# Patient Record
Sex: MALE | Race: WHITE | NOT HISPANIC OR LATINO | Employment: FULL TIME | URBAN - METROPOLITAN AREA
[De-identification: names, ages, dates, MRNs, and addresses within clinical notes are randomized per-mention and may not be internally consistent; named-entity substitution may affect disease eponyms.]

---

## 2019-08-09 ENCOUNTER — OFFICE VISIT (OUTPATIENT)
Dept: FAMILY MEDICINE | Facility: CLINIC | Age: 49
End: 2019-08-09
Payer: COMMERCIAL

## 2019-08-09 VITALS
TEMPERATURE: 98 F | SYSTOLIC BLOOD PRESSURE: 120 MMHG | BODY MASS INDEX: 25.62 KG/M2 | WEIGHT: 183 LBS | HEIGHT: 71 IN | DIASTOLIC BLOOD PRESSURE: 60 MMHG | OXYGEN SATURATION: 96 % | HEART RATE: 84 BPM

## 2019-08-09 DIAGNOSIS — Z72.0 TOBACCO ABUSE: ICD-10-CM

## 2019-08-09 DIAGNOSIS — Z00.00 ROUTINE PHYSICAL EXAMINATION: Primary | ICD-10-CM

## 2019-08-09 DIAGNOSIS — Z23 PNEUMOCOCCAL VACCINATION ADMINISTERED AT CURRENT VISIT: ICD-10-CM

## 2019-08-09 DIAGNOSIS — E78.5 HYPERLIPIDEMIA, UNSPECIFIED HYPERLIPIDEMIA TYPE: ICD-10-CM

## 2019-08-09 PROCEDURE — 99386 PREV VISIT NEW AGE 40-64: CPT | Mod: 25,S$GLB,, | Performed by: FAMILY MEDICINE

## 2019-08-09 PROCEDURE — 99999 PR PBB SHADOW E&M-NEW PATIENT-LVL III: CPT | Mod: PBBFAC,,, | Performed by: FAMILY MEDICINE

## 2019-08-09 PROCEDURE — 90471 IMMUNIZATION ADMIN: CPT | Mod: S$GLB,,, | Performed by: FAMILY MEDICINE

## 2019-08-09 PROCEDURE — 90471 PNEUMOCOCCAL POLYSACCHARIDE VACCINE 23-VALENT =>2YO SQ IM: ICD-10-PCS | Mod: S$GLB,,, | Performed by: FAMILY MEDICINE

## 2019-08-09 PROCEDURE — 90732 PPSV23 VACC 2 YRS+ SUBQ/IM: CPT | Mod: S$GLB,,, | Performed by: FAMILY MEDICINE

## 2019-08-09 PROCEDURE — 99999 PR PBB SHADOW E&M-NEW PATIENT-LVL III: ICD-10-PCS | Mod: PBBFAC,,, | Performed by: FAMILY MEDICINE

## 2019-08-09 PROCEDURE — 99386 PR PREVENTIVE VISIT,NEW,40-64: ICD-10-PCS | Mod: 25,S$GLB,, | Performed by: FAMILY MEDICINE

## 2019-08-09 PROCEDURE — 90732 PNEUMOCOCCAL POLYSACCHARIDE VACCINE 23-VALENT =>2YO SQ IM: ICD-10-PCS | Mod: S$GLB,,, | Performed by: FAMILY MEDICINE

## 2019-08-09 RX ORDER — ATORVASTATIN CALCIUM 10 MG/1
10 TABLET, FILM COATED ORAL DAILY
COMMUNITY
End: 2019-08-09 | Stop reason: SDUPTHER

## 2019-08-09 RX ORDER — CHOLECALCIFEROL (VITAMIN D3) 25 MCG
1000 TABLET ORAL DAILY
COMMUNITY
End: 2019-08-09

## 2019-08-09 RX ORDER — ATORVASTATIN CALCIUM 10 MG/1
10 TABLET, FILM COATED ORAL DAILY
Qty: 90 TABLET | Refills: 3 | Status: SHIPPED | OUTPATIENT
Start: 2019-08-09 | End: 2020-08-24

## 2019-08-09 NOTE — PROGRESS NOTES
[facility]  Progress Note    SUBJECTIVE:     Chief Complaint   Patient presents with    [facility]       HPI   Rubén Hu  is a 48 y.o. male here for routine physical exam. Patient has no other new complaints/problems at this time.      Review of patient's allergies indicates:   Allergen Reactions    Bactrim [sulfamethoxazole-trimethoprim] Hives       Past Medical History:   Diagnosis Date    Hyperlipidemia      Past Surgical History:   Procedure Laterality Date    EYE SURGERY       Family History   Problem Relation Age of Onset    Arthritis Mother     COPD Father      Social History     Tobacco Use    Smoking status: Current Some Day Smoker     Types: Cigars    Smokeless tobacco: Never Used   Substance Use Topics    Alcohol use: Yes    Drug use: Never        Review of Systems   Constitutional: Negative for chills, diaphoresis and fever.   HENT: Negative for congestion, ear pain and sore throat.    Eyes: Negative for photophobia and discharge.   Respiratory: Negative for cough, shortness of breath and wheezing.    Cardiovascular: Negative for chest pain and palpitations.   Gastrointestinal: Negative for abdominal pain, constipation, diarrhea, nausea and vomiting.   Genitourinary: Negative for dysuria and hematuria.   Musculoskeletal: Negative for back pain and myalgias.   Skin: Negative for itching and rash.   Neurological: Negative for dizziness, sensory change, focal weakness, weakness and headaches.   All other systems reviewed and are negative.    OBJECTIVE:     Vitals:    08/09/19 1403   BP: 120/60   Pulse: 84   Temp: 98 °F (36.7 °C)     Body mass index is 25.52 kg/m².    Physical Exam   Constitutional: He is oriented to person, place, and time and well-developed, well-nourished, and in no distress. No distress.   HENT:   Head: Normocephalic and atraumatic.   Right Ear: Tympanic membrane is not perforated, not erythematous and not bulging. No hemotympanum.   Left Ear: Tympanic  membrane is not perforated, not erythematous and not bulging. No hemotympanum.   Mouth/Throat: Oropharynx is clear and moist. No oropharyngeal exudate.   Eyes: Pupils are equal, round, and reactive to light. Conjunctivae and EOM are normal.   Neck: No thyromegaly present.   Cardiovascular: Normal rate, regular rhythm and normal heart sounds. Exam reveals no gallop and no friction rub.   No murmur heard.  Pulmonary/Chest: Effort normal and breath sounds normal. No respiratory distress. He has no wheezes. He has no rales.   Abdominal: Soft. Bowel sounds are normal. He exhibits no distension. There is no tenderness. There is no rebound and no guarding.   Musculoskeletal: Normal range of motion. He exhibits no edema or tenderness.   Lymphadenopathy:     He has no cervical adenopathy.   Neurological: He is alert and oriented to person, place, and time.   Skin: Skin is warm. No rash noted. He is not diaphoretic. No erythema.       Old records were reviewed. Labs and/or images were independently reviewed.    ASSESSMENT     1. Routine physical examination    2. Hyperlipidemia, unspecified hyperlipidemia type    3. Tobacco abuse    4. Pneumococcal vaccination administered at current visit        PLAN:     Routine physical examination  -     CBC auto differential; Future  -     Comprehensive metabolic panel; Future  -     Hemoglobin A1c; Future  -     Lipid panel; Future  -     TSH; Future  -     T4, free; Future  -     PSA, Screening; Future; Expected date: 08/09/2019  -     Vitamin D; Future; Expected date: 08/09/2019  -     We briefly discussed diet, exercise, and routine preventive exams. All questions and comments addressed.    Hyperlipidemia, unspecified hyperlipidemia type  -     atorvastatin (LIPITOR) 10 MG tablet; Take 1 tablet (10 mg total) by mouth once daily.  Dispense: 90 tablet; Refill: 3  -     Counseled patient about healthy diet, exercise habits, and to increase physical activity.    Tobacco abuse   -      Will quit on his own.    Pneumococcal vaccination administered at current visit  -     Pneumococcal Polysaccharide Vaccine (23 Valent) (SQ/IM)      RTC PRN    Bebeto Arora MD  08/09/2019 2:30 PM

## 2019-08-12 ENCOUNTER — LAB VISIT (OUTPATIENT)
Dept: LAB | Facility: HOSPITAL | Age: 49
End: 2019-08-12
Attending: FAMILY MEDICINE
Payer: COMMERCIAL

## 2019-08-12 ENCOUNTER — TELEPHONE (OUTPATIENT)
Dept: FAMILY MEDICINE | Facility: CLINIC | Age: 49
End: 2019-08-12

## 2019-08-12 DIAGNOSIS — E55.9 VITAMIN D DEFICIENCY: Primary | ICD-10-CM

## 2019-08-12 DIAGNOSIS — Z00.00 ROUTINE PHYSICAL EXAMINATION: ICD-10-CM

## 2019-08-12 LAB
25(OH)D3+25(OH)D2 SERPL-MCNC: 16 NG/ML (ref 30–96)
ALBUMIN SERPL BCP-MCNC: 4.1 G/DL (ref 3.5–5.2)
ALP SERPL-CCNC: 53 U/L (ref 55–135)
ALT SERPL W/O P-5'-P-CCNC: 29 U/L (ref 10–44)
ANION GAP SERPL CALC-SCNC: 6 MMOL/L (ref 8–16)
AST SERPL-CCNC: 22 U/L (ref 10–40)
BASOPHILS # BLD AUTO: 0.08 K/UL (ref 0–0.2)
BASOPHILS NFR BLD: 0.7 % (ref 0–1.9)
BILIRUB SERPL-MCNC: 0.3 MG/DL (ref 0.1–1)
BUN SERPL-MCNC: 13 MG/DL (ref 6–20)
CALCIUM SERPL-MCNC: 9.4 MG/DL (ref 8.7–10.5)
CHLORIDE SERPL-SCNC: 105 MMOL/L (ref 95–110)
CHOLEST SERPL-MCNC: 139 MG/DL (ref 120–199)
CHOLEST/HDLC SERPL: 3 {RATIO} (ref 2–5)
CO2 SERPL-SCNC: 28 MMOL/L (ref 23–29)
COMPLEXED PSA SERPL-MCNC: 1.7 NG/ML (ref 0–4)
CREAT SERPL-MCNC: 0.8 MG/DL (ref 0.5–1.4)
DIFFERENTIAL METHOD: ABNORMAL
EOSINOPHIL # BLD AUTO: 0.3 K/UL (ref 0–0.5)
EOSINOPHIL NFR BLD: 2.3 % (ref 0–8)
ERYTHROCYTE [DISTWIDTH] IN BLOOD BY AUTOMATED COUNT: 14.3 % (ref 11.5–14.5)
EST. GFR  (AFRICAN AMERICAN): >60 ML/MIN/1.73 M^2
EST. GFR  (NON AFRICAN AMERICAN): >60 ML/MIN/1.73 M^2
ESTIMATED AVG GLUCOSE: 105 MG/DL (ref 68–131)
GLUCOSE SERPL-MCNC: 89 MG/DL (ref 70–110)
HBA1C MFR BLD HPLC: 5.3 % (ref 4–5.6)
HCT VFR BLD AUTO: 44.3 % (ref 40–54)
HDLC SERPL-MCNC: 47 MG/DL (ref 40–75)
HDLC SERPL: 33.8 % (ref 20–50)
HGB BLD-MCNC: 14.1 G/DL (ref 14–18)
IMM GRANULOCYTES # BLD AUTO: 0.07 K/UL (ref 0–0.04)
IMM GRANULOCYTES NFR BLD AUTO: 0.6 % (ref 0–0.5)
LDLC SERPL CALC-MCNC: 66.8 MG/DL (ref 63–159)
LYMPHOCYTES # BLD AUTO: 2.6 K/UL (ref 1–4.8)
LYMPHOCYTES NFR BLD: 23.3 % (ref 18–48)
MCH RBC QN AUTO: 31.4 PG (ref 27–31)
MCHC RBC AUTO-ENTMCNC: 31.8 G/DL (ref 32–36)
MCV RBC AUTO: 99 FL (ref 82–98)
MONOCYTES # BLD AUTO: 1.1 K/UL (ref 0.3–1)
MONOCYTES NFR BLD: 9.4 % (ref 4–15)
NEUTROPHILS # BLD AUTO: 7.2 K/UL (ref 1.8–7.7)
NEUTROPHILS NFR BLD: 63.7 % (ref 38–73)
NONHDLC SERPL-MCNC: 92 MG/DL
NRBC BLD-RTO: 0 /100 WBC
PLATELET # BLD AUTO: 548 K/UL (ref 150–350)
PMV BLD AUTO: 11.3 FL (ref 9.2–12.9)
POTASSIUM SERPL-SCNC: 3.9 MMOL/L (ref 3.5–5.1)
PROT SERPL-MCNC: 7.1 G/DL (ref 6–8.4)
RBC # BLD AUTO: 4.49 M/UL (ref 4.6–6.2)
SODIUM SERPL-SCNC: 139 MMOL/L (ref 136–145)
T4 FREE SERPL-MCNC: 0.71 NG/DL (ref 0.71–1.51)
TRIGL SERPL-MCNC: 126 MG/DL (ref 30–150)
TSH SERPL DL<=0.005 MIU/L-ACNC: 0.96 UIU/ML (ref 0.4–4)
WBC # BLD AUTO: 11.27 K/UL (ref 3.9–12.7)

## 2019-08-12 PROCEDURE — 82306 VITAMIN D 25 HYDROXY: CPT

## 2019-08-12 PROCEDURE — 85025 COMPLETE CBC W/AUTO DIFF WBC: CPT

## 2019-08-12 PROCEDURE — 83036 HEMOGLOBIN GLYCOSYLATED A1C: CPT

## 2019-08-12 PROCEDURE — 84153 ASSAY OF PSA TOTAL: CPT

## 2019-08-12 PROCEDURE — 36415 COLL VENOUS BLD VENIPUNCTURE: CPT | Mod: PO

## 2019-08-12 PROCEDURE — 84443 ASSAY THYROID STIM HORMONE: CPT

## 2019-08-12 PROCEDURE — 84439 ASSAY OF FREE THYROXINE: CPT

## 2019-08-12 PROCEDURE — 80053 COMPREHEN METABOLIC PANEL: CPT

## 2019-08-12 PROCEDURE — 80061 LIPID PANEL: CPT

## 2019-08-12 RX ORDER — ERGOCALCIFEROL 1.25 MG/1
50000 CAPSULE ORAL
Qty: 12 CAPSULE | Refills: 3 | Status: SHIPPED | OUTPATIENT
Start: 2019-08-12 | End: 2020-07-13 | Stop reason: SDUPTHER

## 2019-08-14 ENCOUNTER — PATIENT OUTREACH (OUTPATIENT)
Dept: ADMINISTRATIVE | Facility: HOSPITAL | Age: 49
End: 2019-08-14

## 2020-07-11 DIAGNOSIS — E55.9 VITAMIN D DEFICIENCY: ICD-10-CM

## 2020-07-11 DIAGNOSIS — E78.5 HYPERLIPIDEMIA, UNSPECIFIED HYPERLIPIDEMIA TYPE: Primary | ICD-10-CM

## 2020-07-12 RX ORDER — ERGOCALCIFEROL 1.25 MG/1
CAPSULE ORAL
Qty: 12 CAPSULE | Refills: 3 | OUTPATIENT
Start: 2020-07-12

## 2020-07-12 NOTE — PROGRESS NOTES
Refill Routing Note     Medication(s) are not appropriate for processing by [facility]:    Medication Outside of Protocol    Appointments  past 12m or future 3m with PCP    Date Provider   Last Visit   8/9/2019 Bebeto Arora MD   Next Visit   Visit date not found Bebeto Arora MD           Automatic Epic Protocol Generated Data:    Requested Prescriptions   Pending Prescriptions Disp Refills    ergocalciferol (ERGOCALCIFEROL) 50,000 unit Cap [Pharmacy Med Name: VITAMIN D2 50,000IU (ERGO) CAP RX] 12 capsule 3     Sig: TAKE 1 CAPSULE BY MOUTH ONCE WEEKLY       Endocrinology:  Vitamins - Vitamin D Supplementation Failed - 7/11/2020  5:12 PM        Failed - Vitamin D is 20 or above and within 360 days     Vit D, 25-Hydroxy   Date Value Ref Range Status   08/12/2019 16 (L) 30 - 96 ng/mL Final     Comment:     Vitamin D deficiency.........<10 ng/mL                              Vitamin D insufficiency......10-29 ng/mL       Vitamin D sufficiency........> or equal to 30 ng/mL  Vitamin D toxicity............>100 ng/mL                Passed - Patient is at least 18 years old        Passed - Hypercalcemia is not present on problem list        Passed - Office visit in past 12 months or future 90 days.     Recent Outpatient Visits            11 months ago Routine physical examination    [facility] Bebeto Arora MD                    Passed - Ca in normal range and within 360 days     Calcium   Date Value Ref Range Status   08/12/2019 9.4 8.7 - 10.5 mg/dL Final                    Note composed:10:20 PM 07/11/2020

## 2020-07-13 RX ORDER — ERGOCALCIFEROL 1.25 MG/1
50000 CAPSULE ORAL
Qty: 12 CAPSULE | Refills: 0 | Status: SHIPPED | OUTPATIENT
Start: 2020-07-13 | End: 2020-10-02

## 2020-07-13 NOTE — TELEPHONE ENCOUNTER
Please note denial of medication.    Requested Prescriptions     Refused Prescriptions Disp Refills    ergocalciferol (ERGOCALCIFEROL) 50,000 unit Cap [Pharmacy Med Name: VITAMIN D2 50,000IU (ERGO) CAP RX] 12 capsule 3     Sig: TAKE 1 CAPSULE BY MOUTH ONCE WEEKLY     Refused By: ELOISA RAYMOND     Reason for Refusal: Patient needs an appointment         Thanks

## 2020-07-13 NOTE — TELEPHONE ENCOUNTER
Provider Staff:     Please schedule patient for Annual and Labs (CMP, Lipid)    Please also check with your provider if any further labs need to be added and scheduled together.    Thanks!  [facility]     Appointments  past 12m or future 3m with PCP    Date Provider   Last Visit   8/9/2019 Bebeto Arora MD   Next Visit   Visit date not found Bebeto Arora MD     Note composed:8:34 AM 07/13/2020

## 2020-07-13 NOTE — TELEPHONE ENCOUNTER
Spoke with the patient and scheduled his physical and fasting labs.  Patient is asking can a one month refill be sent to the pharmacy he is completely out.  Please advise      Will this patient need any other labs, other then the 2 I scheduled?

## 2020-07-13 NOTE — TELEPHONE ENCOUNTER
Spoke with the patient and inform him of the medication approval.  Patient verbalized understandings.

## 2020-07-30 ENCOUNTER — LAB VISIT (OUTPATIENT)
Dept: LAB | Facility: HOSPITAL | Age: 50
End: 2020-07-30
Attending: FAMILY MEDICINE
Payer: COMMERCIAL

## 2020-07-30 DIAGNOSIS — E78.5 HYPERLIPIDEMIA, UNSPECIFIED HYPERLIPIDEMIA TYPE: ICD-10-CM

## 2020-07-30 LAB
ALBUMIN SERPL BCP-MCNC: 4.4 G/DL (ref 3.5–5.2)
ALP SERPL-CCNC: 62 U/L (ref 55–135)
ALT SERPL W/O P-5'-P-CCNC: 43 U/L (ref 10–44)
ANION GAP SERPL CALC-SCNC: 8 MMOL/L (ref 8–16)
AST SERPL-CCNC: 27 U/L (ref 10–40)
BILIRUB SERPL-MCNC: 0.4 MG/DL (ref 0.1–1)
BUN SERPL-MCNC: 18 MG/DL (ref 6–20)
CALCIUM SERPL-MCNC: 9.7 MG/DL (ref 8.7–10.5)
CHLORIDE SERPL-SCNC: 105 MMOL/L (ref 95–110)
CHOLEST SERPL-MCNC: 169 MG/DL (ref 120–199)
CHOLEST/HDLC SERPL: 3.3 {RATIO} (ref 2–5)
CO2 SERPL-SCNC: 25 MMOL/L (ref 23–29)
CREAT SERPL-MCNC: 0.8 MG/DL (ref 0.5–1.4)
EST. GFR  (AFRICAN AMERICAN): >60 ML/MIN/1.73 M^2
EST. GFR  (NON AFRICAN AMERICAN): >60 ML/MIN/1.73 M^2
GLUCOSE SERPL-MCNC: 87 MG/DL (ref 70–110)
HDLC SERPL-MCNC: 51 MG/DL (ref 40–75)
HDLC SERPL: 30.2 % (ref 20–50)
LDLC SERPL CALC-MCNC: 88.2 MG/DL (ref 63–159)
NONHDLC SERPL-MCNC: 118 MG/DL
POTASSIUM SERPL-SCNC: 4.1 MMOL/L (ref 3.5–5.1)
PROT SERPL-MCNC: 7.8 G/DL (ref 6–8.4)
SODIUM SERPL-SCNC: 138 MMOL/L (ref 136–145)
TRIGL SERPL-MCNC: 149 MG/DL (ref 30–150)

## 2020-07-30 PROCEDURE — 80061 LIPID PANEL: CPT

## 2020-07-30 PROCEDURE — 36415 COLL VENOUS BLD VENIPUNCTURE: CPT | Mod: PO

## 2020-07-30 PROCEDURE — 80053 COMPREHEN METABOLIC PANEL: CPT

## 2020-08-11 ENCOUNTER — OFFICE VISIT (OUTPATIENT)
Dept: FAMILY MEDICINE | Facility: CLINIC | Age: 50
End: 2020-08-11
Payer: COMMERCIAL

## 2020-08-11 VITALS
SYSTOLIC BLOOD PRESSURE: 104 MMHG | OXYGEN SATURATION: 97 % | WEIGHT: 189.69 LBS | BODY MASS INDEX: 26.56 KG/M2 | DIASTOLIC BLOOD PRESSURE: 80 MMHG | HEART RATE: 82 BPM | HEIGHT: 71 IN | TEMPERATURE: 98 F

## 2020-08-11 DIAGNOSIS — Z00.00 ROUTINE PHYSICAL EXAMINATION: Primary | ICD-10-CM

## 2020-08-11 DIAGNOSIS — Z23 NEED FOR SHINGLES VACCINE: ICD-10-CM

## 2020-08-11 PROBLEM — Z72.0 TOBACCO ABUSE: Chronic | Status: ACTIVE | Noted: 2019-08-09

## 2020-08-11 PROBLEM — E78.5 HYPERLIPIDEMIA: Chronic | Status: ACTIVE | Noted: 2019-08-09

## 2020-08-11 PROCEDURE — 90750 ZOSTER RECOMBINANT VACCINE: ICD-10-PCS | Mod: S$GLB,,, | Performed by: FAMILY MEDICINE

## 2020-08-11 PROCEDURE — 99999 PR PBB SHADOW E&M-EST. PATIENT-LVL III: ICD-10-PCS | Mod: PBBFAC,,, | Performed by: FAMILY MEDICINE

## 2020-08-11 PROCEDURE — 90471 ZOSTER RECOMBINANT VACCINE: ICD-10-PCS | Mod: S$GLB,,, | Performed by: FAMILY MEDICINE

## 2020-08-11 PROCEDURE — 90471 IMMUNIZATION ADMIN: CPT | Mod: S$GLB,,, | Performed by: FAMILY MEDICINE

## 2020-08-11 PROCEDURE — 3008F PR BODY MASS INDEX (BMI) DOCUMENTED: ICD-10-PCS | Mod: CPTII,S$GLB,, | Performed by: FAMILY MEDICINE

## 2020-08-11 PROCEDURE — 99999 PR PBB SHADOW E&M-EST. PATIENT-LVL III: CPT | Mod: PBBFAC,,, | Performed by: FAMILY MEDICINE

## 2020-08-11 PROCEDURE — 99396 PREV VISIT EST AGE 40-64: CPT | Mod: 25,S$GLB,, | Performed by: FAMILY MEDICINE

## 2020-08-11 PROCEDURE — 99396 PR PREVENTIVE VISIT,EST,40-64: ICD-10-PCS | Mod: 25,S$GLB,, | Performed by: FAMILY MEDICINE

## 2020-08-11 PROCEDURE — 3008F BODY MASS INDEX DOCD: CPT | Mod: CPTII,S$GLB,, | Performed by: FAMILY MEDICINE

## 2020-08-11 PROCEDURE — 90750 HZV VACC RECOMBINANT IM: CPT | Mod: S$GLB,,, | Performed by: FAMILY MEDICINE

## 2020-08-11 NOTE — PROGRESS NOTES
[facility]  Progress Note    SUBJECTIVE:     Chief Complaint   Patient presents with    Annual Exam       HPI   Rubén Hu  is a 49 y.o. male here for routine physical exam. Patient has no other new complaints/problems at this time.      Review of patient's allergies indicates:   Allergen Reactions    Bactrim [sulfamethoxazole-trimethoprim] Hives       Past Medical History:   Diagnosis Date    Hyperlipidemia      Past Surgical History:   Procedure Laterality Date    EYE SURGERY       Family History   Problem Relation Age of Onset    Arthritis Mother     COPD Father      Social History     Tobacco Use    Smoking status: Current Some Day Smoker     Types: Cigars    Smokeless tobacco: Never Used   Substance Use Topics    Alcohol use: Yes    Drug use: Never        Review of Systems   Constitutional: Negative for chills, diaphoresis and fever.   HENT: Negative for congestion, ear pain and sore throat.    Eyes: Negative for photophobia and discharge.   Respiratory: Negative for cough, shortness of breath and wheezing.    Cardiovascular: Negative for chest pain and palpitations.   Gastrointestinal: Negative for abdominal pain, constipation, diarrhea, nausea and vomiting.   Genitourinary: Negative for dysuria and hematuria.   Musculoskeletal: Negative for back pain and myalgias.   Skin: Negative for itching and rash.   Neurological: Negative for dizziness, sensory change, focal weakness, weakness and headaches.   All other systems reviewed and are negative.    OBJECTIVE:     Vitals:    08/11/20 1528   BP: 104/80   Pulse: 82   Temp: 97.7 °F (36.5 °C)     Body mass index is 26.46 kg/m².    Physical Exam   Constitutional: He is oriented to person, place, and time and well-developed, well-nourished, and in no distress. No distress.   HENT:   Head: Normocephalic and atraumatic.   Right Ear: Tympanic membrane is not perforated, not erythematous and not bulging. No hemotympanum.   Left Ear: Tympanic  membrane is not perforated, not erythematous and not bulging. No hemotympanum.   Mouth/Throat: Oropharynx is clear and moist. No oropharyngeal exudate.   Eyes: Pupils are equal, round, and reactive to light. Conjunctivae and EOM are normal.   Neck: No thyromegaly present.   Cardiovascular: Normal rate, regular rhythm and normal heart sounds. Exam reveals no gallop and no friction rub.   No murmur heard.  Pulmonary/Chest: Effort normal and breath sounds normal. No respiratory distress. He has no wheezes. He has no rales.   Abdominal: Soft. Bowel sounds are normal. He exhibits no distension. There is no abdominal tenderness. There is no rebound and no guarding.   Musculoskeletal: Normal range of motion.         General: No tenderness or edema.   Lymphadenopathy:     He has no cervical adenopathy.   Neurological: He is alert and oriented to person, place, and time.   Skin: Skin is warm. No rash noted. He is not diaphoretic. No erythema.       Old records were reviewed. Labs and/or images were independently reviewed.    ASSESSMENT     1. Routine physical examination    2. Need for shingles vaccine        PLAN:     Routine physical examination  -     CBC auto differential; Future  -     Hemoglobin A1C; Future  -     Comprehensive metabolic panel; Future  -     Lipid Panel; Future  -     TSH; Future  -     T4, free; Future  -     PSA, Screening; Future; Expected date: 08/11/2020  -     Vitamin D; Future; Expected date: 08/11/2020  -     We briefly discussed diet, exercise, and routine preventive exams. All questions and comments addressed.    Need for shingles vaccine  -     Zoster Recombinant Vaccine      RTC PRN    Bebeto Arora MD  08/11/2020 3:42 PM

## 2020-08-14 DIAGNOSIS — Z11.59 NEED FOR HEPATITIS C SCREENING TEST: ICD-10-CM

## 2020-08-23 DIAGNOSIS — E78.5 HYPERLIPIDEMIA, UNSPECIFIED HYPERLIPIDEMIA TYPE: ICD-10-CM

## 2020-08-24 RX ORDER — ATORVASTATIN CALCIUM 10 MG/1
TABLET, FILM COATED ORAL
Qty: 90 TABLET | Refills: 3 | Status: SHIPPED | OUTPATIENT
Start: 2020-08-24 | End: 2021-03-10 | Stop reason: SDUPTHER

## 2020-08-25 NOTE — PROGRESS NOTES
Refill Authorization Note     is requesting a refill authorization.    Brief assessment and rationale for refill: APPROVE: prr               Medication reconciliation completed: No                         Comments:   Automatic Epic Protocol Generated Data:    Requested Prescriptions   Signed Prescriptions Disp Refills    atorvastatin (LIPITOR) 10 MG tablet 90 tablet 3     Sig: TAKE 1 TABLET BY MOUTH EVERY NIGHT AT BEDTIME       Cardiovascular:  Antilipid - Statins Passed - 8/23/2020  5:35 PM        Passed - Patient is at least 18 years old        Passed - Office visit in past 12 months or future 90 days.     Recent Outpatient Visits            1 week ago Routine physical examination    [facility] Bebeto Arora MD    1 year ago Routine physical examination    [facility] Bebeto Arora MD          Future Appointments              In 2 months NURSE, [facility]                Passed - Lipid Panel completed in last 360 days     Lab Results   Component Value Date    CHOL 169 07/30/2020    HDL 51 07/30/2020    LDLCALC 88.2 07/30/2020    TRIG 149 07/30/2020             Passed - ALT is 94 or below and within 360 days     ALT   Date Value Ref Range Status   07/30/2020 43 10 - 44 U/L Final   08/12/2019 29 10 - 44 U/L Final              Passed - AST is 54 or below and within 360 days     AST   Date Value Ref Range Status   07/30/2020 27 10 - 40 U/L Final   08/12/2019 22 10 - 40 U/L Final                    Appointments  past 12m or future 3m with PCP    Date Provider   Last Visit   8/11/2020 Bebeto Arora MD   Next Visit   Visit date not found Bebeto Arora MD   ED visits in past 90 days: 0     Note composed:10:23 PM 08/24/2020

## 2020-10-02 DIAGNOSIS — E55.9 VITAMIN D DEFICIENCY: ICD-10-CM

## 2020-10-02 RX ORDER — ERGOCALCIFEROL 1.25 MG/1
CAPSULE ORAL
Qty: 12 CAPSULE | Refills: 3 | Status: SHIPPED | OUTPATIENT
Start: 2020-10-02 | End: 2021-03-10 | Stop reason: SDUPTHER

## 2020-10-02 NOTE — PROGRESS NOTES
Refill Routing Note   Medication(s) are not appropriate for processing by [facility] for the following reason(s)   - Outside of Protocol    Medication reconciliation completed: No   Automatic Epic Protocol Generated Data:    Requested Prescriptions   Pending Prescriptions Disp Refills    ergocalciferol (ERGOCALCIFEROL) 50,000 unit Cap [Pharmacy Med Name: VITAMIN D2 50,000IU (ERGO) CAP RX] 12 capsule 0     Sig: TAKE 1 CAPSULE BY MOUTH EVERY 7 DAYS       There is no refill protocol information for this order           Appointments     Date Provider   Last Visit   8/11/2020 Bebeto Arora MD   Next Visit   Visit date not found Bebeto Arora MD   ED visits in past 90 days: 0    Note composed:8:54 AM 10/02/2020

## 2020-12-03 ENCOUNTER — CLINICAL SUPPORT (OUTPATIENT)
Dept: FAMILY MEDICINE | Facility: CLINIC | Age: 50
End: 2020-12-03
Payer: COMMERCIAL

## 2020-12-03 DIAGNOSIS — Z23 NEED FOR SHINGLES VACCINE: Primary | ICD-10-CM

## 2020-12-03 PROCEDURE — 99499 UNLISTED E&M SERVICE: CPT | Mod: S$GLB,,, | Performed by: FAMILY MEDICINE

## 2020-12-03 PROCEDURE — 99499 NO LOS: ICD-10-PCS | Mod: S$GLB,,, | Performed by: FAMILY MEDICINE

## 2020-12-03 PROCEDURE — 90471 IMMUNIZATION ADMIN: CPT | Mod: S$GLB,,, | Performed by: FAMILY MEDICINE

## 2020-12-03 PROCEDURE — 90750 HZV VACC RECOMBINANT IM: CPT | Mod: S$GLB,,, | Performed by: FAMILY MEDICINE

## 2020-12-03 PROCEDURE — 90471 ZOSTER RECOMBINANT VACCINE: ICD-10-PCS | Mod: S$GLB,,, | Performed by: FAMILY MEDICINE

## 2020-12-03 PROCEDURE — 90750 ZOSTER RECOMBINANT VACCINE: ICD-10-PCS | Mod: S$GLB,,, | Performed by: FAMILY MEDICINE

## 2020-12-03 NOTE — PROGRESS NOTES
SHINGRIX dose#2,Lot# [id number],  Given IM in Left Deltoid, tolerated well. Patient instructed to waited 15 minutes ,VIS form given

## 2020-12-18 ENCOUNTER — TELEPHONE (OUTPATIENT)
Dept: FAMILY MEDICINE | Facility: CLINIC | Age: 50
End: 2020-12-18

## 2020-12-18 NOTE — TELEPHONE ENCOUNTER
----- Message from Tessie Cleary sent at 12/18/2020 12:34 PM CST -----  Regarding: Pharmacy Change  Contact: Rubén  Type: Patient Call Back    Who called:Rubén    What is the request in detail:[facility] - [place], [place] - [address] [phone] (Phone)  [phone] (Fax)    Can the clinic reply by [facility]?    Would the patient rather a call back or a response via [facility]? Call    Best call back number:[phone]    Additional Information:Going forward, please send all medications and refills to pharmacy listed above

## 2020-12-19 ENCOUNTER — TELEPHONE (OUTPATIENT)
Dept: FAMILY MEDICINE | Facility: CLINIC | Age: 50
End: 2020-12-19

## 2020-12-19 NOTE — TELEPHONE ENCOUNTER
----- Message from Tessie Huff sent at 12/18/2020 12:58 PM CST -----  Regarding: self [phone]  .Type:  Patient Returning Call    Who Called: self     Who Left Message for Patient: Eleni Jorge    Does the patient know what this is regarding?: A call back     Would the patient rather a call back or a response via [facility]? Call back     Best Call Back Number:[phone]

## 2020-12-19 NOTE — TELEPHONE ENCOUNTER
Spoke with patient and he informed me that due to his new insurance; all medications [facility]. This was update to reflex patient's request.

## 2021-03-10 DIAGNOSIS — E78.5 HYPERLIPIDEMIA, UNSPECIFIED HYPERLIPIDEMIA TYPE: ICD-10-CM

## 2021-03-10 DIAGNOSIS — E55.9 VITAMIN D DEFICIENCY: ICD-10-CM

## 2021-03-10 RX ORDER — ERGOCALCIFEROL 1.25 MG/1
50000 CAPSULE ORAL
Qty: 12 CAPSULE | Refills: 1 | Status: SHIPPED | OUTPATIENT
Start: 2021-03-10 | End: 2021-09-19

## 2021-03-10 RX ORDER — ATORVASTATIN CALCIUM 10 MG/1
10 TABLET, FILM COATED ORAL NIGHTLY
Qty: 90 TABLET | Refills: 1 | Status: SHIPPED | OUTPATIENT
Start: 2021-03-10 | End: 2021-09-17

## 2021-08-09 ENCOUNTER — TELEPHONE (OUTPATIENT)
Dept: FAMILY MEDICINE | Facility: CLINIC | Age: 51
End: 2021-08-09

## 2021-09-16 DIAGNOSIS — E78.5 HYPERLIPIDEMIA, UNSPECIFIED HYPERLIPIDEMIA TYPE: ICD-10-CM

## 2021-09-16 DIAGNOSIS — E55.9 VITAMIN D DEFICIENCY: ICD-10-CM

## 2021-09-17 RX ORDER — ATORVASTATIN CALCIUM 10 MG/1
TABLET, FILM COATED ORAL
Qty: 90 TABLET | Refills: 0 | Status: SHIPPED | OUTPATIENT
Start: 2021-09-17 | End: 2021-12-21

## 2021-09-19 RX ORDER — ERGOCALCIFEROL 1.25 MG/1
CAPSULE ORAL
Qty: 12 CAPSULE | Refills: 0 | Status: SHIPPED | OUTPATIENT
Start: 2021-09-19 | End: 2021-12-21 | Stop reason: SDUPTHER

## 2021-12-20 DIAGNOSIS — E55.9 VITAMIN D DEFICIENCY: ICD-10-CM

## 2021-12-20 DIAGNOSIS — E78.5 HYPERLIPIDEMIA, UNSPECIFIED HYPERLIPIDEMIA TYPE: ICD-10-CM

## 2021-12-21 RX ORDER — ATORVASTATIN CALCIUM 10 MG/1
TABLET, FILM COATED ORAL
Qty: 90 TABLET | Refills: 0 | Status: SHIPPED | OUTPATIENT
Start: 2021-12-21 | End: 2022-04-21 | Stop reason: SDUPTHER

## 2021-12-21 RX ORDER — ERGOCALCIFEROL 1.25 MG/1
50000 CAPSULE ORAL
Qty: 12 CAPSULE | Refills: 0 | Status: SHIPPED | OUTPATIENT
Start: 2021-12-21 | End: 2022-03-07

## 2022-01-19 ENCOUNTER — OFFICE VISIT (OUTPATIENT)
Dept: FAMILY MEDICINE | Facility: CLINIC | Age: 52
End: 2022-01-19
Payer: COMMERCIAL

## 2022-01-19 VITALS
HEIGHT: 71 IN | DIASTOLIC BLOOD PRESSURE: 80 MMHG | OXYGEN SATURATION: 98 % | WEIGHT: 193 LBS | HEART RATE: 79 BPM | SYSTOLIC BLOOD PRESSURE: 138 MMHG | TEMPERATURE: 98 F | BODY MASS INDEX: 27.02 KG/M2

## 2022-01-19 DIAGNOSIS — K64.9 HEMORRHOIDS, UNSPECIFIED HEMORRHOID TYPE: ICD-10-CM

## 2022-01-19 DIAGNOSIS — Z23 NEED FOR TDAP VACCINATION: ICD-10-CM

## 2022-01-19 DIAGNOSIS — Z00.00 ROUTINE PHYSICAL EXAMINATION: Primary | ICD-10-CM

## 2022-01-19 DIAGNOSIS — E78.5 HYPERLIPIDEMIA, UNSPECIFIED HYPERLIPIDEMIA TYPE: Chronic | ICD-10-CM

## 2022-01-19 PROCEDURE — 3008F BODY MASS INDEX DOCD: CPT | Mod: CPTII,S$GLB,, | Performed by: FAMILY MEDICINE

## 2022-01-19 PROCEDURE — 1159F PR MEDICATION LIST DOCUMENTED IN MEDICAL RECORD: ICD-10-PCS | Mod: CPTII,S$GLB,, | Performed by: FAMILY MEDICINE

## 2022-01-19 PROCEDURE — 3075F PR MOST RECENT SYSTOLIC BLOOD PRESS GE 130-139MM HG: ICD-10-PCS | Mod: CPTII,S$GLB,, | Performed by: FAMILY MEDICINE

## 2022-01-19 PROCEDURE — 90715 TDAP VACCINE GREATER THAN OR EQUAL TO 7YO IM: ICD-10-PCS | Mod: S$GLB,,, | Performed by: FAMILY MEDICINE

## 2022-01-19 PROCEDURE — 90471 IMMUNIZATION ADMIN: CPT | Mod: S$GLB,,, | Performed by: FAMILY MEDICINE

## 2022-01-19 PROCEDURE — 90471 TDAP VACCINE GREATER THAN OR EQUAL TO 7YO IM: ICD-10-PCS | Mod: S$GLB,,, | Performed by: FAMILY MEDICINE

## 2022-01-19 PROCEDURE — 90715 TDAP VACCINE 7 YRS/> IM: CPT | Mod: S$GLB,,, | Performed by: FAMILY MEDICINE

## 2022-01-19 PROCEDURE — 3079F DIAST BP 80-89 MM HG: CPT | Mod: CPTII,S$GLB,, | Performed by: FAMILY MEDICINE

## 2022-01-19 PROCEDURE — 99396 PREV VISIT EST AGE 40-64: CPT | Mod: S$GLB,,, | Performed by: FAMILY MEDICINE

## 2022-01-19 PROCEDURE — 99999 PR PBB SHADOW E&M-EST. PATIENT-LVL III: CPT | Mod: PBBFAC,,, | Performed by: FAMILY MEDICINE

## 2022-01-19 PROCEDURE — 3075F SYST BP GE 130 - 139MM HG: CPT | Mod: CPTII,S$GLB,, | Performed by: FAMILY MEDICINE

## 2022-01-19 PROCEDURE — 1159F MED LIST DOCD IN RCRD: CPT | Mod: CPTII,S$GLB,, | Performed by: FAMILY MEDICINE

## 2022-01-19 PROCEDURE — 99999 PR PBB SHADOW E&M-EST. PATIENT-LVL III: ICD-10-PCS | Mod: PBBFAC,,, | Performed by: FAMILY MEDICINE

## 2022-01-19 PROCEDURE — 99396 PR PREVENTIVE VISIT,EST,40-64: ICD-10-PCS | Mod: S$GLB,,, | Performed by: FAMILY MEDICINE

## 2022-01-19 PROCEDURE — 3008F PR BODY MASS INDEX (BMI) DOCUMENTED: ICD-10-PCS | Mod: CPTII,S$GLB,, | Performed by: FAMILY MEDICINE

## 2022-01-19 PROCEDURE — 3079F PR MOST RECENT DIASTOLIC BLOOD PRESSURE 80-89 MM HG: ICD-10-PCS | Mod: CPTII,S$GLB,, | Performed by: FAMILY MEDICINE

## 2022-01-19 NOTE — PROGRESS NOTES
[facility]  Progress Note    SUBJECTIVE:     Chief Complaint   Patient presents with    Annual Exam       HPI   Rubén Hu  is a 51 y.o. male here for routine physical exam. Patient has no other new complaints/problems at this time.      Review of patient's allergies indicates:   Allergen Reactions    Bactrim [sulfamethoxazole-trimethoprim] Hives       Past Medical History:   Diagnosis Date    Hyperlipidemia      Past Surgical History:   Procedure Laterality Date    EYE SURGERY       Family History   Problem Relation Age of Onset    Arthritis Mother     COPD Father      Social History     Tobacco Use    Smoking status: Former Smoker     Types: Cigars    Smokeless tobacco: Never Used   Substance Use Topics    Alcohol use: Yes    Drug use: Never        Review of Systems   Constitutional: Negative for chills, diaphoresis and fever.   HENT: Negative for congestion, ear pain and sore throat.    Eyes: Negative for photophobia and discharge.   Respiratory: Negative for cough, shortness of breath and wheezing.    Cardiovascular: Negative for chest pain and palpitations.   Gastrointestinal: Negative for abdominal pain, constipation, diarrhea, nausea and vomiting.   Genitourinary: Negative for dysuria and hematuria.   Musculoskeletal: Negative for back pain and myalgias.   Skin: Negative for itching and rash.   Neurological: Negative for dizziness, sensory change, focal weakness, weakness and headaches.   All other systems reviewed and are negative.    OBJECTIVE:     Vitals:    01/19/22 1601   BP: 138/80   Pulse: 79   Temp: 98.1 °F (36.7 °C)     Body mass index is 26.92 kg/m².    Physical Exam  Constitutional:       General: He is not in acute distress.     Appearance: He is not diaphoretic.   HENT:      Head: Normocephalic and atraumatic.      Right Ear: Tympanic membrane and ear canal normal. No hemotympanum. Tympanic membrane is not perforated, erythematous or bulging.      Left Ear: Tympanic  membrane and ear canal normal. No hemotympanum. Tympanic membrane is not perforated, erythematous or bulging.      Mouth/Throat:      Pharynx: No oropharyngeal exudate.   Eyes:      Conjunctiva/sclera: Conjunctivae normal.      Pupils: Pupils are equal, round, and reactive to light.   Neck:      Thyroid: No thyromegaly.   Cardiovascular:      Rate and Rhythm: Normal rate and regular rhythm.      Heart sounds: Normal heart sounds. No murmur heard.  No friction rub. No gallop.    Pulmonary:      Effort: Pulmonary effort is normal. No respiratory distress.      Breath sounds: Normal breath sounds. No wheezing or rales.   Abdominal:      General: Bowel sounds are normal. There is no distension.      Palpations: Abdomen is soft.      Tenderness: There is no abdominal tenderness. There is no guarding or rebound.   Musculoskeletal:         General: No tenderness. Normal range of motion.   Lymphadenopathy:      Cervical: No cervical adenopathy.   Skin:     General: Skin is warm.      Findings: No erythema or rash.   Neurological:      Mental Status: He is alert and oriented to person, place, and time.         Old records were reviewed. Labs and/or images were independently reviewed.    ASSESSMENT     1. Routine physical examination    2. Hyperlipidemia, unspecified hyperlipidemia type    3. Hemorrhoids, unspecified hemorrhoid type    4. Need for Tdap vaccination        PLAN:     Routine physical examination  -     CBC Auto Differential; Future  -     Comprehensive Metabolic Panel; Future  -     Hemoglobin A1C; Future  -     Lipid Panel; Future  -     TSH; Future  -     T4, Free; Future  -     We briefly discussed diet, exercise, and routine preventive exams. All questions and comments addressed.    Hyperlipidemia, unspecified hyperlipidemia type   -     Counseled patient about healthy diet, exercise habits, and to increase physical activity.    Hemorrhoids, unspecified hemorrhoid type   -      Increase fiber intake.  Continue with current regimen. F/u with colorectal as scheduled.    Need for Tdap vaccination  -     Tdap Vaccine      RTC PRN    Bebeto Arora MD  01/19/2022 4:23 PM

## 2022-01-22 ENCOUNTER — LAB VISIT (OUTPATIENT)
Dept: LAB | Facility: HOSPITAL | Age: 52
End: 2022-01-22
Attending: FAMILY MEDICINE
Payer: COMMERCIAL

## 2022-01-22 DIAGNOSIS — Z00.00 ROUTINE PHYSICAL EXAMINATION: ICD-10-CM

## 2022-01-22 LAB
ALBUMIN SERPL BCP-MCNC: 3.9 G/DL (ref 3.5–5.2)
ALP SERPL-CCNC: 59 U/L (ref 55–135)
ALT SERPL W/O P-5'-P-CCNC: 39 U/L (ref 10–44)
ANION GAP SERPL CALC-SCNC: 10 MMOL/L (ref 8–16)
AST SERPL-CCNC: 27 U/L (ref 10–40)
BASOPHILS # BLD AUTO: 0.09 K/UL (ref 0–0.2)
BASOPHILS NFR BLD: 0.8 % (ref 0–1.9)
BILIRUB SERPL-MCNC: 0.4 MG/DL (ref 0.1–1)
BUN SERPL-MCNC: 10 MG/DL (ref 6–20)
CALCIUM SERPL-MCNC: 9.5 MG/DL (ref 8.7–10.5)
CHLORIDE SERPL-SCNC: 107 MMOL/L (ref 95–110)
CHOLEST SERPL-MCNC: 137 MG/DL (ref 120–199)
CHOLEST/HDLC SERPL: 2.9 {RATIO} (ref 2–5)
CO2 SERPL-SCNC: 25 MMOL/L (ref 23–29)
CREAT SERPL-MCNC: 0.7 MG/DL (ref 0.5–1.4)
DIFFERENTIAL METHOD: ABNORMAL
EOSINOPHIL # BLD AUTO: 0.3 K/UL (ref 0–0.5)
EOSINOPHIL NFR BLD: 2.8 % (ref 0–8)
ERYTHROCYTE [DISTWIDTH] IN BLOOD BY AUTOMATED COUNT: 14.2 % (ref 11.5–14.5)
EST. GFR  (AFRICAN AMERICAN): >60 ML/MIN/1.73 M^2
EST. GFR  (NON AFRICAN AMERICAN): >60 ML/MIN/1.73 M^2
ESTIMATED AVG GLUCOSE: 108 MG/DL (ref 68–131)
GLUCOSE SERPL-MCNC: 88 MG/DL (ref 70–110)
HBA1C MFR BLD: 5.4 % (ref 4–5.6)
HCT VFR BLD AUTO: 44.9 % (ref 40–54)
HDLC SERPL-MCNC: 48 MG/DL (ref 40–75)
HDLC SERPL: 35 % (ref 20–50)
HGB BLD-MCNC: 14.6 G/DL (ref 14–18)
IMM GRANULOCYTES # BLD AUTO: 0.1 K/UL (ref 0–0.04)
IMM GRANULOCYTES NFR BLD AUTO: 0.8 % (ref 0–0.5)
LDLC SERPL CALC-MCNC: 63.6 MG/DL (ref 63–159)
LYMPHOCYTES # BLD AUTO: 2.3 K/UL (ref 1–4.8)
LYMPHOCYTES NFR BLD: 19 % (ref 18–48)
MCH RBC QN AUTO: 31 PG (ref 27–31)
MCHC RBC AUTO-ENTMCNC: 32.5 G/DL (ref 32–36)
MCV RBC AUTO: 95 FL (ref 82–98)
MONOCYTES # BLD AUTO: 1.6 K/UL (ref 0.3–1)
MONOCYTES NFR BLD: 13.7 % (ref 4–15)
NEUTROPHILS # BLD AUTO: 7.5 K/UL (ref 1.8–7.7)
NEUTROPHILS NFR BLD: 62.9 % (ref 38–73)
NONHDLC SERPL-MCNC: 89 MG/DL
NRBC BLD-RTO: 0 /100 WBC
PLATELET # BLD AUTO: 662 K/UL (ref 150–450)
PMV BLD AUTO: 10.8 FL (ref 9.2–12.9)
POTASSIUM SERPL-SCNC: 4.6 MMOL/L (ref 3.5–5.1)
PROT SERPL-MCNC: 7 G/DL (ref 6–8.4)
RBC # BLD AUTO: 4.71 M/UL (ref 4.6–6.2)
SODIUM SERPL-SCNC: 142 MMOL/L (ref 136–145)
T4 FREE SERPL-MCNC: 0.8 NG/DL (ref 0.71–1.51)
TRIGL SERPL-MCNC: 127 MG/DL (ref 30–150)
TSH SERPL DL<=0.005 MIU/L-ACNC: 1.04 UIU/ML (ref 0.4–4)
WBC # BLD AUTO: 11.87 K/UL (ref 3.9–12.7)

## 2022-01-22 PROCEDURE — 80053 COMPREHEN METABOLIC PANEL: CPT | Performed by: FAMILY MEDICINE

## 2022-01-22 PROCEDURE — 84443 ASSAY THYROID STIM HORMONE: CPT | Performed by: FAMILY MEDICINE

## 2022-01-22 PROCEDURE — 83036 HEMOGLOBIN GLYCOSYLATED A1C: CPT | Performed by: FAMILY MEDICINE

## 2022-01-22 PROCEDURE — 36415 COLL VENOUS BLD VENIPUNCTURE: CPT | Mod: PO | Performed by: FAMILY MEDICINE

## 2022-01-22 PROCEDURE — 84439 ASSAY OF FREE THYROXINE: CPT | Performed by: FAMILY MEDICINE

## 2022-01-22 PROCEDURE — 85025 COMPLETE CBC W/AUTO DIFF WBC: CPT | Performed by: FAMILY MEDICINE

## 2022-01-22 PROCEDURE — 80061 LIPID PANEL: CPT | Performed by: FAMILY MEDICINE

## 2022-03-07 DIAGNOSIS — E55.9 VITAMIN D DEFICIENCY: ICD-10-CM

## 2022-03-07 RX ORDER — ERGOCALCIFEROL 1.25 MG/1
CAPSULE ORAL
Qty: 12 CAPSULE | Refills: 3 | Status: SHIPPED | OUTPATIENT
Start: 2022-03-07 | End: 2022-04-21 | Stop reason: SDUPTHER

## 2022-03-16 DIAGNOSIS — Z11.59 NEED FOR HEPATITIS C SCREENING TEST: ICD-10-CM

## 2022-04-21 DIAGNOSIS — E55.9 VITAMIN D DEFICIENCY: ICD-10-CM

## 2022-04-21 DIAGNOSIS — E78.5 HYPERLIPIDEMIA, UNSPECIFIED HYPERLIPIDEMIA TYPE: ICD-10-CM

## 2022-04-21 RX ORDER — ERGOCALCIFEROL 1.25 MG/1
CAPSULE ORAL
Qty: 12 CAPSULE | Refills: 2 | Status: SHIPPED | OUTPATIENT
Start: 2022-04-21 | End: 2023-03-02 | Stop reason: SDUPTHER

## 2022-04-21 RX ORDER — ATORVASTATIN CALCIUM 10 MG/1
TABLET, FILM COATED ORAL
Qty: 90 TABLET | Refills: 2 | Status: SHIPPED | OUTPATIENT
Start: 2022-04-21 | End: 2023-01-30

## 2022-04-21 NOTE — TELEPHONE ENCOUNTER
----- Message from Jessika Leon sent at 4/21/2022  4:31 PM CDT -----  Type: RX Refill Request    Who Called: self     Have you contacted your pharmacy: yes     Refill or New Rx: refill     RX Name and Strength: atorvastatin (LIPITOR) 10 MG tablet, ergocalciferol (ERGOCALCIFEROL) 50,000 unit Cap    Preferred Pharmacy with phone number:   [facility] - [place], [place] - [address]  [address]  [facility] [address]  Phone: [phone] Fax: [phone]    Local or Mail Order: mail     Would the patient rather a call back or a response via [facility]? Call back     Best Call Back Number: [phone]

## 2022-04-21 NOTE — TELEPHONE ENCOUNTER
No new care gaps identified.  Powered by [employer]. Reference number: [id number].   4/21/2022 4:38:43 PM CDT

## 2022-11-17 ENCOUNTER — OFFICE VISIT (OUTPATIENT)
Dept: FAMILY MEDICINE | Facility: CLINIC | Age: 52
End: 2022-11-17
Payer: COMMERCIAL

## 2022-11-17 VITALS
HEART RATE: 80 BPM | SYSTOLIC BLOOD PRESSURE: 118 MMHG | DIASTOLIC BLOOD PRESSURE: 62 MMHG | TEMPERATURE: 98 F | HEIGHT: 71 IN | WEIGHT: 196.88 LBS | BODY MASS INDEX: 27.56 KG/M2 | OXYGEN SATURATION: 98 %

## 2022-11-17 DIAGNOSIS — L03.213 PERIORBITAL CELLULITIS OF RIGHT EYE: Primary | ICD-10-CM

## 2022-11-17 DIAGNOSIS — H00.012 HORDEOLUM EXTERNUM OF RIGHT LOWER EYELID: ICD-10-CM

## 2022-11-17 PROCEDURE — 3074F SYST BP LT 130 MM HG: CPT | Mod: CPTII,S$GLB,, | Performed by: INTERNAL MEDICINE

## 2022-11-17 PROCEDURE — 3078F PR MOST RECENT DIASTOLIC BLOOD PRESSURE < 80 MM HG: ICD-10-PCS | Mod: CPTII,S$GLB,, | Performed by: INTERNAL MEDICINE

## 2022-11-17 PROCEDURE — 1159F PR MEDICATION LIST DOCUMENTED IN MEDICAL RECORD: ICD-10-PCS | Mod: CPTII,S$GLB,, | Performed by: INTERNAL MEDICINE

## 2022-11-17 PROCEDURE — 99999 PR PBB SHADOW E&M-EST. PATIENT-LVL III: CPT | Mod: PBBFAC,,, | Performed by: INTERNAL MEDICINE

## 2022-11-17 PROCEDURE — 3044F HG A1C LEVEL LT 7.0%: CPT | Mod: CPTII,S$GLB,, | Performed by: INTERNAL MEDICINE

## 2022-11-17 PROCEDURE — 3008F BODY MASS INDEX DOCD: CPT | Mod: CPTII,S$GLB,, | Performed by: INTERNAL MEDICINE

## 2022-11-17 PROCEDURE — 3078F DIAST BP <80 MM HG: CPT | Mod: CPTII,S$GLB,, | Performed by: INTERNAL MEDICINE

## 2022-11-17 PROCEDURE — 3008F PR BODY MASS INDEX (BMI) DOCUMENTED: ICD-10-PCS | Mod: CPTII,S$GLB,, | Performed by: INTERNAL MEDICINE

## 2022-11-17 PROCEDURE — 99214 OFFICE O/P EST MOD 30 MIN: CPT | Mod: S$GLB,,, | Performed by: INTERNAL MEDICINE

## 2022-11-17 PROCEDURE — 3044F PR MOST RECENT HEMOGLOBIN A1C LEVEL <7.0%: ICD-10-PCS | Mod: CPTII,S$GLB,, | Performed by: INTERNAL MEDICINE

## 2022-11-17 PROCEDURE — 3074F PR MOST RECENT SYSTOLIC BLOOD PRESSURE < 130 MM HG: ICD-10-PCS | Mod: CPTII,S$GLB,, | Performed by: INTERNAL MEDICINE

## 2022-11-17 PROCEDURE — 1160F PR REVIEW ALL MEDS BY PRESCRIBER/CLIN PHARMACIST DOCUMENTED: ICD-10-PCS | Mod: CPTII,S$GLB,, | Performed by: INTERNAL MEDICINE

## 2022-11-17 PROCEDURE — 99214 PR OFFICE/OUTPT VISIT, EST, LEVL IV, 30-39 MIN: ICD-10-PCS | Mod: S$GLB,,, | Performed by: INTERNAL MEDICINE

## 2022-11-17 PROCEDURE — 99999 PR PBB SHADOW E&M-EST. PATIENT-LVL III: ICD-10-PCS | Mod: PBBFAC,,, | Performed by: INTERNAL MEDICINE

## 2022-11-17 PROCEDURE — 1159F MED LIST DOCD IN RCRD: CPT | Mod: CPTII,S$GLB,, | Performed by: INTERNAL MEDICINE

## 2022-11-17 PROCEDURE — 1160F RVW MEDS BY RX/DR IN RCRD: CPT | Mod: CPTII,S$GLB,, | Performed by: INTERNAL MEDICINE

## 2022-11-17 RX ORDER — ERYTHROMYCIN 5 MG/G
OINTMENT OPHTHALMIC 3 TIMES DAILY
Qty: 3.5 G | Refills: 0 | Status: SHIPPED | OUTPATIENT
Start: 2022-11-17

## 2022-11-17 RX ORDER — CEPHALEXIN 500 MG/1
1000 TABLET ORAL 2 TIMES DAILY
Qty: 40 TABLET | Refills: 0 | Status: SHIPPED | OUTPATIENT
Start: 2022-11-17 | End: 2022-11-27

## 2022-11-17 NOTE — PROGRESS NOTES
Assessment & Plan (all problems are new to me)  Problem List Items Addressed This Visit    None  Visit Diagnoses       Periorbital cellulitis of right eye    -  Primary  -    Start PO antibiotics given rapidly worsening course over last 3 days. I have discussed the common side effects of this(these) medication(s) and black box warnings (if applicable) with the patient and answered all of the questions they had at the time of this visit regarding this(these) medication(s).     Relevant Medications    cephalexin (KEFTAB) 500 mg tablet    Hordeolum externum of right lower eyelid    -  Start erythromycin ophthalmic    Relevant Medications    erythromycin (ROMYCIN) ophthalmic ointment              Health Maintenance reviewed, deferred to physical in 2 months.    Follow-up: Follow up if symptoms worsen or fail to improve.  ______________________________________________________________________    Chief Complaint  Chief Complaint   Patient presents with    Stye     Started Monday, pt states he has put hot wash cloths on it at night and that provides temporary relief, but the next day it gets worse.       HPI  Rubén Hu is a 52 y.o. male with multiple medical diagnoses as listed in the medical history and problem list that presents for stye on right lower lid since Monday.  Pt is new to me but is known to this clinic/department with their last appointment being Visit date not found.      It is getting rapidly worse.  More red and swollen.  He is trying hot compress and some old cream he had from a previous stye (12 years ago).  No orbital pain.  No discharge.  No pain with EOMI.  No fevers.       PAST MEDICAL HISTORY:  Past Medical History:   Diagnosis Date    Hyperlipidemia        PAST SURGICAL HISTORY:  Past Surgical History:   Procedure Laterality Date    EYE SURGERY         SOCIAL HISTORY:  Social History     Socioeconomic History    Marital status:    Tobacco Use    Smoking status: Former     Types: Cigars  "   Smokeless tobacco: Never   Substance and Sexual Activity    Alcohol use: Yes    Drug use: Never    Sexual activity: Yes     Partners: Female       FAMILY HISTORY:  Family History   Problem Relation Age of Onset    Arthritis Mother     COPD Father        ALLERGIES AND MEDICATIONS: updated and reviewed.  Review of patient's allergies indicates:   Allergen Reactions    Bactrim [sulfamethoxazole-trimethoprim] Hives     Current Outpatient Medications   Medication Sig Dispense Refill    atorvastatin (LIPITOR) 10 MG tablet TAKE ONE TABLET BY MOUTH ONCE NIGHTLY 90 tablet 2    ergocalciferol (ERGOCALCIFEROL) 50,000 unit Cap TAKE ONE CAPSULE (50,000 UNITS TOTAL) BY MOUTH ONCE WEEKLY 12 capsule 2    cephalexin (KEFTAB) 500 mg tablet Take 2 tablets (1,000 mg total) by mouth 2 (two) times daily. for 10 days 40 tablet 0    erythromycin (ROMYCIN) ophthalmic ointment Place into the right eye 3 (three) times daily. 3.5 g 0    NIFEDIPINE, BULK, MISC 0.3 % by Apply Externally route 3 (three) times daily.       No current facility-administered medications for this visit.         ROS  Review of Systems   Constitutional:  Negative for chills, diaphoresis and fever.   Eyes:  Negative for photophobia, pain, discharge, redness, itching and visual disturbance.       Physical Exam  Vitals:    11/17/22 1428   BP: 118/62   Pulse: 80   Temp: 97.9 °F (36.6 °C)   TempSrc: Oral   SpO2: 98%   Weight: 89.3 kg (196 lb 13.9 oz)   Height: 5' 11" (1.803 m)    Body mass index is 27.46 kg/m².  Weight: 89.3 kg (196 lb 13.9 oz)   Height: 5' 11" (180.3 cm)   Physical Exam  Constitutional:       General: He is not in acute distress.     Appearance: Normal appearance. He is well-developed.   HENT:      Head: Normocephalic and atraumatic.   Eyes:      General:         Right eye: Hordeolum (lower lid) present.      Extraocular Movements: Extraocular movements intact.      Conjunctiva/sclera: Conjunctivae normal.     Pulmonary:      Effort: Pulmonary effort is " normal. No respiratory distress.   Musculoskeletal:      Cervical back: Normal range of motion.   Neurological:      General: No focal deficit present.      Mental Status: He is alert and oriented to person, place, and time.   Psychiatric:         Mood and Affect: Mood and affect normal.         Behavior: Behavior normal.         Thought Content: Thought content normal.         Judgment: Judgment normal.           Health Maintenance         Date Due Completion Date    Hepatitis C Screening Never done ---    HIV Screening Never done ---    COVID-19 Vaccine (2 - Booster for Isaac series) 05/11/2021 3/16/2021    Colorectal Cancer Screening 06/14/2021 6/14/2018    Lipid Panel 01/22/2027 1/22/2022    TETANUS VACCINE 01/21/2032 1/21/2022

## 2023-03-02 ENCOUNTER — OFFICE VISIT (OUTPATIENT)
Dept: FAMILY MEDICINE | Facility: CLINIC | Age: 53
End: 2023-03-02
Payer: COMMERCIAL

## 2023-03-02 VITALS
SYSTOLIC BLOOD PRESSURE: 126 MMHG | DIASTOLIC BLOOD PRESSURE: 78 MMHG | OXYGEN SATURATION: 98 % | HEIGHT: 71 IN | WEIGHT: 194.75 LBS | BODY MASS INDEX: 27.27 KG/M2 | HEART RATE: 88 BPM | TEMPERATURE: 98 F

## 2023-03-02 DIAGNOSIS — E78.5 HYPERLIPIDEMIA, UNSPECIFIED HYPERLIPIDEMIA TYPE: Chronic | ICD-10-CM

## 2023-03-02 DIAGNOSIS — Z12.11 ENCOUNTER FOR SCREENING FOR MALIGNANT NEOPLASM OF COLON: ICD-10-CM

## 2023-03-02 DIAGNOSIS — Z00.00 ROUTINE PHYSICAL EXAMINATION: Primary | ICD-10-CM

## 2023-03-02 DIAGNOSIS — E55.9 VITAMIN D DEFICIENCY: ICD-10-CM

## 2023-03-02 PROCEDURE — 3078F PR MOST RECENT DIASTOLIC BLOOD PRESSURE < 80 MM HG: ICD-10-PCS | Mod: CPTII,S$GLB,, | Performed by: FAMILY MEDICINE

## 2023-03-02 PROCEDURE — 99396 PR PREVENTIVE VISIT,EST,40-64: ICD-10-PCS | Mod: S$GLB,,, | Performed by: FAMILY MEDICINE

## 2023-03-02 PROCEDURE — 3008F BODY MASS INDEX DOCD: CPT | Mod: CPTII,S$GLB,, | Performed by: FAMILY MEDICINE

## 2023-03-02 PROCEDURE — 3074F SYST BP LT 130 MM HG: CPT | Mod: CPTII,S$GLB,, | Performed by: FAMILY MEDICINE

## 2023-03-02 PROCEDURE — 99999 PR PBB SHADOW E&M-EST. PATIENT-LVL III: CPT | Mod: PBBFAC,,, | Performed by: FAMILY MEDICINE

## 2023-03-02 PROCEDURE — 99396 PREV VISIT EST AGE 40-64: CPT | Mod: S$GLB,,, | Performed by: FAMILY MEDICINE

## 2023-03-02 PROCEDURE — 3074F PR MOST RECENT SYSTOLIC BLOOD PRESSURE < 130 MM HG: ICD-10-PCS | Mod: CPTII,S$GLB,, | Performed by: FAMILY MEDICINE

## 2023-03-02 PROCEDURE — 3008F PR BODY MASS INDEX (BMI) DOCUMENTED: ICD-10-PCS | Mod: CPTII,S$GLB,, | Performed by: FAMILY MEDICINE

## 2023-03-02 PROCEDURE — 1159F PR MEDICATION LIST DOCUMENTED IN MEDICAL RECORD: ICD-10-PCS | Mod: CPTII,S$GLB,, | Performed by: FAMILY MEDICINE

## 2023-03-02 PROCEDURE — 99999 PR PBB SHADOW E&M-EST. PATIENT-LVL III: ICD-10-PCS | Mod: PBBFAC,,, | Performed by: FAMILY MEDICINE

## 2023-03-02 PROCEDURE — 1159F MED LIST DOCD IN RCRD: CPT | Mod: CPTII,S$GLB,, | Performed by: FAMILY MEDICINE

## 2023-03-02 PROCEDURE — 3078F DIAST BP <80 MM HG: CPT | Mod: CPTII,S$GLB,, | Performed by: FAMILY MEDICINE

## 2023-03-02 RX ORDER — ATORVASTATIN CALCIUM 10 MG/1
10 TABLET, FILM COATED ORAL NIGHTLY
Qty: 90 TABLET | Refills: 3 | Status: SHIPPED | OUTPATIENT
Start: 2023-03-02

## 2023-03-02 RX ORDER — ERGOCALCIFEROL 1.25 MG/1
CAPSULE ORAL
Qty: 12 CAPSULE | Refills: 2 | Status: SHIPPED | OUTPATIENT
Start: 2023-03-02 | End: 2024-01-31

## 2023-03-02 NOTE — PROGRESS NOTES
[facility]  Progress Note    SUBJECTIVE:     Chief Complaint   Patient presents with    Annual Exam       HPI   Rubén Hu  is a 52 y.o. male here for routine physical exam. Patient has no other new complaints/problems at this time.      Review of patient's allergies indicates:   Allergen Reactions    Bactrim [sulfamethoxazole-trimethoprim] Hives       Past Medical History:   Diagnosis Date    Hyperlipidemia      Past Surgical History:   Procedure Laterality Date    EYE SURGERY       Family History   Problem Relation Age of Onset    Arthritis Mother     COPD Father      Social History     Tobacco Use    Smoking status: Former     Types: Cigars    Smokeless tobacco: Never   Substance Use Topics    Alcohol use: Yes    Drug use: Never        Review of Systems   Constitutional:  Negative for chills, diaphoresis and fever.   HENT:  Negative for congestion, ear pain and sore throat.    Eyes:  Negative for photophobia and discharge.   Respiratory:  Negative for cough, shortness of breath and wheezing.    Cardiovascular:  Negative for chest pain and palpitations.   Gastrointestinal:  Negative for abdominal pain, constipation, diarrhea, nausea and vomiting.   Genitourinary:  Negative for dysuria and hematuria.   Musculoskeletal:  Negative for back pain and myalgias.   Skin:  Negative for itching and rash.   Neurological:  Negative for dizziness, sensory change, focal weakness, weakness and headaches.   All other systems reviewed and are negative.  OBJECTIVE:     Vitals:    03/02/23 1305   BP: 126/78   Pulse: 88   Temp: 98.4 °F (36.9 °C)     Body mass index is 27.17 kg/m².    Physical Exam  Constitutional:       General: He is not in acute distress.     Appearance: He is not diaphoretic.   HENT:      Head: Normocephalic and atraumatic.      Right Ear: Tympanic membrane and ear canal normal. No hemotympanum. Tympanic membrane is not perforated, erythematous or bulging.      Left Ear: Tympanic membrane and ear  canal normal. No hemotympanum. Tympanic membrane is not perforated, erythematous or bulging.      Mouth/Throat:      Pharynx: No oropharyngeal exudate.   Eyes:      Conjunctiva/sclera: Conjunctivae normal.      Pupils: Pupils are equal, round, and reactive to light.   Neck:      Thyroid: No thyromegaly.   Cardiovascular:      Rate and Rhythm: Normal rate and regular rhythm.      Heart sounds: Normal heart sounds. No murmur heard.    No friction rub. No gallop.   Pulmonary:      Effort: Pulmonary effort is normal. No respiratory distress.      Breath sounds: Normal breath sounds. No wheezing or rales.   Abdominal:      General: Bowel sounds are normal. There is no distension.      Palpations: Abdomen is soft.      Tenderness: There is no abdominal tenderness. There is no guarding or rebound.   Musculoskeletal:         General: No tenderness. Normal range of motion.   Lymphadenopathy:      Cervical: No cervical adenopathy.   Skin:     General: Skin is warm.      Findings: No erythema or rash.   Neurological:      Mental Status: He is alert and oriented to person, place, and time.       Old records were reviewed. Labs and/or images were independently reviewed.    ASSESSMENT     1. Routine physical examination    2. Hyperlipidemia, unspecified hyperlipidemia type    3. Vitamin D deficiency    4. Encounter for screening for malignant neoplasm of colon        PLAN:     Routine physical examination  -     Comprehensive Metabolic Panel; Future  -     CBC Auto Differential; Future  -     Hemoglobin A1C; Future  -     Lipid Panel; Future  -     TSH; Future  -     T4, Free; Future  -     PSA, Screening; Future; Expected date: 03/02/2023  -     Vitamin D; Future; Expected date: 03/02/2023  -     We briefly discussed diet, exercise, and routine preventive exams. All questions and comments addressed.    Hyperlipidemia, unspecified hyperlipidemia type  -     atorvastatin (LIPITOR) 10 MG tablet; Take 1 tablet (10 mg total) by mouth  every evening.  Dispense: 90 tablet; Refill: 3  -     Stable. Continue current regimen.    Vitamin D deficiency  -     ergocalciferol (ERGOCALCIFEROL) 50,000 unit Cap; TAKE ONE CAPSULE (50,000 UNITS TOTAL) BY MOUTH ONCE WEEKLY  Dispense: 12 capsule; Refill: 2  -     Stable. Continue current regimen.    Encounter for screening for malignant neoplasm of colon  -     Ambulatory referral/consult to Endo Procedure ; Future; Expected date: 03/03/2023      RTC PRJOSE Arora MD  03/02/2023 1:16 PM

## 2023-03-03 ENCOUNTER — LAB VISIT (OUTPATIENT)
Dept: LAB | Facility: HOSPITAL | Age: 53
End: 2023-03-03
Attending: FAMILY MEDICINE
Payer: COMMERCIAL

## 2023-03-03 DIAGNOSIS — Z11.59 NEED FOR HEPATITIS C SCREENING TEST: ICD-10-CM

## 2023-03-03 DIAGNOSIS — Z00.00 ROUTINE PHYSICAL EXAMINATION: ICD-10-CM

## 2023-03-03 LAB
25(OH)D3+25(OH)D2 SERPL-MCNC: 26 NG/ML (ref 30–96)
ALBUMIN SERPL BCP-MCNC: 4.4 G/DL (ref 3.5–5.2)
ALP SERPL-CCNC: 61 U/L (ref 55–135)
ALT SERPL W/O P-5'-P-CCNC: 56 U/L (ref 10–44)
ANION GAP SERPL CALC-SCNC: 7 MMOL/L (ref 8–16)
AST SERPL-CCNC: 35 U/L (ref 10–40)
BASOPHILS # BLD AUTO: 0.11 K/UL (ref 0–0.2)
BASOPHILS NFR BLD: 1 % (ref 0–1.9)
BILIRUB SERPL-MCNC: 0.5 MG/DL (ref 0.1–1)
BUN SERPL-MCNC: 16 MG/DL (ref 6–20)
CALCIUM SERPL-MCNC: 10 MG/DL (ref 8.7–10.5)
CHLORIDE SERPL-SCNC: 105 MMOL/L (ref 95–110)
CHOLEST SERPL-MCNC: 179 MG/DL (ref 120–199)
CHOLEST/HDLC SERPL: 4.1 {RATIO} (ref 2–5)
CO2 SERPL-SCNC: 27 MMOL/L (ref 23–29)
COMPLEXED PSA SERPL-MCNC: 2 NG/ML (ref 0–4)
CREAT SERPL-MCNC: 0.9 MG/DL (ref 0.5–1.4)
DIFFERENTIAL METHOD: ABNORMAL
EOSINOPHIL # BLD AUTO: 0.2 K/UL (ref 0–0.5)
EOSINOPHIL NFR BLD: 1.5 % (ref 0–8)
ERYTHROCYTE [DISTWIDTH] IN BLOOD BY AUTOMATED COUNT: 14.5 % (ref 11.5–14.5)
EST. GFR  (NO RACE VARIABLE): >60 ML/MIN/1.73 M^2
ESTIMATED AVG GLUCOSE: 108 MG/DL (ref 68–131)
GLUCOSE SERPL-MCNC: 79 MG/DL (ref 70–110)
HBA1C MFR BLD: 5.4 % (ref 4–5.6)
HCT VFR BLD AUTO: 49.4 % (ref 40–54)
HCV AB SERPL QL IA: NORMAL
HDLC SERPL-MCNC: 44 MG/DL (ref 40–75)
HDLC SERPL: 24.6 % (ref 20–50)
HGB BLD-MCNC: 15.9 G/DL (ref 14–18)
IMM GRANULOCYTES # BLD AUTO: 0.06 K/UL (ref 0–0.04)
IMM GRANULOCYTES NFR BLD AUTO: 0.5 % (ref 0–0.5)
LDLC SERPL CALC-MCNC: 111.6 MG/DL (ref 63–159)
LYMPHOCYTES # BLD AUTO: 2.4 K/UL (ref 1–4.8)
LYMPHOCYTES NFR BLD: 20.4 % (ref 18–48)
MCH RBC QN AUTO: 31.3 PG (ref 27–31)
MCHC RBC AUTO-ENTMCNC: 32.2 G/DL (ref 32–36)
MCV RBC AUTO: 97 FL (ref 82–98)
MONOCYTES # BLD AUTO: 1.1 K/UL (ref 0.3–1)
MONOCYTES NFR BLD: 9.5 % (ref 4–15)
NEUTROPHILS # BLD AUTO: 7.8 K/UL (ref 1.8–7.7)
NEUTROPHILS NFR BLD: 67.1 % (ref 38–73)
NONHDLC SERPL-MCNC: 135 MG/DL
NRBC BLD-RTO: 0 /100 WBC
PLATELET # BLD AUTO: 786 K/UL (ref 150–450)
PMV BLD AUTO: 10.6 FL (ref 9.2–12.9)
POTASSIUM SERPL-SCNC: 4.1 MMOL/L (ref 3.5–5.1)
PROT SERPL-MCNC: 7.7 G/DL (ref 6–8.4)
RBC # BLD AUTO: 5.08 M/UL (ref 4.6–6.2)
SODIUM SERPL-SCNC: 139 MMOL/L (ref 136–145)
T4 FREE SERPL-MCNC: 0.89 NG/DL (ref 0.71–1.51)
TRIGL SERPL-MCNC: 117 MG/DL (ref 30–150)
TSH SERPL DL<=0.005 MIU/L-ACNC: 1.49 UIU/ML (ref 0.4–4)
WBC # BLD AUTO: 11.57 K/UL (ref 3.9–12.7)

## 2023-03-03 PROCEDURE — 84153 ASSAY OF PSA TOTAL: CPT | Performed by: FAMILY MEDICINE

## 2023-03-03 PROCEDURE — 80053 COMPREHEN METABOLIC PANEL: CPT | Performed by: FAMILY MEDICINE

## 2023-03-03 PROCEDURE — 83036 HEMOGLOBIN GLYCOSYLATED A1C: CPT | Performed by: FAMILY MEDICINE

## 2023-03-03 PROCEDURE — 85025 COMPLETE CBC W/AUTO DIFF WBC: CPT | Performed by: FAMILY MEDICINE

## 2023-03-03 PROCEDURE — 80061 LIPID PANEL: CPT | Performed by: FAMILY MEDICINE

## 2023-03-03 PROCEDURE — 84443 ASSAY THYROID STIM HORMONE: CPT | Performed by: FAMILY MEDICINE

## 2023-03-03 PROCEDURE — 36415 COLL VENOUS BLD VENIPUNCTURE: CPT | Mod: PO | Performed by: FAMILY MEDICINE

## 2023-03-03 PROCEDURE — 84439 ASSAY OF FREE THYROXINE: CPT | Performed by: FAMILY MEDICINE

## 2023-03-03 PROCEDURE — 82306 VITAMIN D 25 HYDROXY: CPT | Performed by: FAMILY MEDICINE

## 2023-03-03 PROCEDURE — 86803 HEPATITIS C AB TEST: CPT | Performed by: FAMILY MEDICINE

## 2023-05-15 ENCOUNTER — OFFICE VISIT (OUTPATIENT)
Dept: FAMILY MEDICINE | Facility: CLINIC | Age: 53
End: 2023-05-15
Payer: COMMERCIAL

## 2023-05-15 VITALS
HEART RATE: 88 BPM | BODY MASS INDEX: 26.64 KG/M2 | TEMPERATURE: 98 F | SYSTOLIC BLOOD PRESSURE: 130 MMHG | OXYGEN SATURATION: 99 % | WEIGHT: 190.25 LBS | DIASTOLIC BLOOD PRESSURE: 70 MMHG | HEIGHT: 71 IN

## 2023-05-15 DIAGNOSIS — M54.50 ACUTE BILATERAL LOW BACK PAIN WITHOUT SCIATICA: Primary | ICD-10-CM

## 2023-05-15 PROCEDURE — 3078F DIAST BP <80 MM HG: CPT | Mod: CPTII,S$GLB,, | Performed by: INTERNAL MEDICINE

## 2023-05-15 PROCEDURE — 3044F HG A1C LEVEL LT 7.0%: CPT | Mod: CPTII,S$GLB,, | Performed by: INTERNAL MEDICINE

## 2023-05-15 PROCEDURE — 1160F PR REVIEW ALL MEDS BY PRESCRIBER/CLIN PHARMACIST DOCUMENTED: ICD-10-PCS | Mod: CPTII,S$GLB,, | Performed by: INTERNAL MEDICINE

## 2023-05-15 PROCEDURE — 3078F PR MOST RECENT DIASTOLIC BLOOD PRESSURE < 80 MM HG: ICD-10-PCS | Mod: CPTII,S$GLB,, | Performed by: INTERNAL MEDICINE

## 2023-05-15 PROCEDURE — 1159F PR MEDICATION LIST DOCUMENTED IN MEDICAL RECORD: ICD-10-PCS | Mod: CPTII,S$GLB,, | Performed by: INTERNAL MEDICINE

## 2023-05-15 PROCEDURE — 1159F MED LIST DOCD IN RCRD: CPT | Mod: CPTII,S$GLB,, | Performed by: INTERNAL MEDICINE

## 2023-05-15 PROCEDURE — 1160F RVW MEDS BY RX/DR IN RCRD: CPT | Mod: CPTII,S$GLB,, | Performed by: INTERNAL MEDICINE

## 2023-05-15 PROCEDURE — 3008F PR BODY MASS INDEX (BMI) DOCUMENTED: ICD-10-PCS | Mod: CPTII,S$GLB,, | Performed by: INTERNAL MEDICINE

## 2023-05-15 PROCEDURE — 99999 PR PBB SHADOW E&M-EST. PATIENT-LVL III: ICD-10-PCS | Mod: PBBFAC,,, | Performed by: INTERNAL MEDICINE

## 2023-05-15 PROCEDURE — 99214 OFFICE O/P EST MOD 30 MIN: CPT | Mod: S$GLB,,, | Performed by: INTERNAL MEDICINE

## 2023-05-15 PROCEDURE — 3008F BODY MASS INDEX DOCD: CPT | Mod: CPTII,S$GLB,, | Performed by: INTERNAL MEDICINE

## 2023-05-15 PROCEDURE — 3044F PR MOST RECENT HEMOGLOBIN A1C LEVEL <7.0%: ICD-10-PCS | Mod: CPTII,S$GLB,, | Performed by: INTERNAL MEDICINE

## 2023-05-15 PROCEDURE — 99214 PR OFFICE/OUTPT VISIT, EST, LEVL IV, 30-39 MIN: ICD-10-PCS | Mod: S$GLB,,, | Performed by: INTERNAL MEDICINE

## 2023-05-15 PROCEDURE — 3075F PR MOST RECENT SYSTOLIC BLOOD PRESS GE 130-139MM HG: ICD-10-PCS | Mod: CPTII,S$GLB,, | Performed by: INTERNAL MEDICINE

## 2023-05-15 PROCEDURE — 3075F SYST BP GE 130 - 139MM HG: CPT | Mod: CPTII,S$GLB,, | Performed by: INTERNAL MEDICINE

## 2023-05-15 PROCEDURE — 99999 PR PBB SHADOW E&M-EST. PATIENT-LVL III: CPT | Mod: PBBFAC,,, | Performed by: INTERNAL MEDICINE

## 2023-05-15 RX ORDER — TIZANIDINE 2 MG/1
TABLET ORAL
Qty: 60 TABLET | Refills: 0 | Status: SHIPPED | OUTPATIENT
Start: 2023-05-15 | End: 2023-06-15

## 2023-05-15 RX ORDER — NAPROXEN 500 MG/1
500 TABLET ORAL 2 TIMES DAILY PRN
Qty: 60 TABLET | Refills: 0 | Status: SHIPPED | OUTPATIENT
Start: 2023-05-15

## 2023-05-15 NOTE — PROGRESS NOTES
__________________________________________________________________________________________________________________________________________________  I attest that I have reviewed the student note and that the components of the history of present illness, the physical exam, and the assessment and plan documented were performed by me or were performed in my presence by the student. I have verified (and addended if appropriate) the documentation and performed (or re-performed) the exam and medical decision making.     Problem List Items Addressed This Visit    None  Visit Diagnoses       Acute bilateral low back pain without sciatica    -  Primary  -  Patient with no red flags for low back pain on history.  His physical exam is consistent with a musculoskeletal pain.  He has a negative straight leg raise.  I reassured him that imaging at this time does not seem to be indicated.  Okay to continue Tylenol.  Stop ibuprofen and change of oxygen.  Add tizanidine at night. I have discussed the common side effects of this(these) medication(s) and black box warnings (if applicable) with the patient and answered all of the questions they had at the time of this visit regarding this(these) medication(s).  Low back handout provided for home physical therapy    Relevant Medications    tiZANidine (ZANAFLEX) 2 MG tablet    naproxen (EC NAPROSYN) 500 MG EC tablet            No follow-ups on file.    Jesús Gaston    __________________________________________________________________________________________________________________________________________________  Chief Complaint  Chief Complaint   Patient presents with    Back Pain       HPI  Rubén Hu is a 52 y.o. male with multiple medical diagnoses as listed in the medical history and problem list that presents for back pain.  Their last appointment with primary care was 3/2/2023.      Mr. Hu was fishing on Saturday morning and in the evening started experiencing sharp  "lower back pain. Patient doesn't recall hearing a "pop" or "snap" or any particular movement during the morning that started the pain. Describes it as sharp pangs that often come on with rotational movement of his torso or with straightening his back. He points to the L5/S1 region centrally as the focal point of pain, and states it further radiates bilaterally to both hip regions. Pain does not travel down his legs. Denies urinary or fecal incontinence, numbness/tingling in legs, or any gait difficulties.      PAST MEDICAL HISTORY:  Past Medical History:   Diagnosis Date    Hyperlipidemia        PAST SURGICAL HISTORY:  Past Surgical History:   Procedure Laterality Date    EYE SURGERY         SOCIAL HISTORY:  Social History     Socioeconomic History    Marital status:    Tobacco Use    Smoking status: Former     Types: Cigars    Smokeless tobacco: Never   Substance and Sexual Activity    Alcohol use: Yes    Drug use: Never    Sexual activity: Yes     Partners: Female       FAMILY HISTORY:  Family History   Problem Relation Age of Onset    Arthritis Mother     COPD Father        ALLERGIES AND MEDICATIONS: updated and reviewed.  Review of patient's allergies indicates:   Allergen Reactions    Bactrim [sulfamethoxazole-trimethoprim] Hives     Current Outpatient Medications   Medication Sig Dispense Refill    atorvastatin (LIPITOR) 10 MG tablet Take 1 tablet (10 mg total) by mouth every evening. 90 tablet 3    ergocalciferol (ERGOCALCIFEROL) 50,000 unit Cap TAKE ONE CAPSULE (50,000 UNITS TOTAL) BY MOUTH ONCE WEEKLY 12 capsule 2    erythromycin (ROMYCIN) ophthalmic ointment Place into the right eye 3 (three) times daily. (Patient not taking: Reported on 5/15/2023) 3.5 g 0    naproxen (EC NAPROSYN) 500 MG EC tablet Take 1 tablet (500 mg total) by mouth 2 (two) times daily as needed (pain). 60 tablet 0    NIFEDIPINE, BULK, MISC 0.3 % by Apply Externally route 3 (three) times daily.      tiZANidine (ZANAFLEX) 2 MG " "tablet Take 1-2 tabs PO QHS PRN muscle pain/spasms 60 tablet 0     No current facility-administered medications for this visit.         ROS  Review of Systems   Constitutional:  Positive for activity change. Negative for diaphoresis and fever.        Decreased movement since start of back pain   Gastrointestinal:  Negative for abdominal pain, constipation, diarrhea, nausea and vomiting.   Genitourinary:  Negative for difficulty urinating, frequency, hematuria and urgency.   Musculoskeletal:  Positive for back pain. Negative for gait problem and joint swelling.        Pain in lower back, L5/S1 region centrally, radiating bilaterally to posterior hip regions   Skin:  Negative for color change.         Physical Exam  Vitals:    05/15/23 1020   BP: 130/70   Pulse: 88   Temp: 98.3 °F (36.8 °C)   SpO2: 99%   Weight: 86.3 kg (190 lb 4.1 oz)   Height: 5' 11" (1.803 m)    Body mass index is 26.54 kg/m².  Weight: 86.3 kg (190 lb 4.1 oz)   Height: 5' 11" (180.3 cm)   Physical Exam  HENT:      Head: Normocephalic and atraumatic.   Cardiovascular:      Rate and Rhythm: Normal rate and regular rhythm.      Pulses: Normal pulses.      Heart sounds: Normal heart sounds.   Pulmonary:      Effort: Pulmonary effort is normal.      Breath sounds: Normal breath sounds.   Abdominal:      General: Abdomen is flat.      Palpations: Abdomen is soft.   Musculoskeletal:         General: No swelling, tenderness, deformity or signs of injury. Normal range of motion.      Cervical back: Normal range of motion.      Comments: Negative straight leg raise   Skin:     General: Skin is warm and dry.   Neurological:      General: No focal deficit present.      Mental Status: He is alert and oriented to person, place, and time.      Sensory: Sensation is intact.      Deep Tendon Reflexes:      Reflex Scores:       Patellar reflexes are 2+ on the right side and 2+ on the left side.        Health Maintenance         Date Due Completion Date    HIV " Screening Never done ---    COVID-19 Vaccine (2 - Booster for Isaac series) 05/11/2021 3/16/2021    Colorectal Cancer Screening 06/14/2021 6/14/2018    Hemoglobin A1c (Diabetic Prevention Screening) 03/03/2026 3/3/2023    Lipid Panel 03/03/2028 3/3/2023    TETANUS VACCINE 01/21/2032 1/21/2022              Assessment and Plan:  Acute bilateral low back pain without sciatica  3-day history of lower back pain (L5/S1 region) following fishing trip. Most likely musculoskeletal origin vs neuropathic. Sharp pangs, reproduced on rotational movement of torso. Has tried Ibuprofen 600mg twice daily, with minimal relief.  -     tiZANidine (ZANAFLEX) 2 MG tablet; Take 1-2 tabs PO QHS PRN muscle pain/spasms  Dispense: 60 tablet; Refill: 0  -     naproxen (EC NAPROSYN) 500 MG EC tablet; Take 1 tablet (500 mg total) by mouth 2 (two) times daily as needed (pain).  Dispense: 60 tablet; Refill: 0      KAREN Malik  MS4, -[employer]

## 2023-06-11 DIAGNOSIS — M54.50 ACUTE BILATERAL LOW BACK PAIN WITHOUT SCIATICA: ICD-10-CM

## 2023-06-11 NOTE — TELEPHONE ENCOUNTER
No care due was identified.  [facility] Embedded Care Due Messages. Reference number: [id number].   6/11/2023 6:45:37 AM CDT

## 2023-06-15 RX ORDER — TIZANIDINE 2 MG/1
TABLET ORAL
Qty: 60 TABLET | Refills: 0 | Status: SHIPPED | OUTPATIENT
Start: 2023-06-15

## 2023-07-31 ENCOUNTER — TELEPHONE (OUTPATIENT)
Dept: ENDOSCOPY | Facility: HOSPITAL | Age: 53
End: 2023-07-31
Payer: COMMERCIAL

## 2023-07-31 ENCOUNTER — PATIENT MESSAGE (OUTPATIENT)
Dept: ENDOSCOPY | Facility: HOSPITAL | Age: 53
End: 2023-07-31
Payer: COMMERCIAL

## 2023-07-31 DIAGNOSIS — Z12.11 SCREENING FOR COLON CANCER: Primary | ICD-10-CM

## 2023-07-31 RX ORDER — SODIUM, POTASSIUM,MAG SULFATES 17.5-3.13G
1 SOLUTION, RECONSTITUTED, ORAL ORAL DAILY
Qty: 1 KIT | Refills: 0 | Status: SHIPPED | OUTPATIENT
Start: 2023-07-31 | End: 2023-08-02

## 2023-07-31 NOTE — TELEPHONE ENCOUNTER
Spoke to pt to schedule procedure(s) Colonoscopy       Physician to perform procedure(s) Dr. ESTHELA Ardon  Date of Procedure (s) 08/09/2023  Arrival Time 2:30 PM  Time of Procedure(s) 3:30 PM   Location of Procedure(s) [place] [address]th Floor  Type of Rx Prep sent to patient: Suprep  Instructions provided to patient via [facility]    Patient was informed on the following information and verbalized understanding. Screening questionnaire reviewed with patient and complete. If procedure requires anesthesia, a responsible adult needs to be present to accompany the patient home, patient cannot drive after receiving anesthesia. Appointment details are tentative, especially check-in time. Patient will receive a prep-op call 4 days prior to confirm check-in time for procedure. If applicable the patient should contact their pharmacy to verify Rx for procedure prep is ready for pick-up. Patient was advised to call the scheduling department at [phone] if pharmacy states no Rx is available. Patient was advised to call the endoscopy scheduling department if any questions or concerns arise.      [facility] Endoscopy Scheduling Department         Procedure:      Ambulatory referral/consult to Endo Procedure     Status: Needs Scheduling (Sent to Patient)      Requested appt date: 3/3/2023          Authorizing:     Bebeto Arora MD in [facility] FAMILY MED/ INTERNAL MED/ PEDS      Referral:          [id number] (Authorized)                              Expires:           9/2/2023          Priority:            Routine      Assign to:        JAQUELINE BROWN                            Diagnosis:       Encounter for screening for malignant neoplasm of colon [Z12.11]       Order Specific Questions      What procedure is to be performed?      Screening Colonoscopy                                        CPT Code:      COLON CA SCRN NOT HI RSK IND []

## 2023-07-31 NOTE — TELEPHONE ENCOUNTER
Called pt and spoke to him about the colonoscopy. He has an appointment next week at [facility] for a consultation. He said that he was having some issues so I offered him a 1115 next Monday at [place] and he declined. He said that he would think about it and give us a call. Pend x 1 week      Procedure: Ambulatory referral/consult to Endo Procedure  Status: Needs Scheduling (Sent to Patient)     Requested appt date: 3/3/2023 Authorizing: Bebeto Aorra MD in [facility] FAMILY MED/ INTERNAL MED/ PEDS     Referral: [id number] (Authorized)         Expires: 9/2/2023 Priority: Routine     Assign to: JAQUELINE BROWN       Diagnosis: Encounter for screening for malignant neoplasm of colon [Z12.11]      Order Specific Questions     What procedure is to be performed?     Screening Colonoscopy        CPT Code:     COLON CA SCRN NOT HI RSK IND []

## 2023-08-07 ENCOUNTER — TELEPHONE (OUTPATIENT)
Dept: ENDOSCOPY | Facility: HOSPITAL | Age: 53
End: 2023-08-07
Payer: COMMERCIAL

## 2023-08-09 ENCOUNTER — HOSPITAL ENCOUNTER (OUTPATIENT)
Facility: HOSPITAL | Age: 53
Discharge: HOME | End: 2023-08-09
Attending: INTERNAL MEDICINE | Admitting: INTERNAL MEDICINE
Payer: COMMERCIAL

## 2023-08-09 ENCOUNTER — ANESTHESIA EVENT (OUTPATIENT)
Dept: ENDOSCOPY | Facility: HOSPITAL | Age: 53
End: 2023-08-09
Payer: COMMERCIAL

## 2023-08-09 ENCOUNTER — ANESTHESIA (OUTPATIENT)
Dept: ENDOSCOPY | Facility: HOSPITAL | Age: 53
End: 2023-08-09
Payer: COMMERCIAL

## 2023-08-09 VITALS
HEIGHT: 71 IN | OXYGEN SATURATION: 99 % | SYSTOLIC BLOOD PRESSURE: 118 MMHG | DIASTOLIC BLOOD PRESSURE: 77 MMHG | RESPIRATION RATE: 18 BRPM | HEART RATE: 58 BPM | BODY MASS INDEX: 25.62 KG/M2 | TEMPERATURE: 98 F | WEIGHT: 183 LBS

## 2023-08-09 DIAGNOSIS — Z86.010 HISTORY OF COLONIC POLYPS: Primary | ICD-10-CM

## 2023-08-09 DIAGNOSIS — K63.5 COLON POLYPS: ICD-10-CM

## 2023-08-09 PROCEDURE — 37000009 HC ANESTHESIA EA ADD 15 MINS: Performed by: INTERNAL MEDICINE

## 2023-08-09 PROCEDURE — E9220 PRA ENDO ANESTHESIA: ICD-10-PCS | Mod: ,,, | Performed by: NURSE ANESTHETIST, CERTIFIED REGISTERED

## 2023-08-09 PROCEDURE — G0105 COLORECTAL SCRN; HI RISK IND: HCPCS | Performed by: INTERNAL MEDICINE

## 2023-08-09 PROCEDURE — 63600175 PHARM REV CODE 636 W HCPCS: Performed by: NURSE ANESTHETIST, CERTIFIED REGISTERED

## 2023-08-09 PROCEDURE — E9220 PRA ENDO ANESTHESIA: HCPCS | Mod: ,,, | Performed by: NURSE ANESTHETIST, CERTIFIED REGISTERED

## 2023-08-09 PROCEDURE — 25000003 PHARM REV CODE 250: Performed by: NURSE ANESTHETIST, CERTIFIED REGISTERED

## 2023-08-09 PROCEDURE — G0105 COLORECTAL SCRN; HI RISK IND: HCPCS | Mod: ,,, | Performed by: INTERNAL MEDICINE

## 2023-08-09 PROCEDURE — G0105 COLORECTAL SCRN; HI RISK IND: ICD-10-PCS | Mod: ,,, | Performed by: INTERNAL MEDICINE

## 2023-08-09 PROCEDURE — 37000008 HC ANESTHESIA 1ST 15 MINUTES: Performed by: INTERNAL MEDICINE

## 2023-08-09 RX ORDER — PROPOFOL 10 MG/ML
VIAL (ML) INTRAVENOUS
Status: DISCONTINUED | OUTPATIENT
Start: 2023-08-09 | End: 2023-08-09

## 2023-08-09 RX ORDER — LIDOCAINE HYDROCHLORIDE 20 MG/ML
INJECTION INTRAVENOUS
Status: DISCONTINUED | OUTPATIENT
Start: 2023-08-09 | End: 2023-08-09

## 2023-08-09 RX ORDER — SODIUM CHLORIDE 9 MG/ML
INJECTION, SOLUTION INTRAVENOUS CONTINUOUS
Status: DISCONTINUED | OUTPATIENT
Start: 2023-08-09 | End: 2023-08-09 | Stop reason: HOSPADM

## 2023-08-09 RX ADMIN — PROPOFOL 175 MCG/KG/MIN: 10 INJECTION, EMULSION INTRAVENOUS at 12:08

## 2023-08-09 RX ADMIN — LIDOCAINE HYDROCHLORIDE 50 MG: 20 INJECTION INTRAVENOUS at 12:08

## 2023-08-09 RX ADMIN — SODIUM CHLORIDE: 0.9 INJECTION, SOLUTION INTRAVENOUS at 12:08

## 2023-08-09 NOTE — H&P
Short Stay Endoscopy History and Physical    PCP - Bebeto Arora MD    Procedure - Colonoscopy  ASA - per anesthesia  Mallampati - per anesthesia  History of Anesthesia problems - no  Family history Anesthesia problems - no   Plan of anesthesia - General    HPI:  This is a 52 y.o. male here for evaluation of : personal history of colon polyps      ROS:  Constitutional: No fevers, chills, No weight loss  CV: No chest pain  Pulm: No cough, No shortness of breath  GI: see HPI  Derm: No rash    Medical History:  has a past medical history of Hyperlipidemia.    Surgical History:  has a past surgical history that includes Eye surgery.    Family History: family history includes Arthritis in his mother; COPD in his father.. Otherwise no colon cancer, inflammatory bowel disease, or GI malignancies.    Social History:  reports that he has been smoking cigars. He has never used smokeless tobacco. He reports current alcohol use. He reports that he does not use drugs.    Review of patient's allergies indicates:   Allergen Reactions    Bactrim [sulfamethoxazole-trimethoprim] Hives       Medications:   Medications Prior to Admission   Medication Sig Dispense Refill Last Dose    atorvastatin (LIPITOR) 10 MG tablet Take 1 tablet (10 mg total) by mouth every evening. 90 tablet 3 8/8/2023    ergocalciferol (ERGOCALCIFEROL) 50,000 unit Cap TAKE ONE CAPSULE (50,000 UNITS TOTAL) BY MOUTH ONCE WEEKLY 12 capsule 2 Past Week    erythromycin (ROMYCIN) ophthalmic ointment Place into the right eye 3 (three) times daily. (Patient not taking: Reported on 5/15/2023) 3.5 g 0 Unknown    naproxen (EC NAPROSYN) 500 MG EC tablet Take 1 tablet (500 mg total) by mouth 2 (two) times daily as needed (pain). 60 tablet 0 More than a month    NIFEDIPINE, BULK, MISC 0.3 % by Apply Externally route 3 (three) times daily.   Unknown    tiZANidine (ZANAFLEX) 2 MG tablet TAKE 1 TO 2 TABLETS BY MOUTH EVERY NIGHT AT BEDTIME AS NEEDED FOR MUSCLE PAIN OR SPASMS  60 tablet 0 Unknown         Physical Exam:    Vital Signs:   Vitals:    08/09/23 1234   BP: 125/76   Pulse: 72   Resp: 18   Temp: 98.4 °F (36.9 °C)       General Appearance: Well appearing in no acute distress  Eyes:    No scleral icterus  ENT: Neck supple, Lips, mucosa, and tongue normal; teeth and gums normal  Abdomen: Soft, non tender, non distended with positive bowel sounds. No hepatosplenomegaly, ascites, or mass.  Extremities: 2+ pulses, no clubbing, cyanosis or edema  Skin: No rash      Labs:  Lab Results   Component Value Date    WBC 11.57 03/03/2023    HGB 15.9 03/03/2023    HCT 49.4 03/03/2023     (H) 03/03/2023    CHOL 179 03/03/2023    TRIG 117 03/03/2023    HDL 44 03/03/2023    ALT 56 (H) 03/03/2023    AST 35 03/03/2023     03/03/2023    K 4.1 03/03/2023     03/03/2023    CREATININE 0.9 03/03/2023    BUN 16 03/03/2023    CO2 27 03/03/2023    TSH 1.494 03/03/2023    PSA 2.0 03/03/2023    HGBA1C 5.4 03/03/2023       I have explained the risks and benefits of endoscopy procedures to the patient including but not limited to bleeding, perforation, infection, and death.  The patient was asked if they understand and allowed to ask any further questions to their satisfaction.    Will Ardon MD

## 2023-08-09 NOTE — TRANSFER OF CARE
"Anesthesia Transfer of Care Note    Patient: Rubén Hu    Procedure(s) Performed: Procedure(s) (LRB):  COLONOSCOPY (N/A)    Patient location: GI    Anesthesia Type: general    Transport from OR: Transported from OR on room air with adequate spontaneous ventilation    Post pain: adequate analgesia    Post assessment: no apparent anesthetic complications    Post vital signs: stable    Level of consciousness: awake and alert    Nausea/Vomiting: no nausea/vomiting    Complications: none    Transfer of care protocol was followed      Last vitals:   Visit Vitals  /76 (BP Location: Left arm, Patient Position: Lying)   Pulse 72   Temp 36.9 °C (98.4 °F) (Temporal)   Resp 18   Ht 5' 11" (1.803 m)   Wt 83 kg (183 lb)   SpO2 98%   BMI 25.52 kg/m²     "

## 2023-08-09 NOTE — PLAN OF CARE
To procedure room 11 via stretcher. All vital signs, medications, IV fluids and sedation per CRNA. See anesthesia notes. Pt's belongings under stretcher

## 2023-08-09 NOTE — ANESTHESIA PREPROCEDURE EVALUATION
08/09/2023  Rubén Hu is a 52 y.o., male.    Patient Active Problem List   Diagnosis    Hyperlipidemia    Tobacco abuse (quit cigarettes, on cigars)    Hemorrhoids     Pre-op Assessment    I have reviewed the Patient Summary Reports.     I have reviewed the Nursing Notes. I have reviewed the NPO Status.   I have reviewed the Medications.     Review of Systems  Anesthesia Hx:  No problems with previous Anesthesia  Denies Family Hx of Anesthesia complications.   Denies Personal Hx of Anesthesia complications.       Physical Exam  General: Alert and Oriented    Airway:  Mallampati: II   Mouth Opening: Normal  TM Distance: Normal  Tongue: Normal  Neck ROM: Normal ROM    Dental:  Intact        Anesthesia Plan  Type of Anesthesia, risks & benefits discussed:    Anesthesia Type: Gen Natural Airway  Intra-op Monitoring Plan: Standard ASA Monitors  Post Op Pain Control Plan: IV/PO Opioids PRN  Induction:  IV  Informed Consent: Informed consent signed with the Patient and all parties understand the risks and agree with anesthesia plan.  All questions answered.   ASA Score: 2  Day of Surgery Review of History & Physical: H&P Update referred to the surgeon/provider.    Ready For Surgery From Anesthesia Perspective.     .

## 2023-08-09 NOTE — ANESTHESIA POSTPROCEDURE EVALUATION
Anesthesia Post Evaluation    Patient: Rubén Hu    Procedure(s) Performed: Procedure(s) (LRB):  COLONOSCOPY (N/A)    Final Anesthesia Type: general      Patient location during evaluation: PACU  Patient participation: Yes- Able to Participate  Level of consciousness: awake and alert and oriented  Post-procedure vital signs: reviewed and stable  Pain management: adequate  Airway patency: patent    PONV status at discharge: No PONV  Anesthetic complications: no      Cardiovascular status: hemodynamically stable  Respiratory status: unassisted  Hydration status: euvolemic  Follow-up not needed.          Vitals Value Taken Time   /77 08/09/23 1348   Pulse 58 08/09/23 1348   Resp 18 08/09/23 1348   SpO2 99 % 08/09/23 1348         Event Time   Out of Recovery 13:52:45         Pain/Mohsen Score: Mohsen Score: 10 (8/9/2023  1:50 PM)

## 2023-08-09 NOTE — PROVATION PATIENT INSTRUCTIONS
Discharge Summary/Instructions after an Endoscopic Procedure  Patient Name: Rubén Hu  Patient MRN: [id number]  Patient YOB: 1970 Wednesday, August 9, 2023  Will Ardon MD  Dear patient,  As a result of recent federal legislation (The Federal Cures Act), you may   receive lab or pathology results from your procedure in your [facility]   account before your physician is able to contact you. Your physician or   their representative will relay the results to you with their   recommendations at their soonest availability.  Thank you,  RESTRICTIONS:  During your procedure today, you received medications for sedation.  These   medications may affect your judgment, balance and coordination.  Therefore,   for 24 hours, you have the following restrictions:   - DO NOT drive a car, operate machinery, make legal/financial decisions,   sign important papers or drink alcohol.    ACTIVITY:  Today: no heavy lifting, straining or running due to procedural   sedation/anesthesia.  The following day: return to full activity including work.  DIET:  Eat and drink normally unless instructed otherwise.     TREATMENT FOR COMMON SIDE EFFECTS:  - Mild abdominal pain, nausea, belching, bloating or excessive gas:  rest,   eat lightly and use a heating pad.  - Sore Throat: treat with throat lozenges and/or gargle with warm salt   water.  - Because air was used during the procedure, expelling large amounts of air   from your rectum or belching is normal.  - If a bowel prep was taken, you may not have a bowel movement for 1-3 days.    This is normal.  SYMPTOMS TO WATCH FOR AND REPORT TO YOUR PHYSICIAN:  1. Abdominal pain or bloating, other than gas cramps.  2. Chest pain.  3. Back pain.  4. Signs of infection such as: chills or fever occurring within 24 hours   after the procedure.  5. Rectal bleeding, which would show as bright red, maroon, or black stools.   (A tablespoon of blood from the rectum is not serious, especially if    hemorrhoids are present.)  6. Vomiting.  7. Weakness or dizziness.  GO DIRECTLY TO THE NEAREST EMERGENCY ROOM IF YOU HAVE ANY OF THE FOLLOWING:      Difficulty breathing              Chills and/or fever over 101 F   Persistent vomiting and/or vomiting blood   Severe abdominal pain   Severe chest pain   Black, tarry stools   Bleeding- more than one tablespoon   Any other symptom or condition that you feel may need urgent attention  Your doctor recommends these additional instructions:  If any biopsies were taken, your doctors clinic will contact you in 1 to 2   weeks with any results.  - Discharge patient to home (ambulatory).   - Patient has a contact number available for emergencies.  The signs and   symptoms of potential delayed complications were discussed with the   patient.  Return to normal activities tomorrow.  Written discharge   instructions were provided to the patient.   - Resume previous diet.   - Continue present medications.   - Return to primary care physician as previously scheduled.   - Repeat colonoscopy in 5 years for surveillance.  For questions, problems or results please call your physician - Will Ardon MD at Work:  [phone].  [facility], EMERGENCY ROOM PHONE NUMBER: [phone]  IF A COMPLICATION OR EMERGENCY SITUATION ARISES AND YOU ARE UNABLE TO REACH   YOUR PHYSICIAN - GO DIRECTLY TO THE EMERGENCY ROOM.  Will Ardon MD  8/9/2023 1:13:25 PM  This report has been verified and signed electronically.  Dear patient,  As a result of recent federal legislation (The Federal Cures Act), you may   receive lab or pathology results from your procedure in your [facility]   account before your physician is able to contact you. Your physician or   their representative will relay the results to you with their   recommendations at their soonest availability.  Thank you,  PROVATION

## 2023-11-22 ENCOUNTER — PATIENT OUTREACH (OUTPATIENT)
Dept: ADMINISTRATIVE | Facility: HOSPITAL | Age: 53
End: 2023-11-22
Payer: COMMERCIAL

## 2024-01-25 DIAGNOSIS — E55.9 VITAMIN D DEFICIENCY: ICD-10-CM

## 2024-01-31 RX ORDER — ERGOCALCIFEROL 1.25 MG/1
CAPSULE ORAL
Qty: 12 CAPSULE | Refills: 0 | Status: SHIPPED | OUTPATIENT
Start: 2024-01-31 | End: 2024-04-15

## 2024-02-07 ENCOUNTER — TELEPHONE (OUTPATIENT)
Dept: FAMILY MEDICINE | Facility: CLINIC | Age: 54
End: 2024-02-07

## 2024-02-20 ENCOUNTER — OFFICE VISIT (OUTPATIENT)
Dept: FAMILY MEDICINE | Facility: CLINIC | Age: 54
End: 2024-02-20
Payer: COMMERCIAL

## 2024-02-20 VITALS
HEIGHT: 71 IN | DIASTOLIC BLOOD PRESSURE: 80 MMHG | TEMPERATURE: 99 F | HEART RATE: 69 BPM | BODY MASS INDEX: 26.23 KG/M2 | OXYGEN SATURATION: 98 % | SYSTOLIC BLOOD PRESSURE: 124 MMHG | WEIGHT: 187.38 LBS

## 2024-02-20 DIAGNOSIS — Z23 PNEUMOCOCCAL VACCINATION ADMINISTERED AT CURRENT VISIT: ICD-10-CM

## 2024-02-20 DIAGNOSIS — Z00.00 ROUTINE PHYSICAL EXAMINATION: Primary | ICD-10-CM

## 2024-02-20 DIAGNOSIS — E27.8 ADRENAL NODULE: ICD-10-CM

## 2024-02-20 PROBLEM — E27.9 ADRENAL NODULE: Status: ACTIVE | Noted: 2024-02-20

## 2024-02-20 PROBLEM — E27.9 ADRENAL NODULE: Chronic | Status: ACTIVE | Noted: 2024-02-20

## 2024-02-20 PROCEDURE — 3074F SYST BP LT 130 MM HG: CPT | Mod: CPTII,S$GLB,, | Performed by: FAMILY MEDICINE

## 2024-02-20 PROCEDURE — 99999 PR PBB SHADOW E&M-EST. PATIENT-LVL III: CPT | Mod: PBBFAC,,, | Performed by: FAMILY MEDICINE

## 2024-02-20 PROCEDURE — G0009 ADMIN PNEUMOCOCCAL VACCINE: HCPCS | Mod: S$GLB,,, | Performed by: FAMILY MEDICINE

## 2024-02-20 PROCEDURE — 3079F DIAST BP 80-89 MM HG: CPT | Mod: CPTII,S$GLB,, | Performed by: FAMILY MEDICINE

## 2024-02-20 PROCEDURE — 90677 PCV20 VACCINE IM: CPT | Mod: S$GLB,,, | Performed by: FAMILY MEDICINE

## 2024-02-20 PROCEDURE — 99396 PREV VISIT EST AGE 40-64: CPT | Mod: S$GLB,,, | Performed by: FAMILY MEDICINE

## 2024-02-20 PROCEDURE — 1159F MED LIST DOCD IN RCRD: CPT | Mod: CPTII,S$GLB,, | Performed by: FAMILY MEDICINE

## 2024-02-20 PROCEDURE — 3008F BODY MASS INDEX DOCD: CPT | Mod: CPTII,S$GLB,, | Performed by: FAMILY MEDICINE

## 2024-02-20 NOTE — PROGRESS NOTES
[facility]  Progress Note    SUBJECTIVE:     Chief Complaint   Patient presents with    Annual Exam       HPI   Rubén Hu  is a 53 y.o. male here for physical exam. Patient has no other new complaints/problems at this time.      Review of patient's allergies indicates:   Allergen Reactions    Bactrim [sulfamethoxazole-trimethoprim] Hives       Past Medical History:   Diagnosis Date    Hyperlipidemia      Past Surgical History:   Procedure Laterality Date    COLONOSCOPY N/A 8/9/2023    Procedure: COLONOSCOPY;  Surgeon: Will Ardon MD;  Location: [address]);  Service: Endoscopy;  Laterality: N/A;  Suprep Instructions sent via portal / Authorizing:     Bebeto Arora MD in [facility] FAMILY MED/ INTERNAL MED/ PEDS  Referral:          [id number]    EYE SURGERY       Family History   Problem Relation Age of Onset    Arthritis Mother     COPD Father      Social History     Tobacco Use    Smoking status: Former     Types: Cigars    Smokeless tobacco: Never   Substance Use Topics    Alcohol use: Yes     Comment: socially    Drug use: Never        Review of Systems   Constitutional:  Negative for chills, diaphoresis and fever.   HENT:  Negative for congestion, ear pain and sore throat.    Eyes:  Negative for photophobia and discharge.   Respiratory:  Negative for cough, shortness of breath and wheezing.    Cardiovascular:  Negative for chest pain and palpitations.   Gastrointestinal:  Negative for abdominal pain, constipation, diarrhea, nausea and vomiting.   Genitourinary:  Negative for dysuria and hematuria.   Musculoskeletal:  Negative for back pain and myalgias.   Skin:  Negative for itching and rash.   Neurological:  Negative for dizziness, sensory change, focal weakness, weakness and headaches.   All other systems reviewed and are negative.    OBJECTIVE:     Vitals:    02/20/24 0841   BP: 124/80   Pulse: 69   Temp: 98.7 °F (37.1 °C)     Body mass index is 26.14 kg/m².    Physical Exam  Constitutional:        General: He is not in acute distress.     Appearance: He is not diaphoretic.   HENT:      Head: Normocephalic and atraumatic.      Right Ear: Tympanic membrane and ear canal normal. No hemotympanum. Tympanic membrane is not perforated, erythematous or bulging.      Left Ear: Tympanic membrane and ear canal normal. No hemotympanum. Tympanic membrane is not perforated, erythematous or bulging.   Eyes:      Conjunctiva/sclera: Conjunctivae normal.      Pupils: Pupils are equal, round, and reactive to light.   Neck:      Thyroid: No thyromegaly.   Cardiovascular:      Rate and Rhythm: Normal rate and regular rhythm.      Heart sounds: Normal heart sounds. No murmur heard.     No friction rub. No gallop.   Pulmonary:      Effort: Pulmonary effort is normal. No respiratory distress.      Breath sounds: Normal breath sounds. No wheezing or rales.   Abdominal:      General: Bowel sounds are normal. There is no distension.      Palpations: Abdomen is soft.      Tenderness: There is no abdominal tenderness. There is no guarding or rebound.   Musculoskeletal:         General: No tenderness. Normal range of motion.   Skin:     General: Skin is warm.      Findings: No erythema or rash.   Neurological:      Mental Status: He is alert and oriented to person, place, and time.         Old records were reviewed. Labs and/or images were independently reviewed.    ASSESSMENT     1. Routine physical examination    2. Adrenal nodule        PLAN:     Routine physical examination  -     CBC Auto Differential; Future; Expected date: 02/20/2024  -     Comprehensive Metabolic Panel; Future; Expected date: 02/20/2024  -     Hemoglobin A1C; Future; Expected date: 02/20/2024  -     TSH; Future; Expected date: 02/20/2024  -     Lipid Panel; Future; Expected date: 02/20/2024  -     PSA, Screening; Future; Expected date: 02/20/2024  -     T4, Free; Future  -     We briefly discussed diet, exercise, and routine preventive exams. All questions  and comments addressed.    Adrenal nodule   -      repeat CT after 6/2024.    RTC PRN    Bebeto Arora MD  02/20/2024 8:52 AM

## 2024-02-22 ENCOUNTER — LAB VISIT (OUTPATIENT)
Dept: LAB | Facility: HOSPITAL | Age: 54
End: 2024-02-22
Payer: COMMERCIAL

## 2024-02-22 DIAGNOSIS — Z00.00 ROUTINE PHYSICAL EXAMINATION: ICD-10-CM

## 2024-02-22 LAB
ALBUMIN SERPL BCP-MCNC: 4 G/DL (ref 3.5–5.2)
ALP SERPL-CCNC: 71 U/L (ref 55–135)
ALT SERPL W/O P-5'-P-CCNC: 79 U/L (ref 10–44)
ANION GAP SERPL CALC-SCNC: 11 MMOL/L (ref 8–16)
AST SERPL-CCNC: 57 U/L (ref 10–40)
BASOPHILS # BLD AUTO: 0.13 K/UL (ref 0–0.2)
BASOPHILS NFR BLD: 0.9 % (ref 0–1.9)
BILIRUB SERPL-MCNC: 0.3 MG/DL (ref 0.1–1)
BUN SERPL-MCNC: 15 MG/DL (ref 6–20)
CALCIUM SERPL-MCNC: 9.5 MG/DL (ref 8.7–10.5)
CHLORIDE SERPL-SCNC: 104 MMOL/L (ref 95–110)
CHOLEST SERPL-MCNC: 138 MG/DL (ref 120–199)
CHOLEST/HDLC SERPL: 2.7 {RATIO} (ref 2–5)
CO2 SERPL-SCNC: 25 MMOL/L (ref 23–29)
COMPLEXED PSA SERPL-MCNC: 2.2 NG/ML (ref 0–4)
CREAT SERPL-MCNC: 0.8 MG/DL (ref 0.5–1.4)
DIFFERENTIAL METHOD BLD: ABNORMAL
EOSINOPHIL # BLD AUTO: 0.2 K/UL (ref 0–0.5)
EOSINOPHIL NFR BLD: 1.4 % (ref 0–8)
ERYTHROCYTE [DISTWIDTH] IN BLOOD BY AUTOMATED COUNT: 14.6 % (ref 11.5–14.5)
EST. GFR  (NO RACE VARIABLE): >60 ML/MIN/1.73 M^2
ESTIMATED AVG GLUCOSE: 108 MG/DL (ref 68–131)
GLUCOSE SERPL-MCNC: 94 MG/DL (ref 70–110)
HBA1C MFR BLD: 5.4 % (ref 4–5.6)
HCT VFR BLD AUTO: 49.2 % (ref 40–54)
HDLC SERPL-MCNC: 52 MG/DL (ref 40–75)
HDLC SERPL: 37.7 % (ref 20–50)
HGB BLD-MCNC: 15.7 G/DL (ref 14–18)
IMM GRANULOCYTES # BLD AUTO: 0.09 K/UL (ref 0–0.04)
IMM GRANULOCYTES NFR BLD AUTO: 0.7 % (ref 0–0.5)
LDLC SERPL CALC-MCNC: 73.8 MG/DL (ref 63–159)
LYMPHOCYTES # BLD AUTO: 1.8 K/UL (ref 1–4.8)
LYMPHOCYTES NFR BLD: 12.9 % (ref 18–48)
MCH RBC QN AUTO: 30.7 PG (ref 27–31)
MCHC RBC AUTO-ENTMCNC: 31.9 G/DL (ref 32–36)
MCV RBC AUTO: 96 FL (ref 82–98)
MONOCYTES # BLD AUTO: 1.8 K/UL (ref 0.3–1)
MONOCYTES NFR BLD: 12.9 % (ref 4–15)
NEUTROPHILS # BLD AUTO: 9.9 K/UL (ref 1.8–7.7)
NEUTROPHILS NFR BLD: 71.2 % (ref 38–73)
NONHDLC SERPL-MCNC: 86 MG/DL
NRBC BLD-RTO: 0 /100 WBC
PLATELET # BLD AUTO: 780 K/UL (ref 150–450)
PMV BLD AUTO: 10.7 FL (ref 9.2–12.9)
POTASSIUM SERPL-SCNC: 4.2 MMOL/L (ref 3.5–5.1)
PROT SERPL-MCNC: 7.3 G/DL (ref 6–8.4)
RBC # BLD AUTO: 5.11 M/UL (ref 4.6–6.2)
SODIUM SERPL-SCNC: 140 MMOL/L (ref 136–145)
T4 FREE SERPL-MCNC: 0.8 NG/DL (ref 0.71–1.51)
TRIGL SERPL-MCNC: 61 MG/DL (ref 30–150)
TSH SERPL DL<=0.005 MIU/L-ACNC: 0.91 UIU/ML (ref 0.4–4)
WBC # BLD AUTO: 13.83 K/UL (ref 3.9–12.7)

## 2024-02-22 PROCEDURE — 80061 LIPID PANEL: CPT | Performed by: FAMILY MEDICINE

## 2024-02-22 PROCEDURE — 84153 ASSAY OF PSA TOTAL: CPT | Performed by: FAMILY MEDICINE

## 2024-02-22 PROCEDURE — 85025 COMPLETE CBC W/AUTO DIFF WBC: CPT | Performed by: FAMILY MEDICINE

## 2024-02-22 PROCEDURE — 84443 ASSAY THYROID STIM HORMONE: CPT | Performed by: FAMILY MEDICINE

## 2024-02-22 PROCEDURE — 36415 COLL VENOUS BLD VENIPUNCTURE: CPT | Mod: PO | Performed by: FAMILY MEDICINE

## 2024-02-22 PROCEDURE — 80053 COMPREHEN METABOLIC PANEL: CPT | Performed by: FAMILY MEDICINE

## 2024-02-22 PROCEDURE — 84439 ASSAY OF FREE THYROXINE: CPT | Performed by: FAMILY MEDICINE

## 2024-02-22 PROCEDURE — 83036 HEMOGLOBIN GLYCOSYLATED A1C: CPT | Performed by: FAMILY MEDICINE

## 2024-02-23 ENCOUNTER — PATIENT MESSAGE (OUTPATIENT)
Dept: FAMILY MEDICINE | Facility: CLINIC | Age: 54
End: 2024-02-23
Payer: COMMERCIAL

## 2024-02-23 DIAGNOSIS — D72.829 LEUKOCYTOSIS, UNSPECIFIED TYPE: Primary | ICD-10-CM

## 2024-02-23 DIAGNOSIS — R74.8 ELEVATED LIVER ENZYMES: Primary | ICD-10-CM

## 2024-03-07 ENCOUNTER — TELEPHONE (OUTPATIENT)
Dept: FAMILY MEDICINE | Facility: CLINIC | Age: 54
End: 2024-03-07
Payer: COMMERCIAL

## 2024-03-07 NOTE — TELEPHONE ENCOUNTER
----- Message from Nina Hanna MA sent at 3/7/2024 11:00 AM CST -----  Type: Patient Call Back    Who called:What is the request in detail: pt. Is asking if the orders in the system is all he needs to have done .. He was told to repeat labs ..     Can the clinic reply by [facility]?No    Would the patient rather a call back or a response via [facility]? Yes, call     Best call back number: [phone]

## 2024-03-18 ENCOUNTER — PATIENT OUTREACH (OUTPATIENT)
Dept: ADMINISTRATIVE | Facility: HOSPITAL | Age: 54
End: 2024-03-18
Payer: COMMERCIAL

## 2024-03-18 ENCOUNTER — PATIENT MESSAGE (OUTPATIENT)
Dept: ADMINISTRATIVE | Facility: HOSPITAL | Age: 54
End: 2024-03-18
Payer: COMMERCIAL

## 2024-03-19 ENCOUNTER — LAB VISIT (OUTPATIENT)
Dept: LAB | Facility: HOSPITAL | Age: 54
End: 2024-03-19
Attending: FAMILY MEDICINE
Payer: COMMERCIAL

## 2024-03-19 DIAGNOSIS — D72.829 LEUKOCYTOSIS, UNSPECIFIED TYPE: ICD-10-CM

## 2024-03-19 DIAGNOSIS — R74.8 ELEVATED LIVER ENZYMES: ICD-10-CM

## 2024-03-19 LAB
ALBUMIN SERPL BCP-MCNC: 4.4 G/DL (ref 3.5–5.2)
ALP SERPL-CCNC: 59 U/L (ref 55–135)
ALT SERPL W/O P-5'-P-CCNC: 39 U/L (ref 10–44)
ANION GAP SERPL CALC-SCNC: 6 MMOL/L (ref 8–16)
AST SERPL-CCNC: 28 U/L (ref 10–40)
BASOPHILS # BLD AUTO: 0.14 K/UL (ref 0–0.2)
BASOPHILS NFR BLD: 1.1 % (ref 0–1.9)
BILIRUB SERPL-MCNC: 0.4 MG/DL (ref 0.1–1)
BUN SERPL-MCNC: 15 MG/DL (ref 6–20)
CALCIUM SERPL-MCNC: 10.1 MG/DL (ref 8.7–10.5)
CHLORIDE SERPL-SCNC: 106 MMOL/L (ref 95–110)
CO2 SERPL-SCNC: 28 MMOL/L (ref 23–29)
CREAT SERPL-MCNC: 0.8 MG/DL (ref 0.5–1.4)
DIFFERENTIAL METHOD BLD: ABNORMAL
EOSINOPHIL # BLD AUTO: 0.3 K/UL (ref 0–0.5)
EOSINOPHIL NFR BLD: 2.1 % (ref 0–8)
ERYTHROCYTE [DISTWIDTH] IN BLOOD BY AUTOMATED COUNT: 14.6 % (ref 11.5–14.5)
EST. GFR  (NO RACE VARIABLE): >60 ML/MIN/1.73 M^2
GLUCOSE SERPL-MCNC: 88 MG/DL (ref 70–110)
HCT VFR BLD AUTO: 51.4 % (ref 40–54)
HGB BLD-MCNC: 16 G/DL (ref 14–18)
IMM GRANULOCYTES # BLD AUTO: 0.1 K/UL (ref 0–0.04)
IMM GRANULOCYTES NFR BLD AUTO: 0.8 % (ref 0–0.5)
LYMPHOCYTES # BLD AUTO: 2.7 K/UL (ref 1–4.8)
LYMPHOCYTES NFR BLD: 21.1 % (ref 18–48)
MCH RBC QN AUTO: 30.7 PG (ref 27–31)
MCHC RBC AUTO-ENTMCNC: 31.1 G/DL (ref 32–36)
MCV RBC AUTO: 99 FL (ref 82–98)
MONOCYTES # BLD AUTO: 1 K/UL (ref 0.3–1)
MONOCYTES NFR BLD: 7.9 % (ref 4–15)
NEUTROPHILS # BLD AUTO: 8.5 K/UL (ref 1.8–7.7)
NEUTROPHILS NFR BLD: 67 % (ref 38–73)
NRBC BLD-RTO: 0 /100 WBC
PATH REV BLD -IMP: NORMAL
PLATELET # BLD AUTO: 903 K/UL (ref 150–450)
PMV BLD AUTO: 10.7 FL (ref 9.2–12.9)
POTASSIUM SERPL-SCNC: 4.7 MMOL/L (ref 3.5–5.1)
PROT SERPL-MCNC: 7.3 G/DL (ref 6–8.4)
RBC # BLD AUTO: 5.22 M/UL (ref 4.6–6.2)
SODIUM SERPL-SCNC: 140 MMOL/L (ref 136–145)
WBC # BLD AUTO: 12.73 K/UL (ref 3.9–12.7)

## 2024-03-19 PROCEDURE — 80053 COMPREHEN METABOLIC PANEL: CPT | Performed by: FAMILY MEDICINE

## 2024-03-19 PROCEDURE — 85025 COMPLETE CBC W/AUTO DIFF WBC: CPT | Performed by: FAMILY MEDICINE

## 2024-03-19 PROCEDURE — 85060 BLOOD SMEAR INTERPRETATION: CPT | Mod: ,,, | Performed by: PATHOLOGY

## 2024-03-19 PROCEDURE — 36415 COLL VENOUS BLD VENIPUNCTURE: CPT | Mod: PO | Performed by: FAMILY MEDICINE

## 2024-03-20 ENCOUNTER — TELEPHONE (OUTPATIENT)
Dept: FAMILY MEDICINE | Facility: CLINIC | Age: 54
End: 2024-03-20
Payer: COMMERCIAL

## 2024-03-20 DIAGNOSIS — D75.839 THROMBOCYTOSIS: Primary | ICD-10-CM

## 2024-03-20 DIAGNOSIS — D72.829 LEUKOCYTOSIS, UNSPECIFIED TYPE: ICD-10-CM

## 2024-03-20 LAB — PATH REV BLD -IMP: NORMAL

## 2024-03-22 ENCOUNTER — LAB VISIT (OUTPATIENT)
Dept: LAB | Facility: HOSPITAL | Age: 54
End: 2024-03-22
Attending: FAMILY MEDICINE
Payer: COMMERCIAL

## 2024-03-22 DIAGNOSIS — D75.839 THROMBOCYTOSIS: ICD-10-CM

## 2024-03-22 DIAGNOSIS — D72.829 LEUKOCYTOSIS, UNSPECIFIED TYPE: ICD-10-CM

## 2024-03-22 LAB — FERRITIN SERPL-MCNC: 354 NG/ML (ref 20–300)

## 2024-03-22 PROCEDURE — 36415 COLL VENOUS BLD VENIPUNCTURE: CPT | Mod: PO | Performed by: FAMILY MEDICINE

## 2024-03-22 PROCEDURE — 81270 JAK2 GENE: CPT | Performed by: FAMILY MEDICINE

## 2024-03-22 PROCEDURE — 82728 ASSAY OF FERRITIN: CPT | Performed by: FAMILY MEDICINE

## 2024-03-25 LAB — CRC RECOMMENDATION EXT: NORMAL

## 2024-03-26 ENCOUNTER — PATIENT MESSAGE (OUTPATIENT)
Dept: FAMILY MEDICINE | Facility: CLINIC | Age: 54
End: 2024-03-26
Payer: COMMERCIAL

## 2024-03-26 DIAGNOSIS — D75.839 THROMBOCYTOSIS: ICD-10-CM

## 2024-03-26 DIAGNOSIS — Z15.89 JAK2 GENE MUTATION: Primary | ICD-10-CM

## 2024-03-26 LAB
MPNR  FINAL DIAGNOSIS: NORMAL
MPNR  SPECIMEN TYPE: NORMAL
MPNR RESULT: NORMAL

## 2024-03-27 ENCOUNTER — PATIENT OUTREACH (OUTPATIENT)
Dept: ADMINISTRATIVE | Facility: HOSPITAL | Age: 54
End: 2024-03-27
Payer: COMMERCIAL

## 2024-04-08 ENCOUNTER — OFFICE VISIT (OUTPATIENT)
Dept: HEMATOLOGY/ONCOLOGY | Facility: CLINIC | Age: 54
End: 2024-04-08
Payer: COMMERCIAL

## 2024-04-08 ENCOUNTER — LAB VISIT (OUTPATIENT)
Dept: LAB | Facility: HOSPITAL | Age: 54
End: 2024-04-08
Attending: INTERNAL MEDICINE
Payer: COMMERCIAL

## 2024-04-08 VITALS
DIASTOLIC BLOOD PRESSURE: 79 MMHG | HEART RATE: 69 BPM | HEIGHT: 71 IN | SYSTOLIC BLOOD PRESSURE: 128 MMHG | WEIGHT: 189.13 LBS | BODY MASS INDEX: 26.48 KG/M2 | OXYGEN SATURATION: 100 %

## 2024-04-08 DIAGNOSIS — D75.839 THROMBOCYTOSIS: Primary | ICD-10-CM

## 2024-04-08 DIAGNOSIS — D75.839 THROMBOCYTOSIS: ICD-10-CM

## 2024-04-08 LAB
ALBUMIN SERPL BCP-MCNC: 4.1 G/DL (ref 3.5–5.2)
ALP SERPL-CCNC: 60 U/L (ref 55–135)
ALT SERPL W/O P-5'-P-CCNC: 41 U/L (ref 10–44)
ANION GAP SERPL CALC-SCNC: 7 MMOL/L (ref 8–16)
AST SERPL-CCNC: 24 U/L (ref 10–40)
BASOPHILS # BLD AUTO: 0.11 K/UL (ref 0–0.2)
BASOPHILS NFR BLD: 0.9 % (ref 0–1.9)
BILIRUB SERPL-MCNC: 0.3 MG/DL (ref 0.1–1)
BUN SERPL-MCNC: 15 MG/DL (ref 6–20)
CALCIUM SERPL-MCNC: 9.8 MG/DL (ref 8.7–10.5)
CHLORIDE SERPL-SCNC: 106 MMOL/L (ref 95–110)
CO2 SERPL-SCNC: 28 MMOL/L (ref 23–29)
CREAT SERPL-MCNC: 0.9 MG/DL (ref 0.5–1.4)
DIFFERENTIAL METHOD BLD: ABNORMAL
EOSINOPHIL # BLD AUTO: 0.2 K/UL (ref 0–0.5)
EOSINOPHIL NFR BLD: 1.8 % (ref 0–8)
ERYTHROCYTE [DISTWIDTH] IN BLOOD BY AUTOMATED COUNT: 14.6 % (ref 11.5–14.5)
ERYTHROCYTE [SEDIMENTATION RATE] IN BLOOD BY WESTERGREN METHOD: 1 MM/HR (ref 0–10)
EST. GFR  (NO RACE VARIABLE): >60 ML/MIN/1.73 M^2
GLUCOSE SERPL-MCNC: 90 MG/DL (ref 70–110)
HAV IGM SERPL QL IA: NORMAL
HBV CORE AB SERPL QL IA: NORMAL
HBV CORE IGM SERPL QL IA: NORMAL
HBV SURFACE AG SERPL QL IA: NORMAL
HCT VFR BLD AUTO: 47.5 % (ref 40–54)
HCV AB SERPL QL IA: NORMAL
HGB BLD-MCNC: 15.1 G/DL (ref 14–18)
IMM GRANULOCYTES # BLD AUTO: 0.11 K/UL (ref 0–0.04)
IMM GRANULOCYTES NFR BLD AUTO: 0.9 % (ref 0–0.5)
IRON SERPL-MCNC: 87 UG/DL (ref 45–160)
LYMPHOCYTES # BLD AUTO: 2.3 K/UL (ref 1–4.8)
LYMPHOCYTES NFR BLD: 19.3 % (ref 18–48)
MCH RBC QN AUTO: 30.1 PG (ref 27–31)
MCHC RBC AUTO-ENTMCNC: 31.8 G/DL (ref 32–36)
MCV RBC AUTO: 95 FL (ref 82–98)
MONOCYTES # BLD AUTO: 1 K/UL (ref 0.3–1)
MONOCYTES NFR BLD: 8.3 % (ref 4–15)
NEUTROPHILS # BLD AUTO: 8.2 K/UL (ref 1.8–7.7)
NEUTROPHILS NFR BLD: 68.8 % (ref 38–73)
NRBC BLD-RTO: 0 /100 WBC
PLATELET # BLD AUTO: 749 K/UL (ref 150–450)
PMV BLD AUTO: 9.9 FL (ref 9.2–12.9)
POTASSIUM SERPL-SCNC: 6 MMOL/L (ref 3.5–5.1)
PROT SERPL-MCNC: 7 G/DL (ref 6–8.4)
RBC # BLD AUTO: 5.02 M/UL (ref 4.6–6.2)
SATURATED IRON: 23 % (ref 20–50)
SODIUM SERPL-SCNC: 141 MMOL/L (ref 136–145)
TOTAL IRON BINDING CAPACITY: 373 UG/DL (ref 250–450)
TRANSFERRIN SERPL-MCNC: 252 MG/DL (ref 200–375)
URATE SERPL-MCNC: 6.5 MG/DL (ref 3.4–7)
WBC # BLD AUTO: 11.9 K/UL (ref 3.9–12.7)

## 2024-04-08 PROCEDURE — 3074F SYST BP LT 130 MM HG: CPT | Mod: CPTII,S$GLB,, | Performed by: INTERNAL MEDICINE

## 2024-04-08 PROCEDURE — 80053 COMPREHEN METABOLIC PANEL: CPT | Performed by: INTERNAL MEDICINE

## 2024-04-08 PROCEDURE — 80074 ACUTE HEPATITIS PANEL: CPT | Performed by: INTERNAL MEDICINE

## 2024-04-08 PROCEDURE — 84550 ASSAY OF BLOOD/URIC ACID: CPT | Performed by: INTERNAL MEDICINE

## 2024-04-08 PROCEDURE — 85240 CLOT FACTOR VIII AHG 1 STAGE: CPT | Performed by: INTERNAL MEDICINE

## 2024-04-08 PROCEDURE — 83540 ASSAY OF IRON: CPT | Performed by: INTERNAL MEDICINE

## 2024-04-08 PROCEDURE — 3008F BODY MASS INDEX DOCD: CPT | Mod: CPTII,S$GLB,, | Performed by: INTERNAL MEDICINE

## 2024-04-08 PROCEDURE — 81270 JAK2 GENE: CPT | Performed by: INTERNAL MEDICINE

## 2024-04-08 PROCEDURE — 85246 CLOT FACTOR VIII VW ANTIGEN: CPT | Performed by: INTERNAL MEDICINE

## 2024-04-08 PROCEDURE — 85652 RBC SED RATE AUTOMATED: CPT | Performed by: INTERNAL MEDICINE

## 2024-04-08 PROCEDURE — 86704 HEP B CORE ANTIBODY TOTAL: CPT | Performed by: INTERNAL MEDICINE

## 2024-04-08 PROCEDURE — 3044F HG A1C LEVEL LT 7.0%: CPT | Mod: CPTII,S$GLB,, | Performed by: INTERNAL MEDICINE

## 2024-04-08 PROCEDURE — 3078F DIAST BP <80 MM HG: CPT | Mod: CPTII,S$GLB,, | Performed by: INTERNAL MEDICINE

## 2024-04-08 PROCEDURE — 99999 PR PBB SHADOW E&M-EST. PATIENT-LVL III: CPT | Mod: PBBFAC,,, | Performed by: INTERNAL MEDICINE

## 2024-04-08 PROCEDURE — 85025 COMPLETE CBC W/AUTO DIFF WBC: CPT | Performed by: INTERNAL MEDICINE

## 2024-04-08 PROCEDURE — 99204 OFFICE O/P NEW MOD 45 MIN: CPT | Mod: S$GLB,,, | Performed by: INTERNAL MEDICINE

## 2024-04-08 PROCEDURE — 1159F MED LIST DOCD IN RCRD: CPT | Mod: CPTII,S$GLB,, | Performed by: INTERNAL MEDICINE

## 2024-04-08 NOTE — PROGRESS NOTES
Subjective     Patient ID: Rubén Hu is a 53 y.o. male.    Chief Complaint: No chief complaint on file.  Reason For Consultation: Thrombocytosis  HPI Patient is a 54 y/o male with history of HLD seen today in consultation for thrombocytosis. No recent infections or surgeries. No history of splenectomy. He denies known  history of abnormal blood counts. He reports a couple of episodes of acute abd pain assoc with n/v and abd cramping past few months. He was followed at [facility] and workup included CT imaging a/p 1/31/24  Very subtle soft tissue nodularity and enhancement measuring approximately 1 cm along the right margin of the images. This is nonspecific and could represent a hemorrhoid. Visualized bowel may be helpful. Bilateral adrenal nodularity with 2 1.5 cm right adrenal nodules, most likely representing adenomas but incompletely characterized. He also  underwent EGD-Colonoscopy March 2024 which was unremarkable. No new Rx meds. He takes Vit D weekly .No HA/CP. Appetite and weight stable. No fatigue. .Family history unremarkable for cancer. He reports possible family history of hemoglobinopathy . He smokes cigars occasionally . He denies excessive snoring , fatigue or daytime sleepiness. Labs from 3/19/24 shows wbc 12.7 Hb 16 Hct 51.4 % plt ct 903. He is here for further evaluation.     Past Medical History:   Diagnosis Date    Hyperlipidemia        Past Surgical History:   Procedure Laterality Date    COLONOSCOPY N/A 8/9/2023    Procedure: COLONOSCOPY;  Surgeon: Will Ardon MD;  Location: [address];  Service: Endoscopy;  Laterality: N/A;  Suprep Instructions sent via portal / Authorizing:     Bebeto Arora MD in [facility] FAMILY MED/ INTERNAL MED/ PEDS  Referral:          [id number]    EYE SURGERY          Social History     Tobacco Use    Smoking status: Former     Types: Cigars    Smokeless tobacco: Never   Substance Use Topics    Alcohol use: Yes     Comment: socially    Drug use: Never     "    Review of Systems   Constitutional:  Negative for appetite change, fatigue, fever and unexpected weight change.   HENT:  Negative for mouth sores.    Eyes:  Negative for visual disturbance.   Respiratory:  Negative for cough and shortness of breath.    Cardiovascular:  Negative for chest pain.   Gastrointestinal:  Positive for abdominal pain. Negative for diarrhea.   Genitourinary:  Negative for frequency.   Musculoskeletal:  Negative for back pain.   Integumentary:  Negative for rash.   Neurological:  Negative for headaches.   Hematological:  Negative for adenopathy.   Psychiatric/Behavioral:  The patient is nervous/anxious.           Objective     Vitals:    04/08/24 0848   BP: 128/79   Pulse: 69   SpO2: 100%   Weight: 85.8 kg (189 lb 2.5 oz)   Height: 5' 11" (1.803 m)       Physical Exam    Physical Exam   Constitutional: She is oriented to person, place, and time. She appears well-developed and well-nourished.   HENT:   Head: Normocephalic.   Mouth/Throat: Oropharynx is clear and moist. No oropharyngeal exudate.   Eyes: Conjunctivae and lids are normal.  No scleral icterus.   Neck: Normal range of motion. Neck supple. No thyromegaly present.   Cardiovascular: Normal rate, regular rhythm and normal heart sounds.    No murmur heard.  Pulmonary/Chest: Breath sounds normal. She has no wheezes. She has no rales.   Abdominal: Soft. Bowel sounds are normal. There is no hepatosplenomegaly. There is no tenderness. There is no rebound and no guarding.   Musculoskeletal: Normal range of motion. She exhibits no edema and no tenderness.   Lymphadenopathy:     She has no cervical adenopathy.     She has no axillary adenopathy.        Right: No supraclavicular adenopathy present.        Left: No supraclavicular adenopathy present.   Neurological: She is alert and oriented to person, place, and time. No cranial nerve deficit. Coordination normal.   Skin: Skin is warm and dry. No ecchymosis, no petechiae and no rash noted. " No erythema.   Psychiatric: She has a normal mood and affect.           [facility] CT ABDOMEN PELVIS WITH CONTRAST on 1/31/2024 8:30 CST    Clinical history: Anorectal hemorrhage.    Technique: Oral contrast was administered prior to the study. During IV administration of contrast, spiral acquisition with multiplanar reconstructed images of the abdomen and pelvis were obtained. CT was performed utilizing one or more of the following dose lowering techniques: automated exposure control, iterative reconstruction technique, and/or adjustment of the mA and kV according to patient size.    CONTRAST: BARIUM SULFATE 2 % (W/V) ORAL SUSPENSION:450mL; IOHEXOL 350 MG IODINE/ML INTRAVENOUS SOLUTION:100mL    Findings: There is very subtle soft tissue nodularity and enhancement measuring approximately 1 cm along the right margin of the anus. There are no pathologically enlarged lymph nodes within the gluteal soft tissues, pudendal areas, pelvic sidewalls, retroperitoneum, or the mesentery.    The visualized lung bases, lower heart, and pericardium are unremarkable.    No suspicious lesions are seen within the liver, the spleen, or the pancreas. There is mild nodularity of both adrenal glands. There are 2 hypoenhancing foci within the right lobe each measuring approximately 1.5 cm in greatest length. No discrete nodularity is seen on the left.    There are tiny bilateral renal cysts. The kidneys are otherwise unremarkable without hydronephrosis down to the bladder. There are no focal bladder lesions. The seminal vesicles and prostate are unremarkable.    Stool and gas are scattered throughout the colon. The appendix is not inflamed. There is no small bowel dilatation. The stomach and lower esophagus are unremarkable.    No suspicious osseous lesions are seen. Surrounding structures are otherwise intact.    IMPRESSION:  1. Very subtle soft tissue nodularity and enhancement measuring approximately 1 cm along the right margin of the  images. This is nonspecific and could represent a hemorrhoid. Visualized bowel may be helpful.  2. Bilateral adrenal nodularity with 2 1.5 cm right adrenal nodules, most likely representing adenomas but incompletely characterized. Consider repeat adrenal protocol CT or long-term follow-up monitoring.       Pathologist interpretation of peripheral blood smear:   Mild leukocytosis shows no diagnostic morphologic abnormalities on   scanning.   Normochromic normocytic red cells show mild anisocytosis.    Platelets are morphologically unremarkable but increased in number.    - - Given the persistent and increasing peripheral thrombocytosis,   ruling out a myeloproliferative neoplasm is recommended.     Assessment and Plan     1. Thrombocytosis  -     CBC Auto Differential; Future; Expected date: 04/08/2024  -     Comprehensive Metabolic Panel; Future; Expected date: 04/08/2024  -     Iron and TIBC; Future; Expected date: 04/08/2024  -     Uric Acid; Future; Expected date: 04/08/2024  -     Sedimentation rate; Future; Expected date: 04/08/2024  -     MPN (JAK2 V617F)WITH REFLEX TO CALR,MPL; Future; Expected date: 04/08/2024  -     BCR/ABL GENE REARRANGEMENT (PCR); Future; Expected date: 04/08/2024  -     von Willebrand Profile; Future; Expected date: 04/08/2024  -     Hepatitis B Core Antibody, Total; Future; Expected date: 04/08/2024  -     HEPATITIS PANEL, ACUTE; Future; Expected date: 04/08/2024    Review of laboratory data reveals  elevated plt ct trending since 2019. Review of laboratory data concerning for myeloproliferative neoplasm. Plan workup including  appropriate lab testing and bone marrow biopsy. Bone marrow biopsy not always indicated for diagnosis   However;bone marrow may provide confirmatory data and prognostic information, particularly  Degree of reticulin fibrosis which may provide a useful baseline study  Pt elects  to proceed with bmbx at this point  Informed consent obtained.     All questions posed  answered to patient's satisfaction     ALL LABS TODAY  BONE MARROW BX- ZYRA  SCAN CONSENT  FOLLOW UP 2 WEEKS AFTER BMBX

## 2024-04-08 NOTE — PROGRESS NOTES
PROCEDURE NOTE:  Bone Marrow Biopsy  Date: 4/16/24  Indication: Thrombocytosis   Consent: Informed consent was obtained from patient.  Timeout: Done and documented.  Site: Left posterior illiac crest.  Prep: Betadine.  Needle used: 11 gauge Jamshidi needle.  Anesthetic: 2% lidocaine 10 cc.  Biopsy: The biopsy needle was introduced into the marrow cavity and an aspirate was obtained without complications. Core biopsy obtained and sent for routine histologic examination and cytogenetics. DNA/RNA hold and FISH Hold sent.   Complications: None.  Disposition: The patient was discharged home after applying pressure to site for 15 minutes and being check by an RN.  Minimal blood loss    Return to clinic in 2 weeks with MD appointment and labs.     Patient is in agreement with the proposed treatment plan. All questions were answered to the patient's satisfaction. Patient knows to call clinic for any new or worsening symptoms and if anything is needed before the next clinic visit.          Vero Hughes, FNP-C  Hematology & Medical Oncology   [facility] [place] [address]  ph. [phone]  Fax. [phone]    Collaborating physician, Dr. Garay.

## 2024-04-10 LAB
MPNR  FINAL DIAGNOSIS: NORMAL
MPNR  SPECIMEN TYPE: 1
MPNR RESULT: NORMAL

## 2024-04-14 DIAGNOSIS — E55.9 VITAMIN D DEFICIENCY: ICD-10-CM

## 2024-04-14 NOTE — TELEPHONE ENCOUNTER
Care Due:                  Date            Visit Type   Department     Provider  --------------------------------------------------------------------------------                                EP [facility] FAMILY                              PRIMARY      MED/ INTERNAL  Last Visit: 02-      CARE ([facility])   MED/ PEDS      ELOISA RAYMOND  Next Visit: None Scheduled  None         None Found                                                            Last  Test          Frequency    Reason                     Performed    Due Date  --------------------------------------------------------------------------------    Vitamin D...  12 months..  ergocalciferol...........  03- 02-    [facility] Embedded Care Due Messages. Reference number: [id number].   4/14/2024 1:59:37 PM CDT

## 2024-04-15 LAB
FACT VIII ACT/NOR PPP: NORMAL %
VON WILLEBRAND EVAL PPP-IMP: NORMAL
VWF AG ACT/NOR PPP IA: NORMAL %
VWF:AC ACT/NOR PPP IA: NORMAL %

## 2024-04-15 RX ORDER — ERGOCALCIFEROL 1.25 MG/1
CAPSULE ORAL
Qty: 12 CAPSULE | Refills: 2 | Status: SHIPPED | OUTPATIENT
Start: 2024-04-15

## 2024-04-15 NOTE — TELEPHONE ENCOUNTER
Refill Routing Note   Medication(s) are not appropriate for processing by [facility] for the following reason(s):        Outside of protocol    [facility] action(s):  Route     Requires labs : Yes             Appointments  past 12m or future 3m with PCP    Date Provider   Last Visit   2/20/2024 Bebeto Arora MD   Next Visit   Visit date not found Bebeto Arora MD   ED visits in past 90 days: 0        Note composed:8:14 AM 04/15/2024

## 2024-04-16 ENCOUNTER — OFFICE VISIT (OUTPATIENT)
Dept: HEMATOLOGY/ONCOLOGY | Facility: CLINIC | Age: 54
End: 2024-04-16
Payer: COMMERCIAL

## 2024-04-16 VITALS — HEART RATE: 68 BPM | SYSTOLIC BLOOD PRESSURE: 144 MMHG | DIASTOLIC BLOOD PRESSURE: 94 MMHG

## 2024-04-16 DIAGNOSIS — D75.839 THROMBOCYTOSIS: Primary | ICD-10-CM

## 2024-04-16 PROCEDURE — 88305 TISSUE EXAM BY PATHOLOGIST: CPT | Mod: 26,,, | Performed by: PATHOLOGY

## 2024-04-16 PROCEDURE — 88185 FLOWCYTOMETRY/TC ADD-ON: CPT | Performed by: PATHOLOGY

## 2024-04-16 PROCEDURE — 88342 IMHCHEM/IMCYTCHM 1ST ANTB: CPT | Mod: 26,,, | Performed by: PATHOLOGY

## 2024-04-16 PROCEDURE — 85097 BONE MARROW INTERPRETATION: CPT | Mod: ,,, | Performed by: PATHOLOGY

## 2024-04-16 PROCEDURE — 88184 FLOWCYTOMETRY/ TC 1 MARKER: CPT | Performed by: PATHOLOGY

## 2024-04-16 PROCEDURE — 88341 IMHCHEM/IMCYTCHM EA ADD ANTB: CPT | Performed by: PATHOLOGY

## 2024-04-16 PROCEDURE — 88275 CYTOGENETICS 100-300: CPT | Mod: 59

## 2024-04-16 PROCEDURE — 88189 FLOWCYTOMETRY/READ 16 & >: CPT | Mod: ,,, | Performed by: PATHOLOGY

## 2024-04-16 PROCEDURE — 88264 CHROMOSOME ANALYSIS 20-25: CPT | Performed by: NURSE PRACTITIONER

## 2024-04-16 PROCEDURE — 38222 DX BONE MARROW BX & ASPIR: CPT | Mod: LT,S$GLB,, | Performed by: NURSE PRACTITIONER

## 2024-04-16 PROCEDURE — 88299 UNLISTED CYTOGENETIC STUDY: CPT | Performed by: NURSE PRACTITIONER

## 2024-04-16 PROCEDURE — 81207 BCR/ABL1 GENE MINOR BP: CPT | Performed by: NURSE PRACTITIONER

## 2024-04-16 PROCEDURE — 88311 DECALCIFY TISSUE: CPT | Mod: 26,,, | Performed by: PATHOLOGY

## 2024-04-16 PROCEDURE — 88342 IMHCHEM/IMCYTCHM 1ST ANTB: CPT | Mod: 59 | Performed by: PATHOLOGY

## 2024-04-16 PROCEDURE — 88311 DECALCIFY TISSUE: CPT | Performed by: PATHOLOGY

## 2024-04-16 PROCEDURE — 88341 IMHCHEM/IMCYTCHM EA ADD ANTB: CPT | Mod: 26,,, | Performed by: PATHOLOGY

## 2024-04-16 PROCEDURE — 99999 PR PBB SHADOW E&M-EST. PATIENT-LVL III: CPT | Mod: PBBFAC,,, | Performed by: NURSE PRACTITIONER

## 2024-04-16 PROCEDURE — 88313 SPECIAL STAINS GROUP 2: CPT | Mod: 26,,, | Performed by: PATHOLOGY

## 2024-04-16 PROCEDURE — 88271 CYTOGENETICS DNA PROBE: CPT | Mod: 59 | Performed by: NURSE PRACTITIONER

## 2024-04-16 PROCEDURE — 88313 SPECIAL STAINS GROUP 2: CPT | Mod: 59 | Performed by: PATHOLOGY

## 2024-04-16 PROCEDURE — 88237 TISSUE CULTURE BONE MARROW: CPT | Performed by: NURSE PRACTITIONER

## 2024-04-16 PROCEDURE — 88305 TISSUE EXAM BY PATHOLOGIST: CPT | Performed by: PATHOLOGY

## 2024-04-17 DIAGNOSIS — D75.839 THROMBOCYTOSIS: Primary | ICD-10-CM

## 2024-04-19 LAB
DNA/RNA EXTRACT AND HOLD RESULT: NORMAL
DNA/RNA EXTRACTION: NORMAL
EXHR SPECIMEN TYPE: NORMAL

## 2024-04-22 LAB
ANNOTATION COMMENT IMP: NORMAL
HOLDF INTERPRETATION: NORMAL
HOLDF REASON FOR REFERRAL: NORMAL
HOLDF RELEASED BY: NORMAL
HOLDF REQUESTED FISH TEST: NORMAL
HOLDF SOURCE: NORMAL
MOL DX INTERP BLD/T QL: NORMAL
REF LAB TEST METHOD: NORMAL
SPECIMEN TYPE: NORMAL

## 2024-04-24 ENCOUNTER — PATIENT MESSAGE (OUTPATIENT)
Dept: HEMATOLOGY/ONCOLOGY | Facility: CLINIC | Age: 54
End: 2024-04-24
Payer: COMMERCIAL

## 2024-04-24 LAB
BODY SITE - BONE MARROW: NORMAL
CLINICAL DIAGNOSIS - BONE MARROW: NORMAL
DIAGNOSTIC BCR/ABL 1 RESULT: NORMAL
FLOW CYTOMETRY ANTIBODIES ANALYZED - BONE MARROW: NORMAL
FLOW CYTOMETRY COMMENT - BONE MARROW: NORMAL
FLOW CYTOMETRY INTERPRETATION - BONE MARROW: NORMAL
NARRATIVE DIAGNOSTIC REPORT-IMP: NORMAL
SPECIMEN SOURCE: NORMAL

## 2024-04-25 DIAGNOSIS — D75.839 THROMBOCYTOSIS: Primary | ICD-10-CM

## 2024-05-02 ENCOUNTER — LAB VISIT (OUTPATIENT)
Dept: LAB | Facility: HOSPITAL | Age: 54
End: 2024-05-02
Payer: COMMERCIAL

## 2024-05-02 DIAGNOSIS — D75.839 THROMBOCYTOSIS: ICD-10-CM

## 2024-05-02 LAB
ALBUMIN SERPL BCP-MCNC: 4.1 G/DL (ref 3.5–5.2)
ALP SERPL-CCNC: 65 U/L (ref 55–135)
ALT SERPL W/O P-5'-P-CCNC: 43 U/L (ref 10–44)
ANION GAP SERPL CALC-SCNC: 12 MMOL/L (ref 8–16)
AST SERPL-CCNC: 32 U/L (ref 10–40)
BASOPHILS # BLD AUTO: 0.12 K/UL (ref 0–0.2)
BASOPHILS NFR BLD: 1 % (ref 0–1.9)
BILIRUB SERPL-MCNC: 0.2 MG/DL (ref 0.1–1)
BUN SERPL-MCNC: 18 MG/DL (ref 6–20)
CALCIUM SERPL-MCNC: 9.6 MG/DL (ref 8.7–10.5)
CHLORIDE SERPL-SCNC: 107 MMOL/L (ref 95–110)
CO2 SERPL-SCNC: 22 MMOL/L (ref 23–29)
CREAT SERPL-MCNC: 0.8 MG/DL (ref 0.5–1.4)
DIFFERENTIAL METHOD BLD: ABNORMAL
EOSINOPHIL # BLD AUTO: 0.3 K/UL (ref 0–0.5)
EOSINOPHIL NFR BLD: 2.2 % (ref 0–8)
ERYTHROCYTE [DISTWIDTH] IN BLOOD BY AUTOMATED COUNT: 14.6 % (ref 11.5–14.5)
EST. GFR  (NO RACE VARIABLE): >60 ML/MIN/1.73 M^2
GLUCOSE SERPL-MCNC: 88 MG/DL (ref 70–110)
HCT VFR BLD AUTO: 46.7 % (ref 40–54)
HGB BLD-MCNC: 15.1 G/DL (ref 14–18)
IMM GRANULOCYTES # BLD AUTO: 0.08 K/UL (ref 0–0.04)
IMM GRANULOCYTES NFR BLD AUTO: 0.7 % (ref 0–0.5)
LYMPHOCYTES # BLD AUTO: 2.8 K/UL (ref 1–4.8)
LYMPHOCYTES NFR BLD: 23.7 % (ref 18–48)
MCH RBC QN AUTO: 30.3 PG (ref 27–31)
MCHC RBC AUTO-ENTMCNC: 32.3 G/DL (ref 32–36)
MCV RBC AUTO: 94 FL (ref 82–98)
MONOCYTES # BLD AUTO: 1 K/UL (ref 0.3–1)
MONOCYTES NFR BLD: 8.6 % (ref 4–15)
NEUTROPHILS # BLD AUTO: 7.5 K/UL (ref 1.8–7.7)
NEUTROPHILS NFR BLD: 63.8 % (ref 38–73)
NRBC BLD-RTO: 0 /100 WBC
PLATELET # BLD AUTO: 781 K/UL (ref 150–450)
PMV BLD AUTO: 10.4 FL (ref 9.2–12.9)
POTASSIUM SERPL-SCNC: 4.2 MMOL/L (ref 3.5–5.1)
PROT SERPL-MCNC: 7.3 G/DL (ref 6–8.4)
RBC # BLD AUTO: 4.98 M/UL (ref 4.6–6.2)
SODIUM SERPL-SCNC: 141 MMOL/L (ref 136–145)
WBC # BLD AUTO: 11.81 K/UL (ref 3.9–12.7)

## 2024-05-02 PROCEDURE — 80053 COMPREHEN METABOLIC PANEL: CPT | Performed by: INTERNAL MEDICINE

## 2024-05-02 PROCEDURE — 36415 COLL VENOUS BLD VENIPUNCTURE: CPT | Mod: PO | Performed by: INTERNAL MEDICINE

## 2024-05-02 PROCEDURE — 85025 COMPLETE CBC W/AUTO DIFF WBC: CPT | Performed by: INTERNAL MEDICINE

## 2024-05-06 ENCOUNTER — OFFICE VISIT (OUTPATIENT)
Dept: HEMATOLOGY/ONCOLOGY | Facility: CLINIC | Age: 54
End: 2024-05-06
Payer: COMMERCIAL

## 2024-05-06 VITALS
DIASTOLIC BLOOD PRESSURE: 79 MMHG | HEART RATE: 79 BPM | HEIGHT: 71 IN | OXYGEN SATURATION: 100 % | SYSTOLIC BLOOD PRESSURE: 150 MMHG | BODY MASS INDEX: 26.76 KG/M2 | WEIGHT: 191.13 LBS

## 2024-05-06 DIAGNOSIS — E27.8 ADRENAL NODULE: ICD-10-CM

## 2024-05-06 DIAGNOSIS — D47.1 MYELOPROLIFERATIVE NEOPLASM: Primary | ICD-10-CM

## 2024-05-06 LAB
CHROM BANDING METHOD: NORMAL
CHROMOSOME ANALYSIS BM ADDITIONAL INFORMATION: NORMAL
CHROMOSOME ANALYSIS BM RELEASED BY: NORMAL
CHROMOSOME ANALYSIS BM RESULT SUMMARY: NORMAL
CLINICAL CYTOGENETICIST REVIEW: NORMAL
KARYOTYP MAR: NORMAL
MAYO MISCELLANEOUS RESULT (REF): NORMAL
REASON FOR REFERRAL (NARRATIVE): NORMAL
REF LAB TEST METHOD: NORMAL
SPECIMEN SOURCE: NORMAL
SPECIMEN: NORMAL

## 2024-05-06 PROCEDURE — 3044F HG A1C LEVEL LT 7.0%: CPT | Mod: CPTII,S$GLB,, | Performed by: INTERNAL MEDICINE

## 2024-05-06 PROCEDURE — 3008F BODY MASS INDEX DOCD: CPT | Mod: CPTII,S$GLB,, | Performed by: INTERNAL MEDICINE

## 2024-05-06 PROCEDURE — 99999 PR PBB SHADOW E&M-EST. PATIENT-LVL III: CPT | Mod: PBBFAC,,, | Performed by: INTERNAL MEDICINE

## 2024-05-06 PROCEDURE — 1159F MED LIST DOCD IN RCRD: CPT | Mod: CPTII,S$GLB,, | Performed by: INTERNAL MEDICINE

## 2024-05-06 PROCEDURE — 3078F DIAST BP <80 MM HG: CPT | Mod: CPTII,S$GLB,, | Performed by: INTERNAL MEDICINE

## 2024-05-06 PROCEDURE — 3077F SYST BP >= 140 MM HG: CPT | Mod: CPTII,S$GLB,, | Performed by: INTERNAL MEDICINE

## 2024-05-06 PROCEDURE — 99215 OFFICE O/P EST HI 40 MIN: CPT | Mod: S$GLB,,, | Performed by: INTERNAL MEDICINE

## 2024-05-06 NOTE — PROGRESS NOTES
Subjective     Patient ID: Rubén Hu is a 53 y.o. male.    Chief Complaint: No chief complaint on file.  Diagnosis : MPN   HPI Patient is a 52 y/o male with history of HLD seen today in consultation for thrombocytosis. No recent infections or surgeries. No history of splenectomy. He denies known  history of abnormal blood counts. He reports a couple of episodes of acute abd pain assoc with n/v and abd cramping past few months. He was followed at [facility] [place] and workup included CT imaging a/p 1/31/24  Very subtle soft tissue nodularity and enhancement measuring approximately 1 cm along the right margin of the images. This is nonspecific and could represent a hemorrhoid. Visualized bowel may be helpful. Bilateral adrenal nodularity with 2 1.5 cm right adrenal nodules, most likely representing adenomas but incompletely characterized. He also  underwent EGD-Colonoscopy March 2024 which was unremarkable. No new Rx meds. He takes Vit D weekly .No HA/CP. Appetite and weight stable. No fatigue. .Family history unremarkable for cancer. He reports possible family history of hemoglobinopathy . He smokes cigars occasionally . He denies excessive snoring , fatigue or daytime sleepiness. Labs from 3/19/24 shows wbc 12.7 Hb 16 Hct 51.4 % plt ct 903.     Quit smoking 10 yrs ago. 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  -- ADEQUATE STORAGE IRON.  -- SEE  COMMENT. 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 FISH studies (reported separately) indicate ABL1 (at 9q34) is not rearranged. This specific 9;19 translocation is not a known, recurrent abnormality associated  with any hematologic malignancy. Since this translocation could be constitutional or acquired, a chromosome study of T-lymphocytes from blood, test CHRCB (Chromosomes, Congenital, Blood), is recommended to establish this patient's constitutional  karyotype. If this abnormality is acquired, it could be associated with a hematologic malignancy. If it is constitutional, a genetic consultation may be of benefit. Clinical and pathologic correlation is recommended.           Past Medical History:   Diagnosis Date    Hyperlipidemia        Past Surgical History:   Procedure Laterality Date    COLONOSCOPY N/A 8/9/2023    Procedure: COLONOSCOPY;  Surgeon: Will Ardon MD;  Location: [address]);  Service: Endoscopy;  Laterality: N/A;  Suprep Instructions sent via portal / Authorizing:     Bebeto Arora MD in [facility] FAMILY MED/ INTERNAL MED/ PEDS  Referral:          [id number]    EYE SURGERY          Social History     Tobacco Use    Smoking status: Former     Types: Cigars    Smokeless tobacco: Never   Substance Use Topics    Alcohol use: Yes     Comment: socially    Drug use: Never        Review of Systems   Constitutional:  Negative for appetite change, fatigue, fever and unexpected weight change.   HENT:  Negative for mouth sores.    Eyes:  Negative for visual disturbance.   Respiratory:  Negative for cough and shortness of breath.    Cardiovascular:  Negative for chest  "pain.   Gastrointestinal:  Positive for abdominal pain. Negative for diarrhea.   Genitourinary:  Negative for frequency.   Musculoskeletal:  Negative for back pain.   Integumentary:  Negative for rash.   Neurological:  Negative for headaches.   Hematological:  Negative for adenopathy.   Psychiatric/Behavioral:  The patient is nervous/anxious.           Objective     Vitals:    05/06/24 1432   BP: (!) 150/79   Pulse: 79   SpO2: 100%   Weight: 86.7 kg (191 lb 2.2 oz)   Height: 5' 11" (1.803 m)           Physical Exam    Physical Exam   Constitutional: She is oriented to person, place, and time. She appears well-developed and well-nourished.   HENT:   Head: Normocephalic.   Mouth/Throat: Oropharynx is clear and moist. No oropharyngeal exudate.   Eyes: Conjunctivae and lids are normal.  No scleral icterus.   Neck: Normal range of motion. Neck supple. No thyromegaly present.   Cardiovascular: Normal rate, regular rhythm and normal heart sounds.    No murmur heard.  Pulmonary/Chest: Breath sounds normal. She has no wheezes. She has no rales.   Abdominal: Soft. Bowel sounds are normal. There is no hepatosplenomegaly. There is no tenderness. There is no rebound and no guarding.   Musculoskeletal: Normal range of motion. She exhibits no edema and no tenderness.   Lymphadenopathy:     She has no cervical adenopathy.     She has no axillary adenopathy.        Right: No supraclavicular adenopathy present.        Left: No supraclavicular adenopathy present.   Neurological: She is alert and oriented to person, place, and time. No cranial nerve deficit. Coordination normal.   Skin: Skin is warm and dry. No ecchymosis, no petechiae and no rash noted. No erythema.   Psychiatric: She has a normal mood and affect.           [facility] CT ABDOMEN PELVIS WITH CONTRAST on 1/31/2024 8:30 CST    Clinical history: Anorectal hemorrhage.    Technique: Oral contrast was administered prior to the study. During IV administration of contrast, spiral " acquisition with multiplanar reconstructed images of the abdomen and pelvis were obtained. CT was performed utilizing one or more of the following dose lowering techniques: automated exposure control, iterative reconstruction technique, and/or adjustment of the mA and kV according to patient size.    CONTRAST: BARIUM SULFATE 2 % (W/V) ORAL SUSPENSION:450mL; IOHEXOL 350 MG IODINE/ML INTRAVENOUS SOLUTION:100mL    Findings: There is very subtle soft tissue nodularity and enhancement measuring approximately 1 cm along the right margin of the anus. There are no pathologically enlarged lymph nodes within the gluteal soft tissues, pudendal areas, pelvic sidewalls, retroperitoneum, or the mesentery.    The visualized lung bases, lower heart, and pericardium are unremarkable.    No suspicious lesions are seen within the liver, the spleen, or the pancreas. There is mild nodularity of both adrenal glands. There are 2 hypoenhancing foci within the right lobe each measuring approximately 1.5 cm in greatest length. No discrete nodularity is seen on the left.    There are tiny bilateral renal cysts. The kidneys are otherwise unremarkable without hydronephrosis down to the bladder. There are no focal bladder lesions. The seminal vesicles and prostate are unremarkable.    Stool and gas are scattered throughout the colon. The appendix is not inflamed. There is no small bowel dilatation. The stomach and lower esophagus are unremarkable.    No suspicious osseous lesions are seen. Surrounding structures are otherwise intact.    IMPRESSION:  1. Very subtle soft tissue nodularity and enhancement measuring approximately 1 cm along the right margin of the images. This is nonspecific and could represent a hemorrhoid. Visualized bowel may be helpful.  2. Bilateral adrenal nodularity with 2 1.5 cm right adrenal nodules, most likely representing adenomas but incompletely characterized. Consider repeat adrenal protocol CT or long-term follow-up  monitoring.       Pathologist interpretation of peripheral blood smear:   Mild leukocytosis shows no diagnostic morphologic abnormalities on   scanning.   Normochromic normocytic red cells show mild anisocytosis.    Platelets are morphologically unremarkable but increased in number.    - - Given the persistent and increasing peripheral thrombocytosis,   ruling out a myeloproliferative neoplasm is recommended.     Assessment and Plan     1. 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  -- ADEQUATE STORAGE IRON.  -- SEE COMMENT.  1. Peripheral blood JAK2 mutation analysis (04/08/2024): JAK2 V617F mutated DNA was detected and measured at 15% of total JAK2 DNA. 2. Bone marrow BCR/ABL1 mRNA analysis: No BCR/ABL1 mRNA transcripts were detected  The overall findings are consistent with a myeloproliferative neoplasm. Differential considerations include primary myelofibrosis and essential thrombocythemia. 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 Correlation with other lab results is recommended    Discussed bmbx findings in detail with patient  Discussed bmbx findings in detail with pathologist   Clinically , findings c/w ET   Pt considered low risk stratification   Plan [facility] send out testing , a chromosome study of Tlymphocytes from blood, test CHRCB (Chromosomes, Congenital, Blood), is recommended to establish this patient's constitutional karyotype. If this abnormality is acquired, it could be associated with a hematologic malignancy. If it is constitutional, a genetic consultation may be of benefit    Assessment of thrombotic risk, control of other risk factors for thrombosis (eg, smoking, elevated cholesterol), and risk-stratified treatment are key aspects of management for individuals with ET.     - Lactate Dehydrogenase; Future  - US  Abdomen Complete; Future    2. Adrenal nodule  Plan CT imaging   Review of laboratory data reveals  elevated plt ct trending since 2019. Review of laboratory data concerning for myeloproliferative neoplasm. Plan workup including  appropriate lab testing and bone marrow biopsy. Bone marrow biopsy not always indicated for diagnosis   However;bone marrow may provide confirmatory data and prognostic information, particularly  Degree of reticulin fibrosis which may provide a useful baseline study  Pt elects  to proceed with bmbx at this point  Informed consent obtained.     All questions posed answered to patient's satisfaction         SCHEDULE CT   SCHEDULE LABS  LDH AND MISCELLANOUS SEND OUT TEST     FOLLOW UP IN 1MONTH       I spent a total of  50  minutes on the day of the visit.This includes face to face time and non-face to face time preparing to see the patient (eg, review of tests), obtaining and/or reviewing separately obtained history, documenting clinical information in the electronic or other health record, independently interpreting results and communicating results to the patient/family/caregiver, and coordinating care.

## 2024-05-16 ENCOUNTER — HOSPITAL ENCOUNTER (OUTPATIENT)
Dept: RADIOLOGY | Facility: HOSPITAL | Age: 54
Discharge: HOME | End: 2024-05-16
Attending: INTERNAL MEDICINE
Payer: COMMERCIAL

## 2024-05-16 DIAGNOSIS — D47.1 MYELOPROLIFERATIVE NEOPLASM: ICD-10-CM

## 2024-05-16 PROCEDURE — 76700 US EXAM ABDOM COMPLETE: CPT | Mod: TC

## 2024-05-16 PROCEDURE — 76700 US EXAM ABDOM COMPLETE: CPT | Mod: 26,,, | Performed by: INTERNAL MEDICINE

## 2024-05-21 ENCOUNTER — PATIENT MESSAGE (OUTPATIENT)
Dept: HEMATOLOGY/ONCOLOGY | Facility: CLINIC | Age: 54
End: 2024-05-21
Payer: COMMERCIAL

## 2024-05-22 ENCOUNTER — LAB VISIT (OUTPATIENT)
Dept: LAB | Facility: HOSPITAL | Age: 54
End: 2024-05-22
Payer: COMMERCIAL

## 2024-05-22 DIAGNOSIS — D47.1 MYELOPROLIFERATIVE NEOPLASM: ICD-10-CM

## 2024-05-22 LAB — LDH SERPL L TO P-CCNC: 173 U/L (ref 110–260)

## 2024-05-22 PROCEDURE — 88275 CYTOGENETICS 100-300: CPT

## 2024-05-22 PROCEDURE — 88271 CYTOGENETICS DNA PROBE: CPT | Mod: 59 | Performed by: INTERNAL MEDICINE

## 2024-05-22 PROCEDURE — 36415 COLL VENOUS BLD VENIPUNCTURE: CPT | Mod: PO | Performed by: INTERNAL MEDICINE

## 2024-05-22 PROCEDURE — 30000890 MAYO MISCELLANEOUS TEST (REFLEX): Mod: 59 | Performed by: INTERNAL MEDICINE

## 2024-05-22 PROCEDURE — 83615 LACTATE (LD) (LDH) ENZYME: CPT | Performed by: INTERNAL MEDICINE

## 2024-05-22 PROCEDURE — 30000890 HC MISC. SEND OUT TEST

## 2024-05-23 ENCOUNTER — HOSPITAL ENCOUNTER (OUTPATIENT)
Dept: RADIOLOGY | Facility: HOSPITAL | Age: 54
Discharge: HOME | End: 2024-05-23
Attending: INTERNAL MEDICINE
Payer: COMMERCIAL

## 2024-05-23 DIAGNOSIS — E27.8 ADRENAL NODULE: ICD-10-CM

## 2024-05-23 PROCEDURE — 74178 CT ABD&PLV WO CNTR FLWD CNTR: CPT | Mod: TC

## 2024-05-23 PROCEDURE — 25500020 PHARM REV CODE 255: Performed by: INTERNAL MEDICINE

## 2024-05-23 PROCEDURE — 74178 CT ABD&PLV WO CNTR FLWD CNTR: CPT | Mod: 26,,, | Performed by: STUDENT IN AN ORGANIZED HEALTH CARE EDUCATION/TRAINING PROGRAM

## 2024-05-23 RX ADMIN — IOHEXOL 75 ML: 350 INJECTION, SOLUTION INTRAVENOUS at 03:05

## 2024-05-28 ENCOUNTER — LAB VISIT (OUTPATIENT)
Dept: LAB | Facility: HOSPITAL | Age: 54
End: 2024-05-28
Attending: INTERNAL MEDICINE
Payer: COMMERCIAL

## 2024-05-28 ENCOUNTER — OFFICE VISIT (OUTPATIENT)
Dept: HEMATOLOGY/ONCOLOGY | Facility: CLINIC | Age: 54
End: 2024-05-28
Payer: COMMERCIAL

## 2024-05-28 VITALS
SYSTOLIC BLOOD PRESSURE: 131 MMHG | BODY MASS INDEX: 26.55 KG/M2 | DIASTOLIC BLOOD PRESSURE: 86 MMHG | OXYGEN SATURATION: 98 % | HEIGHT: 71 IN | WEIGHT: 189.63 LBS | HEART RATE: 67 BPM

## 2024-05-28 DIAGNOSIS — E27.8 ADRENAL NODULE: ICD-10-CM

## 2024-05-28 DIAGNOSIS — D47.1 MYELOPROLIFERATIVE NEOPLASM: ICD-10-CM

## 2024-05-28 DIAGNOSIS — D75.81 MF (MYELOFIBROSIS): ICD-10-CM

## 2024-05-28 PROCEDURE — 99215 OFFICE O/P EST HI 40 MIN: CPT | Mod: S$GLB,,, | Performed by: INTERNAL MEDICINE

## 2024-05-28 PROCEDURE — 3044F HG A1C LEVEL LT 7.0%: CPT | Mod: CPTII,S$GLB,, | Performed by: INTERNAL MEDICINE

## 2024-05-28 PROCEDURE — 85397 CLOTTING FUNCT ACTIVITY: CPT | Performed by: INTERNAL MEDICINE

## 2024-05-28 PROCEDURE — 85245 CLOT FACTOR VIII VW RISTOCTN: CPT | Performed by: INTERNAL MEDICINE

## 2024-05-28 PROCEDURE — 88262 CHROMOSOME ANALYSIS 15-20: CPT | Performed by: INTERNAL MEDICINE

## 2024-05-28 PROCEDURE — 88230 TISSUE CULTURE LYMPHOCYTE: CPT | Performed by: INTERNAL MEDICINE

## 2024-05-28 PROCEDURE — 3075F SYST BP GE 130 - 139MM HG: CPT | Mod: CPTII,S$GLB,, | Performed by: INTERNAL MEDICINE

## 2024-05-28 PROCEDURE — 3079F DIAST BP 80-89 MM HG: CPT | Mod: CPTII,S$GLB,, | Performed by: INTERNAL MEDICINE

## 2024-05-28 PROCEDURE — 85390 FIBRINOLYSINS SCREEN I&R: CPT | Mod: 91 | Performed by: INTERNAL MEDICINE

## 2024-05-28 PROCEDURE — 85240 CLOT FACTOR VIII AHG 1 STAGE: CPT | Performed by: INTERNAL MEDICINE

## 2024-05-28 PROCEDURE — 3008F BODY MASS INDEX DOCD: CPT | Mod: CPTII,S$GLB,, | Performed by: INTERNAL MEDICINE

## 2024-05-28 PROCEDURE — 99999 PR PBB SHADOW E&M-EST. PATIENT-LVL III: CPT | Mod: PBBFAC,,, | Performed by: INTERNAL MEDICINE

## 2024-05-28 PROCEDURE — 36415 COLL VENOUS BLD VENIPUNCTURE: CPT | Performed by: INTERNAL MEDICINE

## 2024-05-28 PROCEDURE — 1159F MED LIST DOCD IN RCRD: CPT | Mod: CPTII,S$GLB,, | Performed by: INTERNAL MEDICINE

## 2024-05-28 PROCEDURE — 85247 CLOT FACTOR VIII MULTIMETRIC: CPT | Performed by: INTERNAL MEDICINE

## 2024-05-28 RX ORDER — HYDROXYUREA 500 MG/1
500 CAPSULE ORAL DAILY
Qty: 30 CAPSULE | Refills: 3 | Status: SHIPPED | OUTPATIENT
Start: 2024-05-28 | End: 2024-09-25

## 2024-05-28 NOTE — PROGRESS NOTES
Subjective     Patient ID: Rubén Hu is a 53 y.o. male.    Chief Complaint: No chief complaint on file.  Diagnosis : MPN   HPI Patient is a 54 y/o male with history of HLD seen today  for follow up for MPN, myeloproliferative neoplasia. He was seen initially in consultation for  thrombocytosis. No recent infections or surgeries. No history of splenectomy. He denies known  history of abnormal blood counts. He reports a couple of episodes of acute abd pain assoc with n/v and abd cramping past few months. He was followed at [facility] [place] and workup included CT imaging a/p 1/31/24  Very subtle soft tissue nodularity and enhancement measuring approximately 1 cm along the right margin of the images. This is nonspecific and could represent a hemorrhoid. Visualized bowel may be helpful. Bilateral adrenal nodularity with 2 1.5 cm right adrenal nodules, most likely representing adenomas but incompletely characterized. He also  underwent EGD-Colonoscopy March 2024 which was unremarkable. No new Rx meds. He takes Vit D weekly .No HA/CP. Appetite and weight stable. No fatigue. .Family history unremarkable for cancer. He reports possible family history of hemoglobinopathy . He smokes cigars occasionally . He denies excessive snoring , fatigue or daytime sleepiness. Labs from 3/19/24 shows wbc 12.7 Hb 16 Hct 51.4 % plt ct 903. Quit smoking 10 yrs ago. Smoked 28 yrs  No CAD. On cholesterol meds since age 39 Dad is 89 and has bleeding ulcers had a mild MI in his 80's He continues with intermittent rectal bleeding on toilet paper He was diagnosed with Anal fissure 2 1/2 years ago per CRS    Interval Hx: accompanied by wife   He reports occasional Headaches  HA have improved with start of baby ASA last visit  He reports pruritus when he showers  Also has improved with baby ASA   He reports some life related stressors  He continues to work full time and remains active  Appetite and weight stable           He  underwent bmbx  4/16/24    BONE MARROW, LEFT ILIAC CREST (ASPIRATE SMEAR, TOUCH IMPRINT, CLOT SECTION, AND CORE BIOPSY):  -- HYPERCELLULAR MARROW (60-70%) 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  -- ADEQUATE STORAGE IRON.  -- SEE COMMENT. 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 FISH studies (reported separately) indicate ABL1 (at 9q34) is not rearranged. This specific 9;19 translocation is not a known, recurrent abnormality associated  with any hematologic malignancy. Since this translocation could be constitutional or acquired, a chromosome study of T-lymphocytes from blood, test CHRCB (Chromosomes, Congenital, Blood), is recommended to establish this patient's constitutional  karyotype. If this abnormality is acquired, it could be associated with a hematologic malignancy. If it is constitutional, a genetic consultation may be of benefit. Clinical and pathologic correlation is recommended.           Past Medical History:   Diagnosis Date    Hyperlipidemia        Past Surgical History:   Procedure Laterality Date    COLONOSCOPY N/A 8/9/2023    Procedure: COLONOSCOPY;  Surgeon: Will Ardon MD;  Location: [address]);  Service: Endoscopy;  Laterality: N/A;  Suprep Instructions sent via portal / Authorizing:     Bebeto Arora MD in [facility] FAMILY MED/ INTERNAL MED/ PEDS  Referral:          [id number]    EYE SURGERY          Social History     Tobacco Use    Smoking status: Former     Types: Cigars    Smokeless tobacco: Never   Substance Use Topics    Alcohol use: Yes     Comment: socially    Drug use: Never        Review of Systems   Constitutional:   "Negative for appetite change, fatigue, fever and unexpected weight change.   HENT:  Negative for mouth sores.    Eyes:  Negative for visual disturbance.   Respiratory:  Negative for cough and shortness of breath.    Cardiovascular:  Negative for chest pain.   Gastrointestinal:  Positive for abdominal pain. Negative for diarrhea.   Genitourinary:  Negative for frequency.   Musculoskeletal:  Negative for back pain.   Integumentary:  Negative for rash.   Neurological:  Positive for headaches (improved).   Hematological:  Negative for adenopathy.   Psychiatric/Behavioral:  The patient is nervous/anxious.           Objective       Vitals:    05/28/24 0817   BP: 131/86   Pulse: 67   SpO2: 98%   Weight: 86 kg (189 lb 9.5 oz)   Height: 5' 11" (1.803 m)       Physical Exam    Physical Exam   Constitutional: She is oriented to person, place, and time. She appears well-developed and well-nourished.   HENT:   Head: Normocephalic.   Mouth/Throat: Oropharynx is clear and moist. No oropharyngeal exudate.   Eyes: Conjunctivae and lids are normal.  No scleral icterus.   Neck: Normal range of motion. Neck supple. No thyromegaly present.   Cardiovascular: Normal rate, regular rhythm and normal heart sounds.    No murmur heard.  Pulmonary/Chest: Breath sounds normal. She has no wheezes. She has no rales.   Abdominal: Soft. Bowel sounds are normal. There is no tenderness. There is no rebound and no guarding.   Musculoskeletal: Normal range of motion. She exhibits no edema and no tenderness.   Lymphadenopathy:     She has no cervical adenopathy.     She has no axillary adenopathy.        Right: No supraclavicular adenopathy present.        Left: No supraclavicular adenopathy present.   Neurological: She is alert and oriented to person, place, and time. No cranial nerve deficit. Coordination normal.   Skin: Skin is warm and dry. No ecchymosis, no petechiae and no rash noted. No erythema.   Psychiatric: She has a normal mood and affect.  "          [facility] [place] ABDOMEN PELVIS WITH CONTRAST on 1/31/2024 8:30 CST    Clinical history: Anorectal hemorrhage.    Technique: Oral contrast was administered prior to the study. During IV administration of contrast, spiral acquisition with multiplanar reconstructed images of the abdomen and pelvis were obtained. CT was performed utilizing one or more of the following dose lowering techniques: automated exposure control, iterative reconstruction technique, and/or adjustment of the mA and kV according to patient size.    CONTRAST: BARIUM SULFATE 2 % (W/V) ORAL SUSPENSION:450mL; IOHEXOL 350 MG IODINE/ML INTRAVENOUS SOLUTION:100mL    Findings: There is very subtle soft tissue nodularity and enhancement measuring approximately 1 cm along the right margin of the anus. There are no pathologically enlarged lymph nodes within the gluteal soft tissues, pudendal areas, pelvic sidewalls, retroperitoneum, or the mesentery.    The visualized lung bases, lower heart, and pericardium are unremarkable.    No suspicious lesions are seen within the liver, the spleen, or the pancreas. There is mild nodularity of both adrenal glands. There are 2 hypoenhancing foci within the right lobe each measuring approximately 1.5 cm in greatest length. No discrete nodularity is seen on the left.    There are tiny bilateral renal cysts. The kidneys are otherwise unremarkable without hydronephrosis down to the bladder. There are no focal bladder lesions. The seminal vesicles and prostate are unremarkable.    Stool and gas are scattered throughout the colon. The appendix is not inflamed. There is no small bowel dilatation. The stomach and lower esophagus are unremarkable.    No suspicious osseous lesions are seen. Surrounding structures are otherwise intact.    IMPRESSION:  1. Very subtle soft tissue nodularity and enhancement measuring approximately 1 cm along the right margin of the images. This is nonspecific and could represent a hemorrhoid.  Visualized bowel may be helpful.  2. Bilateral adrenal nodularity with 2 1.5 cm right adrenal nodules, most likely representing adenomas but incompletely characterized. Consider repeat adrenal protocol CT or long-term follow-up monitoring.       Pathologist interpretation of peripheral blood smear:   Mild leukocytosis shows no diagnostic morphologic abnormalities on   scanning.   Normochromic normocytic red cells show mild anisocytosis.    Platelets are morphologically unremarkable but increased in number.    - - Given the persistent and increasing peripheral thrombocytosis,   ruling out a myeloproliferative neoplasm is recommended.                 Assessment and Plan     1. 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  -- ADEQUATE STORAGE IRON.  -- SEE COMMENT.  1. Peripheral blood JAK2 mutation analysis (04/08/2024): JAK2 V617F mutated DNA was detected and measured at 15% of total JAK2 DNA. 2. Bone marrow BCR/ABL1 mRNA analysis: No BCR/ABL1 mRNA transcripts were detected  The overall findings are consistent with a myeloproliferative neoplasm. Differential considerations include primary myelofibrosis and essential thrombocythemia. 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 Correlation with other lab results is recommended    Discussed bmbx findings in detail with patient  Discussed bmbx findings in detail with pathologist      Plan [facility] send out testing , a chromosome study of Tlymphocytes from blood, test CHRCB (Chromosomes, Congenital, Blood), is recommended to establish this patient's constitutional karyotype. If this abnormality is acquired, it could be associated with a hematologic malignancy. If it is constitutional, a genetic consultation may be of benefit    Also detailed d/w Dr. Bailey regarding patient. 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 He has a MPN/MF type clinical picture. Based on age , bone marrow findings and counts plan to initiate cytoreductive therapy with Hydrea 500mg po daily with close monitoring of counts.   Plan to continue to monitor for any signs of disease progression and if any evidence of disease progression, .plan referral to [facility] transplant team.       Plan NGS blood testing  Plan vWillebrand panel testing   Plan CHRCB testing     Rx provided for Hydrea 500mg po qd  CBC in 2 weeks    Myelofibrosis  See above    3. Adrenal nodule   CT imaging shows 2 right adrenal nodules measuring approximately 1.6 and 1.7 cm, respectively, with imaging characteristics in keeping with adrenal adenomas.        Plan d/w Dr. Bailey   Plan d/w Dr. Spencer GRANADOS spent a total of  65  minutes on the day of the visit.This includes face to face time and non-face to face time preparing to see the patient (eg, review of tests), obtaining and/or reviewing separately obtained history, documenting clinical information in the electronic or other health record, independently interpreting results and communicating results to the patient/family/caregiver, and coordinating care.

## 2024-05-29 ENCOUNTER — LAB VISIT (OUTPATIENT)
Dept: LAB | Facility: HOSPITAL | Age: 54
End: 2024-05-29
Attending: INTERNAL MEDICINE
Payer: COMMERCIAL

## 2024-05-29 DIAGNOSIS — D47.1 MYELOPROLIFERATIVE NEOPLASM: ICD-10-CM

## 2024-05-29 DIAGNOSIS — E78.5 HYPERLIPIDEMIA, UNSPECIFIED HYPERLIPIDEMIA TYPE: Chronic | ICD-10-CM

## 2024-05-29 LAB
FACT VIII ACT/NOR PPP: 65 % (ref 55–200)
MAYO MISCELLANEOUS RESULT (REF): NORMAL
VON WILLEBRAND EVAL PPP-IMP: ABNORMAL
VWF AG ACT/NOR PPP IA: 54 % (ref 55–200)
VWF:AC ACT/NOR PPP IA: 42 % (ref 55–200)

## 2024-05-29 PROCEDURE — 36415 COLL VENOUS BLD VENIPUNCTURE: CPT | Performed by: INTERNAL MEDICINE

## 2024-05-29 PROCEDURE — 81450 HL NEO GSAP 5-50DNA/DNA&RNA: CPT | Performed by: INTERNAL MEDICINE

## 2024-05-29 RX ORDER — ATORVASTATIN CALCIUM 10 MG/1
10 TABLET, FILM COATED ORAL NIGHTLY
Qty: 90 TABLET | Refills: 2 | Status: SHIPPED | OUTPATIENT
Start: 2024-05-29

## 2024-05-30 NOTE — TELEPHONE ENCOUNTER
Refill Decision Note   Rubén Hu  is requesting a refill authorization.  Brief Assessment and Rationale for Refill:  Approve     Medication Therapy Plan:         Comments:     Note composed:11:16 PM 05/29/2024

## 2024-05-30 NOTE — TELEPHONE ENCOUNTER
No care due was identified.  [facility] Embedded Care Due Messages. Reference number: [id number].   5/29/2024 7:18:05 PM CDT

## 2024-06-03 LAB — VWF:RCO ACT/NOR PPP PL AGG: 48 % (ref 55–200)

## 2024-06-06 LAB
CHROM BANDING METHOD: NORMAL
CHROMOSOME ANALYSIS CONGENITAL ADDITIONAL INFORMATION: NORMAL
CHROMOSOME ANALYSIS CONGENITAL RELEASED BY: NORMAL
CHROMOSOME ANALYSIS CONGENITAL RESULT SUMMARY: NORMAL
CLINICAL CYTOGENETICIST REVIEW: NORMAL
KARYOTYP SPEC: NORMAL
PROVIDER SIGNING NAME: NORMAL
REASON FOR REFERRAL, CHROMOSOME ANALYSIS CONGENITAL: NORMAL
REF LAB TEST METHOD: NORMAL
SPECIMEN SOURCE: NORMAL
SPECIMEN, CHROMOSOME ANALYSIS CONGENITAL: NORMAL
VON WILLEBRAND EVAL PPP-IMP: NORMAL
VON WILLEBRAND EVAL PPP-IMP: NORMAL
VWF MULTIMERS PPP QL: NORMAL

## 2024-06-10 ENCOUNTER — TELEPHONE (OUTPATIENT)
Dept: HEMATOLOGY/ONCOLOGY | Facility: CLINIC | Age: 54
End: 2024-06-10
Payer: COMMERCIAL

## 2024-06-10 ENCOUNTER — LAB VISIT (OUTPATIENT)
Dept: LAB | Facility: HOSPITAL | Age: 54
End: 2024-06-10
Attending: INTERNAL MEDICINE
Payer: COMMERCIAL

## 2024-06-10 ENCOUNTER — TELEPHONE (OUTPATIENT)
Dept: PSYCHIATRY | Facility: CLINIC | Age: 54
End: 2024-06-10
Payer: COMMERCIAL

## 2024-06-10 DIAGNOSIS — D47.1 MYELOPROLIFERATIVE NEOPLASM: ICD-10-CM

## 2024-06-10 DIAGNOSIS — R45.89 ANXIETY ABOUT HEALTH: Primary | ICD-10-CM

## 2024-06-10 LAB
BASOPHILS # BLD AUTO: 0.09 K/UL (ref 0–0.2)
BASOPHILS NFR BLD: 0.7 % (ref 0–1.9)
DIFFERENTIAL METHOD BLD: ABNORMAL
EOSINOPHIL # BLD AUTO: 0.3 K/UL (ref 0–0.5)
EOSINOPHIL NFR BLD: 2.2 % (ref 0–8)
ERYTHROCYTE [DISTWIDTH] IN BLOOD BY AUTOMATED COUNT: 14.6 % (ref 11.5–14.5)
HCT VFR BLD AUTO: 45.7 % (ref 40–54)
HGB BLD-MCNC: 14.6 G/DL (ref 14–18)
IMM GRANULOCYTES # BLD AUTO: 0.07 K/UL (ref 0–0.04)
IMM GRANULOCYTES NFR BLD AUTO: 0.5 % (ref 0–0.5)
LYMPHOCYTES # BLD AUTO: 2.5 K/UL (ref 1–4.8)
LYMPHOCYTES NFR BLD: 18.9 % (ref 18–48)
MCH RBC QN AUTO: 31.1 PG (ref 27–31)
MCHC RBC AUTO-ENTMCNC: 31.9 G/DL (ref 32–36)
MCV RBC AUTO: 97 FL (ref 82–98)
MONOCYTES # BLD AUTO: 1.1 K/UL (ref 0.3–1)
MONOCYTES NFR BLD: 8.6 % (ref 4–15)
NEUTROPHILS # BLD AUTO: 9.2 K/UL (ref 1.8–7.7)
NEUTROPHILS NFR BLD: 69.1 % (ref 38–73)
NRBC BLD-RTO: 0 /100 WBC
PLATELET # BLD AUTO: 665 K/UL (ref 150–450)
PMV BLD AUTO: 9.8 FL (ref 9.2–12.9)
RBC # BLD AUTO: 4.7 M/UL (ref 4.6–6.2)
WBC # BLD AUTO: 13.25 K/UL (ref 3.9–12.7)

## 2024-06-10 PROCEDURE — 36415 COLL VENOUS BLD VENIPUNCTURE: CPT | Performed by: INTERNAL MEDICINE

## 2024-06-10 PROCEDURE — 85025 COMPLETE CBC W/AUTO DIFF WBC: CPT | Performed by: INTERNAL MEDICINE

## 2024-06-10 NOTE — TELEPHONE ENCOUNTER
Good afternoon This is a patient of Dr Garay that was just diagnosed with  a  Myeloproliferative neoplasm. If you could schedule him to be seen by your group we would greatly appreciate it. .His   anxiety is  so extreme he calls  almost daily with this great fear of dying  I can appreciate his concern,although we have not told him he was dying, but  Dr Garay and  I have been unsuccessful in speaking with him. I am really concerned for him at this time as I do not like seeing this fear consuming his thoughts to the extreme that they are doing . If you could assist with getting him scheduled asap we would greatly appreciate your assistance. Thank you for all you do for our patients  Theo sesay RN [facility] [place] hem/onc

## 2024-06-10 NOTE — TELEPHONE ENCOUNTER
Patient called is having anxiety and not doing okay after diagnosis of 2 possible cancers. He stated he is coming in on 07/17, but cannot wait until then to be seen. Pt would like to be seen sooner around 06/17, 18, 19 if possible. You are booked, how would you like to proceed?

## 2024-06-11 ENCOUNTER — TELEPHONE (OUTPATIENT)
Dept: HEMATOLOGY/ONCOLOGY | Facility: CLINIC | Age: 54
End: 2024-06-11
Payer: COMMERCIAL

## 2024-06-11 ENCOUNTER — PATIENT MESSAGE (OUTPATIENT)
Dept: HEMATOLOGY/ONCOLOGY | Facility: CLINIC | Age: 54
End: 2024-06-11
Payer: COMMERCIAL

## 2024-06-11 NOTE — NURSING
Oncology Navigation   Intake  Cancer Type: Leukemia  Type of Referral: Internal  Date of Referral: 06/11/24  Initial Nurse Navigator Contact: 06/11/24  Referral to Initial Contact Timeline (days): 0     Treatment                              Acuity      Follow Up  No follow-ups on file.

## 2024-06-11 NOTE — TELEPHONE ENCOUNTER
Called and spoke to  Patient       Appointment scheduled on 7/1 with Dr James.  All questions and concerns addressed. Provided my name and direct number and instructed Patient  to call with any questions and/or concerns.     Thanks,  SONYA SilvaN, RN-BC  BMT Nurse Navigator  [facility] [address], [place], [place] [address]  _________________________  o [phone]

## 2024-06-12 LAB
ANNOTATION COMMENT IMP: NORMAL
DX: NORMAL
NGS CLINCIAL TRIALS: NORMAL
NGS INTERPRETATION: NORMAL
NGS ONCOHEME PANEL GENE LIST: NORMAL
NGS PATHOGENIC MUTATIONS DETECTED: NORMAL
NGS REVIEWED BY:: NORMAL
NGS VARIANTS OF UNKNOWN SIGNIFICANCE: NORMAL
NGSHM RESULT, BLOOD: NORMAL
REF LAB TEST METHOD: NORMAL
SPECIMEN SOURCE: NORMAL
TEST PERFORMANCE INFO SPEC: NORMAL

## 2024-06-13 LAB
COMMENT: NORMAL
FINAL PATHOLOGIC DIAGNOSIS: NORMAL
GROSS: NORMAL
Lab: NORMAL
MICROSCOPIC EXAM: NORMAL
SUPPLEMENTAL DIAGNOSIS: NORMAL

## 2024-07-01 ENCOUNTER — OFFICE VISIT (OUTPATIENT)
Dept: HEMATOLOGY/ONCOLOGY | Facility: CLINIC | Age: 54
End: 2024-07-01
Payer: COMMERCIAL

## 2024-07-01 ENCOUNTER — HOSPITAL ENCOUNTER (OUTPATIENT)
Dept: RADIOLOGY | Facility: HOSPITAL | Age: 54
Discharge: HOME | End: 2024-07-01
Attending: INTERNAL MEDICINE
Payer: COMMERCIAL

## 2024-07-01 VITALS
SYSTOLIC BLOOD PRESSURE: 138 MMHG | HEART RATE: 80 BPM | WEIGHT: 187.63 LBS | TEMPERATURE: 98 F | OXYGEN SATURATION: 97 % | DIASTOLIC BLOOD PRESSURE: 84 MMHG | RESPIRATION RATE: 18 BRPM | HEIGHT: 71 IN | BODY MASS INDEX: 26.27 KG/M2

## 2024-07-01 DIAGNOSIS — K62.5 RECTAL BLEED: ICD-10-CM

## 2024-07-01 DIAGNOSIS — M25.561 CHRONIC PAIN OF RIGHT KNEE: ICD-10-CM

## 2024-07-01 DIAGNOSIS — G89.29 CHRONIC PAIN OF RIGHT KNEE: ICD-10-CM

## 2024-07-01 DIAGNOSIS — D47.1 PRIMARY MYELOFIBROSIS: Primary | ICD-10-CM

## 2024-07-01 PROCEDURE — 73560 X-RAY EXAM OF KNEE 1 OR 2: CPT | Mod: 26,RT,, | Performed by: RADIOLOGY

## 2024-07-01 PROCEDURE — 73560 X-RAY EXAM OF KNEE 1 OR 2: CPT | Mod: TC,RT

## 2024-07-01 PROCEDURE — 99999 PR PBB SHADOW E&M-EST. PATIENT-LVL IV: CPT | Mod: PBBFAC,,, | Performed by: INTERNAL MEDICINE

## 2024-07-01 RX ORDER — NAPROXEN SODIUM 220 MG/1
81 TABLET, FILM COATED ORAL DAILY
COMMUNITY

## 2024-07-01 NOTE — PROGRESS NOTES
HEMATOLOGIC MALIGNANCIES CONSULT NOTE    IDENTIFYING STATEMENT   Rubén Peters) is a 53 y.o. male with a  of 1970 from [place], [place], referred by Dr. 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     HISTORY OF PRESENT ILLNESS:      Mr. 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     He first established care with Dr. Richa Garay on 2024 for evaluation of thrombocytosis. Platelet counts were recorded at 903 on 3/19/2024. He had testing and was found to be positive for JAK2 V617F mutation.     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    Bone marrow biopsy on 2024 was consistent with a myeloproliferative neoplasm with grade 1-2 reticulin fibrosis. Pathology report gave consideration to both essential thrombocytosis and primary myelofibrosis, but PMF was favored.     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     He presents for another opinion on the management of PMF. He has read about this and is worried about his prognosis. He reports having been healthy otherwise. He presents with his wife and his brother.     He is having drenching night sweats about once per week.    Past Medical History:   Diagnosis Date    Hyperlipidemia        Family History   Problem Relation Name Age of Onset    Arthritis Mother      COPD Father         Social History     Socioeconomic History    Marital status:    Tobacco Use    Smoking status: Former     Types: Cigars    Smokeless tobacco: Never   Substance and Sexual Activity    Alcohol use: Yes     Comment: socially    Drug use: Never    Sexual activity: Yes     Partners: Female     Social Determinants of Health     Financial Resource Strain: Low Risk  (2024)    Overall Financial Resource Strain ([facility])      Difficulty of Paying Living Expenses: Not hard at all   Food Insecurity: No Food Insecurity (6/30/2024)    Hunger Vital Sign     Worried About Running Out of Food in the Last Year: Never true     Ran Out of Food in the Last Year: Never true   Physical Activity: Insufficiently Active (6/30/2024)    Exercise Vital Sign     Days of Exercise per Week: 5 days     Minutes of Exercise per Session: 20 min   Stress: No Stress Concern Present (6/30/2024)    [employer] - Occupational Stress Questionnaire     Feeling of Stress : Only a little   Housing Stability: Unknown (6/30/2024)    Housing Stability Vital Sign     Unable to Pay for Housing in the Last Year: No         MEDICATIONS:     Current Outpatient Medications on File Prior to Visit   Medication Sig Dispense Refill    aspirin 81 MG Chew Take 81 mg by mouth once daily. Pt takes over the counter      atorvastatin (LIPITOR) 10 MG tablet TAKE ONE TABLET BY MOUTH EVERY EVENING 90 tablet 2    ergocalciferol (ERGOCALCIFEROL) 50,000 unit Cap TAKE 1 CAPSULE BY MOUTH ONCE WEEKLY 12 capsule 2     No current facility-administered medications on file prior to visit.       ALLERGIES:   Review of patient's allergies indicates:   Allergen Reactions    Bactrim [sulfamethoxazole-trimethoprim] Hives        ROS:       Review of Systems   Constitutional:  Positive for diaphoresis. Negative for fatigue, fever and unexpected weight change.   HENT:   Negative for lump/mass and sore throat.    Eyes:  Negative for icterus.   Respiratory:  Negative for cough and shortness of breath.    Cardiovascular:  Negative for chest pain and palpitations.   Gastrointestinal:  Positive for blood in stool. Negative for abdominal distention, constipation, diarrhea, nausea and vomiting.   Genitourinary:  Negative for dysuria and frequency.    Musculoskeletal:  Negative for arthralgias, gait problem and myalgias.   Skin:  Negative for rash.   Neurological:  Negative for dizziness,  "gait problem and headaches.   Hematological:  Negative for adenopathy. Does not bruise/bleed easily.   Psychiatric/Behavioral:  The patient is not nervous/anxious.        PHYSICAL EXAM:  Vitals:    07/01/24 1400   BP: 138/84   Pulse: 80   Resp: 18   Temp: 98.4 °F (36.9 °C)   TempSrc: Oral   SpO2: 97%   Weight: 85.1 kg (187 lb 9.8 oz)   Height: 5' 11" (1.803 m)   PainSc: 0-No pain       Physical Exam  Constitutional:       General: He is not in acute distress.     Appearance: He is well-developed.   HENT:      Head: Normocephalic and atraumatic.      Mouth/Throat:      Mouth: No oral lesions.   Eyes:      Conjunctiva/sclera: Conjunctivae normal.   Neck:      Thyroid: No thyromegaly.   Cardiovascular:      Rate and Rhythm: Normal rate and regular rhythm.      Heart sounds: Normal heart sounds. No murmur heard.  Pulmonary:      Breath sounds: Normal breath sounds. No wheezing or rales.   Abdominal:      General: There is no distension.      Palpations: Abdomen is soft. There is no hepatomegaly, splenomegaly or mass.      Tenderness: There is no abdominal tenderness.   Lymphadenopathy:      Cervical: No cervical adenopathy.      Right cervical: No deep cervical adenopathy.     Left cervical: No deep cervical adenopathy.   Skin:     Findings: No rash.   Neurological:      Mental Status: He is alert and oriented to person, place, and time.      Cranial Nerves: No cranial nerve deficit.      Coordination: Coordination normal.      Deep Tendon Reflexes: Reflexes are normal and symmetric.         LAB:   Results for orders placed or performed in visit on 06/10/24   CBC Auto Differential   Result Value Ref Range    WBC 13.25 (H) 3.90 - 12.70 K/uL    RBC 4.70 4.60 - 6.20 M/uL    Hemoglobin 14.6 14.0 - 18.0 g/dL    Hematocrit 45.7 40.0 - 54.0 %    MCV 97 82 - 98 fL    MCH 31.1 (H) 27.0 - 31.0 pg    MCHC 31.9 (L) 32.0 - 36.0 g/dL    RDW 14.6 (H) 11.5 - 14.5 %    Platelets 665 (H) 150 - 450 K/uL    MPV 9.8 9.2 - 12.9 fL    " Immature Granulocytes 0.5 0.0 - 0.5 %    Gran # (ANC) 9.2 (H) 1.8 - 7.7 K/uL    Immature Grans (Abs) 0.07 (H) 0.00 - 0.04 K/uL    Lymph # 2.5 1.0 - 4.8 K/uL    Mono # 1.1 (H) 0.3 - 1.0 K/uL    Eos # 0.3 0.0 - 0.5 K/uL    Baso # 0.09 0.00 - 0.20 K/uL    nRBC 0 0 /100 WBC    Gran % 69.1 38.0 - 73.0 %    Lymph % 18.9 18.0 - 48.0 %    Mono % 8.6 4.0 - 15.0 %    Eosinophil % 2.2 0.0 - 8.0 %    Basophil % 0.7 0.0 - 1.9 %    Differential Method Automated        PROBLEMS ASSESSED THIS VISIT:    1. Primary myelofibrosis    2. Chronic pain of right knee    3. Rectal bleed        IMPRESSION:    1. Primary myelofibrosis   A. 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 Adrenal nodules - need evaluation    3. Hyperlipidemia  4. History of tobacco use    PLAN:       Primary myelofibrosis  Mr. Hu has primary myelofibrosis. Using the MIPSS-70 version 2.0 risk assessment tool, he is considered intermediate risk, with 10-year overall survival of 37%.     We reviewed that PMF is a hematologic malignancy, and that there are few, if any, medical therapies that alter the disease biology in a mechanism that is expected to alter the clinical course. Therapeutics are predominantly aimed at mitigating symptoms of disease, especially splenomegaly.    We also discussed that allogeneic stem cell transplant may offer long-term disease-free survival and possible cure, though this is not guaranteed. AlloSCT has high intrinsic risk, with 20% mortality within the first year; thus, timing of transplant can be complicated to avoid exposing patients to excess risk too early in their disease course.    Mr. Hu is healthy and desires a long-term survival. For symptom mitigation, I have recommended he stop hydroxyurea now, and we will plan  ruxolitinib therapy.    We obtained HLA typing today, and we will plan evaluation for alloSCT given intermediate risk disease.     Constitutional chromsomal abnormality  Refer to medical genetics.     Rectal bleeding  He has known diverticulosis from prior colonoscopy with [facility], but he has regular episodes of rectal bleeding. Refer to gastroenterology here as he does not have a gastroenterologist. Will need further evaluation before alloSCT.     Follow-up    Route Chart for Scheduling    BMT Chart Routing      Follow up with physician 1 month.   Follow up with REBEKAH    Provider visit type Malignant hem   Infusion scheduling note    Injection scheduling note    Labs CBC, CMP, magnesium, phosphorus, LDH and uric acid   Scheduling:  Preferred lab:  Lab interval:  labs at time of return visit   Imaging    Pharmacy appointment    Other referrals                   Clyde James MD  Hematology and Stem Cell Transplant    Visit today included increased complexity associated with the care of the episodic problem primary myelofibrosis addressed and managing the longitudinal care of the patient due to the serious and/or complex managed problem(s) primary myelofibrosis.

## 2024-07-03 ENCOUNTER — TELEPHONE (OUTPATIENT)
Dept: HEMATOLOGY/ONCOLOGY | Facility: CLINIC | Age: 54
End: 2024-07-03
Payer: COMMERCIAL

## 2024-07-03 NOTE — TELEPHONE ENCOUNTER
Pt called to ask questions about [employer] paperwork.  Insurance denied new med and med will be filled at [facility].  Should start med on Monday.    Thanks,  SONYA SilvaN, RN-BC  BMT Nurse Navigator  [facility] [address], [place], [place] [address]  _________________________  o [phone]

## 2024-07-16 DIAGNOSIS — D47.1 PRIMARY MYELOFIBROSIS: ICD-10-CM

## 2024-07-22 ENCOUNTER — TELEPHONE (OUTPATIENT)
Dept: HEMATOLOGY/ONCOLOGY | Facility: CLINIC | Age: 54
End: 2024-07-22
Payer: COMMERCIAL

## 2024-07-22 ENCOUNTER — HOSPITAL ENCOUNTER (INPATIENT)
Facility: HOSPITAL | Age: 54
LOS: 1 days | Discharge: HOME | DRG: 393 | End: 2024-07-23
Attending: EMERGENCY MEDICINE | Admitting: HOSPITALIST
Payer: COMMERCIAL

## 2024-07-22 DIAGNOSIS — K62.5 BRBPR (BRIGHT RED BLOOD PER RECTUM): Primary | ICD-10-CM

## 2024-07-22 DIAGNOSIS — D47.1 MYELOPROLIFERATIVE DISEASE: ICD-10-CM

## 2024-07-22 DIAGNOSIS — D72.829 LEUKOCYTOSIS, UNSPECIFIED TYPE: ICD-10-CM

## 2024-07-22 DIAGNOSIS — R07.9 CHEST PAIN: ICD-10-CM

## 2024-07-22 DIAGNOSIS — K52.9 COLITIS: ICD-10-CM

## 2024-07-22 LAB
ABO + RH BLD: NORMAL
ALBUMIN SERPL BCP-MCNC: 4 G/DL (ref 3.5–5.2)
ALBUMIN SERPL-MCNC: 3.6 G/DL (ref 3.3–5.5)
ALBUMIN SERPL-MCNC: 3.9 G/DL (ref 3.3–5.5)
ALP SERPL-CCNC: 41 U/L (ref 42–141)
ALP SERPL-CCNC: 45 U/L (ref 42–141)
ALP SERPL-CCNC: 56 U/L (ref 55–135)
ALT SERPL W/O P-5'-P-CCNC: 50 U/L (ref 10–44)
ANION GAP SERPL CALC-SCNC: 8 MMOL/L (ref 8–16)
AST SERPL-CCNC: 29 U/L (ref 10–40)
BASOPHILS # BLD AUTO: 0.11 K/UL (ref 0–0.2)
BASOPHILS NFR BLD: 0.7 % (ref 0–1.9)
BILIRUB SERPL-MCNC: 0.4 MG/DL (ref 0.1–1)
BILIRUB SERPL-MCNC: 0.5 MG/DL (ref 0.2–1.6)
BILIRUB SERPL-MCNC: 0.6 MG/DL (ref 0.2–1.6)
BLD GP AB SCN CELLS X3 SERPL QL: NORMAL
BUN SERPL-MCNC: 11 MG/DL (ref 7–22)
BUN SERPL-MCNC: 12 MG/DL (ref 6–20)
CALCIUM SERPL-MCNC: 9 MG/DL (ref 8–10.3)
CALCIUM SERPL-MCNC: 9.5 MG/DL (ref 8.7–10.5)
CHLORIDE SERPL-SCNC: 104 MMOL/L (ref 95–110)
CHLORIDE SERPL-SCNC: 106 MMOL/L (ref 98–108)
CO2 SERPL-SCNC: 29 MMOL/L (ref 23–29)
CREAT SERPL-MCNC: 0.8 MG/DL (ref 0.6–1.2)
CREAT SERPL-MCNC: 1 MG/DL (ref 0.5–1.4)
CRP SERPL-MCNC: 5.1 MG/L (ref 0–8.2)
DIFFERENTIAL METHOD BLD: ABNORMAL
EOSINOPHIL # BLD AUTO: 0.2 K/UL (ref 0–0.5)
EOSINOPHIL NFR BLD: 1.2 % (ref 0–8)
ERYTHROCYTE [DISTWIDTH] IN BLOOD BY AUTOMATED COUNT: 15.4 % (ref 11.5–14.5)
ERYTHROCYTE [SEDIMENTATION RATE] IN BLOOD BY PHOTOMETRIC METHOD: <2 MM/HR (ref 0–23)
EST. GFR  (NO RACE VARIABLE): >60 ML/MIN/1.73 M^2
GLUCOSE SERPL-MCNC: 114 MG/DL (ref 73–118)
GLUCOSE SERPL-MCNC: 130 MG/DL (ref 70–110)
HCT VFR BLD AUTO: 45.1 % (ref 40–54)
HCT, POC: NORMAL
HGB BLD-MCNC: 14.9 G/DL (ref 14–18)
HGB, POC: NORMAL (ref 14–18)
IMM GRANULOCYTES # BLD AUTO: 0.07 K/UL (ref 0–0.04)
IMM GRANULOCYTES NFR BLD AUTO: 0.4 % (ref 0–0.5)
INR PPP: 1 (ref 0.8–1.2)
LYMPHOCYTES # BLD AUTO: 3.3 K/UL (ref 1–4.8)
LYMPHOCYTES NFR BLD: 20.1 % (ref 18–48)
MAGNESIUM SERPL-MCNC: 2.3 MG/DL (ref 1.6–2.6)
MCH RBC QN AUTO: 31.7 PG (ref 27–31)
MCH, POC: NORMAL
MCHC RBC AUTO-ENTMCNC: 33 G/DL (ref 32–36)
MCHC, POC: NORMAL
MCV RBC AUTO: 96 FL (ref 82–98)
MCV, POC: NORMAL
MONOCYTES # BLD AUTO: 1.2 K/UL (ref 0.3–1)
MONOCYTES NFR BLD: 7.4 % (ref 4–15)
MPV, POC: NORMAL
NEUTROPHILS # BLD AUTO: 11.5 K/UL (ref 1.8–7.7)
NEUTROPHILS NFR BLD: 70.2 % (ref 38–73)
NRBC BLD-RTO: 0 /100 WBC
PHOSPHATE SERPL-MCNC: 2.4 MG/DL (ref 2.7–4.5)
PLATELET # BLD AUTO: 520 K/UL (ref 150–450)
PMV BLD AUTO: 10.1 FL (ref 9.2–12.9)
POC ALT (SGPT): 44 U/L (ref 10–47)
POC ALT (SGPT): 44 U/L (ref 10–47)
POC AMYLASE: 33 U/L (ref 14–97)
POC AST (SGOT): 33 U/L (ref 11–38)
POC AST (SGOT): 41 U/L (ref 11–38)
POC GGT: 26 U/L (ref 5–65)
POC PLATELET COUNT: NORMAL
POC PTINR: 1.1 (ref 0.9–1.2)
POC PTWBT: 13.1 SEC (ref 9.7–14.3)
POC TCO2: 28 MMOL/L (ref 18–33)
POTASSIUM BLD-SCNC: 4.6 MMOL/L (ref 3.6–5.1)
POTASSIUM SERPL-SCNC: 4.1 MMOL/L (ref 3.5–5.1)
PROT SERPL-MCNC: 6.9 G/DL (ref 6–8.4)
PROTEIN, POC: 6.8 G/DL (ref 6.4–8.1)
PROTEIN, POC: 6.9 G/DL (ref 6.4–8.1)
PROTHROMBIN TIME: 11.1 SEC (ref 9–12.5)
RBC # BLD AUTO: 4.7 M/UL (ref 4.6–6.2)
RBC, POC: NORMAL
RDW, POC: NORMAL
SAMPLE: NORMAL
SODIUM BLD-SCNC: 144 MMOL/L (ref 128–145)
SODIUM SERPL-SCNC: 141 MMOL/L (ref 136–145)
SPECIMEN OUTDATE: NORMAL
WBC # BLD AUTO: 16.39 K/UL (ref 3.9–12.7)
WBC, POC: NORMAL

## 2024-07-22 PROCEDURE — 63600175 PHARM REV CODE 636 W HCPCS: Performed by: STUDENT IN AN ORGANIZED HEALTH CARE EDUCATION/TRAINING PROGRAM

## 2024-07-22 PROCEDURE — 27000207 HC ISOLATION

## 2024-07-22 PROCEDURE — 86850 RBC ANTIBODY SCREEN: CPT | Performed by: STUDENT IN AN ORGANIZED HEALTH CARE EDUCATION/TRAINING PROGRAM

## 2024-07-22 PROCEDURE — 25000003 PHARM REV CODE 250: Performed by: STUDENT IN AN ORGANIZED HEALTH CARE EDUCATION/TRAINING PROGRAM

## 2024-07-22 PROCEDURE — 85610 PROTHROMBIN TIME: CPT | Mod: ER

## 2024-07-22 PROCEDURE — 86901 BLOOD TYPING SEROLOGIC RH(D): CPT | Performed by: STUDENT IN AN ORGANIZED HEALTH CARE EDUCATION/TRAINING PROGRAM

## 2024-07-22 PROCEDURE — 80053 COMPREHEN METABOLIC PANEL: CPT | Performed by: STUDENT IN AN ORGANIZED HEALTH CARE EDUCATION/TRAINING PROGRAM

## 2024-07-22 PROCEDURE — 25500020 PHARM REV CODE 255: Mod: ER | Performed by: EMERGENCY MEDICINE

## 2024-07-22 PROCEDURE — 86140 C-REACTIVE PROTEIN: CPT | Performed by: STUDENT IN AN ORGANIZED HEALTH CARE EDUCATION/TRAINING PROGRAM

## 2024-07-22 PROCEDURE — 85025 COMPLETE CBC W/AUTO DIFF WBC: CPT | Performed by: STUDENT IN AN ORGANIZED HEALTH CARE EDUCATION/TRAINING PROGRAM

## 2024-07-22 PROCEDURE — 36415 COLL VENOUS BLD VENIPUNCTURE: CPT | Performed by: EMERGENCY MEDICINE

## 2024-07-22 PROCEDURE — 25000003 PHARM REV CODE 250: Mod: ER | Performed by: EMERGENCY MEDICINE

## 2024-07-22 PROCEDURE — 85652 RBC SED RATE AUTOMATED: CPT | Performed by: STUDENT IN AN ORGANIZED HEALTH CARE EDUCATION/TRAINING PROGRAM

## 2024-07-22 PROCEDURE — 84100 ASSAY OF PHOSPHORUS: CPT | Performed by: STUDENT IN AN ORGANIZED HEALTH CARE EDUCATION/TRAINING PROGRAM

## 2024-07-22 PROCEDURE — 63600175 PHARM REV CODE 636 W HCPCS: Mod: ER | Performed by: EMERGENCY MEDICINE

## 2024-07-22 PROCEDURE — 99285 EMERGENCY DEPT VISIT HI MDM: CPT | Mod: 25,ER

## 2024-07-22 PROCEDURE — 82040 ASSAY OF SERUM ALBUMIN: CPT | Mod: ER

## 2024-07-22 PROCEDURE — 85025 COMPLETE CBC W/AUTO DIFF WBC: CPT | Mod: ER

## 2024-07-22 PROCEDURE — 80053 COMPREHEN METABOLIC PANEL: CPT | Mod: ER

## 2024-07-22 PROCEDURE — 83735 ASSAY OF MAGNESIUM: CPT | Performed by: STUDENT IN AN ORGANIZED HEALTH CARE EDUCATION/TRAINING PROGRAM

## 2024-07-22 PROCEDURE — 85610 PROTHROMBIN TIME: CPT | Performed by: STUDENT IN AN ORGANIZED HEALTH CARE EDUCATION/TRAINING PROGRAM

## 2024-07-22 PROCEDURE — 86900 BLOOD TYPING SEROLOGIC ABO: CPT | Performed by: STUDENT IN AN ORGANIZED HEALTH CARE EDUCATION/TRAINING PROGRAM

## 2024-07-22 PROCEDURE — 21400001 HC TELEMETRY ROOM

## 2024-07-22 PROCEDURE — 36415 COLL VENOUS BLD VENIPUNCTURE: CPT | Performed by: HOSPITALIST

## 2024-07-22 RX ORDER — SODIUM CHLORIDE 9 MG/ML
INJECTION, SOLUTION INTRAVENOUS CONTINUOUS
Status: DISCONTINUED | OUTPATIENT
Start: 2024-07-22 | End: 2024-07-23 | Stop reason: HOSPADM

## 2024-07-22 RX ORDER — LANOLIN ALCOHOL/MO/W.PET/CERES
800 CREAM (GRAM) TOPICAL
Status: DISCONTINUED | OUTPATIENT
Start: 2024-07-22 | End: 2024-07-23 | Stop reason: HOSPADM

## 2024-07-22 RX ORDER — ALUMINUM HYDROXIDE, MAGNESIUM HYDROXIDE, AND SIMETHICONE 1200; 120; 1200 MG/30ML; MG/30ML; MG/30ML
30 SUSPENSION ORAL 4 TIMES DAILY PRN
Status: DISCONTINUED | OUTPATIENT
Start: 2024-07-22 | End: 2024-07-23 | Stop reason: HOSPADM

## 2024-07-22 RX ORDER — ACETAMINOPHEN 325 MG/1
650 TABLET ORAL EVERY 4 HOURS PRN
Status: DISCONTINUED | OUTPATIENT
Start: 2024-07-22 | End: 2024-07-23 | Stop reason: HOSPADM

## 2024-07-22 RX ORDER — ONDANSETRON HYDROCHLORIDE 2 MG/ML
4 INJECTION, SOLUTION INTRAVENOUS EVERY 8 HOURS PRN
Status: DISCONTINUED | OUTPATIENT
Start: 2024-07-22 | End: 2024-07-23 | Stop reason: HOSPADM

## 2024-07-22 RX ORDER — SODIUM CHLORIDE 9 MG/ML
1000 INJECTION, SOLUTION INTRAVENOUS
Status: DISCONTINUED | OUTPATIENT
Start: 2024-07-22 | End: 2024-07-22

## 2024-07-22 RX ORDER — ATORVASTATIN CALCIUM 10 MG/1
10 TABLET, FILM COATED ORAL NIGHTLY
Status: DISCONTINUED | OUTPATIENT
Start: 2024-07-22 | End: 2024-07-23 | Stop reason: HOSPADM

## 2024-07-22 RX ORDER — SODIUM,POTASSIUM PHOSPHATES 280-250MG
2 POWDER IN PACKET (EA) ORAL
Status: DISCONTINUED | OUTPATIENT
Start: 2024-07-22 | End: 2024-07-23 | Stop reason: HOSPADM

## 2024-07-22 RX ORDER — BISACODYL 10 MG/1
10 SUPPOSITORY RECTAL DAILY PRN
Status: DISCONTINUED | OUTPATIENT
Start: 2024-07-22 | End: 2024-07-23 | Stop reason: HOSPADM

## 2024-07-22 RX ORDER — IPRATROPIUM BROMIDE AND ALBUTEROL SULFATE 2.5; .5 MG/3ML; MG/3ML
3 SOLUTION RESPIRATORY (INHALATION) EVERY 4 HOURS PRN
Status: DISCONTINUED | OUTPATIENT
Start: 2024-07-22 | End: 2024-07-23 | Stop reason: HOSPADM

## 2024-07-22 RX ORDER — NALOXONE HCL 0.4 MG/ML
0.02 VIAL (ML) INJECTION
Status: DISCONTINUED | OUTPATIENT
Start: 2024-07-22 | End: 2024-07-23 | Stop reason: HOSPADM

## 2024-07-22 RX ORDER — TALC
6 POWDER (GRAM) TOPICAL NIGHTLY PRN
Status: DISCONTINUED | OUTPATIENT
Start: 2024-07-22 | End: 2024-07-23 | Stop reason: HOSPADM

## 2024-07-22 RX ORDER — SIMETHICONE 80 MG
1 TABLET,CHEWABLE ORAL 4 TIMES DAILY PRN
Status: DISCONTINUED | OUTPATIENT
Start: 2024-07-22 | End: 2024-07-23 | Stop reason: HOSPADM

## 2024-07-22 RX ORDER — GLUCAGON 1 MG
1 KIT INJECTION
Status: DISCONTINUED | OUTPATIENT
Start: 2024-07-22 | End: 2024-07-23 | Stop reason: HOSPADM

## 2024-07-22 RX ORDER — IBUPROFEN 200 MG
24 TABLET ORAL
Status: DISCONTINUED | OUTPATIENT
Start: 2024-07-22 | End: 2024-07-23 | Stop reason: HOSPADM

## 2024-07-22 RX ORDER — POLYETHYLENE GLYCOL 3350 17 G/17G
17 POWDER, FOR SOLUTION ORAL DAILY
Status: DISCONTINUED | OUTPATIENT
Start: 2024-07-23 | End: 2024-07-22

## 2024-07-22 RX ORDER — IBUPROFEN 200 MG
16 TABLET ORAL
Status: DISCONTINUED | OUTPATIENT
Start: 2024-07-22 | End: 2024-07-23 | Stop reason: HOSPADM

## 2024-07-22 RX ORDER — ACETAMINOPHEN 325 MG/1
650 TABLET ORAL EVERY 8 HOURS PRN
Status: DISCONTINUED | OUTPATIENT
Start: 2024-07-22 | End: 2024-07-23 | Stop reason: HOSPADM

## 2024-07-22 RX ORDER — SODIUM CHLORIDE 0.9 % (FLUSH) 0.9 %
10 SYRINGE (ML) INJECTION EVERY 12 HOURS PRN
Status: DISCONTINUED | OUTPATIENT
Start: 2024-07-22 | End: 2024-07-23 | Stop reason: HOSPADM

## 2024-07-22 RX ORDER — PROCHLORPERAZINE EDISYLATE 5 MG/ML
5 INJECTION INTRAMUSCULAR; INTRAVENOUS EVERY 6 HOURS PRN
Status: DISCONTINUED | OUTPATIENT
Start: 2024-07-22 | End: 2024-07-23 | Stop reason: HOSPADM

## 2024-07-22 RX ADMIN — IOHEXOL 85 ML: 350 INJECTION, SOLUTION INTRAVENOUS at 12:07

## 2024-07-22 RX ADMIN — PIPERACILLIN AND TAZOBACTAM 4.5 G: 4; .5 INJECTION, POWDER, LYOPHILIZED, FOR SOLUTION INTRAVENOUS; PARENTERAL at 09:07

## 2024-07-22 RX ADMIN — SODIUM CHLORIDE: 9 INJECTION, SOLUTION INTRAVENOUS at 08:07

## 2024-07-22 RX ADMIN — PIPERACILLIN AND TAZOBACTAM 4.5 G: 4; .5 INJECTION, POWDER, LYOPHILIZED, FOR SOLUTION INTRAVENOUS; PARENTERAL at 02:07

## 2024-07-22 NOTE — ASSESSMENT & PLAN NOTE
CT abdomen and pelvis showed findings suggestive of infectious/inflammatory colitis involving the mid to distal transverse colon and the entirety of the descending colon.  Continue Zosyn   Stool studies   IVF

## 2024-07-22 NOTE — HPI
This is a 53-year-old male with a past medical history of primary myelofibrosis (on ruxolitinib), hyperlipidemia, tobacco use who presents with rectal bleeding.    Patient of rectal bleeding and abdominal pain that started 2 days prior to presentation.  He endorsed severe abdominal cramping, associated with loose bloody stools, and several episodes of nonbloody emesis.  His symptoms progressively worsened, however associated with decreased p.o. intake.  Patient reports 2 prior similar episodes in the past and intermittent chronic bleeding noted after wiping since 2019.    In the ED, the patient was hemodynamically stable.  Labs were remarkable for leukocytosis (16.3), stable hemoglobin (14.7).  CT abdomen and pelvis showed findings suggestive of infectious/inflammatory colitis involving the mid to distal transverse colon and the entirety of the descending colon.  Patient was given 1 L of NS, Zosyn.  He was admitted for further management.

## 2024-07-22 NOTE — ED PROVIDER NOTES
"  EM PHYSICIAN NOTE       This patient presents with a complaint of   Chief Complaint   Patient presents with    Rectal Bleeding     Patient arrived to ED with complaint of abdominal pain, diarrhea, and rectal bleeding onset Friday.  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        Source of HPI & ROS: patient    HPI:  This is a 53-year-old gentleman with a history of a myeloproliferative disorder for which he takes ruxolitinib and hydroxyurea, presents to the emergency department with a complaint of rectal bleeding.  Patient reports that proximally 3 days ago he developed abdominal pain diarrhea and bleeding.  He reports that bleeding has been daily the abdominal pain has improved.  The patient reports this is about the 3rd episode, and he has undergone a fairly thorough workup including upper and lower scope and CT scan in the past with no diagnosis.      Review of patient's allergies indicates:   Allergen Reactions    Bactrim [sulfamethoxazole-trimethoprim] Hives          Pertinent REVIEW of SYSTEMS  GENERAL/CONSTITUTIONAL: There is not a report of fever   CARDIOVASCULAR: There is not a report of chest pain   RESPIRATORY: There is not a report of cough or SOB  GASTROINTESTINAL: There is a report of  vomiting, diarrhea  HEMATOLOGIC/LYMPHATIC: There is not a report of anticoagulant but the patient does take a baby aspirin a day    The nurse's notes and triage vital signs were reviewed.    PHYSICAL EXAMINATION    ED Triage Vitals [07/22/24 1025]   Enc Vitals Group      /87      Pulse 87      Resp 18      Temp 97.8 °F (36.6 °C)      Temp Source Oral      SpO2 98 %      Weight 185 lb      Height 5' 11"      Head Circumference       Peak Flow       Pain Score       Pain Loc       Pain Education       Exclude from Growth Chart      Vital signs and Pulse Ox reviewed in clinical context. Abnormalities noted: None  Body mass index is 25.8 kg/m².  Pt's level of consciousness is Awake and Alert, and the " patient is in no distress.  Skin: warm, pink and dry.  Capillary refill is less than 2 seconds.  Mucosa: normal  Head and Neck: no JVD, neck supple  Cardiac exam: RRR I did not appreciate a murmur.  Pulmonary exam: unlabored and clear  Abd Exam: soft nontender   Rectal exam:  No blood in the vault.  There no obvious hemorrhoids or mass.  Musculoskeletal: no joint tenderness, deformity or swelling   Neurologic: GCS 15; moving all extremities equally, no facial droop       Medical decision making:   Nurses notes and Vital Signs reviewed.     Problems: Today's visit reveals GI bleed/bright red blood per rectum which is a/an  Acute on Chronic problem with a differential diagnosis which includes anemia, diverticular bleed, inflammatory bowel disease.    Other problems today include leukocytosis     MDM Components integrated into this visit: Social determinants of health impacting care today: Access to PCP impaired because patient was unable to obtain appointment with PCP in a timely manner    Data: see ER course below for lab test ordered, results reviewed, independent interpretation of images or EKG, discussion with consultants, data obtained from sources other than patient:  ED Course as of 07/22/24 1412   Mon Jul 22, 2024   1137 INR is normal [MH]   1137 CMP is normal [MH]   1137 [place] count is 05759.  H&H is normal.  Platelets are 563 [MH]   1216 Chart review reveals the patient's colonoscopy in March had revealed mild diverticular disease [MH]   1230 My wet read of the CT of the abdomen: I do not see obstruction but the descending colon appears to be inflamed [MH]   1249 Final read of the CT: . Long segment wall thickening involving the mid to distal transverse colon and the entirety of the descending colon, suggesting infectious or inflammatory colitis.  [MH]   1346 I consulted both GI and hematology:  GI requested that we transfer to [facility] for admission: c diff, culture, GI pathogen panel, crypto,  giardia;  cmv DNA pcr blood.  Patient's hematologist reports that the colitis is not an expected part of his myeloproliferative disorder []   1347 I have consulted hospital medicine for admit at [facility]. []   1351 Discussed with the patient the recommendations of my consultants and he is in agreement with transfer []      ED Course User Index  [] Asya Murillo MD           Orders Placed This Encounter   Procedures    Clostridium difficile EIA    Stool culture **CANNOT BE ORDERED STAT**    X-ray chest AP portable    CT Abdomen Pelvis With IV Contrast NO Oral Contrast    CMV DNA, quantitative, PCR    Stool Exam-Ova,Cysts,Parasites    Giardia / Cryptosporidum, EIA    Gastrointestinal Pathogens Panel, PCR    Diet NPO    Vital signs    Cardiac Monitoring - Adult    Inpatient consult to Gastroenterology    Special contact c diff isolation status    POCT CBC    Insert peripheral IV    Admit to Inpatient     Medications   piperacillin-tazobactam (ZOSYN) 4.5 g in 0.9% NaCl 100 mL IVPB (MB+) (4.5 g Intravenous New Bag 7/22/24 1409)   iohexoL (OMNIPAQUE 350) injection 100 mL (85 mLs Intravenous Given 7/22/24 1209)           Diagnoses that have been ruled out:   None   Diagnoses that are still under consideration:   None   Final diagnoses:   BRBPR (bright red blood per rectum)   Leukocytosis, unspecified type   Colitis     Future Appointments   Date Time Provider Department Center   7/24/2024  8:30 AM Ewa Donovan NP [facility] COLON Daniel [facility]   8/8/2024  2:20 PM [facility] LAB BMT [facility] LABBMT Sergey Goodman   8/8/2024  3:20 PM Clyde James MD [facility] HC BMT Sergey Canshahida   9/26/2024 12:30 PM Chanelle Ibarra [facility] HERARSENIO Goodman       CRITICAL CARE TIME:   Critical care services included the following: chart data review, reviewing nursing notes and researching old charts from internal and external sources, documentation time, consultant collaboration regarding findings and treatment options, medication orders  and management, direct patient care, vital sign assessments, physical exam reassessments, and ordering, interpreting and reviewing diagnostic studies/lab tests.    Aggregate critical care time was approximately 35 minutes, which includes only time during which I was engaged in work directly related to the patient's care, as described above, whether at the bedside or elsewhere in the Emergency Department.  It did not include time spent performing other reported procedures or the services of residents, students, nurses or physician assistants.      Disposition:  Admit to [facility]    Asya Murillo      This note was created using dictation software.  This program may occasionally mistype words and phrases.             Asya Murillo MD  07/22/24 1058       Asya Murillo MD  07/22/24 1218       Asya Murillo MD  07/22/24 1353       Asya Murillo MD  07/22/24 1412

## 2024-07-22 NOTE — SUBJECTIVE & OBJECTIVE
Past Medical History:   Diagnosis Date    Hyperlipidemia        Past Surgical History:   Procedure Laterality Date    COLONOSCOPY N/A 8/9/2023    Procedure: COLONOSCOPY;  Surgeon: Will Ardon MD;  Location: [address]);  Service: Endoscopy;  Laterality: N/A;  Suprep Instructions sent via portal / Authorizing:     Bebeto Arora MD in [facility] FAMILY MED/ INTERNAL MED/ PEDS  Referral:          [id number]    EYE SURGERY         Review of patient's allergies indicates:   Allergen Reactions    Bactrim [sulfamethoxazole-trimethoprim] Hives       No current facility-administered medications on file prior to encounter.     Current Outpatient Medications on File Prior to Encounter   Medication Sig    aspirin 81 MG Chew Take 81 mg by mouth once daily. Pt takes over the counter    atorvastatin (LIPITOR) 10 MG tablet TAKE ONE TABLET BY MOUTH EVERY EVENING    ergocalciferol (ERGOCALCIFEROL) 50,000 unit Cap TAKE 1 CAPSULE BY MOUTH ONCE WEEKLY    ruxolitinib (JAKAFI) 20 mg Tab TAKE 1 TABLET (20 MG) TWICE DAILY     Family History       Problem Relation (Age of Onset)    Arthritis Mother    COPD Father          Tobacco Use    Smoking status: Former     Types: Cigars    Smokeless tobacco: Never   Substance and Sexual Activity    Alcohol use: Yes     Comment: socially    Drug use: Never    Sexual activity: Yes     Partners: Female     Review of Systems   Constitutional: Negative.    HENT: Negative.     Eyes: Negative.    Respiratory: Negative.     Cardiovascular: Negative.    Gastrointestinal:  Positive for abdominal pain, blood in stool, diarrhea, nausea and vomiting.   Endocrine: Negative.    Genitourinary: Negative.    Musculoskeletal: Negative.    Skin: Negative.    Allergic/Immunologic: Negative.    Neurological: Negative.    Psychiatric/Behavioral: Negative.       Objective:     Vital Signs (Most Recent):  Temp: 97.8 °F (36.6 °C) (07/22/24 1704)  Pulse: 77 (07/22/24 1704)  Resp: 16 (07/22/24 1704)  BP: (!) 150/82 (07/22/24  1704)  SpO2: 99 % (07/22/24 1704) Vital Signs (24h Range):  Temp:  [97.7 °F (36.5 °C)-97.8 °F (36.6 °C)] 97.8 °F (36.6 °C)  Pulse:  [62-87] 77  Resp:  [16-18] 16  SpO2:  [96 %-100 %] 99 %  BP: (114-157)/(73-87) 150/82     Weight: 83.9 kg (185 lb)  Body mass index is 25.8 kg/m².     Physical Exam  Vitals and nursing note reviewed.   Constitutional:       General: He is not in acute distress.     Appearance: Normal appearance. He is not ill-appearing.   HENT:      Head: Normocephalic and atraumatic.      Nose: Nose normal.      Mouth/Throat:      Mouth: Mucous membranes are moist.   Eyes:      Extraocular Movements: Extraocular movements intact.   Cardiovascular:      Rate and Rhythm: Normal rate.      Pulses: Normal pulses.      Heart sounds: No murmur heard.  Pulmonary:      Effort: Pulmonary effort is normal. No respiratory distress.   Abdominal:      General: Abdomen is flat.      Palpations: Abdomen is soft.      Tenderness: There is no abdominal tenderness.   Musculoskeletal:      Right lower leg: No edema.      Left lower leg: No edema.   Skin:     General: Skin is warm.      Capillary Refill: Capillary refill takes less than 2 seconds.   Neurological:      General: No focal deficit present.      Mental Status: He is alert.   Psychiatric:         Mood and Affect: Mood normal.                Significant Labs: All pertinent labs within the past 24 hours have been reviewed.    Significant Imaging: I have reviewed all pertinent imaging results/findings within the past 24 hours.

## 2024-07-22 NOTE — H&P
[facility] Medicine  History & Physical    Patient Name: Rubén Hu  MRN: [id number]  Patient Class: IP- Inpatient  Admission Date: 7/22/2024  Attending Physician: Cindy Calles, *   Primary Care Provider: Bebeto Arora MD         Patient information was obtained from patient, spouse/SO, and ER records.     Subjective:     Principal Problem:Colitis    Chief Complaint:   Chief Complaint   Patient presents with    Rectal Bleeding     Patient arrived to ED with complaint of abdominal pain, diarrhea, and rectal bleeding onset Friday.  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         HPI: This is a 53-year-old male with a past medical history of primary myelofibrosis (on ruxolitinib), hyperlipidemia, tobacco use who presents with rectal bleeding.    Patient of rectal bleeding and abdominal pain that started 2 days prior to presentation.  He endorsed severe abdominal cramping, associated with loose bloody stools, and several episodes of nonbloody emesis.  His symptoms progressively worsened, however associated with decreased p.o. intake.  Patient reports 2 prior similar episodes in the past and intermittent chronic bleeding noted after wiping since 2019.    In the ED, the patient was hemodynamically stable.  Labs were remarkable for leukocytosis (16.3), stable hemoglobin (14.7).  CT abdomen and pelvis showed findings suggestive of infectious/inflammatory colitis involving the mid to distal transverse colon and the entirety of the descending colon.  Patient was given 1 L of NS, Zosyn.  He was admitted for further management.    Past Medical History:   Diagnosis Date    Hyperlipidemia        Past Surgical History:   Procedure Laterality Date    COLONOSCOPY N/A 8/9/2023    Procedure: COLONOSCOPY;  Surgeon: Will Ardon MD;  Location: [address];  Service: Endoscopy;  Laterality: N/A;  Suprep Instructions sent via portal / Authorizing:     Bebeto Arora MD in [facility]  FAMILY MED/ INTERNAL MED/ PEDS  Referral:          [id number]    EYE SURGERY         Review of patient's allergies indicates:   Allergen Reactions    Bactrim [sulfamethoxazole-trimethoprim] Hives       No current facility-administered medications on file prior to encounter.     Current Outpatient Medications on File Prior to Encounter   Medication Sig    aspirin 81 MG Chew Take 81 mg by mouth once daily. Pt takes over the counter    atorvastatin (LIPITOR) 10 MG tablet TAKE ONE TABLET BY MOUTH EVERY EVENING    ergocalciferol (ERGOCALCIFEROL) 50,000 unit Cap TAKE 1 CAPSULE BY MOUTH ONCE WEEKLY    ruxolitinib (JAKAFI) 20 mg Tab TAKE 1 TABLET (20 MG) TWICE DAILY     Family History       Problem Relation (Age of Onset)    Arthritis Mother    COPD Father          Tobacco Use    Smoking status: Former     Types: Cigars    Smokeless tobacco: Never   Substance and Sexual Activity    Alcohol use: Yes     Comment: socially    Drug use: Never    Sexual activity: Yes     Partners: Female     Review of Systems   Constitutional: Negative.    HENT: Negative.     Eyes: Negative.    Respiratory: Negative.     Cardiovascular: Negative.    Gastrointestinal:  Positive for abdominal pain, blood in stool, diarrhea, nausea and vomiting.   Endocrine: Negative.    Genitourinary: Negative.    Musculoskeletal: Negative.    Skin: Negative.    Allergic/Immunologic: Negative.    Neurological: Negative.    Psychiatric/Behavioral: Negative.       Objective:     Vital Signs (Most Recent):  Temp: 97.8 °F (36.6 °C) (07/22/24 1704)  Pulse: 77 (07/22/24 1704)  Resp: 16 (07/22/24 1704)  BP: (!) 150/82 (07/22/24 1704)  SpO2: 99 % (07/22/24 1704) Vital Signs (24h Range):  Temp:  [97.7 °F (36.5 °C)-97.8 °F (36.6 °C)] 97.8 °F (36.6 °C)  Pulse:  [62-87] 77  Resp:  [16-18] 16  SpO2:  [96 %-100 %] 99 %  BP: (114-157)/(73-87) 150/82     Weight: 83.9 kg (185 lb)  Body mass index is 25.8 kg/m².     Physical Exam  Vitals and nursing note reviewed.   Constitutional:        General: He is not in acute distress.     Appearance: Normal appearance. He is not ill-appearing.   HENT:      Head: Normocephalic and atraumatic.      Nose: Nose normal.      Mouth/Throat:      Mouth: Mucous membranes are moist.   Eyes:      Extraocular Movements: Extraocular movements intact.   Cardiovascular:      Rate and Rhythm: Normal rate.      Pulses: Normal pulses.      Heart sounds: No murmur heard.  Pulmonary:      Effort: Pulmonary effort is normal. No respiratory distress.   Abdominal:      General: Abdomen is flat.      Palpations: Abdomen is soft.      Tenderness: There is no abdominal tenderness.   Musculoskeletal:      Right lower leg: No edema.      Left lower leg: No edema.   Skin:     General: Skin is warm.      Capillary Refill: Capillary refill takes less than 2 seconds.   Neurological:      General: No focal deficit present.      Mental Status: He is alert.   Psychiatric:         Mood and Affect: Mood normal.                Significant Labs: All pertinent labs within the past 24 hours have been reviewed.    Significant Imaging: I have reviewed all pertinent imaging results/findings within the past 24 hours.  Assessment/Plan:     * Colitis  CT abdomen and pelvis showed findings suggestive of infectious/inflammatory colitis involving the mid to distal transverse colon and the entirety of the descending colon.  Continue Zosyn   Stool studies   IVF    BRBPR (bright red blood per rectum)  - Colonoscopy (6/14/2018): External hemorrhoids. Internal hemorrhoids.  Polyps (4mm to 5mm) in the descending colon.  Polyp (12mm) in the descending colon.  Polyp ( 6 mm ) in the transverse colon.    - Colonoscopy (8/9/2023): Diverticulosis in the entire examined colon.  Internal hemorrhoids.  - Colonoscopy/EGD (3/25/2024) normal mucosa in the stomach, examined duodenum.  Lipoma in the ileocecal valve, mild diverticulosis of the ascending colon, descending colon and sigmoid colon.  Polyps (5 mm) in the  transverse colon s/p polypectomy.  Polyp (6 mm) in the sigmoid colon s/p polypectomy    Patient has acute blood loss due to hemorrhage, the hemorrhage is due to gastrointestinal bleed, patient does have a propensity for bleeding due to prior GI bleeds/diverticulosis... Will trend hemoglobin/hematocrit Daily, as well as monitor and correct for any coagulation defects. CBC and vital signs have been reviewed and last CBC was noted-   Lab Results   Component Value Date    WBC 12.34 07/01/2024    HGB 15.0 07/01/2024    HCT 44.6 07/01/2024    MCV 95 07/01/2024     (H) 07/01/2024         Will order a type and screen and consent patient for blood transfusion. Will transfuse if Hgb is <7g/dl (<8g/dl in cases of active ACS) or if patient has rapid bleeding leading to hemodynamic instability.  Consult GI     Myeloproliferative disease  Continue home Ruxolitinib     Hyperlipidemia  Continue statin         VTE Risk Mitigation (From admission, onward)           Ordered     IP VTE LOW RISK PATIENT  Once         07/22/24 1810     Place sequential compression device  Until discontinued         07/22/24 1810                           Je Castaneda MD  Department of Hospital Medicine  [facility]

## 2024-07-22 NOTE — ASSESSMENT & PLAN NOTE
- Colonoscopy (6/14/2018): External hemorrhoids. Internal hemorrhoids.  Polyps (4mm to 5mm) in the descending colon.  Polyp (12mm) in the descending colon.  Polyp ( 6 mm ) in the transverse colon.    - Colonoscopy (8/9/2023): Diverticulosis in the entire examined colon.  Internal hemorrhoids.  - Colonoscopy/EGD (3/25/2024) normal mucosa in the stomach, examined duodenum.  Lipoma in the ileocecal valve, mild diverticulosis of the ascending colon, descending colon and sigmoid colon.  Polyps (5 mm) in the transverse colon s/p polypectomy.  Polyp (6 mm) in the sigmoid colon s/p polypectomy    Patient has acute blood loss due to hemorrhage, the hemorrhage is due to gastrointestinal bleed, patient does have a propensity for bleeding due to prior GI bleeds/diverticulosis... Will trend hemoglobin/hematocrit Daily, as well as monitor and correct for any coagulation defects. CBC and vital signs have been reviewed and last CBC was noted-   Lab Results   Component Value Date    WBC 12.34 07/01/2024    HGB 15.0 07/01/2024    HCT 44.6 07/01/2024    MCV 95 07/01/2024     (H) 07/01/2024         Will order a type and screen and consent patient for blood transfusion. Will transfuse if Hgb is <7g/dl (<8g/dl in cases of active ACS) or if patient has rapid bleeding leading to hemodynamic instability.  Consult GI

## 2024-07-22 NOTE — TELEPHONE ENCOUNTER
Pt called to state he had severe abd pain on Sat night, all night long. He vomited 3 times and had 8 bloody bowel movements Sat night. No nausea or vomiting yesterday but he had 3-4 bloody bowel movements. He states he feels good this morning.   Per Dr James:  He should present to urgent care for assessment. If he has another bloody bowel movement, he needs to go straight to the emergency department.  I called pt and he is on the way to the [facility] urgent care near his home.  I also explained that if he has severe abd pain with bloody bowel movements he will need to go to the emergency room as soon as possible.  Pt stated an understanding. Reminded pt that it is important for him to attend his CRS appt on 7/24.

## 2024-07-23 ENCOUNTER — TELEPHONE (OUTPATIENT)
Dept: GASTROENTEROLOGY | Facility: CLINIC | Age: 54
End: 2024-07-23

## 2024-07-23 ENCOUNTER — ANESTHESIA (OUTPATIENT)
Dept: ENDOSCOPY | Facility: HOSPITAL | Age: 54
DRG: 393 | End: 2024-07-23
Payer: COMMERCIAL

## 2024-07-23 ENCOUNTER — TELEPHONE (OUTPATIENT)
Dept: SURGERY | Facility: CLINIC | Age: 54
End: 2024-07-23
Payer: COMMERCIAL

## 2024-07-23 ENCOUNTER — ANESTHESIA EVENT (OUTPATIENT)
Dept: ENDOSCOPY | Facility: HOSPITAL | Age: 54
DRG: 393 | End: 2024-07-23
Payer: COMMERCIAL

## 2024-07-23 VITALS
HEIGHT: 71 IN | DIASTOLIC BLOOD PRESSURE: 73 MMHG | SYSTOLIC BLOOD PRESSURE: 122 MMHG | TEMPERATURE: 98 F | HEART RATE: 71 BPM | RESPIRATION RATE: 17 BRPM | WEIGHT: 185 LBS | OXYGEN SATURATION: 100 % | BODY MASS INDEX: 25.9 KG/M2

## 2024-07-23 LAB
ALBUMIN SERPL BCP-MCNC: 3.5 G/DL (ref 3.5–5.2)
ALP SERPL-CCNC: 47 U/L (ref 55–135)
ALT SERPL W/O P-5'-P-CCNC: 39 U/L (ref 10–44)
ANION GAP SERPL CALC-SCNC: 7 MMOL/L (ref 8–16)
AST SERPL-CCNC: 24 U/L (ref 10–40)
BASOPHILS # BLD AUTO: 0.09 K/UL (ref 0–0.2)
BASOPHILS NFR BLD: 0.9 % (ref 0–1.9)
BILIRUB SERPL-MCNC: 0.5 MG/DL (ref 0.1–1)
BUN SERPL-MCNC: 13 MG/DL (ref 6–20)
CALCIUM SERPL-MCNC: 8.8 MG/DL (ref 8.7–10.5)
CHLORIDE SERPL-SCNC: 107 MMOL/L (ref 95–110)
CO2 SERPL-SCNC: 26 MMOL/L (ref 23–29)
CREAT SERPL-MCNC: 0.9 MG/DL (ref 0.5–1.4)
DIFFERENTIAL METHOD BLD: ABNORMAL
EOSINOPHIL # BLD AUTO: 0.2 K/UL (ref 0–0.5)
EOSINOPHIL NFR BLD: 1.9 % (ref 0–8)
ERYTHROCYTE [DISTWIDTH] IN BLOOD BY AUTOMATED COUNT: 15.1 % (ref 11.5–14.5)
EST. GFR  (NO RACE VARIABLE): >60 ML/MIN/1.73 M^2
GLUCOSE SERPL-MCNC: 93 MG/DL (ref 70–110)
HCT VFR BLD AUTO: 41.3 % (ref 40–54)
HGB BLD-MCNC: 13.4 G/DL (ref 14–18)
IMM GRANULOCYTES # BLD AUTO: 0.05 K/UL (ref 0–0.04)
IMM GRANULOCYTES NFR BLD AUTO: 0.5 % (ref 0–0.5)
LYMPHOCYTES # BLD AUTO: 3.2 K/UL (ref 1–4.8)
LYMPHOCYTES NFR BLD: 30.8 % (ref 18–48)
MAGNESIUM SERPL-MCNC: 2.1 MG/DL (ref 1.6–2.6)
MCH RBC QN AUTO: 30.9 PG (ref 27–31)
MCHC RBC AUTO-ENTMCNC: 32.4 G/DL (ref 32–36)
MCV RBC AUTO: 95 FL (ref 82–98)
MONOCYTES # BLD AUTO: 0.8 K/UL (ref 0.3–1)
MONOCYTES NFR BLD: 7.8 % (ref 4–15)
NEUTROPHILS # BLD AUTO: 6.1 K/UL (ref 1.8–7.7)
NEUTROPHILS NFR BLD: 58.1 % (ref 38–73)
NRBC BLD-RTO: 0 /100 WBC
PHOSPHATE SERPL-MCNC: 2.9 MG/DL (ref 2.7–4.5)
PLATELET # BLD AUTO: 479 K/UL (ref 150–450)
PMV BLD AUTO: 10.4 FL (ref 9.2–12.9)
POTASSIUM SERPL-SCNC: 4.1 MMOL/L (ref 3.5–5.1)
PROT SERPL-MCNC: 6.1 G/DL (ref 6–8.4)
RBC # BLD AUTO: 4.33 M/UL (ref 4.6–6.2)
SODIUM SERPL-SCNC: 140 MMOL/L (ref 136–145)
WBC # BLD AUTO: 10.43 K/UL (ref 3.9–12.7)

## 2024-07-23 PROCEDURE — 25000003 PHARM REV CODE 250: Performed by: STUDENT IN AN ORGANIZED HEALTH CARE EDUCATION/TRAINING PROGRAM

## 2024-07-23 PROCEDURE — 63600175 PHARM REV CODE 636 W HCPCS: Performed by: STUDENT IN AN ORGANIZED HEALTH CARE EDUCATION/TRAINING PROGRAM

## 2024-07-23 PROCEDURE — 88305 TISSUE EXAM BY PATHOLOGIST: CPT | Performed by: PATHOLOGY

## 2024-07-23 PROCEDURE — 99223 1ST HOSP IP/OBS HIGH 75: CPT | Mod: 25,,, | Performed by: NURSE PRACTITIONER

## 2024-07-23 PROCEDURE — 83735 ASSAY OF MAGNESIUM: CPT | Performed by: STUDENT IN AN ORGANIZED HEALTH CARE EDUCATION/TRAINING PROGRAM

## 2024-07-23 PROCEDURE — 85025 COMPLETE CBC W/AUTO DIFF WBC: CPT | Performed by: STUDENT IN AN ORGANIZED HEALTH CARE EDUCATION/TRAINING PROGRAM

## 2024-07-23 PROCEDURE — 84100 ASSAY OF PHOSPHORUS: CPT | Performed by: STUDENT IN AN ORGANIZED HEALTH CARE EDUCATION/TRAINING PROGRAM

## 2024-07-23 PROCEDURE — 25000003 PHARM REV CODE 250: Performed by: NURSE ANESTHETIST, CERTIFIED REGISTERED

## 2024-07-23 PROCEDURE — 37000008 HC ANESTHESIA 1ST 15 MINUTES: Performed by: INTERNAL MEDICINE

## 2024-07-23 PROCEDURE — 27201012 HC FORCEPS, HOT/COLD, DISP: Performed by: INTERNAL MEDICINE

## 2024-07-23 PROCEDURE — 0DBM8ZX EXCISION OF DESCENDING COLON, VIA NATURAL OR ARTIFICIAL OPENING ENDOSCOPIC, DIAGNOSTIC: ICD-10-PCS | Performed by: INTERNAL MEDICINE

## 2024-07-23 PROCEDURE — 0DBP8ZX EXCISION OF RECTUM, VIA NATURAL OR ARTIFICIAL OPENING ENDOSCOPIC, DIAGNOSTIC: ICD-10-PCS | Performed by: INTERNAL MEDICINE

## 2024-07-23 PROCEDURE — 37000009 HC ANESTHESIA EA ADD 15 MINS: Performed by: INTERNAL MEDICINE

## 2024-07-23 PROCEDURE — 0DBN8ZX EXCISION OF SIGMOID COLON, VIA NATURAL OR ARTIFICIAL OPENING ENDOSCOPIC, DIAGNOSTIC: ICD-10-PCS | Performed by: INTERNAL MEDICINE

## 2024-07-23 PROCEDURE — 80053 COMPREHEN METABOLIC PANEL: CPT | Performed by: STUDENT IN AN ORGANIZED HEALTH CARE EDUCATION/TRAINING PROGRAM

## 2024-07-23 PROCEDURE — 36415 COLL VENOUS BLD VENIPUNCTURE: CPT | Performed by: STUDENT IN AN ORGANIZED HEALTH CARE EDUCATION/TRAINING PROGRAM

## 2024-07-23 RX ORDER — LIDOCAINE HYDROCHLORIDE 20 MG/ML
INJECTION, SOLUTION EPIDURAL; INFILTRATION; INTRACAUDAL; PERINEURAL
Status: DISCONTINUED
Start: 2024-07-23 | End: 2024-07-23 | Stop reason: HOSPADM

## 2024-07-23 RX ORDER — LIDOCAINE HYDROCHLORIDE 20 MG/ML
INJECTION INTRAVENOUS
Status: DISCONTINUED | OUTPATIENT
Start: 2024-07-23 | End: 2024-07-23

## 2024-07-23 RX ORDER — PROPOFOL 10 MG/ML
VIAL (ML) INTRAVENOUS
Status: DISCONTINUED
Start: 2024-07-23 | End: 2024-07-23 | Stop reason: WASHOUT

## 2024-07-23 RX ORDER — PROPOFOL 10 MG/ML
VIAL (ML) INTRAVENOUS
Status: DISCONTINUED | OUTPATIENT
Start: 2024-07-23 | End: 2024-07-23

## 2024-07-23 RX ORDER — PROPOFOL 10 MG/ML
VIAL (ML) INTRAVENOUS
Status: DISCONTINUED
Start: 2024-07-23 | End: 2024-07-23 | Stop reason: HOSPADM

## 2024-07-23 RX ADMIN — PROPOFOL 50 MG: 10 INJECTION, EMULSION INTRAVENOUS at 11:07

## 2024-07-23 RX ADMIN — PROPOFOL 20 MG: 10 INJECTION, EMULSION INTRAVENOUS at 11:07

## 2024-07-23 RX ADMIN — SODIUM CHLORIDE: 9 INJECTION, SOLUTION INTRAVENOUS at 04:07

## 2024-07-23 RX ADMIN — LIDOCAINE HYDROCHLORIDE 100 MG: 20 INJECTION, SOLUTION INTRAVENOUS at 11:07

## 2024-07-23 RX ADMIN — PROPOFOL 30 MG: 10 INJECTION, EMULSION INTRAVENOUS at 11:07

## 2024-07-23 RX ADMIN — PROPOFOL 80 MG: 10 INJECTION, EMULSION INTRAVENOUS at 11:07

## 2024-07-23 RX ADMIN — SODIUM CHLORIDE: 0.9 INJECTION, SOLUTION INTRAVENOUS at 11:07

## 2024-07-23 RX ADMIN — PIPERACILLIN AND TAZOBACTAM 4.5 G: 4; .5 INJECTION, POWDER, LYOPHILIZED, FOR SOLUTION INTRAVENOUS; PARENTERAL at 05:07

## 2024-07-23 NOTE — CONSULTS
[facility] Consultation Note    Patient Complaint: rectal bleeding, abdominal pain    PCP:   Bebeto Arora       LOS: 1        Initial History of Present Illness (HPI):  This is a 53 y.o. male consulted to GI service for colitis. PMH primary myelofibrosis (on ruxolitinib), hyperlipidemia, tobacco use. Patient complaint of acute onset of rectal bleeding with associated symptoms of  n/v, abdominal pain and diarrhea that began Saturday night. Denies hematemesis, dark stools and constipation. He reports chronic rectal bleeding for years but usually scant spotting. He reports having a colonoscopy at [facility]  with mild diverticular disease, I do not have a copy of the report. Denies oac.  Has pictures on phone of BRB stools with clots.  Reports he has a appt with heme[facility]c [facility] tomorrow.    Hgb 14.9 to 13.4, wbc 16.3      Medical History:  has a past medical history of Hyperlipidemia.    Surgical History:  has a past surgical history that includes Eye surgery and Colonoscopy (N/A, 8/9/2023).      Objective Findings:    Vital Signs:  Temp:  [97.6 °F (36.4 °C)-98.2 °F (36.8 °C)]   Pulse:  [54-87]   Resp:  [16-18]   BP: (114-157)/(70-87)   SpO2:  [96 %-100 %]   Body mass index is 25.8 kg/m².      Physical Exam  Vitals and nursing note reviewed.   Constitutional:       Appearance: Normal appearance.   HENT:      Head: Normocephalic.   Pulmonary:      Effort: Pulmonary effort is normal.   Abdominal:      General: Bowel sounds are normal.      Palpations: Abdomen is soft.   Skin:     General: Skin is warm and dry.   Neurological:      Mental Status: He is alert and oriented to person, place, and time.   Psychiatric:         Mood and Affect: Mood normal.         Behavior: Behavior normal.         Thought Content: Thought content normal.         Judgment: Judgment normal.               Labs:  Lab Results   Component Value Date    WBC 10.43 07/23/2024    HGB 13.4 (L) 07/23/2024    HCT 41.3 07/23/2024    PLT  479 (H) 2024    CHOL 138 2024    TRIG 61 2024    HDL 52 2024    ALT 39 2024    AST 24 2024     2024    K 4.1 2024     2024    CREATININE 0.9 2024    BUN 13 2024    CO2 26 2024    TSH 0.912 2024    PSA 2.2 2024    INR 1.0 2024    HGBA1C 5.4 2024             Imagin/22 CT Abdomen pelvis-  Long segment wall thickening involving the mid to distal transverse colon and the entirety of the descending colon, suggesting infectious or inflammatory colitis.   Findings suggest hepatic steatosis.    Endoscopy: 3/27/24 EGD- normal  3/27/24 Colonoscopy- lipoma, diverticulosis, otherwise normal colonoscopy.    I have independently reviewed and interpreted the imaging above           Rectal bleeding. Abdominal pain. Abnormal CT abdomen. Leukocytosis.   Plan/ Recommendations:  1.  Denies any episodes of rectal bleeding this am. Continue to monitor counts. WBC normalized with the start of zosyn. Stool studies ordered, unable to produce a sample at this time. Plan for flex sig today.      Thank you so much for allowing us to participate in the care of Rubén Hu . Please contact us if you have any additional questions.    Venessa Shaw NP  Gastroenterology  [facility] - Med Surg

## 2024-07-23 NOTE — HOSPITAL COURSE
Mr. Hu is a 54 yo M who was admitted with hematochezia and abdominal pain. CT abdomen/pelvis with long segment wall thickening involving mid/distal transverse colon and entire descending colon. He was started on IV antibiotics and GI consulted. He underwent flexible sigmoidoscopy and found to have a single rectal ulcer and reports no evidence of inflammation in the sigmoid or descending colon. Biopsies were taken.  He was cleared for discharge from GI standpoint. Discussed with GI, they are recommending against continuing antibiotics at discharge. Patient tolerating diet post sigmoidoscopy. He is stable for discharge home and follow up with GI as outpatient regarding biopsy results.

## 2024-07-23 NOTE — H&P
Short Stay Endoscopy History and Physical    PCP - Bebeto Arora MD    Procedure - Flexible Sigmoidoscopy  ASA - per anesthesia  Mallampati - per anesthesia  Plan of anesthesia - MAC    HPI:  This is a 53 y.o. male here for evaluation of : rectal bleeding    ROS:  Constitutional: No fevers, chills  CV: No chest pain  Pulm: No cough  Ophtho: No vision changes  GI: see HPI  Derm: No rash    Medical History:  has a past medical history of Hyperlipidemia.    Surgical History:  has a past surgical history that includes Eye surgery and Colonoscopy (N/A, 8/9/2023).    Family History: family history includes Arthritis in his mother; COPD in his father.. Otherwise no colon cancer, inflammatory bowel disease, or GI malignancies.    Social History:  reports that he has quit smoking. His smoking use included cigars. He has never used smokeless tobacco. He reports current alcohol use. He reports that he does not use drugs.    Review of patient's allergies indicates:   Allergen Reactions    Bactrim [sulfamethoxazole-trimethoprim] Hives       Medications:   Medications Prior to Admission   Medication Sig Dispense Refill Last Dose    aspirin 81 MG Chew Take 81 mg by mouth once daily. Pt takes over the counter       atorvastatin (LIPITOR) 10 MG tablet TAKE ONE TABLET BY MOUTH EVERY EVENING 90 tablet 2     ergocalciferol (ERGOCALCIFEROL) 50,000 unit Cap TAKE 1 CAPSULE BY MOUTH ONCE WEEKLY 12 capsule 2     ruxolitinib (JAKAFI) 20 mg Tab TAKE 1 TABLET (20 MG) TWICE DAILY 60 tablet 0          Vital Signs:   Vitals:    07/23/24 0753   BP: (!) 143/80   Pulse: 69   Resp: 18   Temp: 98 °F (36.7 °C)       General Appearance: Well appearing in no acute distress  Eyes:    No scleral icterus  ENT: atraumatic  Abdomen: Soft, nondistended  Extremities: no tenderness  Skin: normal color    Labs:  Lab Results   Component Value Date    WBC 10.43 07/23/2024    HGB 13.4 (L) 07/23/2024    HCT 41.3 07/23/2024     (H) 07/23/2024    CHOL 138  02/22/2024    TRIG 61 02/22/2024    HDL 52 02/22/2024    ALT 39 07/23/2024    AST 24 07/23/2024     07/23/2024    K 4.1 07/23/2024     07/23/2024    CREATININE 0.9 07/23/2024    BUN 13 07/23/2024    CO2 26 07/23/2024    TSH 0.912 02/22/2024    PSA 2.2 02/22/2024    INR 1.0 07/22/2024    HGBA1C 5.4 02/22/2024       I have explained the risks and benefits of endoscopy procedures to the patient/their POA including but not limited to bleeding, perforation, infection, and death.  The patient/their POA was asked if they understand and allowed to ask any further questions to their satisfaction.    Proceed with flexible sigmoidoscopy    Edilberto Waldrop MD

## 2024-07-23 NOTE — TRANSFER OF CARE
"Anesthesia Transfer of Care Note    Patient: Rubén Hu    Procedure(s) Performed: Procedure(s) (LRB):  SIGMOIDOSCOPY, FLEXIBLE (N/A)    Patient location: GI    Anesthesia Type: general    Post pain: adequate analgesia    Post assessment: no apparent anesthetic complications and tolerated procedure well    Post vital signs: stable    Level of consciousness: sedated and responds to stimulation    Nausea/Vomiting: no nausea/vomiting    Complications: none    Transfer of care protocol was followed      Last vitals: Visit Vitals  BP (!) 97/59   Pulse 86   Temp 36.5 °C (97.7 °F) (Skin)   Resp 18   Ht 5' 11" (1.803 m)   Wt 83.9 kg (185 lb)   SpO2 96%   BMI 25.80 kg/m²     "

## 2024-07-23 NOTE — ANESTHESIA PREPROCEDURE EVALUATION
07/23/2024    Pre-operative evaluation for Procedure(s) (LRB):  SIGMOIDOSCOPY, FLEXIBLE (N/A)    Rubén Hu is a 53 y.o. male     Patient Active Problem List   Diagnosis    Hyperlipidemia    Tobacco abuse (quit cigarettes, on cigars)    Hemorrhoids    Adrenal nodule (repeat CT around 6/2024)    Myeloproliferative disease    Colitis    BRBPR (bright red blood per rectum)       Review of patient's allergies indicates:   Allergen Reactions    Bactrim [sulfamethoxazole-trimethoprim] Hives       No current facility-administered medications on file prior to encounter.     Current Outpatient Medications on File Prior to Encounter   Medication Sig Dispense Refill    aspirin 81 MG Chew Take 81 mg by mouth once daily. Pt takes over the counter      atorvastatin (LIPITOR) 10 MG tablet TAKE ONE TABLET BY MOUTH EVERY EVENING 90 tablet 2    ergocalciferol (ERGOCALCIFEROL) 50,000 unit Cap TAKE 1 CAPSULE BY MOUTH ONCE WEEKLY 12 capsule 2    ruxolitinib (JAKAFI) 20 mg Tab TAKE 1 TABLET (20 MG) TWICE DAILY 60 tablet 0             VITALS    Wt Readings from Last 3 Encounters:   07/22/24 83.9 kg (185 lb)   07/01/24 85.1 kg (187 lb 9.8 oz)   05/28/24 86 kg (189 lb 9.5 oz)     Temp Readings from Last 3 Encounters:   07/23/24 36.7 °C (98 °F) (Oral)   07/01/24 36.9 °C (98.4 °F) (Oral)   02/20/24 37.1 °C (98.7 °F)     BP Readings from Last 3 Encounters:   07/23/24 (!) 143/80   07/01/24 138/84   05/28/24 131/86     Pulse Readings from Last 3 Encounters:   07/23/24 69   07/01/24 80   05/28/24 67       CBC:   Recent Labs     07/22/24 1915 07/23/24  0451   WBC 16.39* 10.43   RBC 4.70 4.33*   HGB 14.9 13.4*   HCT 45.1 41.3   * 479*   MCV 96 95   MCH 31.7* 30.9   MCHC 33.0 32.4       CMP:   Recent Labs     07/22/24 1915 07/23/24  0451    140   K 4.1 4.1    107   CO2 29 26   BUN 12 13   CREATININE 1.0 0.9   GLU  130* 93   MG 2.3 2.1   PHOS 2.4* 2.9   CALCIUM 9.5 8.8   ALBUMIN 4.0 3.5   PROT 6.9 6.1   ALKPHOS 56 47*   ALT 50* 39   AST 29 24   BILITOT 0.4 0.5       INR  Recent Labs     24  1101 24  1915   INR 1.1 1.0           Diagnostic Studies:      EKD Echo:  No results found for this or any previous visit.      Pre-op Assessment    I have reviewed the Patient Summary Reports.     I have reviewed the Nursing Notes. I have reviewed the NPO Status.      Review of Systems  Anesthesia Hx:  No problems with previous Anesthesia             Denies Family Hx of Anesthesia complications.    Denies Personal Hx of Anesthesia complications.                    Social:  Smoker       Hematology/Oncology:  Hematology Normal                                 Oncology Comments: Primary myelofibrosis     EENT/Dental:  EENT/Dental Normal           Cardiovascular:                hyperlipidemia                             Pulmonary:  Pulmonary Normal                       Hepatic/GI:        Rectal bleeding          Musculoskeletal:  Musculoskeletal Normal                Neurological:  Neurology Normal                                      Endocrine:  Endocrine Normal            Dermatological:  Skin Normal    Psych:  Psychiatric Normal                    Physical Exam  General: Alert, Oriented, Well nourished and Cooperative    Airway:  Mallampati: II   Mouth Opening: Normal  TM Distance: Normal  Tongue: Normal  Neck ROM: Normal ROM    Dental:  Intact    Chest/Lungs:  Normal Respiratory Rate    Heart:  Rate: Normal  Rhythm: Regular Rhythm        Anesthesia Plan  Type of Anesthesia, risks & benefits discussed:    Anesthesia Type: Gen Natural Airway  Intra-op Monitoring Plan: Standard ASA Monitors  Induction:  IV  Informed Consent: Informed consent signed with the Patient and all parties understand the risks and agree with anesthesia plan.  All questions answered.   ASA Score: 2    Ready For Surgery From Anesthesia Perspective.      .

## 2024-07-23 NOTE — NURSING
[facility]  Nurses Note -- 4 Eyes      7/22/2024       Skin assessed on: Q Shift      [x] No Pressure Injuries Present    [x]Prevention Measures Documented    [] Yes LDA  for Pressure Injury Previously documented     [] Yes New Pressure Injury Discovered   [] LDA for New Pressure Injury Added      Attending RN:  Barbara Mazariegos, RN     Second RN:  Malina MCCLURE RN

## 2024-07-23 NOTE — PLAN OF CARE
Procedure and recovery complete. Awake and alert. No c/o pain or discomfort. Resp. Even and unlabored. Report given to primary nurse. No acute distress noted.

## 2024-07-23 NOTE — DISCHARGE SUMMARY
[facility]  Discharge Summary      Patient Name: Rubén Hu  MRN: [id number]  [facility]: [id number]  Patient Class: IP- Inpatient  Admission Date: 7/22/2024  Hospital Length of Stay: 1 days  Discharge Date and Time: 7/23/2024  4:28 PM  Attending Physician: No att. providers found   Discharging Provider: Juan Jose Middleton MD  Primary Care Provider: Bebeto Arora MD    Primary Care Team: Networked reference to record PCT     HPI:   This is a 53-year-old male with a past medical history of primary myelofibrosis (on ruxolitinib), hyperlipidemia, tobacco use who presents with rectal bleeding.    Patient of rectal bleeding and abdominal pain that started 2 days prior to presentation.  He endorsed severe abdominal cramping, associated with loose bloody stools, and several episodes of nonbloody emesis.  His symptoms progressively worsened, however associated with decreased p.o. intake.  Patient reports 2 prior similar episodes in the past and intermittent chronic bleeding noted after wiping since 2019.    In the ED, the patient was hemodynamically stable.  Labs were remarkable for leukocytosis (16.3), stable hemoglobin (14.7).  CT abdomen and pelvis showed findings suggestive of infectious/inflammatory colitis involving the mid to distal transverse colon and the entirety of the descending colon.  Patient was given 1 L of NS, Zosyn.  He was admitted for further management.    Procedure(s) (LRB):  SIGMOIDOSCOPY, FLEXIBLE (N/A)      Hospital Course:   Mr. Hu is a 52 yo M who was admitted with hematochezia and abdominal pain. CT abdomen/pelvis with long segment wall thickening involving mid/distal transverse colon and entire descending colon. He was started on IV antibiotics and GI consulted. He underwent flexible sigmoidoscopy and found to have a single rectal ulcer and reports no evidence of inflammation in the sigmoid or descending colon. Biopsies were taken.  He was cleared for discharge from  GI standpoint. Discussed with GI, they are recommending against continuing antibiotics at discharge. Patient tolerating diet post sigmoidoscopy. He is stable for discharge home and follow up with GI as outpatient regarding biopsy results.      Goals of Care Treatment Preferences:  Code Status: Full Code      Consults:   Consults (From admission, onward)          Status Ordering Provider     Inpatient consult to Gastroenterology  Once        Provider:  Nirali Vicente MD    Completed ROBBI LORENZO            No new Assessment & Plan notes have been filed under this hospital service since the last note was generated.  Service: Hospital Medicine    Final Active Diagnoses:    Diagnosis Date Noted POA    PRINCIPAL PROBLEM:  Colitis [K52.9] 07/22/2024 Yes    BRBPR (bright red blood per rectum) [K62.5] 07/22/2024 Yes    Myeloproliferative disease [D47.1] 07/01/2024 Yes    Hyperlipidemia [E78.5] 08/09/2019 Yes     Chronic      Problems Resolved During this Admission:       Discharged Condition: stable    Disposition: Home or Self Care    Follow Up:    Patient Instructions:      Diet Adult Regular     Notify your health care provider if you experience any of the following:  temperature >100.4     Notify your health care provider if you experience any of the following:  persistent nausea and vomiting or diarrhea     Notify your health care provider if you experience any of the following:  severe uncontrolled pain     Notify your health care provider if you experience any of the following:  difficulty breathing or increased cough     Notify your health care provider if you experience any of the following:  severe persistent headache     Notify your health care provider if you experience any of the following:  worsening rash     Notify your health care provider if you experience any of the following:  persistent dizziness, light-headedness, or visual disturbances     Notify your health care provider if you experience any of  the following:  increased confusion or weakness     Activity as tolerated       Significant Diagnostic Studies: Labs: All labs within the past 24 hours have been reviewed    Pending Diagnostic Studies:       Procedure Component Value Units Date/Time    CMV DNA, quantitative, PCR [[id number]] Collected: 07/22/24 1915    Order Status: Sent Lab Status: In process Updated: 07/23/24 1242    Specimen: Blood     Specimen to Pathology, Surgery Gastrointestinal tract [[id number]] Collected: 07/23/24 1144    Order Status: Sent Lab Status: In process Updated: 07/23/24 1144    Specimen: Tissue            Medications:  Reconciled Home Medications:      Medication List        CONTINUE taking these medications      aspirin 81 MG Chew  Take 81 mg by mouth once daily. Pt takes over the counter     atorvastatin 10 MG tablet  Commonly known as: LIPITOR  TAKE ONE TABLET BY MOUTH EVERY EVENING     ergocalciferol 50,000 unit Cap  Commonly known as: ERGOCALCIFEROL  TAKE 1 CAPSULE BY MOUTH ONCE WEEKLY     ruxolitinib 20 mg Tab  Commonly known as: JAKAFI  TAKE 1 TABLET (20 MG) TWICE DAILY              Indwelling Lines/Drains at time of discharge:   Lines/Drains/Airways       None                   Time spent on the discharge of patient: >35 minutes         Juan Jose Middleton MD  Department of Hospital Medicine  [facility]

## 2024-07-23 NOTE — PLAN OF CARE
Case Management Assessment     PCP: Bebeto Arora MD    Pharmacy:   [id number] - [place] [address] [facility] [address]  [address] [place] [address]  Phone: [phone] Fax: [phone]    [facility] - [place], [place] - [address]  [address]  [place] [address]  Phone: [phone] Fax: [phone]      Patient Arrived From: Home  Existing Help at Home: Carmelita Hu (Spouse) [phone]    Barriers to Discharge: None    Discharge Plan:    A. Home   B. Home with family  Spoke with patient's spouse via telephone.  She stated patient is independent but she is available at home to provide assistance as needed; spouse to assist patient home at discharge. CM to continue to assess for and until discharge.   07/23/24 1122   Discharge Assessment   Assessment Type Discharge Planning Assessment   Confirmed/corrected address, phone number and insurance Yes   Confirmed Demographics Correct on Facesheet   Source of Information family   Does patient/caregiver understand observation status Yes   Communicated ALCON with patient/caregiver Date not available/Unable to determine   Reason For Admission Colitis   People in Home spouse   Facility Arrived From: House   Do you expect to return to your current living situation? Yes   Do you have help at home or someone to help you manage your care at home? Yes   Who are your caregiver(s) and their phone number(s)? Carmelita Hu (Spouse)  [phone]   Prior to hospitilization cognitive status: Unable to Assess   Current cognitive status: Unable to Assess   Walking or Climbing Stairs Difficulty no   Dressing/Bathing Difficulty no   Equipment Currently Used at Home none   Readmission within 30 days? No   Patient currently being followed by outpatient case management? No   Do you currently have service(s) that help you manage your care at home? No   Do you take prescription medications? Yes    Do you have prescription coverage? Yes   Coverage [facility] CHOICE PLUS   Do you have any problems affording any of your prescribed medications? No   Is the patient taking medications as prescribed? yes   Who is going to help you get home at discharge? Carmelita Hu (Spouse)  [phone]   How do you get to doctors appointments? car, drives self   Are you on dialysis? No   Do you take coumadin? No   Discharge Plan A Home   Discharge Plan B Home with family   DME Needed Upon Discharge  other (see comments)  (TBD)   Discharge Plan discussed with: Spouse/sig other   Name(s) and Number(s) Carmelita Hu (Spouse) [phone]   Transition of Care Barriers None

## 2024-07-23 NOTE — NURSING
AVS virtually reviewed with patient in its entirety with emphasis on diet, medications, follow-up appointments and reasons to return to the ED or contact the [facility] On [facility] Line. Patient also encouraged to utilize their patient portal. Ease and convenience of use reiterated. Education complete and patient voiced understanding. All questions answered. Discharge teaching complete.

## 2024-07-23 NOTE — PLAN OF CARE
Case Management Final Discharge Note      Discharge Disposition: Home    New DME ordered / company name: None    Relevant SDOH / Transition of Care Barriers:  None    Primary Caretaker and contact information: Patient is independent; Carmelita Hu (Spouse) [phone]    Scheduled followup appointment: Follow up appointment with PCP scheduled; patient also has upcoming appointments for Colon and Rectal Surgery, Lab Bone and Marrow Transplant, and Hematology.  All appointments added to patient AVS     Referrals placed: None    Transportation: Private vehicle/family taking patient home        Patient and family educated on discharge services and updated on DC plan. Patient to be discharged home; patient is independent but lives with her wife who is able to provide assistance at home as needed.  Bedside RN notified, patient clear to discharge from Case Management Perspective.    07/23/24 1400   Final Note   Assessment Type Final Discharge Note   Anticipated Discharge Disposition Home   Hospital Resources/Appts/Education Provided Provided patient/caregiver with written discharge plan information;Post-Acute resouces added to AVS   Post-Acute Status   Coverage [facility] CHOICE PLUS   Discharge Delays None known at this time

## 2024-07-23 NOTE — TELEPHONE ENCOUNTER
----- Message from Luiz Humphries sent at 7/23/2024 10:44 AM CDT -----  Regarding: Consult  Contact: Patsy/[facility] [phone]  Patsy calling from [facility] requesting a callback from nurse or provider in regards to a consult for patient. Please call back as soon as possible.

## 2024-07-23 NOTE — ANESTHESIA POSTPROCEDURE EVALUATION
Anesthesia Post Evaluation    Patient: Rubén Hu    Procedure(s) Performed: Procedure(s) (LRB):  SIGMOIDOSCOPY, FLEXIBLE (N/A)    Final Anesthesia Type: general      Patient location during evaluation: GI PACU  Patient participation: Yes- Able to Participate  Level of consciousness: awake and alert and oriented  Post-procedure vital signs: reviewed and stable  Pain management: adequate  Airway patency: patent    PONV status at discharge: No PONV  Anesthetic complications: no      Cardiovascular status: blood pressure returned to baseline and hemodynamically stable  Respiratory status: unassisted, spontaneous ventilation and room air  Hydration status: euvolemic  Follow-up not needed.              Vitals Value Taken Time   /73 07/23/24 1215   Temp 97.7 F 07/23/24 1145   Pulse 69 07/23/24 1215   Resp 17 07/23/24 1215   SpO2 100 % 07/23/24 1215         Event Time   Out of Recovery 07/23/2024 12:21:42         Pain/Mohsen Score: No data recorded

## 2024-07-23 NOTE — PROVATION PATIENT INSTRUCTIONS
Discharge Summary/Instructions after an Endoscopic Procedure  Patient Name: Rubén Hu  Patient MRN: [id number]  Patient YOB: 1970 Tuesday, July 23, 2024  Nirali Vicente MD  Dear patient,  As a result of recent federal legislation (The Federal Cures Act), you may   receive lab or pathology results from your procedure in your [facility]   account before your physician is able to contact you. Your physician or   their representative will relay the results to you with their   recommendations at their soonest availability.  Thank you,  RESTRICTIONS:  During your procedure today, you received medications for sedation.  These   medications may affect your judgment, balance and coordination.  Therefore,   for 24 hours, you have the following restrictions:   - DO NOT drive a car, operate machinery, make legal/financial decisions,   sign important papers or drink alcohol.    ACTIVITY:  Today: no heavy lifting, straining or running due to procedural   sedation/anesthesia.  The following day: return to full activity including work.  DIET:  Eat and drink normally unless instructed otherwise.     TREATMENT FOR COMMON SIDE EFFECTS:  - Mild abdominal pain, nausea, belching, bloating or excessive gas:  rest,   eat lightly and use a heating pad.  - Sore Throat: treat with throat lozenges and/or gargle with warm salt   water.  - Because air was used during the procedure, expelling large amounts of air   from your rectum or belching is normal.  - If a bowel prep was taken, you may not have a bowel movement for 1-3 days.    This is normal.  SYMPTOMS TO WATCH FOR AND REPORT TO YOUR PHYSICIAN:  1. Abdominal pain or bloating, other than gas cramps.  2. Chest pain.  3. Back pain.  4. Signs of infection such as: chills or fever occurring within 24 hours   after the procedure.  5. Rectal bleeding, which would show as bright red, maroon, or black stools.   (A tablespoon of blood from the rectum is not serious, especially  if   hemorrhoids are present.)  6. Vomiting.  7. Weakness or dizziness.  GO DIRECTLY TO THE NEAREST EMERGENCY ROOM IF YOU HAVE ANY OF THE FOLLOWING:      Difficulty breathing              Chills and/or fever over 101 F   Persistent vomiting and/or vomiting blood   Severe abdominal pain   Severe chest pain   Black, tarry stools   Bleeding- more than one tablespoon   Any other symptom or condition that you feel may need urgent attention  Your doctor recommends these additional instructions:  If any biopsies were taken, your doctors clinic will contact you in 1 to 2   weeks with any results.  - Return patient to hospital glass for ongoing care.   - Resume previous diet.   - Continue present medications.   - Await pathology results.  For questions, problems or results please call your physician - Nirali Vicente MD at Work:  [phone].  [facility] can be reached at [phone]     IF A COMPLICATION OR EMERGENCY SITUATION ARISES AND YOU ARE UNABLE TO REACH   YOUR PHYSICIAN - GO DIRECTLY TO THE EMERGENCY ROOM.  Nirali Vicente MD  7/23/2024 11:50:40 AM  This report has been verified and signed electronically.  Dear patient,  As a result of recent federal legislation (The Federal Cures Act), you may   receive lab or pathology results from your procedure in your [facility]   account before your physician is able to contact you. Your physician or   their representative will relay the results to you with their   recommendations at their soonest availability.  Thank you,  PROVATION

## 2024-07-24 ENCOUNTER — OFFICE VISIT (OUTPATIENT)
Dept: SURGERY | Facility: CLINIC | Age: 54
End: 2024-07-24
Payer: COMMERCIAL

## 2024-07-24 ENCOUNTER — TELEPHONE (OUTPATIENT)
Dept: SURGERY | Facility: CLINIC | Age: 54
End: 2024-07-24

## 2024-07-24 VITALS
DIASTOLIC BLOOD PRESSURE: 79 MMHG | SYSTOLIC BLOOD PRESSURE: 120 MMHG | WEIGHT: 185.19 LBS | HEIGHT: 71 IN | HEART RATE: 75 BPM | BODY MASS INDEX: 25.93 KG/M2

## 2024-07-24 DIAGNOSIS — R10.84 GENERALIZED ABDOMINAL PAIN: ICD-10-CM

## 2024-07-24 DIAGNOSIS — K62.5 RECTAL BLEED: Primary | ICD-10-CM

## 2024-07-24 LAB
CMV DNA SPEC QL NAA+PROBE: NORMAL
CYTOMEGALOVIRUS PCR, QUANT: NOT DETECTED IU/ML

## 2024-07-24 PROCEDURE — 3074F SYST BP LT 130 MM HG: CPT | Mod: CPTII,S$GLB,, | Performed by: NURSE PRACTITIONER

## 2024-07-24 PROCEDURE — 99204 OFFICE O/P NEW MOD 45 MIN: CPT | Mod: S$GLB,,, | Performed by: NURSE PRACTITIONER

## 2024-07-24 PROCEDURE — 3008F BODY MASS INDEX DOCD: CPT | Mod: CPTII,S$GLB,, | Performed by: NURSE PRACTITIONER

## 2024-07-24 PROCEDURE — 1160F RVW MEDS BY RX/DR IN RCRD: CPT | Mod: CPTII,S$GLB,, | Performed by: NURSE PRACTITIONER

## 2024-07-24 PROCEDURE — 99999 PR PBB SHADOW E&M-EST. PATIENT-LVL V: CPT | Mod: PBBFAC,,, | Performed by: NURSE PRACTITIONER

## 2024-07-24 PROCEDURE — 3078F DIAST BP <80 MM HG: CPT | Mod: CPTII,S$GLB,, | Performed by: NURSE PRACTITIONER

## 2024-07-24 PROCEDURE — 1159F MED LIST DOCD IN RCRD: CPT | Mod: CPTII,S$GLB,, | Performed by: NURSE PRACTITIONER

## 2024-07-24 PROCEDURE — 1111F DSCHRG MED/CURRENT MED MERGE: CPT | Mod: CPTII,S$GLB,, | Performed by: NURSE PRACTITIONER

## 2024-07-24 PROCEDURE — 3044F HG A1C LEVEL LT 7.0%: CPT | Mod: CPTII,S$GLB,, | Performed by: NURSE PRACTITIONER

## 2024-07-24 NOTE — PROGRESS NOTES
CRS Office Visit History and Physical    Referring Md:   Clyde James Md  [address] [place] [address]    SUBJECTIVE:     Chief Complaint: rectal bleeding    History of Present Illness:  The patient is a new patient to this practice.   Course is as follows:  Patient is a 53 y.o. male presents with rectal bleeding. This appt was made on 7/1. On 7/22/2024 pt went to hospital for abd cramping and bloody stools. CT scan showed: 1. Long segment wall thickening involving the mid to distal transverse colon and the entirety of the descending colon, suggesting infectious or inflammatory colitis. Correlation is advised. Follow-up colonoscopy as warranted.   Flex sig 7/23 showed rectal ulcer, biopsies were taken from rectum, sigmoid colon and descending. Since procedure yesterday, path still pending.   Pt reports the bleeding as 3 episodes of stomach pains that are really bad, he has stomach pains with throwing up. This all started October 2023. Once Oct 2023, Jan 2024, then a few days ago.   Has a BM Daily, soft, easy to pass.   He has a normal EGD 2024  Currently no rectal pain    He reports a few years ago he had LIS for fissure and since then no burning or pain at anus    Last Colonoscopy: 3/2024 , two polyps removed  Family history of colorectal cancer or IBD: no.    Review of patient's allergies indicates:   Allergen Reactions    Bactrim [sulfamethoxazole-trimethoprim] Hives       Past Medical History:   Diagnosis Date    Hyperlipidemia      Past Surgical History:   Procedure Laterality Date    COLONOSCOPY N/A 8/9/2023    Procedure: COLONOSCOPY;  Surgeon: Will Ardon MD;  Location: [address];  Service: Endoscopy;  Laterality: N/A;  Suprep Instructions sent via portal / Authorizing:     Bebeto Arora MD in [facility] FAMILY MED/ INTERNAL MED/ PEDS  Referral:          [id number]    EYE SURGERY       Family History   Problem Relation Name Age of Onset    Arthritis Mother      COPD Father    "    Social History     Tobacco Use    Smoking status: Former     Types: Cigars    Smokeless tobacco: Never   Substance Use Topics    Alcohol use: Yes     Comment: socially    Drug use: Never        Review of Systems:  ROS    OBJECTIVE:     Vital Signs (Most Recent)  /79 (BP Location: Left arm, Patient Position: Sitting, BP Method: Medium (Automatic))   Pulse 75   Ht 5' 11" (1.803 m)   Wt 84 kg (185 lb 3 oz)   BMI 25.83 kg/m²     Physical Exam:  General: White male in no distress   Neuro: Alert and oriented to person, place, and time.  Moves all extremities.     HEENT: No icterus.  Trachea midline  Respiratory: Respirations are even and unlabored, no cough or audible wheezing  Skin: Warm dry and intact, No visible rashes, no jaundice    Labs reviewed today:  Lab Results   Component Value Date    WBC 10.43 07/23/2024    HGB 13.4 (L) 07/23/2024    HCT 41.3 07/23/2024     (H) 07/23/2024    CHOL 138 02/22/2024    TRIG 61 02/22/2024    HDL 52 02/22/2024    ALT 39 07/23/2024    AST 24 07/23/2024     07/23/2024    K 4.1 07/23/2024     07/23/2024    CREATININE 0.9 07/23/2024    BUN 13 07/23/2024    CO2 26 07/23/2024    TSH 0.912 02/22/2024    PSA 2.2 02/22/2024    INR 1.0 07/22/2024    HGBA1C 5.4 02/22/2024       Imaging reviewed today:  See HPI    Endoscopy reviewed today:  See HPI    Anorectal Exam:    Anal Skin:  MLP previous LIAS scar    Digital Rectal Exam:  Deferred due to recent scope      ASSESSMENT/PLAN:     Diagnoses and all orders for this visit:    Rectal bleed  -     Ambulatory referral/consult to Gastroenterology    Generalized abdominal pain      54 y/o m here with abd pain and rectal bleeding. These come as an episode of intense abd pain, n/v then rectal bleeding. Recent flex sig with rectal ulcer yesterday w path pending. CT scan a few days ago with colon inflammation. For now he needs to follow up with GI regarding these abd pain episodes. He has questions regarding the painful " episodes and would like counseling regarding this and has not had clinic visit w GI, so a message was sent to that team today.    F/u PRn    LEON RodriguezP-CHANG  Colon and Rectal Surgery

## 2024-07-25 ENCOUNTER — TELEPHONE (OUTPATIENT)
Dept: GASTROENTEROLOGY | Facility: CLINIC | Age: 54
End: 2024-07-25
Payer: COMMERCIAL

## 2024-07-25 DIAGNOSIS — Z76.82 STEM CELL TRANSPLANT CANDIDATE: Primary | ICD-10-CM

## 2024-07-25 DIAGNOSIS — D47.1 PRIMARY MYELOFIBROSIS: ICD-10-CM

## 2024-07-25 LAB
FINAL PATHOLOGIC DIAGNOSIS: NORMAL
GROSS: NORMAL
Lab: NORMAL

## 2024-07-25 NOTE — TELEPHONE ENCOUNTER
MA spoke with patient in regards to scheduling a hospital follow for his chronic abdominal pain. Patient declined appointment, he states that he has already seen GI at the main campus and is scheduled to see his PCP next week. Patient states that his abdominal pain has improved and that he will call for an appointment if anything changes.

## 2024-07-25 NOTE — TELEPHONE ENCOUNTER
----- Message from Jane Bobby MA sent at 7/24/2024  5:02 PM CDT -----    ----- Message -----  From: Ewa Donovan NP  Sent: 7/24/2024   8:16 AM CDT  To: [facility] Gastro Clinical Staff    Please contact pt for hospital f/u. He has chronic abd pain that he needs addressed. Lives on [address]

## 2024-07-26 ENCOUNTER — PATIENT OUTREACH (OUTPATIENT)
Dept: ADMINISTRATIVE | Facility: CLINIC | Age: 54
End: 2024-07-26
Payer: COMMERCIAL

## 2024-07-26 NOTE — PROGRESS NOTES
C3 nurse spoke with Rubén Hu  for a TCC post hospital discharge follow up call. The patient has a scheduled [facility] appointments with Ewa Donovan NP (Colon & Rectal Surgery) Wednesday Jul 24, 2024 8:30 AM and with Bebeto Arora MD (Family Medicine) Tuesday Jul 30, 2024 10:20 AM

## 2024-07-30 ENCOUNTER — LAB VISIT (OUTPATIENT)
Dept: LAB | Facility: HOSPITAL | Age: 54
End: 2024-07-30
Attending: FAMILY MEDICINE
Payer: COMMERCIAL

## 2024-07-30 ENCOUNTER — OFFICE VISIT (OUTPATIENT)
Dept: FAMILY MEDICINE | Facility: CLINIC | Age: 54
End: 2024-07-30
Payer: COMMERCIAL

## 2024-07-30 ENCOUNTER — EDUCATION (OUTPATIENT)
Dept: HEMATOLOGY/ONCOLOGY | Facility: CLINIC | Age: 54
End: 2024-07-30
Payer: COMMERCIAL

## 2024-07-30 VITALS
SYSTOLIC BLOOD PRESSURE: 124 MMHG | HEART RATE: 77 BPM | WEIGHT: 189.69 LBS | DIASTOLIC BLOOD PRESSURE: 78 MMHG | BODY MASS INDEX: 26.56 KG/M2 | OXYGEN SATURATION: 100 % | HEIGHT: 71 IN | TEMPERATURE: 98 F

## 2024-07-30 DIAGNOSIS — Z76.82 STEM CELL TRANSPLANT CANDIDATE: Primary | ICD-10-CM

## 2024-07-30 DIAGNOSIS — D47.1 MYELOPROLIFERATIVE DISEASE: Chronic | ICD-10-CM

## 2024-07-30 DIAGNOSIS — K62.5 BRBPR (BRIGHT RED BLOOD PER RECTUM): Primary | ICD-10-CM

## 2024-07-30 DIAGNOSIS — K52.9 COLITIS: ICD-10-CM

## 2024-07-30 DIAGNOSIS — D47.1 PRIMARY MYELOFIBROSIS: ICD-10-CM

## 2024-07-30 LAB
ALBUMIN SERPL BCP-MCNC: 4 G/DL (ref 3.5–5.2)
ALP SERPL-CCNC: 53 U/L (ref 55–135)
ALT SERPL W/O P-5'-P-CCNC: 47 U/L (ref 10–44)
ANION GAP SERPL CALC-SCNC: 8 MMOL/L (ref 8–16)
AST SERPL-CCNC: 37 U/L (ref 10–40)
BASOPHILS # BLD AUTO: 0.12 K/UL (ref 0–0.2)
BASOPHILS NFR BLD: 1.1 % (ref 0–1.9)
BILIRUB SERPL-MCNC: 0.2 MG/DL (ref 0.1–1)
BUN SERPL-MCNC: 15 MG/DL (ref 6–20)
CALCIUM SERPL-MCNC: 9.4 MG/DL (ref 8.7–10.5)
CHLORIDE SERPL-SCNC: 106 MMOL/L (ref 95–110)
CO2 SERPL-SCNC: 26 MMOL/L (ref 23–29)
CREAT SERPL-MCNC: 0.8 MG/DL (ref 0.5–1.4)
DIFFERENTIAL METHOD BLD: ABNORMAL
EOSINOPHIL # BLD AUTO: 0.2 K/UL (ref 0–0.5)
EOSINOPHIL NFR BLD: 1.5 % (ref 0–8)
ERYTHROCYTE [DISTWIDTH] IN BLOOD BY AUTOMATED COUNT: 15.3 % (ref 11.5–14.5)
EST. GFR  (NO RACE VARIABLE): >60 ML/MIN/1.73 M^2
GLUCOSE SERPL-MCNC: 80 MG/DL (ref 70–110)
HCT VFR BLD AUTO: 43 % (ref 40–54)
HGB BLD-MCNC: 14.1 G/DL (ref 14–18)
IMM GRANULOCYTES # BLD AUTO: 0.12 K/UL (ref 0–0.04)
IMM GRANULOCYTES NFR BLD AUTO: 1.1 % (ref 0–0.5)
LYMPHOCYTES # BLD AUTO: 3.1 K/UL (ref 1–4.8)
LYMPHOCYTES NFR BLD: 27.2 % (ref 18–48)
MCH RBC QN AUTO: 32.1 PG (ref 27–31)
MCHC RBC AUTO-ENTMCNC: 32.8 G/DL (ref 32–36)
MCV RBC AUTO: 98 FL (ref 82–98)
MONOCYTES # BLD AUTO: 0.8 K/UL (ref 0.3–1)
MONOCYTES NFR BLD: 7 % (ref 4–15)
NEUTROPHILS # BLD AUTO: 7.1 K/UL (ref 1.8–7.7)
NEUTROPHILS NFR BLD: 62.1 % (ref 38–73)
NRBC BLD-RTO: 0 /100 WBC
PLATELET # BLD AUTO: 572 K/UL (ref 150–450)
PMV BLD AUTO: 11.1 FL (ref 9.2–12.9)
POTASSIUM SERPL-SCNC: 4.2 MMOL/L (ref 3.5–5.1)
PROT SERPL-MCNC: 6.9 G/DL (ref 6–8.4)
RBC # BLD AUTO: 4.39 M/UL (ref 4.6–6.2)
SODIUM SERPL-SCNC: 140 MMOL/L (ref 136–145)
WBC # BLD AUTO: 11.39 K/UL (ref 3.9–12.7)

## 2024-07-30 PROCEDURE — 1111F DSCHRG MED/CURRENT MED MERGE: CPT | Mod: CPTII,S$GLB,, | Performed by: FAMILY MEDICINE

## 2024-07-30 PROCEDURE — 3008F BODY MASS INDEX DOCD: CPT | Mod: CPTII,S$GLB,, | Performed by: FAMILY MEDICINE

## 2024-07-30 PROCEDURE — 99999 PR PBB SHADOW E&M-EST. PATIENT-LVL III: CPT | Mod: PBBFAC,,, | Performed by: FAMILY MEDICINE

## 2024-07-30 PROCEDURE — 85025 COMPLETE CBC W/AUTO DIFF WBC: CPT | Mod: NBTX | Performed by: FAMILY MEDICINE

## 2024-07-30 PROCEDURE — 3044F HG A1C LEVEL LT 7.0%: CPT | Mod: CPTII,S$GLB,, | Performed by: FAMILY MEDICINE

## 2024-07-30 PROCEDURE — 36415 COLL VENOUS BLD VENIPUNCTURE: CPT | Mod: PO | Performed by: FAMILY MEDICINE

## 2024-07-30 PROCEDURE — 99214 OFFICE O/P EST MOD 30 MIN: CPT | Mod: S$GLB,,, | Performed by: FAMILY MEDICINE

## 2024-07-30 PROCEDURE — 1159F MED LIST DOCD IN RCRD: CPT | Mod: CPTII,S$GLB,, | Performed by: FAMILY MEDICINE

## 2024-07-30 PROCEDURE — 3074F SYST BP LT 130 MM HG: CPT | Mod: CPTII,S$GLB,, | Performed by: FAMILY MEDICINE

## 2024-07-30 PROCEDURE — 80053 COMPREHEN METABOLIC PANEL: CPT | Performed by: FAMILY MEDICINE

## 2024-07-30 PROCEDURE — 3078F DIAST BP <80 MM HG: CPT | Mod: CPTII,S$GLB,, | Performed by: FAMILY MEDICINE

## 2024-07-30 NOTE — PROGRESS NOTES
Spoke with patient to discuss allogeneic stem cell transplant. Discussed with patient pre-screening requirements. Educated patient on the evaluation process, line placement, stem cell collection, and the hospitalization including current visitor's policy. Reviewed with patient lodging and caregiver requirements following transplant as well as the need for follow-up and immunizations. Donor names and numbers obtained from patient; patient instructed to reach out with further questions.

## 2024-07-30 NOTE — PROGRESS NOTES
[facility]  Progress Note    SUBJECTIVE:     Chief Complaint   Patient presents with    Hospital Follow Up       HPI   Rubén Hu  is a 53 y.o. male here for hospital follow-up. Was having some bright red blood per rectum, which he was dx with colitis. Finished course of abx. No current bleeding. Has f/u with GI.    Review of patient's allergies indicates:   Allergen Reactions    Bactrim [sulfamethoxazole-trimethoprim] Hives       Past Medical History:   Diagnosis Date    Hyperlipidemia      Past Surgical History:   Procedure Laterality Date    COLONOSCOPY N/A 8/9/2023    Procedure: COLONOSCOPY;  Surgeon: Will Ardon MD;  [facility]  Service: Endoscopy;  Laterality: N/A;  Suprep Instructions sent via portal / Authorizing:     Bebeto Arora MD in [facility] FAMILY MED/ INTERNAL MED/ PEDS  Referral:          [id number]    EYE SURGERY      FLEXIBLE SIGMOIDOSCOPY N/A 7/23/2024    Procedure: SIGMOIDOSCOPY, FLEXIBLE;  Surgeon: Nirali Vicente MD;  Location: [facility];  Service: Endoscopy;  Laterality: N/A;     Family History   Problem Relation Name Age of Onset    Arthritis Mother      COPD Father       Social History     Tobacco Use    Smoking status: Former     Types: Cigars    Smokeless tobacco: Never   Substance Use Topics    Alcohol use: Yes     Comment: socially    Drug use: Never        Review of Systems   Constitutional:  Negative for chills and fever.   HENT: Negative.     Respiratory: Negative.  Negative for shortness of breath.    Cardiovascular: Negative.  Negative for chest pain.   Gastrointestinal:  Positive for blood in stool. Negative for abdominal pain, constipation, diarrhea, melena, nausea and vomiting.   Genitourinary: Negative.    Neurological:  Negative for headaches.   All other systems reviewed and are negative.    OBJECTIVE:     Vitals:    07/30/24 1042   BP: 124/78   Pulse: 77   Temp: 98.1 °F (36.7 °C)     Body mass index is 26.46 kg/m².    Physical  Exam  Constitutional:       General: He is not in acute distress.     Appearance: He is not diaphoretic.   HENT:      Head: Normocephalic and atraumatic.   Eyes:      Conjunctiva/sclera: Conjunctivae normal.   Pulmonary:      Effort: Pulmonary effort is normal. No respiratory distress.   Skin:     General: Skin is warm.   Neurological:      Mental Status: He is alert and oriented to person, place, and time.         Old records were reviewed. Labs and/or images were independently reviewed.    ASSESSMENT     1. BRBPR (bright red blood per rectum)    2. Colitis    3. Myeloproliferative disease (followed by hematology)        PLAN:     BRBPR (bright red blood per rectum)/Colitis  -     CBC Auto Differential; Future  -     Comprehensive Metabolic Panel; Future  -      finish abx. Back at baseline.   -      follow-up with GI. Check CBC to ensure WBC stays in normal range.    Myeloproliferative disease (followed by hematology)  -     CBC Auto Differential; Future  -     Comprehensive Metabolic Panel; Future  -     follow-up with hematology. No current side effects with jakafi.      RTC PRN  30 minutes of total time spent on the encounter, which includes face to face time and non-face to face time preparing to see the patient (eg, review of tests), Obtaining and/or reviewing separately obtained history, Documenting clinical information in the electronic or other health record, Independently interpreting results (not separately reported), communicating results to the patient/family/caregiver, and/or Care coordination (not separately reported).     Bebeto Arora MD  07/30/2024 1:06 PM

## 2024-07-30 NOTE — PROGRESS NOTES
"    [facility] Consultation Note    Reason for Consult:  The primary encounter diagnosis was Hematochezia. A diagnosis of Constipation, unspecified constipation type was also pertinent to this visit.    PCP:   Bebeto Arora   [address] / [place] [address]    Referring MD:  Ewa Donovan, Np  [address] [place] [id number]    HPI:  This is a 53 y.o. male here for hospital follow up for hematochezia, diarrhea, and abdominal pain. Pt was admitted on 7/22. CT with long segment wall thickening involving mid/distal transverse colon and entire descending colon. He underwent flex sigmoidoscopy and was found to have single rectal ulcer with no evidence of inflammation in sigmoid or descending colon. Colonic mucosa biopsies with focal surface erosion, negative for active inflammation, granulomas, dysplasia, or malignancies.      Today, patient reports he is feeling better. CBC yesterday with improved counts. Denies any additional episodes of bloody stool, abdominal pain, fever, nausea, vomiting, and diarrhea. States this was the third episode of hematochezia following diarrhea. States he ate something different prior to each episode. He has had 2 colonoscopies in the past year, remarkable for a couple polyps and diverticulosis. Pt endorses daily, small BM that he describes pellet stool with straining, occasionally notices rectal bleeding with wiping. States he drinks about 5-6 cups of water daily. Reports healthy diet. Pt to have stem cell transplant in a few months.       Objective Findings:    Vital Signs:  /87   Pulse 66   Ht 5' 11" (1.803 m)   Wt 84.9 kg (187 lb 2.7 oz)   BMI 26.11 kg/m²   Body mass index is 26.11 kg/m².    Physical Exam:  General Appearance: Well appearing in no acute distress  Abdomen: Soft, non tender, non distended with positive bowel sounds in all four quadrants. No hepatosplenomegaly, ascites, or mass    I have personally reviewed labs " and imaging results.     CT Abdomen Pelvis (07/22/2024)  Impression:   1. Long segment wall thickening involving the mid to distal transverse colon and the entirety of the descending colon, suggesting infectious or inflammatory colitis.  Correlation is advised.  Follow-up colonoscopy as warranted.  2. Findings suggest hepatic steatosis, correlation with LFTs recommended.  3. Please see above for several additional findings.    Assessment:  1. Hematochezia    2. Constipation, unspecified constipation type      This is a 53 y.o male with myelofibrosis (on ruxolitinib) and hyperlipidemia who presents for hospital f/u for episode of diarrhea with hematochezia. He underwent flex sigmoidoscopy and was found to have single rectal ulcer with no evidence of inflammation in sigmoid or descending colon. Colonic mucosa biopsies with focal surface erosion. Today, pt reports feeling better. CBC yesterday with improved counts. Endorses daily, small pellet stool with straining, occasionally notices rectal bleeding with wiping.     - Recommended patient start daily fiber supplement and Miralax  - Maintain adequate hydration  - Repeat colonoscopy due in 2028 or sooner if indicated.     RTC as needed.     Thank you so much for allowing me to participate in the care of Rubén Scaccia Sarah Abukhader, PA-C [facility]

## 2024-07-31 ENCOUNTER — OFFICE VISIT (OUTPATIENT)
Dept: GASTROENTEROLOGY | Facility: CLINIC | Age: 54
End: 2024-07-31
Payer: COMMERCIAL

## 2024-07-31 VITALS
BODY MASS INDEX: 26.21 KG/M2 | HEIGHT: 71 IN | HEART RATE: 66 BPM | SYSTOLIC BLOOD PRESSURE: 129 MMHG | DIASTOLIC BLOOD PRESSURE: 87 MMHG | WEIGHT: 187.19 LBS

## 2024-07-31 DIAGNOSIS — K92.1 HEMATOCHEZIA: Primary | ICD-10-CM

## 2024-07-31 DIAGNOSIS — K59.00 CONSTIPATION, UNSPECIFIED CONSTIPATION TYPE: ICD-10-CM

## 2024-07-31 PROCEDURE — 1111F DSCHRG MED/CURRENT MED MERGE: CPT | Mod: CPTII,S$GLB,,

## 2024-07-31 PROCEDURE — 3044F HG A1C LEVEL LT 7.0%: CPT | Mod: CPTII,S$GLB,,

## 2024-07-31 PROCEDURE — 99214 OFFICE O/P EST MOD 30 MIN: CPT | Mod: S$GLB,,,

## 2024-07-31 PROCEDURE — 1160F RVW MEDS BY RX/DR IN RCRD: CPT | Mod: CPTII,S$GLB,,

## 2024-07-31 PROCEDURE — 99999 PR PBB SHADOW E&M-EST. PATIENT-LVL III: CPT | Mod: PBBFAC,,,

## 2024-07-31 PROCEDURE — 3074F SYST BP LT 130 MM HG: CPT | Mod: CPTII,S$GLB,,

## 2024-07-31 PROCEDURE — 3008F BODY MASS INDEX DOCD: CPT | Mod: CPTII,S$GLB,,

## 2024-07-31 PROCEDURE — 1159F MED LIST DOCD IN RCRD: CPT | Mod: CPTII,S$GLB,,

## 2024-07-31 PROCEDURE — 3079F DIAST BP 80-89 MM HG: CPT | Mod: CPTII,S$GLB,,

## 2024-07-31 NOTE — PATIENT INSTRUCTIONS
Constipation Tips  - Eat more high fiber foods   - Take a Fiber supplement (Metamucil, Benefiber, Citrucell, etc)  - Take Miralax (once, twice, or three times a day)  - Drink at least 8 cups of water a day  - Get regular physical activity  - Use a stool or Squatty Potty  - Avoid sitting on the toilet >5 minutes to prevent hemorrhoids

## 2024-08-08 ENCOUNTER — OFFICE VISIT (OUTPATIENT)
Dept: HEMATOLOGY/ONCOLOGY | Facility: CLINIC | Age: 54
End: 2024-08-08
Payer: COMMERCIAL

## 2024-08-08 ENCOUNTER — LAB VISIT (OUTPATIENT)
Dept: LAB | Facility: HOSPITAL | Age: 54
End: 2024-08-08
Attending: INTERNAL MEDICINE
Payer: COMMERCIAL

## 2024-08-08 VITALS
HEIGHT: 71 IN | HEART RATE: 66 BPM | DIASTOLIC BLOOD PRESSURE: 74 MMHG | OXYGEN SATURATION: 99 % | WEIGHT: 187.5 LBS | SYSTOLIC BLOOD PRESSURE: 131 MMHG | TEMPERATURE: 98 F | RESPIRATION RATE: 18 BRPM | BODY MASS INDEX: 26.25 KG/M2

## 2024-08-08 DIAGNOSIS — K75.81 METABOLIC DYSFUNCTION-ASSOCIATED STEATOHEPATITIS (MASH): ICD-10-CM

## 2024-08-08 DIAGNOSIS — D47.1 PRIMARY MYELOFIBROSIS: Primary | ICD-10-CM

## 2024-08-08 DIAGNOSIS — D47.1 PRIMARY MYELOFIBROSIS: ICD-10-CM

## 2024-08-08 DIAGNOSIS — Z76.82 STEM CELL TRANSPLANT CANDIDATE: ICD-10-CM

## 2024-08-08 PROBLEM — K62.5 BRBPR (BRIGHT RED BLOOD PER RECTUM): Status: RESOLVED | Noted: 2024-07-22 | Resolved: 2024-08-08

## 2024-08-08 LAB
ALBUMIN SERPL BCP-MCNC: 4.3 G/DL (ref 3.5–5.2)
ALP SERPL-CCNC: 55 U/L (ref 55–135)
ALT SERPL W/O P-5'-P-CCNC: 59 U/L (ref 10–44)
ANION GAP SERPL CALC-SCNC: 7 MMOL/L (ref 8–16)
AST SERPL-CCNC: 32 U/L (ref 10–40)
BASOPHILS # BLD AUTO: 0.09 K/UL (ref 0–0.2)
BASOPHILS NFR BLD: 0.7 % (ref 0–1.9)
BILIRUB SERPL-MCNC: 0.4 MG/DL (ref 0.1–1)
BUN SERPL-MCNC: 16 MG/DL (ref 6–20)
CALCIUM SERPL-MCNC: 9.7 MG/DL (ref 8.7–10.5)
CHLORIDE SERPL-SCNC: 105 MMOL/L (ref 95–110)
CO2 SERPL-SCNC: 27 MMOL/L (ref 23–29)
COMPLEXED PSA SERPL-MCNC: 2.8 NG/ML (ref 0–4)
CREAT SERPL-MCNC: 0.9 MG/DL (ref 0.5–1.4)
DIFFERENTIAL METHOD BLD: ABNORMAL
EOSINOPHIL # BLD AUTO: 0.2 K/UL (ref 0–0.5)
EOSINOPHIL NFR BLD: 1.8 % (ref 0–8)
ERYTHROCYTE [DISTWIDTH] IN BLOOD BY AUTOMATED COUNT: 14.8 % (ref 11.5–14.5)
EST. GFR  (NO RACE VARIABLE): >60 ML/MIN/1.73 M^2
GLUCOSE SERPL-MCNC: 88 MG/DL (ref 70–110)
HCT VFR BLD AUTO: 41 % (ref 40–54)
HGB BLD-MCNC: 14 G/DL (ref 14–18)
IMM GRANULOCYTES # BLD AUTO: 0.07 K/UL (ref 0–0.04)
IMM GRANULOCYTES NFR BLD AUTO: 0.6 % (ref 0–0.5)
LDH SERPL L TO P-CCNC: 174 U/L (ref 110–260)
LYMPHOCYTES # BLD AUTO: 2.9 K/UL (ref 1–4.8)
LYMPHOCYTES NFR BLD: 23.4 % (ref 18–48)
MAGNESIUM SERPL-MCNC: 2.1 MG/DL (ref 1.6–2.6)
MCH RBC QN AUTO: 32.3 PG (ref 27–31)
MCHC RBC AUTO-ENTMCNC: 34.1 G/DL (ref 32–36)
MCV RBC AUTO: 95 FL (ref 82–98)
MONOCYTES # BLD AUTO: 0.9 K/UL (ref 0.3–1)
MONOCYTES NFR BLD: 7.1 % (ref 4–15)
NEUTROPHILS # BLD AUTO: 8.3 K/UL (ref 1.8–7.7)
NEUTROPHILS NFR BLD: 66.4 % (ref 38–73)
NRBC BLD-RTO: 0 /100 WBC
PHOSPHATE SERPL-MCNC: 4.3 MG/DL (ref 2.7–4.5)
PLATELET # BLD AUTO: 524 K/UL (ref 150–450)
PMV BLD AUTO: 10.4 FL (ref 9.2–12.9)
POTASSIUM SERPL-SCNC: 3.7 MMOL/L (ref 3.5–5.1)
PROT SERPL-MCNC: 7.1 G/DL (ref 6–8.4)
RBC # BLD AUTO: 4.33 M/UL (ref 4.6–6.2)
SODIUM SERPL-SCNC: 139 MMOL/L (ref 136–145)
URATE SERPL-MCNC: 5.8 MG/DL (ref 3.4–7)
WBC # BLD AUTO: 12.46 K/UL (ref 3.9–12.7)

## 2024-08-08 PROCEDURE — 85025 COMPLETE CBC W/AUTO DIFF WBC: CPT | Performed by: INTERNAL MEDICINE

## 2024-08-08 PROCEDURE — 80053 COMPREHEN METABOLIC PANEL: CPT | Performed by: INTERNAL MEDICINE

## 2024-08-08 PROCEDURE — 99999 PR PBB SHADOW E&M-EST. PATIENT-LVL IV: CPT | Mod: PBBFAC,,, | Performed by: INTERNAL MEDICINE

## 2024-08-08 PROCEDURE — 86977 RBC SERUM PRETX INCUBJ/INHIB: CPT | Mod: 59 | Performed by: INTERNAL MEDICINE

## 2024-08-08 PROCEDURE — 86832 HLA CLASS I HIGH DEFIN QUAL: CPT | Performed by: INTERNAL MEDICINE

## 2024-08-08 PROCEDURE — 3044F HG A1C LEVEL LT 7.0%: CPT | Mod: CPTII,S$GLB,, | Performed by: INTERNAL MEDICINE

## 2024-08-08 PROCEDURE — 84153 ASSAY OF PSA TOTAL: CPT | Performed by: INTERNAL MEDICINE

## 2024-08-08 PROCEDURE — 99215 OFFICE O/P EST HI 40 MIN: CPT | Mod: S$GLB,,, | Performed by: INTERNAL MEDICINE

## 2024-08-08 PROCEDURE — 3078F DIAST BP <80 MM HG: CPT | Mod: CPTII,S$GLB,, | Performed by: INTERNAL MEDICINE

## 2024-08-08 PROCEDURE — 83735 ASSAY OF MAGNESIUM: CPT | Performed by: INTERNAL MEDICINE

## 2024-08-08 PROCEDURE — 3008F BODY MASS INDEX DOCD: CPT | Mod: CPTII,S$GLB,, | Performed by: INTERNAL MEDICINE

## 2024-08-08 PROCEDURE — 84100 ASSAY OF PHOSPHORUS: CPT | Performed by: INTERNAL MEDICINE

## 2024-08-08 PROCEDURE — 1159F MED LIST DOCD IN RCRD: CPT | Mod: CPTII,S$GLB,, | Performed by: INTERNAL MEDICINE

## 2024-08-08 PROCEDURE — 84550 ASSAY OF BLOOD/URIC ACID: CPT | Performed by: INTERNAL MEDICINE

## 2024-08-08 PROCEDURE — 1160F RVW MEDS BY RX/DR IN RCRD: CPT | Mod: CPTII,S$GLB,, | Performed by: INTERNAL MEDICINE

## 2024-08-08 PROCEDURE — 86833 HLA CLASS II HIGH DEFIN QUAL: CPT | Performed by: INTERNAL MEDICINE

## 2024-08-08 PROCEDURE — 83615 LACTATE (LD) (LDH) ENZYME: CPT | Performed by: INTERNAL MEDICINE

## 2024-08-08 PROCEDURE — 1111F DSCHRG MED/CURRENT MED MERGE: CPT | Mod: CPTII,S$GLB,, | Performed by: INTERNAL MEDICINE

## 2024-08-08 PROCEDURE — 3075F SYST BP GE 130 - 139MM HG: CPT | Mod: CPTII,S$GLB,, | Performed by: INTERNAL MEDICINE

## 2024-08-08 RX ORDER — DOXYCYCLINE 100 MG/1
100 CAPSULE ORAL 2 TIMES DAILY
COMMUNITY
Start: 2024-07-10

## 2024-08-09 ENCOUNTER — TELEPHONE (OUTPATIENT)
Dept: HEPATOLOGY | Facility: CLINIC | Age: 54
End: 2024-08-09
Payer: COMMERCIAL

## 2024-08-12 NOTE — PROGRESS NOTES
HEMATOLOGIC MALIGNANCIES PROGRESS NOTE    IDENTIFYING STATEMENT   Rubén Peters) is a 53 y.o. male with a  of 1970 from [place], [place] with the diagnosis of primary myelofibrosis.      ONCOLOGY HISTORY:    1. Primary myelofibrosis              A. 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 Adrenal nodules - need evaluation     3. Hyperlipidemia  4. History of tobacco use    INTERVAL HISTORY:      Mr. Hu returns to clinic in follow-up of primary myelofibrosis. He is on ruxolitinib and is tolerating this well. His diaphoresis is improved.    He also had flex sig after initial visit with identification of a rectal ulcer in the descending colon. Biopsy showed focal surface erosion only without any dysplasia, granuloma, or malignancy. His bleeding has improved.     Past Medical History, Past Social History and Past Family History have been reviewed and are unchanged except as noted in the interval history.    MEDICATIONS:     Current Outpatient Medications on File Prior to Visit   Medication Sig Dispense Refill    aspirin 81 MG Chew Take 81 mg by mouth once daily. Pt takes over the counter      atorvastatin (LIPITOR) 10 MG tablet TAKE ONE TABLET BY MOUTH EVERY EVENING 90 tablet 2    doxycycline (VIBRAMYCIN) 100 MG Cap Take 100 mg by mouth 2 (two) times daily.      ergocalciferol (ERGOCALCIFEROL) 50,000 unit Cap TAKE 1 CAPSULE BY MOUTH ONCE WEEKLY 12 capsule 2    ruxolitinib (JAKAFI) 20 mg Tab TAKE 1 TABLET (20 MG) TWICE DAILY 60 tablet 0     No current facility-administered medications on file prior to visit.       ALLERGIES:   Review of patient's allergies indicates:   Allergen Reactions    Bactrim [sulfamethoxazole-trimethoprim] Hives        ROS:       Review of Systems   Constitutional:   "Negative for diaphoresis, fatigue, fever and unexpected weight change.   HENT:   Negative for lump/mass and sore throat.    Eyes:  Negative for icterus.   Respiratory:  Negative for cough and shortness of breath.    Cardiovascular:  Negative for chest pain and palpitations.   Gastrointestinal:  Negative for abdominal distention, blood in stool, constipation, diarrhea, nausea and vomiting.   Genitourinary:  Negative for dysuria and frequency.    Musculoskeletal:  Negative for arthralgias, gait problem and myalgias.   Skin:  Negative for rash.   Neurological:  Negative for dizziness, gait problem and headaches.   Hematological:  Negative for adenopathy. Does not bruise/bleed easily.   Psychiatric/Behavioral:  The patient is not nervous/anxious.        PHYSICAL EXAM:  Vitals:    08/08/24 1516   BP: 131/74   Pulse: 66   Resp: 18   Temp: 97.9 °F (36.6 °C)   TempSrc: Oral   SpO2: 99%   Weight: 85.1 kg (187 lb 8 oz)   Height: 5' 11" (1.803 m)   PainSc: 0-No pain       KARNOFSKY PERFORMANCE STATUS 90%  ECOG 0    Physical Exam  Constitutional:       General: He is not in acute distress.     Appearance: He is well-developed.   HENT:      Head: Normocephalic and atraumatic.      Mouth/Throat:      Mouth: No oral lesions.   Eyes:      Conjunctiva/sclera: Conjunctivae normal.   Neck:      Thyroid: No thyromegaly.   Cardiovascular:      Rate and Rhythm: Normal rate and regular rhythm.      Heart sounds: Normal heart sounds. No murmur heard.  Pulmonary:      Breath sounds: Normal breath sounds. No wheezing or rales.   Abdominal:      General: There is no distension.      Palpations: Abdomen is soft. There is no hepatomegaly, splenomegaly or mass.      Tenderness: There is no abdominal tenderness.   Lymphadenopathy:      Cervical: No cervical adenopathy.      Right cervical: No deep cervical adenopathy.     Left cervical: No deep cervical adenopathy.   Skin:     Findings: No rash.   Neurological:      Mental Status: He is alert and " oriented to person, place, and time.      Cranial Nerves: No cranial nerve deficit.      Coordination: Coordination normal.      Deep Tendon Reflexes: Reflexes are normal and symmetric.         LAB:   Results for orders placed or performed in visit on 08/08/24   CBC Auto Differential   Result Value Ref Range    WBC 12.46 3.90 - 12.70 K/uL    RBC 4.33 (L) 4.60 - 6.20 M/uL    Hemoglobin 14.0 14.0 - 18.0 g/dL    Hematocrit 41.0 40.0 - 54.0 %    MCV 95 82 - 98 fL    MCH 32.3 (H) 27.0 - 31.0 pg    MCHC 34.1 32.0 - 36.0 g/dL    RDW 14.8 (H) 11.5 - 14.5 %    Platelets 524 (H) 150 - 450 K/uL    MPV 10.4 9.2 - 12.9 fL    Immature Granulocytes 0.6 (H) 0.0 - 0.5 %    Gran # (ANC) 8.3 (H) 1.8 - 7.7 K/uL    Immature Grans (Abs) 0.07 (H) 0.00 - 0.04 K/uL    Lymph # 2.9 1.0 - 4.8 K/uL    Mono # 0.9 0.3 - 1.0 K/uL    Eos # 0.2 0.0 - 0.5 K/uL    Baso # 0.09 0.00 - 0.20 K/uL    nRBC 0 0 /100 WBC    Gran % 66.4 38.0 - 73.0 %    Lymph % 23.4 18.0 - 48.0 %    Mono % 7.1 4.0 - 15.0 %    Eosinophil % 1.8 0.0 - 8.0 %    Basophil % 0.7 0.0 - 1.9 %    Differential Method Automated    Comprehensive Metabolic Panel   Result Value Ref Range    Sodium 139 136 - 145 mmol/L    Potassium 3.7 3.5 - 5.1 mmol/L    Chloride 105 95 - 110 mmol/L    CO2 27 23 - 29 mmol/L    Glucose 88 70 - 110 mg/dL    BUN 16 6 - 20 mg/dL    Creatinine 0.9 0.5 - 1.4 mg/dL    Calcium 9.7 8.7 - 10.5 mg/dL    Total Protein 7.1 6.0 - 8.4 g/dL    Albumin 4.3 3.5 - 5.2 g/dL    Total Bilirubin 0.4 0.1 - 1.0 mg/dL    Alkaline Phosphatase 55 55 - 135 U/L    AST 32 10 - 40 U/L    ALT 59 (H) 10 - 44 U/L    eGFR >60.0 >60 mL/min/1.73 m^2    Anion Gap 7 (L) 8 - 16 mmol/L   Magnesium   Result Value Ref Range    Magnesium 2.1 1.6 - 2.6 mg/dL   PHOSPHORUS   Result Value Ref Range    Phosphorus 4.3 2.7 - 4.5 mg/dL   Lactate Dehydrogenase   Result Value Ref Range     110 - 260 U/L   Uric Acid   Result Value Ref Range    Uric Acid 5.8 3.4 - 7.0 mg/dL   PSA, Screening   Result Value  Ref Range    PSA, Screen 2.8 0.00 - 4.00 ng/mL       PROBLEMS ASSESSED THIS VISIT:    1. Primary myelofibrosis    2. Stem cell transplant candidate    3. Metabolic dysfunction-associated steatohepatitis ([facility])        PLAN:       Primary myelofibrosis  Mr. Hu has primary myelofibrosis. Using the MIPSS-70 version 2.0 risk assessment tool, he is considered intermediate risk, with 10-year overall survival of 37%.      He is on ruxolitinib therapy and tolerating this well. This has brought some symptom mitigation with respect to diaphoresis.     He is interested in allogeneic stem cell transplant. He has potential MUDs in the [facility] registry. His siblings have submitted typing. Evaluation is pending donor selection.     [facility]  Refer to hepatology for evaluation as he is an allogeneic stem cell transplant candidate.     Follow-up  1 month     Clyde James MD  Hematology and Stem Cell Transplant

## 2024-08-13 DIAGNOSIS — D47.1 PRIMARY MYELOFIBROSIS: ICD-10-CM

## 2024-08-13 LAB — HPRA INTERPRETATION: NORMAL

## 2024-08-14 LAB
CLASS I ANTIBODIES - LUMINEX: NORMAL
CLASS II ANTIBODIES - LUMINEX: NEGATIVE
CPRA %: 0
SERUM COLLECTION DT - LUMINEX CLASS I: NORMAL
SERUM COLLECTION DT - LUMINEX CLASS II: NORMAL
SPCL1 TESTING DATE: NORMAL
SPCL2 TESTING DATE: NORMAL
SPLUA TESTING DATE: NORMAL

## 2024-09-03 ENCOUNTER — OFFICE VISIT (OUTPATIENT)
Dept: HEPATOLOGY | Facility: CLINIC | Age: 54
End: 2024-09-03
Payer: COMMERCIAL

## 2024-09-03 DIAGNOSIS — Z76.82 STEM CELL TRANSPLANT CANDIDATE: ICD-10-CM

## 2024-09-03 DIAGNOSIS — E66.3 OVERWEIGHT (BMI 25.0-29.9): ICD-10-CM

## 2024-09-03 DIAGNOSIS — D47.1 MYELOPROLIFERATIVE DISEASE: Chronic | ICD-10-CM

## 2024-09-03 DIAGNOSIS — K75.81 METABOLIC DYSFUNCTION-ASSOCIATED STEATOHEPATITIS (MASH): ICD-10-CM

## 2024-09-03 NOTE — Clinical Note
Please help the patient schedule labs and fibroscan at his earliest convenience. RTC in 3 months. Thank you.

## 2024-09-03 NOTE — PROGRESS NOTES
Hepatology Consult Note    Referring provider: Aaareferral Self  PCP: Bebeto Arora MD    The patient location is: [place]  The chief complaint leading to consultation is: MASH, stem cell transplant candidate     Visit type: audiovisual    Face to Face time with patient: 22 minutes.   30 minutes of total time spent on the encounter, which includes face to face time and non-face to face time preparing to see the patient (eg, review of tests), Obtaining and/or reviewing separately obtained history, Documenting clinical information in the electronic or other health record, Independently interpreting results (not separately reported) and communicating results to the patient/family/caregiver, or Care coordination (not separately reported).     Each patient to whom he or she provides medical services by telemedicine is:  (1) informed of the relationship between the physician and patient and the respective role of any other health care provider with respect to management of the patient; and (2) notified that he or she may decline to receive medical services by telemedicine and may withdraw from such care at any time.    HPI:  Rubén Hu is a 54 y.o. male with HLD, primary myelofibrosis being evaluated for stem cell transplant, who was referred to Hepatology Clinic for evaluation of elevated liver enzymes, hepatic steatosis. Denies prior diagnosis of fatty liver, cirrhosis.    Regarding risk factors for liver disease, the patient denies prior history of EtOH abuse, illicit drug use, blood transfusions, prior tattoos. MASLD risk factors: overweight (current weight ranges 180lb - 190lb, heaviest weight ~208lb in early 20s), HLD. Denies history of DM, HTN, REJI. Denies family history of liver disease or liver cancer.     He has been on Jakafi for the last 2 1/2 months. Only OTC is baby aspirin. Denies herbal supplements, vitamins.    The patient denies prior episodes of jaundice. The patient denies prior evaluation by  hepatologist, liver biopsy.    Denies prior abdominal surgeries.    Past Medical History:   Diagnosis Date    Hyperlipidemia        Past Surgical History:   Procedure Laterality Date    COLONOSCOPY N/A 8/9/2023    Procedure: COLONOSCOPY;  Surgeon: Will Ardon MD;  Location: [facility] ([address] [place] [address]);  Service: Endoscopy;  Laterality: N/A;  Suprep Instructions sent via portal / Authorizing:     Bebeto Arora MD in [facility] FAMILY MED/ INTERNAL MED/ PEDS  Referral:          [id number]    EYE SURGERY      FLEXIBLE SIGMOIDOSCOPY N/A 7/23/2024    Procedure: SIGMOIDOSCOPY, FLEXIBLE;  Surgeon: Nirali Vicente MD;  Location: [facility];  Service: Endoscopy;  Laterality: N/A;       Family History   Problem Relation Name Age of Onset    Arthritis Mother      COPD Father         Social History     Tobacco Use    Smoking status: Former     Types: Cigars    Smokeless tobacco: Never   Substance Use Topics    Alcohol use: Yes     Comment: socially    Drug use: Never       Current Outpatient Medications   Medication Sig Dispense Refill    aspirin 81 MG Chew Take 81 mg by mouth once daily. Pt takes over the counter      atorvastatin (LIPITOR) 10 MG tablet TAKE ONE TABLET BY MOUTH EVERY EVENING 90 tablet 2    doxycycline (VIBRAMYCIN) 100 MG Cap Take 100 mg by mouth 2 (two) times daily.      ergocalciferol (ERGOCALCIFEROL) 50,000 unit Cap TAKE 1 CAPSULE BY MOUTH ONCE WEEKLY 12 capsule 2    ruxolitinib (JAKAFI) 20 mg Tab TAKE 1 TABLET (20 MG) TWICE DAILY 60 tablet 0     No current facility-administered medications for this visit.       Review of patient's allergies indicates:   Allergen Reactions    Bactrim [sulfamethoxazole-trimethoprim] Hives            There were no vitals filed for this visit.  There is no height or weight on file to calculate BMI.    Physical Exam  Constitutional:       General: He is not in acute distress.  Eyes:      General: No scleral icterus.  Skin:     Coloration: Skin is not jaundiced.   Neurological:      General:  "No focal deficit present.      Mental Status: He is alert.   Psychiatric:         Thought Content: Thought content normal.         LABS: I personally reviewed pertinent laboratory findings.    Lab Results   Component Value Date    WBC 12.46 08/08/2024    HGB 14.0 08/08/2024    HCT 41.0 08/08/2024    MCV 95 08/08/2024     (H) 08/08/2024       Lab Results   Component Value Date     08/08/2024    K 3.7 08/08/2024     08/08/2024    CO2 27 08/08/2024    BUN 16 08/08/2024    CREATININE 0.9 08/08/2024    CALCIUM 9.7 08/08/2024    ANIONGAP 7 (L) 08/08/2024    ESTGFRAFRICA >60.0 01/22/2022    EGFRNONAA >60.0 01/22/2022       Lab Results   Component Value Date    ALT 59 (H) 08/08/2024    AST 32 08/08/2024    ALKPHOS 55 08/08/2024    BILITOT 0.4 08/08/2024       Lab Results   Component Value Date    HEPAIGM Non-reactive 04/08/2024    HEPBIGM Non-reactive 04/08/2024    HEPBCAB Non-reactive 04/08/2024    HEPCAB Non-reactive 04/08/2024       No results found for: "MARIO ALBERTO", "MITOAB", "SMOOTHMUSCAB", "IGG", "CERULOPLSM"     MELD 3.0: 6 at 7/23/2024  4:51 AM  MELD-Na: 6 at 7/23/2024  4:51 AM  Calculated from:  Serum Creatinine: 0.9 mg/dL (Using min of 1 mg/dL) at 7/23/2024  4:51 AM  Serum Sodium: 140 mmol/L (Using max of 137 mmol/L) at 7/23/2024  4:51 AM  Total Bilirubin: 0.5 mg/dL (Using min of 1 mg/dL) at 7/23/2024  4:51 AM  Serum Albumin: 3.5 g/dL at 7/23/2024  4:51 AM  INR(ratio): 1.0 at 7/22/2024  7:15 PM  Age at listing (hypothetical): 53 years  Sex: Male at 7/23/2024  4:51 AM     FIB-4 Calculation: 0 at 9/3/2024  7:39 AM     FIB-4 below 1.30 is considered as low-risk for advanced fibrosis  FIB-4 over 2.67 is considered as high-risk for advanced fibrosis  FIB-4 values between 1.30 and 2.67 are considered as intermediate-risk of advanced fibrosis for ages 36-64.     For ages > 64 the cut-off for low-risk goes to < 2.  This is a screening tool and clinical judgement should be used in the interpretation of these " results.    IMAGING: I personally reviewed imaging studies.      Assessment:  Rubén Hu is a 54 y.o. male with HLD, primary myelofibrosis being evaluated for stem cell transplant, who was referred to Hepatology Clinic for evaluation of MASH. Denies prior diagnosis of fatty liver, cirrhosis. Per chart review, transaminases have been intermittently elevated (ALT>AST, ALT <1.5x ULN) dating back to 2023 in our system. Bilirubin and INR are normal. Platelet count is not decreased. There is no evidence of iron overload. Contrasted CT abd (7/22/24) demonstrated findings suggestive of hepatic steatosis without mention of splenomegaly, or concerns portal hypertension.     Presentation seems most consistent with MASLD. Plan to complete chronic liver disease workup, and non-invasive measures of fibrosis.    Counseled on importance of adhering to a healthy diet and maintaining an ideal weight to prevent progression of liver disease.    1. Metabolic dysfunction-associated steatohepatitis (MASH)    2. Stem cell transplant candidate    3. Myeloproliferative disease (followed by hematology)    4. Overweight (BMI 25.0-29.9)      Recommendations:  - LFTs, INR  - HAV IgG, HBsAb, HIV  - MARIO ALBERTO, A1AT phenotype   - PETH   - FibroScan   - Mediterranean diet   - Maintain an ideal weight     Return to clinic in 3 months.    I have sent communication to the referring physician and/or primary care provider.    Reyna Montesinos MD  Staff Physician  Hepatology and Liver Transplant  [facility]  [facility] [place]

## 2024-09-04 ENCOUNTER — TELEPHONE (OUTPATIENT)
Facility: CLINIC | Age: 54
End: 2024-09-04
Payer: COMMERCIAL

## 2024-09-04 NOTE — TELEPHONE ENCOUNTER
Pt was called and fibroscan, labs, and 3 month follow up were schld with him.    Barbara    ----- Message from Reyna Montesinos MD sent at 9/3/2024  9:32 AM CDT -----  Please help the patient schedule labs and fibroscan at his earliest convenience. RTC in 3 months. Thank you.

## 2024-09-06 ENCOUNTER — PROCEDURE VISIT (OUTPATIENT)
Dept: HEPATOLOGY | Facility: CLINIC | Age: 54
End: 2024-09-06
Payer: COMMERCIAL

## 2024-09-06 ENCOUNTER — LAB VISIT (OUTPATIENT)
Dept: LAB | Facility: HOSPITAL | Age: 54
End: 2024-09-06
Attending: INTERNAL MEDICINE
Payer: COMMERCIAL

## 2024-09-06 DIAGNOSIS — K75.81 METABOLIC DYSFUNCTION-ASSOCIATED STEATOHEPATITIS (MASH): ICD-10-CM

## 2024-09-06 LAB
ALBUMIN SERPL BCP-MCNC: 4.3 G/DL (ref 3.5–5.2)
ALP SERPL-CCNC: 69 U/L (ref 55–135)
ALT SERPL W/O P-5'-P-CCNC: 97 U/L (ref 10–44)
AST SERPL-CCNC: 52 U/L (ref 10–40)
BILIRUB DIRECT SERPL-MCNC: 0.1 MG/DL (ref 0.1–0.3)
BILIRUB SERPL-MCNC: 0.4 MG/DL (ref 0.1–1)
HAV IGG SER QL IA: NORMAL
HBV SURFACE AB SER-ACNC: <3 MIU/ML
HBV SURFACE AB SER-ACNC: NORMAL M[IU]/ML
INR PPP: 1 (ref 0.8–1.2)
PROT SERPL-MCNC: 7.5 G/DL (ref 6–8.4)
PROTHROMBIN TIME: 10.5 SEC (ref 9–12.5)

## 2024-09-06 PROCEDURE — 86038 ANTINUCLEAR ANTIBODIES: CPT | Performed by: INTERNAL MEDICINE

## 2024-09-06 PROCEDURE — 82103 ALPHA-1-ANTITRYPSIN TOTAL: CPT | Performed by: INTERNAL MEDICINE

## 2024-09-06 PROCEDURE — 80321 ALCOHOLS BIOMARKERS 1OR 2: CPT | Performed by: INTERNAL MEDICINE

## 2024-09-06 PROCEDURE — 85610 PROTHROMBIN TIME: CPT | Performed by: INTERNAL MEDICINE

## 2024-09-06 PROCEDURE — 82104 ALPHA-1-ANTITRYPSIN PHENO: CPT | Performed by: INTERNAL MEDICINE

## 2024-09-06 PROCEDURE — 86706 HEP B SURFACE ANTIBODY: CPT | Performed by: INTERNAL MEDICINE

## 2024-09-06 PROCEDURE — 80076 HEPATIC FUNCTION PANEL: CPT | Performed by: INTERNAL MEDICINE

## 2024-09-06 PROCEDURE — 36415 COLL VENOUS BLD VENIPUNCTURE: CPT | Performed by: INTERNAL MEDICINE

## 2024-09-06 PROCEDURE — 86790 VIRUS ANTIBODY NOS: CPT | Performed by: INTERNAL MEDICINE

## 2024-09-06 NOTE — PROCEDURES
FibroScan Transplant Hepatology    Date/Time: 9/6/2024 8:30 AM    Performed by: Fabian Arias MD  Authorized by: Reyna Motnesinos MD    Diagnosis:  NAFLD    Probe:  XL    Universal Protocol: Patient's identity, procedure and site were verified, confirmatory pause was performed.  Discussed procedure including risks and potential complications.  Questions answered.  Patient verbalizes understanding and wishes to proceed with VCTE.     Procedure: After providing explanations of the procedure, patient was placed in the supine position with right arm in maximum abduction to allow optimal exposure of right lateral abdomen.  Patient was briefly assessed, Testing was performed in the mid-axillary location, 50Hz Shear Wave pulses were applied and the resulting Shear Wave and Propagation Speed detected with a 3.5 MHz ultrasonic signal, using the FibroScan probe, Skin to liver capsule distance and liver parenchyma were accessed during the entire examination with the FibroScan probe, Patient was instructed to breathe normally and to abstain from sudden movements during the procedure, allowing for random measurements of liver stiffness. At least 10 Shear Waves were produced, Individual measurements of each Shear Wave were calculated.  Patient tolerated the procedure well with no complications.  Meets discharge criteria as was dismissed.  Rates pain 0 out of 10.  Patient will follow up with ordering provider to review results.    Findings  Median liver stiffness score:  13.5  CAP Reading: dB/m:  246    IQR/med %:  9  Interpretation  Fibrosis interpretation is based on medial liver stiffness - Kilopascal (kPa).    Fibrosis Stage:  F4  Steatosis interpretation is based on controlled attenuation parameter - (dB/m).    Steatosis Grade:  <S1

## 2024-09-09 LAB — ANA SER QL IF: NORMAL

## 2024-09-10 LAB
A1AT PHENOTYP SERPL-IMP: NORMAL BANDS
A1AT SERPL NEPH-MCNC: 116 MG/DL (ref 100–190)
CLINICAL BIOCHEMIST REVIEW: NORMAL
PLPETH BLD-MCNC: <10 NG/ML
POPETH BLD-MCNC: <10 NG/ML

## 2024-09-17 ENCOUNTER — TELEPHONE (OUTPATIENT)
Dept: HEPATOLOGY | Facility: CLINIC | Age: 54
End: 2024-09-17
Payer: COMMERCIAL

## 2024-09-17 ENCOUNTER — OFFICE VISIT (OUTPATIENT)
Dept: HEMATOLOGY/ONCOLOGY | Facility: CLINIC | Age: 54
End: 2024-09-17
Payer: COMMERCIAL

## 2024-09-17 VITALS
HEART RATE: 86 BPM | RESPIRATION RATE: 20 BRPM | SYSTOLIC BLOOD PRESSURE: 122 MMHG | BODY MASS INDEX: 26.91 KG/M2 | DIASTOLIC BLOOD PRESSURE: 73 MMHG | WEIGHT: 192.25 LBS | TEMPERATURE: 98 F | OXYGEN SATURATION: 98 % | HEIGHT: 71 IN

## 2024-09-17 DIAGNOSIS — D47.1 MYELOPROLIFERATIVE DISEASE: ICD-10-CM

## 2024-09-17 DIAGNOSIS — D47.1 PRIMARY MYELOFIBROSIS: Primary | ICD-10-CM

## 2024-09-17 DIAGNOSIS — K75.81 METABOLIC DYSFUNCTION-ASSOCIATED STEATOHEPATITIS (MASH): Primary | ICD-10-CM

## 2024-09-17 DIAGNOSIS — Z76.82 STEM CELL TRANSPLANT CANDIDATE: ICD-10-CM

## 2024-09-17 DIAGNOSIS — K75.81 METABOLIC DYSFUNCTION-ASSOCIATED STEATOHEPATITIS (MASH): ICD-10-CM

## 2024-09-17 DIAGNOSIS — D47.1 PRIMARY MYELOFIBROSIS: ICD-10-CM

## 2024-09-17 PROCEDURE — 3074F SYST BP LT 130 MM HG: CPT | Mod: CPTII,S$GLB,, | Performed by: INTERNAL MEDICINE

## 2024-09-17 PROCEDURE — 99999 PR PBB SHADOW E&M-EST. PATIENT-LVL III: CPT | Mod: PBBFAC,,, | Performed by: INTERNAL MEDICINE

## 2024-09-17 PROCEDURE — 1159F MED LIST DOCD IN RCRD: CPT | Mod: CPTII,S$GLB,, | Performed by: INTERNAL MEDICINE

## 2024-09-17 PROCEDURE — 99215 OFFICE O/P EST HI 40 MIN: CPT | Mod: S$GLB,,, | Performed by: INTERNAL MEDICINE

## 2024-09-17 PROCEDURE — 3008F BODY MASS INDEX DOCD: CPT | Mod: CPTII,S$GLB,, | Performed by: INTERNAL MEDICINE

## 2024-09-17 PROCEDURE — G2211 COMPLEX E/M VISIT ADD ON: HCPCS | Mod: S$GLB,,, | Performed by: INTERNAL MEDICINE

## 2024-09-17 PROCEDURE — 3044F HG A1C LEVEL LT 7.0%: CPT | Mod: CPTII,S$GLB,, | Performed by: INTERNAL MEDICINE

## 2024-09-17 PROCEDURE — 1160F RVW MEDS BY RX/DR IN RCRD: CPT | Mod: CPTII,S$GLB,, | Performed by: INTERNAL MEDICINE

## 2024-09-17 PROCEDURE — 3078F DIAST BP <80 MM HG: CPT | Mod: CPTII,S$GLB,, | Performed by: INTERNAL MEDICINE

## 2024-09-17 NOTE — TELEPHONE ENCOUNTER
Called Mr. Hu to review results from labs, FibroScan and discuss next steps. Serological workup has been unrevealing, cross sectional imaging suggest presence of hepatic steatosis, and FibroScan suggests cirrhosis without fatty infiltration. Etiology of liver disease is unclear at this time. Ddx includes MASH cirrhosis vs extramedullary hematopoiesis related to myeloid neoplasm. After discussing case with Onc team, recommend proceeding with liver biopsy for further evaluation of elevated liver enzymes and confirm presence of cirrhosis.     The patient agrees with the plan. All questions were answered.    Reyna Montesinos MD  Staff Physician  Hepatology and Liver Transplant  [facility]  [facility] [place]

## 2024-09-18 DIAGNOSIS — K75.81 METABOLIC DYSFUNCTION-ASSOCIATED STEATOHEPATITIS (MASH): Primary | ICD-10-CM

## 2024-09-18 DIAGNOSIS — D47.1 MYELOPROLIFERATIVE DISEASE: Chronic | ICD-10-CM

## 2024-09-18 DIAGNOSIS — Z76.82 STEM CELL TRANSPLANT CANDIDATE: ICD-10-CM

## 2024-09-20 ENCOUNTER — PATIENT MESSAGE (OUTPATIENT)
Dept: HEMATOLOGY/ONCOLOGY | Facility: CLINIC | Age: 54
End: 2024-09-20
Payer: COMMERCIAL

## 2024-09-20 ENCOUNTER — TELEPHONE (OUTPATIENT)
Dept: HEMATOLOGY/ONCOLOGY | Facility: CLINIC | Age: 54
End: 2024-09-20
Payer: COMMERCIAL

## 2024-09-20 NOTE — TELEPHONE ENCOUNTER
Patient called in to rescheduled genetic appointment as he has to have a liver biopsy the same day.

## 2024-09-23 ENCOUNTER — PATIENT MESSAGE (OUTPATIENT)
Dept: INTERVENTIONAL RADIOLOGY/VASCULAR | Facility: HOSPITAL | Age: 54
End: 2024-09-23
Payer: COMMERCIAL

## 2024-09-23 NOTE — NURSING
Pre-procedure call complete.  2 patient identifier used (name and ).   Arrival time 7am.  Patient instructed not to eat or drink anything after midnight the night before procedure.  Patient aware will need someone to provide transport home and monitor pt 8 hours post procedure.  No driving for at least 24 hours after procedure.   Patient reports he does not take blood pressure, heart medications, seizure and anti-rejection medications.   Do not take sleep medication (including OTC) the night before procedure.  Arrival time and location given.  Expected length of stay reviewed.  Covid screening completed.  Patient verbalized understanding of all pre-procedure instructions.  Written instructions and directions sent to patient in [employer]/portal.

## 2024-09-23 NOTE — PROGRESS NOTES
HEMATOLOGIC MALIGNANCIES PROGRESS NOTE    IDENTIFYING STATEMENT   Rubén Peters) is a 54 y.o. male with a  of 1970 from [place], [place] with the diagnosis of primary myelofibrosis.      ONCOLOGY HISTORY:    1. Primary myelofibrosis              A. 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 Adrenal nodules - need evaluation     3. Hyperlipidemia  4. History of tobacco use    INTERVAL HISTORY:      Mr. Hu returns to clinic in follow-up of primary myelofibrosis. He is on ruxolitinib and is tolerating this well. His diaphoresis is improved.    He is distressed because the fibroscan of his liver was suggestive of cirrhosis, and he is recommended for a liver biopsy.     Past Medical History, Past Social History and Past Family History have been reviewed and are unchanged except as noted in the interval history.    MEDICATIONS:     Current Outpatient Medications on File Prior to Visit   Medication Sig Dispense Refill    aspirin 81 MG Chew Take 81 mg by mouth once daily. Pt takes over the counter      atorvastatin (LIPITOR) 10 MG tablet TAKE ONE TABLET BY MOUTH EVERY EVENING 90 tablet 2    ergocalciferol (ERGOCALCIFEROL) 50,000 unit Cap TAKE 1 CAPSULE BY MOUTH ONCE WEEKLY 12 capsule 2     No current facility-administered medications on file prior to visit.       ALLERGIES:   Review of patient's allergies indicates:   Allergen Reactions    Bactrim [sulfamethoxazole-trimethoprim] Hives        ROS:       Review of Systems   Constitutional:  Negative for diaphoresis, fatigue, fever and unexpected weight change.   HENT:   Negative for lump/mass and sore throat.    Eyes:  Negative for icterus.   Respiratory:  Negative for cough and shortness of breath.    Cardiovascular:  Negative for chest pain and  "palpitations.   Gastrointestinal:  Negative for abdominal distention, blood in stool, constipation, diarrhea, nausea and vomiting.   Genitourinary:  Negative for dysuria and frequency.    Musculoskeletal:  Negative for arthralgias, gait problem and myalgias.   Skin:  Negative for rash.   Neurological:  Negative for dizziness, gait problem and headaches.   Hematological:  Negative for adenopathy. Does not bruise/bleed easily.   Psychiatric/Behavioral:  The patient is not nervous/anxious.        PHYSICAL EXAM:  Vitals:    09/17/24 1609   BP: 122/73   Pulse: 86   Resp: 20   Temp: 98 °F (36.7 °C)   TempSrc: Oral   SpO2: 98%   Weight: 87.2 kg (192 lb 3.9 oz)   Height: 5' 11" (1.803 m)   PainSc: 0-No pain       KARNOFSKY PERFORMANCE STATUS 90%  ECOG 0    Physical Exam  Constitutional:       General: He is not in acute distress.     Appearance: He is well-developed.   HENT:      Head: Normocephalic and atraumatic.      Mouth/Throat:      Mouth: No oral lesions.   Eyes:      Conjunctiva/sclera: Conjunctivae normal.   Neck:      Thyroid: No thyromegaly.   Cardiovascular:      Rate and Rhythm: Normal rate and regular rhythm.      Heart sounds: Normal heart sounds. No murmur heard.  Pulmonary:      Breath sounds: Normal breath sounds. No wheezing or rales.   Abdominal:      General: There is no distension.      Palpations: Abdomen is soft. There is no hepatomegaly, splenomegaly or mass.      Tenderness: There is no abdominal tenderness.   Lymphadenopathy:      Cervical: No cervical adenopathy.      Right cervical: No deep cervical adenopathy.     Left cervical: No deep cervical adenopathy.   Skin:     Findings: No rash.   Neurological:      Mental Status: He is alert and oriented to person, place, and time.      Cranial Nerves: No cranial nerve deficit.      Coordination: Coordination normal.      Deep Tendon Reflexes: Reflexes are normal and symmetric.         LAB:   Results for orders placed or performed in visit on 09/06/24 "   Alpha 1 Antitrypsin Phenotype   Result Value Ref Range    Alpha 1 Antitrypsin Phenotype MM bands    Alpha-1 Anti-Trypsin 116 100 - 190 mg/dL   MARIO ALBERTO Screen w/Reflex   Result Value Ref Range    MARIO ALBERTO Screen Negative <1:80 Negative <1:80   Hepatitis A antibody, IgG   Result Value Ref Range    Hepatitis A Antibody IgG Non-reactive    Hepatitis B Surface Ab, Qualitative   Result Value Ref Range    Hep B S Ab <3.00 mIU/mL    Hep B S Ab Non-reactive    Hepatic Function Panel   Result Value Ref Range    Total Protein 7.5 6.0 - 8.4 g/dL    Albumin 4.3 3.5 - 5.2 g/dL    Total Bilirubin 0.4 0.1 - 1.0 mg/dL    Bilirubin, Direct 0.1 0.1 - 0.3 mg/dL    AST 52 (H) 10 - 40 U/L    ALT 97 (H) 10 - 44 U/L    Alkaline Phosphatase 69 55 - 135 U/L   Protime-INR   Result Value Ref Range    Prothrombin Time 10.5 9.0 - 12.5 sec    INR 1.0 0.8 - 1.2   Phosphatidylethanol (PETH)   Result Value Ref Range    PEth 16:0/18.1 (POPEth) <10 Cutoff: 10 ng/mL    PEth 16:0/18.2 (PLPEth) <10 Cutoff: 10 ng/mL    PETH INTERPRETATION Negative.        PROBLEMS ASSESSED THIS VISIT:    1. Primary myelofibrosis    2. Metabolic dysfunction-associated steatohepatitis (MASH)          PLAN:       Primary myelofibrosis  Mr. Hu has primary myelofibrosis. Using the MIPSS-70 version 2.0 risk assessment tool, he is considered intermediate risk, with 10-year overall survival of 37%.      He is on ruxolitinib therapy and tolerating this well. This has brought some symptom mitigation with respect to diaphoresis.     He is interested in allogeneic stem cell transplant. We are identifying an appropriate donor.    MASH vs. Cirrhosis?   Discussed need for liver biopsy to help determine if he may be a transplant candidate. He is understanding and agreeable to biopsy.     Follow-up  1 month     Route Chart for Scheduling    BMT Chart Routing      Follow up with physician 1 month.   Follow up with REBEKAH    Provider visit type Malignant hem   Infusion scheduling note     Injection scheduling note    Labs CBC, CMP, magnesium, phosphorus, LDH and uric acid   Scheduling:  Preferred lab:  Lab interval:  labs 1-2 days before return visit please   Imaging    Pharmacy appointment    Other referrals                       Clyde James MD  Hematology and Stem Cell Transplant    Visit today included increased complexity associated with the care of the episodic problem primary myelofibrosis addressed and managing the longitudinal care of the patient due to the serious and/or complex managed problem(s) primary myelofibrosis.

## 2024-09-25 ENCOUNTER — LAB VISIT (OUTPATIENT)
Dept: LAB | Facility: HOSPITAL | Age: 54
End: 2024-09-25
Attending: FAMILY MEDICINE
Payer: COMMERCIAL

## 2024-09-25 DIAGNOSIS — K75.81 METABOLIC DYSFUNCTION-ASSOCIATED STEATOHEPATITIS (MASH): ICD-10-CM

## 2024-09-25 LAB
BASOPHILS # BLD AUTO: 0.06 K/UL (ref 0–0.2)
BASOPHILS NFR BLD: 0.5 % (ref 0–1.9)
DIFFERENTIAL METHOD BLD: ABNORMAL
EOSINOPHIL # BLD AUTO: 0.2 K/UL (ref 0–0.5)
EOSINOPHIL NFR BLD: 1.2 % (ref 0–8)
ERYTHROCYTE [DISTWIDTH] IN BLOOD BY AUTOMATED COUNT: 14.2 % (ref 11.5–14.5)
HCT VFR BLD AUTO: 38.9 % (ref 40–54)
HGB BLD-MCNC: 12.5 G/DL (ref 14–18)
IMM GRANULOCYTES # BLD AUTO: 0.16 K/UL (ref 0–0.04)
IMM GRANULOCYTES NFR BLD AUTO: 1.2 % (ref 0–0.5)
LYMPHOCYTES # BLD AUTO: 3.3 K/UL (ref 1–4.8)
LYMPHOCYTES NFR BLD: 25.5 % (ref 18–48)
MCH RBC QN AUTO: 31.3 PG (ref 27–31)
MCHC RBC AUTO-ENTMCNC: 32.1 G/DL (ref 32–36)
MCV RBC AUTO: 98 FL (ref 82–98)
MONOCYTES # BLD AUTO: 1.1 K/UL (ref 0.3–1)
MONOCYTES NFR BLD: 8.1 % (ref 4–15)
NEUTROPHILS # BLD AUTO: 8.3 K/UL (ref 1.8–7.7)
NEUTROPHILS NFR BLD: 63.5 % (ref 38–73)
NRBC BLD-RTO: 0 /100 WBC
PLATELET # BLD AUTO: 558 K/UL (ref 150–450)
PMV BLD AUTO: 10.9 FL (ref 9.2–12.9)
RBC # BLD AUTO: 3.99 M/UL (ref 4.6–6.2)
WBC # BLD AUTO: 13.04 K/UL (ref 3.9–12.7)

## 2024-09-25 PROCEDURE — 85025 COMPLETE CBC W/AUTO DIFF WBC: CPT | Performed by: FAMILY MEDICINE

## 2024-09-25 PROCEDURE — 36415 COLL VENOUS BLD VENIPUNCTURE: CPT | Mod: PO | Performed by: FAMILY MEDICINE

## 2024-09-26 ENCOUNTER — HOSPITAL ENCOUNTER (OUTPATIENT)
Dept: INTERVENTIONAL RADIOLOGY/VASCULAR | Facility: HOSPITAL | Age: 54
Discharge: HOME | End: 2024-09-26
Attending: FAMILY MEDICINE | Admitting: RADIOLOGY
Payer: COMMERCIAL

## 2024-09-26 VITALS
HEART RATE: 66 BPM | TEMPERATURE: 98 F | WEIGHT: 187 LBS | RESPIRATION RATE: 17 BRPM | BODY MASS INDEX: 26.18 KG/M2 | SYSTOLIC BLOOD PRESSURE: 117 MMHG | DIASTOLIC BLOOD PRESSURE: 70 MMHG | OXYGEN SATURATION: 98 % | HEIGHT: 71 IN

## 2024-09-26 DIAGNOSIS — Z76.82 STEM CELL TRANSPLANT CANDIDATE: ICD-10-CM

## 2024-09-26 DIAGNOSIS — K75.81 METABOLIC DYSFUNCTION-ASSOCIATED STEATOHEPATITIS (MASH): ICD-10-CM

## 2024-09-26 DIAGNOSIS — D47.1 MYELOPROLIFERATIVE DISEASE: Chronic | ICD-10-CM

## 2024-09-26 PROCEDURE — 94761 N-INVAS EAR/PLS OXIMETRY MLT: CPT

## 2024-09-26 PROCEDURE — 63600175 PHARM REV CODE 636 W HCPCS: Performed by: RADIOLOGY

## 2024-09-26 PROCEDURE — 99900035 HC TECH TIME PER 15 MIN (STAT)

## 2024-09-26 PROCEDURE — 25000003 PHARM REV CODE 250: Performed by: RADIOLOGY

## 2024-09-26 RX ORDER — MIDAZOLAM HYDROCHLORIDE 1 MG/ML
INJECTION, SOLUTION INTRAMUSCULAR; INTRAVENOUS
Status: COMPLETED | OUTPATIENT
Start: 2024-09-26 | End: 2024-09-26

## 2024-09-26 RX ORDER — SODIUM CHLORIDE 9 MG/ML
INJECTION, SOLUTION INTRAVENOUS CONTINUOUS
Status: DISCONTINUED | OUTPATIENT
Start: 2024-09-26 | End: 2024-09-27 | Stop reason: HOSPADM

## 2024-09-26 RX ORDER — FENTANYL CITRATE 50 UG/ML
INJECTION, SOLUTION INTRAMUSCULAR; INTRAVENOUS
Status: COMPLETED | OUTPATIENT
Start: 2024-09-26 | End: 2024-09-26

## 2024-09-26 RX ORDER — LIDOCAINE HYDROCHLORIDE 10 MG/ML
1 INJECTION, SOLUTION EPIDURAL; INFILTRATION; INTRACAUDAL; PERINEURAL ONCE
Status: DISCONTINUED | OUTPATIENT
Start: 2024-09-26 | End: 2024-09-27 | Stop reason: HOSPADM

## 2024-09-26 RX ORDER — ONDANSETRON HYDROCHLORIDE 2 MG/ML
4 INJECTION, SOLUTION INTRAVENOUS EVERY 6 HOURS PRN
Status: DISCONTINUED | OUTPATIENT
Start: 2024-09-26 | End: 2024-09-27 | Stop reason: HOSPADM

## 2024-09-26 RX ORDER — LIDOCAINE HYDROCHLORIDE 20 MG/ML
INJECTION, SOLUTION INFILTRATION; PERINEURAL
Status: COMPLETED | OUTPATIENT
Start: 2024-09-26 | End: 2024-09-26

## 2024-09-26 RX ADMIN — MIDAZOLAM HYDROCHLORIDE 1 MG: 1 INJECTION, SOLUTION INTRAMUSCULAR; INTRAVENOUS at 09:09

## 2024-09-26 RX ADMIN — FENTANYL CITRATE 50 MCG: 50 INJECTION, SOLUTION INTRAMUSCULAR; INTRAVENOUS at 09:09

## 2024-09-26 RX ADMIN — LIDOCAINE HYDROCHLORIDE 10 ML: 20 INJECTION, SOLUTION INFILTRATION; PERINEURAL at 09:09

## 2024-09-26 NOTE — H&P
Radiology History & Physical      SUBJECTIVE:     Chief Complaint: Elevated LFTs    History of Present Illness:  Rubén Hu is a 54 y.o. male who presents for random liver biopsy.  Past Medical History:   Diagnosis Date    Hyperlipidemia      Past Surgical History:   Procedure Laterality Date    COLONOSCOPY N/A 8/9/2023    Procedure: COLONOSCOPY;  Surgeon: Will Ardon MD;  Location: [facility] ([address] [place] [address]);  Service: Endoscopy;  Laterality: N/A;  Suprep Instructions sent via portal / Authorizing:     Bebeto Arora MD in [facility] FAMILY MED/ INTERNAL MED/ PEDS  Referral:          [id number]    EYE SURGERY      FLEXIBLE SIGMOIDOSCOPY N/A 7/23/2024    Procedure: SIGMOIDOSCOPY, FLEXIBLE;  Surgeon: Nirali Vicente MD;  Location: [facility];  Service: Endoscopy;  Laterality: N/A;       Home Meds:   Prior to Admission medications    Medication Sig Start Date End Date Taking? Authorizing Provider   atorvastatin (LIPITOR) 10 MG tablet TAKE ONE TABLET BY MOUTH EVERY EVENING 5/29/24  Yes Bebeto Arora MD   ergocalciferol (ERGOCALCIFEROL) 50,000 unit Cap TAKE 1 CAPSULE BY MOUTH ONCE WEEKLY 4/15/24  Yes Bebeto Arora MD   ruxolitinib (JAKAFI) 20 mg Tab TAKE 1 TABLET (20 MG) TWICE DAILY 9/18/24  Yes Clyde James MD   aspirin 81 MG Chew Take 81 mg by mouth once daily. Pt takes over the counter    Provider, Historical     Anticoagulants/Antiplatelets: aspirin - Held    Allergies:   Review of patient's allergies indicates:   Allergen Reactions    Bactrim [sulfamethoxazole-trimethoprim] Hives     Sedation History:  no adverse reactions    Review of Systems:   Hematological: no known coagulopathies  Respiratory: no shortness of breath  Cardiovascular: no chest pain  Gastrointestinal: no abdominal pain  Genito-Urinary: no dysuria  Musculoskeletal: negative  Neurological: no TIA or stroke symptoms         OBJECTIVE:     Vital Signs (Most Recent)  Temp: 97.5 °F (36.4 °C) (09/26/24 0712)  Pulse: 73 (09/26/24 0712)  Resp: 19  (09/26/24 0712)  BP: (!) 153/86 (09/26/24 0712)  SpO2: 98 % (09/26/24 0712)    Physical Exam:  ASA: 3  Mallampati: 2    General: no acute distress  Mental Status: alert and oriented to person, place and time  HEENT: normocephalic, atraumatic  Chest: unlabored breathing  Heart: regular heart rate  Abdomen: nondistended  Extremity: moves all extremities    Laboratory  Lab Results   Component Value Date    INR 1.0 09/06/2024       Lab Results   Component Value Date    WBC 13.04 (H) 09/25/2024    HGB 12.5 (L) 09/25/2024    HCT 38.9 (L) 09/25/2024    MCV 98 09/25/2024     (H) 09/25/2024      Lab Results   Component Value Date    GLU 88 08/08/2024     08/08/2024    K 3.7 08/08/2024     08/08/2024    CO2 27 08/08/2024    BUN 16 08/08/2024    CREATININE 0.9 08/08/2024    CALCIUM 9.7 08/08/2024    MG 2.1 08/08/2024    ALT 97 (H) 09/06/2024    AST 52 (H) 09/06/2024    ALBUMIN 4.3 09/06/2024    BILITOT 0.4 09/06/2024    BILIDIR 0.1 09/06/2024       ASSESSMENT/PLAN:     Sedation Plan: Moderate  Patient will undergo random liver biopsy.    Jean-Paul Medina M.D.  Interventional Radiology  Department of Radiology

## 2024-09-26 NOTE — PLAN OF CARE
Chart reviewed. Preop nursing care completed per orders. Safe surgery checklist complete. Pt denies any open wounds cuts or sores. Pt denies any metal in body or use of weight loss injections. Pt AAOX3, VSS on room air. Pt toileted, Bed locked in lowest position, Call light within reach. Pt denies any needs at this time. Belongings placed in locker #11.

## 2024-09-26 NOTE — PROCEDURES
Radiology Post-Procedure Note    Pre Op Diagnosis: Elevated LFTs    Post Op Diagnosis: Same    Procedure: Random liver biopsy    Procedure performed by: Jean-Paul Medina MD    Written Informed Consent Obtained: Yes  Specimen Removed: YES 2 x 18 gauge cores  Estimated Blood Loss: Minimal    Findings:   Under US guidance, 17/18 gauge biopsy system was used to sample right liver lobe. Tract embolized with Gelfoam slurry. No complications. See dictation.    Patient tolerated procedure well.    Jean-Paul Medina M.D.  Interventional Radiology  Department of Radiology

## 2024-09-26 NOTE — DISCHARGE SUMMARY
Radiology Discharge Summary      Hospital Course: No complications    Admit Date: 9/26/2024  Discharge Date: 09/26/2024     Instructions Given to Patient: Yes  Diet: Resume prior diet  Activity: activity as tolerated and no driving for today    Description of Condition on Discharge: Stable  Vital Signs (Most Recent): Temp: 97.5 °F (36.4 °C) (09/26/24 0712)  Pulse: 70 (09/26/24 0911)  Resp: 16 (09/26/24 0911)  BP: 122/88 (09/26/24 0911)  SpO2: (!) 94 % (09/26/24 0911)    Discharge Disposition: Home    Discharge Diagnosis: Elevated LFTs s/p random liver biopsy    Follow up: As scheduled    Jean-Paul Medina M.D.  Interventional Radiology  Department of Radiology

## 2024-09-30 NOTE — PROGRESS NOTES
Cancer Genetics  Hereditary and High-Risk Clinic  Department of Hematology and Oncology  [facility]    Date of Service:  10/1/24  Visit Provider:  Ronnie Magallanes, [facility], [facility] [place]  Collaborating Physician:  Cipriano Mcnair MD    Patient ID  Name: Rubén Hu    : 1970    MRN: [id number]      Referring Provider:   Clyde James MD  [facility] [place] [address]    Televisit Information  The patient location is:  [place].    The chief complaint leading to consultation is:  As below.    Visit type: audiovisual.    Face-to-face time with patient:  Approximately 60 minutes.    Approximately 140 minutes in total were spent on this encounter, which includes face-to-face time and non-face-to-face time preparing to see the patient (e.g., review of tests), obtaining and/or reviewing separately obtained history, documenting clinical information in the electronic or other health record, independently interpreting results (not separately reported) and communicating results to the patient/family/caregiver, or coordinating care (not separately reported).    Each patient provided medical services by telemedicine is:  (1) informed of the relationship between the physician and patient and the respective role of any other health care provider with respect to management of the patient; and (2) notified that he or she may decline to receive medical services by telemedicine and may withdraw from such care at any time.    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 Today, we discussed the impact of the balanced translocation for himself and his family members. Chromosome testing was recommend for first-degree relatives and  "will be requested if one of these relatives acts as a stem cell donor. We also discussed coordination of a skin-punch biopsy for germline analysis of the JAK2 mutation. A portal message will be sent to Rubén with information about the balanced translocation that he can share with his family members. Follow-up will be needed to confirm and coordinate germline genetic testing and skin-punch biopsy.    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  He was referred by Dr. 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    Age:  54 y.o.   Race and ethnicity:  White, Not  or /a  Previous germline cancer genetic testing:  No    Cancer History  Primary myelofibrosis diagnosed at 54yo  No personal history Masses/tumors/lesions    Previous Somatic Testing Results  Chromosome Analysis for Congenital Disorders, Blood  Result: abnormal  46,XY,t(9;19)(q34.3;q13.1)   Result indicates that the t(9;19) observed in the 04/16/2024 bone marrow specimen was likely constitutional   This balanced translocation is not currently known to be associated with hematological malignancy  OncoHeme (NGS) Hematologic Neoplasms, Blood  Result: pathogenic mutation detected  JAK2: Chr9(GRCh37):g.[id number]>T; NM_004972.3(JAK2):c.1849G>T; p.[id number] (14% VAF)     GI History  Upper GI screening: N/A  Most recent colonoscopy: 3/2024  Colonoscoyp: 8/9/23 - 1 polyp  Sigmoidoscopy: 7/23/24 - rectal ulcer  Colon polyp:  Yes - <10 polyps  Pancreatitis:  No    Prostate History  PSA: 8/2/24 - 2.8    Dermatologic History  Abnormal moles/lesions: cyst removed from " right side of jaw  Skin cancer screening: does annual screening, no concerns     Focused Social History  Alcohol use: social drinking, quit after cancer diagnosis  Tobacco use: 28 years of smoking, quit smoking 11 years ago    ONCOLOGY PEDIGREE  Ancestry: paternal - Australian/Sicilian, maternal - Australian/Sicilian  Ashkenazi Episcopal:  No  Consanguinity:  No  Hereditary cancer genetic testing in blood relatives:  No    Family Cancer Pedigree:  Pedigree Image     Rubén reported his  family history of cancer as follows:   Maternal first cousin (54yo) diagnosed with breast cancer at 50yo  Maternal first cousin (46yo) diagnosed with testicular cancer at 18yo     COUNSELING      Pre-test cancer genetic counseling was conducted.        Causes of Cancer:  Cancer occurs when cells grow out of control. Some genes help protect against cancer by controlling the growth of cells. However, mutations (problems) in these genes can prevent them from working properly, increasing the risk of developing cancer.  Sporadic Cancer: Most people who get cancer have sporadic cancer. Sporadic cancer is caused by mutations that occur during the lifetime. These mutations may be caused by aging, environmental exposures (ex. UV radiation, carcinogens), lifestyle (ex. smoking, drinking alcohol, sunbathing, poor diet), other medical conditions (ex. hepatitis, HPV, ulcerative colitis), or other factors.   Hereditary Cancer: A small percentage (5-10%) of people who develop cancer were born with a mutation already in one of the cancer protection genes.  Familial Cancer: Cancer can also cluster in families that do not have an identifiable mutation. This may be due to shared environmental or lifestyle factors or genetic risk factors that have not been identified or cannot be detected using current technology or panels.     The likelihood of having a hereditary cancer risk depends on many factors including who in the family had cancer, what type of cancer  they had, their age at diagnosis, cancer specifics (such as MMR status of an endometrial or colon cancer or type of breast cancer), and previous genetic testing in the family. Typically, the chance is higher for families that have multiple people with the same or related cancers, an individual with multiple cancers, younger ages of cancer, and certain types of cancer (such as pancreatic or triple negative breast cancer).     Genetic Testing Options:   Various genetic testing options were discussed along with associated benefits, limitations, and risks.   There are several issues to consider regarding multi-gene testing:  Insurance coverage can vary depending on the genetic test panel(s) ordered.  There are limited data and a lack of clear guidelines regarding degree of cancer risk associated with some of the genes assessed and how to communicate and manage risk for carriers of these genes; this issue is compounded by the low incidence rates of hereditary disease; multi-gene tests include moderate-penetrance genes, and they often also include low-penetrance genes for which there are little available data regarding degree of cancer risk and guidelines for risk management.  Increased likelihood of detecting a genetic variant of unknown significance (VUS).  Increased chance of finding genotypically distinct cell lines (i.e., genetic mosaicism) with next-generation sequencing; clones of non-cancerous cell containing certain genetic mutations have been found in healthy adults undergoing multi-gene testing; this phenomenon can often be attributed to clonal hematopoiesis, a condition in which a hematopoietic stem cell begins making blood cells with the same acquired mutation.    Rubén expressed interest in pursuing site-specific JAK2 sequencing over panel testing, as the JAK2 mutation was the pathogenic mutation detected on somatic testing. We discussed how a skin-punch biopsy would be required for testing due to his  current hematological malignancy.     We also discussed how chromosome testing would be recommended for first degree relatives. This would allow for identification of which side of the family the balanced translocation was inherited from, or if the translocation was de wade. Some carriers of balanced translocations have an increased risk of miscarriage or pregnancies with abnormal clinical outcomes, including intellectual disability, due to unbalanced versions of the rearrangement.     Possible Results:    Positive (pathogenic or likely pathogenic variant): A genetic variant was found that is suspected or known to impact the function of the gene. The impact of a positive result on an individual's risk of cancer varies based on the gene, specific variant, individual's sex assigned at birth, personal cancer history, other health history (such as surgical history), and family history. A positive result can impact screening and risk management recommendations. However, there are not always available guidelines for management based on a specific gene variant. Family history and personal risk factors should always be considered. Sometimes, a positive result can also have treatment or reproductive implications.   Negative: No clinically significant variants were reported in the tested genes. A negative result does not indicate that an individual cannot develop cancer or even that the individual is at average risk. An individual may still be at an increased risk for cancer based on personal risk factors or family history. Additionally, there could be a hereditary cancer predisposition that was not included in a chosen panel or is not detected with current technology.   Variant of Uncertain Significance (VUS): A variant was found. However, the lab does not have enough information to determine if the variant is benign (harmless) or pathogenic (impacts the function of the gene). The laboratory may update (reclassify) the  variant over time as more information becomes available. When reclassified, most variants of uncertain significance are reclassified to benign/likely benign. Typically, it is not recommended to  based on the presence of a VUS. The chance of finding a VUS varies based on the test performed. Generally, the chance of finding a VUS increases with the number of genes tested and decreases with the amount of testing of that gene (genes that are tested more frequently or for a longer period of time have a lower VUS rate).     Genetic Mutation Inheritance:  When an individual has a germline gene mutation, their first-degree relatives (parents, children, and siblings) each have a 50% chance of carrying the same mutation. Other, more distant blood relatives can also be at risk of carrying the same mutation. At-risk relatives of an individual with a mutation should consider genetic testing to help determine their risk for cancer.     When an individual carries a balanced translocation, there is an increased risk of miscarriage or pregnancies with abnormal clinical outcomes, including intellectual disability, due to unbalanced versions of the rearrangement. There are four possible outcomes for a pregnancy when one parent carries a balanced translocation:  Inheritance of both ordinary chromosomes (without translocation)  Inheritance of the same balanced chromosome arrangement as the carrier parent  Inheritance of an unbalanced chromosome arrangement (in this case, extra material of chromosome 19 and missing material of chromosome 9)  Inheritance of a different unbalanced chromosome arrangement (in this case, extra material of chromosome 9 and missing material of chromosome 19)    Rubén's family history is not suggestive of any family members with an unbalanced chromosome rearrangement. However, other family members could also be carriers of the unbalanced rearrangement. Chromosomal testing of first-degree  relatives could be informative for determining the origin of the balanced translocation. It would also be informative for determining if other family members are at risk for miscarriage or pregnancies with abnormal clinical outcomes, including intellectual disability and multiple congenital anomalies, due to an unbalanced chromosome rearrangement.    Genetic Discrimination: The Genetic Information Nondiscrimination Act of 2008 (LEXI) is a [place] federal law that provides some protections against the use of an individual's genetic information by their health insurer and by their employer. Title I of LEXI prohibits most health insurers (except for insurance obtained through a job with the  or the Federal Employees Health Benefits Plan) from utilizing an individual's genetic information to make decisions regarding insurance eligibility or premium charges. Title II of LEXI prohibits covered entities from requesting or requiring the genetic information of employees and applicants and from using said information to make employment decisions. This does not apply to employers with fewer than 15 employees or to the .  LEXI also does not protect individuals from genetic discrimination by any other type of policy or entity, including but not limited to life insurance, disability insurance, long-term care insurance,  benefits, and [employer] [facility] benefits.    Genetic Testing Guidelines: Rubén's reported does not meet meets the genetic testing criteria established by the National Comprehensive Cancer Network Genetic/Familial High-Risk Assessment: Breast, Ovarian, and Pancreatic version 1.2025 or Genetic/Familial High-Risk Assessment: Colorectal version 1.2024.  However, Rubén decided he may proceed with testing anyway after a discussion regarding various genetic testing panels that could be performed as well as associated risks. The patient understands that the chance of finding a clinically  significant variant is low and that insurance is unlikely to cover genetic testing.     ASSESSMENT / PLAN      Genetic Testing Logistics:  Germline genetic testing of JAK2 is pending. An outside laboratory performs this genetic testing. Genetic testing typically takes 2-3 weeks to after the sample has been received.  There is a potential for the patient to have out-of-pocket costs related to genetic testing.  Post-test genetic counseling can be conducted once the genetic testing results are available.    Genetic Test Information:  Testing lab: TBD   Test panel: TBD   ICD-10 code(s): Q95.0, D75.81   Verbal informed consent: Obtained   Specimen type: Fibroblasts obtained from skin  (Patient denies blood disorders that would necessitate a skin fibroblast specimen)   Specimen collection by: TBD   Specimen collection date: To be scheduled   Results expected by: Approximately 2-3 weeks after the genetic testing lab receives the specimen   Results disclosure plan: Post-test visit if positive or complex result; otherwise, results will be communicated by phone call     Follow-up: Post-test genetic counseling will be conducted once the genetic testing results are available. Rubén will be provided information about the balanced 9;19 translocation via the portal so family members can consider undergoing chromosome analysis. For family members who are local, testing can be coordinated through [place]. Any family member who plans to act as a stem cell donor will be required to undergo chromosomal analysis.    Rubén appeared to have a good understanding of the information. Questions were encouraged and answered to the patient's satisfaction, and he verbalized understanding of the information and agreement with the plan.   Rubén is welcome to contact me if he has any questions, concerns, or updates to his family history.     This assessment is based on the history and reports provided, as well as the current scientific knowledge  regarding cancer genetics.     Ronnie Magallanes, MGC, [facility]  Certified Genetic Counselor  Department of Hematology and Oncology  [facility]    CC:  Dr. Clyde James

## 2024-10-01 ENCOUNTER — OFFICE VISIT (OUTPATIENT)
Dept: HEMATOLOGY/ONCOLOGY | Facility: CLINIC | Age: 54
End: 2024-10-01
Payer: COMMERCIAL

## 2024-10-01 DIAGNOSIS — D47.1 PRIMARY MYELOFIBROSIS: ICD-10-CM

## 2024-10-02 ENCOUNTER — TELEPHONE (OUTPATIENT)
Dept: HEPATOLOGY | Facility: CLINIC | Age: 54
End: 2024-10-02
Payer: COMMERCIAL

## 2024-10-02 NOTE — TELEPHONE ENCOUNTER
Contacted the patient over the phone to inform about the results from liver biopsy performed on 9/26/24.     Liver, random (needle biopsy):   Mild steatosis, 5-10%   Minimal portal based lymphocytes, nonspecific.   No significant interface activity.  No lobular activity.   No extramedullary hematopoiesis identified.   Portal expansion (stage 0/1 of 4)   Mild hepatocyte iron.   Iron and trichrome stains with appropriate controls.     I discussed with the patient that although there is some excess fat in there liver, there is no inflammation associated with the fat, and there is no evidence of advanced scarring in the liver. I informed him that from a liver's perspective, there is no contraindication to stem cell transplant.    Will arrange for 6-month follow in Hepatology Clinic.     The patient understood the recs / plan. All questions were answered.    Reyna Montesinos MD  Staff Physician  Hepatology and Liver Transplant  [facility]  [facility]

## 2024-10-03 ENCOUNTER — TELEPHONE (OUTPATIENT)
Facility: CLINIC | Age: 54
End: 2024-10-03
Payer: COMMERCIAL

## 2024-10-03 NOTE — TELEPHONE ENCOUNTER
Patient Dec 2024 appt was Cx as requested by provider. Recall placed for March 2025 appt as [facility] is not open/available.     Barbara

## 2024-10-07 ENCOUNTER — PATIENT MESSAGE (OUTPATIENT)
Dept: HEMATOLOGY/ONCOLOGY | Facility: CLINIC | Age: 54
End: 2024-10-07
Payer: COMMERCIAL

## 2024-10-18 DIAGNOSIS — D47.1 PRIMARY MYELOFIBROSIS: ICD-10-CM

## 2024-10-23 ENCOUNTER — PATIENT MESSAGE (OUTPATIENT)
Dept: RESEARCH | Facility: HOSPITAL | Age: 54
End: 2024-10-23
Payer: COMMERCIAL

## 2024-10-24 ENCOUNTER — PATIENT MESSAGE (OUTPATIENT)
Dept: RESEARCH | Facility: HOSPITAL | Age: 54
End: 2024-10-24
Payer: COMMERCIAL

## 2024-10-31 ENCOUNTER — LAB VISIT (OUTPATIENT)
Dept: LAB | Facility: HOSPITAL | Age: 54
End: 2024-10-31
Attending: INTERNAL MEDICINE
Payer: COMMERCIAL

## 2024-10-31 DIAGNOSIS — D47.1 PRIMARY MYELOFIBROSIS: ICD-10-CM

## 2024-10-31 LAB
ALBUMIN SERPL BCP-MCNC: 4.2 G/DL (ref 3.5–5.2)
ALP SERPL-CCNC: 50 U/L (ref 40–150)
ALT SERPL W/O P-5'-P-CCNC: 60 U/L (ref 10–44)
ANION GAP SERPL CALC-SCNC: 9 MMOL/L (ref 8–16)
AST SERPL-CCNC: 33 U/L (ref 10–40)
BASOPHILS # BLD AUTO: 0.07 K/UL (ref 0–0.2)
BASOPHILS NFR BLD: 0.7 % (ref 0–1.9)
BILIRUB SERPL-MCNC: 0.5 MG/DL (ref 0.1–1)
BUN SERPL-MCNC: 16 MG/DL (ref 6–20)
CALCIUM SERPL-MCNC: 9.7 MG/DL (ref 8.7–10.5)
CHLORIDE SERPL-SCNC: 106 MMOL/L (ref 95–110)
CO2 SERPL-SCNC: 25 MMOL/L (ref 23–29)
CREAT SERPL-MCNC: 0.9 MG/DL (ref 0.5–1.4)
DIFFERENTIAL METHOD BLD: ABNORMAL
EOSINOPHIL # BLD AUTO: 0.1 K/UL (ref 0–0.5)
EOSINOPHIL NFR BLD: 1.3 % (ref 0–8)
ERYTHROCYTE [DISTWIDTH] IN BLOOD BY AUTOMATED COUNT: 13.8 % (ref 11.5–14.5)
EST. GFR  (NO RACE VARIABLE): >60 ML/MIN/1.73 M^2
GLUCOSE SERPL-MCNC: 89 MG/DL (ref 70–110)
HCT VFR BLD AUTO: 38.2 % (ref 40–54)
HGB BLD-MCNC: 12.2 G/DL (ref 14–18)
IMM GRANULOCYTES # BLD AUTO: 0.11 K/UL (ref 0–0.04)
IMM GRANULOCYTES NFR BLD AUTO: 1.1 % (ref 0–0.5)
LDH SERPL L TO P-CCNC: 181 U/L (ref 110–260)
LYMPHOCYTES # BLD AUTO: 3.3 K/UL (ref 1–4.8)
LYMPHOCYTES NFR BLD: 33.1 % (ref 18–48)
MAGNESIUM SERPL-MCNC: 2.1 MG/DL (ref 1.6–2.6)
MCH RBC QN AUTO: 31.5 PG (ref 27–31)
MCHC RBC AUTO-ENTMCNC: 31.9 G/DL (ref 32–36)
MCV RBC AUTO: 99 FL (ref 82–98)
MONOCYTES # BLD AUTO: 0.7 K/UL (ref 0.3–1)
MONOCYTES NFR BLD: 7.4 % (ref 4–15)
NEUTROPHILS # BLD AUTO: 5.7 K/UL (ref 1.8–7.7)
NEUTROPHILS NFR BLD: 56.4 % (ref 38–73)
NRBC BLD-RTO: 0 /100 WBC
PHOSPHATE SERPL-MCNC: 3.2 MG/DL (ref 2.7–4.5)
PLATELET # BLD AUTO: 515 K/UL (ref 150–450)
PMV BLD AUTO: 10.8 FL (ref 9.2–12.9)
POTASSIUM SERPL-SCNC: 4 MMOL/L (ref 3.5–5.1)
PROT SERPL-MCNC: 7.1 G/DL (ref 6–8.4)
RBC # BLD AUTO: 3.87 M/UL (ref 4.6–6.2)
SODIUM SERPL-SCNC: 140 MMOL/L (ref 136–145)
URATE SERPL-MCNC: 6 MG/DL (ref 3.4–7)
WBC # BLD AUTO: 10.03 K/UL (ref 3.9–12.7)

## 2024-10-31 PROCEDURE — 84100 ASSAY OF PHOSPHORUS: CPT | Performed by: INTERNAL MEDICINE

## 2024-10-31 PROCEDURE — 84550 ASSAY OF BLOOD/URIC ACID: CPT | Performed by: INTERNAL MEDICINE

## 2024-10-31 PROCEDURE — 80053 COMPREHEN METABOLIC PANEL: CPT | Performed by: INTERNAL MEDICINE

## 2024-10-31 PROCEDURE — 83615 LACTATE (LD) (LDH) ENZYME: CPT | Performed by: INTERNAL MEDICINE

## 2024-10-31 PROCEDURE — 83735 ASSAY OF MAGNESIUM: CPT | Performed by: INTERNAL MEDICINE

## 2024-10-31 PROCEDURE — 85025 COMPLETE CBC W/AUTO DIFF WBC: CPT | Performed by: INTERNAL MEDICINE

## 2024-10-31 PROCEDURE — 36415 COLL VENOUS BLD VENIPUNCTURE: CPT | Mod: PO | Performed by: INTERNAL MEDICINE

## 2024-11-05 ENCOUNTER — OFFICE VISIT (OUTPATIENT)
Dept: HEMATOLOGY/ONCOLOGY | Facility: CLINIC | Age: 54
End: 2024-11-05
Payer: COMMERCIAL

## 2024-11-05 VITALS
BODY MASS INDEX: 27.6 KG/M2 | RESPIRATION RATE: 18 BRPM | OXYGEN SATURATION: 99 % | WEIGHT: 197.19 LBS | DIASTOLIC BLOOD PRESSURE: 84 MMHG | HEIGHT: 71 IN | SYSTOLIC BLOOD PRESSURE: 142 MMHG | HEART RATE: 63 BPM

## 2024-11-05 DIAGNOSIS — D47.1 PRIMARY MYELOFIBROSIS: ICD-10-CM

## 2024-11-05 PROCEDURE — 1159F MED LIST DOCD IN RCRD: CPT | Mod: CPTII,S$GLB,, | Performed by: INTERNAL MEDICINE

## 2024-11-05 PROCEDURE — 3008F BODY MASS INDEX DOCD: CPT | Mod: CPTII,S$GLB,, | Performed by: INTERNAL MEDICINE

## 2024-11-05 PROCEDURE — 3079F DIAST BP 80-89 MM HG: CPT | Mod: CPTII,S$GLB,, | Performed by: INTERNAL MEDICINE

## 2024-11-05 PROCEDURE — 1160F RVW MEDS BY RX/DR IN RCRD: CPT | Mod: CPTII,S$GLB,, | Performed by: INTERNAL MEDICINE

## 2024-11-05 PROCEDURE — 3044F HG A1C LEVEL LT 7.0%: CPT | Mod: CPTII,S$GLB,, | Performed by: INTERNAL MEDICINE

## 2024-11-05 PROCEDURE — 99999 PR PBB SHADOW E&M-EST. PATIENT-LVL III: CPT | Mod: PBBFAC,,, | Performed by: INTERNAL MEDICINE

## 2024-11-05 PROCEDURE — G2211 COMPLEX E/M VISIT ADD ON: HCPCS | Mod: S$GLB,,, | Performed by: INTERNAL MEDICINE

## 2024-11-05 PROCEDURE — 3077F SYST BP >= 140 MM HG: CPT | Mod: CPTII,S$GLB,, | Performed by: INTERNAL MEDICINE

## 2024-11-05 PROCEDURE — 99215 OFFICE O/P EST HI 40 MIN: CPT | Mod: S$GLB,,, | Performed by: INTERNAL MEDICINE

## 2024-11-08 ENCOUNTER — TELEPHONE (OUTPATIENT)
Dept: HEMATOLOGY/ONCOLOGY | Facility: CLINIC | Age: 54
End: 2024-11-08
Payer: COMMERCIAL

## 2024-11-08 NOTE — TELEPHONE ENCOUNTER
Pt called.  Verified that insurance has been updated.  Request sent to clinic nurse for medication PA.

## 2024-11-12 DIAGNOSIS — Z76.82 STEM CELL TRANSPLANT CANDIDATE: Primary | ICD-10-CM

## 2024-11-12 DIAGNOSIS — D47.1 PRIMARY MYELOFIBROSIS: ICD-10-CM

## 2024-11-13 NOTE — PROGRESS NOTES
HEMATOLOGIC MALIGNANCIES PROGRESS NOTE    IDENTIFYING STATEMENT   Rubén Peters) is a 54 y.o. male with a  of 1970 from [place], [place] with the diagnosis of primary myelofibrosis.      ONCOLOGY HISTORY:    1. Primary myelofibrosis              A. 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 Adrenal nodules - need evaluation     3. Hyperlipidemia  4. History of tobacco use    INTERVAL HISTORY:      Mr. Hu returns to clinic in follow-up of primary myelofibrosis. He is on ruxolitinib and is tolerating this well. His diaphoresis is improved.    Since his last visit, he had liver biopsy showing only mild steatosis but no cirrhosis. No extramedullary hematopoiesis.     Past Medical History, Past Social History and Past Family History have been reviewed and are unchanged except as noted in the interval history.    MEDICATIONS:     Current Outpatient Medications on File Prior to Visit   Medication Sig Dispense Refill    aspirin 81 MG Chew Take 81 mg by mouth once daily. Pt takes over the counter      atorvastatin (LIPITOR) 10 MG tablet TAKE ONE TABLET BY MOUTH EVERY EVENING 90 tablet 2    ergocalciferol (ERGOCALCIFEROL) 50,000 unit Cap TAKE 1 CAPSULE BY MOUTH ONCE WEEKLY 12 capsule 2     No current facility-administered medications on file prior to visit.       ALLERGIES:   Review of patient's allergies indicates:   Allergen Reactions    Bactrim [sulfamethoxazole-trimethoprim] Hives        ROS:       Review of Systems   Constitutional:  Negative for diaphoresis, fatigue, fever and unexpected weight change.   HENT:   Negative for lump/mass and sore throat.    Eyes:  Negative for icterus.   Respiratory:  Negative for cough and shortness of breath.    Cardiovascular:  Negative for chest pain and  "palpitations.   Gastrointestinal:  Negative for abdominal distention, blood in stool, constipation, diarrhea, nausea and vomiting.   Genitourinary:  Negative for dysuria and frequency.    Musculoskeletal:  Negative for arthralgias, gait problem and myalgias.   Skin:  Negative for rash.   Neurological:  Negative for dizziness, gait problem and headaches.   Hematological:  Negative for adenopathy. Does not bruise/bleed easily.   Psychiatric/Behavioral:  The patient is not nervous/anxious.        PHYSICAL EXAM:  Vitals:    11/05/24 1626   BP: (!) 142/84   Pulse: 63   Resp: 18   SpO2: 99%   Weight: 89.4 kg (197 lb 3.2 oz)   Height: 5' 11" (1.803 m)   PainSc: 0-No pain       KARNOFSKY PERFORMANCE STATUS 90%  ECOG 0    Physical Exam  Constitutional:       General: He is not in acute distress.     Appearance: He is well-developed.   HENT:      Head: Normocephalic and atraumatic.      Mouth/Throat:      Mouth: No oral lesions.   Eyes:      Conjunctiva/sclera: Conjunctivae normal.   Neck:      Thyroid: No thyromegaly.   Cardiovascular:      Rate and Rhythm: Normal rate and regular rhythm.      Heart sounds: Normal heart sounds. No murmur heard.  Pulmonary:      Breath sounds: Normal breath sounds. No wheezing or rales.   Abdominal:      General: There is no distension.      Palpations: Abdomen is soft. There is no hepatomegaly, splenomegaly or mass.      Tenderness: There is no abdominal tenderness.   Lymphadenopathy:      Cervical: No cervical adenopathy.      Right cervical: No deep cervical adenopathy.     Left cervical: No deep cervical adenopathy.   Skin:     Findings: No rash.   Neurological:      Mental Status: He is alert and oriented to person, place, and time.      Cranial Nerves: No cranial nerve deficit.      Coordination: Coordination normal.      Deep Tendon Reflexes: Reflexes are normal and symmetric.         LAB:   Results for orders placed or performed in visit on 10/31/24   CBC Auto Differential    " Collection Time: 10/31/24 10:15 AM   Result Value Ref Range    WBC 10.03 3.90 - 12.70 K/uL    RBC 3.87 (L) 4.60 - 6.20 M/uL    Hemoglobin 12.2 (L) 14.0 - 18.0 g/dL    Hematocrit 38.2 (L) 40.0 - 54.0 %    MCV 99 (H) 82 - 98 fL    MCH 31.5 (H) 27.0 - 31.0 pg    MCHC 31.9 (L) 32.0 - 36.0 g/dL    RDW 13.8 11.5 - 14.5 %    Platelets 515 (H) 150 - 450 K/uL    MPV 10.8 9.2 - 12.9 fL    Immature Granulocytes 1.1 (H) 0.0 - 0.5 %    Gran # (ANC) 5.7 1.8 - 7.7 K/uL    Immature Grans (Abs) 0.11 (H) 0.00 - 0.04 K/uL    Lymph # 3.3 1.0 - 4.8 K/uL    Mono # 0.7 0.3 - 1.0 K/uL    Eos # 0.1 0.0 - 0.5 K/uL    Baso # 0.07 0.00 - 0.20 K/uL    nRBC 0 0 /100 WBC    Gran % 56.4 38.0 - 73.0 %    Lymph % 33.1 18.0 - 48.0 %    Mono % 7.4 4.0 - 15.0 %    Eosinophil % 1.3 0.0 - 8.0 %    Basophil % 0.7 0.0 - 1.9 %    Differential Method Automated    Comprehensive Metabolic Panel    Collection Time: 10/31/24 10:15 AM   Result Value Ref Range    Sodium 140 136 - 145 mmol/L    Potassium 4.0 3.5 - 5.1 mmol/L    Chloride 106 95 - 110 mmol/L    CO2 25 23 - 29 mmol/L    Glucose 89 70 - 110 mg/dL    BUN 16 6 - 20 mg/dL    Creatinine 0.9 0.5 - 1.4 mg/dL    Calcium 9.7 8.7 - 10.5 mg/dL    Total Protein 7.1 6.0 - 8.4 g/dL    Albumin 4.2 3.5 - 5.2 g/dL    Total Bilirubin 0.5 0.1 - 1.0 mg/dL    Alkaline Phosphatase 50 40 - 150 U/L    AST 33 10 - 40 U/L    ALT 60 (H) 10 - 44 U/L    eGFR >60 >60 mL/min/1.73 m^2    Anion Gap 9 8 - 16 mmol/L   Magnesium    Collection Time: 10/31/24 10:15 AM   Result Value Ref Range    Magnesium 2.1 1.6 - 2.6 mg/dL   PHOSPHORUS    Collection Time: 10/31/24 10:15 AM   Result Value Ref Range    Phosphorus 3.2 2.7 - 4.5 mg/dL   Lactate Dehydrogenase    Collection Time: 10/31/24 10:15 AM   Result Value Ref Range     110 - 260 U/L   Uric Acid    Collection Time: 10/31/24 10:15 AM   Result Value Ref Range    Uric Acid 6.0 3.4 - 7.0 mg/dL       PROBLEMS ASSESSED THIS VISIT:    1. Primary myelofibrosis      PLAN:       Primary  myelofibrosis  Mr. Hu has primary myelofibrosis. Using the MIPSS-70 version 2.0 risk assessment tool, he is considered intermediate risk, with 10-year overall survival of 37%.      He is on ruxolitinib therapy and tolerating this well. This has brought some symptom mitigation with respect to diaphoresis.     He is interested in allogeneic stem cell transplant. His brother is a haploidentical donor, and we are planning evaluation and transplant dates at this time.     Anemia  Likely due to ruxolitinib therapy. Mild. Monitor closely throughout therapy.     MASLD  Biopsy proven to be steatotic change. Most compatible with MASLD. Follow-up with hepatology. Encourage lifestyle modification. No significant fibrosis.     Follow-up  For transplant evaluation    Clyde James MD  Hematology and Stem Cell Transplant    Visit today included increased complexity associated with the care of the episodic problem primary myelofibrosis addressed and managing the longitudinal care of the patient due to the serious and/or complex managed problem(s) primary myelofibrosis.

## 2024-11-13 NOTE — H&P (VIEW-ONLY)
HEMATOLOGIC MALIGNANCIES PROGRESS NOTE    IDENTIFYING STATEMENT   Rubén Peters) is a 54 y.o. male with a  of 1970 from [place], [place] with the diagnosis of primary myelofibrosis.      ONCOLOGY HISTORY:    1. Primary myelofibrosis              A. 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 Adrenal nodules - need evaluation     3. Hyperlipidemia  4. History of tobacco use    INTERVAL HISTORY:      Mr. Hu returns to clinic in follow-up of primary myelofibrosis. He is on ruxolitinib and is tolerating this well. His diaphoresis is improved.    Since his last visit, he had liver biopsy showing only mild steatosis but no cirrhosis. No extramedullary hematopoiesis.     Past Medical History, Past Social History and Past Family History have been reviewed and are unchanged except as noted in the interval history.    MEDICATIONS:     Current Outpatient Medications on File Prior to Visit   Medication Sig Dispense Refill    aspirin 81 MG Chew Take 81 mg by mouth once daily. Pt takes over the counter      atorvastatin (LIPITOR) 10 MG tablet TAKE ONE TABLET BY MOUTH EVERY EVENING 90 tablet 2    ergocalciferol (ERGOCALCIFEROL) 50,000 unit Cap TAKE 1 CAPSULE BY MOUTH ONCE WEEKLY 12 capsule 2     No current facility-administered medications on file prior to visit.       ALLERGIES:   Review of patient's allergies indicates:   Allergen Reactions    Bactrim [sulfamethoxazole-trimethoprim] Hives        ROS:       Review of Systems   Constitutional:  Negative for diaphoresis, fatigue, fever and unexpected weight change.   HENT:   Negative for lump/mass and sore throat.    Eyes:  Negative for icterus.   Respiratory:  Negative for cough and shortness of breath.    Cardiovascular:  Negative for chest pain and  "palpitations.   Gastrointestinal:  Negative for abdominal distention, blood in stool, constipation, diarrhea, nausea and vomiting.   Genitourinary:  Negative for dysuria and frequency.    Musculoskeletal:  Negative for arthralgias, gait problem and myalgias.   Skin:  Negative for rash.   Neurological:  Negative for dizziness, gait problem and headaches.   Hematological:  Negative for adenopathy. Does not bruise/bleed easily.   Psychiatric/Behavioral:  The patient is not nervous/anxious.        PHYSICAL EXAM:  Vitals:    11/05/24 1626   BP: (!) 142/84   Pulse: 63   Resp: 18   SpO2: 99%   Weight: 89.4 kg (197 lb 3.2 oz)   Height: 5' 11" (1.803 m)   PainSc: 0-No pain       KARNOFSKY PERFORMANCE STATUS 90%  ECOG 0    Physical Exam  Constitutional:       General: He is not in acute distress.     Appearance: He is well-developed.   HENT:      Head: Normocephalic and atraumatic.      Mouth/Throat:      Mouth: No oral lesions.   Eyes:      Conjunctiva/sclera: Conjunctivae normal.   Neck:      Thyroid: No thyromegaly.   Cardiovascular:      Rate and Rhythm: Normal rate and regular rhythm.      Heart sounds: Normal heart sounds. No murmur heard.  Pulmonary:      Breath sounds: Normal breath sounds. No wheezing or rales.   Abdominal:      General: There is no distension.      Palpations: Abdomen is soft. There is no hepatomegaly, splenomegaly or mass.      Tenderness: There is no abdominal tenderness.   Lymphadenopathy:      Cervical: No cervical adenopathy.      Right cervical: No deep cervical adenopathy.     Left cervical: No deep cervical adenopathy.   Skin:     Findings: No rash.   Neurological:      Mental Status: He is alert and oriented to person, place, and time.      Cranial Nerves: No cranial nerve deficit.      Coordination: Coordination normal.      Deep Tendon Reflexes: Reflexes are normal and symmetric.         LAB:   Results for orders placed or performed in visit on 10/31/24   CBC Auto Differential    " Collection Time: 10/31/24 10:15 AM   Result Value Ref Range    WBC 10.03 3.90 - 12.70 K/uL    RBC 3.87 (L) 4.60 - 6.20 M/uL    Hemoglobin 12.2 (L) 14.0 - 18.0 g/dL    Hematocrit 38.2 (L) 40.0 - 54.0 %    MCV 99 (H) 82 - 98 fL    MCH 31.5 (H) 27.0 - 31.0 pg    MCHC 31.9 (L) 32.0 - 36.0 g/dL    RDW 13.8 11.5 - 14.5 %    Platelets 515 (H) 150 - 450 K/uL    MPV 10.8 9.2 - 12.9 fL    Immature Granulocytes 1.1 (H) 0.0 - 0.5 %    Gran # (ANC) 5.7 1.8 - 7.7 K/uL    Immature Grans (Abs) 0.11 (H) 0.00 - 0.04 K/uL    Lymph # 3.3 1.0 - 4.8 K/uL    Mono # 0.7 0.3 - 1.0 K/uL    Eos # 0.1 0.0 - 0.5 K/uL    Baso # 0.07 0.00 - 0.20 K/uL    nRBC 0 0 /100 WBC    Gran % 56.4 38.0 - 73.0 %    Lymph % 33.1 18.0 - 48.0 %    Mono % 7.4 4.0 - 15.0 %    Eosinophil % 1.3 0.0 - 8.0 %    Basophil % 0.7 0.0 - 1.9 %    Differential Method Automated    Comprehensive Metabolic Panel    Collection Time: 10/31/24 10:15 AM   Result Value Ref Range    Sodium 140 136 - 145 mmol/L    Potassium 4.0 3.5 - 5.1 mmol/L    Chloride 106 95 - 110 mmol/L    CO2 25 23 - 29 mmol/L    Glucose 89 70 - 110 mg/dL    BUN 16 6 - 20 mg/dL    Creatinine 0.9 0.5 - 1.4 mg/dL    Calcium 9.7 8.7 - 10.5 mg/dL    Total Protein 7.1 6.0 - 8.4 g/dL    Albumin 4.2 3.5 - 5.2 g/dL    Total Bilirubin 0.5 0.1 - 1.0 mg/dL    Alkaline Phosphatase 50 40 - 150 U/L    AST 33 10 - 40 U/L    ALT 60 (H) 10 - 44 U/L    eGFR >60 >60 mL/min/1.73 m^2    Anion Gap 9 8 - 16 mmol/L   Magnesium    Collection Time: 10/31/24 10:15 AM   Result Value Ref Range    Magnesium 2.1 1.6 - 2.6 mg/dL   PHOSPHORUS    Collection Time: 10/31/24 10:15 AM   Result Value Ref Range    Phosphorus 3.2 2.7 - 4.5 mg/dL   Lactate Dehydrogenase    Collection Time: 10/31/24 10:15 AM   Result Value Ref Range     110 - 260 U/L   Uric Acid    Collection Time: 10/31/24 10:15 AM   Result Value Ref Range    Uric Acid 6.0 3.4 - 7.0 mg/dL       PROBLEMS ASSESSED THIS VISIT:    1. Primary myelofibrosis      PLAN:       Primary  myelofibrosis  Mr. Hu has primary myelofibrosis. Using the MIPSS-70 version 2.0 risk assessment tool, he is considered intermediate risk, with 10-year overall survival of 37%.      He is on ruxolitinib therapy and tolerating this well. This has brought some symptom mitigation with respect to diaphoresis.     He is interested in allogeneic stem cell transplant. His brother is a haploidentical donor, and we are planning evaluation and transplant dates at this time.     Anemia  Likely due to ruxolitinib therapy. Mild. Monitor closely throughout therapy.     MASLD  Biopsy proven to be steatotic change. Most compatible with MASLD. Follow-up with hepatology. Encourage lifestyle modification. No significant fibrosis.     Follow-up  For transplant evaluation    Clyde James MD  Hematology and Stem Cell Transplant    Visit today included increased complexity associated with the care of the episodic problem primary myelofibrosis addressed and managing the longitudinal care of the patient due to the serious and/or complex managed problem(s) primary myelofibrosis.

## 2024-11-14 DIAGNOSIS — D47.1 PRIMARY MYELOFIBROSIS: Primary | ICD-10-CM

## 2024-11-20 ENCOUNTER — HOSPITAL ENCOUNTER (OUTPATIENT)
Dept: RADIOLOGY | Facility: HOSPITAL | Age: 54
Discharge: HOME | End: 2024-11-20
Attending: INTERNAL MEDICINE
Payer: COMMERCIAL

## 2024-11-20 ENCOUNTER — HOSPITAL ENCOUNTER (OUTPATIENT)
Dept: PULMONOLOGY | Facility: CLINIC | Age: 54
Discharge: HOME | End: 2024-11-20
Payer: COMMERCIAL

## 2024-11-20 ENCOUNTER — PATIENT MESSAGE (OUTPATIENT)
Dept: HEMATOLOGY/ONCOLOGY | Facility: CLINIC | Age: 54
End: 2024-11-20

## 2024-11-20 ENCOUNTER — HOSPITAL ENCOUNTER (OUTPATIENT)
Dept: CARDIOLOGY | Facility: CLINIC | Age: 54
Discharge: HOME | End: 2024-11-20
Payer: COMMERCIAL

## 2024-11-20 ENCOUNTER — CLINICAL SUPPORT (OUTPATIENT)
Dept: HEMATOLOGY/ONCOLOGY | Facility: CLINIC | Age: 54
End: 2024-11-20
Payer: COMMERCIAL

## 2024-11-20 ENCOUNTER — CLINICAL SUPPORT (OUTPATIENT)
Dept: NUTRITION | Facility: CLINIC | Age: 54
End: 2024-11-20
Payer: COMMERCIAL

## 2024-11-20 ENCOUNTER — HOSPITAL ENCOUNTER (OUTPATIENT)
Dept: CARDIOLOGY | Facility: HOSPITAL | Age: 54
Discharge: HOME | End: 2024-11-20
Attending: INTERNAL MEDICINE
Payer: COMMERCIAL

## 2024-11-20 VITALS
DIASTOLIC BLOOD PRESSURE: 83 MMHG | WEIGHT: 197.06 LBS | HEIGHT: 71 IN | HEART RATE: 93 BPM | SYSTOLIC BLOOD PRESSURE: 136 MMHG | BODY MASS INDEX: 27.59 KG/M2

## 2024-11-20 DIAGNOSIS — D47.1 PRIMARY MYELOFIBROSIS: ICD-10-CM

## 2024-11-20 DIAGNOSIS — Z76.82 STEM CELL TRANSPLANT CANDIDATE: ICD-10-CM

## 2024-11-20 DIAGNOSIS — Z76.82 STEM CELL TRANSPLANT CANDIDATE: Primary | ICD-10-CM

## 2024-11-20 DIAGNOSIS — D47.1 PRIMARY MYELOFIBROSIS: Primary | ICD-10-CM

## 2024-11-20 LAB
ASCENDING AORTA: 3.4 CM
AV AREA BY CONTINUOUS VTI: 3.8 CM2
AV INDEX (PROSTH): 0.91
AV LVOT MEAN GRADIENT: 2 MMHG
AV LVOT PEAK GRADIENT: 3 MMHG
AV MEAN GRADIENT: 2.3 MMHG
AV PEAK GRADIENT: 3.2 MMHG
AV VALVE AREA BY VELOCITY RATIO: 4.2 CM²
AV VALVE AREA: 3.8 CM2
AV VELOCITY RATIO: 1
BSA FOR ECHO PROCEDURE: 2.12 M2
CV ECHO LV RWT: 0.2 CM
DOP CALC AO PEAK VEL: 0.9 M/S
DOP CALC AO VTI: 19.7 CM
DOP CALC LVOT AREA: 4.2 CM2
DOP CALC LVOT DIAMETER: 2.3 CM
DOP CALC LVOT PEAK VEL: 0.9 M/S
DOP CALC LVOT STROKE VOLUME: 74.3 CM3
DOP CALCLVOT PEAK VEL VTI: 17.9 CM
E WAVE DECELERATION TIME: 201 MS
E/A RATIO: 0.91
E/E' RATIO: 6.53 M/S
ECHO EF ESTIMATED: 59 %
ECHO LV POSTERIOR WALL: 0.5 CM (ref 0.6–1.1)
FRACTIONAL SHORTENING: 31.4 % (ref 28–44)
GLOBAL LONGITUIDAL STRAIN: 18 %
INTERVENTRICULAR SEPTUM: 0.6 CM (ref 0.6–1.1)
IVC DIAMETER: 0.97 CM
LA MAJOR: 5.11 CM
LA MINOR: 5.24 CM
LA WIDTH: 3.92 CM
LEFT ATRIUM SIZE: 4.44 CM
LEFT ATRIUM VOLUME INDEX MOD: 26.7 ML/M2
LEFT ATRIUM VOLUME INDEX: 36.5 ML/M2
LEFT ATRIUM VOLUME MOD: 56.14 ML
LEFT ATRIUM VOLUME: 76.55 CM3
LEFT INTERNAL DIMENSION IN SYSTOLE: 3.5 CM (ref 2.1–4)
LEFT VENTRICLE DIASTOLIC VOLUME INDEX: 59.37 ML/M2
LEFT VENTRICLE DIASTOLIC VOLUME: 124.68 ML
LEFT VENTRICLE MASS INDEX: 42.2 G/M2
LEFT VENTRICLE SYSTOLIC VOLUME INDEX: 24.3 ML/M2
LEFT VENTRICLE SYSTOLIC VOLUME: 51.09 ML
LEFT VENTRICULAR INTERNAL DIMENSION IN DIASTOLE: 5.1 CM (ref 3.5–6)
LEFT VENTRICULAR MASS: 88.5 G
LV LATERAL E/E' RATIO: 5.64
LV SEPTAL E/E' RATIO: 7.75
MV PEAK A VEL: 0.68 M/S
MV PEAK E VEL: 0.62 M/S
OHS CV RV/LV RATIO: 0.82 CM
OHS QRS DURATION: 74 MS
OHS QTC CALCULATION: 433 MS
RA MAJOR: 4.16 CM
RA PRESSURE ESTIMATED: 3 MMHG
RA WIDTH: 4.03 CM
RIGHT ATRIAL AREA: 13.2 CM2
RIGHT VENTRICLE DIASTOLIC BASEL DIMENSION: 4.2 CM
RV TISSUE DOPPLER FREE WALL SYSTOLIC VELOCITY 1 (APICAL 4 CHAMBER VIEW): 15.42 CM/S
SINUS: 3.27 CM
STJ: 3.06 CM
TDI LATERAL: 0.11 M/S
TDI SEPTAL: 0.08 M/S
TDI: 0.1 M/S
TRICUSPID ANNULAR PLANE SYSTOLIC EXCURSION: 2.6 CM
TV PEAK GRADIENT: 4 MMHG
Z-SCORE OF LEFT VENTRICULAR DIMENSION IN END DIASTOLE: -2.47
Z-SCORE OF LEFT VENTRICULAR DIMENSION IN END SYSTOLE: -1.03

## 2024-11-20 PROCEDURE — 93010 ELECTROCARDIOGRAM REPORT: CPT | Mod: S$GLB,,, | Performed by: INTERNAL MEDICINE

## 2024-11-20 PROCEDURE — 99999 PR PBB SHADOW E&M-EST. PATIENT-LVL II: CPT | Mod: PBBFAC,,,

## 2024-11-20 PROCEDURE — 93356 MYOCRD STRAIN IMG SPCKL TRCK: CPT

## 2024-11-20 PROCEDURE — 93356 MYOCRD STRAIN IMG SPCKL TRCK: CPT | Mod: ,,, | Performed by: INTERNAL MEDICINE

## 2024-11-20 PROCEDURE — 71046 X-RAY EXAM CHEST 2 VIEWS: CPT | Mod: 26,,, | Performed by: RADIOLOGY

## 2024-11-20 PROCEDURE — 93005 ELECTROCARDIOGRAM TRACING: CPT | Mod: S$GLB,,, | Performed by: INTERNAL MEDICINE

## 2024-11-20 PROCEDURE — 93306 TTE W/DOPPLER COMPLETE: CPT | Mod: 26,,, | Performed by: INTERNAL MEDICINE

## 2024-11-20 PROCEDURE — 97802 MEDICAL NUTRITION INDIV IN: CPT | Mod: 95,,,

## 2024-11-20 PROCEDURE — 71046 X-RAY EXAM CHEST 2 VIEWS: CPT | Mod: TC,FY

## 2024-11-20 NOTE — PROGRESS NOTES
BMT Pharmacist Evaluation      Current Outpatient Medications:     aspirin 81 MG Chew, Take 81 mg by mouth once daily. Pt takes over the counter, Disp: , Rfl:     atorvastatin (LIPITOR) 10 MG tablet, TAKE ONE TABLET BY MOUTH EVERY EVENING, Disp: 90 tablet, Rfl: 2    ergocalciferol (ERGOCALCIFEROL) 50,000 unit Cap, TAKE 1 CAPSULE BY MOUTH ONCE WEEKLY (Patient taking differently: Take 50,000 Units by mouth every 7 days. TAKE 1 CAPSULE BY MOUTH ONCE WEEKLY (Every Thursday)), Disp: 12 capsule, Rfl: 2    ruxolitinib (JAKAFI) 20 mg Tab, Take 1 tablet (20 mg) by mouth 2 (two) times a day., Disp: 60 tablet, Rfl: 2  No current facility-administered medications for this visit.      Review of patient's allergies indicates:   Allergen Reactions    Bactrim [sulfamethoxazole-trimethoprim] Hives         CrCl cannot be calculated (Patient's most recent lab result is older than the maximum 7 days allowed.).       Medication adherence: Good; Was able to recall doses and when he started taking certain medications.   Medication-related problems: Patient reported developing hives over chest from Bactrim. No other allergies or medication related problems reported.      Planned conditioning regimen:  Thiotepa on Day -7  Busulfan on Day -6, -5, -4, and -3  Fludarabine on Day -6, -5, -4, and -3  REST DAYS on Day -2 and -1     Planned  GVHD Prophylaxis:  Cyclophosphamide on Day +3 and +4  Tacrolimus starting on Day +5  Mycophenolate starting on Day +5     Antimicrobial Prophylaxis:  Acyclovir starting on Day -7  Levofloxacin starting on Day -1  Micafungin on Day -1 through Day +4  Letermovir starting on Day +5 (if CMV PCR drawn on day 0 results negative)  Posaconazole starting on Day +5  Atovaquone starting on Day +30     Skin Care with Thiotepa: Day -7 through D-5    Seizure Prophylaxis:  Levetiracetam on Day -7 through Day -2     SOS/VOD Prophylaxis:  Ursodiol on Day -7 through Day +30     Growth Factor Support:  Neupogen starting on Day  +5       Caregiver: Carmelita (Spouse)  Post-transplant discharge plans: Home     Notes:  Reviewed and reconciled the medication list with the patient and his caregiver. Patient was able to confirm all medications he currently takes including schedule and indication. Patient demonstrates excellent medication adherence and understands the importance of this through the transplant process.     Reviewed the planned high-dose chemotherapy regimen, including schedule and possible side effects. Provided the patient with drug information handouts for chemotherapy and GVHD prophylaxis. Reviewed possible side effects of transplant including: neutropenia, thrombocytopenia, anemia, infection, infusion reactions, rash, nausea/vomiting, mucositis/mouth sores, loss of appetite, taste changes, diarrhea, skin color changes, neuropathy, muscle/joint pain, skin sensitivity to light, hair loss, liver and/or renal dysfunction, and rare side effects of seizures and sinusoidal obstruction syndrome (SOS/VOD). Discussed the specific bathing and skin care requirements during and for 24-48 hours after the administration of thiotepa. Reviewed prophylactic antimicrobials, as well as prophylactic and as needed antiemetics. Encouraged the patient to report all possible side effects/new symptoms and to ask for supportive care medications if needed. Patient verbalized understanding and all questions were answered.    Pharmacy Notes:   - Patient reports that his provider placed him on aspirin back in May after his initial diagnosis. No cardiac history reported. Recommend holding once his platelets fall below 50k, and stopping on discharge.    - Recommend holding his home Lipitor on admit. It can be safely restarted on discharge.   - Patient reports taking Tylenol as needed for headaches. Patient aware that Tylenol and NSAIDs are generally avoided during admission due to increased risk of fever masking.    - No reported history of shingles, invasive  fungal infections, or CDiff.    - Patient understands that he will need to take a higher dose of acyclovir for one year post-transplant and start taking Atovaquone at day +30 and continue until off immunosuppression.     Drug Interactions:   Busulfan and Tylenol: Patient is aware that he will need to avoid Tylenol products beginning 3 days before admission. Should he develop a headache, before admit, he can take a one time ibuprofen. He is aware that Tylenol will be temporarily placed on his chart as an allergy, and that he will receive a reminder call from a pharmacist before admission.        Proposed recommendations:  The patient demonstrates good medication adherence and understanding of the chemotherapy and transplant plan. BMT/Hematology Oncology PharmD will continue to follow the patient while admitted to the inpatient unit.       Annalee Mcdowell, PharmD  Clinical Pharmacist-BMT/Hematology  [facility]

## 2024-11-20 NOTE — PROGRESS NOTES
The patient location is: [place]  The chief complaint leading to consultation is: evaluation/education prior to SCT/CarT Therapy.    Visit type: audiovisual    Face to Face time with patient: 30  45 minutes of total time spent on the encounter, which includes face to face time and non-face to face time preparing to see the patient (eg, review of tests), Obtaining and/or reviewing separately obtained history, Documenting clinical information in the electronic or other health record, Independently interpreting results (not separately reported) and communicating results to the patient/family/caregiver, or Care coordination (not separately reported).     Each patient to whom he or she provides medical services by telemedicine is:  (1) informed of the relationship between the physician and patient and the respective role of any other health care provider with respect to management of the patient; and (2) notified that he or she may decline to receive medical services by telemedicine and may withdraw from such care at any time.    Notes:  Oncology Nutrition Assessment   Evaluation and Education Note    Rubén Beachburak  1970    Transplant History:   Primary oncologist: Dr. Clyde James  Primary oncologic diagnosis: primary myelofibrosis  Transplant type and date: Allogenic SCT on 11/27/24  Conditioning Regimen: n/a  Immediate post-transplant complications: n/a    Social History:   Caregiver: spouse, Carmelita -   Post-transplant discharge plans: home Pre-Transplant Nutrition History:   Appetite/Intake:  good  Cultural/Spiritual/Personal Preferences: No Preferences    Living Situation: Lives with spouse  Who: Shops for Groceries? Spouse  Who: Prepare meals? Patient and Spouse  Eating out: 0-1x/week.  Allergies: Bactrim [sulfamethoxazole-trimethoprim]     PMHx:   Past Medical History:   Diagnosis Date    Hyperlipidemia         Nutrition Assessment    Anthropometrics:   Weight:   Wt Readings  "from Last 10 Encounters:   11/05/24 89.4 kg (197 lb 3.2 oz)   09/26/24 84.8 kg (187 lb)   09/17/24 87.2 kg (192 lb 3.9 oz)   08/08/24 85.1 kg (187 lb 8 oz)   07/31/24 84.9 kg (187 lb 2.7 oz)   07/30/24 86 kg (189 lb 11.3 oz)   07/24/24 84 kg (185 lb 3 oz)   07/22/24 83.9 kg (185 lb)   07/01/24 85.1 kg (187 lb 9.8 oz)   05/28/24 86 kg (189 lb 9.5 oz)                          Usual BW: 195-200 lb. Timeframe: past year  Weight Change: none Ht Readings from Last 1 Encounters:   11/05/24 5' 11" (1.803 m)      BMI Readings from Last 1 Encounters:   11/05/24 27.50 kg/m²     Estimated body surface area is 2.12 meters squared as calculated from the following:    Height as of 11/5/24: 5' 11" (1.803 m).    Weight as of 11/5/24: 89.4 kg (197 lb 3.2 oz).         Post-Transplant Nutrition History:   Appetite: good  Intake: good  Doesn't typically eat breakfast. Lunch is tuna and egg salad sandwich with fruit and yogurt. Dinner is     Encounter Notes:  Rubén Hu is a 54 y.o. male being seen for nutrition evaluation and education prior to Allogenic SCT. Patient presents to clinic accompanied by spouse . Weight stable. Current nutrition impact symptoms listed below.   And Ms. Hu are     Nutrition Impact Symptoms    [x] No nutritional concerns at current  [] Poor Appetite   [] Weight loss   [] Nausea   [] Vomiting   [] Diarrhea   [] Constipation   [] Early Satiety [] Change in smell   [] Change in taste   [] Dry Mouth   [] Thick saliva   [] Dysphagia   [] Difficulty chewing  [] Mucositis  [] Indigestion  [] Gas/Bloating  [] Reflux      [] Fatigue     [] other, please specify      Current Medications:  Current Outpatient Medications   Medication Instructions    aspirin 81 mg, Daily    atorvastatin (LIPITOR) 10 mg, Oral, Nightly    ergocalciferol (ERGOCALCIFEROL) 50,000 unit Cap TAKE 1 CAPSULE BY MOUTH ONCE WEEKLY    ruxolitinib (JAKAFI) 20 mg Tab Take 1 tablet (20 mg) by mouth 2 (two) times a day.     Labs: Reviewed - " followed by MD mckinney     Nutrition Diagnosis    Nutrition Problem: Food and nutrition related knowledge deficit  Etiology (related to): cancer and associated treatment  Signs/Symptoms (as evidenced by): referral for SCT evaluation- allogeneic transplant soon     Nutrition Intervention    Nutrition Prescription  N/A due to pre-transplant education/evaluation encounter    7082-2529 kcals/day 20-25 kcal/kg    g protein/day 1-1.2 gm/kg  6274-4653 mL fluid/day 1 ml/kcal    Recommendations:   Review details of [facility] Nutrition Guide review during visit   Encouraged safe food practices    Materials Provided/Reviewed: [facility] Nutrition Guide, Cell Therapy Nutrition Guide booklet  Barriers to Learning: none identified  Patient and/or Family Verbalizes understanding: yes     Nutrition Monitoring and Evaluation    Monitor: energy intake, diet tolerance , weight, and symptom management     Follow up upon RTC post Allogenic SCT In 2 months    Communication to referring provider/care team: Note available in chart.     Consultation Time: 45 Minutes    Hanna Lamb, MPH, RD, LDN  [facility]  [phone]

## 2024-11-20 NOTE — PATIENT INSTRUCTIONS
Recommendations:   Review details of [facility] Nutrition Guide review during visit   Encouraged safe food practices

## 2024-11-21 ENCOUNTER — DOCUMENTATION ONLY (OUTPATIENT)
Dept: HEMATOLOGY/ONCOLOGY | Facility: CLINIC | Age: 54
End: 2024-11-21
Payer: COMMERCIAL

## 2024-11-21 NOTE — PROGRESS NOTES
[facility]   Bone Marrow Transplant Psychosocial Assessment   Date: 2024       Demographic Information     Name: Rubén Hu    : 1970    Age: 54 y.o.    Sex: male    Race: White    Marital Status:    SS #:     Phone Number(s): [phone] (home)     Home Address: [address] [place] [address]    Mailing Address: [address] [place] [address]    Are you a [place] Citizen? Yes   If no, please explain: NA       Contact Information     Next of Kin: Carmelita Hu   Relationship: Spouse   Phone Number(s): [phone]   Emergency Contact: Extended Emergency Contact Information  Primary Emergency Contact: BayleedawoodCarmelita forde  Mobile Phone: [phone]  Relation: Spouse  Preferred language: English   needed? No        Living Arrangements   Household Composition:  Patient currently resides with: Spouse   If patient resides with spouse, please explain the marital relationship: No concerns   Does the patient currently own his/her own home? Yes   Does the patient currently rent home/apartment: No   Are current living arrangements permanent? Yes   If no, please explain: NA       Children's Names     Name Sex Age   1. None                           Who will be the primary caregiver for the children when patient is admitted to the hospital? NA               Support System   Primary Caregiver:     Name: Carmelita   Relationship: Spouse   Cell #: [phone]   Address: [facility]   Home #: None   City: [place]   Street: [address] [place] [address]   ZIP: [id number]       Secondary Caregiver:     Name: James Hu   Relationship: Brother   Cell #: [phone]   Address: [address] [place] [address]   Home #: None   City: [place]       ZIP: [id number]     Will patient's caregiver be available full-time? Yes   What is the patient's Druze? Shinto   Is patient currently practicing or non-practicing? No      Does patient have any other sources for support? Yes      If yes, please explain:  Brother, Sister, Nephew, Friends, Co-workers (at pharmacy)       Post BMT Plans      Does patient have full understanding of recovery from BMT? Yes   Does patient understand risk associated with BMT? Yes   What are patient's housing plans post BMT? Own home   Does patient have a Living Will? No   Does patient have a Power of ?  No   If yes, please give name of POA:  Name: No    Phone #: NA       Employment Information     Is patient currently employed? Yes   Employer: Networked reference to record [employer]   Phone #: Data Unavailable [facility]   Position:    Full Time/Part Time? full time   YRS: 15       Secondary Employment Information     Employer: None                       Significant Other Employment Information     Employer: [employer]   Phone #: [phone]   Position: Special    Full Time/Part Time? full time   YRS: 24         Financial Information     Monthly Income: $155-160,000 combined   Yearly Income: $13,000   Source of Income:  salary   Do you have any financial concerns? Patient will receive 66% of salary but concerned about finances.   If yes, please explain:  No immediate concerns       Insurance Information      Do you have health insurance?  Yes   Insurance Carrier [employer] out of state   Policy #: [id number]    Group #:   [id number]      Policy Shay: Patient   Medicare: No   Medicare Part D: No   Medicaid: No   Do you have Disability Insurance? Yes, long and short term      Do you have a Cancer Policy? No   Do you have medication/prescription coverage? Yes   Are you a ? No       Medical Information     Diagnosis: No diagnosis found. primary myelofibrosis    Date of Diagnosis: 07/01/2024   Is this a new diagnosis? Yes         If no, please explain: NA   Past Medical History: Past Medical History:   Diagnosis Date    Hyperlipidemia       Infusion Services: None   Home Health: None   Durable Medical Equipment: None   Activities of Daily  Living: Independent   Patient's Family Cancer History: Cancer-related family history includes Breast cancer (age of onset: 51) in his maternal cousin; Testicular cancer (age of onset: 17) in his maternal cousin.       Cognitive Functioning     Cognitive State: alert and oriented   Does patient have any concerns that may affect medical follow up and full understanding of treatment? No   Does patient have any concerns that may impact medication compliance? No   Education Level: some college   Does the patient have any learning disabilities? No   If yes, please explain: NA   Can the patient read English? Yes   Can the patient write in English? Yes      Is the patient Literate? Yes   What is the patient's primary language? English   Does the patient need interpretation services? No        Psychosocial History     Does patient have any emotional issues? No   If, yes please explain:  NA   Does patient have a psychiatric history? No   If, yes please explain:  NA   Is patient currently taking any psychiatric medication? No   If yes, please list medications: NA   Is patient currently in therapy or attending support groups? No   Where is the patient currently in therapy? NA   Therapist/Counselor Name: KHANH   Therapist/Counselor Phone #: NA       Alcohol/Drug Use/Abuse History     Alcohol Use: None since diagnosis in May Social History     Substance and Sexual Activity   Alcohol Use Yes    Comment: socially      Tobacco Use: Quite 11 years ago Social History     Tobacco Use   Smoking Status Former    Types: Cigars   Smokeless Tobacco Never      Drug Use: None Social History     Substance and Sexual Activity   Drug Use Never          Coping Skills     How is the patient currently coping with their diagnosis? Staying positive, maintains a positive outlook   Is the patient open/receptive to psychosocial intervention? Yes, if recommended   Has the patient experienced any significant losses in his/her life? No, none recently      If  yes, please explain: NA   What are the patient's identified needs? Monitor finances   Goals: Patient feels he's still young, he wants to live life to the fullest, travel, work   Interview Behavior: Pleasant, AAOx4   Suitability for Transplant: Yes   Additional Comments: Patient has financial concerns from taking time off work but will receive 2/3 income, spouse will receive 1/2 income. Patient in good physical shape, was doing 100 pushups 5 days/week.    Patient has a dog, Jun, at home.     Patient's brother and sister live near patient, can provide assistance if needed.    Carmelita has jury duty scheduled for 1/27/24. NAT obtained signed MD letter requesting excusal, sent to Carmelita via email, [email or web address] and [email or web address].

## 2024-11-22 LAB
DLCO ADJ PRE: 22.74 ML/(MIN*MMHG) (ref 23.21–37.07)
DLCO SINGLE BREATH LLN: 23.21
DLCO SINGLE BREATH PRE REF: 72.3 %
DLCO SINGLE BREATH REF: 30.14
DLCOC SBVA LLN: 3.04
DLCOC SBVA PRE REF: 81.8 %
DLCOC SBVA REF: 4.23
DLCOC SINGLE BREATH LLN: 23.21
DLCOC SINGLE BREATH PRE REF: 75.5 %
DLCOC SINGLE BREATH REF: 30.14
DLCOCSBVAULN: 5.42
DLCOCSINGLEBREATHULN: 37.07
DLCOCSINGLEBREATHZSCORE: -1.76
DLCOSINGLEBREATHULN: 37.07
DLCOSINGLEBREATHZSCORE: -1.98
DLCOVA LLN: 3.04
DLCOVA PRE REF: 78.4 %
DLCOVA PRE: 3.31 ML/(MIN*MMHG*L) (ref 3.04–5.42)
DLCOVA REF: 4.23
DLCOVAULN: 5.42
DLVAADJ PRE: 3.46 ML/(MIN*MMHG*L) (ref 3.04–5.42)
FEF 25 75 LLN: 2.07
FEF 25 75 PRE REF: 76.8 %
FEF 25 75 REF: 3.78
FEV05 LLN: 1.76
FEV05 REF: 2.9
FEV1 FVC LLN: 67
FEV1 FVC PRE REF: 99.5 %
FEV1 FVC REF: 78
FEV1 LLN: 2.89
FEV1 PRE REF: 92.5 %
FEV1 REF: 3.76
FEV1FVCZSCORE: -0.06
FEV1ZSCORE: -0.54
FVC LLN: 3.72
FVC PRE REF: 92.7 %
FVC REF: 4.82
FVCZSCORE: -0.53
IVC PRE: 4.53 L (ref 3.72–5.93)
IVC SINGLE BREATH LLN: 3.72
IVC SINGLE BREATH PRE REF: 94 %
IVC SINGLE BREATH REF: 4.82
IVCSINGLEBREATHULN: 5.93
PEF LLN: 7.24
PEF PRE REF: 108.5 %
PEF REF: 9.56
PHYSICIAN COMMENT: ABNORMAL
PRE DLCO: 21.79 ML/(MIN*MMHG) (ref 23.21–37.07)
PRE FEF 25 75: 2.91 L/S (ref 2.07–5.5)
PRE FET 100: 6.42 SEC
PRE FEV05 REF: 93.7 %
PRE FEV1 FVC: 77.89 % (ref 66.82–88.12)
PRE FEV1: 3.48 L (ref 2.89–4.59)
PRE FEV5: 2.72 L (ref 1.76–4.03)
PRE FVC: 4.46 L (ref 3.72–5.93)
PRE PEF: 10.37 L/S (ref 7.24–11.87)
VA PRE: 6.57 L (ref 6.98–6.98)
VA SINGLE BREATH LLN: 6.98
VA SINGLE BREATH PRE REF: 94.2 %
VA SINGLE BREATH REF: 6.98
VASINGLEBREATHULN: 6.98

## 2024-11-27 ENCOUNTER — HOSPITAL ENCOUNTER (OUTPATIENT)
Facility: HOSPITAL | Age: 54
Discharge: HOME | End: 2024-11-27
Attending: INTERNAL MEDICINE | Admitting: INTERNAL MEDICINE
Payer: COMMERCIAL

## 2024-11-27 ENCOUNTER — OFFICE VISIT (OUTPATIENT)
Dept: PSYCHIATRY | Facility: CLINIC | Age: 54
End: 2024-11-27
Payer: COMMERCIAL

## 2024-11-27 ENCOUNTER — ANESTHESIA (OUTPATIENT)
Dept: ENDOSCOPY | Facility: HOSPITAL | Age: 54
End: 2024-11-27
Payer: COMMERCIAL

## 2024-11-27 ENCOUNTER — ANESTHESIA EVENT (OUTPATIENT)
Dept: ENDOSCOPY | Facility: HOSPITAL | Age: 54
End: 2024-11-27
Payer: COMMERCIAL

## 2024-11-27 VITALS
TEMPERATURE: 98 F | SYSTOLIC BLOOD PRESSURE: 125 MMHG | BODY MASS INDEX: 27.44 KG/M2 | WEIGHT: 196 LBS | HEIGHT: 71 IN | HEART RATE: 61 BPM | OXYGEN SATURATION: 100 % | RESPIRATION RATE: 16 BRPM | DIASTOLIC BLOOD PRESSURE: 82 MMHG

## 2024-11-27 DIAGNOSIS — Z76.82 STEM CELL TRANSPLANT CANDIDATE: ICD-10-CM

## 2024-11-27 DIAGNOSIS — Z01.818 PRE-TRANSPLANT EVALUATION FOR STEM CELL TRANSPLANT: Primary | ICD-10-CM

## 2024-11-27 DIAGNOSIS — D75.81 MYELOFIBROSIS: ICD-10-CM

## 2024-11-27 DIAGNOSIS — D47.1 PRIMARY MYELOFIBROSIS: ICD-10-CM

## 2024-11-27 DIAGNOSIS — D47.1 PRIMARY MYELOFIBROSIS: Primary | ICD-10-CM

## 2024-11-27 LAB
REASON FOR REFERRAL (NARRATIVE): NORMAL
SPECIMEN SOURCE: NORMAL

## 2024-11-27 PROCEDURE — 88237 TISSUE CULTURE BONE MARROW: CPT | Performed by: NURSE PRACTITIONER

## 2024-11-27 PROCEDURE — 88313 SPECIAL STAINS GROUP 2: CPT | Mod: 59 | Performed by: PATHOLOGY

## 2024-11-27 PROCEDURE — 88185 FLOWCYTOMETRY/TC ADD-ON: CPT | Mod: 59 | Performed by: PATHOLOGY

## 2024-11-27 PROCEDURE — 38222 DX BONE MARROW BX & ASPIR: CPT | Mod: LT,,, | Performed by: NURSE PRACTITIONER

## 2024-11-27 PROCEDURE — 37000008 HC ANESTHESIA 1ST 15 MINUTES: Performed by: INTERNAL MEDICINE

## 2024-11-27 PROCEDURE — 88311 DECALCIFY TISSUE: CPT | Performed by: PATHOLOGY

## 2024-11-27 PROCEDURE — 88299 UNLISTED CYTOGENETIC STUDY: CPT | Performed by: NURSE PRACTITIONER

## 2024-11-27 PROCEDURE — 37000009 HC ANESTHESIA EA ADD 15 MINS: Performed by: INTERNAL MEDICINE

## 2024-11-27 PROCEDURE — 25000003 PHARM REV CODE 250: Performed by: NURSE ANESTHETIST, CERTIFIED REGISTERED

## 2024-11-27 PROCEDURE — 38222 DX BONE MARROW BX & ASPIR: CPT | Performed by: INTERNAL MEDICINE

## 2024-11-27 PROCEDURE — 88184 FLOWCYTOMETRY/ TC 1 MARKER: CPT | Performed by: PATHOLOGY

## 2024-11-27 PROCEDURE — 88341 IMHCHEM/IMCYTCHM EA ADD ANTB: CPT | Performed by: PATHOLOGY

## 2024-11-27 PROCEDURE — 81450 HL NEO GSAP 5-50DNA/DNA&RNA: CPT | Performed by: NURSE PRACTITIONER

## 2024-11-27 PROCEDURE — 63600175 PHARM REV CODE 636 W HCPCS: Performed by: NURSE ANESTHETIST, CERTIFIED REGISTERED

## 2024-11-27 PROCEDURE — 99999 PR PBB SHADOW E&M-EST. PATIENT-LVL II: CPT | Mod: PBBFAC,,, | Performed by: PSYCHOLOGIST

## 2024-11-27 PROCEDURE — 88305 TISSUE EXAM BY PATHOLOGIST: CPT | Performed by: PATHOLOGY

## 2024-11-27 PROCEDURE — 88342 IMHCHEM/IMCYTCHM 1ST ANTB: CPT | Performed by: PATHOLOGY

## 2024-11-27 RX ORDER — PROPOFOL 10 MG/ML
VIAL (ML) INTRAVENOUS
Status: DISCONTINUED | OUTPATIENT
Start: 2024-11-27 | End: 2024-11-27

## 2024-11-27 RX ORDER — LIDOCAINE HYDROCHLORIDE 20 MG/ML
INJECTION INTRAVENOUS
Status: DISCONTINUED | OUTPATIENT
Start: 2024-11-27 | End: 2024-11-27

## 2024-11-27 RX ADMIN — PROPOFOL 140 MCG/KG/MIN: 10 INJECTION, EMULSION INTRAVENOUS at 01:11

## 2024-11-27 RX ADMIN — PROPOFOL 95 MG: 10 INJECTION, EMULSION INTRAVENOUS at 01:11

## 2024-11-27 RX ADMIN — SODIUM CHLORIDE: 9 INJECTION, SOLUTION INTRAVENOUS at 01:11

## 2024-11-27 RX ADMIN — LIDOCAINE HYDROCHLORIDE 75 MG: 20 INJECTION INTRAVENOUS at 01:11

## 2024-11-27 NOTE — DISCHARGE INSTRUCTIONS
Discharge instructions for having a Bone Marrow Aspiration / Biopsy    Keep Bandage in place for 24 hours.  - Do not shower or take a tube bath during this time. (you may sponge bathe).  - Call the nurse or physician for excessive bleeding or pain at biopsy site.  - You may take Tylenol as needed for pain.    You have received medication to sedate you.  - Do not drive a car or operate heavy machinery for the rest of the day.  - You may resume other activities as tolerated.    You can call [phone] for any problems during the hours of 8:00 AM-5:00PM.    For an emergency after 5:00 PM you can call [phone] and have the  page the Hematologist / Oncologist on call.

## 2024-11-27 NOTE — PROCEDURES
"Rubén Hu is a 54 y.o. male patient.    Temp: 97.7 °F (36.5 °C) (11/27/24 1155)  Pulse: 60 (11/27/24 1155)  Resp: 16 (11/27/24 1155)  BP: 139/78 (11/27/24 1155)  SpO2: 98 % (11/27/24 1155)  Weight: 88.9 kg (196 lb) (11/27/24 1155)  Height: 5' 11" (180.3 cm) (11/27/24 1155)       Bone marrow    Date/Time: 11/27/2024 1:30 PM    Performed by: Cassia Wu NP  Authorized by: Cassia Wu NP    Aspiration?: Yes   Biopsy?: Yes      PROCEDURE NOTE:  Bone Marrow aspiration and biopsy  Indication: pre allo transplant restaging myelofibrosis  Consent: Informed consent was obtained from patient.  Timeout: Done and documented.  Site: Left posterior illiac crest.  Prep: Betadine.  Needle used: 11 gauge Jamshidi needle.  Anesthetic: 2% lidocaine 5 cc.  Biopsy: The biopsy needle was introduced into the marrow cavity and 25cc's of aspirate was obtained without complications and sent for flow, cytogenetics, NGS and DNA hold. Core biopsy obtained on 2nd attempt and sent for routine histologic examination.  Complications: None.  EBL: minimal  Disposition: The patient was discharged per anesthesia protocol.    Cassia Wu NP  Hematology/BMT   11/27/2024    "

## 2024-11-27 NOTE — PROGRESS NOTES
INFORMED CONSENT/ LIMITS of CONFIDENTIALITY: Prior to beginning the interview, the patient's identification was confirmed using two identifiers. Rubén Hu  was informed of the possible risks and benefits of psychological interventions (e.g., counseling, psychotherapy, testing) and provided information regarding the handling of protected health records and   the limits of confidentiality, including the importance of reporting any suicidal or homicidal ideation to ensure safety of all parties. This provider explained the purpose of today's appointment and the patient was provided with time to ask questions regarding this information.  Acceptance and understanding of these conditions was expressed, and Rubén Hu freely consented to this evaluation.     Psycho-Oncology Pre-Transplant Evaluation  Psychiatry Initial Visit (PhD)  Psychological Intake and Assessment    Date:  11/27/2024     CPT Code: 38353 (1hr) , 34699 (1hr), 31462 (1hr) Evaluation Length (direct face-to-face time):  1 hour   Total Time including report writing, chart review, integration of data and feedback: 3 hours       Referred by:  BMT Team/ Oncologist: CIARRA James MD.     Chief complaint/reason for encounter:  Psychological Evaluation prior to stem cell transplantation    Clinical status of patient: Outpatient    Rubén Hu, a 54 y.o. male, was seen for initial evaluation visit.  Met with patient and spouse. His primary care physician is Bebeto Arora MD.       Psychological Intake  Medical/Surgical History:   Patient Active Problem List   Diagnosis    Hyperlipidemia    Tobacco abuse (quit cigarettes, on cigars)    Hemorrhoids    Adrenal nodule (repeat CT around 6/2024)    Primary myelofibrosis    Colitis    Metabolic dysfunction-associated steatohepatitis (MASH)    Stem cell transplant candidate    Overweight (BMI 25.0-29.9)        Health Behaviors:       ETOH Use: No (past social)      Tobacco Use: No Former Smoker   Illicit Drug  Use:  No     Prescription Misuse:No   Caffeine: minimal 1 cup per day   Exercise:The patient maintains a regular, healthy exercise program.   Firearms:  yes stored    Advanced directives:No       Past Psychiatric History:   Inpatient treatment: No     Outpatient treatment: No     Prior substance abuse treatment: No     Suicide Attempts: No      Psychotropic Medications:  Current: none       Past: none    Current medications as per below, allergies reviewed in chart.  Current Outpatient Medications   Medication    aspirin 81 MG Chew    atorvastatin (LIPITOR) 10 MG tablet    ergocalciferol (ERGOCALCIFEROL) 50,000 unit Cap    ruxolitinib (JAKAFI) 20 mg Tab     No current facility-administered medications for this visit.         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 Anxiety is much improved. Brother in haplo donor.    Rubén Hu has adjusted to illness fairly well primarily through active coping strategies. He has engaged in appropriate information gathering.  The patient has good family support.  His family is coping well with the  diagnosis/treatment/prognosis.    Rubén Hu reports using  fishing and time with family and friends  as his primary methods of coping with general stressors.  Illness-related psychosocial stressors include changes in ability to engage in leisure activities. These stressors will not prevent patient from adhering to post-transplant requirements.  The patient has an good partnership with his [facility] oncology treatment team. The patient reports the following barriers to cancer care:none.    Stem Cell Transplantation (SCT):  Rubén Hu possesses a good level of knowledge about SCT gleaned from materials provided by his clinicians and discussions with his clinical team .  Rubén Hu is knowledgeable about the possible costs, risks, and complications of the procedure and the behavioral changes which will be required of him.  He has anticipated his recovery needs and has planned adequate time away from work, assistance from family, and residence at home to facilitate healing. Rubén Hu is aware of the requirement that HSCT patients must stay within 1 hour of the hospital for their first 100 days post-transplant.  Rubén Hu knows he must commit to careful monitoring of symptoms, the possibility of a complex long-term multiple drug treatment regimen, and long term follow-up visits with his oncologist (as required) following the procedure.  He is aware of the following necessary behavioral changes:changes in food selection, preparation, and storage, increased vigilance with home cleanliness , careful personal and dental hygiene , changes to modes of pet care , and rapid return to physical activity. The patient reports good compliance with medical treatment in the past, which is supported by review of his medical chart.  Rubén Hu has realistic expectations of health and illness possibilities following SCT. He is aware of possible medical side effects including infection, organ toxicity/failure ,  hair loss, GI difficulties , loss of appetite, fatigue , neurocognitive changes, neuropathy, and mucositis  during/following treatment. He is aware of the risk of mortality. He also anticipates social strains including decreased ability to participate in some leisure and social activities for some time and the strains of care-giving demands on friends/family.      Collateral Information: No concerns    Current Symptoms:  Mood: denied no SI/HI;   Guillermina: Denies   Psychosis: Denies  Anxiety: Feeling nervous, anxious, or on edge; prior anxiety:situational, general worries ;   Generalized anxiety: Denies       Panic Disorder: Denies  Social/specific phobia: Denies   OCD: Denies  Substance abuse: denied  Is the patient willing to submit to a random drug screen?   Yes (Drugs of abuse screen NEG)  Cognitive functioning: denied  Health behaviors: noncontributory  Can the patient identify own medications and describe purpose and proper dosing? Yes  Does the patient appropriately manage chronic conditions which need close monitoring (such as diabetes)? Yes  Does the patient use complementary or alternative medications, remedies, procedures, or interventions? No   Sleep:   Stress creeping into dreams ,   Pain: Mr. Hu reports no pain.      Trauma: Denies  Sexual Dysfunction:  Denies   Head Injury History: Denies  Personality Functioning: The patient does not display any personality characteristics which would be an impediment to receiving BMT.    Patient Reported Cancer Treatment Symptoms:  no complaints    Psychological Assessment/Testing:     Distress thermometer:   Distress Score    Distress Score: 3        Practical Problems Physical Problems                                                   Family Problems                                         Emotional Problems                                                         Spiritual/Religions Concerns               Other Problems              PHQ ANSWERS    Q1. Little  interest or pleasure in doing things: (Patient-Rptd) (P) Not at all (11/20/24 2034)  Q2. Feeling down, depressed, or hopeless: (Patient-Rptd) (P) Not at all (11/20/24 2034)  Q3. Trouble falling or staying asleep, or sleeping too much: (Patient-Rptd) (P) Not at all (11/20/24 2034)  Q4. Feeling tired or having little energy: (Patient-Rptd) (P) Several days (11/20/24 2034)  Q5. Poor appetite or overeating: (Patient-Rptd) (P) Several days (11/20/24 2034)  Q6. Feeling bad about yourself - or that you are a failure or have let yourself or your family down: (Patient-Rptd) (P) Not at all (11/20/24 2034)  Q7. Trouble concentrating on things, such as reading the newspaper or watching television: (Patient-Rptd) (P) Not at all (11/20/24 2034)  Q8. Moving or speaking so slowly that other people could have noticed. Or the opposite - being so fidgety or restless that you have been moving around a lot more than usual: (Patient-Rptd) (P) Not at all (11/20/24 2034)  Q9.  0    PHQ8 Score : (Patient-Rptd) (P) 2 (11/20/24 2034)  PHQ-9 Total Score: (Patient-Rptd) (P) 2 (11/20/24 2034)         COURTNEY-7 Answers        11/20/2024     8:33 PM   GAD7   1. Feeling nervous, anxious, or on edge? 1    2. Not being able to stop or control worrying? 0    3. Worrying too much about different things? 0    4. Trouble relaxing? 0    5. Being so restless that it is hard to sit still? 0    6. Becoming easily annoyed or irritable? 0    7. Feeling afraid as if something awful might happen? 1    8. If you checked off any problems, how difficult have these problems made it for you to do your work, take care of things at home, or get along with other people? 0    COURTNEY-7 Score 2        Patient-reported     COURTNEY-7 Score: (Patient-Rptd) (P) 2  Interpretation: (Patient-Rptd) (P) Normal       AUDIT-C  The AUDIT-C is a 3-item alcohol screen that can help identify individuals who are hazardous drinkers or who have alcohol use disorders (including alcohol abuse or  dependence). Negative; Score:  0    PCS: Pain catastrophizing: 3 There were no elevations      MOCA  The Chris Cognitive Assessment (MOCA), a cognitive screener, was administered to assess the patient's current mental status and cognitive capacity. Patient obtained a score of 27/30. This score does fall within normal limits as compared to those within similar age and level of education, and suggests no impairments in neurocognitive functioning.  He is currently undergoing cancer treatment, which is associated with temporary decline in cognitive functioning.      REALM-R:   Deferred as patient did not have reading glasses.       [facility] BEHAVIORAL MEDICINE DIAGNOSTIC (MBMD)                                                               The MBMD provides an assessment of the potential role of psychiatric factors in a patient's disease and treatment. Rubén Hu produced a valid MBMD profile. Validity indices suggested some concerns in the area of Disclosure though not enough to invalidate results.The elevation of the disclosure scale suggests that Rubén Hu may be hesitant to share some information. All additional scales will be interpreted with this information in mind.    The patient's profile suggests the presence of  no  anxiety, no depression, no  cognitive issues, no difficulty with moodiness or mood swings, and no apprehension to being open with others about these issues. There are no indications he may struggle with overeating, overuse of EtOH, overuse of caffeine, use of substances, inactivity, or tobacco use when under stress.     In regard to COPING STYLES, this patient's responses showed no particular strong points or areas of concern. Several key scales show response patterns that would be expected to POSITIVELY influence mis-procedural course (Sociable, Confident, and Respectful). Rubén Hu's responses indicate that he is outgoing and cooperative following treatment, which suggests that  he will be compliant with recommendations by providers. Additionally, the profile suggests that the patient is self-assured but may need to be informed in more detailed of medical suggestions by providers. Scorers with similar profiles tend to be responsible and cooperative.     The STRESS MODERATORS scales assess factors which have the potential to influence patient responses to treatment. Rubén Hu obtained no elevations on the Stress Moderators scales of Illness Apprehension, Functional Deficits, Pain Sensitivity, Social Isolation, and Future Pessimism, and a mild elevation on the scale of Spiritual Absence. His score on the Spiritual Absence scale suggests low levels of spiritual coping mechanisms. However, he is positive about his future and feels able to cope with his current pain levels and physical abilities (based on his scores on the Functional Deficits, Pain Sensitivity, and Future Pessimism scales). Profiles similar to Rubén Hu indicate the patient may have high awareness of changes in his physical functioning, which may be beneficial to him with regard to being proactive in seeking medical treatment.    Scores on the TREATMENT PROGNOSTIC scales reveal that Rubén Hu is likely to be very stoic in the face of medical intervention, will likely be diligent in adhering to medical recommendations, and is receptive to information about his health and comfortable learning about details of his illness. The patient's responses indicate that he is likely to be an adequate partner with his medical team in working toward wellness. He is not likely to require excessive psychosocial support or assistance from his medical team during his mis-procedural course.    Scores on the MANAGEMENT GUIDES indices reveal that the patient's responses indicate that he is likely to feel confident in his ability to cope with activities, roles, and responsibilities of daily life and is adjusting adequately to his  illness and treatment. The patient's responses indicate that he is not likely to require assistance to overcome his psychosocial needs and additional psychotherapeutic support is not likely to be necessary.            Mental Status Exam:    General appearance:  appears stated age, neatly dressed, well groomed  Level of cooperation:  cooperative  Thought processes:  logical, goal-directed   Speech: normal in rate, volume, and tone    Mood: steady  Affect: mood congruent  Thought content:  no illusions, no visual hallucinations, no auditory hallucinations, no delusions, no active or passive homicidal thoughts, no active or passive suicidal ideation, no obsessions, no compulsions, no violence  Orientation:  oriented to person, place, and time  Memory:  Recent memory:  3 of 5 objects after brief delay.    Remote memory - intact  Attention span and concentration:  WNL  Abstract reasoning:    Similarities: abstract.    Proverbs: abstract.  Judgment and insight: good   Language:  Intact      SUMMARY:  Rubén Hu is a  54 y.o. male referred by Dr. CIARRA James MD. for psychological evaluation prior to stem cell transplantation.  The patient appears absent of disabling psychopathology or disabilities which would prevent understanding and compliance with medical treatment.  There is no evidence of suicidality.   The patient has good knowledge about HSCT, appropriate expectations for health and illness following transplantation, adequate  understanding of the possible risks and complications of this treatment option, and a high willingness to sustain effort for lifestyle changes and health adaptations which will be required of him. He is aware of the 100 day 1 hour residence requirement.  He reports adequate compliance with previous medical treatment.  Rubén Hu has good social support from spouse. Caregivers are engaged and aware of post-HSCT demands.  The patient exhibits a high degree of social stability.  The  patient acknowledges no stressors expected to limit his ability to cope with the demands of HSCT and recovery.  The patient reports limited, social alcohol use, no illicit drug use, and long-term discontinuation of tobacco use  He demonstrates adequate health literacy.          IMPRESSIONS AND RECOMMENDATIONS  Rubén Hu is an acceptable HSCT candidate from a psychological perspective, with minimal risk.   There are no overt psychological contraindications for proceeding with the procedure.He has no significant mental health history, and reports no current psychiatric problems or major adjustment issues.  The patient has reasonable expectations for the procedure, good social support, and has already begun making appropriate life plans in anticipation of the procedure. The patient has verbalized appropriate awareness and commitment to the necessary behavioral changes associated with HSCTand appears willing to adjust to long-term lifestyle challenges and medical follow-up. There are no recommendations for psychological treatment at this time. The patient and family are aware of resources available should their psychological needs change in the future.    ICD-10-CM ICD-9-CM   1. Pre-transplant evaluation for stem cell transplant  Z01.818 V72.83   2. Primary myelofibrosis  D47.1 238.76   3. Stem cell transplant candidate  Z76.82 V49.83           Livier Barroso, PhD  Clinical Psychologist  [place] License #[id number]  [place] License #[id number]

## 2024-11-27 NOTE — DISCHARGE SUMMARY
[facility]th [facility]  Hematology  Bone Marrow Transplant  Discharge Summary      Patient Name: Rubén Hu  MRN: [id number]  Admission Date: 11/27/2024  Hospital Length of Stay: 0 days  Discharge Date and Time: 11/27/2024  2:18 PM  Attending Physician: No att. providers found   Discharging Provider: Cassia Wu NP  Primary Care Provider: Bebeto Arora MD    HPI: 54 year old male with history of myelofibrosis present for pre allo transplant restaging bone marrow aspiration and biopsy with sedation.    Procedure(s) (LRB):  Biopsy-bone marrow (N/A)     Hospital Course:   Patient admitted to endoscopy today for a bone marrow aspiration and biopsy. Consent was obtained for a bone marrow biopsy. Patient was sedated per anesthesia and a bone marrow biopsy and aspiration was performed in endo suite 5. Patient was then transferred to post op and discharged home when appropriate per anesthesia.      Goals of Care Treatment Preferences:  Code Status: Full Code          Significant Diagnostic Studies: N/A    Pending Diagnostic Studies:       Procedure Component Value Units Date/Time    Chromosome Analysis, Bone Marrow Right Posterior Iliac Crest [[id number]] Collected: 11/27/24 1301    Order Status: Sent Lab Status: In process Updated: 11/27/24 1301    Specimen: Bone Marrow     Heme Disorders DNA/RNA Hold, Bone Marrow [[id number]] Collected: 11/27/24 1301    Order Status: Sent Lab Status: In process Updated: 11/27/24 1301    Specimen: Bone Marrow     OncoHeme (NGS) Hematologic Neoplasms, BM Diagnosis or Indication for test: restaging myelofibrosis [[id number]] Collected: 11/27/24 1301    Order Status: Sent Lab Status: In process Updated: 11/27/24 1301    Specimen: Bone Marrow     Specimen to Pathology, Bone Marrow Aspiration/Biopsy [[id number]] Collected: 11/27/24 1301    Order Status: Sent Lab Status: In process Updated: 11/27/24 1357    Specimen: Bone Marrow           There are no hospital problems to  display for this patient.     Discharged Condition: stable    Disposition: Home or Self Care    Follow Up:   Future Appointments   Date Time Provider Department Center   12/4/2024  1:40 PM Clyde James MD [facility] BMT [place]   12/18/2024 11:45 AM BMT, NURSE TAMIE [facility] BMT [place]   12/18/2024 12:45 PM Tatum Pruitt NP [facility] BMT [place]   1/17/2025  9:30 AM Hanna Lamb RD [facility]        Patient Instructions:      Notify your health care provider if you experience any of the following:  temperature >100.4     Notify your health care provider if you experience any of the following:  persistent nausea and vomiting or diarrhea     Notify your health care provider if you experience any of the following:  severe uncontrolled pain     Notify your health care provider if you experience any of the following:  redness, tenderness, or signs of infection (pain, swelling, redness, odor or green/yellow discharge around incision site)     Notify your health care provider if you experience any of the following:  difficulty breathing or increased cough     Notify your health care provider if you experience any of the following:  persistent dizziness, light-headedness, or visual disturbances     Remove dressing in 24 hours     Activity as tolerated     Shower on day dressing removed (No bath)     Medications:  Reconciled Home Medications:      Medication List        ASK your doctor about these medications      aspirin 81 MG Chew  Take 81 mg by mouth once daily. Pt takes over the counter     atorvastatin 10 MG tablet  Commonly known as: LIPITOR  TAKE ONE TABLET BY MOUTH EVERY EVENING     ergocalciferol 50,000 unit Cap  Commonly known as: ERGOCALCIFEROL  TAKE 1 CAPSULE BY MOUTH ONCE WEEKLY     JAKAFI 20 mg Tab  Generic drug: ruxolitinib  Take 1 tablet (20 mg) by mouth 2 (two) times a day.              Cassia Wu NP  Bone Marrow Transplant  [facility]

## 2024-11-27 NOTE — PLAN OF CARE
Discharge instructions reviewed with patient at bedside. Questions/concerns addressed. Verbalized understanding of instructions.

## 2024-11-27 NOTE — TRANSFER OF CARE
"Anesthesia Transfer of Care Note    Patient: Rubén Hu    Procedure(s) Performed: Procedure(s) (LRB):  Biopsy-bone marrow (N/A)    Patient location: GI    Anesthesia Type: general    Transport from OR: Transported from OR on room air with adequate spontaneous ventilation    Post pain: adequate analgesia    Post assessment: no apparent anesthetic complications    Post vital signs: stable    Level of consciousness: awake and lethargic    Nausea/Vomiting: no nausea/vomiting    Complications: none    Transfer of care protocol was followed    Last vitals: Visit Vitals  /68 (BP Location: Right arm, Patient Position: Lying)   Pulse 68   Temp 36.8 °C (98.2 °F) (Temporal)   Resp 16   Ht 5' 11" (1.803 m)   Wt 88.9 kg (196 lb)   SpO2 99%   BMI 27.34 kg/m²     "

## 2024-11-27 NOTE — ANESTHESIA POSTPROCEDURE EVALUATION
Anesthesia Post Evaluation    Patient: Rubén Hu    Procedure(s) Performed: Procedure(s) (LRB):  Biopsy-bone marrow (N/A)    Final Anesthesia Type: general      Patient location during evaluation: PACU  Patient participation: Yes- Able to Participate  Level of consciousness: awake and alert and oriented  Post-procedure vital signs: reviewed and stable  Pain management: adequate  Airway patency: patent    PONV status at discharge: No PONV  Anesthetic complications: no      Cardiovascular status: blood pressure returned to baseline and hemodynamically stable  Respiratory status: unassisted  Hydration status: euvolemic  Follow-up not needed.              Vitals Value Taken Time   /82 11/27/24 1402   Temp 36.8 °C (98.2 °F) 11/27/24 1337   Pulse 61 11/27/24 1402   Resp 16 11/27/24 1402   SpO2 100 % 11/27/24 1402         Event Time   Out of Recovery 14:12:24         Pain/Mohsen Score: Mohsen Score: 10 (11/27/2024  1:47 PM)

## 2024-11-27 NOTE — LETTER
December 6, 2024        Clyde James MD  [address] [facility] [address]             [facility] - Psychiatry  [address]  [place] [address]  Phone: [phone]  Fax: [phone]   Patient: Rubén Hu   MR Number: [id number]   YOB: 1970   Date of Visit: 11/27/2024       Dear Team    Thank you for referring Rubén Hu to me for evaluation. Below are the relevant portions of my assessment and plan of care.       IMPRESSIONS AND RECOMMENDATIONS  Rubén Hu is an acceptable HSCT candidate from a psychological perspective, with minimal risk.   There are no overt psychological contraindications for proceeding with the procedure.He has no significant mental health history, and reports no current psychiatric problems or major adjustment issues.  The patient has reasonable expectations for the procedure, good social support, and has already begun making appropriate life plans in anticipation of the procedure. The patient has verbalized appropriate awareness and commitment to the necessary behavioral changes associated with HSCTand appears willing to adjust to long-term lifestyle challenges and medical follow-up. There are no recommendations for psychological treatment at this time. The patient and family are aware of resources available should their psychological needs change in the future.    ICD-10-CM ICD-9-CM   1. Pre-transplant evaluation for stem cell transplant  Z01.818 V72.83   2. Primary myelofibrosis  D47.1 238.76   3. Stem cell transplant candidate  Z76.82 V49.83         If you have questions, please do not hesitate to call me. I look forward to following Rubén along with you.    Sincerely,      Livier Barroso, PhD           CC  No Recipients      Date/Time Last Known Well Time: 
 2200 10-24-19 Date/Time Discovery of Stroke Symptoms 2200 10-24-19 NIHSS upon arrival:  NIH 21 at 1:14 AM per Tele Neuro in ER at [facility] Time to IR Suite:[facility] arrival 2:52 am 
 
Time of Arterial Puncture: 3:06 am 
 
Start of IA Infusion of tPA or 1st pass of recanalization device in Target vessel: # 1 @ 3:26 am 
                        #2 @ 3:38 am 
                        #3 @ 3:46 am 
                        #4 @ 4:05 am 
                    
 
Time of Revascularization:  4:05 am 
 
Pretreatment TICI:  0 Post treatment TICI:  2B Procedure Stop Time(When sterile drape is off):  
 
Time of first set of VS, neuro cks, groin, and pulse checks:  415 am 
 
Time transfer to ICU:4:40 am 
 
Time of last VS, neuro, groin, and pulse checks documentation before ICU caring for pt:  4:30 am 
 
 
 
TRANSFER - OUT REPORT: 
 
Verbal report given to Connor Bhatt RN, ICU on   [facility]being transferred to ICU #2 for routine post - op (Post embolectomy) Report consisted of patients Situation, Background, Assessment and  
Recommendations(SBAR). Information from the following report(s) [facility] ER and Angio procedure was reviewed with the receiving nurse. Lines:   18 R arm 
              20 L arm Left radial A line Opportunity for questions and clarification was provided. Patient transported with: 
 Monitor O2 @ 2 liters Registered Nurse Tech  
 
 
Report to Connor Bhatt RN at bedside in angio suite prior to transfer. Patient to ICU bed from angio table.  No personal belongings with patient upon arrival.

## 2024-11-27 NOTE — ANESTHESIA PREPROCEDURE EVALUATION
"                                                                                                             11/27/2024    Pre-operative evaluation for Procedure(s) Bone Marrow Biopsy    Rubén Hu is a 54 y.o. male     Patient Active Problem List   Diagnosis    Hyperlipidemia    Tobacco abuse (quit cigarettes, on cigars)    Hemorrhoids    Adrenal nodule (repeat CT around 6/2024)    Primary myelofibrosis    Colitis    Metabolic dysfunction-associated steatohepatitis (MASH)    Stem cell transplant candidate    Overweight (BMI 25.0-29.9)       Review of patient's allergies indicates:   Allergen Reactions    Bactrim [sulfamethoxazole-trimethoprim] Hives       No current facility-administered medications on file prior to encounter.     Current Outpatient Medications on File Prior to Encounter   Medication Sig Dispense Refill    aspirin 81 MG Chew Take 81 mg by mouth once daily. Pt takes over the counter      atorvastatin (LIPITOR) 10 MG tablet TAKE ONE TABLET BY MOUTH EVERY EVENING 90 tablet 2    ruxolitinib (JAKAFI) 20 mg Tab Take 1 tablet (20 mg) by mouth 2 (two) times a day. 60 tablet 2    ergocalciferol (ERGOCALCIFEROL) 50,000 unit Cap TAKE 1 CAPSULE BY MOUTH ONCE WEEKLY (Patient taking differently: Take 50,000 Units by mouth every 7 days. TAKE 1 CAPSULE BY MOUTH ONCE WEEKLY (Every Thursday)) 12 capsule 2             VITALS    Wt Readings from Last 3 Encounters:   11/27/24 88.9 kg (196 lb)   11/20/24 89.4 kg (197 lb 1.5 oz)   11/05/24 89.4 kg (197 lb 3.2 oz)     Temp Readings from Last 3 Encounters:   11/27/24 36.5 °C (97.7 °F)   09/26/24 36.7 °C (98.1 °F) (Oral)   09/17/24 36.7 °C (98 °F) (Oral)     BP Readings from Last 3 Encounters:   11/27/24 139/78   11/20/24 136/83   11/05/24 (!) 142/84     Pulse Readings from Last 3 Encounters:   11/27/24 60   11/20/24 93   11/05/24 63       CBC:   No results for input(s): "WBC", "RBC", "HGB", "HCT", "PLT", "MCV", "MCH", "MCHC" in the last 72 " "hours.      CMP:   No results for input(s): "NA", "K", "CL", "CO2", "BUN", "CREATININE", "GLU", "MG", "PHOS", "CALCIUM", "ALBUMIN", "PROT", "ALKPHOS", "ALT", "AST", "BILITOT" in the last 72 hours.      INR  No results for input(s): "PT", "INR", "PROTIME", "APTT" in the last 72 hours.          Diagnostic Studies:      EKD Echo:  No results found for this or any previous visit.      Pre-op Assessment    I have reviewed the Patient Summary Reports.     I have reviewed the Nursing Notes. I have reviewed the NPO Status.      Review of Systems  Anesthesia Hx:  No problems with previous Anesthesia             Denies Family Hx of Anesthesia complications.    Denies Personal Hx of Anesthesia complications.                    Social:  Smoker       Hematology/Oncology:  Hematology Normal                                 Oncology Comments: Primary myelofibrosis     EENT/Dental:  EENT/Dental Normal           Cardiovascular:                hyperlipidemia                               Pulmonary:  Pulmonary Normal                       Hepatic/GI:      Liver Disease, Hepatitis Rectal bleeding          Liver Disease, Hepatitis        Musculoskeletal:  Musculoskeletal Normal                Neurological:  Neurology Normal                                      Endocrine:  Endocrine Normal            Dermatological:  Skin Normal    Psych:  Psychiatric Normal                  Physical Exam  General: Alert, Oriented, Well nourished and Cooperative    Airway:  Mallampati: II   Mouth Opening: Normal  TM Distance: Normal  Tongue: Normal  Neck ROM: Normal ROM    Dental:  Intact    Chest/Lungs:  Normal Respiratory Rate    Heart:  Rate: Normal  Rhythm: Regular Rhythm      Anesthesia Plan  Type of Anesthesia, risks & benefits discussed:    Anesthesia Type: Gen Natural Airway  Intra-op Monitoring Plan: Standard ASA Monitors  Induction:  IV  Informed Consent: Informed consent signed with the Patient and all parties understand the risks and " agree with anesthesia plan.  All questions answered. Patient consented to blood products? No  ASA Score: 2  Day of Surgery Review of History & Physical: H&P Update referred to the surgeon/provider.I have interviewed and examined the patient. I have reviewed the patient's H&P dated: There are no significant changes.     Ready For Surgery From Anesthesia Perspective.     .

## 2024-11-27 NOTE — INTERVAL H&P NOTE
The patient has been examined and the H&P has been reviewed:    I concur with the findings and no changes have occurred since H&P was written.    Procedure risks, benefits and alternative options discussed and understood by patient/family.          There are no hospital problems to display for this patient.     Detail Level: Zone Render Risk Assessment In Note?: yes Additional Notes: Pt seeing  endocrinologist due to 4 cm growth on adrenal gland and spots on liver after mri. Pt stated mass is stable is following up in 1year

## 2024-12-03 DIAGNOSIS — Z76.82 STEM CELL TRANSPLANT CANDIDATE: Primary | ICD-10-CM

## 2024-12-03 DIAGNOSIS — D47.1 PRIMARY MYELOFIBROSIS: ICD-10-CM

## 2024-12-03 LAB
DNA/RNA EXTRACT AND HOLD RESULT: NORMAL
DNA/RNA EXTRACTION: NORMAL
EXHR SPECIMEN TYPE: NORMAL

## 2024-12-04 ENCOUNTER — DOCUMENTATION ONLY (OUTPATIENT)
Dept: HEMATOLOGY/ONCOLOGY | Facility: CLINIC | Age: 54
End: 2024-12-04
Payer: COMMERCIAL

## 2024-12-04 ENCOUNTER — OFFICE VISIT (OUTPATIENT)
Dept: HEMATOLOGY/ONCOLOGY | Facility: CLINIC | Age: 54
End: 2024-12-04
Payer: COMMERCIAL

## 2024-12-04 VITALS
HEIGHT: 71 IN | WEIGHT: 198.63 LBS | SYSTOLIC BLOOD PRESSURE: 140 MMHG | HEART RATE: 75 BPM | BODY MASS INDEX: 27.81 KG/M2 | OXYGEN SATURATION: 100 % | RESPIRATION RATE: 18 BRPM | DIASTOLIC BLOOD PRESSURE: 79 MMHG

## 2024-12-04 DIAGNOSIS — Z76.82 STEM CELL TRANSPLANT CANDIDATE: ICD-10-CM

## 2024-12-04 DIAGNOSIS — D63.0 ANEMIA IN NEOPLASTIC DISEASE: ICD-10-CM

## 2024-12-04 DIAGNOSIS — D47.1 PRIMARY MYELOFIBROSIS: Primary | ICD-10-CM

## 2024-12-04 LAB
BODY SITE - BONE MARROW: NORMAL
CLINICAL DIAGNOSIS - BONE MARROW: NORMAL
COMMENT: NORMAL
FINAL PATHOLOGIC DIAGNOSIS: NORMAL
FLOW CYTOMETRY ANTIBODIES ANALYZED - BONE MARROW: NORMAL
FLOW CYTOMETRY COMMENT - BONE MARROW: NORMAL
FLOW CYTOMETRY INTERPRETATION - BONE MARROW: NORMAL
GROSS: NORMAL
Lab: NORMAL
MICROSCOPIC EXAM: NORMAL

## 2024-12-04 PROCEDURE — 99215 OFFICE O/P EST HI 40 MIN: CPT | Mod: S$GLB,,, | Performed by: INTERNAL MEDICINE

## 2024-12-04 PROCEDURE — 3077F SYST BP >= 140 MM HG: CPT | Mod: CPTII,S$GLB,, | Performed by: INTERNAL MEDICINE

## 2024-12-04 PROCEDURE — 3008F BODY MASS INDEX DOCD: CPT | Mod: CPTII,S$GLB,, | Performed by: INTERNAL MEDICINE

## 2024-12-04 PROCEDURE — 99999 PR PBB SHADOW E&M-EST. PATIENT-LVL III: CPT | Mod: PBBFAC,,, | Performed by: INTERNAL MEDICINE

## 2024-12-04 PROCEDURE — 1160F RVW MEDS BY RX/DR IN RCRD: CPT | Mod: CPTII,S$GLB,, | Performed by: INTERNAL MEDICINE

## 2024-12-04 PROCEDURE — 3044F HG A1C LEVEL LT 7.0%: CPT | Mod: CPTII,S$GLB,, | Performed by: INTERNAL MEDICINE

## 2024-12-04 PROCEDURE — G2211 COMPLEX E/M VISIT ADD ON: HCPCS | Mod: S$GLB,,, | Performed by: INTERNAL MEDICINE

## 2024-12-04 PROCEDURE — 1159F MED LIST DOCD IN RCRD: CPT | Mod: CPTII,S$GLB,, | Performed by: INTERNAL MEDICINE

## 2024-12-04 PROCEDURE — 3078F DIAST BP <80 MM HG: CPT | Mod: CPTII,S$GLB,, | Performed by: INTERNAL MEDICINE

## 2024-12-04 NOTE — PROGRESS NOTES
Consent status for submitting research data to [facility]:    Patient accepts [facility] study.

## 2024-12-05 ENCOUNTER — PATIENT MESSAGE (OUTPATIENT)
Dept: SURGERY | Facility: CLINIC | Age: 54
End: 2024-12-05
Payer: COMMERCIAL

## 2024-12-05 ENCOUNTER — PATIENT MESSAGE (OUTPATIENT)
Dept: ADMINISTRATIVE | Facility: OTHER | Age: 54
End: 2024-12-05
Payer: COMMERCIAL

## 2024-12-05 DIAGNOSIS — Z76.82 STEM CELL TRANSPLANT CANDIDATE: Primary | ICD-10-CM

## 2024-12-06 RX ORDER — POSACONAZOLE 100 MG/1
300 TABLET, DELAYED RELEASE ORAL DAILY
Qty: 90 TABLET | Refills: 11 | Status: SHIPPED | OUTPATIENT
Start: 2024-12-18

## 2024-12-09 ENCOUNTER — OFFICE VISIT (OUTPATIENT)
Dept: ENDOCRINOLOGY | Facility: CLINIC | Age: 54
End: 2024-12-09
Payer: COMMERCIAL

## 2024-12-09 VITALS
WEIGHT: 200.19 LBS | DIASTOLIC BLOOD PRESSURE: 80 MMHG | OXYGEN SATURATION: 98 % | BODY MASS INDEX: 27.92 KG/M2 | HEART RATE: 64 BPM | SYSTOLIC BLOOD PRESSURE: 140 MMHG

## 2024-12-09 DIAGNOSIS — Z76.82 STEM CELL TRANSPLANT CANDIDATE: ICD-10-CM

## 2024-12-09 DIAGNOSIS — E78.5 HYPERLIPIDEMIA, UNSPECIFIED HYPERLIPIDEMIA TYPE: Chronic | ICD-10-CM

## 2024-12-09 DIAGNOSIS — D47.1 PRIMARY MYELOFIBROSIS: ICD-10-CM

## 2024-12-09 DIAGNOSIS — D35.00 ADRENAL ADENOMA, UNSPECIFIED LATERALITY: Primary | ICD-10-CM

## 2024-12-09 DIAGNOSIS — E27.9 ADRENAL NODULE: ICD-10-CM

## 2024-12-09 PROCEDURE — 99999 PR PBB SHADOW E&M-EST. PATIENT-LVL III: CPT | Mod: PBBFAC,,,

## 2024-12-09 PROCEDURE — 3008F BODY MASS INDEX DOCD: CPT | Mod: CPTII,S$GLB,, | Performed by: INTERNAL MEDICINE

## 2024-12-09 PROCEDURE — 99204 OFFICE O/P NEW MOD 45 MIN: CPT | Mod: S$GLB,,, | Performed by: INTERNAL MEDICINE

## 2024-12-09 PROCEDURE — 3079F DIAST BP 80-89 MM HG: CPT | Mod: CPTII,S$GLB,, | Performed by: INTERNAL MEDICINE

## 2024-12-09 PROCEDURE — 3044F HG A1C LEVEL LT 7.0%: CPT | Mod: CPTII,S$GLB,, | Performed by: INTERNAL MEDICINE

## 2024-12-09 PROCEDURE — 3077F SYST BP >= 140 MM HG: CPT | Mod: CPTII,S$GLB,, | Performed by: INTERNAL MEDICINE

## 2024-12-09 RX ORDER — DEXAMETHASONE 1 MG/1
1 TABLET ORAL ONCE
Qty: 1 TABLET | Refills: 0 | Status: SHIPPED | OUTPATIENT
Start: 2024-12-09 | End: 2024-12-09

## 2024-12-09 NOTE — PATIENT INSTRUCTIONS
Here are the instructions for the dexamethasone suppression test.    Please take 1 mg dexamethasone between 11 PM-12 AM. Then have your blood drawn at 8-9 AM the next morning.    I have sent the prescription of dexamethasone to your pharmacy and entered the blood tests for you.

## 2024-12-09 NOTE — ASSESSMENT & PLAN NOTE
Two subcentimeter right adrenal incidentalomas, both less than 10 HU,  first noted on CAT scan in Jan 2024, no interval growth on repeat imagings.  Low suspicion for pheochromocytoma based on imaging characteristics.  Patient has no signs or symptoms suggestive of Cushing's syndrome.  Will proceed with 1 mg dexamethasone suppression test to evaluate for MACS. If DST is normal, no further workup is warranted.    From an adrenal perspective, patient is cleared to proceed with the transplant.

## 2024-12-09 NOTE — PROGRESS NOTES
Endocrinology New Patient Visit    Subjective:      Chief Complaint: No chief complaint on file.      HPI: Rubén Hu is a 54 y.o. male who is here for an initial evaluation for adrenal incidentaloma    PMHx: Primary myelofibrosis- scheduled for allogenic stem cell transplant, MASLD, HLD      HPI    Regarding Adrenal Incidentaloma:     - Found to have two right incidental adrenal nodules on CT scan in January, 2024  - Repeat imaging was done in May, 2024 and July, 2024.  - Adrenal lesion imaging characteristics: 1.6 x 1.4 cm, and 1.7 x 1.2 cm -5.8 HU and -8.3 HU respectively     - Functional evaluation not done    - Last BMD: not done    - Symptoms of excess cortisol or pheochromocytoma:  No   Yes  [x]    []  Abdominal discomfort  [x]    []  Hypertension- blood pressure elevated since this month, not on any meds  [x]    []  Paroxysmal symptoms (syncope, pallor, dizziness)  [x]    []  Palpitations  [x]    []  Diabetes or new hyperglycemia  [x]    []  Polyuria, polydipsia, nocturia, unexplained weight loss or blurred vision (does have polyuria)  [x]    []  Easy bruisability  [x]    []  Abnormal stretch marks  [x]    []  Muscle weakness   [x]    []  Fractures or osteoporosis  [x]    []  DVT    - Contributing factors:    No   Yes  []    [x]  Hyperlipidemia  []    [x]  Weight gain- 10 lbs few months  [x]    []  Atherosclerosis  [x]    []  Potassium abnormalities  [x]    []  Recent Steroids  [x]    []  Recent Clonidine  [x]    []  Recent Spironolactone  [x]    []  Family history of adrenocortical carcinoma       Reviewed past medical, family, social history and updated as appropriate.    Objective:     Vitals:    12/09/24 0952   BP: (!) 140/80   Pulse: 64           BP Readings from Last 5 Encounters:   12/04/24 (!) 140/79   11/27/24 125/82   11/20/24 136/83   11/05/24 (!) 142/84   09/26/24 117/70     Wt Readings from Last 5 Encounters:   12/09/24 90.8 kg (200 lb 2.8 oz)   12/04/24 90.1 kg (198 lb 10.2 oz)   11/27/24  88.9 kg (196 lb)   11/20/24 89.4 kg (197 lb 1.5 oz)   11/05/24 89.4 kg (197 lb 3.2 oz)           Physical Exam  Constitutional:       Appearance: Normal appearance.      Comments: BMI-28  No truncal obesity   Eyes:      Extraocular Movements: Extraocular movements intact.   Cardiovascular:      Rate and Rhythm: Normal rate.   Pulmonary:      Effort: No respiratory distress.   Abdominal:      Comments: No abdominal striae   Skin:     General: Skin is warm and dry.      Comments: No supraclavicular or dorsal fat pads   Neurological:      Mental Status: He is oriented to person, place, and time.   Psychiatric:         Behavior: Behavior normal.          Lab Results   Component Value Date    CHOL 138 02/22/2024    HDL 52 02/22/2024    LDLCALC 73.8 02/22/2024    TRIG 61 02/22/2024    CHOLHDL 37.7 02/22/2024     Lab Results   Component Value Date     11/20/2024    K 4.1 11/20/2024     11/20/2024    CO2 28 11/20/2024    GLU 90 11/20/2024    BUN 15 11/20/2024    CREATININE 0.9 11/20/2024    CALCIUM 10.2 11/20/2024    PROT 7.9 11/20/2024    ALBUMIN 4.6 11/20/2024    BILITOT 0.5 11/20/2024    ALKPHOS 63 11/20/2024    AST 37 11/20/2024    ALT 66 (H) 11/20/2024    ANIONGAP 6 (L) 11/20/2024    ESTGFRAFRICA >60.0 01/22/2022    EGFRNONAA >60.0 01/22/2022    TSH 0.912 02/22/2024        Assessment/Plan:       Adrenal nodule    Two subcentimeter right adrenal incidentalomas, both less than 10 HU,  first noted on CAT scan in Jan 2024, no interval growth on repeat imagings.  Low suspicion for pheochromocytoma based on imaging characteristics.  Patient has no signs or symptoms suggestive of Cushing's syndrome.  Will proceed with 1 mg dexamethasone suppression test to evaluate for MACS. If DST is normal, no further workup is warranted.    From an adrenal perspective, patient is cleared to proceed with the transplant.      Hyperlipidemia, unspecified hyperlipidemia type    On atorvastatin         Patient seen, examined and  discussed with Dr. Mejía.      Shalini De Leon MD  [facility]

## 2024-12-10 ENCOUNTER — LAB VISIT (OUTPATIENT)
Dept: LAB | Facility: HOSPITAL | Age: 54
End: 2024-12-10
Payer: COMMERCIAL

## 2024-12-10 ENCOUNTER — PATIENT OUTREACH (OUTPATIENT)
Dept: ADMINISTRATIVE | Facility: HOSPITAL | Age: 54
End: 2024-12-10
Payer: COMMERCIAL

## 2024-12-10 DIAGNOSIS — D35.00 ADRENAL ADENOMA, UNSPECIFIED LATERALITY: ICD-10-CM

## 2024-12-10 LAB — CORTIS SERPL-MCNC: <1 UG/DL (ref 4.3–22.4)

## 2024-12-10 PROCEDURE — 82533 TOTAL CORTISOL: CPT

## 2024-12-10 PROCEDURE — 82542 COL CHROMOTOGRAPHY QUAL/QUAN: CPT

## 2024-12-10 PROCEDURE — 36415 COLL VENOUS BLD VENIPUNCTURE: CPT | Mod: PO

## 2024-12-10 NOTE — PROGRESS NOTES
I have seen the patient, reviewed the fellow's history and physical, assessment, plan, and progress note. I have personally interviewed and examined the patient at bedside and agree with the findings.     Keyshawn Mejía MD  Endocrinology Staff

## 2024-12-11 RX ORDER — DEXAMETHASONE 1 MG/1
TABLET ORAL
COMMUNITY
Start: 2024-12-09

## 2024-12-11 NOTE — PROGRESS NOTES
HEMATOLOGIC MALIGNANCIES PROGRESS NOTE    IDENTIFYING STATEMENT   Rubén Peters) is a 54 y.o. male with a  of 1970 from [place], [place] with the diagnosis of primary myelofibrosis.      ONCOLOGY HISTORY:    1. Primary myelofibrosis              A. 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 Adrenal nodules - to be evaluated by endocrinology     3. Hyperlipidemia  4. History of tobacco use    INTERVAL HISTORY:      Mr. Hu returns to clinic in follow-up of primary myelofibrosis. He is on ruxolitinib and is tolerating this well. His diaphoresis is improved.    He presents for informed consent for allogeneic stem cell transplant.     Past Medical History, Past Social History and Past Family History have been reviewed and are unchanged except as noted in the interval history.    MEDICATIONS:     Current Outpatient Medications on File Prior to Visit   Medication Sig Dispense Refill    aspirin 81 MG Chew Take 81 mg by mouth once daily. Pt takes over the counter      atorvastatin (LIPITOR) 10 MG tablet TAKE ONE TABLET BY MOUTH EVERY EVENING 90 tablet 2    dexAMETHasone (DECADRON) 1 MG Tab TAKE ONE TABLET BY MOUTH ONCE ATR 11 PM      ergocalciferol (ERGOCALCIFEROL) 50,000 unit Cap TAKE 1 CAPSULE BY MOUTH ONCE WEEKLY (Patient taking differently: Take 50,000 Units by mouth every 7 days. TAKE 1 CAPSULE BY MOUTH ONCE WEEKLY (Every Thursday)) 12 capsule 2    ruxolitinib (JAKAFI) 20 mg Tab Take 1 tablet (20 mg) by mouth 2 (two) times a day. 60 tablet 2    [START ON 2024] letermovir (PREVYMIS) 480 mg Tab Take 1 tablet (480 mg total) by mouth once daily. 28 tablet 11    [START ON 2024] posaconazole (NOXAFIL) 100 mg TbEC tablet Take 3 tablets (300 mg total) by mouth once daily. 90 tablet 11     No  "current facility-administered medications on file prior to visit.       ALLERGIES:   Review of patient's allergies indicates:   Allergen Reactions    Bactrim [sulfamethoxazole-trimethoprim] Hives        ROS:       Review of Systems   Constitutional:  Negative for diaphoresis, fatigue, fever and unexpected weight change.   HENT:   Negative for lump/mass and sore throat.    Eyes:  Negative for icterus.   Respiratory:  Negative for cough and shortness of breath.    Cardiovascular:  Negative for chest pain and palpitations.   Gastrointestinal:  Negative for abdominal distention, blood in stool, constipation, diarrhea, nausea and vomiting.   Genitourinary:  Negative for dysuria and frequency.    Musculoskeletal:  Negative for arthralgias, gait problem and myalgias.   Skin:  Negative for rash.   Neurological:  Negative for dizziness, gait problem and headaches.   Hematological:  Negative for adenopathy. Does not bruise/bleed easily.   Psychiatric/Behavioral:  The patient is not nervous/anxious.        PHYSICAL EXAM:  Vitals:    12/04/24 1343   BP: (!) 140/79   Pulse: 75   Resp: 18   SpO2: 100%   Weight: 90.1 kg (198 lb 10.2 oz)   Height: 5' 11" (1.803 m)   PainSc: 0-No pain       KARNOFSKY PERFORMANCE STATUS 90%  ECOG 0    Physical Exam  Constitutional:       General: He is not in acute distress.     Appearance: He is well-developed.   HENT:      Head: Normocephalic and atraumatic.      Mouth/Throat:      Mouth: No oral lesions.   Eyes:      Conjunctiva/sclera: Conjunctivae normal.   Neck:      Thyroid: No thyromegaly.   Cardiovascular:      Rate and Rhythm: Normal rate and regular rhythm.      Heart sounds: Normal heart sounds. No murmur heard.  Pulmonary:      Breath sounds: Normal breath sounds. No wheezing or rales.   Abdominal:      General: There is no distension.      Palpations: Abdomen is soft. There is no hepatomegaly, splenomegaly or mass.      Tenderness: There is no abdominal tenderness.   Lymphadenopathy:    "   Cervical: No cervical adenopathy.      Right cervical: No deep cervical adenopathy.     Left cervical: No deep cervical adenopathy.   Skin:     Findings: No rash.   Neurological:      Mental Status: He is alert and oriented to person, place, and time.      Cranial Nerves: No cranial nerve deficit.      Coordination: Coordination normal.      Deep Tendon Reflexes: Reflexes are normal and symmetric.         LAB:   Results for orders placed or performed during the hospital encounter of 11/27/24   Heme Disorders DNA/RNA Hold, Bone Marrow    Collection Time: 11/27/24  1:01 PM   Result Value Ref Range    EXHR Specimen Type Bone Marrow     EXHR Extract and Hold Result see method     EXHR Extraction Performed    OncoHeme (NGS) Hematologic Neoplasms, BM Diagnosis or Indication for test: restaging myelofibrosis    Collection Time: 11/27/24  1:01 PM   Result Value Ref Range    NGS Specimen Type Bone Marrow     NGS Indication of Test restaging myelofibrosis     NGSHM Result, Bone Marrow See Interpretation     NGS Pathogenic Mutations Detected SEE BELOW     NGS Interpretation SEE BELOW     NGS Clinical Trials SEE BELOW     NGS Variants of Unknown Significance None     NGS Additional Notes None     NGS Method Summary SEE BELOW     NGS Disclaimer SEE BELOW     NGS OncoHeme Panel Gene list SEE BELOW     NGS Reviewed by: SEE BELOW    Leukemia/Lymphoma Screen - Bone Marrow Right Posterior Iliac Crest    Collection Time: 11/27/24  1:01 PM   Result Value Ref Range    Clinical Diagnosis - Bone Marrow MPD     Body Site - Bone Marrow RPI     Bone Marrow Interpretation       No diagnostic abnormal hematopoietic population is detected in this sample.    Bone Marrow Antibodies Analyzed       All analyzed: CD2, CD3, CD4, CD5, CD7, CD8, CD10, CD13, CD19, CD20, CD34, , KAPPA, LAMBDA,CD45 and 7AAD.    Bone Marrow Comment       Flow cytometric analysis of  bone marrow shows populations of polyclonal B lymphocytes and T lymphocytes that are  immunophenotypically unremarkable.  The blast gate is not increased.  Flow differential:  Lymphocytes 10.0%, Monocytes 4.4%, Granulocytes  80.1%, Blast  1.5%, Debris/nRBC 1.2%,  Viability 99.9%.     Chromosome Analysis, Bone Marrow Right Posterior Iliac Crest    Collection Time: 11/27/24  1:01 PM   Result Value Ref Range    Reason for Referral restaging myelofibrosis    Specimen to Pathology, Bone Marrow Aspiration/Biopsy    Collection Time: 11/27/24  1:01 PM   Result Value Ref Range    Final Pathologic Diagnosis       BONE MARROW, LEFT ILIAC CREST (ASPIRATE SMEAR, TOUCH IMPRINT, CLOT SECTION, AND CORE BIOPSY):  -- NORMOCELLULAR MARROW (30-50%) WITH TRILINEAGE HEMATOPOIESIS AND MEGAKARYOCYTIC HYPERPLASIA.  -- RETICULIN MYELOFIBROSIS (MF 1-2 OF 3).  -- INCREASED STORAGE IRON.  -- SEE COMMENT.      Gross       Two Part Case:    Part 1  Pathology MRN# [id number]  Hospital/Clinic label MRN# [id number]    Received in formalin labeled with the patient's name, MRN, and &quot;clot&quot; is a 2.4 x 1.3 x 0.5 cm aggregate of dark red clot.  The specimen is formalin filtered and entirely submitted in cassettes:  [id number]  [id number]  [id number]  [id number]    Part 2  Pathology MRN# [id number]  Hospital/Clinic label MRN# [id number]    Received in formalin labeled with the patient's name, MRN, and &quot;core&quot; is a 1.8 x 0.2 cm tan-brown bone core.  The specimen is placed in immunocal and submitted in toto in cassette [id number].    PIERCE Wright, PA ([facility])CM      Microscopic Exam       CBC (11/20/2024): WBC 11.31 K/uL (granulocyte 56.0 %, lymphocyte 33.4 %, monocyte 7.3 %, eosinophil 1.1 %, basophil 0.7 % immature granulocytes 1.5%), RBC 4.03 M/uL, HGB 13.2 g/dL, HCT 39.2 %, MCV 97 fL, MCH 32.8 pg, MCHC 33.7 g/dL, RDW 13.8 %, Platelet   539 K/uL, MPV 10.5 fL.    BONE MARROW ASPIRATE DIFFERENTIAL COUNT:  Blasts-----------------------------------------------1.3  %  Promyelocytes-----------------------------------1.3 %  Myelocytes----------------------------------------4.7 %  Metamyelocytes---------------------------------9.7 %  Bands-----------------------------------------------12.3 %  PMN Neutrophils--------------------------------22.7 %  Eosinophils---------------------------------------3.0 %  Basophils------------------------------------------0.0 %  Monocytes----------------------------------------6.0 %  Lymphocytes-------------------------------------13.7 %  Plasma cells--------------------------------------0.3 %  Erythroid precursors---------------------------25.0  %    BONE MARROW ASPIRATE SMEAR:  The smears contain cellular spicules. The myeloid to erythroid ratio is approximately 2.5:1.0. Both myeloid and erythroid precursors display orderly maturation.  Blasts are not increased.  Frequent megakaryocytes are seen.  Megakaryocytes display bulbous   nuclei.  Lymphocytes and plasma cells are not increased.      BONE MARROW TOUCH IMPRINT:  The touch imprint displays cellular composition similar to the aspirate smear.    BONE MARROW CLOT SECTION:  The clot sections contain spicules with cellular composition similar to the biopsy core.  Storage iron is increased.    BONE MARROW CORE BIOPSY:  Cortical and medullary bones are present. The cellularity is approximately 30-50%. The myeloid to erythroid ratio is unremarkable. Megakaryocytes are increased and display hyperchromatic nuclei.  Lymphoid aggregates or granulomas are not identified.    Storage iron is increased.  Reticulin stain shows myelofibrosis (MF 1-2 of 3).  Controls are adequate.      Comment       Flow cytometric analysis of  bone marrow shows populations of polyclonal B lymphocytes and T lymphocytes that are immunophenotypically unremarkable.  The blast gate is not increased. Flow differential:  Lymphocytes 10.0%, Monocytes 4.4%, Granulocytes    80.1%, Blast  1.5%, Debris/nRBC 1.2%,  Viability 99.9%.      Immunohistochemical stains were performed on the biopsy core for greater sensitivity and further architectural assessment with adequate controls.  CD61-positive megakaryocytes are increased and display both large and small forms.  CD34-positive blasts   are not increased..    Taken together, the overall findings are consistent with patient's history of primary myelofibrosis.      Disclaimer       Unless the case is a 'gross only' or additional testing only, the final diagnosis for each specimen is based on a microscopic examination of appropriate tissue sections.       PROBLEMS ASSESSED THIS VISIT:    1. Primary myelofibrosis    2. Stem cell transplant candidate    3. Anemia in neoplastic disease        PLAN:       Primary myelofibrosis  Mr. Hu has primary myelofibrosis. Using the MIPSS-70 version 2.0 risk assessment tool, he is considered intermediate risk, with 10-year overall survival of 37%.      He is on ruxolitinib therapy and tolerating this well. This has brought some symptom mitigation with respect to diaphoresis.     He is interested in allogeneic stem cell transplant. His brother is a haploidentical donor, and we obtained informed consent today.     Anemia  Likely due to ruxolitinib therapy. Mild. Monitor closely throughout therapy.     MASLD  Biopsy proven to be steatotic change. Most compatible with MASLD. Follow-up with hepatology. Encourage lifestyle modification. No significant fibrosis.     Adrenal nodule  Under evaluation by endocrinology.     Follow-up  For allogeneic stem cell transplant.    Clyde James MD  Hematology and Stem Cell Transplant    TRANSPLANT CONSENT    Today, we obtained informed consent for allogeneic stem cell transplant. We discussed that allogeneic stem cell transplant is a potentially curative procedure for primary myelofibrosis, though morbidity and mortality are high. We reviewed the pre-transplant evaluation and potential risks. HCT-CI score is included below  for discussion of mortality risk.     We reviewed the required procedures, including collection of donor product, central line placement, conditioning chemotherapy, and stem cell/bone marrow infusion. We reviewed that central line placement risks include bleeding, pain, infection, and small risk of pneumothorax. We reviewed risks of conditioning chemotherapy, including, but not limited to, organ damage, nausea/vomiting, diarrhea or constipation, mucositis, cytopenias requiring transfusion (including transfusion consents), opportunistic infection, and death. We reviewed procedures surrounding transplant infusion, including risk of acute reactions during infusion and long-term reactions. We also discussed specifically risks such as SOS/VOD.      We discussed frankly that relapse is possible after transplant and may cause eventual death. We also reviewed risks of graft versus host disease long-term after transplant. We reviewed need for close follow-up in the post-transplant period and requirement of residence within 1 hour of the transplant center for 100 days post-transplant.      The patient was given opportunities to ask questions, and all questions were answered to their satisfaction. The patient provided informed consent.     Diagnosis: primary myelofibrosis     CIBMTR Classification  Primary myelofibrosis     ASBMT Risk  other     HCT-CI score  2     21% NRM at 1 year     1-year OS: 62%  3-year NRM: 28%  3-year OS: 47%     Transplant-type  Haploidentical peripheral blood stem cell transplant from related donor    Blood group  CMV-reactive (donor) into CMV-negative (Recipient)    CMV status  Donor O-positive  Recipient O-positive     Conditioning     BuFluTT     Planned maintenance  none      Visit today included increased complexity associated with the care of the episodic problem primary myelofibrosis addressed and managing the longitudinal care of the patient due to the serious and/or complex managed  problem(s) primary myelofibrosis.

## 2024-12-12 DIAGNOSIS — E78.5 HYPERLIPIDEMIA, UNSPECIFIED HYPERLIPIDEMIA TYPE: Chronic | ICD-10-CM

## 2024-12-12 DIAGNOSIS — E55.9 VITAMIN D DEFICIENCY: ICD-10-CM

## 2024-12-12 RX ORDER — ATORVASTATIN CALCIUM 10 MG/1
10 TABLET, FILM COATED ORAL NIGHTLY
Qty: 90 TABLET | Refills: 2 | Status: SHIPPED | OUTPATIENT
Start: 2024-12-12

## 2024-12-12 RX ORDER — ERGOCALCIFEROL 1.25 MG/1
CAPSULE ORAL
Qty: 12 CAPSULE | Refills: 2 | Status: SHIPPED | OUTPATIENT
Start: 2024-12-12

## 2024-12-12 NOTE — TELEPHONE ENCOUNTER
Care Due:                  Date            Visit Type   Department     Provider  --------------------------------------------------------------------------------                                             [facility]     MED/ INTERNAL  Last Visit: 07-      FOLLOW UP    MED/ PEDS      ELOISA RAYMOND  Next Visit: None Scheduled  None         None Found                                                            Last  Test          Frequency    Reason                     Performed    Due Date  --------------------------------------------------------------------------------    Lipid Panel.  12 months..  atorvastatin.............  02- 02-    Vitamin D...  12 months..  ergocalciferol...........  03- 02-    [employer] Embedded Care Due Messages. Reference number: [id number].   12/12/2024 1:49:52 PM CST

## 2024-12-12 NOTE — TELEPHONE ENCOUNTER
----- Message from Mary sent at 12/12/2024 12:24 PM CST -----  .Type: RX Refill Request    Who Called: Self     Have you contacted your pharmacy: No     Refill or New Rx: Refill     RX Name and Strength:atorvastatin (LIPITOR) 10 MG tablet    vitamin D (VITAMIN D3) 1000 units Tab    Preferred Pharmacy with phone number:.    [facility], [facility] - [address] [address]  Phone: [phone] Fax: [phone]        Local or Mail Order: Mail Order     Ordering Provider: Bebeto Arora    Would the patient rather a call back or a response via [facility]? Call Back     Best Call Back Number: .[phone] (home)       Additional Information:

## 2024-12-16 NOTE — PROGRESS NOTES
BMT Pharmacist Reminder Phone Call    Spoke with patient on the phone to remind him to avoid all acetaminophen containing products starting today up until admission on 12/18/24.     Patient understands recommendations and that an allergy for acetaminophen will be placed in his chart for safety purposes. Allergy will be removed once acetaminophen is safe to take post-busulfan administration.       Annalee Mcdowell, PharmD  Clinical Pharmacist-BMT/Hematology  [facility]

## 2024-12-17 ENCOUNTER — TELEPHONE (OUTPATIENT)
Dept: SURGERY | Facility: CLINIC | Age: 54
End: 2024-12-17
Payer: COMMERCIAL

## 2024-12-17 ENCOUNTER — ANESTHESIA EVENT (OUTPATIENT)
Dept: SURGERY | Facility: HOSPITAL | Age: 54
End: 2024-12-17
Payer: COMMERCIAL

## 2024-12-17 NOTE — ANESTHESIA PREPROCEDURE EVALUATION
[facility]  Anesthesia Pre-Operative Evaluation   12/17/2024        Rubén Hu, 1970  [id number]  Procedure(s) (LRB):  INSERTION, CATHETER, CENTRAL VENOUS, NORMAN TRIPLE LUMEN, Bard 12.5 fr Norman Cath model 4221193 (Right)    Donna Hu is a 54 y.o. male w/ a significant PMHx of HLD, hemmorhoids, MASH, and primary myelofibrosis here as a stem cell transplant candidate for insertion of a central venous catheter.    Patient now presents for above procedure(s).     Prev Airway: None documented.    LDA: None documented.       Drips: None documented.      Patient Active Problem List   Diagnosis    Hyperlipidemia    Tobacco abuse (quit cigarettes, on cigars)    Hemorrhoids    Primary myelofibrosis    Colitis    Metabolic dysfunction-associated steatohepatitis (MASH)    Stem cell transplant candidate    Overweight (BMI 25.0-29.9)    Adrenal nodule       Review of patient's allergies indicates:   Allergen Reactions    Acetaminophen Other (See Comments)     No acetaminophen beginning 72 hours prior to Busulfan through at least 72 hours after the last Busulfan dose. BMT PharmD will remove allergy once safe to give.     Bactrim [sulfamethoxazole-trimethoprim] Hives       Current Inpatient Medications:       No current facility-administered medications on file prior to encounter.     Current Outpatient Medications on File Prior to Encounter   Medication Sig Dispense Refill    aspirin 81 MG Chew Take 81 mg by mouth once daily. Pt takes over the counter      [START ON 12/31/2024] letermovir (PREVYMIS) 480 mg Tab Take 1 tablet (480 mg total) by mouth once daily. 28 tablet 11    [START ON 12/18/2024] posaconazole (NOXAFIL) 100 mg TbEC tablet Take 3 tablets (300 mg total) by mouth once daily. 90 tablet 11    ruxolitinib (JAKAFI) 20 mg Tab Take 1 tablet (20 mg) by mouth 2 (two) times a day. 60 tablet 2       Past Surgical History:   Procedure Laterality Date    BONE MARROW BIOPSY N/A  11/27/2024    Procedure: Biopsy-bone marrow;  Surgeon: Clyde James MD;  Location: [facility] (4TH FLR);  Service: Oncology;  Laterality: N/A;  11/20-pre call complete-tb    COLONOSCOPY N/A 8/9/2023    Procedure: COLONOSCOPY;  Surgeon: Will Ardon MD;  Location: [facility] (4TH FLR);  Service: Endoscopy;  Laterality: N/A;  Suprep Instructions sent via portal / Authorizing:     Bebeto Arora MD in [facility] FAMILY MED/ INTERNAL MED/ PEDS  Referral:          [id number]    EYE SURGERY      FLEXIBLE SIGMOIDOSCOPY N/A 7/23/2024    Procedure: SIGMOIDOSCOPY, FLEXIBLE;  Surgeon: Nirali Vicente MD;  Location: [facility];  Service: Endoscopy;  Laterality: N/A;       Social History:  Tobacco Use: Medium Risk (12/11/2024)    Patient History     Smoking Tobacco Use: Former     Smokeless Tobacco Use: Never     Passive Exposure: Not on file       Alcohol Use: Not At Risk (6/30/2024)    AUDIT-C     Frequency of Alcohol Consumption: 2-4 times a month     Average Number of Drinks: 1 or 2     Frequency of Binge Drinking: Never       Objective    Vital Signs Range:  BMI Readings from Last 1 Encounters:   12/09/24 27.92 kg/m²               Significant Labs:        Component Value Date/Time    WBC 11.31 11/20/2024 0929    HGB 13.2 (L) 11/20/2024 0929    HCT 39.2 (L) 11/20/2024 0929     (H) 11/20/2024 0929     11/20/2024 0929    K 4.1 11/20/2024 0929     11/20/2024 0929    CO2 28 11/20/2024 0929    GLU 90 11/20/2024 0929    BUN 15 11/20/2024 0929    CREATININE 0.9 11/20/2024 0929    MG 2.3 11/20/2024 0929    PHOS 2.9 11/20/2024 0929    CALCIUM 10.2 11/20/2024 0929    ALBUMIN 4.6 11/20/2024 0929    PROT 7.9 11/20/2024 0929    ALKPHOS 63 11/20/2024 0929    BILITOT 0.5 11/20/2024 0929    AST 37 11/20/2024 0929    ALT 66 (H) 11/20/2024 0929    INR 1.0 11/20/2024 0929    INR 1.1 07/22/2024 1101    HGBA1C 5.4 02/22/2024 0820        Please see Results Review for additional labs.     Diagnostic Studies: All relevant studies,  reviewed.      EKG:   Results for orders placed or performed during the hospital encounter of 11/20/24   EKG 12-lead    Collection Time: 11/20/24  2:27 PM   Result Value Ref Range    QRS Duration 74 ms    [facility] QTC Calculation 433 ms    Narrative    Test Reason : D47.1,Z76.82,    Vent. Rate :  78 BPM     Atrial Rate :  78 BPM     P-R Int : 148 ms          QRS Dur :  74 ms      QT Int : 380 ms       P-R-T Axes :  65  49  42 degrees    QTcB Int : 433 ms    Normal sinus rhythm  Normal ECG  No previous ECGs available  Confirmed by Lukas Grove ([id number]) on 11/20/2024 4:02:35 PM    Referred By: PEDRO PABLO YEPEZ           Confirmed By: Lukas Grove       ECHO:  Results for orders placed during the hospital encounter of 11/20/24    Echo    Interpretation Summary    Left Ventricle: The left ventricle is normal in size. Ventricular mass is normal. Normal wall thickness. Normal wall motion. There is normal systolic function with a visually estimated ejection fraction of 60 - 65%. Global longitudinal strain is -18.0%. Global longitudinal strain is normal. There is normal diastolic function.    Right Ventricle: Normal right ventricular cavity size. Wall thickness is normal. Systolic function is normal.    IVC/SVC: Normal venous pressure at 3 mmHg.         Pre-op Assessment    I have reviewed the Patient Summary Reports.     I have reviewed the Nursing Notes.    I have reviewed the Medications.     Review of Systems  Anesthesia Hx:  No previous Anesthesia                Cardiovascular:                hyperlipidemia                               Hepatic/GI:      Liver Disease, Hepatitis           Liver Disease, Hepatitis            Physical Exam  General: Well nourished, Cooperative and Alert    Airway:  Mallampati: II   Mouth Opening: Normal  TM Distance: Normal  Tongue: Normal  Neck ROM: Normal ROM    Dental:  Intact        Anesthesia Plan  Type of Anesthesia, risks & benefits discussed:    Anesthesia Type: Gen Natural Airway, Gen  Supraglottic Airway, MAC  Intra-op Monitoring Plan: Standard ASA Monitors  Post Op Pain Control Plan: multimodal analgesia  Induction:  IV  Airway Plan: Video, Post-Induction  ASA Score: 3  Day of Surgery Review of History & Physical: H&P Update referred to the surgeon/provider.    Ready For Surgery From Anesthesia Perspective.     .

## 2024-12-17 NOTE — PROGRESS NOTES
HEMATOLOGIC MALIGNANCIES PROGRESS NOTE    IDENTIFYING STATEMENT   Rubén Peters) is a 54 y.o. male with a  of 1970 from [place], [place] with the diagnosis of primary myelofibrosis.      ONCOLOGY HISTORY:    1. Primary myelofibrosis              A. 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 Adrenal nodules - to be evaluated by endocrinology  3. Hyperlipidemia  4. History of tobacco use    INTERVAL HISTORY 2024:    Patient is here for surveillance visit prior to admission for HAPLO stem cell transplant from his brother. He had his central line placed this morning and did some experience some pain post-procedure. The site is still bothersome at the neck incision site. He reports he has been feeling well, walking, fishing, etc... and is otherwise without any infectious signs/symptoms. Denies fevers, chills, sore throat, cough, chest pain, shortness of breath, dysuria, N/V/D. He has not had aspirin in two weeks.    Past Medical History, Past Social History and Past Family History have been reviewed and are unchanged except as noted in the interval history.    MEDICATIONS:     Current Outpatient Medications on File Prior to Visit   Medication Sig Dispense Refill    aspirin 81 MG Chew Take 81 mg by mouth once daily. Pt takes over the counter (Patient not taking: Reported on 2024)      atorvastatin (LIPITOR) 10 MG tablet Take 1 tablet (10 mg total) by mouth every evening. (Patient not taking: Reported on 2024) 90 tablet 2    ergocalciferol (ERGOCALCIFEROL) 50,000 unit Cap TAKE 1 CAPSULE BY MOUTH ONCE WEEKLY (Patient not taking: Reported on 2024) 12 capsule 2    [START ON 2024] letermovir (PREVYMIS) 480 mg Tab Take 1 tablet (480 mg total) by mouth once daily. (Patient not  taking: Reported on 12/18/2024) 28 tablet 11    posaconazole (NOXAFIL) 100 mg TbEC tablet Take 3 tablets (300 mg total) by mouth once daily. (Patient not taking: Reported on 12/18/2024) 90 tablet 11    ruxolitinib (JAKAFI) 20 mg Tab Take 1 tablet (20 mg) by mouth 2 (two) times a day. (Patient not taking: Reported on 12/18/2024.) 60 tablet 2    [DISCONTINUED] dexAMETHasone (DECADRON) 1 MG Tab TAKE ONE TABLET BY MOUTH ONCE ATR 11 PM       Current Facility-Administered Medications on File Prior to Visit   Medication Dose Route Frequency Provider Last Rate Last Admin    [COMPLETED] ceFAZolin 2 g  2 g Intravenous On Call Procedure Winston Brunner PA-C   2 g at 12/18/24 0854    [COMPLETED] fentaNYL 50 mcg/mL injection 25 mcg  25 mcg Intravenous Q5 Min PRN Amadeo Corona MD   25 mcg at 12/18/24 1030    [DISCONTINUED] BUPivacaine (PF) 0.25% (2.5 mg/ml) injection    PRN Willie Hadley MD   6 mL at 12/18/24 0912    [DISCONTINUED] dextrose 50% injection 12.5 g  12.5 g Intravenous PRN Amadeo Corona MD        [DISCONTINUED] glucagon (human recombinant) injection 1 mg  1 mg Intramuscular PRN Amadeo Corona MD        [DISCONTINUED] haloperidol lactate injection 0.5 mg  0.5 mg Intravenous Q10 Min PRN Amadeo Corona MD        [DISCONTINUED] heparin, porcine (PF) 100 unit/mL injection flush    PRN Willie Hadley MD   5 mL at 12/18/24 0935    [DISCONTINUED] sodium chloride 0.9% flush 10 mL  10 mL Intravenous PRN Amadeo Corona MD           ALLERGIES:   Review of patient's allergies indicates:   Allergen Reactions    Acetaminophen Other (See Comments)     No acetaminophen beginning 72 hours prior to Busulfan through at least 72 hours after the last Busulfan dose. BMT PharmD will remove allergy once safe to give.     Bactrim [sulfamethoxazole-trimethoprim] Hives        ROS:       Review of Systems   Constitutional:  Positive for fatigue. Negative for appetite change, diaphoresis, fever and unexpected weight change.   HENT:    "Negative for lump/mass and sore throat.    Eyes:  Negative for icterus.   Respiratory:  Negative for cough and shortness of breath.    Cardiovascular:  Negative for chest pain and palpitations.   Gastrointestinal:  Negative for abdominal distention, blood in stool, constipation, diarrhea, nausea and vomiting.   Genitourinary:  Negative for dysuria and frequency.    Musculoskeletal:  Negative for arthralgias, gait problem and myalgias.   Skin:  Negative for rash.   Neurological:  Negative for dizziness, gait problem and headaches.   Hematological:  Negative for adenopathy. Does not bruise/bleed easily.   Psychiatric/Behavioral:  The patient is not nervous/anxious.        PHYSICAL EXAM:  Vitals:    12/18/24 1228   BP: 127/77   Pulse: 76   Resp: 16   Temp: 98.3 °F (36.8 °C)   TempSrc: Oral   SpO2: 98%   Weight: 90.8 kg (200 lb 2.8 oz)   Height: 5' 11" (1.803 m)   PainSc: 0-No pain       KARNOFSKY PERFORMANCE STATUS 90%  ECOG 0    Physical Exam  Constitutional:       General: He is not in acute distress.     Appearance: Normal appearance. He is well-developed and normal weight. He is not ill-appearing.   HENT:      Head: Normocephalic and atraumatic.      Nose: Nose normal. No congestion.      Mouth/Throat:      Mouth: Mucous membranes are moist. No oral lesions.      Pharynx: Oropharynx is clear. No oropharyngeal exudate.   Eyes:      Conjunctiva/sclera: Conjunctivae normal.   Neck:      Thyroid: No thyromegaly.   Cardiovascular:      Rate and Rhythm: Normal rate and regular rhythm.      Pulses: Normal pulses.      Heart sounds: Normal heart sounds. No murmur heard.  Pulmonary:      Breath sounds: Normal breath sounds. No wheezing or rales.   Abdominal:      General: There is no distension.      Palpations: Abdomen is soft. There is no hepatomegaly, splenomegaly or mass.      Tenderness: There is no abdominal tenderness.   Musculoskeletal:      Right lower leg: No edema.      Left lower leg: No edema. "   Lymphadenopathy:      Cervical: No cervical adenopathy.      Right cervical: No deep cervical adenopathy.     Left cervical: No deep cervical adenopathy.   Skin:     Findings: No rash.   Neurological:      Mental Status: He is alert and oriented to person, place, and time.      Cranial Nerves: No cranial nerve deficit.      Coordination: Coordination normal.      Deep Tendon Reflexes: Reflexes are normal and symmetric.       LAB:   Results for orders placed or performed during the hospital encounter of 12/18/24   CBC auto differential    Collection Time: 12/18/24  6:52 AM   Result Value Ref Range    WBC 8.80 3.90 - 12.70 K/uL    RBC 3.82 (L) 4.60 - 6.20 M/uL    Hemoglobin 12.9 (L) 14.0 - 18.0 g/dL    Hematocrit 37.3 (L) 40.0 - 54.0 %    MCV 98 82 - 98 fL    MCH 33.8 (H) 27.0 - 31.0 pg    MCHC 34.6 32.0 - 36.0 g/dL    RDW 13.7 11.5 - 14.5 %    Platelets 466 (H) 150 - 450 K/uL    MPV 11.1 9.2 - 12.9 fL    Immature Granulocytes 1.1 (H) 0.0 - 0.5 %    Gran # (ANC) 5.6 1.8 - 7.7 K/uL    Immature Grans (Abs) 0.10 (H) 0.00 - 0.04 K/uL    Lymph # 2.0 1.0 - 4.8 K/uL    Mono # 0.9 0.3 - 1.0 K/uL    Eos # 0.2 0.0 - 0.5 K/uL    Baso # 0.07 0.00 - 0.20 K/uL    nRBC 0 0 /100 WBC    Gran % 63.4 38.0 - 73.0 %    Lymph % 23.0 18.0 - 48.0 %    Mono % 10.0 4.0 - 15.0 %    Eosinophil % 1.7 0.0 - 8.0 %    Basophil % 0.8 0.0 - 1.9 %    Differential Method Automated    Comprehensive metabolic panel    Collection Time: 12/18/24  6:52 AM   Result Value Ref Range    Sodium 139 136 - 145 mmol/L    Potassium 3.9 3.5 - 5.1 mmol/L    Chloride 109 95 - 110 mmol/L    CO2 22 (L) 23 - 29 mmol/L    Glucose 95 70 - 110 mg/dL    BUN 15 6 - 20 mg/dL    Creatinine 0.8 0.5 - 1.4 mg/dL    Calcium 9.0 8.7 - 10.5 mg/dL    Total Protein 7.0 6.0 - 8.4 g/dL    Albumin 4.0 3.5 - 5.2 g/dL    Total Bilirubin 0.3 0.1 - 1.0 mg/dL    Alkaline Phosphatase 46 40 - 150 U/L    AST 43 (H) 10 - 40 U/L    ALT 63 (H) 10 - 44 U/L    eGFR >60.0 >60 mL/min/1.73 m^2    Anion  Gap 8 8 - 16 mmol/L   Type & Screen    Collection Time: 12/18/24  6:52 AM   Result Value Ref Range    Group & Rh O POS     Indirect Viola NEG     Specimen Outdate 12/21/2024 23:59        PROBLEMS ASSESSED THIS VISIT:    1. Primary myelofibrosis    2. Stem cell transplant candidate          PLAN:       Primary myelofibrosis/Stem Cell Transplant Candidate  Mr. Hu has primary myelofibrosis. Using the MIPSS-70 version 2.0 risk assessment tool, he is considered intermediate risk, with 10-year overall survival of 37%.      He was on ruxolitinib therapy and tolerating this well. This has brought some symptom mitigation with respect to diaphoresis.   Will receive allogeneic stem cell transplant. His brother is a haploidentical donor, and informed consent completed with Dr. James 12/4/24.  - Central line placed today, reports pain/stiffness to the site.  - Covid negative today  - Planned admission today for HAPLO SCT from peripheral blood donor.    Planned conditioning regimen:  Thiotepa on Day -7  Busulfan on Day -6, -5, -4, and -3  Fludarabine on Day -6, -5, -4, and -3  REST DAYS on Day -2 and -1     Planned  GVHD Prophylaxis:  Cyclophosphamide on Day +3 and +4  Tacrolimus starting on Day +5  Mycophenolate starting on Day +5     Antimicrobial Prophylaxis:  Acyclovir starting on Day -7  Levofloxacin starting on Day -1  Micafungin on Day -1 through Day +4  Letermovir starting on Day +5 (if CMV PCR drawn on day 0 results negative)  Posaconazole starting on Day +5  Atovaquone starting on Day +30     Skin Care with Thiotepa: Day -7 through D-5     Seizure Prophylaxis:  Levetiracetam on Day -7 through Day -2     SOS/VOD Prophylaxis:  Ursodiol on Day -7 through Day +30     Growth Factor Support:  Neupogen starting on Day +5        Caregiver: Carmelita (Spouse)  Post-transplant discharge plans: Home      Anemia  Likely due to ruxolitinib therapy. Mild.  CBC daily while inpatient; transfuse for hgb <7  g/dL    Thrombocytosis  Secondary to myelofibrosis  Stopped ASA two weeks ago  Plan for VTE prophylaxis until PLT count <50K    MASLD  Biopsy proven to be steatotic change. Most compatible with MASLD. Follow-up with hepatology. Encourage lifestyle modification. No significant fibrosis.     Adrenal nodule  Cleared by endocrinology from perspective.    Follow-up  Twice weekly labs and follow up post-transplant.      BMT Chart Routing      Follow up with physician    Follow up with REBEKAH Francois as scheduled   Provider visit type    Infusion scheduling note    Injection scheduling note    Labs CBC, CMP, CMV, magnesium, phosphorus, type and screen, tacrolimus level and EBV   Scheduling:  Preferred lab:  Lab interval:  Twice weekly, prior to appointments   Imaging    Pharmacy appointment    Other referrals               Total time of this visit was 40 minutes, including time spent face to face with patient and/or via video/audio, and also in preparing for today's visit for MDM and documentation. (Medical Decision Making, including consideration of possible diagnoses, management options, complex medical record review, review of diagnostic tests and information, consideration and discussion of significant complications based on comorbidities, and discussion with providers involved with the care of the patient). Greater than 50% was spent face to face with the patient counseling and coordinating care.       Tatum Pruitt NP  Hematology and Stem Cell Transplant

## 2024-12-17 NOTE — PRE-PROCEDURE INSTRUCTIONS
PreOp Instructions given:   - Verbal medication information (what to hold and what to take)   - NPO guidelines     Patients should stop full meals at midnight, but they can consume clear liquids up to 2 hours prior to scheduled Surgery time.  Clear liquids include Gatorade, water, Clear liquids do NOT include anything with pulp or food particles (such as chicken broth, ice cream, yogurt, Jello, etc.)   - Arrival place directions given; time to be given the day before procedure by the   Surgeon's Office 0600 DOSC  - Bathing with antibacterial soap   - Don't wear any jewelry or bring any valuables AM of surgery   - No makeup or moisturizer to face   - No perfume/cologne, powder, lotions or aftershave   Pt. verbalized understanding.   Pt denies any h/o Anesthesia/Sedation complications or side effects.

## 2024-12-18 ENCOUNTER — ANESTHESIA (OUTPATIENT)
Dept: SURGERY | Facility: HOSPITAL | Age: 54
End: 2024-12-18
Payer: COMMERCIAL

## 2024-12-18 ENCOUNTER — HOSPITAL ENCOUNTER (INPATIENT)
Facility: HOSPITAL | Age: 54
LOS: 38 days | Discharge: HOME | DRG: 014 | End: 2025-01-25
Attending: INTERNAL MEDICINE | Admitting: INTERNAL MEDICINE
Payer: COMMERCIAL

## 2024-12-18 ENCOUNTER — TELEPHONE (OUTPATIENT)
Dept: HEMATOLOGY/ONCOLOGY | Facility: CLINIC | Age: 54
End: 2024-12-18
Payer: COMMERCIAL

## 2024-12-18 ENCOUNTER — CLINICAL SUPPORT (OUTPATIENT)
Dept: HEMATOLOGY/ONCOLOGY | Facility: CLINIC | Age: 54
End: 2024-12-18
Payer: COMMERCIAL

## 2024-12-18 ENCOUNTER — OFFICE VISIT (OUTPATIENT)
Dept: HEMATOLOGY/ONCOLOGY | Facility: CLINIC | Age: 54
End: 2024-12-18
Payer: COMMERCIAL

## 2024-12-18 ENCOUNTER — HOSPITAL ENCOUNTER (OUTPATIENT)
Facility: HOSPITAL | Age: 54
Discharge: HOME | End: 2024-12-18
Attending: SURGERY | Admitting: SURGERY
Payer: COMMERCIAL

## 2024-12-18 VITALS
HEIGHT: 71 IN | HEART RATE: 71 BPM | SYSTOLIC BLOOD PRESSURE: 123 MMHG | DIASTOLIC BLOOD PRESSURE: 68 MMHG | WEIGHT: 200 LBS | TEMPERATURE: 98 F | RESPIRATION RATE: 13 BRPM | BODY MASS INDEX: 28 KG/M2 | OXYGEN SATURATION: 98 %

## 2024-12-18 VITALS
HEART RATE: 76 BPM | HEIGHT: 71 IN | BODY MASS INDEX: 28.02 KG/M2 | TEMPERATURE: 98 F | SYSTOLIC BLOOD PRESSURE: 127 MMHG | OXYGEN SATURATION: 98 % | RESPIRATION RATE: 16 BRPM | WEIGHT: 200.19 LBS | DIASTOLIC BLOOD PRESSURE: 77 MMHG

## 2024-12-18 DIAGNOSIS — Z94.84 IMMUNOCOMPROMISED STATE ASSOCIATED WITH STEM CELL TRANSPLANT: ICD-10-CM

## 2024-12-18 DIAGNOSIS — Z76.82 STEM CELL TRANSPLANT CANDIDATE: ICD-10-CM

## 2024-12-18 DIAGNOSIS — Z94.84 HISTORY OF ALLOGENEIC STEM CELL TRANSPLANT: ICD-10-CM

## 2024-12-18 DIAGNOSIS — Z75.1 ENCOUNTER FOR PERSON AWAITING ADMISSION TO ACUTE HOSPITAL: Primary | ICD-10-CM

## 2024-12-18 DIAGNOSIS — D61.810 PANCYTOPENIA DUE TO CHEMOTHERAPY: ICD-10-CM

## 2024-12-18 DIAGNOSIS — R10.9 ABDOMINAL PAIN, UNSPECIFIED ABDOMINAL LOCATION: ICD-10-CM

## 2024-12-18 DIAGNOSIS — D47.1 PRIMARY MYELOFIBROSIS: Primary | ICD-10-CM

## 2024-12-18 DIAGNOSIS — G47.00 INSOMNIA, UNSPECIFIED TYPE: ICD-10-CM

## 2024-12-18 DIAGNOSIS — R19.7 DIARRHEA, UNSPECIFIED TYPE: ICD-10-CM

## 2024-12-18 DIAGNOSIS — I10 HYPERTENSION, UNSPECIFIED TYPE: ICD-10-CM

## 2024-12-18 DIAGNOSIS — Z76.82 STEM CELL TRANSPLANT CANDIDATE: Primary | ICD-10-CM

## 2024-12-18 DIAGNOSIS — R94.31 QT PROLONGATION: ICD-10-CM

## 2024-12-18 DIAGNOSIS — D47.1 PRIMARY MYELOFIBROSIS: ICD-10-CM

## 2024-12-18 DIAGNOSIS — D75.81 MYELOFIBROSIS: ICD-10-CM

## 2024-12-18 DIAGNOSIS — D84.822 IMMUNOCOMPROMISED STATE ASSOCIATED WITH STEM CELL TRANSPLANT: ICD-10-CM

## 2024-12-18 PROBLEM — K76.0 METABOLIC DYSFUNCTION-ASSOCIATED STEATOTIC LIVER DISEASE (MASLD): Status: ACTIVE | Noted: 2024-09-03

## 2024-12-18 PROBLEM — D63.0 ANEMIA IN NEOPLASTIC DISEASE: Status: ACTIVE | Noted: 2024-12-18

## 2024-12-18 PROBLEM — D75.839 THROMBOCYTOSIS: Status: ACTIVE | Noted: 2024-12-18

## 2024-12-18 LAB
ABO + RH BLD: NORMAL
ABO + RH BLD: NORMAL
ALBUMIN SERPL BCP-MCNC: 4 G/DL (ref 3.5–5.2)
ALP SERPL-CCNC: 46 U/L (ref 40–150)
ALT SERPL W/O P-5'-P-CCNC: 63 U/L (ref 10–44)
ANION GAP SERPL CALC-SCNC: 8 MMOL/L (ref 8–16)
AST SERPL-CCNC: 43 U/L (ref 10–40)
BASOPHILS # BLD AUTO: 0.07 K/UL (ref 0–0.2)
BASOPHILS NFR BLD: 0.8 % (ref 0–1.9)
BILIRUB SERPL-MCNC: 0.3 MG/DL (ref 0.1–1)
BLD GP AB SCN CELLS X3 SERPL QL: NORMAL
BLD GP AB SCN CELLS X3 SERPL QL: NORMAL
BUN SERPL-MCNC: 15 MG/DL (ref 6–20)
CALCIUM SERPL-MCNC: 9 MG/DL (ref 8.7–10.5)
CHLORIDE SERPL-SCNC: 109 MMOL/L (ref 95–110)
CO2 SERPL-SCNC: 22 MMOL/L (ref 23–29)
CREAT SERPL-MCNC: 0.8 MG/DL (ref 0.5–1.4)
DIFFERENTIAL METHOD BLD: ABNORMAL
EOSINOPHIL # BLD AUTO: 0.2 K/UL (ref 0–0.5)
EOSINOPHIL NFR BLD: 1.7 % (ref 0–8)
ERYTHROCYTE [DISTWIDTH] IN BLOOD BY AUTOMATED COUNT: 13.7 % (ref 11.5–14.5)
EST. GFR  (NO RACE VARIABLE): >60 ML/MIN/1.73 M^2
GLUCOSE SERPL-MCNC: 95 MG/DL (ref 70–110)
HCT VFR BLD AUTO: 37.3 % (ref 40–54)
HGB BLD-MCNC: 12.9 G/DL (ref 14–18)
IMM GRANULOCYTES # BLD AUTO: 0.1 K/UL (ref 0–0.04)
IMM GRANULOCYTES NFR BLD AUTO: 1.1 % (ref 0–0.5)
LYMPHOCYTES # BLD AUTO: 2 K/UL (ref 1–4.8)
LYMPHOCYTES NFR BLD: 23 % (ref 18–48)
MCH RBC QN AUTO: 33.8 PG (ref 27–31)
MCHC RBC AUTO-ENTMCNC: 34.6 G/DL (ref 32–36)
MCV RBC AUTO: 98 FL (ref 82–98)
MONOCYTES # BLD AUTO: 0.9 K/UL (ref 0.3–1)
MONOCYTES NFR BLD: 10 % (ref 4–15)
NEUTROPHILS # BLD AUTO: 5.6 K/UL (ref 1.8–7.7)
NEUTROPHILS NFR BLD: 63.4 % (ref 38–73)
NRBC BLD-RTO: 0 /100 WBC
PLATELET # BLD AUTO: 466 K/UL (ref 150–450)
PMV BLD AUTO: 11.1 FL (ref 9.2–12.9)
POTASSIUM SERPL-SCNC: 3.9 MMOL/L (ref 3.5–5.1)
PROT SERPL-MCNC: 7 G/DL (ref 6–8.4)
RBC # BLD AUTO: 3.82 M/UL (ref 4.6–6.2)
SARS-COV-2 RDRP RESP QL NAA+PROBE: NEGATIVE
SODIUM SERPL-SCNC: 139 MMOL/L (ref 136–145)
SPECIMEN OUTDATE: NORMAL
SPECIMEN OUTDATE: NORMAL
WBC # BLD AUTO: 8.8 K/UL (ref 3.9–12.7)

## 2024-12-18 PROCEDURE — 37000008 HC ANESTHESIA 1ST 15 MINUTES: Performed by: SURGERY

## 2024-12-18 PROCEDURE — 63600175 PHARM REV CODE 636 W HCPCS

## 2024-12-18 PROCEDURE — 63600175 PHARM REV CODE 636 W HCPCS: Performed by: STUDENT IN AN ORGANIZED HEALTH CARE EDUCATION/TRAINING PROGRAM

## 2024-12-18 PROCEDURE — 87635 SARS-COV-2 COVID-19 AMP PRB: CPT | Performed by: INTERNAL MEDICINE

## 2024-12-18 PROCEDURE — 37000009 HC ANESTHESIA EA ADD 15 MINS: Performed by: SURGERY

## 2024-12-18 PROCEDURE — 80053 COMPREHEN METABOLIC PANEL: CPT | Performed by: SURGERY

## 2024-12-18 PROCEDURE — 25000003 PHARM REV CODE 250

## 2024-12-18 PROCEDURE — 86900 BLOOD TYPING SEROLOGIC ABO: CPT | Performed by: SURGERY

## 2024-12-18 PROCEDURE — 25000003 PHARM REV CODE 250: Performed by: INTERNAL MEDICINE

## 2024-12-18 PROCEDURE — 36415 COLL VENOUS BLD VENIPUNCTURE: CPT | Performed by: NURSE PRACTITIONER

## 2024-12-18 PROCEDURE — 86850 RBC ANTIBODY SCREEN: CPT | Mod: 91 | Performed by: NURSE PRACTITIONER

## 2024-12-18 PROCEDURE — 99223 1ST HOSP IP/OBS HIGH 75: CPT | Mod: ,,, | Performed by: INTERNAL MEDICINE

## 2024-12-18 PROCEDURE — 77001 FLUOROGUIDE FOR VEIN DEVICE: CPT | Mod: 26,,, | Performed by: SURGERY

## 2024-12-18 PROCEDURE — 36558 INSERT TUNNELED CV CATH: CPT | Mod: RT,,, | Performed by: SURGERY

## 2024-12-18 PROCEDURE — 36000707: Performed by: SURGERY

## 2024-12-18 PROCEDURE — 71000015 HC POSTOP RECOV 1ST HR: Performed by: SURGERY

## 2024-12-18 PROCEDURE — 99999 PR PBB SHADOW E&M-EST. PATIENT-LVL I: CPT | Mod: PBBFAC,,,

## 2024-12-18 PROCEDURE — 99999 PR PBB SHADOW E&M-EST. PATIENT-LVL V: CPT | Mod: PBBFAC,,,

## 2024-12-18 PROCEDURE — 63600175 PHARM REV CODE 636 W HCPCS: Mod: JZ,JG | Performed by: SURGERY

## 2024-12-18 PROCEDURE — 85025 COMPLETE CBC W/AUTO DIFF WBC: CPT | Performed by: SURGERY

## 2024-12-18 PROCEDURE — 63600175 PHARM REV CODE 636 W HCPCS: Performed by: INTERNAL MEDICINE

## 2024-12-18 PROCEDURE — 36000706: Performed by: SURGERY

## 2024-12-18 PROCEDURE — 71000044 HC DOSC ROUTINE RECOVERY FIRST HOUR: Performed by: SURGERY

## 2024-12-18 PROCEDURE — 20600001 HC STEP DOWN PRIVATE ROOM

## 2024-12-18 PROCEDURE — 71000016 HC POSTOP RECOV ADDL HR: Performed by: SURGERY

## 2024-12-18 RX ORDER — OLANZAPINE 5 MG/1
5 TABLET ORAL 2 TIMES DAILY
Status: CANCELLED | OUTPATIENT
Start: 2024-12-18

## 2024-12-18 RX ORDER — SODIUM CHLORIDE 0.9 % (FLUSH) 0.9 %
10 SYRINGE (ML) INJECTION
Status: CANCELLED | OUTPATIENT
Start: 2024-12-18

## 2024-12-18 RX ORDER — PROPOFOL 10 MG/ML
VIAL (ML) INTRAVENOUS
Status: DISCONTINUED | OUTPATIENT
Start: 2024-12-18 | End: 2024-12-18

## 2024-12-18 RX ORDER — LANOLIN ALCOHOL/MO/W.PET/CERES
800 CREAM (GRAM) TOPICAL EVERY 4 HOURS PRN
Status: DISCONTINUED | OUTPATIENT
Start: 2024-12-18 | End: 2025-01-02

## 2024-12-18 RX ORDER — ONDANSETRON HYDROCHLORIDE 2 MG/ML
INJECTION, SOLUTION INTRAVENOUS
Status: DISCONTINUED | OUTPATIENT
Start: 2024-12-18 | End: 2024-12-18

## 2024-12-18 RX ORDER — LANOLIN ALCOHOL/MO/W.PET/CERES
400 CREAM (GRAM) TOPICAL EVERY 4 HOURS PRN
Status: DISCONTINUED | OUTPATIENT
Start: 2024-12-18 | End: 2025-01-02

## 2024-12-18 RX ORDER — GLUCAGON 1 MG
1 KIT INJECTION
Status: DISCONTINUED | OUTPATIENT
Start: 2024-12-18 | End: 2024-12-18 | Stop reason: HOSPADM

## 2024-12-18 RX ORDER — POSACONAZOLE 100 MG/1
300 TABLET, DELAYED RELEASE ORAL DAILY
Status: CANCELLED | OUTPATIENT
Start: 2025-01-02

## 2024-12-18 RX ORDER — ATOVAQUONE 750 MG/5ML
1500 SUSPENSION ORAL DAILY
Status: CANCELLED | OUTPATIENT
Start: 2025-01-25

## 2024-12-18 RX ORDER — URSODIOL 300 MG/1
300 CAPSULE ORAL 2 TIMES DAILY
Status: DISCONTINUED | OUTPATIENT
Start: 2024-12-19 | End: 2025-01-25 | Stop reason: HOSPADM

## 2024-12-18 RX ORDER — NAPROXEN SODIUM 220 MG/1
81 TABLET, FILM COATED ORAL DAILY
Status: DISCONTINUED | OUTPATIENT
Start: 2024-12-19 | End: 2024-12-18

## 2024-12-18 RX ORDER — HEPARIN 100 UNIT/ML
SYRINGE INTRAVENOUS
Status: DISCONTINUED | OUTPATIENT
Start: 2024-12-18 | End: 2024-12-18 | Stop reason: HOSPADM

## 2024-12-18 RX ORDER — FENTANYL CITRATE 50 UG/ML
INJECTION, SOLUTION INTRAMUSCULAR; INTRAVENOUS
Status: DISCONTINUED | OUTPATIENT
Start: 2024-12-18 | End: 2024-12-18

## 2024-12-18 RX ORDER — OLANZAPINE 2.5 MG/1
5 TABLET ORAL 2 TIMES DAILY
Status: COMPLETED | OUTPATIENT
Start: 2024-12-18 | End: 2024-12-25

## 2024-12-18 RX ORDER — TRAMADOL HYDROCHLORIDE 50 MG/1
50 TABLET ORAL ONCE
Status: COMPLETED | OUTPATIENT
Start: 2024-12-18 | End: 2024-12-18

## 2024-12-18 RX ORDER — MYCOPHENOLATE MOFETIL 250 MG/1
1000 CAPSULE ORAL 3 TIMES DAILY
Status: DISCONTINUED | OUTPATIENT
Start: 2024-12-31 | End: 2025-01-05

## 2024-12-18 RX ORDER — ATOVAQUONE 750 MG/5ML
1500 SUSPENSION ORAL DAILY
Status: DISCONTINUED | OUTPATIENT
Start: 2025-01-25 | End: 2025-01-25 | Stop reason: HOSPADM

## 2024-12-18 RX ORDER — HALOPERIDOL 5 MG/ML
0.5 INJECTION INTRAMUSCULAR EVERY 10 MIN PRN
Status: DISCONTINUED | OUTPATIENT
Start: 2024-12-18 | End: 2024-12-18 | Stop reason: HOSPADM

## 2024-12-18 RX ORDER — POTASSIUM CHLORIDE 20 MEQ/1
20 TABLET, EXTENDED RELEASE ORAL
Status: DISCONTINUED | OUTPATIENT
Start: 2024-12-18 | End: 2025-01-02

## 2024-12-18 RX ORDER — ONDANSETRON HYDROCHLORIDE 2 MG/ML
8 INJECTION, SOLUTION INTRAVENOUS
Status: CANCELLED | OUTPATIENT
Start: 2024-12-29

## 2024-12-18 RX ORDER — ACYCLOVIR 200 MG/1
800 CAPSULE ORAL 2 TIMES DAILY
Status: CANCELLED | OUTPATIENT
Start: 2024-12-19

## 2024-12-18 RX ORDER — LEVOFLOXACIN 500 MG/1
500 TABLET, FILM COATED ORAL DAILY
Status: CANCELLED | OUTPATIENT
Start: 2024-12-25

## 2024-12-18 RX ORDER — TACROLIMUS 1 MG/1
1 CAPSULE ORAL EVERY EVENING
Status: CANCELLED | OUTPATIENT
Start: 2024-12-31

## 2024-12-18 RX ORDER — POSACONAZOLE 100 MG/1
300 TABLET, DELAYED RELEASE ORAL DAILY
Status: DISCONTINUED | OUTPATIENT
Start: 2025-01-01 | End: 2025-01-05

## 2024-12-18 RX ORDER — SODIUM CHLORIDE 0.9 % (FLUSH) 0.9 %
10 SYRINGE (ML) INJECTION
Status: DISCONTINUED | OUTPATIENT
Start: 2024-12-18 | End: 2024-12-18 | Stop reason: HOSPADM

## 2024-12-18 RX ORDER — LORAZEPAM 2 MG/ML
1 INJECTION INTRAMUSCULAR EVERY 6 HOURS PRN
Status: DISCONTINUED | OUTPATIENT
Start: 2024-12-18 | End: 2025-01-14

## 2024-12-18 RX ORDER — ONDANSETRON HYDROCHLORIDE 2 MG/ML
8 INJECTION, SOLUTION INTRAVENOUS
Status: COMPLETED | OUTPATIENT
Start: 2024-12-29 | End: 2025-01-01

## 2024-12-18 RX ORDER — BUPIVACAINE HYDROCHLORIDE 2.5 MG/ML
INJECTION, SOLUTION EPIDURAL; INFILTRATION; INTRACAUDAL
Status: DISCONTINUED | OUTPATIENT
Start: 2024-12-18 | End: 2024-12-18 | Stop reason: HOSPADM

## 2024-12-18 RX ORDER — DEXAMETHASONE 4 MG/1
12 TABLET ORAL
Status: CANCELLED | OUTPATIENT
Start: 2024-12-19

## 2024-12-18 RX ORDER — LEVOFLOXACIN 500 MG/1
500 TABLET, FILM COATED ORAL DAILY
Status: DISCONTINUED | OUTPATIENT
Start: 2024-12-25 | End: 2024-12-27

## 2024-12-18 RX ORDER — DEXAMETHASONE SODIUM PHOSPHATE 4 MG/ML
INJECTION, SOLUTION INTRA-ARTICULAR; INTRALESIONAL; INTRAMUSCULAR; INTRAVENOUS; SOFT TISSUE
Status: DISCONTINUED | OUTPATIENT
Start: 2024-12-18 | End: 2024-12-18

## 2024-12-18 RX ORDER — DEXAMETHASONE 4 MG/1
12 TABLET ORAL
Status: COMPLETED | OUTPATIENT
Start: 2024-12-19 | End: 2024-12-25

## 2024-12-18 RX ORDER — LORAZEPAM 1 MG/1
1 TABLET ORAL
Status: CANCELLED | OUTPATIENT
Start: 2024-12-29

## 2024-12-18 RX ORDER — LORAZEPAM 2 MG/ML
1 INJECTION INTRAMUSCULAR EVERY 6 HOURS PRN
Status: CANCELLED | OUTPATIENT
Start: 2024-12-18

## 2024-12-18 RX ORDER — TACROLIMUS 1 MG/1
1 CAPSULE ORAL EVERY EVENING
Status: DISCONTINUED | OUTPATIENT
Start: 2024-12-31 | End: 2025-01-06

## 2024-12-18 RX ORDER — TACROLIMUS 1 MG/1
1 CAPSULE ORAL EVERY MORNING
Status: CANCELLED | OUTPATIENT
Start: 2024-12-31

## 2024-12-18 RX ORDER — ONDANSETRON HYDROCHLORIDE 8 MG/1
8 TABLET, FILM COATED ORAL
Status: CANCELLED | OUTPATIENT
Start: 2024-12-19

## 2024-12-18 RX ORDER — SODIUM CHLORIDE 0.9 % (FLUSH) 0.9 %
10 SYRINGE (ML) INJECTION
Status: DISCONTINUED | OUTPATIENT
Start: 2024-12-18 | End: 2025-01-25 | Stop reason: HOSPADM

## 2024-12-18 RX ORDER — POSACONAZOLE 100 MG/1
300 TABLET, DELAYED RELEASE ORAL 2 TIMES DAILY
Status: CANCELLED | OUTPATIENT
Start: 2024-12-31

## 2024-12-18 RX ORDER — HEPARIN 100 UNIT/ML
300 SYRINGE INTRAVENOUS
Status: CANCELLED | OUTPATIENT
Start: 2024-12-18

## 2024-12-18 RX ORDER — ACYCLOVIR 200 MG/1
800 CAPSULE ORAL 2 TIMES DAILY
Status: DISCONTINUED | OUTPATIENT
Start: 2024-12-19 | End: 2025-01-02

## 2024-12-18 RX ORDER — PANTOPRAZOLE SODIUM 40 MG/1
40 TABLET, DELAYED RELEASE ORAL DAILY
Status: DISCONTINUED | OUTPATIENT
Start: 2024-12-19 | End: 2025-01-02

## 2024-12-18 RX ORDER — DIPHENHYDRAMINE HCL 25 MG
50 CAPSULE ORAL
Status: DISCONTINUED | OUTPATIENT
Start: 2024-12-18 | End: 2025-01-14

## 2024-12-18 RX ORDER — LEVETIRACETAM 500 MG/1
500 TABLET ORAL 2 TIMES DAILY
Status: COMPLETED | OUTPATIENT
Start: 2024-12-18 | End: 2024-12-24

## 2024-12-18 RX ORDER — POSACONAZOLE 100 MG/1
300 TABLET, DELAYED RELEASE ORAL 2 TIMES DAILY
Status: COMPLETED | OUTPATIENT
Start: 2024-12-31 | End: 2024-12-31

## 2024-12-18 RX ORDER — PROCHLORPERAZINE EDISYLATE 5 MG/ML
10 INJECTION INTRAMUSCULAR; INTRAVENOUS ONCE AS NEEDED
Status: CANCELLED | OUTPATIENT
Start: 2024-12-18

## 2024-12-18 RX ORDER — HEPARIN 100 UNIT/ML
300 SYRINGE INTRAVENOUS
Status: DISCONTINUED | OUTPATIENT
Start: 2024-12-18 | End: 2025-01-25 | Stop reason: HOSPADM

## 2024-12-18 RX ORDER — PROCHLORPERAZINE EDISYLATE 5 MG/ML
10 INJECTION INTRAMUSCULAR; INTRAVENOUS ONCE AS NEEDED
Status: COMPLETED | OUTPATIENT
Start: 2024-12-18 | End: 2024-12-23

## 2024-12-18 RX ORDER — LORAZEPAM 1 MG/1
1 TABLET ORAL
Status: COMPLETED | OUTPATIENT
Start: 2024-12-29 | End: 2024-12-30

## 2024-12-18 RX ORDER — SODIUM CHLORIDE AND POTASSIUM CHLORIDE 150; 900 MG/100ML; MG/100ML
75 INJECTION, SOLUTION INTRAVENOUS CONTINUOUS
Status: CANCELLED | OUTPATIENT
Start: 2024-12-18

## 2024-12-18 RX ORDER — URSODIOL 300 MG/1
300 CAPSULE ORAL 2 TIMES DAILY
Status: CANCELLED | OUTPATIENT
Start: 2024-12-19

## 2024-12-18 RX ORDER — FENTANYL CITRATE 50 UG/ML
25 INJECTION, SOLUTION INTRAMUSCULAR; INTRAVENOUS EVERY 5 MIN PRN
Status: COMPLETED | OUTPATIENT
Start: 2024-12-18 | End: 2024-12-18

## 2024-12-18 RX ORDER — LEVETIRACETAM 500 MG/1
500 TABLET ORAL 2 TIMES DAILY
Status: CANCELLED | OUTPATIENT
Start: 2024-12-19

## 2024-12-18 RX ORDER — DEXMEDETOMIDINE HYDROCHLORIDE 100 UG/ML
INJECTION, SOLUTION INTRAVENOUS
Status: DISCONTINUED | OUTPATIENT
Start: 2024-12-18 | End: 2024-12-18

## 2024-12-18 RX ORDER — ONDANSETRON 4 MG/1
8 TABLET, FILM COATED ORAL
Status: DISCONTINUED | OUTPATIENT
Start: 2024-12-19 | End: 2024-12-21

## 2024-12-18 RX ORDER — IBUPROFEN 400 MG/1
400 TABLET ORAL EVERY 6 HOURS PRN
Status: DISCONTINUED | OUTPATIENT
Start: 2024-12-18 | End: 2024-12-18

## 2024-12-18 RX ORDER — TACROLIMUS 1 MG/1
1 CAPSULE ORAL EVERY MORNING
Status: DISCONTINUED | OUTPATIENT
Start: 2024-12-31 | End: 2025-01-06

## 2024-12-18 RX ORDER — MIDAZOLAM HYDROCHLORIDE 1 MG/ML
INJECTION INTRAMUSCULAR; INTRAVENOUS
Status: DISCONTINUED | OUTPATIENT
Start: 2024-12-18 | End: 2024-12-18

## 2024-12-18 RX ORDER — CEFAZOLIN 2 G/1
2 INJECTION, POWDER, FOR SOLUTION INTRAMUSCULAR; INTRAVENOUS
Status: COMPLETED | OUTPATIENT
Start: 2024-12-18 | End: 2024-12-18

## 2024-12-18 RX ORDER — MYCOPHENOLATE MOFETIL 500 MG/1
1000 TABLET ORAL 3 TIMES DAILY
Status: CANCELLED | OUTPATIENT
Start: 2024-12-31

## 2024-12-18 RX ORDER — SODIUM CHLORIDE AND POTASSIUM CHLORIDE 150; 900 MG/100ML; MG/100ML
75 INJECTION, SOLUTION INTRAVENOUS CONTINUOUS
Status: DISCONTINUED | OUTPATIENT
Start: 2024-12-18 | End: 2024-12-27

## 2024-12-18 RX ADMIN — TRAMADOL HYDROCHLORIDE 50 MG: 50 TABLET, COATED ORAL at 06:12

## 2024-12-18 RX ADMIN — DEXMEDETOMIDINE 8 MCG: 200 INJECTION, SOLUTION INTRAVENOUS at 08:12

## 2024-12-18 RX ADMIN — FENTANYL CITRATE 25 MCG: 50 INJECTION INTRAMUSCULAR; INTRAVENOUS at 10:12

## 2024-12-18 RX ADMIN — PROPOFOL 125 MCG/KG/MIN: 10 INJECTION, EMULSION INTRAVENOUS at 08:12

## 2024-12-18 RX ADMIN — DEXAMETHASONE SODIUM PHOSPHATE 4 MG: 4 INJECTION, SOLUTION INTRAMUSCULAR; INTRAVENOUS at 08:12

## 2024-12-18 RX ADMIN — Medication 1 DOSE: at 09:12

## 2024-12-18 RX ADMIN — MIDAZOLAM HYDROCHLORIDE 2 MG: 2 INJECTION, SOLUTION INTRAMUSCULAR; INTRAVENOUS at 08:12

## 2024-12-18 RX ADMIN — PROPOFOL 150 MCG/KG/MIN: 10 INJECTION, EMULSION INTRAVENOUS at 08:12

## 2024-12-18 RX ADMIN — ONDANSETRON 4 MG: 2 INJECTION INTRAMUSCULAR; INTRAVENOUS at 09:12

## 2024-12-18 RX ADMIN — CEFAZOLIN 2 G: 2 INJECTION, POWDER, FOR SOLUTION INTRAMUSCULAR; INTRAVENOUS at 08:12

## 2024-12-18 RX ADMIN — OLANZAPINE 5 MG: 2.5 TABLET, FILM COATED ORAL at 09:12

## 2024-12-18 RX ADMIN — PROPOFOL 50 MG: 10 INJECTION, EMULSION INTRAVENOUS at 08:12

## 2024-12-18 RX ADMIN — LEVETIRACETAM 500 MG: 500 TABLET, FILM COATED ORAL at 09:12

## 2024-12-18 RX ADMIN — SODIUM CHLORIDE: 0.9 INJECTION, SOLUTION INTRAVENOUS at 09:12

## 2024-12-18 RX ADMIN — SODIUM CHLORIDE: 0.9 INJECTION, SOLUTION INTRAVENOUS at 08:12

## 2024-12-18 RX ADMIN — SODIUM CHLORIDE AND POTASSIUM CHLORIDE 75 ML/HR: .9; .15 SOLUTION INTRAVENOUS at 09:12

## 2024-12-18 RX ADMIN — FENTANYL CITRATE 50 MCG: 50 INJECTION, SOLUTION INTRAMUSCULAR; INTRAVENOUS at 09:12

## 2024-12-18 NOTE — ASSESSMENT & PLAN NOTE
- Subcentimeter nodular thickening of the adrenal glands first noted on imaging from 1/2024.  - Seen by endocrine prior to transplant admission and Dexamethasone suppression test performed. Patient responded appropriately. No further w/u needed.  - Likely adenomas.

## 2024-12-18 NOTE — PROGRESS NOTES
BMT Pharmacist Treatment Plan Note:     Patient admitting for stem cell transplant.      The following patient parameters from 12/18/24 have been used in the treatment plan (within 7 days from start of chemotherapy) to calculate doses per ASBMT recommendations:     Actual body weight is being used to calculate Fludarabine   - Height = 180.3 cm   - Weight = 90.7 kg   - BSA = 2.13 m2    Since patient's actual weight > 120% of ideal body weight, adjusted body weight with a 25% correction factor is being used to calculate Busulfan and Cyclophosphamide   - Height = 180.3 cm   - Weight = 90.7 kg   - Ideal body weight = 75.3 kg   - Adjusted (correction factor 25%) = 79.2 kg   - Adjusted BSA = 1.99 m2    Since patient's actual weight > 120% of ideal body weight, adjusted body weight with a 40% correction factor is being used to calculate Thiotepa   - Height = 180.3 cm   - Weight = 90.7 kg   - Ideal body weight = 75.3 kg   - Adjusted (correction factor 40%) = 81.5 kg   - Adjusted BSA = 2.02 m2            Please note that adjusted body weight in Epic side bar is based off a correction factor of 40%.      Calculations:     Ideal body weight   - Males: 50 kg + 2.3 kg for each inch over 5 feet   - Females: 45.5 kg + 2.3 kg for each inch over 5 feet     Adjusted body weight   - AjBW25 (correction factor 25%) = [(Actual body weight - Ideal body weight) x 0.25] + IBW   - AjBW40 (correction factor 40%) = [(Actual body weight - Ideal body weight) x 0.40] + IBW     BSA (Mosteller) = SQR RT ([Height(cm) x Weight(kg)] / 3600)        Carolina Carson, Pharm.D., BCOP  Clinical Pharmacy Specialist, Bone Marrow Transplant/Cellular Therapy  [facility]  SpectraLink: [phone]

## 2024-12-18 NOTE — ASSESSMENT & PLAN NOTE
- 2/2 myelofibrosis  - Was on daily ASA, but stopped taking ~ 2 weeks ago  - Anticipate thrombocytopenia following chemotherapy  - Daily CBC while IP

## 2024-12-18 NOTE — ASSESSMENT & PLAN NOTE
From Dr. James's most recent clinic note:  Primary myelofibrosis              A. 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

## 2024-12-18 NOTE — ASSESSMENT & PLAN NOTE
- Patient of Dr. 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 (Listed as allergy in Epic. Can be removed as allergy 72 hours following last dose of Busulfan).  - Patient is O+. Donor is O+.  - Patient is CMV non-reactive. Donor is CMV reactive. Given donor CMV status, patient will start (patient-supplied) letermovir on Day +13.  - See treatment plan below:  Planned conditioning regimen:  Thiotepa on Day -7  Busulfan on Day -6, -5, -4, and -3  Fludarabine on Day -6, -5, -4, and -3  REST DAYS on Day -2 and -1     Planned  GVHD Prophylaxis:  Cyclophosphamide on Day +3 and +4  Tacrolimus starting on Day +5  Mycophenolate starting on Day +5     Antimicrobial Prophylaxis:  Acyclovir starting on Day -7  Levofloxacin starting on Day -1  Micafungin on Day -1 through Day +4  Letermovir starting on Day +5 (if CMV PCR drawn on day 0 results negative)  Posaconazole starting on Day +5  Atovaquone starting on Day +30     Skin Care with Thiotepa: Day -7 through D-5     Seizure Prophylaxis:  Levetiracetam on Day -7 through Day -2     SOS/VOD Prophylaxis:  Ursodiol on Day -7 through Day +30     Growth Factor Support:  Neupogen starting on Day +5        Caregiver: Carmelita (Spouse)  Post-transplant discharge plans: Home

## 2024-12-18 NOTE — ASSESSMENT & PLAN NOTE
- Biopsy proven to be steatotic change. Most compatible with MASLD.   - Follow-up with hepatology OP.

## 2024-12-18 NOTE — ASSESSMENT & PLAN NOTE
- Holding home atorvastatin while IP to avoid interaction with other medications. Can resume at discharge if LFTs stable.

## 2024-12-18 NOTE — TRANSFER OF CARE
"Anesthesia Transfer of Care Note    Patient: Rubén Hu    Procedure(s) Performed: Procedure(s) (LRB):  INSERTION, CATHETER, CENTRAL VENOUS, NORMAN TRIPLE LUMEN, Bard 12.5 fr Norman Cath model [id number] (Right)    Patient location: [facility]    Anesthesia Type: general    Transport from OR: Transported from OR on 6-10 L/min O2 by face mask with adequate spontaneous ventilation    Post pain: adequate analgesia    Post assessment: no apparent anesthetic complications and tolerated procedure well    Post vital signs: stable    Level of consciousness: awake    Nausea/Vomiting: no nausea/vomiting    Complications: none    Transfer of care protocol was followed      Last vitals: Visit Vitals  /78   Pulse 71   Temp 36.9 °C (98.4 °F)   Resp 16   Ht 5' 11" (1.803 m)   Wt 90.7 kg (200 lb)   SpO2 99%   BMI 27.89 kg/m²     "

## 2024-12-18 NOTE — H&P
"Acute Care Surgery: History & Physical    Chief Complaint: primary myelofibrosis    Subjective:  Mr. Hu is a 54 y.o. male who presents for tunneled central venous access for  stem cell transplant. No history of prior tunneled line.    On examination, Mr. Hu is sitting comfortably in the room upon entering in no acute distress and in good spirits.     PMHx, SHx, FHx, SocHx, Allergies, Medications:  Mr. Hu  has a past medical history of Cancer and Hyperlipidemia.     He  has a past surgical history that includes Eye surgery; Colonoscopy (N/A, 8/9/2023); Flexible sigmoidoscopy (N/A, 7/23/2024); and Bone marrow biopsy (N/A, 11/27/2024).    He  reports that he has quit smoking. His smoking use included cigars and cigarettes. He has never used smokeless tobacco. He reports that he does not currently use alcohol. He reports that he does not use drugs.     Mr. Hu's family history includes Arthritis in his mother; Breast cancer (age of onset: 51) in his maternal cousin; COPD in his father; Heart disease in his maternal grandfather; Stroke in his maternal grandmother; Testicular cancer (age of onset: 17) in his maternal cousin.  He is allergic to acetaminophen and bactrim [sulfamethoxazole-trimethoprim].    Mr. Hu has a current medication list which includes the following prescription(s): aspirin, atorvastatin, ergocalciferol, ruxolitinib, dexamethasone, [START ON 12/31/2024] letermovir, and posaconazole, and the following Facility-Administered Medications: cefazolin.    ROS:  Pertinent items from 14 system review are noted in HPI, all others negative     Objective:  /78   Pulse 71   Temp 98.4 °F (36.9 °C)   Resp 16   Ht 5' 11" (1.803 m)   Wt 90.7 kg (200 lb)   SpO2 99%   BMI 27.89 kg/m²     Physical Exam:  Gen: no acute distress, alert and oriented  CV: regular rate   Pulm: no increased work of breathing, symmetrical chest rise  Psych: appropriate affect, normal speech    Medical " Decision Making:  Data reviewed during evaluation of Mr. Hu includes lab results, personal interpretation of imaging results, and other practitioner's notes and charts prior to this encounter.     Assessment:   Mr. Hu is a 54 y.o. male who primary myelofibrosis who needs tunneled central venous access for stem cell transplant.    After discussing the alternatives, benefits, and risks for the proposed operation, Mr. Hu wished to proceed. All of Mr. Hu questions concerning the operation were answered, he expressed full understanding of the procedure and the risks/benefits associated, and signed informed consent was obtained.    Plan:  -OR for tunneled central venous access  -informed consent obtained     Willie Hadley MD, FACUNDO, [facility]  Acute Care Surgery & Surgical Critical Care  [facility]

## 2024-12-18 NOTE — SUBJECTIVE & OBJECTIVE
Patient information was obtained from patient and past medical records.     Oncology History:   From Dr. James's most recent clinic note:  Primary myelofibrosis              A. 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       Acetaminophen and Bactrim [sulfamethoxazole-trimethoprim]     Past Medical History:   Diagnosis Date    Cancer     Hyperlipidemia      Past Surgical History:   Procedure Laterality Date    BONE MARROW BIOPSY N/A 11/27/2024    Procedure: Biopsy-bone marrow;  Surgeon: Clyde James MD;  Location: [facility] ([address] [place] [address]);  Service: Oncology;  Laterality: N/A;  11/20-pre call complete-tb    COLONOSCOPY N/A 8/9/2023    Procedure: COLONOSCOPY;  Surgeon: Will Ardon MD;  Location: [facility] ([address] [place] [address]);  Service: Endoscopy;  Laterality: N/A;  Suprep Instructions sent via portal / Authorizing:     Bebeto Arora MD in [facility] FAMILY MED/ INTERNAL MED/ PEDS  Referral:          [id number]    EYE SURGERY      FLEXIBLE SIGMOIDOSCOPY N/A 7/23/2024    Procedure: SIGMOIDOSCOPY, FLEXIBLE;  Surgeon: Nirali Vicente MD;  Location: [facility] ENDO;  Service: Endoscopy;  Laterality: N/A;     Family History       Problem Relation (Age of Onset)    Arthritis Mother    Breast cancer Maternal Cousin (51)    COPD Father    Heart disease Maternal Grandfather    Stroke Maternal Grandmother    Testicular cancer Maternal Cousin (17)          Tobacco Use    Smoking status: Former     Types: Cigars, Cigarettes    Smokeless tobacco: Never   Substance and Sexual Activity    Alcohol use: Not Currently     Comment: socially    Drug use: Never    Sexual activity: Yes     Partners: Female       Review of Systems   Constitutional:  Positive for fatigue. Negative for activity change, appetite change,  chills and fever.   HENT:  Negative for congestion, mouth sores, nosebleeds, rhinorrhea, sinus pressure, sinus pain, sneezing and sore throat.    Eyes:  Negative for photophobia, pain, discharge, redness, itching and visual disturbance.   Respiratory:  Negative for cough, chest tightness, shortness of breath and wheezing.    Cardiovascular:  Negative for chest pain, palpitations and leg swelling.   Gastrointestinal:  Negative for abdominal distention, abdominal pain, blood in stool, constipation, diarrhea, nausea and vomiting.   Endocrine: Negative for cold intolerance, heat intolerance, polydipsia, polyphagia and polyuria.   Genitourinary:  Negative for difficulty urinating, frequency, hematuria and urgency.   Musculoskeletal:  Negative for arthralgias, back pain, myalgias, neck pain and neck stiffness.   Skin:  Negative for pallor, rash and wound.   Allergic/Immunologic: Negative for environmental allergies, food allergies and immunocompromised state.   Neurological:  Negative for dizziness, tremors, seizures, syncope, speech difficulty, weakness, light-headedness, numbness and headaches.   Hematological:  Negative for adenopathy. Does not bruise/bleed easily.   Psychiatric/Behavioral:  Negative for agitation, confusion, hallucinations, sleep disturbance and suicidal ideas. The patient is not nervous/anxious.      Objective:     Vital Signs (Most Recent):    Vital Signs (24h Range):  Temp:  [98.1 °F (36.7 °C)-98.4 °F (36.9 °C)] 98.3 °F (36.8 °C)  Pulse:  [60-76] 76  Resp:  [13-20] 16  SpO2:  [95 %-99 %] 98 %  BP: (107-135)/(63-79) 127/77        There is no height or weight on file to calculate BMI.  There is no height or weight on file to calculate BSA.    ECOG SCORE           [unfilled]    Lines/Drains/Airways       Central Venous Catheter Line  Duration             Tunneled Central Line - Triple Lumen 12/18/24 Internal Jugular Right <1 day                     Physical Exam  Constitutional:       Appearance: He is  well-developed.   HENT:      Head: Normocephalic and atraumatic.      Mouth/Throat:      Pharynx: No oropharyngeal exudate.   Eyes:      General:         Right eye: No discharge.         Left eye: No discharge.      Conjunctiva/sclera: Conjunctivae normal.      Pupils: Pupils are equal, round, and reactive to light.   Cardiovascular:      Rate and Rhythm: Normal rate and regular rhythm.      Heart sounds: Normal heart sounds. No murmur heard.  Pulmonary:      Effort: Pulmonary effort is normal. No respiratory distress.      Breath sounds: Normal breath sounds. No wheezing or rales.   Abdominal:      General: Bowel sounds are normal. There is no distension.      Palpations: Abdomen is soft.      Tenderness: There is no abdominal tenderness.   Musculoskeletal:         General: No deformity. Normal range of motion.      Cervical back: Normal range of motion and neck supple.   Skin:     General: Skin is warm and dry.      Findings: No erythema or rash.      Comments: Right chest wall Longoria. Dressing c/d/i. No sign of infection to site.   Neurological:      Mental Status: He is alert and oriented to person, place, and time.   Psychiatric:         Behavior: Behavior normal.         Thought Content: Thought content normal.         Judgment: Judgment normal.            Significant Labs:   CBC:   Recent Labs   Lab 12/18/24  0652   WBC 8.80   HGB 12.9*   HCT 37.3*   *    and CMP:   Recent Labs   Lab 12/18/24  0652      K 3.9      CO2 22*   GLU 95   BUN 15   CREATININE 0.8   CALCIUM 9.0   PROT 7.0   ALBUMIN 4.0   BILITOT 0.3   ALKPHOS 46   AST 43*   ALT 63*   ANIONGAP 8       Diagnostic Results:  I have reviewed all pertinent imaging results/findings within the past 24 hours.

## 2024-12-18 NOTE — HPI
Mr. Hu is a 54-y-o patient of Dr. Clyde James with primary myelofibrosis diagnosed 4/2024. Other medical history includes HLD, previous tobacco abuse, and adrenal nodule noted on imaging from 7/2024. 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 His central line was place by gen surge earlier today. He c/o tenderness to site.  Does not appear infected. He denies recent fevers, chills, cough, SOB, chest pain, bleeding or bruising, and n/v/d/c. He is COVID neg.

## 2024-12-18 NOTE — TELEPHONE ENCOUNTER
"----- Message from Serge sent at 12/18/2024 11:42 AM CST -----  Consult/Advisory      Name Of Caller: Self    Contact Preference?: [phone]     Provider Name: Sonal    Does patient feel the need to be seen today? Yes    What is the nature of the call?: Calling to inform office that he's running about 15-20 late for today's 11:45 appt due to currently still having his port put in. Inquiring about still being seen today    Additional Notes:  "Thank you for all that you do for our patients"  "

## 2024-12-18 NOTE — BRIEF OP NOTE
[facility] - [facility] (2nd Fl)  Brief Operative Note    Surgery Date: 12/18/2024     Surgeons and Role:     * Willie Hadley MD - Primary     * Rian Oden MD - Resident - Assisting        Pre-op Diagnosis:  Stem cell transplant candidate [Z76.82]    Post-op Diagnosis:  Post-Op Diagnosis Codes:     * Stem cell transplant candidate [Z76.82]    Procedure(s) (LRB):  INSERTION, CATHETER, CENTRAL VENOUS, LONGORIA TRIPLE LUMEN, Bard 12.5 fr Longoria Cath model [id number] (Right)    Anesthesia: Local MAC    Operative Findings: Right IJ tunneled CVC placed under ultrasound and fluoroscopic guidance.    Chest xray pending    Estimated Blood Loss: Minimal         Specimens:   Specimen (24h ago, onward)      None              Discharge Note    OUTCOME: Patient tolerated treatment/procedure well without complication and is now ready for discharge.    DISPOSITION: Home or Self Care    FINAL DIAGNOSIS:  Same    FOLLOWUP: None    DISCHARGE INSTRUCTIONS:    Discharge Procedure Orders   Notify your health care provider if you experience any of the following:  temperature >100.4     Notify your health care provider if you experience any of the following:  persistent nausea and vomiting or diarrhea     Notify your health care provider if you experience any of the following:  severe uncontrolled pain     Notify your health care provider if you experience any of the following:  redness, tenderness, or signs of infection (pain, swelling, redness, odor or green/yellow discharge around incision site)     Notify your health care provider if you experience any of the following:  difficulty breathing or increased cough     Notify your health care provider if you experience any of the following:  severe persistent headache     Notify your health care provider if you experience any of the following:  worsening rash     Notify your health care provider if you experience any of the following:  persistent dizziness, light-headedness, or visual  disturbances     Notify your health care provider if you experience any of the following:  increased confusion or weakness     Leave dressing on - Keep it clean, dry, and intact until clinic visit     Activity as tolerated

## 2024-12-19 PROBLEM — K59.09 OTHER CONSTIPATION: Status: ACTIVE | Noted: 2024-12-19

## 2024-12-19 LAB
ALBUMIN SERPL BCP-MCNC: 3.5 G/DL (ref 3.5–5.2)
ALP SERPL-CCNC: 54 U/L (ref 40–150)
ALT SERPL W/O P-5'-P-CCNC: 61 U/L (ref 10–44)
ANION GAP SERPL CALC-SCNC: 7 MMOL/L (ref 8–16)
AST SERPL-CCNC: 35 U/L (ref 10–40)
BASOPHILS # BLD AUTO: 0.02 K/UL (ref 0–0.2)
BASOPHILS NFR BLD: 0.1 % (ref 0–1.9)
BILIRUB SERPL-MCNC: 0.3 MG/DL (ref 0.1–1)
BUN SERPL-MCNC: 13 MG/DL (ref 6–20)
CALCIUM SERPL-MCNC: 8.9 MG/DL (ref 8.7–10.5)
CHLORIDE SERPL-SCNC: 110 MMOL/L (ref 95–110)
CO2 SERPL-SCNC: 22 MMOL/L (ref 23–29)
CREAT SERPL-MCNC: 0.8 MG/DL (ref 0.5–1.4)
DIFFERENTIAL METHOD BLD: ABNORMAL
EOSINOPHIL # BLD AUTO: 0.1 K/UL (ref 0–0.5)
EOSINOPHIL NFR BLD: 0.4 % (ref 0–8)
ERYTHROCYTE [DISTWIDTH] IN BLOOD BY AUTOMATED COUNT: 13.8 % (ref 11.5–14.5)
EST. GFR  (NO RACE VARIABLE): >60 ML/MIN/1.73 M^2
GLUCOSE SERPL-MCNC: 105 MG/DL (ref 70–110)
HCT VFR BLD AUTO: 33.4 % (ref 40–54)
HGB BLD-MCNC: 11 G/DL (ref 14–18)
IMM GRANULOCYTES # BLD AUTO: 0.12 K/UL (ref 0–0.04)
IMM GRANULOCYTES NFR BLD AUTO: 0.7 % (ref 0–0.5)
LYMPHOCYTES # BLD AUTO: 1.7 K/UL (ref 1–4.8)
LYMPHOCYTES NFR BLD: 10.1 % (ref 18–48)
MAGNESIUM SERPL-MCNC: 2.2 MG/DL (ref 1.6–2.6)
MCH RBC QN AUTO: 32.4 PG (ref 27–31)
MCHC RBC AUTO-ENTMCNC: 32.9 G/DL (ref 32–36)
MCV RBC AUTO: 99 FL (ref 82–98)
MONOCYTES # BLD AUTO: 1.6 K/UL (ref 0.3–1)
MONOCYTES NFR BLD: 9.6 % (ref 4–15)
NEUTROPHILS # BLD AUTO: 13.2 K/UL (ref 1.8–7.7)
NEUTROPHILS NFR BLD: 79.1 % (ref 38–73)
NRBC BLD-RTO: 0 /100 WBC
PHOSPHATE SERPL-MCNC: 2.9 MG/DL (ref 2.7–4.5)
PLATELET # BLD AUTO: 399 K/UL (ref 150–450)
PMV BLD AUTO: 11.1 FL (ref 9.2–12.9)
POCT GLUCOSE: 156 MG/DL (ref 70–110)
POTASSIUM SERPL-SCNC: 3.9 MMOL/L (ref 3.5–5.1)
PROT SERPL-MCNC: 6.4 G/DL (ref 6–8.4)
RBC # BLD AUTO: 3.39 M/UL (ref 4.6–6.2)
SODIUM SERPL-SCNC: 139 MMOL/L (ref 136–145)
WBC # BLD AUTO: 16.7 K/UL (ref 3.9–12.7)

## 2024-12-19 PROCEDURE — 85025 COMPLETE CBC W/AUTO DIFF WBC: CPT | Performed by: NURSE PRACTITIONER

## 2024-12-19 PROCEDURE — 25000003 PHARM REV CODE 250: Performed by: NURSE PRACTITIONER

## 2024-12-19 PROCEDURE — 97165 OT EVAL LOW COMPLEX 30 MIN: CPT

## 2024-12-19 PROCEDURE — 84100 ASSAY OF PHOSPHORUS: CPT | Performed by: NURSE PRACTITIONER

## 2024-12-19 PROCEDURE — 99233 SBSQ HOSP IP/OBS HIGH 50: CPT | Mod: ,,, | Performed by: INTERNAL MEDICINE

## 2024-12-19 PROCEDURE — 20600001 HC STEP DOWN PRIVATE ROOM

## 2024-12-19 PROCEDURE — 25000003 PHARM REV CODE 250: Performed by: INTERNAL MEDICINE

## 2024-12-19 PROCEDURE — 97161 PT EVAL LOW COMPLEX 20 MIN: CPT

## 2024-12-19 PROCEDURE — 83735 ASSAY OF MAGNESIUM: CPT | Performed by: NURSE PRACTITIONER

## 2024-12-19 PROCEDURE — 63600175 PHARM REV CODE 636 W HCPCS: Performed by: INTERNAL MEDICINE

## 2024-12-19 PROCEDURE — 94761 N-INVAS EAR/PLS OXIMETRY MLT: CPT

## 2024-12-19 PROCEDURE — 97116 GAIT TRAINING THERAPY: CPT

## 2024-12-19 PROCEDURE — 80053 COMPREHEN METABOLIC PANEL: CPT | Performed by: NURSE PRACTITIONER

## 2024-12-19 RX ORDER — MUPIROCIN 20 MG/G
OINTMENT TOPICAL 2 TIMES DAILY
Status: COMPLETED | OUTPATIENT
Start: 2024-12-19 | End: 2024-12-23

## 2024-12-19 RX ADMIN — OLANZAPINE 5 MG: 2.5 TABLET, FILM COATED ORAL at 08:12

## 2024-12-19 RX ADMIN — MUPIROCIN: 20 OINTMENT TOPICAL at 12:12

## 2024-12-19 RX ADMIN — ONDANSETRON HYDROCHLORIDE 8 MG: 4 TABLET, FILM COATED ORAL at 04:12

## 2024-12-19 RX ADMIN — URSODIOL 300 MG: 300 CAPSULE ORAL at 08:12

## 2024-12-19 RX ADMIN — Medication 1 DOSE: at 06:12

## 2024-12-19 RX ADMIN — PANTOPRAZOLE SODIUM 40 MG: 40 TABLET, DELAYED RELEASE ORAL at 08:12

## 2024-12-19 RX ADMIN — ACYCLOVIR 800 MG: 200 CAPSULE ORAL at 08:12

## 2024-12-19 RX ADMIN — SODIUM CHLORIDE AND POTASSIUM CHLORIDE 75 ML/HR: .9; .15 SOLUTION INTRAVENOUS at 01:12

## 2024-12-19 RX ADMIN — DEXAMETHASONE 12 MG: 4 TABLET ORAL at 04:12

## 2024-12-19 RX ADMIN — ONDANSETRON HYDROCHLORIDE 8 MG: 4 TABLET, FILM COATED ORAL at 08:12

## 2024-12-19 RX ADMIN — ONDANSETRON HYDROCHLORIDE 8 MG: 4 TABLET, FILM COATED ORAL at 12:12

## 2024-12-19 RX ADMIN — Medication 1 DOSE: at 08:12

## 2024-12-19 RX ADMIN — LEVETIRACETAM 500 MG: 500 TABLET, FILM COATED ORAL at 08:12

## 2024-12-19 RX ADMIN — Medication 1 DOSE: at 12:12

## 2024-12-19 RX ADMIN — MUPIROCIN: 20 OINTMENT TOPICAL at 08:12

## 2024-12-19 RX ADMIN — THIOTEPA 405 MG: 15 INJECTION, POWDER, LYOPHILIZED, FOR SOLUTION INTRACAVITARY; INTRAVENOUS; INTRAVESICAL at 06:12

## 2024-12-19 NOTE — PLAN OF CARE
Problem: Occupational Therapy  Goal: Occupational Therapy Goal  Description: Goals to be met by: 01-02-24     Patient will increase functional independence with ADLs by performing:    Pt. With be independent with ADL tasks  Pt. Will be independent with HEP to maintain level of endurance    Outcome: Progressing

## 2024-12-19 NOTE — PROGRESS NOTES
NAT met with patient and spouse at bedside. Patient resting, woke briefly but quickly fell back asleep, no ss of pain or agitation.    Patient's spouse voiced some frustration with having to wait on a room for a planned admission. She's hoping to be moved into a larger room today.    Carmelita reports jury duty has been resolved, no other concerns at this time.

## 2024-12-19 NOTE — NURSING
Patient direct admit for stem cell transplant . Provider notified about patient admit . Patient came with right chest wall triple lumen cental line dressing C/D/I . A & O x 4 . Spo2 maintains in room air . Patient complained of pain tramadol given a sp-er order . .

## 2024-12-19 NOTE — PLAN OF CARE
Recommendations  --Continue Regular diet as tolerated and clinically indicated   --Encourage good intakes   --Nursing: please continue to document % meal eaten on flowsheet   --RD to monitor weight, PO intake     Goals: Meet % EEN/EPN  Nutrition Goal Status: new  Communication of RD Recs:  (POC)

## 2024-12-19 NOTE — SUBJECTIVE & OBJECTIVE
Subjective:     Interval History: Day -7 of Bu/Flu/Thiotepa + PtCy Haplo Brother SCT for myelofibrosis. Patient received thiotepa today with no incident. Continuing showers per protocol. Reports dry mouth and mild constipation. Still with soreness to muhammad insertion site, although improved since yesterday. Afebrile, VSS    Objective:     Vital Signs (Most Recent):  Temp: 97.3 °F (36.3 °C) (12/19/24 1103)  Pulse: 80 (12/19/24 1103)  Resp: 19 (12/19/24 1103)  BP: 113/73 (12/19/24 1103)  SpO2: 99 % (12/19/24 1103) Vital Signs (24h Range):  Temp:  [97.3 °F (36.3 °C)-98.5 °F (36.9 °C)] 97.3 °F (36.3 °C)  Pulse:  [] 80  Resp:  [13-19] 19  SpO2:  [94 %-99 %] 99 %  BP: (113-145)/(65-85) 113/73     Weight: 91.4 kg (201 lb 8 oz)  Body mass index is 28.1 kg/m².  Body surface area is 2.14 meters squared.    Intake/Output - Last 3 Shifts         12/17 0700  12/18 0659 12/18 0700  12/19 0659 12/19 0700  12/20 0659    P.O.  150     I.V. (mL/kg)  661.6 (7.2)     IV Piggyback  72.2     Total Intake(mL/kg)  883.8 (9.7)     Urine (mL/kg/hr)  1500 850 (2.2)    Total Output  1500 850    Net  -616.2 -850                    Physical Exam  Vitals reviewed.   Constitutional:       Appearance: Normal appearance. He is well-developed.   HENT:      Head: Normocephalic and atraumatic.      Right Ear: External ear normal.      Left Ear: External ear normal.      Mouth/Throat:      Mouth: Mucous membranes are dry.      Pharynx: No oropharyngeal exudate.   Eyes:      Extraocular Movements: Extraocular movements intact.      Conjunctiva/sclera: Conjunctivae normal.   Cardiovascular:      Rate and Rhythm: Normal rate and regular rhythm.      Pulses: Normal pulses.      Heart sounds: Normal heart sounds. No murmur heard.  Pulmonary:      Effort: Pulmonary effort is normal. No respiratory distress.      Breath sounds: Normal breath sounds. No stridor. No wheezing or rales.   Abdominal:      General: Bowel sounds are normal.      Palpations:  Abdomen is soft.      Tenderness: There is no abdominal tenderness.   Musculoskeletal:         General: Normal range of motion.      Cervical back: Normal range of motion and neck supple.      Right lower leg: No edema.      Left lower leg: No edema.   Skin:     General: Skin is warm and dry.      Findings: Bruising present. No erythema or rash.      Comments: Right triple lumen muhammad clean and dry with no signs of infection; small amount of bruising and swelling noted; line placed 12/18   Neurological:      General: No focal deficit present.      Mental Status: He is alert and oriented to person, place, and time.      Motor: No weakness.   Psychiatric:         Mood and Affect: Mood normal.         Behavior: Behavior normal.         Thought Content: Thought content normal.         Judgment: Judgment normal.            Significant Labs:   CBC:   Recent Labs   Lab 12/18/24  0652 12/19/24  0410   WBC 8.80 16.70*   HGB 12.9* 11.0*   HCT 37.3* 33.4*   * 399    and CMP:   Recent Labs   Lab 12/18/24  0652 12/19/24  0410    139   K 3.9 3.9    110   CO2 22* 22*   GLU 95 105   BUN 15 13   CREATININE 0.8 0.8   CALCIUM 9.0 8.9   PROT 7.0 6.4   ALBUMIN 4.0 3.5   BILITOT 0.3 0.3   ALKPHOS 46 54   AST 43* 35   ALT 63* 61*   ANIONGAP 8 7*       Diagnostic Results:  I have reviewed all pertinent imaging results/findings within the past 24 hours.

## 2024-12-19 NOTE — ASSESSMENT & PLAN NOTE
From Dr. James's most recent clinic note:  Primary myelofibrosis              A. 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

## 2024-12-19 NOTE — PLAN OF CARE
Problem: Physical Therapy  Goal: Physical Therapy Goal  Description: Goals to be met by: 25     Patient will increase functional independence with mobility by performin. Supine to sit with [place]  2. Sit to supine with [place]  3. Gait  x 750 feet with [place] using No Assistive Device.   4. Lower extremity exercise program x15 reps per handout, with assistance as needed    PT Evaluation completed 2024   PT goals and POC established.     Outcome: Progressing

## 2024-12-19 NOTE — PT/OT/SLP EVAL
Physical Therapy Evaluation    Patient Name:  Rubén Hu   MRN:  [id number]    Recommendations:     Discharge Recommendations: No Therapy Indicated   Discharge Equipment Recommendations: none   Barriers to discharge: None    Assessment:     Rubén Hu is a 54 y.o. male admitted with a medical diagnosis of Stem cell transplant candidate.  He presents with the following impairments/functional limitations:  (at risk of deconditioning) Patient pleasantly agreeable to therapy eval, tolerated the session  well. Will keep on PT caseload for 1x/week maintenance therapy, primarily to ensure he does not have any regression in his functional mobility during this admit.      Rehab Prognosis: Good; patient would benefit from acute skilled PT services to address these deficits and reach maximum level of function.    Recent Surgery: * No surgery found *      Plan:     During this hospitalization, patient to be seen 1 x/week to address the identified rehab impairments via gait training, therapeutic activities, therapeutic exercises, neuromuscular re-education and progress toward the following goals:    Plan of Care Expires:  01/18/25    Subjective     Chief Complaint: pain at port site with increased mobility of shoulder and laying supine  Patient/Family Comments/goals: maintain strength, successful SCT  Pain/Comfort:  Pain Rating 1: 5/10  Location 1:  (port site)  Pain Addressed 1: Pre-medicate for activity, Nurse notified  Pain Rating Post-Intervention 1: 5/10    Patients cultural, spiritual, Quaker conflicts given the current situation: no    Living Environment:  Patient lives with his wife and dog in a [facility] with 0 TALAT. Bathroom contains walk-in shower without grab bars or a seat inside.   Prior to admission, patients level of function was independence, actively working - however currently on leave.  Equipment used at home: none.  DME owned (not currently used): none.  Upon discharge, patient will have assistance from  wife.    Objective:     Communicated with RN prior to session.  Patient found up in chair with  (port)  upon PT entry to room.    General Precautions: Standard, fall  Orthopedic Precautions:N/A   Braces: N/A  Respiratory Status: Room air    Exams:  Cognitive Exam:  Patient is oriented to Person, Place, Time, and Situation  Gross Motor Coordination:  WFL  Postural Exam:  Patient presented with the following abnormalities:    -       Rounded shoulders  -       Forward head  RLE ROM: WFL  RLE Strength: WFL  LLE ROM: WFL  LLE Strength: WFL    Functional Mobility:  Bed Mobility:  not performed, as patient found/left in chair  Transfers:     Sit to Stand:  independence with no AD  Gait: ~320 ft with no AD and supervision, one episode of instability d/t footwear dysfunction but able to self correct with therapist managing IV pole for majority of ambulation trial  Balance:   Sitting: good  Standing: no LOB noted, no need for DME      AM-PAC 6 CLICK MOBILITY  Total Score:24       Treatment & Education:  Patient educated on calling for assistance for any needs to improve overall safety awareness.  All items placed within reach, and notified on call light usage for assistance with any needs for fall prevention.  Patient educated on importance of OOB activity to promote overall endurance - good tolerance noted  Patient educated on role of PT in acute care, PT POC, and PT goals.      Patient left up in chair with all lines intact, call button in reach, and team present.    GOALS:   Multidisciplinary Problems       Physical Therapy Goals          Problem: Physical Therapy    Goal Priority Disciplines Outcome Interventions   Physical Therapy Goal     PT, PT/OT Progressing    Description: Goals to be met by: 25     Patient will increase functional independence with mobility by performin. Supine to sit with [place]  2. Sit to supine with [place]  3. Gait  x 750 feet with [place] using No Assistive Device.    4. Lower extremity exercise program x15 reps per handout, with assistance as needed                         History:     Past Medical History:   Diagnosis Date    Cancer     Hyperlipidemia        Past Surgical History:   Procedure Laterality Date    BONE MARROW BIOPSY N/A 11/27/2024    Procedure: Biopsy-bone marrow;  Surgeon: Clyde James MD;  [facility]: [facility]);  Service: Oncology;  Laterality: N/A;  11/20-pre call complete-tb    COLONOSCOPY N/A 8/9/2023    Procedure: COLONOSCOPY;  Surgeon: Will Ardon MD;  Location: [facility] (4TH FLR);  Service: Endoscopy;  Laterality: N/A;  Suprep Instructions sent via portal / Authorizing:     Bebeto Arora MD in [facility] FAMILY MED/ INTERNAL MED/ PEDS  Referral:          [id number]    EYE SURGERY      FLEXIBLE SIGMOIDOSCOPY N/A 7/23/2024    Procedure: SIGMOIDOSCOPY, FLEXIBLE;  Surgeon: Nirali Vicente MD;  Location: [facility];  Service: Endoscopy;  Laterality: N/A;    INSERTION OF LONGORIA CATHETER Right 12/18/2024    Procedure: INSERTION, CATHETER, CENTRAL VENOUS, LONGORIA TRIPLE LUMEN, Bard 12.5 fr [place] Cath model [id number];  Surgeon: Willie Hadley MD;  Location: [facility] OR [address] [place] [address];  Service: General;  Laterality: Right;       Time Tracking:     PT Received On: 12/19/24  PT Start Time: 1017     PT Stop Time: 1038  PT Total Time (min): 21 min     Billable Minutes: Evaluation 11 and Gait Training 10      12/19/2024
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
## 2024-12-19 NOTE — CONSULTS
[facility] - Oncology ([facility])  Adult Nutrition  Consult Note    SUMMARY     Recommendations  --Continue Regular diet as tolerated and clinically indicated   --Encourage good intakes   --Nursing: please continue to document % meal eaten on flowsheet   --RD to monitor weight, PO intake     Goals: Meet % EEN/EPN  Nutrition Goal Status: new  Communication of RD Recs:  (POC)    Assessment and Plan    Nutrition Problem  Increased nutrient needs (calories, protein)     Related to (etiology):   Metabolic demand of disease and treatment    Signs and Symptoms (as evidenced by):   Myelofibrosis     Interventions/Recommendations (treatment strategy):  --Continue Regular diet as tolerated and clinically indicated   --Encourage good intakes   --Nursing: please continue to document % meal eaten on flowsheet   --RD to monitor weight, PO intake     Nutrition Diagnosis Status:   New      Reason for Assessment    Reason For Assessment: consult (Transplant patient)  Diagnosis: cancer diagnosis/related complications (Stem cell transplant candidate)  General Information Comments: 54-y-o patient of Dr. Clyde James with primary myelofibrosis diagnosed 4/2024. Other medical history includes HLD, previous tobacco abuse, and adrenal nodule noted on imaging from 7/2024. 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 RD consult for transplant patient. Pt asleep at time of visit - spoke with pt's wife. Wife reports good appetite and PO intake with 3 meals/day with 100% PO intake. Wife denies any recent weight loss and reports weight gain. Weight trending up - no concerns for malnutrition at this time. Educated wife on food safety and high calorie/high protein diet. Wife verbalized understanding. Left handout and RD contact info with wife.    Nutrition Discharge Planning: Provided pt with high calorie, high protein diet education and food safety education for bone marrow transplant. Appropriate  "education material with RD contact information provided. No other needs identified.    Nutrition Related Social Determinants of Health: SDOH: Adequate food in home environment    Nutrition Risk Screen    Nutrition Risk Screen: no indicators present    Nutrition/Diet History    Spiritual, Cultural Beliefs, Jainism Practices, Values that Affect Care: no  Food Allergies: NKFA    Anthropometrics    Temp: 97.4 °F (36.3 °C)  Height Method: Stated  Height: 5' 11" (180.3 cm)  Height (inches): 71 in  Weight Method: Standard Scale  Weight: 91.4 kg (201 lb 8 oz)  Weight (lb): 201.5 lb  Ideal Body Weight (IBW), Male: 172 lb  % Ideal Body Weight, Male (lb): 117.15 %  BMI (Calculated): 28.1  BMI Grade: 25 - 29.9 - overweight       Lab/Procedures/Meds    Pertinent Labs Reviewed: reviewed - hemoglobin 11, hematocrit 33.4, MCV 99, MCH 32.4, ALT 61    Pertinent Medications Reviewed: reviewed - ondansetron, pantoprazole, sodium bicarb-sodium chloride, tacrolimus    Estimated/Assessed Needs    Weight Used For Calorie Calculations: 91.4 kg (201 lb 8 oz)  Energy Calorie Requirements (kcal): 9056-0329 kcal/day (25-30 kcal/day)  Energy Need Method: Kcal/kg  Protein Requirements:  g/day (1.0-1.2 g/kg)  Weight Used For Protein Calculations: 91.4 kg (201 lb 8 oz)     Estimated Fluid Requirement Method: RDA Method  RDA Method (mL): 2285  CHO Requirement: 286-343 g/day      Nutrition Prescription Ordered    Current Diet Order: Regular    Evaluation of Received Nutrient/Fluid Intake       % Intake of Estimated Energy Needs: 75 - 100 %  % Meal Intake: 75 - 100 %    Nutrition Risk    Level of Risk/Frequency of Follow-up: high       Monitor and Evaluation    Food and Nutrient Intake: energy intake, food and beverage intake  Food and Nutrient Adminstration: diet order  Knowledge/Beliefs/Attitudes: food and nutrition knowledge/skill  Physical Activity and Function: nutrition-related ADLs and IADLs  Anthropometric Measurements: weight, weight " change, body mass index  Biochemical Data, Medical Tests and Procedures: electrolyte and renal panel, gastrointestinal profile, glucose/endocrine profile, inflammatory profile, lipid profile  Nutrition-Focused Physical Findings: overall appearance       Nutrition Follow-Up    RD Follow-up?: Yes    Tari Cote, Registration Eligible, Provisional LDN

## 2024-12-19 NOTE — ANESTHESIA POSTPROCEDURE EVALUATION
Anesthesia Post Evaluation    Patient: Rubén Hu    Procedure(s) Performed: Procedure(s) (LRB):  INSERTION, CATHETER, CENTRAL VENOUS, NORMAN TRIPLE LUMEN, Bard 12.5 fr Norman Cath model [id number] (Right)    Final Anesthesia Type: general      Patient location during evaluation: PACU  Patient participation: Yes- Able to Participate  Level of consciousness: awake and alert and oriented  Post-procedure vital signs: reviewed and stable  Pain management: adequate  Airway patency: patent    PONV status at discharge: No PONV  Anesthetic complications: no      Cardiovascular status: blood pressure returned to baseline and hemodynamically stable  Respiratory status: unassisted, spontaneous ventilation and room air  Hydration status: euvolemic  Follow-up not needed.              Vitals Value Taken Time   /68 12/18/24 1145   Temp 36.8 °C (98.3 °F) 12/18/24 1145   Pulse 71 12/18/24 1145   Resp 13 12/18/24 1145   SpO2 98 % 12/18/24 1145         No case tracking events are documented in the log.      Pain/Mohsen Score: Pain Rating Prior to Med Admin: 6 (12/18/2024  6:22 PM)  Pain Rating Post Med Admin: 0 (12/18/2024  7:22 PM)  Mohsen Score: 10 (12/18/2024 10:15 AM)

## 2024-12-19 NOTE — PT/OT/SLP EVAL
Occupational Therapy   Evaluation    Name: Rubén Hu  MRN: [id number]  Admitting Diagnosis: Stem cell transplant candidate  Recent Surgery: * No surgery found *      Recommendations:     Discharge Recommendations: No Therapy Indicated  Discharge Equipment Recommendations:  none  Barriers to discharge:  None    Assessment:     Rubén Hu is a 54 y.o. male with a medical diagnosis of Stem cell transplant candidate.  He presents with high leve of functioning and good endurance at this time. Performance deficits affecting function: other (comment) (potential to decline with treatments).      Rehab Prognosis: Good; patient would benefit from acute skilled OT services to address these deficits and reach maximum level of function.       Plan:     Patient to be seen 1 x/week to address the above listed problems via self-care/home management, therapeutic exercises  Plan of Care Expires: 01/02/25  Plan of Care Reviewed with: patient    Subjective     Chief Complaint: Pt. Reported he got in his room late and he thought the room was going to be larger and with stationary bike.  Patient/Family Comments/goals: To continue to stay strong and moving around    Occupational Profile:  Living Environment: pt. Resides with his spouse in ss house with no steps. Pt. Has a WIS with no seat.   Previous level of function: pt. Completely independent with ADL/IADL tasks and mobility; works full-time but on FMLA at this time.   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  Patient found supine with  (port) upon OT entry to room.    General Precautions: Standard, fall  Orthopedic Precautions: N/A  Braces: N/A  Respiratory Status:  Room air    Occupational Performance:    Bed Mobility:    Patient completed Supine to Sit with independence    Functional Mobility/Transfers:  Patient completed Sit <> Stand Transfer with independence  with  no assistive device   Functional Mobility: pt. Performed functional mobility in room to and from the bathroom pushing IV pole with mod I    Activities of Daily Living:  Grooming: independence in stand at sink to wash hands  Lower Body Dressing: independence to don slippers  Toileting: independence to void in stand  in bathroom toilet    Cognitive/Visual Perceptual:  Cognitive/Psychosocial Skills:     -       Oriented to: Person, Place, Time, and Situation   -       Follows Commands/attention:Follows multistep  commands  -       Communication: clear/fluent  -       Memory: No Deficits noted  -       Safety awareness/insight to disability: intact   -       Mood/Affect/Coping skills/emotional control: Appropriate to situation  Visual/Perceptual:      -Intact      Physical Exam:  Balance: -       good  Postural examination/scapula alignment:    -       No postural abnormalities identified  Skin integrity: blister in neck region (notified nurse)  Upper Extremity Range of Motion:     -       Right Upper Extremity: WFL  -       Left Upper Extremity: WFL   Strength:    -       Right Upper Extremity: WFL  -       Left Upper Extremity: WFL    AMPAC 6 Click ADL:  AMPAC Total Score: 23    Treatment & Education:  Pt. Educated on role of OT and pOC  Pt. Educated on safety with mobility, giving body time to adjust to positional changes, being aware if platelet or H & H is low and have assist for mobility, SBA for showers as well as nurse covering port site, as well as need to wear mask out of the room and wash hands when re-entering the room    Patient left supine with all lines intact and call button in reach    GOALS:   Multidisciplinary Problems       Occupational Therapy Goals          Problem: Occupational Therapy     Goal Priority Disciplines Outcome Interventions   Occupational Therapy Goal     OT, PT/OT Progressing    Description: Goals to be met by: 01-02-24     Patient will increase functional independence with ADLs by performing:    Pt. With be independent with ADL tasks  Pt. Will be independent with HEP to maintain level of endurance                         History:     Past Medical History:   Diagnosis Date    Cancer     Hyperlipidemia          Past Surgical History:   Procedure Laterality Date    BONE MARROW BIOPSY N/A 11/27/2024    Procedure: Biopsy-bone marrow;  Surgeon: Clyde James MD;  Location: [facility] ([address] [place] [address]);  Service: Oncology;  Laterality: N/A;  11/20-pre call complete-tb    COLONOSCOPY N/A 8/9/2023    Procedure: COLONOSCOPY;  Surgeon: Will Ardon MD;  Location: [facility] ([address] [place] [address]);  Service: Endoscopy;  Laterality: N/A;  Suprep Instructions sent via portal / Authorizing:     Bebeto Arora MD in [facility] FAMILY MED/ INTERNAL MED/ PEDS  Referral:          [id number]    EYE SURGERY      FLEXIBLE SIGMOIDOSCOPY N/A 7/23/2024    Procedure: SIGMOIDOSCOPY, FLEXIBLE;  Surgeon: Nirali Vicente MD;  Location: [facility];  Service: Endoscopy;  Laterality: N/A;    INSERTION OF LONGORIA CATHETER Right 12/18/2024    Procedure: INSERTION, CATHETER, CENTRAL VENOUS, LONGORIA TRIPLE LUMEN, Bard 12.5 fr Longoria Cath model [id number];  Surgeon: Willie Hadley MD;  Location: [address];  Service: General;  Laterality: Right;       Time Tracking:     OT Date of Treatment: 12/19/24  OT Start Time: 0840  OT Stop Time: 0853  OT Total Time (min): 13 min    Billable Minutes:Evaluation 13    12/19/2024

## 2024-12-19 NOTE — PLAN OF CARE
Patient AAOX4, VSS, afebrile, on room air. Patient had no concerns over night. Patient up to the bathroom independently, free from falls and injuries. I&O's monitored as ordered. Bed in lowest position, side rails up x2, non-skid socks on, call light in reach. Patient educated to use call light for any needs, patient verbalizes understanding. There are no concerns at this time. Plan of care on going.

## 2024-12-19 NOTE — NURSING
thiotepa 405 mg in 0.9% NaCl 605.5 mL chemo infusion  started through right chest muhammad (gauze dressing and paper tape applied), noted for having positive blood return to infuse over 3 hours. Dosage and BSA checked by two chemotherapy certified nurses (Zaida RN and BRANDIN Stewart). Premedications given prior to starting infusion. Consent and order in chart/EPIC. Chemotherapy education completed at this time. Chemotherapeutic precautions in place throughout therapy. Will continue to monitor.

## 2024-12-19 NOTE — H&P
[facility] - Oncology ([facility])  Hematology  Bone Marrow Transplant  H&P    Subjective:     Principal Problem: Stem cell transplant candidate    HPI: Mr. Hu is a 54-y-o patient of Dr. Clyde James with primary myelofibrosis diagnosed 4/2024. Other medical history includes HLD, previous tobacco abuse, and adrenal nodule noted on imaging from 7/2024. 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 His central line was place by gen surge earlier today. He c/o tenderness to site.  Does not appear infected. He denies recent fevers, chills, cough, SOB, chest pain, bleeding or bruising, and n/v/d/c. He is COVID neg.    Patient information was obtained from patient and past medical records.     Oncology History:   From Dr. James's most recent clinic note:  Primary myelofibrosis              A. 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       Acetaminophen and Bactrim [sulfamethoxazole-trimethoprim]     Past Medical History:   Diagnosis Date    Cancer     Hyperlipidemia      Past Surgical History:   Procedure Laterality Date    BONE MARROW BIOPSY N/A 11/27/2024    Procedure: Biopsy-bone marrow;  Surgeon: Clyde James MD;  Location: [facility] ([address] [place] [address]);  Service: Oncology;  Laterality: N/A;  11/20-pre call complete-tb    COLONOSCOPY N/A 8/9/2023    Procedure: COLONOSCOPY;  Surgeon: Will Ardon MD;  Location: [facility] ([address] [place] [address]);  Service: Endoscopy;  Laterality: N/A;  Suprep Instructions sent via portal / Authorizing:     Bebeto Arora MD in [facility] FAMILY MED/ INTERNAL MED/ PEDS  Referral:          [id number]    EYE SURGERY      FLEXIBLE SIGMOIDOSCOPY N/A 7/23/2024    Procedure: SIGMOIDOSCOPY, FLEXIBLE;  Surgeon: Nirali Vicente MD;  Location:  [facility] ENDO;  Service: Endoscopy;  Laterality: N/A;     Family History       Problem Relation (Age of Onset)    Arthritis Mother    Breast cancer Maternal Cousin (51)    COPD Father    Heart disease Maternal Grandfather    Stroke Maternal Grandmother    Testicular cancer Maternal Cousin (17)          Tobacco Use    Smoking status: Former     Types: Cigars, Cigarettes    Smokeless tobacco: Never   Substance and Sexual Activity    Alcohol use: Not Currently     Comment: socially    Drug use: Never    Sexual activity: Yes     Partners: Female       Review of Systems   Constitutional:  Positive for fatigue. Negative for activity change, appetite change, chills and fever.   HENT:  Negative for congestion, mouth sores, nosebleeds, rhinorrhea, sinus pressure, sinus pain, sneezing and sore throat.    Eyes:  Negative for photophobia, pain, discharge, redness, itching and visual disturbance.   Respiratory:  Negative for cough, chest tightness, shortness of breath and wheezing.    Cardiovascular:  Negative for chest pain, palpitations and leg swelling.   Gastrointestinal:  Negative for abdominal distention, abdominal pain, blood in stool, constipation, diarrhea, nausea and vomiting.   Endocrine: Negative for cold intolerance, heat intolerance, polydipsia, polyphagia and polyuria.   Genitourinary:  Negative for difficulty urinating, frequency, hematuria and urgency.   Musculoskeletal:  Negative for arthralgias, back pain, myalgias, neck pain and neck stiffness.   Skin:  Negative for pallor, rash and wound.   Allergic/Immunologic: Negative for environmental allergies, food allergies and immunocompromised state.   Neurological:  Negative for dizziness, tremors, seizures, syncope, speech difficulty, weakness, light-headedness, numbness and headaches.   Hematological:  Negative for adenopathy. Does not bruise/bleed easily.   Psychiatric/Behavioral:  Negative for agitation, confusion, hallucinations, sleep disturbance and suicidal  ideas. The patient is not nervous/anxious.      Objective:     Vital Signs (Most Recent):    Vital Signs (24h Range):  Temp:  [98.1 °F (36.7 °C)-98.4 °F (36.9 °C)] 98.3 °F (36.8 °C)  Pulse:  [60-76] 76  Resp:  [13-20] 16  SpO2:  [95 %-99 %] 98 %  BP: (107-135)/(63-79) 127/77        There is no height or weight on file to calculate BMI.  There is no height or weight on file to calculate BSA.    ECOG SCORE           [unfilled]    Lines/Drains/Airways       Central Venous Catheter Line  Duration             Tunneled Central Line - Triple Lumen 12/18/24 Internal Jugular Right <1 day                     Physical Exam  Constitutional:       Appearance: He is well-developed.   HENT:      Head: Normocephalic and atraumatic.      Mouth/Throat:      Pharynx: No oropharyngeal exudate.   Eyes:      General:         Right eye: No discharge.         Left eye: No discharge.      Conjunctiva/sclera: Conjunctivae normal.      Pupils: Pupils are equal, round, and reactive to light.   Cardiovascular:      Rate and Rhythm: Normal rate and regular rhythm.      Heart sounds: Normal heart sounds. No murmur heard.  Pulmonary:      Effort: Pulmonary effort is normal. No respiratory distress.      Breath sounds: Normal breath sounds. No wheezing or rales.   Abdominal:      General: Bowel sounds are normal. There is no distension.      Palpations: Abdomen is soft.      Tenderness: There is no abdominal tenderness.   Musculoskeletal:         General: No deformity. Normal range of motion.      Cervical back: Normal range of motion and neck supple.   Skin:     General: Skin is warm and dry.      Findings: No erythema or rash.      Comments: Right chest wall Longoria. Dressing c/d/i. No sign of infection to site.   Neurological:      Mental Status: He is alert and oriented to person, place, and time.   Psychiatric:         Behavior: Behavior normal.         Thought Content: Thought content normal.         Judgment: Judgment normal.             Significant Labs:   CBC:   Recent Labs   Lab 12/18/24  0652   WBC 8.80   HGB 12.9*   HCT 37.3*   *    and CMP:   Recent Labs   Lab 12/18/24  0652      K 3.9      CO2 22*   GLU 95   BUN 15   CREATININE 0.8   CALCIUM 9.0   PROT 7.0   ALBUMIN 4.0   BILITOT 0.3   ALKPHOS 46   AST 43*   ALT 63*   ANIONGAP 8       Diagnostic Results:  I have reviewed all pertinent imaging results/findings within the past 24 hours.  Assessment/Plan:     Cardiac/Vascular  Hyperlipidemia  - Holding home atorvastatin while IP to avoid interaction with other medications. Can resume at discharge if LFTs stable.      Oncology  * Stem cell transplant candidate  - Patient of Dr. 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 (Listed as allergy in Epic. Can be removed as allergy 72 hours following last dose of Busulfan).  - Patient is O+. Donor is O+.  - Patient is CMV non-reactive. Donor is CMV reactive. Given donor CMV status, patient will start (patient-supplied) letermovir on Day +13.  - See treatment plan below:  Planned conditioning regimen:  Thiotepa on Day -7  Busulfan on Day -6, -5, -4, and -3  Fludarabine on Day -6, -5, -4, and -3  REST DAYS on Day -2 and -1     Planned  GVHD Prophylaxis:  Cyclophosphamide on Day +3 and +4  Tacrolimus starting on Day +5  Mycophenolate starting on Day +5     Antimicrobial Prophylaxis:  Acyclovir starting on Day -7  Levofloxacin starting on Day -1  Micafungin on Day -1 through Day +4  Letermovir starting on Day +5 (if CMV PCR drawn on day 0 results negative)  Posaconazole starting on Day +5  Atovaquone starting on Day +30     Skin Care with Thiotepa: Day -7 through D-5     Seizure Prophylaxis:  Levetiracetam on Day -7 through Day -2     SOS/VOD Prophylaxis:  Ursodiol on Day -7 through Day +30     Growth Factor Support:  Neupogen  starting on Day +5        Caregiver: Carmelita (Spouse)  Post-transplant discharge plans: Home      Thrombocytosis  - 2/2 myelofibrosis  - Was on daily ASA, but stopped taking ~ 2 weeks ago  - Anticipate thrombocytopenia following chemotherapy  - Daily CBC while IP    Anemia in neoplastic disease  - Anticipate pancytopenia following chemotherapy.  - Transfuse for hgb < 7  - Daily CBC while IP    Primary myelofibrosis  From Dr. James's most recent clinic note:  Primary myelofibrosis              A. 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  - Seen by endocrine prior to transplant admission and Dexamethasone suppression test performed. Patient responded appropriately. No further w/u needed.  - Likely adenomas.    GI  Metabolic dysfunction-associated steatotic liver disease (MASLD)  - Biopsy proven to be steatotic change. Most compatible with MASLD.   - Follow-up with hepatology OP.        VTE Risk Mitigation (From admission, onward)      None              Abdullahi Marshall DO  Hematology/Oncology Fellow, PGY-IV  [facility]

## 2024-12-19 NOTE — PROGRESS NOTES
[facility]guez - Oncology ([facility])  Hematology  Bone Marrow Transplant  Progress Note    Patient Name: Rubén Hu  Admission Date: 12/18/2024  Hospital Length of Stay: 1 days  Code Status: Full Code    Subjective:     Interval History: Day -7 of Bu/Flu/Thiotepa + PtCy Haplo Brother SCT for myelofibrosis. Patient received thiotepa today with no incident. Continuing showers per protocol. Reports dry mouth and mild constipation. Still with soreness to muhammad insertion site, although improved since yesterday. Afebrile, VSS    Objective:     Vital Signs (Most Recent):  Temp: 97.3 °F (36.3 °C) (12/19/24 1103)  Pulse: 80 (12/19/24 1103)  Resp: 19 (12/19/24 1103)  BP: 113/73 (12/19/24 1103)  SpO2: 99 % (12/19/24 1103) Vital Signs (24h Range):  Temp:  [97.3 °F (36.3 °C)-98.5 °F (36.9 °C)] 97.3 °F (36.3 °C)  Pulse:  [] 80  Resp:  [13-19] 19  SpO2:  [94 %-99 %] 99 %  BP: (113-145)/(65-85) 113/73     Weight: 91.4 kg (201 lb 8 oz)  Body mass index is 28.1 kg/m².  Body surface area is 2.14 meters squared.    Intake/Output - Last 3 Shifts         12/17 0700  12/18 0659 12/18 0700  12/19 0659 12/19 0700  12/20 0659    P.O.  150     I.V. (mL/kg)  661.6 (7.2)     IV Piggyback  72.2     Total Intake(mL/kg)  883.8 (9.7)     Urine (mL/kg/hr)  1500 850 (2.2)    Total Output  1500 850    Net  -616.2 -850                    Physical Exam  Vitals reviewed.   Constitutional:       Appearance: Normal appearance. He is well-developed.   HENT:      Head: Normocephalic and atraumatic.      Right Ear: External ear normal.      Left Ear: External ear normal.      Mouth/Throat:      Mouth: Mucous membranes are dry.      Pharynx: No oropharyngeal exudate.   Eyes:      Extraocular Movements: Extraocular movements intact.      Conjunctiva/sclera: Conjunctivae normal.   Cardiovascular:      Rate and Rhythm: Normal rate and regular rhythm.      Pulses: Normal pulses.      Heart sounds: Normal heart sounds. No murmur heard.  Pulmonary:       Effort: Pulmonary effort is normal. No respiratory distress.      Breath sounds: Normal breath sounds. No stridor. No wheezing or rales.   Abdominal:      General: Bowel sounds are normal.      Palpations: Abdomen is soft.      Tenderness: There is no abdominal tenderness.   Musculoskeletal:         General: Normal range of motion.      Cervical back: Normal range of motion and neck supple.      Right lower leg: No edema.      Left lower leg: No edema.   Skin:     General: Skin is warm and dry.      Findings: Bruising present. No erythema or rash.      Comments: Right triple lumen muhammad clean and dry with no signs of infection; small amount of bruising and swelling noted; line placed 12/18   Neurological:      General: No focal deficit present.      Mental Status: He is alert and oriented to person, place, and time.      Motor: No weakness.   Psychiatric:         Mood and Affect: Mood normal.         Behavior: Behavior normal.         Thought Content: Thought content normal.         Judgment: Judgment normal.            Significant Labs:   CBC:   Recent Labs   Lab 12/18/24  0652 12/19/24  0410   WBC 8.80 16.70*   HGB 12.9* 11.0*   HCT 37.3* 33.4*   * 399    and CMP:   Recent Labs   Lab 12/18/24  0652 12/19/24  0410    139   K 3.9 3.9    110   CO2 22* 22*   GLU 95 105   BUN 15 13   CREATININE 0.8 0.8   CALCIUM 9.0 8.9   PROT 7.0 6.4   ALBUMIN 4.0 3.5   BILITOT 0.3 0.3   ALKPHOS 46 54   AST 43* 35   ALT 63* 61*   ANIONGAP 8 7*       Diagnostic Results:  I have reviewed all pertinent imaging results/findings within the past 24 hours.  Assessment/Plan:     * Stem cell transplant candidate  - Patient of Dr. 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 (Listed as allergy in Epic. Can be removed as allergy 72 hours following last dose of  Busulfan).  - Patient is O+. Donor is O+.  - Patient is CMV non-reactive. Donor is CMV reactive. Given donor CMV status, patient will start (patient-supplied) letermovir on Day +13.    - See treatment plan below:  Planned conditioning regimen:  Thiotepa on Day -7  Busulfan on Day -6, -5, -4, and -3  Fludarabine on Day -6, -5, -4, and -3  REST DAYS on Day -2 and -1     Planned  GVHD Prophylaxis:  Cyclophosphamide on Day +3 and +4  Tacrolimus starting on Day +5  Mycophenolate starting on Day +5     Antimicrobial Prophylaxis:  Acyclovir starting on Day -7  Levofloxacin starting on Day -1  Micafungin on Day -1 through Day +4  Letermovir starting on Day +5 (if CMV PCR drawn on day 0 results negative)  Posaconazole starting on Day +5  Atovaquone starting on Day +30     Skin Care with Thiotepa: Day -7 through D-5     Seizure Prophylaxis:  Levetiracetam on Day -7 through Day -2     SOS/VOD Prophylaxis:  Ursodiol on Day -7 through Day +30     Growth Factor Support:  Neupogen starting on Day +5        Caregiver: Carmelita (Spouse)  Post-transplant discharge plans: Home      Primary myelofibrosis  From Dr. James's most recent clinic note:  Primary myelofibrosis              A. 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  - Transfuse for hgb < 7  - Daily CBC while IP    Thrombocytosis  - 2/2 myelofibrosis  - Was on daily ASA, but stopped taking ~ 2 weeks ago  - Anticipate thrombocytopenia following chemotherapy  - Daily CBC while IP  - platelet count  normal today at 399K    Other constipation  - continue prn miralax    Adrenal nodule  - Subcentimeter nodular thickening of the adrenal glands first noted on imaging from 1/2024.  - Seen by endocrine prior to transplant admission and Dexamethasone suppression test performed. Patient responded appropriately. No further w/u needed.  - Likely adenomas.    Metabolic dysfunction-associated steatotic liver disease (MASLD)  - Biopsy proven to be steatotic change. Most compatible with MASLD.   - Follow-up with hepatology OP.    Hyperlipidemia  - Holding home atorvastatin while IP to avoid interaction with other medications. Can resume at discharge if LFTs stable.          VTE Risk Mitigation (From admission, onward)           Ordered     heparin, porcine (PF) 100 unit/mL injection flush 300 Units  As needed (PRN)         12/18/24 2025                    Disposition: Remains inpatient    Bridgette Castillo NP  Bone Marrow Transplant  [facility] - Oncology ([facility])

## 2024-12-19 NOTE — ASSESSMENT & PLAN NOTE
- 2/2 myelofibrosis  - Was on daily ASA, but stopped taking ~ 2 weeks ago  - Anticipate thrombocytopenia following chemotherapy  - Daily CBC while IP  - platelet count normal today at 399K

## 2024-12-19 NOTE — CARE UPDATE
Unit [facility] Care Support Interaction      I have reviewed the chart of Rubén Hu who is hospitalized for Stem cell transplant candidate. The patient is currently located in the following unit: ONC/BMT        I have assisted the primary physician in management of the following:      MRSA Decolonization - Mupirocin ordered and CHG ordered        MATHEW SUTHERLAND  Unit Based [facility]

## 2024-12-20 LAB
ALBUMIN SERPL BCP-MCNC: 3.7 G/DL (ref 3.5–5.2)
ALP SERPL-CCNC: 66 U/L (ref 40–150)
ALT SERPL W/O P-5'-P-CCNC: 55 U/L (ref 10–44)
ANION GAP SERPL CALC-SCNC: 7 MMOL/L (ref 8–16)
AST SERPL-CCNC: 28 U/L (ref 10–40)
BASOPHILS # BLD AUTO: 0.03 K/UL (ref 0–0.2)
BASOPHILS NFR BLD: 0.2 % (ref 0–1.9)
BILIRUB SERPL-MCNC: 0.2 MG/DL (ref 0.1–1)
BUN SERPL-MCNC: 14 MG/DL (ref 6–20)
CALCIUM SERPL-MCNC: 9.1 MG/DL (ref 8.7–10.5)
CHLORIDE SERPL-SCNC: 112 MMOL/L (ref 95–110)
CO2 SERPL-SCNC: 23 MMOL/L (ref 23–29)
CREAT SERPL-MCNC: 0.8 MG/DL (ref 0.5–1.4)
DIFFERENTIAL METHOD BLD: ABNORMAL
EOSINOPHIL # BLD AUTO: 0 K/UL (ref 0–0.5)
EOSINOPHIL NFR BLD: 0.2 % (ref 0–8)
ERYTHROCYTE [DISTWIDTH] IN BLOOD BY AUTOMATED COUNT: 14.2 % (ref 11.5–14.5)
EST. GFR  (NO RACE VARIABLE): >60 ML/MIN/1.73 M^2
GLUCOSE SERPL-MCNC: 105 MG/DL (ref 70–110)
HCT VFR BLD AUTO: 35.6 % (ref 40–54)
HGB BLD-MCNC: 12.1 G/DL (ref 14–18)
IMM GRANULOCYTES # BLD AUTO: 0.15 K/UL (ref 0–0.04)
IMM GRANULOCYTES NFR BLD AUTO: 0.9 % (ref 0–0.5)
LYMPHOCYTES # BLD AUTO: 1.9 K/UL (ref 1–4.8)
LYMPHOCYTES NFR BLD: 11.5 % (ref 18–48)
MAGNESIUM SERPL-MCNC: 2.1 MG/DL (ref 1.6–2.6)
MCH RBC QN AUTO: 33.9 PG (ref 27–31)
MCHC RBC AUTO-ENTMCNC: 34 G/DL (ref 32–36)
MCV RBC AUTO: 100 FL (ref 82–98)
MONOCYTES # BLD AUTO: 1.3 K/UL (ref 0.3–1)
MONOCYTES NFR BLD: 7.7 % (ref 4–15)
NEUTROPHILS # BLD AUTO: 13.3 K/UL (ref 1.8–7.7)
NEUTROPHILS NFR BLD: 79.5 % (ref 38–73)
NRBC BLD-RTO: 0 /100 WBC
PHOSPHATE SERPL-MCNC: 2.6 MG/DL (ref 2.7–4.5)
PLATELET # BLD AUTO: 475 K/UL (ref 150–450)
PMV BLD AUTO: 11.3 FL (ref 9.2–12.9)
POTASSIUM SERPL-SCNC: 3.6 MMOL/L (ref 3.5–5.1)
PROT SERPL-MCNC: 6.9 G/DL (ref 6–8.4)
RBC # BLD AUTO: 3.57 M/UL (ref 4.6–6.2)
SODIUM SERPL-SCNC: 142 MMOL/L (ref 136–145)
WBC # BLD AUTO: 16.69 K/UL (ref 3.9–12.7)

## 2024-12-20 PROCEDURE — 84100 ASSAY OF PHOSPHORUS: CPT | Performed by: NURSE PRACTITIONER

## 2024-12-20 PROCEDURE — 80053 COMPREHEN METABOLIC PANEL: CPT | Performed by: NURSE PRACTITIONER

## 2024-12-20 PROCEDURE — 25000003 PHARM REV CODE 250: Performed by: NURSE PRACTITIONER

## 2024-12-20 PROCEDURE — 83735 ASSAY OF MAGNESIUM: CPT | Performed by: NURSE PRACTITIONER

## 2024-12-20 PROCEDURE — 20600001 HC STEP DOWN PRIVATE ROOM

## 2024-12-20 PROCEDURE — 85025 COMPLETE CBC W/AUTO DIFF WBC: CPT | Performed by: NURSE PRACTITIONER

## 2024-12-20 PROCEDURE — 25000003 PHARM REV CODE 250: Performed by: INTERNAL MEDICINE

## 2024-12-20 PROCEDURE — 63600175 PHARM REV CODE 636 W HCPCS: Performed by: INTERNAL MEDICINE

## 2024-12-20 PROCEDURE — 99232 SBSQ HOSP IP/OBS MODERATE 35: CPT | Mod: FS,,, | Performed by: INTERNAL MEDICINE

## 2024-12-20 PROCEDURE — 80299 QUANTITATIVE ASSAY DRUG: CPT | Mod: 59 | Performed by: INTERNAL MEDICINE

## 2024-12-20 RX ORDER — POLYETHYLENE GLYCOL 3350 17 G/17G
17 POWDER, FOR SOLUTION ORAL DAILY
Status: DISCONTINUED | OUTPATIENT
Start: 2024-12-20 | End: 2024-12-24

## 2024-12-20 RX ORDER — AMOXICILLIN 250 MG
1 CAPSULE ORAL DAILY PRN
Status: DISCONTINUED | OUTPATIENT
Start: 2024-12-20 | End: 2024-12-24

## 2024-12-20 RX ADMIN — SODIUM CHLORIDE AND POTASSIUM CHLORIDE 75 ML/HR: .9; .15 SOLUTION INTRAVENOUS at 10:12

## 2024-12-20 RX ADMIN — ACYCLOVIR 800 MG: 200 CAPSULE ORAL at 08:12

## 2024-12-20 RX ADMIN — ONDANSETRON HYDROCHLORIDE 8 MG: 4 TABLET, FILM COATED ORAL at 08:12

## 2024-12-20 RX ADMIN — MUPIROCIN: 20 OINTMENT TOPICAL at 08:12

## 2024-12-20 RX ADMIN — LEVETIRACETAM 500 MG: 500 TABLET, FILM COATED ORAL at 10:12

## 2024-12-20 RX ADMIN — MUPIROCIN: 20 OINTMENT TOPICAL at 10:12

## 2024-12-20 RX ADMIN — Medication 1 DOSE: at 01:12

## 2024-12-20 RX ADMIN — POTASSIUM CHLORIDE 20 MEQ: 1500 TABLET, EXTENDED RELEASE ORAL at 06:12

## 2024-12-20 RX ADMIN — BUSULFAN 253.4 MG: 6 INJECTION, SOLUTION INTRAVENOUS at 05:12

## 2024-12-20 RX ADMIN — DEXAMETHASONE 12 MG: 4 TABLET ORAL at 04:12

## 2024-12-20 RX ADMIN — Medication 1 TABLET: at 06:12

## 2024-12-20 RX ADMIN — Medication 1 TABLET: at 01:12

## 2024-12-20 RX ADMIN — Medication 1 TABLET: at 10:12

## 2024-12-20 RX ADMIN — POLYETHYLENE GLYCOL 3350 17 G: 17 POWDER, FOR SOLUTION ORAL at 10:12

## 2024-12-20 RX ADMIN — Medication 1 DOSE: at 10:12

## 2024-12-20 RX ADMIN — PANTOPRAZOLE SODIUM 40 MG: 40 TABLET, DELAYED RELEASE ORAL at 10:12

## 2024-12-20 RX ADMIN — ONDANSETRON HYDROCHLORIDE 8 MG: 4 TABLET, FILM COATED ORAL at 01:12

## 2024-12-20 RX ADMIN — ACYCLOVIR 800 MG: 200 CAPSULE ORAL at 10:12

## 2024-12-20 RX ADMIN — FLUDARABINE PHOSPHATE 85 MG: 25 INJECTION, SOLUTION INTRAVENOUS at 04:12

## 2024-12-20 RX ADMIN — Medication 1 TABLET: at 05:12

## 2024-12-20 RX ADMIN — ONDANSETRON HYDROCHLORIDE 8 MG: 4 TABLET, FILM COATED ORAL at 04:12

## 2024-12-20 RX ADMIN — URSODIOL 300 MG: 300 CAPSULE ORAL at 10:12

## 2024-12-20 RX ADMIN — Medication 1 DOSE: at 05:12

## 2024-12-20 RX ADMIN — OLANZAPINE 5 MG: 2.5 TABLET, FILM COATED ORAL at 08:12

## 2024-12-20 RX ADMIN — OLANZAPINE 5 MG: 2.5 TABLET, FILM COATED ORAL at 10:12

## 2024-12-20 RX ADMIN — SODIUM CHLORIDE AND POTASSIUM CHLORIDE 20 ML/HR: .9; .15 SOLUTION INTRAVENOUS at 04:12

## 2024-12-20 RX ADMIN — Medication 1 DOSE: at 08:12

## 2024-12-20 RX ADMIN — URSODIOL 300 MG: 300 CAPSULE ORAL at 08:12

## 2024-12-20 RX ADMIN — LEVETIRACETAM 500 MG: 500 TABLET, FILM COATED ORAL at 08:12

## 2024-12-20 NOTE — NURSING
fludarabine (FLUDARA) 85 mg in 0.9% NaCl 118.4 mL chemo infusion to infuse through R Longoria. Positive for blood return to infuse over 1 hour. CAR and consent in chart. Dosage and BSA verified by 2 chemo certified nurses (Miriam GHOTRA, and BRANDIN Bartholomew). Pre-meds given prior to infusion. Chemo education performed and patient verbalizes understanding. Chemo precautions in place throughout infusion. Patient expresses no other needs at this time.

## 2024-12-20 NOTE — PLAN OF CARE
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 Busulfan chemo infusion completed and labs sent off per chemo regimen. Patient is AAOx4. No acute events this shift. Up, independent and family at bedside. Afebrile & VSS on room air. No PRNs needed. Electrolytes closely monitored and replaced per PRN scale. Ambulates independently to bathroom; educated on importance of I/O recording. CHG wipes provided and encouraged use. Tolerating diet with good intake and voiding without difficulty. Pt remaining free from falls or injury throughout shift; bed locked and in lowest position; call light within reach. POC reviewed with patient and family at bedside. Patient involved in care. All needs addressed. Frequent rounding performed. Patient is stable at this time.

## 2024-12-20 NOTE — ASSESSMENT & PLAN NOTE
- Patient of Dr. Clyde James  - Admitting today for a Bu/Flu/Thiotepa haploidentical (brother) allogeneic SCT  - Today is Day -6  - Cannot have Tylenol 72 hours prior to until 72 hours following Busulfan administration. (Listed as allergy in Epic. Can be removed as allergy 72 hours following last dose of Busulfan).  - Patient is O+. Donor is O+.  - Patient is CMV non-reactive. Donor is CMV reactive. Given donor CMV status, patient will start (patient-supplied) letermovir on Day +13.    - See treatment plan below:  Planned conditioning regimen:  Thiotepa on Day -7  Busulfan on Day -6, -5, -4, and -3  Fludarabine on Day -6, -5, -4, and -3  REST DAYS on Day -2 and -1     Planned  GVHD Prophylaxis:  Cyclophosphamide on Day +3 and +4  Tacrolimus starting on Day +5  Mycophenolate starting on Day +5     Antimicrobial Prophylaxis:  Acyclovir starting on Day -7  Levofloxacin starting on Day -1  Micafungin on Day -1 through Day +4  Letermovir starting on Day +5 (if CMV PCR drawn on day 0 results negative)  Posaconazole starting on Day +5  Atovaquone starting on Day +30     Skin Care with Thiotepa: Day -7 through D-5     Seizure Prophylaxis:  Levetiracetam on Day -7 through Day -2     SOS/VOD Prophylaxis:  Ursodiol on Day -7 through Day +30     Growth Factor Support:  Neupogen starting on Day +5        Caregiver: Carmelita (Spouse)  Post-transplant discharge plans: Home

## 2024-12-20 NOTE — PLAN OF CARE
Day -6 Bu/Flu/Thiotepa+PtCy Haplo SCT. Patient AAOx4. Afebrile and VSS on room air. NaCl w/Kt to infuse @ 75 ml/hr. Patient with no complaints overnight. Showers, linen, and dressing changes performed as ordered. Patient is up independently to the bathroom. Free from falls and injury. Monitoring I&Os, tolerating diet, and CHG wipes given for independent use. Patient received chemo this morning and is tolerating well. Bed is locked and in lowest position, non-skid socks on, and call light within reach. Patient educated on using the call light when needing assistance and verbalizes understanding. Plan of care reviewed and is ongoing. Patient expresses no other needs at this time.

## 2024-12-20 NOTE — PLAN OF CARE
Day -7 of Bu/Flu/Thiotepa+PtCy Haplo SCT. Pt involved in plan of care and communicating needs throughout shift. Up in room and to bathroom independently; voiding without difficulty. Tolerating diet. Reports tenderness at line insertion site. Thiotepa showers started; scheduled 9 and 3. Dressing changed; gauze with paper tape. NS w 20K+ infusing @ 75 cc/hr. All VSS. Pt remaining free from falls or injury throughout shift. Bed locked and in lowest position, personal belongings and call light within reach, non skid socks on when OOB. Pt instructed to call for assistance as needed. Wife at bedside. Hourly rounding done on pt.

## 2024-12-20 NOTE — NURSING
busulfan (BUSULFEX) 253.4 mg in 0.9% NaCl 469.3 mL chemo infusion to infuse through R Longoria. Positive for blood return to infuse over 3 hours. CAR and consent in chart. Dosage and BSA verified by 2 chemo certified nurses (Miriam GHOTRA, and BRANDIN Bartholomew). Pre-meds given prior to infusion. Chemo education performed and patient verbalizes understanding. Chemo precautions in place throughout infusion. Patient expresses no other needs at this time.

## 2024-12-20 NOTE — PROGRESS NOTES
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 Tolerating chemo well thus far. Performing Q6 hour fresh water showers following thiotepa administration. Remains afebrile VSS. Good oral intake. No BM since admission. Received Mirilax today and PRN Pericolace also made available. Was having discomfort with newly placed Longoria, which he states has improved today.    Objective:     Vital Signs (Most Recent):  Temp: 97.6 °F (36.4 °C) (12/20/24 1117)  Pulse: 78 (12/20/24 1117)  Resp: 19 (12/20/24 1117)  BP: 132/78 (12/20/24 1117)  SpO2: 97 % (12/20/24 1117) Vital Signs (24h Range):  Temp:  [97.3 °F (36.3 °C)-97.9 °F (36.6 °C)] 97.6 °F (36.4 °C)  Pulse:  [69-78] 78  Resp:  [18-20] 19  SpO2:  [94 %-98 %] 97 %  BP: (122-135)/(71-83) 132/78     Weight: 92.9 kg (204 lb 14.7 oz)  Body mass index is 28.58 kg/m².  Body surface area is 2.16 meters squared.    ECOG SCORE           [unfilled]    Intake/Output - Last 3 Shifts         12/18 0700  12/19 0659 12/19 0700  12/20 0659 12/20 0700  12/21 0659    P.O. 150 1880 300    I.V. (mL/kg) 661.6 (7.2) 958.6 (10.5)     IV Piggyback 72.2 512.5     Total Intake(mL/kg) 883.8 (9.7) 3351.1 (36.7) 300 (3.2)    Urine (mL/kg/hr) 1500 1350 (0.6) 2000 (3.6)    Emesis/NG output   0    Stool   0    Total Output 1500 1350 2000    Net -616.2 +2001.1 -1700           Urine Occurrence  1 x 1 x    Stool Occurrence   0 x    Emesis Occurrence   0 x             Physical Exam  Constitutional:       Appearance: He is well-developed.   HENT:      Head: Normocephalic and atraumatic.      Mouth/Throat:      Pharynx: No oropharyngeal exudate.   Eyes:      General:         Right eye: No discharge.         Left eye: No discharge.       Conjunctiva/sclera: Conjunctivae normal.      Pupils: Pupils are equal, round, and reactive to light.   Cardiovascular:      Rate and Rhythm: Normal rate and regular rhythm.      Heart sounds: Normal heart sounds. No murmur heard.  Pulmonary:      Effort: Pulmonary effort is normal. No respiratory distress.      Breath sounds: Normal breath sounds. No wheezing or rales.   Abdominal:      General: Bowel sounds are normal. There is no distension.      Palpations: Abdomen is soft.      Tenderness: There is no abdominal tenderness.   Musculoskeletal:         General: No deformity. Normal range of motion.      Cervical back: Normal range of motion and neck supple.   Skin:     General: Skin is warm and dry.      Findings: No erythema or rash.      Comments: Right chest wall Longoria. Dressing c/d/i. No sign of infection to site.   Neurological:      Mental Status: He is alert and oriented to person, place, and time.   Psychiatric:         Behavior: Behavior normal.         Thought Content: Thought content normal.         Judgment: Judgment normal.            Significant Labs:   CBC:   Recent Labs   Lab 12/19/24 0410 12/20/24  0438   WBC 16.70* 16.69*   HGB 11.0* 12.1*   HCT 33.4* 35.6*    475*    and CMP:   Recent Labs   Lab 12/19/24  0410 12/20/24  0438    142   K 3.9 3.6    112*   CO2 22* 23    105   BUN 13 14   CREATININE 0.8 0.8   CALCIUM 8.9 9.1   PROT 6.4 6.9   ALBUMIN 3.5 3.7   BILITOT 0.3 0.2   ALKPHOS 54 66   AST 35 28   ALT 61* 55*   ANIONGAP 7* 7*       Diagnostic Results:  I have reviewed all pertinent imaging results/findings within the past 24 hours.  Assessment/Plan:     * Stem cell transplant candidate  - Patient of Dr. Clyde James  - Admitting today for a Bu/Flu/Thiotepa haploidentical (brother) allogeneic SCT  - Today is Day -6  - Cannot have Tylenol 72 hours prior to until 72 hours following Busulfan administration. (Listed as allergy in Epic. Can be removed as allergy 72  hours following last dose of Busulfan).  - Patient is O+. Donor is O+.  - Patient is CMV non-reactive. Donor is CMV reactive. Given donor CMV status, patient will start (patient-supplied) letermovir on Day +13.    - See treatment plan below:  Planned conditioning regimen:  Thiotepa on Day -7  Busulfan on Day -6, -5, -4, and -3  Fludarabine on Day -6, -5, -4, and -3  REST DAYS on Day -2 and -1     Planned  GVHD Prophylaxis:  Cyclophosphamide on Day +3 and +4  Tacrolimus starting on Day +5  Mycophenolate starting on Day +5     Antimicrobial Prophylaxis:  Acyclovir starting on Day -7  Levofloxacin starting on Day -1  Micafungin on Day -1 through Day +4  Letermovir starting on Day +5 (if CMV PCR drawn on day 0 results negative)  Posaconazole starting on Day +5  Atovaquone starting on Day +30     Skin Care with Thiotepa: Day -7 through D-5     Seizure Prophylaxis:  Levetiracetam on Day -7 through Day -2     SOS/VOD Prophylaxis:  Ursodiol on Day -7 through Day +30     Growth Factor Support:  Neupogen starting on Day +5        Caregiver: Carmelita (Spouse)  Post-transplant discharge plans: Home      Primary myelofibrosis  From Dr. James's most recent clinic note:  Primary myelofibrosis              A. 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  - Transfuse for hgb < 7  - Daily CBC  while IP    Metabolic dysfunction-associated steatotic liver disease (MASLD)  - Biopsy proven to be steatotic change. Most compatible with MASLD.   - Follow-up with hepatology OP.    Other constipation  - Continue prn miralax and PRN Pericolace    Adrenal nodule  - Subcentimeter nodular thickening of the adrenal glands first noted on imaging from 1/2024.  - Seen by endocrine prior to transplant admission and Dexamethasone suppression test performed. Patient responded appropriately. No further w/u needed.  - Likely adenomas.    Hyperlipidemia  - Holding home atorvastatin while IP to avoid interaction with other medications. Can resume at discharge if LFTs stable.          VTE Risk Mitigation (From admission, onward)           Ordered     heparin, porcine (PF) 100 unit/mL injection flush 300 Units  As needed (PRN)         12/18/24 2025                    Disposition: Inpatient for allogeneic SCT.    Anna Vera, NP  Bone Marrow Transplant  [facility] On[facility] ([facility])

## 2024-12-20 NOTE — SUBJECTIVE & OBJECTIVE
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 Tolerating chemo well thus far. Performing Q6 hour fresh water showers following thiotepa administration. Remains afebrile VSS. Good oral intake. No BM since admission. Received Mirilax today and PRN Pericolace also made available. Was having discomfort with newly placed Longoria, which he states has improved today.    Objective:     Vital Signs (Most Recent):  Temp: 97.6 °F (36.4 °C) (12/20/24 1117)  Pulse: 78 (12/20/24 1117)  Resp: 19 (12/20/24 1117)  BP: 132/78 (12/20/24 1117)  SpO2: 97 % (12/20/24 1117) Vital Signs (24h Range):  Temp:  [97.3 °F (36.3 °C)-97.9 °F (36.6 °C)] 97.6 °F (36.4 °C)  Pulse:  [69-78] 78  Resp:  [18-20] 19  SpO2:  [94 %-98 %] 97 %  BP: (122-135)/(71-83) 132/78     Weight: 92.9 kg (204 lb 14.7 oz)  Body mass index is 28.58 kg/m².  Body surface area is 2.16 meters squared.    ECOG SCORE           [unfilled]    Intake/Output - Last 3 Shifts         12/18 0700  12/19 0659 12/19 0700  12/20 0659 12/20 0700  12/21 0659    P.O. 150 1880 300    I.V. (mL/kg) 661.6 (7.2) 958.6 (10.5)     IV Piggyback 72.2 512.5     Total Intake(mL/kg) 883.8 (9.7) 3351.1 (36.7) 300 (3.2)    Urine (mL/kg/hr) 1500 1350 (0.6) 2000 (3.6)    Emesis/NG output   0    Stool   0    Total Output 1500 1350 2000    Net -616.2 +2001.1 -1700           Urine Occurrence  1 x 1 x    Stool Occurrence   0 x    Emesis Occurrence   0 x             Physical Exam  Constitutional:       Appearance: He is well-developed.   HENT:      Head: Normocephalic and atraumatic.      Mouth/Throat:      Pharynx: No oropharyngeal exudate.   Eyes:      General:         Right eye: No discharge.         Left eye: No discharge.      Conjunctiva/sclera: Conjunctivae normal.      Pupils: Pupils are equal, round, and reactive to light.   Cardiovascular:      Rate and Rhythm: Normal rate and regular rhythm.      Heart sounds:  Normal heart sounds. No murmur heard.  Pulmonary:      Effort: Pulmonary effort is normal. No respiratory distress.      Breath sounds: Normal breath sounds. No wheezing or rales.   Abdominal:      General: Bowel sounds are normal. There is no distension.      Palpations: Abdomen is soft.      Tenderness: There is no abdominal tenderness.   Musculoskeletal:         General: No deformity. Normal range of motion.      Cervical back: Normal range of motion and neck supple.   Skin:     General: Skin is warm and dry.      Findings: No erythema or rash.      Comments: Right chest wall Longoria. Dressing c/d/i. No sign of infection to site.   Neurological:      Mental Status: He is alert and oriented to person, place, and time.   Psychiatric:         Behavior: Behavior normal.         Thought Content: Thought content normal.         Judgment: Judgment normal.            Significant Labs:   CBC:   Recent Labs   Lab 12/19/24 0410 12/20/24  0438   WBC 16.70* 16.69*   HGB 11.0* 12.1*   HCT 33.4* 35.6*    475*    and CMP:   Recent Labs   Lab 12/19/24 0410 12/20/24  0438    142   K 3.9 3.6    112*   CO2 22* 23    105   BUN 13 14   CREATININE 0.8 0.8   CALCIUM 8.9 9.1   PROT 6.4 6.9   ALBUMIN 3.5 3.7   BILITOT 0.3 0.2   ALKPHOS 54 66   AST 35 28   ALT 61* 55*   ANIONGAP 7* 7*       Diagnostic Results:  I have reviewed all pertinent imaging results/findings within the past 24 hours.

## 2024-12-21 LAB
ALBUMIN SERPL BCP-MCNC: 3.2 G/DL (ref 3.5–5.2)
ALP SERPL-CCNC: 48 U/L (ref 40–150)
ALT SERPL W/O P-5'-P-CCNC: 48 U/L (ref 10–44)
ANION GAP SERPL CALC-SCNC: 7 MMOL/L (ref 8–16)
AST SERPL-CCNC: 21 U/L (ref 10–40)
BASOPHILS # BLD AUTO: 0.02 K/UL (ref 0–0.2)
BASOPHILS NFR BLD: 0.2 % (ref 0–1.9)
BILIRUB SERPL-MCNC: 0.2 MG/DL (ref 0.1–1)
BUN SERPL-MCNC: 14 MG/DL (ref 6–20)
CALCIUM SERPL-MCNC: 8.6 MG/DL (ref 8.7–10.5)
CHLORIDE SERPL-SCNC: 110 MMOL/L (ref 95–110)
CO2 SERPL-SCNC: 25 MMOL/L (ref 23–29)
CREAT SERPL-MCNC: 0.7 MG/DL (ref 0.5–1.4)
DIFFERENTIAL METHOD BLD: ABNORMAL
EOSINOPHIL # BLD AUTO: 0 K/UL (ref 0–0.5)
EOSINOPHIL NFR BLD: 0.1 % (ref 0–8)
ERYTHROCYTE [DISTWIDTH] IN BLOOD BY AUTOMATED COUNT: 14.4 % (ref 11.5–14.5)
EST. GFR  (NO RACE VARIABLE): >60 ML/MIN/1.73 M^2
GLUCOSE SERPL-MCNC: 99 MG/DL (ref 70–110)
HCT VFR BLD AUTO: 30.2 % (ref 40–54)
HGB BLD-MCNC: 10.1 G/DL (ref 14–18)
IMM GRANULOCYTES # BLD AUTO: 0.06 K/UL (ref 0–0.04)
IMM GRANULOCYTES NFR BLD AUTO: 0.5 % (ref 0–0.5)
LYMPHOCYTES # BLD AUTO: 1 K/UL (ref 1–4.8)
LYMPHOCYTES NFR BLD: 8.6 % (ref 18–48)
MAGNESIUM SERPL-MCNC: 2.1 MG/DL (ref 1.6–2.6)
MCH RBC QN AUTO: 33.1 PG (ref 27–31)
MCHC RBC AUTO-ENTMCNC: 33.4 G/DL (ref 32–36)
MCV RBC AUTO: 99 FL (ref 82–98)
MONOCYTES # BLD AUTO: 0.5 K/UL (ref 0.3–1)
MONOCYTES NFR BLD: 4.4 % (ref 4–15)
NEUTROPHILS # BLD AUTO: 10.1 K/UL (ref 1.8–7.7)
NEUTROPHILS NFR BLD: 86.2 % (ref 38–73)
NRBC BLD-RTO: 0 /100 WBC
PHOSPHATE SERPL-MCNC: 3.1 MG/DL (ref 2.7–4.5)
PLATELET # BLD AUTO: 380 K/UL (ref 150–450)
PMV BLD AUTO: 11.2 FL (ref 9.2–12.9)
POTASSIUM SERPL-SCNC: 3.6 MMOL/L (ref 3.5–5.1)
PROT SERPL-MCNC: 5.9 G/DL (ref 6–8.4)
RBC # BLD AUTO: 3.05 M/UL (ref 4.6–6.2)
SODIUM SERPL-SCNC: 142 MMOL/L (ref 136–145)
WBC # BLD AUTO: 11.67 K/UL (ref 3.9–12.7)

## 2024-12-21 PROCEDURE — 99900035 HC TECH TIME PER 15 MIN (STAT)

## 2024-12-21 PROCEDURE — 84100 ASSAY OF PHOSPHORUS: CPT | Performed by: NURSE PRACTITIONER

## 2024-12-21 PROCEDURE — 83735 ASSAY OF MAGNESIUM: CPT | Performed by: NURSE PRACTITIONER

## 2024-12-21 PROCEDURE — 25000003 PHARM REV CODE 250: Performed by: INTERNAL MEDICINE

## 2024-12-21 PROCEDURE — 99232 SBSQ HOSP IP/OBS MODERATE 35: CPT | Mod: ,,, | Performed by: INTERNAL MEDICINE

## 2024-12-21 PROCEDURE — 63600175 PHARM REV CODE 636 W HCPCS: Mod: JZ,JG | Performed by: INTERNAL MEDICINE

## 2024-12-21 PROCEDURE — 25000003 PHARM REV CODE 250: Performed by: NURSE PRACTITIONER

## 2024-12-21 PROCEDURE — 85025 COMPLETE CBC W/AUTO DIFF WBC: CPT | Performed by: NURSE PRACTITIONER

## 2024-12-21 PROCEDURE — 20600001 HC STEP DOWN PRIVATE ROOM

## 2024-12-21 PROCEDURE — 80053 COMPREHEN METABOLIC PANEL: CPT | Performed by: NURSE PRACTITIONER

## 2024-12-21 PROCEDURE — 25000003 PHARM REV CODE 250: Performed by: STUDENT IN AN ORGANIZED HEALTH CARE EDUCATION/TRAINING PROGRAM

## 2024-12-21 RX ORDER — LACTULOSE 10 G/15ML
15 SOLUTION ORAL 2 TIMES DAILY
Status: COMPLETED | OUTPATIENT
Start: 2024-12-21 | End: 2024-12-21

## 2024-12-21 RX ORDER — ONDANSETRON 8 MG/1
8 TABLET, ORALLY DISINTEGRATING ORAL EVERY 8 HOURS
Status: COMPLETED | OUTPATIENT
Start: 2024-12-21 | End: 2024-12-25

## 2024-12-21 RX ADMIN — LACTULOSE 15 G: 20 SOLUTION ORAL at 01:12

## 2024-12-21 RX ADMIN — POTASSIUM CHLORIDE 20 MEQ: 1500 TABLET, EXTENDED RELEASE ORAL at 06:12

## 2024-12-21 RX ADMIN — Medication 1 DOSE: at 05:12

## 2024-12-21 RX ADMIN — LACTULOSE 15 G: 20 SOLUTION ORAL at 08:12

## 2024-12-21 RX ADMIN — ACYCLOVIR 800 MG: 200 CAPSULE ORAL at 08:12

## 2024-12-21 RX ADMIN — FLUDARABINE PHOSPHATE 85 MG: 25 INJECTION, SOLUTION INTRAVENOUS at 04:12

## 2024-12-21 RX ADMIN — LEVETIRACETAM 500 MG: 500 TABLET, FILM COATED ORAL at 08:12

## 2024-12-21 RX ADMIN — DIPHENHYDRAMINE HYDROCHLORIDE, ZINC ACETATE: 2; .1 CREAM TOPICAL at 01:12

## 2024-12-21 RX ADMIN — Medication 1 DOSE: at 08:12

## 2024-12-21 RX ADMIN — MUPIROCIN: 20 OINTMENT TOPICAL at 08:12

## 2024-12-21 RX ADMIN — BUSULFAN 253.4 MG: 6 INJECTION, SOLUTION INTRAVENOUS at 05:12

## 2024-12-21 RX ADMIN — DEXAMETHASONE 12 MG: 4 TABLET ORAL at 03:12

## 2024-12-21 RX ADMIN — ONDANSETRON 8 MG: 8 TABLET, ORALLY DISINTEGRATING ORAL at 02:12

## 2024-12-21 RX ADMIN — ONDANSETRON 8 MG: 8 TABLET, ORALLY DISINTEGRATING ORAL at 10:12

## 2024-12-21 RX ADMIN — OLANZAPINE 5 MG: 2.5 TABLET, FILM COATED ORAL at 08:12

## 2024-12-21 RX ADMIN — URSODIOL 300 MG: 300 CAPSULE ORAL at 08:12

## 2024-12-21 RX ADMIN — PANTOPRAZOLE SODIUM 40 MG: 40 TABLET, DELAYED RELEASE ORAL at 08:12

## 2024-12-21 RX ADMIN — POLYETHYLENE GLYCOL 3350 17 G: 17 POWDER, FOR SOLUTION ORAL at 08:12

## 2024-12-21 RX ADMIN — Medication 1 DOSE: at 01:12

## 2024-12-21 RX ADMIN — ONDANSETRON HYDROCHLORIDE 8 MG: 4 TABLET, FILM COATED ORAL at 03:12

## 2024-12-21 NOTE — NURSING
fludarabine (FLUDARA) 85 mg in 0.9% NaCl 118.4 mL chemo infusion to infuse through R Longoria. Positive for blood return to infuse over 1 hour. CAR and consent in chart. Dosage and BSA verified by 2 chemo certified nurses (Miriam GHOTRA, and BRANDIN Neely). Pre-meds given prior to infusion. Chemo education performed and patient verbalizes understanding. Chemo precautions in place throughout infusion. Patient expresses no other needs at this time.

## 2024-12-21 NOTE — OP NOTE
[facility]  Surgery Department  Operative Note    SUMMARY     Date of Procedure: 12/18/2024     Primary Surgeon: Surgeons and Role:     * Willie Hadley MD - Primary     * Rian Oden MD - Resident - Assisting       Pre-Operative Diagnosis: Stem cell transplant candidate [Z76.82]    Post-op Diagnosis: Same    Anesthesia: Local MAC     Procedure: Procedure(s) (LRB):  INSERTION, CATHETER, CENTRAL VENOUS, LONGORIA TRIPLE LUMEN, Bard 12.5 fr Longoria Cath model [id number] (Right)     Indications for the procedure: Rubén Hu is a 54 y.o. with primary myelofibrosis who is stem cell transplant candidate. We recommended they undergo a tunneled central venous catheter placement and the patient agreed to proceed. The patient signed informed consent and expressed understanding of the risks, benefits, and alternatives of surgery.     Operative Findings (including complications, if any):   1) Right internal jugular central venous catheter placement under ultrasound and fluoroscopic guidance.    Procedure in Detail: After the procedure was explained and all questions answered appropriately, consent was obtained. The patient was then brought back to the Operating Room, where he was placed in the supine position.  MAC was then induced.  Next, the patient was prepped and draped in the usual sterile fashion.  A timeout, including the patient's name, allergies, procedure, was then performed, to which all members of the operative team agreed. We also confirmed the administration of appropriate pre-operative antibiotics. The patient was prepped and draped in the usual sterile fashion inferiorly from just below the horizontal line between the nipples superiorly to the angle of the mandible and from axilla to axilla laterally so as to provide sterile access to both internal jugular areas. The 12.5 fr Longoria triple lumen catheter was selected for this patient.  Using ultrasound guidance to locate the right  internal jugular vein the introducer needle was inserted through the skin and advanced towards the right internal jugular vein.  Aspiration and advancement yielded dark red blood consistent with internal jugular cannulation.  The wire was inserted through the needle to appropriate depth and C-arm was brought in draped to confirm wire below the diaphragm, consistent with venous cannulation through the subclavian to the SVC, through the right atrium, to the IVC. Ectopy was also observed. Two finger breaths below the clavicle on the right chest, a 3 cm incision was made horizontally with the #15 blade and a small pocket was developed using blunt dissection and Bovie electrocautery. An #11 blade was used to make the skin nick at the insertion site of the wire. The catheter was tunneled through the subcutaneous tissue in the pocket that was created towards the insertion site of the wire. The catheter was then measured and cut to the appropriate length using the C-arm and superimposition of the catheter on the external chest with a single shot film showing the superimposed catheter tip projecting over the SVC. The introducer sheath was fully inserted under fluoroscopic guidance and the wire withdrawn. The catheter was passed down the break-away introducer sheath. Then good flow was confirmed by accessing each lumen with a syringe and aspirating to see blood return, then forcefully flushing sterile saline through the catheter with appropriately low resistance.  An appropriate volume of heparinized saline was then injected into the each lumen.  C arm was then brought back in to confirm the central line was in appropriate position with no kinks in the catheter.  Using 2-0 prolene a u-stitch was thrown to approximate the skin around the insertion site of the central venous catheter. A chlorohexidane impreganted sponge was placed around the catheter at it insertion at the skin. A central line dressing was then placed over the  catheter. A 4-0 monocryl was used to close the skin at the neck. All instrument and lap counts were correct at the end of the case. The patient emerged from anesthesia uneventfully and was taken to [facility] in stable condition.    The procedure was tolerated well and the patient was sent to recovery in good condition.  Post-central venous cannulation chest x-ray is pending completion at this time.     Dr. Hadley was present during this case.    Estimated Blood Loss (EBL): Minimal           Implants: 12.5 fr Triple Lumen Longoria Catheter             Condition: Good    Disposition: DOSC - hemodynamically stable.

## 2024-12-21 NOTE — NURSING
busulfan (BUSULFEX) 253.4 mg in 0.9% NaCl 469.3 mL chemo infusion to infuse through R Longorai. Positive for blood return to infuse over 3 hours. CAR and consent in chart. Dosage and BSA verified by 2 chemo certified nurses (Florinda PITTS and BRANDIN Carvalho). Pre-meds given prior to infusion. Chemo education performed and patient verbalizes understanding. Chemo precautions in place throughout infusion. Patient expresses no other needs at this time.

## 2024-12-21 NOTE — SUBJECTIVE & OBJECTIVE
Subjective:     Interval History: D-5 from a Bu/Flu/Thiotepa PTCy haploidentical (brother) AlloSCT for PMF. Doing well. Had BM this afternoon. Skin rash improving. Otherwise no complaints.     Objective:     Vital Signs (Most Recent):  Temp: 97.8 °F (36.6 °C) (12/21/24 1128)  Pulse: 75 (12/21/24 1128)  Resp: 20 (12/21/24 1128)  BP: 128/78 (12/21/24 1128)  SpO2: 95 % (12/21/24 1128) Vital Signs (24h Range):  Temp:  [97.4 °F (36.3 °C)-97.8 °F (36.6 °C)] 97.8 °F (36.6 °C)  Pulse:  [62-77] 75  Resp:  [16-20] 20  SpO2:  [95 %-99 %] 95 %  BP: (128-138)/(72-80) 128/78     Weight: 93.3 kg (205 lb 12.8 oz)  Body mass index is 28.7 kg/m².  Body surface area is 2.16 meters squared.    ECOG SCORE           [unfilled]    Intake/Output - Last 3 Shifts         12/19 0700  12/20 0659 12/20 0700  12/21 0659 12/21 0700  12/22 0659    P.O. 1880 1400 240    I.V. (mL/kg) 958.6 (10.5) 1630.8 (17.5) 144.9 (1.6)    IV Piggyback 512.5 802.4 369.8    Total Intake(mL/kg) 3351.1 (36.7) 3833.2 (41.1) 754.7 (8.1)    Urine (mL/kg/hr) 1350 (0.6) 3100 (1.4) 1600 (2.8)    Emesis/NG output  0     Stool  0     Total Output 1350 3100 1600    Net +2001.1 +733.2 -845.3           Urine Occurrence 1 x 4 x     Stool Occurrence  0 x     Emesis Occurrence  0 x              Physical Exam  Constitutional:       Appearance: He is well-developed.   HENT:      Head: Normocephalic and atraumatic.      Mouth/Throat:      Pharynx: No oropharyngeal exudate.   Eyes:      General:         Right eye: No discharge.         Left eye: No discharge.      Conjunctiva/sclera: Conjunctivae normal.      Pupils: Pupils are equal, round, and reactive to light.   Cardiovascular:      Rate and Rhythm: Normal rate and regular rhythm.      Heart sounds: Normal heart sounds. No murmur heard.  Pulmonary:      Effort: Pulmonary effort is normal. No respiratory distress.      Breath sounds: Normal breath sounds. No wheezing or rales.   Abdominal:      General: Bowel sounds are normal.  There is no distension.      Palpations: Abdomen is soft.      Tenderness: There is no abdominal tenderness.   Musculoskeletal:         General: No deformity. Normal range of motion.      Cervical back: Normal range of motion and neck supple.   Skin:     General: Skin is warm and dry.      Findings: Rash present. No erythema.      Comments: Right chest wall Longoria. Dressing c/d/i. No sign of infection to site.   Neurological:      Mental Status: He is alert and oriented to person, place, and time.   Psychiatric:         Behavior: Behavior normal.         Thought Content: Thought content normal.         Judgment: Judgment normal.            Significant Labs:   CBC:   Recent Labs   Lab 12/20/24 0438 12/21/24 0315   WBC 16.69* 11.67   HGB 12.1* 10.1*   HCT 35.6* 30.2*   * 380    and CMP:   Recent Labs   Lab 12/20/24 0438 12/21/24 0315    142   K 3.6 3.6   * 110   CO2 23 25    99   BUN 14 14   CREATININE 0.8 0.7   CALCIUM 9.1 8.6*   PROT 6.9 5.9*   ALBUMIN 3.7 3.2*   BILITOT 0.2 0.2   ALKPHOS 66 48   AST 28 21   ALT 55* 48*   ANIONGAP 7* 7*       Diagnostic Results:  I have reviewed all pertinent imaging results/findings within the past 24 hours.

## 2024-12-21 NOTE — PROGRESS NOTES
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 Doing well. Had BM this afternoon. Skin rash improving. Otherwise no complaints.     Objective:     Vital Signs (Most Recent):  Temp: 97.8 °F (36.6 °C) (12/21/24 1128)  Pulse: 75 (12/21/24 1128)  Resp: 20 (12/21/24 1128)  BP: 128/78 (12/21/24 1128)  SpO2: 95 % (12/21/24 1128) Vital Signs (24h Range):  Temp:  [97.4 °F (36.3 °C)-97.8 °F (36.6 °C)] 97.8 °F (36.6 °C)  Pulse:  [62-77] 75  Resp:  [16-20] 20  SpO2:  [95 %-99 %] 95 %  BP: (128-138)/(72-80) 128/78     Weight: 93.3 kg (205 lb 12.8 oz)  Body mass index is 28.7 kg/m².  Body surface area is 2.16 meters squared.    ECOG SCORE           [unfilled]    Intake/Output - Last 3 Shifts         12/19 0700  12/20 0659 12/20 0700  12/21 0659 12/21 0700  12/22 0659    P.O. 1880 1400 240    I.V. (mL/kg) 958.6 (10.5) 1630.8 (17.5) 144.9 (1.6)    IV Piggyback 512.5 802.4 369.8    Total Intake(mL/kg) 3351.1 (36.7) 3833.2 (41.1) 754.7 (8.1)    Urine (mL/kg/hr) 1350 (0.6) 3100 (1.4) 1600 (2.8)    Emesis/NG output  0     Stool  0     Total Output 1350 3100 1600    Net +2001.1 +733.2 -845.3           Urine Occurrence 1 x 4 x     Stool Occurrence  0 x     Emesis Occurrence  0 x              Physical Exam  Constitutional:       Appearance: He is well-developed.   HENT:      Head: Normocephalic and atraumatic.      Mouth/Throat:      Pharynx: No oropharyngeal exudate.   Eyes:      General:         Right eye: No discharge.         Left eye: No discharge.      Conjunctiva/sclera: Conjunctivae normal.      Pupils: Pupils are equal, round, and reactive to light.   Cardiovascular:      Rate and Rhythm: Normal rate and regular rhythm.      Heart sounds: Normal heart sounds. No murmur  heard.  Pulmonary:      Effort: Pulmonary effort is normal. No respiratory distress.      Breath sounds: Normal breath sounds. No wheezing or rales.   Abdominal:      General: Bowel sounds are normal. There is no distension.      Palpations: Abdomen is soft.      Tenderness: There is no abdominal tenderness.   Musculoskeletal:         General: No deformity. Normal range of motion.      Cervical back: Normal range of motion and neck supple.   Skin:     General: Skin is warm and dry.      Findings: Rash present. No erythema.      Comments: Right chest wall [place]. Dressing c/d/i. No sign of infection to site.   Neurological:      Mental Status: He is alert and oriented to person, place, and time.   Psychiatric:         Behavior: Behavior normal.         Thought Content: Thought content normal.         Judgment: Judgment normal.            Significant Labs:   CBC:   Recent Labs   Lab 12/20/24 0438 12/21/24 0315   WBC 16.69* 11.67   HGB 12.1* 10.1*   HCT 35.6* 30.2*   * 380    and CMP:   Recent Labs   Lab 12/20/24 0438 12/21/24 0315    142   K 3.6 3.6   * 110   CO2 23 25    99   BUN 14 14   CREATININE 0.8 0.7   CALCIUM 9.1 8.6*   PROT 6.9 5.9*   ALBUMIN 3.7 3.2*   BILITOT 0.2 0.2   ALKPHOS 66 48   AST 28 21   ALT 55* 48*   ANIONGAP 7* 7*       Diagnostic Results:  I have reviewed all pertinent imaging results/findings within the past 24 hours.  Assessment/Plan:     * Stem cell transplant candidate  - Patient of Dr. 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 (Listed as allergy in Epic. Can be removed as allergy 72 hours following last dose of Busulfan).  - Patient is O+. Donor is O+.  - Patient is CMV non-reactive. Donor is CMV reactive. Given donor CMV status, patient will start (patient-supplied) letermovir on Day +13.    - See  treatment plan below:  Planned conditioning regimen:  Thiotepa on Day -7  Busulfan on Day -6, -5, -4, and -3  Fludarabine on Day -6, -5, -4, and -3  REST DAYS on Day -2 and -1     Planned  GVHD Prophylaxis:  Cyclophosphamide on Day +3 and +4  Tacrolimus starting on Day +5  Mycophenolate starting on Day +5     Antimicrobial Prophylaxis:  Acyclovir starting on Day -7  Levofloxacin starting on Day -1  Micafungin on Day -1 through Day +4  Letermovir starting on Day +5 (if CMV PCR drawn on day 0 results negative)  Posaconazole starting on Day +5  Atovaquone starting on Day +30     Skin Care with Thiotepa: Day -7 through D-5     Seizure Prophylaxis:  Levetiracetam on Day -7 through Day -2     SOS/VOD Prophylaxis:  Ursodiol on Day -7 through Day +30     Growth Factor Support:  Neupogen starting on Day +5        Caregiver: Carmelita (Spouse)  Post-transplant discharge plans: Home      Other constipation  - Continue prn miralax and PRN Pericolace    Thrombocytosis  - 2/2 myelofibrosis  - Was on daily ASA, but stopped taking ~ 2 weeks ago  - Anticipate thrombocytopenia following chemotherapy  - Daily CBC while IP    Anemia in neoplastic disease  - Anticipate pancytopenia following chemotherapy.  - Transfuse for hgb < 7  - Daily CBC while IP    Adrenal nodule  - Subcentimeter nodular thickening of the adrenal glands first noted on imaging from 1/2024.  - Seen by endocrine prior to transplant admission and Dexamethasone suppression test performed. Patient responded appropriately. No further w/u needed.  - Likely adenomas.    Metabolic dysfunction-associated steatotic liver disease (MASLD)  - Biopsy proven to be steatotic change. Most compatible with MASLD.   - Follow-up with hepatology OP.    Primary myelofibrosis  From Dr. James's most recent clinic note:  Primary myelofibrosis              A. 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 Can resume at discharge if LFTs stable.          VTE Risk Mitigation (From admission, onward)           Ordered     heparin, porcine (PF) 100 unit/mL injection flush 300 Units  As needed (PRN)         12/18/24 2025                    Arthur Samano MD  Bone Marrow Transplant  [facility] - Oncology ([facility])

## 2024-12-21 NOTE — PLAN OF CARE
Day -5 Bu/Flu/Thiotepa+PtCy Haplo SCT. Patient AAOx4. Afebrile and VSS on room air. NaCl w/Kt to infuse @ 75 ml/hr. Patient with no complaints overnight. Showers, linen, and dressing changes performed as ordered. Patient is up independently to the bathroom. Free from falls and injury. Monitoring I&Os, tolerating diet, and CHG wipes given for independent use. No BM overnight. Patient received chemo this morning and is tolerating well. Bed is locked and in lowest position, non-skid socks on, and call light within reach. Patient educated on using the call light when needing assistance and verbalizes understanding. Plan of care reviewed and is ongoing. Patient expresses no other needs at this time

## 2024-12-21 NOTE — PLAN OF CARE
POC reviewed with patient; understanding verbalized. Day -5 Bu/Flu/Thiotepa haploidentical allogeneic SCT. Triple lumen central line infusing NS W/ KCI @ 75ml/hr. Pt. has nonskid footwear on, bed in lowest position and locked with bed rails up x2.  Pt. instructed to call prior to getting OOB.  Pt. has call light and personal items within reach. Wife at bedside. Patient ambulates in room independently. VSS and afebrile this shift. All questions and concerns addressed at this time.

## 2024-12-21 NOTE — ASSESSMENT & PLAN NOTE
- Patient of Dr. 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 (Listed as allergy in Epic. Can be removed as allergy 72 hours following last dose of Busulfan).  - Patient is O+. Donor is O+.  - Patient is CMV non-reactive. Donor is CMV reactive. Given donor CMV status, patient will start (patient-supplied) letermovir on Day +13.    - See treatment plan below:  Planned conditioning regimen:  Thiotepa on Day -7  Busulfan on Day -6, -5, -4, and -3  Fludarabine on Day -6, -5, -4, and -3  REST DAYS on Day -2 and -1     Planned  GVHD Prophylaxis:  Cyclophosphamide on Day +3 and +4  Tacrolimus starting on Day +5  Mycophenolate starting on Day +5     Antimicrobial Prophylaxis:  Acyclovir starting on Day -7  Levofloxacin starting on Day -1  Micafungin on Day -1 through Day +4  Letermovir starting on Day +5 (if CMV PCR drawn on day 0 results negative)  Posaconazole starting on Day +5  Atovaquone starting on Day +30     Skin Care with Thiotepa: Day -7 through D-5     Seizure Prophylaxis:  Levetiracetam on Day -7 through Day -2     SOS/VOD Prophylaxis:  Ursodiol on Day -7 through Day +30     Growth Factor Support:  Neupogen starting on Day +5        Caregiver: Carmelita (Spouse)  Post-transplant discharge plans: Home

## 2024-12-22 LAB
ABO + RH BLD: NORMAL
ALBUMIN SERPL BCP-MCNC: 3.3 G/DL (ref 3.5–5.2)
ALP SERPL-CCNC: 48 U/L (ref 40–150)
ALT SERPL W/O P-5'-P-CCNC: 52 U/L (ref 10–44)
ANION GAP SERPL CALC-SCNC: 8 MMOL/L (ref 8–16)
ANISOCYTOSIS BLD QL SMEAR: SLIGHT
AST SERPL-CCNC: 29 U/L (ref 10–40)
BASOPHILS # BLD AUTO: 0.02 K/UL (ref 0–0.2)
BASOPHILS NFR BLD: 0.2 % (ref 0–1.9)
BILIRUB SERPL-MCNC: 0.7 MG/DL (ref 0.1–1)
BLD GP AB SCN CELLS X3 SERPL QL: NORMAL
BUN SERPL-MCNC: 14 MG/DL (ref 6–20)
CALCIUM SERPL-MCNC: 8.9 MG/DL (ref 8.7–10.5)
CHLORIDE SERPL-SCNC: 108 MMOL/L (ref 95–110)
CO2 SERPL-SCNC: 24 MMOL/L (ref 23–29)
CREAT SERPL-MCNC: 0.7 MG/DL (ref 0.5–1.4)
DIFFERENTIAL METHOD BLD: ABNORMAL
EOSINOPHIL # BLD AUTO: 0 K/UL (ref 0–0.5)
EOSINOPHIL NFR BLD: 0.1 % (ref 0–8)
ERYTHROCYTE [DISTWIDTH] IN BLOOD BY AUTOMATED COUNT: 14.2 % (ref 11.5–14.5)
EST. GFR  (NO RACE VARIABLE): >60 ML/MIN/1.73 M^2
GLUCOSE SERPL-MCNC: 95 MG/DL (ref 70–110)
HCT VFR BLD AUTO: 30.5 % (ref 40–54)
HGB BLD-MCNC: 10.5 G/DL (ref 14–18)
IMM GRANULOCYTES # BLD AUTO: 0.06 K/UL (ref 0–0.04)
IMM GRANULOCYTES NFR BLD AUTO: 0.7 % (ref 0–0.5)
LYMPHOCYTES # BLD AUTO: 0.4 K/UL (ref 1–4.8)
LYMPHOCYTES NFR BLD: 4.9 % (ref 18–48)
MAGNESIUM SERPL-MCNC: 2.1 MG/DL (ref 1.6–2.6)
MCH RBC QN AUTO: 34.8 PG (ref 27–31)
MCHC RBC AUTO-ENTMCNC: 34.4 G/DL (ref 32–36)
MCV RBC AUTO: 101 FL (ref 82–98)
MONOCYTES # BLD AUTO: 0.1 K/UL (ref 0.3–1)
MONOCYTES NFR BLD: 1.1 % (ref 4–15)
NEUTROPHILS # BLD AUTO: 7.9 K/UL (ref 1.8–7.7)
NEUTROPHILS NFR BLD: 93 % (ref 38–73)
NRBC BLD-RTO: 0 /100 WBC
OVALOCYTES BLD QL SMEAR: ABNORMAL
PHOSPHATE SERPL-MCNC: 2.5 MG/DL (ref 2.7–4.5)
PLATELET # BLD AUTO: 349 K/UL (ref 150–450)
PLATELET BLD QL SMEAR: ABNORMAL
PMV BLD AUTO: 11.3 FL (ref 9.2–12.9)
POIKILOCYTOSIS BLD QL SMEAR: SLIGHT
POTASSIUM SERPL-SCNC: 3.7 MMOL/L (ref 3.5–5.1)
PROT SERPL-MCNC: 5.9 G/DL (ref 6–8.4)
RBC # BLD AUTO: 3.02 M/UL (ref 4.6–6.2)
SODIUM SERPL-SCNC: 140 MMOL/L (ref 136–145)
SPECIMEN OUTDATE: NORMAL
WBC # BLD AUTO: 8.54 K/UL (ref 3.9–12.7)

## 2024-12-22 PROCEDURE — 86850 RBC ANTIBODY SCREEN: CPT | Performed by: NURSE PRACTITIONER

## 2024-12-22 PROCEDURE — 83735 ASSAY OF MAGNESIUM: CPT | Performed by: NURSE PRACTITIONER

## 2024-12-22 PROCEDURE — 20600001 HC STEP DOWN PRIVATE ROOM

## 2024-12-22 PROCEDURE — 99232 SBSQ HOSP IP/OBS MODERATE 35: CPT | Mod: ,,, | Performed by: INTERNAL MEDICINE

## 2024-12-22 PROCEDURE — 25000003 PHARM REV CODE 250: Performed by: NURSE PRACTITIONER

## 2024-12-22 PROCEDURE — 25000003 PHARM REV CODE 250: Performed by: INTERNAL MEDICINE

## 2024-12-22 PROCEDURE — 63600175 PHARM REV CODE 636 W HCPCS: Mod: JZ,JG | Performed by: INTERNAL MEDICINE

## 2024-12-22 PROCEDURE — 25000003 PHARM REV CODE 250: Performed by: STUDENT IN AN ORGANIZED HEALTH CARE EDUCATION/TRAINING PROGRAM

## 2024-12-22 PROCEDURE — 80053 COMPREHEN METABOLIC PANEL: CPT | Performed by: NURSE PRACTITIONER

## 2024-12-22 PROCEDURE — 85025 COMPLETE CBC W/AUTO DIFF WBC: CPT | Performed by: NURSE PRACTITIONER

## 2024-12-22 PROCEDURE — 84100 ASSAY OF PHOSPHORUS: CPT | Performed by: NURSE PRACTITIONER

## 2024-12-22 PROCEDURE — 63600175 PHARM REV CODE 636 W HCPCS: Performed by: INTERNAL MEDICINE

## 2024-12-22 RX ORDER — LACTULOSE 10 G/15ML
20 SOLUTION ORAL ONCE
Status: DISCONTINUED | OUTPATIENT
Start: 2024-12-22 | End: 2024-12-24

## 2024-12-22 RX ADMIN — POTASSIUM CHLORIDE 20 MEQ: 1500 TABLET, EXTENDED RELEASE ORAL at 09:12

## 2024-12-22 RX ADMIN — SODIUM CHLORIDE AND POTASSIUM CHLORIDE 75 ML/HR: .9; .15 SOLUTION INTRAVENOUS at 05:12

## 2024-12-22 RX ADMIN — BUSULFAN 282 MG: 6 INJECTION, SOLUTION INTRAVENOUS at 05:12

## 2024-12-22 RX ADMIN — LEVETIRACETAM 500 MG: 500 TABLET, FILM COATED ORAL at 09:12

## 2024-12-22 RX ADMIN — MUPIROCIN: 20 OINTMENT TOPICAL at 09:12

## 2024-12-22 RX ADMIN — OLANZAPINE 5 MG: 2.5 TABLET, FILM COATED ORAL at 09:12

## 2024-12-22 RX ADMIN — FLUDARABINE PHOSPHATE 85 MG: 25 INJECTION, SOLUTION INTRAVENOUS at 04:12

## 2024-12-22 RX ADMIN — URSODIOL 300 MG: 300 CAPSULE ORAL at 09:12

## 2024-12-22 RX ADMIN — Medication 1 DOSE: at 05:12

## 2024-12-22 RX ADMIN — DEXAMETHASONE 12 MG: 4 TABLET ORAL at 03:12

## 2024-12-22 RX ADMIN — Medication 1 DOSE: at 01:12

## 2024-12-22 RX ADMIN — SODIUM CHLORIDE AND POTASSIUM CHLORIDE 75 ML/HR: .9; .15 SOLUTION INTRAVENOUS at 10:12

## 2024-12-22 RX ADMIN — LEVETIRACETAM 500 MG: 500 TABLET, FILM COATED ORAL at 08:12

## 2024-12-22 RX ADMIN — PANTOPRAZOLE SODIUM 40 MG: 40 TABLET, DELAYED RELEASE ORAL at 09:12

## 2024-12-22 RX ADMIN — URSODIOL 300 MG: 300 CAPSULE ORAL at 08:12

## 2024-12-22 RX ADMIN — OLANZAPINE 5 MG: 2.5 TABLET, FILM COATED ORAL at 08:12

## 2024-12-22 RX ADMIN — DIPHENHYDRAMINE HYDROCHLORIDE 15 ML: 25 SOLUTION ORAL at 11:12

## 2024-12-22 RX ADMIN — Medication 1 DOSE: at 09:12

## 2024-12-22 RX ADMIN — ONDANSETRON 8 MG: 8 TABLET, ORALLY DISINTEGRATING ORAL at 05:12

## 2024-12-22 RX ADMIN — ONDANSETRON 8 MG: 8 TABLET, ORALLY DISINTEGRATING ORAL at 01:12

## 2024-12-22 RX ADMIN — ACYCLOVIR 800 MG: 200 CAPSULE ORAL at 09:12

## 2024-12-22 RX ADMIN — ONDANSETRON 8 MG: 8 TABLET, ORALLY DISINTEGRATING ORAL at 09:12

## 2024-12-22 RX ADMIN — POLYETHYLENE GLYCOL 3350 17 G: 17 POWDER, FOR SOLUTION ORAL at 09:12

## 2024-12-22 RX ADMIN — ACYCLOVIR 800 MG: 200 CAPSULE ORAL at 08:12

## 2024-12-22 RX ADMIN — MUPIROCIN: 20 OINTMENT TOPICAL at 08:12

## 2024-12-22 NOTE — PLAN OF CARE
POC reviewed with patient; understanding verbalized. Day -4 Bu/Flu/Thiotepa haploidentical allogeneic SCT. Electrolytes replaced. NS w/ 20 KCI @ 75 ml/hr. Pt. with nonskid footwear on, bed in lowest position, and locked with bed rails up x2. Pt. has call light and personal items within reach. Wife at bedside. Patient ambulates in room independently. VSS and afebrile this shift. All questions and concerns addressed at this time.

## 2024-12-22 NOTE — NURSING
CHEMOTHERAPY NOTE:    busulfan (BUSULFEX) 253.4 mg in 0.9% NaCl 469.3 mL chemo infusion to infuse through R [place]. Positive for blood return to infuse over 3 hours. CAR and consent in chart. Dosage and BSA verified by 2 chemo certified nurses (Florinda PITTS, and [place] SHELDON RN). Pre-meds given prior to infusion. Chemo education performed and patient verbalizes understanding. Chemo precautions in place throughout infusion. Patient expresses no other needs at this time.

## 2024-12-22 NOTE — SUBJECTIVE & OBJECTIVE
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 Doing well. Feels constipated today, lactulose x1 today. Skin rash stable. Having dry mouth - added MMW.     Objective:     Vital Signs (Most Recent):  Temp: 97.9 °F (36.6 °C) (12/22/24 0718)  Pulse: 73 (12/22/24 0718)  Resp: 18 (12/22/24 0718)  BP: 134/78 (12/22/24 0718)  SpO2: 96 % (12/22/24 0718) Vital Signs (24h Range):  Temp:  [97.7 °F (36.5 °C)-98.6 °F (37 °C)] 97.9 °F (36.6 °C)  Pulse:  [61-75] 73  Resp:  [18-20] 18  SpO2:  [95 %-98 %] 96 %  BP: (125-138)/(75-82) 134/78     Weight: 93.1 kg (205 lb 4 oz)  Body mass index is 28.63 kg/m².  Body surface area is 2.16 meters squared.    ECOG SCORE           [unfilled]    Intake/Output - Last 3 Shifts         12/20 0700  12/21 0659 12/21 0700 12/22 0659 12/22 0700 12/23 0659    P.O. 1400 240 480    I.V. (mL/kg) 1630.8 (17.5) 588.8 (6.3) 891.7 (9.6)    IV Piggyback 802.4 369.8 637.9    Total Intake(mL/kg) 3833.2 (41.1) 1198.6 (12.9) 2009.6 (21.6)    Urine (mL/kg/hr) 3100 (1.4) 3200 (1.4) 1050 (4)    Emesis/NG output 0      Stool 0      Total Output 3100 3200 1050    Net +733.2 -2001.4 +959.6           Urine Occurrence 4 x      Stool Occurrence 0 x      Emesis Occurrence 0 x               Physical Exam  Constitutional:       Appearance: He is well-developed.   HENT:      Head: Normocephalic and atraumatic.      Mouth/Throat:      Pharynx: No oropharyngeal exudate.   Eyes:      General:         Right eye: No discharge.         Left eye: No discharge.      Conjunctiva/sclera: Conjunctivae normal.      Pupils: Pupils are equal, round, and reactive to light.   Cardiovascular:      Rate and Rhythm: Normal rate and regular rhythm.      Heart sounds: Normal heart sounds. No murmur heard.  Pulmonary:      Effort: Pulmonary effort is normal. No respiratory distress.      Breath sounds: Normal breath sounds. No wheezing or rales.   Abdominal:      General: Bowel sounds  are normal. There is no distension.      Palpations: Abdomen is soft.      Tenderness: There is no abdominal tenderness.   Musculoskeletal:         General: No deformity. Normal range of motion.      Cervical back: Normal range of motion and neck supple.   Skin:     General: Skin is warm and dry.      Findings: Rash present. No erythema.      Comments: Right chest wall [place]. Dressing c/d/i. No sign of infection to site.   Neurological:      Mental Status: He is alert and oriented to person, place, and time.   Psychiatric:         Behavior: Behavior normal.         Thought Content: Thought content normal.         Judgment: Judgment normal.            Significant Labs:   CBC:   Recent Labs   Lab 12/21/24 0315 12/22/24 0359   WBC 11.67 8.54   HGB 10.1* 10.5*   HCT 30.2* 30.5*    349    and CMP:   Recent Labs   Lab 12/21/24 0315 12/22/24 0359    140   K 3.6 3.7    108   CO2 25 24   GLU 99 95   BUN 14 14   CREATININE 0.7 0.7   CALCIUM 8.6* 8.9   PROT 5.9* 5.9*   ALBUMIN 3.2* 3.3*   BILITOT 0.2 0.7   ALKPHOS 48 48   AST 21 29   ALT 48* 52*   ANIONGAP 7* 8       Diagnostic Results:  I have reviewed all pertinent imaging results/findings within the past 24 hours.

## 2024-12-22 NOTE — NURSING
CHEMOTHERAPY NOTE:    fludarabine (FLUDARA) 85 mg in 0.9% NaCl 118.4 mL chemo infusion to infuse through R [place]. Positive for blood return to infuse over 1 hour. CAR and consent in chart. Dosage and BSA verified by 2 chemo certified nurses (BRANDIN Neely and Zaida SALINAS RN). Pre-meds given prior to infusion. Chemo education performed and patient verbalizes understanding. Chemo precautions in place throughout infusion. Patient expresses no other needs at this time.

## 2024-12-22 NOTE — PROGRESS NOTES
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 Doing well. Feels constipated today, lactulose x1 today. Skin rash stable. Having dry mouth - added MMW.     Objective:     Vital Signs (Most Recent):  Temp: 97.9 °F (36.6 °C) (12/22/24 0718)  Pulse: 73 (12/22/24 0718)  Resp: 18 (12/22/24 0718)  BP: 134/78 (12/22/24 0718)  SpO2: 96 % (12/22/24 0718) Vital Signs (24h Range):  Temp:  [97.7 °F (36.5 °C)-98.6 °F (37 °C)] 97.9 °F (36.6 °C)  Pulse:  [61-75] 73  Resp:  [18-20] 18  SpO2:  [95 %-98 %] 96 %  BP: (125-138)/(75-82) 134/78     Weight: 93.1 kg (205 lb 4 oz)  Body mass index is 28.63 kg/m².  Body surface area is 2.16 meters squared.    ECOG SCORE           [unfilled]    Intake/Output - Last 3 Shifts         12/20 0700 12/21 0659 12/21 0700 12/22 0659 12/22 0700 12/23 0659    P.O. 1400 240 480    I.V. (mL/kg) 1630.8 (17.5) 588.8 (6.3) 891.7 (9.6)    IV Piggyback 802.4 369.8 637.9    Total Intake(mL/kg) 3833.2 (41.1) 1198.6 (12.9) 2009.6 (21.6)    Urine (mL/kg/hr) 3100 (1.4) 3200 (1.4) 1050 (4)    Emesis/NG output 0      Stool 0      Total Output 3100 3200 1050    Net +733.2 -2001.4 +959.6           Urine Occurrence 4 x      Stool Occurrence 0 x      Emesis Occurrence 0 x               Physical Exam  Constitutional:       Appearance: He is well-developed.   HENT:      Head: Normocephalic and atraumatic.      Mouth/Throat:      Pharynx: No oropharyngeal exudate.   Eyes:      General:         Right eye: No discharge.         Left eye: No discharge.      Conjunctiva/sclera: Conjunctivae normal.      Pupils: Pupils are equal, round, and reactive to light.   Cardiovascular:      Rate and Rhythm: Normal rate and regular rhythm.      Heart sounds: Normal heart sounds.  No murmur heard.  Pulmonary:      Effort: Pulmonary effort is normal. No respiratory distress.      Breath sounds: Normal breath sounds. No wheezing or rales.   Abdominal:      General: Bowel sounds are normal. There is no distension.      Palpations: Abdomen is soft.      Tenderness: There is no abdominal tenderness.   Musculoskeletal:         General: No deformity. Normal range of motion.      Cervical back: Normal range of motion and neck supple.   Skin:     General: Skin is warm and dry.      Findings: Rash present. No erythema.      Comments: Right chest wall [place]. Dressing c/d/i. No sign of infection to site.   Neurological:      Mental Status: He is alert and oriented to person, place, and time.   Psychiatric:         Behavior: Behavior normal.         Thought Content: Thought content normal.         Judgment: Judgment normal.            Significant Labs:   CBC:   Recent Labs   Lab 12/21/24 0315 12/22/24 0359   WBC 11.67 8.54   HGB 10.1* 10.5*   HCT 30.2* 30.5*    349    and CMP:   Recent Labs   Lab 12/21/24 0315 12/22/24  0359    140   K 3.6 3.7    108   CO2 25 24   GLU 99 95   BUN 14 14   CREATININE 0.7 0.7   CALCIUM 8.6* 8.9   PROT 5.9* 5.9*   ALBUMIN 3.2* 3.3*   BILITOT 0.2 0.7   ALKPHOS 48 48   AST 21 29   ALT 48* 52*   ANIONGAP 7* 8       Diagnostic Results:  I have reviewed all pertinent imaging results/findings within the past 24 hours.  Assessment/Plan:     * Stem cell transplant candidate  - Patient of Dr. 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 (Listed as allergy in Epic. Can be removed as allergy 72 hours following last dose of Busulfan).  - Patient is O+. Donor is O+.  - Patient is CMV non-reactive. Donor is CMV reactive. Given donor CMV status, patient will start (patient-supplied) letermovir on Day +13.    -  See treatment plan below:  Planned conditioning regimen:  Thiotepa on Day -7  Busulfan on Day -6, -5, -4, and -3  Fludarabine on Day -6, -5, -4, and -3  REST DAYS on Day -2 and -1     Planned  GVHD Prophylaxis:  Cyclophosphamide on Day +3 and +4  Tacrolimus starting on Day +5  Mycophenolate starting on Day +5     Antimicrobial Prophylaxis:  Acyclovir starting on Day -7  Levofloxacin starting on Day -1  Micafungin on Day -1 through Day +4  Letermovir starting on Day +5 (if CMV PCR drawn on day 0 results negative)  Posaconazole starting on Day +5  Atovaquone starting on Day +30     Skin Care with Thiotepa: Day -7 through D-5     Seizure Prophylaxis:  Levetiracetam on Day -7 through Day -2     SOS/VOD Prophylaxis:  Ursodiol on Day -7 through Day +30     Growth Factor Support:  Neupogen starting on Day +5        Caregiver: Carmelita (Spouse)  Post-transplant discharge plans: Home      Other constipation  - Continue prn miralax and PRN Pericolace    Thrombocytosis  - 2/2 myelofibrosis  - Was on daily ASA, but stopped taking ~ 2 weeks ago  - Anticipate thrombocytopenia following chemotherapy  - Daily CBC while IP    Anemia in neoplastic disease  - Anticipate pancytopenia following chemotherapy.  - Transfuse for hgb < 7  - Daily CBC while IP    Adrenal nodule  - Subcentimeter nodular thickening of the adrenal glands first noted on imaging from 1/2024.  - Seen by endocrine prior to transplant admission and Dexamethasone suppression test performed. Patient responded appropriately. No further w/u needed.  - Likely adenomas.    Metabolic dysfunction-associated steatotic liver disease (MASLD)  - Biopsy proven to be steatotic change. Most compatible with MASLD.   - Follow-up with hepatology OP.    Primary myelofibrosis  From Dr. James's most recent clinic note:  Primary myelofibrosis              A. 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 Can resume at discharge if LFTs stable.          VTE Risk Mitigation (From admission, onward)           Ordered     heparin, porcine (PF) 100 unit/mL injection flush 300 Units  As needed (PRN)         12/18/24 2025                    Arthur Samano MD  Bone Marrow Transplant  [facility] - Oncology ([facility])

## 2024-12-22 NOTE — ASSESSMENT & PLAN NOTE
- Patient of Dr. 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 (Listed as allergy in Epic. Can be removed as allergy 72 hours following last dose of Busulfan).  - Patient is O+. Donor is O+.  - Patient is CMV non-reactive. Donor is CMV reactive. Given donor CMV status, patient will start (patient-supplied) letermovir on Day +13.    - See treatment plan below:  Planned conditioning regimen:  Thiotepa on Day -7  Busulfan on Day -6, -5, -4, and -3  Fludarabine on Day -6, -5, -4, and -3  REST DAYS on Day -2 and -1     Planned  GVHD Prophylaxis:  Cyclophosphamide on Day +3 and +4  Tacrolimus starting on Day +5  Mycophenolate starting on Day +5     Antimicrobial Prophylaxis:  Acyclovir starting on Day -7  Levofloxacin starting on Day -1  Micafungin on Day -1 through Day +4  Letermovir starting on Day +5 (if CMV PCR drawn on day 0 results negative)  Posaconazole starting on Day +5  Atovaquone starting on Day +30     Skin Care with Thiotepa: Day -7 through D-5     Seizure Prophylaxis:  Levetiracetam on Day -7 through Day -2     SOS/VOD Prophylaxis:  Ursodiol on Day -7 through Day +30     Growth Factor Support:  Neupogen starting on Day +5        Caregiver: Carmelita (Spouse)  Post-transplant discharge plans: Home

## 2024-12-23 LAB
ALBUMIN SERPL BCP-MCNC: 3.5 G/DL (ref 3.5–5.2)
ALP SERPL-CCNC: 49 U/L (ref 40–150)
ALT SERPL W/O P-5'-P-CCNC: 57 U/L (ref 10–44)
ANION GAP SERPL CALC-SCNC: 6 MMOL/L (ref 8–16)
ANISOCYTOSIS BLD QL SMEAR: SLIGHT
AST SERPL-CCNC: 27 U/L (ref 10–40)
BASOPHILS # BLD AUTO: 0.01 K/UL (ref 0–0.2)
BASOPHILS NFR BLD: 0.1 % (ref 0–1.9)
BILIRUB SERPL-MCNC: 0.5 MG/DL (ref 0.1–1)
BUN SERPL-MCNC: 17 MG/DL (ref 6–20)
BUSULFAN SERPL-SCNC: NORMAL UMOL/L
CALCIUM SERPL-MCNC: 8.7 MG/DL (ref 8.7–10.5)
CHLORIDE SERPL-SCNC: 105 MMOL/L (ref 95–110)
CO2 SERPL-SCNC: 25 MMOL/L (ref 23–29)
CREAT SERPL-MCNC: 0.8 MG/DL (ref 0.5–1.4)
DIFFERENTIAL METHOD BLD: ABNORMAL
EOSINOPHIL # BLD AUTO: 0 K/UL (ref 0–0.5)
EOSINOPHIL NFR BLD: 0.2 % (ref 0–8)
ERYTHROCYTE [DISTWIDTH] IN BLOOD BY AUTOMATED COUNT: 13.7 % (ref 11.5–14.5)
EST. GFR  (NO RACE VARIABLE): >60 ML/MIN/1.73 M^2
GLUCOSE SERPL-MCNC: 96 MG/DL (ref 70–110)
HCT VFR BLD AUTO: 33.6 % (ref 40–54)
HGB BLD-MCNC: 11.3 G/DL (ref 14–18)
IMM GRANULOCYTES # BLD AUTO: 0.04 K/UL (ref 0–0.04)
IMM GRANULOCYTES NFR BLD AUTO: 0.5 % (ref 0–0.5)
LYMPHOCYTES # BLD AUTO: 0.2 K/UL (ref 1–4.8)
LYMPHOCYTES NFR BLD: 2.4 % (ref 18–48)
MAGNESIUM SERPL-MCNC: 2.2 MG/DL (ref 1.6–2.6)
MCH RBC QN AUTO: 33.3 PG (ref 27–31)
MCHC RBC AUTO-ENTMCNC: 33.6 G/DL (ref 32–36)
MCV RBC AUTO: 99 FL (ref 82–98)
MONOCYTES # BLD AUTO: 0 K/UL (ref 0.3–1)
MONOCYTES NFR BLD: 0.2 % (ref 4–15)
NEUTROPHILS # BLD AUTO: 8.4 K/UL (ref 1.8–7.7)
NEUTROPHILS NFR BLD: 96.6 % (ref 38–73)
NRBC BLD-RTO: 0 /100 WBC
OVALOCYTES BLD QL SMEAR: ABNORMAL
PHOSPHATE SERPL-MCNC: 2.6 MG/DL (ref 2.7–4.5)
PLATELET # BLD AUTO: 380 K/UL (ref 150–450)
PLATELET BLD QL SMEAR: ABNORMAL
PMV BLD AUTO: 11.1 FL (ref 9.2–12.9)
POIKILOCYTOSIS BLD QL SMEAR: SLIGHT
POTASSIUM SERPL-SCNC: 4 MMOL/L (ref 3.5–5.1)
PROT SERPL-MCNC: 6.6 G/DL (ref 6–8.4)
RBC # BLD AUTO: 3.39 M/UL (ref 4.6–6.2)
SODIUM SERPL-SCNC: 136 MMOL/L (ref 136–145)
WBC # BLD AUTO: 8.7 K/UL (ref 3.9–12.7)

## 2024-12-23 PROCEDURE — 25000003 PHARM REV CODE 250: Performed by: INTERNAL MEDICINE

## 2024-12-23 PROCEDURE — 83735 ASSAY OF MAGNESIUM: CPT | Performed by: NURSE PRACTITIONER

## 2024-12-23 PROCEDURE — 80053 COMPREHEN METABOLIC PANEL: CPT | Performed by: NURSE PRACTITIONER

## 2024-12-23 PROCEDURE — 84100 ASSAY OF PHOSPHORUS: CPT | Performed by: NURSE PRACTITIONER

## 2024-12-23 PROCEDURE — 20600001 HC STEP DOWN PRIVATE ROOM

## 2024-12-23 PROCEDURE — 25000003 PHARM REV CODE 250: Performed by: STUDENT IN AN ORGANIZED HEALTH CARE EDUCATION/TRAINING PROGRAM

## 2024-12-23 PROCEDURE — 63600175 PHARM REV CODE 636 W HCPCS: Performed by: NURSE PRACTITIONER

## 2024-12-23 PROCEDURE — 99232 SBSQ HOSP IP/OBS MODERATE 35: CPT | Mod: FS,,, | Performed by: INTERNAL MEDICINE

## 2024-12-23 PROCEDURE — 25000003 PHARM REV CODE 250: Performed by: NURSE PRACTITIONER

## 2024-12-23 PROCEDURE — 63600175 PHARM REV CODE 636 W HCPCS: Performed by: INTERNAL MEDICINE

## 2024-12-23 PROCEDURE — 85025 COMPLETE CBC W/AUTO DIFF WBC: CPT | Performed by: NURSE PRACTITIONER

## 2024-12-23 PROCEDURE — 63600175 PHARM REV CODE 636 W HCPCS: Mod: JZ,JG | Performed by: INTERNAL MEDICINE

## 2024-12-23 RX ORDER — CLONIDINE HYDROCHLORIDE 0.1 MG/1
0.1 TABLET ORAL ONCE AS NEEDED
Status: COMPLETED | OUTPATIENT
Start: 2024-12-26 | End: 2024-12-26

## 2024-12-23 RX ORDER — PROCHLORPERAZINE EDISYLATE 5 MG/ML
5 INJECTION INTRAMUSCULAR; INTRAVENOUS
Status: DISCONTINUED | OUTPATIENT
Start: 2024-12-23 | End: 2025-01-14

## 2024-12-23 RX ORDER — DIPHENHYDRAMINE HYDROCHLORIDE 50 MG/ML
50 INJECTION INTRAMUSCULAR; INTRAVENOUS ONCE AS NEEDED
Status: COMPLETED | OUTPATIENT
Start: 2024-12-26 | End: 2024-12-26

## 2024-12-23 RX ORDER — NITROGLYCERIN 0.4 MG/1
0.4 TABLET SUBLINGUAL ONCE AS NEEDED
Status: COMPLETED | OUTPATIENT
Start: 2024-12-26 | End: 2024-12-26

## 2024-12-23 RX ORDER — EPINEPHRINE 1 MG/ML
0.5 INJECTION, SOLUTION, CONCENTRATE INTRAVENOUS ONCE AS NEEDED
Status: DISCONTINUED | OUTPATIENT
Start: 2024-12-26 | End: 2025-01-02

## 2024-12-23 RX ORDER — FUROSEMIDE 10 MG/ML
100 INJECTION INTRAMUSCULAR; INTRAVENOUS ONCE AS NEEDED
Status: COMPLETED | OUTPATIENT
Start: 2024-12-26 | End: 2024-12-26

## 2024-12-23 RX ORDER — DIPHENHYDRAMINE HYDROCHLORIDE 50 MG/ML
50 INJECTION INTRAMUSCULAR; INTRAVENOUS ONCE
Status: COMPLETED | OUTPATIENT
Start: 2024-12-26 | End: 2024-12-26

## 2024-12-23 RX ORDER — LORAZEPAM 2 MG/ML
1 INJECTION INTRAMUSCULAR ONCE
Status: COMPLETED | OUTPATIENT
Start: 2024-12-26 | End: 2024-12-26

## 2024-12-23 RX ORDER — SODIUM CHLORIDE AND POTASSIUM CHLORIDE 150; 900 MG/100ML; MG/100ML
INJECTION, SOLUTION INTRAVENOUS CONTINUOUS
Status: ACTIVE | OUTPATIENT
Start: 2024-12-26 | End: 2024-12-26

## 2024-12-23 RX ORDER — MEPERIDINE HYDROCHLORIDE 50 MG/ML
50 INJECTION INTRAMUSCULAR; INTRAVENOUS; SUBCUTANEOUS ONCE AS NEEDED
Status: COMPLETED | OUTPATIENT
Start: 2024-12-26 | End: 2024-12-26

## 2024-12-23 RX ADMIN — OLANZAPINE 5 MG: 2.5 TABLET, FILM COATED ORAL at 08:12

## 2024-12-23 RX ADMIN — ACYCLOVIR 800 MG: 200 CAPSULE ORAL at 08:12

## 2024-12-23 RX ADMIN — Medication 1 TABLET: at 01:12

## 2024-12-23 RX ADMIN — PROCHLORPERAZINE EDISYLATE 5 MG: 5 INJECTION INTRAMUSCULAR; INTRAVENOUS at 09:12

## 2024-12-23 RX ADMIN — DEXAMETHASONE 12 MG: 4 TABLET ORAL at 04:12

## 2024-12-23 RX ADMIN — SODIUM CHLORIDE AND POTASSIUM CHLORIDE 75 ML/HR: .9; .15 SOLUTION INTRAVENOUS at 06:12

## 2024-12-23 RX ADMIN — Medication 1 DOSE: at 01:12

## 2024-12-23 RX ADMIN — PROCHLORPERAZINE EDISYLATE 10 MG: 5 INJECTION INTRAMUSCULAR; INTRAVENOUS at 07:12

## 2024-12-23 RX ADMIN — BUSULFAN 282 MG: 6 INJECTION, SOLUTION INTRAVENOUS at 05:12

## 2024-12-23 RX ADMIN — FLUDARABINE PHOSPHATE 85 MG: 25 INJECTION, SOLUTION INTRAVENOUS at 04:12

## 2024-12-23 RX ADMIN — URSODIOL 300 MG: 300 CAPSULE ORAL at 09:12

## 2024-12-23 RX ADMIN — ACYCLOVIR 800 MG: 200 CAPSULE ORAL at 09:12

## 2024-12-23 RX ADMIN — ONDANSETRON 8 MG: 8 TABLET, ORALLY DISINTEGRATING ORAL at 09:12

## 2024-12-23 RX ADMIN — PANTOPRAZOLE SODIUM 40 MG: 40 TABLET, DELAYED RELEASE ORAL at 08:12

## 2024-12-23 RX ADMIN — PROCHLORPERAZINE EDISYLATE 5 MG: 5 INJECTION INTRAMUSCULAR; INTRAVENOUS at 05:12

## 2024-12-23 RX ADMIN — ONDANSETRON 8 MG: 8 TABLET, ORALLY DISINTEGRATING ORAL at 05:12

## 2024-12-23 RX ADMIN — Medication 1 TABLET: at 06:12

## 2024-12-23 RX ADMIN — POLYETHYLENE GLYCOL 3350 17 G: 17 POWDER, FOR SOLUTION ORAL at 08:12

## 2024-12-23 RX ADMIN — LORAZEPAM 1 MG: 2 INJECTION INTRAMUSCULAR; INTRAVENOUS at 10:12

## 2024-12-23 RX ADMIN — LEVETIRACETAM 500 MG: 500 TABLET, FILM COATED ORAL at 09:12

## 2024-12-23 RX ADMIN — URSODIOL 300 MG: 300 CAPSULE ORAL at 08:12

## 2024-12-23 RX ADMIN — Medication 1 TABLET: at 05:12

## 2024-12-23 RX ADMIN — LEVETIRACETAM 500 MG: 500 TABLET, FILM COATED ORAL at 08:12

## 2024-12-23 RX ADMIN — Medication 1 DOSE: at 08:12

## 2024-12-23 RX ADMIN — TRAZODONE HYDROCHLORIDE 25 MG: 50 TABLET ORAL at 09:12

## 2024-12-23 RX ADMIN — Medication 1 DOSE: at 06:12

## 2024-12-23 RX ADMIN — ONDANSETRON 8 MG: 8 TABLET, ORALLY DISINTEGRATING ORAL at 01:12

## 2024-12-23 RX ADMIN — Medication 1 DOSE: at 09:12

## 2024-12-23 RX ADMIN — OLANZAPINE 5 MG: 2.5 TABLET, FILM COATED ORAL at 09:12

## 2024-12-23 RX ADMIN — MUPIROCIN: 20 OINTMENT TOPICAL at 09:12

## 2024-12-23 RX ADMIN — Medication 1 TABLET: at 09:12

## 2024-12-23 NOTE — PLAN OF CARE
Day -3 Bu/Flu/Thiotepa+PtCy Haplo SCT. Patient AAOx4. Afebrile and VSS on room air. NaCl w/Kt to infuse @ 75 ml/hr. Complained of nausea overnight that was moderately relieved with scheduled Zofran. Patient received chemo overnight and tolerated well. Patient is up independently to the bathroom. Free from falls and injury. Monitoring I&Os, tolerating diet, and CHG wipes given for independent use. No BM overnight. Bed is locked and in lowest position, non-skid socks on, and call light within reach. Patient educated on using the call light when needing assistance and verbalizes understanding. Plan of care reviewed and is ongoing. Patient expresses no other needs at this time.

## 2024-12-23 NOTE — SUBJECTIVE & OBJECTIVE
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 Remains afebrile. VSS. Will receive Busulfan and Fludarabine today. Remains afebrile. VSS. Complains of insomnia and nausea. PRN trazodone ordered for insomnia. Will schedule compazine and alternate with scheduled Zofran.    Objective:     Vital Signs (Most Recent):  Temp: 97.8 °F (36.6 °C) (12/23/24 0716)  Pulse: 77 (12/23/24 0903)  Resp: 18 (12/23/24 0716)  BP: (!) 145/77 (12/23/24 0903)  SpO2: 95 % (12/23/24 0716) Vital Signs (24h Range):  Temp:  [97.5 °F (36.4 °C)-98.1 °F (36.7 °C)] 97.8 °F (36.6 °C)  Pulse:  [70-88] 77  Resp:  [18] 18  SpO2:  [95 %-99 %] 95 %  BP: (103-145)/(65-85) 145/77     Weight: 93.1 kg (205 lb 4 oz)  Body mass index is 28.63 kg/m².  Body surface area is 2.16 meters squared.    ECOG SCORE           [unfilled]    Intake/Output - Last 3 Shifts         12/21 0700  12/22 0659 12/22 0700 12/23 0659 12/23 0700 12/24 0659    P.O. 240 2370     I.V. (mL/kg) 588.8 (6.3) 2309.7 (24.8) 43.6 (0.5)    IV Piggyback 369.8 918.3 356.7    Total Intake(mL/kg) 1198.6 (12.9) 5598 (60.1) 400.3 (4.3)    Urine (mL/kg/hr) 3200 (1.4) 2650 (1.2) 800 (3.4)    Emesis/NG output       Stool       Total Output 3200 2650 800    Net -2001.4 +2948 -399.7           Urine Occurrence  1 x              Physical Exam  Constitutional:       Appearance: He is well-developed.   HENT:      Head: Normocephalic and atraumatic.      Mouth/Throat:      Pharynx: No oropharyngeal exudate.   Eyes:      General:         Right eye: No discharge.         Left eye: No discharge.      Conjunctiva/sclera: Conjunctivae normal.      Pupils: Pupils are equal, round, and reactive to light.   Cardiovascular:      Rate and Rhythm: Normal rate and regular rhythm.      Heart sounds: Normal heart sounds. No murmur heard.  Pulmonary:      Effort: Pulmonary effort is normal. No respiratory distress.      Breath sounds: Normal breath sounds. No  wheezing or rales.   Abdominal:      General: Bowel sounds are normal. There is no distension.      Palpations: Abdomen is soft.      Tenderness: There is no abdominal tenderness.   Musculoskeletal:         General: No deformity. Normal range of motion.      Cervical back: Normal range of motion and neck supple.   Skin:     General: Skin is warm and dry.      Findings: No erythema or rash.      Comments: Right chest wall [place]. Dressing c/d/i. No sign of infection to site.   Neurological:      Mental Status: He is alert and oriented to person, place, and time.   Psychiatric:         Behavior: Behavior normal.         Thought Content: Thought content normal.         Judgment: Judgment normal.            Significant Labs:   CBC:   Recent Labs   Lab 12/22/24  0359 12/23/24  0416   WBC 8.54 8.70   HGB 10.5* 11.3*   HCT 30.5* 33.6*    380    and CMP:   Recent Labs   Lab 12/22/24  0359 12/23/24 0416    136   K 3.7 4.0    105   CO2 24 25   GLU 95 96   BUN 14 17   CREATININE 0.7 0.8   CALCIUM 8.9 8.7   PROT 5.9* 6.6   ALBUMIN 3.3* 3.5   BILITOT 0.7 0.5   ALKPHOS 48 49   AST 29 27   ALT 52* 57*   ANIONGAP 8 6*       Diagnostic Results:  I have reviewed all pertinent imaging results/findings within the past 24 hours.

## 2024-12-23 NOTE — ASSESSMENT & PLAN NOTE
- Patient of Dr. 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 (Listed as allergy in Epic. Can be removed as allergy 72 hours following last dose of Busulfan).  - Patient is O+. Donor is O+.  - Patient is CMV non-reactive. Donor is CMV reactive. Given donor CMV status, patient will start (patient-supplied) letermovir on Day +13.  - Day -4 and Day -3 Busulfan dose-adjusted to 282 mg  - See treatment plan below:    Planned conditioning regimen:  Thiotepa on Day -7  Busulfan on Day -6, -5, -4, and -3  Fludarabine on Day -6, -5, -4, and -3  REST DAYS on Day -2 and -1     Planned  GVHD Prophylaxis:  Cyclophosphamide on Day +3 and +4  Tacrolimus starting on Day +5  Mycophenolate starting on Day +5     Antimicrobial Prophylaxis:  Acyclovir starting on Day -7  Levofloxacin starting on Day -1  Micafungin on Day -1 through Day +4  Letermovir starting on Day +5 (if CMV PCR drawn on day 0 results negative)  Posaconazole starting on Day +5  Atovaquone starting on Day +30     Skin Care with Thiotepa: Day -7 through D-5     Seizure Prophylaxis:  Levetiracetam on Day -7 through Day -2     SOS/VOD Prophylaxis:  Ursodiol on Day -7 through Day +30     Growth Factor Support:  Neupogen starting on Day +5        Caregiver: Carmelita (Spouse)  Post-transplant discharge plans: Home

## 2024-12-23 NOTE — PROGRESS NOTES
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 Remains afebrile. VSS. Will receive Busulfan and Fludarabine today. Remains afebrile. VSS. Complains of insomnia and nausea. PRN trazodone ordered for insomnia. Will schedule compazine and alternate with scheduled Zofran.    Objective:     Vital Signs (Most Recent):  Temp: 97.8 °F (36.6 °C) (12/23/24 0716)  Pulse: 77 (12/23/24 0903)  Resp: 18 (12/23/24 0716)  BP: (!) 145/77 (12/23/24 0903)  SpO2: 95 % (12/23/24 0716) Vital Signs (24h Range):  Temp:  [97.5 °F (36.4 °C)-98.1 °F (36.7 °C)] 97.8 °F (36.6 °C)  Pulse:  [70-88] 77  Resp:  [18] 18  SpO2:  [95 %-99 %] 95 %  BP: (103-145)/(65-85) 145/77     Weight: 93.1 kg (205 lb 4 oz)  Body mass index is 28.63 kg/m².  Body surface area is 2.16 meters squared.    ECOG SCORE           [unfilled]    Intake/Output - Last 3 Shifts         12/21 0700 12/22 0659 12/22 0700 12/23 0659 12/23 0700 12/24 0659    P.O. 240 2370     I.V. (mL/kg) 588.8 (6.3) 2309.7 (24.8) 43.6 (0.5)    IV Piggyback 369.8 918.3 356.7    Total Intake(mL/kg) 1198.6 (12.9) 5598 (60.1) 400.3 (4.3)    Urine (mL/kg/hr) 3200 (1.4) 2650 (1.2) 800 (3.4)    Emesis/NG output       Stool       Total Output 3200 2650 800    Net -2001.4 +2948 -399.7           Urine Occurrence  1 x              Physical Exam  Constitutional:       Appearance: He is well-developed.   HENT:      Head: Normocephalic and atraumatic.      Mouth/Throat:      Pharynx: No oropharyngeal exudate.   Eyes:      General:         Right eye: No discharge.         Left eye: No discharge.      Conjunctiva/sclera: Conjunctivae normal.      Pupils: Pupils are equal, round, and reactive to light.   Cardiovascular:      Rate and Rhythm: Normal rate  and regular rhythm.      Heart sounds: Normal heart sounds. No murmur heard.  Pulmonary:      Effort: Pulmonary effort is normal. No respiratory distress.      Breath sounds: Normal breath sounds. No wheezing or rales.   Abdominal:      General: Bowel sounds are normal. There is no distension.      Palpations: Abdomen is soft.      Tenderness: There is no abdominal tenderness.   Musculoskeletal:         General: No deformity. Normal range of motion.      Cervical back: Normal range of motion and neck supple.   Skin:     General: Skin is warm and dry.      Findings: No erythema or rash.      Comments: Right chest wall [place]. Dressing c/d/i. No sign of infection to site.   Neurological:      Mental Status: He is alert and oriented to person, place, and time.   Psychiatric:         Behavior: Behavior normal.         Thought Content: Thought content normal.         Judgment: Judgment normal.            Significant Labs:   CBC:   Recent Labs   Lab 12/22/24 0359 12/23/24 0416   WBC 8.54 8.70   HGB 10.5* 11.3*   HCT 30.5* 33.6*    380    and CMP:   Recent Labs   Lab 12/22/24 0359 12/23/24 0416    136   K 3.7 4.0    105   CO2 24 25   GLU 95 96   BUN 14 17   CREATININE 0.7 0.8   CALCIUM 8.9 8.7   PROT 5.9* 6.6   ALBUMIN 3.3* 3.5   BILITOT 0.7 0.5   ALKPHOS 48 49   AST 29 27   ALT 52* 57*   ANIONGAP 8 6*       Diagnostic Results:  I have reviewed all pertinent imaging results/findings within the past 24 hours.  Assessment/Plan:     * Stem cell transplant candidate  - Patient of Dr. 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 (Listed as allergy in Epic. Can be removed as allergy 72 hours following last dose of Busulfan).  - Patient is O+. Donor is O+.  - Patient is CMV non-reactive. Donor is CMV reactive. Given donor CMV status, patient will  start (patient-supplied) letermovir on Day +13.  - Day -4 and Day -3 Busulfan dose-adjusted to 282 mg  - See treatment plan below:    Planned conditioning regimen:  Thiotepa on Day -7  Busulfan on Day -6, -5, -4, and -3  Fludarabine on Day -6, -5, -4, and -3  REST DAYS on Day -2 and -1     Planned  GVHD Prophylaxis:  Cyclophosphamide on Day +3 and +4  Tacrolimus starting on Day +5  Mycophenolate starting on Day +5     Antimicrobial Prophylaxis:  Acyclovir starting on Day -7  Levofloxacin starting on Day -1  Micafungin on Day -1 through Day +4  Letermovir starting on Day +5 (if CMV PCR drawn on day 0 results negative)  Posaconazole starting on Day +5  Atovaquone starting on Day +30     Skin Care with Thiotepa: Day -7 through D-5     Seizure Prophylaxis:  Levetiracetam on Day -7 through Day -2     SOS/VOD Prophylaxis:  Ursodiol on Day -7 through Day +30     Growth Factor Support:  Neupogen starting on Day +5        Caregiver: Carmelita (Spouse)  Post-transplant discharge plans: Home      Primary myelofibrosis  From Dr. James's most recent clinic note:  Primary myelofibrosis              A. 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  - Transfuse for hgb < 7  - Daily CBC while IP    Metabolic dysfunction-associated steatotic liver disease (MASLD)  - Biopsy proven to be steatotic  change. Most compatible with MASLD.   - Follow-up with hepatology OP.    Other constipation  - Continue PRN miralax and PRN Pericolace    Adrenal nodule  - Subcentimeter nodular thickening of the adrenal glands first noted on imaging from 1/2024.  - Seen by endocrine prior to transplant admission and Dexamethasone suppression test performed. Patient responded appropriately. No further w/u needed.  - Likely adenomas.    Hyperlipidemia  - Holding home atorvastatin while IP to avoid interaction with other medications. Can resume at discharge if LFTs stable.          VTE Risk Mitigation (From admission, onward)           Ordered     heparin, porcine (PF) 100 unit/mL injection flush 300 Units  As needed (PRN)         12/18/24 2025                    Disposition: Inpatient for allogeneic SCT.    Anna Vera, NP  Bone Marrow Transplant  [facility] - Oncology ([facility])

## 2024-12-23 NOTE — NURSING
busulfan (BUSULFEX) 253.4 mg in 0.9% NaCl 469.3 mL chemo infusion to infuse through R [place]. Positive for blood return to infuse over 3 hours. CAR and consent in chart. Dosage and BSA verified by 2 chemo certified nurses (Miriam RN, and Florinda RN). Pre-meds given prior to infusion. Chemo education performed and patient verbalizes understanding. Chemo precautions in place throughout infusion. Patient expresses no other needs at this time.

## 2024-12-23 NOTE — NURSING
fludarabine (FLUDARA) 85 mg in 0.9% NaCl 118.4 mL chemo infusion to infuse through R [place]. Positive for blood return to infuse over 1 hour. CAR and consent in chart. Dosage and BSA verified by 2 chemo certified nurses (Miriam GHOTRA, and [place] HEAD RN). Pre-meds given prior to infusion. Chemo education performed and patient verbalizes understanding. Chemo precautions in place throughout infusion. Patient expresses no other needs at this time

## 2024-12-24 PROBLEM — G47.00 INSOMNIA: Status: ACTIVE | Noted: 2024-12-24

## 2024-12-24 LAB
ALBUMIN SERPL BCP-MCNC: 3.3 G/DL (ref 3.5–5.2)
ALP SERPL-CCNC: 38 U/L (ref 40–150)
ALT SERPL W/O P-5'-P-CCNC: 42 U/L (ref 10–44)
ANION GAP SERPL CALC-SCNC: 5 MMOL/L (ref 8–16)
AST SERPL-CCNC: 19 U/L (ref 10–40)
BASOPHILS # BLD AUTO: 0.01 K/UL (ref 0–0.2)
BASOPHILS NFR BLD: 0.2 % (ref 0–1.9)
BILIRUB SERPL-MCNC: 0.6 MG/DL (ref 0.1–1)
BUN SERPL-MCNC: 15 MG/DL (ref 6–20)
CALCIUM SERPL-MCNC: 8.5 MG/DL (ref 8.7–10.5)
CHLORIDE SERPL-SCNC: 105 MMOL/L (ref 95–110)
CO2 SERPL-SCNC: 24 MMOL/L (ref 23–29)
CREAT SERPL-MCNC: 0.7 MG/DL (ref 0.5–1.4)
DIFFERENTIAL METHOD BLD: ABNORMAL
EOSINOPHIL # BLD AUTO: 0 K/UL (ref 0–0.5)
EOSINOPHIL NFR BLD: 0.6 % (ref 0–8)
ERYTHROCYTE [DISTWIDTH] IN BLOOD BY AUTOMATED COUNT: 13.5 % (ref 11.5–14.5)
EST. GFR  (NO RACE VARIABLE): >60 ML/MIN/1.73 M^2
GLUCOSE SERPL-MCNC: 101 MG/DL (ref 70–110)
HCT VFR BLD AUTO: 31 % (ref 40–54)
HGB BLD-MCNC: 10.1 G/DL (ref 14–18)
IMM GRANULOCYTES # BLD AUTO: 0.03 K/UL (ref 0–0.04)
IMM GRANULOCYTES NFR BLD AUTO: 0.5 % (ref 0–0.5)
LYMPHOCYTES # BLD AUTO: 0.1 K/UL (ref 1–4.8)
LYMPHOCYTES NFR BLD: 1.1 % (ref 18–48)
MAGNESIUM SERPL-MCNC: 2.1 MG/DL (ref 1.6–2.6)
MCH RBC QN AUTO: 32.3 PG (ref 27–31)
MCHC RBC AUTO-ENTMCNC: 32.6 G/DL (ref 32–36)
MCV RBC AUTO: 99 FL (ref 82–98)
MONOCYTES # BLD AUTO: 0 K/UL (ref 0.3–1)
MONOCYTES NFR BLD: 0.2 % (ref 4–15)
NEUTROPHILS # BLD AUTO: 6.5 K/UL (ref 1.8–7.7)
NEUTROPHILS NFR BLD: 97.4 % (ref 38–73)
NRBC BLD-RTO: 0 /100 WBC
PHOSPHATE SERPL-MCNC: 2.6 MG/DL (ref 2.7–4.5)
PLATELET # BLD AUTO: 314 K/UL (ref 150–450)
PMV BLD AUTO: 10.9 FL (ref 9.2–12.9)
POTASSIUM SERPL-SCNC: 3.8 MMOL/L (ref 3.5–5.1)
PROT SERPL-MCNC: 6 G/DL (ref 6–8.4)
RBC # BLD AUTO: 3.13 M/UL (ref 4.6–6.2)
SODIUM SERPL-SCNC: 134 MMOL/L (ref 136–145)
WBC # BLD AUTO: 6.63 K/UL (ref 3.9–12.7)

## 2024-12-24 PROCEDURE — 99232 SBSQ HOSP IP/OBS MODERATE 35: CPT | Mod: FS,,, | Performed by: INTERNAL MEDICINE

## 2024-12-24 PROCEDURE — 25000003 PHARM REV CODE 250: Performed by: INTERNAL MEDICINE

## 2024-12-24 PROCEDURE — 84100 ASSAY OF PHOSPHORUS: CPT | Performed by: NURSE PRACTITIONER

## 2024-12-24 PROCEDURE — 20600001 HC STEP DOWN PRIVATE ROOM

## 2024-12-24 PROCEDURE — 83735 ASSAY OF MAGNESIUM: CPT | Performed by: NURSE PRACTITIONER

## 2024-12-24 PROCEDURE — 25000003 PHARM REV CODE 250: Performed by: STUDENT IN AN ORGANIZED HEALTH CARE EDUCATION/TRAINING PROGRAM

## 2024-12-24 PROCEDURE — 63600175 PHARM REV CODE 636 W HCPCS: Performed by: NURSE PRACTITIONER

## 2024-12-24 PROCEDURE — 25000003 PHARM REV CODE 250: Performed by: NURSE PRACTITIONER

## 2024-12-24 PROCEDURE — 63600175 PHARM REV CODE 636 W HCPCS: Performed by: INTERNAL MEDICINE

## 2024-12-24 PROCEDURE — 80053 COMPREHEN METABOLIC PANEL: CPT | Performed by: NURSE PRACTITIONER

## 2024-12-24 PROCEDURE — 85025 COMPLETE CBC W/AUTO DIFF WBC: CPT | Performed by: NURSE PRACTITIONER

## 2024-12-24 RX ADMIN — ONDANSETRON 8 MG: 8 TABLET, ORALLY DISINTEGRATING ORAL at 05:12

## 2024-12-24 RX ADMIN — PROCHLORPERAZINE EDISYLATE 5 MG: 5 INJECTION INTRAMUSCULAR; INTRAVENOUS at 05:12

## 2024-12-24 RX ADMIN — PROCHLORPERAZINE EDISYLATE 5 MG: 5 INJECTION INTRAMUSCULAR; INTRAVENOUS at 04:12

## 2024-12-24 RX ADMIN — DEXAMETHASONE 12 MG: 4 TABLET ORAL at 04:12

## 2024-12-24 RX ADMIN — Medication 1 DOSE: at 01:12

## 2024-12-24 RX ADMIN — LEVETIRACETAM 500 MG: 500 TABLET, FILM COATED ORAL at 08:12

## 2024-12-24 RX ADMIN — Medication 1 DOSE: at 05:12

## 2024-12-24 RX ADMIN — PROCHLORPERAZINE EDISYLATE 5 MG: 5 INJECTION INTRAMUSCULAR; INTRAVENOUS at 10:12

## 2024-12-24 RX ADMIN — ONDANSETRON 8 MG: 8 TABLET, ORALLY DISINTEGRATING ORAL at 01:12

## 2024-12-24 RX ADMIN — OLANZAPINE 5 MG: 2.5 TABLET, FILM COATED ORAL at 08:12

## 2024-12-24 RX ADMIN — ACYCLOVIR 800 MG: 200 CAPSULE ORAL at 08:12

## 2024-12-24 RX ADMIN — ONDANSETRON 8 MG: 8 TABLET, ORALLY DISINTEGRATING ORAL at 10:12

## 2024-12-24 RX ADMIN — Medication 1 DOSE: at 08:12

## 2024-12-24 RX ADMIN — Medication 1 TABLET: at 01:12

## 2024-12-24 RX ADMIN — PROCHLORPERAZINE EDISYLATE 5 MG: 5 INJECTION INTRAMUSCULAR; INTRAVENOUS at 09:12

## 2024-12-24 RX ADMIN — Medication 1 TABLET: at 06:12

## 2024-12-24 RX ADMIN — URSODIOL 300 MG: 300 CAPSULE ORAL at 08:12

## 2024-12-24 RX ADMIN — PANTOPRAZOLE SODIUM 40 MG: 40 TABLET, DELAYED RELEASE ORAL at 08:12

## 2024-12-24 RX ADMIN — Medication 1 TABLET: at 09:12

## 2024-12-24 RX ADMIN — POTASSIUM CHLORIDE 20 MEQ: 1500 TABLET, EXTENDED RELEASE ORAL at 06:12

## 2024-12-24 RX ADMIN — Medication 1 TABLET: at 05:12

## 2024-12-24 RX ADMIN — SODIUM CHLORIDE AND POTASSIUM CHLORIDE 75 ML/HR: .9; .15 SOLUTION INTRAVENOUS at 06:12

## 2024-12-24 RX ADMIN — TRAZODONE HYDROCHLORIDE 25 MG: 50 TABLET ORAL at 08:12

## 2024-12-24 RX ADMIN — LORAZEPAM 1 MG: 2 INJECTION INTRAMUSCULAR; INTRAVENOUS at 10:12

## 2024-12-24 RX ADMIN — POLYETHYLENE GLYCOL 3350 17 G: 17 POWDER, FOR SOLUTION ORAL at 08:12

## 2024-12-24 NOTE — PLAN OF CARE
POC reviewed with patient; understanding verbalized. Day -3 Bu/Flu/Thiotepa+PtCy Haplo SCT. Electrolytes replaced. NS w/ KCI @ 75 ml/hr. Pt. with nonskid footwear on, bed in lowest position, and locked with bed rails up x2. Pt. has call light and personal items within reach. Patient ambulates in room independently. Wife at bedside. VSS and afebrile this shift. All questions and concerns addressed at this time.

## 2024-12-24 NOTE — PROGRESS NOTES
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 Mildly hypertensive, remains afebrile. Difficulty sleeping overnight. Today is a rest day. Transplant scheduled for 12/26 @1330. He will receive 3 bags with CD34 dose 6.04 x 10^6/kg.     Objective:     Vital Signs (Most Recent):  Temp: 98 °F (36.7 °C) (12/24/24 1109)  Pulse: 97 (12/24/24 1109)  Resp: 18 (12/24/24 1109)  BP: 134/74 (12/24/24 1109)  SpO2: 95 % (12/24/24 1109) Vital Signs (24h Range):  Temp:  [97.8 °F (36.6 °C)-98.4 °F (36.9 °C)] 98 °F (36.7 °C)  Pulse:  [76-97] 97  Resp:  [17-18] 18  SpO2:  [95 %-99 %] 95 %  BP: (134-151)/(74-86) 134/74     Weight: 91.4 kg (201 lb 9.8 oz)  Body mass index is 28.12 kg/m².  Body surface area is 2.14 meters squared.    ECOG SCORE           [unfilled]    Intake/Output - Last 3 Shifts         12/22 0700  12/23 0659 12/23 0700  12/24 0659 12/24 0700  12/25 0659    P.O. 2370 1450 450    I.V. (mL/kg) 2309.7 (24.8) 537.8 (5.9) 352.6 (3.9)    IV Piggyback 918.3 356.7     Total Intake(mL/kg) 5598 (60.1) 2344.5 (25.7) 802.6 (8.8)    Urine (mL/kg/hr) 2650 (1.2) 1800 (0.8) 350 (0.6)    Stool  0     Total Output 2650 1800 350    Net +2948 +544.5 +452.6           Urine Occurrence 1 x 6 x     Stool Occurrence  2 x              Physical Exam  Vitals reviewed.   Constitutional:       Appearance: Normal appearance.   HENT:      Head: Normocephalic and atraumatic.      Nose: Nose normal.      Mouth/Throat:      Mouth: Mucous membranes are moist.      Pharynx: Oropharynx is clear.   Eyes:      Extraocular Movements: Extraocular movements intact.      Conjunctiva/sclera: Conjunctivae normal.      Pupils: Pupils are equal, round, and reactive to light.   Cardiovascular:      Rate and  Rhythm: Normal rate and regular rhythm.      Pulses: Normal pulses.      Heart sounds: Normal heart sounds.   Pulmonary:      Effort: Pulmonary effort is normal.      Breath sounds: Normal breath sounds.   Abdominal:      General: Abdomen is flat. Bowel sounds are normal.      Palpations: Abdomen is soft.   Musculoskeletal:         General: Normal range of motion.      Cervical back: Normal range of motion.   Skin:     General: Skin is warm and dry.      Capillary Refill: Capillary refill takes less than 2 seconds.      Comments: R muhammad in place, c/d/I. No signs of infection   Neurological:      General: No focal deficit present.      Mental Status: He is alert and oriented to person, place, and time.   Psychiatric:         Mood and Affect: Mood normal.         Behavior: Behavior normal.         Thought Content: Thought content normal.         Judgment: Judgment normal.            Significant Labs:   CBC:   Recent Labs   Lab 12/23/24 0416 12/24/24  0432   WBC 8.70 6.63   HGB 11.3* 10.1*   HCT 33.6* 31.0*    314    and CMP:   Recent Labs   Lab 12/23/24 0416 12/24/24  0432    134*   K 4.0 3.8    105   CO2 25 24   GLU 96 101   BUN 17 15   CREATININE 0.8 0.7   CALCIUM 8.7 8.5*   PROT 6.6 6.0   ALBUMIN 3.5 3.3*   BILITOT 0.5 0.6   ALKPHOS 49 38*   AST 27 19   ALT 57* 42   ANIONGAP 6* 5*       Diagnostic Results:  None  Assessment/Plan:     * Stem cell transplant candidate  - Patient of Dr. 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 (Listed as allergy in Epic. Can be removed as allergy 72 hours following last dose of Busulfan).  - Patient is O+. Donor is O+.  - Patient is CMV non-reactive. Donor is CMV reactive. Given donor CMV status, patient will start (patient-supplied) letermovir on Day +13.  - Day -4 and Day -3 Busulfan dose-adjusted  to 282 mg  - See treatment plan below:    Planned conditioning regimen:  Thiotepa on Day -7  Busulfan on Day -6, -5, -4, and -3  Fludarabine on Day -6, -5, -4, and -3  REST DAYS on Day -2 and -1     Planned  GVHD Prophylaxis:  Cyclophosphamide on Day +3 and +4  Tacrolimus starting on Day +5  Mycophenolate starting on Day +5     Antimicrobial Prophylaxis:  Acyclovir starting on Day -7  Levofloxacin starting on Day -1  Micafungin on Day -1 through Day +4  Letermovir starting on Day +5 (if CMV PCR drawn on day 0 results negative)  Posaconazole starting on Day +5  Atovaquone starting on Day +30     Skin Care with Thiotepa: Day -7 through D-5     Seizure Prophylaxis:  Levetiracetam on Day -7 through Day -2     SOS/VOD Prophylaxis:  Ursodiol on Day -7 through Day +30     Growth Factor Support:  Neupogen starting on Day +5        Caregiver: Carmelita (Spouse)  Post-transplant discharge plans: Home      Primary myelofibrosis  From Dr. James's most recent clinic note:  Primary myelofibrosis              A. 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 Transplant on 12/26/24 at 1330. Receiving 3 bags with CD34 dose of 6.04 x 10^6/kg.     Insomnia  - Difficulty sleeping at night while IP.  - Has PRN ativan and trazodone  - melatonin not effective     Other constipation  - Constipated upon admit and was previously taking miralax and PRN Pericolace. Now with soft bowel movements and miralax and pericolace discontinued 12/24/24.     Thrombocytosis  - 2/2 myelofibrosis  - Was on daily ASA, but stopped taking ~ 2 weeks ago  - Anticipate  thrombocytopenia following chemotherapy  - Daily CBC while IP    Anemia in neoplastic disease  - Anticipate pancytopenia following chemotherapy.  - Transfuse for hgb < 7  - Daily CBC while IP    Adrenal nodule  - Subcentimeter nodular thickening of the adrenal glands first noted on imaging from 1/2024.  - Seen by endocrine prior to transplant admission and Dexamethasone suppression test performed. Patient responded appropriately. No further w/u needed.  - Likely adenomas.    Metabolic dysfunction-associated steatotic liver disease (MASLD)  - Biopsy proven to be steatotic change. Most compatible with MASLD.   - Follow-up with hepatology OP.    Hyperlipidemia  - Holding home atorvastatin while IP to avoid interaction with other medications. Can resume at discharge if LFTs stable.          VTE Risk Mitigation (From admission, onward)           Ordered     heparin, porcine (PF) 100 unit/mL injection flush 300 Units  As needed (PRN)         12/18/24 2025                    Disposition: Remains inpatient    Chu Wolf PA-C  Bone Marrow Transplant  [facility] - Oncology ([facility])

## 2024-12-24 NOTE — ASSESSMENT & PLAN NOTE
- Holding home atorvastatin while IP to avoid interaction with other medications. Can resume at discharge if LFTs stable.     no

## 2024-12-24 NOTE — SUBJECTIVE & OBJECTIVE
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 Mildly hypertensive, remains afebrile. Difficulty sleeping overnight. Today is a rest day. Transplant scheduled for 12/26 @1330. He will receive 3 bags with CD34 dose 6.04 x 10^6/kg.     Objective:     Vital Signs (Most Recent):  Temp: 98 °F (36.7 °C) (12/24/24 1109)  Pulse: 97 (12/24/24 1109)  Resp: 18 (12/24/24 1109)  BP: 134/74 (12/24/24 1109)  SpO2: 95 % (12/24/24 1109) Vital Signs (24h Range):  Temp:  [97.8 °F (36.6 °C)-98.4 °F (36.9 °C)] 98 °F (36.7 °C)  Pulse:  [76-97] 97  Resp:  [17-18] 18  SpO2:  [95 %-99 %] 95 %  BP: (134-151)/(74-86) 134/74     Weight: 91.4 kg (201 lb 9.8 oz)  Body mass index is 28.12 kg/m².  Body surface area is 2.14 meters squared.    ECOG SCORE           [unfilled]    Intake/Output - Last 3 Shifts         12/22 0700  12/23 0659 12/23 0700  12/24 0659 12/24 0700  12/25 0659    P.O. 2370 1450 450    I.V. (mL/kg) 2309.7 (24.8) 537.8 (5.9) 352.6 (3.9)    IV Piggyback 918.3 356.7     Total Intake(mL/kg) 5598 (60.1) 2344.5 (25.7) 802.6 (8.8)    Urine (mL/kg/hr) 2650 (1.2) 1800 (0.8) 350 (0.6)    Stool  0     Total Output 2650 1800 350    Net +2948 +544.5 +452.6           Urine Occurrence 1 x 6 x     Stool Occurrence  2 x              Physical Exam  Vitals reviewed.   Constitutional:       Appearance: Normal appearance.   HENT:      Head: Normocephalic and atraumatic.      Nose: Nose normal.      Mouth/Throat:      Mouth: Mucous membranes are moist.      Pharynx: Oropharynx is clear.   Eyes:      Extraocular Movements: Extraocular movements intact.      Conjunctiva/sclera: Conjunctivae normal.      Pupils: Pupils are equal, round, and reactive to light.   Cardiovascular:      Rate and Rhythm: Normal rate and regular rhythm.      Pulses: Normal pulses.      Heart sounds: Normal heart sounds.   Pulmonary:      Effort: Pulmonary effort is normal.      Breath sounds: Normal breath  sounds.   Abdominal:      General: Abdomen is flat. Bowel sounds are normal.      Palpations: Abdomen is soft.   Musculoskeletal:         General: Normal range of motion.      Cervical back: Normal range of motion.   Skin:     General: Skin is warm and dry.      Capillary Refill: Capillary refill takes less than 2 seconds.      Comments: R muhammad in place, c/d/I. No signs of infection   Neurological:      General: No focal deficit present.      Mental Status: He is alert and oriented to person, place, and time.   Psychiatric:         Mood and Affect: Mood normal.         Behavior: Behavior normal.         Thought Content: Thought content normal.         Judgment: Judgment normal.            Significant Labs:   CBC:   Recent Labs   Lab 12/23/24  0416 12/24/24  0432   WBC 8.70 6.63   HGB 11.3* 10.1*   HCT 33.6* 31.0*    314    and CMP:   Recent Labs   Lab 12/23/24  0416 12/24/24  0432    134*   K 4.0 3.8    105   CO2 25 24   GLU 96 101   BUN 17 15   CREATININE 0.8 0.7   CALCIUM 8.7 8.5*   PROT 6.6 6.0   ALBUMIN 3.5 3.3*   BILITOT 0.5 0.6   ALKPHOS 49 38*   AST 27 19   ALT 57* 42   ANIONGAP 6* 5*       Diagnostic Results:  None

## 2024-12-24 NOTE — ASSESSMENT & PLAN NOTE
- Patient of Dr. 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 (Listed as allergy in Epic. Can be removed as allergy 72 hours following last dose of Busulfan).  - Patient is O+. Donor is O+.  - Patient is CMV non-reactive. Donor is CMV reactive. Given donor CMV status, patient will start (patient-supplied) letermovir on Day +13.  - Day -4 and Day -3 Busulfan dose-adjusted to 282 mg  - See treatment plan below:    Planned conditioning regimen:  Thiotepa on Day -7  Busulfan on Day -6, -5, -4, and -3  Fludarabine on Day -6, -5, -4, and -3  REST DAYS on Day -2 and -1     Planned  GVHD Prophylaxis:  Cyclophosphamide on Day +3 and +4  Tacrolimus starting on Day +5  Mycophenolate starting on Day +5     Antimicrobial Prophylaxis:  Acyclovir starting on Day -7  Levofloxacin starting on Day -1  Micafungin on Day -1 through Day +4  Letermovir starting on Day +5 (if CMV PCR drawn on day 0 results negative)  Posaconazole starting on Day +5  Atovaquone starting on Day +30     Skin Care with Thiotepa: Day -7 through D-5     Seizure Prophylaxis:  Levetiracetam on Day -7 through Day -2     SOS/VOD Prophylaxis:  Ursodiol on Day -7 through Day +30     Growth Factor Support:  Neupogen starting on Day +5        Caregiver: Carmelita (Spouse)  Post-transplant discharge plans: Home

## 2024-12-24 NOTE — ASSESSMENT & PLAN NOTE
- Difficulty sleeping at night while IP.  - Has PRN ativan and trazodone  - melatonin not effective

## 2024-12-24 NOTE — ASSESSMENT & PLAN NOTE
From Dr. James's most recent clinic note:  Primary myelofibrosis              A. 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 Transplant on 12/26/24 at 1330. Receiving 3 bags with CD34 dose of 6.04 x 10^6/kg.

## 2024-12-24 NOTE — ASSESSMENT & PLAN NOTE
- Constipated upon admit and was previously taking miralax and PRN Pericolace. Now with soft bowel movements and miralax and pericolace discontinued 12/24/24.

## 2024-12-24 NOTE — PLAN OF CARE
POC reviewed with patient; understanding verbalized. Haplo SCT Day -2. No c/o of nausea this shift. 1x BM this shift. NS with 20K @ 75 cc/hr. Pt. with nonskid footwear on, bed in lowest position, and locked with bed rails up x2.  Pt. instructed to call prior to getting OOB.  Pt. has call light and personal items within reach. Patient ambulates in room independently. VSS and afebrile this shift. All questions and concerns addressed at this time.

## 2024-12-24 NOTE — PLAN OF CARE
Day -2 Bu/Flu/Thiotepa+PtCy Haplo SCT. Patient AAOx4. Afebrile and VSS on room air. NaCl w/Kt to infuse @ 75 ml/hr. Patient complaining of not being able to sleep overnight. PRN trazodone and ativan was given with minimal relief. Receiving scheduled Zofran and Compazine for nausea. Patient is up independently to the bathroom. Free from falls and injury. Monitoring I&Os, tolerating diet, and CHG wipes given for independent use. Bed is locked and in lowest position, non-skid socks on, and call light within reach. Patient educated on using the call light when needing assistance and verbalizes understanding. Plan of care reviewed and is ongoing. Patient expresses no other needs at this time.

## 2024-12-25 LAB
ABO + RH BLD: NORMAL
ALBUMIN SERPL BCP-MCNC: 3.4 G/DL (ref 3.5–5.2)
ALP SERPL-CCNC: 36 U/L (ref 40–150)
ALT SERPL W/O P-5'-P-CCNC: 38 U/L (ref 10–44)
ANION GAP SERPL CALC-SCNC: 7 MMOL/L (ref 8–16)
AST SERPL-CCNC: 16 U/L (ref 10–40)
BASOPHILS # BLD AUTO: 0.01 K/UL (ref 0–0.2)
BASOPHILS NFR BLD: 0.2 % (ref 0–1.9)
BILIRUB SERPL-MCNC: 0.7 MG/DL (ref 0.1–1)
BLD GP AB SCN CELLS X3 SERPL QL: NORMAL
BUN SERPL-MCNC: 17 MG/DL (ref 6–20)
CALCIUM SERPL-MCNC: 8.9 MG/DL (ref 8.7–10.5)
CHLORIDE SERPL-SCNC: 107 MMOL/L (ref 95–110)
CO2 SERPL-SCNC: 25 MMOL/L (ref 23–29)
CREAT SERPL-MCNC: 0.8 MG/DL (ref 0.5–1.4)
DIFFERENTIAL METHOD BLD: ABNORMAL
EOSINOPHIL # BLD AUTO: 0 K/UL (ref 0–0.5)
EOSINOPHIL NFR BLD: 0.8 % (ref 0–8)
ERYTHROCYTE [DISTWIDTH] IN BLOOD BY AUTOMATED COUNT: 13.2 % (ref 11.5–14.5)
EST. GFR  (NO RACE VARIABLE): >60 ML/MIN/1.73 M^2
GLUCOSE SERPL-MCNC: 91 MG/DL (ref 70–110)
HCT VFR BLD AUTO: 32.1 % (ref 40–54)
HGB BLD-MCNC: 10.6 G/DL (ref 14–18)
IMM GRANULOCYTES # BLD AUTO: 0.08 K/UL (ref 0–0.04)
IMM GRANULOCYTES NFR BLD AUTO: 1.7 % (ref 0–0.5)
LYMPHOCYTES # BLD AUTO: 0.1 K/UL (ref 1–4.8)
LYMPHOCYTES NFR BLD: 1.9 % (ref 18–48)
MAGNESIUM SERPL-MCNC: 2.1 MG/DL (ref 1.6–2.6)
MCH RBC QN AUTO: 32.3 PG (ref 27–31)
MCHC RBC AUTO-ENTMCNC: 33 G/DL (ref 32–36)
MCV RBC AUTO: 98 FL (ref 82–98)
MONOCYTES # BLD AUTO: 0 K/UL (ref 0.3–1)
MONOCYTES NFR BLD: 0.2 % (ref 4–15)
NEUTROPHILS # BLD AUTO: 4.5 K/UL (ref 1.8–7.7)
NEUTROPHILS NFR BLD: 95.2 % (ref 38–73)
NRBC BLD-RTO: 0 /100 WBC
PHOSPHATE SERPL-MCNC: 2.9 MG/DL (ref 2.7–4.5)
PLATELET # BLD AUTO: 294 K/UL (ref 150–450)
PMV BLD AUTO: 10.8 FL (ref 9.2–12.9)
POTASSIUM SERPL-SCNC: 4 MMOL/L (ref 3.5–5.1)
PROT SERPL-MCNC: 6.3 G/DL (ref 6–8.4)
RBC # BLD AUTO: 3.28 M/UL (ref 4.6–6.2)
SODIUM SERPL-SCNC: 139 MMOL/L (ref 136–145)
SPECIMEN OUTDATE: NORMAL
WBC # BLD AUTO: 4.77 K/UL (ref 3.9–12.7)

## 2024-12-25 PROCEDURE — 84100 ASSAY OF PHOSPHORUS: CPT | Performed by: NURSE PRACTITIONER

## 2024-12-25 PROCEDURE — 63600175 PHARM REV CODE 636 W HCPCS: Performed by: INTERNAL MEDICINE

## 2024-12-25 PROCEDURE — 99232 SBSQ HOSP IP/OBS MODERATE 35: CPT | Mod: ,,, | Performed by: INTERNAL MEDICINE

## 2024-12-25 PROCEDURE — 25000003 PHARM REV CODE 250: Performed by: NURSE PRACTITIONER

## 2024-12-25 PROCEDURE — 85025 COMPLETE CBC W/AUTO DIFF WBC: CPT | Performed by: NURSE PRACTITIONER

## 2024-12-25 PROCEDURE — 25000003 PHARM REV CODE 250: Performed by: INTERNAL MEDICINE

## 2024-12-25 PROCEDURE — 83735 ASSAY OF MAGNESIUM: CPT | Performed by: NURSE PRACTITIONER

## 2024-12-25 PROCEDURE — 86901 BLOOD TYPING SEROLOGIC RH(D): CPT | Performed by: INTERNAL MEDICINE

## 2024-12-25 PROCEDURE — 20600001 HC STEP DOWN PRIVATE ROOM

## 2024-12-25 PROCEDURE — 63600175 PHARM REV CODE 636 W HCPCS: Performed by: NURSE PRACTITIONER

## 2024-12-25 PROCEDURE — 80053 COMPREHEN METABOLIC PANEL: CPT | Performed by: NURSE PRACTITIONER

## 2024-12-25 PROCEDURE — 25000003 PHARM REV CODE 250: Performed by: STUDENT IN AN ORGANIZED HEALTH CARE EDUCATION/TRAINING PROGRAM

## 2024-12-25 RX ADMIN — URSODIOL 300 MG: 300 CAPSULE ORAL at 08:12

## 2024-12-25 RX ADMIN — ONDANSETRON 8 MG: 8 TABLET, ORALLY DISINTEGRATING ORAL at 05:12

## 2024-12-25 RX ADMIN — Medication 1 DOSE: at 04:12

## 2024-12-25 RX ADMIN — OLANZAPINE 5 MG: 2.5 TABLET, FILM COATED ORAL at 09:12

## 2024-12-25 RX ADMIN — ACYCLOVIR 800 MG: 200 CAPSULE ORAL at 08:12

## 2024-12-25 RX ADMIN — SODIUM CHLORIDE AND POTASSIUM CHLORIDE 75 ML/HR: .9; .15 SOLUTION INTRAVENOUS at 09:12

## 2024-12-25 RX ADMIN — ONDANSETRON 8 MG: 8 TABLET, ORALLY DISINTEGRATING ORAL at 01:12

## 2024-12-25 RX ADMIN — URSODIOL 300 MG: 300 CAPSULE ORAL at 09:12

## 2024-12-25 RX ADMIN — Medication 1 DOSE: at 08:12

## 2024-12-25 RX ADMIN — PROCHLORPERAZINE EDISYLATE 5 MG: 5 INJECTION INTRAMUSCULAR; INTRAVENOUS at 04:12

## 2024-12-25 RX ADMIN — LEVOFLOXACIN 500 MG: 500 TABLET, FILM COATED ORAL at 08:12

## 2024-12-25 RX ADMIN — LORAZEPAM 1 MG: 2 INJECTION INTRAMUSCULAR; INTRAVENOUS at 10:12

## 2024-12-25 RX ADMIN — Medication 1 DOSE: at 09:12

## 2024-12-25 RX ADMIN — MICAFUNGIN SODIUM 100 MG: 100 INJECTION, POWDER, LYOPHILIZED, FOR SOLUTION INTRAVENOUS at 09:12

## 2024-12-25 RX ADMIN — TRAZODONE HYDROCHLORIDE 25 MG: 50 TABLET ORAL at 09:12

## 2024-12-25 RX ADMIN — PROCHLORPERAZINE EDISYLATE 5 MG: 5 INJECTION INTRAMUSCULAR; INTRAVENOUS at 10:12

## 2024-12-25 RX ADMIN — OLANZAPINE 5 MG: 2.5 TABLET, FILM COATED ORAL at 08:12

## 2024-12-25 RX ADMIN — PANTOPRAZOLE SODIUM 40 MG: 40 TABLET, DELAYED RELEASE ORAL at 08:12

## 2024-12-25 RX ADMIN — Medication 1 DOSE: at 01:12

## 2024-12-25 RX ADMIN — ACYCLOVIR 800 MG: 200 CAPSULE ORAL at 09:12

## 2024-12-25 RX ADMIN — DEXAMETHASONE 12 MG: 4 TABLET ORAL at 04:12

## 2024-12-25 RX ADMIN — SODIUM CHLORIDE AND POTASSIUM CHLORIDE 75 ML/HR: .9; .15 SOLUTION INTRAVENOUS at 07:12

## 2024-12-25 NOTE — ASSESSMENT & PLAN NOTE
- Patient of Dr. 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 (Listed as allergy in Epic. Can be removed as allergy 72 hours following last dose of Busulfan).  - Patient is O+. Donor is O+.  - Patient is CMV non-reactive. Donor is CMV reactive. Given donor CMV status, patient will start (patient-supplied) letermovir on Day +13.  - Day -4 and Day -3 Busulfan dose-adjusted to 282 mg  - See treatment plan below:    Planned conditioning regimen:  Thiotepa on Day -7  Busulfan on Day -6, -5, -4, and -3  Fludarabine on Day -6, -5, -4, and -3  REST DAYS on Day -2 and -1     Planned  GVHD Prophylaxis:  Cyclophosphamide on Day +3 and +4  Tacrolimus starting on Day +5  Mycophenolate starting on Day +5     Antimicrobial Prophylaxis:  Acyclovir starting on Day -7  Levofloxacin starting on Day -1  Micafungin on Day -1 through Day +4  Letermovir starting on Day +5 (if CMV PCR drawn on day 0 results negative)  Posaconazole starting on Day +5  Atovaquone starting on Day +30     Skin Care with Thiotepa: Day -7 through D-5     Seizure Prophylaxis:  Levetiracetam on Day -7 through Day -2     SOS/VOD Prophylaxis:  Ursodiol on Day -7 through Day +30     Growth Factor Support:  Neupogen starting on Day +5        Caregiver: Carmelita (Spouse)  Post-transplant discharge plans: Home

## 2024-12-25 NOTE — PLAN OF CARE
Day -1 Bu/Flu/Thiotepa+PtCy Haplo SCT. Patient AAOx4. Afebrile and VSS on room air. NaCl w/Kt to infuse @ 75 ml/hr. PRN trazodone and ativan was given with minimal relief. Receiving scheduled Zofran and Compazine for nausea. No bowel movements overnight. Patient is up independently to the bathroom. Free from falls and injury. Monitoring I&Os, tolerating diet, and CHG wipes given for independent use. Bed is locked and in lowest position, non-skid socks on, and call light within reach. Patient educated on using the call light when needing assistance and verbalizes understanding. Plan of care reviewed and is ongoing. Patient expresses no other needs at this time.

## 2024-12-25 NOTE — PLAN OF CARE
Day -1 Bu/Flu/Thiotepa Haplo SCT. Pt involved in plan of care and communicating needs throughout shift. PRN antiemetics given for nausea with moderate relief. Up in room and to bathroom independently; no c/o pain or SOB. Tolerating diet, voiding without difficulty. BM this shift. AAOX4. All VSS; no acute events so far this shift. NaCl w/Kt to infuse @ 75 ml/hr. Pt remaining free from falls or injury throughout shift; bed locked and in lowest position; call light within reach. Pt instructed to call for assistance as needed. Q1H rounding done on pt.

## 2024-12-25 NOTE — PROGRESS NOTES
[facility] - Oncology ([facility])  Hematology  Bone Marrow Transplant  Progress Note    Patient Name: Rubén Hu  Admission Date: 12/18/2024  Hospital Length of Stay: 7 days  Code Status: Full Code    Subjective:     Interval History: D-1 Bu/Flu/Thiotepa PTCy haploidentical (brother) AlloSCT for PMF. No symptoms. Doing well. Rest day today.      Objective:     Vital Signs (Most Recent):  Temp: 97.8 °F (36.6 °C) (12/25/24 0742)  Pulse: 76 (12/25/24 0742)  Resp: 15 (12/25/24 0742)  BP: 122/77 (12/25/24 0742)  SpO2: 95 % (12/25/24 0742) Vital Signs (24h Range):  Temp:  [97.3 °F (36.3 °C)-98.1 °F (36.7 °C)] 97.8 °F (36.6 °C)  Pulse:  [76-97] 76  Resp:  [15-18] 15  SpO2:  [95 %-97 %] 95 %  BP: (117-148)/(74-79) 122/77     Weight: 90.5 kg (199 lb 6.5 oz)  Body mass index is 27.81 kg/m².  Body surface area is 2.13 meters squared.    ECOG SCORE           [unfilled]    Intake/Output - Last 3 Shifts         12/23 0700  12/24 0659 12/24 0700 12/25 0659 12/25 0700  12/26 0659    P.O. 1450 1200     I.V. (mL/kg) 537.8 (5.9) 783.8 (8.7)     IV Piggyback 356.7      Total Intake(mL/kg) 2344.5 (25.7) 1983.8 (21.9)     Urine (mL/kg/hr) 1800 (0.8) 1300 (0.6)     Stool 0 0     Total Output 1800 1300     Net +544.5 +683.8            Urine Occurrence 6 x 2 x     Stool Occurrence 2 x 1 x              Physical Exam  Vitals reviewed.   Constitutional:       Appearance: Normal appearance.   HENT:      Head: Normocephalic and atraumatic.      Nose: Nose normal.      Mouth/Throat:      Mouth: Mucous membranes are moist.      Pharynx: Oropharynx is clear.   Eyes:      Extraocular Movements: Extraocular movements intact.      Conjunctiva/sclera: Conjunctivae normal.      Pupils: Pupils are equal, round, and reactive to light.   Cardiovascular:      Rate and Rhythm: Normal rate and regular rhythm.      Pulses: Normal pulses.      Heart sounds: Normal heart sounds.   Pulmonary:      Effort: Pulmonary effort is normal.      Breath sounds: Normal  breath sounds.   Abdominal:      General: Abdomen is flat. Bowel sounds are normal.      Palpations: Abdomen is soft.   Musculoskeletal:         General: Normal range of motion.      Cervical back: Normal range of motion.   Skin:     General: Skin is warm and dry.      Capillary Refill: Capillary refill takes less than 2 seconds.      Comments: R muhammad in place, c/d/I. No signs of infection   Neurological:      General: No focal deficit present.      Mental Status: He is alert and oriented to person, place, and time.   Psychiatric:         Mood and Affect: Mood normal.         Behavior: Behavior normal.         Thought Content: Thought content normal.         Judgment: Judgment normal.            Significant Labs:   CBC:   Recent Labs   Lab 12/24/24  0432 12/25/24  0536   WBC 6.63 4.77   HGB 10.1* 10.6*   HCT 31.0* 32.1*    294    and CMP:   Recent Labs   Lab 12/24/24  0432 12/25/24  0536   * 139   K 3.8 4.0    107   CO2 24 25    91   BUN 15 17   CREATININE 0.7 0.8   CALCIUM 8.5* 8.9   PROT 6.0 6.3   ALBUMIN 3.3* 3.4*   BILITOT 0.6 0.7   ALKPHOS 38* 36*   AST 19 16   ALT 42 38   ANIONGAP 5* 7*       Diagnostic Results:  I have reviewed all pertinent imaging results/findings within the past 24 hours.  Assessment/Plan:     * Stem cell transplant candidate  - Patient of Dr. 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 (Listed as allergy in Epic. Can be removed as allergy 72 hours following last dose of Busulfan).  - Patient is O+. Donor is O+.  - Patient is CMV non-reactive. Donor is CMV reactive. Given donor CMV status, patient will start (patient-supplied) letermovir on Day +13.  - Day -4 and Day -3 Busulfan dose-adjusted to 282 mg  - See treatment plan below:    Planned conditioning regimen:  Thiotepa on Day -7  Busulfan on Day -6,  -5, -4, and -3  Fludarabine on Day -6, -5, -4, and -3  REST DAYS on Day -2 and -1     Planned  GVHD Prophylaxis:  Cyclophosphamide on Day +3 and +4  Tacrolimus starting on Day +5  Mycophenolate starting on Day +5     Antimicrobial Prophylaxis:  Acyclovir starting on Day -7  Levofloxacin starting on Day -1  Micafungin on Day -1 through Day +4  Letermovir starting on Day +5 (if CMV PCR drawn on day 0 results negative)  Posaconazole starting on Day +5  Atovaquone starting on Day +30     Skin Care with Thiotepa: Day -7 through D-5     Seizure Prophylaxis:  Levetiracetam on Day -7 through Day -2     SOS/VOD Prophylaxis:  Ursodiol on Day -7 through Day +30     Growth Factor Support:  Neupogen starting on Day +5        Caregiver: Carmelita (Spouse)  Post-transplant discharge plans: Home      Insomnia  - Difficulty sleeping at night while IP.  - Has PRN ativan and trazodone  - melatonin not effective     Other constipation  - Constipated upon admit and was previously taking miralax and PRN Pericolace. Now with soft bowel movements and miralax and pericolace discontinued 12/24/24.     Thrombocytosis  - 2/2 myelofibrosis  - Was on daily ASA, but stopped taking ~ 2 weeks ago  - Anticipate thrombocytopenia following chemotherapy  - Daily CBC while IP    Anemia in neoplastic disease  - Anticipate pancytopenia following chemotherapy.  - Transfuse for hgb < 7  - Daily CBC while IP    Adrenal nodule  - Subcentimeter nodular thickening of the adrenal glands first noted on imaging from 1/2024.  - Seen by endocrine prior to transplant admission and Dexamethasone suppression test performed. Patient responded appropriately. No further w/u needed.  - Likely adenomas.    Metabolic dysfunction-associated steatotic liver disease (MASLD)  - Biopsy proven to be steatotic change. Most compatible with MASLD.   - Follow-up with hepatology OP.    Primary myelofibrosis  From Dr. James's most recent clinic note:  Primary myelofibrosis               A. 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 Transplant on 12/26/24 at 1330. Receiving 3 bags with CD34 dose of 6.04 x 10^6/kg.     Hyperlipidemia  - Holding home atorvastatin while IP to avoid interaction with other medications. Can resume at discharge if LFTs stable.          VTE Risk Mitigation (From admission, onward)           Ordered     heparin, porcine (PF) 100 unit/mL injection flush 300 Units  As needed (PRN)         12/18/24 2025                        Arthur Samano MD  Bone Marrow Transplant  [facility] - Oncology ([facility])

## 2024-12-25 NOTE — SUBJECTIVE & OBJECTIVE
Subjective:     Interval History: D-1 Bu/Flu/Thiotepa PTCy haploidentical (brother) AlloSCT for PMF. No symptoms. Doing well. Rest day today.      Objective:     Vital Signs (Most Recent):  Temp: 97.8 °F (36.6 °C) (12/25/24 0742)  Pulse: 76 (12/25/24 0742)  Resp: 15 (12/25/24 0742)  BP: 122/77 (12/25/24 0742)  SpO2: 95 % (12/25/24 0742) Vital Signs (24h Range):  Temp:  [97.3 °F (36.3 °C)-98.1 °F (36.7 °C)] 97.8 °F (36.6 °C)  Pulse:  [76-97] 76  Resp:  [15-18] 15  SpO2:  [95 %-97 %] 95 %  BP: (117-148)/(74-79) 122/77     Weight: 90.5 kg (199 lb 6.5 oz)  Body mass index is 27.81 kg/m².  Body surface area is 2.13 meters squared.    ECOG SCORE           [unfilled]    Intake/Output - Last 3 Shifts         12/23 0700  12/24 0659 12/24 0700 12/25 0659 12/25 0700 12/26 0659    P.O. 1450 1200     I.V. (mL/kg) 537.8 (5.9) 783.8 (8.7)     IV Piggyback 356.7      Total Intake(mL/kg) 2344.5 (25.7) 1983.8 (21.9)     Urine (mL/kg/hr) 1800 (0.8) 1300 (0.6)     Stool 0 0     Total Output 1800 1300     Net +544.5 +683.8            Urine Occurrence 6 x 2 x     Stool Occurrence 2 x 1 x              Physical Exam  Vitals reviewed.   Constitutional:       Appearance: Normal appearance.   HENT:      Head: Normocephalic and atraumatic.      Nose: Nose normal.      Mouth/Throat:      Mouth: Mucous membranes are moist.      Pharynx: Oropharynx is clear.   Eyes:      Extraocular Movements: Extraocular movements intact.      Conjunctiva/sclera: Conjunctivae normal.      Pupils: Pupils are equal, round, and reactive to light.   Cardiovascular:      Rate and Rhythm: Normal rate and regular rhythm.      Pulses: Normal pulses.      Heart sounds: Normal heart sounds.   Pulmonary:      Effort: Pulmonary effort is normal.      Breath sounds: Normal breath sounds.   Abdominal:      General: Abdomen is flat. Bowel sounds are normal.      Palpations: Abdomen is soft.   Musculoskeletal:         General: Normal range of motion.      Cervical back:  Normal range of motion.   Skin:     General: Skin is warm and dry.      Capillary Refill: Capillary refill takes less than 2 seconds.      Comments: R muhammad in place, c/d/I. No signs of infection   Neurological:      General: No focal deficit present.      Mental Status: He is alert and oriented to person, place, and time.   Psychiatric:         Mood and Affect: Mood normal.         Behavior: Behavior normal.         Thought Content: Thought content normal.         Judgment: Judgment normal.            Significant Labs:   CBC:   Recent Labs   Lab 12/24/24  0432 12/25/24  0536   WBC 6.63 4.77   HGB 10.1* 10.6*   HCT 31.0* 32.1*    294    and CMP:   Recent Labs   Lab 12/24/24  0432 12/25/24  0536   * 139   K 3.8 4.0    107   CO2 24 25    91   BUN 15 17   CREATININE 0.7 0.8   CALCIUM 8.5* 8.9   PROT 6.0 6.3   ALBUMIN 3.3* 3.4*   BILITOT 0.6 0.7   ALKPHOS 38* 36*   AST 19 16   ALT 42 38   ANIONGAP 5* 7*       Diagnostic Results:  I have reviewed all pertinent imaging results/findings within the past 24 hours.

## 2024-12-26 PROBLEM — Z94.84 HISTORY OF ALLOGENEIC STEM CELL TRANSPLANT: Status: ACTIVE | Noted: 2024-09-03

## 2024-12-26 LAB
ALBUMIN SERPL BCP-MCNC: 3.2 G/DL (ref 3.5–5.2)
ALP SERPL-CCNC: 34 U/L (ref 40–150)
ALT SERPL W/O P-5'-P-CCNC: 34 U/L (ref 10–44)
ANION GAP SERPL CALC-SCNC: 7 MMOL/L (ref 8–16)
ANISOCYTOSIS BLD QL SMEAR: SLIGHT
AST SERPL-CCNC: 15 U/L (ref 10–40)
BASOPHILS # BLD AUTO: 0.02 K/UL (ref 0–0.2)
BASOPHILS NFR BLD: 0.7 % (ref 0–1.9)
BILIRUB SERPL-MCNC: 0.5 MG/DL (ref 0.1–1)
BUN SERPL-MCNC: 16 MG/DL (ref 6–20)
CALCIUM SERPL-MCNC: 8.7 MG/DL (ref 8.7–10.5)
CHLORIDE SERPL-SCNC: 107 MMOL/L (ref 95–110)
CMV DNA SPEC QL NAA+PROBE: NORMAL
CO2 SERPL-SCNC: 24 MMOL/L (ref 23–29)
CREAT SERPL-MCNC: 0.7 MG/DL (ref 0.5–1.4)
CYTOMEGALOVIRUS PCR, QUANT: NOT DETECTED IU/ML
DIFFERENTIAL METHOD BLD: ABNORMAL
EOSINOPHIL # BLD AUTO: 0 K/UL (ref 0–0.5)
EOSINOPHIL NFR BLD: 0.3 % (ref 0–8)
ERYTHROCYTE [DISTWIDTH] IN BLOOD BY AUTOMATED COUNT: 13.3 % (ref 11.5–14.5)
EST. GFR  (NO RACE VARIABLE): >60 ML/MIN/1.73 M^2
GLUCOSE SERPL-MCNC: 90 MG/DL (ref 70–110)
HCT VFR BLD AUTO: 30.2 % (ref 40–54)
HGB BLD-MCNC: 9.9 G/DL (ref 14–18)
IMM GRANULOCYTES # BLD AUTO: 0.07 K/UL (ref 0–0.04)
IMM GRANULOCYTES NFR BLD AUTO: 2.4 % (ref 0–0.5)
LYMPHOCYTES # BLD AUTO: 0.1 K/UL (ref 1–4.8)
LYMPHOCYTES NFR BLD: 4.1 % (ref 18–48)
MAGNESIUM SERPL-MCNC: 2.1 MG/DL (ref 1.6–2.6)
MCH RBC QN AUTO: 32.9 PG (ref 27–31)
MCHC RBC AUTO-ENTMCNC: 32.8 G/DL (ref 32–36)
MCV RBC AUTO: 100 FL (ref 82–98)
MONOCYTES # BLD AUTO: 0 K/UL (ref 0.3–1)
MONOCYTES NFR BLD: 0.3 % (ref 4–15)
NEUTROPHILS # BLD AUTO: 2.7 K/UL (ref 1.8–7.7)
NEUTROPHILS NFR BLD: 92.2 % (ref 38–73)
NRBC BLD-RTO: 0 /100 WBC
PHOSPHATE SERPL-MCNC: 2.8 MG/DL (ref 2.7–4.5)
PLATELET # BLD AUTO: 239 K/UL (ref 150–450)
PLATELET BLD QL SMEAR: ABNORMAL
PMV BLD AUTO: 10.7 FL (ref 9.2–12.9)
POTASSIUM SERPL-SCNC: 4.1 MMOL/L (ref 3.5–5.1)
PROT SERPL-MCNC: 6.1 G/DL (ref 6–8.4)
RBC # BLD AUTO: 3.01 M/UL (ref 4.6–6.2)
SODIUM SERPL-SCNC: 138 MMOL/L (ref 136–145)
TOXIC GRANULES BLD QL SMEAR: PRESENT
WBC # BLD AUTO: 2.93 K/UL (ref 3.9–12.7)

## 2024-12-26 PROCEDURE — 85025 COMPLETE CBC W/AUTO DIFF WBC: CPT | Performed by: NURSE PRACTITIONER

## 2024-12-26 PROCEDURE — 84100 ASSAY OF PHOSPHORUS: CPT | Performed by: NURSE PRACTITIONER

## 2024-12-26 PROCEDURE — 25000003 PHARM REV CODE 250: Performed by: INTERNAL MEDICINE

## 2024-12-26 PROCEDURE — 63600175 PHARM REV CODE 636 W HCPCS: Performed by: NURSE PRACTITIONER

## 2024-12-26 PROCEDURE — 20600001 HC STEP DOWN PRIVATE ROOM

## 2024-12-26 PROCEDURE — 38208 THAW PRESERVED STEM CELLS: CPT

## 2024-12-26 PROCEDURE — 80053 COMPREHEN METABOLIC PANEL: CPT | Performed by: NURSE PRACTITIONER

## 2024-12-26 PROCEDURE — 38241 TRANSPLT AUTOL HCT/DONOR: CPT

## 2024-12-26 PROCEDURE — 30243Y2 TRANSFUSION OF ALLOGENEIC RELATED HEMATOPOIETIC STEM CELLS INTO CENTRAL VEIN, PERCUTANEOUS APPROACH: ICD-10-PCS | Performed by: INTERNAL MEDICINE

## 2024-12-26 PROCEDURE — 83735 ASSAY OF MAGNESIUM: CPT | Performed by: NURSE PRACTITIONER

## 2024-12-26 PROCEDURE — 63600175 PHARM REV CODE 636 W HCPCS: Performed by: INTERNAL MEDICINE

## 2024-12-26 PROCEDURE — 25000003 PHARM REV CODE 250: Performed by: NURSE PRACTITIONER

## 2024-12-26 PROCEDURE — 99232 SBSQ HOSP IP/OBS MODERATE 35: CPT | Mod: FS,,, | Performed by: INTERNAL MEDICINE

## 2024-12-26 RX ADMIN — URSODIOL 300 MG: 300 CAPSULE ORAL at 08:12

## 2024-12-26 RX ADMIN — LEVOFLOXACIN 500 MG: 500 TABLET, FILM COATED ORAL at 09:12

## 2024-12-26 RX ADMIN — Medication 1 DOSE: at 05:12

## 2024-12-26 RX ADMIN — PANTOPRAZOLE SODIUM 40 MG: 40 TABLET, DELAYED RELEASE ORAL at 09:12

## 2024-12-26 RX ADMIN — ACYCLOVIR 800 MG: 200 CAPSULE ORAL at 08:12

## 2024-12-26 RX ADMIN — PROCHLORPERAZINE EDISYLATE 5 MG: 5 INJECTION INTRAMUSCULAR; INTRAVENOUS at 03:12

## 2024-12-26 RX ADMIN — ACYCLOVIR 800 MG: 200 CAPSULE ORAL at 09:12

## 2024-12-26 RX ADMIN — URSODIOL 300 MG: 300 CAPSULE ORAL at 09:12

## 2024-12-26 RX ADMIN — Medication 1 DOSE: at 12:12

## 2024-12-26 RX ADMIN — Medication 1 DOSE: at 09:12

## 2024-12-26 RX ADMIN — SODIUM CHLORIDE AND POTASSIUM CHLORIDE: .9; .15 SOLUTION INTRAVENOUS at 11:12

## 2024-12-26 RX ADMIN — DIPHENHYDRAMINE HYDROCHLORIDE 50 MG: 50 INJECTION, SOLUTION INTRAMUSCULAR; INTRAVENOUS at 12:12

## 2024-12-26 RX ADMIN — HYDROCORTISONE SODIUM SUCCINATE 250 MG: 250 INJECTION, POWDER, FOR SOLUTION INTRAMUSCULAR; INTRAVENOUS at 12:12

## 2024-12-26 RX ADMIN — PROCHLORPERAZINE EDISYLATE 5 MG: 5 INJECTION INTRAMUSCULAR; INTRAVENOUS at 08:12

## 2024-12-26 RX ADMIN — LORAZEPAM 1 MG: 2 INJECTION INTRAMUSCULAR; INTRAVENOUS at 09:12

## 2024-12-26 RX ADMIN — PROCHLORPERAZINE EDISYLATE 5 MG: 5 INJECTION INTRAMUSCULAR; INTRAVENOUS at 04:12

## 2024-12-26 RX ADMIN — LORAZEPAM 1 MG: 2 INJECTION INTRAMUSCULAR; INTRAVENOUS at 12:12

## 2024-12-26 RX ADMIN — MICAFUNGIN SODIUM 100 MG: 100 INJECTION, POWDER, LYOPHILIZED, FOR SOLUTION INTRAVENOUS at 09:12

## 2024-12-26 RX ADMIN — PROCHLORPERAZINE EDISYLATE 5 MG: 5 INJECTION INTRAMUSCULAR; INTRAVENOUS at 10:12

## 2024-12-26 RX ADMIN — Medication 1 DOSE: at 08:12

## 2024-12-26 NOTE — PROGRESS NOTES
[facility] - Oncology ([facility])  Hematology  Bone Marrow Transplant  Progress Note    Patient Name: Rubén Hu  Admission Date: 12/18/2024  Hospital Length of Stay: 8 days  Code Status: Full Code    Subjective:     Interval History: Day 0 Bu/Flu/Thiotepa PTCy haploidentical (brother) SCT for PMF. VSS, remains afebrile. No active complaints today, doing well. Transplant planned for 1330 and pt will receive 3 bags with CD34 dose of 6.04x10^6/kg.     Objective:     Vital Signs (Most Recent):  Temp: 97.4 °F (36.3 °C) (12/26/24 0727)  Pulse: 76 (12/26/24 0727)  Resp: 16 (12/26/24 0727)  BP: 135/83 (12/26/24 0727)  SpO2: 97 % (12/26/24 0727) Vital Signs (24h Range):  Temp:  [97.4 °F (36.3 °C)-98.2 °F (36.8 °C)] 97.4 °F (36.3 °C)  Pulse:  [74-99] 76  Resp:  [16-20] 16  SpO2:  [94 %-97 %] 97 %  BP: (117-138)/(72-88) 135/83     Weight: 90.4 kg (199 lb 4.7 oz)  Body mass index is 27.8 kg/m².  Body surface area is 2.13 meters squared.    ECOG SCORE           [unfilled]    Intake/Output - Last 3 Shifts         12/24 0700 12/25 0659 12/25 0700 12/26 0659 12/26 0700 12/27 0659    P.O. 1200 1030     I.V. (mL/kg) 783.8 (8.7) 1772.4 (19.6)     IV Piggyback  99.3     Total Intake(mL/kg) 1983.8 (21.9) 2901.8 (32.1)     Urine (mL/kg/hr) 1300 (0.6) 2050 (0.9) 350 (2)    Stool 0 0     Total Output 1300 2050 350    Net +683.8 +851.8 -350           Urine Occurrence 2 x 2 x     Stool Occurrence 1 x 1 x              Physical Exam  Vitals reviewed.   Constitutional:       Appearance: Normal appearance.   HENT:      Head: Normocephalic and atraumatic.      Nose: Nose normal.      Mouth/Throat:      Mouth: Mucous membranes are moist.      Pharynx: Oropharynx is clear.   Eyes:      Extraocular Movements: Extraocular movements intact.      Conjunctiva/sclera: Conjunctivae normal.      Pupils: Pupils are equal, round, and reactive to light.   Cardiovascular:      Rate and Rhythm: Normal rate and regular rhythm.      Pulses: Normal pulses.       Heart sounds: Normal heart sounds.   Pulmonary:      Effort: Pulmonary effort is normal.      Breath sounds: Normal breath sounds.   Abdominal:      General: Abdomen is flat. Bowel sounds are normal.      Palpations: Abdomen is soft.   Musculoskeletal:         General: Normal range of motion.      Cervical back: Normal range of motion.   Skin:     General: Skin is warm and dry.      Capillary Refill: Capillary refill takes less than 2 seconds.      Comments: R muhammad in place, c/d/I. No signs of infection    Neurological:      General: No focal deficit present.      Mental Status: He is alert and oriented to person, place, and time.   Psychiatric:         Mood and Affect: Mood normal.         Behavior: Behavior normal.         Thought Content: Thought content normal.         Judgment: Judgment normal.            Significant Labs:   CBC:   Recent Labs   Lab 12/25/24  0536 12/26/24  0512   WBC 4.77 2.93*   HGB 10.6* 9.9*   HCT 32.1* 30.2*    239    and CMP:   Recent Labs   Lab 12/25/24  0536 12/26/24  0512    138   K 4.0 4.1    107   CO2 25 24   GLU 91 90   BUN 17 16   CREATININE 0.8 0.7   CALCIUM 8.9 8.7   PROT 6.3 6.1   ALBUMIN 3.4* 3.2*   BILITOT 0.7 0.5   ALKPHOS 36* 34*   AST 16 15   ALT 38 34   ANIONGAP 7* 7*       Diagnostic Results:  None  Assessment/Plan:     * History of allogeneic stem cell transplant  - Patient of Dr. 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 Received 3 bags with CD34 dose of 6.04x10^6/kg.   - Today is Day 0  - Cannot have Tylenol 72 hours prior to until 72 hours following Busulfan administration. (Listed as allergy in Epic. Can be removed as allergy 72 hours following last dose of Busulfan).  - Patient is O+. Donor is O+.  - Patient is CMV non-reactive. Donor is CMV reactive. Given donor CMV status, patient will start (patient-supplied) letermovir on Day +13.  - Day -4 and Day -3  Busulfan dose-adjusted to 282 mg  - See treatment plan below:    Planned conditioning regimen:  Thiotepa on Day -7  Busulfan on Day -6, -5, -4, and -3  Fludarabine on Day -6, -5, -4, and -3  REST DAYS on Day -2 and -1     Planned  GVHD Prophylaxis:  Cyclophosphamide on Day +3 and +4  Tacrolimus starting on Day +5  Mycophenolate starting on Day +5     Antimicrobial Prophylaxis:  Acyclovir starting on Day -7  Levofloxacin starting on Day -1  Micafungin on Day -1 through Day +4  Letermovir starting on Day +5 (if CMV PCR drawn on day 0 results negative)  Posaconazole starting on Day +5  Atovaquone starting on Day +30     Skin Care with Thiotepa: Day -7 through D-5     Seizure Prophylaxis:  Levetiracetam on Day -7 through Day -2     SOS/VOD Prophylaxis:  Ursodiol on Day -7 through Day +30     Growth Factor Support:  Neupogen starting on Day +5        Caregiver: Carmelita (Spouse)  Post-transplant discharge plans: Home      Primary myelofibrosis  From Dr. James's most recent clinic note:  Primary myelofibrosis              A. 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 Transplant on 12/26/24 at 1330. Received 3 bags with CD34 dose of 6.04 x 10^6/kg.     Insomnia  - Difficulty sleeping at night while IP.  - Has PRN ativan and trazodone  - melatonin not effective     Other constipation  - Constipated upon admit and was previously taking miralax and PRN Pericolace. Now with soft bowel movements and miralax and pericolace discontinued 12/24/24.     Thrombocytosis  - 2/2 myelofibrosis  - Was on daily ASA, but stopped taking ~ 3 weeks  ago  - Anticipate thrombocytopenia following chemotherapy  - Daily CBC while IP    Anemia in neoplastic disease  - Anticipate pancytopenia following chemotherapy.  - Transfuse for hgb < 7  - Daily CBC while IP    Adrenal nodule  - Subcentimeter nodular thickening of the adrenal glands first noted on imaging from 1/2024.  - Seen by endocrine prior to transplant admission and Dexamethasone suppression test performed. Patient responded appropriately. No further w/u needed.  - Likely adenomas.    Metabolic dysfunction-associated steatotic liver disease (MASLD)  - Biopsy proven to be steatotic change. Most compatible with MASLD.   - Follow-up with hepatology OP.    Hyperlipidemia  - Holding home atorvastatin while IP to avoid interaction with other medications. Can resume at discharge if LFTs stable.          VTE Risk Mitigation (From admission, onward)           Ordered     heparin, porcine (PF) 100 unit/mL injection flush 300 Units  As needed (PRN)         12/18/24 2025                    Disposition: Remains inpatient     Chu Wolf PA-C  Bone Marrow Transplant  [facility] - Oncology ([facility])

## 2024-12-26 NOTE — PLAN OF CARE
Day 0 of Bu/Flu/Thiotepa PTCy haploidentical SCT. Pt involved in plan of care and communicating needs throughout shift. Up in room and to bathroom independently; voiding without difficulty. Tolerating diet. No c/o pain. Pt received 3 bags of stem cells today; no complications noted during transfusion. Pre and post hydration infused as ordered. IVF currently @ 75 cc/hr. One soft BM today. All VSS. Pt remaining free from falls or injury throughout shift. Bed locked and in lowest position, personal belongings and call light within reach, non skid socks on when OOB. Pt instructed to call for assistance as needed. Hourly rounding done on pt.

## 2024-12-26 NOTE — SUBJECTIVE & OBJECTIVE
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 VSS, remains afebrile. No active complaints today, doing well. Transplant planned for 1330 and pt will receive 3 bags with CD34 dose of 6.04x10^6/kg.     Objective:     Vital Signs (Most Recent):  Temp: 97.4 °F (36.3 °C) (12/26/24 0727)  Pulse: 76 (12/26/24 0727)  Resp: 16 (12/26/24 0727)  BP: 135/83 (12/26/24 0727)  SpO2: 97 % (12/26/24 0727) Vital Signs (24h Range):  Temp:  [97.4 °F (36.3 °C)-98.2 °F (36.8 °C)] 97.4 °F (36.3 °C)  Pulse:  [74-99] 76  Resp:  [16-20] 16  SpO2:  [94 %-97 %] 97 %  BP: (117-138)/(72-88) 135/83     Weight: 90.4 kg (199 lb 4.7 oz)  Body mass index is 27.8 kg/m².  Body surface area is 2.13 meters squared.    ECOG SCORE           [unfilled]    Intake/Output - Last 3 Shifts         12/24 0700 12/25 0659 12/25 0700 12/26 0659 12/26 0700 12/27 0659    P.O. 1200 1030     I.V. (mL/kg) 783.8 (8.7) 1772.4 (19.6)     IV Piggyback  99.3     Total Intake(mL/kg) 1983.8 (21.9) 2901.8 (32.1)     Urine (mL/kg/hr) 1300 (0.6) 2050 (0.9) 350 (2)    Stool 0 0     Total Output 1300 2050 350    Net +683.8 +851.8 -350           Urine Occurrence 2 x 2 x     Stool Occurrence 1 x 1 x              Physical Exam  Vitals reviewed.   Constitutional:       Appearance: Normal appearance.   HENT:      Head: Normocephalic and atraumatic.      Nose: Nose normal.      Mouth/Throat:      Mouth: Mucous membranes are moist.      Pharynx: Oropharynx is clear.   Eyes:      Extraocular Movements: Extraocular movements intact.      Conjunctiva/sclera: Conjunctivae normal.      Pupils: Pupils are equal, round, and reactive to light.   Cardiovascular:      Rate and Rhythm: Normal rate and regular rhythm.      Pulses: Normal pulses.      Heart sounds: Normal heart sounds.   Pulmonary:      Effort: Pulmonary effort is normal.      Breath sounds: Normal breath sounds.   Abdominal:      General: Abdomen is flat. Bowel sounds are normal.       Palpations: Abdomen is soft.   Musculoskeletal:         General: Normal range of motion.      Cervical back: Normal range of motion.   Skin:     General: Skin is warm and dry.      Capillary Refill: Capillary refill takes less than 2 seconds.      Comments: R muhammad in place, c/d/I. No signs of infection    Neurological:      General: No focal deficit present.      Mental Status: He is alert and oriented to person, place, and time.   Psychiatric:         Mood and Affect: Mood normal.         Behavior: Behavior normal.         Thought Content: Thought content normal.         Judgment: Judgment normal.            Significant Labs:   CBC:   Recent Labs   Lab 12/25/24  0536 12/26/24  0512   WBC 4.77 2.93*   HGB 10.6* 9.9*   HCT 32.1* 30.2*    239    and CMP:   Recent Labs   Lab 12/25/24  0536 12/26/24  0512    138   K 4.0 4.1    107   CO2 25 24   GLU 91 90   BUN 17 16   CREATININE 0.8 0.7   CALCIUM 8.9 8.7   PROT 6.3 6.1   ALBUMIN 3.4* 3.2*   BILITOT 0.7 0.5   ALKPHOS 36* 34*   AST 16 15   ALT 38 34   ANIONGAP 7* 7*       Diagnostic Results:  None

## 2024-12-26 NOTE — PLAN OF CARE
Day 0 Bu/Flu/Thiotepa+PtCy Haplo SCT. Patient AAOx4. Afebrile and VSS on room air. NaCl w/Kt to infuse @ 75 ml/hr. PRN trazodone and ativan was given with moderate relief. Receiving scheduled Zofran and Compazine for nausea. No bowel movements overnight. Patient is up independently to the bathroom. Free from falls and injury. Monitoring I&Os, tolerating diet, and CHG wipes given for independent use. Bed is locked and in lowest position, non-skid socks on, and call light within reach. Patient educated on using the call light when needing assistance and verbalizes understanding. Plan of care reviewed and is ongoing. Patient expresses no other needs at this time.

## 2024-12-26 NOTE — PROGRESS NOTES
[facility] - Oncology ([facility])  Adult Nutrition  Progress Note    SUMMARY       Recommendations  --Continue Regular diet as tolerated and clinically indicated   --Encourage good intakes   --Nursing: please continue to document % meal eaten on flowsheet   --RD to monitor weight, PO intake     Goals: Meet % EEN/EPN  Nutrition Goal Status: goal met  Communication of RD Recs:  (POC)    Assessment and Plan    Nutrition Problem  Increased nutrient needs (calories, protein)      Related to (etiology):   Metabolic demand of disease and treatment     Signs and Symptoms (as evidenced by):   Myelofibrosis      Interventions/Recommendations (treatment strategy):  --Continue Regular diet as tolerated and clinically indicated   --Encourage good intakes   --Nursing: please continue to document % meal eaten on flowsheet   --RD to monitor weight, PO intake      Nutrition Diagnosis Status:   Continues     Reason for Assessment    Reason For Assessment: RD follow-up   Diagnosis: cancer diagnosis/related complications (Stem cell transplant candidate)  General Information Comments:   General Information Comments: 54-y-o patient of Dr. Clyde James with primary myelofibrosis diagnosed 4/2024. Other medical history includes HLD, previous tobacco abuse, and adrenal nodule noted on imaging from 7/2024. 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 RD follow-up. Attempted to speak to pt - providers in room at time of visit. Noted 75% PO intake per chart. Weight trending up - no concerns for malnutrition at this time.     Nutrition Discharge Planning: Provided pt with high calorie, high protein diet education and food safety education for bone marrow transplant. Appropriate education material with RD contact information provided. No other needs identified.    Nutrition Risk Screen    Nutrition Risk Screen: no indicators present    Nutrition/Diet History    Spiritual, Cultural Beliefs, Jehovah's witness  "Practices, Values that Affect Care: no  Food Allergies: NKFA    Anthropometrics    Temp: 97.4 °F (36.3 °C)  Height Method: Stated  Height: 5' 11" (180.3 cm)  Height (inches): 71 in  Weight Method: Standard Scale  Weight: 90.4 kg (199 lb 4.7 oz)  Weight (lb): 199.3 lb  Ideal Body Weight (IBW), Male: 172 lb  % Ideal Body Weight, Male (lb): 117.15 %  BMI (Calculated): 27.8  BMI Grade: 25 - 29.9 - overweight       Lab/Procedures/Meds    Pertinent Labs Reviewed: reviewed - hemoglobin 9.9, hematocrit 30.2, , MCH 32.9    Pertinent Medications Reviewed: reviewed - ondansetron, pantoprazole, sodium bicarb-sodium chloride, tacrolimus     Estimated/Assessed Needs    Weight Used For Calorie Calculations: 91.4 kg (201 lb 8 oz)  Energy Calorie Requirements (kcal): 4710-5956 kcal/day (25-30 kcal/day)  Energy Need Method: Kcal/kg  Protein Requirements:  g/day (1.0-1.2 g/kg)  Weight Used For Protein Calculations: 91.4 kg (201 lb 8 oz)     Estimated Fluid Requirement Method: RDA Method  RDA Method (mL): 2285  CHO Requirement: 286-343 g/day      Nutrition Prescription Ordered    Current Diet Order: Regular    Evaluation of Received Nutrient/Fluid Intake    Comments: LBM 12/25   % Intake of Estimated Energy Needs: 75 - 100 %  % Meal Intake: 75 - 100 %    Nutrition Risk    Level of Risk/Frequency of Follow-up: high     Monitor and Evaluation    Food and Nutrient Intake: energy intake, food and beverage intake  Food and Nutrient Adminstration: diet order  Knowledge/Beliefs/Attitudes: food and nutrition knowledge/skill  Physical Activity and Function: nutrition-related ADLs and IADLs  Anthropometric Measurements: weight, weight change, body mass index  Biochemical Data, Medical Tests and Procedures: electrolyte and renal panel, gastrointestinal profile, glucose/endocrine profile, inflammatory profile, lipid profile  Nutrition-Focused Physical Findings: overall appearance     Nutrition Follow-Up    RD Follow-up?: Yes    Tari " Rocael, Registration Eligible, Provisional LDN

## 2024-12-26 NOTE — PLAN OF CARE
Recommendations  --Continue Regular diet as tolerated and clinically indicated   --Encourage good intakes   --Nursing: please continue to document % meal eaten on flowsheet   --RD to monitor weight, PO intake     Goals: Meet % EEN/EPN  Nutrition Goal Status: goal met  Communication of RD Recs:  (POC)

## 2024-12-26 NOTE — PROGRESS NOTES
SW met with patient and spouse at bedside. Patient feeling well, he is ready to receive SCT.  Patient spouse did not have any concerns at this time either.     Patient reports missing work and missing his dog. SW encouraged patient in that today is a big step in returning home.    No other needs or concerns at this time. SW remains available.

## 2024-12-26 NOTE — ASSESSMENT & PLAN NOTE
From Dr. James's most recent clinic note:  Primary myelofibrosis              A. 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 Transplant on 12/26/24 at 1330. Received 3 bags with CD34 dose of 6.04 x 10^6/kg.

## 2024-12-26 NOTE — ASSESSMENT & PLAN NOTE
- Patient of Dr. 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 Received 3 bags with CD34 dose of 6.04x10^6/kg.   - Today is Day 0  - Cannot have Tylenol 72 hours prior to until 72 hours following Busulfan administration. (Listed as allergy in Epic. Can be removed as allergy 72 hours following last dose of Busulfan).  - Patient is O+. Donor is O+.  - Patient is CMV non-reactive. Donor is CMV reactive. Given donor CMV status, patient will start (patient-supplied) letermovir on Day +13.  - Day -4 and Day -3 Busulfan dose-adjusted to 282 mg  - See treatment plan below:    Planned conditioning regimen:  Thiotepa on Day -7  Busulfan on Day -6, -5, -4, and -3  Fludarabine on Day -6, -5, -4, and -3  REST DAYS on Day -2 and -1     Planned  GVHD Prophylaxis:  Cyclophosphamide on Day +3 and +4  Tacrolimus starting on Day +5  Mycophenolate starting on Day +5     Antimicrobial Prophylaxis:  Acyclovir starting on Day -7  Levofloxacin starting on Day -1  Micafungin on Day -1 through Day +4  Letermovir starting on Day +5 (if CMV PCR drawn on day 0 results negative)  Posaconazole starting on Day +5  Atovaquone starting on Day +30     Skin Care with Thiotepa: Day -7 through D-5     Seizure Prophylaxis:  Levetiracetam on Day -7 through Day -2     SOS/VOD Prophylaxis:  Ursodiol on Day -7 through Day +30     Growth Factor Support:  Neupogen starting on Day +5        Caregiver: Carmelita (Spouse)  Post-transplant discharge plans: Home

## 2024-12-26 NOTE — ASSESSMENT & PLAN NOTE
- 2/2 myelofibrosis  - Was on daily ASA, but stopped taking ~ 3 weeks ago  - Anticipate thrombocytopenia following chemotherapy  - Daily CBC while IP

## 2024-12-27 PROBLEM — T45.1X5A CHEMOTHERAPY-INDUCED NAUSEA: Status: ACTIVE | Noted: 2024-12-27

## 2024-12-27 PROBLEM — R11.0 CHEMOTHERAPY-INDUCED NAUSEA: Status: ACTIVE | Noted: 2024-12-27

## 2024-12-27 PROBLEM — R51.9 HEADACHE: Status: ACTIVE | Noted: 2024-12-27

## 2024-12-27 PROBLEM — R74.01 TRANSAMINITIS: Status: ACTIVE | Noted: 2024-12-27

## 2024-12-27 PROBLEM — I10 HYPERTENSION: Status: ACTIVE | Noted: 2024-12-27

## 2024-12-27 LAB
ALBUMIN SERPL BCP-MCNC: 3.2 G/DL (ref 3.5–5.2)
ALP SERPL-CCNC: 38 U/L (ref 40–150)
ALT SERPL W/O P-5'-P-CCNC: 74 U/L (ref 10–44)
ANION GAP SERPL CALC-SCNC: 8 MMOL/L (ref 8–16)
ANISOCYTOSIS BLD QL SMEAR: SLIGHT
AST SERPL-CCNC: 51 U/L (ref 10–40)
BASOPHILS # BLD AUTO: ABNORMAL K/UL (ref 0–0.2)
BASOPHILS NFR BLD: 0 % (ref 0–1.9)
BILIRUB SERPL-MCNC: 0.4 MG/DL (ref 0.1–1)
BILIRUB UR QL STRIP: NEGATIVE
BUN SERPL-MCNC: 14 MG/DL (ref 6–20)
CALCIUM SERPL-MCNC: 8.5 MG/DL (ref 8.7–10.5)
CHLORIDE SERPL-SCNC: 108 MMOL/L (ref 95–110)
CLARITY UR REFRACT.AUTO: CLEAR
CO2 SERPL-SCNC: 23 MMOL/L (ref 23–29)
COLOR UR AUTO: COLORLESS
CREAT SERPL-MCNC: 0.7 MG/DL (ref 0.5–1.4)
DIFFERENTIAL METHOD BLD: ABNORMAL
EOSINOPHIL # BLD AUTO: ABNORMAL K/UL (ref 0–0.5)
EOSINOPHIL NFR BLD: 0 % (ref 0–8)
ERYTHROCYTE [DISTWIDTH] IN BLOOD BY AUTOMATED COUNT: 13.3 % (ref 11.5–14.5)
EST. GFR  (NO RACE VARIABLE): >60 ML/MIN/1.73 M^2
GLUCOSE SERPL-MCNC: 83 MG/DL (ref 70–110)
GLUCOSE UR QL STRIP: NEGATIVE
HCT VFR BLD AUTO: 29.4 % (ref 40–54)
HGB BLD-MCNC: 9.8 G/DL (ref 14–18)
HGB UR QL STRIP: NEGATIVE
IMM GRANULOCYTES # BLD AUTO: ABNORMAL K/UL (ref 0–0.04)
IMM GRANULOCYTES NFR BLD AUTO: ABNORMAL % (ref 0–0.5)
KETONES UR QL STRIP: NEGATIVE
LEUKOCYTE ESTERASE UR QL STRIP: NEGATIVE
LYMPHOCYTES # BLD AUTO: ABNORMAL K/UL (ref 1–4.8)
LYMPHOCYTES NFR BLD: 1 % (ref 18–48)
MAGNESIUM SERPL-MCNC: 1.9 MG/DL (ref 1.6–2.6)
MCH RBC QN AUTO: 31.7 PG (ref 27–31)
MCHC RBC AUTO-ENTMCNC: 33.3 G/DL (ref 32–36)
MCV RBC AUTO: 95 FL (ref 82–98)
MONOCYTES # BLD AUTO: ABNORMAL K/UL (ref 0.3–1)
MONOCYTES NFR BLD: 0 % (ref 4–15)
NEUTROPHILS NFR BLD: 99 % (ref 38–73)
NITRITE UR QL STRIP: NEGATIVE
NRBC BLD-RTO: 0 /100 WBC
OHS QRS DURATION: 74 MS
OHS QTC CALCULATION: 434 MS
PH UR STRIP: 7 [PH] (ref 5–8)
PHOSPHATE SERPL-MCNC: 2.8 MG/DL (ref 2.7–4.5)
PLATELET # BLD AUTO: 166 K/UL (ref 150–450)
PMV BLD AUTO: 10.1 FL (ref 9.2–12.9)
POTASSIUM SERPL-SCNC: 3.7 MMOL/L (ref 3.5–5.1)
PROT SERPL-MCNC: 6 G/DL (ref 6–8.4)
PROT UR QL STRIP: NEGATIVE
RBC # BLD AUTO: 3.09 M/UL (ref 4.6–6.2)
SODIUM SERPL-SCNC: 139 MMOL/L (ref 136–145)
SP GR UR STRIP: 1 (ref 1–1.03)
URN SPEC COLLECT METH UR: ABNORMAL
WBC # BLD AUTO: 1.34 K/UL (ref 3.9–12.7)

## 2024-12-27 PROCEDURE — 80053 COMPREHEN METABOLIC PANEL: CPT | Performed by: NURSE PRACTITIONER

## 2024-12-27 PROCEDURE — 63600175 PHARM REV CODE 636 W HCPCS: Performed by: INTERNAL MEDICINE

## 2024-12-27 PROCEDURE — 25000003 PHARM REV CODE 250: Performed by: INTERNAL MEDICINE

## 2024-12-27 PROCEDURE — 36415 COLL VENOUS BLD VENIPUNCTURE: CPT

## 2024-12-27 PROCEDURE — 63600175 PHARM REV CODE 636 W HCPCS

## 2024-12-27 PROCEDURE — 85007 BL SMEAR W/DIFF WBC COUNT: CPT | Performed by: NURSE PRACTITIONER

## 2024-12-27 PROCEDURE — 99233 SBSQ HOSP IP/OBS HIGH 50: CPT | Mod: FS,,, | Performed by: INTERNAL MEDICINE

## 2024-12-27 PROCEDURE — 87086 URINE CULTURE/COLONY COUNT: CPT

## 2024-12-27 PROCEDURE — 85027 COMPLETE CBC AUTOMATED: CPT | Performed by: NURSE PRACTITIONER

## 2024-12-27 PROCEDURE — 81003 URINALYSIS AUTO W/O SCOPE: CPT

## 2024-12-27 PROCEDURE — 84100 ASSAY OF PHOSPHORUS: CPT | Performed by: NURSE PRACTITIONER

## 2024-12-27 PROCEDURE — 25000003 PHARM REV CODE 250: Performed by: NURSE PRACTITIONER

## 2024-12-27 PROCEDURE — 63600175 PHARM REV CODE 636 W HCPCS: Performed by: NURSE PRACTITIONER

## 2024-12-27 PROCEDURE — 93010 ELECTROCARDIOGRAM REPORT: CPT | Mod: ,,, | Performed by: INTERNAL MEDICINE

## 2024-12-27 PROCEDURE — 20600001 HC STEP DOWN PRIVATE ROOM

## 2024-12-27 PROCEDURE — 25000003 PHARM REV CODE 250

## 2024-12-27 PROCEDURE — 83735 ASSAY OF MAGNESIUM: CPT | Performed by: NURSE PRACTITIONER

## 2024-12-27 PROCEDURE — 93005 ELECTROCARDIOGRAM TRACING: CPT

## 2024-12-27 PROCEDURE — 87040 BLOOD CULTURE FOR BACTERIA: CPT

## 2024-12-27 RX ORDER — AMLODIPINE BESYLATE 5 MG/1
5 TABLET ORAL DAILY
Status: DISCONTINUED | OUTPATIENT
Start: 2024-12-27 | End: 2025-01-05

## 2024-12-27 RX ORDER — CEFEPIME HYDROCHLORIDE 2 G/1
2 INJECTION, POWDER, FOR SOLUTION INTRAVENOUS
Status: DISCONTINUED | OUTPATIENT
Start: 2024-12-27 | End: 2024-12-27

## 2024-12-27 RX ORDER — CEFEPIME HYDROCHLORIDE 2 G/1
2 INJECTION, POWDER, FOR SOLUTION INTRAVENOUS
Status: DISCONTINUED | OUTPATIENT
Start: 2024-12-27 | End: 2024-12-31

## 2024-12-27 RX ORDER — MORPHINE SULFATE 2 MG/ML
1 INJECTION, SOLUTION INTRAMUSCULAR; INTRAVENOUS EVERY 4 HOURS PRN
Status: DISCONTINUED | OUTPATIENT
Start: 2024-12-27 | End: 2025-01-04

## 2024-12-27 RX ORDER — ACETAMINOPHEN 325 MG/1
650 TABLET ORAL ONCE
Status: COMPLETED | OUTPATIENT
Start: 2024-12-27 | End: 2024-12-27

## 2024-12-27 RX ORDER — OXYCODONE HYDROCHLORIDE 10 MG/1
10 TABLET ORAL EVERY 6 HOURS PRN
Status: DISCONTINUED | OUTPATIENT
Start: 2024-12-27 | End: 2024-12-27

## 2024-12-27 RX ORDER — OLANZAPINE 2.5 MG/1
10 TABLET ORAL NIGHTLY
Status: DISCONTINUED | OUTPATIENT
Start: 2024-12-27 | End: 2025-01-13

## 2024-12-27 RX ORDER — MORPHINE SULFATE 2 MG/ML
2 INJECTION, SOLUTION INTRAMUSCULAR; INTRAVENOUS EVERY 4 HOURS PRN
Status: DISCONTINUED | OUTPATIENT
Start: 2024-12-27 | End: 2024-12-27

## 2024-12-27 RX ORDER — OXYCODONE HYDROCHLORIDE 5 MG/1
5 TABLET ORAL EVERY 6 HOURS PRN
Status: DISCONTINUED | OUTPATIENT
Start: 2024-12-27 | End: 2024-12-27

## 2024-12-27 RX ORDER — SUMATRIPTAN SUCCINATE 50 MG/1
50 TABLET ORAL
Status: DISCONTINUED | OUTPATIENT
Start: 2024-12-27 | End: 2025-01-25 | Stop reason: HOSPADM

## 2024-12-27 RX ADMIN — Medication 1 DOSE: at 12:12

## 2024-12-27 RX ADMIN — MORPHINE SULFATE 2 MG: 2 INJECTION, SOLUTION INTRAMUSCULAR; INTRAVENOUS at 12:12

## 2024-12-27 RX ADMIN — OLANZAPINE 10 MG: 2.5 TABLET, FILM COATED ORAL at 09:12

## 2024-12-27 RX ADMIN — POTASSIUM CHLORIDE 20 MEQ: 1500 TABLET, EXTENDED RELEASE ORAL at 08:12

## 2024-12-27 RX ADMIN — CEFEPIME 2 G: 2 INJECTION, POWDER, FOR SOLUTION INTRAVENOUS at 05:12

## 2024-12-27 RX ADMIN — Medication 400 MG: at 12:12

## 2024-12-27 RX ADMIN — PROCHLORPERAZINE EDISYLATE 5 MG: 5 INJECTION INTRAMUSCULAR; INTRAVENOUS at 09:12

## 2024-12-27 RX ADMIN — AMLODIPINE BESYLATE 5 MG: 5 TABLET ORAL at 08:12

## 2024-12-27 RX ADMIN — PROCHLORPERAZINE EDISYLATE 5 MG: 5 INJECTION INTRAMUSCULAR; INTRAVENOUS at 04:12

## 2024-12-27 RX ADMIN — MORPHINE SULFATE 1 MG: 2 INJECTION, SOLUTION INTRAMUSCULAR; INTRAVENOUS at 04:12

## 2024-12-27 RX ADMIN — PROCHLORPERAZINE EDISYLATE 5 MG: 5 INJECTION INTRAMUSCULAR; INTRAVENOUS at 10:12

## 2024-12-27 RX ADMIN — LORAZEPAM 1 MG: 2 INJECTION INTRAMUSCULAR; INTRAVENOUS at 09:12

## 2024-12-27 RX ADMIN — URSODIOL 300 MG: 300 CAPSULE ORAL at 08:12

## 2024-12-27 RX ADMIN — Medication 1 DOSE: at 09:12

## 2024-12-27 RX ADMIN — ACYCLOVIR 800 MG: 200 CAPSULE ORAL at 08:12

## 2024-12-27 RX ADMIN — URSODIOL 300 MG: 300 CAPSULE ORAL at 09:12

## 2024-12-27 RX ADMIN — Medication 400 MG: at 08:12

## 2024-12-27 RX ADMIN — ACETAMINOPHEN 650 MG: 325 TABLET ORAL at 04:12

## 2024-12-27 RX ADMIN — Medication 1 DOSE: at 04:12

## 2024-12-27 RX ADMIN — SODIUM CHLORIDE AND POTASSIUM CHLORIDE 75 ML/HR: .9; .15 SOLUTION INTRAVENOUS at 12:12

## 2024-12-27 RX ADMIN — Medication 1 DOSE: at 08:12

## 2024-12-27 RX ADMIN — ACYCLOVIR 800 MG: 200 CAPSULE ORAL at 09:12

## 2024-12-27 RX ADMIN — LEVOFLOXACIN 500 MG: 500 TABLET, FILM COATED ORAL at 08:12

## 2024-12-27 RX ADMIN — PANTOPRAZOLE SODIUM 40 MG: 40 TABLET, DELAYED RELEASE ORAL at 08:12

## 2024-12-27 RX ADMIN — MICAFUNGIN SODIUM 100 MG: 100 INJECTION, POWDER, LYOPHILIZED, FOR SOLUTION INTRAVENOUS at 09:12

## 2024-12-27 RX ADMIN — SUMATRIPTAN SUCCINATE 50 MG: 50 TABLET ORAL at 05:12

## 2024-12-27 RX ADMIN — OXYCODONE 5 MG: 5 TABLET ORAL at 08:12

## 2024-12-27 NOTE — ASSESSMENT & PLAN NOTE
- Patient of Dr. 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 Received 3 bags with CD34 dose of 6.04x10^6/kg.   - Today is Day +1  - Cannot have Tylenol 72 hours prior to until 72 hours following Busulfan administration. (Listed as allergy in Epic. Can be removed as allergy 72 hours following last dose of Busulfan).  - Patient is O+. Donor is O+.  - Patient is CMV non-reactive. Donor is CMV reactive. Given donor CMV status, patient will start (patient-supplied) letermovir on Day +13.  - Day -4 and Day -3 Busulfan dose-adjusted to 282 mg  - See treatment plan below:    Planned conditioning regimen:  Thiotepa on Day -7  Busulfan on Day -6, -5, -4, and -3  Fludarabine on Day -6, -5, -4, and -3  REST DAYS on Day -2 and -1     Planned  GVHD Prophylaxis:  Cyclophosphamide on Day +3 and +4  Tacrolimus starting on Day +5  Mycophenolate starting on Day +5     Antimicrobial Prophylaxis:  Acyclovir starting on Day -7  Levofloxacin starting on Day -1  Micafungin on Day -1 through Day +4  Letermovir starting on Day +5 (if CMV PCR drawn on day 0 results negative)  Posaconazole starting on Day +5  Atovaquone starting on Day +30     Skin Care with Thiotepa: Day -7 through D-5     Seizure Prophylaxis:  Levetiracetam on Day -7 through Day -2     SOS/VOD Prophylaxis:  Ursodiol on Day -7 through Day +30     Growth Factor Support:  Neupogen starting on Day +5        Caregiver: Carmelita (Spouse)  Post-transplant discharge plans: Home

## 2024-12-27 NOTE — ASSESSMENT & PLAN NOTE
- Transaminases first elevated 12/27. AST 51 and ALT 74. Suspect 2/2 chemotherapy prior to transplant.   - Will continue to monitor with daily CMP

## 2024-12-27 NOTE — PT/OT/SLP PROGRESS
Occupational Therapy      Patient Name:  Rubén Hu   MRN:  [id number]    Patient not seen today secondary to pt. Reporting feeling really bad on this date and HR noted to be elevated.     12/27/2024

## 2024-12-27 NOTE — PT/OT/SLP PROGRESS
"Physical Therapy      Patient Name:  Rubén Hu   MRN:  [id number]    10:50 am  Patient not seen today secondary to Other (Comment) ("Pt's nurse reports "He is trying to sleep.  He's drained from yesterday's transplant"). Will follow-up on next scheduled visit.    "

## 2024-12-27 NOTE — PROGRESS NOTES
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 Hypertensive, now on amlodipine. Other VSS, remains afebrile. Complaints of a headache this morning. Given oxycodone for pain relief. Additionally notes continued fatigue since chemotherapy. Tolerated SCT well yesterday. Mild rise in transaminases and will continue to monitor. Fluids discontinued today after transplant.     Objective:     Vital Signs (Most Recent):  Temp: 100.1 °F (37.8 °C) (12/27/24 0741)  Pulse: (!) 115 (12/27/24 0741)  Resp: 18 (12/27/24 0845)  BP: (!) 164/80 (12/27/24 0741)  SpO2: 97 % (12/27/24 0741) Vital Signs (24h Range):  Temp:  [97.5 °F (36.4 °C)-100.1 °F (37.8 °C)] 100.1 °F (37.8 °C)  Pulse:  [] 115  Resp:  [15-22] 18  SpO2:  [94 %-99 %] 97 %  BP: (125-167)/(72-89) 164/80     Weight: 91.7 kg (202 lb 0.8 oz)  Body mass index is 28.18 kg/m².  Body surface area is 2.14 meters squared.    ECOG SCORE           [unfilled]    Intake/Output - Last 3 Shifts         12/25 0700 12/26 0659 12/26 0700 12/27 0659 12/27 0700 12/28 0659    P.O. 1030 1340 200    I.V. (mL/kg) 1772.4 (19.6) 2807.8 (30.7)     Blood  180     IV Piggyback 99.3 98.6     Total Intake(mL/kg) 2901.8 (32.1) 4426.4 (48.3) 200 (2.2)    Urine (mL/kg/hr) 2050 (0.9) 2375 (1.1) 725 (2.9)    Stool 0 0     Total Output 2050 2375 725    Net +851.8 +2051.4 -525           Urine Occurrence 2 x 2 x     Stool Occurrence 1 x 1 x              Physical Exam  Vitals reviewed.   Constitutional:       Appearance: Normal appearance.   HENT:      Head: Normocephalic and atraumatic.      Nose: Nose normal.      Mouth/Throat:      Mouth: Mucous membranes are moist.      Pharynx: Oropharynx is clear.   Eyes:      Extraocular Movements: Extraocular  movements intact.      Conjunctiva/sclera: Conjunctivae normal.      Pupils: Pupils are equal, round, and reactive to light.   Cardiovascular:      Rate and Rhythm: Normal rate and regular rhythm.      Pulses: Normal pulses.      Heart sounds: Normal heart sounds.   Pulmonary:      Effort: Pulmonary effort is normal.      Breath sounds: Normal breath sounds.   Abdominal:      General: Abdomen is flat. Bowel sounds are normal.      Palpations: Abdomen is soft.   Musculoskeletal:         General: Normal range of motion.      Cervical back: Normal range of motion.      Right lower leg: No edema.      Left lower leg: No edema.   Skin:     General: Skin is warm and dry.      Capillary Refill: Capillary refill takes less than 2 seconds.      Comments: R chest wall muhammad in place, c/d/I. No signs of infection    Neurological:      General: No focal deficit present.      Mental Status: He is alert and oriented to person, place, and time.   Psychiatric:         Mood and Affect: Mood normal.         Behavior: Behavior normal.         Thought Content: Thought content normal.         Judgment: Judgment normal.            Significant Labs:   CBC:   Recent Labs   Lab 12/26/24  0512 12/27/24  0435   WBC 2.93* 1.34*   HGB 9.9* 9.8*   HCT 30.2* 29.4*    166    and CMP:   Recent Labs   Lab 12/26/24  0512 12/27/24  0435    139   K 4.1 3.7    108   CO2 24 23   GLU 90 83   BUN 16 14   CREATININE 0.7 0.7   CALCIUM 8.7 8.5*   PROT 6.1 6.0   ALBUMIN 3.2* 3.2*   BILITOT 0.5 0.4   ALKPHOS 34* 38*   AST 15 51*   ALT 34 74*   ANIONGAP 7* 8       Diagnostic Results:  None  Assessment/Plan:     * History of allogeneic stem cell transplant  - Patient of Dr. 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 Received 3 bags with CD34 dose of 6.04x10^6/kg.   - Today is Day +1  - Cannot have Tylenol 72 hours prior to until 72 hours following Busulfan  administration. (Listed as allergy in Epic. Can be removed as allergy 72 hours following last dose of Busulfan).  - Patient is O+. Donor is O+.  - Patient is CMV non-reactive. Donor is CMV reactive. Given donor CMV status, patient will start (patient-supplied) letermovir on Day +13.  - Day -4 and Day -3 Busulfan dose-adjusted to 282 mg  - See treatment plan below:    Planned conditioning regimen:  Thiotepa on Day -7  Busulfan on Day -6, -5, -4, and -3  Fludarabine on Day -6, -5, -4, and -3  REST DAYS on Day -2 and -1     Planned  GVHD Prophylaxis:  Cyclophosphamide on Day +3 and +4  Tacrolimus starting on Day +5  Mycophenolate starting on Day +5     Antimicrobial Prophylaxis:  Acyclovir starting on Day -7  Levofloxacin starting on Day -1  Micafungin on Day -1 through Day +4  Letermovir starting on Day +5 (if CMV PCR drawn on day 0 results negative)  Posaconazole starting on Day +5  Atovaquone starting on Day +30     Skin Care with Thiotepa: Day -7 through D-5     Seizure Prophylaxis:  Levetiracetam on Day -7 through Day -2     SOS/VOD Prophylaxis:  Ursodiol on Day -7 through Day +30     Growth Factor Support:  Neupogen starting on Day +5        Caregiver: Carmelita (Spouse)  Post-transplant discharge plans: Home      Primary myelofibrosis  From Dr. James's most recent clinic note:  Primary myelofibrosis              A. 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 Transplant on 12/26/24 at 1330. Received 3 bags with CD34 dose of 6.04 x 10^6/kg.     Hypertension  - Became hypertensive following SCT on 12/26. No signs of volume  overload contributing to HTN  - 12/27 started amlodipine     Headache  - 12/27 diffuse headache began.   - Oxy 5 mg as needed for HA  - Avoiding tylenol with recent allo SCT/neutropenia and do not want to mask a fever.     Transaminitis  - Transaminases first elevated 12/27. AST 51 and ALT 74. Suspect 2/2 chemotherapy prior to transplant.   - Will continue to monitor with daily CMP    Insomnia  - Difficulty sleeping at night while IP.  - Has PRN ativan and trazodone  - melatonin not effective     Other constipation  - Constipated upon admit and was previously taking miralax and PRN Pericolace. Now with soft bowel movements and miralax and pericolace discontinued 12/24/24.     Thrombocytosis  - 2/2 myelofibrosis  - Was on daily ASA, but stopped taking ~ 3 weeks ago  - Anticipate thrombocytopenia following chemotherapy  - Daily CBC while IP    Anemia in neoplastic disease  - Anticipate pancytopenia following chemotherapy.  - Transfuse for hgb < 7  - Daily CBC while IP    Adrenal nodule  - Subcentimeter nodular thickening of the adrenal glands first noted on imaging from 1/2024.  - Seen by endocrine prior to transplant admission and Dexamethasone suppression test performed. Patient responded appropriately. No further w/u needed.  - Likely adenomas.    Metabolic dysfunction-associated steatotic liver disease (MASLD)  - Biopsy proven to be steatotic change. Most compatible with MASLD.   - Follow-up with hepatology OP.    Hyperlipidemia  - Holding home atorvastatin while IP to avoid interaction with other medications. Can resume at discharge if LFTs stable.          VTE Risk Mitigation (From admission, onward)           Ordered     heparin, porcine (PF) 100 unit/mL injection flush 300 Units  As needed (PRN)         12/18/24 2025                    Disposition: Remains inpatient while awaiting engraftment     Chu Wolf PA-C  Bone Marrow Transplant  [facility] - Oncology ([facility])

## 2024-12-27 NOTE — PLAN OF CARE
Day +1 of Bu/Flu/Thiotepa PTCy haploidentical Allo SCT. No acute events this shift. Patient AAOx4. T-max of 99.9. Temp reassessed at 99.8. All other VSS. Patient complaint of anxiety. PRN Ativan administered x1. Moderate relief obtained. IV fluids continued as ordered. Bed in lowest position and locked. Side rails up x2. All possessions and call light within reach. Non-skid socks worn. Instructed to call for assistance and voiced understanding. All needs of patient currently met. Will continue to monitor with frequent rounding.

## 2024-12-27 NOTE — ASSESSMENT & PLAN NOTE
- Biopsy proven to be steatotic change. Most compatible with MASLD.   - Follow-up with hepatology OP.   I will SWITCH the dose or number of times a day I take the medications listed below when I get home from the hospital:  None

## 2024-12-27 NOTE — SUBJECTIVE & OBJECTIVE
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 Hypertensive, now on amlodipine. Other VSS, remains afebrile. Complaints of a headache this morning. Given oxycodone for pain relief. Additional notes continued fatigue since chemotherapy. Tolerated SCT well yesterday. Mild rise in transaminases and will continue to monitor. Fluids discontinued today after transplant.     Objective:     Vital Signs (Most Recent):  Temp: 100.1 °F (37.8 °C) (12/27/24 0741)  Pulse: (!) 115 (12/27/24 0741)  Resp: 18 (12/27/24 0845)  BP: (!) 164/80 (12/27/24 0741)  SpO2: 97 % (12/27/24 0741) Vital Signs (24h Range):  Temp:  [97.5 °F (36.4 °C)-100.1 °F (37.8 °C)] 100.1 °F (37.8 °C)  Pulse:  [] 115  Resp:  [15-22] 18  SpO2:  [94 %-99 %] 97 %  BP: (125-167)/(72-89) 164/80     Weight: 91.7 kg (202 lb 0.8 oz)  Body mass index is 28.18 kg/m².  Body surface area is 2.14 meters squared.    ECOG SCORE           [unfilled]    Intake/Output - Last 3 Shifts         12/25 0700 12/26 0659 12/26 0700 12/27 0659 12/27 0700 12/28 0659    P.O. 1030 1340 200    I.V. (mL/kg) 1772.4 (19.6) 2807.8 (30.7)     Blood  180     IV Piggyback 99.3 98.6     Total Intake(mL/kg) 2901.8 (32.1) 4426.4 (48.3) 200 (2.2)    Urine (mL/kg/hr) 2050 (0.9) 2375 (1.1) 725 (2.9)    Stool 0 0     Total Output 2050 2375 725    Net +851.8 +2051.4 -525           Urine Occurrence 2 x 2 x     Stool Occurrence 1 x 1 x              Physical Exam  Vitals reviewed.   Constitutional:       Appearance: Normal appearance.   HENT:      Head: Normocephalic and atraumatic.      Nose: Nose normal.      Mouth/Throat:      Mouth: Mucous membranes are moist.      Pharynx: Oropharynx is clear.   Eyes:      Extraocular Movements: Extraocular movements intact.      Conjunctiva/sclera: Conjunctivae normal.      Pupils: Pupils are equal, round, and reactive to light.   Cardiovascular:      Rate and Rhythm: Normal rate and regular rhythm.       Pulses: Normal pulses.      Heart sounds: Normal heart sounds.   Pulmonary:      Effort: Pulmonary effort is normal.      Breath sounds: Normal breath sounds.   Abdominal:      General: Abdomen is flat. Bowel sounds are normal.      Palpations: Abdomen is soft.   Musculoskeletal:         General: Normal range of motion.      Cervical back: Normal range of motion.      Right lower leg: No edema.      Left lower leg: No edema.   Skin:     General: Skin is warm and dry.      Capillary Refill: Capillary refill takes less than 2 seconds.      Comments: R chest wall muhammad in place, c/d/I. No signs of infection    Neurological:      General: No focal deficit present.      Mental Status: He is alert and oriented to person, place, and time.   Psychiatric:         Mood and Affect: Mood normal.         Behavior: Behavior normal.         Thought Content: Thought content normal.         Judgment: Judgment normal.            Significant Labs:   CBC:   Recent Labs   Lab 12/26/24  0512 12/27/24  0435   WBC 2.93* 1.34*   HGB 9.9* 9.8*   HCT 30.2* 29.4*    166    and CMP:   Recent Labs   Lab 12/26/24  0512 12/27/24  0435    139   K 4.1 3.7    108   CO2 24 23   GLU 90 83   BUN 16 14   CREATININE 0.7 0.7   CALCIUM 8.7 8.5*   PROT 6.1 6.0   ALBUMIN 3.2* 3.2*   BILITOT 0.5 0.4   ALKPHOS 34* 38*   AST 15 51*   ALT 34 74*   ANIONGAP 7* 8       Diagnostic Results:  None

## 2024-12-27 NOTE — ASSESSMENT & PLAN NOTE
- Became hypertensive following SCT on 12/26. No signs of volume overload contributing to HTN  - 12/27 started amlodipine

## 2024-12-27 NOTE — PLAN OF CARE
Day +1 of Bu/Flu/Thiotepa PTCy haploidentical Allo SCT. Pt sleeping in bed upon arrival to room. Right IJ flushed without complications and normal saline locked, dressing CDI. Q1h patient rounds. Pt is independent. No difficulty voiding. On tele due to running tachy. Gave PRN oxy 5mg, Morphine 2mg, ad Sumatriptan 50mg for migraine. Electrolytes replaced per PRN orders. Complaints of on and off chills, t-max 100.8. Pt AAOx4. Received chest x-ray, blood cultures, and urine cultures - awaiting results. Non skid socks on; Pt. Instructed to call if any assistance is needed. Pt remaining free from falls or injury; Bed locked in lowest position w/side rails up x2 & all belongings including call light within reach; Plan of care ongoing; All questions and concerns addressed at this time.

## 2024-12-27 NOTE — ASSESSMENT & PLAN NOTE
- 12/27 diffuse headache began.   - Oxy 5 mg as needed for HA  - Avoiding tylenol with recent allo SCT/neutropenia and do not want to mask a fever.

## 2024-12-28 LAB
ABO + RH BLD: NORMAL
ALBUMIN SERPL BCP-MCNC: 3.1 G/DL (ref 3.5–5.2)
ALP SERPL-CCNC: 40 U/L (ref 40–150)
ALT SERPL W/O P-5'-P-CCNC: 108 U/L (ref 10–44)
ANION GAP SERPL CALC-SCNC: 9 MMOL/L (ref 8–16)
AST SERPL-CCNC: 50 U/L (ref 10–40)
BACTERIA UR CULT: NO GROWTH
BASOPHILS # BLD AUTO: ABNORMAL K/UL (ref 0–0.2)
BASOPHILS NFR BLD: 0 % (ref 0–1.9)
BILIRUB SERPL-MCNC: 0.8 MG/DL (ref 0.1–1)
BLD GP AB SCN CELLS X3 SERPL QL: NORMAL
BUN SERPL-MCNC: 12 MG/DL (ref 6–20)
CALCIUM SERPL-MCNC: 8.7 MG/DL (ref 8.7–10.5)
CHLORIDE SERPL-SCNC: 102 MMOL/L (ref 95–110)
CO2 SERPL-SCNC: 22 MMOL/L (ref 23–29)
CREAT SERPL-MCNC: 0.8 MG/DL (ref 0.5–1.4)
DIFFERENTIAL METHOD BLD: ABNORMAL
DOHLE BOD BLD QL SMEAR: PRESENT
EOSINOPHIL # BLD AUTO: ABNORMAL K/UL (ref 0–0.5)
EOSINOPHIL NFR BLD: 0 % (ref 0–8)
ERYTHROCYTE [DISTWIDTH] IN BLOOD BY AUTOMATED COUNT: 13.1 % (ref 11.5–14.5)
EST. GFR  (NO RACE VARIABLE): >60 ML/MIN/1.73 M^2
GLUCOSE SERPL-MCNC: 105 MG/DL (ref 70–110)
HCT VFR BLD AUTO: 28.9 % (ref 40–54)
HGB BLD-MCNC: 10.1 G/DL (ref 14–18)
IMM GRANULOCYTES # BLD AUTO: ABNORMAL K/UL (ref 0–0.04)
IMM GRANULOCYTES NFR BLD AUTO: ABNORMAL % (ref 0–0.5)
LYMPHOCYTES # BLD AUTO: ABNORMAL K/UL (ref 1–4.8)
LYMPHOCYTES NFR BLD: 2 % (ref 18–48)
MAGNESIUM SERPL-MCNC: 2 MG/DL (ref 1.6–2.6)
MCH RBC QN AUTO: 33.2 PG (ref 27–31)
MCHC RBC AUTO-ENTMCNC: 34.9 G/DL (ref 32–36)
MCV RBC AUTO: 95 FL (ref 82–98)
MONOCYTES # BLD AUTO: ABNORMAL K/UL (ref 0.3–1)
MONOCYTES NFR BLD: 1 % (ref 4–15)
NEUTROPHILS NFR BLD: 97 % (ref 38–73)
NRBC BLD-RTO: 0 /100 WBC
PHOSPHATE SERPL-MCNC: 2.2 MG/DL (ref 2.7–4.5)
PLATELET # BLD AUTO: 72 K/UL (ref 150–450)
PMV BLD AUTO: 10.4 FL (ref 9.2–12.9)
POTASSIUM SERPL-SCNC: 3.9 MMOL/L (ref 3.5–5.1)
PROT SERPL-MCNC: 6.4 G/DL (ref 6–8.4)
RBC # BLD AUTO: 3.04 M/UL (ref 4.6–6.2)
SODIUM SERPL-SCNC: 133 MMOL/L (ref 136–145)
SPECIMEN OUTDATE: NORMAL
WBC # BLD AUTO: 0.7 K/UL (ref 3.9–12.7)

## 2024-12-28 PROCEDURE — 85007 BL SMEAR W/DIFF WBC COUNT: CPT | Performed by: NURSE PRACTITIONER

## 2024-12-28 PROCEDURE — 99233 SBSQ HOSP IP/OBS HIGH 50: CPT | Mod: ,,, | Performed by: INTERNAL MEDICINE

## 2024-12-28 PROCEDURE — 63600175 PHARM REV CODE 636 W HCPCS

## 2024-12-28 PROCEDURE — 25000003 PHARM REV CODE 250: Performed by: NURSE PRACTITIONER

## 2024-12-28 PROCEDURE — 25000003 PHARM REV CODE 250: Performed by: INTERNAL MEDICINE

## 2024-12-28 PROCEDURE — 84100 ASSAY OF PHOSPHORUS: CPT | Performed by: NURSE PRACTITIONER

## 2024-12-28 PROCEDURE — 83735 ASSAY OF MAGNESIUM: CPT | Performed by: NURSE PRACTITIONER

## 2024-12-28 PROCEDURE — 80053 COMPREHEN METABOLIC PANEL: CPT | Performed by: NURSE PRACTITIONER

## 2024-12-28 PROCEDURE — 63600175 PHARM REV CODE 636 W HCPCS: Performed by: NURSE PRACTITIONER

## 2024-12-28 PROCEDURE — 87324 CLOSTRIDIUM AG IA: CPT | Performed by: STUDENT IN AN ORGANIZED HEALTH CARE EDUCATION/TRAINING PROGRAM

## 2024-12-28 PROCEDURE — 25000003 PHARM REV CODE 250

## 2024-12-28 PROCEDURE — 20600001 HC STEP DOWN PRIVATE ROOM

## 2024-12-28 PROCEDURE — 86850 RBC ANTIBODY SCREEN: CPT | Performed by: INTERNAL MEDICINE

## 2024-12-28 PROCEDURE — 25000003 PHARM REV CODE 250: Performed by: STUDENT IN AN ORGANIZED HEALTH CARE EDUCATION/TRAINING PROGRAM

## 2024-12-28 PROCEDURE — 85027 COMPLETE CBC AUTOMATED: CPT | Performed by: NURSE PRACTITIONER

## 2024-12-28 PROCEDURE — 63600175 PHARM REV CODE 636 W HCPCS: Performed by: INTERNAL MEDICINE

## 2024-12-28 RX ORDER — ACETAMINOPHEN 325 MG/1
650 TABLET ORAL EVERY 6 HOURS PRN
Status: DISCONTINUED | OUTPATIENT
Start: 2024-12-28 | End: 2024-12-31

## 2024-12-28 RX ADMIN — Medication 1 TABLET: at 09:12

## 2024-12-28 RX ADMIN — URSODIOL 300 MG: 300 CAPSULE ORAL at 08:12

## 2024-12-28 RX ADMIN — PROCHLORPERAZINE EDISYLATE 5 MG: 5 INJECTION INTRAMUSCULAR; INTRAVENOUS at 04:12

## 2024-12-28 RX ADMIN — MICAFUNGIN SODIUM 100 MG: 100 INJECTION, POWDER, LYOPHILIZED, FOR SOLUTION INTRAVENOUS at 08:12

## 2024-12-28 RX ADMIN — OLANZAPINE 10 MG: 2.5 TABLET, FILM COATED ORAL at 09:12

## 2024-12-28 RX ADMIN — PANTOPRAZOLE SODIUM 40 MG: 40 TABLET, DELAYED RELEASE ORAL at 08:12

## 2024-12-28 RX ADMIN — TRAZODONE HYDROCHLORIDE 25 MG: 50 TABLET ORAL at 10:12

## 2024-12-28 RX ADMIN — ACYCLOVIR 800 MG: 200 CAPSULE ORAL at 08:12

## 2024-12-28 RX ADMIN — AMLODIPINE BESYLATE 5 MG: 5 TABLET ORAL at 08:12

## 2024-12-28 RX ADMIN — Medication 1 DOSE: at 08:12

## 2024-12-28 RX ADMIN — CEFEPIME 2 G: 2 INJECTION, POWDER, FOR SOLUTION INTRAVENOUS at 08:12

## 2024-12-28 RX ADMIN — ACETAMINOPHEN 650 MG: 325 TABLET ORAL at 03:12

## 2024-12-28 RX ADMIN — SUMATRIPTAN SUCCINATE 50 MG: 50 TABLET ORAL at 03:12

## 2024-12-28 RX ADMIN — Medication 1 TABLET: at 05:12

## 2024-12-28 RX ADMIN — PROCHLORPERAZINE EDISYLATE 5 MG: 5 INJECTION INTRAMUSCULAR; INTRAVENOUS at 09:12

## 2024-12-28 RX ADMIN — Medication 1 DOSE: at 01:12

## 2024-12-28 RX ADMIN — CEFEPIME 2 G: 2 INJECTION, POWDER, FOR SOLUTION INTRAVENOUS at 12:12

## 2024-12-28 RX ADMIN — ACYCLOVIR 800 MG: 200 CAPSULE ORAL at 09:12

## 2024-12-28 RX ADMIN — URSODIOL 300 MG: 300 CAPSULE ORAL at 09:12

## 2024-12-28 RX ADMIN — Medication 1 DOSE: at 05:12

## 2024-12-28 RX ADMIN — Medication 1 TABLET: at 08:12

## 2024-12-28 RX ADMIN — PROCHLORPERAZINE EDISYLATE 5 MG: 5 INJECTION INTRAMUSCULAR; INTRAVENOUS at 05:12

## 2024-12-28 RX ADMIN — Medication 1 TABLET: at 01:12

## 2024-12-28 RX ADMIN — CEFEPIME 2 G: 2 INJECTION, POWDER, FOR SOLUTION INTRAVENOUS at 05:12

## 2024-12-28 RX ADMIN — Medication 1 DOSE: at 09:12

## 2024-12-28 NOTE — SUBJECTIVE & OBJECTIVE
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 Afebrile this morning. VSS. Complains of fatigue and nausea. Denies any headache. LFTs trending up. Infectious work up so far negative.  Continue cefepime for now  Objective:     Vital Signs (Most Recent):  Temp: 99 °F (37.2 °C) (12/28/24 0742)  Pulse: 109 (12/28/24 0742)  Resp: 16 (12/28/24 0742)  BP: 137/84 (12/28/24 0742)  SpO2: 95 % (12/28/24 0742) Vital Signs (24h Range):  Temp:  [98.2 °F (36.8 °C)-100.8 °F (38.2 °C)] 99 °F (37.2 °C)  Pulse:  [] 109  Resp:  [16-20] 16  SpO2:  [94 %-97 %] 95 %  BP: (125-148)/(70-84) 137/84     Weight: 89.9 kg (198 lb 4.9 oz)  Body mass index is 27.66 kg/m².  Body surface area is 2.12 meters squared.    ECOG SCORE           [unfilled]    Intake/Output - Last 3 Shifts         12/26 0700 12/27 0659 12/27 0700 12/28 0659 12/28 0700 12/29 0659    P.O. 1340 1100     I.V. (mL/kg) 2807.8 (30.7) 140 (1.6)     Blood 180      IV Piggyback 98.6 98.7     Total Intake(mL/kg) 4426.4 (48.3) 1338.7 (14.9)     Urine (mL/kg/hr) 2375 (1.1) 2825 (1.3)     Stool 0      Total Output 2375 2825     Net +2051.4 -1486.3            Urine Occurrence 2 x 4 x     Stool Occurrence 1 x               Physical Exam  Vitals reviewed.   Constitutional:       Appearance: Normal appearance.   HENT:      Head: Normocephalic and atraumatic.      Nose: Nose normal.      Mouth/Throat:      Mouth: Mucous membranes are moist.      Pharynx: Oropharynx is clear.   Eyes:      Extraocular Movements: Extraocular movements intact.      Conjunctiva/sclera: Conjunctivae normal.      Pupils: Pupils are equal, round, and reactive to light.   Cardiovascular:      Rate and Rhythm: Normal rate and regular rhythm.      Pulses: Normal pulses.      Heart sounds: Normal heart sounds.   Pulmonary:      Effort: Pulmonary effort is normal.      Breath sounds: Normal breath sounds.   Abdominal:      General: Abdomen is flat. Bowel  sounds are normal.      Palpations: Abdomen is soft.   Musculoskeletal:         General: Normal range of motion.      Cervical back: Normal range of motion.      Right lower leg: No edema.      Left lower leg: No edema.   Skin:     General: Skin is warm and dry.      Capillary Refill: Capillary refill takes less than 2 seconds.      Comments: R chest wall muhammad in place, c/d/I. No signs of infection    Neurological:      General: No focal deficit present.      Mental Status: He is alert and oriented to person, place, and time.   Psychiatric:         Mood and Affect: Mood normal.         Behavior: Behavior normal.         Thought Content: Thought content normal.         Judgment: Judgment normal.            Significant Labs:   All pertinent labs from the last 24 hours have been reviewed.    Diagnostic Results:  I have reviewed and interpreted all pertinent imaging results/findings within the past 24 hours.

## 2024-12-28 NOTE — PROGRESS NOTES
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 Afebrile this morning. VSS. Complains of fatigue and nausea. Denies any headache. LFTs trending up. Infectious work up so far negative.  Continue cefepime for now  Objective:     Vital Signs (Most Recent):  Temp: 99 °F (37.2 °C) (12/28/24 0742)  Pulse: 109 (12/28/24 0742)  Resp: 16 (12/28/24 0742)  BP: 137/84 (12/28/24 0742)  SpO2: 95 % (12/28/24 0742) Vital Signs (24h Range):  Temp:  [98.2 °F (36.8 °C)-100.8 °F (38.2 °C)] 99 °F (37.2 °C)  Pulse:  [] 109  Resp:  [16-20] 16  SpO2:  [94 %-97 %] 95 %  BP: (125-148)/(70-84) 137/84     Weight: 89.9 kg (198 lb 4.9 oz)  Body mass index is 27.66 kg/m².  Body surface area is 2.12 meters squared.    ECOG SCORE           [unfilled]    Intake/Output - Last 3 Shifts         12/26 0700 12/27 0659 12/27 0700 12/28 0659 12/28 0700 12/29 0659    P.O. 1340 1100     I.V. (mL/kg) 2807.8 (30.7) 140 (1.6)     Blood 180      IV Piggyback 98.6 98.7     Total Intake(mL/kg) 4426.4 (48.3) 1338.7 (14.9)     Urine (mL/kg/hr) 2375 (1.1) 2825 (1.3)     Stool 0      Total Output 2375 2825     Net +2051.4 -1486.3            Urine Occurrence 2 x 4 x     Stool Occurrence 1 x               Physical Exam  Vitals reviewed.   Constitutional:       Appearance: Normal appearance.   HENT:      Head: Normocephalic and atraumatic.      Nose: Nose normal.      Mouth/Throat:      Mouth: Mucous membranes are moist.      Pharynx: Oropharynx is clear.   Eyes:      Extraocular Movements: Extraocular movements intact.      Conjunctiva/sclera: Conjunctivae normal.      Pupils: Pupils are equal, round, and reactive to light.   Cardiovascular:      Rate and Rhythm: Normal rate and regular rhythm.      Pulses:  Normal pulses.      Heart sounds: Normal heart sounds.   Pulmonary:      Effort: Pulmonary effort is normal.      Breath sounds: Normal breath sounds.   Abdominal:      General: Abdomen is flat. Bowel sounds are normal.      Palpations: Abdomen is soft.   Musculoskeletal:         General: Normal range of motion.      Cervical back: Normal range of motion.      Right lower leg: No edema.      Left lower leg: No edema.   Skin:     General: Skin is warm and dry.      Capillary Refill: Capillary refill takes less than 2 seconds.      Comments: R chest wall muhammad in place, c/d/I. No signs of infection    Neurological:      General: No focal deficit present.      Mental Status: He is alert and oriented to person, place, and time.   Psychiatric:         Mood and Affect: Mood normal.         Behavior: Behavior normal.         Thought Content: Thought content normal.         Judgment: Judgment normal.            Significant Labs:   All pertinent labs from the last 24 hours have been reviewed.    Diagnostic Results:  I have reviewed and interpreted all pertinent imaging results/findings within the past 24 hours.  Assessment/Plan:     * History of allogeneic stem cell transplant  - Patient of Dr. 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 Received 3 bags with CD34 dose of 6.04x10^6/kg.   - Today is Day +2  - Cannot have Tylenol 72 hours prior to until 72 hours following Busulfan administration. (Listed as allergy in Epic. Can be removed as allergy 72 hours following last dose of Busulfan).  - Patient is O+. Donor is O+.  - Patient is CMV non-reactive. Donor is CMV reactive. Given donor CMV status, patient will start (patient-supplied) letermovir on Day +13.  - Day -4 and Day -3 Busulfan dose-adjusted to 282 mg  - See treatment plan below:    Planned conditioning regimen:  Thiotepa on Day -7  Busulfan on Day -6, -5, -4, and -3  Fludarabine on  Day -6, -5, -4, and -3  REST DAYS on Day -2 and -1     Planned  GVHD Prophylaxis:  Cyclophosphamide on Day +3 and +4  Tacrolimus starting on Day +5  Mycophenolate starting on Day +5     Antimicrobial Prophylaxis:  Acyclovir starting on Day -7  Levofloxacin starting on Day -1  Micafungin on Day -1 through Day +4  Letermovir starting on Day +5 (if CMV PCR drawn on day 0 results negative)  Posaconazole starting on Day +5  Atovaquone starting on Day +30     Skin Care with Thiotepa: Day -7 through D-5     Seizure Prophylaxis:  Levetiracetam on Day -7 through Day -2     SOS/VOD Prophylaxis:  Ursodiol on Day -7 through Day +30     Growth Factor Support:  Neupogen starting on Day +5        Caregiver: Carmelita (Spouse)  Post-transplant discharge plans: Home      Chemotherapy-induced nausea  - Anticipate 2/2 chemotherapy   - Zofran q8h  - Compazine q6h  - Zyprexa QHS  - Ativan PRN    Transaminitis  - Transaminases first elevated 12/27. AST 51 and ALT 74. Suspect 2/2 chemotherapy prior to transplant.   - Will continue to monitor with daily CMP    Hypertension  - Became hypertensive following SCT on 12/26. No signs of volume overload contributing to HTN  - 12/27 started amlodipine     Headache  - 12/27 diffuse headache began.   - Oxy 5 mg as needed for HA  - Avoiding tylenol with recent allo SCT/neutropenia and do not want to mask a fever.     Insomnia  - Difficulty sleeping at night while IP.  - Has PRN ativan and trazodone  - melatonin not effective     Other constipation  - Constipated upon admit and was previously taking miralax and PRN Pericolace. Now with soft bowel movements and miralax and pericolace discontinued 12/24/24.     Thrombocytosis  - 2/2 myelofibrosis  - Was on daily ASA, but stopped taking ~ 3 weeks ago  - Anticipate thrombocytopenia following chemotherapy  - Daily CBC while IP    Anemia in neoplastic disease  - Anticipate pancytopenia following chemotherapy.  - Transfuse for hgb < 7  - Daily CBC while  IP    Adrenal nodule  - Subcentimeter nodular thickening of the adrenal glands first noted on imaging from 1/2024.  - Seen by endocrine prior to transplant admission and Dexamethasone suppression test performed. Patient responded appropriately. No further w/u needed.  - Likely adenomas.    Metabolic dysfunction-associated steatotic liver disease (MASLD)  - Biopsy proven to be steatotic change. Most compatible with MASLD.   - Follow-up with hepatology OP.    Primary myelofibrosis  From Dr. James's most recent clinic note:  Primary myelofibrosis              A. 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 Transplant on 12/26/24 at 1330. Received 3 bags with CD34 dose of 6.04 x 10^6/kg.     Hyperlipidemia  - Holding home atorvastatin while IP to avoid interaction with other medications. Can resume at discharge if LFTs stable.          VTE Risk Mitigation (From admission, onward)           Ordered     heparin, porcine (PF) 100 unit/mL injection flush 300 Units  As needed (PRN)         12/18/24 2025                    Disposition: nahid Bruno MD  Bone Marrow Transplant  Daniel caryl - Oncology ([facility])

## 2024-12-28 NOTE — ASSESSMENT & PLAN NOTE
- Patient of Dr. 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 Received 3 bags with CD34 dose of 6.04x10^6/kg.   - Today is Day +2  - Cannot have Tylenol 72 hours prior to until 72 hours following Busulfan administration. (Listed as allergy in Epic. Can be removed as allergy 72 hours following last dose of Busulfan).  - Patient is O+. Donor is O+.  - Patient is CMV non-reactive. Donor is CMV reactive. Given donor CMV status, patient will start (patient-supplied) letermovir on Day +13.  - Day -4 and Day -3 Busulfan dose-adjusted to 282 mg  - See treatment plan below:    Planned conditioning regimen:  Thiotepa on Day -7  Busulfan on Day -6, -5, -4, and -3  Fludarabine on Day -6, -5, -4, and -3  REST DAYS on Day -2 and -1     Planned  GVHD Prophylaxis:  Cyclophosphamide on Day +3 and +4  Tacrolimus starting on Day +5  Mycophenolate starting on Day +5     Antimicrobial Prophylaxis:  Acyclovir starting on Day -7  Levofloxacin starting on Day -1  Micafungin on Day -1 through Day +4  Letermovir starting on Day +5 (if CMV PCR drawn on day 0 results negative)  Posaconazole starting on Day +5  Atovaquone starting on Day +30     Skin Care with Thiotepa: Day -7 through D-5     Seizure Prophylaxis:  Levetiracetam on Day -7 through Day -2     SOS/VOD Prophylaxis:  Ursodiol on Day -7 through Day +30     Growth Factor Support:  Neupogen starting on Day +5        Caregiver: Carmelita (Spouse)  Post-transplant discharge plans: Home

## 2024-12-28 NOTE — PLAN OF CARE
Day +2 of Bu/Flu/Thiotepa PTCy haploidentical Allo SCT. No acute events this shift. Patient AAOx4.  T-max of 99.9. Temp not sustained. All other VSS. Ambulates independently and voids without difficulty. No complaints of pain or nausea. Patient complaint of anxiety. PRN IV Ativan administered x1. Moderate relief obtained. Bed in lowest position and locked. Side rails up x2. All possessions and call light within reach. Non-skid socks worn. Instructed to call for assistance and voiced understanding. All needs of patient currently met. Will continue to monitor with frequent rounding.

## 2024-12-28 NOTE — PLAN OF CARE
Problem: Adult Inpatient Plan of Care  Goal: Plan of Care Review  Outcome: Progressing  Goal: Patient-Specific Goal (Individualized)  Outcome: Progressing  Goal: Absence of Hospital-Acquired Illness or Injury  Outcome: Progressing  Goal: Optimal Comfort and Wellbeing  Outcome: Progressing  Goal: Readiness for Transition of Care  Outcome: Progressing     Problem: Infection  Goal: Absence of Infection Signs and Symptoms  Outcome: Progressing     Problem: Wound  Goal: Optimal Coping  Outcome: Progressing  Goal: Optimal Functional Ability  Outcome: Progressing  Goal: Absence of Infection Signs and Symptoms  Outcome: Progressing  Goal: Improved Oral Intake  Outcome: Progressing  Goal: Optimal Pain Control and Function  Outcome: Progressing  Goal: Skin Health and Integrity  Outcome: Progressing  Goal: Optimal Wound Healing  Outcome: Progressing     Problem: Fatigue  Goal: Improved Activity Tolerance  Outcome: Progressing

## 2024-12-29 LAB
ALBUMIN SERPL BCP-MCNC: 2.9 G/DL (ref 3.5–5.2)
ALP SERPL-CCNC: 40 U/L (ref 40–150)
ALT SERPL W/O P-5'-P-CCNC: 80 U/L (ref 10–44)
ANION GAP SERPL CALC-SCNC: 7 MMOL/L (ref 8–16)
ANISOCYTOSIS BLD QL SMEAR: SLIGHT
AST SERPL-CCNC: 26 U/L (ref 10–40)
BASOPHILS # BLD AUTO: 0 K/UL (ref 0–0.2)
BASOPHILS NFR BLD: 0 % (ref 0–1.9)
BILIRUB SERPL-MCNC: 0.5 MG/DL (ref 0.1–1)
BUN SERPL-MCNC: 15 MG/DL (ref 6–20)
CALCIUM SERPL-MCNC: 8.7 MG/DL (ref 8.7–10.5)
CHLORIDE SERPL-SCNC: 105 MMOL/L (ref 95–110)
CO2 SERPL-SCNC: 23 MMOL/L (ref 23–29)
CREAT SERPL-MCNC: 0.8 MG/DL (ref 0.5–1.4)
DIFFERENTIAL METHOD BLD: ABNORMAL
EOSINOPHIL # BLD AUTO: 0 K/UL (ref 0–0.5)
EOSINOPHIL NFR BLD: 0 % (ref 0–8)
ERYTHROCYTE [DISTWIDTH] IN BLOOD BY AUTOMATED COUNT: 13.2 % (ref 11.5–14.5)
EST. GFR  (NO RACE VARIABLE): >60 ML/MIN/1.73 M^2
GLUCOSE SERPL-MCNC: 113 MG/DL (ref 70–110)
HCT VFR BLD AUTO: 28.7 % (ref 40–54)
HGB BLD-MCNC: 9.8 G/DL (ref 14–18)
HYPOCHROMIA BLD QL SMEAR: ABNORMAL
IMM GRANULOCYTES # BLD AUTO: 0 K/UL (ref 0–0.04)
IMM GRANULOCYTES NFR BLD AUTO: 0 % (ref 0–0.5)
LYMPHOCYTES # BLD AUTO: 0 K/UL (ref 1–4.8)
LYMPHOCYTES NFR BLD: 5.6 % (ref 18–48)
MAGNESIUM SERPL-MCNC: 2.2 MG/DL (ref 1.6–2.6)
MCH RBC QN AUTO: 32.9 PG (ref 27–31)
MCHC RBC AUTO-ENTMCNC: 34.1 G/DL (ref 32–36)
MCV RBC AUTO: 96 FL (ref 82–98)
MONOCYTES # BLD AUTO: 0 K/UL (ref 0.3–1)
MONOCYTES NFR BLD: 0 % (ref 4–15)
NEUTROPHILS # BLD AUTO: 0.3 K/UL (ref 1.8–7.7)
NEUTROPHILS NFR BLD: 94.4 % (ref 38–73)
NRBC BLD-RTO: 0 /100 WBC
OVALOCYTES BLD QL SMEAR: ABNORMAL
PHOSPHATE SERPL-MCNC: 2.3 MG/DL (ref 2.7–4.5)
PLATELET # BLD AUTO: 35 K/UL (ref 150–450)
PLATELET BLD QL SMEAR: ABNORMAL
PMV BLD AUTO: 10.2 FL (ref 9.2–12.9)
POIKILOCYTOSIS BLD QL SMEAR: SLIGHT
POLYCHROMASIA BLD QL SMEAR: ABNORMAL
POTASSIUM SERPL-SCNC: 3.7 MMOL/L (ref 3.5–5.1)
PROT SERPL-MCNC: 6.6 G/DL (ref 6–8.4)
RBC # BLD AUTO: 2.98 M/UL (ref 4.6–6.2)
SODIUM SERPL-SCNC: 135 MMOL/L (ref 136–145)
TARGETS BLD QL SMEAR: ABNORMAL
WBC # BLD AUTO: 0.36 K/UL (ref 3.9–12.7)

## 2024-12-29 PROCEDURE — 25000003 PHARM REV CODE 250: Performed by: INTERNAL MEDICINE

## 2024-12-29 PROCEDURE — 99233 SBSQ HOSP IP/OBS HIGH 50: CPT | Mod: ,,, | Performed by: INTERNAL MEDICINE

## 2024-12-29 PROCEDURE — 85025 COMPLETE CBC W/AUTO DIFF WBC: CPT | Performed by: NURSE PRACTITIONER

## 2024-12-29 PROCEDURE — 84100 ASSAY OF PHOSPHORUS: CPT | Performed by: NURSE PRACTITIONER

## 2024-12-29 PROCEDURE — 25000003 PHARM REV CODE 250: Performed by: NURSE PRACTITIONER

## 2024-12-29 PROCEDURE — 83735 ASSAY OF MAGNESIUM: CPT | Performed by: NURSE PRACTITIONER

## 2024-12-29 PROCEDURE — 63600175 PHARM REV CODE 636 W HCPCS: Mod: JG | Performed by: INTERNAL MEDICINE

## 2024-12-29 PROCEDURE — 63600175 PHARM REV CODE 636 W HCPCS: Performed by: INTERNAL MEDICINE

## 2024-12-29 PROCEDURE — 80053 COMPREHEN METABOLIC PANEL: CPT | Performed by: NURSE PRACTITIONER

## 2024-12-29 PROCEDURE — 63600175 PHARM REV CODE 636 W HCPCS: Performed by: NURSE PRACTITIONER

## 2024-12-29 PROCEDURE — 20600001 HC STEP DOWN PRIVATE ROOM

## 2024-12-29 PROCEDURE — 25000003 PHARM REV CODE 250

## 2024-12-29 PROCEDURE — 63600175 PHARM REV CODE 636 W HCPCS

## 2024-12-29 RX ADMIN — Medication 1 DOSE: at 05:12

## 2024-12-29 RX ADMIN — SODIUM CHLORIDE 500 ML: 9 INJECTION, SOLUTION INTRAVENOUS at 01:12

## 2024-12-29 RX ADMIN — Medication 400 MG: at 09:12

## 2024-12-29 RX ADMIN — ONDANSETRON 8 MG: 2 INJECTION INTRAMUSCULAR; INTRAVENOUS at 01:12

## 2024-12-29 RX ADMIN — PROCHLORPERAZINE EDISYLATE 5 MG: 5 INJECTION INTRAMUSCULAR; INTRAVENOUS at 04:12

## 2024-12-29 RX ADMIN — Medication 1 DOSE: at 09:12

## 2024-12-29 RX ADMIN — ONDANSETRON 8 MG: 2 INJECTION INTRAMUSCULAR; INTRAVENOUS at 05:12

## 2024-12-29 RX ADMIN — AMLODIPINE BESYLATE 5 MG: 5 TABLET ORAL at 09:12

## 2024-12-29 RX ADMIN — MICAFUNGIN SODIUM 100 MG: 100 INJECTION, POWDER, LYOPHILIZED, FOR SOLUTION INTRAVENOUS at 10:12

## 2024-12-29 RX ADMIN — CYCLOPHOSPHAMIDE 3960 MG: 200 INJECTION, SOLUTION INTRAVENOUS at 11:12

## 2024-12-29 RX ADMIN — URSODIOL 300 MG: 300 CAPSULE ORAL at 09:12

## 2024-12-29 RX ADMIN — PROCHLORPERAZINE EDISYLATE 5 MG: 5 INJECTION INTRAMUSCULAR; INTRAVENOUS at 10:12

## 2024-12-29 RX ADMIN — Medication 1 TABLET: at 01:12

## 2024-12-29 RX ADMIN — ONDANSETRON 8 MG: 2 INJECTION INTRAMUSCULAR; INTRAVENOUS at 09:12

## 2024-12-29 RX ADMIN — OLANZAPINE 10 MG: 2.5 TABLET, FILM COATED ORAL at 09:12

## 2024-12-29 RX ADMIN — MESNA 792 MG: 100 INJECTION, SOLUTION INTRAVENOUS at 01:12

## 2024-12-29 RX ADMIN — CEFEPIME 2 G: 2 INJECTION, POWDER, FOR SOLUTION INTRAVENOUS at 12:12

## 2024-12-29 RX ADMIN — MESNA 792 MG: 100 INJECTION, SOLUTION INTRAVENOUS at 05:12

## 2024-12-29 RX ADMIN — LORAZEPAM 1 MG: 1 TABLET ORAL at 09:12

## 2024-12-29 RX ADMIN — APREPITANT 130 MG: 130 INJECTION, EMULSION INTRAVENOUS at 09:12

## 2024-12-29 RX ADMIN — CEFEPIME 2 G: 2 INJECTION, POWDER, FOR SOLUTION INTRAVENOUS at 05:12

## 2024-12-29 RX ADMIN — CEFEPIME 2 G: 2 INJECTION, POWDER, FOR SOLUTION INTRAVENOUS at 08:12

## 2024-12-29 RX ADMIN — Medication 1 TABLET: at 09:12

## 2024-12-29 RX ADMIN — MESNA 792 MG: 100 INJECTION, SOLUTION INTRAVENOUS at 10:12

## 2024-12-29 RX ADMIN — MESNA 792 MG: 100 INJECTION, SOLUTION INTRAVENOUS at 07:12

## 2024-12-29 RX ADMIN — PROCHLORPERAZINE EDISYLATE 5 MG: 5 INJECTION INTRAMUSCULAR; INTRAVENOUS at 09:12

## 2024-12-29 RX ADMIN — ACYCLOVIR 800 MG: 200 CAPSULE ORAL at 09:12

## 2024-12-29 RX ADMIN — Medication 1 DOSE: at 01:12

## 2024-12-29 RX ADMIN — POTASSIUM CHLORIDE 20 MEQ: 1500 TABLET, EXTENDED RELEASE ORAL at 09:12

## 2024-12-29 RX ADMIN — PANTOPRAZOLE SODIUM 40 MG: 40 TABLET, DELAYED RELEASE ORAL at 09:12

## 2024-12-29 RX ADMIN — SODIUM CHLORIDE 500 ML: 9 INJECTION, SOLUTION INTRAVENOUS at 10:12

## 2024-12-29 RX ADMIN — Medication 1 TABLET: at 05:12

## 2024-12-29 NOTE — NURSING
cycloPHOSphamide 50 mg/kg = 3,960 mg in 0.9% NaCl 584.8 mL chemo infusion started through right muhammad noted for having positive blood return over 90 mins. Consent & CAR in chart. CAR & BSA verified by two chemo certified RNs; 2 RN verification per protocol. Education given on chemotherapy prior to administration w/additional education provided as necessary; pt. verbalized understanding with all questions answered; Atineoplastic precautions in place throughout infusion; Pt. Tolerated well w/NADN.

## 2024-12-29 NOTE — PLAN OF CARE
Assumed care of pt. 06:45-19:15  Pt involved in plan of care and communicating needs throughout shift.      - D +3 haplo Allo SCT for PMF  - AAOx4;   - No complaints of pain throughout shift  - Ambulates independently;   - Reg. diet  - T-Max 100.3; Ordered to CTM  - Continent of bowel and bladder   - Voids via RR; No BM this shift  - RA; NSR-low ST (100's) on telemonitor  - Skin intact   - Chemotherapy administered as charted; Pt. Tolerated well w/NADN        - q1h patient rounds. All VSS; no acute events so far this shift. Non skid socks on; Pt. Instructed to call if any assistance is needed. Pt remaining free from falls or injury; Bed locked in lowest position w/side rails up x2 & all belongings including call light within reach; Plan of care ongoing; All needs met at this time.

## 2024-12-29 NOTE — PT/OT/SLP PROGRESS
Physical Therapy      Patient Name:  Rubén Hu   MRN:  [id number]    Patient not seen today secondary to RN & PT assessment given patient confusion & possible s/s of delirium. Will follow-up at next appropriate date.

## 2024-12-29 NOTE — PROGRESS NOTES
Daniel Rodríguez - Oncology ([facility])  Hematology  Bone Marrow Transplant  Progress Note    Patient Name: Rubén Hu  Admission Date: 12/18/2024  Hospital Length of Stay: 11 days  Code Status: Full Code    Subjective:     Interval History: Day +3 s/p Bu/Flu/Thiotepa PTCy haploidentical (brother) SCT for PMF. Febrile yesterday with Tmax 101.9. Having loose watery stools. No BM overnight. C diff pending. BC NGTD. LFTs trending down. Continue antibiotics.    Objective:     Vital Signs (Most Recent):  Temp: 98.9 °F (37.2 °C) (12/29/24 0732)  Pulse: 94 (12/29/24 0732)  Resp: 16 (12/29/24 0732)  BP: (!) 144/79 (12/29/24 0732)  SpO2: 98 % (12/29/24 0732) Vital Signs (24h Range):  Temp:  [98.2 °F (36.8 °C)-101.9 °F (38.8 °C)] 98.9 °F (37.2 °C)  Pulse:  [] 94  Resp:  [16-18] 16  SpO2:  [95 %-98 %] 98 %  BP: (115-144)/(72-84) 144/79     Weight: 88.6 kg (195 lb 7 oz)  Body mass index is 27.26 kg/m².  Body surface area is 2.11 meters squared.    ECOG SCORE           [unfilled]    Intake/Output - Last 3 Shifts         12/27 0700 12/28 0659 12/28 0700 12/29 0659 12/29 0700 12/30 0659    P.O. 1100 150     I.V. (mL/kg) 140 (1.6)      Blood       IV Piggyback 98.7 98.6     Total Intake(mL/kg) 1338.7 (14.9) 248.6 (2.8)     Urine (mL/kg/hr) 2825 (1.3)      Stool       Total Output 2825      Net -1486.3 +248.6            Urine Occurrence 4 x      Stool Occurrence  5 x              Physical Exam  Vitals reviewed.   Constitutional:       Appearance: Normal appearance.   HENT:      Head: Normocephalic and atraumatic.      Nose: Nose normal.      Mouth/Throat:      Mouth: Mucous membranes are moist.      Pharynx: Oropharynx is clear.   Eyes:      Extraocular Movements: Extraocular movements intact.      Conjunctiva/sclera: Conjunctivae normal.      Pupils: Pupils are equal, round, and reactive to light.   Cardiovascular:      Rate and Rhythm: Normal rate and regular rhythm.      Pulses: Normal pulses.      Heart sounds: Normal heart  sounds.   Pulmonary:      Effort: Pulmonary effort is normal.      Breath sounds: Normal breath sounds.   Abdominal:      General: Abdomen is flat. Bowel sounds are normal.      Palpations: Abdomen is soft.   Musculoskeletal:         General: Normal range of motion.      Cervical back: Normal range of motion.      Right lower leg: No edema.      Left lower leg: No edema.   Skin:     General: Skin is warm and dry.      Capillary Refill: Capillary refill takes less than 2 seconds.      Comments: R chest wall muhammad in place, c/d/I. No signs of infection    Neurological:      General: No focal deficit present.      Mental Status: He is alert and oriented to person, place, and time.   Psychiatric:         Mood and Affect: Mood normal.         Behavior: Behavior normal.         Thought Content: Thought content normal.         Judgment: Judgment normal.            Significant Labs:   All pertinent labs from the last 24 hours have been reviewed.    Diagnostic Results:  I have reviewed and interpreted all pertinent imaging results/findings within the past 24 hours.  Assessment/Plan:     * History of allogeneic stem cell transplant  - Patient of Dr. 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 Received 3 bags with CD34 dose of 6.04x10^6/kg.   - Today is Day +3  - Cannot have Tylenol 72 hours prior to until 72 hours following Busulfan administration. (Listed as allergy in Epic. Can be removed as allergy 72 hours following last dose of Busulfan).  - Patient is O+. Donor is O+.  - Patient is CMV non-reactive. Donor is CMV reactive. Given donor CMV status, patient will start (patient-supplied) letermovir on Day +13.  - Day -4 and Day -3 Busulfan dose-adjusted to 282 mg  - See treatment plan below:    Planned conditioning regimen:  Thiotepa on Day -7  Busulfan on Day -6, -5, -4, and -3  Fludarabine on Day -6, -5, -4, and -3  REST DAYS on Day -2 and  -1     Planned  GVHD Prophylaxis:  Cyclophosphamide on Day +3 and +4  Tacrolimus starting on Day +5  Mycophenolate starting on Day +5     Antimicrobial Prophylaxis:  Acyclovir starting on Day -7  Levofloxacin starting on Day -1  Micafungin on Day -1 through Day +4  Letermovir starting on Day +5 (if CMV PCR drawn on day 0 results negative)  Posaconazole starting on Day +5  Atovaquone starting on Day +30     Skin Care with Thiotepa: Day -7 through D-5     Seizure Prophylaxis:  Levetiracetam on Day -7 through Day -2     SOS/VOD Prophylaxis:  Ursodiol on Day -7 through Day +30     Growth Factor Support:  Neupogen starting on Day +5        Caregiver: Carmelita (Spouse)  Post-transplant discharge plans: Home      Chemotherapy-induced nausea  - Anticipate 2/2 chemotherapy   - Zofran q8h  - Compazine q6h  - Zyprexa QHS  - Ativan PRN    Transaminitis  - Transaminases first elevated 12/27. AST 51 and ALT 74. Suspect 2/2 chemotherapy prior to transplant.   - Will continue to monitor with daily CMP    Hypertension  - Became hypertensive following SCT on 12/26. No signs of volume overload contributing to HTN  - 12/27 started amlodipine     Headache  - 12/27 diffuse headache began.   - Oxy 5 mg as needed for HA  - Avoiding tylenol with recent allo SCT/neutropenia and do not want to mask a fever.     Insomnia  - Difficulty sleeping at night while IP.  - Has PRN ativan and trazodone  - melatonin not effective     Other constipation  - Constipated upon admit and was previously taking miralax and PRN Pericolace. Now with soft bowel movements and miralax and pericolace discontinued 12/24/24.     Thrombocytosis  - 2/2 myelofibrosis  - Was on daily ASA, but stopped taking ~ 3 weeks ago  - Anticipate thrombocytopenia following chemotherapy  - Daily CBC while IP    Anemia in neoplastic disease  - Anticipate pancytopenia following chemotherapy.  - Transfuse for hgb < 7  - Daily CBC while IP    Adrenal nodule  - Subcentimeter nodular  thickening of the adrenal glands first noted on imaging from 1/2024.  - Seen by endocrine prior to transplant admission and Dexamethasone suppression test performed. Patient responded appropriately. No further w/u needed.  - Likely adenomas.    Metabolic dysfunction-associated steatotic liver disease (MASLD)  - Biopsy proven to be steatotic change. Most compatible with MASLD.   - Follow-up with hepatology OP.    Primary myelofibrosis  From Dr. James's most recent clinic note:  Primary myelofibrosis              A. 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 Transplant on 12/26/24 at 1330. Received 3 bags with CD34 dose of 6.04 x 10^6/kg.     Hyperlipidemia  - Holding home atorvastatin while IP to avoid interaction with other medications. Can resume at discharge if LFTs stable.          VTE Risk Mitigation (From admission, onward)           Ordered     heparin, porcine (PF) 100 unit/mL injection flush 300 Units  As needed (PRN)         12/18/24 2025                    Disposition: YANNICK Bruno MD  Bone Marrow Transplant  [facility] ([facility])

## 2024-12-29 NOTE — NURSING
"Called to bedside by pts. Spouse who voiced concern that pt was "having a conversation with someone that is not there" & endorsing feeling hot/cold. Pt. AAOx4 to all orientation questions. Temp. 99.8; MD Bruno made aware; Will recheck temp. In 1 hour @ 1700 as ordered;   "

## 2024-12-29 NOTE — PLAN OF CARE
Pt day +3 allo SCT. No acute events occurred during the shift. No complains of pain or nausea. Pt ambulates in room and to bathroom independently. No difficulty voiding. VS have been stable throughout shift and pt is afebrile. Pt AOx4. POC reviewed with patient and he verbalized an understanding. Questions encouraged and answered. Bed is in the lowest position and locked. Non skid socks worn. Call light within reach. Pt encouraged to call for assistance when needed.

## 2024-12-29 NOTE — SUBJECTIVE & OBJECTIVE
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 Febrile yesterday with Tmax 101.9. Having loose watery stools. No BM overnight. C diff pending. BC NGTD. LFTs trending down. Continue antibiotics.    Objective:     Vital Signs (Most Recent):  Temp: 98.9 °F (37.2 °C) (12/29/24 0732)  Pulse: 94 (12/29/24 0732)  Resp: 16 (12/29/24 0732)  BP: (!) 144/79 (12/29/24 0732)  SpO2: 98 % (12/29/24 0732) Vital Signs (24h Range):  Temp:  [98.2 °F (36.8 °C)-101.9 °F (38.8 °C)] 98.9 °F (37.2 °C)  Pulse:  [] 94  Resp:  [16-18] 16  SpO2:  [95 %-98 %] 98 %  BP: (115-144)/(72-84) 144/79     Weight: 88.6 kg (195 lb 7 oz)  Body mass index is 27.26 kg/m².  Body surface area is 2.11 meters squared.    ECOG SCORE           [unfilled]    Intake/Output - Last 3 Shifts         12/27 0700  12/28 0659 12/28 0700 12/29 0659 12/29 0700 12/30 0659    P.O. 1100 150     I.V. (mL/kg) 140 (1.6)      Blood       IV Piggyback 98.7 98.6     Total Intake(mL/kg) 1338.7 (14.9) 248.6 (2.8)     Urine (mL/kg/hr) 2825 (1.3)      Stool       Total Output 2825      Net -1486.3 +248.6            Urine Occurrence 4 x      Stool Occurrence  5 x              Physical Exam  Vitals reviewed.   Constitutional:       Appearance: Normal appearance.   HENT:      Head: Normocephalic and atraumatic.      Nose: Nose normal.      Mouth/Throat:      Mouth: Mucous membranes are moist.      Pharynx: Oropharynx is clear.   Eyes:      Extraocular Movements: Extraocular movements intact.      Conjunctiva/sclera: Conjunctivae normal.      Pupils: Pupils are equal, round, and reactive to light.   Cardiovascular:      Rate and Rhythm: Normal rate and regular rhythm.      Pulses: Normal pulses.      Heart sounds: Normal heart sounds.   Pulmonary:      Effort: Pulmonary effort is normal.      Breath sounds: Normal breath sounds.   Abdominal:      General: Abdomen is flat. Bowel sounds are normal.      Palpations: Abdomen is  soft.   Musculoskeletal:         General: Normal range of motion.      Cervical back: Normal range of motion.      Right lower leg: No edema.      Left lower leg: No edema.   Skin:     General: Skin is warm and dry.      Capillary Refill: Capillary refill takes less than 2 seconds.      Comments: R chest wall muhammad in place, c/d/I. No signs of infection    Neurological:      General: No focal deficit present.      Mental Status: He is alert and oriented to person, place, and time.   Psychiatric:         Mood and Affect: Mood normal.         Behavior: Behavior normal.         Thought Content: Thought content normal.         Judgment: Judgment normal.            Significant Labs:   All pertinent labs from the last 24 hours have been reviewed.    Diagnostic Results:  I have reviewed and interpreted all pertinent imaging results/findings within the past 24 hours.

## 2024-12-30 PROBLEM — R50.81 NEUTROPENIC FEVER: Status: ACTIVE | Noted: 2024-12-30

## 2024-12-30 PROBLEM — D61.810 PANCYTOPENIA DUE TO CHEMOTHERAPY: Status: ACTIVE | Noted: 2024-12-18

## 2024-12-30 PROBLEM — D70.9 NEUTROPENIC FEVER: Status: ACTIVE | Noted: 2024-12-30

## 2024-12-30 PROBLEM — E87.6 HYPOKALEMIA: Status: ACTIVE | Noted: 2024-12-30

## 2024-12-30 PROBLEM — E87.8 ELECTROLYTE DISORDER: Status: ACTIVE | Noted: 2024-12-30

## 2024-12-30 LAB
ALBUMIN SERPL BCP-MCNC: 2.5 G/DL (ref 3.5–5.2)
ALP SERPL-CCNC: 42 U/L (ref 40–150)
ALT SERPL W/O P-5'-P-CCNC: 69 U/L (ref 10–44)
ANION GAP SERPL CALC-SCNC: 8 MMOL/L (ref 8–16)
AST SERPL-CCNC: 31 U/L (ref 10–40)
BASOPHILS # BLD AUTO: 0 K/UL (ref 0–0.2)
BASOPHILS NFR BLD: 0 % (ref 0–1.9)
BILIRUB SERPL-MCNC: 0.6 MG/DL (ref 0.1–1)
BUN SERPL-MCNC: 13 MG/DL (ref 6–20)
C DIFF GDH STL QL: NEGATIVE
C DIFF TOX A+B STL QL IA: NEGATIVE
CALCIUM SERPL-MCNC: 8.2 MG/DL (ref 8.7–10.5)
CHLORIDE SERPL-SCNC: 107 MMOL/L (ref 95–110)
CO2 SERPL-SCNC: 21 MMOL/L (ref 23–29)
CREAT SERPL-MCNC: 0.8 MG/DL (ref 0.5–1.4)
DIFFERENTIAL METHOD BLD: ABNORMAL
EOSINOPHIL # BLD AUTO: 0 K/UL (ref 0–0.5)
EOSINOPHIL NFR BLD: 0 % (ref 0–8)
ERYTHROCYTE [DISTWIDTH] IN BLOOD BY AUTOMATED COUNT: 12.7 % (ref 11.5–14.5)
EST. GFR  (NO RACE VARIABLE): >60 ML/MIN/1.73 M^2
GLUCOSE SERPL-MCNC: 95 MG/DL (ref 70–110)
HCT VFR BLD AUTO: 25.1 % (ref 40–54)
HGB BLD-MCNC: 8.3 G/DL (ref 14–18)
IMM GRANULOCYTES # BLD AUTO: 0 K/UL (ref 0–0.04)
IMM GRANULOCYTES NFR BLD AUTO: 0 % (ref 0–0.5)
LYMPHOCYTES # BLD AUTO: 0 K/UL (ref 1–4.8)
LYMPHOCYTES NFR BLD: 33.3 % (ref 18–48)
MAGNESIUM SERPL-MCNC: 2.2 MG/DL (ref 1.6–2.6)
MCH RBC QN AUTO: 32.2 PG (ref 27–31)
MCHC RBC AUTO-ENTMCNC: 33.1 G/DL (ref 32–36)
MCV RBC AUTO: 97 FL (ref 82–98)
MONOCYTES # BLD AUTO: 0 K/UL (ref 0.3–1)
MONOCYTES NFR BLD: 0 % (ref 4–15)
NEUTROPHILS # BLD AUTO: 0 K/UL (ref 1.8–7.7)
NEUTROPHILS NFR BLD: 66.7 % (ref 38–73)
NRBC BLD-RTO: 0 /100 WBC
PHOSPHATE SERPL-MCNC: 2.4 MG/DL (ref 2.7–4.5)
PLATELET # BLD AUTO: 10 K/UL (ref 150–450)
PMV BLD AUTO: 11.9 FL (ref 9.2–12.9)
POTASSIUM SERPL-SCNC: 3.4 MMOL/L (ref 3.5–5.1)
PROT SERPL-MCNC: 6.1 G/DL (ref 6–8.4)
RBC # BLD AUTO: 2.58 M/UL (ref 4.6–6.2)
SODIUM SERPL-SCNC: 136 MMOL/L (ref 136–145)
WBC # BLD AUTO: 0.06 K/UL (ref 3.9–12.7)

## 2024-12-30 PROCEDURE — 63600175 PHARM REV CODE 636 W HCPCS: Performed by: INTERNAL MEDICINE

## 2024-12-30 PROCEDURE — 99233 SBSQ HOSP IP/OBS HIGH 50: CPT | Mod: FS,,, | Performed by: INTERNAL MEDICINE

## 2024-12-30 PROCEDURE — 63600175 PHARM REV CODE 636 W HCPCS

## 2024-12-30 PROCEDURE — 25000003 PHARM REV CODE 250: Performed by: INTERNAL MEDICINE

## 2024-12-30 PROCEDURE — 63600175 PHARM REV CODE 636 W HCPCS: Performed by: NURSE PRACTITIONER

## 2024-12-30 PROCEDURE — 25000003 PHARM REV CODE 250: Performed by: NURSE PRACTITIONER

## 2024-12-30 PROCEDURE — 25000003 PHARM REV CODE 250

## 2024-12-30 PROCEDURE — 85025 COMPLETE CBC W/AUTO DIFF WBC: CPT | Performed by: NURSE PRACTITIONER

## 2024-12-30 PROCEDURE — 20600001 HC STEP DOWN PRIVATE ROOM

## 2024-12-30 PROCEDURE — 94761 N-INVAS EAR/PLS OXIMETRY MLT: CPT

## 2024-12-30 PROCEDURE — 83735 ASSAY OF MAGNESIUM: CPT | Performed by: NURSE PRACTITIONER

## 2024-12-30 PROCEDURE — 80053 COMPREHEN METABOLIC PANEL: CPT | Performed by: NURSE PRACTITIONER

## 2024-12-30 PROCEDURE — 63600175 PHARM REV CODE 636 W HCPCS: Mod: JG | Performed by: INTERNAL MEDICINE

## 2024-12-30 PROCEDURE — 84100 ASSAY OF PHOSPHORUS: CPT | Performed by: NURSE PRACTITIONER

## 2024-12-30 PROCEDURE — 27000207 HC ISOLATION

## 2024-12-30 RX ORDER — DICYCLOMINE HYDROCHLORIDE 10 MG/1
10 CAPSULE ORAL 4 TIMES DAILY
Status: DISCONTINUED | OUTPATIENT
Start: 2024-12-30 | End: 2025-01-02

## 2024-12-30 RX ORDER — OXYCODONE HYDROCHLORIDE 5 MG/1
5 TABLET ORAL EVERY 6 HOURS PRN
Status: DISCONTINUED | OUTPATIENT
Start: 2024-12-30 | End: 2025-01-02

## 2024-12-30 RX ORDER — HYDROMORPHONE HYDROCHLORIDE 1 MG/ML
1 INJECTION, SOLUTION INTRAMUSCULAR; INTRAVENOUS; SUBCUTANEOUS ONCE
Status: COMPLETED | OUTPATIENT
Start: 2024-12-30 | End: 2024-12-30

## 2024-12-30 RX ADMIN — MICAFUNGIN SODIUM 100 MG: 100 INJECTION, POWDER, LYOPHILIZED, FOR SOLUTION INTRAVENOUS at 09:12

## 2024-12-30 RX ADMIN — AMLODIPINE BESYLATE 5 MG: 5 TABLET ORAL at 09:12

## 2024-12-30 RX ADMIN — CEFEPIME 2 G: 2 INJECTION, POWDER, FOR SOLUTION INTRAVENOUS at 09:12

## 2024-12-30 RX ADMIN — MORPHINE SULFATE 1 MG: 2 INJECTION, SOLUTION INTRAMUSCULAR; INTRAVENOUS at 06:12

## 2024-12-30 RX ADMIN — ONDANSETRON 8 MG: 2 INJECTION INTRAMUSCULAR; INTRAVENOUS at 01:12

## 2024-12-30 RX ADMIN — CYCLOPHOSPHAMIDE 3960 MG: 200 INJECTION, SOLUTION INTRAVENOUS at 11:12

## 2024-12-30 RX ADMIN — Medication 1 TABLET: at 04:12

## 2024-12-30 RX ADMIN — Medication 1 DOSE: at 09:12

## 2024-12-30 RX ADMIN — CEFEPIME 2 G: 2 INJECTION, POWDER, FOR SOLUTION INTRAVENOUS at 12:12

## 2024-12-30 RX ADMIN — HYDROMORPHONE HYDROCHLORIDE 1 MG: 1 INJECTION, SOLUTION INTRAMUSCULAR; INTRAVENOUS; SUBCUTANEOUS at 09:12

## 2024-12-30 RX ADMIN — MESNA 792 MG: 100 INJECTION, SOLUTION INTRAVENOUS at 07:12

## 2024-12-30 RX ADMIN — POTASSIUM CHLORIDE 20 MEQ: 1500 TABLET, EXTENDED RELEASE ORAL at 11:12

## 2024-12-30 RX ADMIN — MESNA 792 MG: 100 INJECTION, SOLUTION INTRAVENOUS at 04:12

## 2024-12-30 RX ADMIN — MESNA 792 MG: 100 INJECTION, SOLUTION INTRAVENOUS at 11:12

## 2024-12-30 RX ADMIN — CEFEPIME 2 G: 2 INJECTION, POWDER, FOR SOLUTION INTRAVENOUS at 04:12

## 2024-12-30 RX ADMIN — ONDANSETRON 8 MG: 2 INJECTION INTRAMUSCULAR; INTRAVENOUS at 06:12

## 2024-12-30 RX ADMIN — OLANZAPINE 10 MG: 2.5 TABLET, FILM COATED ORAL at 09:12

## 2024-12-30 RX ADMIN — DICYCLOMINE HYDROCHLORIDE 10 MG: 10 CAPSULE ORAL at 04:12

## 2024-12-30 RX ADMIN — Medication 1 DOSE: at 04:12

## 2024-12-30 RX ADMIN — URSODIOL 300 MG: 300 CAPSULE ORAL at 09:12

## 2024-12-30 RX ADMIN — LORAZEPAM 1 MG: 1 TABLET ORAL at 09:12

## 2024-12-30 RX ADMIN — ACYCLOVIR 800 MG: 200 CAPSULE ORAL at 09:12

## 2024-12-30 RX ADMIN — PROCHLORPERAZINE EDISYLATE 5 MG: 5 INJECTION INTRAMUSCULAR; INTRAVENOUS at 10:12

## 2024-12-30 RX ADMIN — PROCHLORPERAZINE EDISYLATE 5 MG: 5 INJECTION INTRAMUSCULAR; INTRAVENOUS at 04:12

## 2024-12-30 RX ADMIN — DICYCLOMINE HYDROCHLORIDE 10 MG: 10 CAPSULE ORAL at 09:12

## 2024-12-30 RX ADMIN — POTASSIUM CHLORIDE 20 MEQ: 1500 TABLET, EXTENDED RELEASE ORAL at 09:12

## 2024-12-30 RX ADMIN — MESNA 792 MG: 100 INJECTION, SOLUTION INTRAVENOUS at 02:12

## 2024-12-30 RX ADMIN — ONDANSETRON 8 MG: 2 INJECTION INTRAMUSCULAR; INTRAVENOUS at 09:12

## 2024-12-30 RX ADMIN — Medication 1 TABLET: at 09:12

## 2024-12-30 RX ADMIN — SODIUM CHLORIDE 500 ML: 9 INJECTION, SOLUTION INTRAVENOUS at 01:12

## 2024-12-30 RX ADMIN — PANTOPRAZOLE SODIUM 40 MG: 40 TABLET, DELAYED RELEASE ORAL at 09:12

## 2024-12-30 RX ADMIN — SODIUM CHLORIDE 500 ML: 9 INJECTION, SOLUTION INTRAVENOUS at 08:12

## 2024-12-30 RX ADMIN — Medication 1 TABLET: at 01:12

## 2024-12-30 RX ADMIN — OXYCODONE 5 MG: 5 TABLET ORAL at 04:12

## 2024-12-30 RX ADMIN — Medication 1 DOSE: at 01:12

## 2024-12-30 NOTE — ASSESSMENT & PLAN NOTE
- Transaminases first elevated 12/27. AST 51 and ALT 74. Suspect 2/2 chemotherapy prior to transplant.   - Will continue to monitor with daily CMP.  - Down-trending.

## 2024-12-30 NOTE — ASSESSMENT & PLAN NOTE
- 2/2 myelofibrosis  - Was on daily ASA, but stopped taking ~ 3 weeks ago  - Now with anticipated thrombocytopenia following chemotherapy  - Daily CBC while IP  RESOLVED

## 2024-12-30 NOTE — SUBJECTIVE & OBJECTIVE
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 Will receive PT Cy today. T-max of 100.3 over night. VSS. Infection w/u remains neg. Continuing Cefepime for neutropenic fevers. Repleting K+. No aGVHD on exam. C-diff sample collected but apparently not appropriate for processing. Reports having liquid stool this morning. Will reorder c-diff with next liquid stool.    Objective:     Vital Signs (Most Recent):  Temp: 98.3 °F (36.8 °C) (12/30/24 0759)  Pulse: 94 (12/30/24 0759)  Resp: 15 (12/30/24 0759)  BP: 112/70 (12/30/24 0759)  SpO2: 95 % (12/30/24 0759) Vital Signs (24h Range):  Temp:  [98 °F (36.7 °C)-100.3 °F (37.9 °C)] 98.3 °F (36.8 °C)  Pulse:  [] 94  Resp:  [15-18] 15  SpO2:  [95 %-98 %] 95 %  BP: (112-144)/(68-76) 112/70     Weight: 89.8 kg (197 lb 15.6 oz)  Body mass index is 27.61 kg/m².  Body surface area is 2.12 meters squared.    ECOG SCORE           [unfilled]    Intake/Output - Last 3 Shifts         12/28 0700  12/29 0659 12/29 0700  12/30 0659 12/30 0700 12/31 0659    P.O. 150 240     I.V. (mL/kg)       IV Piggyback 98.6 1114.6     Total Intake(mL/kg) 248.6 (2.8) 1354.6 (15.1)     Urine (mL/kg/hr) 1200 (0.6) 1720 (0.8)     Stool 0 0     Total Output 1200 1720     Net -951.4 -365.4            Urine Occurrence  1 x     Stool Occurrence 5 x 0 x              Physical Exam  Constitutional:       Appearance: He is well-developed.   HENT:      Head: Normocephalic and atraumatic.      Mouth/Throat:      Pharynx: No oropharyngeal exudate.   Eyes:      General:         Right eye: No discharge.         Left eye: No discharge.      Conjunctiva/sclera: Conjunctivae normal.      Pupils: Pupils are equal, round, and reactive to light.   Cardiovascular:      Rate and Rhythm: Normal rate and regular rhythm.      Heart sounds: Normal heart sounds. No murmur heard.  Pulmonary:      Effort: Pulmonary effort is normal. No respiratory distress.      Breath  sounds: Normal breath sounds. No wheezing or rales.   Abdominal:      General: Bowel sounds are normal. There is no distension.      Palpations: Abdomen is soft.      Tenderness: There is no abdominal tenderness.   Musculoskeletal:         General: No deformity. Normal range of motion.      Cervical back: Normal range of motion and neck supple.   Skin:     General: Skin is warm and dry.      Findings: No erythema or rash.      Comments: Right chest wall [place]. Dressing c/d/i. No sign of infection to site.   Neurological:      Mental Status: He is alert and oriented to person, place, and time.   Psychiatric:         Behavior: Behavior normal.         Thought Content: Thought content normal.         Judgment: Judgment normal.            Significant Labs:   CBC:   Recent Labs   Lab 12/29/24  0453 12/30/24  0420   WBC 0.36* 0.06*   HGB 9.8* 8.3*   HCT 28.7* 25.1*   PLT 35* 10*    and CMP:   Recent Labs   Lab 12/29/24  0453 12/30/24  0420   * 136   K 3.7 3.4*    107   CO2 23 21*   * 95   BUN 15 13   CREATININE 0.8 0.8   CALCIUM 8.7 8.2*   PROT 6.6 6.1   ALBUMIN 2.9* 2.5*   BILITOT 0.5 0.6   ALKPHOS 40 42   AST 26 31   ALT 80* 69*   ANIONGAP 7* 8       Diagnostic Results:  I have reviewed all pertinent imaging results/findings within the past 24 hours.

## 2024-12-30 NOTE — ASSESSMENT & PLAN NOTE
- Became hypertensive following SCT on 12/26. No signs of volume overload contributing to HTN.  - 12/27 started amlodipine.

## 2024-12-30 NOTE — PLAN OF CARE
Pt involved in plan of care and communicating needs throughout shift. Up in room and to bathroom independently; no SOB. Pt reported having liquid stools x3 and abdominal pain - C-diff sample sent to lab. Electrolytes replaced PRN as per protocol. Pt. Verbalized not having much of an appetite. Voiding without difficulty.  AAOX4. All VSS; no acute events so far this shift. Pt remaining free from falls or injury throughout shift; bed locked and in lowest position; call light within reach. Pt instructed to call for assistance as needed. Q1H rounding done on pt.    Called to pt room pt endorsing sharp abdominal pains with cramping NP Carmelita notified. Bentyl & Oxy given as ordered with no relief. Pt diaphoretic in fetal position with HR sustaining 130's. On call physician Rolando notified - CT of chest, abd and pelvis with contrast ordered. IV morphine given as ordered with moderate relief. Pending hospital transport for CT.

## 2024-12-30 NOTE — ASSESSMENT & PLAN NOTE
- Constipated upon admit and was previously taking miralax and PRN Pericolace. Now with soft bowel movements. Miralax and pericolace discontinued 12/24/24.

## 2024-12-30 NOTE — ASSESSMENT & PLAN NOTE
- 12/27 diffuse headache began.   - Oxy 5 mg as needed for HA  - Avoiding tylenol with recent allo SCT/neutropenia and do not want to mask a fever.   - No c/o HA today.

## 2024-12-30 NOTE — PLAN OF CARE
Pt day +4 allo SCT. No acute events occurred during the shift. No complains of pain or nausea. Pt ambulates in room and to bathroom independently. No difficulty voiding. VS have been stable throughout shift and pt is afebrile. Pt AOx4. POC reviewed with patient and he verbalized an understanding. Questions encouraged and answered. Bed is in the lowest position and locked. Non skid socks worn. Call light within reach. Pt encouraged to call for assistance when needed.

## 2024-12-30 NOTE — ASSESSMENT & PLAN NOTE
- Anticipated following chemotherapy.  - Transfuse for hgb < 7 or plts < 10K  - Continue antimicrobial ppx  - Daily CBC while IP

## 2024-12-30 NOTE — PROGRESS NOTES
Daniel Rodríguez - Oncology ([facility])  Hematology  Bone Marrow Transplant  Progress Note    Patient Name: Rubén Hu  Admission Date: 12/18/2024  Hospital Length of Stay: 12 days  Code Status: Full Code    Subjective:     Interval History: Day +4 from a Bu/Flu/Thiotepa PTCy haploidentical (brother) SCT for PMF. Will receive PT Cy today. T-max of 100.3 over night. VSS. Infection w/u remains neg. Continuing Cefepime for neutropenic fevers. Repleting K+. No aGVHD on exam. C-diff sample collected but apparently not appropriate for processing. Reports having liquid stool this morning. Will reorder c-diff with next liquid stool.    Objective:     Vital Signs (Most Recent):  Temp: 98.3 °F (36.8 °C) (12/30/24 0759)  Pulse: 94 (12/30/24 0759)  Resp: 15 (12/30/24 0759)  BP: 112/70 (12/30/24 0759)  SpO2: 95 % (12/30/24 0759) Vital Signs (24h Range):  Temp:  [98 °F (36.7 °C)-100.3 °F (37.9 °C)] 98.3 °F (36.8 °C)  Pulse:  [] 94  Resp:  [15-18] 15  SpO2:  [95 %-98 %] 95 %  BP: (112-144)/(68-76) 112/70     Weight: 89.8 kg (197 lb 15.6 oz)  Body mass index is 27.61 kg/m².  Body surface area is 2.12 meters squared.    ECOG SCORE           [unfilled]    Intake/Output - Last 3 Shifts         12/28 0700  12/29 0659 12/29 0700  12/30 0659 12/30 0700  12/31 0659    P.O. 150 240     I.V. (mL/kg)       IV Piggyback 98.6 1114.6     Total Intake(mL/kg) 248.6 (2.8) 1354.6 (15.1)     Urine (mL/kg/hr) 1200 (0.6) 1720 (0.8)     Stool 0 0     Total Output 1200 1720     Net -951.4 -365.4            Urine Occurrence  1 x     Stool Occurrence 5 x 0 x              Physical Exam  Constitutional:       Appearance: He is well-developed.   HENT:      Head: Normocephalic and atraumatic.      Mouth/Throat:      Pharynx: No oropharyngeal exudate.   Eyes:      General:         Right eye: No discharge.         Left eye: No discharge.      Conjunctiva/sclera: Conjunctivae normal.      Pupils: Pupils are equal, round, and reactive to light.   Cardiovascular:       Rate and Rhythm: Normal rate and regular rhythm.      Heart sounds: Normal heart sounds. No murmur heard.  Pulmonary:      Effort: Pulmonary effort is normal. No respiratory distress.      Breath sounds: Normal breath sounds. No wheezing or rales.   Abdominal:      General: Bowel sounds are normal. There is no distension.      Palpations: Abdomen is soft.      Tenderness: There is no abdominal tenderness.   Musculoskeletal:         General: No deformity. Normal range of motion.      Cervical back: Normal range of motion and neck supple.   Skin:     General: Skin is warm and dry.      Findings: No erythema or rash.      Comments: Right chest wall [place]. Dressing c/d/i. No sign of infection to site.   Neurological:      Mental Status: He is alert and oriented to person, place, and time.   Psychiatric:         Behavior: Behavior normal.         Thought Content: Thought content normal.         Judgment: Judgment normal.            Significant Labs:   CBC:   Recent Labs   Lab 12/29/24  0453 12/30/24  0420   WBC 0.36* 0.06*   HGB 9.8* 8.3*   HCT 28.7* 25.1*   PLT 35* 10*    and CMP:   Recent Labs   Lab 12/29/24  0453 12/30/24  0420   * 136   K 3.7 3.4*    107   CO2 23 21*   * 95   BUN 15 13   CREATININE 0.8 0.8   CALCIUM 8.7 8.2*   PROT 6.6 6.1   ALBUMIN 2.9* 2.5*   BILITOT 0.5 0.6   ALKPHOS 40 42   AST 26 31   ALT 80* 69*   ANIONGAP 7* 8       Diagnostic Results:  I have reviewed all pertinent imaging results/findings within the past 24 hours.  Assessment/Plan:     * History of allogeneic stem cell transplant  - Patient of Dr. 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 Received 3 bags with CD34 dose of 6.04x10^6/kg.   - Today is Day +4  - He will receive PT Cy today.  - Patient is O+. Donor is O+.  - Patient is CMV non-reactive. Donor is CMV reactive. Given donor CMV status, patient will start (patient-supplied)  letermovir on Day +13.  - Day -4 and Day -3 Busulfan dose-adjusted to 282 mg  - See treatment plan below:    Planned conditioning regimen:  Thiotepa on Day -7  Busulfan on Day -6, -5, -4, and -3  Fludarabine on Day -6, -5, -4, and -3  REST DAYS on Day -2 and -1     Planned  GVHD Prophylaxis:  Cyclophosphamide on Day +3 and +4  Tacrolimus starting on Day +5  Mycophenolate starting on Day +5     Antimicrobial Prophylaxis:  Acyclovir starting on Day -7  Levofloxacin starting on Day -1  Micafungin on Day -1 through Day +4  Letermovir starting on Day +5 (if CMV PCR drawn on day 0 results negative)  Posaconazole starting on Day +5  Atovaquone starting on Day +30     Skin Care with Thiotepa: Day -7 through D-5     Seizure Prophylaxis:  Levetiracetam on Day -7 through Day -2     SOS/VOD Prophylaxis:  Ursodiol on Day -7 through Day +30     Growth Factor Support:  Neupogen starting on Day +5        Caregiver: Carmelita (Spouse)  Post-transplant discharge plans: Home      Primary myelofibrosis  From Dr. James's most recent clinic note:  Primary myelofibrosis              A. 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 Transplant on 12/26/24 at 1330. Received 3 bags with CD34 dose of 6.04 x 10^6/kg.     Pancytopenia due to chemotherapy  - Anticipated following chemotherapy.  - Transfuse for hgb < 7 or plts < 10K  - Continue antimicrobial ppx  - Daily CBC while IP    Neutropenic fever  - Infection w/u neg thus far  - Continue Cefepime    Chemotherapy-induced nausea  - Anticipate 2/2 chemotherapy   - Zofran q8h  - Compazine q6h  - Zyprexa  QHS  - Ativan PRN    Hypokalemia  - See electrolyte disorder    Electrolyte disorder  - Replete per PRN electrolyte order set  - Daily CMP, mag, and phos while inpatient    Transaminitis  - Transaminases first elevated 12/27. AST 51 and ALT 74. Suspect 2/2 chemotherapy prior to transplant.   - Will continue to monitor with daily CMP.  - Down-trending.    Hypertension  - Became hypertensive following SCT on 12/26. No signs of volume overload contributing to HTN.  - 12/27 started amlodipine.     Headache  - 12/27 diffuse headache began.   - Oxy 5 mg as needed for HA  - Avoiding tylenol with recent allo SCT/neutropenia and do not want to mask a fever.   - No c/o HA today.    Insomnia  - Difficulty sleeping at night while IP.  - Has PRN ativan and trazodone  - Melatonin not effective     Other constipation  - Constipated upon admit and was previously taking miralax and PRN Pericolace. Now with soft bowel movements. Miralax and pericolace discontinued 12/24/24.     Thrombocytosis  - 2/2 myelofibrosis  - Was on daily ASA, but stopped taking ~ 3 weeks ago  - Now with anticipated thrombocytopenia following chemotherapy  - Daily CBC while IP  RESOLVED    Adrenal nodule  - Subcentimeter nodular thickening of the adrenal glands first noted on imaging from 1/2024.  - Seen by endocrine prior to transplant admission and Dexamethasone suppression test performed. Patient responded appropriately. No further w/u needed.  - Likely adenomas.    Metabolic dysfunction-associated steatotic liver disease (MASLD)  - Biopsy proven to be steatotic change. Most compatible with MASLD.   - Follow-up with hepatology OP.    Hyperlipidemia  - Holding home atorvastatin while IP to avoid interaction with other medications. Can resume at discharge if LFTs stable.          VTE Risk Mitigation (From admission, onward)           Ordered     heparin, porcine (PF) 100 unit/mL injection flush 300 Units  As needed (PRN)         12/18/24 2025                     Disposition: Inpatient for allogeneic SCT. Awaiting neutrophil engraftment.    Anna Vera, NP  Bone Marrow Transplant  [facility] - Oncology ([facility])

## 2024-12-30 NOTE — ASSESSMENT & PLAN NOTE
- Patient of Dr. 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 Received 3 bags with CD34 dose of 6.04x10^6/kg.   - Today is Day +4  - He will receive PT Cy today.  - Patient is O+. Donor is O+.  - Patient is CMV non-reactive. Donor is CMV reactive. Given donor CMV status, patient will start (patient-supplied) letermovir on Day +13.  - Day -4 and Day -3 Busulfan dose-adjusted to 282 mg  - See treatment plan below:    Planned conditioning regimen:  Thiotepa on Day -7  Busulfan on Day -6, -5, -4, and -3  Fludarabine on Day -6, -5, -4, and -3  REST DAYS on Day -2 and -1     Planned  GVHD Prophylaxis:  Cyclophosphamide on Day +3 and +4  Tacrolimus starting on Day +5  Mycophenolate starting on Day +5     Antimicrobial Prophylaxis:  Acyclovir starting on Day -7  Levofloxacin starting on Day -1  Micafungin on Day -1 through Day +4  Letermovir starting on Day +5 (if CMV PCR drawn on day 0 results negative)  Posaconazole starting on Day +5  Atovaquone starting on Day +30     Skin Care with Thiotepa: Day -7 through D-5     Seizure Prophylaxis:  Levetiracetam on Day -7 through Day -2     SOS/VOD Prophylaxis:  Ursodiol on Day -7 through Day +30     Growth Factor Support:  Neupogen starting on Day +5        Caregiver: Carmelita (Spouse)  Post-transplant discharge plans: Home

## 2024-12-31 PROBLEM — L23.1 ALLERGIC CONTACT DERMATITIS DUE TO ADHESIVES: Status: ACTIVE | Noted: 2024-12-31

## 2024-12-31 LAB
ABO + RH BLD: NORMAL
ALBUMIN SERPL BCP-MCNC: 2.4 G/DL (ref 3.5–5.2)
ALP SERPL-CCNC: 43 U/L (ref 40–150)
ALT SERPL W/O P-5'-P-CCNC: 48 U/L (ref 10–44)
ANION GAP SERPL CALC-SCNC: 7 MMOL/L (ref 8–16)
ANISOCYTOSIS BLD QL SMEAR: SLIGHT
AST SERPL-CCNC: 17 U/L (ref 10–40)
BASOPHILS # BLD AUTO: 0 K/UL (ref 0–0.2)
BASOPHILS NFR BLD: 0 % (ref 0–1.9)
BILIRUB SERPL-MCNC: 0.4 MG/DL (ref 0.1–1)
BLD GP AB SCN CELLS X3 SERPL QL: NORMAL
BLD PROD TYP BPU: NORMAL
BLOOD UNIT EXPIRATION DATE: NORMAL
BLOOD UNIT TYPE CODE: 7300
BLOOD UNIT TYPE: NORMAL
BUN SERPL-MCNC: 10 MG/DL (ref 6–20)
CALCIUM SERPL-MCNC: 8 MG/DL (ref 8.7–10.5)
CHLORIDE SERPL-SCNC: 110 MMOL/L (ref 95–110)
CO2 SERPL-SCNC: 20 MMOL/L (ref 23–29)
CODING SYSTEM: NORMAL
CREAT SERPL-MCNC: 0.6 MG/DL (ref 0.5–1.4)
CROSSMATCH INTERPRETATION: NORMAL
DIFFERENTIAL METHOD BLD: ABNORMAL
DISPENSE STATUS: NORMAL
EOSINOPHIL # BLD AUTO: 0 K/UL (ref 0–0.5)
EOSINOPHIL NFR BLD: 0 % (ref 0–8)
ERYTHROCYTE [DISTWIDTH] IN BLOOD BY AUTOMATED COUNT: 12.7 % (ref 11.5–14.5)
EST. GFR  (NO RACE VARIABLE): >60 ML/MIN/1.73 M^2
GLUCOSE SERPL-MCNC: 101 MG/DL (ref 70–110)
HCT VFR BLD AUTO: 22.6 % (ref 40–54)
HGB BLD-MCNC: 7.7 G/DL (ref 14–18)
IMM GRANULOCYTES # BLD AUTO: 0 K/UL (ref 0–0.04)
IMM GRANULOCYTES NFR BLD AUTO: 0 % (ref 0–0.5)
LYMPHOCYTES # BLD AUTO: 0 K/UL (ref 1–4.8)
LYMPHOCYTES NFR BLD: 100 % (ref 18–48)
MAGNESIUM SERPL-MCNC: 2.1 MG/DL (ref 1.6–2.6)
MCH RBC QN AUTO: 32.5 PG (ref 27–31)
MCHC RBC AUTO-ENTMCNC: 34.1 G/DL (ref 32–36)
MCV RBC AUTO: 95 FL (ref 82–98)
MONOCYTES # BLD AUTO: 0 K/UL (ref 0.3–1)
MONOCYTES NFR BLD: 0 % (ref 4–15)
NEUTROPHILS # BLD AUTO: 0 K/UL (ref 1.8–7.7)
NEUTROPHILS NFR BLD: 0 % (ref 38–73)
NRBC BLD-RTO: 0 /100 WBC
OVALOCYTES BLD QL SMEAR: ABNORMAL
PHOSPHATE SERPL-MCNC: 1.6 MG/DL (ref 2.7–4.5)
PLATELET # BLD AUTO: 4 K/UL (ref 150–450)
PLATELET BLD QL SMEAR: ABNORMAL
PMV BLD AUTO: 12.8 FL (ref 9.2–12.9)
POIKILOCYTOSIS BLD QL SMEAR: SLIGHT
POTASSIUM SERPL-SCNC: 3.2 MMOL/L (ref 3.5–5.1)
PROT SERPL-MCNC: 5.7 G/DL (ref 6–8.4)
RBC # BLD AUTO: 2.37 M/UL (ref 4.6–6.2)
SODIUM SERPL-SCNC: 137 MMOL/L (ref 136–145)
SPECIMEN OUTDATE: NORMAL
UNIT NUMBER: NORMAL
WBC # BLD AUTO: 0.01 K/UL (ref 3.9–12.7)

## 2024-12-31 PROCEDURE — 63600175 PHARM REV CODE 636 W HCPCS: Performed by: INTERNAL MEDICINE

## 2024-12-31 PROCEDURE — 86901 BLOOD TYPING SEROLOGIC RH(D): CPT | Performed by: INTERNAL MEDICINE

## 2024-12-31 PROCEDURE — 25000003 PHARM REV CODE 250: Performed by: NURSE PRACTITIONER

## 2024-12-31 PROCEDURE — 63600175 PHARM REV CODE 636 W HCPCS

## 2024-12-31 PROCEDURE — 63600175 PHARM REV CODE 636 W HCPCS: Performed by: NURSE PRACTITIONER

## 2024-12-31 PROCEDURE — 99233 SBSQ HOSP IP/OBS HIGH 50: CPT | Mod: FS,,, | Performed by: INTERNAL MEDICINE

## 2024-12-31 PROCEDURE — 80053 COMPREHEN METABOLIC PANEL: CPT | Performed by: NURSE PRACTITIONER

## 2024-12-31 PROCEDURE — 83735 ASSAY OF MAGNESIUM: CPT | Performed by: NURSE PRACTITIONER

## 2024-12-31 PROCEDURE — 85025 COMPLETE CBC W/AUTO DIFF WBC: CPT | Performed by: NURSE PRACTITIONER

## 2024-12-31 PROCEDURE — 20600001 HC STEP DOWN PRIVATE ROOM

## 2024-12-31 PROCEDURE — 25500020 PHARM REV CODE 255

## 2024-12-31 PROCEDURE — 25000003 PHARM REV CODE 250: Performed by: INTERNAL MEDICINE

## 2024-12-31 PROCEDURE — P9037 PLATE PHERES LEUKOREDU IRRAD: HCPCS

## 2024-12-31 PROCEDURE — 25500020 PHARM REV CODE 255: Performed by: INTERNAL MEDICINE

## 2024-12-31 PROCEDURE — 25000003 PHARM REV CODE 250

## 2024-12-31 PROCEDURE — 30233R1 TRANSFUSION OF NONAUTOLOGOUS PLATELETS INTO PERIPHERAL VEIN, PERCUTANEOUS APPROACH: ICD-10-PCS | Performed by: INTERNAL MEDICINE

## 2024-12-31 PROCEDURE — 84100 ASSAY OF PHOSPHORUS: CPT | Performed by: NURSE PRACTITIONER

## 2024-12-31 PROCEDURE — 36430 TRANSFUSION BLD/BLD COMPNT: CPT

## 2024-12-31 RX ORDER — ACETAMINOPHEN 325 MG/1
650 TABLET ORAL EVERY 6 HOURS PRN
Status: DISCONTINUED | OUTPATIENT
Start: 2024-12-31 | End: 2025-01-25 | Stop reason: HOSPADM

## 2024-12-31 RX ORDER — HYDROCODONE BITARTRATE AND ACETAMINOPHEN 500; 5 MG/1; MG/1
TABLET ORAL
Status: DISCONTINUED | OUTPATIENT
Start: 2024-12-31 | End: 2025-01-06

## 2024-12-31 RX ORDER — LEVOFLOXACIN 500 MG/1
500 TABLET, FILM COATED ORAL DAILY
Status: DISCONTINUED | OUTPATIENT
Start: 2024-12-31 | End: 2025-01-02

## 2024-12-31 RX ADMIN — Medication 1 DOSE: at 01:12

## 2024-12-31 RX ADMIN — ONDANSETRON 8 MG: 2 INJECTION INTRAMUSCULAR; INTRAVENOUS at 08:12

## 2024-12-31 RX ADMIN — LETERMOVIR 480 MG: 480 TABLET, FILM COATED ORAL at 09:12

## 2024-12-31 RX ADMIN — IOHEXOL 100 ML: 350 INJECTION, SOLUTION INTRAVENOUS at 09:12

## 2024-12-31 RX ADMIN — OXYCODONE 5 MG: 5 TABLET ORAL at 02:12

## 2024-12-31 RX ADMIN — Medication 2 TABLET: at 01:12

## 2024-12-31 RX ADMIN — POSACONAZOLE 300 MG: 100 TABLET, DELAYED RELEASE ORAL at 09:12

## 2024-12-31 RX ADMIN — URSODIOL 300 MG: 300 CAPSULE ORAL at 08:12

## 2024-12-31 RX ADMIN — DICYCLOMINE HYDROCHLORIDE 10 MG: 10 CAPSULE ORAL at 09:12

## 2024-12-31 RX ADMIN — Medication 2 TABLET: at 09:12

## 2024-12-31 RX ADMIN — POSACONAZOLE 300 MG: 100 TABLET, DELAYED RELEASE ORAL at 08:12

## 2024-12-31 RX ADMIN — ONDANSETRON 8 MG: 2 INJECTION INTRAMUSCULAR; INTRAVENOUS at 04:12

## 2024-12-31 RX ADMIN — MYCOPHENOLATE MOFETIL 1000 MG: 250 CAPSULE ORAL at 09:12

## 2024-12-31 RX ADMIN — PROCHLORPERAZINE EDISYLATE 5 MG: 5 INJECTION INTRAMUSCULAR; INTRAVENOUS at 05:12

## 2024-12-31 RX ADMIN — DICYCLOMINE HYDROCHLORIDE 10 MG: 10 CAPSULE ORAL at 01:12

## 2024-12-31 RX ADMIN — FILGRASTIM 480 MCG: 480 INJECTION, SOLUTION INTRAVENOUS; SUBCUTANEOUS at 09:12

## 2024-12-31 RX ADMIN — DIPHENHYDRAMINE HYDROCHLORIDE 50 MG: 25 CAPSULE ORAL at 11:12

## 2024-12-31 RX ADMIN — ACETAMINOPHEN 650 MG: 325 TABLET ORAL at 12:12

## 2024-12-31 RX ADMIN — MYCOPHENOLATE MOFETIL 1000 MG: 250 CAPSULE ORAL at 08:12

## 2024-12-31 RX ADMIN — CEFEPIME 2 G: 2 INJECTION, POWDER, FOR SOLUTION INTRAVENOUS at 01:12

## 2024-12-31 RX ADMIN — POTASSIUM CHLORIDE 20 MEQ: 1500 TABLET, EXTENDED RELEASE ORAL at 11:12

## 2024-12-31 RX ADMIN — ACYCLOVIR 800 MG: 200 CAPSULE ORAL at 09:12

## 2024-12-31 RX ADMIN — OLANZAPINE 10 MG: 2.5 TABLET, FILM COATED ORAL at 08:12

## 2024-12-31 RX ADMIN — MYCOPHENOLATE MOFETIL 1000 MG: 250 CAPSULE ORAL at 03:12

## 2024-12-31 RX ADMIN — DICYCLOMINE HYDROCHLORIDE 10 MG: 10 CAPSULE ORAL at 08:12

## 2024-12-31 RX ADMIN — TACROLIMUS 1 MG: 1 CAPSULE ORAL at 06:12

## 2024-12-31 RX ADMIN — ACYCLOVIR 800 MG: 200 CAPSULE ORAL at 08:12

## 2024-12-31 RX ADMIN — DICYCLOMINE HYDROCHLORIDE 10 MG: 10 CAPSULE ORAL at 05:12

## 2024-12-31 RX ADMIN — SUMATRIPTAN SUCCINATE 50 MG: 50 TABLET ORAL at 04:12

## 2024-12-31 RX ADMIN — POTASSIUM CHLORIDE 20 MEQ: 1500 TABLET, EXTENDED RELEASE ORAL at 01:12

## 2024-12-31 RX ADMIN — Medication 1 DOSE: at 09:12

## 2024-12-31 RX ADMIN — TACROLIMUS 1 MG: 1 CAPSULE ORAL at 08:12

## 2024-12-31 RX ADMIN — Medication 1 DOSE: at 08:12

## 2024-12-31 RX ADMIN — PROCHLORPERAZINE EDISYLATE 5 MG: 5 INJECTION INTRAMUSCULAR; INTRAVENOUS at 10:12

## 2024-12-31 RX ADMIN — ONDANSETRON 8 MG: 2 INJECTION INTRAMUSCULAR; INTRAVENOUS at 01:12

## 2024-12-31 RX ADMIN — PANTOPRAZOLE SODIUM 40 MG: 40 TABLET, DELAYED RELEASE ORAL at 09:12

## 2024-12-31 RX ADMIN — CEFEPIME 2 G: 2 INJECTION, POWDER, FOR SOLUTION INTRAVENOUS at 08:12

## 2024-12-31 RX ADMIN — URSODIOL 300 MG: 300 CAPSULE ORAL at 09:12

## 2024-12-31 RX ADMIN — PROCHLORPERAZINE EDISYLATE 5 MG: 5 INJECTION INTRAMUSCULAR; INTRAVENOUS at 04:12

## 2024-12-31 RX ADMIN — TRAZODONE HYDROCHLORIDE 25 MG: 50 TABLET ORAL at 08:12

## 2024-12-31 RX ADMIN — Medication 1 DOSE: at 05:12

## 2024-12-31 RX ADMIN — IOHEXOL 15 ML: 350 INJECTION, SOLUTION INTRAVENOUS at 05:12

## 2024-12-31 RX ADMIN — AMLODIPINE BESYLATE 5 MG: 5 TABLET ORAL at 09:12

## 2024-12-31 RX ADMIN — Medication 2 TABLET: at 05:12

## 2024-12-31 RX ADMIN — PROCHLORPERAZINE EDISYLATE 5 MG: 5 INJECTION INTRAMUSCULAR; INTRAVENOUS at 09:12

## 2024-12-31 RX ADMIN — POTASSIUM CHLORIDE 20 MEQ: 1500 TABLET, EXTENDED RELEASE ORAL at 09:12

## 2024-12-31 RX ADMIN — LEVOFLOXACIN 500 MG: 500 TABLET, FILM COATED ORAL at 10:12

## 2024-12-31 NOTE — ASSESSMENT & PLAN NOTE
- Patient of Dr. 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 Received 3 bags with CD34 dose of 6.04x10^6/kg.   - Today is Day +5  - Patient is O+. Donor is O+.  - Patient is CMV non-reactive. Donor is CMV reactive. Given donor CMV status, patient will start (patient-supplied) letermovir on Day +5.  - Day -4 and Day -3 Busulfan dose-adjusted to 282 mg  - See treatment plan below:    Planned conditioning regimen:  Thiotepa on Day -7  Busulfan on Day -6, -5, -4, and -3  Fludarabine on Day -6, -5, -4, and -3  REST DAYS on Day -2 and -1     Planned  GVHD Prophylaxis:  Cyclophosphamide on Day +3 and +4  Tacrolimus starting on Day +5  Mycophenolate starting on Day +5     Antimicrobial Prophylaxis:  Acyclovir starting on Day -7  Levofloxacin starting on Day -1  Micafungin on Day -1 through Day +4  Letermovir starting on Day +5 (if CMV PCR drawn on day 0 results negative)  Posaconazole starting on Day +5  Atovaquone starting on Day +30     Skin Care with Thiotepa: Day -7 through D-5     Seizure Prophylaxis:  Levetiracetam on Day -7 through Day -2     SOS/VOD Prophylaxis:  Ursodiol on Day -7 through Day +30     Growth Factor Support:  Neupogen starting on Day +5        Caregiver: Carmelita (Spouse)  Post-transplant discharge plans: Home

## 2024-12-31 NOTE — PROGRESS NOTES
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 VSS, Afebrile. Stopping cefepime and re-starting Levaquin today as pt has been afebrile >48hrs and infectious workup was negative. Abdominal pain resolved  with ATC Bentyl. Reports normal stools since yesterday evening. C-diff negative. CT chest/abd/pelvis with no acute abdominopelvic abnormalities. Platelets 4, will receive 1 unit in addition to K and phos replacement. ANC 0. Reports redness/itching to chest where tele stickers were previously placed. Tele monitor off. Suspect allergy to adhesive tele leads. Itching improved with topical benadryl. No signs of acute GVHD. 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 Also beginning letermovir, posaconazole and neupogen today.     lObjective:     Vital Signs (Most Recent):  Temp: 97.4 °F (36.3 °C) (12/31/24 1200)  Pulse: 87 (12/31/24 1200)  Resp: 18 (12/31/24 1200)  BP: (!) 140/72 (12/31/24 1200)  SpO2: 99 % (12/31/24 1200) Vital Signs (24h Range):  Temp:  [97.4 °F (36.3 °C)-100.1 °F (37.8 °C)] 97.4 °F (36.3 °C)  Pulse:  [81-99] 87  Resp:  [18-20] 18  SpO2:  [95 %-99 %] 99 %  BP: (125-140)/(72-79) 140/72     Weight: 90.2 kg (198 lb 13.7 oz)  Body mass index is 27.73 kg/m².  Body surface area is 2.13 meters squared.    ECOG SCORE           [unfilled]    Intake/Output - Last 3 Shifts         12/29 0700  12/30 0659 12/30 0700 12/31 0659 12/31 0700 01/01 0659    P.O. 240 1140 480    IV Piggyback 1114.6      Total Intake(mL/kg) 1354.6 (15.1) 1140 (12.6) 480 (5.3)    Urine (mL/kg/hr) 1720 (0.8) 950 (0.4)     Stool 0 0     Total Output 1720 950     Net -365.4 +190 +480           Urine Occurrence 1 x 1 x 3 x    Stool Occurrence 0 x 3 x              Physical  Exam  Constitutional:       General: He is awake.      Appearance: Normal appearance.   HENT:      Head: Normocephalic and atraumatic.      Mouth/Throat:      Pharynx: No oropharyngeal exudate.   Eyes:      General:         Right eye: No discharge.         Left eye: No discharge.      Conjunctiva/sclera: Conjunctivae normal.      Pupils: Pupils are equal, round, and reactive to light.   Cardiovascular:      Rate and Rhythm: Normal rate and regular rhythm.      Pulses: Normal pulses.      Heart sounds: Normal heart sounds. No murmur heard.  Pulmonary:      Effort: Pulmonary effort is normal. No respiratory distress.      Breath sounds: Normal breath sounds. No wheezing or rales.   Abdominal:      General: Bowel sounds are normal. There is no distension.      Palpations: Abdomen is soft.      Tenderness: There is no abdominal tenderness.   Musculoskeletal:         General: No deformity. Normal range of motion.      Cervical back: Normal range of motion and neck supple.   Skin:     General: Skin is warm and dry.      Findings: Erythema and rash present. Rash is urticarial.      Comments: Right chest wall [place]. CDI. No sign of infection to site. Erythematous regions where previous tele leads were on abdomen consistent with contact dermatitis.    Neurological:      Mental Status: He is alert and oriented to person, place, and time.   Psychiatric:         Behavior: Behavior normal. Behavior is cooperative.         Thought Content: Thought content normal.         Cognition and Memory: Cognition normal.         Judgment: Judgment normal.            Significant Labs:   CBC:   Recent Labs   Lab 12/30/24  0420 12/31/24  0446   WBC 0.06* 0.01*   HGB 8.3* 7.7*   HCT 25.1* 22.6*   PLT 10* 4*    and CMP:   Recent Labs   Lab 12/30/24  0420 12/31/24  0446    137   K 3.4* 3.2*    110   CO2 21* 20*   GLU 95 101   BUN 13 10   CREATININE 0.8 0.6   CALCIUM 8.2* 8.0*   PROT 6.1 5.7*   ALBUMIN 2.5* 2.4*   BILITOT 0.6 0.4    ALKPHOS 42 43   AST 31 17   ALT 69* 48*   ANIONGAP 8 7*       Diagnostic Results:      EXAMINATION:  CT ABDOMEN PELVIS WITH IV CONTRAST     CLINICAL HISTORY:  Abdominal pain, acute, nonlocalized;     TECHNIQUE:  Axial images of the abdomen and pelvis were acquired after the use of 100 cc Vbar014 IV contrast. No oral contrast was administered.  Coronal and sagittal reconstructions were also obtained     COMPARISON:  CT abdomen pelvis 07/22/2024, 05/23/2024     FINDINGS:  Heart: Normal in size. No pericardial effusion. No significant calcific coronary atherosclerosis.     Lung Bases: Bibasilar linear opacifications, likely subsegmental atelectasis versus scarring.  No pneumothorax, or pleural fluid.     Liver: Enlarged measuring 20 cm in craniocaudal dimension.  Normal contour.  No focal hepatic lesion.     Gallbladder: No calcified gallstones. No pericholecystic fluid or gallbladder wall thickening.     Bile Ducts: No intrahepatic or extrahepatic biliary ductal dilatation.     Pancreas: No mass, ductal dilation, or peripancreatic fat stranding.     Spleen: Subcentimeter hypoattenuating focus in the central spleen, similar to prior, too small to characterize.     Adrenals: Stable right adrenal nodules measuring 1.7 cm and 1.6 cm.  Similar subcentimeter nodularity of the left adrenal gland     Kidneys/Ureters: Normal in size and location. Symmetric bilateral perinephric stranding, nonspecific.  No hydronephrosis or nephrolithiasis. No ureteral dilatation.     Bladder: No evidence of wall thickening.     Reproductive organs: Unremarkable.     Peritoneum: Trace free fluid.  No free intraperitoneal air.     Lymph nodes: Subcentimeter periaortic and pericaval lymph nodes noted.  No lymph nodes in the abdomen or pelvis meet size criteria for pathologic enlargement.     GI tract/Mesentery: No abnormality of the visualized esophagus.  Stomach is unremarkable. Small bowel is normal in caliber with no evidence of obstruction  or inflammation. Colon demonstrates no focal wall thickening or obstruction.  Several colonic diverticuli without acute diverticulitis.  Appendix is visualized and unremarkable.     Abdominal wall/Soft Tissues: Unremarkable.     Vasculature: No aneurysm. No significant calcific atherosclerosis.     Bones: No acute fracture. No suspicious osseous lesions.     Impression:     No acute abdominopelvic abnormality.     Symmetric bilateral perinephric stranding and trace free intraperitoneal fluid, nonspecific.  No hydronephrosis.     Stable adrenal nodules, previously characterized as adenomas.     Hepatomegaly.     Additional findings as above.        Assessment/Plan:     * History of allogeneic stem cell transplant  - Patient of Dr. 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 Received 3 bags with CD34 dose of 6.04x10^6/kg.   - Today is Day +5  - Patient is O+. Donor is O+.  - Patient is CMV non-reactive. Donor is CMV reactive. Given donor CMV status, patient will start (patient-supplied) letermovir on Day +5.  - Day -4 and Day -3 Busulfan dose-adjusted to 282 mg  - See treatment plan below:    Planned conditioning regimen:  Thiotepa on Day -7  Busulfan on Day -6, -5, -4, and -3  Fludarabine on Day -6, -5, -4, and -3  REST DAYS on Day -2 and -1     Planned  GVHD Prophylaxis:  Cyclophosphamide on Day +3 and +4  Tacrolimus starting on Day +5  Mycophenolate starting on Day +5     Antimicrobial Prophylaxis:  Acyclovir starting on Day -7  Levofloxacin starting on Day -1  Micafungin on Day -1 through Day +4  Letermovir starting on Day +5 (if CMV PCR drawn on day 0 results negative)  Posaconazole starting on Day +5  Atovaquone starting on Day +30     Skin Care with Thiotepa: Day -7 through D-5     Seizure Prophylaxis:  Levetiracetam on Day -7 through Day -2     SOS/VOD Prophylaxis:  Ursodiol on Day -7 through Day +30     Growth Factor  Support:  Neupogen starting on Day +5        Caregiver: Carmelita (Spouse)  Post-transplant discharge plans: Home      Primary myelofibrosis  From Dr. 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 Transplant on 12/26/24 at 1330. Received 3 bags with CD34 dose of 6.04 x 10^6/kg.     Hypertension  - Became hypertensive following SCT on 12/26. No signs of volume overload contributing to HTN.  - 12/27 started amlodipine.     Headache  - 12/27 diffuse headache began.   - Oxy 5mg and sumatriptan as needed for HA  - Avoiding tylenol with recent allo SCT/neutropenia and do not want to mask a fever.   RESOLVED    Transaminitis  - Transaminases first elevated 12/27. AST 51 and ALT 74. Suspect 2/2 chemotherapy prior to transplant.   - Will continue to monitor with daily CMP.  - Down-trending.    Allergic contact dermatitis due to adhesives  - Itching and redness to LLQ/RLQ where tele leads were placed.   - Topical benadryl prn for itching.  - Tele removed     Hypokalemia  - See electrolyte disorder    Electrolyte disorder  - Replete per PRN electrolyte order set  - Daily CMP, mag, and phos while inpatient    Neutropenic fever  - Infection w/u neg thus far  - Afebrile, stopping cefepime and starting  levaquin today, 12/31/24    Chemotherapy-induced nausea  - Anticipate 2/2 chemotherapy   - Zofran q8h  - Compazine q6h  - Zyprexa QHS  - Ativan PRN    Insomnia  - Difficulty sleeping at night while IP.  - Has PRN ativan and trazodone  - Melatonin not effective     Other  constipation  - Constipated upon admit and was previously taking miralax and PRN Pericolace. Now with soft bowel movements. Miralax and pericolace discontinued 12/24/24.     Thrombocytosis  - 2/2 myelofibrosis  - Was on daily ASA, but stopped taking ~ 3 weeks ago  - Now with anticipated thrombocytopenia following chemotherapy  - Daily CBC while IP  RESOLVED    Pancytopenia due to chemotherapy  - Anticipated following chemotherapy.  - Transfuse for hgb < 7 or plts < 10K  - Continue antimicrobial ppx   - Daily CBC while IP      Adrenal nodule  - Subcentimeter nodular thickening of the adrenal glands first noted on imaging from 1/2024.  - Seen by endocrine prior to transplant admission and Dexamethasone suppression test performed. Patient responded appropriately. No further w/u needed.  - Likely adenomas.    Metabolic dysfunction-associated steatotic liver disease (MASLD)  - Biopsy proven to be steatotic change. Most compatible with MASLD.   - Follow-up with hepatology OP.    Hyperlipidemia  - Holding home atorvastatin while IP to avoid interaction with other medications. Can resume at discharge if LFTs stable.          VTE Risk Mitigation (From admission, onward)           Ordered     heparin, porcine (PF) 100 unit/mL injection flush 300 Units  As needed (PRN)         12/18/24 2025                    Disposition: Remains inpatient while awaiting engraftment    Chu Wolf PA-C  Bone Marrow Transplant  [facility] - [facility] ([facility])

## 2024-12-31 NOTE — SUBJECTIVE & OBJECTIVE
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 VSS, Afebrile. Stopping cefepime and re-starting Levaquin today as pt has been afebrile >48hrs and infectious workup was negative. Abdominal pain resolved  with ATC Bentyl. Reports normal stools since yesterday evening. C-diff negative. CT chest/abd/pelvis with no acute abdominopelvic abnormalities. Platelets 4, will receive 1 unit in addition to K and phos replacement. ANC 0. Reports redness/itching to chest where tele stickers were previously placed. Tele monitor off. Suspect allergy to adhesive tele leads. Itching improved with topical benadryl. No signs of acute GVHD. 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 Also beginning letermovir, posaconazole and neupogen today.     lObjective:     Vital Signs (Most Recent):  Temp: 97.4 °F (36.3 °C) (12/31/24 1200)  Pulse: 87 (12/31/24 1200)  Resp: 18 (12/31/24 1200)  BP: (!) 140/72 (12/31/24 1200)  SpO2: 99 % (12/31/24 1200) Vital Signs (24h Range):  Temp:  [97.4 °F (36.3 °C)-100.1 °F (37.8 °C)] 97.4 °F (36.3 °C)  Pulse:  [81-99] 87  Resp:  [18-20] 18  SpO2:  [95 %-99 %] 99 %  BP: (125-140)/(72-79) 140/72     Weight: 90.2 kg (198 lb 13.7 oz)  Body mass index is 27.73 kg/m².  Body surface area is 2.13 meters squared.    ECOG SCORE           [unfilled]    Intake/Output - Last 3 Shifts         12/29 0700 12/30 0659 12/30 0700 12/31 0659 12/31 0700 01/01 0659    P.O. 240 1140 480    IV Piggyback 1114.6      Total Intake(mL/kg) 1354.6 (15.1) 1140 (12.6) 480 (5.3)    Urine (mL/kg/hr) 1720 (0.8) 950 (0.4)     Stool 0 0     Total Output 1720 950     Net -365.4 +190 +480           Urine Occurrence 1 x 1 x 3 x    Stool Occurrence 0 x 3 x              Physical Exam  Constitutional:       General: He is awake.      Appearance: Normal appearance.   HENT:      Head: Normocephalic and atraumatic.      Mouth/Throat:      Pharynx: No oropharyngeal exudate.   Eyes:       General:         Right eye: No discharge.         Left eye: No discharge.      Conjunctiva/sclera: Conjunctivae normal.      Pupils: Pupils are equal, round, and reactive to light.   Cardiovascular:      Rate and Rhythm: Normal rate and regular rhythm.      Pulses: Normal pulses.      Heart sounds: Normal heart sounds. No murmur heard.  Pulmonary:      Effort: Pulmonary effort is normal. No respiratory distress.      Breath sounds: Normal breath sounds. No wheezing or rales.   Abdominal:      General: Bowel sounds are normal. There is no distension.      Palpations: Abdomen is soft.      Tenderness: There is no abdominal tenderness.   Musculoskeletal:         General: No deformity. Normal range of motion.      Cervical back: Normal range of motion and neck supple.   Skin:     General: Skin is warm and dry.      Findings: Erythema and rash present. Rash is urticarial.      Comments: Right chest wall [place]. CDI. No sign of infection to site. Erythematous regions where previous tele leads were on abdomen consistent with contact dermatitis.    Neurological:      Mental Status: He is alert and oriented to person, place, and time.   Psychiatric:         Behavior: Behavior normal. Behavior is cooperative.         Thought Content: Thought content normal.         Cognition and Memory: Cognition normal.         Judgment: Judgment normal.            Significant Labs:   CBC:   Recent Labs   Lab 12/30/24  0420 12/31/24  0446   WBC 0.06* 0.01*   HGB 8.3* 7.7*   HCT 25.1* 22.6*   PLT 10* 4*    and CMP:   Recent Labs   Lab 12/30/24  0420 12/31/24  0446    137   K 3.4* 3.2*    110   CO2 21* 20*   GLU 95 101   BUN 13 10   CREATININE 0.8 0.6   CALCIUM 8.2* 8.0*   PROT 6.1 5.7*   ALBUMIN 2.5* 2.4*   BILITOT 0.6 0.4   ALKPHOS 42 43   AST 31 17   ALT 69* 48*   ANIONGAP 8 7*       Diagnostic Results:      EXAMINATION:  CT ABDOMEN PELVIS WITH IV CONTRAST     CLINICAL HISTORY:  Abdominal pain, acute, nonlocalized;      TECHNIQUE:  Axial images of the abdomen and pelvis were acquired after the use of 100 cc Luck929 IV contrast. No oral contrast was administered.  Coronal and sagittal reconstructions were also obtained     COMPARISON:  CT abdomen pelvis 07/22/2024, 05/23/2024     FINDINGS:  Heart: Normal in size. No pericardial effusion. No significant calcific coronary atherosclerosis.     Lung Bases: Bibasilar linear opacifications, likely subsegmental atelectasis versus scarring.  No pneumothorax, or pleural fluid.     Liver: Enlarged measuring 20 cm in craniocaudal dimension.  Normal contour.  No focal hepatic lesion.     Gallbladder: No calcified gallstones. No pericholecystic fluid or gallbladder wall thickening.     Bile Ducts: No intrahepatic or extrahepatic biliary ductal dilatation.     Pancreas: No mass, ductal dilation, or peripancreatic fat stranding.     Spleen: Subcentimeter hypoattenuating focus in the central spleen, similar to prior, too small to characterize.     Adrenals: Stable right adrenal nodules measuring 1.7 cm and 1.6 cm.  Similar subcentimeter nodularity of the left adrenal gland     Kidneys/Ureters: Normal in size and location. Symmetric bilateral perinephric stranding, nonspecific.  No hydronephrosis or nephrolithiasis. No ureteral dilatation.     Bladder: No evidence of wall thickening.     Reproductive organs: Unremarkable.     Peritoneum: Trace free fluid.  No free intraperitoneal air.     Lymph nodes: Subcentimeter periaortic and pericaval lymph nodes noted.  No lymph nodes in the abdomen or pelvis meet size criteria for pathologic enlargement.     GI tract/Mesentery: No abnormality of the visualized esophagus.  Stomach is unremarkable. Small bowel is normal in caliber with no evidence of obstruction or inflammation. Colon demonstrates no focal wall thickening or obstruction.  Several colonic diverticuli without acute diverticulitis.  Appendix is visualized and unremarkable.     Abdominal  wall/Soft Tissues: Unremarkable.     Vasculature: No aneurysm. No significant calcific atherosclerosis.     Bones: No acute fracture. No suspicious osseous lesions.     Impression:     No acute abdominopelvic abnormality.     Symmetric bilateral perinephric stranding and trace free intraperitoneal fluid, nonspecific.  No hydronephrosis.     Stable adrenal nodules, previously characterized as adenomas.     Hepatomegaly.     Additional findings as above.

## 2024-12-31 NOTE — ASSESSMENT & PLAN NOTE
From Dr. 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 Transplant on 12/26/24 at 1330. Received 3 bags with CD34 dose of 6.04 x 10^6/kg.

## 2024-12-31 NOTE — NURSING
Patient received 1 unit of platelets at this time , premeds of tylenol 650mg po and benadryl 50mg po prior to platelets .  No signs and symptoms of reaction noted.

## 2024-12-31 NOTE — PT/OT/SLP PROGRESS
Occupational Therapy      Patient Name:  Rubén Hu   MRN:  [id number]    Patient not seen today secondary to away at CT scan  12/31/2024

## 2024-12-31 NOTE — ASSESSMENT & PLAN NOTE
- 12/27 diffuse headache began.   - Oxy 5mg and sumatriptan as needed for HA  - Avoiding tylenol with recent allo SCT/neutropenia and do not want to mask a fever.   RESOLVED

## 2024-12-31 NOTE — PLAN OF CARE
Day +5 of Allo SCT. Plan of care reviewed. Patient is oriented X4. Ambulates without difficulty. Right muhammad flushed and saline locked. C/O H/A; Oxycodone administered once. Sumatriptan administered once. NPO besides sips of water with medication. Omnipaque given for CT; Will finish drinking at 0715. C-diff negative. Remained afebrile. All questions and concerns addressed. All safety precautions in place. Remains free of injury. All needs met at this time.

## 2024-12-31 NOTE — ASSESSMENT & PLAN NOTE
- Itching and redness to LLQ/RLQ where tele leads were placed.   - Topical benadryl prn for itching.  - Tele removed

## 2024-12-31 NOTE — ASSESSMENT & PLAN NOTE
- Infection w/u neg thus far  - Afebrile, stopping cefepime and starting  levaquin today, 12/31/24

## 2025-01-01 LAB
ALBUMIN SERPL BCP-MCNC: 2.5 G/DL (ref 3.5–5.2)
ALP SERPL-CCNC: 42 U/L (ref 40–150)
ALT SERPL W/O P-5'-P-CCNC: 39 U/L (ref 10–44)
ANION GAP SERPL CALC-SCNC: 9 MMOL/L (ref 8–16)
ANISOCYTOSIS BLD QL SMEAR: SLIGHT
AST SERPL-CCNC: 13 U/L (ref 10–40)
BACTERIA BLD CULT: NORMAL
BACTERIA BLD CULT: NORMAL
BASOPHILS # BLD AUTO: 0 K/UL (ref 0–0.2)
BASOPHILS NFR BLD: 0 % (ref 0–1.9)
BILIRUB SERPL-MCNC: 0.4 MG/DL (ref 0.1–1)
BUN SERPL-MCNC: 9 MG/DL (ref 6–20)
CALCIUM SERPL-MCNC: 8.4 MG/DL (ref 8.7–10.5)
CHLORIDE SERPL-SCNC: 106 MMOL/L (ref 95–110)
CO2 SERPL-SCNC: 23 MMOL/L (ref 23–29)
CREAT SERPL-MCNC: 0.6 MG/DL (ref 0.5–1.4)
DIFFERENTIAL METHOD BLD: ABNORMAL
EOSINOPHIL # BLD AUTO: 0 K/UL (ref 0–0.5)
EOSINOPHIL NFR BLD: 0 % (ref 0–8)
ERYTHROCYTE [DISTWIDTH] IN BLOOD BY AUTOMATED COUNT: 12.3 % (ref 11.5–14.5)
EST. GFR  (NO RACE VARIABLE): >60 ML/MIN/1.73 M^2
GLUCOSE SERPL-MCNC: 98 MG/DL (ref 70–110)
HCT VFR BLD AUTO: 23.4 % (ref 40–54)
HGB BLD-MCNC: 7.9 G/DL (ref 14–18)
IMM GRANULOCYTES # BLD AUTO: 0 K/UL (ref 0–0.04)
IMM GRANULOCYTES NFR BLD AUTO: 0 % (ref 0–0.5)
LYMPHOCYTES # BLD AUTO: 0 K/UL (ref 1–4.8)
LYMPHOCYTES NFR BLD: 100 % (ref 18–48)
MAGNESIUM SERPL-MCNC: 2 MG/DL (ref 1.6–2.6)
MCH RBC QN AUTO: 31.9 PG (ref 27–31)
MCHC RBC AUTO-ENTMCNC: 33.8 G/DL (ref 32–36)
MCV RBC AUTO: 94 FL (ref 82–98)
MONOCYTES # BLD AUTO: 0 K/UL (ref 0.3–1)
MONOCYTES NFR BLD: 0 % (ref 4–15)
NEUTROPHILS # BLD AUTO: 0 K/UL (ref 1.8–7.7)
NEUTROPHILS NFR BLD: 0 % (ref 38–73)
NRBC BLD-RTO: 0 /100 WBC
PHOSPHATE SERPL-MCNC: 2 MG/DL (ref 2.7–4.5)
PLATELET # BLD AUTO: 16 K/UL (ref 150–450)
PLATELET BLD QL SMEAR: ABNORMAL
PMV BLD AUTO: 10.2 FL (ref 9.2–12.9)
POTASSIUM SERPL-SCNC: 3.4 MMOL/L (ref 3.5–5.1)
PROT SERPL-MCNC: 6 G/DL (ref 6–8.4)
RBC # BLD AUTO: 2.48 M/UL (ref 4.6–6.2)
SODIUM SERPL-SCNC: 138 MMOL/L (ref 136–145)
WBC # BLD AUTO: 0.01 K/UL (ref 3.9–12.7)

## 2025-01-01 PROCEDURE — 63600175 PHARM REV CODE 636 W HCPCS: Performed by: INTERNAL MEDICINE

## 2025-01-01 PROCEDURE — 63600175 PHARM REV CODE 636 W HCPCS: Performed by: NURSE PRACTITIONER

## 2025-01-01 PROCEDURE — 83735 ASSAY OF MAGNESIUM: CPT | Performed by: NURSE PRACTITIONER

## 2025-01-01 PROCEDURE — 25000003 PHARM REV CODE 250: Performed by: INTERNAL MEDICINE

## 2025-01-01 PROCEDURE — 85025 COMPLETE CBC W/AUTO DIFF WBC: CPT | Performed by: NURSE PRACTITIONER

## 2025-01-01 PROCEDURE — 20600001 HC STEP DOWN PRIVATE ROOM

## 2025-01-01 PROCEDURE — 25000003 PHARM REV CODE 250: Performed by: NURSE PRACTITIONER

## 2025-01-01 PROCEDURE — 84100 ASSAY OF PHOSPHORUS: CPT | Performed by: NURSE PRACTITIONER

## 2025-01-01 PROCEDURE — 25000003 PHARM REV CODE 250

## 2025-01-01 PROCEDURE — 99233 SBSQ HOSP IP/OBS HIGH 50: CPT | Mod: ,,, | Performed by: INTERNAL MEDICINE

## 2025-01-01 PROCEDURE — 80053 COMPREHEN METABOLIC PANEL: CPT | Performed by: NURSE PRACTITIONER

## 2025-01-01 PROCEDURE — 94761 N-INVAS EAR/PLS OXIMETRY MLT: CPT

## 2025-01-01 RX ORDER — LOPERAMIDE HYDROCHLORIDE 2 MG/1
2 CAPSULE ORAL 4 TIMES DAILY PRN
Status: DISCONTINUED | OUTPATIENT
Start: 2025-01-01 | End: 2025-01-10

## 2025-01-01 RX ORDER — TALC
9 POWDER (GRAM) TOPICAL NIGHTLY
Status: DISCONTINUED | OUTPATIENT
Start: 2025-01-01 | End: 2025-01-05

## 2025-01-01 RX ORDER — ZOLPIDEM TARTRATE 5 MG/1
5 TABLET ORAL NIGHTLY PRN
Status: DISCONTINUED | OUTPATIENT
Start: 2025-01-01 | End: 2025-01-14

## 2025-01-01 RX ADMIN — Medication 1 DOSE: at 09:01

## 2025-01-01 RX ADMIN — PANTOPRAZOLE SODIUM 40 MG: 40 TABLET, DELAYED RELEASE ORAL at 09:01

## 2025-01-01 RX ADMIN — ZOLPIDEM TARTRATE 5 MG: 5 TABLET, FILM COATED ORAL at 08:01

## 2025-01-01 RX ADMIN — DICYCLOMINE HYDROCHLORIDE 10 MG: 10 CAPSULE ORAL at 12:01

## 2025-01-01 RX ADMIN — URSODIOL 300 MG: 300 CAPSULE ORAL at 09:01

## 2025-01-01 RX ADMIN — LOPERAMIDE HYDROCHLORIDE 2 MG: 2 CAPSULE ORAL at 12:01

## 2025-01-01 RX ADMIN — POTASSIUM CHLORIDE 20 MEQ: 1500 TABLET, EXTENDED RELEASE ORAL at 06:01

## 2025-01-01 RX ADMIN — Medication 1 DOSE: at 08:01

## 2025-01-01 RX ADMIN — POTASSIUM CHLORIDE 20 MEQ: 1500 TABLET, EXTENDED RELEASE ORAL at 09:01

## 2025-01-01 RX ADMIN — DICYCLOMINE HYDROCHLORIDE 10 MG: 10 CAPSULE ORAL at 04:01

## 2025-01-01 RX ADMIN — MYCOPHENOLATE MOFETIL 1000 MG: 250 CAPSULE ORAL at 04:01

## 2025-01-01 RX ADMIN — MYCOPHENOLATE MOFETIL 1000 MG: 250 CAPSULE ORAL at 09:01

## 2025-01-01 RX ADMIN — LEVOFLOXACIN 500 MG: 500 TABLET, FILM COATED ORAL at 09:01

## 2025-01-01 RX ADMIN — PROCHLORPERAZINE EDISYLATE 5 MG: 5 INJECTION INTRAMUSCULAR; INTRAVENOUS at 04:01

## 2025-01-01 RX ADMIN — DIPHENHYDRAMINE HYDROCHLORIDE, ZINC ACETATE: 2; .1 CREAM TOPICAL at 06:01

## 2025-01-01 RX ADMIN — AMLODIPINE BESYLATE 5 MG: 5 TABLET ORAL at 09:01

## 2025-01-01 RX ADMIN — PROCHLORPERAZINE EDISYLATE 5 MG: 5 INJECTION INTRAMUSCULAR; INTRAVENOUS at 09:01

## 2025-01-01 RX ADMIN — DICYCLOMINE HYDROCHLORIDE 10 MG: 10 CAPSULE ORAL at 09:01

## 2025-01-01 RX ADMIN — ONDANSETRON 8 MG: 2 INJECTION INTRAMUSCULAR; INTRAVENOUS at 12:01

## 2025-01-01 RX ADMIN — URSODIOL 300 MG: 300 CAPSULE ORAL at 08:01

## 2025-01-01 RX ADMIN — OLANZAPINE 10 MG: 2.5 TABLET, FILM COATED ORAL at 08:01

## 2025-01-01 RX ADMIN — Medication 2 TABLET: at 04:01

## 2025-01-01 RX ADMIN — TACROLIMUS 1 MG: 1 CAPSULE ORAL at 09:01

## 2025-01-01 RX ADMIN — ACYCLOVIR 800 MG: 200 CAPSULE ORAL at 09:01

## 2025-01-01 RX ADMIN — ACYCLOVIR 800 MG: 200 CAPSULE ORAL at 08:01

## 2025-01-01 RX ADMIN — ONDANSETRON 8 MG: 2 INJECTION INTRAMUSCULAR; INTRAVENOUS at 08:01

## 2025-01-01 RX ADMIN — TACROLIMUS 1 MG: 1 CAPSULE ORAL at 06:01

## 2025-01-01 RX ADMIN — FILGRASTIM 480 MCG: 480 INJECTION, SOLUTION INTRAVENOUS; SUBCUTANEOUS at 09:01

## 2025-01-01 RX ADMIN — LETERMOVIR 480 MG: 480 TABLET, FILM COATED ORAL at 09:01

## 2025-01-01 RX ADMIN — Medication 2 TABLET: at 06:01

## 2025-01-01 RX ADMIN — POSACONAZOLE 300 MG: 100 TABLET, DELAYED RELEASE ORAL at 09:01

## 2025-01-01 RX ADMIN — ONDANSETRON 8 MG: 2 INJECTION INTRAMUSCULAR; INTRAVENOUS at 04:01

## 2025-01-01 RX ADMIN — MYCOPHENOLATE MOFETIL 1000 MG: 250 CAPSULE ORAL at 08:01

## 2025-01-01 RX ADMIN — Medication 1 DOSE: at 12:01

## 2025-01-01 RX ADMIN — Medication 2 TABLET: at 08:01

## 2025-01-01 NOTE — SUBJECTIVE & OBJECTIVE
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 VSS, Afebrile. VSS. Complains of stomach cramps and he states he is not sleeping well. Ambien ordered. Had 2 BM overnight. Continue supportive therapy.    Objective:     Vital Signs (Most Recent):  Temp: 97.4 °F (36.3 °C) (01/01/25 0400)  Pulse: 91 (01/01/25 0400)  Resp: 18 (01/01/25 0400)  BP: 121/69 (01/01/25 0400)  SpO2: 96 % (01/01/25 0400) Vital Signs (24h Range):  Temp:  [96.3 °F (35.7 °C)-98.5 °F (36.9 °C)] 97.4 °F (36.3 °C)  Pulse:  [81-99] 91  Resp:  [18-20] 18  SpO2:  [96 %-99 %] 96 %  BP: (113-140)/(69-79) 121/69     Weight: 89.9 kg (198 lb 4.9 oz)  Body mass index is 27.66 kg/m².  Body surface area is 2.12 meters squared.    ECOG SCORE           [unfilled]    Intake/Output - Last 3 Shifts         12/30 0700  12/31 0659 12/31 0700  01/01 0659 01/01 0700  01/02 0659    P.O. 1140 1680     Blood  227     IV Piggyback       Total Intake(mL/kg) 1140 (12.6) 1907 (21.2)     Urine (mL/kg/hr) 950 (0.4)      Stool 0      Total Output 950      Net +190 +1907            Urine Occurrence 1 x 10 x     Stool Occurrence 3 x 2 x              Physical Exam  Constitutional:       General: He is awake.      Appearance: Normal appearance.   HENT:      Head: Normocephalic and atraumatic.      Mouth/Throat:      Pharynx: No oropharyngeal exudate.   Eyes:      General:         Right eye: No discharge.         Left eye: No discharge.      Conjunctiva/sclera: Conjunctivae normal.      Pupils: Pupils are equal, round, and reactive to light.   Cardiovascular:      Rate and Rhythm: Normal rate and regular rhythm.      Pulses: Normal pulses.      Heart sounds: Normal heart sounds. No murmur heard.  Pulmonary:      Effort: Pulmonary effort is normal. No respiratory distress.      Breath sounds: Normal breath sounds. No wheezing or rales.   Abdominal:      General: Bowel sounds are normal. There is no distension.      Palpations: Abdomen  is soft.      Tenderness: There is no abdominal tenderness.   Musculoskeletal:         General: No deformity. Normal range of motion.      Cervical back: Normal range of motion and neck supple.   Skin:     General: Skin is warm and dry.      Findings: Erythema and rash present. Rash is urticarial.      Comments: Right chest wall [place]. CDI. No sign of infection to site. Erythematous regions where previous tele leads were on abdomen consistent with contact dermatitis.    Neurological:      Mental Status: He is alert and oriented to person, place, and time.   Psychiatric:         Behavior: Behavior normal. Behavior is cooperative.         Thought Content: Thought content normal.         Cognition and Memory: Cognition normal.         Judgment: Judgment normal.            Significant Labs:   All pertinent labs from the last 24 hours have been reviewed.    Diagnostic Results:  I have reviewed and interpreted all pertinent imaging results/findings within the past 24 hours.

## 2025-01-01 NOTE — PLAN OF CARE
Day + 6 haplo BMT. Plan of care discussed with patient at start of shift. Free from falls and injuries. Resting quietly with eyes closed. Respirations even, unlabored. Skin warm and dry. Denies pain. Nausea managed with scheduled antiemetics. Afebrile. Frequent checks for pain and safety maintained. Bed in lowest position, wheels locked, side rails up x's 2, call light in reach. Instructed to call for assistance as needed, verbalizes understanding.

## 2025-01-01 NOTE — PROGRESS NOTES
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 VSS, Afebrile. VSS. Complains of stomach cramps and he states he is not sleeping well. Ambien ordered. Had 2 BM overnight. Continue supportive therapy.    Objective:     Vital Signs (Most Recent):  Temp: 97.4 °F (36.3 °C) (01/01/25 0400)  Pulse: 91 (01/01/25 0400)  Resp: 18 (01/01/25 0400)  BP: 121/69 (01/01/25 0400)  SpO2: 96 % (01/01/25 0400) Vital Signs (24h Range):  Temp:  [96.3 °F (35.7 °C)-98.5 °F (36.9 °C)] 97.4 °F (36.3 °C)  Pulse:  [81-99] 91  Resp:  [18-20] 18  SpO2:  [96 %-99 %] 96 %  BP: (113-140)/(69-79) 121/69     Weight: 89.9 kg (198 lb 4.9 oz)  Body mass index is 27.66 kg/m².  Body surface area is 2.12 meters squared.    ECOG SCORE           [unfilled]    Intake/Output - Last 3 Shifts         12/30 0700  12/31 0659 12/31 0700 01/01 0659 01/01 0700 01/02 0659    P.O. 1140 1680     Blood  227     IV Piggyback       Total Intake(mL/kg) 1140 (12.6) 1907 (21.2)     Urine (mL/kg/hr) 950 (0.4)      Stool 0      Total Output 950      Net +190 +1907            Urine Occurrence 1 x 10 x     Stool Occurrence 3 x 2 x              Physical Exam  Constitutional:       General: He is awake.      Appearance: Normal appearance.   HENT:      Head: Normocephalic and atraumatic.      Mouth/Throat:      Pharynx: No oropharyngeal exudate.   Eyes:      General:         Right eye: No discharge.         Left eye: No discharge.      Conjunctiva/sclera: Conjunctivae normal.      Pupils: Pupils are equal, round, and reactive to light.   Cardiovascular:      Rate and Rhythm: Normal rate and regular rhythm.      Pulses: Normal pulses.      Heart sounds: Normal heart sounds. No murmur heard.  Pulmonary:      Effort: Pulmonary effort  is normal. No respiratory distress.      Breath sounds: Normal breath sounds. No wheezing or rales.   Abdominal:      General: Bowel sounds are normal. There is no distension.      Palpations: Abdomen is soft.      Tenderness: There is no abdominal tenderness.   Musculoskeletal:         General: No deformity. Normal range of motion.      Cervical back: Normal range of motion and neck supple.   Skin:     General: Skin is warm and dry.      Findings: Erythema and rash present. Rash is urticarial.      Comments: Right chest wall [place]. CDI. No sign of infection to site. Erythematous regions where previous tele leads were on abdomen consistent with contact dermatitis.    Neurological:      Mental Status: He is alert and oriented to person, place, and time.   Psychiatric:         Behavior: Behavior normal. Behavior is cooperative.         Thought Content: Thought content normal.         Cognition and Memory: Cognition normal.         Judgment: Judgment normal.            Significant Labs:   All pertinent labs from the last 24 hours have been reviewed.    Diagnostic Results:  I have reviewed and interpreted all pertinent imaging results/findings within the past 24 hours.  Assessment/Plan:     * History of allogeneic stem cell transplant  - Patient of Dr. 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 Received 3 bags with CD34 dose of 6.04x10^6/kg.   - Today is Day +6  - Patient is O+. Donor is O+.  - Patient is CMV non-reactive. Donor is CMV reactive. Given donor CMV status, patient will start (patient-supplied) letermovir on Day +5.  - Day -4 and Day -3 Busulfan dose-adjusted to 282 mg  - See treatment plan below:    Planned conditioning regimen:  Thiotepa on Day -7  Busulfan on Day -6, -5, -4, and -3  Fludarabine on Day -6, -5, -4, and -3  REST DAYS on Day -2 and -1     Planned  GVHD Prophylaxis:  Cyclophosphamide on Day +3 and  +4  Tacrolimus starting on Day +5  Mycophenolate starting on Day +5     Antimicrobial Prophylaxis:  Acyclovir starting on Day -7  Levofloxacin starting on Day -1  Micafungin on Day -1 through Day +4  Letermovir starting on Day +5 (if CMV PCR drawn on day 0 results negative)  Posaconazole starting on Day +5  Atovaquone starting on Day +30     Skin Care with Thiotepa: Day -7 through D-5     Seizure Prophylaxis:  Levetiracetam on Day -7 through Day -2     SOS/VOD Prophylaxis:  Ursodiol on Day -7 through Day +30     Growth Factor Support:  Neupogen starting on Day +5        Caregiver: Carmelita (Spouse)  Post-transplant discharge plans: Home      Allergic contact dermatitis due to adhesives  - Itching and redness to LLQ/RLQ where tele leads were placed.   - Topical benadryl prn for itching.  - Tele removed     Hypokalemia  - See electrolyte disorder    Electrolyte disorder  - Replete per PRN electrolyte order set  - Daily CMP, mag, and phos while inpatient    Neutropenic fever  - Infection w/u neg thus far  - Afebrile, stopping cefepime and starting  levaquin today, 12/31/24    Chemotherapy-induced nausea  - Anticipate 2/2 chemotherapy   - Zofran q8h  - Compazine q6h  - Zyprexa QHS  - Ativan PRN    Transaminitis  - Transaminases first elevated 12/27. AST 51 and ALT 74. Suspect 2/2 chemotherapy prior to transplant.   - Will continue to monitor with daily CMP.  - Down-trending.    Hypertension  - Became hypertensive following SCT on 12/26. No signs of volume overload contributing to HTN.  - 12/27 started amlodipine.     Headache  - 12/27 diffuse headache began.   - Oxy 5mg and sumatriptan as needed for HA  - Avoiding tylenol with recent allo SCT/neutropenia and do not want to mask a fever.   RESOLVED    Insomnia  - Difficulty sleeping at night while IP.  - Has PRN ativan and trazodone  - Melatonin not effective     Other constipation  - Constipated upon admit and was previously taking miralax and PRN Pericolace. Now with  soft bowel movements. Miralax and pericolace discontinued 12/24/24.     Thrombocytosis  - 2/2 myelofibrosis  - Was on daily ASA, but stopped taking ~ 3 weeks ago  - Now with anticipated thrombocytopenia following chemotherapy  - Daily CBC while IP  RESOLVED    Pancytopenia due to chemotherapy  - Anticipated following chemotherapy.  - Transfuse for hgb < 7 or plts < 10K  - Continue antimicrobial ppx   - Daily CBC while IP      Adrenal nodule  - Subcentimeter nodular thickening of the adrenal glands first noted on imaging from 1/2024.  - Seen by endocrine prior to transplant admission and Dexamethasone suppression test performed. Patient responded appropriately. No further w/u needed.  - Likely adenomas.    Metabolic dysfunction-associated steatotic liver disease (MASLD)  - Biopsy proven to be steatotic change. Most compatible with MASLD.   - Follow-up with hepatology OP.    Primary myelofibrosis  From Dr. 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 Transplant on 12/26/24 at 1330. Received 3 bags with CD34 dose of 6.04 x 10^6/kg.     Hyperlipidemia  - Holding home atorvastatin while IP to avoid interaction with other medications. Can resume at discharge if LFTs stable.          VTE Risk Mitigation (From admission, onward)           Ordered     heparin, porcine (PF) 100 unit/mL injection flush 300 Units  As needed (PRN)         12/18/24 2025                    Disposition: [id number] Bruno MD  Bone Marrow Transplant  [facility] - Oncology ([facility])

## 2025-01-01 NOTE — ASSESSMENT & PLAN NOTE
- Patient of Dr. 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 Received 3 bags with CD34 dose of 6.04x10^6/kg.   - Today is Day +6  - Patient is O+. Donor is O+.  - Patient is CMV non-reactive. Donor is CMV reactive. Given donor CMV status, patient will start (patient-supplied) letermovir on Day +5.  - Day -4 and Day -3 Busulfan dose-adjusted to 282 mg  - See treatment plan below:    Planned conditioning regimen:  Thiotepa on Day -7  Busulfan on Day -6, -5, -4, and -3  Fludarabine on Day -6, -5, -4, and -3  REST DAYS on Day -2 and -1     Planned  GVHD Prophylaxis:  Cyclophosphamide on Day +3 and +4  Tacrolimus starting on Day +5  Mycophenolate starting on Day +5     Antimicrobial Prophylaxis:  Acyclovir starting on Day -7  Levofloxacin starting on Day -1  Micafungin on Day -1 through Day +4  Letermovir starting on Day +5 (if CMV PCR drawn on day 0 results negative)  Posaconazole starting on Day +5  Atovaquone starting on Day +30     Skin Care with Thiotepa: Day -7 through D-5     Seizure Prophylaxis:  Levetiracetam on Day -7 through Day -2     SOS/VOD Prophylaxis:  Ursodiol on Day -7 through Day +30     Growth Factor Support:  Neupogen starting on Day +5        Caregiver: Carmelita (Spouse)  Post-transplant discharge plans: Home

## 2025-01-02 PROBLEM — R10.13 EPIGASTRIC PAIN: Status: ACTIVE | Noted: 2025-01-02

## 2025-01-02 PROBLEM — Z94.84 IMMUNOCOMPROMISED STATE ASSOCIATED WITH STEM CELL TRANSPLANT: Status: ACTIVE | Noted: 2025-01-02

## 2025-01-02 PROBLEM — K59.09 OTHER CONSTIPATION: Status: RESOLVED | Noted: 2024-12-19 | Resolved: 2025-01-02

## 2025-01-02 PROBLEM — D84.822 IMMUNOCOMPROMISED STATE ASSOCIATED WITH STEM CELL TRANSPLANT: Status: ACTIVE | Noted: 2025-01-02

## 2025-01-02 PROBLEM — K12.30 MUCOSITIS: Status: ACTIVE | Noted: 2025-01-02

## 2025-01-02 LAB
ALBUMIN SERPL BCP-MCNC: 2.5 G/DL (ref 3.5–5.2)
ALP SERPL-CCNC: 44 U/L (ref 40–150)
ALT SERPL W/O P-5'-P-CCNC: 28 U/L (ref 10–44)
ANION GAP SERPL CALC-SCNC: 8 MMOL/L (ref 8–16)
ANISOCYTOSIS BLD QL SMEAR: SLIGHT
AST SERPL-CCNC: 11 U/L (ref 10–40)
BASOPHILS # BLD AUTO: 0 K/UL (ref 0–0.2)
BASOPHILS NFR BLD: 0 % (ref 0–1.9)
BILIRUB SERPL-MCNC: 0.3 MG/DL (ref 0.1–1)
BUN SERPL-MCNC: 11 MG/DL (ref 6–20)
CALCIUM SERPL-MCNC: 8.6 MG/DL (ref 8.7–10.5)
CHLORIDE SERPL-SCNC: 107 MMOL/L (ref 95–110)
CO2 SERPL-SCNC: 25 MMOL/L (ref 23–29)
CREAT SERPL-MCNC: 0.7 MG/DL (ref 0.5–1.4)
DIFFERENTIAL METHOD BLD: ABNORMAL
EOSINOPHIL # BLD AUTO: 0 K/UL (ref 0–0.5)
EOSINOPHIL NFR BLD: 0 % (ref 0–8)
ERYTHROCYTE [DISTWIDTH] IN BLOOD BY AUTOMATED COUNT: 12.4 % (ref 11.5–14.5)
EST. GFR  (NO RACE VARIABLE): >60 ML/MIN/1.73 M^2
GGT SERPL-CCNC: 101 U/L (ref 8–55)
GLUCOSE SERPL-MCNC: 108 MG/DL (ref 70–110)
HCT VFR BLD AUTO: 23.6 % (ref 40–54)
HGB BLD-MCNC: 8.3 G/DL (ref 14–18)
IMM GRANULOCYTES # BLD AUTO: 0 K/UL (ref 0–0.04)
IMM GRANULOCYTES NFR BLD AUTO: 0 % (ref 0–0.5)
LIPASE SERPL-CCNC: 47 U/L (ref 4–60)
LYMPHOCYTES # BLD AUTO: 0 K/UL (ref 1–4.8)
LYMPHOCYTES NFR BLD: 50 % (ref 18–48)
MAGNESIUM SERPL-MCNC: 1.9 MG/DL (ref 1.6–2.6)
MCH RBC QN AUTO: 33.2 PG (ref 27–31)
MCHC RBC AUTO-ENTMCNC: 35.2 G/DL (ref 32–36)
MCV RBC AUTO: 94 FL (ref 82–98)
MONOCYTES # BLD AUTO: 0 K/UL (ref 0.3–1)
MONOCYTES NFR BLD: 0 % (ref 4–15)
NEUTROPHILS # BLD AUTO: 0 K/UL (ref 1.8–7.7)
NEUTROPHILS NFR BLD: 50 % (ref 38–73)
NRBC BLD-RTO: 0 /100 WBC
OVALOCYTES BLD QL SMEAR: ABNORMAL
PHOSPHATE SERPL-MCNC: 2.8 MG/DL (ref 2.7–4.5)
PLATELET # BLD AUTO: 10 K/UL (ref 150–450)
PLATELET BLD QL SMEAR: ABNORMAL
PMV BLD AUTO: 10.5 FL (ref 9.2–12.9)
POIKILOCYTOSIS BLD QL SMEAR: SLIGHT
POTASSIUM SERPL-SCNC: 3.6 MMOL/L (ref 3.5–5.1)
PROT SERPL-MCNC: 6.3 G/DL (ref 6–8.4)
RBC # BLD AUTO: 2.5 M/UL (ref 4.6–6.2)
SODIUM SERPL-SCNC: 140 MMOL/L (ref 136–145)
SPHEROCYTES BLD QL SMEAR: ABNORMAL
WBC # BLD AUTO: 0.02 K/UL (ref 3.9–12.7)

## 2025-01-02 PROCEDURE — 25000003 PHARM REV CODE 250: Performed by: INTERNAL MEDICINE

## 2025-01-02 PROCEDURE — 85025 COMPLETE CBC W/AUTO DIFF WBC: CPT | Performed by: NURSE PRACTITIONER

## 2025-01-02 PROCEDURE — 82977 ASSAY OF GGT: CPT

## 2025-01-02 PROCEDURE — 99233 SBSQ HOSP IP/OBS HIGH 50: CPT | Mod: FS,,, | Performed by: INTERNAL MEDICINE

## 2025-01-02 PROCEDURE — 83690 ASSAY OF LIPASE: CPT

## 2025-01-02 PROCEDURE — 83735 ASSAY OF MAGNESIUM: CPT | Performed by: NURSE PRACTITIONER

## 2025-01-02 PROCEDURE — 63600175 PHARM REV CODE 636 W HCPCS: Performed by: INTERNAL MEDICINE

## 2025-01-02 PROCEDURE — 84100 ASSAY OF PHOSPHORUS: CPT | Performed by: NURSE PRACTITIONER

## 2025-01-02 PROCEDURE — 20600001 HC STEP DOWN PRIVATE ROOM

## 2025-01-02 PROCEDURE — 25000003 PHARM REV CODE 250: Performed by: NURSE PRACTITIONER

## 2025-01-02 PROCEDURE — 63600175 PHARM REV CODE 636 W HCPCS

## 2025-01-02 PROCEDURE — 80053 COMPREHEN METABOLIC PANEL: CPT | Performed by: NURSE PRACTITIONER

## 2025-01-02 PROCEDURE — 25000003 PHARM REV CODE 250

## 2025-01-02 PROCEDURE — 63600175 PHARM REV CODE 636 W HCPCS: Performed by: NURSE PRACTITIONER

## 2025-01-02 PROCEDURE — 97110 THERAPEUTIC EXERCISES: CPT

## 2025-01-02 RX ORDER — MAGNESIUM SULFATE HEPTAHYDRATE 40 MG/ML
2 INJECTION, SOLUTION INTRAVENOUS
Status: DISCONTINUED | OUTPATIENT
Start: 2025-01-02 | End: 2025-01-22

## 2025-01-02 RX ORDER — POTASSIUM CHLORIDE 7.45 MG/ML
40 INJECTION INTRAVENOUS
Status: DISCONTINUED | OUTPATIENT
Start: 2025-01-02 | End: 2025-01-12

## 2025-01-02 RX ORDER — POTASSIUM CHLORIDE 7.45 MG/ML
80 INJECTION INTRAVENOUS
Status: DISCONTINUED | OUTPATIENT
Start: 2025-01-02 | End: 2025-01-12

## 2025-01-02 RX ORDER — FAMOTIDINE 10 MG/ML
20 INJECTION INTRAVENOUS DAILY
Status: DISCONTINUED | OUTPATIENT
Start: 2025-01-02 | End: 2025-01-16

## 2025-01-02 RX ORDER — POTASSIUM CHLORIDE 7.45 MG/ML
60 INJECTION INTRAVENOUS
Status: DISCONTINUED | OUTPATIENT
Start: 2025-01-02 | End: 2025-01-12

## 2025-01-02 RX ORDER — ONDANSETRON HYDROCHLORIDE 2 MG/ML
8 INJECTION, SOLUTION INTRAVENOUS EVERY 8 HOURS
Status: DISCONTINUED | OUTPATIENT
Start: 2025-01-02 | End: 2025-01-23

## 2025-01-02 RX ORDER — DICYCLOMINE HYDROCHLORIDE 10 MG/ML
20 INJECTION INTRAMUSCULAR 4 TIMES DAILY
Status: DISCONTINUED | OUTPATIENT
Start: 2025-01-02 | End: 2025-01-02

## 2025-01-02 RX ORDER — DICYCLOMINE HYDROCHLORIDE 10 MG/1
10 CAPSULE ORAL 4 TIMES DAILY
Status: DISCONTINUED | OUTPATIENT
Start: 2025-01-02 | End: 2025-01-05

## 2025-01-02 RX ORDER — LEVOFLOXACIN 5 MG/ML
500 INJECTION, SOLUTION INTRAVENOUS
Status: DISCONTINUED | OUTPATIENT
Start: 2025-01-02 | End: 2025-01-14

## 2025-01-02 RX ORDER — PANTOPRAZOLE SODIUM 40 MG/10ML
40 INJECTION, POWDER, LYOPHILIZED, FOR SOLUTION INTRAVENOUS DAILY
Status: DISCONTINUED | OUTPATIENT
Start: 2025-01-02 | End: 2025-01-16

## 2025-01-02 RX ADMIN — Medication 400 MG: at 05:01

## 2025-01-02 RX ADMIN — Medication 1 DOSE: at 08:01

## 2025-01-02 RX ADMIN — LOPERAMIDE HYDROCHLORIDE 2 MG: 2 CAPSULE ORAL at 01:01

## 2025-01-02 RX ADMIN — PANTOPRAZOLE SODIUM 40 MG: 40 INJECTION, POWDER, FOR SOLUTION INTRAVENOUS at 09:01

## 2025-01-02 RX ADMIN — Medication 1 DOSE: at 01:01

## 2025-01-02 RX ADMIN — PROCHLORPERAZINE EDISYLATE 5 MG: 5 INJECTION INTRAMUSCULAR; INTRAVENOUS at 10:01

## 2025-01-02 RX ADMIN — ONDANSETRON 8 MG: 2 INJECTION INTRAMUSCULAR; INTRAVENOUS at 01:01

## 2025-01-02 RX ADMIN — POSACONAZOLE 300 MG: 100 TABLET, DELAYED RELEASE ORAL at 10:01

## 2025-01-02 RX ADMIN — DICYCLOMINE HYDROCHLORIDE 10 MG: 10 CAPSULE ORAL at 01:01

## 2025-01-02 RX ADMIN — DICYCLOMINE HYDROCHLORIDE 10 MG: 10 CAPSULE ORAL at 05:01

## 2025-01-02 RX ADMIN — MAGNESIUM SULFATE HEPTAHYDRATE 2 G: 40 INJECTION, SOLUTION INTRAVENOUS at 10:01

## 2025-01-02 RX ADMIN — FILGRASTIM 480 MCG: 480 INJECTION, SOLUTION INTRAVENOUS; SUBCUTANEOUS at 10:01

## 2025-01-02 RX ADMIN — Medication 1 DOSE: at 05:01

## 2025-01-02 RX ADMIN — LETERMOVIR 480 MG: 480 TABLET, FILM COATED ORAL at 08:01

## 2025-01-02 RX ADMIN — URSODIOL 300 MG: 300 CAPSULE ORAL at 10:01

## 2025-01-02 RX ADMIN — DICYCLOMINE HYDROCHLORIDE 10 MG: 10 CAPSULE ORAL at 08:01

## 2025-01-02 RX ADMIN — AMLODIPINE BESYLATE 5 MG: 5 TABLET ORAL at 10:01

## 2025-01-02 RX ADMIN — URSODIOL 300 MG: 300 CAPSULE ORAL at 08:01

## 2025-01-02 RX ADMIN — TACROLIMUS 1 MG: 1 CAPSULE ORAL at 05:01

## 2025-01-02 RX ADMIN — POTASSIUM CHLORIDE 20 MEQ: 1500 TABLET, EXTENDED RELEASE ORAL at 05:01

## 2025-01-02 RX ADMIN — ONDANSETRON 8 MG: 2 INJECTION INTRAMUSCULAR; INTRAVENOUS at 09:01

## 2025-01-02 RX ADMIN — TACROLIMUS 1 MG: 1 CAPSULE ORAL at 08:01

## 2025-01-02 RX ADMIN — MYCOPHENOLATE MOFETIL 1000 MG: 250 CAPSULE ORAL at 08:01

## 2025-01-02 RX ADMIN — OLANZAPINE 10 MG: 2.5 TABLET, FILM COATED ORAL at 08:01

## 2025-01-02 RX ADMIN — ACYCLOVIR SODIUM 380 MG: 50 INJECTION, SOLUTION INTRAVENOUS at 10:01

## 2025-01-02 RX ADMIN — MYCOPHENOLATE MOFETIL 1000 MG: 250 CAPSULE ORAL at 03:01

## 2025-01-02 RX ADMIN — DIPHENHYDRAMINE HYDROCHLORIDE 15 ML: 25 SOLUTION ORAL at 01:01

## 2025-01-02 RX ADMIN — PROCHLORPERAZINE EDISYLATE 5 MG: 5 INJECTION INTRAMUSCULAR; INTRAVENOUS at 04:01

## 2025-01-02 RX ADMIN — LOPERAMIDE HYDROCHLORIDE 2 MG: 2 CAPSULE ORAL at 10:01

## 2025-01-02 RX ADMIN — LEVOFLOXACIN 500 MG: 5 INJECTION, SOLUTION INTRAVENOUS at 10:01

## 2025-01-02 RX ADMIN — FAMOTIDINE 20 MG: 10 INJECTION, SOLUTION INTRAVENOUS at 10:01

## 2025-01-02 RX ADMIN — ZOLPIDEM TARTRATE 5 MG: 5 TABLET, FILM COATED ORAL at 08:01

## 2025-01-02 RX ADMIN — DIPHENHYDRAMINE HYDROCHLORIDE 15 ML: 25 SOLUTION ORAL at 05:01

## 2025-01-02 RX ADMIN — ACYCLOVIR SODIUM 400 MG: 50 INJECTION, SOLUTION INTRAVENOUS at 08:01

## 2025-01-02 NOTE — ASSESSMENT & PLAN NOTE
- Suspect 2/2 chemotherapy conditioning regimen prior to allogeneic SCT. Abdomen notably distended and with TTP in epigastric region.   - Protonix and famotidine daily  - GGT and lipase pending  - morphine 1 mg PRN  - CT A/P 12/31: no acute pathology

## 2025-01-02 NOTE — PLAN OF CARE
Goals to be met by: 01-23-24     Patient will increase functional independence with ADLs by performing:    Pt. With be independent with ADL tasks - Mod I for bathing, plan continued 1/2  Pt. Will be independent with HEP to maintain level of endurance - Progressing, visual and verbal cues for UE HEP 1/2

## 2025-01-02 NOTE — PROGRESS NOTES
Daniel Rodríguez - Oncology ([facility])  Adult Nutrition  Progress Note    SUMMARY       Recommendations  --Continue Regular diet as tolerated and clinically indicated   --Discontinue Boost Plus TID, Order Boost Breeze Orange TID   --Encourage good intakes   --Nursing: please continue to document % meal eaten on flowsheet   --RD to monitor weight, PO intake     Goals: Meet % EEN/EPN  Nutrition Goal Status: progressing to   Communication of RD Recs:  (POC)    Assessment and Plan    Nutrition Problem  Increased nutrient needs (calories, protein)      Related to (etiology):   Metabolic demand of disease and treatment     Signs and Symptoms (as evidenced by):   Myelofibrosis      Interventions/Recommendations (treatment strategy):  --Continue Regular diet as tolerated and clinically indicated   --Discontinue Boost Plus TID, Order Boost Breeze Orange TID   --Encourage good intakes   --Nursing: please continue to document % meal eaten on flowsheet   --RD to monitor weight, PO intake       Nutrition Diagnosis Status:   Continues       Reason for Assessment    Reason For Assessment: RD follow-up  Diagnosis: cancer diagnosis/related complications (Stem cell transplant candidate)  General Information Comments: 54-y-o patient of Dr. Clyde James with primary myelofibrosis diagnosed 4/2024. Other medical history includes HLD, previous tobacco abuse, and adrenal nodule noted on imaging from 7/2024. 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 RD follow-up. Spoke with pt at bedside. Pt reports poor appetite d/t abdominal pain the last 4-5 days. Pt reports almost throwing up yesterday and today. Pt states he did not like Boost Plus, willing to try Boost Breeze.     Nutrition Discharge Planning: Provided pt with high calorie, high protein diet education and food safety education for bone marrow transplant. Appropriate education material with RD contact information provided. No other needs  "identified.    Nutrition Related Social Determinants of Health: SDOH: Adequate food in home environment      Nutrition Risk Screen    Nutrition Risk Screen: no indicators present    Nutrition/Diet History    Spiritual, Cultural Beliefs, Scientology Practices, Values that Affect Care: no  Food Allergies: NKFA    Anthropometrics    Temp: 98.1 °F (36.7 °C)  Height Method: Stated  Height: 5' 11" (180.3 cm)  Height (inches): 71 in  Weight Method: Standard Scale  Weight: 89.5 kg (197 lb 6.8 oz)  Weight (lb): 197.42 lb  Ideal Body Weight (IBW), Male: 172 lb  % Ideal Body Weight, Male (lb): 117.15 %  BMI (Calculated): 27.5  BMI Grade: 25 - 29.9 - overweight       Lab/Procedures/Meds    Pertinent Labs Reviewed: reviewed - hemoglobin 8.3, hematocrit 23.6, MCH 33.2, Ca 8.6    Pertinent Medications Reviewed: reviewed - ondansetron, pantoprazole, sodium bicarb-sodium chloride, tacrolimus    Estimated/Assessed Needs    Weight Used For Calorie Calculations: 91.4 kg (201 lb 8 oz)  Energy Calorie Requirements (kcal): 5247-1023 kcal/day (25-30 kcal/day)  Energy Need Method: Kcal/kg  Protein Requirements:  g/day (1.0-1.2 g/kg)  Weight Used For Protein Calculations: 91.4 kg (201 lb 8 oz)     Estimated Fluid Requirement Method: RDA Method  RDA Method (mL): 2285  CHO Requirement: 286-343 g/day      Nutrition Prescription Ordered    Current Diet Order: Regular    Evaluation of Received Nutrient/Fluid Intake    Comments: LBM 1/1   % Intake of Estimated Energy Needs: 0 - 25 %  % Meal Intake: 0 - 25 %    Nutrition Risk    Level of Risk/Frequency of Follow-up: high     Monitor and Evaluation    Food and Nutrient Intake: energy intake, food and beverage intake  Food and Nutrient Adminstration: diet order  Knowledge/Beliefs/Attitudes: food and nutrition knowledge/skill  Physical Activity and Function: nutrition-related ADLs and IADLs  Anthropometric Measurements: weight, weight change, body mass index  Biochemical Data, Medical Tests and " Procedures: electrolyte and renal panel, gastrointestinal profile, glucose/endocrine profile, inflammatory profile, lipid profile  Nutrition-Focused Physical Findings: overall appearance     Nutrition Follow-Up    RD Follow-up?: Yes    Tari Cote, Registration Eligible, Provisional LDN

## 2025-01-02 NOTE — ASSESSMENT & PLAN NOTE
- From Dr. 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 Transplant on 12/26/24 at 1330. Received 3 bags with CD34 dose of 6.04 x 10^6/kg.

## 2025-01-02 NOTE — PROGRESS NOTES
SW met with patient and spouse at bedside. Patient reports nausea, lack of appetite etc. Patient remains in good spirits.     Patient has no new concerns, he is looking forward to getting out of the hospital.    SW will continue to be available for any questions or concerns.

## 2025-01-02 NOTE — PLAN OF CARE
Recommendations  --Continue Regular diet as tolerated and clinically indicated   --Discontinue Boost Plus TID, Order Boost Breeze Orange TID   --Encourage good intakes   --Nursing: please continue to document % meal eaten on flowsheet   --RD to monitor weight, PO intake     Goals: Meet % EEN/EPN  Nutrition Goal Status: progressing to   Communication of RD Recs:  (POC)

## 2025-01-02 NOTE — CARE UPDATE
I have reviewed the chart of Rubén Hu who is hospitalized for the following:    Active Hospital Problems    Diagnosis    *History of allogeneic stem cell transplant    Mucositis    Epigastric pain    Immunocompromised state associated with stem cell transplant     Day +7 s/p Bu/Flu/Thiotepa PTCy haploidentical (brother) SCT for PMF       Allergic contact dermatitis due to adhesives    Neutropenic fever    Electrolyte disorder    Hypokalemia    Headache    Hypertension    Transaminitis    Chemotherapy-induced nausea    Insomnia    Pancytopenia due to chemotherapy    Thrombocytosis    Adrenal nodule    Metabolic dysfunction-associated steatotic liver disease (MASLD)    Primary myelofibrosis    Hyperlipidemia        ILENE BERRIOS, MATHEW  [facility]

## 2025-01-02 NOTE — ASSESSMENT & PLAN NOTE
- Patient of Dr. 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 Received 3 bags with CD34 dose of 6.04x10^6/kg.   - Today is Day +7  - Patient is O+. Donor is O+.  - Patient is CMV non-reactive. Donor is CMV reactive. Given donor CMV status, patient will start (patient-supplied) letermovir on Day +5.  - Day -4 and Day -3 Busulfan dose-adjusted to 282 mg  - See treatment plan below:    Planned conditioning regimen:  Thiotepa on Day -7  Busulfan on Day -6, -5, -4, and -3  Fludarabine on Day -6, -5, -4, and -3  REST DAYS on Day -2 and -1     Planned  GVHD Prophylaxis:  Cyclophosphamide on Day +3 and +4  Tacrolimus starting on Day +5  Mycophenolate starting on Day +5     Antimicrobial Prophylaxis:  Acyclovir starting on Day -7  Levofloxacin starting on Day -1  Micafungin on Day -1 through Day +4  Letermovir starting on Day +5 (if CMV PCR drawn on day 0 results negative)  Posaconazole starting on Day +5  Atovaquone starting on Day +30     Skin Care with Thiotepa: Day -7 through D-5     Seizure Prophylaxis:  Levetiracetam on Day -7 through Day -2     SOS/VOD Prophylaxis:  Ursodiol on Day -7 through Day +30     Growth Factor Support:  Neupogen starting on Day +5        Caregiver: Carmelita (Spouse)  Post-transplant discharge plans: Home

## 2025-01-02 NOTE — PLAN OF CARE
Plan of care reviewed with pt. Day +6. Prn electrolytes replaced. Pt ambulates in room and to restroom independently. All VSS, afebrile, free from falls or injuries; no acute events so far this shift. Pt in bed with non-skid socks on, bed locked and in lowest position, side rails up x2, call light and personal items within reach. Pt instructed to call for assistance as needed.

## 2025-01-02 NOTE — SUBJECTIVE & OBJECTIVE
Subjective:     Interval History: Day +7 s/p Bu/Flu/Thiotepa PTCy haploidentical (brother) SCT for PMF. VSS, afebrile. Continued complaints of stomach cramping and sharp epigastric pain. Abdomen notably distended. Diarrhea has now improved, but pt with worsening nausea and poor PO intake. Medications switched from PO to IV as able. Now with famotidine in addition to protonix. Has Duke's solution. Lipase and GGT ordered to rule out acute pancreatitis/cholecystitis. Of note, recent CT A/P from 12/31 without acute pathology. K repletion today. First tacro level will be Friday.     Objective:     Vital Signs (Most Recent):  Temp: 97.5 °F (36.4 °C) (01/02/25 0825)  Pulse: 102 (01/02/25 0825)  Resp: 18 (01/02/25 0825)  BP: 105/71 (01/02/25 0825)  SpO2: 98 % (01/02/25 0825) Vital Signs (24h Range):  Temp:  [97.5 °F (36.4 °C)-98.3 °F (36.8 °C)] 97.5 °F (36.4 °C)  Pulse:  [] 102  Resp:  [17-20] 18  SpO2:  [96 %-99 %] 98 %  BP: (105-119)/(66-75) 105/71     Weight: 89.5 kg (197 lb 6.8 oz)  Body mass index is 27.53 kg/m².  Body surface area is 2.12 meters squared.    ECOG SCORE           [unfilled]    Intake/Output - Last 3 Shifts         12/31 0700  01/01 0659 01/01 0700  01/02 0659 01/02 0700  01/03 0659    P.O. 1680 960     Blood 227      Total Intake(mL/kg) 1907 (21.2) 960 (10.7)     Urine (mL/kg/hr)       Stool       Total Output       Net +1907 +960            Urine Occurrence 10 x 8 x     Stool Occurrence 2 x 2 x              Physical Exam  Constitutional:       General: He is awake.      Appearance: Normal appearance.   HENT:      Head: Normocephalic and atraumatic.      Mouth/Throat:      Pharynx: No oropharyngeal exudate.   Eyes:      General:         Right eye: No discharge.         Left eye: No discharge.      Conjunctiva/sclera: Conjunctivae normal.      Pupils: Pupils are equal, round, and reactive to light.   Cardiovascular:      Rate and Rhythm: Normal rate and regular rhythm.      Pulses: Normal pulses.       Heart sounds: Normal heart sounds. No murmur heard.  Pulmonary:      Effort: Pulmonary effort is normal. No respiratory distress.      Breath sounds: Normal breath sounds. No wheezing or rales.   Abdominal:      General: Bowel sounds are normal. There is distension.      Palpations: Abdomen is soft.      Tenderness: There is no abdominal tenderness (epigastric). There is no guarding or rebound.   Musculoskeletal:         General: No deformity. Normal range of motion.      Cervical back: Normal range of motion and neck supple.   Skin:     General: Skin is warm and dry.      Findings: Erythema and rash present. Rash is urticarial.      Comments: Right chest wall [place]. CDI. No sign of infection to site. Erythematous regions where previous tele leads were on abdomen consistent with contact dermatitis.    Neurological:      Mental Status: He is alert and oriented to person, place, and time.   Psychiatric:         Behavior: Behavior normal. Behavior is cooperative.         Thought Content: Thought content normal.         Cognition and Memory: Cognition normal.         Judgment: Judgment normal.            Significant Labs:   CBC:   Recent Labs   Lab 01/01/25  0424 01/02/25  0425   WBC 0.01* 0.02*   HGB 7.9* 8.3*   HCT 23.4* 23.6*   PLT 16* 10*    and CMP:   Recent Labs   Lab 01/01/25  0424 01/02/25  0425    140   K 3.4* 3.6    107   CO2 23 25   GLU 98 108   BUN 9 11   CREATININE 0.6 0.7   CALCIUM 8.4* 8.6*   PROT 6.0 6.3   ALBUMIN 2.5* 2.5*   BILITOT 0.4 0.3   ALKPHOS 42 44   AST 13 11   ALT 39 28   ANIONGAP 9 8       Diagnostic Results:  None

## 2025-01-02 NOTE — ASSESSMENT & PLAN NOTE
- Anticipate 2/2 chemotherapy   - Meds switched from PO to IV as able 1/2.   - Zofran q8h  - Compazine q6h  - Zyprexa QHS  - Ativan PRN

## 2025-01-02 NOTE — PROGRESS NOTES
Daniel Rodríguez - Oncology ([facility])  Hematology  Bone Marrow Transplant  Progress Note    Patient Name: Rubén Hu  Admission Date: 12/18/2024  Hospital Length of Stay: 15 days  Code Status: Full Code    Subjective:     Interval History: Day +7 s/p Bu/Flu/Thiotepa PTCy haploidentical (brother) SCT for PMF. VSS, afebrile. Continued complaints of stomach cramping and sharp epigastric pain. Abdomen notably distended. Diarrhea has now improved, but pt with worsening nausea and poor PO intake. Medications switched from PO to IV as able. Now with famotidine in addition to protonix. Has Duke's solution. Lipase and GGT ordered to rule out acute pancreatitis/cholecystitis. Of note, recent CT A/P from 12/31 without acute pathology. K repletion today. First tacro level will be Friday.     Objective:     Vital Signs (Most Recent):  Temp: 97.5 °F (36.4 °C) (01/02/25 0825)  Pulse: 102 (01/02/25 0825)  Resp: 18 (01/02/25 0825)  BP: 105/71 (01/02/25 0825)  SpO2: 98 % (01/02/25 0825) Vital Signs (24h Range):  Temp:  [97.5 °F (36.4 °C)-98.3 °F (36.8 °C)] 97.5 °F (36.4 °C)  Pulse:  [] 102  Resp:  [17-20] 18  SpO2:  [96 %-99 %] 98 %  BP: (105-119)/(66-75) 105/71     Weight: 89.5 kg (197 lb 6.8 oz)  Body mass index is 27.53 kg/m².  Body surface area is 2.12 meters squared.    ECOG SCORE           [unfilled]    Intake/Output - Last 3 Shifts         12/31 0700  01/01 0659 01/01 0700  01/02 0659 01/02 0700  01/03 0659    P.O. 1680 960     Blood 227      Total Intake(mL/kg) 1907 (21.2) 960 (10.7)     Urine (mL/kg/hr)       Stool       Total Output       Net +1907 +960            Urine Occurrence 10 x 8 x     Stool Occurrence 2 x 2 x              Physical Exam  Constitutional:       General: He is awake.      Appearance: Normal appearance.   HENT:      Head: Normocephalic and atraumatic.      Mouth/Throat:      Pharynx: No oropharyngeal exudate.   Eyes:      General:         Right eye: No discharge.         Left eye: No discharge.       Conjunctiva/sclera: Conjunctivae normal.      Pupils: Pupils are equal, round, and reactive to light.   Cardiovascular:      Rate and Rhythm: Normal rate and regular rhythm.      Pulses: Normal pulses.      Heart sounds: Normal heart sounds. No murmur heard.  Pulmonary:      Effort: Pulmonary effort is normal. No respiratory distress.      Breath sounds: Normal breath sounds. No wheezing or rales.   Abdominal:      General: Bowel sounds are normal. There is distension.      Palpations: Abdomen is soft.      Tenderness: There is no abdominal tenderness (epigastric). There is no guarding or rebound.   Musculoskeletal:         General: No deformity. Normal range of motion.      Cervical back: Normal range of motion and neck supple.   Skin:     General: Skin is warm and dry.      Findings: Erythema and rash present. Rash is urticarial.      Comments: Right chest wall [place]. CDI. No sign of infection to site. Erythematous regions where previous tele leads were on abdomen consistent with contact dermatitis.    Neurological:      Mental Status: He is alert and oriented to person, place, and time.   Psychiatric:         Behavior: Behavior normal. Behavior is cooperative.         Thought Content: Thought content normal.         Cognition and Memory: Cognition normal.         Judgment: Judgment normal.            Significant Labs:   CBC:   Recent Labs   Lab 01/01/25  0424 01/02/25  0425   WBC 0.01* 0.02*   HGB 7.9* 8.3*   HCT 23.4* 23.6*   PLT 16* 10*    and CMP:   Recent Labs   Lab 01/01/25  0424 01/02/25  0425    140   K 3.4* 3.6    107   CO2 23 25   GLU 98 108   BUN 9 11   CREATININE 0.6 0.7   CALCIUM 8.4* 8.6*   PROT 6.0 6.3   ALBUMIN 2.5* 2.5*   BILITOT 0.4 0.3   ALKPHOS 42 44   AST 13 11   ALT 39 28   ANIONGAP 9 8       Diagnostic Results:  None  Assessment/Plan:     * History of allogeneic stem cell transplant  - Patient of Dr. Clyde James  - Admitting today for a Bu/Flu/Thiotepa haploidentical (brother)  allogeneic SCT  - Transplant day 0 on 12/26/24. Received 3 bags with CD34 dose of 6.04x10^6/kg.   - Today is Day +7  - Patient is O+. Donor is O+.  - Patient is CMV non-reactive. Donor is CMV reactive. Given donor CMV status, patient will start (patient-supplied) letermovir on Day +5.  - Day -4 and Day -3 Busulfan dose-adjusted to 282 mg  - See treatment plan below:    Planned conditioning regimen:  Thiotepa on Day -7  Busulfan on Day -6, -5, -4, and -3  Fludarabine on Day -6, -5, -4, and -3  REST DAYS on Day -2 and -1     Planned  GVHD Prophylaxis:  Cyclophosphamide on Day +3 and +4  Tacrolimus starting on Day +5  Mycophenolate starting on Day +5     Antimicrobial Prophylaxis:  Acyclovir starting on Day -7  Levofloxacin starting on Day -1  Micafungin on Day -1 through Day +4  Letermovir starting on Day +5 (if CMV PCR drawn on day 0 results negative)  Posaconazole starting on Day +5  Atovaquone starting on Day +30     Skin Care with Thiotepa: Day -7 through D-5     Seizure Prophylaxis:  Levetiracetam on Day -7 through Day -2     SOS/VOD Prophylaxis:  Ursodiol on Day -7 through Day +30     Growth Factor Support:  Neupogen starting on Day +5        Caregiver: Carmelita (Spouse)  Post-transplant discharge plans: Home      Primary myelofibrosis  - From Dr. 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 Transplant on 12/26/24 at 1330. Received 3 bags with CD34 dose of 6.04 x 10^6/kg.     Epigastric pain  - Suspect 2/2 chemotherapy conditioning regimen prior to allogeneic SCT. Abdomen notably  distended and with TTP in epigastric region.   - Protonix and famotidine daily  - GGT and lipase pending  - morphine 1 mg PRN  - CT A/P 12/31: no acute pathology     Mucositis  - Having poor PO intake/throat pain 2/2 chemotherapy   - Duke's solution QID  - Morphine 1 mg PRN      Transaminitis  - Transaminases first elevated 12/27. AST 51 and ALT 74. Suspect 2/2 chemotherapy prior to transplant.   - Will continue to monitor with daily CMP.  - Down-trending.    Hypertension  - Became hypertensive following SCT on 12/26. No signs of volume overload contributing to HTN.  - 12/27 started amlodipine.     Headache  - 12/27 diffuse headache began.   - Oxy 5mg and sumatriptan as needed for HA  - Avoiding tylenol with recent allo SCT/neutropenia and do not want to mask a fever.   RESOLVED    Allergic contact dermatitis due to adhesives  - Itching and redness to LLQ/RLQ where tele leads were placed.   - Topical benadryl prn for itching.  - Tele removed     Hypokalemia  - See electrolyte disorder    Electrolyte disorder  - Replete per PRN electrolyte order set  - Daily CMP, mag, and phos while inpatient    Neutropenic fever  - Infection w/u neg thus far  - Afebrile, stopping cefepime and started levaquin 12/31/24      Chemotherapy-induced nausea  - Anticipate 2/2 chemotherapy   - Meds switched from PO to IV as able 1/2.   - Zofran q8h  - Compazine q6h  - Zyprexa QHS  - Ativan PRN    Insomnia  - Difficulty sleeping at night while IP.  - Has PRN ativan and trazodone  - Ambien PRN  - Melatonin not effective     Thrombocytosis  - 2/2 myelofibrosis  - Was on daily ASA, but stopped taking ~ 3 weeks ago  - Now with anticipated thrombocytopenia following chemotherapy  - Daily CBC while IP  RESOLVED    Pancytopenia due to chemotherapy  - Anticipated following chemotherapy.  - Transfuse for hgb < 7 or plts < 10K  - Continue antimicrobial ppx   - Daily CBC while IP      Adrenal nodule  - Subcentimeter nodular thickening of the adrenal  glands first noted on imaging from 1/2024.  - Seen by endocrine prior to transplant admission and Dexamethasone suppression test performed. Patient responded appropriately. No further w/u needed.  - Likely adenomas.    Metabolic dysfunction-associated steatotic liver disease (MASLD)  - Biopsy proven to be steatotic change. Most compatible with MASLD.   - Follow-up with hepatology OP.    Hyperlipidemia  - Holding home atorvastatin while IP to avoid interaction with other medications. Can resume at discharge if LFTs stable.          VTE Risk Mitigation (From admission, onward)           Ordered     heparin, porcine (PF) 100 unit/mL injection flush 300 Units  As needed (PRN)         12/18/24 2025                    Disposition: Remains inpatient     Chu Wolf PA-C  Bone Marrow Transplant  Daniel caryl - Oncology ([facility])

## 2025-01-02 NOTE — PLAN OF CARE
Day + 7 haplo BMT. Plan of care discussed with patient at start of shift. Free from falls and injuries. Resting quietly with eyes closed. Respirations even, unlabored. Skin warm and dry. Denies pain. Nausea managed with scheduled antiemetics. Frequent checks for pain and safety maintained. Bed in lowest position, wheels locked, side rails up x's 2, call light in reach. Instructed to call for assistance as needed, verbalizes understanding.

## 2025-01-02 NOTE — ASSESSMENT & PLAN NOTE
- Having poor PO intake/throat pain 2/2 chemotherapy   - Duke's solution QID  - Morphine 1 mg PRN

## 2025-01-02 NOTE — PT/OT/SLP PROGRESS
"Occupational Therapy   Treatment    Name: Rubén Hu  MRN: [id number]  Admitting Diagnosis:  History of allogeneic stem cell transplant       Recommendations:     Discharge Recommendations: No Therapy Indicated  Discharge Equipment Recommendations:  none  Barriers to discharge:  None    Assessment:     Rubén Hu is a 54 y.o. male with a medical diagnosis of History of allogeneic stem cell transplant.  He presents with performance deficits affecting function are impaired endurance, pain. Presents in bed, agreeable to tx. C/o some ongoing abdominal pain d/t edema, improves with certain positions. Having GI pain and diarrhea prior date and reports impact on endurance. He sits EOB for UE theraband HEP this date with good performance given brief rest breaks.    Rehab Prognosis:  Good; patient would benefit from acute skilled OT services to address these deficits and reach maximum level of function.       Plan:     Patient to be seen 1 x/week to address the above listed problems via self-care/home management, therapeutic activities, therapeutic exercises  Plan of Care Expires: 01/23/25  Plan of Care Reviewed with: patient    Subjective     Chief Complaint: GI issues/diarrhea prior date "wiped" him out  Patient/Family Comments/goals: To go home  Pain/Comfort:  Pain Rating 1: other (see comments) ("a little")  Location - Orientation 1: generalized  Location 1: abdomen  Pain Addressed 1: Nurse notified, Reposition  Pain Rating Post-Intervention 1: 0/10    Objective:     Communicated with: Nsg prior to session.  Patient found HOB elevated with Other (comments) (port) upon OT entry to room.    General Precautions: Standard, fall    Orthopedic Precautions:N/A  Braces: N/A  Respiratory Status: Room air     Occupational Performance:     Bed Mobility:    Patient completed Rolling/Turning to Right with independence  Patient completed Scooting/Bridging with independence  Patient completed Supine to Sit with independence and " with side rail  Patient completed Sit to Supine with independence and with side rail     Functional Mobility/Transfers:  Deferred OOB, on and off toilet all yesterday and on writer's first attempt this date. Requires no A for STS and ambulation in room.     Activities of Daily Living:  Deferred needs.     [facility] 6 Click ADL: 23    Treatment & Education:  Pt provided Red theraband, educated on purpose and proper use.   Provided handout and completes 1 x10 each with band:  External rotation, shoulder flexion, shoulder abduction, tricep ext, bicep curl, lat pull out/shoulder adduction from overhead     Educated on use daily to reduce risk of decline, encouraged to carryover upon d/t as possible as well.     Patient left HOB elevated with all lines intact, call button in reach, and nsg present    GOALS:   Multidisciplinary Problems       Occupational Therapy Goals          Problem: Occupational Therapy    Goal Priority Disciplines Outcome Interventions   Occupational Therapy Goal     OT, PT/OT Progressing    Description: Goals to be met by: 01-23-24     Patient will increase functional independence with ADLs by performing:    Pt. With be independent with ADL tasks - Mod I for bathing, plan continued 1/2  Pt. Will be independent with HEP to maintain level of endurance - Progressing, visual and verbal cues for UE HEP 1/2                         Time Tracking:     OT Date of Treatment: 01/02/25  OT Start Time: 1012  OT Stop Time: 1025  OT Total Time (min): 13 min    Billable Minutes:Therapeutic Exercise 13    OT/PANFILO: OT          1/2/2025

## 2025-01-02 NOTE — ASSESSMENT & PLAN NOTE
- Transaminases first elevated 12/27. Suspect 2/2 chemotherapy prior to transplant.   - Will continue to monitor with daily CMP.  - Down-trending.

## 2025-01-02 NOTE — PLAN OF CARE
Day +7 of Bu/Flu/Thiotepa Haplo SCT. Pt involved in plan of care and communicating needs throughout shift. Up in room and to bathroom independently; voiding without difficulty. Poor appetite. Pt endorses abdominal pain and cramping especially when ambulating. Lipase/GGT lab ordered; US of abdomen done. Three loose bowel movements today; imodium given x2. Electrolytes replaced per protocol. All VSS. Pt remaining free from falls or injury throughout shift. Bed locked and in lowest position, personal belongings and call light within reach, non skid socks on when OOB. Pt instructed to call for assistance as needed. Hourly rounding done on pt.

## 2025-01-02 NOTE — ASSESSMENT & PLAN NOTE
- Difficulty sleeping at night while IP.  - Has PRN ativan and trazodone  - Ambien PRN  - Melatonin not effective

## 2025-01-03 PROBLEM — R14.3 FLATULENCE: Status: ACTIVE | Noted: 2025-01-03

## 2025-01-03 LAB
ABO + RH BLD: NORMAL
ALBUMIN SERPL BCP-MCNC: 2.4 G/DL (ref 3.5–5.2)
ALP SERPL-CCNC: 57 U/L (ref 40–150)
ALT SERPL W/O P-5'-P-CCNC: 33 U/L (ref 10–44)
ANION GAP SERPL CALC-SCNC: 6 MMOL/L (ref 8–16)
AST SERPL-CCNC: 19 U/L (ref 10–40)
BASOPHILS # BLD AUTO: 0 K/UL (ref 0–0.2)
BASOPHILS NFR BLD: 0 % (ref 0–1.9)
BILIRUB SERPL-MCNC: 0.4 MG/DL (ref 0.1–1)
BLD GP AB SCN CELLS X3 SERPL QL: NORMAL
BLD PROD TYP BPU: NORMAL
BLOOD UNIT EXPIRATION DATE: NORMAL
BLOOD UNIT TYPE CODE: 9500
BLOOD UNIT TYPE: NORMAL
BUN SERPL-MCNC: 11 MG/DL (ref 6–20)
CALCIUM SERPL-MCNC: 8.3 MG/DL (ref 8.7–10.5)
CHLORIDE SERPL-SCNC: 106 MMOL/L (ref 95–110)
CO2 SERPL-SCNC: 25 MMOL/L (ref 23–29)
CODING SYSTEM: NORMAL
CREAT SERPL-MCNC: 0.6 MG/DL (ref 0.5–1.4)
CROSSMATCH INTERPRETATION: NORMAL
DIFFERENTIAL METHOD BLD: ABNORMAL
DISPENSE STATUS: NORMAL
EOSINOPHIL # BLD AUTO: 0 K/UL (ref 0–0.5)
EOSINOPHIL NFR BLD: 0 % (ref 0–8)
ERYTHROCYTE [DISTWIDTH] IN BLOOD BY AUTOMATED COUNT: 12.5 % (ref 11.5–14.5)
EST. GFR  (NO RACE VARIABLE): >60 ML/MIN/1.73 M^2
GLUCOSE SERPL-MCNC: 108 MG/DL (ref 70–110)
HCT VFR BLD AUTO: 23.9 % (ref 40–54)
HGB BLD-MCNC: 7.9 G/DL (ref 14–18)
IMM GRANULOCYTES # BLD AUTO: 0 K/UL (ref 0–0.04)
IMM GRANULOCYTES NFR BLD AUTO: 0 % (ref 0–0.5)
LYMPHOCYTES # BLD AUTO: 0 K/UL (ref 1–4.8)
LYMPHOCYTES NFR BLD: 100 % (ref 18–48)
MAGNESIUM SERPL-MCNC: 2.1 MG/DL (ref 1.6–2.6)
MCH RBC QN AUTO: 32 PG (ref 27–31)
MCHC RBC AUTO-ENTMCNC: 33.1 G/DL (ref 32–36)
MCV RBC AUTO: 97 FL (ref 82–98)
MONOCYTES # BLD AUTO: 0 K/UL (ref 0.3–1)
MONOCYTES NFR BLD: 0 % (ref 4–15)
NEUTROPHILS # BLD AUTO: 0 K/UL (ref 1.8–7.7)
NEUTROPHILS NFR BLD: 0 % (ref 38–73)
NRBC BLD-RTO: 0 /100 WBC
PHOSPHATE SERPL-MCNC: 2.6 MG/DL (ref 2.7–4.5)
PLATELET # BLD AUTO: 6 K/UL (ref 150–450)
PLATELET BLD QL SMEAR: ABNORMAL
PMV BLD AUTO: 10.8 FL (ref 9.2–12.9)
POTASSIUM SERPL-SCNC: 3.7 MMOL/L (ref 3.5–5.1)
PROT SERPL-MCNC: 6 G/DL (ref 6–8.4)
RBC # BLD AUTO: 2.47 M/UL (ref 4.6–6.2)
SODIUM SERPL-SCNC: 137 MMOL/L (ref 136–145)
SPECIMEN OUTDATE: NORMAL
TACROLIMUS BLD-MCNC: 4.6 NG/ML (ref 5–15)
UNIT NUMBER: NORMAL
WBC # BLD AUTO: 0.01 K/UL (ref 3.9–12.7)

## 2025-01-03 PROCEDURE — P9037 PLATE PHERES LEUKOREDU IRRAD: HCPCS | Performed by: STUDENT IN AN ORGANIZED HEALTH CARE EDUCATION/TRAINING PROGRAM

## 2025-01-03 PROCEDURE — 85025 COMPLETE CBC W/AUTO DIFF WBC: CPT | Performed by: NURSE PRACTITIONER

## 2025-01-03 PROCEDURE — 63600175 PHARM REV CODE 636 W HCPCS: Performed by: NURSE PRACTITIONER

## 2025-01-03 PROCEDURE — 25000003 PHARM REV CODE 250: Performed by: INTERNAL MEDICINE

## 2025-01-03 PROCEDURE — 25000003 PHARM REV CODE 250: Performed by: NURSE PRACTITIONER

## 2025-01-03 PROCEDURE — 25000003 PHARM REV CODE 250: Performed by: STUDENT IN AN ORGANIZED HEALTH CARE EDUCATION/TRAINING PROGRAM

## 2025-01-03 PROCEDURE — 83735 ASSAY OF MAGNESIUM: CPT | Performed by: NURSE PRACTITIONER

## 2025-01-03 PROCEDURE — 99232 SBSQ HOSP IP/OBS MODERATE 35: CPT | Mod: ,,, | Performed by: INTERNAL MEDICINE

## 2025-01-03 PROCEDURE — 80197 ASSAY OF TACROLIMUS: CPT | Performed by: INTERNAL MEDICINE

## 2025-01-03 PROCEDURE — 84100 ASSAY OF PHOSPHORUS: CPT | Performed by: NURSE PRACTITIONER

## 2025-01-03 PROCEDURE — 97116 GAIT TRAINING THERAPY: CPT

## 2025-01-03 PROCEDURE — 25000003 PHARM REV CODE 250

## 2025-01-03 PROCEDURE — 80053 COMPREHEN METABOLIC PANEL: CPT | Performed by: NURSE PRACTITIONER

## 2025-01-03 PROCEDURE — 63600175 PHARM REV CODE 636 W HCPCS

## 2025-01-03 PROCEDURE — 63600175 PHARM REV CODE 636 W HCPCS: Mod: JZ,TB | Performed by: INTERNAL MEDICINE

## 2025-01-03 PROCEDURE — 20600001 HC STEP DOWN PRIVATE ROOM

## 2025-01-03 PROCEDURE — 63600175 PHARM REV CODE 636 W HCPCS: Performed by: STUDENT IN AN ORGANIZED HEALTH CARE EDUCATION/TRAINING PROGRAM

## 2025-01-03 PROCEDURE — 86900 BLOOD TYPING SEROLOGIC ABO: CPT | Performed by: INTERNAL MEDICINE

## 2025-01-03 RX ORDER — TACROLIMUS 1 MG/1
CAPSULE ORAL
Qty: 60 CAPSULE | Refills: 11 | Status: SHIPPED | OUTPATIENT
Start: 2025-01-03 | End: 2025-01-17

## 2025-01-03 RX ORDER — SIMETHICONE 80 MG
1 TABLET,CHEWABLE ORAL 3 TIMES DAILY
Status: DISCONTINUED | OUTPATIENT
Start: 2025-01-03 | End: 2025-01-25 | Stop reason: HOSPADM

## 2025-01-03 RX ORDER — ATOVAQUONE 750 MG/5ML
1500 SUSPENSION ORAL DAILY
Qty: 300 ML | Refills: 7 | Status: SHIPPED | OUTPATIENT
Start: 2025-01-25 | End: 2025-01-24 | Stop reason: HOSPADM

## 2025-01-03 RX ORDER — URSODIOL 300 MG/1
300 CAPSULE ORAL 2 TIMES DAILY
Qty: 30 CAPSULE | Refills: 0 | Status: SHIPPED | OUTPATIENT
Start: 2025-01-10 | End: 2025-01-17

## 2025-01-03 RX ORDER — DIPHENHYDRAMINE HYDROCHLORIDE 50 MG/ML
50 INJECTION INTRAMUSCULAR; INTRAVENOUS EVERY 6 HOURS PRN
Status: DISCONTINUED | OUTPATIENT
Start: 2025-01-03 | End: 2025-01-14

## 2025-01-03 RX ORDER — HYDROCODONE BITARTRATE AND ACETAMINOPHEN 500; 5 MG/1; MG/1
TABLET ORAL
Status: DISCONTINUED | OUTPATIENT
Start: 2025-01-03 | End: 2025-01-06

## 2025-01-03 RX ORDER — MYCOPHENOLATE MOFETIL 250 MG/1
1000 CAPSULE ORAL 3 TIMES DAILY
Qty: 240 CAPSULE | Refills: 0 | Status: SHIPPED | OUTPATIENT
Start: 2025-01-10 | End: 2025-01-17

## 2025-01-03 RX ADMIN — DICYCLOMINE HYDROCHLORIDE 10 MG: 10 CAPSULE ORAL at 05:01

## 2025-01-03 RX ADMIN — SODIUM PHOSPHATE, MONOBASIC, MONOHYDRATE AND SODIUM PHOSPHATE, DIBASIC, ANHYDROUS 15 MMOL: 142; 276 INJECTION, SOLUTION INTRAVENOUS at 08:01

## 2025-01-03 RX ADMIN — MYCOPHENOLATE MOFETIL 1000 MG: 250 CAPSULE ORAL at 08:01

## 2025-01-03 RX ADMIN — DICYCLOMINE HYDROCHLORIDE 10 MG: 10 CAPSULE ORAL at 12:01

## 2025-01-03 RX ADMIN — DIPHENHYDRAMINE HYDROCHLORIDE 15 ML: 25 SOLUTION ORAL at 12:01

## 2025-01-03 RX ADMIN — PROCHLORPERAZINE EDISYLATE 5 MG: 5 INJECTION INTRAMUSCULAR; INTRAVENOUS at 10:01

## 2025-01-03 RX ADMIN — LETERMOVIR 480 MG: 480 TABLET, FILM COATED ORAL at 08:01

## 2025-01-03 RX ADMIN — ONDANSETRON 8 MG: 2 INJECTION INTRAMUSCULAR; INTRAVENOUS at 10:01

## 2025-01-03 RX ADMIN — PROCHLORPERAZINE EDISYLATE 5 MG: 5 INJECTION INTRAMUSCULAR; INTRAVENOUS at 09:01

## 2025-01-03 RX ADMIN — TACROLIMUS 1 MG: 1 CAPSULE ORAL at 08:01

## 2025-01-03 RX ADMIN — DIPHENHYDRAMINE HYDROCHLORIDE 15 ML: 25 SOLUTION ORAL at 11:01

## 2025-01-03 RX ADMIN — MYCOPHENOLATE MOFETIL 1000 MG: 250 CAPSULE ORAL at 02:01

## 2025-01-03 RX ADMIN — DICYCLOMINE HYDROCHLORIDE 10 MG: 10 CAPSULE ORAL at 08:01

## 2025-01-03 RX ADMIN — SIMETHICONE 80 MG: 80 TABLET, CHEWABLE ORAL at 03:01

## 2025-01-03 RX ADMIN — Medication 1 DOSE: at 05:01

## 2025-01-03 RX ADMIN — URSODIOL 300 MG: 300 CAPSULE ORAL at 08:01

## 2025-01-03 RX ADMIN — Medication 1 DOSE: at 08:01

## 2025-01-03 RX ADMIN — DIPHENHYDRAMINE HYDROCHLORIDE 50 MG: 50 INJECTION, SOLUTION INTRAMUSCULAR; INTRAVENOUS at 08:01

## 2025-01-03 RX ADMIN — Medication 1 DOSE: at 12:01

## 2025-01-03 RX ADMIN — AMLODIPINE BESYLATE 5 MG: 5 TABLET ORAL at 08:01

## 2025-01-03 RX ADMIN — ONDANSETRON 8 MG: 2 INJECTION INTRAMUSCULAR; INTRAVENOUS at 02:01

## 2025-01-03 RX ADMIN — DIPHENHYDRAMINE HYDROCHLORIDE 15 ML: 25 SOLUTION ORAL at 05:01

## 2025-01-03 RX ADMIN — ONDANSETRON 8 MG: 2 INJECTION INTRAMUSCULAR; INTRAVENOUS at 05:01

## 2025-01-03 RX ADMIN — FAMOTIDINE 20 MG: 10 INJECTION, SOLUTION INTRAVENOUS at 08:01

## 2025-01-03 RX ADMIN — ZOLPIDEM TARTRATE 5 MG: 5 TABLET, FILM COATED ORAL at 08:01

## 2025-01-03 RX ADMIN — ACYCLOVIR SODIUM 400 MG: 50 INJECTION, SOLUTION INTRAVENOUS at 09:01

## 2025-01-03 RX ADMIN — PROCHLORPERAZINE EDISYLATE 5 MG: 5 INJECTION INTRAMUSCULAR; INTRAVENOUS at 05:01

## 2025-01-03 RX ADMIN — FILGRASTIM 480 MCG: 480 INJECTION, SOLUTION INTRAVENOUS; SUBCUTANEOUS at 08:01

## 2025-01-03 RX ADMIN — POTASSIUM CHLORIDE 40 MEQ: 7.46 INJECTION, SOLUTION INTRAVENOUS at 08:01

## 2025-01-03 RX ADMIN — OLANZAPINE 10 MG: 2.5 TABLET, FILM COATED ORAL at 08:01

## 2025-01-03 RX ADMIN — DIPHENHYDRAMINE HYDROCHLORIDE 50 MG: 25 CAPSULE ORAL at 03:01

## 2025-01-03 RX ADMIN — DIPHENHYDRAMINE HYDROCHLORIDE, ZINC ACETATE: 2; .1 CREAM TOPICAL at 07:01

## 2025-01-03 RX ADMIN — ALUMINUM HYDROXIDE, MAGNESIUM HYDROXIDE, AND DIMETHICONE 10 ML: 400; 400; 40 SUSPENSION ORAL at 08:01

## 2025-01-03 RX ADMIN — TACROLIMUS 1 MG: 1 CAPSULE ORAL at 05:01

## 2025-01-03 RX ADMIN — ACYCLOVIR SODIUM 400 MG: 50 INJECTION, SOLUTION INTRAVENOUS at 08:01

## 2025-01-03 RX ADMIN — PANTOPRAZOLE SODIUM 40 MG: 40 INJECTION, POWDER, FOR SOLUTION INTRAVENOUS at 08:01

## 2025-01-03 RX ADMIN — ACETAMINOPHEN 650 MG: 325 TABLET ORAL at 02:01

## 2025-01-03 RX ADMIN — MORPHINE SULFATE 1 MG: 2 INJECTION, SOLUTION INTRAMUSCULAR; INTRAVENOUS at 10:01

## 2025-01-03 RX ADMIN — SIMETHICONE 80 MG: 80 TABLET, CHEWABLE ORAL at 08:01

## 2025-01-03 RX ADMIN — LEVOFLOXACIN 500 MG: 5 INJECTION, SOLUTION INTRAVENOUS at 08:01

## 2025-01-03 RX ADMIN — POSACONAZOLE 300 MG: 100 TABLET, DELAYED RELEASE ORAL at 08:01

## 2025-01-03 NOTE — PT/OT/SLP PROGRESS
"Physical Therapy Treatment    Patient Name:  Rubén Hu   MRN:  [id number]    Recommendations:     Discharge Recommendations: No Therapy Indicated  Discharge Equipment Recommendations: none  Barriers to discharge: None    Assessment:     Rubén Hu is a 54 y.o. male admitted with a medical diagnosis of History of allogeneic stem cell transplant.  He presents with the following impairments/functional limitations: impaired endurance, pain, increased risk for deconditioning. Patient with good therapeutic participation this date, reports of some weakness from deconditioning over the last couple of days. Demonstrates ability for short ambulation trial in hallway without incident this date with RN approval. Will keep on PT caseload for 1x/week maintenance therapy, primarily to ensure he does not have any regression in his functional mobility during this admit.      Rehab Prognosis: Good; patient would benefit from acute skilled PT services to address these deficits and reach maximum level of function.    Recent Surgery: * No surgery found *      Plan:     During this hospitalization, patient to be seen 1 x/week to address the identified rehab impairments via gait training, therapeutic activities, therapeutic exercises, neuromuscular re-education and progress toward the following goals:    Plan of Care Expires:  01/18/25    Subjective     Chief Complaint: "I'm feeling better, but not MUCH better"  Patient/Family Comments/goals: improve strength  Pain/Comfort:  Pain Rating 1:  (no pain rating stated)  Location - Orientation 1: generalized  Location 1: abdomen  Pain Addressed 1: Nurse notified  Pain Rating Post-Intervention 1:  (no pain rating stated, "a little bit")      Objective:     Communicated with RN prior to session.  Patient found HOB elevated with  (no active lines) upon PT entry to room.     General Precautions: Standard, fall  Orthopedic Precautions: N/A  Braces: N/A  Respiratory Status: Room air   "   Functional Mobility:  Bed Mobility:     Supine to Sit: independence  Sit to Supine: independence  Transfers:     Sit to Stand:  independence with no AD  Gait: 22 ft, 80 ft with no AD and supervision, no LOB, mildly decreased woody but good reciprocal gait pattern and arm swing noted  Balance: sitting: good, standing: good      AM-PAC 6 CLICK MOBILITY  Turning over in bed (including adjusting bedclothes, sheets and blankets)?: 4  Sitting down on and standing up from a chair with arms (e.g., wheelchair, bedside commode, etc.): 4  Moving from lying on back to sitting on the side of the bed?: 4  Moving to and from a bed to a chair (including a wheelchair)?: 4  Need to walk in hospital room?: 4  Climbing 3-5 steps with a railing?: 4  Basic Mobility Total Score: 24       Treatment & Education:  Patient educated on calling for assistance for any needs to improve overall safety awareness.  Patient educated on importance of OOB activity to promote overall endurance.  Patient educated on current level of function and progression towards therapeutic goals.  All items placed within reach, and notified on call light usage for assistance with any needs for fall prevention.  Educated on appropriate recumbent bike parameters    Patient left up in chair with all lines intact, call button in reach, and RN + spouse present..    GOALS:   Multidisciplinary Problems       Physical Therapy Goals          Problem: Physical Therapy    Goal Priority Disciplines Outcome Interventions   Physical Therapy Goal     PT, PT/OT Progressing    Description: Goals to be met by: 25   Extended to be met by 25    Patient will increase functional independence with mobility by performin. Supine to sit with [place] - met 1/3/2025  2. Sit to supine with [place] - met 1/3/2025   3. Gait  x 750 feet with [place] using No Assistive Device.   4. Lower extremity exercise program x15 reps per handout, with assistance as needed                          Time Tracking:     PT Received On: 01/03/25  PT Start Time: 1510     PT Stop Time: 1519  PT Total Time (min): 9 min     Billable Minutes: Gait Training 9    Treatment Type: Treatment  PT/PTA: PT     Number of PTA visits since last PT visit: 0     01/03/2025

## 2025-01-03 NOTE — ASSESSMENT & PLAN NOTE
- Patient of Dr. 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 Received 3 bags with CD34 dose of 6.04x10^6/kg.   - Today is Day +8  - Patient is O+. Donor is O+.  - Patient is CMV non-reactive. Donor is CMV reactive. Given donor CMV status, patient will start (patient-supplied) letermovir on Day +5.  - Day -4 and Day -3 Busulfan dose-adjusted to 282 mg  - See treatment plan below:    Planned conditioning regimen:  Thiotepa on Day -7  Busulfan on Day -6, -5, -4, and -3  Fludarabine on Day -6, -5, -4, and -3  REST DAYS on Day -2 and -1     Planned  GVHD Prophylaxis:  Cyclophosphamide on Day +3 and +4  Tacrolimus starting on Day +5  Mycophenolate starting on Day +5     Antimicrobial Prophylaxis:  Acyclovir starting on Day -7  Levofloxacin starting on Day -1  Micafungin on Day -1 through Day +4  Letermovir starting on Day +5 (if CMV PCR drawn on day 0 results negative)  Posaconazole starting on Day +5  Atovaquone starting on Day +30     Skin Care with Thiotepa: Day -7 through D-5     Seizure Prophylaxis:  Levetiracetam on Day -7 through Day -2     SOS/VOD Prophylaxis:  Ursodiol on Day -7 through Day +30     Growth Factor Support:  Neupogen starting on Day +5        Caregiver: Carmelita (Spouse)  Post-transplant discharge plans: Home

## 2025-01-03 NOTE — SUBJECTIVE & OBJECTIVE
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 VSS, afebrile. Underwent RUQ limited USG 01/02 for sharp non radiating epigastric pains which was unremarkable/not concerning for VOD, continue ursodiol prophylaxis until D30. Lipase normal, GGT elevated to 101. Abdominal pain is slightly better. Continue famotidine in addition to protonix. Diarrhea significantly improved. Will obtain tacro level today.      Objective:     Vital Signs (Most Recent):  Temp: 97.8 °F (36.6 °C) (01/03/25 1140)  Pulse: 85 (01/03/25 1140)  Resp: 18 (01/03/25 1140)  BP: 125/80 (01/03/25 1140)  SpO2: 97 % (01/03/25 1140) Vital Signs (24h Range):  Temp:  [97.8 °F (36.6 °C)-98.5 °F (36.9 °C)] 97.8 °F (36.6 °C)  Pulse:  [85-95] 85  Resp:  [16-18] 18  SpO2:  [94 %-97 %] 97 %  BP: (117-126)/(73-80) 125/80     Weight: 88.9 kg (195 lb 15.8 oz)  Body mass index is 27.33 kg/m².  Body surface area is 2.11 meters squared.    ECOG SCORE           Intake/Output - Last 3 Shifts         01/01 0700  01/02 0659 01/02 0700  01/03 0659 01/03 0700  01/04 0659    P.O. 960 1300     I.V. (mL/kg)  43.6 (0.5)     Blood       IV Piggyback  160.8     Total Intake(mL/kg) 960 (10.7) 1504.4 (16.9)     Urine (mL/kg/hr)   200 (0.3)    Total Output   200    Net +960 +1504.4 -200           Urine Occurrence 8 x 5 x     Stool Occurrence 2 x 2 x              Physical Exam  Vitals and nursing note reviewed.   Constitutional:       General: He is not in acute distress.     Appearance: Normal appearance. He is not diaphoretic.   HENT:      Head: Normocephalic and atraumatic.      Nose: Nose normal.      Mouth/Throat:      Mouth: Mucous membranes are moist.      Pharynx: Oropharynx is clear.   Eyes:      General: No scleral icterus.     Extraocular Movements: Extraocular movements intact.      Pupils: Pupils are equal, round, and reactive to light.   Cardiovascular:      Rate and Rhythm: Normal rate and regular rhythm.       Pulses: Normal pulses.      Heart sounds: Normal heart sounds. No murmur heard.  Pulmonary:      Effort: Pulmonary effort is normal. No respiratory distress.      Breath sounds: Normal breath sounds. No stridor. No wheezing, rhonchi or rales.   Abdominal:      General: Abdomen is flat. Bowel sounds are normal. There is distension.      Palpations: Abdomen is soft. There is no mass.      Tenderness: There is abdominal tenderness (epigastric).   Musculoskeletal:         General: No swelling or tenderness. Normal range of motion.      Cervical back: Normal range of motion and neck supple. No rigidity.      Right lower leg: No edema.      Left lower leg: No edema.      Comments: Tunneled Central Line - Triple Lumen 12/18/24 Internal Jugular Right   Skin:     General: Skin is warm and dry.      Capillary Refill: Capillary refill takes less than 2 seconds.      Coloration: Skin is not jaundiced.      Findings: No lesion or rash.   Neurological:      General: No focal deficit present.      Mental Status: He is alert and oriented to person, place, and time.      Motor: No weakness.      Coordination: Coordination normal.   Psychiatric:         Mood and Affect: Mood normal.         Behavior: Behavior normal.            Significant Labs:   All pertinent labs from the last 24 hours have been reviewed.    Diagnostic Results:  I have reviewed and interpreted all pertinent imaging results/findings within the past 24 hours.

## 2025-01-03 NOTE — CARE UPDATE
Unit [facility] Care Support Interaction      I have reviewed the chart of Rubén Hu who is hospitalized for History of allogeneic stem cell transplant. The patient is currently located in the following unit: ONC/BMT        I have assisted the primary physician in management of the following:      Central Line - clean, dry, intact. Dressing is due to be changed today it is dated 12/27/24.    Educated pt on proper daily use of CHG wipes. Wipes left at bedside.     I have seen and examined the patient and provided the following support:     Patient experience rounds - positive experience reported  Proactive rounds - patient is stable       MATHEW SUTHERLAND  Unit Based [facility]

## 2025-01-03 NOTE — PLAN OF CARE
Problem: Physical Therapy  Goal: Physical Therapy Goal  Description: Goals to be met by: 25   Extended to be met by 25    Patient will increase functional independence with mobility by performin. Supine to sit with [place] - met 1/3/2025  2. Sit to supine with [place] - met 1/3/2025   3. Gait  x 750 feet with [place] using No Assistive Device.   4. Lower extremity exercise program x15 reps per handout, with assistance as needed    Outcome: Progressing

## 2025-01-03 NOTE — PLAN OF CARE
Plan of care reviewed with patient. Pt is day +8 Haplo Allo SCT. Pt remains afebrile. Free from falls or injury. No complaints of pain. Magic mouthwash  given for mucositits. Zofran and Compazine given for nausea. Pt up independently to bathroom. Bed locked in lowest position, non skid socks on, call light within reach. Pt instructed to call if any assistance is needed. Vitals stable. Will cont to emily pt.

## 2025-01-03 NOTE — ASSESSMENT & PLAN NOTE
- Suspect 2/2 chemotherapy conditioning regimen prior to allogeneic SCT. Abdomen notably distended and with TTP in epigastric region.   - Continue protonix and famotidine daily, simethicone 80 TID ordered for flatulence  - , lipase normal  - morphine 1 mg PRN  - CT A/P 12/31: no acute pathology   - USG abdomen (RUQ) 01/02 unremarkable, not concerning for VOD

## 2025-01-03 NOTE — PROGRESS NOTES
Daniel Rodríguez - Oncology ([facility])  Hematology  Bone Marrow Transplant  Progress Note    Patient Name: Rubén Hu  Admission Date: 12/18/2024  Hospital Length of Stay: 16 days  Code Status: Full Code    Subjective:     Interval History:   Day +8 s/p Bu/Flu/Thiotepa PTCy haploidentical (brother) SCT for PMF. VSS, afebrile. Underwent RUQ limited USG 01/02 for sharp non radiating epigastric pains which was unremarkable/not concerning for VOD, continue ursodiol prophylaxis until D30. Lipase normal, GGT elevated to 101. Abdominal pain is slightly better. Continue famotidine in addition to protonix. Diarrhea significantly improved. Will obtain tacro level today.      Objective:     Vital Signs (Most Recent):  Temp: 97.8 °F (36.6 °C) (01/03/25 1140)  Pulse: 85 (01/03/25 1140)  Resp: 18 (01/03/25 1140)  BP: 125/80 (01/03/25 1140)  SpO2: 97 % (01/03/25 1140) Vital Signs (24h Range):  Temp:  [97.8 °F (36.6 °C)-98.5 °F (36.9 °C)] 97.8 °F (36.6 °C)  Pulse:  [85-95] 85  Resp:  [16-18] 18  SpO2:  [94 %-97 %] 97 %  BP: (117-126)/(73-80) 125/80     Weight: 88.9 kg (195 lb 15.8 oz)  Body mass index is 27.33 kg/m².  Body surface area is 2.11 meters squared.    ECOG SCORE           Intake/Output - Last 3 Shifts         01/01 0700  01/02 0659 01/02 0700  01/03 0659 01/03 0700  01/04 0659    P.O. 960 1300     I.V. (mL/kg)  43.6 (0.5)     Blood       IV Piggyback  160.8     Total Intake(mL/kg) 960 (10.7) 1504.4 (16.9)     Urine (mL/kg/hr)   200 (0.3)    Total Output   200    Net +960 +1504.4 -200           Urine Occurrence 8 x 5 x     Stool Occurrence 2 x 2 x              Physical Exam  Vitals and nursing note reviewed.   Constitutional:       General: He is not in acute distress.     Appearance: Normal appearance. He is not diaphoretic.   HENT:      Head: Normocephalic and atraumatic.      Nose: Nose normal.      Mouth/Throat:      Mouth: Mucous membranes are moist.      Pharynx: Oropharynx is clear.   Eyes:      General: No scleral  icterus.     Extraocular Movements: Extraocular movements intact.      Pupils: Pupils are equal, round, and reactive to light.   Cardiovascular:      Rate and Rhythm: Normal rate and regular rhythm.      Pulses: Normal pulses.      Heart sounds: Normal heart sounds. No murmur heard.  Pulmonary:      Effort: Pulmonary effort is normal. No respiratory distress.      Breath sounds: Normal breath sounds. No stridor. No wheezing, rhonchi or rales.   Abdominal:      General: Abdomen is flat. Bowel sounds are normal. There is distension.      Palpations: Abdomen is soft. There is no mass.      Tenderness: There is abdominal tenderness (epigastric).   Musculoskeletal:         General: No swelling or tenderness. Normal range of motion.      Cervical back: Normal range of motion and neck supple. No rigidity.      Right lower leg: No edema.      Left lower leg: No edema.      Comments: Tunneled Central Line - Triple Lumen 12/18/24 Internal Jugular Right   Skin:     General: Skin is warm and dry.      Capillary Refill: Capillary refill takes less than 2 seconds.      Coloration: Skin is not jaundiced.      Findings: No lesion or rash.   Neurological:      General: No focal deficit present.      Mental Status: He is alert and oriented to person, place, and time.      Motor: No weakness.      Coordination: Coordination normal.   Psychiatric:         Mood and Affect: Mood normal.         Behavior: Behavior normal.            Significant Labs:   All pertinent labs from the last 24 hours have been reviewed.    Diagnostic Results:  I have reviewed and interpreted all pertinent imaging results/findings within the past 24 hours.  Assessment/Plan:     * History of allogeneic stem cell transplant  - Patient of Dr. 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 Received 3 bags with CD34 dose of 6.04x10^6/kg.   - Today is Day +8  - Patient is O+. Donor is  O+.  - Patient is CMV non-reactive. Donor is CMV reactive. Given donor CMV status, patient will start (patient-supplied) letermovir on Day +5.  - Day -4 and Day -3 Busulfan dose-adjusted to 282 mg  - See treatment plan below:    Planned conditioning regimen:  Thiotepa on Day -7  Busulfan on Day -6, -5, -4, and -3  Fludarabine on Day -6, -5, -4, and -3  REST DAYS on Day -2 and -1     Planned  GVHD Prophylaxis:  Cyclophosphamide on Day +3 and +4  Tacrolimus starting on Day +5  Mycophenolate starting on Day +5     Antimicrobial Prophylaxis:  Acyclovir starting on Day -7  Levofloxacin starting on Day -1  Micafungin on Day -1 through Day +4  Letermovir starting on Day +5 (if CMV PCR drawn on day 0 results negative)  Posaconazole starting on Day +5  Atovaquone starting on Day +30     Skin Care with Thiotepa: Day -7 through D-5     Seizure Prophylaxis:  Levetiracetam on Day -7 through Day -2     SOS/VOD Prophylaxis:  Ursodiol on Day -7 through Day +30     Growth Factor Support:  Neupogen starting on Day +5        Caregiver: Carmelita (Spouse)  Post-transplant discharge plans: Home      Flatulence  - See plan under epigastric pain. Simethicone 80 TID ordered 01/03    Epigastric pain  - Suspect 2/2 chemotherapy conditioning regimen prior to allogeneic SCT. Abdomen notably distended and with TTP in epigastric region.   - Continue protonix and famotidine daily, simethicone 80 TID ordered for flatulence  - , lipase normal  - morphine 1 mg PRN  - CT A/P 12/31: no acute pathology   - USG abdomen (RUQ) 01/02 unremarkable, not concerning for VOD    Mucositis  - Having poor PO intake/throat pain 2/2 chemotherapy   - Duke's solution QID  - Morphine 1 mg PRN    Allergic contact dermatitis due to adhesives  - Itching and redness to LLQ/RLQ where tele leads were placed.   - Topical benadryl prn for itching.  - Tele removed     Hypokalemia  - See electrolyte disorder    Electrolyte disorder  - Replete per PRN electrolyte order  set  - Daily CMP, mag, and phos while inpatient    Neutropenic fever  - Infection w/u neg thus far  - Afebrile, stopping cefepime and started levaquin 12/31/24      Chemotherapy-induced nausea  - Anticipate 2/2 chemotherapy   - Meds switched from PO to IV as able 1/2.   - Zofran q8h  - Compazine q6h  - Zyprexa QHS  - Ativan PRN    Transaminitis  - Transaminases first elevated 12/27. Suspect 2/2 chemotherapy prior to transplant.   - Will continue to monitor with daily CMP.  - Down-trending.    Hypertension  - Became hypertensive following SCT on 12/26. No signs of volume overload contributing to HTN.  - 12/27 started amlodipine.     Headache  - 12/27 diffuse headache began.   - Oxy 5mg and sumatriptan as needed for HA  - Avoiding tylenol with recent allo SCT/neutropenia and do not want to mask a fever.   RESOLVED    Insomnia  - Difficulty sleeping at night while IP.  - Has PRN ativan and trazodone  - Ambien PRN  - Melatonin not effective     Thrombocytosis  - 2/2 myelofibrosis  - Was on daily ASA, but stopped taking ~ 3 weeks ago  - Now with anticipated thrombocytopenia following chemotherapy  - Daily CBC while IP  RESOLVED    Pancytopenia due to chemotherapy  - Anticipated following chemotherapy.  - Transfuse for hgb < 7 or plts < 10K  - Continue antimicrobial ppx   - Daily CBC while IP    Adrenal nodule  - Subcentimeter nodular thickening of the adrenal glands first noted on imaging from 1/2024.  - Seen by endocrine prior to transplant admission and Dexamethasone suppression test performed. Patient responded appropriately. No further w/u needed.  - Likely adenomas.    Metabolic dysfunction-associated steatotic liver disease (MASLD)  - Biopsy proven to be steatotic change. Most compatible with MASLD.   - Follow-up with hepatology OP.    Primary myelofibrosis  - From Dr. 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 Transplant on 12/26/24 at 1330. Received 3 bags with CD34 dose of 6.04 x 10^6/kg.     Hyperlipidemia  - Holding home atorvastatin while IP to avoid interaction with other medications. Can resume at discharge if LFTs stable.      VTE Risk Mitigation (From admission, onward)           Ordered     heparin, porcine (PF) 100 unit/mL injection flush 300 Units  As needed (PRN)         12/18/24 2025                      Michael Saavedra MD  Bone Marrow Transplant  [facility] - Oncology ([facility])

## 2025-01-04 LAB
ALBUMIN SERPL BCP-MCNC: 2.4 G/DL (ref 3.5–5.2)
ALP SERPL-CCNC: 53 U/L (ref 40–150)
ALT SERPL W/O P-5'-P-CCNC: 25 U/L (ref 10–44)
ANION GAP SERPL CALC-SCNC: 5 MMOL/L (ref 8–16)
ANISOCYTOSIS BLD QL SMEAR: SLIGHT
AST SERPL-CCNC: 13 U/L (ref 10–40)
BASOPHILS # BLD AUTO: 0 K/UL (ref 0–0.2)
BASOPHILS NFR BLD: 0 % (ref 0–1.9)
BILIRUB SERPL-MCNC: 0.2 MG/DL (ref 0.1–1)
BUN SERPL-MCNC: 8 MG/DL (ref 6–20)
CALCIUM SERPL-MCNC: 8.1 MG/DL (ref 8.7–10.5)
CHLORIDE SERPL-SCNC: 108 MMOL/L (ref 95–110)
CO2 SERPL-SCNC: 24 MMOL/L (ref 23–29)
CREAT SERPL-MCNC: 0.6 MG/DL (ref 0.5–1.4)
DIFFERENTIAL METHOD BLD: ABNORMAL
EOSINOPHIL # BLD AUTO: 0 K/UL (ref 0–0.5)
EOSINOPHIL NFR BLD: 0 % (ref 0–8)
ERYTHROCYTE [DISTWIDTH] IN BLOOD BY AUTOMATED COUNT: 12.3 % (ref 11.5–14.5)
EST. GFR  (NO RACE VARIABLE): >60 ML/MIN/1.73 M^2
GLUCOSE SERPL-MCNC: 101 MG/DL (ref 70–110)
HCT VFR BLD AUTO: 21.1 % (ref 40–54)
HGB BLD-MCNC: 7.3 G/DL (ref 14–18)
HYPOCHROMIA BLD QL SMEAR: ABNORMAL
IMM GRANULOCYTES # BLD AUTO: 0 K/UL (ref 0–0.04)
IMM GRANULOCYTES NFR BLD AUTO: 0 % (ref 0–0.5)
LYMPHOCYTES # BLD AUTO: 0 K/UL (ref 1–4.8)
LYMPHOCYTES NFR BLD: 0 % (ref 18–48)
MAGNESIUM SERPL-MCNC: 2 MG/DL (ref 1.6–2.6)
MCH RBC QN AUTO: 33.2 PG (ref 27–31)
MCHC RBC AUTO-ENTMCNC: 34.6 G/DL (ref 32–36)
MCV RBC AUTO: 96 FL (ref 82–98)
MONOCYTES # BLD AUTO: 0 K/UL (ref 0.3–1)
MONOCYTES NFR BLD: 0 % (ref 4–15)
NEUTROPHILS # BLD AUTO: 0 K/UL (ref 1.8–7.7)
NEUTROPHILS NFR BLD: 100 % (ref 38–73)
NRBC BLD-RTO: 0 /100 WBC
OVALOCYTES BLD QL SMEAR: ABNORMAL
PHOSPHATE SERPL-MCNC: 2.3 MG/DL (ref 2.7–4.5)
PLATELET # BLD AUTO: 21 K/UL (ref 150–450)
PMV BLD AUTO: 12 FL (ref 9.2–12.9)
POIKILOCYTOSIS BLD QL SMEAR: SLIGHT
POLYCHROMASIA BLD QL SMEAR: ABNORMAL
POTASSIUM SERPL-SCNC: 4 MMOL/L (ref 3.5–5.1)
PROT SERPL-MCNC: 5.9 G/DL (ref 6–8.4)
RBC # BLD AUTO: 2.2 M/UL (ref 4.6–6.2)
SODIUM SERPL-SCNC: 137 MMOL/L (ref 136–145)
SPHEROCYTES BLD QL SMEAR: ABNORMAL
WBC # BLD AUTO: 0.01 K/UL (ref 3.9–12.7)

## 2025-01-04 PROCEDURE — 25000003 PHARM REV CODE 250

## 2025-01-04 PROCEDURE — 80053 COMPREHEN METABOLIC PANEL: CPT | Performed by: NURSE PRACTITIONER

## 2025-01-04 PROCEDURE — 25000003 PHARM REV CODE 250: Performed by: INTERNAL MEDICINE

## 2025-01-04 PROCEDURE — 84100 ASSAY OF PHOSPHORUS: CPT | Performed by: NURSE PRACTITIONER

## 2025-01-04 PROCEDURE — 83735 ASSAY OF MAGNESIUM: CPT | Performed by: NURSE PRACTITIONER

## 2025-01-04 PROCEDURE — 20600001 HC STEP DOWN PRIVATE ROOM

## 2025-01-04 PROCEDURE — 85025 COMPLETE CBC W/AUTO DIFF WBC: CPT | Performed by: NURSE PRACTITIONER

## 2025-01-04 PROCEDURE — 25000003 PHARM REV CODE 250: Performed by: STUDENT IN AN ORGANIZED HEALTH CARE EDUCATION/TRAINING PROGRAM

## 2025-01-04 PROCEDURE — 63600175 PHARM REV CODE 636 W HCPCS: Mod: JZ,TB | Performed by: INTERNAL MEDICINE

## 2025-01-04 PROCEDURE — 63600175 PHARM REV CODE 636 W HCPCS: Performed by: NURSE PRACTITIONER

## 2025-01-04 PROCEDURE — 63600175 PHARM REV CODE 636 W HCPCS: Performed by: INTERNAL MEDICINE

## 2025-01-04 PROCEDURE — 63600175 PHARM REV CODE 636 W HCPCS

## 2025-01-04 PROCEDURE — 99232 SBSQ HOSP IP/OBS MODERATE 35: CPT | Mod: ,,, | Performed by: INTERNAL MEDICINE

## 2025-01-04 RX ORDER — MORPHINE SULFATE 2 MG/ML
2 INJECTION, SOLUTION INTRAMUSCULAR; INTRAVENOUS
Status: DISCONTINUED | OUTPATIENT
Start: 2025-01-04 | End: 2025-01-04

## 2025-01-04 RX ORDER — MORPHINE SULFATE 2 MG/ML
2 INJECTION, SOLUTION INTRAMUSCULAR; INTRAVENOUS
Status: DISCONTINUED | OUTPATIENT
Start: 2025-01-04 | End: 2025-01-25 | Stop reason: HOSPADM

## 2025-01-04 RX ORDER — SODIUM CHLORIDE 9 MG/ML
INJECTION, SOLUTION INTRAVENOUS CONTINUOUS
Status: DISCONTINUED | OUTPATIENT
Start: 2025-01-04 | End: 2025-01-09

## 2025-01-04 RX ADMIN — Medication 1 DOSE: at 06:01

## 2025-01-04 RX ADMIN — ACYCLOVIR SODIUM 400 MG: 50 INJECTION, SOLUTION INTRAVENOUS at 09:01

## 2025-01-04 RX ADMIN — DIPHENHYDRAMINE HYDROCHLORIDE 15 ML: 25 SOLUTION ORAL at 06:01

## 2025-01-04 RX ADMIN — SODIUM PHOSPHATE, MONOBASIC, MONOHYDRATE AND SODIUM PHOSPHATE, DIBASIC, ANHYDROUS 15 MMOL: 142; 276 INJECTION, SOLUTION INTRAVENOUS at 09:01

## 2025-01-04 RX ADMIN — PROCHLORPERAZINE EDISYLATE 5 MG: 5 INJECTION INTRAMUSCULAR; INTRAVENOUS at 05:01

## 2025-01-04 RX ADMIN — MORPHINE SULFATE 2 MG: 2 INJECTION, SOLUTION INTRAMUSCULAR; INTRAVENOUS at 09:01

## 2025-01-04 RX ADMIN — SODIUM CHLORIDE: 9 INJECTION, SOLUTION INTRAVENOUS at 07:01

## 2025-01-04 RX ADMIN — AMLODIPINE BESYLATE 5 MG: 5 TABLET ORAL at 09:01

## 2025-01-04 RX ADMIN — OLANZAPINE 10 MG: 2.5 TABLET, FILM COATED ORAL at 09:01

## 2025-01-04 RX ADMIN — MYCOPHENOLATE MOFETIL 1000 MG: 250 CAPSULE ORAL at 02:01

## 2025-01-04 RX ADMIN — TACROLIMUS 1 MG: 1 CAPSULE ORAL at 08:01

## 2025-01-04 RX ADMIN — Medication 1 DOSE: at 09:01

## 2025-01-04 RX ADMIN — FAMOTIDINE 20 MG: 10 INJECTION, SOLUTION INTRAVENOUS at 09:01

## 2025-01-04 RX ADMIN — SIMETHICONE 80 MG: 80 TABLET, CHEWABLE ORAL at 09:01

## 2025-01-04 RX ADMIN — MYCOPHENOLATE MOFETIL 1000 MG: 250 CAPSULE ORAL at 09:01

## 2025-01-04 RX ADMIN — PROCHLORPERAZINE EDISYLATE 5 MG: 5 INJECTION INTRAMUSCULAR; INTRAVENOUS at 09:01

## 2025-01-04 RX ADMIN — POSACONAZOLE 300 MG: 100 TABLET, DELAYED RELEASE ORAL at 09:01

## 2025-01-04 RX ADMIN — ONDANSETRON 8 MG: 2 INJECTION INTRAMUSCULAR; INTRAVENOUS at 05:01

## 2025-01-04 RX ADMIN — FILGRASTIM 480 MCG: 480 INJECTION, SOLUTION INTRAVENOUS; SUBCUTANEOUS at 09:01

## 2025-01-04 RX ADMIN — URSODIOL 300 MG: 300 CAPSULE ORAL at 09:01

## 2025-01-04 RX ADMIN — DIPHENHYDRAMINE HYDROCHLORIDE 15 ML: 25 SOLUTION ORAL at 11:01

## 2025-01-04 RX ADMIN — MORPHINE SULFATE 1 MG: 2 INJECTION, SOLUTION INTRAMUSCULAR; INTRAVENOUS at 03:01

## 2025-01-04 RX ADMIN — TACROLIMUS 1 MG: 1 CAPSULE ORAL at 06:01

## 2025-01-04 RX ADMIN — MORPHINE SULFATE 1 MG: 2 INJECTION, SOLUTION INTRAMUSCULAR; INTRAVENOUS at 09:01

## 2025-01-04 RX ADMIN — LEVOFLOXACIN 500 MG: 5 INJECTION, SOLUTION INTRAVENOUS at 09:01

## 2025-01-04 RX ADMIN — DICYCLOMINE HYDROCHLORIDE 10 MG: 10 CAPSULE ORAL at 02:01

## 2025-01-04 RX ADMIN — LETERMOVIR 480 MG: 480 TABLET, FILM COATED ORAL at 09:01

## 2025-01-04 RX ADMIN — DIPHENHYDRAMINE HYDROCHLORIDE 15 ML: 25 SOLUTION ORAL at 05:01

## 2025-01-04 RX ADMIN — Medication 1 DOSE: at 02:01

## 2025-01-04 RX ADMIN — MYCOPHENOLATE MOFETIL 1000 MG: 250 CAPSULE ORAL at 08:01

## 2025-01-04 RX ADMIN — PROCHLORPERAZINE EDISYLATE 5 MG: 5 INJECTION INTRAMUSCULAR; INTRAVENOUS at 03:01

## 2025-01-04 RX ADMIN — PANTOPRAZOLE SODIUM 40 MG: 40 INJECTION, POWDER, FOR SOLUTION INTRAVENOUS at 09:01

## 2025-01-04 RX ADMIN — DICYCLOMINE HYDROCHLORIDE 10 MG: 10 CAPSULE ORAL at 09:01

## 2025-01-04 RX ADMIN — DICYCLOMINE HYDROCHLORIDE 10 MG: 10 CAPSULE ORAL at 06:01

## 2025-01-04 RX ADMIN — ONDANSETRON 8 MG: 2 INJECTION INTRAMUSCULAR; INTRAVENOUS at 02:01

## 2025-01-04 RX ADMIN — SIMETHICONE 80 MG: 80 TABLET, CHEWABLE ORAL at 02:01

## 2025-01-04 RX ADMIN — MORPHINE SULFATE 1 MG: 2 INJECTION, SOLUTION INTRAMUSCULAR; INTRAVENOUS at 05:01

## 2025-01-04 NOTE — SUBJECTIVE & OBJECTIVE
Subjective:     Interval History: Day +9 Following haplo SCT conditioned with Bu4Flu + Thiotepa plus PTCy, Tac, MMF. He reports continued mucositis/esophagitis pain.     Objective:     Vital Signs (Most Recent):  Temp: 98.2 °F (36.8 °C) (01/04/25 1603)  Pulse: 104 (01/04/25 1603)  Resp: 18 (01/04/25 1603)  BP: 117/73 (01/04/25 1603)  SpO2: 95 % (01/04/25 1603) Vital Signs (24h Range):  Temp:  [97.7 °F (36.5 °C)-98.8 °F (37.1 °C)] 98.2 °F (36.8 °C)  Pulse:  [] 104  Resp:  [16-18] 18  SpO2:  [94 %-97 %] 95 %  BP: (117-130)/(72-81) 117/73     Weight: 89.3 kg (196 lb 13.9 oz)  Body mass index is 27.46 kg/m².  Body surface area is 2.12 meters squared.    ECOG SCORE           Intake/Output - Last 3 Shifts         01/02 0700  01/03 0659 01/03 0700  01/04 0659 01/04 0700  01/05 0659    P.O. 1300 270 600    I.V. (mL/kg) 43.6 (0.5)      Blood  170     IV Piggyback 160.8 984.4     Total Intake(mL/kg) 1504.4 (16.9) 1424.4 (16) 600 (6.7)    Urine (mL/kg/hr)  625 (0.3) 1150 (1.4)    Total Output  625 1150    Net +1504.4 +799.4 -550           Urine Occurrence 5 x 1 x     Stool Occurrence 2 x               Physical Exam  Vitals and nursing note reviewed.   Constitutional:       General: He is not in acute distress.     Appearance: Normal appearance. He is not diaphoretic.   HENT:      Head: Normocephalic and atraumatic.      Nose: Nose normal.      Mouth/Throat:      Mouth: Mucous membranes are moist.      Pharynx: Oropharynx is clear.   Eyes:      General: No scleral icterus.     Extraocular Movements: Extraocular movements intact.      Pupils: Pupils are equal, round, and reactive to light.   Cardiovascular:      Rate and Rhythm: Normal rate and regular rhythm.      Pulses: Normal pulses.      Heart sounds: Normal heart sounds. No murmur heard.  Pulmonary:      Effort: Pulmonary effort is normal. No respiratory distress.      Breath sounds: Normal breath sounds. No stridor. No wheezing, rhonchi or rales.   Abdominal:       General: Abdomen is flat. Bowel sounds are normal. There is no distension.      Palpations: Abdomen is soft. There is no mass.      Tenderness: There is abdominal tenderness (epigastric).   Musculoskeletal:         General: No swelling or tenderness. Normal range of motion.      Cervical back: Normal range of motion and neck supple. No rigidity.      Right lower leg: No edema.      Left lower leg: No edema.      Comments: Tunneled Central Line - Triple Lumen 12/18/24 Internal Jugular Right   Skin:     General: Skin is warm and dry.      Capillary Refill: Capillary refill takes less than 2 seconds.      Coloration: Skin is not jaundiced.      Findings: No lesion or rash.   Neurological:      General: No focal deficit present.      Mental Status: He is alert and oriented to person, place, and time.      Motor: No weakness.      Coordination: Coordination normal.   Psychiatric:         Mood and Affect: Mood normal.         Behavior: Behavior normal.            Significant Labs:   All pertinent labs from the last 24 hours have been reviewed.    Diagnostic Results:  I have reviewed and interpreted all pertinent imaging results/findings within the past 24 hours.

## 2025-01-04 NOTE — ASSESSMENT & PLAN NOTE
- Patient of Dr. 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 Received 3 bags with CD34 dose of 6.04x10^6/kg.   - Today is Day +9  - Patient is O+. Donor is O+.  - Patient is CMV non-reactive. Donor is CMV reactive. Given donor CMV status, patient will start (patient-supplied) letermovir on Day +5.  - Day -4 and Day -3 Busulfan dose-adjusted to 282 mg  - See treatment plan below:    Planned conditioning regimen:  Thiotepa on Day -7  Busulfan on Day -6, -5, -4, and -3  Fludarabine on Day -6, -5, -4, and -3  REST DAYS on Day -2 and -1     Planned  GVHD Prophylaxis:  Cyclophosphamide on Day +3 and +4  Tacrolimus starting on Day +5  Mycophenolate starting on Day +5     Antimicrobial Prophylaxis:  Acyclovir starting on Day -7  Levofloxacin starting on Day -1  Micafungin on Day -1 through Day +4  Letermovir starting on Day +5 (if CMV PCR drawn on day 0 results negative)  Posaconazole starting on Day +5  Atovaquone starting on Day +30     Skin Care with Thiotepa: Day -7 through D-5     Seizure Prophylaxis:  Levetiracetam on Day -7 through Day -2     SOS/VOD Prophylaxis:  Ursodiol on Day -7 through Day +30     Growth Factor Support:  Neupogen starting on Day +5        Caregiver: Carmelita (Spouse)  Post-transplant discharge plans: Home

## 2025-01-04 NOTE — PROGRESS NOTES
Called into room. Pt endorses itching at back and chest. Hives noted. Banophen cream applied, charge called to bedside. Night nurse also at bedside. VSS. No dizziness or SOB.    01/03/25 1903   Vital Signs   Temp 98.1 °F (36.7 °C)   Temp Source Oral   Pulse 105   Resp 18   SpO2 97 %   /75   MAP (mmHg) 89   Assessments (Pre/Post)   Level of Consciousness (AVPU) alert

## 2025-01-04 NOTE — PROGRESS NOTES
Daniel Rodríguez - Oncology ([facility])  Hematology  Bone Marrow Transplant  Progress Note    Patient Name: Rubén Hu  Admission Date: 12/18/2024  Hospital Length of Stay: 17 days  Code Status: Full Code    Subjective:     Interval History: Day +9 Following haplo SCT conditioned with Bu4Flu + Thiotepa plus PTCy, Tac, MMF. He reports continued mucositis/esophagitis pain.     Objective:     Vital Signs (Most Recent):  Temp: 98.2 °F (36.8 °C) (01/04/25 1603)  Pulse: 104 (01/04/25 1603)  Resp: 18 (01/04/25 1603)  BP: 117/73 (01/04/25 1603)  SpO2: 95 % (01/04/25 1603) Vital Signs (24h Range):  Temp:  [97.7 °F (36.5 °C)-98.8 °F (37.1 °C)] 98.2 °F (36.8 °C)  Pulse:  [] 104  Resp:  [16-18] 18  SpO2:  [94 %-97 %] 95 %  BP: (117-130)/(72-81) 117/73     Weight: 89.3 kg (196 lb 13.9 oz)  Body mass index is 27.46 kg/m².  Body surface area is 2.12 meters squared.    ECOG SCORE           Intake/Output - Last 3 Shifts         01/02 0700  01/03 0659 01/03 0700  01/04 0659 01/04 0700  01/05 0659    P.O. 1300 270 600    I.V. (mL/kg) 43.6 (0.5)      Blood  170     IV Piggyback 160.8 984.4     Total Intake(mL/kg) 1504.4 (16.9) 1424.4 (16) 600 (6.7)    Urine (mL/kg/hr)  625 (0.3) 1150 (1.4)    Total Output  625 1150    Net +1504.4 +799.4 -550           Urine Occurrence 5 x 1 x     Stool Occurrence 2 x               Physical Exam  Vitals and nursing note reviewed.   Constitutional:       General: He is not in acute distress.     Appearance: Normal appearance. He is not diaphoretic.   HENT:      Head: Normocephalic and atraumatic.      Nose: Nose normal.      Mouth/Throat:      Mouth: Mucous membranes are moist.      Pharynx: Oropharynx is clear.   Eyes:      General: No scleral icterus.     Extraocular Movements: Extraocular movements intact.      Pupils: Pupils are equal, round, and reactive to light.   Cardiovascular:      Rate and Rhythm: Normal rate and regular rhythm.      Pulses: Normal pulses.      Heart sounds: Normal heart  sounds. No murmur heard.  Pulmonary:      Effort: Pulmonary effort is normal. No respiratory distress.      Breath sounds: Normal breath sounds. No stridor. No wheezing, rhonchi or rales.   Abdominal:      General: Abdomen is flat. Bowel sounds are normal. There is no distension.      Palpations: Abdomen is soft. There is no mass.      Tenderness: There is abdominal tenderness (epigastric).   Musculoskeletal:         General: No swelling or tenderness. Normal range of motion.      Cervical back: Normal range of motion and neck supple. No rigidity.      Right lower leg: No edema.      Left lower leg: No edema.      Comments: Tunneled Central Line - Triple Lumen 12/18/24 Internal Jugular Right   Skin:     General: Skin is warm and dry.      Capillary Refill: Capillary refill takes less than 2 seconds.      Coloration: Skin is not jaundiced.      Findings: No lesion or rash.   Neurological:      General: No focal deficit present.      Mental Status: He is alert and oriented to person, place, and time.      Motor: No weakness.      Coordination: Coordination normal.   Psychiatric:         Mood and Affect: Mood normal.         Behavior: Behavior normal.            Significant Labs:   All pertinent labs from the last 24 hours have been reviewed.    Diagnostic Results:  I have reviewed and interpreted all pertinent imaging results/findings within the past 24 hours.  Assessment/Plan:     * History of allogeneic stem cell transplant  - Patient of Dr. 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 Received 3 bags with CD34 dose of 6.04x10^6/kg.   - Today is Day +9  - Patient is O+. Donor is O+.  - Patient is CMV non-reactive. Donor is CMV reactive. Given donor CMV status, patient will start (patient-supplied) letermovir on Day +5.  - Day -4 and Day -3 Busulfan dose-adjusted to 282 mg  - See treatment plan below:    Planned conditioning  regimen:  Thiotepa on Day -7  Busulfan on Day -6, -5, -4, and -3  Fludarabine on Day -6, -5, -4, and -3  REST DAYS on Day -2 and -1     Planned  GVHD Prophylaxis:  Cyclophosphamide on Day +3 and +4  Tacrolimus starting on Day +5  Mycophenolate starting on Day +5     Antimicrobial Prophylaxis:  Acyclovir starting on Day -7  Levofloxacin starting on Day -1  Micafungin on Day -1 through Day +4  Letermovir starting on Day +5 (if CMV PCR drawn on day 0 results negative)  Posaconazole starting on Day +5  Atovaquone starting on Day +30     Skin Care with Thiotepa: Day -7 through D-5     Seizure Prophylaxis:  Levetiracetam on Day -7 through Day -2     SOS/VOD Prophylaxis:  Ursodiol on Day -7 through Day +30     Growth Factor Support:  Neupogen starting on Day +5        Caregiver: Carmelita (Spouse)  Post-transplant discharge plans: Home      Flatulence  - See plan under epigastric pain. Simethicone 80 TID ordered 01/03    Epigastric pain  - Suspect 2/2 chemotherapy conditioning regimen prior to allogeneic SCT. Abdomen notably distended and with TTP in epigastric region.   - Continue protonix and famotidine daily, simethicone 80 TID ordered for flatulence  - , lipase normal  - morphine 1 mg PRN  - CT A/P 12/31: no acute pathology   - USG abdomen (RUQ) 01/02 unremarkable, not concerning for VOD    Mucositis  - Having poor PO intake/throat pain 2/2 chemotherapy   - Duke's solution QID  - Morphine 1 mg PRN. Can increase dose or frequency if need be.    Allergic contact dermatitis due to adhesives  - Itching and redness to LLQ/RLQ where tele leads were placed.   - Topical benadryl prn for itching.  - Tele removed     Hypokalemia  - See electrolyte disorder    Electrolyte disorder  - Replete per PRN electrolyte order set  - Daily CMP, mag, and phos while inpatient    Neutropenic fever  - Infection w/u neg thus far  - Afebrile, stopping cefepime and started levaquin 12/31/24      Chemotherapy-induced nausea  - Anticipate 2/2  chemotherapy   - Meds switched from PO to IV as able 1/2.   - Zofran q8h  - Compazine q6h  - Zyprexa QHS  - Ativan PRN    Transaminitis  - Transaminases first elevated 12/27. Suspect 2/2 chemotherapy prior to transplant.   - Will continue to monitor with daily CMP.  - Down-trending.    Hypertension  - Became hypertensive following SCT on 12/26. No signs of volume overload contributing to HTN.  - 12/27 started amlodipine.     Headache  - 12/27 diffuse headache began.   - Oxy 5mg and sumatriptan as needed for HA  - Avoiding tylenol with recent allo SCT/neutropenia and do not want to mask a fever.   RESOLVED    Insomnia  - Difficulty sleeping at night while IP.  - Has PRN ativan and trazodone  - Ambien PRN  - Melatonin not effective     Thrombocytosis  - 2/2 myelofibrosis  - Was on daily ASA, but stopped taking ~ 3 weeks ago  - Now with anticipated thrombocytopenia following chemotherapy  - Daily CBC while IP  RESOLVED    Pancytopenia due to chemotherapy  - Anticipated following chemotherapy.  - Transfuse for hgb < 7 or plts < 10K  - Continue antimicrobial ppx   - Daily CBC while IP    Adrenal nodule  - Subcentimeter nodular thickening of the adrenal glands first noted on imaging from 1/2024.  - Seen by endocrine prior to transplant admission and Dexamethasone suppression test performed. Patient responded appropriately. No further w/u needed.  - Likely adenomas.    Metabolic dysfunction-associated steatotic liver disease (MASLD)  - Biopsy proven to be steatotic change. Most compatible with MASLD.   - Follow-up with hepatology OP.    Primary myelofibrosis  - From Dr. 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 Transplant on 12/26/24 at 1330. Received 3 bags with CD34 dose of 6.04 x 10^6/kg.     Hyperlipidemia  - Holding home atorvastatin while IP to avoid interaction with other medications. Can resume at discharge if LFTs stable.        VTE Risk Mitigation (From admission, onward)           Ordered     heparin, porcine (PF) 100 unit/mL injection flush 300 Units  As needed (PRN)         12/18/24 2025                    Disposition: inpatient     Clarence Grover MD  Bone Marrow Transplant  [facility] - Oncology ([facility])

## 2025-01-04 NOTE — PLAN OF CARE
Side rails up x2; call bell in place; bed in lowest, locked position; skid proof socks on; no evidence of skin breakdown; care plan explained to patient; pt remains free of injury. The pt is day +9 of an allo SCT. Pt with decreased appetite due to sore throat and pain when swallowing. Voids, ambulates to BR. Na phos IVPB infused, levofloxacin and acyclovir given as ordered. Pt with c/o pain morphine 1 mg iv given x 2. Pt stated he had relief. Compazine and zofran given as ordered, n/v controlled. Frequent rounding in progress pt encouraged to call as needed, VSS and afebrile.

## 2025-01-04 NOTE — PROGRESS NOTES
Called to room by RN. Pt with itching and Hives after receiving plts. Contacted Dr. Fuller. Received order to give additional IV benadryl. Pt vitals stable and in no acute distress. No other orders given. Did not want to treat as a plt reaction.

## 2025-01-04 NOTE — PLAN OF CARE
Plan of care reviewed with patient. Pt is day +9 Haplo Allo SCT. Pt remains afebrile. Free from falls or injury. Morphine given twice for mucositis pain. Magic mouthwash and Duke's given for mucositits. Zofran and Compazine given for nausea. Benadryl given once for hives/itching. Pt up independently to bathroom. Bed locked in lowest position, non skid socks on, call light within reach. Pt instructed to call if any assistance is needed. Vitals stable. Will cont to emily pt.

## 2025-01-04 NOTE — PLAN OF CARE
Day +8 of Bu/Flu/Thiotepa Haplo SCT. Pt involved in plan of care and communicating needs throughout shift. Up in room and to bathroom independently; voiding without difficulty. Poor appetite. Pt still endorses abdominal pain and cramping; simethicone added. No diarrhea today. Pt reports painful swallowing; duke's solution added on. IV Electrolytes replaced per protocol. Pt's plt=6k; still waiting for plts from blood bank at this time. Pt premedicated with benadryl and tylenol. Showered, linens changed. Sterile dressing change done at [facility]; redness noted at site. All VSS. Pt remaining free from falls or injury throughout shift. Bed locked and in lowest position, personal belongings and call light within reach, non skid socks on when OOB. Pt instructed to call for assistance as needed. Hourly rounding done on pt.

## 2025-01-04 NOTE — ASSESSMENT & PLAN NOTE
- Having poor PO intake/throat pain 2/2 chemotherapy   - Duke's solution QID  - Morphine 1 mg PRN. Can increase dose or frequency if need be.

## 2025-01-05 LAB
ALBUMIN SERPL BCP-MCNC: 2.5 G/DL (ref 3.5–5.2)
ALP SERPL-CCNC: 54 U/L (ref 40–150)
ALT SERPL W/O P-5'-P-CCNC: 20 U/L (ref 10–44)
ANION GAP SERPL CALC-SCNC: 6 MMOL/L (ref 8–16)
ANISOCYTOSIS BLD QL SMEAR: SLIGHT
AST SERPL-CCNC: 10 U/L (ref 10–40)
BASOPHILS # BLD AUTO: 0 K/UL (ref 0–0.2)
BASOPHILS NFR BLD: 0 % (ref 0–1.9)
BILIRUB SERPL-MCNC: 0.3 MG/DL (ref 0.1–1)
BUN SERPL-MCNC: 8 MG/DL (ref 6–20)
CALCIUM SERPL-MCNC: 8.5 MG/DL (ref 8.7–10.5)
CHLORIDE SERPL-SCNC: 107 MMOL/L (ref 95–110)
CO2 SERPL-SCNC: 23 MMOL/L (ref 23–29)
CREAT SERPL-MCNC: 0.6 MG/DL (ref 0.5–1.4)
DIFFERENTIAL METHOD BLD: ABNORMAL
EOSINOPHIL # BLD AUTO: 0 K/UL (ref 0–0.5)
EOSINOPHIL NFR BLD: 0 % (ref 0–8)
ERYTHROCYTE [DISTWIDTH] IN BLOOD BY AUTOMATED COUNT: 12.2 % (ref 11.5–14.5)
EST. GFR  (NO RACE VARIABLE): >60 ML/MIN/1.73 M^2
GLUCOSE SERPL-MCNC: 102 MG/DL (ref 70–110)
HCT VFR BLD AUTO: 21.3 % (ref 40–54)
HGB BLD-MCNC: 7.1 G/DL (ref 14–18)
HYPOCHROMIA BLD QL SMEAR: ABNORMAL
IMM GRANULOCYTES # BLD AUTO: 0 K/UL (ref 0–0.04)
IMM GRANULOCYTES NFR BLD AUTO: 0 % (ref 0–0.5)
LYMPHOCYTES # BLD AUTO: 0 K/UL (ref 1–4.8)
LYMPHOCYTES NFR BLD: 0 % (ref 18–48)
MAGNESIUM SERPL-MCNC: 1.6 MG/DL (ref 1.6–2.6)
MCH RBC QN AUTO: 32 PG (ref 27–31)
MCHC RBC AUTO-ENTMCNC: 33.3 G/DL (ref 32–36)
MCV RBC AUTO: 96 FL (ref 82–98)
MONOCYTES # BLD AUTO: 0 K/UL (ref 0.3–1)
MONOCYTES NFR BLD: 0 % (ref 4–15)
NEUTROPHILS # BLD AUTO: 0 K/UL (ref 1.8–7.7)
NEUTROPHILS NFR BLD: 100 % (ref 38–73)
NRBC BLD-RTO: 0 /100 WBC
OVALOCYTES BLD QL SMEAR: ABNORMAL
PHOSPHATE SERPL-MCNC: 2.9 MG/DL (ref 2.7–4.5)
PLATELET # BLD AUTO: 14 K/UL (ref 150–450)
PLATELET BLD QL SMEAR: ABNORMAL
PMV BLD AUTO: 11.5 FL (ref 9.2–12.9)
POIKILOCYTOSIS BLD QL SMEAR: SLIGHT
POLYCHROMASIA BLD QL SMEAR: ABNORMAL
POTASSIUM SERPL-SCNC: 4 MMOL/L (ref 3.5–5.1)
PROT SERPL-MCNC: 6 G/DL (ref 6–8.4)
RBC # BLD AUTO: 2.22 M/UL (ref 4.6–6.2)
SODIUM SERPL-SCNC: 136 MMOL/L (ref 136–145)
WBC # BLD AUTO: 0.01 K/UL (ref 3.9–12.7)

## 2025-01-05 PROCEDURE — 63600175 PHARM REV CODE 636 W HCPCS

## 2025-01-05 PROCEDURE — 25000003 PHARM REV CODE 250: Performed by: STUDENT IN AN ORGANIZED HEALTH CARE EDUCATION/TRAINING PROGRAM

## 2025-01-05 PROCEDURE — 63600175 PHARM REV CODE 636 W HCPCS: Performed by: INTERNAL MEDICINE

## 2025-01-05 PROCEDURE — 25000003 PHARM REV CODE 250

## 2025-01-05 PROCEDURE — 63600175 PHARM REV CODE 636 W HCPCS: Performed by: NURSE PRACTITIONER

## 2025-01-05 PROCEDURE — 85025 COMPLETE CBC W/AUTO DIFF WBC: CPT | Performed by: NURSE PRACTITIONER

## 2025-01-05 PROCEDURE — 99232 SBSQ HOSP IP/OBS MODERATE 35: CPT | Mod: ,,, | Performed by: INTERNAL MEDICINE

## 2025-01-05 PROCEDURE — 25000003 PHARM REV CODE 250: Performed by: INTERNAL MEDICINE

## 2025-01-05 PROCEDURE — 20600001 HC STEP DOWN PRIVATE ROOM

## 2025-01-05 PROCEDURE — 84100 ASSAY OF PHOSPHORUS: CPT | Performed by: NURSE PRACTITIONER

## 2025-01-05 PROCEDURE — 80053 COMPREHEN METABOLIC PANEL: CPT | Performed by: NURSE PRACTITIONER

## 2025-01-05 PROCEDURE — 63600175 PHARM REV CODE 636 W HCPCS: Performed by: STUDENT IN AN ORGANIZED HEALTH CARE EDUCATION/TRAINING PROGRAM

## 2025-01-05 PROCEDURE — 83735 ASSAY OF MAGNESIUM: CPT | Performed by: NURSE PRACTITIONER

## 2025-01-05 RX ORDER — SCOLOPAMINE TRANSDERMAL SYSTEM 1 MG/1
1 PATCH, EXTENDED RELEASE TRANSDERMAL
Status: DISCONTINUED | OUTPATIENT
Start: 2025-01-05 | End: 2025-01-14

## 2025-01-05 RX ORDER — HYDROMORPHONE HYDROCHLORIDE 1 MG/ML
0.2 INJECTION, SOLUTION INTRAMUSCULAR; INTRAVENOUS; SUBCUTANEOUS ONCE
Status: COMPLETED | OUTPATIENT
Start: 2025-01-05 | End: 2025-01-05

## 2025-01-05 RX ORDER — DICYCLOMINE HYDROCHLORIDE 10 MG/ML
10 INJECTION INTRAMUSCULAR 4 TIMES DAILY
Status: DISCONTINUED | OUTPATIENT
Start: 2025-01-05 | End: 2025-01-06

## 2025-01-05 RX ORDER — HYDROMORPHONE HYDROCHLORIDE 1 MG/ML
0.5 INJECTION, SOLUTION INTRAMUSCULAR; INTRAVENOUS; SUBCUTANEOUS EVERY 6 HOURS PRN
Status: DISCONTINUED | OUTPATIENT
Start: 2025-01-05 | End: 2025-01-13

## 2025-01-05 RX ADMIN — DICYCLOMINE HYDROCHLORIDE 10 MG: 10 INJECTION INTRAMUSCULAR at 09:01

## 2025-01-05 RX ADMIN — PROCHLORPERAZINE EDISYLATE 5 MG: 5 INJECTION INTRAMUSCULAR; INTRAVENOUS at 04:01

## 2025-01-05 RX ADMIN — MAGNESIUM SULFATE HEPTAHYDRATE 2 G: 40 INJECTION, SOLUTION INTRAVENOUS at 06:01

## 2025-01-05 RX ADMIN — MYCOPHENOLATE MOFETIL 1000 MG: 250 CAPSULE ORAL at 08:01

## 2025-01-05 RX ADMIN — TACROLIMUS 1 MG: 1 CAPSULE ORAL at 08:01

## 2025-01-05 RX ADMIN — ONDANSETRON 8 MG: 2 INJECTION INTRAMUSCULAR; INTRAVENOUS at 09:01

## 2025-01-05 RX ADMIN — FAMOTIDINE 20 MG: 10 INJECTION, SOLUTION INTRAVENOUS at 08:01

## 2025-01-05 RX ADMIN — DIPHENHYDRAMINE HYDROCHLORIDE 15 ML: 25 SOLUTION ORAL at 06:01

## 2025-01-05 RX ADMIN — PROCHLORPERAZINE EDISYLATE 5 MG: 5 INJECTION INTRAMUSCULAR; INTRAVENOUS at 03:01

## 2025-01-05 RX ADMIN — Medication 1 DOSE: at 08:01

## 2025-01-05 RX ADMIN — MORPHINE SULFATE 2 MG: 2 INJECTION, SOLUTION INTRAMUSCULAR; INTRAVENOUS at 04:01

## 2025-01-05 RX ADMIN — FILGRASTIM 480 MCG: 480 INJECTION, SOLUTION INTRAVENOUS; SUBCUTANEOUS at 10:01

## 2025-01-05 RX ADMIN — SCOPOLAMINE 1 PATCH: 1.5 PATCH, EXTENDED RELEASE TRANSDERMAL at 07:01

## 2025-01-05 RX ADMIN — URSODIOL 300 MG: 300 CAPSULE ORAL at 09:01

## 2025-01-05 RX ADMIN — ACYCLOVIR SODIUM 400 MG: 50 INJECTION, SOLUTION INTRAVENOUS at 09:01

## 2025-01-05 RX ADMIN — MORPHINE SULFATE 2 MG: 2 INJECTION, SOLUTION INTRAMUSCULAR; INTRAVENOUS at 12:01

## 2025-01-05 RX ADMIN — MORPHINE SULFATE 2 MG: 2 INJECTION, SOLUTION INTRAMUSCULAR; INTRAVENOUS at 08:01

## 2025-01-05 RX ADMIN — LEVOFLOXACIN 500 MG: 5 INJECTION, SOLUTION INTRAVENOUS at 10:01

## 2025-01-05 RX ADMIN — URSODIOL 300 MG: 300 CAPSULE ORAL at 08:01

## 2025-01-05 RX ADMIN — Medication 1 DOSE: at 09:01

## 2025-01-05 RX ADMIN — ACYCLOVIR SODIUM 400 MG: 50 INJECTION, SOLUTION INTRAVENOUS at 10:01

## 2025-01-05 RX ADMIN — Medication 1 DOSE: at 04:01

## 2025-01-05 RX ADMIN — DICYCLOMINE HYDROCHLORIDE 10 MG: 10 CAPSULE ORAL at 08:01

## 2025-01-05 RX ADMIN — DIPHENHYDRAMINE HYDROCHLORIDE 15 ML: 25 SOLUTION ORAL at 12:01

## 2025-01-05 RX ADMIN — AMLODIPINE BESYLATE 5 MG: 5 TABLET ORAL at 08:01

## 2025-01-05 RX ADMIN — DICYCLOMINE HYDROCHLORIDE 10 MG: 10 CAPSULE ORAL at 12:01

## 2025-01-05 RX ADMIN — SIMETHICONE 80 MG: 80 TABLET, CHEWABLE ORAL at 09:01

## 2025-01-05 RX ADMIN — PROCHLORPERAZINE EDISYLATE 5 MG: 5 INJECTION INTRAMUSCULAR; INTRAVENOUS at 10:01

## 2025-01-05 RX ADMIN — MYCOPHENOLATE MOFETIL 1000 MG: 500 INJECTION, POWDER, LYOPHILIZED, FOR SOLUTION INTRAVENOUS at 10:01

## 2025-01-05 RX ADMIN — HYDROMORPHONE HYDROCHLORIDE 0.2 MG: 1 INJECTION, SOLUTION INTRAMUSCULAR; INTRAVENOUS; SUBCUTANEOUS at 07:01

## 2025-01-05 RX ADMIN — SODIUM CHLORIDE: 9 INJECTION, SOLUTION INTRAVENOUS at 01:01

## 2025-01-05 RX ADMIN — Medication 1 DOSE: at 12:01

## 2025-01-05 RX ADMIN — MYCOPHENOLATE MOFETIL 1000 MG: 250 CAPSULE ORAL at 04:01

## 2025-01-05 RX ADMIN — MORPHINE SULFATE 2 MG: 2 INJECTION, SOLUTION INTRAMUSCULAR; INTRAVENOUS at 10:01

## 2025-01-05 RX ADMIN — ONDANSETRON 8 MG: 2 INJECTION INTRAMUSCULAR; INTRAVENOUS at 02:01

## 2025-01-05 RX ADMIN — SIMETHICONE 80 MG: 80 TABLET, CHEWABLE ORAL at 04:01

## 2025-01-05 RX ADMIN — LETERMOVIR 480 MG: 480 TABLET, FILM COATED ORAL at 08:01

## 2025-01-05 RX ADMIN — ONDANSETRON 8 MG: 2 INJECTION INTRAMUSCULAR; INTRAVENOUS at 06:01

## 2025-01-05 RX ADMIN — POSACONAZOLE 300 MG: 100 TABLET, DELAYED RELEASE ORAL at 08:01

## 2025-01-05 RX ADMIN — PANTOPRAZOLE SODIUM 40 MG: 40 INJECTION, POWDER, FOR SOLUTION INTRAVENOUS at 08:01

## 2025-01-05 RX ADMIN — GUAIFENESIN AND DEXTROMETHORPHAN HYDROBROMIDE 1 TABLET: 600; 30 TABLET, EXTENDED RELEASE ORAL at 04:01

## 2025-01-05 RX ADMIN — TACROLIMUS 1 MG: 1 CAPSULE ORAL at 06:01

## 2025-01-05 RX ADMIN — PROCHLORPERAZINE EDISYLATE 5 MG: 5 INJECTION INTRAMUSCULAR; INTRAVENOUS at 09:01

## 2025-01-05 RX ADMIN — SIMETHICONE 80 MG: 80 TABLET, CHEWABLE ORAL at 08:01

## 2025-01-05 NOTE — ASSESSMENT & PLAN NOTE
- Patient of Dr. 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 Received 3 bags with CD34 dose of 6.04x10^6/kg.   - Today is Day +10  - Patient is O+. Donor is O+.  - Patient is CMV non-reactive. Donor is CMV reactive. Given donor CMV status, patient will start (patient-supplied) letermovir on Day +5.  - Day -4 and Day -3 Busulfan dose-adjusted to 282 mg  - See treatment plan below:    Planned conditioning regimen:  Thiotepa on Day -7  Busulfan on Day -6, -5, -4, and -3  Fludarabine on Day -6, -5, -4, and -3  REST DAYS on Day -2 and -1     Planned  GVHD Prophylaxis:  Cyclophosphamide on Day +3 and +4  Tacrolimus starting on Day +5  Mycophenolate starting on Day +5     Antimicrobial Prophylaxis:  Acyclovir starting on Day -7  Levofloxacin starting on Day -1  Micafungin on Day -1 through Day +4  Letermovir starting on Day +5 (if CMV PCR drawn on day 0 results negative)  Posaconazole starting on Day +5  Atovaquone starting on Day +30     Skin Care with Thiotepa: Day -7 through D-5     Seizure Prophylaxis:  Levetiracetam on Day -7 through Day -2     SOS/VOD Prophylaxis:  Ursodiol on Day -7 through Day +30     Growth Factor Support:  Neupogen starting on Day +5        Caregiver: Carmelita (Spouse)  Post-transplant discharge plans: Home

## 2025-01-05 NOTE — PROGRESS NOTES
[facility] - Oncology ([facility])  Hematology  Bone Marrow Transplant  Progress Note    Patient Name: Rubén Hu  Admission Date: 12/18/2024  Hospital Length of Stay: 18 days  Code Status: Full Code    Subjective:     Interval History: Day +10 following haploSCT conditioned with BuFluThiotepa plus PTCy, Tac, MMF. Mucositis/esophagitis worsening. Transitioning meds to IV. Continue morphine PRN.     Objective:     Vital Signs (Most Recent):  Temp: 99.1 °F (37.3 °C) (01/05/25 1144)  Pulse: 102 (01/05/25 1144)  Resp: 16 (01/05/25 1259)  BP: (!) 146/83 (01/05/25 1144)  SpO2: 97 % (01/05/25 1144) Vital Signs (24h Range):  Temp:  [98 °F (36.7 °C)-99.2 °F (37.3 °C)] 99.1 °F (37.3 °C)  Pulse:  [] 102  Resp:  [16-20] 16  SpO2:  [95 %-97 %] 97 %  BP: (117-146)/(73-83) 146/83     Weight: 88.1 kg (194 lb 3.6 oz)  Body mass index is 27.09 kg/m².  Body surface area is 2.1 meters squared.    ECOG SCORE           Intake/Output - Last 3 Shifts         01/03 0700  01/04 0659 01/04 0700  01/05 0659 01/05 0700  01/06 0659    P.O. 270 700 240    I.V. (mL/kg)       Blood 170      IV Piggyback 984.4 549.2     Total Intake(mL/kg) 1424.4 (16) 1249.2 (14.2) 240 (2.7)    Urine (mL/kg/hr) 625 (0.3) 1550 (0.7) 1000 (1.4)    Emesis/NG output  0     Stool  0     Total Output 625 1550 1000    Net +799.4 -300.8 -760           Urine Occurrence 1 x 3 x     Stool Occurrence  0 x     Emesis Occurrence  0 x              Physical Exam  Vitals and nursing note reviewed.   Constitutional:       General: He is not in acute distress.     Appearance: Normal appearance. He is not diaphoretic.   HENT:      Head: Normocephalic and atraumatic.      Nose: Nose normal.      Mouth/Throat:      Mouth: Mucous membranes are moist.      Pharynx: Oropharynx is clear.   Eyes:      General: No scleral icterus.     Extraocular Movements: Extraocular movements intact.      Pupils: Pupils are equal, round, and reactive to light.   Cardiovascular:      Rate and Rhythm:  Normal rate and regular rhythm.      Pulses: Normal pulses.      Heart sounds: Normal heart sounds. No murmur heard.  Pulmonary:      Effort: Pulmonary effort is normal. No respiratory distress.      Breath sounds: Normal breath sounds. No stridor. No wheezing, rhonchi or rales.   Abdominal:      General: Abdomen is flat. Bowel sounds are normal. There is no distension.      Palpations: Abdomen is soft. There is no mass.      Tenderness: There is abdominal tenderness (epigastric).   Musculoskeletal:         General: No swelling or tenderness. Normal range of motion.      Cervical back: Normal range of motion and neck supple. No rigidity.      Right lower leg: No edema.      Left lower leg: No edema.      Comments: Tunneled Central Line - Triple Lumen 12/18/24 Internal Jugular Right   Skin:     General: Skin is warm and dry.      Capillary Refill: Capillary refill takes less than 2 seconds.      Coloration: Skin is not jaundiced.      Findings: No lesion or rash.   Neurological:      General: No focal deficit present.      Mental Status: He is alert and oriented to person, place, and time.      Motor: No weakness.      Coordination: Coordination normal.   Psychiatric:         Mood and Affect: Mood normal.         Behavior: Behavior normal.            Significant Labs:   All pertinent labs from the last 24 hours have been reviewed.    Diagnostic Results:  I have reviewed and interpreted all pertinent imaging results/findings within the past 24 hours.  Assessment/Plan:     * History of allogeneic stem cell transplant  - Patient of Dr. 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 Received 3 bags with CD34 dose of 6.04x10^6/kg.   - Today is Day +10  - Patient is O+. Donor is O+.  - Patient is CMV non-reactive. Donor is CMV reactive. Given donor CMV status, patient will start (patient-supplied) letermovir on Day +5.  - Day -4 and Day -3  Busulfan dose-adjusted to 282 mg  - See treatment plan below:    Planned conditioning regimen:  Thiotepa on Day -7  Busulfan on Day -6, -5, -4, and -3  Fludarabine on Day -6, -5, -4, and -3  REST DAYS on Day -2 and -1     Planned  GVHD Prophylaxis:  Cyclophosphamide on Day +3 and +4  Tacrolimus starting on Day +5  Mycophenolate starting on Day +5     Antimicrobial Prophylaxis:  Acyclovir starting on Day -7  Levofloxacin starting on Day -1  Micafungin on Day -1 through Day +4  Letermovir starting on Day +5 (if CMV PCR drawn on day 0 results negative)  Posaconazole starting on Day +5  Atovaquone starting on Day +30     Skin Care with Thiotepa: Day -7 through D-5     Seizure Prophylaxis:  Levetiracetam on Day -7 through Day -2     SOS/VOD Prophylaxis:  Ursodiol on Day -7 through Day +30     Growth Factor Support:  Neupogen starting on Day +5        Caregiver: Carmelita (Spouse)  Post-transplant discharge plans: Home      Flatulence  - See plan under epigastric pain. Simethicone 80 TID ordered 01/03    Epigastric pain  - Suspect 2/2 chemotherapy conditioning regimen prior to allogeneic SCT. Abdomen notably distended and with TTP in epigastric region.   - Continue protonix and famotidine daily, simethicone 80 TID ordered for flatulence  - , lipase normal  - morphine 1 mg PRN  - CT A/P 12/31: no acute pathology   - USG abdomen (RUQ) 01/02 unremarkable, not concerning for VOD    Mucositis  - Having poor PO intake/throat pain 2/2 chemotherapy   - Duke's solution QID  - Morphine 2 mg q3h PRN. Can increase dose or frequency if need be.  - Transitioned PO meds to IV.    Allergic contact dermatitis due to adhesives  - Itching and redness to LLQ/RLQ where tele leads were placed.   - Topical benadryl prn for itching.  - Tele removed     Hypokalemia  - See electrolyte disorder    Electrolyte disorder  - Replete per PRN electrolyte order set  - Daily CMP, mag, and phos while inpatient    Neutropenic fever  - Infection w/u neg  thus far  - Afebrile, stopping cefepime and started levaquin 12/31/24      Chemotherapy-induced nausea  - Anticipate 2/2 chemotherapy   - Meds switched from PO to IV as able 1/2.   - Zofran q8h  - Compazine q6h  - Zyprexa QHS  - Ativan PRN    Transaminitis  - Transaminases first elevated 12/27. Suspect 2/2 chemotherapy prior to transplant.   - Will continue to monitor with daily CMP.  - Down-trending.    Hypertension  - Became hypertensive following SCT on 12/26. No signs of volume overload contributing to HTN.  - 12/27 started amlodipine.     Headache  - 12/27 diffuse headache began.   - Oxy 5mg and sumatriptan as needed for HA  - Avoiding tylenol with recent allo SCT/neutropenia and do not want to mask a fever.   RESOLVED    Insomnia  - Difficulty sleeping at night while IP.  - Has PRN ativan and trazodone  - Ambien PRN  - Melatonin not effective     Thrombocytosis  - 2/2 myelofibrosis  - Was on daily ASA, but stopped taking ~ 3 weeks ago  - Now with anticipated thrombocytopenia following chemotherapy  - Daily CBC while IP  RESOLVED    Pancytopenia due to chemotherapy  - Anticipated following chemotherapy.  - Transfuse for hgb < 7 or plts < 10K  - Continue antimicrobial ppx   - Daily CBC while IP    Adrenal nodule  - Subcentimeter nodular thickening of the adrenal glands first noted on imaging from 1/2024.  - Seen by endocrine prior to transplant admission and Dexamethasone suppression test performed. Patient responded appropriately. No further w/u needed.  - Likely adenomas.    Metabolic dysfunction-associated steatotic liver disease (MASLD)  - Biopsy proven to be steatotic change. Most compatible with MASLD.   - Follow-up with hepatology OP.    Primary myelofibrosis  - From Dr. 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 Transplant on 12/26/24 at 1330. Received 3 bags with CD34 dose of 6.04 x 10^6/kg.     Hyperlipidemia  - Holding home atorvastatin while IP to avoid interaction with other medications. Can resume at discharge if LFTs stable.        VTE Risk Mitigation (From admission, onward)           Ordered     heparin, porcine (PF) 100 unit/mL injection flush 300 Units  As needed (PRN)         12/18/24 2025                    Disposition: inpatient    Clarence Grover MD  Bone Marrow Transplant  [facility] - Oncology ([facility])

## 2025-01-05 NOTE — PLAN OF CARE
Patient AAOX4, VSS, afebrile, on room air. Patient complained of pain and received morphine prn. Patient up to the bathroom independently, free from falls and injuries. I&O's monitored as ordered. Bed in lowest position, side rails up x2, non-skid socks on, call light in reach. Patient educated to use call light for any needs, patient verbalizes understanding. There are no concerns at this time. Plan of care on going.

## 2025-01-05 NOTE — ASSESSMENT & PLAN NOTE
- Having poor PO intake/throat pain 2/2 chemotherapy   - Duke's solution QID  - Morphine 2 mg q3h PRN. Can increase dose or frequency if need be.  - Transitioned PO meds to IV.

## 2025-01-05 NOTE — NURSING
Patient having difficulty swallowing due to his mucositis. He agreed to take his most important PO meds and refused the rest. Pt would like to see if any if the meds can be given IV.

## 2025-01-05 NOTE — SUBJECTIVE & OBJECTIVE
Subjective:     Interval History: Day +10 following haploSCT conditioned with BuFluThiotepa plus PTCy, Tac, MMF. Mucositis/esophagitis worsening. Transitioning meds to IV. Continue morphine PRN.     Objective:     Vital Signs (Most Recent):  Temp: 99.1 °F (37.3 °C) (01/05/25 1144)  Pulse: 102 (01/05/25 1144)  Resp: 16 (01/05/25 1259)  BP: (!) 146/83 (01/05/25 1144)  SpO2: 97 % (01/05/25 1144) Vital Signs (24h Range):  Temp:  [98 °F (36.7 °C)-99.2 °F (37.3 °C)] 99.1 °F (37.3 °C)  Pulse:  [] 102  Resp:  [16-20] 16  SpO2:  [95 %-97 %] 97 %  BP: (117-146)/(73-83) 146/83     Weight: 88.1 kg (194 lb 3.6 oz)  Body mass index is 27.09 kg/m².  Body surface area is 2.1 meters squared.    ECOG SCORE           Intake/Output - Last 3 Shifts         01/03 0700  01/04 0659 01/04 0700  01/05 0659 01/05 0700  01/06 0659    P.O. 270 700 240    I.V. (mL/kg)       Blood 170      IV Piggyback 984.4 549.2     Total Intake(mL/kg) 1424.4 (16) 1249.2 (14.2) 240 (2.7)    Urine (mL/kg/hr) 625 (0.3) 1550 (0.7) 1000 (1.4)    Emesis/NG output  0     Stool  0     Total Output 625 1550 1000    Net +799.4 -300.8 -760           Urine Occurrence 1 x 3 x     Stool Occurrence  0 x     Emesis Occurrence  0 x              Physical Exam  Vitals and nursing note reviewed.   Constitutional:       General: He is not in acute distress.     Appearance: Normal appearance. He is not diaphoretic.   HENT:      Head: Normocephalic and atraumatic.      Nose: Nose normal.      Mouth/Throat:      Mouth: Mucous membranes are moist.      Pharynx: Oropharynx is clear.   Eyes:      General: No scleral icterus.     Extraocular Movements: Extraocular movements intact.      Pupils: Pupils are equal, round, and reactive to light.   Cardiovascular:      Rate and Rhythm: Normal rate and regular rhythm.      Pulses: Normal pulses.      Heart sounds: Normal heart sounds. No murmur heard.  Pulmonary:      Effort: Pulmonary effort is normal. No respiratory distress.       Breath sounds: Normal breath sounds. No stridor. No wheezing, rhonchi or rales.   Abdominal:      General: Abdomen is flat. Bowel sounds are normal. There is no distension.      Palpations: Abdomen is soft. There is no mass.      Tenderness: There is abdominal tenderness (epigastric).   Musculoskeletal:         General: No swelling or tenderness. Normal range of motion.      Cervical back: Normal range of motion and neck supple. No rigidity.      Right lower leg: No edema.      Left lower leg: No edema.      Comments: Tunneled Central Line - Triple Lumen 12/18/24 Internal Jugular Right   Skin:     General: Skin is warm and dry.      Capillary Refill: Capillary refill takes less than 2 seconds.      Coloration: Skin is not jaundiced.      Findings: No lesion or rash.   Neurological:      General: No focal deficit present.      Mental Status: He is alert and oriented to person, place, and time.      Motor: No weakness.      Coordination: Coordination normal.   Psychiatric:         Mood and Affect: Mood normal.         Behavior: Behavior normal.            Significant Labs:   All pertinent labs from the last 24 hours have been reviewed.    Diagnostic Results:  I have reviewed and interpreted all pertinent imaging results/findings within the past 24 hours.

## 2025-01-06 DIAGNOSIS — D47.1 PRIMARY MYELOFIBROSIS: Primary | ICD-10-CM

## 2025-01-06 DIAGNOSIS — Z94.84 HISTORY OF ALLOGENEIC STEM CELL TRANSPLANT: Primary | ICD-10-CM

## 2025-01-06 LAB
ABO + RH BLD: NORMAL
ALBUMIN SERPL BCP-MCNC: 2.4 G/DL (ref 3.5–5.2)
ALP SERPL-CCNC: 60 U/L (ref 40–150)
ALT SERPL W/O P-5'-P-CCNC: 22 U/L (ref 10–44)
ANION GAP SERPL CALC-SCNC: 7 MMOL/L (ref 8–16)
ANISOCYTOSIS BLD QL SMEAR: SLIGHT
AST SERPL-CCNC: 15 U/L (ref 10–40)
BASOPHILS # BLD AUTO: 0 K/UL (ref 0–0.2)
BASOPHILS NFR BLD: 0 % (ref 0–1.9)
BILIRUB SERPL-MCNC: 0.4 MG/DL (ref 0.1–1)
BLD GP AB SCN CELLS X3 SERPL QL: NORMAL
BLD PROD TYP BPU: NORMAL
BLOOD UNIT EXPIRATION DATE: NORMAL
BLOOD UNIT TYPE CODE: 7300
BLOOD UNIT TYPE: NORMAL
BUN SERPL-MCNC: 8 MG/DL (ref 6–20)
CALCIUM SERPL-MCNC: 8.4 MG/DL (ref 8.7–10.5)
CHLORIDE SERPL-SCNC: 106 MMOL/L (ref 95–110)
CO2 SERPL-SCNC: 21 MMOL/L (ref 23–29)
CODING SYSTEM: NORMAL
CREAT SERPL-MCNC: 0.6 MG/DL (ref 0.5–1.4)
CROSSMATCH INTERPRETATION: NORMAL
DIFFERENTIAL METHOD BLD: ABNORMAL
DISPENSE STATUS: NORMAL
EOSINOPHIL # BLD AUTO: 0 K/UL (ref 0–0.5)
EOSINOPHIL NFR BLD: 0 % (ref 0–8)
ERYTHROCYTE [DISTWIDTH] IN BLOOD BY AUTOMATED COUNT: 12.1 % (ref 11.5–14.5)
EST. GFR  (NO RACE VARIABLE): >60 ML/MIN/1.73 M^2
GLUCOSE SERPL-MCNC: 104 MG/DL (ref 70–110)
HCT VFR BLD AUTO: 21.1 % (ref 40–54)
HGB BLD-MCNC: 7.2 G/DL (ref 14–18)
HYPOCHROMIA BLD QL SMEAR: ABNORMAL
IMM GRANULOCYTES # BLD AUTO: 0 K/UL (ref 0–0.04)
IMM GRANULOCYTES NFR BLD AUTO: 0 % (ref 0–0.5)
LYMPHOCYTES # BLD AUTO: 0 K/UL (ref 1–4.8)
LYMPHOCYTES NFR BLD: 0 % (ref 18–48)
MAGNESIUM SERPL-MCNC: 1.7 MG/DL (ref 1.6–2.6)
MCH RBC QN AUTO: 32.3 PG (ref 27–31)
MCHC RBC AUTO-ENTMCNC: 34.1 G/DL (ref 32–36)
MCV RBC AUTO: 95 FL (ref 82–98)
MONOCYTES # BLD AUTO: 0 K/UL (ref 0.3–1)
MONOCYTES NFR BLD: 0 % (ref 4–15)
NEUTROPHILS # BLD AUTO: 0 K/UL (ref 1.8–7.7)
NEUTROPHILS NFR BLD: 100 % (ref 38–73)
NRBC BLD-RTO: 0 /100 WBC
OVALOCYTES BLD QL SMEAR: ABNORMAL
PHOSPHATE SERPL-MCNC: 2.6 MG/DL (ref 2.7–4.5)
PLATELET # BLD AUTO: 10 K/UL (ref 150–450)
PLATELET BLD QL SMEAR: ABNORMAL
PMV BLD AUTO: 12.4 FL (ref 9.2–12.9)
POIKILOCYTOSIS BLD QL SMEAR: SLIGHT
POLYCHROMASIA BLD QL SMEAR: ABNORMAL
POTASSIUM SERPL-SCNC: 4.1 MMOL/L (ref 3.5–5.1)
PROT SERPL-MCNC: 6.2 G/DL (ref 6–8.4)
RBC # BLD AUTO: 2.23 M/UL (ref 4.6–6.2)
SODIUM SERPL-SCNC: 134 MMOL/L (ref 136–145)
SPECIMEN OUTDATE: NORMAL
TACROLIMUS BLD-MCNC: 4.7 NG/ML (ref 5–15)
UNIT NUMBER: NORMAL
WBC # BLD AUTO: 0.01 K/UL (ref 3.9–12.7)

## 2025-01-06 PROCEDURE — 63600175 PHARM REV CODE 636 W HCPCS

## 2025-01-06 PROCEDURE — 80197 ASSAY OF TACROLIMUS: CPT | Performed by: INTERNAL MEDICINE

## 2025-01-06 PROCEDURE — 20600001 HC STEP DOWN PRIVATE ROOM

## 2025-01-06 PROCEDURE — 25000003 PHARM REV CODE 250: Performed by: INTERNAL MEDICINE

## 2025-01-06 PROCEDURE — 36430 TRANSFUSION BLD/BLD COMPNT: CPT

## 2025-01-06 PROCEDURE — 63600175 PHARM REV CODE 636 W HCPCS: Performed by: NURSE PRACTITIONER

## 2025-01-06 PROCEDURE — 25000003 PHARM REV CODE 250

## 2025-01-06 PROCEDURE — 99233 SBSQ HOSP IP/OBS HIGH 50: CPT | Mod: ,,, | Performed by: INTERNAL MEDICINE

## 2025-01-06 PROCEDURE — 86901 BLOOD TYPING SEROLOGIC RH(D): CPT | Performed by: INTERNAL MEDICINE

## 2025-01-06 PROCEDURE — 80053 COMPREHEN METABOLIC PANEL: CPT | Performed by: NURSE PRACTITIONER

## 2025-01-06 PROCEDURE — 25000003 PHARM REV CODE 250: Performed by: STUDENT IN AN ORGANIZED HEALTH CARE EDUCATION/TRAINING PROGRAM

## 2025-01-06 PROCEDURE — P9037 PLATE PHERES LEUKOREDU IRRAD: HCPCS | Performed by: STUDENT IN AN ORGANIZED HEALTH CARE EDUCATION/TRAINING PROGRAM

## 2025-01-06 PROCEDURE — 83735 ASSAY OF MAGNESIUM: CPT | Performed by: NURSE PRACTITIONER

## 2025-01-06 PROCEDURE — 85025 COMPLETE CBC W/AUTO DIFF WBC: CPT | Performed by: NURSE PRACTITIONER

## 2025-01-06 PROCEDURE — 63600175 PHARM REV CODE 636 W HCPCS: Performed by: INTERNAL MEDICINE

## 2025-01-06 PROCEDURE — 63600175 PHARM REV CODE 636 W HCPCS: Mod: JZ,TB | Performed by: INTERNAL MEDICINE

## 2025-01-06 PROCEDURE — 84100 ASSAY OF PHOSPHORUS: CPT | Performed by: NURSE PRACTITIONER

## 2025-01-06 PROCEDURE — 63600175 PHARM REV CODE 636 W HCPCS: Performed by: STUDENT IN AN ORGANIZED HEALTH CARE EDUCATION/TRAINING PROGRAM

## 2025-01-06 RX ORDER — LIDOCAINE HYDROCHLORIDE 20 MG/ML
15 SOLUTION OROPHARYNGEAL EVERY 6 HOURS
Status: DISCONTINUED | OUTPATIENT
Start: 2025-01-06 | End: 2025-01-16

## 2025-01-06 RX ORDER — HYDROCODONE BITARTRATE AND ACETAMINOPHEN 500; 5 MG/1; MG/1
TABLET ORAL
Status: DISCONTINUED | OUTPATIENT
Start: 2025-01-06 | End: 2025-01-07

## 2025-01-06 RX ADMIN — GUAIFENESIN AND DEXTROMETHORPHAN HYDROBROMIDE 1 TABLET: 600; 30 TABLET, EXTENDED RELEASE ORAL at 08:01

## 2025-01-06 RX ADMIN — POSACONAZOLE 300 MG: 18 INJECTION, SOLUTION INTRAVENOUS at 08:01

## 2025-01-06 RX ADMIN — HYDROMORPHONE HYDROCHLORIDE 0.5 MG: 1 INJECTION, SOLUTION INTRAMUSCULAR; INTRAVENOUS; SUBCUTANEOUS at 08:01

## 2025-01-06 RX ADMIN — SODIUM PHOSPHATE, MONOBASIC, MONOHYDRATE AND SODIUM PHOSPHATE, DIBASIC, ANHYDROUS 15 MMOL: 142; 276 INJECTION, SOLUTION INTRAVENOUS at 08:01

## 2025-01-06 RX ADMIN — MORPHINE SULFATE 2 MG: 2 INJECTION, SOLUTION INTRAMUSCULAR; INTRAVENOUS at 03:01

## 2025-01-06 RX ADMIN — MORPHINE SULFATE 2 MG: 2 INJECTION, SOLUTION INTRAMUSCULAR; INTRAVENOUS at 12:01

## 2025-01-06 RX ADMIN — MAGNESIUM SULFATE HEPTAHYDRATE 2 G: 40 INJECTION, SOLUTION INTRAVENOUS at 06:01

## 2025-01-06 RX ADMIN — ALUMINUM HYDROXIDE, MAGNESIUM HYDROXIDE, AND DIMETHICONE 10 ML: 400; 400; 40 SUSPENSION ORAL at 12:01

## 2025-01-06 RX ADMIN — PROCHLORPERAZINE EDISYLATE 5 MG: 5 INJECTION INTRAMUSCULAR; INTRAVENOUS at 03:01

## 2025-01-06 RX ADMIN — OLANZAPINE 10 MG: 2.5 TABLET, FILM COATED ORAL at 08:01

## 2025-01-06 RX ADMIN — Medication 1 DOSE: at 08:01

## 2025-01-06 RX ADMIN — DIPHENHYDRAMINE HYDROCHLORIDE 15 ML: 25 SOLUTION ORAL at 12:01

## 2025-01-06 RX ADMIN — TACROLIMUS 1.5 MG: 1 CAPSULE ORAL at 05:01

## 2025-01-06 RX ADMIN — DIPHENHYDRAMINE HYDROCHLORIDE 15 ML: 25 SOLUTION ORAL at 05:01

## 2025-01-06 RX ADMIN — Medication 1 DOSE: at 05:01

## 2025-01-06 RX ADMIN — LEVOFLOXACIN 500 MG: 5 INJECTION, SOLUTION INTRAVENOUS at 08:01

## 2025-01-06 RX ADMIN — LIDOCAINE HYDROCHLORIDE 15 ML: 20 SOLUTION OROPHARYNGEAL at 11:01

## 2025-01-06 RX ADMIN — MYCOPHENOLATE MOFETIL 1000 MG: 500 INJECTION, POWDER, LYOPHILIZED, FOR SOLUTION INTRAVENOUS at 02:01

## 2025-01-06 RX ADMIN — ONDANSETRON 8 MG: 2 INJECTION INTRAMUSCULAR; INTRAVENOUS at 09:01

## 2025-01-06 RX ADMIN — PANTOPRAZOLE SODIUM 40 MG: 40 INJECTION, POWDER, FOR SOLUTION INTRAVENOUS at 08:01

## 2025-01-06 RX ADMIN — ACYCLOVIR SODIUM 400 MG: 50 INJECTION, SOLUTION INTRAVENOUS at 09:01

## 2025-01-06 RX ADMIN — LETERMOVIR 480 MG: 480 TABLET, FILM COATED ORAL at 08:01

## 2025-01-06 RX ADMIN — DIPHENHYDRAMINE HYDROCHLORIDE 15 ML: 25 SOLUTION ORAL at 11:01

## 2025-01-06 RX ADMIN — URSODIOL 300 MG: 300 CAPSULE ORAL at 08:01

## 2025-01-06 RX ADMIN — DIPHENHYDRAMINE HYDROCHLORIDE 15 ML: 25 SOLUTION ORAL at 06:01

## 2025-01-06 RX ADMIN — SIMETHICONE 80 MG: 80 TABLET, CHEWABLE ORAL at 08:01

## 2025-01-06 RX ADMIN — LIDOCAINE HYDROCHLORIDE 15 ML: 20 SOLUTION OROPHARYNGEAL at 06:01

## 2025-01-06 RX ADMIN — FAMOTIDINE 20 MG: 10 INJECTION, SOLUTION INTRAVENOUS at 08:01

## 2025-01-06 RX ADMIN — ACYCLOVIR SODIUM 400 MG: 50 INJECTION, SOLUTION INTRAVENOUS at 08:01

## 2025-01-06 RX ADMIN — ONDANSETRON 8 MG: 2 INJECTION INTRAMUSCULAR; INTRAVENOUS at 02:01

## 2025-01-06 RX ADMIN — Medication 1 DOSE: at 12:01

## 2025-01-06 RX ADMIN — DIPHENHYDRAMINE HYDROCHLORIDE 15 ML: 25 SOLUTION ORAL at 02:01

## 2025-01-06 RX ADMIN — MORPHINE SULFATE 2 MG: 2 INJECTION, SOLUTION INTRAMUSCULAR; INTRAVENOUS at 05:01

## 2025-01-06 RX ADMIN — MYCOPHENOLATE MOFETIL 1000 MG: 500 INJECTION, POWDER, LYOPHILIZED, FOR SOLUTION INTRAVENOUS at 08:01

## 2025-01-06 RX ADMIN — PROCHLORPERAZINE EDISYLATE 5 MG: 5 INJECTION INTRAMUSCULAR; INTRAVENOUS at 09:01

## 2025-01-06 RX ADMIN — TACROLIMUS 1 MG: 1 CAPSULE ORAL at 08:01

## 2025-01-06 RX ADMIN — ALUMINUM HYDROXIDE, MAGNESIUM HYDROXIDE, AND DIMETHICONE 10 ML: 400; 400; 40 SUSPENSION ORAL at 08:01

## 2025-01-06 RX ADMIN — MYCOPHENOLATE MOFETIL 1000 MG: 500 INJECTION, POWDER, LYOPHILIZED, FOR SOLUTION INTRAVENOUS at 09:01

## 2025-01-06 RX ADMIN — HYDROMORPHONE HYDROCHLORIDE 0.5 MG: 1 INJECTION, SOLUTION INTRAMUSCULAR; INTRAVENOUS; SUBCUTANEOUS at 12:01

## 2025-01-06 RX ADMIN — SODIUM CHLORIDE: 9 INJECTION, SOLUTION INTRAVENOUS at 09:01

## 2025-01-06 RX ADMIN — PROCHLORPERAZINE EDISYLATE 5 MG: 5 INJECTION INTRAMUSCULAR; INTRAVENOUS at 04:01

## 2025-01-06 RX ADMIN — PROCHLORPERAZINE EDISYLATE 5 MG: 5 INJECTION INTRAMUSCULAR; INTRAVENOUS at 10:01

## 2025-01-06 RX ADMIN — ONDANSETRON 8 MG: 2 INJECTION INTRAMUSCULAR; INTRAVENOUS at 06:01

## 2025-01-06 RX ADMIN — FILGRASTIM 480 MCG: 480 INJECTION, SOLUTION INTRAVENOUS; SUBCUTANEOUS at 08:01

## 2025-01-06 NOTE — SUBJECTIVE & OBJECTIVE
Subjective:     Interval History:   Day +11 following haploSCT conditioned with BuFluThiotepa plus PTCy, Tac, MMF. Mucositis/esophagitis worsening. Transitioned meds to IV. Continue morphine PRN. Given poor nutritional intake from pain 2/2 mucositis, transitioned diet to soft/bite sized. Phos of 4.6, replacing per PRN orders. PLT of 10K - ordered 1 unit of platelets. Tac level 4.7 today - increased tc dose to 1.5 BID. Epigastric pain and abdominal distension significantly improved.     Objective:     Vital Signs (Most Recent):  Temp: (!) 18 °F (-7.8 °C) (01/06/25 1201)  Pulse: 98 (01/06/25 1201)  Resp: 18 (01/06/25 1201)  BP: 120/72 (01/06/25 1201)  SpO2: 95 % (01/06/25 1201) Vital Signs (24h Range):  Temp:  [18 °F (-7.8 °C)-99.6 °F (37.6 °C)] 18 °F (-7.8 °C)  Pulse:  [] 98  Resp:  [16-20] 18  SpO2:  [93 %-96 %] 95 %  BP: (114-136)/(69-82) 120/72     Weight: 87.5 kg (192 lb 14.4 oz)  Body mass index is 26.9 kg/m².  Body surface area is 2.09 meters squared.    ECOG SCORE             Intake/Output - Last 3 Shifts         01/04 0700  01/05 0659 01/05 0700 01/06 0659 01/06 0700  01/07 0659    P.O. 700 720     I.V. (mL/kg)  1559.3 (17.8)     Blood       IV Piggyback 549.2 298.4     Total Intake(mL/kg) 1249.2 (14.2) 2577.7 (29.5)     Urine (mL/kg/hr) 1550 (0.7) 2225 (1.1)     Emesis/NG output 0      Stool 0      Total Output 1550 2225     Net -300.8 +352.7            Urine Occurrence 3 x      Stool Occurrence 0 x      Emesis Occurrence 0 x               Physical Exam  Vitals and nursing note reviewed.   Constitutional:       General: He is not in acute distress.     Appearance: Normal appearance. He is not diaphoretic.   HENT:      Head: Normocephalic and atraumatic.      Nose: Nose normal.      Mouth/Throat:      Mouth: Mucous membranes are moist.      Pharynx: Oropharynx is clear.   Eyes:      General: No scleral icterus.     Extraocular Movements: Extraocular movements intact.      Pupils: Pupils are equal,  round, and reactive to light.   Cardiovascular:      Rate and Rhythm: Normal rate and regular rhythm.      Pulses: Normal pulses.      Heart sounds: Normal heart sounds. No murmur heard.  Pulmonary:      Effort: Pulmonary effort is normal. No respiratory distress.      Breath sounds: Normal breath sounds. No stridor. No wheezing, rhonchi or rales.   Abdominal:      General: Abdomen is flat. Bowel sounds are normal. There is no distension.      Palpations: Abdomen is soft. There is no mass.      Tenderness: There is no abdominal tenderness.   Musculoskeletal:         General: No swelling or tenderness. Normal range of motion.      Cervical back: Normal range of motion and neck supple. No rigidity.      Right lower leg: No edema.      Left lower leg: No edema.      Comments: Tunneled Central Line - Triple Lumen 12/18/24 Internal Jugular Right   Skin:     General: Skin is warm and dry.      Capillary Refill: Capillary refill takes less than 2 seconds.      Coloration: Skin is not jaundiced.      Findings: No lesion or rash.   Neurological:      General: No focal deficit present.      Mental Status: He is alert and oriented to person, place, and time.      Motor: No weakness.      Coordination: Coordination normal.   Psychiatric:         Mood and Affect: Mood normal.         Behavior: Behavior normal.            Significant Labs:   All pertinent labs from the last 24 hours have been reviewed.    Diagnostic Results:  I have reviewed and interpreted all pertinent imaging results/findings within the past 24 hours.

## 2025-01-06 NOTE — PLAN OF CARE
Side rails up x2; call bell in place; bed in lowest, locked position; skid proof socks on; no evidence of skin breakdown; care plan explained to patient; pt remains free of injury. Pt is day +10 of an allo SCT. Pt with poor appetite and difficulty swallowing. Voids per urinal, ambulates in room. Pt with excess oral secretions, scopolamine patched ordered and placed behind right ear, suction with Yankauer available for control of secretion. Pt with frequent cough quaifeuesin and dextromethorphan scheduled. Pt with c/o pain with swallowing morphine given x 3 and dilaudid iv x 1. BMT aware new orders to have po changed to IVPB. Frequent rounding In progress pt encouraged to call as needed, VSS and afebrile.

## 2025-01-06 NOTE — ASSESSMENT & PLAN NOTE
- Having poor PO intake/throat pain 2/2 chemotherapy   - Duke's solution QID  - Morphine 2 mg q3h PRN. Can increase dose or frequency if need be.  - Transitioned PO meds to IV.  - Diet changed from regular to soft/bite sized

## 2025-01-06 NOTE — CLINICAL REVIEW
IP Sepsis Screen (most recent)       Sepsis Screen (IP) - 01/06/25 1221       Is the patient's history or complaint suggestive of a possible infection? Yes  -WL    Are there at least two of the following signs and symptoms present? Yes  -WL    Sepsis signs/symptoms - Hyper or Hypothermia Hyperthermia >100.4 or Hypothermia < 96.8  -WL    Sepsis signs/symptoms - Tachycardia Tachycardia     >90  -WL    Sepsis signs/symptoms - WBC WBC < 4,000 or WBC > 12,000  -WL    Are any of the following organ dysfunction criteria present and not considered to be due to a chronic condition? Yes  -WL    Organ Dysfunction Criteria Total Bilirubin > 2.0 Platelet count < 100,000  -WL    Initiate Sepsis Protocol No  -WL    Reason sepsis not considered Pt. receiving appropriate management   onc pt receiving chemo -WL              User Key  (r) = Recorded By, (t) = Taken By, (c) = Cosigned By      Initials Name    Vannesa Quispe RN

## 2025-01-06 NOTE — PROGRESS NOTES
Daniel Rodríguez - Oncology ([facility])  Hematology  Bone Marrow Transplant  Progress Note    Patient Name: Rubén Hu  Admission Date: 12/18/2024  Hospital Length of Stay: 19 days  Code Status: Full Code    Subjective:     Interval History:   Day +11 following haploSCT conditioned with BuFluThiotepa plus PTCy, Tac, MMF. Mucositis/esophagitis worsening. Transitioned meds to IV. Continue morphine PRN. Given poor nutritional intake from pain 2/2 mucositis, transitioned diet to soft/bite sized. Phos of 4.6, replacing per PRN orders. PLT of 10K - ordered 1 unit of platelets. Tac level 4.7 today - increased tc dose to 1.5 BID. Epigastric pain and abdominal distension significantly improved.     Objective:     Vital Signs (Most Recent):  Temp: (!) 18 °F (-7.8 °C) (01/06/25 1201)  Pulse: 98 (01/06/25 1201)  Resp: 18 (01/06/25 1201)  BP: 120/72 (01/06/25 1201)  SpO2: 95 % (01/06/25 1201) Vital Signs (24h Range):  Temp:  [18 °F (-7.8 °C)-99.6 °F (37.6 °C)] 18 °F (-7.8 °C)  Pulse:  [] 98  Resp:  [16-20] 18  SpO2:  [93 %-96 %] 95 %  BP: (114-136)/(69-82) 120/72     Weight: 87.5 kg (192 lb 14.4 oz)  Body mass index is 26.9 kg/m².  Body surface area is 2.09 meters squared.    ECOG SCORE             Intake/Output - Last 3 Shifts         01/04 0700  01/05 0659 01/05 0700 01/06 0659 01/06 0700 01/07 0659    P.O. 700 720     I.V. (mL/kg)  1559.3 (17.8)     Blood       IV Piggyback 549.2 298.4     Total Intake(mL/kg) 1249.2 (14.2) 2577.7 (29.5)     Urine (mL/kg/hr) 1550 (0.7) 2225 (1.1)     Emesis/NG output 0      Stool 0      Total Output 1550 2225     Net -300.8 +352.7            Urine Occurrence 3 x      Stool Occurrence 0 x      Emesis Occurrence 0 x               Physical Exam  Vitals and nursing note reviewed.   Constitutional:       General: He is not in acute distress.     Appearance: Normal appearance. He is not diaphoretic.   HENT:      Head: Normocephalic and atraumatic.      Nose: Nose normal.      Mouth/Throat:       Mouth: Mucous membranes are moist.      Pharynx: Oropharynx is clear.   Eyes:      General: No scleral icterus.     Extraocular Movements: Extraocular movements intact.      Pupils: Pupils are equal, round, and reactive to light.   Cardiovascular:      Rate and Rhythm: Normal rate and regular rhythm.      Pulses: Normal pulses.      Heart sounds: Normal heart sounds. No murmur heard.  Pulmonary:      Effort: Pulmonary effort is normal. No respiratory distress.      Breath sounds: Normal breath sounds. No stridor. No wheezing, rhonchi or rales.   Abdominal:      General: Abdomen is flat. Bowel sounds are normal. There is no distension.      Palpations: Abdomen is soft. There is no mass.      Tenderness: There is no abdominal tenderness.   Musculoskeletal:         General: No swelling or tenderness. Normal range of motion.      Cervical back: Normal range of motion and neck supple. No rigidity.      Right lower leg: No edema.      Left lower leg: No edema.      Comments: Tunneled Central Line - Triple Lumen 12/18/24 Internal Jugular Right   Skin:     General: Skin is warm and dry.      Capillary Refill: Capillary refill takes less than 2 seconds.      Coloration: Skin is not jaundiced.      Findings: No lesion or rash.   Neurological:      General: No focal deficit present.      Mental Status: He is alert and oriented to person, place, and time.      Motor: No weakness.      Coordination: Coordination normal.   Psychiatric:         Mood and Affect: Mood normal.         Behavior: Behavior normal.            Significant Labs:   All pertinent labs from the last 24 hours have been reviewed.    Diagnostic Results:  I have reviewed and interpreted all pertinent imaging results/findings within the past 24 hours.  Assessment/Plan:     * History of allogeneic stem cell transplant  - Patient of Dr. 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  Received 3 bags with CD34 dose of 6.04x10^6/kg.   - Today is Day +10  - Patient is O+. Donor is O+.  - Patient is CMV non-reactive. Donor is CMV reactive. Given donor CMV status, patient will start (patient-supplied) letermovir on Day +5.  - Day -4 and Day -3 Busulfan dose-adjusted to 282 mg  - See treatment plan below:    Planned conditioning regimen:  Thiotepa on Day -7  Busulfan on Day -6, -5, -4, and -3  Fludarabine on Day -6, -5, -4, and -3  REST DAYS on Day -2 and -1     Planned  GVHD Prophylaxis:  Cyclophosphamide on Day +3 and +4  Tacrolimus starting on Day +5  Mycophenolate starting on Day +5     Antimicrobial Prophylaxis:  Acyclovir starting on Day -7  Levofloxacin starting on Day -1  Micafungin on Day -1 through Day +4  Letermovir starting on Day +5 (if CMV PCR drawn on day 0 results negative)  Posaconazole starting on Day +5  Atovaquone starting on Day +30     Skin Care with Thiotepa: Day -7 through D-5     Seizure Prophylaxis:  Levetiracetam on Day -7 through Day -2     SOS/VOD Prophylaxis:  Ursodiol on Day -7 through Day +30     Growth Factor Support:  Neupogen starting on Day +5        Caregiver: Carmelita (Spouse)  Post-transplant discharge plans: Home      Flatulence  - See plan under epigastric pain. Simethicone 80 TID ordered 01/03    Epigastric pain  - Suspect 2/2 chemotherapy conditioning regimen prior to allogeneic SCT. Abdomen notably distended and with TTP in epigastric region.   - Continue protonix and famotidine daily, simethicone 80 TID ordered for flatulence  - , lipase normal  - morphine 1 mg PRN  - CT A/P 12/31: no acute pathology   - USG abdomen (RUQ) 01/02 unremarkable, not concerning for VOD    Mucositis  - Having poor PO intake/throat pain 2/2 chemotherapy   - Duke's solution QID  - Morphine 2 mg q3h PRN. Can increase dose or frequency if need be.  - Transitioned PO meds to IV.  - Diet changed from regular to soft/bite sized    Allergic contact dermatitis due to adhesives  -  Itching and redness to LLQ/RLQ where tele leads were placed.   - Topical benadryl prn for itching.  - Tele removed     Hypokalemia  - See electrolyte disorder    Electrolyte disorder  - Replete per PRN electrolyte order set  - Daily CMP, mag, and phos while inpatient    Neutropenic fever  - Infection w/u neg thus far  - Afebrile, stopping cefepime and started levaquin 12/31/24      Chemotherapy-induced nausea  - Anticipate 2/2 chemotherapy   - Meds switched from PO to IV as able 1/2.   - Zofran q8h  - Compazine q6h  - Zyprexa QHS  - Ativan PRN    Transaminitis  - Transaminases first elevated 12/27. Suspect 2/2 chemotherapy prior to transplant.   - Will continue to monitor with daily CMP.  - Down-trending.    Hypertension  - Became hypertensive following SCT on 12/26. No signs of volume overload contributing to HTN.  - 12/27 started amlodipine.     Headache  - 12/27 diffuse headache began.   - Oxy 5mg and sumatriptan as needed for HA  - Avoiding tylenol with recent allo SCT/neutropenia and do not want to mask a fever.   RESOLVED    Insomnia  - Difficulty sleeping at night while IP.  - Has PRN ativan and trazodone  - Ambien PRN  - Melatonin not effective     Thrombocytosis  - 2/2 myelofibrosis  - Was on daily ASA, but stopped taking ~ 3 weeks ago  - Now with anticipated thrombocytopenia following chemotherapy  - Daily CBC while IP  RESOLVED    Pancytopenia due to chemotherapy  - Anticipated following chemotherapy.  - Transfuse for hgb < 7 or plts < 10K  - Continue antimicrobial ppx   - Daily CBC while IP    Adrenal nodule  - Subcentimeter nodular thickening of the adrenal glands first noted on imaging from 1/2024.  - Seen by endocrine prior to transplant admission and Dexamethasone suppression test performed. Patient responded appropriately. No further w/u needed.  - Likely adenomas.    Metabolic dysfunction-associated steatotic liver disease (MASLD)  - Biopsy proven to be steatotic change. Most compatible with MASLD.    - Follow-up with hepatology OP.    Primary myelofibrosis  - From Dr. 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 Transplant on 12/26/24 at 1330. Received 3 bags with CD34 dose of 6.04 x 10^6/kg.     Hyperlipidemia  - Holding home atorvastatin while IP to avoid interaction with other medications. Can resume at discharge if LFTs stable.        VTE Risk Mitigation (From admission, onward)           Ordered     heparin, porcine (PF) 100 unit/mL injection flush 300 Units  As needed (PRN)         12/18/24 2025                      Michael Saavedra MD  Bone Marrow Transplant  [facility] - Oncology ([facility])

## 2025-01-06 NOTE — PLAN OF CARE
Patient AAOX4, VSS, afebrile, on room air. Patient complained of pain and received morphine and dilaudid prn. Patients medications being transferred ti IV/IM due to mucositis. Patient up to the bathroom independently, free from falls and injuries. I&O's monitored as ordered. Bed in lowest position, side rails up x2, non-skid socks on, call light in reach. Patient educated to use call light for any needs, patient verbalizes understanding. There are no concerns at this time. Plan of care on going.

## 2025-01-07 PROBLEM — R19.7 DIARRHEA: Status: ACTIVE | Noted: 2025-01-07

## 2025-01-07 LAB
ALBUMIN SERPL BCP-MCNC: 2.5 G/DL (ref 3.5–5.2)
ALP SERPL-CCNC: 62 U/L (ref 40–150)
ALT SERPL W/O P-5'-P-CCNC: 22 U/L (ref 10–44)
ANION GAP SERPL CALC-SCNC: 7 MMOL/L (ref 8–16)
ANISOCYTOSIS BLD QL SMEAR: SLIGHT
AST SERPL-CCNC: 14 U/L (ref 10–40)
BASOPHILS # BLD AUTO: 0 K/UL (ref 0–0.2)
BASOPHILS NFR BLD: 0 % (ref 0–1.9)
BILIRUB SERPL-MCNC: 0.4 MG/DL (ref 0.1–1)
BLD PROD TYP BPU: NORMAL
BLOOD UNIT EXPIRATION DATE: NORMAL
BLOOD UNIT TYPE CODE: 5100
BLOOD UNIT TYPE CODE: 6200
BLOOD UNIT TYPE CODE: 6200
BLOOD UNIT TYPE: NORMAL
BUN SERPL-MCNC: 8 MG/DL (ref 6–20)
CALCIUM SERPL-MCNC: 8.5 MG/DL (ref 8.7–10.5)
CHLORIDE SERPL-SCNC: 105 MMOL/L (ref 95–110)
CO2 SERPL-SCNC: 22 MMOL/L (ref 23–29)
CODING SYSTEM: NORMAL
CREAT SERPL-MCNC: 0.6 MG/DL (ref 0.5–1.4)
CROSSMATCH INTERPRETATION: NORMAL
DIFFERENTIAL METHOD BLD: ABNORMAL
DISPENSE STATUS: NORMAL
EOSINOPHIL # BLD AUTO: 0 K/UL (ref 0–0.5)
EOSINOPHIL NFR BLD: 0 % (ref 0–8)
ERYTHROCYTE [DISTWIDTH] IN BLOOD BY AUTOMATED COUNT: 12 % (ref 11.5–14.5)
EST. GFR  (NO RACE VARIABLE): >60 ML/MIN/1.73 M^2
GLUCOSE SERPL-MCNC: 113 MG/DL (ref 70–110)
HCT VFR BLD AUTO: 20.1 % (ref 40–54)
HGB BLD-MCNC: 6.9 G/DL (ref 14–18)
IMM GRANULOCYTES # BLD AUTO: 0 K/UL (ref 0–0.04)
IMM GRANULOCYTES NFR BLD AUTO: 0 % (ref 0–0.5)
LYMPHOCYTES # BLD AUTO: 0 K/UL (ref 1–4.8)
LYMPHOCYTES NFR BLD: 0 % (ref 18–48)
MAGNESIUM SERPL-MCNC: 1.7 MG/DL (ref 1.6–2.6)
MCH RBC QN AUTO: 32.2 PG (ref 27–31)
MCHC RBC AUTO-ENTMCNC: 34.3 G/DL (ref 32–36)
MCV RBC AUTO: 94 FL (ref 82–98)
MONOCYTES # BLD AUTO: 0 K/UL (ref 0.3–1)
MONOCYTES NFR BLD: 0 % (ref 4–15)
NEUTROPHILS # BLD AUTO: 0 K/UL (ref 1.8–7.7)
NEUTROPHILS NFR BLD: 100 % (ref 38–73)
NRBC BLD-RTO: 0 /100 WBC
NUM UNITS TRANS PACKED RBC: NORMAL
PHOSPHATE SERPL-MCNC: 2.7 MG/DL (ref 2.7–4.5)
PLATELET # BLD AUTO: 29 K/UL (ref 150–450)
PLATELET BLD QL SMEAR: ABNORMAL
PMV BLD AUTO: 11.5 FL (ref 9.2–12.9)
POIKILOCYTOSIS BLD QL SMEAR: SLIGHT
POLYCHROMASIA BLD QL SMEAR: ABNORMAL
POTASSIUM SERPL-SCNC: 4.1 MMOL/L (ref 3.5–5.1)
PROT SERPL-MCNC: 6.4 G/DL (ref 6–8.4)
RBC # BLD AUTO: 2.14 M/UL (ref 4.6–6.2)
SODIUM SERPL-SCNC: 134 MMOL/L (ref 136–145)
SPHEROCYTES BLD QL SMEAR: ABNORMAL
UNIT NUMBER: NORMAL
UNIT NUMBER: NORMAL
WBC # BLD AUTO: 0.01 K/UL (ref 3.9–12.7)

## 2025-01-07 PROCEDURE — 80053 COMPREHEN METABOLIC PANEL: CPT | Performed by: NURSE PRACTITIONER

## 2025-01-07 PROCEDURE — 63600175 PHARM REV CODE 636 W HCPCS: Performed by: INTERNAL MEDICINE

## 2025-01-07 PROCEDURE — 86920 COMPATIBILITY TEST SPIN: CPT | Performed by: STUDENT IN AN ORGANIZED HEALTH CARE EDUCATION/TRAINING PROGRAM

## 2025-01-07 PROCEDURE — 63600175 PHARM REV CODE 636 W HCPCS

## 2025-01-07 PROCEDURE — 63600175 PHARM REV CODE 636 W HCPCS: Performed by: NURSE PRACTITIONER

## 2025-01-07 PROCEDURE — 83735 ASSAY OF MAGNESIUM: CPT | Performed by: NURSE PRACTITIONER

## 2025-01-07 PROCEDURE — 99232 SBSQ HOSP IP/OBS MODERATE 35: CPT | Mod: ,,, | Performed by: INTERNAL MEDICINE

## 2025-01-07 PROCEDURE — P9037 PLATE PHERES LEUKOREDU IRRAD: HCPCS | Performed by: STUDENT IN AN ORGANIZED HEALTH CARE EDUCATION/TRAINING PROGRAM

## 2025-01-07 PROCEDURE — 25000003 PHARM REV CODE 250: Performed by: STUDENT IN AN ORGANIZED HEALTH CARE EDUCATION/TRAINING PROGRAM

## 2025-01-07 PROCEDURE — P9040 RBC LEUKOREDUCED IRRADIATED: HCPCS | Performed by: STUDENT IN AN ORGANIZED HEALTH CARE EDUCATION/TRAINING PROGRAM

## 2025-01-07 PROCEDURE — 25000003 PHARM REV CODE 250: Performed by: INTERNAL MEDICINE

## 2025-01-07 PROCEDURE — 20600001 HC STEP DOWN PRIVATE ROOM

## 2025-01-07 PROCEDURE — 84100 ASSAY OF PHOSPHORUS: CPT | Performed by: NURSE PRACTITIONER

## 2025-01-07 PROCEDURE — 30233N1 TRANSFUSION OF NONAUTOLOGOUS RED BLOOD CELLS INTO PERIPHERAL VEIN, PERCUTANEOUS APPROACH: ICD-10-PCS | Performed by: INTERNAL MEDICINE

## 2025-01-07 PROCEDURE — 25000003 PHARM REV CODE 250

## 2025-01-07 PROCEDURE — 63600175 PHARM REV CODE 636 W HCPCS: Mod: JZ,TB | Performed by: INTERNAL MEDICINE

## 2025-01-07 PROCEDURE — 63600175 PHARM REV CODE 636 W HCPCS: Performed by: STUDENT IN AN ORGANIZED HEALTH CARE EDUCATION/TRAINING PROGRAM

## 2025-01-07 PROCEDURE — 85025 COMPLETE CBC W/AUTO DIFF WBC: CPT | Performed by: NURSE PRACTITIONER

## 2025-01-07 RX ORDER — HYDROCODONE BITARTRATE AND ACETAMINOPHEN 500; 5 MG/1; MG/1
TABLET ORAL
Status: DISCONTINUED | OUTPATIENT
Start: 2025-01-07 | End: 2025-01-09

## 2025-01-07 RX ADMIN — ACYCLOVIR SODIUM 400 MG: 50 INJECTION, SOLUTION INTRAVENOUS at 08:01

## 2025-01-07 RX ADMIN — LEVOFLOXACIN 500 MG: 5 INJECTION, SOLUTION INTRAVENOUS at 08:01

## 2025-01-07 RX ADMIN — PROCHLORPERAZINE EDISYLATE 5 MG: 5 INJECTION INTRAMUSCULAR; INTRAVENOUS at 10:01

## 2025-01-07 RX ADMIN — GUAIFENESIN AND DEXTROMETHORPHAN HYDROBROMIDE 1 TABLET: 600; 30 TABLET, EXTENDED RELEASE ORAL at 08:01

## 2025-01-07 RX ADMIN — PROCHLORPERAZINE EDISYLATE 5 MG: 5 INJECTION INTRAMUSCULAR; INTRAVENOUS at 04:01

## 2025-01-07 RX ADMIN — Medication 1 DOSE: at 08:01

## 2025-01-07 RX ADMIN — LOPERAMIDE HYDROCHLORIDE 2 MG: 2 CAPSULE ORAL at 05:01

## 2025-01-07 RX ADMIN — URSODIOL 300 MG: 300 CAPSULE ORAL at 08:01

## 2025-01-07 RX ADMIN — HYDROMORPHONE HYDROCHLORIDE 0.5 MG: 1 INJECTION, SOLUTION INTRAMUSCULAR; INTRAVENOUS; SUBCUTANEOUS at 02:01

## 2025-01-07 RX ADMIN — HYDROMORPHONE HYDROCHLORIDE 0.5 MG: 1 INJECTION, SOLUTION INTRAMUSCULAR; INTRAVENOUS; SUBCUTANEOUS at 08:01

## 2025-01-07 RX ADMIN — DIPHENHYDRAMINE HYDROCHLORIDE 15 ML: 25 SOLUTION ORAL at 12:01

## 2025-01-07 RX ADMIN — MYCOPHENOLATE MOFETIL 1000 MG: 500 INJECTION, POWDER, LYOPHILIZED, FOR SOLUTION INTRAVENOUS at 10:01

## 2025-01-07 RX ADMIN — POSACONAZOLE 300 MG: 18 INJECTION, SOLUTION INTRAVENOUS at 08:01

## 2025-01-07 RX ADMIN — PANTOPRAZOLE SODIUM 40 MG: 40 INJECTION, POWDER, FOR SOLUTION INTRAVENOUS at 08:01

## 2025-01-07 RX ADMIN — MYCOPHENOLATE MOFETIL 1000 MG: 500 INJECTION, POWDER, LYOPHILIZED, FOR SOLUTION INTRAVENOUS at 04:01

## 2025-01-07 RX ADMIN — SODIUM CHLORIDE: 9 INJECTION, SOLUTION INTRAVENOUS at 03:01

## 2025-01-07 RX ADMIN — LIDOCAINE HYDROCHLORIDE 15 ML: 20 SOLUTION OROPHARYNGEAL at 11:01

## 2025-01-07 RX ADMIN — Medication 1 DOSE: at 04:01

## 2025-01-07 RX ADMIN — MYCOPHENOLATE MOFETIL 1000 MG: 500 INJECTION, POWDER, LYOPHILIZED, FOR SOLUTION INTRAVENOUS at 09:01

## 2025-01-07 RX ADMIN — DIPHENHYDRAMINE HYDROCHLORIDE 15 ML: 25 SOLUTION ORAL at 05:01

## 2025-01-07 RX ADMIN — ONDANSETRON 8 MG: 2 INJECTION INTRAMUSCULAR; INTRAVENOUS at 01:01

## 2025-01-07 RX ADMIN — Medication 1 DOSE: at 09:01

## 2025-01-07 RX ADMIN — FILGRASTIM 480 MCG: 480 INJECTION, SOLUTION INTRAVENOUS; SUBCUTANEOUS at 08:01

## 2025-01-07 RX ADMIN — MAGNESIUM SULFATE HEPTAHYDRATE 2 G: 40 INJECTION, SOLUTION INTRAVENOUS at 05:01

## 2025-01-07 RX ADMIN — ONDANSETRON 8 MG: 2 INJECTION INTRAMUSCULAR; INTRAVENOUS at 09:01

## 2025-01-07 RX ADMIN — PROCHLORPERAZINE EDISYLATE 5 MG: 5 INJECTION INTRAMUSCULAR; INTRAVENOUS at 09:01

## 2025-01-07 RX ADMIN — Medication 1 DOSE: at 12:01

## 2025-01-07 RX ADMIN — TACROLIMUS 1.5 MG: 1 CAPSULE ORAL at 05:01

## 2025-01-07 RX ADMIN — DIPHENHYDRAMINE HYDROCHLORIDE 15 ML: 25 SOLUTION ORAL at 11:01

## 2025-01-07 RX ADMIN — TACROLIMUS 1.5 MG: 1 CAPSULE ORAL at 08:01

## 2025-01-07 RX ADMIN — ONDANSETRON 8 MG: 2 INJECTION INTRAMUSCULAR; INTRAVENOUS at 05:01

## 2025-01-07 RX ADMIN — MORPHINE SULFATE 2 MG: 2 INJECTION, SOLUTION INTRAMUSCULAR; INTRAVENOUS at 10:01

## 2025-01-07 RX ADMIN — LETERMOVIR 480 MG: 480 TABLET, FILM COATED ORAL at 08:01

## 2025-01-07 RX ADMIN — LIDOCAINE HYDROCHLORIDE 15 ML: 20 SOLUTION OROPHARYNGEAL at 05:01

## 2025-01-07 RX ADMIN — OLANZAPINE 10 MG: 2.5 TABLET, FILM COATED ORAL at 08:01

## 2025-01-07 RX ADMIN — FAMOTIDINE 20 MG: 10 INJECTION, SOLUTION INTRAVENOUS at 08:01

## 2025-01-07 RX ADMIN — ACYCLOVIR SODIUM 400 MG: 50 INJECTION, SOLUTION INTRAVENOUS at 10:01

## 2025-01-07 NOTE — PROGRESS NOTES
Daniel Rodríguez - Oncology ([facility])  Hematology  Bone Marrow Transplant  Progress Note    Patient Name: Rubén Hu  Admission Date: 12/18/2024  Hospital Length of Stay: 20 days  Code Status: Full Code    Subjective:     Interval History:   Day +12 following haploSCT conditioned with BuFluThiotepa plus PTCy, Tac, MMF. Mucositis/esophagitis with no improvement noted. Continue morphine PRN. Ordered 1U pRBC and 1U platelets today. Continue tac 1.5 BID.  Continue soft diet. 1 episode of watery diarrhea overnight.     Objective:     Vital Signs (Most Recent):  Temp: 98.3 °F (36.8 °C) (01/07/25 1049)  Pulse: 88 (01/07/25 1120)  Resp: 20 (01/07/25 1012)  BP: 118/73 (01/07/25 1120)  SpO2: 96 % (01/07/25 1120) Vital Signs (24h Range):  Temp:  [18 °F (-7.8 °C)-99 °F (37.2 °C)] 98.3 °F (36.8 °C)  Pulse:  [] 88  Resp:  [18-20] 20  SpO2:  [93 %-97 %] 96 %  BP: (118-142)/(69-78) 118/73     Weight: 87.5 kg (192 lb 12.7 oz)  Body mass index is 26.89 kg/m².  Body surface area is 2.09 meters squared.    ECOG SCORE             Intake/Output - Last 3 Shifts         01/05 0700  01/06 0659 01/06 0700 01/07 0659 01/07 0700 01/08 0659    P.O. 720 360     I.V. (mL/kg) 1559.3 (17.8) 1971.9 (22.6)     IV Piggyback 298.4 1237.3     Total Intake(mL/kg) 2577.7 (29.5) 3569.2 (40.8)     Urine (mL/kg/hr) 2225 (1.1) 875 (0.4)     Emesis/NG output       Stool  0     Total Output 2225 875     Net +352.7 +2694.2            Urine Occurrence  4 x     Stool Occurrence  1 x              Physical Exam  Vitals and nursing note reviewed.   Constitutional:       General: He is not in acute distress.     Appearance: Normal appearance. He is not diaphoretic.   HENT:      Head: Normocephalic and atraumatic.      Nose: Nose normal.      Mouth/Throat:      Mouth: Mucous membranes are moist.      Pharynx: Oropharynx is clear.   Eyes:      General: No scleral icterus.     Extraocular Movements: Extraocular movements intact.      Pupils: Pupils are equal,  round, and reactive to light.   Cardiovascular:      Rate and Rhythm: Normal rate and regular rhythm.      Pulses: Normal pulses.      Heart sounds: Normal heart sounds. No murmur heard.  Pulmonary:      Effort: Pulmonary effort is normal. No respiratory distress.      Breath sounds: Normal breath sounds. No stridor. No wheezing, rhonchi or rales.   Abdominal:      General: Abdomen is flat. Bowel sounds are normal. There is no distension.      Palpations: Abdomen is soft. There is no mass.      Tenderness: There is no abdominal tenderness.   Musculoskeletal:         General: No swelling or tenderness. Normal range of motion.      Cervical back: Normal range of motion and neck supple. No rigidity.      Right lower leg: No edema.      Left lower leg: No edema.      Comments: Tunneled Central Line - Triple Lumen 12/18/24 Internal Jugular Right   Skin:     General: Skin is warm and dry.      Capillary Refill: Capillary refill takes less than 2 seconds.      Coloration: Skin is not jaundiced.      Findings: No lesion or rash.   Neurological:      General: No focal deficit present.      Mental Status: He is alert and oriented to person, place, and time.      Motor: No weakness.      Coordination: Coordination normal.   Psychiatric:         Mood and Affect: Mood normal.         Behavior: Behavior normal.            Significant Labs:   All pertinent labs from the last 24 hours have been reviewed.    Diagnostic Results:  I have reviewed and interpreted all pertinent imaging results/findings within the past 24 hours.  Assessment/Plan:     * History of allogeneic stem cell transplant  - Patient of Dr. 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 Received 3 bags with CD34 dose of 6.04x10^6/kg.   - Today is Day +12  - Patient is O+. Donor is O+.  - Patient is CMV non-reactive. Donor is CMV reactive. Given donor CMV status, patient will start  (patient-supplied) letermovir on Day +5.  - Day -4 and Day -3 Busulfan dose-adjusted to 282 mg  - See treatment plan below:    Planned conditioning regimen:  Thiotepa on Day -7  Busulfan on Day -6, -5, -4, and -3  Fludarabine on Day -6, -5, -4, and -3  REST DAYS on Day -2 and -1     Planned  GVHD Prophylaxis:  Cyclophosphamide on Day +3 and +4  Tacrolimus starting on Day +5  Mycophenolate starting on Day +5     Antimicrobial Prophylaxis:  Acyclovir starting on Day -7  Levofloxacin starting on Day -1  Micafungin on Day -1 through Day +4  Letermovir starting on Day +5 (if CMV PCR drawn on day 0 results negative)  Posaconazole starting on Day +5  Atovaquone starting on Day +30     Skin Care with Thiotepa: Day -7 through D-5     Seizure Prophylaxis:  Levetiracetam on Day -7 through Day -2     SOS/VOD Prophylaxis:  Ursodiol on Day -7 through Day +30     Growth Factor Support:  Neupogen starting on Day +5        Caregiver: Carmelita (Spouse)  Post-transplant discharge plans: Home      Diarrhea  - 1 episode reported 01/07. If worsening and meets criteria, will order Cdif testing    Flatulence  - See plan under epigastric pain. Simethicone 80 TID ordered 01/03. Resolved    Epigastric pain  - Suspect 2/2 chemotherapy conditioning regimen prior to allogeneic SCT. Abdomen notably distended and with TTP in epigastric region.   - Continue protonix and famotidine daily, simethicone 80 TID ordered for flatulence  - , lipase normal  - morphine 1 mg PRN  - CT A/P 12/31: no acute pathology   - USG abdomen (RUQ) 01/02 unremarkable, not concerning for VOD    Mucositis  - Having poor PO intake/throat pain 2/2 chemotherapy   - Duke's solution QID  - Morphine 2 mg q3h PRN. Can increase dose or frequency if need be.  - Transitioned PO meds to IV.  - Diet changed from regular to soft/bite sized    Allergic contact dermatitis due to adhesives  - Itching and redness to LLQ/RLQ where tele leads were placed.   - Topical benadryl prn for  itching.  - Tele removed     Hypokalemia  - See electrolyte disorder    Electrolyte disorder  - Replete per PRN electrolyte order set  - Daily CMP, mag, and phos while inpatient    Neutropenic fever  - Infection w/u neg thus far  - Afebrile, stopping cefepime and started levaquin 12/31/24    Chemotherapy-induced nausea  - Anticipate 2/2 chemotherapy   - Meds switched from PO to IV as able 1/2.   - Zofran q8h  - Compazine q6h  - Zyprexa QHS  - Ativan PRN    Transaminitis  - Transaminases first elevated 12/27. Suspect 2/2 chemotherapy prior to transplant.   - Will continue to monitor with daily CMP.  - RESOLVED    Hypertension  - Became hypertensive following SCT on 12/26. No signs of volume overload contributing to HTN.  - 12/27 started amlodipine.     Headache  - 12/27 diffuse headache began.   - Oxy 5mg and sumatriptan as needed for HA  - Avoiding tylenol with recent allo SCT/neutropenia and do not want to mask a fever.   RESOLVED    Insomnia  - Difficulty sleeping at night while IP.  - Has PRN ativan and trazodone  - Ambien PRN  - Melatonin not effective     Thrombocytosis  - 2/2 myelofibrosis  - Was on daily ASA, but stopped taking ~ 3 weeks ago  - Now with anticipated thrombocytopenia following chemotherapy  - Daily CBC while IP  RESOLVED    Pancytopenia due to chemotherapy  - Anticipated following chemotherapy.  - Transfuse for hgb < 7 or plts < 10K  - Continue antimicrobial ppx   - Daily CBC while IP    Adrenal nodule  - Subcentimeter nodular thickening of the adrenal glands first noted on imaging from 1/2024.  - Seen by endocrine prior to transplant admission and Dexamethasone suppression test performed. Patient responded appropriately. No further w/u needed.  - Likely adenomas.    Metabolic dysfunction-associated steatotic liver disease (MASLD)  - Biopsy proven to be steatotic change. Most compatible with MASLD.   - Follow-up with hepatology OP.    Primary myelofibrosis  - From Dr. 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 Transplant on 12/26/24 at 1330. Received 3 bags with CD34 dose of 6.04 x 10^6/kg.     Hyperlipidemia  - Holding home atorvastatin while IP to avoid interaction with other medications. Can resume at discharge if LFTs stable.      VTE Risk Mitigation (From admission, onward)           Ordered     heparin, porcine (PF) 100 unit/mL injection flush 300 Units  As needed (PRN)         12/18/24 2025                      Michael Saavedra MD  Bone Marrow Transplant  [facility] - Oncology ([facility])

## 2025-01-07 NOTE — SUBJECTIVE & OBJECTIVE
Subjective:     Interval History:   Day +12 following haploSCT conditioned with BuFluThiotepa plus PTCy, Tac, MMF. Mucositis/esophagitis with no improvement noted. Continue morphine PRN. Ordered 1U pRBC and 1U platelets today. Continue tac 1.5 BID.  Continue soft diet. 1 episode of watery diarrhea overnight.     Objective:     Vital Signs (Most Recent):  Temp: 98.3 °F (36.8 °C) (01/07/25 1049)  Pulse: 88 (01/07/25 1120)  Resp: 20 (01/07/25 1012)  BP: 118/73 (01/07/25 1120)  SpO2: 96 % (01/07/25 1120) Vital Signs (24h Range):  Temp:  [18 °F (-7.8 °C)-99 °F (37.2 °C)] 98.3 °F (36.8 °C)  Pulse:  [] 88  Resp:  [18-20] 20  SpO2:  [93 %-97 %] 96 %  BP: (118-142)/(69-78) 118/73     Weight: 87.5 kg (192 lb 12.7 oz)  Body mass index is 26.89 kg/m².  Body surface area is 2.09 meters squared.    ECOG SCORE             Intake/Output - Last 3 Shifts         01/05 0700  01/06 0659 01/06 0700 01/07 0659 01/07 0700  01/08 0659    P.O. 720 360     I.V. (mL/kg) 1559.3 (17.8) 1971.9 (22.6)     IV Piggyback 298.4 1237.3     Total Intake(mL/kg) 2577.7 (29.5) 3569.2 (40.8)     Urine (mL/kg/hr) 2225 (1.1) 875 (0.4)     Emesis/NG output       Stool  0     Total Output 2225 875     Net +352.7 +2694.2            Urine Occurrence  4 x     Stool Occurrence  1 x              Physical Exam  Vitals and nursing note reviewed.   Constitutional:       General: He is not in acute distress.     Appearance: Normal appearance. He is not diaphoretic.   HENT:      Head: Normocephalic and atraumatic.      Nose: Nose normal.      Mouth/Throat:      Mouth: Mucous membranes are moist.      Pharynx: Oropharynx is clear.   Eyes:      General: No scleral icterus.     Extraocular Movements: Extraocular movements intact.      Pupils: Pupils are equal, round, and reactive to light.   Cardiovascular:      Rate and Rhythm: Normal rate and regular rhythm.      Pulses: Normal pulses.      Heart sounds: Normal heart sounds. No murmur heard.  Pulmonary:       Effort: Pulmonary effort is normal. No respiratory distress.      Breath sounds: Normal breath sounds. No stridor. No wheezing, rhonchi or rales.   Abdominal:      General: Abdomen is flat. Bowel sounds are normal. There is no distension.      Palpations: Abdomen is soft. There is no mass.      Tenderness: There is no abdominal tenderness.   Musculoskeletal:         General: No swelling or tenderness. Normal range of motion.      Cervical back: Normal range of motion and neck supple. No rigidity.      Right lower leg: No edema.      Left lower leg: No edema.      Comments: Tunneled Central Line - Triple Lumen 12/18/24 Internal Jugular Right   Skin:     General: Skin is warm and dry.      Capillary Refill: Capillary refill takes less than 2 seconds.      Coloration: Skin is not jaundiced.      Findings: No lesion or rash.   Neurological:      General: No focal deficit present.      Mental Status: He is alert and oriented to person, place, and time.      Motor: No weakness.      Coordination: Coordination normal.   Psychiatric:         Mood and Affect: Mood normal.         Behavior: Behavior normal.            Significant Labs:   All pertinent labs from the last 24 hours have been reviewed.    Diagnostic Results:  I have reviewed and interpreted all pertinent imaging results/findings within the past 24 hours.

## 2025-01-07 NOTE — PLAN OF CARE
Day + 12 haplo BMT. No falls or injuries this shift. Bed alarm refused. Resting quietly with eyes closed. Respirations even, unlabored. Skin warm and dry. Pain managed with Dilaudid 0.5 mg IV x's 2. Nausea managed with scheduled antiemetics. Frequent checks for pain and safety maintained. Bed in lowest position, wheels locked, side rails up x's 2, call light in reach. Instructed to call for assistance as needed, verbalizes understanding.

## 2025-01-07 NOTE — ASSESSMENT & PLAN NOTE
- Patient of Dr. 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 Received 3 bags with CD34 dose of 6.04x10^6/kg.   - Today is Day +12  - Patient is O+. Donor is O+.  - Patient is CMV non-reactive. Donor is CMV reactive. Given donor CMV status, patient will start (patient-supplied) letermovir on Day +5.  - Day -4 and Day -3 Busulfan dose-adjusted to 282 mg  - See treatment plan below:    Planned conditioning regimen:  Thiotepa on Day -7  Busulfan on Day -6, -5, -4, and -3  Fludarabine on Day -6, -5, -4, and -3  REST DAYS on Day -2 and -1     Planned  GVHD Prophylaxis:  Cyclophosphamide on Day +3 and +4  Tacrolimus starting on Day +5  Mycophenolate starting on Day +5     Antimicrobial Prophylaxis:  Acyclovir starting on Day -7  Levofloxacin starting on Day -1  Micafungin on Day -1 through Day +4  Letermovir starting on Day +5 (if CMV PCR drawn on day 0 results negative)  Posaconazole starting on Day +5  Atovaquone starting on Day +30     Skin Care with Thiotepa: Day -7 through D-5     Seizure Prophylaxis:  Levetiracetam on Day -7 through Day -2     SOS/VOD Prophylaxis:  Ursodiol on Day -7 through Day +30     Growth Factor Support:  Neupogen starting on Day +5        Caregiver: Carmelita (Spouse)  Post-transplant discharge plans: Home

## 2025-01-07 NOTE — ASSESSMENT & PLAN NOTE
- Transaminases first elevated 12/27. Suspect 2/2 chemotherapy prior to transplant.   - Will continue to monitor with daily CMP.  - RESOLVED

## 2025-01-08 LAB
ALBUMIN SERPL BCP-MCNC: 2.2 G/DL (ref 3.5–5.2)
ALP SERPL-CCNC: 62 U/L (ref 40–150)
ALT SERPL W/O P-5'-P-CCNC: 21 U/L (ref 10–44)
ANION GAP SERPL CALC-SCNC: 7 MMOL/L (ref 8–16)
ANISOCYTOSIS BLD QL SMEAR: SLIGHT
AST SERPL-CCNC: 17 U/L (ref 10–40)
BASOPHILS # BLD AUTO: 0 K/UL (ref 0–0.2)
BASOPHILS NFR BLD: 0 % (ref 0–1.9)
BILIRUB SERPL-MCNC: 0.4 MG/DL (ref 0.1–1)
BLD PROD TYP BPU: NORMAL
BLOOD UNIT EXPIRATION DATE: NORMAL
BLOOD UNIT TYPE CODE: 5100
BLOOD UNIT TYPE: NORMAL
BUN SERPL-MCNC: 7 MG/DL (ref 6–20)
CALCIUM SERPL-MCNC: 7.9 MG/DL (ref 8.7–10.5)
CHLORIDE SERPL-SCNC: 109 MMOL/L (ref 95–110)
CO2 SERPL-SCNC: 22 MMOL/L (ref 23–29)
CODING SYSTEM: NORMAL
CREAT SERPL-MCNC: 0.6 MG/DL (ref 0.5–1.4)
CROSSMATCH INTERPRETATION: NORMAL
DACRYOCYTES BLD QL SMEAR: ABNORMAL
DIFFERENTIAL METHOD BLD: ABNORMAL
DISPENSE STATUS: NORMAL
EOSINOPHIL # BLD AUTO: 0 K/UL (ref 0–0.5)
EOSINOPHIL NFR BLD: 0 % (ref 0–8)
ERYTHROCYTE [DISTWIDTH] IN BLOOD BY AUTOMATED COUNT: 13.1 % (ref 11.5–14.5)
EST. GFR  (NO RACE VARIABLE): >60 ML/MIN/1.73 M^2
GLUCOSE SERPL-MCNC: 103 MG/DL (ref 70–110)
HCT VFR BLD AUTO: 20 % (ref 40–54)
HGB BLD-MCNC: 7 G/DL (ref 14–18)
IMM GRANULOCYTES # BLD AUTO: 0 K/UL (ref 0–0.04)
IMM GRANULOCYTES NFR BLD AUTO: 0 % (ref 0–0.5)
LYMPHOCYTES # BLD AUTO: 0 K/UL (ref 1–4.8)
LYMPHOCYTES NFR BLD: 0 % (ref 18–48)
MAGNESIUM SERPL-MCNC: 1.6 MG/DL (ref 1.6–2.6)
MCH RBC QN AUTO: 32.4 PG (ref 27–31)
MCHC RBC AUTO-ENTMCNC: 35 G/DL (ref 32–36)
MCV RBC AUTO: 93 FL (ref 82–98)
MONOCYTES # BLD AUTO: 0 K/UL (ref 0.3–1)
MONOCYTES NFR BLD: 0 % (ref 4–15)
NEUTROPHILS # BLD AUTO: 0 K/UL (ref 1.8–7.7)
NEUTROPHILS NFR BLD: 100 % (ref 38–73)
NRBC BLD-RTO: 0 /100 WBC
NUM UNITS TRANS PACKED RBC: NORMAL
OVALOCYTES BLD QL SMEAR: ABNORMAL
PHOSPHATE SERPL-MCNC: 2.1 MG/DL (ref 2.7–4.5)
PLATELET # BLD AUTO: 16 K/UL (ref 150–450)
PLATELET BLD QL SMEAR: ABNORMAL
PMV BLD AUTO: 10 FL (ref 9.2–12.9)
POIKILOCYTOSIS BLD QL SMEAR: SLIGHT
POTASSIUM SERPL-SCNC: 3.6 MMOL/L (ref 3.5–5.1)
PROT SERPL-MCNC: 5.8 G/DL (ref 6–8.4)
RBC # BLD AUTO: 2.16 M/UL (ref 4.6–6.2)
SODIUM SERPL-SCNC: 138 MMOL/L (ref 136–145)
TACROLIMUS BLD-MCNC: 12.5 NG/ML (ref 5–15)
WBC # BLD AUTO: 0.01 K/UL (ref 3.9–12.7)

## 2025-01-08 PROCEDURE — 85025 COMPLETE CBC W/AUTO DIFF WBC: CPT | Performed by: NURSE PRACTITIONER

## 2025-01-08 PROCEDURE — 99232 SBSQ HOSP IP/OBS MODERATE 35: CPT | Mod: ,,, | Performed by: INTERNAL MEDICINE

## 2025-01-08 PROCEDURE — 86920 COMPATIBILITY TEST SPIN: CPT | Performed by: STUDENT IN AN ORGANIZED HEALTH CARE EDUCATION/TRAINING PROGRAM

## 2025-01-08 PROCEDURE — 25000003 PHARM REV CODE 250: Performed by: INTERNAL MEDICINE

## 2025-01-08 PROCEDURE — 80053 COMPREHEN METABOLIC PANEL: CPT | Performed by: NURSE PRACTITIONER

## 2025-01-08 PROCEDURE — 25000003 PHARM REV CODE 250

## 2025-01-08 PROCEDURE — P9040 RBC LEUKOREDUCED IRRADIATED: HCPCS | Performed by: STUDENT IN AN ORGANIZED HEALTH CARE EDUCATION/TRAINING PROGRAM

## 2025-01-08 PROCEDURE — 63600175 PHARM REV CODE 636 W HCPCS: Performed by: INTERNAL MEDICINE

## 2025-01-08 PROCEDURE — 84100 ASSAY OF PHOSPHORUS: CPT | Performed by: NURSE PRACTITIONER

## 2025-01-08 PROCEDURE — 36430 TRANSFUSION BLD/BLD COMPNT: CPT

## 2025-01-08 PROCEDURE — 63600175 PHARM REV CODE 636 W HCPCS

## 2025-01-08 PROCEDURE — 25000003 PHARM REV CODE 250: Performed by: STUDENT IN AN ORGANIZED HEALTH CARE EDUCATION/TRAINING PROGRAM

## 2025-01-08 PROCEDURE — 63600175 PHARM REV CODE 636 W HCPCS: Performed by: NURSE PRACTITIONER

## 2025-01-08 PROCEDURE — 20600001 HC STEP DOWN PRIVATE ROOM

## 2025-01-08 PROCEDURE — 25000003 PHARM REV CODE 250: Performed by: NURSE PRACTITIONER

## 2025-01-08 PROCEDURE — 63600175 PHARM REV CODE 636 W HCPCS: Mod: JZ,TB | Performed by: INTERNAL MEDICINE

## 2025-01-08 PROCEDURE — 83735 ASSAY OF MAGNESIUM: CPT | Performed by: NURSE PRACTITIONER

## 2025-01-08 PROCEDURE — 80197 ASSAY OF TACROLIMUS: CPT | Performed by: INTERNAL MEDICINE

## 2025-01-08 RX ORDER — HYDROCODONE BITARTRATE AND ACETAMINOPHEN 500; 5 MG/1; MG/1
TABLET ORAL
Status: DISCONTINUED | OUTPATIENT
Start: 2025-01-08 | End: 2025-01-09

## 2025-01-08 RX ORDER — TACROLIMUS 1 MG/1
1 CAPSULE ORAL EVERY EVENING
Status: DISCONTINUED | OUTPATIENT
Start: 2025-01-08 | End: 2025-01-15

## 2025-01-08 RX ORDER — SODIUM,POTASSIUM PHOSPHATES 280-250MG
1 POWDER IN PACKET (EA) ORAL ONCE
Status: COMPLETED | OUTPATIENT
Start: 2025-01-08 | End: 2025-01-08

## 2025-01-08 RX ADMIN — ALUMINUM HYDROXIDE, MAGNESIUM HYDROXIDE, AND DIMETHICONE 10 ML: 400; 400; 40 SUSPENSION ORAL at 09:01

## 2025-01-08 RX ADMIN — POTASSIUM & SODIUM PHOSPHATES POWDER PACK 280-160-250 MG 1 PACKET: 280-160-250 PACK at 08:01

## 2025-01-08 RX ADMIN — MYCOPHENOLATE MOFETIL 1000 MG: 500 INJECTION, POWDER, LYOPHILIZED, FOR SOLUTION INTRAVENOUS at 09:01

## 2025-01-08 RX ADMIN — Medication 1 DOSE: at 12:01

## 2025-01-08 RX ADMIN — PANTOPRAZOLE SODIUM 40 MG: 40 INJECTION, POWDER, FOR SOLUTION INTRAVENOUS at 08:01

## 2025-01-08 RX ADMIN — SIMETHICONE 80 MG: 80 TABLET, CHEWABLE ORAL at 08:01

## 2025-01-08 RX ADMIN — GUAIFENESIN AND DEXTROMETHORPHAN HYDROBROMIDE 1 TABLET: 600; 30 TABLET, EXTENDED RELEASE ORAL at 08:01

## 2025-01-08 RX ADMIN — LEVOFLOXACIN 500 MG: 5 INJECTION, SOLUTION INTRAVENOUS at 08:01

## 2025-01-08 RX ADMIN — LETERMOVIR 480 MG: 480 TABLET, FILM COATED ORAL at 08:01

## 2025-01-08 RX ADMIN — ONDANSETRON 8 MG: 2 INJECTION INTRAMUSCULAR; INTRAVENOUS at 09:01

## 2025-01-08 RX ADMIN — SODIUM CHLORIDE: 9 INJECTION, SOLUTION INTRAVENOUS at 05:01

## 2025-01-08 RX ADMIN — GUAIFENESIN AND DEXTROMETHORPHAN HYDROBROMIDE 1 TABLET: 600; 30 TABLET, EXTENDED RELEASE ORAL at 09:01

## 2025-01-08 RX ADMIN — ACYCLOVIR SODIUM 400 MG: 50 INJECTION, SOLUTION INTRAVENOUS at 10:01

## 2025-01-08 RX ADMIN — FAMOTIDINE 20 MG: 10 INJECTION, SOLUTION INTRAVENOUS at 08:01

## 2025-01-08 RX ADMIN — SIMETHICONE 80 MG: 80 TABLET, CHEWABLE ORAL at 09:01

## 2025-01-08 RX ADMIN — URSODIOL 300 MG: 300 CAPSULE ORAL at 08:01

## 2025-01-08 RX ADMIN — TACROLIMUS 1.5 MG: 1 CAPSULE ORAL at 08:01

## 2025-01-08 RX ADMIN — DIPHENHYDRAMINE HYDROCHLORIDE 50 MG: 25 CAPSULE ORAL at 12:01

## 2025-01-08 RX ADMIN — FILGRASTIM 480 MCG: 480 INJECTION, SOLUTION INTRAVENOUS; SUBCUTANEOUS at 08:01

## 2025-01-08 RX ADMIN — LOPERAMIDE HYDROCHLORIDE 2 MG: 2 CAPSULE ORAL at 09:01

## 2025-01-08 RX ADMIN — Medication 1 DOSE: at 09:01

## 2025-01-08 RX ADMIN — DIPHENHYDRAMINE HYDROCHLORIDE 15 ML: 25 SOLUTION ORAL at 05:01

## 2025-01-08 RX ADMIN — PROCHLORPERAZINE EDISYLATE 5 MG: 5 INJECTION INTRAMUSCULAR; INTRAVENOUS at 09:01

## 2025-01-08 RX ADMIN — LOPERAMIDE HYDROCHLORIDE 2 MG: 2 CAPSULE ORAL at 04:01

## 2025-01-08 RX ADMIN — DIPHENHYDRAMINE HYDROCHLORIDE 15 ML: 25 SOLUTION ORAL at 11:01

## 2025-01-08 RX ADMIN — ACETAMINOPHEN 650 MG: 325 TABLET ORAL at 12:01

## 2025-01-08 RX ADMIN — MYCOPHENOLATE MOFETIL 1000 MG: 500 INJECTION, POWDER, LYOPHILIZED, FOR SOLUTION INTRAVENOUS at 02:01

## 2025-01-08 RX ADMIN — TACROLIMUS 1 MG: 1 CAPSULE ORAL at 05:01

## 2025-01-08 RX ADMIN — ACYCLOVIR SODIUM 400 MG: 50 INJECTION, SOLUTION INTRAVENOUS at 09:01

## 2025-01-08 RX ADMIN — URSODIOL 300 MG: 300 CAPSULE ORAL at 09:01

## 2025-01-08 RX ADMIN — MAGNESIUM SULFATE HEPTAHYDRATE 2 G: 40 INJECTION, SOLUTION INTRAVENOUS at 06:01

## 2025-01-08 RX ADMIN — ONDANSETRON 8 MG: 2 INJECTION INTRAMUSCULAR; INTRAVENOUS at 05:01

## 2025-01-08 RX ADMIN — SIMETHICONE 80 MG: 80 TABLET, CHEWABLE ORAL at 02:01

## 2025-01-08 RX ADMIN — PROCHLORPERAZINE EDISYLATE 5 MG: 5 INJECTION INTRAMUSCULAR; INTRAVENOUS at 10:01

## 2025-01-08 RX ADMIN — Medication 1 DOSE: at 04:01

## 2025-01-08 RX ADMIN — LIDOCAINE HYDROCHLORIDE 15 ML: 20 SOLUTION OROPHARYNGEAL at 05:01

## 2025-01-08 RX ADMIN — PROCHLORPERAZINE EDISYLATE 5 MG: 5 INJECTION INTRAMUSCULAR; INTRAVENOUS at 04:01

## 2025-01-08 RX ADMIN — MORPHINE SULFATE 2 MG: 2 INJECTION, SOLUTION INTRAMUSCULAR; INTRAVENOUS at 09:01

## 2025-01-08 RX ADMIN — MORPHINE SULFATE 2 MG: 2 INJECTION, SOLUTION INTRAMUSCULAR; INTRAVENOUS at 12:01

## 2025-01-08 RX ADMIN — OLANZAPINE 10 MG: 2.5 TABLET, FILM COATED ORAL at 09:01

## 2025-01-08 RX ADMIN — Medication 1 DOSE: at 08:01

## 2025-01-08 RX ADMIN — POSACONAZOLE 300 MG: 18 INJECTION, SOLUTION INTRAVENOUS at 08:01

## 2025-01-08 RX ADMIN — ONDANSETRON 8 MG: 2 INJECTION INTRAMUSCULAR; INTRAVENOUS at 02:01

## 2025-01-08 RX ADMIN — LIDOCAINE HYDROCHLORIDE 15 ML: 20 SOLUTION OROPHARYNGEAL at 11:01

## 2025-01-08 NOTE — SUBJECTIVE & OBJECTIVE
Subjective:     Interval History:   Day +13 following haploSCT conditioned with BuFluThiotepa plus PTCy, Tac, MMF. Mucositis/esophagitis improved. Continue soft diet and morphine PRN. Ordered 1U pRBC for Hb of 7. Tacrolimus doses being adjusted per pharmacy.    Objective:     Vital Signs (Most Recent):  Temp: 98.3 °F (36.8 °C) (01/08/25 0746)  Pulse: 106 (01/08/25 0746)  Resp: 17 (01/08/25 0746)  BP: 132/77 (01/08/25 0746)  SpO2: 97 % (01/08/25 0746) Vital Signs (24h Range):  Temp:  [97.9 °F (36.6 °C)-98.8 °F (37.1 °C)] 98.3 °F (36.8 °C)  Pulse:  [] 106  Resp:  [17-20] 17  SpO2:  [94 %-98 %] 97 %  BP: (118-142)/(72-78) 132/77     Weight: 87.9 kg (193 lb 12.6 oz)  Body mass index is 27.03 kg/m².  Body surface area is 2.1 meters squared.    ECOG SCORE             Intake/Output - Last 3 Shifts         01/06 0700  01/07 0659 01/07 0700 01/08 0659 01/08 0700 01/09 0659    P.O. 360 180     I.V. (mL/kg) 1971.9 (22.6) 1733.9 (19.7)     Blood 280 623     IV Piggyback 1237.3 1151.2     Total Intake(mL/kg) 3849.2 (44) 3688.1 (42)     Urine (mL/kg/hr) 875 (0.4) 1000 (0.5)     Stool 0 0     Total Output 875 1000     Net +2974.2 +2688.1            Urine Occurrence 4 x 1 x     Stool Occurrence 1 x 1 x              Physical Exam  Vitals and nursing note reviewed.   Constitutional:       General: He is not in acute distress.     Appearance: Normal appearance. He is not diaphoretic.   HENT:      Head: Normocephalic and atraumatic.      Nose: Nose normal.      Mouth/Throat:      Mouth: Mucous membranes are moist.      Pharynx: Oropharynx is clear.   Eyes:      General: No scleral icterus.     Extraocular Movements: Extraocular movements intact.      Pupils: Pupils are equal, round, and reactive to light.   Cardiovascular:      Rate and Rhythm: Normal rate and regular rhythm.      Pulses: Normal pulses.      Heart sounds: Normal heart sounds. No murmur heard.  Pulmonary:      Effort: Pulmonary effort is normal. No respiratory  distress.      Breath sounds: Normal breath sounds. No stridor. No wheezing, rhonchi or rales.   Abdominal:      General: Abdomen is flat. Bowel sounds are normal. There is no distension.      Palpations: Abdomen is soft. There is no mass.      Tenderness: There is no abdominal tenderness.   Musculoskeletal:         General: No swelling or tenderness. Normal range of motion.      Cervical back: Normal range of motion and neck supple. No rigidity.      Right lower leg: No edema.      Left lower leg: No edema.      Comments: Tunneled Central Line - Triple Lumen 12/18/24 Internal Jugular Right   Skin:     General: Skin is warm and dry.      Capillary Refill: Capillary refill takes less than 2 seconds.      Coloration: Skin is not jaundiced.      Findings: No lesion or rash.   Neurological:      General: No focal deficit present.      Mental Status: He is alert and oriented to person, place, and time.      Motor: No weakness.      Coordination: Coordination normal.   Psychiatric:         Mood and Affect: Mood normal.         Behavior: Behavior normal.            Significant Labs:   All pertinent labs from the last 24 hours have been reviewed.    Diagnostic Results:  I have reviewed and interpreted all pertinent imaging results/findings within the past 24 hours.

## 2025-01-08 NOTE — PROGRESS NOTES
[facility] - Oncology ([facility])  Hematology  Bone Marrow Transplant  Progress Note    Patient Name: Rubén Hu  Admission Date: 12/18/2024  Hospital Length of Stay: 21 days  Code Status: Full Code    Subjective:     Interval History:   Day +13 following haploSCT conditioned with BuFluThiotepa plus PTCy, Tac, MMF. Mucositis/esophagitis improved. Continue soft diet and morphine PRN. Ordered 1U pRBC for Hb of 7. Tacrolimus doses being adjusted per pharmacy. Another episode of diarrhea today, does not meet criteria for cdif testing - will monitor.     Objective:     Vital Signs (Most Recent):  Temp: 98.3 °F (36.8 °C) (01/08/25 0746)  Pulse: 106 (01/08/25 0746)  Resp: 17 (01/08/25 0746)  BP: 132/77 (01/08/25 0746)  SpO2: 97 % (01/08/25 0746) Vital Signs (24h Range):  Temp:  [97.9 °F (36.6 °C)-98.8 °F (37.1 °C)] 98.3 °F (36.8 °C)  Pulse:  [] 106  Resp:  [17-20] 17  SpO2:  [94 %-98 %] 97 %  BP: (118-142)/(72-78) 132/77     Weight: 87.9 kg (193 lb 12.6 oz)  Body mass index is 27.03 kg/m².  Body surface area is 2.1 meters squared.    ECOG SCORE             Intake/Output - Last 3 Shifts         01/06 0700 01/07 0659 01/07 0700 01/08 0659 01/08 0700 01/09 0659    P.O. 360 180     I.V. (mL/kg) 1971.9 (22.6) 1733.9 (19.7)     Blood 280 623     IV Piggyback 1237.3 1151.2     Total Intake(mL/kg) 3849.2 (44) 3688.1 (42)     Urine (mL/kg/hr) 875 (0.4) 1000 (0.5)     Stool 0 0     Total Output 875 1000     Net +2974.2 +2688.1            Urine Occurrence 4 x 1 x     Stool Occurrence 1 x 1 x              Physical Exam  Vitals and nursing note reviewed.   Constitutional:       General: He is not in acute distress.     Appearance: Normal appearance. He is not diaphoretic.   HENT:      Head: Normocephalic and atraumatic.      Nose: Nose normal.      Mouth/Throat:      Mouth: Mucous membranes are moist.      Pharynx: Oropharynx is clear.   Eyes:      General: No scleral icterus.     Extraocular Movements: Extraocular movements  intact.      Pupils: Pupils are equal, round, and reactive to light.   Cardiovascular:      Rate and Rhythm: Normal rate and regular rhythm.      Pulses: Normal pulses.      Heart sounds: Normal heart sounds. No murmur heard.  Pulmonary:      Effort: Pulmonary effort is normal. No respiratory distress.      Breath sounds: Normal breath sounds. No stridor. No wheezing, rhonchi or rales.   Abdominal:      General: Abdomen is flat. Bowel sounds are normal. There is no distension.      Palpations: Abdomen is soft. There is no mass.      Tenderness: There is no abdominal tenderness.   Musculoskeletal:         General: No swelling or tenderness. Normal range of motion.      Cervical back: Normal range of motion and neck supple. No rigidity.      Right lower leg: No edema.      Left lower leg: No edema.      Comments: Tunneled Central Line - Triple Lumen 12/18/24 Internal Jugular Right   Skin:     General: Skin is warm and dry.      Capillary Refill: Capillary refill takes less than 2 seconds.      Coloration: Skin is not jaundiced.      Findings: No lesion or rash.   Neurological:      General: No focal deficit present.      Mental Status: He is alert and oriented to person, place, and time.      Motor: No weakness.      Coordination: Coordination normal.   Psychiatric:         Mood and Affect: Mood normal.         Behavior: Behavior normal.            Significant Labs:   All pertinent labs from the last 24 hours have been reviewed.    Diagnostic Results:  I have reviewed and interpreted all pertinent imaging results/findings within the past 24 hours.  Assessment/Plan:     * History of allogeneic stem cell transplant  - Patient of Dr. 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 Received 3 bags with CD34 dose of 6.04x10^6/kg.   - Today is Day +13  - Patient is O+. Donor is O+.  - Patient is CMV non-reactive. Donor is CMV reactive. Given  donor CMV status, patient will start (patient-supplied) letermovir on Day +5.  - Day -4 and Day -3 Busulfan dose-adjusted to 282 mg  - See treatment plan below:    Planned conditioning regimen:  Thiotepa on Day -7  Busulfan on Day -6, -5, -4, and -3  Fludarabine on Day -6, -5, -4, and -3  REST DAYS on Day -2 and -1     Planned  GVHD Prophylaxis:  Cyclophosphamide on Day +3 and +4  Tacrolimus starting on Day +5  Mycophenolate starting on Day +5     Antimicrobial Prophylaxis:  Acyclovir starting on Day -7  Levofloxacin starting on Day -1  Micafungin on Day -1 through Day +4  Letermovir starting on Day +5 (if CMV PCR drawn on day 0 results negative)  Posaconazole starting on Day +5  Atovaquone starting on Day +30     Skin Care with Thiotepa: Day -7 through D-5     Seizure Prophylaxis:  Levetiracetam on Day -7 through Day -2     SOS/VOD Prophylaxis:  Ursodiol on Day -7 through Day +30     Growth Factor Support:  Neupogen starting on Day +5        Caregiver: Carmelita (Spouse)  Post-transplant discharge plans: Home      Diarrhea  - 1 episode reported 01/07 and 01/08. If worsening and meets criteria (>3 per day), will order Cdif testing    Flatulence  - See plan under epigastric pain. Simethicone 80 TID ordered 01/03. Resolved    Epigastric pain  - Suspect 2/2 chemotherapy conditioning regimen prior to allogeneic SCT. Abdomen notably distended and with TTP in epigastric region.   - Continue protonix and famotidine daily, simethicone 80 TID ordered for flatulence  - , lipase normal  - morphine 1 mg PRN  - CT A/P 12/31: no acute pathology   - USG abdomen (RUQ) 01/02 unremarkable, not concerning for VOD    Mucositis  - Having poor PO intake/throat pain 2/2 chemotherapy   - Duke's solution QID  - Morphine 2 mg q3h PRN. Can increase dose or frequency if need be.  - Transitioned PO meds to IV.  - Diet changed from regular to soft/bite sized  - IMPROVING    Allergic contact dermatitis due to adhesives  - Itching and  redness to LLQ/RLQ where tele leads were placed.   - Topical benadryl prn for itching.  - Tele removed     Hypokalemia  - See electrolyte disorder    Electrolyte disorder  - Replete per PRN electrolyte order set  - Daily CMP, mag, and phos while inpatient    Neutropenic fever  - Infection w/u neg thus far  - Afebrile, stopping cefepime and started levaquin 12/31/24    Chemotherapy-induced nausea  - Anticipate 2/2 chemotherapy   - Meds switched from PO to IV as able 1/2.   - Zofran q8h  - Compazine q6h  - Zyprexa QHS  - Ativan PRN    Transaminitis  - Transaminases first elevated 12/27. Suspect 2/2 chemotherapy prior to transplant.   - Will continue to monitor with daily CMP.  - RESOLVED    Hypertension  - Became hypertensive following SCT on 12/26. No signs of volume overload contributing to HTN.  - 12/27 started amlodipine.     Headache  - 12/27 diffuse headache began.   - Oxy 5mg and sumatriptan as needed for HA  - Avoiding tylenol with recent allo SCT/neutropenia and do not want to mask a fever.   RESOLVED    Insomnia  - Difficulty sleeping at night while IP.  - Has PRN ativan and trazodone  - Ambien PRN  - Melatonin not effective     Thrombocytosis  - 2/2 myelofibrosis  - Was on daily ASA, but stopped taking ~ 3 weeks ago  - Now with anticipated thrombocytopenia following chemotherapy  - Daily CBC while IP  RESOLVED    Pancytopenia due to chemotherapy  - Anticipated following chemotherapy.  - Transfuse for hgb < 7 or plts < 10K  - Continue antimicrobial ppx   - Daily CBC while IP    Adrenal nodule  - Subcentimeter nodular thickening of the adrenal glands first noted on imaging from 1/2024.  - Seen by endocrine prior to transplant admission and Dexamethasone suppression test performed. Patient responded appropriately. No further w/u needed.  - Likely adenomas.    Metabolic dysfunction-associated steatotic liver disease (MASLD)  - Biopsy proven to be steatotic change. Most compatible with MASLD.   - Follow-up with  hepatology OP.    Primary myelofibrosis  - From Dr. 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 Transplant on 12/26/24 at 1330. Received 3 bags with CD34 dose of 6.04 x 10^6/kg.     Hyperlipidemia  - Holding home atorvastatin while IP to avoid interaction with other medications. Can resume at discharge if LFTs stable.        VTE Risk Mitigation (From admission, onward)           Ordered     heparin, porcine (PF) 100 unit/mL injection flush 300 Units  As needed (PRN)         12/18/24 2025                      Michael Saavedra MD  Bone Marrow Transplant  [facility] - Oncology ([facility])

## 2025-01-08 NOTE — PLAN OF CARE
Day +13 haploSCT. Pt involved in plan of care and communicating needs throughout shift. Premedicated for blood transfusion. Scheduled antiemetics given for nausea with moderate relief. Up in room and to bathroom independently; voiding without difficulty. Scheduled imodium given as ordered; 1 loose BM this shift. No c/o SOB, N/V. Pt reports a decrease in oral intake due to mucositis/esophagitis. Electrolytes replaced PRN as per protocol. AAOX4. All VSS; no acute events so far this shift. Pt remaining free from falls or injury throughout shift; bed locked and in lowest position; call light within reach. Pt instructed to call for assistance as needed. Q1H rounding done on pt.

## 2025-01-08 NOTE — PROGRESS NOTES
SW met with patient who is up in recliner. Patient explained he recently had diarrhea.     Patient explained staff is responsive when he has a need and is comfortable asking for help.    No immediate needs at this time. SW will continue to remain available.

## 2025-01-08 NOTE — ASSESSMENT & PLAN NOTE
- Patient of Dr. 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 Received 3 bags with CD34 dose of 6.04x10^6/kg.   - Today is Day +13  - Patient is O+. Donor is O+.  - Patient is CMV non-reactive. Donor is CMV reactive. Given donor CMV status, patient will start (patient-supplied) letermovir on Day +5.  - Day -4 and Day -3 Busulfan dose-adjusted to 282 mg  - See treatment plan below:    Planned conditioning regimen:  Thiotepa on Day -7  Busulfan on Day -6, -5, -4, and -3  Fludarabine on Day -6, -5, -4, and -3  REST DAYS on Day -2 and -1     Planned  GVHD Prophylaxis:  Cyclophosphamide on Day +3 and +4  Tacrolimus starting on Day +5  Mycophenolate starting on Day +5     Antimicrobial Prophylaxis:  Acyclovir starting on Day -7  Levofloxacin starting on Day -1  Micafungin on Day -1 through Day +4  Letermovir starting on Day +5 (if CMV PCR drawn on day 0 results negative)  Posaconazole starting on Day +5  Atovaquone starting on Day +30     Skin Care with Thiotepa: Day -7 through D-5     Seizure Prophylaxis:  Levetiracetam on Day -7 through Day -2     SOS/VOD Prophylaxis:  Ursodiol on Day -7 through Day +30     Growth Factor Support:  Neupogen starting on Day +5        Caregiver: Carmelita (Spouse)  Post-transplant discharge plans: Home

## 2025-01-08 NOTE — PLAN OF CARE
Day + 13 haplo BMT. No falls or injuries this shift. Bed alarm refused. Resting quietly with eyes closed. Respirations even, unlabored. Skin warm and dry. Pain managed with Dilaudid 0.5 mg IV x's 1, and Morphine 2 mg IV x's 1. Nausea managed with scheduled antiemetics. Frequent checks for pain and safety maintained. Bed in lowest position, wheels locked, side rails up x's 2, call light in reach. Instructed to call for assistance as needed, verbalizes understanding.

## 2025-01-08 NOTE — CARE UPDATE
Unit [facility] Care Support Interaction      I have reviewed the chart of Rubén Hu who is hospitalized for History of allogeneic stem cell transplant. The patient is currently located in the following unit: ONC/BMT        I have assisted the primary physician in management of the following:      Central Line - clean, dry,intact. Dressing dated 1/3/25.     Educated pt on the proper daily use of CHG wipes. Pt states he has not been doing the CHG baths like he is supposed to. Encouraged pt to do daily CHG baths and educated on the importance of them. Pt verbalized understanding.  Provided 3 packets of wipes.    I have seen and examined the patient and provided the following support:     Patient experience rounds - positive experience reported  Proactive rounds - patient is stable       MATHEW SUTHERLAND  Unit Based RE[facility]

## 2025-01-08 NOTE — ASSESSMENT & PLAN NOTE
- Having poor PO intake/throat pain 2/2 chemotherapy   - Duke's solution QID  - Morphine 2 mg q3h PRN. Can increase dose or frequency if need be.  - Transitioned PO meds to IV.  - Diet changed from regular to soft/bite sized  - IMPROVING

## 2025-01-09 LAB
ABO + RH BLD: NORMAL
ALBUMIN SERPL BCP-MCNC: 2.2 G/DL (ref 3.5–5.2)
ALP SERPL-CCNC: 73 U/L (ref 40–150)
ALT SERPL W/O P-5'-P-CCNC: 30 U/L (ref 10–44)
ANION GAP SERPL CALC-SCNC: 8 MMOL/L (ref 8–16)
ANISOCYTOSIS BLD QL SMEAR: SLIGHT
AST SERPL-CCNC: 23 U/L (ref 10–40)
BASOPHILS # BLD AUTO: 0 K/UL (ref 0–0.2)
BASOPHILS NFR BLD: 0 % (ref 0–1.9)
BILIRUB SERPL-MCNC: 0.6 MG/DL (ref 0.1–1)
BLD GP AB SCN CELLS X3 SERPL QL: NORMAL
BLD PROD TYP BPU: NORMAL
BLOOD UNIT EXPIRATION DATE: NORMAL
BLOOD UNIT TYPE CODE: 6200
BLOOD UNIT TYPE: NORMAL
BUN SERPL-MCNC: 6 MG/DL (ref 6–20)
CALCIUM SERPL-MCNC: 8.1 MG/DL (ref 8.7–10.5)
CHLORIDE SERPL-SCNC: 108 MMOL/L (ref 95–110)
CO2 SERPL-SCNC: 21 MMOL/L (ref 23–29)
CODING SYSTEM: NORMAL
CREAT SERPL-MCNC: 0.6 MG/DL (ref 0.5–1.4)
CROSSMATCH INTERPRETATION: NORMAL
DACRYOCYTES BLD QL SMEAR: ABNORMAL
DIFFERENTIAL METHOD BLD: ABNORMAL
DISPENSE STATUS: NORMAL
EOSINOPHIL # BLD AUTO: 0 K/UL (ref 0–0.5)
EOSINOPHIL NFR BLD: 0 % (ref 0–8)
ERYTHROCYTE [DISTWIDTH] IN BLOOD BY AUTOMATED COUNT: 13.9 % (ref 11.5–14.5)
EST. GFR  (NO RACE VARIABLE): >60 ML/MIN/1.73 M^2
GLUCOSE SERPL-MCNC: 103 MG/DL (ref 70–110)
HCT VFR BLD AUTO: 23 % (ref 40–54)
HGB BLD-MCNC: 8.1 G/DL (ref 14–18)
IMM GRANULOCYTES # BLD AUTO: 0 K/UL (ref 0–0.04)
IMM GRANULOCYTES NFR BLD AUTO: 0 % (ref 0–0.5)
LYMPHOCYTES # BLD AUTO: 0 K/UL (ref 1–4.8)
LYMPHOCYTES NFR BLD: 0 % (ref 18–48)
MAGNESIUM SERPL-MCNC: 1.6 MG/DL (ref 1.6–2.6)
MCH RBC QN AUTO: 31.5 PG (ref 27–31)
MCHC RBC AUTO-ENTMCNC: 35.2 G/DL (ref 32–36)
MCV RBC AUTO: 90 FL (ref 82–98)
MONOCYTES # BLD AUTO: 0 K/UL (ref 0.3–1)
MONOCYTES NFR BLD: 0 % (ref 4–15)
NEUTROPHILS # BLD AUTO: 0 K/UL (ref 1.8–7.7)
NEUTROPHILS NFR BLD: 100 % (ref 38–73)
NRBC BLD-RTO: 0 /100 WBC
PHOSPHATE SERPL-MCNC: 2.4 MG/DL (ref 2.7–4.5)
PLATELET # BLD AUTO: 9 K/UL (ref 150–450)
PLATELET BLD QL SMEAR: ABNORMAL
PMV BLD AUTO: 10.1 FL (ref 9.2–12.9)
POIKILOCYTOSIS BLD QL SMEAR: SLIGHT
POLYCHROMASIA BLD QL SMEAR: ABNORMAL
POTASSIUM SERPL-SCNC: 3.4 MMOL/L (ref 3.5–5.1)
PROT SERPL-MCNC: 5.8 G/DL (ref 6–8.4)
RBC # BLD AUTO: 2.57 M/UL (ref 4.6–6.2)
SODIUM SERPL-SCNC: 137 MMOL/L (ref 136–145)
SPECIMEN OUTDATE: NORMAL
SPHEROCYTES BLD QL SMEAR: ABNORMAL
UNIT NUMBER: NORMAL
WBC # BLD AUTO: 0.01 K/UL (ref 3.9–12.7)

## 2025-01-09 PROCEDURE — 63600175 PHARM REV CODE 636 W HCPCS: Performed by: INTERNAL MEDICINE

## 2025-01-09 PROCEDURE — 25000003 PHARM REV CODE 250: Performed by: NURSE PRACTITIONER

## 2025-01-09 PROCEDURE — P9037 PLATE PHERES LEUKOREDU IRRAD: HCPCS | Performed by: STUDENT IN AN ORGANIZED HEALTH CARE EDUCATION/TRAINING PROGRAM

## 2025-01-09 PROCEDURE — 25000003 PHARM REV CODE 250: Performed by: STUDENT IN AN ORGANIZED HEALTH CARE EDUCATION/TRAINING PROGRAM

## 2025-01-09 PROCEDURE — 25000003 PHARM REV CODE 250: Performed by: INTERNAL MEDICINE

## 2025-01-09 PROCEDURE — 97116 GAIT TRAINING THERAPY: CPT | Mod: CQ

## 2025-01-09 PROCEDURE — 99232 SBSQ HOSP IP/OBS MODERATE 35: CPT | Mod: ,,, | Performed by: INTERNAL MEDICINE

## 2025-01-09 PROCEDURE — 63600175 PHARM REV CODE 636 W HCPCS

## 2025-01-09 PROCEDURE — 63600175 PHARM REV CODE 636 W HCPCS: Performed by: NURSE PRACTITIONER

## 2025-01-09 PROCEDURE — 63600175 PHARM REV CODE 636 W HCPCS: Mod: JZ,TB | Performed by: INTERNAL MEDICINE

## 2025-01-09 PROCEDURE — 86901 BLOOD TYPING SEROLOGIC RH(D): CPT | Performed by: INTERNAL MEDICINE

## 2025-01-09 PROCEDURE — 20600001 HC STEP DOWN PRIVATE ROOM

## 2025-01-09 PROCEDURE — 84100 ASSAY OF PHOSPHORUS: CPT | Performed by: NURSE PRACTITIONER

## 2025-01-09 PROCEDURE — 85025 COMPLETE CBC W/AUTO DIFF WBC: CPT | Performed by: NURSE PRACTITIONER

## 2025-01-09 PROCEDURE — 83735 ASSAY OF MAGNESIUM: CPT | Performed by: NURSE PRACTITIONER

## 2025-01-09 PROCEDURE — 80053 COMPREHEN METABOLIC PANEL: CPT | Performed by: NURSE PRACTITIONER

## 2025-01-09 PROCEDURE — 25000003 PHARM REV CODE 250

## 2025-01-09 RX ORDER — ZINC OXIDE 20 G/100G
OINTMENT TOPICAL
Status: DISCONTINUED | OUTPATIENT
Start: 2025-01-09 | End: 2025-01-25 | Stop reason: HOSPADM

## 2025-01-09 RX ORDER — HYDROCODONE BITARTRATE AND ACETAMINOPHEN 500; 5 MG/1; MG/1
TABLET ORAL
Status: DISCONTINUED | OUTPATIENT
Start: 2025-01-09 | End: 2025-01-15

## 2025-01-09 RX ORDER — DICYCLOMINE HYDROCHLORIDE 10 MG/1
10 CAPSULE ORAL 4 TIMES DAILY PRN
Status: DISCONTINUED | OUTPATIENT
Start: 2025-01-09 | End: 2025-01-25 | Stop reason: HOSPADM

## 2025-01-09 RX ADMIN — SIMETHICONE 80 MG: 80 TABLET, CHEWABLE ORAL at 09:01

## 2025-01-09 RX ADMIN — Medication 1 DOSE: at 09:01

## 2025-01-09 RX ADMIN — TACROLIMUS 1 MG: 1 CAPSULE ORAL at 05:01

## 2025-01-09 RX ADMIN — ONDANSETRON 8 MG: 2 INJECTION INTRAMUSCULAR; INTRAVENOUS at 01:01

## 2025-01-09 RX ADMIN — MYCOPHENOLATE MOFETIL 1000 MG: 500 INJECTION, POWDER, LYOPHILIZED, FOR SOLUTION INTRAVENOUS at 09:01

## 2025-01-09 RX ADMIN — ACYCLOVIR SODIUM 400 MG: 50 INJECTION, SOLUTION INTRAVENOUS at 09:01

## 2025-01-09 RX ADMIN — POTASSIUM CHLORIDE 10 MEQ: 7.46 INJECTION, SOLUTION INTRAVENOUS at 04:01

## 2025-01-09 RX ADMIN — URSODIOL 300 MG: 300 CAPSULE ORAL at 09:01

## 2025-01-09 RX ADMIN — OLANZAPINE 10 MG: 2.5 TABLET, FILM COATED ORAL at 09:01

## 2025-01-09 RX ADMIN — LEVOFLOXACIN 500 MG: 5 INJECTION, SOLUTION INTRAVENOUS at 09:01

## 2025-01-09 RX ADMIN — POTASSIUM CHLORIDE 10 MEQ: 7.46 INJECTION, SOLUTION INTRAVENOUS at 11:01

## 2025-01-09 RX ADMIN — GUAIFENESIN AND DEXTROMETHORPHAN HYDROBROMIDE 1 TABLET: 600; 30 TABLET, EXTENDED RELEASE ORAL at 09:01

## 2025-01-09 RX ADMIN — MAGNESIUM SULFATE HEPTAHYDRATE 2 G: 40 INJECTION, SOLUTION INTRAVENOUS at 11:01

## 2025-01-09 RX ADMIN — LETERMOVIR 480 MG: 480 TABLET, FILM COATED ORAL at 09:01

## 2025-01-09 RX ADMIN — SIMETHICONE 80 MG: 80 TABLET, CHEWABLE ORAL at 03:01

## 2025-01-09 RX ADMIN — POTASSIUM CHLORIDE 60 MEQ: 7.46 INJECTION, SOLUTION INTRAVENOUS at 02:01

## 2025-01-09 RX ADMIN — TACROLIMUS 1.5 MG: 1 CAPSULE ORAL at 09:01

## 2025-01-09 RX ADMIN — LOPERAMIDE HYDROCHLORIDE 2 MG: 2 CAPSULE ORAL at 01:01

## 2025-01-09 RX ADMIN — DIPHENHYDRAMINE HYDROCHLORIDE 15 ML: 25 SOLUTION ORAL at 05:01

## 2025-01-09 RX ADMIN — LOPERAMIDE HYDROCHLORIDE 2 MG: 2 CAPSULE ORAL at 09:01

## 2025-01-09 RX ADMIN — LIDOCAINE HYDROCHLORIDE 15 ML: 20 SOLUTION OROPHARYNGEAL at 11:01

## 2025-01-09 RX ADMIN — FILGRASTIM 480 MCG: 480 INJECTION, SOLUTION INTRAVENOUS; SUBCUTANEOUS at 09:01

## 2025-01-09 RX ADMIN — ALUMINUM HYDROXIDE, MAGNESIUM HYDROXIDE, AND DIMETHICONE 10 ML: 400; 400; 40 SUSPENSION ORAL at 05:01

## 2025-01-09 RX ADMIN — ONDANSETRON 8 MG: 2 INJECTION INTRAMUSCULAR; INTRAVENOUS at 09:01

## 2025-01-09 RX ADMIN — ONDANSETRON 8 MG: 2 INJECTION INTRAMUSCULAR; INTRAVENOUS at 06:01

## 2025-01-09 RX ADMIN — LIDOCAINE HYDROCHLORIDE 15 ML: 20 SOLUTION OROPHARYNGEAL at 05:01

## 2025-01-09 RX ADMIN — FAMOTIDINE 20 MG: 10 INJECTION, SOLUTION INTRAVENOUS at 09:01

## 2025-01-09 RX ADMIN — POTASSIUM CHLORIDE 10 MEQ: 7.46 INJECTION, SOLUTION INTRAVENOUS at 03:01

## 2025-01-09 RX ADMIN — PROCHLORPERAZINE EDISYLATE 5 MG: 5 INJECTION INTRAMUSCULAR; INTRAVENOUS at 09:01

## 2025-01-09 RX ADMIN — Medication 1 DOSE: at 04:01

## 2025-01-09 RX ADMIN — Medication 1 DOSE: at 01:01

## 2025-01-09 RX ADMIN — ACETAMINOPHEN 650 MG: 325 TABLET ORAL at 04:01

## 2025-01-09 RX ADMIN — MORPHINE SULFATE 2 MG: 2 INJECTION, SOLUTION INTRAMUSCULAR; INTRAVENOUS at 09:01

## 2025-01-09 RX ADMIN — POTASSIUM CHLORIDE 10 MEQ: 7.46 INJECTION, SOLUTION INTRAVENOUS at 05:01

## 2025-01-09 RX ADMIN — MYCOPHENOLATE MOFETIL 1000 MG: 500 INJECTION, POWDER, LYOPHILIZED, FOR SOLUTION INTRAVENOUS at 02:01

## 2025-01-09 RX ADMIN — POTASSIUM CHLORIDE 10 MEQ: 7.46 INJECTION, SOLUTION INTRAVENOUS at 01:01

## 2025-01-09 RX ADMIN — PANTOPRAZOLE SODIUM 40 MG: 40 INJECTION, POWDER, FOR SOLUTION INTRAVENOUS at 09:01

## 2025-01-09 RX ADMIN — PROCHLORPERAZINE EDISYLATE 5 MG: 5 INJECTION INTRAMUSCULAR; INTRAVENOUS at 05:01

## 2025-01-09 RX ADMIN — POSACONAZOLE 300 MG: 18 INJECTION, SOLUTION INTRAVENOUS at 11:01

## 2025-01-09 RX ADMIN — DIPHENHYDRAMINE HYDROCHLORIDE 50 MG: 25 CAPSULE ORAL at 04:01

## 2025-01-09 RX ADMIN — PROCHLORPERAZINE EDISYLATE 5 MG: 5 INJECTION INTRAMUSCULAR; INTRAVENOUS at 04:01

## 2025-01-09 RX ADMIN — DICYCLOMINE HYDROCHLORIDE 10 MG: 10 CAPSULE ORAL at 09:01

## 2025-01-09 RX ADMIN — LIDOCAINE HYDROCHLORIDE 15 ML: 20 SOLUTION OROPHARYNGEAL at 06:01

## 2025-01-09 RX ADMIN — SODIUM PHOSPHATE, MONOBASIC, MONOHYDRATE AND SODIUM PHOSPHATE, DIBASIC, ANHYDROUS 15 MMOL: 142; 276 INJECTION, SOLUTION INTRAVENOUS at 01:01

## 2025-01-09 NOTE — PROGRESS NOTES
[facility] - Oncology ([facility])  Adult Nutrition  Progress Note    SUMMARY       Recommendations  --Continue Regular diet as tolerated and clinically indicated   --Discontinue Boost Plus TID, Order Boost Breeze Orange TID  --Encourage good intakes   --Nursing: please continue to document % meal eaten on flowsheet   --RD to monitor weight, PO intake     Goals: Meet % EEN/EPN  Nutrition Goal Status: progressing to  Communication of RD Recs:  (POC)    Assessment and Plan    Nutrition Problem  Increased nutrient needs (calories, protein)      Related to (etiology):   Metabolic demand of disease and treatment     Signs and Symptoms (as evidenced by):   Myelofibrosis      Interventions/Recommendations (treatment strategy):  --Continue Regular diet as tolerated and clinically indicated   --Discontinue Boost Plus TID, Order Boost Breeze Orange TID   --Encourage good intakes   --Nursing: please continue to document % meal eaten on flowsheet   --RD to monitor weight, PO intake       Nutrition Diagnosis Status:   Continues    Reason for Assessment    Reason For Assessment: RD follow-up  Diagnosis: cancer diagnosis/related complications (History of allogeneic stem cell transplant)  General Information Comments: 54-y-o patient of Dr. Clyde James with primary myelofibrosis diagnosed 4/2024. Other medical history includes HLD, previous tobacco abuse, and adrenal nodule noted on imaging from 7/2024. 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 RD follow-up. Attemtped to speak to pt - pt asleep at time of visit. Noted 25% PO intake per chart. Weight trending up - no concerns for malnutrition at this time. Per last RD note, pt does not like Boost Plus but willing to try Boost Breeze.     Nutrition Discharge Planning: Adequate nutritional intake    Nutrition Related Social Determinants of Health: SDOH: Adequate food in home environment       Nutrition Risk Screen    Nutrition Risk Screen:  "no indicators present    Nutrition/Diet History    Spiritual, Cultural Beliefs, Sikhism Practices, Values that Affect Care: no  Food Allergies: NKFA    Anthropometrics    Temp: 97.9 °F (36.6 °C)  Height Method: Stated  Height: 5' 11" (180.3 cm)  Height (inches): 71 in  Weight Method: Standard Scale  Weight: 88.7 kg (195 lb 8.8 oz)  Weight (lb): 195.55 lb  Ideal Body Weight (IBW), Male: 172 lb  % Ideal Body Weight, Male (lb): 113.69 %  BMI (Calculated): 27.3  BMI Grade: 25 - 29.9 - overweight       Lab/Procedures/Meds    Pertinent Labs Reviewed: reviewed - hemoglobin 8.1, hematocrit 23, MCH 31.5, K 3.4, Ca 8.1, P 2.4    Pertinent Medications Reviewed: reviewed - ondansetron, pantoprazole, sodium bicarb-sodium chloride, tacrolimus    Estimated/Assessed Needs    Weight Used For Calorie Calculations: 88.7 kg (195 lb 8.8 oz)  Energy Calorie Requirements (kcal): 3846-6892 kcal/day (25-30 kcal/kg)  Energy Need Method: Kcal/kg  Protein Requirements:  g/day (1.0-1.2 g/kg)  Weight Used For Protein Calculations: 88.7 kg (195 lb 8.8 oz)     Estimated Fluid Requirement Method: RDA Method  RDA Method (mL): 2218  CHO Requirement: 286-343 g/day      Nutrition Prescription Ordered    Current Diet Order: Soft & Bite Sized (IDDSI Level 6)  Oral Nutrition Supplement: Boost Plus TID    Evaluation of Received Nutrient/Fluid Intake    Comments: LBM 1/8  % Intake of Estimated Energy Needs: 0 - 25 %  % Meal Intake: 0 - 25 %    Nutrition Risk    Level of Risk/Frequency of Follow-up: high     Monitor and Evaluation    Food and Nutrient Intake: food and beverage intake, energy intake  Food and Nutrient Adminstration: diet order  Knowledge/Beliefs/Attitudes: food and nutrition knowledge/skill  Physical Activity and Function: nutrition-related ADLs and IADLs  Anthropometric Measurements: weight, weight change, body mass index  Biochemical Data, Medical Tests and Procedures: electrolyte and renal panel, gastrointestinal profile, " glucose/endocrine profile, inflammatory profile, lipid profile  Nutrition-Focused Physical Findings: overall appearance     Nutrition Follow-Up    RD Follow-up?: Yes    Tari Cote, Registration Eligible, Provisional LDN

## 2025-01-09 NOTE — ASSESSMENT & PLAN NOTE
- 1 episode reported 01/07 and 01/08, has been receiving his PRN loperamide.   - If worsening and meets criteria, will order Cdif testing

## 2025-01-09 NOTE — PLAN OF CARE
Problem: Adult Inpatient Plan of Care  Goal: Plan of Care Review  Outcome: Progressing  Goal: Patient-Specific Goal (Individualized)  Outcome: Progressing  Goal: Absence of Hospital-Acquired Illness or Injury  Outcome: Progressing  Goal: Optimal Comfort and Wellbeing  Outcome: Progressing  Goal: Readiness for Transition of Care  Outcome: Progressing     Problem: Infection  Goal: Absence of Infection Signs and Symptoms  Outcome: Progressing     Problem: Wound  Goal: Optimal Coping  Outcome: Progressing  Goal: Optimal Functional Ability  Outcome: Progressing  Goal: Absence of Infection Signs and Symptoms  Outcome: Progressing  Goal: Improved Oral Intake  Outcome: Progressing  Goal: Optimal Pain Control and Function  Outcome: Progressing  Goal: Skin Health and Integrity  Outcome: Progressing  Goal: Optimal Wound Healing  Outcome: Progressing     Problem: Fatigue  Goal: Improved Activity Tolerance  Outcome: Progressing     Problem: Fall Injury Risk  Goal: Absence of Fall and Fall-Related Injury  Outcome: Progressing     Problem: Stem Cell/Bone Marrow Transplant  Goal: Optimal Coping with Transplant  Outcome: Progressing  Goal: Symptom-Free Urinary Elimination  Outcome: Progressing  Goal: Diarrhea Symptom Control  Outcome: Progressing  Goal: Improved Activity Tolerance  Outcome: Progressing  Goal: Blood Counts Within Acceptable Range  Outcome: Progressing  Goal: Absence of Hypersensitivity Reaction  Outcome: Progressing  Goal: Absence of Infection  Outcome: Progressing  Goal: Improved Oral Mucous Membrane Health and Integrity  Outcome: Progressing  Goal: Nausea and Vomiting Symptom Relief  Outcome: Progressing  Goal: Optimal Nutrition Intake  Outcome: Progressing

## 2025-01-09 NOTE — PLAN OF CARE
POC reviewed with patient; understanding verbalized. Pt is Day +14 Allo SCT. PRN Morphine given for pain. Continuous NS infusing @ 75mls. Pt. with nonskid footwear on, bed in lowest position, and locked with bed rails up x2.  Pt. instructed to call prior to getting OOB.  Pt. has call light and personal items within reach. Patient ambulates in room independently. VSS and afebrile this shift. All questions and concerns addressed at this time.

## 2025-01-09 NOTE — PROGRESS NOTES
Daniel Rodríguez - Oncology ([facility])  Hematology  Bone Marrow Transplant  Progress Note    Patient Name: Rubén Hu  Admission Date: 12/18/2024  Hospital Length of Stay: 22 days  Code Status: Full Code    Subjective:     Interval History:   Day +14 following haploSCT conditioned with BuFluThiotepa plus PTCy, Tac, MMF. Mucositis/esophagitis improved. Continue soft diet and morphine PRN.  Had 1 episode of diarrhea on 01/07 and 01/08 and 01/09 - continue PRN imodium. Tacrolimus doses being adjusted per pharmacy. 1 platelet unit ordered for platelet count of 9K. Has angular stomatitis and scrotal redness, concern for infection is low.     Objective:     Vital Signs (Most Recent):  Temp: 97.9 °F (36.6 °C) (01/09/25 0856)  Pulse: 81 (01/09/25 0856)  Resp: 20 (01/09/25 0856)  BP: (!) 144/84 (01/09/25 0856)  SpO2: 96 % (01/09/25 0856) Vital Signs (24h Range):  Temp:  [97.7 °F (36.5 °C)-98.6 °F (37 °C)] 97.9 °F (36.6 °C)  Pulse:  [] 81  Resp:  [16-20] 20  SpO2:  [95 %-98 %] 96 %  BP: (127-157)/(70-89) 144/84     Weight: 88.7 kg (195 lb 10.5 oz)  Body mass index is 27.29 kg/m².  Body surface area is 2.11 meters squared.    ECOG SCORE             Intake/Output - Last 3 Shifts         01/07 0700  01/08 0659 01/08 0700  01/09 0659 01/09 0700  01/10 0659    P.O. 180      I.V. (mL/kg) 1733.9 (19.7) 751 (8.5)     Blood 623      IV Piggyback 1151.2 800     Total Intake(mL/kg) 3688.1 (42) 1551 (17.5)     Urine (mL/kg/hr) 1000 (0.5) 2315 (1.1)     Stool 0 0     Total Output 1000 2315     Net +2688.1 -764            Urine Occurrence 1 x      Stool Occurrence 1 x 1 x              Physical Exam  Vitals and nursing note reviewed.   Constitutional:       General: He is not in acute distress.     Appearance: Normal appearance. He is not diaphoretic.   HENT:      Head: Normocephalic and atraumatic.      Nose: Nose normal.      Mouth/Throat:      Mouth: Mucous membranes are moist.      Pharynx: Oropharynx is clear.      Comments: Angular  stomatitis noted 01/09  Eyes:      General: No scleral icterus.     Extraocular Movements: Extraocular movements intact.      Pupils: Pupils are equal, round, and reactive to light.   Cardiovascular:      Rate and Rhythm: Normal rate and regular rhythm.      Pulses: Normal pulses.      Heart sounds: Normal heart sounds. No murmur heard.  Pulmonary:      Effort: Pulmonary effort is normal. No respiratory distress.      Breath sounds: Normal breath sounds. No stridor. No wheezing, rhonchi or rales.   Abdominal:      General: Abdomen is flat. Bowel sounds are normal. There is no distension.      Palpations: Abdomen is soft. There is no mass.      Tenderness: There is no abdominal tenderness.   Musculoskeletal:         General: No swelling or tenderness. Normal range of motion.      Cervical back: Normal range of motion and neck supple. No rigidity.      Right lower leg: No edema.      Left lower leg: No edema.      Comments: Tunneled Central Line - Triple Lumen 12/18/24 Internal Jugular Right   Skin:     General: Skin is warm and dry.      Capillary Refill: Capillary refill takes less than 2 seconds.      Coloration: Skin is not jaundiced.      Findings: No lesion or rash. Scrotal redness +  Neurological:      General: No focal deficit present.      Mental Status: He is alert and oriented to person, place, and time.      Motor: No weakness.      Coordination: Coordination normal.   Psychiatric:         Mood and Affect: Mood normal.         Behavior: Behavior normal.            Significant Labs:   All pertinent labs from the last 24 hours have been reviewed.    Diagnostic Results:  I have reviewed and interpreted all pertinent imaging results/findings within the past 24 hours.  Assessment/Plan:     * History of allogeneic stem cell transplant  - Patient of Dr. 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 Received 3 bags with CD34 dose of  6.04x10^6/kg.   - Today is Day +14  - Patient is O+. Donor is O+.  - Patient is CMV non-reactive. Donor is CMV reactive. Given donor CMV status, patient will start (patient-supplied) letermovir on Day +5.  - Day -4 and Day -3 Busulfan dose-adjusted to 282 mg  - See treatment plan below:    Planned conditioning regimen:  Thiotepa on Day -7  Busulfan on Day -6, -5, -4, and -3  Fludarabine on Day -6, -5, -4, and -3  REST DAYS on Day -2 and -1     Planned GVHD Prophylaxis:  Cyclophosphamide on Day +3 and +4  Tacrolimus starting on Day +5  Mycophenolate starting on Day +5     Antimicrobial Prophylaxis:  Acyclovir starting on Day -7  Levofloxacin starting on Day -1  Micafungin on Day -1 through Day +4  Letermovir starting on Day +5 (if CMV PCR drawn on day 0 results negative)  Posaconazole starting on Day +5  Atovaquone starting on Day +30     Skin Care with Thiotepa: Day -7 through D-5     Seizure Prophylaxis:  Levetiracetam on Day -7 through Day -2     SOS/VOD Prophylaxis:  Ursodiol on Day -7 through Day +30     Growth Factor Support:  Neupogen starting on Day +5        Caregiver: Carmelita (Spouse)  Post-transplant discharge plans: Home    Diarrhea  - 1 episode reported 01/07 and 01/08, has been receiving his PRN loperamide.   - If worsening and meets criteria, will order Cdif testing    Scrotal redness  - Noted 01/09 - not concerning for infectious process at this time. Possibly 2/2 thiotepa. Will try zinc oxide cream    Flatulence  - See plan under epigastric pain. Simethicone 80 TID ordered 01/03. Resolved    Epigastric pain  - Suspect 2/2 chemotherapy conditioning regimen prior to allogeneic SCT. Abdomen notably distended and with TTP in epigastric region.   - Continue protonix and famotidine daily, simethicone 80 TID ordered for flatulence  - , lipase normal  - morphine 1 mg PRN  - CT A/P 12/31: no acute pathology   - USG abdomen (RUQ) 01/02 unremarkable, not concerning for VOD    Mucositis  - Having poor PO  intake/throat pain 2/2 chemotherapy   - Duke's solution QID  - Morphine 2 mg q3h PRN. Can increase dose or frequency if need be.  - Transitioned PO meds to IV.  - Diet changed from regular to soft/bite sized  - IMPROVING    Allergic contact dermatitis due to adhesives  - Itching and redness to LLQ/RLQ where tele leads were placed.   - Topical benadryl prn for itching.  - Tele removed     Hypokalemia  - See electrolyte disorder    Electrolyte disorder  - Replete per PRN electrolyte order set  - Daily CMP, mag, and phos while inpatient    Neutropenic fever  - Infection w/u neg thus far  - Afebrile, stopping cefepime and started levaquin 12/31/24    Chemotherapy-induced nausea  - Anticipate 2/2 chemotherapy   - Meds switched from PO to IV as able 1/2.   - Zofran q8h  - Compazine q6h  - Zyprexa QHS  - Ativan PRN    Transaminitis  - Transaminases first elevated 12/27. Suspect 2/2 chemotherapy prior to transplant.   - Will continue to monitor with daily CMP.  - RESOLVED    Hypertension  - Became hypertensive following SCT on 12/26. No signs of volume overload contributing to HTN.  - 12/27 started amlodipine.     Headache  - 12/27 diffuse headache began.   - Oxy 5mg and sumatriptan as needed for HA  - Avoiding tylenol with recent allo SCT/neutropenia and do not want to mask a fever.   RESOLVED    Insomnia  - Difficulty sleeping at night while IP.  - Has PRN ativan and trazodone  - Ambien PRN  - Melatonin not effective     Thrombocytosis  - 2/2 myelofibrosis  - Was on daily ASA, but stopped taking ~ 3 weeks ago  - Now with anticipated thrombocytopenia following chemotherapy  - Daily CBC while IP  RESOLVED    Pancytopenia due to chemotherapy  - Anticipated following chemotherapy.  - Transfuse for hgb < 7 or plts < 10K  - Continue antimicrobial ppx   - Daily CBC while IP    Adrenal nodule  - Subcentimeter nodular thickening of the adrenal glands first noted on imaging from 1/2024.  - Seen by endocrine prior to transplant  admission and Dexamethasone suppression test performed. Patient responded appropriately. No further w/u needed.  - Likely adenomas.    Metabolic dysfunction-associated steatotic liver disease (MASLD)  - Biopsy proven to be steatotic change. Most compatible with MASLD.   - Follow-up with hepatology OP.    Primary myelofibrosis  - From Dr. 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 Transplant on 12/26/24 at 1330. Received 3 bags with CD34 dose of 6.04 x 10^6/kg.     Hyperlipidemia  - Holding home atorvastatin while IP to avoid interaction with other medications. Can resume at discharge if LFTs stable.        VTE Risk Mitigation (From admission, onward)           Ordered     heparin, porcine (PF) 100 unit/mL injection flush 300 Units  As needed (PRN)         12/18/24 2025                      Michael Saavedra MD  Bone Marrow Transplant  Daniel caryl - Oncology ([facility])

## 2025-01-09 NOTE — PROGRESS NOTES
Occupational Therapy         Rubén Hu   MRN: [id number]         Per pt he is able to perform ADL's and T/F's I at this time and states that he was able to perform dressing I today and that he has been walking to bathroom I.  Will continue to check on pt once a week for maintenance and instructed pt to notify MD and RN if he becomes weaker in coming days.  Pt verbalized understanding.    TAJ Brown  1/9/2025

## 2025-01-09 NOTE — CARE UPDATE
Evaluation of Early Detection of Sepsis Risk     Patient Name: Rubén Hu  MRN:  [id number]  Primary Care Team: [facility] HEMATOLOGY BMT  Date of Admission:  12/18/2024     Principal Problem:  History of allogeneic stem cell transplant   Date of Review: 01/09/2025    Patient reviewed for elevated sepsis risk score. Sepsis Screen Negative  Will continue to monitor for signs and symptoms of new or worsening infection and screen as needed. Please notify Rapid Response Team for any hypotension or decompensation.    Sepsis Screen Results     IP Sepsis Screen (most recent)       Sepsis Screen (IP) - 01/09/25 1506       Is the patient's history or complaint suggestive of a possible infection? No  -AP    Are there at least two of the following signs and symptoms present? Yes  -AP    Sepsis signs/symptoms - Tachycardia Tachycardia     >90  -AP    Sepsis signs/symptoms - WBC WBC < 4,000 or WBC > 12,000  -AP    Are any of the following organ dysfunction criteria present and not considered to be due to a chronic condition? Yes  -AP    Organ Dysfunction Criteria Total Bilirubin > 2.0 Platelet count < 100,000  -AP    Initiate Sepsis Protocol No  -AP              User Key  (r) = Recorded By, (t) = Taken By, (c) = Cosigned By      Initials Name    Viviane Ruano FNP AMY L PORCHE, FNP  Unit Based [id number]

## 2025-01-09 NOTE — PT/OT/SLP PROGRESS
Physical Therapy Treatment    Patient Name:  Rubén Hu   MRN:  [id number]    Recommendations:     Discharge Recommendations: No Therapy Indicated  Discharge Equipment Recommendations: none  Barriers to discharge: None    Assessment:     Rubén Hu is a 54 y.o. male admitted with a medical diagnosis of History of allogeneic stem cell transplant.  He presents with the following impairments/functional limitations: impaired endurance Pt tolerated treatment session well today. Patient remains appropriate for continued skilled services within the acute environment and goals remain appropriate.   .    Rehab Prognosis: Good; patient would benefit from acute skilled PT services to address these deficits and reach maximum level of function.    Recent Surgery: * No surgery found *      Plan:     During this hospitalization, patient to be seen 1 x/week to address the identified rehab impairments via gait training, therapeutic activities, therapeutic exercises, neuromuscular re-education and progress toward the following goals:    Plan of Care Expires:  01/18/25    Subjective     Chief Complaint: Abdominal discomfort   Patient/Family Comments/goals: Pt agreeable to PT   Pain/Comfort:  Pain Rating 1: 0/10      Objective:     Communicated with Rn prior to session.  Patient found supine with peripheral IV upon PT entry to room.     General Precautions: Standard, fall  Orthopedic Precautions: N/A  Braces: N/A  Respiratory Status: Room air     Functional Mobility:  Bed Mobility:     Supine to Sit: independence  Sit to Supine: independence    Transfers:     Sit to Stand:  independence with no AD  Gait: 300 ft supervision with No AD       AM-PAC 6 CLICK MOBILITY  Turning over in bed (including adjusting bedclothes, sheets and blankets)?: 4  Sitting down on and standing up from a chair with arms (e.g., wheelchair, bedside commode, etc.): 4  Moving from lying on back to sitting on the side of the bed?: 4  Moving to and from a bed  to a chair (including a wheelchair)?: 4  Need to walk in hospital room?: 4  Climbing 3-5 steps with a railing?: 4  Basic Mobility Total Score: 24       Treatment & Education:  Therapist provided instruction and educated for safety during transfers and gait training. As well as proper body mechanics, energy conservation, and fall prevention strategies during tasks listed above, and the effects of prolonged immobility and the importance of performing EOB/OOB activity and exercises to promote healing and reduce recovery time.       Patient left supine with all lines intact, call button in reach, Rn notified, and family present..    GOALS:   Multidisciplinary Problems       Physical Therapy Goals          Problem: Physical Therapy    Goal Priority Disciplines Outcome Interventions   Physical Therapy Goal     PT, PT/OT Progressing    Description: Goals to be met by: 25   Extended to be met by 25    Patient will increase functional independence with mobility by performin. Supine to sit with [place] - met 1/3/2025  2. Sit to supine with [place] - met 1/3/2025   3. Gait  x 750 feet with [place] using No Assistive Device.   4. Lower extremity exercise program x15 reps per handout, with assistance as needed                         Time Tracking:     PT Received On: 25  PT Start Time: 1336     PT Stop Time: 1344  PT Total Time (min): 8 min     Billable Minutes: Gait Training 8    Treatment Type: Treatment  PT/PTA: PTA     Number of PTA visits since last PT visit: 2025

## 2025-01-09 NOTE — SUBJECTIVE & OBJECTIVE
Subjective:     Interval History:   Day +14 following haploSCT conditioned with BuFluThiotepa plus PTCy, Tac, MMF. Mucositis/esophagitis improved. Continue soft diet and morphine PRN.  Had 1 episode of diarrhea on 01/07 and 01/08, none today as of AM - has PRN loperamide and did receive a dose this AM. Tacrolimus doses being adjusted per pharmacy. 1 platelet unit ordered for platelet count of 9K. Has angular stomatitis.    Objective:     Vital Signs (Most Recent):  Temp: 97.9 °F (36.6 °C) (01/09/25 0856)  Pulse: 81 (01/09/25 0856)  Resp: 20 (01/09/25 0856)  BP: (!) 144/84 (01/09/25 0856)  SpO2: 96 % (01/09/25 0856) Vital Signs (24h Range):  Temp:  [97.7 °F (36.5 °C)-98.6 °F (37 °C)] 97.9 °F (36.6 °C)  Pulse:  [] 81  Resp:  [16-20] 20  SpO2:  [95 %-98 %] 96 %  BP: (127-157)/(70-89) 144/84     Weight: 88.7 kg (195 lb 10.5 oz)  Body mass index is 27.29 kg/m².  Body surface area is 2.11 meters squared.    ECOG SCORE             Intake/Output - Last 3 Shifts         01/07 0700  01/08 0659 01/08 0700  01/09 0659 01/09 0700  01/10 0659    P.O. 180      I.V. (mL/kg) 1733.9 (19.7) 751 (8.5)     Blood 623      IV Piggyback 1151.2 800     Total Intake(mL/kg) 3688.1 (42) 1551 (17.5)     Urine (mL/kg/hr) 1000 (0.5) 2315 (1.1)     Stool 0 0     Total Output 1000 2315     Net +2688.1 -764            Urine Occurrence 1 x      Stool Occurrence 1 x 1 x              Physical Exam  Vitals and nursing note reviewed.   Constitutional:       General: He is not in acute distress.     Appearance: Normal appearance. He is not diaphoretic.   HENT:      Head: Normocephalic and atraumatic.      Nose: Nose normal.      Mouth/Throat:      Mouth: Mucous membranes are moist.      Pharynx: Oropharynx is clear.      Comments: Angular stomatitis noted 01/09  Eyes:      General: No scleral icterus.     Extraocular Movements: Extraocular movements intact.      Pupils: Pupils are equal, round, and reactive to light.   Cardiovascular:      Rate  and Rhythm: Normal rate and regular rhythm.      Pulses: Normal pulses.      Heart sounds: Normal heart sounds. No murmur heard.  Pulmonary:      Effort: Pulmonary effort is normal. No respiratory distress.      Breath sounds: Normal breath sounds. No stridor. No wheezing, rhonchi or rales.   Abdominal:      General: Abdomen is flat. Bowel sounds are normal. There is no distension.      Palpations: Abdomen is soft. There is no mass.      Tenderness: There is no abdominal tenderness.   Musculoskeletal:         General: No swelling or tenderness. Normal range of motion.      Cervical back: Normal range of motion and neck supple. No rigidity.      Right lower leg: No edema.      Left lower leg: No edema.      Comments: Tunneled Central Line - Triple Lumen 12/18/24 Internal Jugular Right   Skin:     General: Skin is warm and dry.      Capillary Refill: Capillary refill takes less than 2 seconds.      Coloration: Skin is not jaundiced.      Findings: No lesion or rash.   Neurological:      General: No focal deficit present.      Mental Status: He is alert and oriented to person, place, and time.      Motor: No weakness.      Coordination: Coordination normal.   Psychiatric:         Mood and Affect: Mood normal.         Behavior: Behavior normal.            Significant Labs:   All pertinent labs from the last 24 hours have been reviewed.    Diagnostic Results:  I have reviewed and interpreted all pertinent imaging results/findings within the past 24 hours.

## 2025-01-10 PROBLEM — R21 SCROTAL RASH: Status: ACTIVE | Noted: 2025-01-10

## 2025-01-10 LAB
ALBUMIN SERPL BCP-MCNC: 2.4 G/DL (ref 3.5–5.2)
ALP SERPL-CCNC: 78 U/L (ref 40–150)
ALT SERPL W/O P-5'-P-CCNC: 31 U/L (ref 10–44)
ANION GAP SERPL CALC-SCNC: 11 MMOL/L (ref 8–16)
ANISOCYTOSIS BLD QL SMEAR: SLIGHT
AST SERPL-CCNC: 23 U/L (ref 10–40)
BASOPHILS # BLD AUTO: 0 K/UL (ref 0–0.2)
BASOPHILS NFR BLD: 0 % (ref 0–1.9)
BILIRUB SERPL-MCNC: 0.6 MG/DL (ref 0.1–1)
BUN SERPL-MCNC: 6 MG/DL (ref 6–20)
CALCIUM SERPL-MCNC: 8.6 MG/DL (ref 8.7–10.5)
CHLORIDE SERPL-SCNC: 105 MMOL/L (ref 95–110)
CO2 SERPL-SCNC: 21 MMOL/L (ref 23–29)
CREAT SERPL-MCNC: 0.6 MG/DL (ref 0.5–1.4)
DACRYOCYTES BLD QL SMEAR: ABNORMAL
DIFFERENTIAL METHOD BLD: ABNORMAL
EOSINOPHIL # BLD AUTO: 0 K/UL (ref 0–0.5)
EOSINOPHIL NFR BLD: 0 % (ref 0–8)
ERYTHROCYTE [DISTWIDTH] IN BLOOD BY AUTOMATED COUNT: 13.2 % (ref 11.5–14.5)
EST. GFR  (NO RACE VARIABLE): >60 ML/MIN/1.73 M^2
GLUCOSE SERPL-MCNC: 100 MG/DL (ref 70–110)
HCT VFR BLD AUTO: 24.3 % (ref 40–54)
HGB BLD-MCNC: 8.4 G/DL (ref 14–18)
HYPOCHROMIA BLD QL SMEAR: ABNORMAL
IMM GRANULOCYTES # BLD AUTO: 0 K/UL (ref 0–0.04)
IMM GRANULOCYTES NFR BLD AUTO: 0 % (ref 0–0.5)
LYMPHOCYTES # BLD AUTO: 0 K/UL (ref 1–4.8)
LYMPHOCYTES NFR BLD: 0 % (ref 18–48)
MAGNESIUM SERPL-MCNC: 1.4 MG/DL (ref 1.6–2.6)
MCH RBC QN AUTO: 31 PG (ref 27–31)
MCHC RBC AUTO-ENTMCNC: 34.6 G/DL (ref 32–36)
MCV RBC AUTO: 90 FL (ref 82–98)
MONOCYTES # BLD AUTO: 0 K/UL (ref 0.3–1)
MONOCYTES NFR BLD: 0 % (ref 4–15)
NEUTROPHILS # BLD AUTO: 0 K/UL (ref 1.8–7.7)
NEUTROPHILS NFR BLD: 100 % (ref 38–73)
NRBC BLD-RTO: 0 /100 WBC
OVALOCYTES BLD QL SMEAR: ABNORMAL
PHOSPHATE SERPL-MCNC: 2.7 MG/DL (ref 2.7–4.5)
PLATELET # BLD AUTO: 11 K/UL (ref 150–450)
PLATELET BLD QL SMEAR: ABNORMAL
PMV BLD AUTO: 10.6 FL (ref 9.2–12.9)
POIKILOCYTOSIS BLD QL SMEAR: SLIGHT
POLYCHROMASIA BLD QL SMEAR: ABNORMAL
POTASSIUM SERPL-SCNC: 3.7 MMOL/L (ref 3.5–5.1)
PROT SERPL-MCNC: 6.2 G/DL (ref 6–8.4)
RBC # BLD AUTO: 2.71 M/UL (ref 4.6–6.2)
SODIUM SERPL-SCNC: 137 MMOL/L (ref 136–145)
SPHEROCYTES BLD QL SMEAR: ABNORMAL
TACROLIMUS BLD-MCNC: 10.7 NG/ML (ref 5–15)
WBC # BLD AUTO: 0.01 K/UL (ref 3.9–12.7)

## 2025-01-10 PROCEDURE — 63600175 PHARM REV CODE 636 W HCPCS

## 2025-01-10 PROCEDURE — 25000003 PHARM REV CODE 250: Performed by: STUDENT IN AN ORGANIZED HEALTH CARE EDUCATION/TRAINING PROGRAM

## 2025-01-10 PROCEDURE — 25000003 PHARM REV CODE 250

## 2025-01-10 PROCEDURE — 83735 ASSAY OF MAGNESIUM: CPT | Performed by: NURSE PRACTITIONER

## 2025-01-10 PROCEDURE — 25000003 PHARM REV CODE 250: Performed by: INTERNAL MEDICINE

## 2025-01-10 PROCEDURE — 80053 COMPREHEN METABOLIC PANEL: CPT | Performed by: NURSE PRACTITIONER

## 2025-01-10 PROCEDURE — 85025 COMPLETE CBC W/AUTO DIFF WBC: CPT | Performed by: NURSE PRACTITIONER

## 2025-01-10 PROCEDURE — 63600175 PHARM REV CODE 636 W HCPCS: Mod: JZ,TB | Performed by: INTERNAL MEDICINE

## 2025-01-10 PROCEDURE — 63600175 PHARM REV CODE 636 W HCPCS: Performed by: NURSE PRACTITIONER

## 2025-01-10 PROCEDURE — 84100 ASSAY OF PHOSPHORUS: CPT | Performed by: NURSE PRACTITIONER

## 2025-01-10 PROCEDURE — 99233 SBSQ HOSP IP/OBS HIGH 50: CPT | Mod: ,,, | Performed by: INTERNAL MEDICINE

## 2025-01-10 PROCEDURE — 97110 THERAPEUTIC EXERCISES: CPT

## 2025-01-10 PROCEDURE — 80197 ASSAY OF TACROLIMUS: CPT | Performed by: INTERNAL MEDICINE

## 2025-01-10 PROCEDURE — 63600175 PHARM REV CODE 636 W HCPCS: Performed by: INTERNAL MEDICINE

## 2025-01-10 PROCEDURE — 20600001 HC STEP DOWN PRIVATE ROOM

## 2025-01-10 RX ORDER — AMLODIPINE BESYLATE 5 MG/1
5 TABLET ORAL DAILY
Status: DISCONTINUED | OUTPATIENT
Start: 2025-01-11 | End: 2025-01-16

## 2025-01-10 RX ORDER — DIPHENOXYLATE HYDROCHLORIDE AND ATROPINE SULFATE 2.5; .025 MG/1; MG/1
1 TABLET ORAL 4 TIMES DAILY PRN
Status: DISCONTINUED | OUTPATIENT
Start: 2025-01-10 | End: 2025-01-25 | Stop reason: HOSPADM

## 2025-01-10 RX ORDER — LANOLIN ALCOHOL/MO/W.PET/CERES
400 CREAM (GRAM) TOPICAL ONCE
Status: DISCONTINUED | OUTPATIENT
Start: 2025-01-10 | End: 2025-01-10

## 2025-01-10 RX ORDER — LOPERAMIDE HYDROCHLORIDE 2 MG/1
2 CAPSULE ORAL 4 TIMES DAILY
Status: DISCONTINUED | OUTPATIENT
Start: 2025-01-10 | End: 2025-01-24

## 2025-01-10 RX ADMIN — PROCHLORPERAZINE EDISYLATE 5 MG: 5 INJECTION INTRAMUSCULAR; INTRAVENOUS at 05:01

## 2025-01-10 RX ADMIN — SODIUM PHOSPHATE, MONOBASIC, MONOHYDRATE AND SODIUM PHOSPHATE, DIBASIC, ANHYDROUS 15 MMOL: 142; 276 INJECTION, SOLUTION INTRAVENOUS at 11:01

## 2025-01-10 RX ADMIN — FILGRASTIM 480 MCG: 480 INJECTION, SOLUTION INTRAVENOUS; SUBCUTANEOUS at 08:01

## 2025-01-10 RX ADMIN — SIMETHICONE 80 MG: 80 TABLET, CHEWABLE ORAL at 08:01

## 2025-01-10 RX ADMIN — MORPHINE SULFATE 2 MG: 2 INJECTION, SOLUTION INTRAMUSCULAR; INTRAVENOUS at 02:01

## 2025-01-10 RX ADMIN — ONDANSETRON 8 MG: 2 INJECTION INTRAMUSCULAR; INTRAVENOUS at 06:01

## 2025-01-10 RX ADMIN — ACYCLOVIR SODIUM 400 MG: 50 INJECTION, SOLUTION INTRAVENOUS at 08:01

## 2025-01-10 RX ADMIN — Medication 1 DOSE: at 10:01

## 2025-01-10 RX ADMIN — ONDANSETRON 8 MG: 2 INJECTION INTRAMUSCULAR; INTRAVENOUS at 10:01

## 2025-01-10 RX ADMIN — FAMOTIDINE 20 MG: 10 INJECTION, SOLUTION INTRAVENOUS at 08:01

## 2025-01-10 RX ADMIN — POTASSIUM CHLORIDE 10 MEQ: 7.46 INJECTION, SOLUTION INTRAVENOUS at 11:01

## 2025-01-10 RX ADMIN — LOPERAMIDE HYDROCHLORIDE 2 MG: 2 CAPSULE ORAL at 10:01

## 2025-01-10 RX ADMIN — POTASSIUM CHLORIDE 40 MEQ: 7.46 INJECTION, SOLUTION INTRAVENOUS at 02:01

## 2025-01-10 RX ADMIN — Medication 1 DOSE: at 08:01

## 2025-01-10 RX ADMIN — GUAIFENESIN AND DEXTROMETHORPHAN HYDROBROMIDE 1 TABLET: 600; 30 TABLET, EXTENDED RELEASE ORAL at 08:01

## 2025-01-10 RX ADMIN — TACROLIMUS 1 MG: 1 CAPSULE ORAL at 05:01

## 2025-01-10 RX ADMIN — MYCOPHENOLATE MOFETIL 1000 MG: 500 INJECTION, POWDER, LYOPHILIZED, FOR SOLUTION INTRAVENOUS at 03:01

## 2025-01-10 RX ADMIN — POSACONAZOLE 300 MG: 18 INJECTION, SOLUTION INTRAVENOUS at 10:01

## 2025-01-10 RX ADMIN — LEVOFLOXACIN 500 MG: 5 INJECTION, SOLUTION INTRAVENOUS at 08:01

## 2025-01-10 RX ADMIN — DIPHENOXYLATE HYDROCHLORIDE AND ATROPINE SULFATE 1 TABLET: .025; 2.5 TABLET ORAL at 05:01

## 2025-01-10 RX ADMIN — MYCOPHENOLATE MOFETIL 1000 MG: 500 INJECTION, POWDER, LYOPHILIZED, FOR SOLUTION INTRAVENOUS at 08:01

## 2025-01-10 RX ADMIN — LOPERAMIDE HYDROCHLORIDE 2 MG: 2 CAPSULE ORAL at 12:01

## 2025-01-10 RX ADMIN — MAGNESIUM SULFATE HEPTAHYDRATE 2 G: 40 INJECTION, SOLUTION INTRAVENOUS at 10:01

## 2025-01-10 RX ADMIN — GUAIFENESIN AND DEXTROMETHORPHAN HYDROBROMIDE 1 TABLET: 600; 30 TABLET, EXTENDED RELEASE ORAL at 10:01

## 2025-01-10 RX ADMIN — TACROLIMUS 1.5 MG: 1 CAPSULE ORAL at 08:01

## 2025-01-10 RX ADMIN — PROCHLORPERAZINE EDISYLATE 5 MG: 5 INJECTION INTRAMUSCULAR; INTRAVENOUS at 04:01

## 2025-01-10 RX ADMIN — POTASSIUM CHLORIDE 40 MEQ: 7.46 INJECTION, SOLUTION INTRAVENOUS at 03:01

## 2025-01-10 RX ADMIN — SIMETHICONE 80 MG: 80 TABLET, CHEWABLE ORAL at 03:01

## 2025-01-10 RX ADMIN — MAGNESIUM SULFATE HEPTAHYDRATE 2 G: 40 INJECTION, SOLUTION INTRAVENOUS at 12:01

## 2025-01-10 RX ADMIN — PROCHLORPERAZINE EDISYLATE 5 MG: 5 INJECTION INTRAMUSCULAR; INTRAVENOUS at 10:01

## 2025-01-10 RX ADMIN — LOPERAMIDE HYDROCHLORIDE 2 MG: 2 CAPSULE ORAL at 05:01

## 2025-01-10 RX ADMIN — OLANZAPINE 10 MG: 2.5 TABLET, FILM COATED ORAL at 10:01

## 2025-01-10 RX ADMIN — DIPHENOXYLATE HYDROCHLORIDE AND ATROPINE SULFATE 1 TABLET: .025; 2.5 TABLET ORAL at 12:01

## 2025-01-10 RX ADMIN — SIMETHICONE 80 MG: 80 TABLET, CHEWABLE ORAL at 10:01

## 2025-01-10 RX ADMIN — URSODIOL 300 MG: 300 CAPSULE ORAL at 08:01

## 2025-01-10 RX ADMIN — MORPHINE SULFATE 2 MG: 2 INJECTION, SOLUTION INTRAMUSCULAR; INTRAVENOUS at 10:01

## 2025-01-10 RX ADMIN — PANTOPRAZOLE SODIUM 40 MG: 40 INJECTION, POWDER, FOR SOLUTION INTRAVENOUS at 08:01

## 2025-01-10 RX ADMIN — Medication 1 DOSE: at 12:01

## 2025-01-10 RX ADMIN — ONDANSETRON 8 MG: 2 INJECTION INTRAMUSCULAR; INTRAVENOUS at 02:01

## 2025-01-10 RX ADMIN — LETERMOVIR 480 MG: 480 TABLET, FILM COATED ORAL at 08:01

## 2025-01-10 RX ADMIN — URSODIOL 300 MG: 300 CAPSULE ORAL at 10:01

## 2025-01-10 RX ADMIN — MYCOPHENOLATE MOFETIL 1000 MG: 500 INJECTION, POWDER, LYOPHILIZED, FOR SOLUTION INTRAVENOUS at 10:01

## 2025-01-10 RX ADMIN — DICYCLOMINE HYDROCHLORIDE 10 MG: 10 CAPSULE ORAL at 10:01

## 2025-01-10 RX ADMIN — LOPERAMIDE HYDROCHLORIDE 2 MG: 2 CAPSULE ORAL at 08:01

## 2025-01-10 RX ADMIN — POTASSIUM CHLORIDE 10 MEQ: 7.46 INJECTION, SOLUTION INTRAVENOUS at 12:01

## 2025-01-10 RX ADMIN — ACYCLOVIR SODIUM 400 MG: 50 INJECTION, SOLUTION INTRAVENOUS at 10:01

## 2025-01-10 NOTE — SUBJECTIVE & OBJECTIVE
Subjective:     Interval History:   Day +15 following haploSCT conditioned with BuFluThiotepa plus PTCy, Tac, MMF. Mucositis/esophagitis improved. Continue soft diet and morphine PRN. Has been having diarrhea since 01/07 - current with gradually increasing imodium doses being administered. Reports having 1-2 episodes of diarrhea daily. Not meeting criteria for cdif testing. Tacrolimus doses being adjusted per pharmacy. Has angular stomatitis. Zinc oxide for scrotal rash (likely thiotepa related).    Objective:     Vital Signs (Most Recent):  Temp: 99.7 °F (37.6 °C) (01/10/25 0354)  Pulse: 91 (01/10/25 0808)  Resp: 16 (01/10/25 0808)  BP: (!) 142/83 (01/10/25 0808)  SpO2: 97 % (01/10/25 0808) Vital Signs (24h Range):  Temp:  [96.9 °F (36.1 °C)-99.7 °F (37.6 °C)] 99.7 °F (37.6 °C)  Pulse:  [] 91  Resp:  [16-18] 16  SpO2:  [92 %-98 %] 97 %  BP: (131-168)/(70-92) 142/83     Weight: 88.7 kg (195 lb 8.8 oz)  Body mass index is 27.27 kg/m².  Body surface area is 2.11 meters squared.    ECOG SCORE             Intake/Output - Last 3 Shifts         01/08 0700  01/09 0659 01/09 0700  01/10 0659 01/10 0700 01/11 0659    P.O.  260     I.V. (mL/kg) 751 (8.5) 1152.1 (13)     Blood  222     IV Piggyback 800 1988.2     Total Intake(mL/kg) 1551 (17.5) 3622.3 (40.8)     Urine (mL/kg/hr) 2315 (1.1) 1505 (0.7)     Stool 0 0     Total Output 2315 1505     Net -764 +2117.3            Urine Occurrence  1 x     Stool Occurrence 1 x 2 x              Physical Exam  Vitals and nursing note reviewed.   Constitutional:       General: He is not in acute distress.     Appearance: Normal appearance. He is not diaphoretic.   HENT:      Head: Normocephalic and atraumatic.      Nose: Nose normal.      Mouth/Throat:      Mouth: Mucous membranes are moist.      Pharynx: Oropharynx is clear.      Comments: Angular stomatitis noted 01/09  Eyes:      General: No scleral icterus.     Extraocular Movements: Extraocular movements intact.      Pupils:  Pupils are equal, round, and reactive to light.   Cardiovascular:      Rate and Rhythm: Normal rate and regular rhythm.      Pulses: Normal pulses.      Heart sounds: Normal heart sounds. No murmur heard.  Pulmonary:      Effort: Pulmonary effort is normal. No respiratory distress.      Breath sounds: Normal breath sounds. No stridor. No wheezing, rhonchi or rales.   Abdominal:      General: Abdomen is flat. Bowel sounds are normal. There is no distension.      Palpations: Abdomen is soft. There is no mass.      Tenderness: There is no abdominal tenderness.   Musculoskeletal:         General: No swelling or tenderness. Normal range of motion.      Cervical back: Normal range of motion and neck supple. No rigidity.      Right lower leg: No edema.      Left lower leg: No edema.      Comments: Tunneled Central Line - Triple Lumen 12/18/24 Internal Jugular Right   Skin:     General: Skin is warm and dry.      Capillary Refill: Capillary refill takes less than 2 seconds.      Coloration: Skin is not jaundiced.      Findings: Rash (Scrotal) present. No lesion.   Neurological:      General: No focal deficit present.      Mental Status: He is alert and oriented to person, place, and time.      Motor: No weakness.      Coordination: Coordination normal.   Psychiatric:         Mood and Affect: Mood normal.         Behavior: Behavior normal.            Significant Labs:   All pertinent labs from the last 24 hours have been reviewed.    Diagnostic Results:  I have reviewed and interpreted all pertinent imaging results/findings within the past 24 hours.

## 2025-01-10 NOTE — PLAN OF CARE
Assumed care of pt. 06:45-19:15  Pt involved in plan of care and communicating needs throughout shift.      - D +14 haplo Allo SCT for PMF  - AAOx4;   - c/o abdominal bloating  - Ambulates independently;   - Reg. Diet w/decreased PO intake d/t esophagitis  - Remains afebrile  - Continent of bowel and bladder   - Voids via RR; BM x2 this shift; Diarrhea imodium given x2  - RA;   - Skin intact w/gen. bruising  - 1 unit PLT administered; Type & Screen verified; Consents in chart; Premedicated as ordered; 2 RN verification per protocol; Pt. Tolerated well w/NADN;  - Electrolytes replaced as ordered;         - q1h patient rounds. All VSS; no acute events so far this shift. Non skid socks on; Pt. Instructed to call if any assistance is needed. Pt remaining free from falls or injury; Bed locked in lowest position w/side rails up x2 & all belongings including call light within reach; Plan of care ongoing; All needs met at this time.

## 2025-01-10 NOTE — PLAN OF CARE
POC reviewed with patient; understanding verbalized. Pt is Day +15 Allo SCT. PRN Morphine given for pain. Bentyl called in for abd cramping and Imodium given. Pt. with nonskid footwear on, bed in lowest position, and locked with bed rails up x2.  Pt. instructed to call prior to getting OOB.  Pt. has call light and personal items within reach. Patient ambulates in room independently. VSS and afebrile this shift. All questions and concerns addressed at this time.

## 2025-01-10 NOTE — ASSESSMENT & PLAN NOTE
- Patient of Dr. 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 Received 3 bags with CD34 dose of 6.04x10^6/kg.   - Today is Day +15  - Patient is O+. Donor is O+.  - Patient is CMV non-reactive. Donor is CMV reactive. Given donor CMV status, patient will start (patient-supplied) letermovir on Day +5.  - Day -4 and Day -3 Busulfan dose-adjusted to 282 mg  - See treatment plan below:    Planned conditioning regimen:  Thiotepa on Day -7  Busulfan on Day -6, -5, -4, and -3  Fludarabine on Day -6, -5, -4, and -3  REST DAYS on Day -2 and -1     Planned  GVHD Prophylaxis:  Cyclophosphamide on Day +3 and +4  Tacrolimus starting on Day +5  Mycophenolate starting on Day +5     Antimicrobial Prophylaxis:  Acyclovir starting on Day -7  Levofloxacin starting on Day -1  Micafungin on Day -1 through Day +4  Letermovir starting on Day +5 (if CMV PCR drawn on day 0 results negative)  Posaconazole starting on Day +5  Atovaquone starting on Day +30     Skin Care with Thiotepa: Day -7 through D-5     Seizure Prophylaxis:  Levetiracetam on Day -7 through Day -2     SOS/VOD Prophylaxis:  Ursodiol on Day -7 through Day +30     Growth Factor Support:  Neupogen starting on Day +5        Caregiver: Carmelita (Spouse)  Post-transplant discharge plans: Home

## 2025-01-10 NOTE — ASSESSMENT & PLAN NOTE
- C. Diff negative on 12/28/24  - 01/07 - current   - Has been receiving increasing doses of PRN loperamide. Scheduled imodium on 01/10 and added PRN lomotil  - Will order cdif testing if he has >3x diarrhea per day

## 2025-01-10 NOTE — PLAN OF CARE
Assumed care of pt. 06:45-19:15  Pt involved in plan of care and communicating needs throughout shift.      - D +15 haplo Allo SCT for PMF  - AAOx4;   - c/o abdominal bloating  - Ambulates independently;   - Reg. Diet w/decreased PO intake d/t esophagitis  - Remains afebrile  - Continent of bowel and bladder   - Voids via RR; BM x2 this shift; Diarrhea-imodium given x3; Lomotil given x2  - RA;   - Skin intact w/gen. Bruising  - Sterile dsg change completed  - Electrolytes replaced as ordered;         - q1h patient rounds. All VSS; no acute events so far this shift. Non skid socks on; Pt. Instructed to call if any assistance is needed. Pt remaining free from falls or injury; Bed locked in lowest position w/side rails up x2 & all belongings including call light within reach; Plan of care ongoing; All needs met at this time.

## 2025-01-10 NOTE — PT/OT/SLP PROGRESS
Occupational Therapy   Treatment    Name: Rubén Hu  MRN: [id number]  Admitting Diagnosis:  History of allogeneic stem cell transplant       Recommendations:     Discharge Recommendations: No Therapy Indicated  Discharge Equipment Recommendations:  none  Barriers to discharge:  None    Assessment:     Rubén Hu is a 54 y.o. male with a medical diagnosis of History of allogeneic stem cell transplant.  He presents with performance deficits affecting function are impaired endurance. Pt presents resting in bed, agreeable to tx. He tolerates session seated EOB focused on UE and core exercises to minimize risk of debility, decline in balance, and maximize activity tolerance.     Rehab Prognosis:  Good; patient would benefit from acute skilled OT services to address these deficits and reach maximum level of function.       Plan:     Patient to be seen 1 x/week to address the above listed problems via self-care/home management, therapeutic activities, therapeutic exercises  Plan of Care Expires: 01/23/25  Plan of Care Reviewed with: patient    Subjective     Chief Complaint: Still being hospitalized  Patient/Family Comments/goals: To go home  Pain/Comfort:  Pain Rating 1: 0/10  Pain Rating Post-Intervention 1: 0/10    Objective:     Communicated with: Nsg prior to session.  Patient found HOB elevated with peripheral IV upon OT entry to room.    General Precautions: Standard, fall    Orthopedic Precautions:N/A  Braces: N/A  Respiratory Status: Room air     Occupational Performance:     Bed Mobility:    Patient completed Scooting/Bridging with independence  Patient completed Supine to Sit with independence  Patient completed Sit to Supine with independence     Functional Mobility/Transfers:  Deferred need, ambulates room and halls IND with PT/Nsg.    Activities of Daily Living:  LBD IND seated EOB      AMPAC 6 Click ADL: 24    Treatment & Education:  Pt provided dumbbell UE exercise packet and completes 7/8 exercises  1x5 shoulders, 1x10 bicep, tricep, forearm to build strength and endurance to maintain IND.    Pt educated on purpose and performance of hollow body core: hold, flutter kicks, and scissor kicks, oblique twists, bicycle twists from seated edge of bed with trunk reclined to activate increased core musculature for improved bed mobility and functional balance.      Encouraged to engage in light exercise daily to tolerance.    -Education on energy conservation and task modification to maximize safety and (I) during ADLs and mobility    Pt had no further questions & when asked whether there were any concerns pt reported none.     Patient left HOB elevated with all lines intact, call button in reach, and nsg present    GOALS:   Multidisciplinary Problems       Occupational Therapy Goals          Problem: Occupational Therapy    Goal Priority Disciplines Outcome Interventions   Occupational Therapy Goal     OT, PT/OT Progressing    Description: Goals to be met by: 01-23-24     Patient will increase functional independence with ADLs by performing:    Pt. With be independent with ADL tasks - Mod I for bathing, plan continued 1/2  Pt. Will be independent with HEP to maintain level of endurance - Progressing, visual and verbal cues for UE HEP 1/2                         Time Tracking:     OT Date of Treatment: 01/10/25  OT Start Time: 0938  OT Stop Time: 0950  OT Total Time (min): 12 min    Billable Minutes:Therapeutic Exercise 12    OT/PANFILO: OT          1/10/2025

## 2025-01-10 NOTE — PROGRESS NOTES
[facility] - Oncology ([facility])  Hematology  Bone Marrow Transplant  Progress Note    Patient Name: Rubén Hu  Admission Date: 12/18/2024  Hospital Length of Stay: 23 days  Code Status: Full Code    Subjective:     Interval History:   Day +15 following haploSCT conditioned with BuFluThiotepa plus PTCy, Tac, MMF. Mucositis/esophagitis improved. Continue soft diet and morphine PRN. Has been having diarrhea since 01/07 - current with gradually increasing imodium doses being administered. Reports having 1-2 episodes of diarrhea daily. Not meeting criteria for cdif testing. Imodium scheduled and PRN lomotil added.     Tacrolimus doses being adjusted per pharmacy. Has angular stomatitis. Zinc oxide for scrotal rash (likely thiotepa related).    Objective:     Vital Signs (Most Recent):  Temp: 99.7 °F (37.6 °C) (01/10/25 0354)  Pulse: 91 (01/10/25 0808)  Resp: 16 (01/10/25 0808)  BP: (!) 142/83 (01/10/25 0808)  SpO2: 97 % (01/10/25 0808) Vital Signs (24h Range):  Temp:  [96.9 °F (36.1 °C)-99.7 °F (37.6 °C)] 99.7 °F (37.6 °C)  Pulse:  [] 91  Resp:  [16-18] 16  SpO2:  [92 %-98 %] 97 %  BP: (131-168)/(70-92) 142/83     Weight: 88.7 kg (195 lb 8.8 oz)  Body mass index is 27.27 kg/m².  Body surface area is 2.11 meters squared.    ECOG SCORE             Intake/Output - Last 3 Shifts         01/08 0700  01/09 0659 01/09 0700  01/10 0659 01/10 0700  01/11 0659    P.O.  260     I.V. (mL/kg) 751 (8.5) 1152.1 (13)     Blood  222     IV Piggyback 800 1988.2     Total Intake(mL/kg) 1551 (17.5) 3622.3 (40.8)     Urine (mL/kg/hr) 2315 (1.1) 1505 (0.7)     Stool 0 0     Total Output 2315 1505     Net -764 +2117.3            Urine Occurrence  1 x     Stool Occurrence 1 x 2 x              Physical Exam  Vitals and nursing note reviewed.   Constitutional:       General: He is not in acute distress.     Appearance: Normal appearance. He is not diaphoretic.   HENT:      Head: Normocephalic and atraumatic.      Nose: Nose normal.       Mouth/Throat:      Mouth: Mucous membranes are moist.      Pharynx: Oropharynx is clear.      Comments: Angular stomatitis noted 01/09  Eyes:      General: No scleral icterus.     Extraocular Movements: Extraocular movements intact.      Pupils: Pupils are equal, round, and reactive to light.   Cardiovascular:      Rate and Rhythm: Normal rate and regular rhythm.      Pulses: Normal pulses.      Heart sounds: Normal heart sounds. No murmur heard.  Pulmonary:      Effort: Pulmonary effort is normal. No respiratory distress.      Breath sounds: Normal breath sounds. No stridor. No wheezing, rhonchi or rales.   Abdominal:      General: Abdomen is flat. Bowel sounds are normal. There is no distension.      Palpations: Abdomen is soft. There is no mass.      Tenderness: There is no abdominal tenderness.   Musculoskeletal:         General: No swelling or tenderness. Normal range of motion.      Cervical back: Normal range of motion and neck supple. No rigidity.      Right lower leg: No edema.      Left lower leg: No edema.      Comments: Tunneled Central Line - Triple Lumen 12/18/24 Internal Jugular Right   Skin:     General: Skin is warm and dry.      Capillary Refill: Capillary refill takes less than 2 seconds.      Coloration: Skin is not jaundiced.      Findings: Rash (Scrotal) present. No lesion.   Neurological:      General: No focal deficit present.      Mental Status: He is alert and oriented to person, place, and time.      Motor: No weakness.      Coordination: Coordination normal.   Psychiatric:         Mood and Affect: Mood normal.         Behavior: Behavior normal.            Significant Labs:   All pertinent labs from the last 24 hours have been reviewed.    Diagnostic Results:  I have reviewed and interpreted all pertinent imaging results/findings within the past 24 hours.    Assessment/Plan:     * History of allogeneic stem cell transplant  - Patient of Dr. 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 Received 3 bags with CD34 dose of 6.04x10^6/kg.   - Today is Day +15  - Patient is O+. Donor is O+.  - Patient is CMV non-reactive. Donor is CMV reactive. Given donor CMV status, patient will start (patient-supplied) letermovir on Day +5.  - Day -4 and Day -3 Busulfan dose-adjusted to 282 mg  - See treatment plan below:    Planned conditioning regimen:  Thiotepa on Day -7  Busulfan on Day -6, -5, -4, and -3  Fludarabine on Day -6, -5, -4, and -3  REST DAYS on Day -2 and -1     Planned  GVHD Prophylaxis:  Cyclophosphamide on Day +3 and +4  Tacrolimus starting on Day +5  Mycophenolate starting on Day +5     Antimicrobial Prophylaxis:  Acyclovir starting on Day -7  Levofloxacin starting on Day -1  Micafungin on Day -1 through Day +4  Letermovir starting on Day +5 (if CMV PCR drawn on day 0 results negative)  Posaconazole starting on Day +5  Atovaquone starting on Day +30     Skin Care with Thiotepa: Day -7 through D-5     Seizure Prophylaxis:  Levetiracetam on Day -7 through Day -2     SOS/VOD Prophylaxis:  Ursodiol on Day -7 through Day +30     Growth Factor Support:  Neupogen starting on Day +5        Caregiver: Carmelita (Spouse)  Post-transplant discharge plans: Home      Scrotal rash  - Likely thiotepa related, concern for infection is low. Continue Zinc oxide     Diarrhea  - 01/07 - current   - Has been receiving increasing doses of PRN loperamide. Scheduled imodium on 01/10 and added PRN lomotil  - Will order cdif testing if he has >3x diarrhea per day    Flatulence  - See plan under epigastric pain. Simethicone 80 TID ordered 01/03. Resolved    Epigastric pain  - Suspect 2/2 chemotherapy conditioning regimen prior to allogeneic SCT. Abdomen notably distended and with TTP in epigastric region.   - Continue protonix and famotidine daily, simethicone 80 TID ordered for flatulence  - , lipase normal  - morphine 1 mg  PRN  - CT A/P 12/31: no acute pathology   - USG abdomen (RUQ) 01/02 unremarkable, not concerning for VOD    Mucositis  - Having poor PO intake/throat pain 2/2 chemotherapy   - Duke's solution QID  - Morphine 2 mg q3h PRN. Can increase dose or frequency if need be.  - Transitioned PO meds to IV.  - Diet changed from regular to soft/bite sized  - IMPROVING    Allergic contact dermatitis due to adhesives  - Itching and redness to LLQ/RLQ where tele leads were placed.   - Topical benadryl prn for itching.  - Tele removed     Hypokalemia  - See electrolyte disorder    Electrolyte disorder  - Replete per PRN electrolyte order set  - Daily CMP, mag, and phos while inpatient    Neutropenic fever  - Infection w/u neg thus far  - Afebrile, stopping cefepime and started levaquin 12/31/24    Chemotherapy-induced nausea  - Anticipate 2/2 chemotherapy   - Meds switched from PO to IV as able 1/2.   - Zofran q8h  - Compazine q6h  - Zyprexa QHS  - Ativan PRN    Transaminitis  - Transaminases first elevated 12/27. Suspect 2/2 chemotherapy prior to transplant.   - Will continue to monitor with daily CMP.  - RESOLVED    Hypertension  - Became hypertensive following SCT on 12/26. No signs of volume overload contributing to HTN.  - 12/27 started amlodipine.     Headache  - 12/27 diffuse headache began.   - Oxy 5mg and sumatriptan as needed for HA  - Avoiding tylenol with recent allo SCT/neutropenia and do not want to mask a fever.   RESOLVED    Insomnia  - Difficulty sleeping at night while IP.  - Has PRN ativan and trazodone  - Ambien PRN  - Melatonin not effective     Thrombocytosis  - 2/2 myelofibrosis  - Was on daily ASA, but stopped taking ~ 3 weeks ago  - Now with anticipated thrombocytopenia following chemotherapy  - Daily CBC while IP  RESOLVED    Pancytopenia due to chemotherapy  - Anticipated following chemotherapy.  - Transfuse for hgb < 7 or plts < 10K  - Continue antimicrobial ppx   - Daily CBC while IP    Adrenal nodule  -  Subcentimeter nodular thickening of the adrenal glands first noted on imaging from 1/2024.  - Seen by endocrine prior to transplant admission and Dexamethasone suppression test performed. Patient responded appropriately. No further w/u needed.  - Likely adenomas.    Metabolic dysfunction-associated steatotic liver disease (MASLD)  - Biopsy proven to be steatotic change. Most compatible with MASLD.   - Follow-up with hepatology OP.    Primary myelofibrosis  - From Dr. 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 Transplant on 12/26/24 at 1330. Received 3 bags with CD34 dose of 6.04 x 10^6/kg.     Hyperlipidemia  - Holding home atorvastatin while IP to avoid interaction with other medications. Can resume at discharge if LFTs stable.        VTE Risk Mitigation (From admission, onward)           Ordered     heparin, porcine (PF) 100 unit/mL injection flush 300 Units  As needed (PRN)         12/18/24 2025                      Michael Saavedra MD  Bone Marrow Transplant  [facility] - Oncology ([facility])

## 2025-01-11 LAB
ALBUMIN SERPL BCP-MCNC: 2.2 G/DL (ref 3.5–5.2)
ALP SERPL-CCNC: 74 U/L (ref 40–150)
ALT SERPL W/O P-5'-P-CCNC: 30 U/L (ref 10–44)
ANION GAP SERPL CALC-SCNC: 7 MMOL/L (ref 8–16)
ANISOCYTOSIS BLD QL SMEAR: SLIGHT
AST SERPL-CCNC: 20 U/L (ref 10–40)
BASOPHILS # BLD AUTO: 0 K/UL (ref 0–0.2)
BASOPHILS NFR BLD: 0 % (ref 0–1.9)
BILIRUB SERPL-MCNC: 0.6 MG/DL (ref 0.1–1)
BLD PROD TYP BPU: NORMAL
BLOOD UNIT EXPIRATION DATE: NORMAL
BLOOD UNIT TYPE CODE: 6200
BLOOD UNIT TYPE: NORMAL
BUN SERPL-MCNC: 7 MG/DL (ref 6–20)
CALCIUM SERPL-MCNC: 8.2 MG/DL (ref 8.7–10.5)
CHLORIDE SERPL-SCNC: 103 MMOL/L (ref 95–110)
CO2 SERPL-SCNC: 24 MMOL/L (ref 23–29)
CODING SYSTEM: NORMAL
CREAT SERPL-MCNC: 0.6 MG/DL (ref 0.5–1.4)
CROSSMATCH INTERPRETATION: NORMAL
DIFFERENTIAL METHOD BLD: ABNORMAL
DISPENSE STATUS: NORMAL
EOSINOPHIL # BLD AUTO: 0 K/UL (ref 0–0.5)
EOSINOPHIL NFR BLD: 0 % (ref 0–8)
ERYTHROCYTE [DISTWIDTH] IN BLOOD BY AUTOMATED COUNT: 12.9 % (ref 11.5–14.5)
EST. GFR  (NO RACE VARIABLE): >60 ML/MIN/1.73 M^2
GLUCOSE SERPL-MCNC: 102 MG/DL (ref 70–110)
HCT VFR BLD AUTO: 22.8 % (ref 40–54)
HGB BLD-MCNC: 7.9 G/DL (ref 14–18)
HYPOCHROMIA BLD QL SMEAR: ABNORMAL
IMM GRANULOCYTES # BLD AUTO: 0 K/UL (ref 0–0.04)
IMM GRANULOCYTES NFR BLD AUTO: 0 % (ref 0–0.5)
LYMPHOCYTES # BLD AUTO: 0 K/UL (ref 1–4.8)
LYMPHOCYTES NFR BLD: 0 % (ref 18–48)
MAGNESIUM SERPL-MCNC: 1.5 MG/DL (ref 1.6–2.6)
MCH RBC QN AUTO: 31 PG (ref 27–31)
MCHC RBC AUTO-ENTMCNC: 34.6 G/DL (ref 32–36)
MCV RBC AUTO: 89 FL (ref 82–98)
MONOCYTES # BLD AUTO: 0 K/UL (ref 0.3–1)
MONOCYTES NFR BLD: 0 % (ref 4–15)
NEUTROPHILS # BLD AUTO: 0 K/UL (ref 1.8–7.7)
NEUTROPHILS NFR BLD: 100 % (ref 38–73)
NRBC BLD-RTO: 0 /100 WBC
OVALOCYTES BLD QL SMEAR: ABNORMAL
PHOSPHATE SERPL-MCNC: 2.5 MG/DL (ref 2.7–4.5)
PLATELET # BLD AUTO: 4 K/UL (ref 150–450)
PLATELET BLD QL SMEAR: ABNORMAL
PMV BLD AUTO: 8.9 FL (ref 9.2–12.9)
POIKILOCYTOSIS BLD QL SMEAR: SLIGHT
POTASSIUM SERPL-SCNC: 3.5 MMOL/L (ref 3.5–5.1)
PROT SERPL-MCNC: 5.7 G/DL (ref 6–8.4)
RBC # BLD AUTO: 2.55 M/UL (ref 4.6–6.2)
SODIUM SERPL-SCNC: 134 MMOL/L (ref 136–145)
UNIT NUMBER: NORMAL
WBC # BLD AUTO: 0.01 K/UL (ref 3.9–12.7)

## 2025-01-11 PROCEDURE — 84100 ASSAY OF PHOSPHORUS: CPT | Performed by: NURSE PRACTITIONER

## 2025-01-11 PROCEDURE — 25000003 PHARM REV CODE 250

## 2025-01-11 PROCEDURE — 25000003 PHARM REV CODE 250: Performed by: NURSE PRACTITIONER

## 2025-01-11 PROCEDURE — 63600175 PHARM REV CODE 636 W HCPCS: Performed by: NURSE PRACTITIONER

## 2025-01-11 PROCEDURE — 25000003 PHARM REV CODE 250: Performed by: INTERNAL MEDICINE

## 2025-01-11 PROCEDURE — 85025 COMPLETE CBC W/AUTO DIFF WBC: CPT | Performed by: NURSE PRACTITIONER

## 2025-01-11 PROCEDURE — 25000003 PHARM REV CODE 250: Performed by: STUDENT IN AN ORGANIZED HEALTH CARE EDUCATION/TRAINING PROGRAM

## 2025-01-11 PROCEDURE — 20600001 HC STEP DOWN PRIVATE ROOM

## 2025-01-11 PROCEDURE — P9037 PLATE PHERES LEUKOREDU IRRAD: HCPCS | Performed by: STUDENT IN AN ORGANIZED HEALTH CARE EDUCATION/TRAINING PROGRAM

## 2025-01-11 PROCEDURE — 80053 COMPREHEN METABOLIC PANEL: CPT | Performed by: NURSE PRACTITIONER

## 2025-01-11 PROCEDURE — 63600175 PHARM REV CODE 636 W HCPCS

## 2025-01-11 PROCEDURE — 83735 ASSAY OF MAGNESIUM: CPT | Performed by: NURSE PRACTITIONER

## 2025-01-11 PROCEDURE — 63600175 PHARM REV CODE 636 W HCPCS: Performed by: INTERNAL MEDICINE

## 2025-01-11 PROCEDURE — 63600175 PHARM REV CODE 636 W HCPCS: Mod: JZ,TB | Performed by: INTERNAL MEDICINE

## 2025-01-11 PROCEDURE — 99233 SBSQ HOSP IP/OBS HIGH 50: CPT | Mod: ,,, | Performed by: INTERNAL MEDICINE

## 2025-01-11 RX ADMIN — ONDANSETRON 8 MG: 2 INJECTION INTRAMUSCULAR; INTRAVENOUS at 01:01

## 2025-01-11 RX ADMIN — ALUMINUM HYDROXIDE, MAGNESIUM HYDROXIDE, AND DIMETHICONE 10 ML: 400; 400; 40 SUSPENSION ORAL at 09:01

## 2025-01-11 RX ADMIN — ONDANSETRON 8 MG: 2 INJECTION INTRAMUSCULAR; INTRAVENOUS at 09:01

## 2025-01-11 RX ADMIN — ACETAMINOPHEN 650 MG: 325 TABLET ORAL at 11:01

## 2025-01-11 RX ADMIN — DIPHENOXYLATE HYDROCHLORIDE AND ATROPINE SULFATE 1 TABLET: .025; 2.5 TABLET ORAL at 05:01

## 2025-01-11 RX ADMIN — POSACONAZOLE 300 MG: 18 INJECTION, SOLUTION INTRAVENOUS at 11:01

## 2025-01-11 RX ADMIN — TACROLIMUS 1.5 MG: 1 CAPSULE ORAL at 09:01

## 2025-01-11 RX ADMIN — PROCHLORPERAZINE EDISYLATE 5 MG: 5 INJECTION INTRAMUSCULAR; INTRAVENOUS at 09:01

## 2025-01-11 RX ADMIN — LIDOCAINE HYDROCHLORIDE 15 ML: 20 SOLUTION OROPHARYNGEAL at 05:01

## 2025-01-11 RX ADMIN — LOPERAMIDE HYDROCHLORIDE 2 MG: 2 CAPSULE ORAL at 05:01

## 2025-01-11 RX ADMIN — LETERMOVIR 480 MG: 480 TABLET, FILM COATED ORAL at 09:01

## 2025-01-11 RX ADMIN — Medication 1 DOSE: at 09:01

## 2025-01-11 RX ADMIN — URSODIOL 300 MG: 300 CAPSULE ORAL at 09:01

## 2025-01-11 RX ADMIN — MYCOPHENOLATE MOFETIL 1000 MG: 500 INJECTION, POWDER, LYOPHILIZED, FOR SOLUTION INTRAVENOUS at 03:01

## 2025-01-11 RX ADMIN — Medication 1 DOSE: at 12:01

## 2025-01-11 RX ADMIN — DICYCLOMINE HYDROCHLORIDE 10 MG: 10 CAPSULE ORAL at 11:01

## 2025-01-11 RX ADMIN — LEVOFLOXACIN 500 MG: 5 INJECTION, SOLUTION INTRAVENOUS at 09:01

## 2025-01-11 RX ADMIN — DIPHENHYDRAMINE HYDROCHLORIDE 50 MG: 25 CAPSULE ORAL at 11:01

## 2025-01-11 RX ADMIN — LOPERAMIDE HYDROCHLORIDE 2 MG: 2 CAPSULE ORAL at 09:01

## 2025-01-11 RX ADMIN — OLANZAPINE 10 MG: 2.5 TABLET, FILM COATED ORAL at 09:01

## 2025-01-11 RX ADMIN — SIMETHICONE 80 MG: 80 TABLET, CHEWABLE ORAL at 09:01

## 2025-01-11 RX ADMIN — AMLODIPINE BESYLATE 5 MG: 5 TABLET ORAL at 09:01

## 2025-01-11 RX ADMIN — TACROLIMUS 1 MG: 1 CAPSULE ORAL at 05:01

## 2025-01-11 RX ADMIN — SCOPOLAMINE 1 PATCH: 1.5 PATCH, EXTENDED RELEASE TRANSDERMAL at 05:01

## 2025-01-11 RX ADMIN — DIPHENOXYLATE HYDROCHLORIDE AND ATROPINE SULFATE 1 TABLET: .025; 2.5 TABLET ORAL at 11:01

## 2025-01-11 RX ADMIN — GUAIFENESIN AND DEXTROMETHORPHAN HYDROBROMIDE 1 TABLET: 600; 30 TABLET, EXTENDED RELEASE ORAL at 09:01

## 2025-01-11 RX ADMIN — Medication 1 DOSE: at 05:01

## 2025-01-11 RX ADMIN — GUAIFENESIN AND DEXTROMETHORPHAN HYDROBROMIDE 1 TABLET: 600; 30 TABLET, EXTENDED RELEASE ORAL at 10:01

## 2025-01-11 RX ADMIN — LIDOCAINE HYDROCHLORIDE 15 ML: 20 SOLUTION OROPHARYNGEAL at 11:01

## 2025-01-11 RX ADMIN — MYCOPHENOLATE MOFETIL 1000 MG: 500 INJECTION, POWDER, LYOPHILIZED, FOR SOLUTION INTRAVENOUS at 10:01

## 2025-01-11 RX ADMIN — PANTOPRAZOLE SODIUM 40 MG: 40 INJECTION, POWDER, FOR SOLUTION INTRAVENOUS at 08:01

## 2025-01-11 RX ADMIN — DIPHENHYDRAMINE HYDROCHLORIDE 15 ML: 25 SOLUTION ORAL at 11:01

## 2025-01-11 RX ADMIN — LOPERAMIDE HYDROCHLORIDE 2 MG: 2 CAPSULE ORAL at 12:01

## 2025-01-11 RX ADMIN — ACYCLOVIR SODIUM 400 MG: 50 INJECTION, SOLUTION INTRAVENOUS at 09:01

## 2025-01-11 RX ADMIN — MYCOPHENOLATE MOFETIL 1000 MG: 500 INJECTION, POWDER, LYOPHILIZED, FOR SOLUTION INTRAVENOUS at 09:01

## 2025-01-11 RX ADMIN — ACYCLOVIR SODIUM 400 MG: 50 INJECTION, SOLUTION INTRAVENOUS at 10:01

## 2025-01-11 RX ADMIN — MORPHINE SULFATE 2 MG: 2 INJECTION, SOLUTION INTRAMUSCULAR; INTRAVENOUS at 05:01

## 2025-01-11 RX ADMIN — FAMOTIDINE 20 MG: 10 INJECTION, SOLUTION INTRAVENOUS at 08:01

## 2025-01-11 RX ADMIN — MORPHINE SULFATE 2 MG: 2 INJECTION, SOLUTION INTRAMUSCULAR; INTRAVENOUS at 09:01

## 2025-01-11 RX ADMIN — FILGRASTIM 480 MCG: 480 INJECTION, SOLUTION INTRAVENOUS; SUBCUTANEOUS at 09:01

## 2025-01-11 RX ADMIN — PROCHLORPERAZINE EDISYLATE 5 MG: 5 INJECTION INTRAMUSCULAR; INTRAVENOUS at 03:01

## 2025-01-11 RX ADMIN — SIMETHICONE 80 MG: 80 TABLET, CHEWABLE ORAL at 03:01

## 2025-01-11 NOTE — PLAN OF CARE
POC reviewed with patient; understanding verbalized. Pt is Day +16 Allo SCT. PRN Morphine given for pain. Bentyl and Imodium given. Pt. with nonskid footwear on, bed in lowest position, and locked with bed rails up x2.  Pt. instructed to call prior to getting OOB.  Pt. has call light and personal items within reach. Patient ambulates in room independently. VSS and afebrile this shift. All questions and concerns addressed at this time.

## 2025-01-11 NOTE — SUBJECTIVE & OBJECTIVE
Subjective:     Interval History: Day +16 following haploSCT conditioned with BuFluThiotepa plus PTCy, Tac, MMF. 1 episode of watery diarrhea this morning. Overall diarrhea improved with new regimen of scheduled imodium with PRN lamotil. Mucositis present but manageable with PRN's. Getting platelets today (4). ANC remains 0.     Objective:     Vital Signs (Most Recent):  Temp: 98.6 °F (37 °C) (01/11/25 1230)  Pulse: 98 (01/11/25 1230)  Resp: 18 (01/11/25 1230)  BP: (!) 146/80 (01/11/25 1230)  SpO2: (!) 94 % (01/11/25 1230) Vital Signs (24h Range):  Temp:  [97.9 °F (36.6 °C)-98.6 °F (37 °C)] 98.6 °F (37 °C)  Pulse:  [] 98  Resp:  [16-20] 18  SpO2:  [94 %-97 %] 94 %  BP: (127-155)/(76-91) 146/80     Weight: 88.7 kg (195 lb 8.8 oz)  Body mass index is 27.27 kg/m².  Body surface area is 2.11 meters squared.    ECOG SCORE           [unfilled]    Intake/Output - Last 3 Shifts         01/09 0700  01/10 0659 01/10 0700 01/11 0659 01/11 0700  01/12 0659    P.O. 260 280     I.V. (mL/kg) 1152.1 (13) 73.7 (0.8)     Blood 222      IV Piggyback 1988.2 3905.6     Total Intake(mL/kg) 3622.3 (40.8) 4259.3 (48)     Urine (mL/kg/hr) 1505 (0.7) 700 (0.3)     Stool 0 0     Total Output 1505 700     Net +2117.3 +3559.3            Urine Occurrence 1 x 1 x     Stool Occurrence 2 x 2 x              Physical Exam  Vitals and nursing note reviewed.   Constitutional:       General: He is not in acute distress.     Appearance: Normal appearance. He is not diaphoretic.   HENT:      Head: Normocephalic and atraumatic.      Nose: Nose normal.      Mouth/Throat:      Mouth: Mucous membranes are moist.      Pharynx: Oropharynx is clear.      Comments: Angular stomatitis noted 01/09  Eyes:      General: No scleral icterus.     Extraocular Movements: Extraocular movements intact.      Pupils: Pupils are equal, round, and reactive to light.   Cardiovascular:      Rate and Rhythm: Normal rate and regular rhythm.      Pulses: Normal pulses.       Heart sounds: Normal heart sounds. No murmur heard.  Pulmonary:      Effort: Pulmonary effort is normal. No respiratory distress.      Breath sounds: Normal breath sounds. No stridor. No wheezing, rhonchi or rales.   Abdominal:      General: Abdomen is flat. Bowel sounds are normal. There is no distension.      Palpations: Abdomen is soft. There is no mass.      Tenderness: There is no abdominal tenderness.   Musculoskeletal:         General: No swelling or tenderness. Normal range of motion.      Cervical back: Normal range of motion and neck supple. No rigidity.      Right lower leg: No edema.      Left lower leg: No edema.      Comments: Tunneled Central Line - Triple Lumen 12/18/24 Internal Jugular Right   Skin:     General: Skin is warm and dry.      Capillary Refill: Capillary refill takes less than 2 seconds.      Coloration: Skin is not jaundiced.      Findings: No lesion or rash.   Neurological:      General: No focal deficit present.      Mental Status: He is alert and oriented to person, place, and time.      Motor: No weakness.      Coordination: Coordination normal.   Psychiatric:         Mood and Affect: Mood normal.         Behavior: Behavior normal.            Significant Labs:   All pertinent labs from the last 24 hours have been reviewed.    Diagnostic Results:  I have reviewed all pertinent imaging results/findings within the past 24 hours.

## 2025-01-11 NOTE — PROGRESS NOTES
Daniel Rodríguez - Oncology ([facility])  Hematology  Bone Marrow Transplant  Progress Note    Patient Name: Rubén Hu  Admission Date: 12/18/2024  Hospital Length of Stay: 24 days  Code Status: Full Code    Subjective:     Interval History: Day +16 following haploSCT conditioned with BuFluThiotepa plus PTCy, Tac, MMF. 1 episode of watery diarrhea this morning. Overall diarrhea improved with new regimen of scheduled imodium with PRN lamotil. Mucositis present but manageable with PRN's. Getting platelets today (4). ANC remains 0.     Objective:     Vital Signs (Most Recent):  Temp: 98.6 °F (37 °C) (01/11/25 1230)  Pulse: 98 (01/11/25 1230)  Resp: 18 (01/11/25 1230)  BP: (!) 146/80 (01/11/25 1230)  SpO2: (!) 94 % (01/11/25 1230) Vital Signs (24h Range):  Temp:  [97.9 °F (36.6 °C)-98.6 °F (37 °C)] 98.6 °F (37 °C)  Pulse:  [] 98  Resp:  [16-20] 18  SpO2:  [94 %-97 %] 94 %  BP: (127-155)/(76-91) 146/80     Weight: 88.7 kg (195 lb 8.8 oz)  Body mass index is 27.27 kg/m².  Body surface area is 2.11 meters squared.    ECOG SCORE           [unfilled]    Intake/Output - Last 3 Shifts         01/09 0700  01/10 0659 01/10 0700 01/11 0659 01/11 0700 01/12 0659    P.O. 260 280     I.V. (mL/kg) 1152.1 (13) 73.7 (0.8)     Blood 222      IV Piggyback 1988.2 3905.6     Total Intake(mL/kg) 3622.3 (40.8) 4259.3 (48)     Urine (mL/kg/hr) 1505 (0.7) 700 (0.3)     Stool 0 0     Total Output 1505 700     Net +2117.3 +3559.3            Urine Occurrence 1 x 1 x     Stool Occurrence 2 x 2 x              Physical Exam  Vitals and nursing note reviewed.   Constitutional:       General: He is not in acute distress.     Appearance: Normal appearance. He is not diaphoretic.   HENT:      Head: Normocephalic and atraumatic.      Nose: Nose normal.      Mouth/Throat:      Mouth: Mucous membranes are moist.      Pharynx: Oropharynx is clear.      Comments: Angular stomatitis noted 01/09  Eyes:      General: No scleral icterus.     Extraocular  Movements: Extraocular movements intact.      Pupils: Pupils are equal, round, and reactive to light.   Cardiovascular:      Rate and Rhythm: Normal rate and regular rhythm.      Pulses: Normal pulses.      Heart sounds: Normal heart sounds. No murmur heard.  Pulmonary:      Effort: Pulmonary effort is normal. No respiratory distress.      Breath sounds: Normal breath sounds. No stridor. No wheezing, rhonchi or rales.   Abdominal:      General: Abdomen is flat. Bowel sounds are normal. There is no distension.      Palpations: Abdomen is soft. There is no mass.      Tenderness: There is no abdominal tenderness.   Musculoskeletal:         General: No swelling or tenderness. Normal range of motion.      Cervical back: Normal range of motion and neck supple. No rigidity.      Right lower leg: No edema.      Left lower leg: No edema.      Comments: Tunneled Central Line - Triple Lumen 12/18/24 Internal Jugular Right   Skin:     General: Skin is warm and dry.      Capillary Refill: Capillary refill takes less than 2 seconds.      Coloration: Skin is not jaundiced.      Findings: No lesion or rash.   Neurological:      General: No focal deficit present.      Mental Status: He is alert and oriented to person, place, and time.      Motor: No weakness.      Coordination: Coordination normal.   Psychiatric:         Mood and Affect: Mood normal.         Behavior: Behavior normal.            Significant Labs:   All pertinent labs from the last 24 hours have been reviewed.    Diagnostic Results:  I have reviewed all pertinent imaging results/findings within the past 24 hours.  Assessment/Plan:     * History of allogeneic stem cell transplant  - Patient of Dr. 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 Received 3 bags with CD34 dose of 6.04x10^6/kg.   - Today is Day +16  - Patient is O+. Donor is O+.  - Patient is CMV non-reactive. Donor is CMV  reactive. Given donor CMV status, patient will start (patient-supplied) letermovir on Day +5.  - Day -4 and Day -3 Busulfan dose-adjusted to 282 mg  - See treatment plan below:    Planned conditioning regimen:  Thiotepa on Day -7  Busulfan on Day -6, -5, -4, and -3  Fludarabine on Day -6, -5, -4, and -3  REST DAYS on Day -2 and -1     Planned  GVHD Prophylaxis:  Cyclophosphamide on Day +3 and +4  Tacrolimus starting on Day +5  Mycophenolate starting on Day +5     Antimicrobial Prophylaxis:  Acyclovir starting on Day -7  Levofloxacin starting on Day -1  Micafungin on Day -1 through Day +4  Letermovir starting on Day +5 (if CMV PCR drawn on day 0 results negative)  Posaconazole starting on Day +5  Atovaquone starting on Day +30     Skin Care with Thiotepa: Day -7 through D-5     Seizure Prophylaxis:  Levetiracetam on Day -7 through Day -2     SOS/VOD Prophylaxis:  Ursodiol on Day -7 through Day +30     Growth Factor Support:  Neupogen starting on Day +5        Caregiver: Carmelita (Spouse)  Post-transplant discharge plans: Home      Scrotal rash  - Likely thiotepa related, concern for infection is low. Continue Zinc oxide     Diarrhea  - C. Diff negative on 12/28/24  - 01/07 - current   - Has been receiving increasing doses of PRN loperamide. Scheduled imodium on 01/10 and added PRN lomotil  - Will order cdif testing if he has >3x diarrhea per day    Flatulence  - See plan under epigastric pain. Simethicone 80 TID ordered 01/03. Resolved    Epigastric pain  - Suspect 2/2 chemotherapy conditioning regimen prior to allogeneic SCT. Abdomen notably distended and with TTP in epigastric region.   - Continue protonix and famotidine daily, simethicone 80 TID ordered for flatulence  - , lipase normal  - morphine 1 mg PRN  - CT A/P 12/31: no acute pathology   - USG abdomen (RUQ) 01/02 unremarkable, not concerning for VOD    Mucositis  - Having poor PO intake/throat pain 2/2 chemotherapy   - Duke's solution QID  - Morphine  2 mg q3h PRN. Can increase dose or frequency if need be.  - Transitioned PO meds to IV.  - Diet changed from regular to soft/bite sized  - IMPROVING    Allergic contact dermatitis due to adhesives  - Itching and redness to LLQ/RLQ where tele leads were placed.   - Topical benadryl prn for itching.  - Tele removed     Hypokalemia  - See electrolyte disorder    Electrolyte disorder  - Replete per PRN electrolyte order set  - Daily CMP, mag, and phos while inpatient    Neutropenic fever  - Infection w/u neg thus far  - Afebrile, stopping cefepime and started levaquin 12/31/24    Chemotherapy-induced nausea  - Anticipate 2/2 chemotherapy   - Meds switched from PO to IV as able 1/2.   - Zofran q8h  - Compazine q6h  - Zyprexa QHS  - Ativan PRN    Transaminitis  - Transaminases first elevated 12/27. Suspect 2/2 chemotherapy prior to transplant.   - Will continue to monitor with daily CMP.  - RESOLVED    Hypertension  - Became hypertensive following SCT on 12/26. No signs of volume overload contributing to HTN.  - 12/27 started amlodipine.     Headache  - 12/27 diffuse headache began.   - Oxy 5mg and sumatriptan as needed for HA  - Avoiding tylenol with recent allo SCT/neutropenia and do not want to mask a fever.   RESOLVED    Insomnia  - Difficulty sleeping at night while IP.  - Has PRN ativan and trazodone  - Ambien PRN  - Melatonin not effective     Thrombocytosis  - 2/2 myelofibrosis  - Was on daily ASA, but stopped taking ~ 3 weeks ago  - Now with anticipated thrombocytopenia following chemotherapy  - Daily CBC while IP  RESOLVED    Pancytopenia due to chemotherapy  - Anticipated following chemotherapy.  - Transfuse for hgb < 7 or plts < 10K  - Continue antimicrobial ppx   - Daily CBC while IP    Adrenal nodule  - Subcentimeter nodular thickening of the adrenal glands first noted on imaging from 1/2024.  - Seen by endocrine prior to transplant admission and Dexamethasone suppression test performed. Patient responded  appropriately. No further w/u needed.  - Likely adenomas.    Metabolic dysfunction-associated steatotic liver disease (MASLD)  - Biopsy proven to be steatotic change. Most compatible with MASLD.   - Follow-up with hepatology OP.    Primary myelofibrosis  - From Dr. 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 Transplant on 12/26/24 at 1330. Received 3 bags with CD34 dose of 6.04 x 10^6/kg.     Hyperlipidemia  - Holding home atorvastatin while IP to avoid interaction with other medications. Can resume at discharge if LFTs stable.        VTE Risk Mitigation (From admission, onward)           Ordered     heparin, porcine (PF) 100 unit/mL injection flush 300 Units  As needed (PRN)         12/18/24 2025                    Tom Shankar MD  Bone Marrow Transplant  [facility] ([facility])

## 2025-01-11 NOTE — ASSESSMENT & PLAN NOTE
- Patient of Dr. 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 Received 3 bags with CD34 dose of 6.04x10^6/kg.   - Today is Day +16  - Patient is O+. Donor is O+.  - Patient is CMV non-reactive. Donor is CMV reactive. Given donor CMV status, patient will start (patient-supplied) letermovir on Day +5.  - Day -4 and Day -3 Busulfan dose-adjusted to 282 mg  - See treatment plan below:    Planned conditioning regimen:  Thiotepa on Day -7  Busulfan on Day -6, -5, -4, and -3  Fludarabine on Day -6, -5, -4, and -3  REST DAYS on Day -2 and -1     Planned  GVHD Prophylaxis:  Cyclophosphamide on Day +3 and +4  Tacrolimus starting on Day +5  Mycophenolate starting on Day +5     Antimicrobial Prophylaxis:  Acyclovir starting on Day -7  Levofloxacin starting on Day -1  Micafungin on Day -1 through Day +4  Letermovir starting on Day +5 (if CMV PCR drawn on day 0 results negative)  Posaconazole starting on Day +5  Atovaquone starting on Day +30     Skin Care with Thiotepa: Day -7 through D-5     Seizure Prophylaxis:  Levetiracetam on Day -7 through Day -2     SOS/VOD Prophylaxis:  Ursodiol on Day -7 through Day +30     Growth Factor Support:  Neupogen starting on Day +5        Caregiver: Carmelita (Spouse)  Post-transplant discharge plans: Home

## 2025-01-12 LAB
ABO + RH BLD: NORMAL
ALBUMIN SERPL BCP-MCNC: 2.2 G/DL (ref 3.5–5.2)
ALP SERPL-CCNC: 77 U/L (ref 40–150)
ALT SERPL W/O P-5'-P-CCNC: 29 U/L (ref 10–44)
ANION GAP SERPL CALC-SCNC: 10 MMOL/L (ref 8–16)
AST SERPL-CCNC: 21 U/L (ref 10–40)
BASOPHILS # BLD AUTO: 0 K/UL (ref 0–0.2)
BASOPHILS NFR BLD: 0 % (ref 0–1.9)
BILIRUB SERPL-MCNC: 0.7 MG/DL (ref 0.1–1)
BLD GP AB SCN CELLS X3 SERPL QL: NORMAL
BLD PROD TYP BPU: NORMAL
BLOOD UNIT EXPIRATION DATE: NORMAL
BLOOD UNIT TYPE CODE: 9500
BLOOD UNIT TYPE: NORMAL
BUN SERPL-MCNC: 8 MG/DL (ref 6–20)
CALCIUM SERPL-MCNC: 8.3 MG/DL (ref 8.7–10.5)
CHLORIDE SERPL-SCNC: 103 MMOL/L (ref 95–110)
CO2 SERPL-SCNC: 21 MMOL/L (ref 23–29)
CODING SYSTEM: NORMAL
CREAT SERPL-MCNC: 0.7 MG/DL (ref 0.5–1.4)
CROSSMATCH INTERPRETATION: NORMAL
DIFFERENTIAL METHOD BLD: ABNORMAL
DISPENSE STATUS: NORMAL
EOSINOPHIL # BLD AUTO: 0 K/UL (ref 0–0.5)
EOSINOPHIL NFR BLD: 0 % (ref 0–8)
ERYTHROCYTE [DISTWIDTH] IN BLOOD BY AUTOMATED COUNT: 12.7 % (ref 11.5–14.5)
EST. GFR  (NO RACE VARIABLE): >60 ML/MIN/1.73 M^2
GLUCOSE SERPL-MCNC: 107 MG/DL (ref 70–110)
HCT VFR BLD AUTO: 21.8 % (ref 40–54)
HGB BLD-MCNC: 7.5 G/DL (ref 14–18)
IMM GRANULOCYTES # BLD AUTO: 0 K/UL (ref 0–0.04)
IMM GRANULOCYTES NFR BLD AUTO: 0 % (ref 0–0.5)
LYMPHOCYTES # BLD AUTO: 0 K/UL (ref 1–4.8)
LYMPHOCYTES NFR BLD: 0 % (ref 18–48)
MAGNESIUM SERPL-MCNC: 1.2 MG/DL (ref 1.6–2.6)
MCH RBC QN AUTO: 31 PG (ref 27–31)
MCHC RBC AUTO-ENTMCNC: 34.4 G/DL (ref 32–36)
MCV RBC AUTO: 90 FL (ref 82–98)
MONOCYTES # BLD AUTO: 0 K/UL (ref 0.3–1)
MONOCYTES NFR BLD: 0 % (ref 4–15)
NEUTROPHILS # BLD AUTO: 0 K/UL (ref 1.8–7.7)
NEUTROPHILS NFR BLD: 100 % (ref 38–73)
NRBC BLD-RTO: 0 /100 WBC
PHOSPHATE SERPL-MCNC: 2.8 MG/DL (ref 2.7–4.5)
PLATELET # BLD AUTO: 6 K/UL (ref 150–450)
PLATELET BLD QL SMEAR: ABNORMAL
PMV BLD AUTO: 13.9 FL (ref 9.2–12.9)
POTASSIUM SERPL-SCNC: 3.6 MMOL/L (ref 3.5–5.1)
PROT SERPL-MCNC: 5.8 G/DL (ref 6–8.4)
RBC # BLD AUTO: 2.42 M/UL (ref 4.6–6.2)
SODIUM SERPL-SCNC: 134 MMOL/L (ref 136–145)
SPECIMEN OUTDATE: NORMAL
UNIT NUMBER: NORMAL
WBC # BLD AUTO: 0.02 K/UL (ref 3.9–12.7)

## 2025-01-12 PROCEDURE — P9073 PLATELETS PHERESIS PATH REDU: HCPCS | Performed by: STUDENT IN AN ORGANIZED HEALTH CARE EDUCATION/TRAINING PROGRAM

## 2025-01-12 PROCEDURE — 63600175 PHARM REV CODE 636 W HCPCS

## 2025-01-12 PROCEDURE — 63600175 PHARM REV CODE 636 W HCPCS: Performed by: STUDENT IN AN ORGANIZED HEALTH CARE EDUCATION/TRAINING PROGRAM

## 2025-01-12 PROCEDURE — 20600001 HC STEP DOWN PRIVATE ROOM

## 2025-01-12 PROCEDURE — P9073 PLATELETS PHERESIS PATH REDU: HCPCS | Mod: 91 | Performed by: STUDENT IN AN ORGANIZED HEALTH CARE EDUCATION/TRAINING PROGRAM

## 2025-01-12 PROCEDURE — 99233 SBSQ HOSP IP/OBS HIGH 50: CPT | Mod: ,,, | Performed by: INTERNAL MEDICINE

## 2025-01-12 PROCEDURE — 80053 COMPREHEN METABOLIC PANEL: CPT | Performed by: NURSE PRACTITIONER

## 2025-01-12 PROCEDURE — 85025 COMPLETE CBC W/AUTO DIFF WBC: CPT | Performed by: NURSE PRACTITIONER

## 2025-01-12 PROCEDURE — 83735 ASSAY OF MAGNESIUM: CPT | Performed by: NURSE PRACTITIONER

## 2025-01-12 PROCEDURE — 25000003 PHARM REV CODE 250: Performed by: STUDENT IN AN ORGANIZED HEALTH CARE EDUCATION/TRAINING PROGRAM

## 2025-01-12 PROCEDURE — 63600175 PHARM REV CODE 636 W HCPCS: Mod: JZ,TB | Performed by: INTERNAL MEDICINE

## 2025-01-12 PROCEDURE — 63600175 PHARM REV CODE 636 W HCPCS: Performed by: INTERNAL MEDICINE

## 2025-01-12 PROCEDURE — 86901 BLOOD TYPING SEROLOGIC RH(D): CPT | Performed by: INTERNAL MEDICINE

## 2025-01-12 PROCEDURE — 84100 ASSAY OF PHOSPHORUS: CPT | Performed by: NURSE PRACTITIONER

## 2025-01-12 PROCEDURE — 25000003 PHARM REV CODE 250: Performed by: INTERNAL MEDICINE

## 2025-01-12 PROCEDURE — 63600175 PHARM REV CODE 636 W HCPCS: Mod: JZ,TB | Performed by: STUDENT IN AN ORGANIZED HEALTH CARE EDUCATION/TRAINING PROGRAM

## 2025-01-12 PROCEDURE — 25000003 PHARM REV CODE 250: Performed by: NURSE PRACTITIONER

## 2025-01-12 PROCEDURE — 25000003 PHARM REV CODE 250

## 2025-01-12 PROCEDURE — 63600175 PHARM REV CODE 636 W HCPCS: Performed by: NURSE PRACTITIONER

## 2025-01-12 RX ORDER — MAGNESIUM SULFATE HEPTAHYDRATE 40 MG/ML
2 INJECTION, SOLUTION INTRAVENOUS ONCE
Status: COMPLETED | OUTPATIENT
Start: 2025-01-12 | End: 2025-01-12

## 2025-01-12 RX ADMIN — LETERMOVIR 480 MG: 480 TABLET, FILM COATED ORAL at 08:01

## 2025-01-12 RX ADMIN — MYCOPHENOLATE MOFETIL 1000 MG: 500 INJECTION, POWDER, LYOPHILIZED, FOR SOLUTION INTRAVENOUS at 10:01

## 2025-01-12 RX ADMIN — MYCOPHENOLATE MOFETIL 1000 MG: 500 INJECTION, POWDER, LYOPHILIZED, FOR SOLUTION INTRAVENOUS at 03:01

## 2025-01-12 RX ADMIN — SIMETHICONE 80 MG: 80 TABLET, CHEWABLE ORAL at 08:01

## 2025-01-12 RX ADMIN — DIPHENHYDRAMINE HYDROCHLORIDE 15 ML: 25 SOLUTION ORAL at 01:01

## 2025-01-12 RX ADMIN — ACETAMINOPHEN 650 MG: 325 TABLET ORAL at 10:01

## 2025-01-12 RX ADMIN — PROCHLORPERAZINE EDISYLATE 5 MG: 5 INJECTION INTRAMUSCULAR; INTRAVENOUS at 10:01

## 2025-01-12 RX ADMIN — URSODIOL 300 MG: 300 CAPSULE ORAL at 08:01

## 2025-01-12 RX ADMIN — TACROLIMUS 1 MG: 1 CAPSULE ORAL at 05:01

## 2025-01-12 RX ADMIN — MAGNESIUM SULFATE HEPTAHYDRATE 2 G: 40 INJECTION, SOLUTION INTRAVENOUS at 10:01

## 2025-01-12 RX ADMIN — POSACONAZOLE 300 MG: 18 INJECTION, SOLUTION INTRAVENOUS at 09:01

## 2025-01-12 RX ADMIN — PROCHLORPERAZINE EDISYLATE 5 MG: 5 INJECTION INTRAMUSCULAR; INTRAVENOUS at 04:01

## 2025-01-12 RX ADMIN — Medication 1 DOSE: at 08:01

## 2025-01-12 RX ADMIN — GUAIFENESIN AND DEXTROMETHORPHAN HYDROBROMIDE 1 TABLET: 600; 30 TABLET, EXTENDED RELEASE ORAL at 08:01

## 2025-01-12 RX ADMIN — ONDANSETRON 8 MG: 2 INJECTION INTRAMUSCULAR; INTRAVENOUS at 10:01

## 2025-01-12 RX ADMIN — FILGRASTIM 480 MCG: 480 INJECTION, SOLUTION INTRAVENOUS; SUBCUTANEOUS at 08:01

## 2025-01-12 RX ADMIN — LEVOFLOXACIN 500 MG: 5 INJECTION, SOLUTION INTRAVENOUS at 09:01

## 2025-01-12 RX ADMIN — PROCHLORPERAZINE EDISYLATE 5 MG: 5 INJECTION INTRAMUSCULAR; INTRAVENOUS at 03:01

## 2025-01-12 RX ADMIN — HYDROMORPHONE HYDROCHLORIDE 0.5 MG: 1 INJECTION, SOLUTION INTRAMUSCULAR; INTRAVENOUS; SUBCUTANEOUS at 10:01

## 2025-01-12 RX ADMIN — ACYCLOVIR SODIUM 400 MG: 50 INJECTION, SOLUTION INTRAVENOUS at 08:01

## 2025-01-12 RX ADMIN — SIMETHICONE 80 MG: 80 TABLET, CHEWABLE ORAL at 03:01

## 2025-01-12 RX ADMIN — DIPHENHYDRAMINE HYDROCHLORIDE 15 ML: 25 SOLUTION ORAL at 05:01

## 2025-01-12 RX ADMIN — POTASSIUM CHLORIDE 10 MEQ: 7.46 INJECTION, SOLUTION INTRAVENOUS at 07:01

## 2025-01-12 RX ADMIN — Medication 1 DOSE: at 05:01

## 2025-01-12 RX ADMIN — DIPHENHYDRAMINE HYDROCHLORIDE 15 ML: 25 SOLUTION ORAL at 12:01

## 2025-01-12 RX ADMIN — DIPHENOXYLATE HYDROCHLORIDE AND ATROPINE SULFATE 1 TABLET: .025; 2.5 TABLET ORAL at 10:01

## 2025-01-12 RX ADMIN — LOPERAMIDE HYDROCHLORIDE 2 MG: 2 CAPSULE ORAL at 05:01

## 2025-01-12 RX ADMIN — LOPERAMIDE HYDROCHLORIDE 2 MG: 2 CAPSULE ORAL at 08:01

## 2025-01-12 RX ADMIN — LIDOCAINE HYDROCHLORIDE 15 ML: 20 SOLUTION OROPHARYNGEAL at 05:01

## 2025-01-12 RX ADMIN — POTASSIUM CHLORIDE 40 MEQ: 7.46 INJECTION, SOLUTION INTRAVENOUS at 06:01

## 2025-01-12 RX ADMIN — TACROLIMUS 1.5 MG: 1 CAPSULE ORAL at 08:01

## 2025-01-12 RX ADMIN — ONDANSETRON 8 MG: 2 INJECTION INTRAMUSCULAR; INTRAVENOUS at 01:01

## 2025-01-12 RX ADMIN — OLANZAPINE 10 MG: 2.5 TABLET, FILM COATED ORAL at 08:01

## 2025-01-12 RX ADMIN — LOPERAMIDE HYDROCHLORIDE 2 MG: 2 CAPSULE ORAL at 12:01

## 2025-01-12 RX ADMIN — LIDOCAINE HYDROCHLORIDE 15 ML: 20 SOLUTION OROPHARYNGEAL at 12:01

## 2025-01-12 RX ADMIN — FAMOTIDINE 20 MG: 10 INJECTION, SOLUTION INTRAVENOUS at 08:01

## 2025-01-12 RX ADMIN — DIPHENHYDRAMINE HYDROCHLORIDE 50 MG: 25 CAPSULE ORAL at 10:01

## 2025-01-12 RX ADMIN — ACYCLOVIR SODIUM 400 MG: 50 INJECTION, SOLUTION INTRAVENOUS at 09:01

## 2025-01-12 RX ADMIN — Medication 1 DOSE: at 12:01

## 2025-01-12 RX ADMIN — PANTOPRAZOLE SODIUM 40 MG: 40 INJECTION, POWDER, FOR SOLUTION INTRAVENOUS at 08:01

## 2025-01-12 RX ADMIN — ALUMINUM HYDROXIDE, MAGNESIUM HYDROXIDE, AND DIMETHICONE 10 ML: 400; 400; 40 SUSPENSION ORAL at 05:01

## 2025-01-12 RX ADMIN — POTASSIUM CHLORIDE 10 MEQ: 7.46 INJECTION, SOLUTION INTRAVENOUS at 09:01

## 2025-01-12 RX ADMIN — AMLODIPINE BESYLATE 5 MG: 5 TABLET ORAL at 08:01

## 2025-01-12 RX ADMIN — ONDANSETRON 8 MG: 2 INJECTION INTRAMUSCULAR; INTRAVENOUS at 05:01

## 2025-01-12 RX ADMIN — MAGNESIUM SULFATE HEPTAHYDRATE 2 G: 40 INJECTION, SOLUTION INTRAVENOUS at 08:01

## 2025-01-12 NOTE — SUBJECTIVE & OBJECTIVE
Subjective:     Interval History: Day +17 following haploSCT conditioned with BuFluThiotepa plus PTCy, Tac, MMF. 1 episode of diarrhea since yesterday. Mucositis present but manageable with PRN's. Getting platelets today (6). ANC remains 0.     Objective:     Vital Signs (Most Recent):  Temp: 99.1 °F (37.3 °C) (01/12/25 1139)  Pulse: 105 (01/12/25 1139)  Resp: 20 (01/12/25 1139)  BP: 134/71 (01/12/25 1139)  SpO2: 95 % (01/12/25 1139) Vital Signs (24h Range):  Temp:  [98.2 °F (36.8 °C)-99.1 °F (37.3 °C)] 99.1 °F (37.3 °C)  Pulse:  [] 105  Resp:  [16-20] 20  SpO2:  [94 %-97 %] 95 %  BP: (134-156)/(71-83) 134/71     Weight: 79.1 kg (174 lb 6.1 oz)  Body mass index is 24.32 kg/m².  Body surface area is 1.99 meters squared.    ECOG SCORE           [unfilled]    Intake/Output - Last 3 Shifts         01/10 0700  01/11 0659 01/11 0700  01/12 0659 01/12 0700  01/13 0659    P.O. 280 100     I.V. (mL/kg) 73.7 (0.8)      Blood       IV Piggyback 3905.6      Total Intake(mL/kg) 4259.3 (48) 100 (1.3)     Urine (mL/kg/hr) 700 (0.3) 700 (0.4)     Stool 0 0     Total Output 700 700     Net +3559.3 -600            Urine Occurrence 1 x      Stool Occurrence 2 x 1 x              Physical Exam  Vitals and nursing note reviewed.   Constitutional:       General: He is not in acute distress.     Appearance: Normal appearance. He is not diaphoretic.   HENT:      Head: Normocephalic and atraumatic.      Nose: Nose normal.      Mouth/Throat:      Mouth: Mucous membranes are moist.      Pharynx: Oropharynx is clear.      Comments: Angular stomatitis noted 01/09  Eyes:      General: No scleral icterus.     Extraocular Movements: Extraocular movements intact.      Pupils: Pupils are equal, round, and reactive to light.   Cardiovascular:      Rate and Rhythm: Normal rate and regular rhythm.      Pulses: Normal pulses.      Heart sounds: Normal heart sounds. No murmur heard.  Pulmonary:      Effort: Pulmonary effort is normal. No respiratory  distress.      Breath sounds: Normal breath sounds. No stridor. No wheezing, rhonchi or rales.   Abdominal:      General: Abdomen is flat. Bowel sounds are normal. There is no distension.      Palpations: Abdomen is soft. There is no mass.      Tenderness: There is no abdominal tenderness.   Musculoskeletal:         General: No swelling or tenderness. Normal range of motion.      Cervical back: Normal range of motion and neck supple. No rigidity.      Right lower leg: No edema.      Left lower leg: No edema.      Comments: Tunneled Central Line - Triple Lumen 12/18/24 Internal Jugular Right   Skin:     General: Skin is warm and dry.      Capillary Refill: Capillary refill takes less than 2 seconds.      Coloration: Skin is not jaundiced.      Findings: No lesion or rash.   Neurological:      General: No focal deficit present.      Mental Status: He is alert and oriented to person, place, and time.      Motor: No weakness.      Coordination: Coordination normal.   Psychiatric:         Mood and Affect: Mood normal.         Behavior: Behavior normal.            Significant Labs:   All pertinent labs from the last 24 hours have been reviewed.    Diagnostic Results:  I have reviewed all pertinent imaging results/findings within the past 24 hours.

## 2025-01-12 NOTE — PLAN OF CARE
Assumed care of pt. [id number]  Pt involved in plan of care and communicating needs throughout shift.      - D +17 haplo Allo SCT for PMF  - AAOx4;   - c/o abdominal bloating / pain PRN pain meds denied   - Ambulates independently;   - Reg. Diet w/decreased PO intake d/t mucositis , esophagitis  - Remains afebrile  - Nausea under controlled with scheduled nausea meds .   - Continent of bowel and bladder   - Voids via RR; BM x 1 this shift; scheduled imodium given x 3; Lomotil given x 1   - RA;   - Skin intact w/gen. Bruising  - Right chest central line dressing C/D/I   - Electrolytes replaced as ordered;    - Platelet count 6  k , one unit of platelet transfused  tolerated well . Premeds administered .      - q1h patient rounds. All VSS; no acute events so far this shift. Non skid socks on; Pt. Instructed to call if any assistance is needed. Pt remaining free from falls or injury; Bed locked in lowest position w/side rails up x2 & all belongings including call light within reach; Plan of care ongoing; All needs met at this time.

## 2025-01-12 NOTE — ASSESSMENT & PLAN NOTE
- Patient of Dr. 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 Received 3 bags with CD34 dose of 6.04x10^6/kg.   - Today is Day +17  - Patient is O+. Donor is O+.  - Patient is CMV non-reactive. Donor is CMV reactive. Given donor CMV status, patient will start (patient-supplied) letermovir on Day +5.  - Day -4 and Day -3 Busulfan dose-adjusted to 282 mg  - See treatment plan below:    Planned conditioning regimen:  Thiotepa on Day -7  Busulfan on Day -6, -5, -4, and -3  Fludarabine on Day -6, -5, -4, and -3  REST DAYS on Day -2 and -1     Planned  GVHD Prophylaxis:  Cyclophosphamide on Day +3 and +4  Tacrolimus starting on Day +5  Mycophenolate starting on Day +5     Antimicrobial Prophylaxis:  Acyclovir starting on Day -7  Levofloxacin starting on Day -1  Micafungin on Day -1 through Day +4  Letermovir starting on Day +5 (if CMV PCR drawn on day 0 results negative)  Posaconazole starting on Day +5  Atovaquone starting on Day +30     Skin Care with Thiotepa: Day -7 through D-5     Seizure Prophylaxis:  Levetiracetam on Day -7 through Day -2     SOS/VOD Prophylaxis:  Ursodiol on Day -7 through Day +30     Growth Factor Support:  Neupogen starting on Day +5        Caregiver: Carmelita (Spouse)  Post-transplant discharge plans: Home

## 2025-01-12 NOTE — PROGRESS NOTES
Daniel Rodríguez - Oncology ([facility])  Hematology  Bone Marrow Transplant  Progress Note    Patient Name: Rubén Hu  Admission Date: 12/18/2024  Hospital Length of Stay: 25 days  Code Status: Full Code    Subjective:     Interval History: Day +17 following haploSCT conditioned with BuFluThiotepa plus PTCy, Tac, MMF. 1 episode of diarrhea since yesterday. Mucositis present but manageable with PRN's. Getting platelets today (6). ANC remains 0.     Objective:     Vital Signs (Most Recent):  Temp: 99.1 °F (37.3 °C) (01/12/25 1139)  Pulse: 105 (01/12/25 1139)  Resp: 20 (01/12/25 1139)  BP: 134/71 (01/12/25 1139)  SpO2: 95 % (01/12/25 1139) Vital Signs (24h Range):  Temp:  [98.2 °F (36.8 °C)-99.1 °F (37.3 °C)] 99.1 °F (37.3 °C)  Pulse:  [] 105  Resp:  [16-20] 20  SpO2:  [94 %-97 %] 95 %  BP: (134-156)/(71-83) 134/71     Weight: 79.1 kg (174 lb 6.1 oz)  Body mass index is 24.32 kg/m².  Body surface area is 1.99 meters squared.    ECOG SCORE           [unfilled]    Intake/Output - Last 3 Shifts         01/10 0700  01/11 0659 01/11 0700  01/12 0659 01/12 0700  01/13 0659    P.O. 280 100     I.V. (mL/kg) 73.7 (0.8)      Blood       IV Piggyback 3905.6      Total Intake(mL/kg) 4259.3 (48) 100 (1.3)     Urine (mL/kg/hr) 700 (0.3) 700 (0.4)     Stool 0 0     Total Output 700 700     Net +3559.3 -600            Urine Occurrence 1 x      Stool Occurrence 2 x 1 x              Physical Exam  Vitals and nursing note reviewed.   Constitutional:       General: He is not in acute distress.     Appearance: Normal appearance. He is not diaphoretic.   HENT:      Head: Normocephalic and atraumatic.      Nose: Nose normal.      Mouth/Throat:      Mouth: Mucous membranes are moist.      Pharynx: Oropharynx is clear.      Comments: Angular stomatitis noted 01/09  Eyes:      General: No scleral icterus.     Extraocular Movements: Extraocular movements intact.      Pupils: Pupils are equal, round, and reactive to light.   Cardiovascular:       Rate and Rhythm: Normal rate and regular rhythm.      Pulses: Normal pulses.      Heart sounds: Normal heart sounds. No murmur heard.  Pulmonary:      Effort: Pulmonary effort is normal. No respiratory distress.      Breath sounds: Normal breath sounds. No stridor. No wheezing, rhonchi or rales.   Abdominal:      General: Abdomen is flat. Bowel sounds are normal. There is no distension.      Palpations: Abdomen is soft. There is no mass.      Tenderness: There is no abdominal tenderness.   Musculoskeletal:         General: No swelling or tenderness. Normal range of motion.      Cervical back: Normal range of motion and neck supple. No rigidity.      Right lower leg: No edema.      Left lower leg: No edema.      Comments: Tunneled Central Line - Triple Lumen 12/18/24 Internal Jugular Right   Skin:     General: Skin is warm and dry.      Capillary Refill: Capillary refill takes less than 2 seconds.      Coloration: Skin is not jaundiced.      Findings: No lesion or rash.   Neurological:      General: No focal deficit present.      Mental Status: He is alert and oriented to person, place, and time.      Motor: No weakness.      Coordination: Coordination normal.   Psychiatric:         Mood and Affect: Mood normal.         Behavior: Behavior normal.            Significant Labs:   All pertinent labs from the last 24 hours have been reviewed.    Diagnostic Results:  I have reviewed all pertinent imaging results/findings within the past 24 hours.  Assessment/Plan:     * History of allogeneic stem cell transplant  - Patient of Dr. 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 Received 3 bags with CD34 dose of 6.04x10^6/kg.   - Today is Day +17  - Patient is O+. Donor is O+.  - Patient is CMV non-reactive. Donor is CMV reactive. Given donor CMV status, patient will start (patient-supplied) letermovir on Day +5.  - Day -4 and Day -3 Busulfan  dose-adjusted to 282 mg  - See treatment plan below:    Planned conditioning regimen:  Thiotepa on Day -7  Busulfan on Day -6, -5, -4, and -3  Fludarabine on Day -6, -5, -4, and -3  REST DAYS on Day -2 and -1     Planned  GVHD Prophylaxis:  Cyclophosphamide on Day +3 and +4  Tacrolimus starting on Day +5  Mycophenolate starting on Day +5     Antimicrobial Prophylaxis:  Acyclovir starting on Day -7  Levofloxacin starting on Day -1  Micafungin on Day -1 through Day +4  Letermovir starting on Day +5 (if CMV PCR drawn on day 0 results negative)  Posaconazole starting on Day +5  Atovaquone starting on Day +30     Skin Care with Thiotepa: Day -7 through D-5     Seizure Prophylaxis:  Levetiracetam on Day -7 through Day -2     SOS/VOD Prophylaxis:  Ursodiol on Day -7 through Day +30     Growth Factor Support:  Neupogen starting on Day +5        Caregiver: Carmelita (Spouse)  Post-transplant discharge plans: Home      Scrotal rash  - Likely thiotepa related, concern for infection is low. Continue Zinc oxide     Diarrhea  - C. Diff negative on 12/28/24  - 01/07 - current   - Has been receiving increasing doses of PRN loperamide. Scheduled imodium on 01/10 and added PRN lomotil  - Will order cdif testing if he has >3x diarrhea per day    Flatulence  - See plan under epigastric pain. Simethicone 80 TID ordered 01/03. Resolved    Epigastric pain  - Suspect 2/2 chemotherapy conditioning regimen prior to allogeneic SCT. Abdomen notably distended and with TTP in epigastric region.   - Continue protonix and famotidine daily, simethicone 80 TID ordered for flatulence  - , lipase normal  - morphine 1 mg PRN  - CT A/P 12/31: no acute pathology   - USG abdomen (RUQ) 01/02 unremarkable, not concerning for VOD    Mucositis  - Having poor PO intake/throat pain 2/2 chemotherapy   - Duke's solution QID  - Morphine 2 mg q3h PRN. Can increase dose or frequency if need be.  - Transitioned PO meds to IV.  - Diet changed from regular to  soft/bite sized  - IMPROVING    Allergic contact dermatitis due to adhesives  - Itching and redness to LLQ/RLQ where tele leads were placed.   - Topical benadryl prn for itching.  - Tele removed     Hypokalemia  - See electrolyte disorder    Electrolyte disorder  - Replete per PRN electrolyte order set  - Daily CMP, mag, and phos while inpatient    Neutropenic fever  - Infection w/u neg thus far  - Afebrile, stopping cefepime and started levaquin 12/31/24    Chemotherapy-induced nausea  - Anticipate 2/2 chemotherapy   - Meds switched from PO to IV as able 1/2.   - Zofran q8h  - Compazine q6h  - Zyprexa QHS  - Ativan PRN    Transaminitis  - Transaminases first elevated 12/27. Suspect 2/2 chemotherapy prior to transplant.   - Will continue to monitor with daily CMP.  - RESOLVED    Hypertension  - Became hypertensive following SCT on 12/26. No signs of volume overload contributing to HTN.  - 12/27 started amlodipine.     Headache  - 12/27 diffuse headache began.   - Oxy 5mg and sumatriptan as needed for HA  - Avoiding tylenol with recent allo SCT/neutropenia and do not want to mask a fever.   RESOLVED    Insomnia  - Difficulty sleeping at night while IP.  - Has PRN ativan and trazodone  - Ambien PRN  - Melatonin not effective     Thrombocytosis  - 2/2 myelofibrosis  - Was on daily ASA, but stopped taking ~ 3 weeks ago  - Now with anticipated thrombocytopenia following chemotherapy  - Daily CBC while IP  RESOLVED    Pancytopenia due to chemotherapy  - Anticipated following chemotherapy.  - Transfuse for hgb < 7 or plts < 10K  - Continue antimicrobial ppx   - Daily CBC while IP    Adrenal nodule  - Subcentimeter nodular thickening of the adrenal glands first noted on imaging from 1/2024.  - Seen by endocrine prior to transplant admission and Dexamethasone suppression test performed. Patient responded appropriately. No further w/u needed.  - Likely adenomas.    Metabolic dysfunction-associated steatotic liver disease  (MASLD)  - Biopsy proven to be steatotic change. Most compatible with MASLD.   - Follow-up with hepatology OP.    Primary myelofibrosis  - From Dr. 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 Transplant on 12/26/24 at 1330. Received 3 bags with CD34 dose of 6.04 x 10^6/kg.     Hyperlipidemia  - Holding home atorvastatin while IP to avoid interaction with other medications. Can resume at discharge if LFTs stable.        VTE Risk Mitigation (From admission, onward)           Ordered     heparin, porcine (PF) 100 unit/mL injection flush 300 Units  As needed (PRN)         12/18/24 2025                    Tom Shankar MD  Bone Marrow Transplant  Daniel caryl - Oncology ([facility])

## 2025-01-12 NOTE — PLAN OF CARE
Assumed care of pt. [id number]  Pt involved in plan of care and communicating needs throughout shift.      - D +16 haplo Allo SCT for PMF  - AAOx4;   - c/o abdominal bloating / pain PRN pain meds administered .   - Ambulates independently;   - Reg. Diet w/decreased PO intake d/t mucositis , esophagitis  - Remains afebrile  - Nausea under controlled with scheduled nausea meds .   - Continent of bowel and bladder   - Voids via RR; BM x 1 this shift; Diarrhea-imodium given x 3; Lomotil given x2  - RA;   - Skin intact w/gen. Bruising  - Right chest central line dressing C/D/I   - Electrolytes replaced as ordered;    - Platelet count 4 k , one unit of platelet transfused  tolerated well .      - q1h patient rounds. All VSS; no acute events so far this shift. Non skid socks on; Pt. Instructed to call if any assistance is needed. Pt remaining free from falls or injury; Bed locked in lowest position w/side rails up x2 & all belongings including call light within reach; Plan of care ongoing; All needs met at this time.

## 2025-01-12 NOTE — PLAN OF CARE
POC reviewed w pt and spouse at beginning of shift and PRN. Pt communicating needs and participating in POCpocD +18 haplo Allo SCT for PMF . AAOx4; reports abdominal bloating / pain PRN pain meds administered simethicone given as ordered. Ambulates independently; but bed alarm set due to low platelets up w assist. Mucositis  pain managed w scheduled meds. Nausea controlled with scheduled meds .     Voids via urinal; BM x 1 this shift; scheduled antidiarrhea given as ordered . In NAD on RA; Skin intact w/gen. Bruising  Right chest central line dressing C/D/I . VSS; no acute events so far this shift. Non skid socks on; Pt. Instructed to call if any assistance is needed. Pt remaining free from falls or injury; Bed locked in lowest position w/side rails up x2 & all belongings including call light within reach; Plan of care ongoing; Will CTM

## 2025-01-13 PROBLEM — R14.3 FLATULENCE: Status: RESOLVED | Noted: 2025-01-03 | Resolved: 2025-01-13

## 2025-01-13 PROBLEM — K62.89 PERIANAL PAIN: Status: ACTIVE | Noted: 2025-01-13

## 2025-01-13 LAB
ALBUMIN SERPL BCP-MCNC: 2.2 G/DL (ref 3.5–5.2)
ALP SERPL-CCNC: 84 U/L (ref 40–150)
ALT SERPL W/O P-5'-P-CCNC: 40 U/L (ref 10–44)
ANION GAP SERPL CALC-SCNC: 9 MMOL/L (ref 8–16)
ANISOCYTOSIS BLD QL SMEAR: SLIGHT
AST SERPL-CCNC: 42 U/L (ref 10–40)
BASOPHILS # BLD AUTO: ABNORMAL K/UL (ref 0–0.2)
BASOPHILS NFR BLD: 0 % (ref 0–1.9)
BILIRUB SERPL-MCNC: 1.2 MG/DL (ref 0.1–1)
BUN SERPL-MCNC: 7 MG/DL (ref 6–20)
CALCIUM SERPL-MCNC: 8.3 MG/DL (ref 8.7–10.5)
CHLORIDE SERPL-SCNC: 100 MMOL/L (ref 95–110)
CO2 SERPL-SCNC: 23 MMOL/L (ref 23–29)
CREAT SERPL-MCNC: 0.7 MG/DL (ref 0.5–1.4)
DIFFERENTIAL METHOD BLD: ABNORMAL
EOSINOPHIL # BLD AUTO: ABNORMAL K/UL (ref 0–0.5)
EOSINOPHIL NFR BLD: 0 % (ref 0–8)
ERYTHROCYTE [DISTWIDTH] IN BLOOD BY AUTOMATED COUNT: 12.8 % (ref 11.5–14.5)
EST. GFR  (NO RACE VARIABLE): >60 ML/MIN/1.73 M^2
GLUCOSE SERPL-MCNC: 113 MG/DL (ref 70–110)
HCT VFR BLD AUTO: 20.2 % (ref 40–54)
HGB BLD-MCNC: 7.1 G/DL (ref 14–18)
HYPOCHROMIA BLD QL SMEAR: ABNORMAL
IMM GRANULOCYTES # BLD AUTO: ABNORMAL K/UL (ref 0–0.04)
IMM GRANULOCYTES NFR BLD AUTO: ABNORMAL % (ref 0–0.5)
LYMPHOCYTES # BLD AUTO: ABNORMAL K/UL (ref 1–4.8)
LYMPHOCYTES NFR BLD: 0 % (ref 18–48)
MAGNESIUM SERPL-MCNC: 1.3 MG/DL (ref 1.6–2.6)
MCH RBC QN AUTO: 31.3 PG (ref 27–31)
MCHC RBC AUTO-ENTMCNC: 35.1 G/DL (ref 32–36)
MCV RBC AUTO: 89 FL (ref 82–98)
MONOCYTES # BLD AUTO: ABNORMAL K/UL (ref 0.3–1)
MONOCYTES NFR BLD: 0 % (ref 4–15)
NEUTROPHILS NFR BLD: 100 % (ref 38–73)
NRBC BLD-RTO: 0 /100 WBC
OVALOCYTES BLD QL SMEAR: ABNORMAL
PHOSPHATE SERPL-MCNC: 1.9 MG/DL (ref 2.7–4.5)
PLATELET # BLD AUTO: 11 K/UL (ref 150–450)
PMV BLD AUTO: 11.1 FL (ref 9.2–12.9)
POIKILOCYTOSIS BLD QL SMEAR: SLIGHT
POLYCHROMASIA BLD QL SMEAR: ABNORMAL
POTASSIUM SERPL-SCNC: 3.5 MMOL/L (ref 3.5–5.1)
PROT SERPL-MCNC: 5.9 G/DL (ref 6–8.4)
RBC # BLD AUTO: 2.27 M/UL (ref 4.6–6.2)
SODIUM SERPL-SCNC: 132 MMOL/L (ref 136–145)
SPHEROCYTES BLD QL SMEAR: ABNORMAL
TACROLIMUS BLD-MCNC: 14.9 NG/ML (ref 5–15)
WBC # BLD AUTO: 0.08 K/UL (ref 3.9–12.7)

## 2025-01-13 PROCEDURE — 83735 ASSAY OF MAGNESIUM: CPT | Performed by: NURSE PRACTITIONER

## 2025-01-13 PROCEDURE — 63600175 PHARM REV CODE 636 W HCPCS: Mod: JZ,TB | Performed by: INTERNAL MEDICINE

## 2025-01-13 PROCEDURE — 25000003 PHARM REV CODE 250

## 2025-01-13 PROCEDURE — 25000003 PHARM REV CODE 250: Performed by: STUDENT IN AN ORGANIZED HEALTH CARE EDUCATION/TRAINING PROGRAM

## 2025-01-13 PROCEDURE — 63600175 PHARM REV CODE 636 W HCPCS

## 2025-01-13 PROCEDURE — 85027 COMPLETE CBC AUTOMATED: CPT | Performed by: NURSE PRACTITIONER

## 2025-01-13 PROCEDURE — 63600175 PHARM REV CODE 636 W HCPCS: Mod: JZ,TB | Performed by: STUDENT IN AN ORGANIZED HEALTH CARE EDUCATION/TRAINING PROGRAM

## 2025-01-13 PROCEDURE — 85007 BL SMEAR W/DIFF WBC COUNT: CPT | Performed by: NURSE PRACTITIONER

## 2025-01-13 PROCEDURE — 84100 ASSAY OF PHOSPHORUS: CPT | Performed by: NURSE PRACTITIONER

## 2025-01-13 PROCEDURE — 99233 SBSQ HOSP IP/OBS HIGH 50: CPT | Mod: ,,, | Performed by: INTERNAL MEDICINE

## 2025-01-13 PROCEDURE — 25000003 PHARM REV CODE 250: Performed by: INTERNAL MEDICINE

## 2025-01-13 PROCEDURE — 25000003 PHARM REV CODE 250: Performed by: NURSE PRACTITIONER

## 2025-01-13 PROCEDURE — 80197 ASSAY OF TACROLIMUS: CPT | Performed by: INTERNAL MEDICINE

## 2025-01-13 PROCEDURE — 80053 COMPREHEN METABOLIC PANEL: CPT | Performed by: NURSE PRACTITIONER

## 2025-01-13 PROCEDURE — 20600001 HC STEP DOWN PRIVATE ROOM

## 2025-01-13 PROCEDURE — 63600175 PHARM REV CODE 636 W HCPCS: Performed by: NURSE PRACTITIONER

## 2025-01-13 PROCEDURE — 63600175 PHARM REV CODE 636 W HCPCS: Performed by: INTERNAL MEDICINE

## 2025-01-13 RX ORDER — POTASSIUM CHLORIDE 14.9 MG/ML
20 INJECTION INTRAVENOUS ONCE
Status: COMPLETED | OUTPATIENT
Start: 2025-01-13 | End: 2025-01-13

## 2025-01-13 RX ORDER — POTASSIUM CHLORIDE 7.45 MG/ML
80 INJECTION INTRAVENOUS
Status: DISCONTINUED | OUTPATIENT
Start: 2025-01-13 | End: 2025-01-22

## 2025-01-13 RX ORDER — POTASSIUM CHLORIDE 7.45 MG/ML
60 INJECTION INTRAVENOUS
Status: DISCONTINUED | OUTPATIENT
Start: 2025-01-13 | End: 2025-01-22

## 2025-01-13 RX ORDER — FUROSEMIDE 10 MG/ML
40 INJECTION INTRAMUSCULAR; INTRAVENOUS ONCE
Status: COMPLETED | OUTPATIENT
Start: 2025-01-13 | End: 2025-01-13

## 2025-01-13 RX ORDER — TACROLIMUS 1 MG/1
1 CAPSULE ORAL EVERY MORNING
Status: DISCONTINUED | OUTPATIENT
Start: 2025-01-14 | End: 2025-01-15

## 2025-01-13 RX ORDER — ZINC OXIDE 20 G/100G
OINTMENT TOPICAL EVERY 8 HOURS
Status: DISCONTINUED | OUTPATIENT
Start: 2025-01-13 | End: 2025-01-24

## 2025-01-13 RX ORDER — POTASSIUM CHLORIDE 7.45 MG/ML
40 INJECTION INTRAVENOUS
Status: DISCONTINUED | OUTPATIENT
Start: 2025-01-13 | End: 2025-01-22

## 2025-01-13 RX ADMIN — AMLODIPINE BESYLATE 5 MG: 5 TABLET ORAL at 09:01

## 2025-01-13 RX ADMIN — ZINC OXIDE: 200 OINTMENT TOPICAL at 10:01

## 2025-01-13 RX ADMIN — MAGNESIUM SULFATE HEPTAHYDRATE 2 G: 40 INJECTION, SOLUTION INTRAVENOUS at 05:01

## 2025-01-13 RX ADMIN — FUROSEMIDE 40 MG: 10 INJECTION, SOLUTION INTRAMUSCULAR; INTRAVENOUS at 10:01

## 2025-01-13 RX ADMIN — SODIUM PHOSPHATE, MONOBASIC, MONOHYDRATE AND SODIUM PHOSPHATE, DIBASIC, ANHYDROUS 20.01 MMOL: 142; 276 INJECTION, SOLUTION INTRAVENOUS at 01:01

## 2025-01-13 RX ADMIN — MYCOPHENOLATE MOFETIL 1000 MG: 500 INJECTION, POWDER, LYOPHILIZED, FOR SOLUTION INTRAVENOUS at 09:01

## 2025-01-13 RX ADMIN — Medication 1 DOSE: at 01:01

## 2025-01-13 RX ADMIN — ONDANSETRON 8 MG: 2 INJECTION INTRAMUSCULAR; INTRAVENOUS at 05:01

## 2025-01-13 RX ADMIN — MYCOPHENOLATE MOFETIL 1000 MG: 500 INJECTION, POWDER, LYOPHILIZED, FOR SOLUTION INTRAVENOUS at 04:01

## 2025-01-13 RX ADMIN — SUMATRIPTAN SUCCINATE 50 MG: 50 TABLET ORAL at 06:01

## 2025-01-13 RX ADMIN — LIDOCAINE HYDROCHLORIDE 15 ML: 20 SOLUTION OROPHARYNGEAL at 05:01

## 2025-01-13 RX ADMIN — DIPHENHYDRAMINE HYDROCHLORIDE 15 ML: 25 SOLUTION ORAL at 05:01

## 2025-01-13 RX ADMIN — Medication 1 DOSE: at 04:01

## 2025-01-13 RX ADMIN — PANTOPRAZOLE SODIUM 40 MG: 40 INJECTION, POWDER, FOR SOLUTION INTRAVENOUS at 10:01

## 2025-01-13 RX ADMIN — MAGNESIUM SULFATE HEPTAHYDRATE 2 G: 40 INJECTION, SOLUTION INTRAVENOUS at 09:01

## 2025-01-13 RX ADMIN — PROCHLORPERAZINE EDISYLATE 5 MG: 5 INJECTION INTRAMUSCULAR; INTRAVENOUS at 04:01

## 2025-01-13 RX ADMIN — LEVOFLOXACIN 500 MG: 5 INJECTION, SOLUTION INTRAVENOUS at 09:01

## 2025-01-13 RX ADMIN — PROCHLORPERAZINE EDISYLATE 5 MG: 5 INJECTION INTRAMUSCULAR; INTRAVENOUS at 10:01

## 2025-01-13 RX ADMIN — TACROLIMUS 1 MG: 1 CAPSULE ORAL at 05:01

## 2025-01-13 RX ADMIN — POTASSIUM CHLORIDE 20 MEQ: 14.9 INJECTION, SOLUTION INTRAVENOUS at 03:01

## 2025-01-13 RX ADMIN — ZINC OXIDE: 200 OINTMENT TOPICAL at 04:01

## 2025-01-13 RX ADMIN — FAMOTIDINE 20 MG: 10 INJECTION, SOLUTION INTRAVENOUS at 09:01

## 2025-01-13 RX ADMIN — LIDOCAINE HYDROCHLORIDE 15 ML: 20 SOLUTION OROPHARYNGEAL at 01:01

## 2025-01-13 RX ADMIN — ONDANSETRON 8 MG: 2 INJECTION INTRAMUSCULAR; INTRAVENOUS at 02:01

## 2025-01-13 RX ADMIN — PROCHLORPERAZINE EDISYLATE 5 MG: 5 INJECTION INTRAMUSCULAR; INTRAVENOUS at 03:01

## 2025-01-13 RX ADMIN — HYDROMORPHONE HYDROCHLORIDE 0.5 MG: 1 INJECTION, SOLUTION INTRAMUSCULAR; INTRAVENOUS; SUBCUTANEOUS at 03:01

## 2025-01-13 RX ADMIN — SIMETHICONE 80 MG: 80 TABLET, CHEWABLE ORAL at 10:01

## 2025-01-13 RX ADMIN — LOPERAMIDE HYDROCHLORIDE 2 MG: 2 CAPSULE ORAL at 01:01

## 2025-01-13 RX ADMIN — POTASSIUM CHLORIDE 20 MEQ: 14.9 INJECTION, SOLUTION INTRAVENOUS at 11:01

## 2025-01-13 RX ADMIN — Medication 1 DOSE: at 08:01

## 2025-01-13 RX ADMIN — LOPERAMIDE HYDROCHLORIDE 2 MG: 2 CAPSULE ORAL at 04:01

## 2025-01-13 RX ADMIN — SIMETHICONE 80 MG: 80 TABLET, CHEWABLE ORAL at 04:01

## 2025-01-13 RX ADMIN — DIPHENHYDRAMINE HYDROCHLORIDE 15 ML: 25 SOLUTION ORAL at 06:01

## 2025-01-13 RX ADMIN — URSODIOL 300 MG: 300 CAPSULE ORAL at 08:01

## 2025-01-13 RX ADMIN — MYCOPHENOLATE MOFETIL 1000 MG: 500 INJECTION, POWDER, LYOPHILIZED, FOR SOLUTION INTRAVENOUS at 10:01

## 2025-01-13 RX ADMIN — LETERMOVIR 480 MG: 480 TABLET, FILM COATED ORAL at 10:01

## 2025-01-13 RX ADMIN — DIPHENHYDRAMINE HYDROCHLORIDE 15 ML: 25 SOLUTION ORAL at 12:01

## 2025-01-13 RX ADMIN — FILGRASTIM 480 MCG: 480 INJECTION, SOLUTION INTRAVENOUS; SUBCUTANEOUS at 10:01

## 2025-01-13 RX ADMIN — LIDOCAINE HYDROCHLORIDE 15 ML: 20 SOLUTION OROPHARYNGEAL at 06:01

## 2025-01-13 RX ADMIN — PROCHLORPERAZINE EDISYLATE 5 MG: 5 INJECTION INTRAMUSCULAR; INTRAVENOUS at 08:01

## 2025-01-13 RX ADMIN — POSACONAZOLE 300 MG: 18 INJECTION, SOLUTION INTRAVENOUS at 09:01

## 2025-01-13 RX ADMIN — ACYCLOVIR SODIUM 400 MG: 50 INJECTION, SOLUTION INTRAVENOUS at 09:01

## 2025-01-13 RX ADMIN — ACYCLOVIR SODIUM 400 MG: 50 INJECTION, SOLUTION INTRAVENOUS at 08:01

## 2025-01-13 RX ADMIN — GUAIFENESIN AND DEXTROMETHORPHAN HYDROBROMIDE 1 TABLET: 600; 30 TABLET, EXTENDED RELEASE ORAL at 08:01

## 2025-01-13 RX ADMIN — LOPERAMIDE HYDROCHLORIDE 2 MG: 2 CAPSULE ORAL at 09:01

## 2025-01-13 RX ADMIN — URSODIOL 300 MG: 300 CAPSULE ORAL at 09:01

## 2025-01-13 RX ADMIN — Medication 1 DOSE: at 09:01

## 2025-01-13 RX ADMIN — ONDANSETRON 8 MG: 2 INJECTION INTRAMUSCULAR; INTRAVENOUS at 08:01

## 2025-01-13 RX ADMIN — LOPERAMIDE HYDROCHLORIDE 2 MG: 2 CAPSULE ORAL at 08:01

## 2025-01-13 RX ADMIN — LIDOCAINE HYDROCHLORIDE 15 ML: 20 SOLUTION OROPHARYNGEAL at 12:01

## 2025-01-13 RX ADMIN — GUAIFENESIN AND DEXTROMETHORPHAN HYDROBROMIDE 1 TABLET: 600; 30 TABLET, EXTENDED RELEASE ORAL at 09:01

## 2025-01-13 RX ADMIN — ALUMINUM HYDROXIDE, MAGNESIUM HYDROXIDE, AND DIMETHICONE 10 ML: 400; 400; 40 SUSPENSION ORAL at 05:01

## 2025-01-13 RX ADMIN — SIMETHICONE 80 MG: 80 TABLET, CHEWABLE ORAL at 08:01

## 2025-01-13 NOTE — ASSESSMENT & PLAN NOTE
- Likely thiotepa related, concern for infection is low. Continue Zinc oxide   - Wound care consulted

## 2025-01-13 NOTE — ASSESSMENT & PLAN NOTE
- Patient of Dr. 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 Received 3 bags with CD34 dose of 6.04x10^6/kg.   - Today is Day +18  - Patient is O+. Donor is O+.  - Patient is CMV non-reactive. Donor is CMV reactive. Given donor CMV status, patient will start (patient-supplied) letermovir on Day +5.  - Day -4 and Day -3 Busulfan dose-adjusted to 282 mg  - See treatment plan below:    Planned conditioning regimen:  Thiotepa on Day -7  Busulfan on Day -6, -5, -4, and -3  Fludarabine on Day -6, -5, -4, and -3  REST DAYS on Day -2 and -1     Planned  GVHD Prophylaxis:  Cyclophosphamide on Day +3 and +4  Tacrolimus starting on Day +5  Mycophenolate starting on Day +5     Antimicrobial Prophylaxis:  Acyclovir starting on Day -7  Levofloxacin starting on Day -1  Micafungin on Day -1 through Day +4  Letermovir starting on Day +5 (if CMV PCR drawn on day 0 results negative)  Posaconazole starting on Day +5  Atovaquone starting on Day +30     Skin Care with Thiotepa: Day -7 through D-5     Seizure Prophylaxis:  Levetiracetam on Day -7 through Day -2     SOS/VOD Prophylaxis:  Ursodiol on Day -7 through Day +30     Growth Factor Support:  Neupogen starting on Day +5        Caregiver: Carmelita (Spouse)  Post-transplant discharge plans: Home

## 2025-01-13 NOTE — ASSESSMENT & PLAN NOTE
- Pt with history of hematochezia that was worked up outpatient prior to transplant. 7/23/24 colonoscopy with several diverticula and a non-bleeding rectal abscess  - Denies hematochezia/melena while inpatient. Now with left sided perianal irritation and pain. Erythematous area that is tender to palpation, but without fluctuance. Reports pain became worse after irritation from diarrhea. Pt afebrile.   - Wound care consulted and will monitor pt closely for fevers/overt signs of infection  - Low threshold to consult CRS  - Continue zinc oxide

## 2025-01-13 NOTE — PROGRESS NOTES
SW notified that CM with [employer] [facility]    NAT left VM with Mitali Blount RN, at [phone] ex [phone], direct call back number provided.

## 2025-01-13 NOTE — PLAN OF CARE
POC reviewed w pt  at beginning of shift and PRN. Pt communicating needs and participating in POCpocD +18 haplo Allo SCT for PMF . AAOx4; reports HA pain; PRN pain meds administered simethicone given as ordered. Ambulates independently; but bed alarm set due to low platelets up w assist. Mucositis  pain managed w scheduled meds. Nausea controlled with scheduled meds .      Voids via urinal; cheduled antidiarrhea given as ordered . In NAD on RA; Skin intact w/gen. Bruising  Right chest central line dressing C/D/I . VSS; no acute events so far this shift. Non skid socks on; Pt. Instructed to call if any assistance is needed. Pt remaining free from falls or injury; Bed locked in lowest position w/side rails up x2 & all belongings including call light within reach; Plan of care ongoing; Will CTM

## 2025-01-13 NOTE — SUBJECTIVE & OBJECTIVE
Subjective:     Interval History: Day +18 following a haplo SCT conditioned with Bu/Flu/Thiotepa plus PTCy, [place], and MMF for primary myelofibrosis.Weight up today with use of IVF. Diuresing with lasix today. O2 saturations decreased, suspect volume overload contributing. Abdomen distended, but without tenderness. Monitoring closely for VOD/SOS with Tbili 1.2. Remains afebrile. Mucositis improving. Complaints of perianal pain and irritation. L sided perianal erythema with TTP. No signs fluctuance. Hematochezia worked up outpatient prior to admission and noted a non-bleeding rectal ulcer. Currently, denies blood in his stool. Wound care consulted and given barrier cream. Continuing electrolyte repletion today. ANC 80.     Objective:     Vital Signs (Most Recent):  Temp: 98.1 °F (36.7 °C) (01/13/25 0757)  Pulse: (!) 113 (01/13/25 0757)  Resp: 15 (01/13/25 0356)  BP: 139/73 (01/13/25 0755)  SpO2: (!) 93 % (01/13/25 0757) Vital Signs (24h Range):  Temp:  [98.1 °F (36.7 °C)-99.6 °F (37.6 °C)] 98.1 °F (36.7 °C)  Pulse:  [] 113  Resp:  [15-20] 15  SpO2:  [90 %-99 %] 93 %  BP: (131-164)/(70-79) 139/73     Weight: 88 kg (194 lb 1.8 oz)  Body mass index is 27.07 kg/m².  Body surface area is 2.1 meters squared.    ECOG SCORE           [unfilled]    Intake/Output - Last 3 Shifts         01/11 0700  01/12 0659 01/12 0700  01/13 0659 01/13 0700 01/14 0659    P.O. 100 600     I.V. (mL/kg)       Blood  249     IV Piggyback  350     Total Intake(mL/kg) 100 (1.3) 1199 (13.6)     Urine (mL/kg/hr) 700 (0.4) 730 (0.3)     Stool 0      Total Output 700 730     Net -600 +469            Stool Occurrence 1 x               Physical Exam  Vitals reviewed.   Constitutional:       Appearance: Normal appearance.   HENT:      Head: Normocephalic and atraumatic.      Nose: Nose normal.      Mouth/Throat:      Mouth: Mucous membranes are moist.      Pharynx: Oropharynx is clear.   Eyes:      Extraocular Movements: Extraocular movements  intact.      Conjunctiva/sclera: Conjunctivae normal.      Pupils: Pupils are equal, round, and reactive to light.   Cardiovascular:      Rate and Rhythm: Normal rate and regular rhythm.      Pulses: Normal pulses.      Heart sounds: Normal heart sounds.   Pulmonary:      Effort: Pulmonary effort is normal.      Breath sounds: Normal breath sounds.   Abdominal:      General: Abdomen is flat. Bowel sounds are normal.      Palpations: Abdomen is soft.   Genitourinary:     Comments: L perianal erythema with TTP. No fluctuance appreciated.   B/l scrotal erythema   Musculoskeletal:         General: Normal range of motion.      Cervical back: Normal range of motion.   Skin:     General: Skin is warm and dry.      Capillary Refill: Capillary refill takes less than 2 seconds.      Comments: R muhammad in place, c/d/I. No signs of infection    Neurological:      General: No focal deficit present.      Mental Status: He is alert and oriented to person, place, and time.   Psychiatric:         Mood and Affect: Mood normal.         Behavior: Behavior normal.         Thought Content: Thought content normal.         Judgment: Judgment normal.            Significant Labs:   CBC:   Recent Labs   Lab 01/12/25  0421 01/13/25  0351   WBC 0.02* 0.08*   HGB 7.5* 7.1*   HCT 21.8* 20.2*   PLT 6* 11*    and CMP:   Recent Labs   Lab 01/12/25  0421 01/13/25  0351   * 132*   K 3.6 3.5    100   CO2 21* 23    113*   BUN 8 7   CREATININE 0.7 0.7   CALCIUM 8.3* 8.3*   PROT 5.8* 5.9*   ALBUMIN 2.2* 2.2*   BILITOT 0.7 1.2*   ALKPHOS 77 84   AST 21 42*   ALT 29 40   ANIONGAP 10 9       Diagnostic Results:  None

## 2025-01-13 NOTE — PROGRESS NOTES
Daniel Rodríguez - Oncology ([facility])  Hematology  Bone Marrow Transplant  Progress Note    Patient Name: Rubén Hu  Admission Date: 12/18/2024  Hospital Length of Stay: 26 days  Code Status: Full Code    Subjective:     Interval History: Day +18 following a haplo SCT conditioned with Bu/Flu/Thiotepa plus PTCy, [place], and MMF for primary myelofibrosis.Weight up today with use of IVF. Diuresing with lasix today. O2 saturations decreased, suspect volume overload contributing. Abdomen distended, but without tenderness. Monitoring closely for VOD/SOS with Tbili 1.2. Remains afebrile. Mucositis improving. Complaints of perianal pain and irritation. L sided perianal erythema with TTP. No signs fluctuance. Hematochezia worked up outpatient prior to admission and noted a non-bleeding rectal ulcer. Currently, denies blood in his stool. Wound care consulted and given barrier cream. Continuing electrolyte repletion today. ANC 80.     Objective:     Vital Signs (Most Recent):  Temp: 98.1 °F (36.7 °C) (01/13/25 0757)  Pulse: (!) 113 (01/13/25 0757)  Resp: 15 (01/13/25 0356)  BP: 139/73 (01/13/25 0755)  SpO2: (!) 93 % (01/13/25 0757) Vital Signs (24h Range):  Temp:  [98.1 °F (36.7 °C)-99.6 °F (37.6 °C)] 98.1 °F (36.7 °C)  Pulse:  [] 113  Resp:  [15-20] 15  SpO2:  [90 %-99 %] 93 %  BP: (131-164)/(70-79) 139/73     Weight: 88 kg (194 lb 1.8 oz)  Body mass index is 27.07 kg/m².  Body surface area is 2.1 meters squared.    ECOG SCORE           [unfilled]    Intake/Output - Last 3 Shifts         01/11 0700 01/12 0659 01/12 0700 01/13 0659 01/13 0700 01/14 0659    P.O. 100 600     I.V. (mL/kg)       Blood  249     IV Piggyback  350     Total Intake(mL/kg) 100 (1.3) 1199 (13.6)     Urine (mL/kg/hr) 700 (0.4) 730 (0.3)     Stool 0      Total Output 700 730     Net -600 +469            Stool Occurrence 1 x               Physical Exam  Vitals reviewed.   Constitutional:       Appearance: Normal appearance.   HENT:      Head:  Normocephalic and atraumatic.      Nose: Nose normal.      Mouth/Throat:      Mouth: Mucous membranes are moist.      Pharynx: Oropharynx is clear.   Eyes:      Extraocular Movements: Extraocular movements intact.      Conjunctiva/sclera: Conjunctivae normal.      Pupils: Pupils are equal, round, and reactive to light.   Cardiovascular:      Rate and Rhythm: Normal rate and regular rhythm.      Pulses: Normal pulses.      Heart sounds: Normal heart sounds.   Pulmonary:      Effort: Pulmonary effort is normal.      Breath sounds: Normal breath sounds.   Abdominal:      General: Abdomen is flat. Bowel sounds are normal.      Palpations: Abdomen is soft.   Genitourinary:     Comments: L perianal erythema with TTP. No fluctuance appreciated.   B/l scrotal erythema   Musculoskeletal:         General: Normal range of motion.      Cervical back: Normal range of motion.   Skin:     General: Skin is warm and dry.      Capillary Refill: Capillary refill takes less than 2 seconds.      Comments: R muhammad in place, c/d/I. No signs of infection    Neurological:      General: No focal deficit present.      Mental Status: He is alert and oriented to person, place, and time.   Psychiatric:         Mood and Affect: Mood normal.         Behavior: Behavior normal.         Thought Content: Thought content normal.         Judgment: Judgment normal.            Significant Labs:   CBC:   Recent Labs   Lab 01/12/25  0421 01/13/25  0351   WBC 0.02* 0.08*   HGB 7.5* 7.1*   HCT 21.8* 20.2*   PLT 6* 11*    and CMP:   Recent Labs   Lab 01/12/25  0421 01/13/25  0351   * 132*   K 3.6 3.5    100   CO2 21* 23    113*   BUN 8 7   CREATININE 0.7 0.7   CALCIUM 8.3* 8.3*   PROT 5.8* 5.9*   ALBUMIN 2.2* 2.2*   BILITOT 0.7 1.2*   ALKPHOS 77 84   AST 21 42*   ALT 29 40   ANIONGAP 10 9       Diagnostic Results:  None  Assessment/Plan:     * History of allogeneic stem cell transplant  - Patient of Dr. Clyde James  - Admitting today for  a Bu/Flu/Thiotepa haploidentical (brother) allogeneic SCT  - Transplant day 0 on 12/26/24. Received 3 bags with CD34 dose of 6.04x10^6/kg.   - Today is Day +18  - Patient is O+. Donor is O+.  - Patient is CMV non-reactive. Donor is CMV reactive. Given donor CMV status, patient will start (patient-supplied) letermovir on Day +5.  - Day -4 and Day -3 Busulfan dose-adjusted to 282 mg  - See treatment plan below:    Planned conditioning regimen:  Thiotepa on Day -7  Busulfan on Day -6, -5, -4, and -3  Fludarabine on Day -6, -5, -4, and -3  REST DAYS on Day -2 and -1     Planned  GVHD Prophylaxis:  Cyclophosphamide on Day +3 and +4  Tacrolimus starting on Day +5  Mycophenolate starting on Day +5     Antimicrobial Prophylaxis:  Acyclovir starting on Day -7  Levofloxacin starting on Day -1  Micafungin on Day -1 through Day +4  Letermovir starting on Day +5 (if CMV PCR drawn on day 0 results negative)  Posaconazole starting on Day +5  Atovaquone starting on Day +30     Skin Care with Thiotepa: Day -7 through D-5     Seizure Prophylaxis:  Levetiracetam on Day -7 through Day -2     SOS/VOD Prophylaxis:  Ursodiol on Day -7 through Day +30     Growth Factor Support:  Neupogen starting on Day +5        Caregiver: Carmelita (Spouse)  Post-transplant discharge plans: Home      Primary myelofibrosis  - From Dr. 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 Transplant on 12/26/24 at 1330. Received 3 bags with CD34 dose of 6.04 x 10^6/kg.     Epigastric pain  - Suspect 2/2 chemotherapy conditioning regimen  prior to allogeneic SCT. Abdomen notably distended and with TTP in epigastric region.   - Continue protonix and famotidine daily, simethicone 80 TID ordered for flatulence  - , lipase normal  - morphine 1 mg PRN  - CT A/P 12/31: no acute pathology   - USG abdomen (RUQ) 01/02 unremarkable, not concerning for VOD    Perianal pain  - Pt with history of hematochezia that was worked up outpatient prior to transplant. 7/23/24 colonoscopy with several diverticula and a non-bleeding rectal abscess  - Denies hematochezia/melena while inpatient. Now with left sided perianal irritation and pain. Erythematous area that is tender to palpation, but without fluctuance. Reports pain became worse after irritation from diarrhea. Pt afebrile.   - Wound care consulted and will monitor pt closely for fevers/overt signs of infection  - Low threshold to consult CRS  - Continue zinc oxide    Mucositis  - Having poor PO intake/throat pain 2/2 chemotherapy   - Duke's solution QID  - Morphine 2 mg q3h PRN. Can increase dose or frequency if need be.  - Transitioned PO meds to IV.  - Diet changed from regular to soft/bite sized  - IMPROVING    Transaminitis  - Transaminases first elevated 12/27. Suspect 2/2 chemotherapy prior to transplant.   - Will continue to monitor with daily CMP.  - RESOLVED    Hypertension  - Became hypertensive following SCT on 12/26. No signs of volume overload contributing to HTN.  - 12/27 started amlodipine.     Headache  - 12/27 diffuse headache began.   - Oxy 5mg and sumatriptan as needed for HA  - Avoiding tylenol with recent allo SCT/neutropenia and do not want to mask a fever.   RESOLVED    Scrotal rash  - Likely thiotepa related, concern for infection is low. Continue Zinc oxide   - Wound care consulted    Diarrhea  - C. Diff negative on 12/28/24  - 01/07 - current   - Has been receiving increasing doses of PRN loperamide. Scheduled imodium on 01/10 and added PRN lomotil  - Will order cdif testing if he  has >3x diarrhea per day    Immunocompromised state associated with stem cell transplant  - See history of allogeneic stem cell transplant     Allergic contact dermatitis due to adhesives  - Itching and redness to LLQ/RLQ where tele leads were placed.   - Topical benadryl prn for itching.  - Tele removed       Hypokalemia  - See electrolyte disorder    Electrolyte disorder  - Replete per PRN electrolyte order set  - Daily CMP, mag, and phos while inpatient    Neutropenic fever  - Infection w/u neg thus far  - Afebrile, stopping cefepime and started levaquin 12/31/24    Chemotherapy-induced nausea  - Anticipate 2/2 chemotherapy   - Meds switched from PO to IV as able 1/2.   - Zofran q8h  - Compazine q6h  - Zyprexa QHS  - Ativan PRN    Insomnia  - Difficulty sleeping at night while IP.  - Has PRN ativan and trazodone  - Ambien PRN  - Melatonin not effective     Thrombocytosis  - 2/2 myelofibrosis  - Was on daily ASA, but stopped taking ~ 3 weeks ago  - Now with anticipated thrombocytopenia following chemotherapy  - Daily CBC while IP  RESOLVED    Pancytopenia due to chemotherapy  - Anticipated following chemotherapy.  - Transfuse for hgb < 7 or plts < 10K  - Continue antimicrobial ppx   - Daily CBC while IP    Adrenal nodule  - Subcentimeter nodular thickening of the adrenal glands first noted on imaging from 1/2024.  - Seen by endocrine prior to transplant admission and Dexamethasone suppression test performed. Patient responded appropriately. No further w/u needed.  - Likely adenomas.    Metabolic dysfunction-associated steatotic liver disease (MASLD)  - Biopsy proven to be steatotic change. Most compatible with MASLD.   - Follow-up with hepatology OP.    Hyperlipidemia  - Holding home atorvastatin while IP to avoid interaction with other medications. Can resume at discharge if LFTs stable.        VTE Risk Mitigation (From admission, onward)           Ordered     heparin, porcine (PF) 100 unit/mL injection flush 300  Units  As needed (PRN)         12/18/24 2025                    Disposition: Remains inpatient while awaiting engraftment     Chu Wolf PA-C  Bone Marrow Transplant  Daniel Rodríguez - Oncology ([facility])

## 2025-01-13 NOTE — PLAN OF CARE
Pt is Day +18 following a haplo SCT. No acute events during this shift, AAOX4, easily aroused, afebrile, with stable VS. Ambulatory to the bathroom with all safety precautions in place. No reports of headaches during this shift. Nausea relieved with scheduled nausea meds. Pt took all meds with no issue. Mucositis improving; suction at the bedside. Abdomen soft and round with some tenderness with general bruising. No BM on this shift. Scheduled imodium was given with afternoon meds.     Bed alarm is set.     Plan of care ongoing; all other needs met at this time.

## 2025-01-14 DIAGNOSIS — Z94.84 HISTORY OF ALLOGENEIC STEM CELL TRANSPLANT: Primary | ICD-10-CM

## 2025-01-14 PROBLEM — L98.9 SKIN LESIONS: Status: ACTIVE | Noted: 2025-01-10

## 2025-01-14 PROBLEM — E80.6 HYPERBILIRUBINEMIA: Status: ACTIVE | Noted: 2025-01-14

## 2025-01-14 PROBLEM — L03.90 CELLULITIS: Status: ACTIVE | Noted: 2025-01-14

## 2025-01-14 PROBLEM — R10.9 ABDOMINAL PAIN: Status: ACTIVE | Noted: 2025-01-02

## 2025-01-14 PROBLEM — D75.839 THROMBOCYTOSIS: Status: RESOLVED | Noted: 2024-12-18 | Resolved: 2025-01-14

## 2025-01-14 LAB
ALBUMIN SERPL BCP-MCNC: 2 G/DL (ref 3.5–5.2)
ALP SERPL-CCNC: 75 U/L (ref 40–150)
ALT SERPL W/O P-5'-P-CCNC: 35 U/L (ref 10–44)
ANION GAP SERPL CALC-SCNC: 10 MMOL/L (ref 8–16)
ANISOCYTOSIS BLD QL SMEAR: SLIGHT
AST SERPL-CCNC: 29 U/L (ref 10–40)
BASOPHILS # BLD AUTO: ABNORMAL K/UL (ref 0–0.2)
BASOPHILS NFR BLD: 0 % (ref 0–1.9)
BILIRUB SERPL-MCNC: 1.1 MG/DL (ref 0.1–1)
BLD PROD TYP BPU: NORMAL
BLD PROD TYP BPU: NORMAL
BLOOD UNIT EXPIRATION DATE: NORMAL
BLOOD UNIT EXPIRATION DATE: NORMAL
BLOOD UNIT TYPE CODE: 5100
BLOOD UNIT TYPE CODE: 5100
BLOOD UNIT TYPE: NORMAL
BLOOD UNIT TYPE: NORMAL
BUN SERPL-MCNC: 12 MG/DL (ref 6–20)
CALCIUM SERPL-MCNC: 8.1 MG/DL (ref 8.7–10.5)
CHLORIDE SERPL-SCNC: 98 MMOL/L (ref 95–110)
CO2 SERPL-SCNC: 23 MMOL/L (ref 23–29)
CODING SYSTEM: NORMAL
CODING SYSTEM: NORMAL
CREAT SERPL-MCNC: 0.8 MG/DL (ref 0.5–1.4)
CROSSMATCH INTERPRETATION: NORMAL
CROSSMATCH INTERPRETATION: NORMAL
DIFFERENTIAL METHOD BLD: ABNORMAL
DISPENSE STATUS: NORMAL
DISPENSE STATUS: NORMAL
EOSINOPHIL # BLD AUTO: ABNORMAL K/UL (ref 0–0.5)
EOSINOPHIL NFR BLD: 0 % (ref 0–8)
ERYTHROCYTE [DISTWIDTH] IN BLOOD BY AUTOMATED COUNT: 12.8 % (ref 11.5–14.5)
EST. GFR  (NO RACE VARIABLE): >60 ML/MIN/1.73 M^2
GLUCOSE SERPL-MCNC: 115 MG/DL (ref 70–110)
HCT VFR BLD AUTO: 18.8 % (ref 40–54)
HGB BLD-MCNC: 6.5 G/DL (ref 14–18)
HYPOCHROMIA BLD QL SMEAR: ABNORMAL
IMM GRANULOCYTES # BLD AUTO: ABNORMAL K/UL (ref 0–0.04)
IMM GRANULOCYTES NFR BLD AUTO: ABNORMAL % (ref 0–0.5)
LYMPHOCYTES # BLD AUTO: ABNORMAL K/UL (ref 1–4.8)
LYMPHOCYTES NFR BLD: 0 % (ref 18–48)
MAGNESIUM SERPL-MCNC: 1.7 MG/DL (ref 1.6–2.6)
MCH RBC QN AUTO: 30.8 PG (ref 27–31)
MCHC RBC AUTO-ENTMCNC: 34.6 G/DL (ref 32–36)
MCV RBC AUTO: 89 FL (ref 82–98)
MONOCYTES # BLD AUTO: ABNORMAL K/UL (ref 0.3–1)
MONOCYTES NFR BLD: 0 % (ref 4–15)
NEUTROPHILS NFR BLD: 92.9 % (ref 38–73)
NEUTS BAND NFR BLD MANUAL: 7.1 %
NRBC BLD-RTO: 0 /100 WBC
NUM UNITS TRANS PACKED RBC: NORMAL
OHS QRS DURATION: 80 MS
OHS QTC CALCULATION: 455 MS
OVALOCYTES BLD QL SMEAR: ABNORMAL
PHOSPHATE SERPL-MCNC: 2.6 MG/DL (ref 2.7–4.5)
PLATELET # BLD AUTO: 4 K/UL (ref 150–450)
PLATELET BLD QL SMEAR: ABNORMAL
PMV BLD AUTO: 11.6 FL (ref 9.2–12.9)
POIKILOCYTOSIS BLD QL SMEAR: SLIGHT
POLYCHROMASIA BLD QL SMEAR: ABNORMAL
POTASSIUM SERPL-SCNC: 3.5 MMOL/L (ref 3.5–5.1)
PROT SERPL-MCNC: 5.7 G/DL (ref 6–8.4)
RBC # BLD AUTO: 2.11 M/UL (ref 4.6–6.2)
SODIUM SERPL-SCNC: 131 MMOL/L (ref 136–145)
UNIT NUMBER: NORMAL
WBC # BLD AUTO: 0.21 K/UL (ref 3.9–12.7)

## 2025-01-14 PROCEDURE — 85027 COMPLETE CBC AUTOMATED: CPT | Performed by: NURSE PRACTITIONER

## 2025-01-14 PROCEDURE — 87102 FUNGUS ISOLATION CULTURE: CPT | Performed by: STUDENT IN AN ORGANIZED HEALTH CARE EDUCATION/TRAINING PROGRAM

## 2025-01-14 PROCEDURE — 87070 CULTURE OTHR SPECIMN AEROBIC: CPT | Performed by: STUDENT IN AN ORGANIZED HEALTH CARE EDUCATION/TRAINING PROGRAM

## 2025-01-14 PROCEDURE — 25500020 PHARM REV CODE 255: Performed by: INTERNAL MEDICINE

## 2025-01-14 PROCEDURE — 88305 TISSUE EXAM BY PATHOLOGIST: CPT | Performed by: PATHOLOGY

## 2025-01-14 PROCEDURE — P9040 RBC LEUKOREDUCED IRRADIATED: HCPCS

## 2025-01-14 PROCEDURE — 25000003 PHARM REV CODE 250

## 2025-01-14 PROCEDURE — 88312 SPECIAL STAINS GROUP 1: CPT | Mod: 59 | Performed by: PATHOLOGY

## 2025-01-14 PROCEDURE — 63600175 PHARM REV CODE 636 W HCPCS: Performed by: STUDENT IN AN ORGANIZED HEALTH CARE EDUCATION/TRAINING PROGRAM

## 2025-01-14 PROCEDURE — 84100 ASSAY OF PHOSPHORUS: CPT | Performed by: NURSE PRACTITIONER

## 2025-01-14 PROCEDURE — 93005 ELECTROCARDIOGRAM TRACING: CPT

## 2025-01-14 PROCEDURE — 25000003 PHARM REV CODE 250: Performed by: INTERNAL MEDICINE

## 2025-01-14 PROCEDURE — 99223 1ST HOSP IP/OBS HIGH 75: CPT | Mod: ,,, | Performed by: INTERNAL MEDICINE

## 2025-01-14 PROCEDURE — 80053 COMPREHEN METABOLIC PANEL: CPT | Performed by: NURSE PRACTITIONER

## 2025-01-14 PROCEDURE — 87075 CULTR BACTERIA EXCEPT BLOOD: CPT | Performed by: STUDENT IN AN ORGANIZED HEALTH CARE EDUCATION/TRAINING PROGRAM

## 2025-01-14 PROCEDURE — 63600175 PHARM REV CODE 636 W HCPCS: Performed by: NURSE PRACTITIONER

## 2025-01-14 PROCEDURE — 25000003 PHARM REV CODE 250: Performed by: STUDENT IN AN ORGANIZED HEALTH CARE EDUCATION/TRAINING PROGRAM

## 2025-01-14 PROCEDURE — 63600175 PHARM REV CODE 636 W HCPCS: Mod: JZ,TB | Performed by: INTERNAL MEDICINE

## 2025-01-14 PROCEDURE — 63600175 PHARM REV CODE 636 W HCPCS: Performed by: INTERNAL MEDICINE

## 2025-01-14 PROCEDURE — 86920 COMPATIBILITY TEST SPIN: CPT

## 2025-01-14 PROCEDURE — 87176 TISSUE HOMOGENIZATION CULTR: CPT | Performed by: STUDENT IN AN ORGANIZED HEALTH CARE EDUCATION/TRAINING PROGRAM

## 2025-01-14 PROCEDURE — 63600175 PHARM REV CODE 636 W HCPCS

## 2025-01-14 PROCEDURE — P9037 PLATE PHERES LEUKOREDU IRRAD: HCPCS

## 2025-01-14 PROCEDURE — 85007 BL SMEAR W/DIFF WBC COUNT: CPT | Performed by: NURSE PRACTITIONER

## 2025-01-14 PROCEDURE — 36430 TRANSFUSION BLD/BLD COMPNT: CPT

## 2025-01-14 PROCEDURE — 93010 ELECTROCARDIOGRAM REPORT: CPT | Mod: ,,, | Performed by: INTERNAL MEDICINE

## 2025-01-14 PROCEDURE — 20600001 HC STEP DOWN PRIVATE ROOM

## 2025-01-14 PROCEDURE — 83735 ASSAY OF MAGNESIUM: CPT | Performed by: NURSE PRACTITIONER

## 2025-01-14 PROCEDURE — 0HBBXZX EXCISION OF RIGHT UPPER ARM SKIN, EXTERNAL APPROACH, DIAGNOSTIC: ICD-10-PCS | Performed by: DERMATOLOGY

## 2025-01-14 PROCEDURE — 87206 SMEAR FLUORESCENT/ACID STAI: CPT | Performed by: STUDENT IN AN ORGANIZED HEALTH CARE EDUCATION/TRAINING PROGRAM

## 2025-01-14 PROCEDURE — 99233 SBSQ HOSP IP/OBS HIGH 50: CPT | Mod: ,,, | Performed by: INTERNAL MEDICINE

## 2025-01-14 PROCEDURE — 87186 SC STD MICRODIL/AGAR DIL: CPT | Performed by: STUDENT IN AN ORGANIZED HEALTH CARE EDUCATION/TRAINING PROGRAM

## 2025-01-14 PROCEDURE — 87077 CULTURE AEROBIC IDENTIFY: CPT | Performed by: STUDENT IN AN ORGANIZED HEALTH CARE EDUCATION/TRAINING PROGRAM

## 2025-01-14 PROCEDURE — 87116 MYCOBACTERIA CULTURE: CPT | Performed by: STUDENT IN AN ORGANIZED HEALTH CARE EDUCATION/TRAINING PROGRAM

## 2025-01-14 RX ORDER — CEFEPIME HYDROCHLORIDE 2 G/1
2 INJECTION, POWDER, FOR SOLUTION INTRAVENOUS
Status: DISCONTINUED | OUTPATIENT
Start: 2025-01-14 | End: 2025-01-25 | Stop reason: HOSPADM

## 2025-01-14 RX ORDER — METRONIDAZOLE 500 MG/1
500 TABLET ORAL EVERY 8 HOURS
Status: DISCONTINUED | OUTPATIENT
Start: 2025-01-15 | End: 2025-01-25 | Stop reason: HOSPADM

## 2025-01-14 RX ORDER — DIPHENHYDRAMINE HYDROCHLORIDE 50 MG/ML
25 INJECTION INTRAMUSCULAR; INTRAVENOUS EVERY 6 HOURS PRN
Status: DISCONTINUED | OUTPATIENT
Start: 2025-01-14 | End: 2025-01-14

## 2025-01-14 RX ORDER — PROCHLORPERAZINE EDISYLATE 5 MG/ML
5 INJECTION INTRAMUSCULAR; INTRAVENOUS EVERY 8 HOURS PRN
Status: DISCONTINUED | OUTPATIENT
Start: 2025-01-14 | End: 2025-01-24

## 2025-01-14 RX ORDER — HYDROCODONE BITARTRATE AND ACETAMINOPHEN 500; 5 MG/1; MG/1
TABLET ORAL
Status: DISCONTINUED | OUTPATIENT
Start: 2025-01-14 | End: 2025-01-15

## 2025-01-14 RX ORDER — METRONIDAZOLE 500 MG/1
500 TABLET ORAL EVERY 12 HOURS
Status: DISCONTINUED | OUTPATIENT
Start: 2025-01-14 | End: 2025-01-14

## 2025-01-14 RX ORDER — FUROSEMIDE 10 MG/ML
40 INJECTION INTRAMUSCULAR; INTRAVENOUS ONCE
Status: COMPLETED | OUTPATIENT
Start: 2025-01-14 | End: 2025-01-14

## 2025-01-14 RX ADMIN — MYCOPHENOLATE MOFETIL 1000 MG: 500 INJECTION, POWDER, LYOPHILIZED, FOR SOLUTION INTRAVENOUS at 09:01

## 2025-01-14 RX ADMIN — METRONIDAZOLE 500 MG: 500 TABLET ORAL at 09:01

## 2025-01-14 RX ADMIN — TACROLIMUS 1 MG: 1 CAPSULE ORAL at 07:01

## 2025-01-14 RX ADMIN — POTASSIUM CHLORIDE 40 MEQ: 7.46 INJECTION, SOLUTION INTRAVENOUS at 12:01

## 2025-01-14 RX ADMIN — CEFEPIME 2 G: 2 INJECTION, POWDER, FOR SOLUTION INTRAVENOUS at 02:01

## 2025-01-14 RX ADMIN — LOPERAMIDE HYDROCHLORIDE 2 MG: 2 CAPSULE ORAL at 09:01

## 2025-01-14 RX ADMIN — Medication 1 DOSE: at 09:01

## 2025-01-14 RX ADMIN — PROCHLORPERAZINE EDISYLATE 5 MG: 5 INJECTION INTRAMUSCULAR; INTRAVENOUS at 03:01

## 2025-01-14 RX ADMIN — URSODIOL 300 MG: 300 CAPSULE ORAL at 09:01

## 2025-01-14 RX ADMIN — SIMETHICONE 80 MG: 80 TABLET, CHEWABLE ORAL at 09:01

## 2025-01-14 RX ADMIN — ONDANSETRON 8 MG: 2 INJECTION INTRAMUSCULAR; INTRAVENOUS at 09:01

## 2025-01-14 RX ADMIN — ZINC OXIDE: 200 OINTMENT TOPICAL at 02:01

## 2025-01-14 RX ADMIN — POSACONAZOLE 300 MG: 18 INJECTION, SOLUTION INTRAVENOUS at 09:01

## 2025-01-14 RX ADMIN — IOHEXOL 75 ML: 350 INJECTION, SOLUTION INTRAVENOUS at 07:01

## 2025-01-14 RX ADMIN — AMLODIPINE BESYLATE 5 MG: 5 TABLET ORAL at 09:01

## 2025-01-14 RX ADMIN — GUAIFENESIN AND DEXTROMETHORPHAN HYDROBROMIDE 1 TABLET: 600; 30 TABLET, EXTENDED RELEASE ORAL at 09:01

## 2025-01-14 RX ADMIN — SIMETHICONE 80 MG: 80 TABLET, CHEWABLE ORAL at 03:01

## 2025-01-14 RX ADMIN — ACYCLOVIR SODIUM 400 MG: 50 INJECTION, SOLUTION INTRAVENOUS at 09:01

## 2025-01-14 RX ADMIN — ZINC OXIDE: 200 OINTMENT TOPICAL at 10:01

## 2025-01-14 RX ADMIN — ONDANSETRON 8 MG: 2 INJECTION INTRAMUSCULAR; INTRAVENOUS at 06:01

## 2025-01-14 RX ADMIN — VANCOMYCIN HYDROCHLORIDE 1500 MG: 1.5 INJECTION, POWDER, LYOPHILIZED, FOR SOLUTION INTRAVENOUS at 02:01

## 2025-01-14 RX ADMIN — LIDOCAINE HYDROCHLORIDE 15 ML: 20 SOLUTION OROPHARYNGEAL at 06:01

## 2025-01-14 RX ADMIN — FILGRASTIM 480 MCG: 480 INJECTION, SOLUTION INTRAVENOUS; SUBCUTANEOUS at 09:01

## 2025-01-14 RX ADMIN — FAMOTIDINE 20 MG: 10 INJECTION, SOLUTION INTRAVENOUS at 09:01

## 2025-01-14 RX ADMIN — LEVOFLOXACIN 500 MG: 5 INJECTION, SOLUTION INTRAVENOUS at 09:01

## 2025-01-14 RX ADMIN — IOHEXOL 60 ML: 350 INJECTION, SOLUTION INTRAVENOUS at 08:01

## 2025-01-14 RX ADMIN — MYCOPHENOLATE MOFETIL 1000 MG: 500 INJECTION, POWDER, LYOPHILIZED, FOR SOLUTION INTRAVENOUS at 11:01

## 2025-01-14 RX ADMIN — LOPERAMIDE HYDROCHLORIDE 2 MG: 2 CAPSULE ORAL at 05:01

## 2025-01-14 RX ADMIN — MYCOPHENOLATE MOFETIL 1000 MG: 500 INJECTION, POWDER, LYOPHILIZED, FOR SOLUTION INTRAVENOUS at 04:01

## 2025-01-14 RX ADMIN — LETERMOVIR 480 MG: 480 TABLET, FILM COATED ORAL at 09:01

## 2025-01-14 RX ADMIN — MAGNESIUM SULFATE HEPTAHYDRATE 2 G: 40 INJECTION, SOLUTION INTRAVENOUS at 12:01

## 2025-01-14 RX ADMIN — SODIUM CHLORIDE 1000 ML: 9 INJECTION, SOLUTION INTRAVENOUS at 07:01

## 2025-01-14 RX ADMIN — ZINC OXIDE: 200 OINTMENT TOPICAL at 06:01

## 2025-01-14 RX ADMIN — LOPERAMIDE HYDROCHLORIDE 2 MG: 2 CAPSULE ORAL at 02:01

## 2025-01-14 RX ADMIN — CEFEPIME 2 G: 2 INJECTION, POWDER, FOR SOLUTION INTRAVENOUS at 09:01

## 2025-01-14 RX ADMIN — TACROLIMUS 1 MG: 1 CAPSULE ORAL at 06:01

## 2025-01-14 RX ADMIN — ACETAMINOPHEN 650 MG: 325 TABLET ORAL at 07:01

## 2025-01-14 RX ADMIN — ONDANSETRON 8 MG: 2 INJECTION INTRAMUSCULAR; INTRAVENOUS at 02:01

## 2025-01-14 RX ADMIN — DIPHENHYDRAMINE HYDROCHLORIDE 15 ML: 25 SOLUTION ORAL at 06:01

## 2025-01-14 RX ADMIN — ALTEPLASE 2 MG: 2.2 INJECTION, POWDER, LYOPHILIZED, FOR SOLUTION INTRAVENOUS at 02:01

## 2025-01-14 RX ADMIN — VANCOMYCIN HYDROCHLORIDE 1500 MG: 1.5 INJECTION, POWDER, LYOPHILIZED, FOR SOLUTION INTRAVENOUS at 11:01

## 2025-01-14 RX ADMIN — PANTOPRAZOLE SODIUM 40 MG: 40 INJECTION, POWDER, FOR SOLUTION INTRAVENOUS at 09:01

## 2025-01-14 RX ADMIN — SODIUM PHOSPHATE, MONOBASIC, MONOHYDRATE AND SODIUM PHOSPHATE, DIBASIC, ANHYDROUS 15 MMOL: 142; 276 INJECTION, SOLUTION INTRAVENOUS at 04:01

## 2025-01-14 RX ADMIN — FUROSEMIDE 40 MG: 10 INJECTION, SOLUTION INTRAMUSCULAR; INTRAVENOUS at 09:01

## 2025-01-14 NOTE — SUBJECTIVE & OBJECTIVE
Past Medical History:   Diagnosis Date    Cancer     Flatulence 01/03/2025    Hyperlipidemia     Other constipation 12/19/2024    Thrombocytosis 12/18/2024       Past Surgical History:   Procedure Laterality Date    BONE MARROW BIOPSY N/A 11/27/2024    Procedure: Biopsy-bone marrow;  Surgeon: Clyde James MD;  Location: [facility] (4TH FLR);  Service: Oncology;  Laterality: N/A;  11/20-pre call complete-tb    COLONOSCOPY N/A 8/9/2023    Procedure: COLONOSCOPY;  Surgeon: Will Ardon MD;  Location: [facility] (4TH FLR);  Service: Endoscopy;  Laterality: N/A;  Suprep Instructions sent via portal / Authorizing:     Bebeto Arora MD in [facility] FAMILY MED/ INTERNAL MED/ PEDS  Referral:          [id number]    EYE SURGERY      FLEXIBLE SIGMOIDOSCOPY N/A 7/23/2024    Procedure: SIGMOIDOSCOPY, FLEXIBLE;  Surgeon: Nirali Vicente MD;  Location: [facility] ENDO;  Service: Endoscopy;  Laterality: N/A;    INSERTION OF LONGORIA CATHETER Right 12/18/2024    Procedure: INSERTION, CATHETER, CENTRAL VENOUS, LONGORIA TRIPLE LUMEN, Bard 12.5 fr Longoria Cath model [id number];  Surgeon: Willie Hadley MD;  Location: [facility] OR [address] [place] [address];  Service: General;  Laterality: Right;       Review of patient's allergies indicates:   Allergen Reactions    Bactrim [sulfamethoxazole-trimethoprim] Hives       Medications:  Medications Prior to Admission   Medication Sig    aspirin 81 MG Chew Take 81 mg by mouth once daily. Pt takes over the counter (Patient not taking: Reported on 12/18/2024)    ergocalciferol (ERGOCALCIFEROL) 50,000 unit Cap TAKE 1 CAPSULE BY MOUTH ONCE WEEKLY (Patient not taking: Reported on 12/18/2024)    letermovir (PREVYMIS) 480 mg Tab Take 1 tablet (480 mg total) by mouth once daily. (Patient not taking: Reported on 12/18/2024)    posaconazole (NOXAFIL) 100 mg TbEC tablet Take 3 tablets (300 mg total) by mouth once daily. (Patient not taking: Reported on 12/18/2024)    [DISCONTINUED] atorvastatin (LIPITOR) 10 MG tablet Take 1 tablet (10  "mg total) by mouth every evening. (Patient not taking: Reported on 12/18/2024)    [DISCONTINUED] ruxolitinib (JAKAFI) 20 mg Tab Take 1 tablet (20 mg) by mouth 2 (two) times a day. (Patient not taking: Reported on 12/18/2024.)     Antibiotics (From admission, onward)      Start     Stop Route Frequency Ordered    01/14/25 2100  metroNIDAZOLE tablet 500 mg         -- Oral Every 12 hours 01/14/25 1320    01/14/25 1430  ceFEPIme injection 2 g         -- IV Every 8 hours (non-standard times) 01/14/25 1320    01/14/25 1100  vancomycin 1,500 mg in 0.9% NaCl 250 mL IVPB (admixture device)         -- IV Every 12 hours (non-standard times) 01/14/25 0953    01/14/25 0947  vancomycin - pharmacy to dose  (vancomycin IVPB (PEDS and ADULTS))        Placed in "And" Linked Group    -- IV pharmacy to manage frequency 01/14/25 0847          Antifungals (From admission, onward)      Start     Stop Route Frequency Ordered    01/06/25 0900  posaconazole 300 mg in D5W 150 mL infusion         -- IV Every 24 hours (non-standard times) 01/05/25 0915    01/03/25 1838  duke's soln (benadryl 30 mL, mylanta 30 mL, LIDOcaine 30 mL, nystatin 30 mL) 120 mL         -- Oral 4 times daily PRN 01/03/25 1738          Antivirals (From admission, onward)          Stop Route Frequency     acyclovir (ZOVIRAX) injection         -- IV Every 12 hours (non-standard times)     letermovir         -- Oral Daily             Immunization History   Administered Date(s) Administered    COVID-19, vector-nr, rS-Ad26, PF (Isaac) 03/16/2021    Influenza (FLUBLOK) - Quadrivalent - Recombinant - PF *Preferred* (egg allergy) 10/20/2020, 10/25/2021, 10/14/2022, 09/29/2023    Influenza - Quadrivalent - MDCK - PF 10/08/2019    Pneumococcal Conjugate - 20 Valent 02/20/2024    Pneumococcal Polysaccharide - 23 Valent 08/09/2019    Tdap 01/21/2022    Zoster Recombinant 08/11/2020, 12/03/2020       Family History       Problem Relation (Age of Onset)    Arthritis Mother    Breast " cancer Maternal Cousin (51)    COPD Father    Heart disease Maternal Grandfather    Stroke Maternal Grandmother    Testicular cancer Maternal Cousin (17)          Social History     Socioeconomic History    Marital status:     Number of children: 0   Tobacco Use    Smoking status: Former     Types: Cigars, Cigarettes    Smokeless tobacco: Never   Substance and Sexual Activity    Alcohol use: Not Currently     Comment: socially    Drug use: Never    Sexual activity: Yes     Partners: Female     Social Drivers of Health     Financial Resource Strain: Low Risk  (12/18/2024)    Overall Financial Resource Strain (CARDIA)     Difficulty of Paying Living Expenses: Not very hard   Food Insecurity: No Food Insecurity (12/18/2024)    Hunger Vital Sign     Worried About Running Out of Food in the Last Year: Never true     Ran Out of Food in the Last Year: Never true   Transportation Needs: No Transportation Needs (12/18/2024)    TRANSPORTATION NEEDS     Transportation : No   Physical Activity: Insufficiently Active (6/30/2024)    Exercise Vital Sign     Days of Exercise per Week: 5 days     Minutes of Exercise per Session: 20 min   Stress: No Stress Concern Present (12/18/2024)    [employer] [place] [employer] - Occupational Stress Questionnaire     Feeling of Stress : Not at all   Housing Stability: Low Risk  (12/18/2024)    Housing Stability Vital Sign     Unable to Pay for Housing in the Last Year: No     Homeless in the Last Year: No     Review of Systems   Constitutional:  Negative for chills and fever.   Respiratory:  Negative for cough and shortness of breath.    Cardiovascular:  Negative for chest pain.   Gastrointestinal:  Positive for rectal pain (perianal). Negative for abdominal pain, constipation, diarrhea, nausea and vomiting.   Musculoskeletal:  Negative for arthralgias and myalgias.   Skin:  Positive for rash and wound.   Neurological:  Negative for weakness and headaches.     Objective:      Vital Signs (Most Recent):  Temp: 98.2 °F (36.8 °C) (01/14/25 1330)  Pulse: 107 (01/14/25 1330)  Resp: 18 (01/14/25 1330)  BP: 134/70 (01/14/25 1330)  SpO2: 97 % (01/14/25 1330) Vital Signs (24h Range):  Temp:  [98.1 °F (36.7 °C)-99 °F (37.2 °C)] 98.2 °F (36.8 °C)  Pulse:  [102-119] 107  Resp:  [18-20] 18  SpO2:  [92 %-97 %] 97 %  BP: (117-148)/(59-82) 134/70     Weight: 86.8 kg (191 lb 5.8 oz)  Body mass index is 26.69 kg/m².    Estimated Creatinine Clearance: 111.1 mL/min (based on SCr of 0.8 mg/dL).     Physical Exam  Vitals reviewed.   Constitutional:       General: He is not in acute distress.     Appearance: Normal appearance. He is not ill-appearing.   HENT:      Head: Normocephalic and atraumatic.   Eyes:      Extraocular Movements: Extraocular movements intact.      Conjunctiva/sclera: Conjunctivae normal.   Cardiovascular:      Rate and Rhythm: Normal rate and regular rhythm.      Heart sounds: No murmur heard.  Pulmonary:      Effort: Pulmonary effort is normal. No respiratory distress.      Breath sounds: Normal breath sounds.   Abdominal:      General: Abdomen is flat. Bowel sounds are normal.      Palpations: Abdomen is soft.      Tenderness: There is no abdominal tenderness.   Musculoskeletal:      Cervical back: Normal range of motion.   Skin:     General: Skin is warm and dry.      Comments: R underarm lesion and L calf rash  Erythematous induration of L perianal area   Neurological:      General: No focal deficit present.      Mental Status: He is alert and oriented to person, place, and time.   Psychiatric:         Mood and Affect: Mood normal.         Behavior: Behavior normal.         Thought Content: Thought content normal.          Significant Labs: All pertinent labs within the past 24 hours have been reviewed.  Recent Lab Results         01/14/25  0430        Albumin 2.0       ALP 75       ALT 35       Anion Gap 10       Aniso Slight       AST 29       Bands 7.1       Baso #  CANCELED  Comment: Result canceled by the ancillary.       Basophil % 0.0       BILIRUBIN TOTAL 1.1  Comment: For infants and newborns, interpretation of results should be based  on gestational age, weight and in agreement with clinical  observations.    Premature Infant recommended reference ranges:  Up to 24 hours.............<8.0 mg/dL  Up to 48 hours............<12.0 mg/dL  3-5 days..................<15.0 mg/dL  6-29 days.................<15.0 mg/dL         BUN 12       Calcium 8.1       Chloride 98       CO2 23       Creatinine 0.8       Differential Method Manual       eGFR >60.0       Eos # CANCELED  Comment: Result canceled by the ancillary.       Eos % 0.0       Glucose 115       Gran % 92.9       Hematocrit 18.8  Comment: PRELIM PLT/WBC/H&H critical result(s) called and verbal readback   obtained from TONY FRANCO RN AT 0556 ON 01/14/2025 BY MIKA  FINAL RESULTS VERIFIED BY REPEAT ANALYSIS by MIKA 01/14/2025 05:56         Hemoglobin 6.5       Hypo Occasional       Immature Grans (Abs) CANCELED  Comment: Mild elevation in immature granulocytes is non specific and   can be seen in a variety of conditions including stress response,   acute inflammation, trauma and pregnancy. Correlation with other   laboratory and clinical findings is essential.    Result canceled by the ancillary.         Immature Granulocytes CANCELED  Comment: Result canceled by the ancillary.       Lymph # CANCELED  Comment: Result canceled by the ancillary.       Lymph % 0.0       Magnesium  1.7       MCH 30.8       MCHC 34.6       MCV 89       Mono # CANCELED  Comment: Result canceled by the ancillary.       Mono % 0.0       MPV 11.6       nRBC 0       Ovalocytes Occasional       Phosphorus Level 2.6       Platelet Estimate Decreased       Platelet Count 4  Comment: PLT  critical result(s) called and verbal readback obtained from   Tony Franco RN by ANTOINE 01/14/2025 06:35         Poikilocytosis Slight       Poly Occasional        Potassium 3.5       PROTEIN TOTAL 5.7       RBC 2.11       RDW 12.8       Sodium 131       WBC 0.21  Comment: PRELIM PLT/WBC/H&H critical result(s) called and verbal readback   obtained from TONY FRANCO RN AT 0556 ON 01/14/2025 BY MIKA  FINAL RESULTS VERIFIED BY REPEAT ANALYSIS by MIKA 01/14/2025 05:56                 Significant Imaging: I have reviewed all pertinent imaging results/findings within the past 24 hours.

## 2025-01-14 NOTE — ASSESSMENT & PLAN NOTE
- Patient of Dr. 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 Received 3 bags with CD34 dose of 6.04x10^6/kg.   - Today is Day +19  - Patient is O+. Donor is O+.  - Patient is CMV non-reactive. Donor is CMV reactive. Given donor CMV status, patient will start (patient-supplied) letermovir on Day +5.  - Day -4 and Day -3 Busulfan dose-adjusted to 282 mg  - See treatment plan below:    Planned conditioning regimen:  Thiotepa on Day -7  Busulfan on Day -6, -5, -4, and -3  Fludarabine on Day -6, -5, -4, and -3  REST DAYS on Day -2 and -1     Planned  GVHD Prophylaxis:  Cyclophosphamide on Day +3 and +4  Tacrolimus starting on Day +5  Mycophenolate starting on Day +5     Antimicrobial Prophylaxis:  Acyclovir starting on Day -7  Levofloxacin starting on Day -1  Micafungin on Day -1 through Day +4  Letermovir starting on Day +5 (if CMV PCR drawn on day 0 results negative)  Posaconazole starting on Day +5  Atovaquone starting on Day +30     Skin Care with Thiotepa: Day -7 through D-5     Seizure Prophylaxis:  Levetiracetam on Day -7 through Day -2     SOS/VOD Prophylaxis:  Ursodiol on Day -7 through Day +30     Growth Factor Support:  Neupogen starting on Day +5        Caregiver: Carmelita (Spouse)  Post-transplant discharge plans: Home

## 2025-01-14 NOTE — ASSESSMENT & PLAN NOTE
- Suspect 2/2 chemotherapy conditioning regimen prior to allogeneic SCT. Abdomen notably distended and with TTP in epigastric region + RUQ.   - Continue protonix and famotidine daily, simethicone 80 TID ordered for flatulence  - , lipase normal  - morphine 1 mg PRN  - CT A/P 12/31: no acute pathology   - USG abdomen (RUQ) 01/02 unremarkable, not concerning for VOD  - Liver US w/ doppler 1/14: Hepatomegaly, mild intrahepatic biliary dilation. No signs of VOD/SOS  - Continuing on ursodiol for VOD/SOS ppx  - Trending Tbili. Elevated to 1.2 1/13. Today it is 1.1

## 2025-01-14 NOTE — ASSESSMENT & PLAN NOTE
-  1/14 pt first complained of perianal erythema and pain, R axillary rash/pain, and BLE petechial rash. Concern for cellulitis vs fungal infection. Low suspicion for acute GVHD as patient has yet to engraft. Pt remains afebrile, but with low grade temp of 100.1 yesterday evening  - CT A/P with colonic dilation, gaseous distension, and L sided perianal thickening/fat stranding concerning for cellulitis  - CT R axilla: no acute soft tissue infection  - CT BLE: mild edema; no signs of soft tissue infection  - S/p R axilla punch biopsy with dermatology. Pending pathology, AFB culture, Fungal culture, aerobic culture, and anaerobic cultures. Derm following  - ID following and recommending vancomycin, cefepime, and flagyl. Pending karius results  - Wound care consulted  - With zinc oxide barrier cream for scrotal and perianal irritation

## 2025-01-14 NOTE — SUBJECTIVE & OBJECTIVE
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  today. Weight stable. Tachycardic from 110s-120s. O2 saturations ranging from 92-94% on RA. Afebrile. Complaints of R underarm tenderness and erythema as well as worsening perianal irritation. On physical exam, R underarm has a palpable ulceration with surrounding warmth, tenderness, and erythema. Perianal irritation worsening with more diffuse redness, tenderness, and warmth. No fluctuance palpated. Bilateral lower extremities with petechiae and purpuric scabbing. Pt has not engrafted yet, so low suspicion for acute GVHD; however, will monitor for petechiae from low platelets vs infectious etiology. Vancomycin initiated with suspected cellulitis and ID and wound care consulted. Additionally, pt's abdomen is very distended with tenderness in the RUQ. Tbili 1.1 today and liver u/s w/ doppler ordered. Liver U/S resulted with hepatomegaly and minimal intrahepatic biliary ductal dilatation. No evidence of VOD/SOS or clots.     Objective:     Vital Signs (Most Recent):  Temp: 98.1 °F (36.7 °C) (01/14/25 1136)  Pulse: 108 (01/14/25 1136)  Resp: 18 (01/14/25 1136)  BP: 128/72 (01/14/25 1136)  SpO2: 97 % (01/14/25 1136) Vital Signs (24h Range):  Temp:  [98.1 °F (36.7 °C)-99 °F (37.2 °C)] 98.1 °F (36.7 °C)  Pulse:  [102-119] 108  Resp:  [18-20] 18  SpO2:  [92 %-97 %] 97 %  BP: (117-132)/(59-76) 128/72     Weight: 86.8 kg (191 lb 5.8 oz)  Body mass index is 26.69 kg/m².  Body surface area is 2.09 meters squared.    ECOG SCORE           [unfilled]    Intake/Output - Last 3 Shifts         01/12 0700 01/13 0659 01/13 0700 01/14 0659 01/14 0700  01/15 0659    P.O. 600 360     Blood 249      IV Piggyback 350 850     Total Intake(mL/kg) 1199 (13.6) 1210 (13.9)     Urine (mL/kg/hr) 730 (0.3) 1550 (0.7)     Emesis/NG output  0     Stool  0     Total Output 730 1550     Net +469  -340            Urine Occurrence  1 x     Stool Occurrence  0 x     Emesis Occurrence  0 x              Physical Exam  Vitals reviewed.   Constitutional:       Appearance: Normal appearance.   HENT:      Head: Normocephalic and atraumatic.      Nose: Nose normal.      Mouth/Throat:      Mouth: Mucous membranes are moist.      Pharynx: Oropharynx is clear.   Eyes:      Extraocular Movements: Extraocular movements intact.      Conjunctiva/sclera: Conjunctivae normal.      Pupils: Pupils are equal, round, and reactive to light.   Cardiovascular:      Rate and Rhythm: Normal rate and regular rhythm.      Pulses: Normal pulses.      Heart sounds: Normal heart sounds.   Pulmonary:      Effort: Pulmonary effort is normal.      Breath sounds: Normal breath sounds.   Abdominal:      General: Abdomen is flat. Bowel sounds are normal.      Palpations: Abdomen is soft.   Genitourinary:     Comments: L perianal erythema with TTP. No fluctuance appreciated.   B/l scrotal erythema   Musculoskeletal:         General: Normal range of motion.      Cervical back: Normal range of motion.   Skin:     General: Skin is warm and dry.      Capillary Refill: Capillary refill takes less than 2 seconds.      Findings: Bruising (abdominal), erythema (R underarm surrounding an ulceration), petechiae (B/l LE) and rash present. Rash is purpuric (on b/l LE).      Comments: R muhammad in place, c/d/I. No signs of infection    Neurological:      General: No focal deficit present.      Mental Status: He is alert and oriented to person, place, and time.   Psychiatric:         Mood and Affect: Mood normal.         Behavior: Behavior normal.         Thought Content: Thought content normal.         Judgment: Judgment normal.            Significant Labs:   CBC:   Recent Labs   Lab 01/13/25  0351 01/14/25  0430   WBC 0.08* 0.21*   HGB 7.1* 6.5*   HCT 20.2* 18.8*   PLT 11* 4*    and CMP:   Recent Labs   Lab 01/13/25  0351 01/14/25  0430   * 131*   K 3.5  3.5    98   CO2 23 23   * 115*   BUN 7 12   CREATININE 0.7 0.8   CALCIUM 8.3* 8.1*   PROT 5.9* 5.7*   ALBUMIN 2.2* 2.0*   BILITOT 1.2* 1.1*   ALKPHOS 84 75   AST 42* 29   ALT 40 35   ANIONGAP 9 10       Diagnostic Results:  U/S: Liver US w/doppler: Hepatomegaly. Mild intrahepatic biliary ductal dilation

## 2025-01-14 NOTE — HPI
"Mr. 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 Infectious disease consulted for "Concern for cellulitis in R underarm and perianal area. Warm to touch and tender to palpation. Remains afebrile. On anti-microbial ppx with levaquin, posaconazole, and acyclovir".     Photos of R underarm and L calf placed in media tab. Also has erythematous induration of L perianal area. Has been afebrile, on room air. Denies any significant exposures. [place] [facility] [place] They have 1 dog, no other animal exposures. Does go fishing, last trip was right before his previous hospitalization. Denies any cuts/scrapes/injuries while fishing. 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           "

## 2025-01-14 NOTE — ASSESSMENT & PLAN NOTE
- Pt with history of hematochezia that was worked up outpatient prior to transplant. 7/23/24 colonoscopy with several diverticula and a non-bleeding rectal abscess  - Denies hematochezia/melena while inpatient. Now with left sided perianal irritation and pain. Erythematous area that is tender to palpation, but without fluctuance. Reports pain became worse after irritation from diarrhea. Pt afebrile.   - 1/14 erythema worsening and rash now warm to touch. Concern for cellulitis vs abscess. Began vancomycin. ID consulted.  - Wound care consulted and will monitor pt closely for fevers/overt signs of infection  - Low threshold to consult CRS  - Continue zinc oxide

## 2025-01-14 NOTE — ASSESSMENT & PLAN NOTE
54M with PMH of myelofibrosis s/p haplo SCT 12/26/24, now with lesion of R underarm, L calf rash, and erythematous induration of L perianal area. No significant exposures.     Underarm lesion concerning for fungal infection, recommend dermatology for biopsy, and possibly of L calf as well since those lesions have different appearance. Will stop levofloxacin and start cefepime and flagyl (anaerobic coverage for possible abscess at L perianal area). Can also get CT of R underarm, L lower leg, and abdomen/pelvis. Also sent karius test.     Recommendations  Continue vancomycin  Stop levofloxacin  Start IV cefepime 2g q8h and PO flagyl 500mg BID  Dermatology consult for biopsy of R underarm lesion, possibly L leg rash as well  CT of R upper arm, L lower leg, and abdomen/pelvis  Follow up karius test

## 2025-01-14 NOTE — PROGRESS NOTES
Pharmacokinetic Initial Assessment: IV Vancomycin    Assessment/Plan:    Initiate intravenous vancomycin with loading dose of 1500 mg once followed by a maintenance dose of vancomycin 1500mg IV every 12 hours  Desired empiric serum trough concentration is 15 to 20 mcg/mL  Draw vancomycin trough level 60 min prior to fourth dose on 1/15 at approximately 2200  Pharmacy will continue to follow and monitor vancomycin.      Please contact pharmacy at extension [phone] with any questions regarding this assessment.     Thank you for the consult,   Yovany Morenotori       Patient brief summary:  Rubén Hu is a 54 y.o. male initiated on antimicrobial therapy with IV Vancomycin for treatment of suspected skin & soft tissue infection    Drug Allergies:   Review of patient's allergies indicates:   Allergen Reactions    Bactrim [sulfamethoxazole-trimethoprim] Hives       Actual Body Weight:   86.8kg    Renal Function:   Estimated Creatinine Clearance: 111.1 mL/min (based on SCr of 0.8 mg/dL).,     Dialysis Method (if applicable):  N/A    CBC (last 72 hours):  Recent Labs   Lab Result Units 01/12/25 0421 01/13/25 0351 01/14/25  0430   WBC K/uL 0.02* 0.08* 0.21*   Hemoglobin g/dL 7.5* 7.1* 6.5*   Hematocrit % 21.8* 20.2* 18.8*   Platelets K/uL 6* 11* 4*   Gran % % 100.0* 100.0* 92.9*   Lymph % % 0.0* 0.0* 0.0*   Mono % % 0.0* 0.0* 0.0*   Eosinophil % % 0.0 0.0 0.0   Basophil % % 0.0 0.0 0.0   Differential Method  Automated Manual Manual       Metabolic Panel (last 72 hours):  Recent Labs   Lab Result Units 01/12/25 0421 01/13/25 0351 01/14/25  0430   Sodium mmol/L 134* 132* 131*   Potassium mmol/L 3.6 3.5 3.5   Chloride mmol/L 103 100 98   CO2 mmol/L 21* 23 23   Glucose mg/dL 107 113* 115*   BUN mg/dL 8 7 12   Creatinine mg/dL 0.7 0.7 0.8   Albumin g/dL 2.2* 2.2* 2.0*   Total Bilirubin mg/dL 0.7 1.2* 1.1*   Alkaline Phosphatase U/L 77 84 75   AST U/L 21 42* 29   ALT U/L 29 40 35   Magnesium mg/dL 1.2* 1.3* 1.7   Phosphorus  "mg/dL 2.8 1.9* 2.6*       Drug levels (last 3 results):  No results for input(s): "VANCOMYCINRA", "VANCORANDOM", "VANCOMYCINPE", "VANCOPEAK", "VANCOMYCINTR", "VANCOTROUGH" in the last 72 hours.    Microbiologic Results:  Microbiology Results (last 7 days)       ** No results found for the last 168 hours. **            "

## 2025-01-14 NOTE — PROGRESS NOTES
Concerns for R axilla and BLE soft tissue infection including fungal infection. Concerns for perirectal soft tissue infection vs abscess. ID recommended switching prophylactic levaquin to cefepime + flagyl. Will continue vancomycin. CT R axilla, BLE, and CT AP ordered for further evaluation. Dermatology consulted and will perform a punch biopsy on the R axilla and BLE with cultures and fungal testing. Pt remains afebrile and updated on current plan of action.     Chu Wolf PA-C  Hematology/Oncology, BMT

## 2025-01-14 NOTE — ASSESSMENT & PLAN NOTE
- Tbili elevated to 1.2 on 1/13. Abdomen distended and tender to palpation in RUQ  - 12/31 CT AP complete for diffuse abdominal pain: No acute pathology. Stable adrenal nodule  - 1/2 abdominal US: No concerns for VOD/SOS  - 1/14 liver US w/ doppler: Hepatomegaly; mild intrahepatic biliary dilation. No evidence of VOD/SOS  - , lipase normal  - Continue ursodiol for VOD/SOS ppx  - Trending Tbili daily. 1.1 today

## 2025-01-14 NOTE — CONSULTS
"[facility])  Infectious Disease  Consult Note    Patient Name: Rubén Hu  MRN: [id number]  Admission Date: 12/18/2024  Hospital Length of Stay: 27 days  Attending Physician: Clyde James MD  Primary Care Provider: Bebeto Arora MD     Isolation Status: No active isolations    Patient information was obtained from patient, spouse/SO, and ER records.      Inpatient consult to Infectious Diseases  Consult performed by: Ghislaine Ashby DO  Consult ordered by: Chu Wolf PA-C        Assessment/Plan:     Derm  Skin lesions  54M with PMH of myelofibrosis s/p haplo SCT 12/26/24, now with lesion of R underarm, L calf rash, and erythematous induration of L perianal area. No significant exposures.     Underarm lesion concerning for fungal infection, recommend dermatology for biopsy, and possibly of L calf as well since those lesions have different appearance. Will stop levofloxacin and start cefepime and flagyl (anaerobic coverage for possible abscess at L perianal area). Can also get CT of R underarm, L lower leg, and abdomen/pelvis. Also sent karius test.     Recommendations  Continue vancomycin  Stop levofloxacin  Start IV cefepime 2g q8h and PO flagyl 500mg BID  Dermatology consult for biopsy of R underarm lesion, possibly L leg rash as well  CT of R upper arm, L lower leg, and abdomen/pelvis  Follow up karius test        Thank you for your consult. I will follow-up with patient. Please contact us if you have any additional questions.    Ghislaine Ashby DO  Infectious Disease  [facility])    Subjective:     Principal Problem: History of allogeneic stem cell transplant    HPI: Mr. 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 Infectious disease consulted for "Concern for cellulitis in R underarm and perianal area. Warm to touch and tender to palpation. Remains afebrile. On anti-microbial ppx with " "levaquin, posaconazole, and acyclovir".     Photos of R underarm and L calf placed in media tab. Also has erythematous induration of L perianal area. Has been afebrile, on room air. Denies any significant exposures. Lives in the [facility] [place] with his wife. They have 1 dog, no other animal exposures. Does go fishing, last trip was right before his previous hospitalization. Denies any cuts/scrapes/injuries while fishing. Works as the  for [employer].             Past Medical History:   Diagnosis Date    Cancer     Flatulence 01/03/2025    Hyperlipidemia     Other constipation 12/19/2024    Thrombocytosis 12/18/2024       Past Surgical History:   Procedure Laterality Date    BONE MARROW BIOPSY N/A 11/27/2024    Procedure: Biopsy-bone marrow;  Surgeon: Clyde James MD;  Location: [facility] ([address] [place] [address]);  Service: Oncology;  Laterality: N/A;  11/20-pre call complete-tb    COLONOSCOPY N/A 8/9/2023    Procedure: COLONOSCOPY;  Surgeon: Will Ardon MD;  [facility]  Service: Endoscopy;  Laterality: N/A;  Suprep Instructions sent via portal / Authorizing:     Bebeto Arora MD in [facility] FAMILY MED/ INTERNAL MED/ PEDS  Referral:          [id number]    EYE SURGERY      FLEXIBLE SIGMOIDOSCOPY N/A 7/23/2024    Procedure: SIGMOIDOSCOPY, FLEXIBLE;  Surgeon: Nirali Vicente MD;  Location: [facility];  Service: Endoscopy;  Laterality: N/A;    INSERTION OF LONGORIA CATHETER Right 12/18/2024    Procedure: INSERTION, CATHETER, CENTRAL VENOUS, LONGORIA TRIPLE LUMEN, Bard 12.5 fr Longoria Cath model [id number];  Surgeon: Willie Hadley MD;  Location: [address];  Service: General;  Laterality: Right;       Review of patient's allergies indicates:   Allergen Reactions    Bactrim [sulfamethoxazole-trimethoprim] Hives       Medications:  Medications Prior to Admission   Medication Sig    aspirin 81 MG Chew Take 81 mg by mouth once daily. Pt takes over the counter (Patient not taking: " "Reported on 12/18/2024)    ergocalciferol (ERGOCALCIFEROL) 50,000 unit Cap TAKE 1 CAPSULE BY MOUTH ONCE WEEKLY (Patient not taking: Reported on 12/18/2024)    letermovir (PREVYMIS) 480 mg Tab Take 1 tablet (480 mg total) by mouth once daily. (Patient not taking: Reported on 12/18/2024)    posaconazole (NOXAFIL) 100 mg TbEC tablet Take 3 tablets (300 mg total) by mouth once daily. (Patient not taking: Reported on 12/18/2024)    [DISCONTINUED] atorvastatin (LIPITOR) 10 MG tablet Take 1 tablet (10 mg total) by mouth every evening. (Patient not taking: Reported on 12/18/2024)    [DISCONTINUED] ruxolitinib (JAKAFI) 20 mg Tab Take 1 tablet (20 mg) by mouth 2 (two) times a day. (Patient not taking: Reported on 12/18/2024.)     Antibiotics (From admission, onward)      Start     Stop Route Frequency Ordered    01/14/25 2100  metroNIDAZOLE tablet 500 mg         -- Oral Every 12 hours 01/14/25 1320    01/14/25 1430  ceFEPIme injection 2 g         -- IV Every 8 hours (non-standard times) 01/14/25 1320    01/14/25 1100  vancomycin 1,500 mg in 0.9% NaCl 250 mL IVPB (admixture device)         -- IV Every 12 hours (non-standard times) 01/14/25 0953    01/14/25 0947  vancomycin - pharmacy to dose  (vancomycin IVPB (PEDS and ADULTS))        Placed in "And" Linked Group    -- IV pharmacy to manage frequency 01/14/25 0847          Antifungals (From admission, onward)      Start     Stop Route Frequency Ordered    01/06/25 0900  posaconazole 300 mg in D5W 150 mL infusion         -- IV Every 24 hours (non-standard times) 01/05/25 0915    01/03/25 1838  duke's soln (benadryl 30 mL, mylanta 30 mL, LIDOcaine 30 mL, nystatin 30 mL) 120 mL         -- Oral 4 times daily PRN 01/03/25 1738          Antivirals (From admission, onward)          Stop Route Frequency     acyclovir (ZOVIRAX) injection         -- IV Every 12 hours (non-standard times)     letermovir         -- Oral Daily             Immunization History   Administered Date(s) " Administered    COVID-19, vector-nr, rS-Ad26, PF (Isaac) 03/16/2021    Influenza (FLUBLOK) - Quadrivalent - Recombinant - PF *Preferred* (egg allergy) 10/20/2020, 10/25/2021, 10/14/2022, 09/29/2023    Influenza - Quadrivalent - MDCK - PF 10/08/2019    Pneumococcal Conjugate - 20 Valent 02/20/2024    Pneumococcal Polysaccharide - 23 Valent 08/09/2019    Tdap 01/21/2022    Zoster Recombinant 08/11/2020, 12/03/2020       Family History       Problem Relation (Age of Onset)    Arthritis Mother    Breast cancer Maternal Cousin (51)    COPD Father    Heart disease Maternal Grandfather    Stroke Maternal Grandmother    Testicular cancer Maternal Cousin (17)          Social History     Socioeconomic History    Marital status:     Number of children: 0   Tobacco Use    Smoking status: Former     Types: Cigars, Cigarettes    Smokeless tobacco: Never   Substance and Sexual Activity    Alcohol use: Not Currently     Comment: socially    Drug use: Never    Sexual activity: Yes     Partners: Female     Social Drivers of Health     Financial Resource Strain: Low Risk  (12/18/2024)    Overall Financial Resource Strain (CARDIA)     Difficulty of Paying Living Expenses: Not very hard   Food Insecurity: No Food Insecurity (12/18/2024)    Hunger Vital Sign     Worried About Running Out of Food in the Last Year: Never true     Ran Out of Food in the Last Year: Never true   Transportation Needs: No Transportation Needs (12/18/2024)    TRANSPORTATION NEEDS     Transportation : No   Physical Activity: Insufficiently Active (6/30/2024)    Exercise Vital Sign     Days of Exercise per Week: 5 days     Minutes of Exercise per Session: 20 min   Stress: No Stress Concern Present (12/18/2024)    [employer] - Occupational Stress Questionnaire     Feeling of Stress : Not at all   Housing Stability: Low Risk  (12/18/2024)    Housing Stability Vital Sign     Unable to Pay for Housing in the Last Year: No      Homeless in the Last Year: No     Review of Systems   Constitutional:  Negative for chills and fever.   Respiratory:  Negative for cough and shortness of breath.    Cardiovascular:  Negative for chest pain.   Gastrointestinal:  Positive for rectal pain (perianal). Negative for abdominal pain, constipation, diarrhea, nausea and vomiting.   Musculoskeletal:  Negative for arthralgias and myalgias.   Skin:  Positive for rash and wound.   Neurological:  Negative for weakness and headaches.     Objective:     Vital Signs (Most Recent):  Temp: 98.2 °F (36.8 °C) (01/14/25 1330)  Pulse: 107 (01/14/25 1330)  Resp: 18 (01/14/25 1330)  BP: 134/70 (01/14/25 1330)  SpO2: 97 % (01/14/25 1330) Vital Signs (24h Range):  Temp:  [98.1 °F (36.7 °C)-99 °F (37.2 °C)] 98.2 °F (36.8 °C)  Pulse:  [102-119] 107  Resp:  [18-20] 18  SpO2:  [92 %-97 %] 97 %  BP: (117-148)/(59-82) 134/70     Weight: 86.8 kg (191 lb 5.8 oz)  Body mass index is 26.69 kg/m².    Estimated Creatinine Clearance: 111.1 mL/min (based on SCr of 0.8 mg/dL).     Physical Exam  Vitals reviewed.   Constitutional:       General: He is not in acute distress.     Appearance: Normal appearance. He is not ill-appearing.   HENT:      Head: Normocephalic and atraumatic.   Eyes:      Extraocular Movements: Extraocular movements intact.      Conjunctiva/sclera: Conjunctivae normal.   Cardiovascular:      Rate and Rhythm: Normal rate and regular rhythm.      Heart sounds: No murmur heard.  Pulmonary:      Effort: Pulmonary effort is normal. No respiratory distress.      Breath sounds: Normal breath sounds.   Abdominal:      General: Abdomen is flat. Bowel sounds are normal.      Palpations: Abdomen is soft.      Tenderness: There is no abdominal tenderness.   Musculoskeletal:      Cervical back: Normal range of motion.   Skin:     General: Skin is warm and dry.      Comments: R underarm lesion and L calf rash  Erythematous induration of L perianal area   Neurological:      General: No  focal deficit present.      Mental Status: He is alert and oriented to person, place, and time.   Psychiatric:         Mood and Affect: Mood normal.         Behavior: Behavior normal.         Thought Content: Thought content normal.          Significant Labs: All pertinent labs within the past 24 hours have been reviewed.  Recent Lab Results         01/14/25  0430        Albumin 2.0       ALP 75       ALT 35       Anion Gap 10       Aniso Slight       AST 29       Bands 7.1       Baso # CANCELED  Comment: Result canceled by the ancillary.       Basophil % 0.0       BILIRUBIN TOTAL 1.1  Comment: For infants and newborns, interpretation of results should be based  on gestational age, weight and in agreement with clinical  observations.    Premature Infant recommended reference ranges:  Up to 24 hours.............<8.0 mg/dL  Up to 48 hours............<12.0 mg/dL  3-5 days..................<15.0 mg/dL  6-29 days.................<15.0 mg/dL         BUN 12       Calcium 8.1       Chloride 98       CO2 23       Creatinine 0.8       Differential Method Manual       eGFR >60.0       Eos # CANCELED  Comment: Result canceled by the ancillary.       Eos % 0.0       Glucose 115       Gran % 92.9       Hematocrit 18.8  Comment: PRELIM PLT/WBC/H&H critical result(s) called and verbal readback   obtained from TONY FRANCO RN AT 0556 ON 01/14/2025 BY MIKA  FINAL RESULTS VERIFIED BY REPEAT ANALYSIS by MIKA 01/14/2025 05:56         Hemoglobin 6.5       Hypo Occasional       Immature Grans (Abs) CANCELED  Comment: Mild elevation in immature granulocytes is non specific and   can be seen in a variety of conditions including stress response,   acute inflammation, trauma and pregnancy. Correlation with other   laboratory and clinical findings is essential.    Result canceled by the ancillary.         Immature Granulocytes CANCELED  Comment: Result canceled by the ancillary.       Lymph # CANCELED  Comment: Result canceled by the  ancillary.       Lymph % 0.0       Magnesium  1.7       MCH 30.8       MCHC 34.6       MCV 89       Mono # CANCELED  Comment: Result canceled by the ancillary.       Mono % 0.0       MPV 11.6       nRBC 0       Ovalocytes Occasional       Phosphorus Level 2.6       Platelet Estimate Decreased       Platelet Count 4  Comment: PLT  critical result(s) called and verbal readback obtained from   Octavia Franco RN by ANTOINE 01/14/2025 06:35         Poikilocytosis Slight       Poly Occasional       Potassium 3.5       PROTEIN TOTAL 5.7       RBC 2.11       RDW 12.8       Sodium 131       WBC 0.21  Comment: PRELIM PLT/WBC/H&H critical result(s) called and verbal readback   obtained from OCTAVIA FRANCO RN AT 0556 ON 01/14/2025 BY MIKA  FINAL RESULTS VERIFIED BY REPEAT ANALYSIS by MIKA 01/14/2025 05:56                 Significant Imaging: I have reviewed all pertinent imaging results/findings within the past 24 hours.

## 2025-01-14 NOTE — ASSESSMENT & PLAN NOTE
- Pt with R underarm, perianal and scrotal erythema and pain. Now with b/l LE purpuric rash.  - 1/14 perianal erythema and pain worsening with warmth to touch and tenderness to palpation. Concerns for cellulitis  - 1/14 R underarm with small ulceration and surrounding erythema, warms and tenderness. Concerns for cellulitis  - 1/14 bilateral lower extremities with petechiae and purpuric scabbing. Concern for petechiae + scabbing from thrombocytopenia and itching vs infectious etiology  - 1/14 Started vancomycin  - ID consulted  - Wound care consulted  - With zinc oxide barrier cream for scrotal and perianal irritation

## 2025-01-14 NOTE — PROGRESS NOTES
Daniel Rodríguez - Oncology ([facility])  Hematology  Bone Marrow Transplant  Progress Note    Patient Name: Rubén Hu  Admission Date: 12/18/2024  Hospital Length of Stay: 27 days  Code Status: Full Code    Subjective:     Interval History: Day +19 following a haplo SCT conditioned with Bu/Flu/Thiotepa + PTCy, [place], and MMF for primary myelofibrosis.  today. Weight stable. Tachycardic from 110s-120s. O2 saturations ranging from 92-94% on RA. Afebrile. Complaints of R underarm tenderness and erythema as well as worsening perianal irritation. On physical exam, R underarm has a palpable ulceration with surrounding warmth, tenderness, and erythema. Perianal irritation worsening with more diffuse redness, tenderness, and warmth. No fluctuance palpated. Bilateral lower extremities with petechiae and purpuric scabbing. Pt has not engrafted yet, so low suspicion for acute GVHD; however, will monitor for petechiae from low platelets vs infectious etiology. Vancomycin initiated with suspected cellulitis vs fungal infection and ID and wound care consulted. Additionally, pt's abdomen is very distended with tenderness in the RUQ. Tbili 1.1 today and liver u/s w/ doppler ordered. Liver U/S resulted with hepatomegaly and minimal intrahepatic biliary ductal dilatation. No evidence of VOD/SOS or clots.     Objective:     Vital Signs (Most Recent):  Temp: 98.1 °F (36.7 °C) (01/14/25 1136)  Pulse: 108 (01/14/25 1136)  Resp: 18 (01/14/25 1136)  BP: 128/72 (01/14/25 1136)  SpO2: 97 % (01/14/25 1136) Vital Signs (24h Range):  Temp:  [98.1 °F (36.7 °C)-99 °F (37.2 °C)] 98.1 °F (36.7 °C)  Pulse:  [102-119] 108  Resp:  [18-20] 18  SpO2:  [92 %-97 %] 97 %  BP: (117-132)/(59-76) 128/72     Weight: 86.8 kg (191 lb 5.8 oz)  Body mass index is 26.69 kg/m².  Body surface area is 2.09 meters squared.    ECOG SCORE           [unfilled]    Intake/Output - Last 3 Shifts         01/12 0700  01/13 0659 01/13 0700 01/14 0659 01/14 0700  01/15 0659     P.O. 600 360     Blood 249      IV Piggyback 350 850     Total Intake(mL/kg) 1199 (13.6) 1210 (13.9)     Urine (mL/kg/hr) 730 (0.3) 1550 (0.7)     Emesis/NG output  0     Stool  0     Total Output 730 1550     Net +469 -340            Urine Occurrence  1 x     Stool Occurrence  0 x     Emesis Occurrence  0 x              Physical Exam  Vitals reviewed.   Constitutional:       Appearance: Normal appearance.   HENT:      Head: Normocephalic and atraumatic.      Nose: Nose normal.      Mouth/Throat:      Mouth: Mucous membranes are moist.      Pharynx: Oropharynx is clear.   Eyes:      Extraocular Movements: Extraocular movements intact.      Conjunctiva/sclera: Conjunctivae normal.      Pupils: Pupils are equal, round, and reactive to light.   Cardiovascular:      Rate and Rhythm: Normal rate and regular rhythm.      Pulses: Normal pulses.      Heart sounds: Normal heart sounds.   Pulmonary:      Effort: Pulmonary effort is normal.      Breath sounds: Normal breath sounds.   Abdominal:      General: Abdomen is flat. Bowel sounds are normal.      Palpations: Abdomen is soft.   Genitourinary:     Comments: L perianal erythema with TTP. No fluctuance appreciated.   B/l scrotal erythema   Musculoskeletal:         General: Normal range of motion.      Cervical back: Normal range of motion.   Skin:     General: Skin is warm and dry.      Capillary Refill: Capillary refill takes less than 2 seconds.      Findings: Bruising (abdominal), erythema (R underarm surrounding an ulceration), petechiae (B/l LE) and rash present. Rash is purpuric (on b/l LE).      Comments: R muhammad in place, c/d/I. No signs of infection    Neurological:      General: No focal deficit present.      Mental Status: He is alert and oriented to person, place, and time.   Psychiatric:         Mood and Affect: Mood normal.         Behavior: Behavior normal.         Thought Content: Thought content normal.         Judgment: Judgment normal.             Significant Labs:   CBC:   Recent Labs   Lab 01/13/25  0351 01/14/25  0430   WBC 0.08* 0.21*   HGB 7.1* 6.5*   HCT 20.2* 18.8*   PLT 11* 4*    and CMP:   Recent Labs   Lab 01/13/25  0351 01/14/25  0430   * 131*   K 3.5 3.5    98   CO2 23 23   * 115*   BUN 7 12   CREATININE 0.7 0.8   CALCIUM 8.3* 8.1*   PROT 5.9* 5.7*   ALBUMIN 2.2* 2.0*   BILITOT 1.2* 1.1*   ALKPHOS 84 75   AST 42* 29   ALT 40 35   ANIONGAP 9 10       Diagnostic Results:  U/S: Liver US w/doppler: Hepatomegaly. Mild intrahepatic biliary ductal dilation   Assessment/Plan:     * History of allogeneic stem cell transplant  - Patient of Dr. 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 Received 3 bags with CD34 dose of 6.04x10^6/kg.   - Today is Day +19  - Patient is O+. Donor is O+.  - Patient is CMV non-reactive. Donor is CMV reactive. Given donor CMV status, patient will start (patient-supplied) letermovir on Day +5.  - Day -4 and Day -3 Busulfan dose-adjusted to 282 mg  - See treatment plan below:    Planned conditioning regimen:  Thiotepa on Day -7  Busulfan on Day -6, -5, -4, and -3  Fludarabine on Day -6, -5, -4, and -3  REST DAYS on Day -2 and -1     Planned  GVHD Prophylaxis:  Cyclophosphamide on Day +3 and +4  Tacrolimus starting on Day +5  Mycophenolate starting on Day +5     Antimicrobial Prophylaxis:  Acyclovir starting on Day -7  Levofloxacin starting on Day -1  Micafungin on Day -1 through Day +4  Letermovir starting on Day +5 (if CMV PCR drawn on day 0 results negative)  Posaconazole starting on Day +5  Atovaquone starting on Day +30     Skin Care with Thiotepa: Day -7 through D-5     Seizure Prophylaxis:  Levetiracetam on Day -7 through Day -2     SOS/VOD Prophylaxis:  Ursodiol on Day -7 through Day +30     Growth Factor Support:  Neupogen starting on Day +5        Caregiver: Carmelita (Spouse)  Post-transplant discharge plans:  Home      Primary myelofibrosis  - From Dr. 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 Transplant on 12/26/24 at 1330. Received 3 bags with CD34 dose of 6.04 x 10^6/kg.     Rash  - Pt with R underarm, perianal and scrotal erythema and pain. Now with b/l LE purpuric rash.  - 1/14 perianal erythema and pain worsening with warmth to touch and tenderness to palpation. Concerns for cellulitis vs abscess  - 1/14 R underarm with small ulceration and surrounding erythema, warms and tenderness. Concerns for cellulitis vs fungal infection   - 1/14 bilateral lower extremities with petechiae and purpuric scabbing. Concern for petechiae + scabbing from thrombocytopenia and itching vs infectious etiology  - 1/14 Started vancomycin  - ID consulted  - Wound care consulted  - With zinc oxide barrier cream for scrotal and perianal irritation    Perianal pain  - Pt with history of hematochezia that was worked up outpatient prior to transplant. 7/23/24 colonoscopy with several diverticula and a non-bleeding rectal abscess  - Denies hematochezia/melena while inpatient. Now with left sided perianal irritation and pain. Erythematous area that is tender to palpation, but without fluctuance. Reports pain became worse after irritation from diarrhea. Pt afebrile.   - 1/14 erythema worsening and rash now warm to touch. Concern for cellulitis vs abscess. Began vancomycin. ID consulted.  - Wound care consulted and will monitor pt closely for fevers/overt signs of infection  - Low threshold to consult CRS  - Continue  zinc oxide    Hyperbilirubinemia  - Tbili elevated to 1.2 on 1/13. Abdomen distended and tender to palpation in RUQ  - 12/31 CT AP complete for diffuse abdominal pain: No acute pathology. Stable adrenal nodule  - 1/2 abdominal US: No concerns for VOD/SOS  - 1/14 liver US w/ doppler: Hepatomegaly; mild intrahepatic biliary dilation. No evidence of VOD/SOS  - , lipase normal  - Continue ursodiol for VOD/SOS ppx  - Trending Tbili daily. 1.1 today    Abdominal pain  - Suspect 2/2 chemotherapy conditioning regimen prior to allogeneic SCT. Abdomen notably distended and with TTP in epigastric region + RUQ.   - Continue protonix and famotidine daily, simethicone 80 TID ordered for flatulence  - , lipase normal  - morphine 1 mg PRN  - CT A/P 12/31: no acute pathology   - USG abdomen (RUQ) 01/02 unremarkable, not concerning for VOD  - Liver US w/ doppler 1/14: Hepatomegaly, mild intrahepatic biliary dilation. No signs of VOD/SOS  - Continuing on ursodiol for VOD/SOS ppx  - Trending Tbili. Elevated to 1.2 1/13. Today it is 1.1    Transaminitis  - Transaminases first elevated 12/27. Suspect 2/2 chemotherapy prior to transplant.   - Will continue to monitor with daily CMP.  - RESOLVED    Mucositis  - Having poor PO intake/throat pain 2/2 chemotherapy   - Duke's solution QID  - Morphine 2 mg q3h PRN. Can increase dose or frequency if need be.  - Transitioned PO meds to IV.  - Diet changed from regular to soft/bite sized  - IMPROVING    Hypertension  - Became hypertensive following SCT on 12/26. No signs of volume overload contributing to HTN.  - 12/27 started amlodipine.     Headache  - 12/27 diffuse headache began.   - Oxy 5mg and sumatriptan as needed for HA  - Avoiding tylenol with recent allo SCT/neutropenia and do not want to mask a fever.   RESOLVED    Cellulitis  - See rash  - See perianal pain  - ID consulted  - Wound care consulted  - 1/14 began vancomycin    Diarrhea  - C. Diff negative on 12/28/24.  Diarrhea improving  - Imodium PRN      Immunocompromised state associated with stem cell transplant  - See history of allogeneic stem cell transplant     Allergic contact dermatitis due to adhesives  - Itching and redness to LLQ/RLQ where tele leads were placed.   - Topical benadryl prn for itching.  - Tele removed       Hypokalemia  - See electrolyte disorder    Electrolyte disorder  - Replete per PRN electrolyte order set  - Daily CMP, mag, and phos while inpatient    Neutropenic fever  - Infection w/u neg thus far  - Afebrile, stopping cefepime and started levaquin 12/31/24    Chemotherapy-induced nausea  - Anticipate 2/2 chemotherapy   - Meds switched from PO to IV as able 1/2.   - Zofran q8h  - Compazine q6h  - Zyprexa QHS  - Ativan PRN    Insomnia  - Difficulty sleeping at night while IP.  - Has PRN ativan and trazodone  - Ambien PRN  - Melatonin not effective     Pancytopenia due to chemotherapy  - Anticipated following chemotherapy.  - Transfuse for hgb < 7 or plts < 10K  - Continue antimicrobial ppx   - Daily CBC while IP    Adrenal nodule  - Subcentimeter nodular thickening of the adrenal glands first noted on imaging from 1/2024.  - Seen by endocrine prior to transplant admission and Dexamethasone suppression test performed. Patient responded appropriately. No further w/u needed.  - Likely adenomas.    Metabolic dysfunction-associated steatotic liver disease (MASLD)  - Biopsy proven to be steatotic change. Most compatible with MASLD.   - Follow-up with hepatology OP.    Hyperlipidemia  - Holding home atorvastatin while IP to avoid interaction with other medications. Can resume at discharge if LFTs stable.        VTE Risk Mitigation (From admission, onward)           Ordered     heparin, porcine (PF) 100 unit/mL injection flush 300 Units  As needed (PRN)         12/18/24 2025                    Disposition: Remains inpatient     Chu Wolf PA-C  Bone Marrow Transplant  Daniel [facility] - Oncology  ([facility])

## 2025-01-14 NOTE — CONSULTS
Daniel Rodríguez - Oncology ([facility])  Dermatology  Consult Note    Patient Name: Rubén Hu  MRN: [id number]  Admission Date: 12/18/2024  Hospital Length of Stay: 27 days  Attending Physician: Clyde James MD  Primary Care Provider: Bebeto Arora MD     Inpatient consult to Dermatology  Consult performed by: Imelda Camarillo MD  Consult ordered by: Chu Wolf, MARK        Subjective:     Principal Problem:History of allogeneic stem cell transplant    Reason for consult:  painful lesion armpit    HPI: 54yoM PMH of myelofibrosis s/p haplo SCT 12/26/24 on immunosuppression with tacrolimus and cellcept with very painful lesion in R armpit that appeared overnight. The patient denies similar lesions anywhere else.     He feels chills. Denies fevers, mouth ulcers, seizures. + petechial rash that has been new since hospitalization. Afebrile in past 24 hours.     Scheduled Meds:   (Magic mouthwash) 1:1:1 diphenhydrAMINE(Benadryl) 12.5mg/5ml liq, aluminum & magnesium hydroxide-simethicone (Maalox), LIDOcaine viscous 2%  15 mL Swish & Spit Q6H    acyclovir  400 mg Intravenous Q12H    amLODIPine  5 mg Oral Daily    [START ON 1/25/2025] atovaquone  1,500 mg Oral Daily    ceFEPime IV (PEDS and ADULTS)  2 g Intravenous Q8H    dextromethorphan-guaiFENesin  mg  1 tablet Oral BID    famotidine (PF)  20 mg Intravenous Daily    filgrastim  480 mcg Subcutaneous Daily    letermovir  480 mg Oral Daily    LIDOcaine viscous HCl 2%  15 mL Mucous Membrane Q6H    loperamide  2 mg Oral QID    metroNIDAZOLE  500 mg Oral Q12H    mycophenolate (CELLCEPT) 1,000 mg in D5W 250 mL IVPB  1,000 mg Intravenous TID    ondansetron  8 mg Intravenous Q8H    pantoprazole  40 mg Intravenous Daily    posaconazole 300 mg in D5W 150 mL infusion  300 mg Intravenous Q24H    simethicone  1 tablet Oral TID    sodium bicarb-sodium chloride  1 Dose Swish & Spit QID    tacrolimus  1 mg Oral Daily PM    tacrolimus  1 mg Oral Daily AM    ursodioL  300 mg Oral  BID    vancomycin (VANCOCIN) IV (PEDS and ADULTS)  1,500 mg Intravenous Q12H    zinc oxide   Topical (Top) Q8H     Continuous Infusions:  PRN Meds:.  Current Facility-Administered Medications:     0.9%  NaCl infusion (for blood administration), , Intravenous, Q24H PRN    0.9%  NaCl infusion (for blood administration), , Intravenous, Q24H PRN    0.9%  NaCl infusion (for blood administration), , Intravenous, Q24H PRN    acetaminophen, 650 mg, Oral, Q6H PRN    alteplase, 2 mg, Intra-Catheter, PRN    artificial tears, 2 drop, Both Eyes, PRN    dicyclomine, 10 mg, Oral, QID PRN    diphenhydrAMINE-zinc acetate 2-0.1%, , Topical (Top), TID PRN    diphenoxylate-atropine 2.5-0.025 mg, 1 tablet, Oral, QID PRN    duke's soln (benadryl 30 mL, mylanta 30 mL, LIDOcaine 30 mL, nystatin 30 mL) 120 mL, 10 mL, Oral, QID PRN    heparin, porcine (PF), 300 Units, Intravenous, PRN    iohexol, 15 mL, Oral, PRN    magnesium sulfate IVPB, 2 g, Intravenous, PRN    magnesium sulfate IVPB, 2 g, Intravenous, Q2H PRN    morphine, 2 mg, Intravenous, Q3H PRN    potassium chloride, 40 mEq, Intravenous, Q1H PRN **AND** potassium chloride, 60 mEq, Intravenous, Q1H PRN **AND** potassium chloride, 80 mEq, Intravenous, Q1H PRN    prochlorperazine, 5 mg, Intravenous, Q8H PRN    sodium chloride 0.9%, 10 mL, Intravenous, PRN    sodium phosphate 15 mmol in D5W 250 mL IVPB, 15 mmol, Intravenous, PRN    sodium phosphate 20.01 mmol in D5W 250 mL IVPB, 20.01 mmol, Intravenous, PRN    sodium phosphate 30 mmol in D5W 250 mL IVPB, 30 mmol, Intravenous, PRN    sumatriptan, 50 mg, Oral, Q2H PRN    Pharmacy to dose Vancomycin consult, , , Once **AND** vancomycin - pharmacy to dose, , Intravenous, pharmacy to manage frequency    zinc oxide, , Topical (Top), PRN    Review of patient's allergies indicates:   Allergen Reactions    Bactrim [sulfamethoxazole-trimethoprim] Hives       Past Medical History:   Diagnosis Date    Cancer     Flatulence 01/03/2025     Hyperlipidemia     Other constipation 12/19/2024    Thrombocytosis 12/18/2024       Past Surgical History:   Procedure Laterality Date    BONE MARROW BIOPSY N/A 11/27/2024    Procedure: Biopsy-bone marrow;  Surgeon: Clyde James MD;  Location: [facility] (4TH FLR);  Service: Oncology;  Laterality: N/A;  11/20-pre call complete-tb    COLONOSCOPY N/A 8/9/2023    Procedure: COLONOSCOPY;  Surgeon: Will Ardon MD;  Location: [facility] ENDO (4TH FLR);  Service: Endoscopy;  Laterality: N/A;  Suprep Instructions sent via portal / Authorizing:     Bebeto Arora MD in [facility] FAMILY MED/ INTERNAL MED/ PEDS  Referral:          [id number]    EYE SURGERY      FLEXIBLE SIGMOIDOSCOPY N/A 7/23/2024    Procedure: SIGMOIDOSCOPY, FLEXIBLE;  Surgeon: Nirali Vicente MD;  Location: [facility] ENDO;  Service: Endoscopy;  Laterality: N/A;    INSERTION OF LONGORIA CATHETER Right 12/18/2024    Procedure: INSERTION, CATHETER, CENTRAL VENOUS, LONGORIA TRIPLE LUMEN, Bard 12.5 fr Longoria Cath model [id number];  Surgeon: Willie Hadley MD;  [facility];  Service: General;  Laterality: Right;       Social History     Socioeconomic History    Marital status:     Number of children: 0   Tobacco Use    Smoking status: Former     Types: Cigars, Cigarettes    Smokeless tobacco: Never   Substance and Sexual Activity    Alcohol use: Not Currently     Comment: socially    Drug use: Never    Sexual activity: Yes     Partners: Female     Social Drivers of Health     Financial Resource Strain: Low Risk  (12/18/2024)    Overall Financial Resource Strain (CARDIA)     Difficulty of Paying Living Expenses: Not very hard   Food Insecurity: No Food Insecurity (12/18/2024)    Hunger Vital Sign     Worried About Running Out of Food in the Last Year: Never true     Ran Out of Food in the Last Year: Never true   Transportation Needs: No Transportation Needs (12/18/2024)    TRANSPORTATION NEEDS     Transportation : No   Physical Activity: Insufficiently  Active (6/30/2024)    Exercise Vital Sign     Days of Exercise per Week: 5 days     Minutes of Exercise per Session: 20 min   Stress: No Stress Concern Present (12/18/2024)    [place] [employer] - Occupational Stress Questionnaire     Feeling of Stress : Not at all   Housing Stability: Low Risk  (12/18/2024)    Housing Stability Vital Sign     Unable to Pay for Housing in the Last Year: No     Homeless in the Last Year: No       Family History   Problem Relation Name Age of Onset    Arthritis Mother      COPD Father      Testicular cancer Maternal Cousin Peter 17        Chemotherapy    Breast cancer Maternal Cousin  51        Chemotherapy    Heart disease Maternal Grandfather      Stroke Maternal Grandmother         Review of Systems: see in HPI    Physical Exam:  Vitals:    01/14/25 1330   BP: 134/70   Pulse: 107   Resp: 18   Temp: 98.2 °F (36.8 °C)     General: NAD   Skin:  - Face: clear  - Neck:clear  - Chest: clear  - Back:clear  - Abdomen:  2-3mm pinpoint erythematous macules   - Genitalia/buttocks: intergluteal cleft covered with zinc at the moment  - RUE: Right axilla with tender nodule with overlying erythema and central purpuric dusky center   - LUE: clear  - RLE: 2-3mm pinpoint erythematous macules   - LLE: 2-3mm pinpoint erythematous macules     Labs  1/14 WBC 0.21; Plt 4 Cr 0.8, ALP 75 , LFTs wnl     Assessment and Plan:  Dermatitis   54yoM myelofibrosis s/p haplo SCT 12/26/24 on immunosuppression with tacrolimus and cellcept with 1 new tender nodule with overlying erythema and central purpuric dusky center. DDX including deep fungal vs ecthyma vs GVHD vs vasculopathy  -Punch biopsy today to confirm diagnosis. Skin tissue cultures also obtained   -Further treatment recommendations once the biopsy has resulted.   -Please remove sutures around 1/28.     Punch biopsy procedure note:  Punch biopsy performed after verbal consent obtained. Area marked and prepped with alcohol.  Approximately 1.5cc of 1% lidocaine with epinephrine injected. 3 mm disposable punch used to remove lesion. Hemostasis obtained and biopsy site closed with 4-0 Prolene sutures. Vaseline and pressure bandage placed. Wound care instructions reviewed with patient and handout given. Patient tolerated procedure well without complication.     Case discussed with dermatology attending, Dr. Chan. Dermatology will continue to follow. Please contact us if you have any additional questions.     Imelda Camarillo MD   Dermatology

## 2025-01-15 PROBLEM — R51.9 HEADACHE: Status: RESOLVED | Noted: 2024-12-27 | Resolved: 2025-01-15

## 2025-01-15 LAB
ACID FAST MOD KINY STN SPEC: NORMAL
ALBUMIN SERPL BCP-MCNC: 2 G/DL (ref 3.5–5.2)
ALP SERPL-CCNC: 69 U/L (ref 40–150)
ALT SERPL W/O P-5'-P-CCNC: 33 U/L (ref 10–44)
ANION GAP SERPL CALC-SCNC: 11 MMOL/L (ref 8–16)
AST SERPL-CCNC: 30 U/L (ref 10–40)
BASOPHILS # BLD AUTO: 0 K/UL (ref 0–0.2)
BASOPHILS NFR BLD: 0 % (ref 0–1.9)
BILIRUB SERPL-MCNC: 1 MG/DL (ref 0.1–1)
BLD PROD TYP BPU: NORMAL
BLOOD UNIT EXPIRATION DATE: NORMAL
BLOOD UNIT TYPE CODE: 5100
BLOOD UNIT TYPE: NORMAL
BUN SERPL-MCNC: 10 MG/DL (ref 6–20)
CALCIUM SERPL-MCNC: 7.8 MG/DL (ref 8.7–10.5)
CHLORIDE SERPL-SCNC: 101 MMOL/L (ref 95–110)
CO2 SERPL-SCNC: 22 MMOL/L (ref 23–29)
CODING SYSTEM: NORMAL
CREAT SERPL-MCNC: 0.7 MG/DL (ref 0.5–1.4)
CROSSMATCH INTERPRETATION: NORMAL
DIFFERENTIAL METHOD BLD: ABNORMAL
DISPENSE STATUS: NORMAL
EOSINOPHIL # BLD AUTO: 0 K/UL (ref 0–0.5)
EOSINOPHIL NFR BLD: 0 % (ref 0–8)
ERYTHROCYTE [DISTWIDTH] IN BLOOD BY AUTOMATED COUNT: 13.6 % (ref 11.5–14.5)
EST. GFR  (NO RACE VARIABLE): >60 ML/MIN/1.73 M^2
GLUCOSE SERPL-MCNC: 98 MG/DL (ref 70–110)
HCT VFR BLD AUTO: 18.7 % (ref 40–54)
HGB BLD-MCNC: 6.6 G/DL (ref 14–18)
IMM GRANULOCYTES # BLD AUTO: 0 K/UL (ref 0–0.04)
IMM GRANULOCYTES NFR BLD AUTO: 0 % (ref 0–0.5)
LYMPHOCYTES # BLD AUTO: 0 K/UL (ref 1–4.8)
LYMPHOCYTES NFR BLD: 0 % (ref 18–48)
MAGNESIUM SERPL-MCNC: 1.5 MG/DL (ref 1.6–2.6)
MCH RBC QN AUTO: 30.8 PG (ref 27–31)
MCHC RBC AUTO-ENTMCNC: 35.3 G/DL (ref 32–36)
MCV RBC AUTO: 87 FL (ref 82–98)
MONOCYTES # BLD AUTO: 0 K/UL (ref 0.3–1)
MONOCYTES NFR BLD: 12.9 % (ref 4–15)
MYCOBACTERIUM SPEC QL CULT: NORMAL
NEUTROPHILS # BLD AUTO: 0.3 K/UL (ref 1.8–7.7)
NEUTROPHILS NFR BLD: 87.1 % (ref 38–73)
NRBC BLD-RTO: 0 /100 WBC
NUM UNITS TRANS PACKED RBC: NORMAL
PHOSPHATE SERPL-MCNC: 2.4 MG/DL (ref 2.7–4.5)
PLATELET # BLD AUTO: 18 K/UL (ref 150–450)
PLATELET BLD QL SMEAR: ABNORMAL
PMV BLD AUTO: 11.2 FL (ref 9.2–12.9)
POTASSIUM SERPL-SCNC: 3.1 MMOL/L (ref 3.5–5.1)
PROT SERPL-MCNC: 5.5 G/DL (ref 6–8.4)
RBC # BLD AUTO: 2.14 M/UL (ref 4.6–6.2)
SCHISTOCYTES BLD QL SMEAR: ABNORMAL
SODIUM SERPL-SCNC: 134 MMOL/L (ref 136–145)
TACROLIMUS BLD-MCNC: 15.1 NG/ML (ref 5–15)
VANCOMYCIN TROUGH SERPL-MCNC: 9.1 UG/ML (ref 10–22)
WBC # BLD AUTO: 0.31 K/UL (ref 3.9–12.7)

## 2025-01-15 PROCEDURE — 84100 ASSAY OF PHOSPHORUS: CPT | Performed by: NURSE PRACTITIONER

## 2025-01-15 PROCEDURE — 0152U NFCT DS DNA UNTRGT NGNRJ SEQ: CPT | Performed by: INTERNAL MEDICINE

## 2025-01-15 PROCEDURE — 20600001 HC STEP DOWN PRIVATE ROOM

## 2025-01-15 PROCEDURE — 86920 COMPATIBILITY TEST SPIN: CPT | Performed by: INTERNAL MEDICINE

## 2025-01-15 PROCEDURE — 25000003 PHARM REV CODE 250: Performed by: STUDENT IN AN ORGANIZED HEALTH CARE EDUCATION/TRAINING PROGRAM

## 2025-01-15 PROCEDURE — 86900 BLOOD TYPING SEROLOGIC ABO: CPT | Performed by: INTERNAL MEDICINE

## 2025-01-15 PROCEDURE — 63600175 PHARM REV CODE 636 W HCPCS: Performed by: INTERNAL MEDICINE

## 2025-01-15 PROCEDURE — P9040 RBC LEUKOREDUCED IRRADIATED: HCPCS | Performed by: INTERNAL MEDICINE

## 2025-01-15 PROCEDURE — 25000003 PHARM REV CODE 250: Performed by: INTERNAL MEDICINE

## 2025-01-15 PROCEDURE — 83735 ASSAY OF MAGNESIUM: CPT | Performed by: NURSE PRACTITIONER

## 2025-01-15 PROCEDURE — 80202 ASSAY OF VANCOMYCIN: CPT | Performed by: INTERNAL MEDICINE

## 2025-01-15 PROCEDURE — 25000003 PHARM REV CODE 250

## 2025-01-15 PROCEDURE — 80197 ASSAY OF TACROLIMUS: CPT | Performed by: INTERNAL MEDICINE

## 2025-01-15 PROCEDURE — 36430 TRANSFUSION BLD/BLD COMPNT: CPT

## 2025-01-15 PROCEDURE — 80053 COMPREHEN METABOLIC PANEL: CPT | Performed by: NURSE PRACTITIONER

## 2025-01-15 PROCEDURE — 85025 COMPLETE CBC W/AUTO DIFF WBC: CPT | Performed by: NURSE PRACTITIONER

## 2025-01-15 PROCEDURE — 99233 SBSQ HOSP IP/OBS HIGH 50: CPT | Mod: ,,, | Performed by: INTERNAL MEDICINE

## 2025-01-15 PROCEDURE — 63600175 PHARM REV CODE 636 W HCPCS

## 2025-01-15 PROCEDURE — 63600175 PHARM REV CODE 636 W HCPCS: Performed by: STUDENT IN AN ORGANIZED HEALTH CARE EDUCATION/TRAINING PROGRAM

## 2025-01-15 RX ORDER — MYCOPHENOLATE MOFETIL 250 MG/1
1000 CAPSULE ORAL 3 TIMES DAILY
Status: DISCONTINUED | OUTPATIENT
Start: 2025-01-15 | End: 2025-01-15

## 2025-01-15 RX ORDER — HYDROCODONE BITARTRATE AND ACETAMINOPHEN 500; 5 MG/1; MG/1
TABLET ORAL
Status: DISCONTINUED | OUTPATIENT
Start: 2025-01-15 | End: 2025-01-17

## 2025-01-15 RX ORDER — ACYCLOVIR 800 MG/1
800 TABLET ORAL 2 TIMES DAILY
Status: DISCONTINUED | OUTPATIENT
Start: 2025-01-15 | End: 2025-01-15

## 2025-01-15 RX ORDER — VANCOMYCIN 1.75 GRAM/500 ML IN 0.9 % SODIUM CHLORIDE INTRAVENOUS
1750
Status: DISCONTINUED | OUTPATIENT
Start: 2025-01-16 | End: 2025-01-17

## 2025-01-15 RX ORDER — DIPHENOXYLATE HYDROCHLORIDE AND ATROPINE SULFATE 2.5; .025 MG/1; MG/1
1 TABLET ORAL 4 TIMES DAILY
Status: DISCONTINUED | OUTPATIENT
Start: 2025-01-15 | End: 2025-01-24

## 2025-01-15 RX ORDER — POSACONAZOLE 100 MG/1
300 TABLET, DELAYED RELEASE ORAL DAILY
Status: DISCONTINUED | OUTPATIENT
Start: 2025-01-16 | End: 2025-01-25 | Stop reason: HOSPADM

## 2025-01-15 RX ORDER — MYCOPHENOLATE MOFETIL 250 MG/1
1000 CAPSULE ORAL 3 TIMES DAILY
Status: DISCONTINUED | OUTPATIENT
Start: 2025-01-15 | End: 2025-01-25 | Stop reason: HOSPADM

## 2025-01-15 RX ORDER — ACYCLOVIR 800 MG/1
800 TABLET ORAL 2 TIMES DAILY
Status: DISCONTINUED | OUTPATIENT
Start: 2025-01-15 | End: 2025-01-25 | Stop reason: HOSPADM

## 2025-01-15 RX ADMIN — LIDOCAINE HYDROCHLORIDE 15 ML: 20 SOLUTION OROPHARYNGEAL at 12:01

## 2025-01-15 RX ADMIN — Medication 1 DOSE: at 09:01

## 2025-01-15 RX ADMIN — METRONIDAZOLE 500 MG: 500 TABLET ORAL at 01:01

## 2025-01-15 RX ADMIN — ONDANSETRON 8 MG: 2 INJECTION INTRAMUSCULAR; INTRAVENOUS at 01:01

## 2025-01-15 RX ADMIN — FILGRASTIM 480 MCG: 480 INJECTION, SOLUTION INTRAVENOUS; SUBCUTANEOUS at 09:01

## 2025-01-15 RX ADMIN — LOPERAMIDE HYDROCHLORIDE 2 MG: 2 CAPSULE ORAL at 05:01

## 2025-01-15 RX ADMIN — MYCOPHENOLATE MOFETIL 1000 MG: 500 INJECTION, POWDER, LYOPHILIZED, FOR SOLUTION INTRAVENOUS at 03:01

## 2025-01-15 RX ADMIN — MORPHINE SULFATE 2 MG: 2 INJECTION, SOLUTION INTRAMUSCULAR; INTRAVENOUS at 09:01

## 2025-01-15 RX ADMIN — MAGNESIUM SULFATE HEPTAHYDRATE 2 G: 40 INJECTION, SOLUTION INTRAVENOUS at 05:01

## 2025-01-15 RX ADMIN — ZINC OXIDE: 200 OINTMENT TOPICAL at 01:01

## 2025-01-15 RX ADMIN — LOPERAMIDE HYDROCHLORIDE 2 MG: 2 CAPSULE ORAL at 09:01

## 2025-01-15 RX ADMIN — ACYCLOVIR 800 MG: 800 TABLET ORAL at 09:01

## 2025-01-15 RX ADMIN — Medication 1 DOSE: at 05:01

## 2025-01-15 RX ADMIN — POTASSIUM CHLORIDE 10 MEQ: 7.45 INJECTION INTRAVENOUS at 09:01

## 2025-01-15 RX ADMIN — ACYCLOVIR SODIUM 400 MG: 50 INJECTION, SOLUTION INTRAVENOUS at 09:01

## 2025-01-15 RX ADMIN — DIPHENHYDRAMINE HYDROCHLORIDE 15 ML: 25 SOLUTION ORAL at 12:01

## 2025-01-15 RX ADMIN — LETERMOVIR 480 MG: 480 TABLET, FILM COATED ORAL at 09:01

## 2025-01-15 RX ADMIN — POTASSIUM CHLORIDE 10 MEQ: 7.45 INJECTION INTRAVENOUS at 05:01

## 2025-01-15 RX ADMIN — ONDANSETRON 8 MG: 2 INJECTION INTRAMUSCULAR; INTRAVENOUS at 09:01

## 2025-01-15 RX ADMIN — GUAIFENESIN AND DEXTROMETHORPHAN HYDROBROMIDE 1 TABLET: 600; 30 TABLET, EXTENDED RELEASE ORAL at 09:01

## 2025-01-15 RX ADMIN — FAMOTIDINE 20 MG: 10 INJECTION, SOLUTION INTRAVENOUS at 09:01

## 2025-01-15 RX ADMIN — CEFEPIME 2 G: 2 INJECTION, POWDER, FOR SOLUTION INTRAVENOUS at 09:01

## 2025-01-15 RX ADMIN — SIMETHICONE 80 MG: 80 TABLET, CHEWABLE ORAL at 03:01

## 2025-01-15 RX ADMIN — ONDANSETRON 8 MG: 2 INJECTION INTRAMUSCULAR; INTRAVENOUS at 05:01

## 2025-01-15 RX ADMIN — POTASSIUM CHLORIDE 60 MEQ: 7.45 INJECTION INTRAVENOUS at 06:01

## 2025-01-15 RX ADMIN — DIPHENHYDRAMINE HYDROCHLORIDE 15 ML: 25 SOLUTION ORAL at 05:01

## 2025-01-15 RX ADMIN — METRONIDAZOLE 500 MG: 500 TABLET ORAL at 05:01

## 2025-01-15 RX ADMIN — POTASSIUM CHLORIDE 10 MEQ: 7.45 INJECTION INTRAVENOUS at 11:01

## 2025-01-15 RX ADMIN — DIPHENOXYLATE HYDROCHLORIDE AND ATROPINE SULFATE 1 TABLET: .025; 2.5 TABLET ORAL at 01:01

## 2025-01-15 RX ADMIN — Medication 1 DOSE: at 12:01

## 2025-01-15 RX ADMIN — PROCHLORPERAZINE EDISYLATE 5 MG: 5 INJECTION INTRAMUSCULAR; INTRAVENOUS at 03:01

## 2025-01-15 RX ADMIN — METRONIDAZOLE 500 MG: 500 TABLET ORAL at 09:01

## 2025-01-15 RX ADMIN — SODIUM PHOSPHATE, MONOBASIC, MONOHYDRATE AND SODIUM PHOSPHATE, DIBASIC, ANHYDROUS 15 MMOL: 142; 276 INJECTION, SOLUTION INTRAVENOUS at 06:01

## 2025-01-15 RX ADMIN — MORPHINE SULFATE 2 MG: 2 INJECTION, SOLUTION INTRAMUSCULAR; INTRAVENOUS at 08:01

## 2025-01-15 RX ADMIN — VANCOMYCIN HYDROCHLORIDE 1500 MG: 1.5 INJECTION, POWDER, LYOPHILIZED, FOR SOLUTION INTRAVENOUS at 12:01

## 2025-01-15 RX ADMIN — PANTOPRAZOLE SODIUM 40 MG: 40 INJECTION, POWDER, FOR SOLUTION INTRAVENOUS at 09:01

## 2025-01-15 RX ADMIN — SIMETHICONE 80 MG: 80 TABLET, CHEWABLE ORAL at 09:01

## 2025-01-15 RX ADMIN — URSODIOL 300 MG: 300 CAPSULE ORAL at 09:01

## 2025-01-15 RX ADMIN — ZINC OXIDE: 200 OINTMENT TOPICAL at 09:01

## 2025-01-15 RX ADMIN — DIPHENOXYLATE HYDROCHLORIDE AND ATROPINE SULFATE 1 TABLET: .025; 2.5 TABLET ORAL at 05:01

## 2025-01-15 RX ADMIN — POTASSIUM CHLORIDE 10 MEQ: 7.45 INJECTION INTRAVENOUS at 06:01

## 2025-01-15 RX ADMIN — ZINC OXIDE: 200 OINTMENT TOPICAL at 05:01

## 2025-01-15 RX ADMIN — MORPHINE SULFATE 2 MG: 2 INJECTION, SOLUTION INTRAMUSCULAR; INTRAVENOUS at 03:01

## 2025-01-15 RX ADMIN — LIDOCAINE HYDROCHLORIDE 15 ML: 20 SOLUTION OROPHARYNGEAL at 05:01

## 2025-01-15 RX ADMIN — TACROLIMUS 1 MG: 1 CAPSULE ORAL at 09:01

## 2025-01-15 RX ADMIN — MYCOPHENOLATE MOFETIL 1000 MG: 250 CAPSULE ORAL at 09:01

## 2025-01-15 RX ADMIN — CEFEPIME 2 G: 2 INJECTION, POWDER, FOR SOLUTION INTRAVENOUS at 03:01

## 2025-01-15 RX ADMIN — ACETAMINOPHEN 650 MG: 325 TABLET ORAL at 11:01

## 2025-01-15 RX ADMIN — MYCOPHENOLATE MOFETIL 1000 MG: 500 INJECTION, POWDER, LYOPHILIZED, FOR SOLUTION INTRAVENOUS at 01:01

## 2025-01-15 RX ADMIN — DIPHENOXYLATE HYDROCHLORIDE AND ATROPINE SULFATE 1 TABLET: .025; 2.5 TABLET ORAL at 09:01

## 2025-01-15 RX ADMIN — MORPHINE SULFATE 2 MG: 2 INJECTION, SOLUTION INTRAMUSCULAR; INTRAVENOUS at 12:01

## 2025-01-15 RX ADMIN — POTASSIUM CHLORIDE 60 MEQ: 7.45 INJECTION INTRAVENOUS at 10:01

## 2025-01-15 RX ADMIN — LOPERAMIDE HYDROCHLORIDE 2 MG: 2 CAPSULE ORAL at 01:01

## 2025-01-15 RX ADMIN — POTASSIUM CHLORIDE 60 MEQ: 7.45 INJECTION INTRAVENOUS at 09:01

## 2025-01-15 RX ADMIN — CEFEPIME 2 G: 2 INJECTION, POWDER, FOR SOLUTION INTRAVENOUS at 05:01

## 2025-01-15 RX ADMIN — DIPHENHYDRAMINE HYDROCHLORIDE, ZINC ACETATE: 2; .1 CREAM TOPICAL at 09:01

## 2025-01-15 RX ADMIN — POSACONAZOLE 300 MG: 18 INJECTION, SOLUTION INTRAVENOUS at 09:01

## 2025-01-15 RX ADMIN — AMLODIPINE BESYLATE 5 MG: 5 TABLET ORAL at 09:01

## 2025-01-15 NOTE — ASSESSMENT & PLAN NOTE
- Pt with history of hematochezia that was worked up outpatient prior to transplant. 7/23/24 colonoscopy with several diverticula and a non-bleeding rectal abscess  - Denies hematochezia/melena while inpatient. Now with left sided perianal irritation and pain. Erythematous area that is tender to palpation, but without fluctuance. Reports pain became worse after irritation from diarrhea. Pt afebrile.   - 1/14 erythema worsening and rash now warm to touch. Concern for cellulitis vs abscess. 1/14 Began vancomycin, cefepime, and flagyl. ID consulted.  - Wound care consulted and will monitor pt closely for fevers/overt signs of infection  - Low threshold to consult CRS  - Continue zinc oxide

## 2025-01-15 NOTE — PROGRESS NOTES
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 No significant exposures. S/p derm biopsy of axillary lesion, cultures and pathology in process. Karius also in process. Cts of axilla, legs, and abd/pelvis all without any abscesses or adenopathy, did show cellulitis of perianal area.     Recommendations  Continue vancomycin, cefepime, and flagyl  Follow up cultures and pathology of R axillary lesion  Follow up karius test        Anticipated Disposition: TBD    Thank you for your consult. I will follow-up with patient. Please contact us if you have any additional questions.    Ghislaine Ashby DO  Infectious Disease  [facility])    Subjective:     Principal Problem:History of allogeneic stem cell transplant    HPI: Mr. 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 Infectious disease consulted for "Concern for cellulitis in R underarm and perianal area. Warm to touch and tender to palpation. Remains afebrile. On anti-microbial ppx with levaquin, posaconazole, and acyclovir".     Photos of R underarm and L calf placed in media tab. Also has erythematous induration of L perianal area. Has been afebrile, on room air. Denies any significant exposures. Lives in the [facility] [place], [place] with his wife. They have 1 dog, no other animal exposures. Does go fishing, last trip was right before his previous hospitalization. Denies any cuts/scrapes/injuries while fishing. Works as the  for [employer] " specialty [employer].           Interval History: perianal pain/discomfort persists, but slightly improved, is on waffle mattress now which has helped a little, also has mild trouble swallowing but has improved    Review of Systems   Constitutional:  Negative for chills and fever.   HENT:  Positive for trouble swallowing. Negative for sore throat.    Respiratory:  Negative for cough and shortness of breath.    Cardiovascular:  Negative for chest pain.   Gastrointestinal:  Negative for abdominal pain, constipation, diarrhea, nausea and vomiting.        Perianal pain   Genitourinary:  Negative for dysuria and hematuria.   Musculoskeletal:  Negative for arthralgias and myalgias.   Skin:  Negative for rash.        R underarm lesion   Neurological:  Negative for weakness and headaches.     Objective:     Vital Signs (Most Recent):  Temp: 97.7 °F (36.5 °C) (01/15/25 1114)  Pulse: 99 (01/15/25 1114)  Resp: 20 (01/15/25 1114)  BP: (!) 151/70 (01/15/25 1114)  SpO2: (!) 94 % (01/15/25 1114) Vital Signs (24h Range):  Temp:  [97.7 °F (36.5 °C)-100.1 °F (37.8 °C)] 97.7 °F (36.5 °C)  Pulse:  [] 99  Resp:  [16-20] 20  SpO2:  [93 %-99 %] 94 %  BP: (130-155)/(70-82) 151/70     Weight: 89.3 kg (196 lb 12.2 oz)  Body mass index is 27.44 kg/m².    Estimated Creatinine Clearance: 127 mL/min (based on SCr of 0.7 mg/dL).     Physical Exam  Vitals reviewed.   Constitutional:       General: He is not in acute distress.     Appearance: Normal appearance. He is not ill-appearing.   HENT:      Head: Normocephalic and atraumatic.   Eyes:      Extraocular Movements: Extraocular movements intact.      Conjunctiva/sclera: Conjunctivae normal.   Cardiovascular:      Rate and Rhythm: Normal rate and regular rhythm.      Heart sounds: No murmur heard.  Pulmonary:      Effort: Pulmonary effort is normal. No respiratory distress.      Breath sounds: Normal breath sounds.   Abdominal:      General: Abdomen is flat. Bowel sounds are normal.       Palpations: Abdomen is soft.      Tenderness: There is no abdominal tenderness.   Musculoskeletal:      Cervical back: Normal range of motion.   Skin:     General: Skin is warm and dry.      Comments: R axillary lesion and MICHOACANO LE petechial rash   Neurological:      General: No focal deficit present.      Mental Status: He is alert and oriented to person, place, and time.   Psychiatric:         Mood and Affect: Mood normal.         Behavior: Behavior normal.         Thought Content: Thought content normal.          Significant Labs: All pertinent labs within the past 24 hours have been reviewed.  Recent Lab Results         01/15/25  0529   01/15/25  0327   01/14/25  1546   01/14/25  1545        Unit Blood Type Code 5100  [P]             Unit Expiration [id number]  [P]             Unit Blood Type O POS  [P]             Aerobic Bacterial Culture     No growth  [P]         Albumin   2.0           ALP   69           ALT   33           Anaerobe Culture       Culture in progress  [P]       Anion Gap   11           AST   30           Baso #   0.00           Basophil %   0.0           BILIRUBIN TOTAL   1.0  Comment: For infants and newborns, interpretation of results should be based  on gestational age, weight and in agreement with clinical  observations.    Premature Infant recommended reference ranges:  Up to 24 hours.............<8.0 mg/dL  Up to 48 hours............<12.0 mg/dL  3-5 days..................<15.0 mg/dL  6-29 days.................<15.0 mg/dL             BUN   10           Calcium   7.8           Chloride   101           CO2   22           CODING SYSTEM [id number]  [P]             Creatinine   0.7           Crossmatch Interpretation Compatible  [P]             Differential Method   Automated           DISPENSE STATUS CROSSMATCHED  [P]             eGFR   >60.0           Eos #   0.0           Eos %   0.0           Fragmented Cells   Occasional           Glucose   98           Gran # (ANC)   0.3           Gran %    87.1           Hematocrit   18.7  Comment: WBC, HCT and PRelim PLT   critical result(s) called and verbal   readback obtained from Jaiden Levin RN by RXP 01/15/2025 05:00             Hemoglobin   6.6           Immature Grans (Abs)   0.00  Comment: Mild elevation in immature granulocytes is non specific and   can be seen in a variety of conditions including stress response,   acute inflammation, trauma and pregnancy. Correlation with other   laboratory and clinical findings is essential.             Immature Granulocytes   0.0           Lymph #   0.0           Lymph %   0.0           Magnesium    1.5           MCH   30.8           MCHC   35.3           MCV   87           Miscellaneous Test Name   tamar           Mono #   0.0           Mono %   12.9           MPV   11.2           nRBC   0           Phosphorus Level   2.4           Platelet Estimate   Decreased           Platelet Count   18  Comment: PLT TO JAIDEN GARVEY RN by NH1 01/15/2025 05:38           Potassium   3.1           Product Code [id number]  [P]             PROTEIN TOTAL   5.5           RBC   2.14           RDW   13.6           Sodium   134           Tacrolimus Lvl   15.1  Comment: Testing performed by a chemiluminescent microparticle   immunoassay on the [employer] i System.    CAUTION: No firm therapeutic range exists for tacrolimus in whole   blood. The   complexity of the clinical state, individual differences in   sensitivity to   immunosuppressive and nephrotoxic effects of tacrolimus,   co-administration   of other immunosuppressants, type of transplant, time post-transplant   and a   number of other factors contribute to different requirements for   optimal   blood levels of tacrolimus. Therefore, individual tacrolimus values   cannot   be used as the sole indicator for making changes in treatment regimen   and   each patient should be thoroughly evaluated clinically before changes   in   treatment regimens are made. Each user must  establish his or her own   ranges   based on clinical experience.  Therapeutic ranges vary according to the commercial test used, and   therefore   should be established for each commercial test. Values obtained with   different assay methods cannot be used interchangeably due to   differences in   assay methods and cross-reactivity with metabolites, nor should   correction   factors be applied. Therefore, consistent use of one assay for   individual   patients is recommended.             UNIT NUMBER [id number]  [P]             WBC   0.31  Comment: WBC, HCT and PRelim PLT   critical result(s) called and verbal   readback obtained from Zaida Levin RN by RXMICHELLE 01/15/2025 05:00                      [P] - Preliminary Result               Significant Imaging: I have reviewed all pertinent imaging results/findings within the past 24 hours.

## 2025-01-15 NOTE — SUBJECTIVE & OBJECTIVE
Interval History: perianal pain/discomfort persists, but slightly improved, is on waffle mattress now which has helped a little, also has mild trouble swallowing but has improved    Review of Systems   Constitutional:  Negative for chills and fever.   HENT:  Positive for trouble swallowing. Negative for sore throat.    Respiratory:  Negative for cough and shortness of breath.    Cardiovascular:  Negative for chest pain.   Gastrointestinal:  Negative for abdominal pain, constipation, diarrhea, nausea and vomiting.        Perianal pain   Genitourinary:  Negative for dysuria and hematuria.   Musculoskeletal:  Negative for arthralgias and myalgias.   Skin:  Negative for rash.        R underarm lesion   Neurological:  Negative for weakness and headaches.     Objective:     Vital Signs (Most Recent):  Temp: 97.7 °F (36.5 °C) (01/15/25 1114)  Pulse: 99 (01/15/25 1114)  Resp: 20 (01/15/25 1114)  BP: (!) 151/70 (01/15/25 1114)  SpO2: (!) 94 % (01/15/25 1114) Vital Signs (24h Range):  Temp:  [97.7 °F (36.5 °C)-100.1 °F (37.8 °C)] 97.7 °F (36.5 °C)  Pulse:  [] 99  Resp:  [16-20] 20  SpO2:  [93 %-99 %] 94 %  BP: (130-155)/(70-82) 151/70     Weight: 89.3 kg (196 lb 12.2 oz)  Body mass index is 27.44 kg/m².    Estimated Creatinine Clearance: 127 mL/min (based on SCr of 0.7 mg/dL).     Physical Exam  Vitals reviewed.   Constitutional:       General: He is not in acute distress.     Appearance: Normal appearance. He is not ill-appearing.   HENT:      Head: Normocephalic and atraumatic.   Eyes:      Extraocular Movements: Extraocular movements intact.      Conjunctiva/sclera: Conjunctivae normal.   Cardiovascular:      Rate and Rhythm: Normal rate and regular rhythm.      Heart sounds: No murmur heard.  Pulmonary:      Effort: Pulmonary effort is normal. No respiratory distress.      Breath sounds: Normal breath sounds.   Abdominal:      General: Abdomen is flat. Bowel sounds are normal.      Palpations: Abdomen is soft.       Tenderness: There is no abdominal tenderness.   Musculoskeletal:      Cervical back: Normal range of motion.   Skin:     General: Skin is warm and dry.      Comments: R axillary lesion and MICHOACANO LE petechial rash   Neurological:      General: No focal deficit present.      Mental Status: He is alert and oriented to person, place, and time.   Psychiatric:         Mood and Affect: Mood normal.         Behavior: Behavior normal.         Thought Content: Thought content normal.          Significant Labs: All pertinent labs within the past 24 hours have been reviewed.  Recent Lab Results         01/15/25  0529   01/15/25  0327   01/14/25  1546   01/14/25  1545        Unit Blood Type Code 5100  [P]             Unit Expiration [id number]  [P]             Unit Blood Type O POS  [P]             Aerobic Bacterial Culture     No growth  [P]         Albumin   2.0           ALP   69           ALT   33           Anaerobe Culture       Culture in progress  [P]       Anion Gap   11           AST   30           Baso #   0.00           Basophil %   0.0           BILIRUBIN TOTAL   1.0  Comment: For infants and newborns, interpretation of results should be based  on gestational age, weight and in agreement with clinical  observations.    Premature Infant recommended reference ranges:  Up to 24 hours.............<8.0 mg/dL  Up to 48 hours............<12.0 mg/dL  3-5 days..................<15.0 mg/dL  6-29 days.................<15.0 mg/dL             BUN   10           Calcium   7.8           Chloride   101           CO2   22           CODING SYSTEM [id number]  [P]             Creatinine   0.7           Crossmatch Interpretation Compatible  [P]             Differential Method   Automated           DISPENSE STATUS CROSSMATCHED  [P]             eGFR   >60.0           Eos #   0.0           Eos %   0.0           Fragmented Cells   Occasional           Glucose   98           Gran # (ANC)   0.3           Gran %   87.1           Hematocrit    18.7  Comment: WBC, HCT and PRelim PLT   critical result(s) called and verbal   readback obtained from Jaiden Levin RN by RXP 01/15/2025 05:00             Hemoglobin   6.6           Immature Grans (Abs)   0.00  Comment: Mild elevation in immature granulocytes is non specific and   can be seen in a variety of conditions including stress response,   acute inflammation, trauma and pregnancy. Correlation with other   laboratory and clinical findings is essential.             Immature Granulocytes   0.0           Lymph #   0.0           Lymph %   0.0           Magnesium    1.5           MCH   30.8           MCHC   35.3           MCV   87           Miscellaneous Test Name   tamar           Mono #   0.0           Mono %   12.9           MPV   11.2           nRBC   0           Phosphorus Level   2.4           Platelet Estimate   Decreased           Platelet Count   18  Comment: PLT TO JAIDEN GARVEY RN by NH1 01/15/2025 05:38           Potassium   3.1           Product Code [id number]  [P]             PROTEIN TOTAL   5.5           RBC   2.14           RDW   13.6           Sodium   134           Tacrolimus Lvl   15.1  Comment: Testing performed by a chemiluminescent microparticle   immunoassay on the [employer] i System.    CAUTION: No firm therapeutic range exists for tacrolimus in whole   blood. The   complexity of the clinical state, individual differences in   sensitivity to   immunosuppressive and nephrotoxic effects of tacrolimus,   co-administration   of other immunosuppressants, type of transplant, time post-transplant   and a   number of other factors contribute to different requirements for   optimal   blood levels of tacrolimus. Therefore, individual tacrolimus values   cannot   be used as the sole indicator for making changes in treatment regimen   and   each patient should be thoroughly evaluated clinically before changes   in   treatment regimens are made. Each user must establish his or her own   ranges    based on clinical experience.  Therapeutic ranges vary according to the commercial test used, and   therefore   should be established for each commercial test. Values obtained with   different assay methods cannot be used interchangeably due to   differences in   assay methods and cross-reactivity with metabolites, nor should   correction   factors be applied. Therefore, consistent use of one assay for   individual   patients is recommended.             UNIT NUMBER [id number]  [P]             WBC   0.31  Comment: WBC, HCT and PRelim PLT   critical result(s) called and verbal   readback obtained from Zaida Levin RN by [facility] 01/15/2025 05:00                      [P] - Preliminary Result               Significant Imaging: I have reviewed all pertinent imaging results/findings within the past 24 hours.

## 2025-01-15 NOTE — ASSESSMENT & PLAN NOTE
- Suspect 2/2 chemotherapy conditioning regimen prior to allogeneic SCT. Abdomen notably distended and with TTP in epigastric region + RUQ.   - Continue protonix and famotidine daily, simethicone 80 TID ordered for flatulence  - , lipase normal  - morphine 1 mg PRN  - CT A/P 12/31: no acute pathology   - USG abdomen (RUQ) 01/02 unremarkable, not concerning for VOD  - Liver US w/ doppler 1/14: Hepatomegaly, mild intrahepatic biliary dilation. No signs of VOD/SOS  - Continuing on ursodiol for VOD/SOS ppx  - Trending Tbili. Elevated to 1.2 1/13. Today it is 1.0. +5 lbs today and will continue to monitor.

## 2025-01-15 NOTE — ASSESSMENT & PLAN NOTE
- Infection w/u neg thus far  - Afebrile, stopping cefepime and started levaquin 12/31/24. Pt afebrile but on 1/15 began vancomycin, cefepime, and flagyl per ID for R axillary rash, perianal cellulitis, and BLE petechial rash.

## 2025-01-15 NOTE — ASSESSMENT & PLAN NOTE
- Having poor PO intake/throat pain 2/2 chemotherapy   - Duke's solution QID  - Morphine 2 mg q3h PRN. Can increase dose or frequency if need be.  - Transitioned PO meds to IV. 1/15 began switching meds back to PO  - Diet changed from regular to soft/bite sized  - IMPROVING

## 2025-01-15 NOTE — SUBJECTIVE & OBJECTIVE
Subjective:     Interval History: Day +20 following a haplo SCT conditioned with Bu/Flu/Thiotepa + PTCy, Tacro, and MMF for primary myelofibrosis.  today. +5 lbs since yesterday. Mildly HTN. Low grade fever yesterday evening with Tmax 100.1. R axillary rash and perianal cellulitis stable from yesterday. BLE petechial rash improving. S/p R axilla biopsy with dermatology and pending results. CT A/P with colon distension and L perianal skin thickening/stranding concerning for cellulitis. No evidence of perirectal abscess. R axilla CT without evidence of an abscess/cellulitis. BLE CT with mild edema, but no evidence of soft tissue infection. Continuing antibiotics per ID. Complaints of continued diarrhea on imodium. Scheduling lomotil. Mucositis improving. Tacro level 15.1 today. Received tacro 1 mg this morning, but holding evening dose. Will recheck tacro level tomorrow and likely dose reduce by 50% pending results. Transfusing 1 unit of pRBCs today. Continuing electrolyte repletion.     Objective:     Vital Signs (Most Recent):  Temp: 97.7 °F (36.5 °C) (01/15/25 1114)  Pulse: 99 (01/15/25 1114)  Resp: 20 (01/15/25 1114)  BP: (!) 151/70 (01/15/25 1114)  SpO2: (!) 94 % (01/15/25 1114) Vital Signs (24h Range):  Temp:  [97.7 °F (36.5 °C)-100.1 °F (37.8 °C)] 97.7 °F (36.5 °C)  Pulse:  [] 99  Resp:  [16-20] 20  SpO2:  [93 %-99 %] 94 %  BP: (130-155)/(70-76) 151/70     Weight: 89.3 kg (196 lb 12.2 oz)  Body mass index is 27.44 kg/m².  Body surface area is 2.12 meters squared.    ECOG SCORE           [unfilled]    Intake/Output - Last 3 Shifts         01/13 0700 01/14 0659 01/14 0700  01/15 0659 01/15 0700 01/16 0659    P.O. 360 800     I.V. (mL/kg)  50 (0.6)     Blood  546     IV Piggyback 1100 2249.6     Total Intake(mL/kg) 1460 (16.8) 3645.6 (42)     Urine (mL/kg/hr) 1550 (0.7) 1100 (0.5)     Emesis/NG output 0 0     Stool 0 0     Total Output 1550 1100     Net -90 +2545.6            Urine Occurrence 1 x 1  x     Stool Occurrence 0 x 1 x     Emesis Occurrence 0 x 0 x              Physical Exam  Vitals reviewed.   Constitutional:       Appearance: Normal appearance.   HENT:      Head: Normocephalic and atraumatic.      Nose: Nose normal.      Mouth/Throat:      Mouth: Mucous membranes are moist.      Pharynx: Oropharynx is clear.   Eyes:      Extraocular Movements: Extraocular movements intact.      Conjunctiva/sclera: Conjunctivae normal.      Pupils: Pupils are equal, round, and reactive to light.   Cardiovascular:      Rate and Rhythm: Normal rate and regular rhythm.      Pulses: Normal pulses.      Heart sounds: Normal heart sounds.   Pulmonary:      Effort: Pulmonary effort is normal.      Breath sounds: Normal breath sounds.   Abdominal:      General: Abdomen is flat. Bowel sounds are normal.      Palpations: Abdomen is soft.   Genitourinary:     Comments: L perianal erythema with TTP. No fluctuance appreciated.   B/l scrotal erythema   Musculoskeletal:         General: Normal range of motion.      Cervical back: Normal range of motion.   Skin:     General: Skin is warm and dry.      Capillary Refill: Capillary refill takes less than 2 seconds.      Findings: Bruising (abdominal), erythema (R underarm surrounding an ulceration), petechiae (B/l LE petechial rash) and rash present. Rash is purpuric (on b/l LE).      Comments: R muhammad in place, c/d/I. No signs of infection    Neurological:      General: No focal deficit present.      Mental Status: He is alert and oriented to person, place, and time.   Psychiatric:         Mood and Affect: Mood normal.         Behavior: Behavior normal.         Thought Content: Thought content normal.         Judgment: Judgment normal.            Significant Labs:   CBC:   Recent Labs   Lab 01/14/25  0430 01/15/25  0327   WBC 0.21* 0.31*   HGB 6.5* 6.6*   HCT 18.8* 18.7*   PLT 4* 18*    and CMP:   Recent Labs   Lab 01/14/25  0430 01/15/25  0327   * 134*   K 3.5 3.1*   CL 98  101   CO2 23 22*   * 98   BUN 12 10   CREATININE 0.8 0.7   CALCIUM 8.1* 7.8*   PROT 5.7* 5.5*   ALBUMIN 2.0* 2.0*   BILITOT 1.1* 1.0   ALKPHOS 75 69   AST 29 30   ALT 35 33   ANIONGAP 10 11       Diagnostic Results:  None

## 2025-01-15 NOTE — PROGRESS NOTES
Daniel Rodríguez - Oncology ([facility])  Hematology  Bone Marrow Transplant  Progress Note    Patient Name: Rubén Hu  Admission Date: 12/18/2024  Hospital Length of Stay: 28 days  Code Status: Full Code    Subjective:     Interval History: Day +20 following a haplo SCT conditioned with Bu/Flu/Thiotepa + PTCy, Tacro, and MMF for primary myelofibrosis.  today. +5 lbs since yesterday. Mildly HTN. Low grade fever yesterday evening with Tmax 100.1. R axillary rash and perianal cellulitis stable from yesterday. BLE petechial rash improving. S/p R axilla biopsy with dermatology and pending results. CT A/P with colon distension and L perianal skin thickening/stranding concerning for cellulitis. No evidence of perirectal abscess. R axilla CT without evidence of an abscess/cellulitis. BLE CT with mild edema, but no evidence of soft tissue infection. Continuing antibiotics per ID. Karius pending. Complaints of continued diarrhea on imodium. Scheduling lomotil. Mucositis improving. Tacro level 15.1 today. Received tacro 1 mg this morning, but holding evening dose. Will recheck tacro level tomorrow and likely dose reduce by 50% pending results. Transfusing 1 unit of pRBCs today. Continuing electrolyte repletion.     Objective:     Vital Signs (Most Recent):  Temp: 97.7 °F (36.5 °C) (01/15/25 1114)  Pulse: 99 (01/15/25 1114)  Resp: 20 (01/15/25 1114)  BP: (!) 151/70 (01/15/25 1114)  SpO2: (!) 94 % (01/15/25 1114) Vital Signs (24h Range):  Temp:  [97.7 °F (36.5 °C)-100.1 °F (37.8 °C)] 97.7 °F (36.5 °C)  Pulse:  [] 99  Resp:  [16-20] 20  SpO2:  [93 %-99 %] 94 %  BP: (130-155)/(70-76) 151/70     Weight: 89.3 kg (196 lb 12.2 oz)  Body mass index is 27.44 kg/m².  Body surface area is 2.12 meters squared.    ECOG SCORE           [unfilled]    Intake/Output - Last 3 Shifts         01/13 0700 01/14 0659 01/14 0700  01/15 0659 01/15 0700  01/16 0659    P.O. 360 800     I.V. (mL/kg)  50 (0.6)     Blood  546     IV Piggyback 1100  2249.6     Total Intake(mL/kg) 1460 (16.8) 3645.6 (42)     Urine (mL/kg/hr) 1550 (0.7) 1100 (0.5)     Emesis/NG output 0 0     Stool 0 0     Total Output 1550 1100     Net -90 +2545.6            Urine Occurrence 1 x 1 x     Stool Occurrence 0 x 1 x     Emesis Occurrence 0 x 0 x              Physical Exam  Vitals reviewed.   Constitutional:       Appearance: Normal appearance.   HENT:      Head: Normocephalic and atraumatic.      Nose: Nose normal.      Mouth/Throat:      Mouth: Mucous membranes are moist.      Pharynx: Oropharynx is clear.   Eyes:      Extraocular Movements: Extraocular movements intact.      Conjunctiva/sclera: Conjunctivae normal.      Pupils: Pupils are equal, round, and reactive to light.   Cardiovascular:      Rate and Rhythm: Normal rate and regular rhythm.      Pulses: Normal pulses.      Heart sounds: Normal heart sounds.   Pulmonary:      Effort: Pulmonary effort is normal.      Breath sounds: Normal breath sounds.   Abdominal:      General: Abdomen is flat. Bowel sounds are normal.      Palpations: Abdomen is soft.   Genitourinary:     Comments: L perianal erythema with TTP. No fluctuance appreciated.   B/l scrotal erythema   Musculoskeletal:         General: Normal range of motion.      Cervical back: Normal range of motion.   Skin:     General: Skin is warm and dry.      Capillary Refill: Capillary refill takes less than 2 seconds.      Findings: Bruising (abdominal), erythema (R underarm surrounding an ulceration), petechiae (B/l LE petechial rash) and rash present. Rash is purpuric (on b/l LE).      Comments: R muhammad in place, c/d/I. No signs of infection    Neurological:      General: No focal deficit present.      Mental Status: He is alert and oriented to person, place, and time.   Psychiatric:         Mood and Affect: Mood normal.         Behavior: Behavior normal.         Thought Content: Thought content normal.         Judgment: Judgment normal.            Significant Labs:    CBC:   Recent Labs   Lab 01/14/25  0430 01/15/25  0327   WBC 0.21* 0.31*   HGB 6.5* 6.6*   HCT 18.8* 18.7*   PLT 4* 18*    and CMP:   Recent Labs   Lab 01/14/25  0430 01/15/25  0327   * 134*   K 3.5 3.1*   CL 98 101   CO2 23 22*   * 98   BUN 12 10   CREATININE 0.8 0.7   CALCIUM 8.1* 7.8*   PROT 5.7* 5.5*   ALBUMIN 2.0* 2.0*   BILITOT 1.1* 1.0   ALKPHOS 75 69   AST 29 30   ALT 35 33   ANIONGAP 10 11       Diagnostic Results:  None  Assessment/Plan:     * History of allogeneic stem cell transplant  - Patient of Dr. 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 Received 3 bags with CD34 dose of 6.04x10^6/kg.   - Today is Day +20  - Tacro levels MWF. Tacro level today 15.1. Previously on 1 mg BID. Received am dose of tacro and holding pm dose. Will recheck tacro levels tomorrow, 1/16 and likely dose reduce x50% pending results.   - Patient is O+. Donor is O+.  - Patient is CMV non-reactive. Donor is CMV reactive. Given donor CMV status, patient will start (patient-supplied) letermovir on Day +5.  - Day -4 and Day -3 Busulfan dose-adjusted to 282 mg  - See treatment plan below:    Planned conditioning regimen:  Thiotepa on Day -7  Busulfan on Day -6, -5, -4, and -3  Fludarabine on Day -6, -5, -4, and -3  REST DAYS on Day -2 and -1     Planned  GVHD Prophylaxis:  Cyclophosphamide on Day +3 and +4  Tacrolimus starting on Day +5  Mycophenolate starting on Day +5     Antimicrobial Prophylaxis:  Acyclovir starting on Day -7  Levofloxacin starting on Day -1  Micafungin on Day -1 through Day +4  Letermovir starting on Day +5 (if CMV PCR drawn on day 0 results negative)  Posaconazole starting on Day +5  Atovaquone starting on Day +30     Skin Care with Thiotepa: Day -7 through D-5     Seizure Prophylaxis:  Levetiracetam on Day -7 through Day -2     SOS/VOD Prophylaxis:  Ursodiol on Day -7 through Day +30     Growth Factor  Support:  Neupogen starting on Day +5        Caregiver: Carmelita (Spouse)  Post-transplant discharge plans: Home      Skin lesions  -  1/14 pt first complained of perianal erythema and pain, R axillary rash/pain, and BLE petechial rash. Concern for cellulitis vs fungal infection. Low suspicion for acute GVHD as patient has yet to engraft. Pt remains afebrile, but with low grade temp of 100.1 yesterday evening  - CT A/P with colonic dilation, gaseous distension, and L sided perianal thickening/fat stranding concerning for cellulitis  - CT R axilla: no acute soft tissue infection  - CT BLE: mild edema; no signs of soft tissue infection  - S/p R axilla punch biopsy with dermatology. Pending pathology, AFB culture, Fungal culture, aerobic culture, and anaerobic cultures. Derm following  - ID following and recommending vancomycin, cefepime, and flagyl. Pending karius results  - Wound care consulted  - With zinc oxide barrier cream for scrotal and perianal irritation    Primary myelofibrosis  - From Dr. 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 Transplant on 12/26/24 at 1330. Received 3 bags with CD34 dose of 6.04 x 10^6/kg.     Perianal pain  - Pt with history of hematochezia that was worked up outpatient prior to transplant. 7/23/24 colonoscopy with several diverticula and a non-bleeding rectal abscess  - Denies hematochezia/melena while inpatient. Now with left sided perianal irritation and pain. Erythematous area that is tender to palpation, but without fluctuance. Reports pain became  worse after irritation from diarrhea. Pt afebrile.   - 1/14 erythema worsening and rash now warm to touch. Concern for cellulitis vs abscess. 1/14 Began vancomycin, cefepime, and flagyl. ID consulted.  - Wound care consulted and will monitor pt closely for fevers/overt signs of infection  - Low threshold to consult CRS  - Continue zinc oxide    Hyperbilirubinemia  - Tbili elevated to 1.2 on 1/13. Abdomen distended and tender to palpation in RUQ  - 12/31 CT AP complete for diffuse abdominal pain: No acute pathology. Stable adrenal nodule  - 1/2 abdominal US: No concerns for VOD/SOS  - 1/14 liver US w/ doppler: Hepatomegaly; mild intrahepatic biliary dilation. No evidence of VOD/SOS  - , lipase normal  - Continue ursodiol for VOD/SOS ppx  - Trending Tbili daily. 1.0 today    Abdominal pain  - Suspect 2/2 chemotherapy conditioning regimen prior to allogeneic SCT. Abdomen notably distended and with TTP in epigastric region + RUQ.   - Continue protonix and famotidine daily, simethicone 80 TID ordered for flatulence  - , lipase normal  - morphine 1 mg PRN  - CT A/P 12/31: no acute pathology   - USG abdomen (RUQ) 01/02 unremarkable, not concerning for VOD  - Liver US w/ doppler 1/14: Hepatomegaly, mild intrahepatic biliary dilation. No signs of VOD/SOS  - Continuing on ursodiol for VOD/SOS ppx  - Trending Tbili. Elevated to 1.2 1/13. Today it is 1.0. +5 lbs today and will continue to monitor.     Transaminitis  - Transaminases first elevated 12/27. Suspect 2/2 chemotherapy prior to transplant.   - Will continue to monitor with daily CMP.  - RESOLVED    Mucositis  - Having poor PO intake/throat pain 2/2 chemotherapy   - Duke's solution QID  - Morphine 2 mg q3h PRN. Can increase dose or frequency if need be.  - Transitioned PO meds to IV. 1/15 began switching meds back to PO  - Diet changed from regular to soft/bite sized  - IMPROVING    Hypertension  - Became hypertensive following SCT on 12/26. No signs of  volume overload contributing to HTN.  - 12/27 started amlodipine.     Cellulitis  - See skin lesions  - See perianal pain  - ID consulted  - Wound care consulted  - 1/14 began vancomycin, cefepime, and flagyl    Diarrhea  - C. Diff negative on 12/28/24. Diarrhea improving  - Imodium q8h  - Began lomotil q8h 1/15      Immunocompromised state associated with stem cell transplant  - See history of allogeneic stem cell transplant     Allergic contact dermatitis due to adhesives  - Itching and redness to LLQ/RLQ where tele leads were placed.   - Topical benadryl prn for itching.  - Tele removed       Hypokalemia  - See electrolyte disorder    Electrolyte disorder  - Replete per PRN electrolyte order set  - Daily CMP, mag, and phos while inpatient    Neutropenic fever  - Infection w/u neg thus far  - Afebrile, stopping cefepime and started levaquin 12/31/24. Pt afebrile but on 1/15 began vancomycin, cefepime, and flagyl per ID for R axillary rash, perianal cellulitis, and BLE petechial rash.     Chemotherapy-induced nausea  - Anticipate 2/2 chemotherapy   - Meds switched from PO to IV as able 1/2.   - Zofran q8h  - Compazine q6h  - Ativan PRN    Insomnia  - Difficulty sleeping at night while IP.  - Has PRN ativan and trazodone  - Melatonin not effective     Pancytopenia due to chemotherapy  - Anticipated following chemotherapy.  - Transfuse for hgb < 7 or plts < 10K  - Continue antimicrobial ppx   - Daily CBC while IP    Adrenal nodule  - Subcentimeter nodular thickening of the adrenal glands first noted on imaging from 1/2024.  - Seen by endocrine prior to transplant admission and Dexamethasone suppression test performed. Patient responded appropriately. No further w/u needed.  - Likely adenomas.    Metabolic dysfunction-associated steatotic liver disease (MASLD)  - Biopsy proven to be steatotic change. Most compatible with MASLD.   - Follow-up with hepatology OP.    Hyperlipidemia  - Holding home atorvastatin while IP  to avoid interaction with other medications. Can resume at discharge if LFTs stable.        VTE Risk Mitigation (From admission, onward)           Ordered     heparin, porcine (PF) 100 unit/mL injection flush 300 Units  As needed (PRN)         12/18/24 2025                    Disposition: Remains inpatient     Chu Wolf PA-C  Bone Marrow Transplant  [facility] - Oncology ([facility])

## 2025-01-15 NOTE — ASSESSMENT & PLAN NOTE
54M with PMH of myelofibrosis s/p haplo SCT 12/26/24, now with lesion of R axilla, L calf petechial rash, and erythematous induration of L perianal area. No significant exposures. S/p derm biopsy of axillary lesion, cultures and pathology in process. Karius also in process. Cts of axilla, legs, and abd/pelvis all without any abscesses or adenopathy, did show cellulitis of perianal area.     Recommendations  Continue vancomycin, cefepime, and flagyl  Follow up cultures and pathology of R axillary lesion  Follow up karius test   TOKEN:'[id number]'

## 2025-01-15 NOTE — PLAN OF CARE
Patient AAOX4, VSS, afebrile, on room air. Patient received prn morphine x2 for pain. Patient up to the bathroom independently, free from falls and injuries. I&O's monitored as ordered. Bed in lowest position, side rails up x2, non-skid socks on, call light in reach. Patient educated to use call light for any needs, patient verbalizes understanding. There are no concerns at this time. Plan of care on going.

## 2025-01-15 NOTE — ASSESSMENT & PLAN NOTE
- Anticipate 2/2 chemotherapy   - Meds switched from PO to IV as able 1/2.   - Zofran q8h  - Compazine q6h  - Ativan PRN

## 2025-01-15 NOTE — ASSESSMENT & PLAN NOTE
- Tbili elevated to 1.2 on 1/13. Abdomen distended and tender to palpation in RUQ  - 12/31 CT AP complete for diffuse abdominal pain: No acute pathology. Stable adrenal nodule  - 1/2 abdominal US: No concerns for VOD/SOS  - 1/14 liver US w/ doppler: Hepatomegaly; mild intrahepatic biliary dilation. No evidence of VOD/SOS  - , lipase normal  - Continue ursodiol for VOD/SOS ppx  - Trending Tbili daily. 1.0 today

## 2025-01-15 NOTE — ASSESSMENT & PLAN NOTE
- Patient of Dr. 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 Received 3 bags with CD34 dose of 6.04x10^6/kg.   - Today is Day +20  - Tacro levels MWF. Tacro level today 15.1. Previously on 1 mg BID. Received am dose of tacro and holding pm dose. Will recheck tacro levels tomorrow, 1/16 and likely dose reduce x50% pending results.   - Patient is O+. Donor is O+.  - Patient is CMV non-reactive. Donor is CMV reactive. Given donor CMV status, patient will start (patient-supplied) letermovir on Day +5.  - Day -4 and Day -3 Busulfan dose-adjusted to 282 mg  - See treatment plan below:    Planned conditioning regimen:  Thiotepa on Day -7  Busulfan on Day -6, -5, -4, and -3  Fludarabine on Day -6, -5, -4, and -3  REST DAYS on Day -2 and -1     Planned  GVHD Prophylaxis:  Cyclophosphamide on Day +3 and +4  Tacrolimus starting on Day +5  Mycophenolate starting on Day +5     Antimicrobial Prophylaxis:  Acyclovir starting on Day -7  Levofloxacin starting on Day -1  Micafungin on Day -1 through Day +4  Letermovir starting on Day +5 (if CMV PCR drawn on day 0 results negative)  Posaconazole starting on Day +5  Atovaquone starting on Day +30     Skin Care with Thiotepa: Day -7 through D-5     Seizure Prophylaxis:  Levetiracetam on Day -7 through Day -2     SOS/VOD Prophylaxis:  Ursodiol on Day -7 through Day +30     Growth Factor Support:  Neupogen starting on Day +5        Caregiver: Carmelita (Spouse)  Post-transplant discharge plans: Home

## 2025-01-15 NOTE — ASSESSMENT & PLAN NOTE
- See skin lesions  - See perianal pain  - ID consulted  - Wound care consulted  - 1/14 began vancomycin, cefepime, and flagyl

## 2025-01-15 NOTE — PROGRESS NOTES
NAT received VM from BRANDIN Wilson, patients discharge planner through insurance. She request a call back if there's any dc concerns, her number is [phone] ext. [phone].

## 2025-01-16 PROBLEM — D68.69 HYPERCOAGULABLE STATE, SECONDARY: Status: ACTIVE | Noted: 2025-01-16

## 2025-01-16 LAB
ABO + RH BLD: NORMAL
ALBUMIN SERPL BCP-MCNC: 1.9 G/DL (ref 3.5–5.2)
ALP SERPL-CCNC: 66 U/L (ref 40–150)
ALT SERPL W/O P-5'-P-CCNC: 27 U/L (ref 10–44)
ANION GAP SERPL CALC-SCNC: 10 MMOL/L (ref 8–16)
ANISOCYTOSIS BLD QL SMEAR: SLIGHT
AST SERPL-CCNC: 19 U/L (ref 10–40)
BASOPHILS # BLD AUTO: ABNORMAL K/UL (ref 0–0.2)
BASOPHILS NFR BLD: 0 % (ref 0–1.9)
BILIRUB SERPL-MCNC: 0.8 MG/DL (ref 0.1–1)
BLD GP AB SCN CELLS X3 SERPL QL: NORMAL
BUN SERPL-MCNC: 8 MG/DL (ref 6–20)
CALCIUM SERPL-MCNC: 7.9 MG/DL (ref 8.7–10.5)
CHLORIDE SERPL-SCNC: 102 MMOL/L (ref 95–110)
CO2 SERPL-SCNC: 23 MMOL/L (ref 23–29)
CREAT SERPL-MCNC: 0.6 MG/DL (ref 0.5–1.4)
DIFFERENTIAL METHOD BLD: ABNORMAL
EOSINOPHIL # BLD AUTO: ABNORMAL K/UL (ref 0–0.5)
EOSINOPHIL NFR BLD: 0 % (ref 0–8)
ERYTHROCYTE [DISTWIDTH] IN BLOOD BY AUTOMATED COUNT: 13.9 % (ref 11.5–14.5)
EST. GFR  (NO RACE VARIABLE): >60 ML/MIN/1.73 M^2
FINAL PATHOLOGIC DIAGNOSIS: NORMAL
GLUCOSE SERPL-MCNC: 94 MG/DL (ref 70–110)
GROSS: NORMAL
HCT VFR BLD AUTO: 21.9 % (ref 40–54)
HGB BLD-MCNC: 7.7 G/DL (ref 14–18)
HYPOCHROMIA BLD QL SMEAR: ABNORMAL
IMM GRANULOCYTES # BLD AUTO: ABNORMAL K/UL (ref 0–0.04)
IMM GRANULOCYTES NFR BLD AUTO: ABNORMAL % (ref 0–0.5)
LYMPHOCYTES # BLD AUTO: ABNORMAL K/UL (ref 1–4.8)
LYMPHOCYTES NFR BLD: 33.3 % (ref 18–48)
Lab: NORMAL
MAGNESIUM SERPL-MCNC: 1.4 MG/DL (ref 1.6–2.6)
MCH RBC QN AUTO: 30.4 PG (ref 27–31)
MCHC RBC AUTO-ENTMCNC: 35.2 G/DL (ref 32–36)
MCV RBC AUTO: 87 FL (ref 82–98)
MICROSCOPIC EXAM: NORMAL
MONOCYTES # BLD AUTO: ABNORMAL K/UL (ref 0.3–1)
MONOCYTES NFR BLD: 0 % (ref 4–15)
NEUTROPHILS NFR BLD: 66.7 % (ref 38–73)
NRBC BLD-RTO: 0 /100 WBC
OVALOCYTES BLD QL SMEAR: ABNORMAL
PHOSPHATE SERPL-MCNC: 2.1 MG/DL (ref 2.7–4.5)
PLATELET # BLD AUTO: 12 K/UL (ref 150–450)
PMV BLD AUTO: 10.4 FL (ref 9.2–12.9)
POIKILOCYTOSIS BLD QL SMEAR: SLIGHT
POLYCHROMASIA BLD QL SMEAR: ABNORMAL
POTASSIUM SERPL-SCNC: 2.9 MMOL/L (ref 3.5–5.1)
PROT SERPL-MCNC: 5.1 G/DL (ref 6–8.4)
RBC # BLD AUTO: 2.53 M/UL (ref 4.6–6.2)
SODIUM SERPL-SCNC: 135 MMOL/L (ref 136–145)
SPECIMEN OUTDATE: NORMAL
SPHEROCYTES BLD QL SMEAR: ABNORMAL
TACROLIMUS BLD-MCNC: 11.7 NG/ML (ref 5–15)
WBC # BLD AUTO: 0.52 K/UL (ref 3.9–12.7)

## 2025-01-16 PROCEDURE — 63600175 PHARM REV CODE 636 W HCPCS

## 2025-01-16 PROCEDURE — 27000207 HC ISOLATION

## 2025-01-16 PROCEDURE — 25000003 PHARM REV CODE 250

## 2025-01-16 PROCEDURE — 99233 SBSQ HOSP IP/OBS HIGH 50: CPT | Mod: ,,, | Performed by: INTERNAL MEDICINE

## 2025-01-16 PROCEDURE — 25000003 PHARM REV CODE 250: Performed by: STUDENT IN AN ORGANIZED HEALTH CARE EDUCATION/TRAINING PROGRAM

## 2025-01-16 PROCEDURE — 25000003 PHARM REV CODE 250: Performed by: INTERNAL MEDICINE

## 2025-01-16 PROCEDURE — 80053 COMPREHEN METABOLIC PANEL: CPT | Performed by: NURSE PRACTITIONER

## 2025-01-16 PROCEDURE — 85027 COMPLETE CBC AUTOMATED: CPT | Performed by: NURSE PRACTITIONER

## 2025-01-16 PROCEDURE — 63600175 PHARM REV CODE 636 W HCPCS: Performed by: STUDENT IN AN ORGANIZED HEALTH CARE EDUCATION/TRAINING PROGRAM

## 2025-01-16 PROCEDURE — 86850 RBC ANTIBODY SCREEN: CPT | Performed by: INTERNAL MEDICINE

## 2025-01-16 PROCEDURE — 97116 GAIT TRAINING THERAPY: CPT

## 2025-01-16 PROCEDURE — 80197 ASSAY OF TACROLIMUS: CPT | Performed by: INTERNAL MEDICINE

## 2025-01-16 PROCEDURE — 83735 ASSAY OF MAGNESIUM: CPT | Performed by: NURSE PRACTITIONER

## 2025-01-16 PROCEDURE — 63600175 PHARM REV CODE 636 W HCPCS: Performed by: INTERNAL MEDICINE

## 2025-01-16 PROCEDURE — 85007 BL SMEAR W/DIFF WBC COUNT: CPT | Performed by: NURSE PRACTITIONER

## 2025-01-16 PROCEDURE — 20600001 HC STEP DOWN PRIVATE ROOM

## 2025-01-16 PROCEDURE — 84100 ASSAY OF PHOSPHORUS: CPT | Performed by: NURSE PRACTITIONER

## 2025-01-16 RX ORDER — TACROLIMUS 0.5 MG/1
0.5 CAPSULE ORAL 2 TIMES DAILY
Status: DISCONTINUED | OUTPATIENT
Start: 2025-01-16 | End: 2025-01-17

## 2025-01-16 RX ORDER — POTASSIUM CHLORIDE 7.45 MG/ML
10 INJECTION INTRAVENOUS ONCE
Status: DISCONTINUED | OUTPATIENT
Start: 2025-01-16 | End: 2025-01-16

## 2025-01-16 RX ORDER — POTASSIUM CHLORIDE 7.45 MG/ML
10 INJECTION INTRAVENOUS
Status: DISPENSED | OUTPATIENT
Start: 2025-01-16 | End: 2025-01-16

## 2025-01-16 RX ORDER — PANTOPRAZOLE SODIUM 40 MG/1
40 TABLET, DELAYED RELEASE ORAL DAILY
Status: DISCONTINUED | OUTPATIENT
Start: 2025-01-16 | End: 2025-01-25 | Stop reason: HOSPADM

## 2025-01-16 RX ORDER — AMLODIPINE BESYLATE 10 MG/1
10 TABLET ORAL DAILY
Status: DISCONTINUED | OUTPATIENT
Start: 2025-01-16 | End: 2025-01-25 | Stop reason: HOSPADM

## 2025-01-16 RX ADMIN — TACROLIMUS 0.5 MG: 0.5 CAPSULE ORAL at 10:01

## 2025-01-16 RX ADMIN — POTASSIUM CHLORIDE 10 MEQ: 7.46 INJECTION, SOLUTION INTRAVENOUS at 11:01

## 2025-01-16 RX ADMIN — TACROLIMUS 0.5 MG: 0.5 CAPSULE ORAL at 05:01

## 2025-01-16 RX ADMIN — SIMETHICONE 80 MG: 80 TABLET, CHEWABLE ORAL at 09:01

## 2025-01-16 RX ADMIN — Medication 1 DOSE: at 09:01

## 2025-01-16 RX ADMIN — DIPHENOXYLATE HYDROCHLORIDE AND ATROPINE SULFATE 1 TABLET: .025; 2.5 TABLET ORAL at 05:01

## 2025-01-16 RX ADMIN — POTASSIUM CHLORIDE 10 MEQ: 7.46 INJECTION, SOLUTION INTRAVENOUS at 08:01

## 2025-01-16 RX ADMIN — AMLODIPINE BESYLATE 10 MG: 10 TABLET ORAL at 10:01

## 2025-01-16 RX ADMIN — POTASSIUM CHLORIDE 80 MEQ: 7.46 INJECTION, SOLUTION INTRAVENOUS at 12:01

## 2025-01-16 RX ADMIN — PANTOPRAZOLE SODIUM 40 MG: 40 TABLET, DELAYED RELEASE ORAL at 10:01

## 2025-01-16 RX ADMIN — SIMETHICONE 80 MG: 80 TABLET, CHEWABLE ORAL at 02:01

## 2025-01-16 RX ADMIN — VANCOMYCIN HYDROCHLORIDE 1750 MG: 500 INJECTION, POWDER, LYOPHILIZED, FOR SOLUTION INTRAVENOUS at 02:01

## 2025-01-16 RX ADMIN — POTASSIUM CHLORIDE 80 MEQ: 7.46 INJECTION, SOLUTION INTRAVENOUS at 07:01

## 2025-01-16 RX ADMIN — URSODIOL 300 MG: 300 CAPSULE ORAL at 09:01

## 2025-01-16 RX ADMIN — MICONAZOLE NITRATE: 20 OINTMENT TOPICAL at 09:01

## 2025-01-16 RX ADMIN — DIPHENOXYLATE HYDROCHLORIDE AND ATROPINE SULFATE 1 TABLET: .025; 2.5 TABLET ORAL at 12:01

## 2025-01-16 RX ADMIN — LETERMOVIR 480 MG: 480 TABLET, FILM COATED ORAL at 10:01

## 2025-01-16 RX ADMIN — CEFEPIME 2 G: 2 INJECTION, POWDER, FOR SOLUTION INTRAVENOUS at 06:01

## 2025-01-16 RX ADMIN — LIDOCAINE HYDROCHLORIDE 15 ML: 20 SOLUTION OROPHARYNGEAL at 12:01

## 2025-01-16 RX ADMIN — METRONIDAZOLE 500 MG: 500 TABLET ORAL at 02:01

## 2025-01-16 RX ADMIN — CEFEPIME 2 G: 2 INJECTION, POWDER, FOR SOLUTION INTRAVENOUS at 02:01

## 2025-01-16 RX ADMIN — MYCOPHENOLATE MOFETIL 1000 MG: 250 CAPSULE ORAL at 09:01

## 2025-01-16 RX ADMIN — ONDANSETRON 8 MG: 2 INJECTION INTRAMUSCULAR; INTRAVENOUS at 02:01

## 2025-01-16 RX ADMIN — CEFEPIME 2 G: 2 INJECTION, POWDER, FOR SOLUTION INTRAVENOUS at 09:01

## 2025-01-16 RX ADMIN — POTASSIUM CHLORIDE 10 MEQ: 7.46 INJECTION, SOLUTION INTRAVENOUS at 06:01

## 2025-01-16 RX ADMIN — ZINC OXIDE: 200 OINTMENT TOPICAL at 06:01

## 2025-01-16 RX ADMIN — LIDOCAINE HYDROCHLORIDE 15 ML: 20 SOLUTION OROPHARYNGEAL at 06:01

## 2025-01-16 RX ADMIN — MAGNESIUM SULFATE HEPTAHYDRATE 2 G: 40 INJECTION, SOLUTION INTRAVENOUS at 06:01

## 2025-01-16 RX ADMIN — POTASSIUM CHLORIDE 10 MEQ: 7.46 INJECTION, SOLUTION INTRAVENOUS at 09:01

## 2025-01-16 RX ADMIN — POTASSIUM CHLORIDE 10 MEQ: 7.46 INJECTION, SOLUTION INTRAVENOUS at 02:01

## 2025-01-16 RX ADMIN — MYCOPHENOLATE MOFETIL 1000 MG: 250 CAPSULE ORAL at 10:01

## 2025-01-16 RX ADMIN — LOPERAMIDE HYDROCHLORIDE 2 MG: 2 CAPSULE ORAL at 05:01

## 2025-01-16 RX ADMIN — ZINC OXIDE: 200 OINTMENT TOPICAL at 02:01

## 2025-01-16 RX ADMIN — LOPERAMIDE HYDROCHLORIDE 2 MG: 2 CAPSULE ORAL at 10:01

## 2025-01-16 RX ADMIN — PROCHLORPERAZINE EDISYLATE 5 MG: 5 INJECTION INTRAMUSCULAR; INTRAVENOUS at 06:01

## 2025-01-16 RX ADMIN — DIPHENOXYLATE HYDROCHLORIDE AND ATROPINE SULFATE 1 TABLET: .025; 2.5 TABLET ORAL at 09:01

## 2025-01-16 RX ADMIN — ACYCLOVIR 800 MG: 800 TABLET ORAL at 09:01

## 2025-01-16 RX ADMIN — Medication 1 DOSE: at 05:01

## 2025-01-16 RX ADMIN — Medication 1 DOSE: at 10:01

## 2025-01-16 RX ADMIN — LOPERAMIDE HYDROCHLORIDE 2 MG: 2 CAPSULE ORAL at 09:01

## 2025-01-16 RX ADMIN — URSODIOL 300 MG: 300 CAPSULE ORAL at 10:01

## 2025-01-16 RX ADMIN — GUAIFENESIN AND DEXTROMETHORPHAN HYDROBROMIDE 1 TABLET: 600; 30 TABLET, EXTENDED RELEASE ORAL at 09:01

## 2025-01-16 RX ADMIN — POSACONAZOLE 300 MG: 100 TABLET, DELAYED RELEASE ORAL at 10:01

## 2025-01-16 RX ADMIN — GUAIFENESIN AND DEXTROMETHORPHAN HYDROBROMIDE 1 TABLET: 600; 30 TABLET, EXTENDED RELEASE ORAL at 10:01

## 2025-01-16 RX ADMIN — ONDANSETRON 8 MG: 2 INJECTION INTRAMUSCULAR; INTRAVENOUS at 06:01

## 2025-01-16 RX ADMIN — VANCOMYCIN HYDROCHLORIDE 1750 MG: 500 INJECTION, POWDER, LYOPHILIZED, FOR SOLUTION INTRAVENOUS at 12:01

## 2025-01-16 RX ADMIN — LOPERAMIDE HYDROCHLORIDE 2 MG: 2 CAPSULE ORAL at 12:01

## 2025-01-16 RX ADMIN — DIPHENHYDRAMINE HYDROCHLORIDE 15 ML: 25 SOLUTION ORAL at 06:01

## 2025-01-16 RX ADMIN — ZINC OXIDE: 200 OINTMENT TOPICAL at 10:01

## 2025-01-16 RX ADMIN — ACYCLOVIR 800 MG: 800 TABLET ORAL at 10:01

## 2025-01-16 RX ADMIN — METRONIDAZOLE 500 MG: 500 TABLET ORAL at 06:01

## 2025-01-16 RX ADMIN — POTASSIUM CHLORIDE 10 MEQ: 7.46 INJECTION, SOLUTION INTRAVENOUS at 05:01

## 2025-01-16 RX ADMIN — MYCOPHENOLATE MOFETIL 1000 MG: 250 CAPSULE ORAL at 02:01

## 2025-01-16 RX ADMIN — SIMETHICONE 80 MG: 80 TABLET, CHEWABLE ORAL at 10:01

## 2025-01-16 RX ADMIN — METRONIDAZOLE 500 MG: 500 TABLET ORAL at 09:01

## 2025-01-16 RX ADMIN — Medication 1 DOSE: at 02:01

## 2025-01-16 RX ADMIN — FILGRASTIM 480 MCG: 480 INJECTION, SOLUTION INTRAVENOUS; SUBCUTANEOUS at 10:01

## 2025-01-16 RX ADMIN — DIPHENHYDRAMINE HYDROCHLORIDE 15 ML: 25 SOLUTION ORAL at 11:01

## 2025-01-16 RX ADMIN — ONDANSETRON 8 MG: 2 INJECTION INTRAMUSCULAR; INTRAVENOUS at 09:01

## 2025-01-16 RX ADMIN — DIPHENOXYLATE HYDROCHLORIDE AND ATROPINE SULFATE 1 TABLET: .025; 2.5 TABLET ORAL at 10:01

## 2025-01-16 RX ADMIN — SODIUM PHOSPHATE, MONOBASIC, MONOHYDRATE AND SODIUM PHOSPHATE, DIBASIC, ANHYDROUS 20.01 MMOL: 142; 276 INJECTION, SOLUTION INTRAVENOUS at 09:01

## 2025-01-16 RX ADMIN — DIPHENHYDRAMINE HYDROCHLORIDE 15 ML: 25 SOLUTION ORAL at 12:01

## 2025-01-16 NOTE — ASSESSMENT & PLAN NOTE
54M with PMH of myelofibrosis s/p haplo SCT 12/26/24, now with lesion of R axilla, L calf petechial rash, and erythematous induration of L perianal area. No significant exposures. 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 Karius also in process. CTs of axilla, legs, and abd/pelvis all without any abscesses or adenopathy, did show cellulitis of perianal area.     Recommendations  Continue vancomycin, cefepime, and flagyl  Follow up staph aureus susceptibilities  Follow up karius test

## 2025-01-16 NOTE — ASSESSMENT & PLAN NOTE
- See skin lesions  - See perianal pain  - ID and derm consulted  - Wound care consulted  - 1/14 began vancomycin, cefepime, and flagyl

## 2025-01-16 NOTE — PROGRESS NOTES
Pharmacokinetic Assessment Follow Up: IV Vancomycin    Vancomycin serum concentration assessment(s):    The trough level was drawn correctly and can be used to guide therapy at this time. The measurement is below the desired definitive target range of 10 to 20 mcg/mL.  - The trough level was drawn ~10 hours after 3rd dose and resulted at 9.1 (keep in mind he received no loading dose).    Vancomycin Regimen Plan:    Change regimen to Vancomycin 1750 mg IV every 12 hours with next serum trough concentration measured at 1130 prior to 4th dose on 1/17.  - Mr. Hu's renal function appears stable and at baseline. He's making good UOP.  - Please draw random level sooner than scheduled trough if renal function changes significantly.    Drug levels (last 3 results):  Recent Labs   Lab Result Units 01/15/25  2223   Vancomycin-Trough ug/mL 9.1*       Pharmacy will continue to follow and monitor vancomycin.    Please contact pharmacy at extension [phone] for questions regarding this assessment.    Thank you for the consult,   Joan Salter       Patient brief summary:  Rubén Hu is a 54 y.o. male initiated on antimicrobial therapy with IV Vancomycin for treatment of skin & soft tissue infection    The patient's previous regimen was vanc 1500 mg Q12H    Actual Body Weight:   89.3 kg    Renal Function:   Estimated Creatinine Clearance: 127 mL/min (based on SCr of 0.7 mg/dL).     Dialysis Method (if applicable):  N/A

## 2025-01-16 NOTE — SUBJECTIVE & OBJECTIVE
Subjective:     Interval History: Day +21 following a haplo SCT conditioned with Bu/Flu/Thiotepa + PTCy, Tacro, and MMF for primary myelofibrosis. . Weight stable from yesterday. HTN overnight; amlodipine dose increased. Remains afebrile. Reports symptoms are improving today. Perianal irritation and pain subsiding. Diarrhea improving. Tolerating PO intake. Continues on antibiotics per ID. Aerobic, anaerobic, and AFB cultures with NGTD. Pending R axillary biopsy fungal cultures and pathology. Derm following. Continuing electrolyte repletion. Tacro 11.7 today. Down from 15.1 yesterday. Received 1 mg of tacro yesterday. Will transition to 0.5 mg BID.     Objective:     Vital Signs (Most Recent):  Temp: 98 °F (36.7 °C) (01/16/25 0851)  Pulse: 91 (01/16/25 0851)  Resp: 16 (01/16/25 0851)  BP: 138/76 (01/16/25 0851)  SpO2: 95 % (01/16/25 0851) Vital Signs (24h Range):  Temp:  [97.7 °F (36.5 °C)-98.6 °F (37 °C)] 98 °F (36.7 °C)  Pulse:  [] 91  Resp:  [16-20] 16  SpO2:  [91 %-97 %] 95 %  BP: (119-156)/(58-80) 138/76     Weight: 89 kg (196 lb 3.4 oz)  Body mass index is 27.37 kg/m².  Body surface area is 2.11 meters squared.    ECOG SCORE           [unfilled]    Intake/Output - Last 3 Shifts         01/14 0700  01/15 0659 01/15 0700  01/16 0659 01/16 0700 01/17 0659    P.O. 800 1450     I.V. (mL/kg) 50 (0.6) 50 (0.6)     Blood 546      IV Piggyback 2249.6 2775.6     Total Intake(mL/kg) 3645.6 (42) 4275.6 (48)     Urine (mL/kg/hr) 1100 (0.5) 2200 (1)     Emesis/NG output 0 0     Stool 0 0     Total Output 1100 2200     Net +2545.6 +2075.6            Urine Occurrence 1 x 3 x     Stool Occurrence 1 x 3 x     Emesis Occurrence 0 x 0 x              Physical Exam  Vitals reviewed.   Constitutional:       Appearance: Normal appearance.   HENT:      Head: Normocephalic and atraumatic.      Nose: Nose normal.      Mouth/Throat:      Mouth: Mucous membranes are moist.      Pharynx: Oropharynx is clear.   Eyes:       Extraocular Movements: Extraocular movements intact.      Conjunctiva/sclera: Conjunctivae normal.      Pupils: Pupils are equal, round, and reactive to light.   Cardiovascular:      Rate and Rhythm: Normal rate and regular rhythm.      Pulses: Normal pulses.      Heart sounds: Normal heart sounds.   Pulmonary:      Effort: Pulmonary effort is normal.      Breath sounds: Normal breath sounds.   Abdominal:      General: Abdomen is flat. Bowel sounds are normal.      Palpations: Abdomen is soft.   Genitourinary:     Comments: L perianal erythema with TTP. No fluctuance appreciated.   B/l scrotal erythema   Musculoskeletal:         General: Normal range of motion.      Cervical back: Normal range of motion.   Skin:     General: Skin is warm and dry.      Capillary Refill: Capillary refill takes less than 2 seconds.      Findings: Bruising (abdominal), erythema (R underarm surrounding an ulceration), petechiae (B/l LE petechial rash) and rash present. Rash is purpuric (on b/l LE).      Comments: R muhammad in place, c/d/I. No signs of infection    Neurological:      General: No focal deficit present.      Mental Status: He is alert and oriented to person, place, and time.   Psychiatric:         Mood and Affect: Mood normal.         Behavior: Behavior normal.         Thought Content: Thought content normal.         Judgment: Judgment normal.            Significant Labs:   CBC:   Recent Labs   Lab 01/15/25  0327 01/16/25  0354   WBC 0.31* 0.52*   HGB 6.6* 7.7*   HCT 18.7* 21.9*   PLT 18* 12*    and CMP:   Recent Labs   Lab 01/15/25  0327 01/16/25  0354   * 135*   K 3.1* 2.9*    102   CO2 22* 23   GLU 98 94   BUN 10 8   CREATININE 0.7 0.6   CALCIUM 7.8* 7.9*   PROT 5.5* 5.1*   ALBUMIN 2.0* 1.9*   BILITOT 1.0 0.8   ALKPHOS 69 66   AST 30 19   ALT 33 27   ANIONGAP 11 10       Diagnostic Results:  None

## 2025-01-16 NOTE — PROGRESS NOTES
"[facility])  Infectious Disease  Progress Note    Patient Name: Rubén Hu  MRN: [id number]  Admission Date: 12/18/2024  Length of Stay: 29 days  Attending Physician: Clyde James MD  Primary Care Provider: Bebeto Arora MD    Isolation Status: Contact  Assessment/Plan:      Derm  Skin lesions  54M with PMH of myelofibrosis s/p haplo SCT 12/26/24, now with lesion of R axilla, L calf petechial rash, and erythematous induration of L perianal area. No significant exposures. 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 Karius also in process. CTs of axilla, legs, and abd/pelvis all without any abscesses or adenopathy, did show cellulitis of perianal area.     Recommendations  Continue vancomycin, cefepime, and flagyl  Follow up staph aureus susceptibilities  Follow up karius test        Anticipated Disposition: TBD    Thank you for your consult. I will follow-up with patient. Please contact us if you have any additional questions.    Ghislaine Ashby DO  Infectious Disease  [facility])    Subjective:     Principal Problem:History of allogeneic stem cell transplant    HPI: Mr. 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 Infectious disease consulted for "Concern for cellulitis in R underarm and perianal area. Warm to touch and tender to palpation. Remains afebrile. On anti-microbial ppx with levaquin, posaconazole, and acyclovir".     Photos of R underarm and L calf placed in media tab. Also has erythematous induration of L perianal area. Has been afebrile, on room air. Denies any significant exposures. Lives in the [facility] with his wife. They have 1 dog, no other animal exposures. Does go fishing, last trip was right before his previous hospitalization. Denies any " cuts/scrapes/injuries while fishing. Works as the  for [employer].           Interval History: reports feeling better today, specifically perianal area is less painful    Review of Systems   Constitutional:  Negative for chills and fever.   Respiratory:  Negative for cough and shortness of breath.    Cardiovascular:  Negative for chest pain, palpitations and leg swelling.   Gastrointestinal:  Negative for abdominal pain, constipation, diarrhea, nausea and vomiting.        Perianal pain, improving   Genitourinary:  Negative for dysuria and hematuria.   Musculoskeletal:  Negative for arthralgias and myalgias.   Skin:  Positive for rash.   Neurological:  Negative for weakness and headaches.     Objective:     Vital Signs (Most Recent):  Temp: 97.8 °F (36.6 °C) (01/16/25 1150)  Pulse: 109 (01/16/25 1150)  Resp: 18 (01/16/25 1150)  BP: 135/82 (01/16/25 1150)  SpO2: 96 % (01/16/25 1150) Vital Signs (24h Range):  Temp:  [97.8 °F (36.6 °C)-98.6 °F (37 °C)] 97.8 °F (36.6 °C)  Pulse:  [] 109  Resp:  [16-18] 18  SpO2:  [91 %-96 %] 96 %  BP: (133-156)/(69-82) 135/82     Weight: 89 kg (196 lb 3.4 oz)  Body mass index is 27.37 kg/m².    Estimated Creatinine Clearance: 148.2 mL/min (based on SCr of 0.6 mg/dL).     Physical Exam  Vitals reviewed.   Constitutional:       General: He is not in acute distress.     Appearance: Normal appearance. He is not ill-appearing.   HENT:      Head: Normocephalic and atraumatic.   Eyes:      Extraocular Movements: Extraocular movements intact.      Conjunctiva/sclera: Conjunctivae normal.   Cardiovascular:      Rate and Rhythm: Normal rate and regular rhythm.      Heart sounds: No murmur heard.  Pulmonary:      Effort: Pulmonary effort is normal. No respiratory distress.      Breath sounds: Normal breath sounds.   Abdominal:      General: Abdomen is flat. Bowel sounds are normal.      Palpations: Abdomen is soft.      Tenderness: There is no abdominal  tenderness.   Musculoskeletal:      Cervical back: Normal range of motion.   Skin:     General: Skin is warm and dry.      Findings: Lesion and rash present.   Neurological:      General: No focal deficit present.      Mental Status: He is alert and oriented to person, place, and time.   Psychiatric:         Mood and Affect: Mood normal.         Behavior: Behavior normal.         Thought Content: Thought content normal.          Significant Labs: All pertinent labs within the past 24 hours have been reviewed.  Recent Lab Results         01/16/25  0354   01/15/25  2223        Albumin 1.9         ALP 66         ALT 27         Anion Gap 10         Aniso Slight         AST 19         Baso # Test Not Performed  Comment: CORRECTED RESULT; previously reported as 0.00 on %DDDDDDDD% at %TTT%.  [C]         Basophil % 0.0         BILIRUBIN TOTAL 0.8  Comment: For infants and newborns, interpretation of results should be based  on gestational age, weight and in agreement with clinical  observations.    Premature Infant recommended reference ranges:  Up to 24 hours.............<8.0 mg/dL  Up to 48 hours............<12.0 mg/dL  3-5 days..................<15.0 mg/dL  6-29 days.................<15.0 mg/dL           BUN 8         Calcium 7.9         Chloride 102         CO2 23         Creatinine 0.6         Differential Method Manual         eGFR >60.0         Eos # Test Not Performed  Comment: CORRECTED RESULT; previously reported as 0.0 on %DDDDDDDD% at %TTT%.  [C]         Eos % 0.0         Glucose 94         Gran % 66.7  Comment: CORRECTED RESULT; previously reported as 76.9 on %DDDDDDDD% at %TTT%.  [C]         Group & Rh O POS         Hematocrit 21.9         Hemoglobin 7.7         Hypo Occasional         Immature Grans (Abs) CANCELED  Comment: Mild elevation in immature granulocytes is non specific and   can be seen in a variety of conditions including stress response,   acute inflammation, trauma and pregnancy. Correlation with  other   laboratory and clinical findings is essential.    Result canceled by the ancillary.           Immature Granulocytes CANCELED  Comment: Result canceled by the ancillary.         INDIRECT DAVIDA NEG         Lymph # Test Not Performed  Comment: CORRECTED RESULT; previously reported as 0.0 on %DDDDDDDD% at %TTT%.  [C]         Lymph % 33.3  Comment: CORRECTED RESULT; previously reported as 0.0 on %DDDDDDDD% at %TTT%.  [C]         Magnesium  1.4         MCH 30.4         MCHC 35.2         MCV 87         Mono # Test Not Performed  Comment: CORRECTED RESULT; previously reported as 0.1 on %DDDDDDDD% at %TTT%.  [C]         Mono % 0.0  Comment: CORRECTED RESULT; previously reported as 13.5 on %DDDDDDDD% at %TTT%.  [C]         MPV 10.4         nRBC 0         Ovalocytes Occasional         Phosphorus Level 2.1         Platelet Count 12  Comment: PLT   critical result(s) called and verbal readback obtained from   JAIDEN LEONARDNURSE by AMY 01/16/2025 06:31           Poikilocytosis Slight         Poly Occasional         Potassium 2.9         PROTEIN TOTAL 5.1         RBC 2.53         RDW 13.9         Sodium 135         Specimen Outdate 01/19/2025 23:59         Spherocytes Occasional         Tacrolimus Lvl 11.7  Comment: Testing performed by a chemiluminescent microparticle   immunoassay on the [employer] i System.    CAUTION: No firm therapeutic range exists for tacrolimus in whole   blood. The   complexity of the clinical state, individual differences in   sensitivity to   immunosuppressive and nephrotoxic effects of tacrolimus,   co-administration   of other immunosuppressants, type of transplant, time post-transplant   and a   number of other factors contribute to different requirements for   optimal   blood levels of tacrolimus. Therefore, individual tacrolimus values   cannot   be used as the sole indicator for making changes in treatment regimen   and   each patient should be thoroughly evaluated clinically before  changes   in   treatment regimens are made. Each user must establish his or her own   ranges   based on clinical experience.  Therapeutic ranges vary according to the commercial test used, and   therefore   should be established for each commercial test. Values obtained with   different assay methods cannot be used interchangeably due to   differences in   assay methods and cross-reactivity with metabolites, nor should   correction   factors be applied. Therefore, consistent use of one assay for   individual   patients is recommended.           Vancomycin-Trough   9.1       WBC 0.52  Comment: wbc ct   critical result(s) called and verbal readback obtained from   Zaida Levin RN by POD 01/16/2025 05:39                    [C] - Corrected Result               Significant Imaging: I have reviewed all pertinent imaging results/findings within the past 24 hours.

## 2025-01-16 NOTE — ASSESSMENT & PLAN NOTE
- Infection w/u neg thus far  - Afebrile, stopping cefepime and started levaquin 12/31/24. Pt afebrile but on 1/15 began vancomycin, cefepime, and flagyl per ID for R axillary rash, perirectal cellulitis, and BLE petechial rash.

## 2025-01-16 NOTE — PROGRESS NOTES
[facility] - Oncology ([facility])  Hematology  Bone Marrow Transplant  Progress Note    Patient Name: Rubén Hu  Admission Date: 12/18/2024  Hospital Length of Stay: 29 days  Code Status: Full Code    Subjective:     Interval History: Day +21 following a haplo SCT conditioned with Bu/Flu/Thiotepa + PTCy, Tacro, and MMF for primary myelofibrosis. . Weight stable from yesterday. HTN overnight; amlodipine dose increased. Remains afebrile. Reports symptoms are improving today. Perianal irritation and pain subsiding. Diarrhea improving. Tolerating PO intake. Continues on antibiotics per ID. Aerobic, anaerobic, and AFB cultures with NGTD. Pending R axillary biopsy fungal cultures and pathology. Derm following. Continuing electrolyte repletion. Tacro 11.7 today. Down from 15.1 yesterday. Received 1 mg of tacro yesterday. Will transition to 0.5 mg BID.     Objective:     Vital Signs (Most Recent):  Temp: 98 °F (36.7 °C) (01/16/25 0851)  Pulse: 91 (01/16/25 0851)  Resp: 16 (01/16/25 0851)  BP: 138/76 (01/16/25 0851)  SpO2: 95 % (01/16/25 0851) Vital Signs (24h Range):  Temp:  [97.7 °F (36.5 °C)-98.6 °F (37 °C)] 98 °F (36.7 °C)  Pulse:  [] 91  Resp:  [16-20] 16  SpO2:  [91 %-97 %] 95 %  BP: (119-156)/(58-80) 138/76     Weight: 89 kg (196 lb 3.4 oz)  Body mass index is 27.37 kg/m².  Body surface area is 2.11 meters squared.    ECOG SCORE           [unfilled]    Intake/Output - Last 3 Shifts         01/14 0700  01/15 0659 01/15 0700 01/16 0659 01/16 0700 01/17 0659    P.O. 800 1450     I.V. (mL/kg) 50 (0.6) 50 (0.6)     Blood 546      IV Piggyback 2249.6 2775.6     Total Intake(mL/kg) 3645.6 (42) 4275.6 (48)     Urine (mL/kg/hr) 1100 (0.5) 2200 (1)     Emesis/NG output 0 0     Stool 0 0     Total Output 1100 2200     Net +2545.6 +2075.6            Urine Occurrence 1 x 3 x     Stool Occurrence 1 x 3 x     Emesis Occurrence 0 x 0 x              Physical Exam  Vitals reviewed.   Constitutional:       Appearance:  Normal appearance.   HENT:      Head: Normocephalic and atraumatic.      Nose: Nose normal.      Mouth/Throat:      Mouth: Mucous membranes are moist.      Pharynx: Oropharynx is clear.   Eyes:      Extraocular Movements: Extraocular movements intact.      Conjunctiva/sclera: Conjunctivae normal.      Pupils: Pupils are equal, round, and reactive to light.   Cardiovascular:      Rate and Rhythm: Normal rate and regular rhythm.      Pulses: Normal pulses.      Heart sounds: Normal heart sounds.   Pulmonary:      Effort: Pulmonary effort is normal.      Breath sounds: Normal breath sounds.   Abdominal:      General: Abdomen is flat. Bowel sounds are normal.      Palpations: Abdomen is soft.   Genitourinary:     Comments: L perianal erythema with TTP. No fluctuance appreciated.   B/l scrotal erythema   Musculoskeletal:         General: Normal range of motion.      Cervical back: Normal range of motion.   Skin:     General: Skin is warm and dry.      Capillary Refill: Capillary refill takes less than 2 seconds.      Findings: Bruising (abdominal), erythema (R underarm surrounding an ulceration), petechiae (B/l LE petechial rash) and rash present. Rash is purpuric (on b/l LE).      Comments: R muhammad in place, c/d/I. No signs of infection    Neurological:      General: No focal deficit present.      Mental Status: He is alert and oriented to person, place, and time.   Psychiatric:         Mood and Affect: Mood normal.         Behavior: Behavior normal.         Thought Content: Thought content normal.         Judgment: Judgment normal.            Significant Labs:   CBC:   Recent Labs   Lab 01/15/25  0327 01/16/25  0354   WBC 0.31* 0.52*   HGB 6.6* 7.7*   HCT 18.7* 21.9*   PLT 18* 12*    and CMP:   Recent Labs   Lab 01/15/25  0327 01/16/25  0354   * 135*   K 3.1* 2.9*    102   CO2 22* 23   GLU 98 94   BUN 10 8   CREATININE 0.7 0.6   CALCIUM 7.8* 7.9*   PROT 5.5* 5.1*   ALBUMIN 2.0* 1.9*   BILITOT 1.0 0.8    ALKPHOS 69 66   AST 30 19   ALT 33 27   ANIONGAP 11 10       Diagnostic Results:  None  Assessment/Plan:     * History of allogeneic stem cell transplant  - Patient of Dr. 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 Received 3 bags with CD34 dose of 6.04x10^6/kg.   - Today is Day +21  - Tacro levels MWF. Tacro level today 11.7. Down from 15.7 yesterday after receiving only 1 mg in the morning. Will transition to 0.5 mg BID.  - Patient is O+. Donor is O+.  - Patient is CMV non-reactive. Donor is CMV reactive. Given donor CMV status, patient will start (patient-supplied) letermovir on Day +5.  - Day -4 and Day -3 Busulfan dose-adjusted to 282 mg  - See treatment plan below:    Planned conditioning regimen:  Thiotepa on Day -7  Busulfan on Day -6, -5, -4, and -3  Fludarabine on Day -6, -5, -4, and -3  REST DAYS on Day -2 and -1     Planned  GVHD Prophylaxis:  Cyclophosphamide on Day +3 and +4  Tacrolimus starting on Day +5  Mycophenolate starting on Day +5     Antimicrobial Prophylaxis:  Acyclovir starting on Day -7  Levofloxacin starting on Day -1  Micafungin on Day -1 through Day +4  Letermovir starting on Day +5 (if CMV PCR drawn on day 0 results negative)  Posaconazole starting on Day +5  Atovaquone starting on Day +30     Skin Care with Thiotepa: Day -7 through D-5     Seizure Prophylaxis:  Levetiracetam on Day -7 through Day -2     SOS/VOD Prophylaxis:  Ursodiol on Day -7 through Day +30     Growth Factor Support:  Neupogen starting on Day +5        Caregiver: Carmelita (Spouse)  Post-transplant discharge plans: Home      Skin lesions  -  1/14 pt first complained of perianal erythema and pain, R axillary rash/pain, and BLE petechial rash. Concern for cellulitis vs fungal infection. Low suspicion for acute GVHD as patient has yet to engraft. Pt remains afebrile.  - CT A/P with colonic dilation, gaseous distension, and L sided  perianal thickening/fat stranding concerning for cellulitis  - CT R axilla: no acute soft tissue infection  - CT BLE: mild edema; no signs of soft tissue infection  - S/p R axilla punch biopsy with dermatology. Pending pathology and fungal cultures. AFB culture, aerobic culture, and anaerobic cultures NGTD. Derm following  - ID following and recommending vancomycin, cefepime, and flagyl. Pending karius results  - Wound care consulted  - With zinc oxide barrier cream for scrotal and perianal irritation    Primary myelofibrosis  - From Dr. 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 Transplant on 12/26/24 at 1330. Received 3 bags with CD34 dose of 6.04 x 10^6/kg.     Perianal pain  - Pt with history of hematochezia that was worked up outpatient prior to transplant. 7/23/24 colonoscopy with several diverticula and a non-bleeding rectal abscess  - Denies hematochezia/melena while inpatient. Now with left sided perianal irritation and pain. Erythematous area that is tender to palpation, but without fluctuance. Reports pain became worse after irritation from diarrhea. Pt afebrile.   - 1/14 erythema worsening and rash now warm to touch. Concern for cellulitis vs abscess. 1/14 Began vancomycin, cefepime, and flagyl. ID consulted.  - Wound care consulted and will monitor pt closely for fevers/overt signs of infection  - Low threshold to consult CRS  - Continue zinc oxide    Hyperbilirubinemia  - Tbili elevated to 1.2 on 1/13. Abdomen distended and tender to palpation in RUQ  - 12/31 CT AP complete  for diffuse abdominal pain: No acute pathology. Stable adrenal nodule  - 1/2 abdominal US: No concerns for VOD/SOS  - 1/14 liver US w/ doppler: Hepatomegaly; mild intrahepatic biliary dilation. No evidence of VOD/SOS  - , lipase normal  - Continue ursodiol for VOD/SOS ppx  - Trending Tbili daily. 0.8 today    Abdominal pain  - Suspect 2/2 chemotherapy conditioning regimen prior to allogeneic SCT. Abdomen notably distended and with TTP in epigastric region + RUQ.   - Continue protonix and famotidine daily, simethicone 80 TID ordered for flatulence  - , lipase normal  - morphine 1 mg PRN  - CT A/P 12/31: no acute pathology   - USG abdomen (RUQ) 01/02 unremarkable, not concerning for VOD  - Liver US w/ doppler 1/14: Hepatomegaly, mild intrahepatic biliary dilation. No signs of VOD/SOS  - Continuing on ursodiol for VOD/SOS ppx  - Trending Tbili. Elevated to 1.2 1/13. Today it is 0.8. Weight stable from yesterday    Transaminitis  - Transaminases first elevated 12/27. Suspect 2/2 chemotherapy prior to transplant.   - Will continue to monitor with daily CMP.  - RESOLVED    Mucositis  - Having poor PO intake/throat pain 2/2 chemotherapy   - Duke's solution QID  - Morphine 2 mg q3h PRN. Can increase dose or frequency if need be.  - Transitioned PO meds to IV. 1/15 began switching meds back to PO  - Diet changed from regular to soft/bite sized  - IMPROVING    Hypertension  - Became hypertensive following SCT on 12/26. No signs of volume overload contributing to HTN.  - 12/27 started amlodipine.     Cellulitis  - See skin lesions  - See perianal pain  - ID and derm consulted  - Wound care consulted  - 1/14 began vancomycin, cefepime, and flagyl    Diarrhea  - C. Diff negative on 12/28/24. Diarrhea improving  - Imodium q8h  - Began lomotil q8h 1/15      Immunocompromised state associated with stem cell transplant  - See history of allogeneic stem cell transplant     Allergic contact dermatitis due to adhesives  -  Itching and redness to LLQ/RLQ where tele leads were placed.   - Topical benadryl prn for itching.  - Tele removed       Hypokalemia  - See electrolyte disorder    Electrolyte disorder  - Replete per PRN electrolyte order set  - Daily CMP, mag, and phos while inpatient    Neutropenic fever  - Infection w/u neg thus far  - Afebrile, stopping cefepime and started levaquin 12/31/24. Pt afebrile but on 1/15 began vancomycin, cefepime, and flagyl per ID for R axillary rash, perirectal cellulitis, and BLE petechial rash.     Chemotherapy-induced nausea  - Anticipate 2/2 chemotherapy   - Meds switched from PO to IV as able 1/2.   - Zofran q8h  - Compazine q6h  - Ativan PRN    Insomnia  - Difficulty sleeping at night while IP.  - Has PRN ativan and trazodone  - Melatonin not effective     Pancytopenia due to chemotherapy  - Anticipated following chemotherapy.  - Transfuse for hgb < 7 or plts < 10K  - Continue antimicrobial ppx   - Daily CBC while IP    Adrenal nodule  - Subcentimeter nodular thickening of the adrenal glands first noted on imaging from 1/2024.  - Seen by endocrine prior to transplant admission and Dexamethasone suppression test performed. Patient responded appropriately. No further w/u needed.  - Likely adenomas.    Metabolic dysfunction-associated steatotic liver disease (MASLD)  - Biopsy proven to be steatotic change. Most compatible with MASLD.   - Follow-up with hepatology OP.    Hyperlipidemia  - Holding home atorvastatin while IP to avoid interaction with other medications. Can resume at discharge if LFTs stable.        VTE Risk Mitigation (From admission, onward)           Ordered     heparin, porcine (PF) 100 unit/mL injection flush 300 Units  As needed (PRN)         12/18/24 2025                    Disposition: Remains inpatient    Chu Wolf PA-C  Bone Marrow Transplant  [facility] ([facility])

## 2025-01-16 NOTE — CONSULTS
"Daniel Rodríguez - Oncology ([facility])  Wound Care    Patient Name:  Rubén Hu   MRN:  [id number]  Date: 1/16/2025  Diagnosis: History of allogeneic stem cell transplant    Pt seen for wound consult- perianal and scrotal areas. Pt AAOx4, agreeable to wound assessment and able to turn independently. Pt's wife at bedside to observe wound care so she can assist when pt is d/c'd. Pt reports sore/redness/dry skin to scrotum and anal area. Scrotum is intact, redness noted- may be yeast from moisture. Skin cleaned with bath wipe. Suggested and applied antifungal cream and provided pt with antifungal powder if he should prefer this instead of the cream. Perianal area has a wound to the left posterior perianal area, although the right side also had redness. Wound to the left look to be denuded skin which is drying; likely a moisture-yeast-shearing or rubbing etiology. Applied Triad wound cream after cleaning area. Education provided on wound and skin care; left creams at bedside. Suggested to pt to change position, let areas "air" when he is able, and use baby wipes or bath wipes after a bm instead of toilet paper, which might irritate area further. Pt and wife verbalized understanding. Will order pt a waffle cushion.     Recs:  BID and prn: clean scrotal and rectal area with soap/water or bath wipe; pat dry. Apply antifungal cream to scrotum and triad wound cream to perianal wound.       JONES Grimes, RN,WON  [facility] Andrei caryl Wound/Ostomy  1/16/25 01/16/25 1205   WOCN Assessment   WOCN Total Time (mins) 45   Visit Date 01/16/25   Visit Time 1205   Consult Type New   WOCN Speciality Wound   Wound moisture;skin tear;shearing;yeast   Number of Wounds 1   Intervention assessed;applied;chart review;coordination of care   Teaching on-going        Wound 01/14/25 0900 Skin Tear Perirectal   Date First Assessed/Time First Assessed: 01/14/25 0900   Present on Original Admission: No  Primary Wound Type: Skin Tear  Location: " Perirectal   Dressing Appearance Open to air   Drainage Amount None   Drainage Characteristics/Odor No odor   Appearance Pink;Red;Moist   Periwound Area Redness;Moist;Maroon   Wound Edges Irregular   Care Cleansed with:;Wound cleanser   Dressing Applied;Other (comment)  (triad wound cream)   Periwound Care Moisture barrier applied     History:     Past Medical History:   Diagnosis Date    Cancer     Flatulence 01/03/2025    Headache 12/27/2024    Hyperlipidemia     Other constipation 12/19/2024    Thrombocytosis 12/18/2024       Social History     Socioeconomic History    Marital status:     Number of children: 0   Tobacco Use    Smoking status: Former     Types: Cigars, Cigarettes    Smokeless tobacco: Never   Substance and Sexual Activity    Alcohol use: Not Currently     Comment: socially    Drug use: Never    Sexual activity: Yes     Partners: Female     Social Drivers of Health     Financial Resource Strain: Low Risk  (12/18/2024)    Overall Financial Resource Strain (CARDIA)     Difficulty of Paying Living Expenses: Not very hard   Food Insecurity: No Food Insecurity (12/18/2024)    Hunger Vital Sign     Worried About Running Out of Food in the Last Year: Never true     Ran Out of Food in the Last Year: Never true   Transportation Needs: No Transportation Needs (12/18/2024)    TRANSPORTATION NEEDS     Transportation : No   Physical Activity: Insufficiently Active (6/30/2024)    Exercise Vital Sign     Days of Exercise per Week: 5 days     Minutes of Exercise per Session: 20 min   Stress: No Stress Concern Present (12/18/2024)    [employer] - Occupational Stress Questionnaire     Feeling of Stress : Not at all   Housing Stability: Low Risk  (12/18/2024)    Housing Stability Vital Sign     Unable to Pay for Housing in the Last Year: No     Homeless in the Last Year: No       Precautions:     Allergies as of 11/14/2024 - Reviewed 11/13/2024   Allergen Reaction Noted    Bactrim  [sulfamethoxazole-trimethoprim] Hives 08/09/2019       WOC Assessment Details/Treatment   See above   52.6

## 2025-01-16 NOTE — PROGRESS NOTES
Pharmacist Intervention IV to PO Note    Rubén Hu is a 54 y.o. male being treated with IV medication pantoprazole    Patient Data:    Vital Signs (Most Recent):  Temp: 98.6 °F (37 °C) (01/16/25 0419)  Pulse: 101 (01/16/25 0419)  Resp: 17 (01/16/25 0419)  BP: (!) 156/77 (01/16/25 0419)  SpO2: (!) 91 % (01/16/25 0419) Vital Signs (72h Range):  Temp:  [97.7 °F (36.5 °C)-100.1 °F (37.8 °C)]   Pulse:  []   Resp:  [16-20]   BP: (117-156)/(58-82)   SpO2:  [91 %-99 %]      CBC:  Recent Labs   Lab 01/14/25  0430 01/15/25  0327 01/16/25  0354   WBC 0.21* 0.31* 0.52*   RBC 2.11* 2.14* 2.53*   HGB 6.5* 6.6* 7.7*   HCT 18.8* 18.7* 21.9*   PLT 4* 18* 12*   MCV 89 87 87   MCH 30.8 30.8 30.4   MCHC 34.6 35.3 35.2     CMP:     Recent Labs   Lab 01/14/25 0430 01/15/25  0327 01/16/25  0354   * 98 94   CALCIUM 8.1* 7.8* 7.9*   ALBUMIN 2.0* 2.0* 1.9*   PROT 5.7* 5.5* 5.1*   * 134* 135*   K 3.5 3.1* 2.9*   CO2 23 22* 23   CL 98 101 102   BUN 12 10 8   CREATININE 0.8 0.7 0.6   ALKPHOS 75 69 66   ALT 35 33 27   AST 29 30 19   BILITOT 1.1* 1.0 0.8       Dietary Orders:  Diet Orders            Dietary nutrition supplements Boost Breeze - [place] starting at 01/09 1715    Diet Soft & Bite Sized (IDDSI Level 6) Standard Tray: Soft & Bite Sized starting at 01/06 0850            Based on the following criteria, this patient qualifies for intravenous to oral conversion:  [x] The patients gastrointestinal tract is functioning (tolerating medications via oral or enteral route for 24 hours and tolerating food or enteral feeds for 24 hours).  [x] The patient is hemodynamically stable for 24 hours (heart rate <100 beats per minute, systolic blood pressure >99 mm Hg, and respiratory rate <20 breaths per minute).  [x] The patient shows clinical improvement (afebrile for at least 24 hours and white blood cell count downtrending or normalized). Additionally, the patient must be non-neutropenic (absolute neutrophil count >500  cells/mm3).  [x] For antimicrobials, the patient has received IV therapy for at least 24 hours.    IV medication pantoprazole will be changed to oral medication pantropazole    Pharmacist's Name: Carolina Carson  Pharmacist's Extension: [phone]

## 2025-01-16 NOTE — ASSESSMENT & PLAN NOTE
- Patient of Dr. 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 Received 3 bags with CD34 dose of 6.04x10^6/kg.   - Today is Day +21  - Tacro levels MWF. Tacro level today 11.7. Down from 15.7 yesterday after receiving only 1 mg in the morning. Will transition to 0.5 mg BID.  - Patient is O+. Donor is O+.  - Patient is CMV non-reactive. Donor is CMV reactive. Given donor CMV status, patient will start (patient-supplied) letermovir on Day +5.  - Day -4 and Day -3 Busulfan dose-adjusted to 282 mg  - See treatment plan below:    Planned conditioning regimen:  Thiotepa on Day -7  Busulfan on Day -6, -5, -4, and -3  Fludarabine on Day -6, -5, -4, and -3  REST DAYS on Day -2 and -1     Planned  GVHD Prophylaxis:  Cyclophosphamide on Day +3 and +4  Tacrolimus starting on Day +5  Mycophenolate starting on Day +5     Antimicrobial Prophylaxis:  Acyclovir starting on Day -7  Levofloxacin starting on Day -1  Micafungin on Day -1 through Day +4  Letermovir starting on Day +5 (if CMV PCR drawn on day 0 results negative)  Posaconazole starting on Day +5  Atovaquone starting on Day +30     Skin Care with Thiotepa: Day -7 through D-5     Seizure Prophylaxis:  Levetiracetam on Day -7 through Day -2     SOS/VOD Prophylaxis:  Ursodiol on Day -7 through Day +30     Growth Factor Support:  Neupogen starting on Day +5        Caregiver: Carmelita (Spouse)  Post-transplant discharge plans: Home

## 2025-01-16 NOTE — ASSESSMENT & PLAN NOTE
-  1/14 pt first complained of perianal erythema and pain, R axillary rash/pain, and BLE petechial rash. Concern for cellulitis vs fungal infection. Low suspicion for acute GVHD as patient has yet to engraft. Pt remains afebrile.  - CT A/P with colonic dilation, gaseous distension, and L sided perianal thickening/fat stranding concerning for cellulitis  - CT R axilla: no acute soft tissue infection  - CT BLE: mild edema; no signs of soft tissue infection  - S/p R axilla punch biopsy with dermatology. Pending pathology and fungal cultures. AFB culture, aerobic culture, and anaerobic cultures NGTD. Derm following  - ID following and recommending vancomycin, cefepime, and flagyl. Pending karius results  - Wound care consulted  - With zinc oxide barrier cream for scrotal and perianal irritation

## 2025-01-16 NOTE — PROGRESS NOTES
Daniel Rodríguez - Oncology ([facility])  Adult Nutrition  Progress Note    SUMMARY       Recommendations  --Continue Soft & Bite Sized (IDDSI Level 6) diet as tolerated and clinically indicated   --Discontinue Boost Breeze TID  --Encourage good intakes   --Nursing: please continue to document % meal eaten on flowsheet   --RD to monitor weight, PO intake     Goals: Meet % EEN/EPN  Nutrition Goal Status: progressing towards goal  Communication of RD Recs:  (POC)    Assessment and Plan    Nutrition Problem  Increased nutrient needs (calories, protein)      Related to (etiology):   Metabolic demand of disease and treatment     Signs and Symptoms (as evidenced by):   Myelofibrosis      Interventions/Recommendations (treatment strategy):  --Continue Regular diet as tolerated and clinically indicated   --Discontinue Boost Plus TID, Order Boost Breeze Orange TID   --Encourage good intakes   --Nursing: please continue to document % meal eaten on flowsheet   --RD to monitor weight, PO intake       Nutrition Diagnosis Status:   Continues     Reason for Assessment    Reason For Assessment: RD follow-up  Diagnosis: cancer diagnosis/related complications (Hisotry of allogeneic stem cell transplant)  General Information Comments: 54-y-o patient of Dr. Clyde James with primary myelofibrosis diagnosed 4/2024. Other medical history includes HLD, previous tobacco abuse, and adrenal nodule noted on imaging from 7/2024. 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 RD follow-up. Spoke with pt at bedside. Pt reports poor appetite - 3 meals/day with 0-25% PO intake. Pt states wife is getting him eggs, 2 pieces of tan, and toast and he is going to try to eat it. Pt also states he does not like any of the ONS and would like to d/c them at this time. Pt states he has been feeling better the last few days and his best today. Pt reports being nauseous here and there but denies v/c/d. Weight stable - no  "concerns for malnutrition at this time.     Nutrition Discharge Planning: Adequate nutritional intake    Nutrition Related Social Determinants of Health: SDOH: Adequate food in home environment      Nutrition/Diet History    Spiritual, Cultural Beliefs, Faith Practices, Values that Affect Care: no  Food Allergies: NKFA  Factors Affecting Nutritional Intake: nausea/vomiting, decreased appetite    Anthropometrics    Height: 5' 11" (180.3 cm)  Height (inches): 71 in  Height Method: Stated  Weight: 89 kg (196 lb 3.4 oz)  Weight (lb): 196.21 lb  Weight Method: Standard Scale  Ideal Body Weight (IBW), Male: 172 lb  % Ideal Body Weight, Male (lb): 114.08 %  BMI (Calculated): 27.4  BMI Grade: 25 - 29.9 - overweight       Lab/Procedures/Meds    Pertinent Labs Reviewed: reviewed - hemoglobin 7.7, hematocrit 21.9, Na 135, K 2.9, Ca 7.9, P 2.1, Mg 1.4     Pertinent Medications Reviewed: reviewed - ondansetron, pantoprazole, potassium chloride, sodium bicarb-sodium chloride, tacrolimus    Estimated/Assessed Needs    Weight Used For Calorie Calculations: 89 kg (196 lb 3.4 oz)  Energy Calorie Requirements (kcal): 9536-9165 kcal/day (25-30 kcal/kg)  Energy Need Method: Kcal/kg  Protein Requirements:  g/day (1.0-1.2 g/kg)  Weight Used For Protein Calculations: 89 kg (196 lb 3.4 oz)     Estimated Fluid Requirement Method: RDA Method  RDA Method (mL): 2225  CHO Requirement: 286-343 g/day      Nutrition Prescription Ordered    Current Diet Order: Soft & Bite Sized (IDDSI Level 6)  Oral Nutrition Supplement: Boost Breeze TID    Evaluation of Received Nutrient/Fluid Intake    Comments: LBM 1/16  Tolerance: tolerating  % Intake of Estimated Energy Needs: 0 - 25 %  % Meal Intake: 0 - 25 %    Nutrition Risk    Level of Risk/Frequency of Follow-up: high     Monitor and Evaluation    Food and Nutrient Intake: energy intake, food and beverage intake  Food and Nutrient Adminstration: diet order  Knowledge/Beliefs/Attitudes: food and " nutrition knowledge/skill  Physical Activity and Function: nutrition-related ADLs and IADLs  Anthropometric Measurements: weight, weight change, body mass index  Biochemical Data, Medical Tests and Procedures: electrolyte and renal panel, gastrointestinal profile, glucose/endocrine profile, inflammatory profile, lipid profile  Nutrition-Focused Physical Findings: overall appearance     Nutrition Follow-Up    RD Follow-up?: Yes    Tari Cote, Registration Eligible, Provisional LDN

## 2025-01-16 NOTE — PT/OT/SLP PROGRESS
Occupational Therapy      Patient Name:  Rubén Hu   MRN:  [id number]  In am nursing administering medications and requested OT to return. In pm ambulating with PT . Will follow-up 01-17-25.    1/16/2025

## 2025-01-16 NOTE — PLAN OF CARE
Patient AAOX4, VSS, afebrile, on room air. Patient received prn morphine x1 for pain. Patient up to the bathroom independently, free from falls and injuries. I&O's monitored as ordered. Bed in lowest position, side rails up x2, non-skid socks on, call light in reach. Patient educated to use call light for any needs, patient verbalizes understanding. There are no concerns at this time. Plan of care on going.

## 2025-01-16 NOTE — ASSESSMENT & PLAN NOTE
- Suspect 2/2 chemotherapy conditioning regimen prior to allogeneic SCT. Abdomen notably distended and with TTP in epigastric region + RUQ.   - Continue protonix and famotidine daily, simethicone 80 TID ordered for flatulence  - , lipase normal  - morphine 1 mg PRN  - CT A/P 12/31: no acute pathology   - USG abdomen (RUQ) 01/02 unremarkable, not concerning for VOD  - Liver US w/ doppler 1/14: Hepatomegaly, mild intrahepatic biliary dilation. No signs of VOD/SOS  - Continuing on ursodiol for VOD/SOS ppx  - Trending Tbili. Elevated to 1.2 1/13. Today it is 0.8. Weight stable from yesterday

## 2025-01-16 NOTE — SUBJECTIVE & OBJECTIVE
Interval History: reports feeling better today, specifically perianal area is less painful    Review of Systems   Constitutional:  Negative for chills and fever.   Respiratory:  Negative for cough and shortness of breath.    Cardiovascular:  Negative for chest pain, palpitations and leg swelling.   Gastrointestinal:  Negative for abdominal pain, constipation, diarrhea, nausea and vomiting.        Perianal pain, improving   Genitourinary:  Negative for dysuria and hematuria.   Musculoskeletal:  Negative for arthralgias and myalgias.   Skin:  Positive for rash.   Neurological:  Negative for weakness and headaches.     Objective:     Vital Signs (Most Recent):  Temp: 97.8 °F (36.6 °C) (01/16/25 1150)  Pulse: 109 (01/16/25 1150)  Resp: 18 (01/16/25 1150)  BP: 135/82 (01/16/25 1150)  SpO2: 96 % (01/16/25 1150) Vital Signs (24h Range):  Temp:  [97.8 °F (36.6 °C)-98.6 °F (37 °C)] 97.8 °F (36.6 °C)  Pulse:  [] 109  Resp:  [16-18] 18  SpO2:  [91 %-96 %] 96 %  BP: (133-156)/(69-82) 135/82     Weight: 89 kg (196 lb 3.4 oz)  Body mass index is 27.37 kg/m².    Estimated Creatinine Clearance: 148.2 mL/min (based on SCr of 0.6 mg/dL).     Physical Exam  Vitals reviewed.   Constitutional:       General: He is not in acute distress.     Appearance: Normal appearance. He is not ill-appearing.   HENT:      Head: Normocephalic and atraumatic.   Eyes:      Extraocular Movements: Extraocular movements intact.      Conjunctiva/sclera: Conjunctivae normal.   Cardiovascular:      Rate and Rhythm: Normal rate and regular rhythm.      Heart sounds: No murmur heard.  Pulmonary:      Effort: Pulmonary effort is normal. No respiratory distress.      Breath sounds: Normal breath sounds.   Abdominal:      General: Abdomen is flat. Bowel sounds are normal.      Palpations: Abdomen is soft.      Tenderness: There is no abdominal tenderness.   Musculoskeletal:      Cervical back: Normal range of motion.   Skin:     General: Skin is warm and  dry.      Findings: Lesion and rash present.   Neurological:      General: No focal deficit present.      Mental Status: He is alert and oriented to person, place, and time.   Psychiatric:         Mood and Affect: Mood normal.         Behavior: Behavior normal.         Thought Content: Thought content normal.          Significant Labs: All pertinent labs within the past 24 hours have been reviewed.  Recent Lab Results         01/16/25  0354   01/15/25  2223        Albumin 1.9         ALP 66         ALT 27         Anion Gap 10         Aniso Slight         AST 19         Baso # Test Not Performed  Comment: CORRECTED RESULT; previously reported as 0.00 on %DDDDDDDD% at %TTT%.  [C]         Basophil % 0.0         BILIRUBIN TOTAL 0.8  Comment: For infants and newborns, interpretation of results should be based  on gestational age, weight and in agreement with clinical  observations.    Premature Infant recommended reference ranges:  Up to 24 hours.............<8.0 mg/dL  Up to 48 hours............<12.0 mg/dL  3-5 days..................<15.0 mg/dL  6-29 days.................<15.0 mg/dL           BUN 8         Calcium 7.9         Chloride 102         CO2 23         Creatinine 0.6         Differential Method Manual         eGFR >60.0         Eos # Test Not Performed  Comment: CORRECTED RESULT; previously reported as 0.0 on %DDDDDDDD% at %TTT%.  [C]         Eos % 0.0         Glucose 94         Gran % 66.7  Comment: CORRECTED RESULT; previously reported as 76.9 on %DDDDDDDD% at %TTT%.  [C]         Group & Rh O POS         Hematocrit 21.9         Hemoglobin 7.7         Hypo Occasional         Immature Grans (Abs) CANCELED  Comment: Mild elevation in immature granulocytes is non specific and   can be seen in a variety of conditions including stress response,   acute inflammation, trauma and pregnancy. Correlation with other   laboratory and clinical findings is essential.    Result canceled by the ancillary.           Immature  Granulocytes CANCELED  Comment: Result canceled by the ancillary.         INDIRECT DAVIDA NEG         Lymph # Test Not Performed  Comment: CORRECTED RESULT; previously reported as 0.0 on %DDDDDDDD% at %TTT%.  [C]         Lymph % 33.3  Comment: CORRECTED RESULT; previously reported as 0.0 on %DDDDDDDD% at %TTT%.  [C]         Magnesium  1.4         MCH 30.4         MCHC 35.2         MCV 87         Mono # Test Not Performed  Comment: CORRECTED RESULT; previously reported as 0.1 on %DDDDDDDD% at %TTT%.  [C]         Mono % 0.0  Comment: CORRECTED RESULT; previously reported as 13.5 on %DDDDDDDD% at %TTT%.  [C]         MPV 10.4         nRBC 0         Ovalocytes Occasional         Phosphorus Level 2.1         Platelet Count 12  Comment: PLT   critical result(s) called and verbal readback obtained from   JAIDEN LEONARDNURSE by AMY 01/16/2025 06:31           Poikilocytosis Slight         Poly Occasional         Potassium 2.9         PROTEIN TOTAL 5.1         RBC 2.53         RDW 13.9         Sodium 135         Specimen Outdate 01/19/2025 23:59         Spherocytes Occasional         Tacrolimus Lvl 11.7  Comment: Testing performed by a chemiluminescent microparticle   immunoassay on the [employer] i System.    CAUTION: No firm therapeutic range exists for tacrolimus in whole   blood. The   complexity of the clinical state, individual differences in   sensitivity to   immunosuppressive and nephrotoxic effects of tacrolimus,   co-administration   of other immunosuppressants, type of transplant, time post-transplant   and a   number of other factors contribute to different requirements for   optimal   blood levels of tacrolimus. Therefore, individual tacrolimus values   cannot   be used as the sole indicator for making changes in treatment regimen   and   each patient should be thoroughly evaluated clinically before changes   in   treatment regimens are made. Each user must establish his or her own   ranges   based on  clinical experience.  Therapeutic ranges vary according to the commercial test used, and   therefore   should be established for each commercial test. Values obtained with   different assay methods cannot be used interchangeably due to   differences in   assay methods and cross-reactivity with metabolites, nor should   correction   factors be applied. Therefore, consistent use of one assay for   individual   patients is recommended.           Vancomycin-Trough   9.1       WBC 0.52  Comment: wbc ct   critical result(s) called and verbal readback obtained from   Zaida Levin RN by POD 01/16/2025 05:39                    [C] - Corrected Result               Significant Imaging: I have reviewed all pertinent imaging results/findings within the past 24 hours.

## 2025-01-16 NOTE — PLAN OF CARE
Recommendations  --Continue Soft & Bite Sized (IDDSI Level 6) diet as tolerated and clinically indicated   --Discontinue Boost Breeze TID  --Encourage good intakes   --Nursing: please continue to document % meal eaten on flowsheet   --RD to monitor weight, PO intake     Goals: Meet % EEN/EPN  Nutrition Goal Status: progressing towards goal  Communication of RD Recs:  (POC)

## 2025-01-16 NOTE — PROGRESS NOTES
Aerobic culture from 1/14 just resulted with staph aureus. Previously no growth to date. Pt on vancomycin, which will cover staph aureus, but awaiting susceptibilities. Remains afebrile.      Chu Wolf PA-C  Hematology/Oncology, BMT

## 2025-01-16 NOTE — ASSESSMENT & PLAN NOTE
- Tbili elevated to 1.2 on 1/13. Abdomen distended and tender to palpation in RUQ  - 12/31 CT AP complete for diffuse abdominal pain: No acute pathology. Stable adrenal nodule  - 1/2 abdominal US: No concerns for VOD/SOS  - 1/14 liver US w/ doppler: Hepatomegaly; mild intrahepatic biliary dilation. No evidence of VOD/SOS  - , lipase normal  - Continue ursodiol for VOD/SOS ppx  - Trending Tbili daily. 0.8 today

## 2025-01-16 NOTE — PT/OT/SLP PROGRESS
Physical Therapy Treatment    Patient Name:  Rubén Hu   MRN:  [id number]    Recommendations:     Discharge Recommendations: No Therapy Indicated  Discharge Equipment Recommendations: none  Barriers to discharge: None    Assessment:     Rubén Hu is a 54 y.o. male admitted with a medical diagnosis of History of allogeneic stem cell transplant.  He presents with the following impairments/functional limitations: gait instability, impaired balance, impaired endurance, impaired functional mobility pt tolerated treatment better being able to gait train farther. Pt will benefit from cont skilled PT 1x/wk to progress physically. Pt will be able to discharge home with no needs when medically stable.     Rehab Prognosis: Good; patient would benefit from acute skilled PT services to address these deficits and reach maximum level of function.    Recent Surgery: * No surgery found *      Plan:     During this hospitalization, patient to be seen 1 x/week to address the identified rehab impairments via gait training, therapeutic activities and progress toward the following goals:    Plan of Care Expires:  01/18/25    Subjective     Chief Complaint: pt stated that his belly is full from eating.   Patient/Family Comments/goals: to get better and go home.   Pain/Comfort:  Pain Rating 1: 0/10  Pain Rating Post-Intervention 1: 0/10      Objective:     Communicated with nurse  prior to session.  Patient found supine with PICC line upon PT entry to room.     General Precautions: Standard, fall (wear mask in room. pt wear mask out of room.)  Orthopedic Precautions: N/A  Braces: N/A  Respiratory Status: Room air     Functional Mobility:  Bed Mobility:     Rolling Right: independence  Supine to Sit: independence  Sit to Supine: independence    Transfers:     Sit to Stand:  independence with no AD    Gait: pt received gait training ~ 450 ft with mask on and supervision.     Pt white board updated with current therapists name and  level of mobility assistance needed.       AM-PAC 6 CLICK MOBILITY  Turning over in bed (including adjusting bedclothes, sheets and blankets)?: 4  Sitting down on and standing up from a chair with arms (e.g., wheelchair, bedside commode, etc.): 4  Moving from lying on back to sitting on the side of the bed?: 4  Moving to and from a bed to a chair (including a wheelchair)?: 4  Need to walk in hospital room?: 4  Climbing 3-5 steps with a railing?: 3  Basic Mobility Total Score: 23       Treatment & Education:  Pt and wife received verbal instructions in PT POC and both verbally expressed understanding of such.     Patient left supine with all lines intact, call button in reach, and wife present..    GOALS:   Multidisciplinary Problems       Physical Therapy Goals          Problem: Physical Therapy    Goal Priority Disciplines Outcome Interventions   Physical Therapy Goal     PT, PT/OT Progressing    Description: Goals to be met by: 25   Extended to be met by 25    Patient will increase functional independence with mobility by performin. Supine to sit with [place] - met 1/3/2025  2. Sit to supine with [place] - met 1/3/2025   3. Gait  x 750 feet with [place] using No Assistive Device.   4. Lower extremity exercise program x15 reps per handout, with assistance as needed                         DME Justifications:  No DME recommended requiring DME justifications    Time Tracking:     PT Received On: 25  PT Start Time: 1301     PT Stop Time: 1311  PT Total Time (min): 10 min     Billable Minutes: Gait Training 10 min     Treatment Type: Treatment  PT/PTA: PT     Number of PTA visits since last PT visit: 0     2025

## 2025-01-16 NOTE — PLAN OF CARE
Problem: Physical Therapy  Goal: Physical Therapy Goal  Description: Goals to be met by: 25   Extended to be met by 25    Patient will increase functional independence with mobility by performin. Supine to sit with [place] - met 1/3/2025  2. Sit to supine with [place] - met 1/3/2025   3. Gait  x 750 feet with [place] using No Assistive Device.   4. Lower extremity exercise program x15 reps per handout, with assistance as needed    Outcome: Progressing   Goals remain appropriate. 2025

## 2025-01-16 NOTE — PLAN OF CARE
Side rails up x2; call bell in place; bed in lowest, locked position; skid proof socks on; no evidence of skin breakdown; care plan explained to patient; pt remains free of injury. Pt is day +20 of an allo SCT. Pt with poor appetite, voids per urinal , soft BM x 1, ambulates in room. Pt denies mouth sore, tolerated taking pills. Pain to rectal abscess. Morphine given x 1 and compazine given for nausea x 1. One unit of PBRCs transfused with out incident. Frequent rounding in progress pt encouraged to call as needed, VSS and afebrile.

## 2025-01-17 LAB
ALBUMIN SERPL BCP-MCNC: 2.1 G/DL (ref 3.5–5.2)
ALP SERPL-CCNC: 64 U/L (ref 40–150)
ALT SERPL W/O P-5'-P-CCNC: 19 U/L (ref 10–44)
ANION GAP SERPL CALC-SCNC: 10 MMOL/L (ref 8–16)
ANISOCYTOSIS BLD QL SMEAR: SLIGHT
AST SERPL-CCNC: 14 U/L (ref 10–40)
BACTERIA SPEC AEROBE CULT: ABNORMAL
BASOPHILS NFR BLD: 0 % (ref 0–1.9)
BILIRUB SERPL-MCNC: 0.7 MG/DL (ref 0.1–1)
BLD PROD TYP BPU: NORMAL
BLOOD UNIT EXPIRATION DATE: NORMAL
BLOOD UNIT TYPE CODE: 6200
BLOOD UNIT TYPE: NORMAL
BUN SERPL-MCNC: 8 MG/DL (ref 6–20)
CALCIUM SERPL-MCNC: 8 MG/DL (ref 8.7–10.5)
CHLORIDE SERPL-SCNC: 104 MMOL/L (ref 95–110)
CO2 SERPL-SCNC: 23 MMOL/L (ref 23–29)
CODING SYSTEM: NORMAL
CREAT SERPL-MCNC: 0.6 MG/DL (ref 0.5–1.4)
CROSSMATCH INTERPRETATION: NORMAL
DIFFERENTIAL METHOD BLD: ABNORMAL
DISPENSE STATUS: NORMAL
DOHLE BOD BLD QL SMEAR: PRESENT
EOSINOPHIL NFR BLD: 0 % (ref 0–8)
ERYTHROCYTE [DISTWIDTH] IN BLOOD BY AUTOMATED COUNT: 13.9 % (ref 11.5–14.5)
EST. GFR  (NO RACE VARIABLE): >60 ML/MIN/1.73 M^2
GLUCOSE SERPL-MCNC: 91 MG/DL (ref 70–110)
HCT VFR BLD AUTO: 22.3 % (ref 40–54)
HGB BLD-MCNC: 7.8 G/DL (ref 14–18)
HYPOCHROMIA BLD QL SMEAR: ABNORMAL
IMM GRANULOCYTES # BLD AUTO: ABNORMAL K/UL (ref 0–0.04)
IMM GRANULOCYTES NFR BLD AUTO: ABNORMAL % (ref 0–0.5)
LYMPHOCYTES NFR BLD: 3 % (ref 18–48)
MAGNESIUM SERPL-MCNC: 1.3 MG/DL (ref 1.6–2.6)
MCH RBC QN AUTO: 30.2 PG (ref 27–31)
MCHC RBC AUTO-ENTMCNC: 35 G/DL (ref 32–36)
MCV RBC AUTO: 86 FL (ref 82–98)
METAMYELOCYTES NFR BLD MANUAL: 1 %
MONOCYTES NFR BLD: 7 % (ref 4–15)
NEUTROPHILS NFR BLD: 86 % (ref 38–73)
NEUTS BAND NFR BLD MANUAL: 3 %
NRBC BLD-RTO: 0 /100 WBC
OVALOCYTES BLD QL SMEAR: ABNORMAL
PHOSPHATE SERPL-MCNC: 2.2 MG/DL (ref 2.7–4.5)
PLATELET # BLD AUTO: 7 K/UL (ref 150–450)
PLATELET BLD QL SMEAR: ABNORMAL
PMV BLD AUTO: 9.8 FL (ref 9.2–12.9)
POIKILOCYTOSIS BLD QL SMEAR: SLIGHT
POLYCHROMASIA BLD QL SMEAR: ABNORMAL
POTASSIUM SERPL-SCNC: 3.2 MMOL/L (ref 3.5–5.1)
PROT SERPL-MCNC: 5.3 G/DL (ref 6–8.4)
RBC # BLD AUTO: 2.58 M/UL (ref 4.6–6.2)
SCHISTOCYTES BLD QL SMEAR: ABNORMAL
SCHISTOCYTES BLD QL SMEAR: PRESENT
SODIUM SERPL-SCNC: 137 MMOL/L (ref 136–145)
SPHEROCYTES BLD QL SMEAR: ABNORMAL
TACROLIMUS BLD-MCNC: 9 NG/ML (ref 5–15)
TOXIC GRANULES BLD QL SMEAR: PRESENT
UNIT NUMBER: NORMAL
VANCOMYCIN TROUGH SERPL-MCNC: 9 UG/ML (ref 10–22)
WBC # BLD AUTO: 1.16 K/UL (ref 3.9–12.7)
WBC TOXIC VACUOLES BLD QL SMEAR: PRESENT

## 2025-01-17 PROCEDURE — 27000207 HC ISOLATION

## 2025-01-17 PROCEDURE — 85007 BL SMEAR W/DIFF WBC COUNT: CPT | Performed by: NURSE PRACTITIONER

## 2025-01-17 PROCEDURE — 80197 ASSAY OF TACROLIMUS: CPT | Performed by: INTERNAL MEDICINE

## 2025-01-17 PROCEDURE — 25000003 PHARM REV CODE 250: Performed by: STUDENT IN AN ORGANIZED HEALTH CARE EDUCATION/TRAINING PROGRAM

## 2025-01-17 PROCEDURE — 25000003 PHARM REV CODE 250: Performed by: INTERNAL MEDICINE

## 2025-01-17 PROCEDURE — 84100 ASSAY OF PHOSPHORUS: CPT | Performed by: NURSE PRACTITIONER

## 2025-01-17 PROCEDURE — 25000003 PHARM REV CODE 250

## 2025-01-17 PROCEDURE — 83735 ASSAY OF MAGNESIUM: CPT | Performed by: NURSE PRACTITIONER

## 2025-01-17 PROCEDURE — 63600175 PHARM REV CODE 636 W HCPCS

## 2025-01-17 PROCEDURE — 25000003 PHARM REV CODE 250: Performed by: NURSE PRACTITIONER

## 2025-01-17 PROCEDURE — P9037 PLATE PHERES LEUKOREDU IRRAD: HCPCS | Performed by: STUDENT IN AN ORGANIZED HEALTH CARE EDUCATION/TRAINING PROGRAM

## 2025-01-17 PROCEDURE — 80053 COMPREHEN METABOLIC PANEL: CPT | Performed by: NURSE PRACTITIONER

## 2025-01-17 PROCEDURE — 63600175 PHARM REV CODE 636 W HCPCS: Performed by: INTERNAL MEDICINE

## 2025-01-17 PROCEDURE — 63600175 PHARM REV CODE 636 W HCPCS: Performed by: NURSE PRACTITIONER

## 2025-01-17 PROCEDURE — 99233 SBSQ HOSP IP/OBS HIGH 50: CPT | Mod: ,,, | Performed by: INTERNAL MEDICINE

## 2025-01-17 PROCEDURE — 20600001 HC STEP DOWN PRIVATE ROOM

## 2025-01-17 PROCEDURE — 85027 COMPLETE CBC AUTOMATED: CPT | Performed by: NURSE PRACTITIONER

## 2025-01-17 PROCEDURE — 63600175 PHARM REV CODE 636 W HCPCS: Performed by: STUDENT IN AN ORGANIZED HEALTH CARE EDUCATION/TRAINING PROGRAM

## 2025-01-17 PROCEDURE — 80202 ASSAY OF VANCOMYCIN: CPT | Performed by: INTERNAL MEDICINE

## 2025-01-17 RX ORDER — URSODIOL 300 MG/1
300 CAPSULE ORAL 2 TIMES DAILY
Qty: 16 CAPSULE | Refills: 0 | Status: SHIPPED | OUTPATIENT
Start: 2025-01-17 | End: 2025-01-27 | Stop reason: ALTCHOICE

## 2025-01-17 RX ORDER — CHOLESTYRAMINE 4 G/9G
1 POWDER, FOR SUSPENSION ORAL 2 TIMES DAILY
Status: DISCONTINUED | OUTPATIENT
Start: 2025-01-17 | End: 2025-01-24

## 2025-01-17 RX ORDER — ACYCLOVIR 800 MG/1
800 TABLET ORAL 2 TIMES DAILY
Qty: 60 TABLET | Refills: 11 | Status: SHIPPED | OUTPATIENT
Start: 2025-01-17 | End: 2026-01-17

## 2025-01-17 RX ORDER — AMLODIPINE BESYLATE 10 MG/1
10 TABLET ORAL DAILY
Qty: 90 TABLET | Refills: 3 | Status: SHIPPED | OUTPATIENT
Start: 2025-01-18 | End: 2025-02-03

## 2025-01-17 RX ORDER — DIPHENHYDRAMINE HCL 25 MG
25 CAPSULE ORAL ONCE AS NEEDED
Status: COMPLETED | OUTPATIENT
Start: 2025-01-17 | End: 2025-01-18

## 2025-01-17 RX ORDER — HYDROCODONE BITARTRATE AND ACETAMINOPHEN 500; 5 MG/1; MG/1
TABLET ORAL
Status: DISCONTINUED | OUTPATIENT
Start: 2025-01-17 | End: 2025-01-22

## 2025-01-17 RX ORDER — VANCOMYCIN 1.75 GRAM/500 ML IN 0.9 % SODIUM CHLORIDE INTRAVENOUS
1750
Status: DISCONTINUED | OUTPATIENT
Start: 2025-01-17 | End: 2025-01-18

## 2025-01-17 RX ORDER — TACROLIMUS 0.5 MG/1
0.5 CAPSULE ORAL EVERY MORNING
Status: DISCONTINUED | OUTPATIENT
Start: 2025-01-18 | End: 2025-01-17

## 2025-01-17 RX ORDER — MYCOPHENOLATE MOFETIL 250 MG/1
1000 CAPSULE ORAL 3 TIMES DAILY
Qty: 156 CAPSULE | Refills: 0 | Status: SHIPPED | OUTPATIENT
Start: 2025-01-17 | End: 2025-02-03

## 2025-01-17 RX ORDER — TACROLIMUS 0.5 MG/1
0.5 CAPSULE ORAL 2 TIMES DAILY
Status: DISCONTINUED | OUTPATIENT
Start: 2025-01-17 | End: 2025-01-20

## 2025-01-17 RX ORDER — TACROLIMUS 0.5 MG/1
CAPSULE ORAL
Qty: 60 CAPSULE | Refills: 5 | Status: SHIPPED | OUTPATIENT
Start: 2025-01-17 | End: 2025-01-23 | Stop reason: HOSPADM

## 2025-01-17 RX ORDER — ACETAMINOPHEN 325 MG/1
650 TABLET ORAL ONCE AS NEEDED
Status: DISCONTINUED | OUTPATIENT
Start: 2025-01-17 | End: 2025-01-19

## 2025-01-17 RX ORDER — LANOLIN ALCOHOL/MO/W.PET/CERES
800 CREAM (GRAM) TOPICAL 2 TIMES DAILY
Qty: 120 TABLET | Refills: 5 | Status: SHIPPED | OUTPATIENT
Start: 2025-01-17

## 2025-01-17 RX ADMIN — DIPHENOXYLATE HYDROCHLORIDE AND ATROPINE SULFATE 1 TABLET: .025; 2.5 TABLET ORAL at 04:01

## 2025-01-17 RX ADMIN — LOPERAMIDE HYDROCHLORIDE 2 MG: 2 CAPSULE ORAL at 08:01

## 2025-01-17 RX ADMIN — CHOLESTYRAMINE 4 G: 4 POWDER, FOR SUSPENSION ORAL at 08:01

## 2025-01-17 RX ADMIN — URSODIOL 300 MG: 300 CAPSULE ORAL at 08:01

## 2025-01-17 RX ADMIN — CEFEPIME 2 G: 2 INJECTION, POWDER, FOR SOLUTION INTRAVENOUS at 01:01

## 2025-01-17 RX ADMIN — DIPHENOXYLATE HYDROCHLORIDE AND ATROPINE SULFATE 1 TABLET: .025; 2.5 TABLET ORAL at 08:01

## 2025-01-17 RX ADMIN — ONDANSETRON 8 MG: 2 INJECTION INTRAMUSCULAR; INTRAVENOUS at 12:01

## 2025-01-17 RX ADMIN — POTASSIUM CHLORIDE 10 MEQ: 7.45 INJECTION INTRAVENOUS at 10:01

## 2025-01-17 RX ADMIN — VANCOMYCIN HYDROCHLORIDE 1750 MG: 500 INJECTION, POWDER, LYOPHILIZED, FOR SOLUTION INTRAVENOUS at 12:01

## 2025-01-17 RX ADMIN — LETERMOVIR 480 MG: 480 TABLET, FILM COATED ORAL at 09:01

## 2025-01-17 RX ADMIN — POTASSIUM CHLORIDE 10 MEQ: 7.45 INJECTION INTRAVENOUS at 05:01

## 2025-01-17 RX ADMIN — METRONIDAZOLE 500 MG: 500 TABLET ORAL at 05:01

## 2025-01-17 RX ADMIN — POSACONAZOLE 300 MG: 100 TABLET, DELAYED RELEASE ORAL at 09:01

## 2025-01-17 RX ADMIN — ACYCLOVIR 800 MG: 800 TABLET ORAL at 09:01

## 2025-01-17 RX ADMIN — ACETAMINOPHEN 650 MG: 325 TABLET ORAL at 05:01

## 2025-01-17 RX ADMIN — ONDANSETRON 8 MG: 2 INJECTION INTRAMUSCULAR; INTRAVENOUS at 05:01

## 2025-01-17 RX ADMIN — VANCOMYCIN HYDROCHLORIDE 1750 MG: 10 INJECTION, POWDER, LYOPHILIZED, FOR SOLUTION INTRAVENOUS at 08:01

## 2025-01-17 RX ADMIN — Medication 1 DOSE: at 08:01

## 2025-01-17 RX ADMIN — DIPHENOXYLATE HYDROCHLORIDE AND ATROPINE SULFATE 1 TABLET: .025; 2.5 TABLET ORAL at 09:01

## 2025-01-17 RX ADMIN — SIMETHICONE 80 MG: 80 TABLET, CHEWABLE ORAL at 09:01

## 2025-01-17 RX ADMIN — METRONIDAZOLE 500 MG: 500 TABLET ORAL at 01:01

## 2025-01-17 RX ADMIN — POTASSIUM CHLORIDE 10 MEQ: 7.45 INJECTION INTRAVENOUS at 09:01

## 2025-01-17 RX ADMIN — TACROLIMUS 0.5 MG: 0.5 CAPSULE ORAL at 05:01

## 2025-01-17 RX ADMIN — POTASSIUM CHLORIDE 10 MEQ: 7.45 INJECTION INTRAVENOUS at 11:01

## 2025-01-17 RX ADMIN — MAGNESIUM SULFATE HEPTAHYDRATE 2 G: 40 INJECTION, SOLUTION INTRAVENOUS at 05:01

## 2025-01-17 RX ADMIN — AMLODIPINE BESYLATE 10 MG: 10 TABLET ORAL at 09:01

## 2025-01-17 RX ADMIN — TACROLIMUS 0.5 MG: 0.5 CAPSULE ORAL at 09:01

## 2025-01-17 RX ADMIN — MICONAZOLE NITRATE: 20 OINTMENT TOPICAL at 09:01

## 2025-01-17 RX ADMIN — ACYCLOVIR 800 MG: 800 TABLET ORAL at 08:01

## 2025-01-17 RX ADMIN — CEFEPIME 2 G: 2 INJECTION, POWDER, FOR SOLUTION INTRAVENOUS at 05:01

## 2025-01-17 RX ADMIN — DIPHENHYDRAMINE HYDROCHLORIDE 15 ML: 25 SOLUTION ORAL at 05:01

## 2025-01-17 RX ADMIN — GUAIFENESIN AND DEXTROMETHORPHAN HYDROBROMIDE 1 TABLET: 600; 30 TABLET, EXTENDED RELEASE ORAL at 08:01

## 2025-01-17 RX ADMIN — DIPHENHYDRAMINE HYDROCHLORIDE 15 ML: 25 SOLUTION ORAL at 12:01

## 2025-01-17 RX ADMIN — Medication 1 DOSE: at 12:01

## 2025-01-17 RX ADMIN — METRONIDAZOLE 500 MG: 500 TABLET ORAL at 09:01

## 2025-01-17 RX ADMIN — CHOLESTYRAMINE 4 G: 4 POWDER, FOR SUSPENSION ORAL at 12:01

## 2025-01-17 RX ADMIN — SODIUM PHOSPHATE, MONOBASIC, MONOHYDRATE AND SODIUM PHOSPHATE, DIBASIC, ANHYDROUS 20.01 MMOL: 142; 276 INJECTION, SOLUTION INTRAVENOUS at 05:01

## 2025-01-17 RX ADMIN — MYCOPHENOLATE MOFETIL 1000 MG: 250 CAPSULE ORAL at 09:01

## 2025-01-17 RX ADMIN — POTASSIUM CHLORIDE 10 MEQ: 7.45 INJECTION INTRAVENOUS at 08:01

## 2025-01-17 RX ADMIN — ZINC OXIDE: 200 OINTMENT TOPICAL at 10:01

## 2025-01-17 RX ADMIN — Medication 1 DOSE: at 09:01

## 2025-01-17 RX ADMIN — FILGRASTIM 480 MCG: 480 INJECTION, SOLUTION INTRAVENOUS; SUBCUTANEOUS at 09:01

## 2025-01-17 RX ADMIN — CEFEPIME 2 G: 2 INJECTION, POWDER, FOR SOLUTION INTRAVENOUS at 09:01

## 2025-01-17 RX ADMIN — ZINC OXIDE: 200 OINTMENT TOPICAL at 05:01

## 2025-01-17 RX ADMIN — PANTOPRAZOLE SODIUM 40 MG: 40 TABLET, DELAYED RELEASE ORAL at 09:01

## 2025-01-17 RX ADMIN — ONDANSETRON 8 MG: 2 INJECTION INTRAMUSCULAR; INTRAVENOUS at 09:01

## 2025-01-17 RX ADMIN — LOPERAMIDE HYDROCHLORIDE 2 MG: 2 CAPSULE ORAL at 04:01

## 2025-01-17 RX ADMIN — LOPERAMIDE HYDROCHLORIDE 2 MG: 2 CAPSULE ORAL at 09:01

## 2025-01-17 RX ADMIN — Medication 1 DOSE: at 04:01

## 2025-01-17 RX ADMIN — SIMETHICONE 80 MG: 80 TABLET, CHEWABLE ORAL at 04:01

## 2025-01-17 RX ADMIN — PROCHLORPERAZINE EDISYLATE 5 MG: 5 INJECTION INTRAMUSCULAR; INTRAVENOUS at 11:01

## 2025-01-17 RX ADMIN — ZINC OXIDE: 200 OINTMENT TOPICAL at 02:01

## 2025-01-17 RX ADMIN — MYCOPHENOLATE MOFETIL 1000 MG: 250 CAPSULE ORAL at 04:01

## 2025-01-17 RX ADMIN — MYCOPHENOLATE MOFETIL 1000 MG: 250 CAPSULE ORAL at 08:01

## 2025-01-17 RX ADMIN — GUAIFENESIN AND DEXTROMETHORPHAN HYDROBROMIDE 1 TABLET: 600; 30 TABLET, EXTENDED RELEASE ORAL at 09:01

## 2025-01-17 RX ADMIN — LOPERAMIDE HYDROCHLORIDE 2 MG: 2 CAPSULE ORAL at 12:01

## 2025-01-17 RX ADMIN — DIPHENOXYLATE HYDROCHLORIDE AND ATROPINE SULFATE 1 TABLET: .025; 2.5 TABLET ORAL at 12:01

## 2025-01-17 RX ADMIN — URSODIOL 300 MG: 300 CAPSULE ORAL at 09:01

## 2025-01-17 RX ADMIN — SIMETHICONE 80 MG: 80 TABLET, CHEWABLE ORAL at 08:01

## 2025-01-17 NOTE — PLAN OF CARE
Mr. 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 This lesion was biopsied on 1/14/25. Results suggest infectious etiology consistent with impression of ecthyma gangrenosum (see below). Culture with preliminary results growing staph aureus. Infectious disease is currently following along. Will defer antimicrobial management to their team. We will continue to follow culture results.     Vickie Hamilton MD  Dermatology        Final Pathologic Diagnosis   -ULCERATED EPIDERMIS WITH UNDERLYING MIXED INFLAMMATION, see comment     COMMENT:  Clinical images were reviewed in epic and differential diagnosis is noted.  The histological findings (see microscopic description) are overall nonspecific but could represent the clinical impression of ecthyma gangrenosum.  Classical   histological features of nyizp-xiugqd-kwij disease are not appreciated in the sections examined.  Although GMS, AFB, and Alexis stains are negative for fungal and mycobacterial organisms, these are usually low yield in nature and therefore if an infectious    etiology is suspected, correlation with cultures and/or PCR is recommended.

## 2025-01-17 NOTE — ASSESSMENT & PLAN NOTE
-  1/14 pt first complained of perianal erythema and pain, R axillary rash/pain, and BLE petechial rash. Concern for cellulitis vs fungal infection. Low suspicion for acute GVHD as patient has yet to engraft. Pt remains afebrile.  - CT A/P with colonic dilation, gaseous distension, and L sided perianal thickening/fat stranding concerning for cellulitis  - CT R axilla: no acute soft tissue infection  - CT BLE: mild edema; no signs of soft tissue infection. Punch biopsy (1/14) path results consistent with infection rather than with GVHD.  - S/p R axilla punch biopsy with dermatology. Cx + for staph. Speciation and susceptibilities pending. On vanc.  - ID following and recommending vancomycin, cefepime, and flagyl. Pending karius results  - Wound care consulted  - With zinc oxide barrier cream for scrotal and perianal irritation

## 2025-01-17 NOTE — ASSESSMENT & PLAN NOTE
- Anticipated 2/2 chemotherapy   - Meds switched from PO to IV as able 1/2.   - Zofran q8h  - Compazine q6h  - Ativan PRN

## 2025-01-17 NOTE — PROGRESS NOTES
Pharmacokinetic Assessment Follow Up: IV Vancomycin    Vancomycin serum concentration assessment(s):    The trough level was drawn correctly and can be used to guide therapy at this time. The measurement is below the desired definitive target range of 15 to 20 mcg/mL.    Vancomycin Regimen Plan:    Change regimen to Vancomycin 1750 mg IV every 8 hours with next serum trough concentration measured at 1130 prior to 4th dose on 1/18/24    Drug levels (last 3 results):  Recent Labs   Lab Result Units 01/15/25  2223 01/17/25  1139   Vancomycin-Trough ug/mL 9.1* 9.0*       Pharmacy will continue to follow and monitor vancomycin.    Please contact pharmacy at extension [phone] for questions regarding this assessment.    Thank you for the consult,   Sree Hernandez       Patient brief summary:  Rubén Hu is a 54 y.o. male initiated on antimicrobial therapy with IV Vancomycin for treatment of skin & soft tissue infection      Drug Allergies:   Review of patient's allergies indicates:   Allergen Reactions    Bactrim [sulfamethoxazole-trimethoprim] Hives       Actual Body Weight:   87.6 kg    Renal Function:   Estimated Creatinine Clearance: 148.2 mL/min (based on SCr of 0.6 mg/dL).,       CBC (last 72 hours):  Recent Labs   Lab Result Units 01/15/25  0327 01/16/25  0354 01/17/25  0328   WBC K/uL 0.31* 0.52* 1.16*   Hemoglobin g/dL 6.6* 7.7* 7.8*   Hematocrit % 18.7* 21.9* 22.3*   Platelets K/uL 18* 12* 7*   Gran % % 87.1* 66.7 86.0*   Lymph % % 0.0* 33.3 3.0*   Mono % % 12.9 0.0* 7.0   Eosinophil % % 0.0 0.0 0.0   Basophil % % 0.0 0.0 0.0   Differential Method  Automated Manual Manual       Metabolic Panel (last 72 hours):  Recent Labs   Lab Result Units 01/15/25  0327 01/16/25  0354 01/17/25  0328   Sodium mmol/L 134* 135* 137   Potassium mmol/L 3.1* 2.9* 3.2*   Chloride mmol/L 101 102 104   CO2 mmol/L 22* 23 23   Glucose mg/dL 98 94 91   BUN mg/dL 10 8 8   Creatinine mg/dL 0.7 0.6 0.6   Albumin g/dL 2.0* 1.9*  2.1*   Total Bilirubin mg/dL 1.0 0.8 0.7   Alkaline Phosphatase U/L 69 66 64   AST U/L 30 19 14   ALT U/L 33 27 19   Magnesium mg/dL 1.5* 1.4* 1.3*   Phosphorus mg/dL 2.4* 2.1* 2.2*       Vancomycin Administrations:  vancomycin given in the last 96 hours                     vancomycin 1750 mg in 0.9% sodium chloride 500 mL IVPB (mg) 1,750 mg New Bag 01/17/25 1218     1,750 mg New Bag  0019     1,750 mg New Bag 01/16/25 1420     1,750 mg New Bag  0016    vancomycin 1,500 mg in 0.9% NaCl 250 mL IVPB (admixture device) (mg) 1,500 mg New Bag 01/15/25 1219     1,500 mg New Bag 01/14/25 2326     1,500 mg New Bag  1413                    Microbiologic Results:  Microbiology Results (last 7 days)       Procedure Component Value Units Date/Time    Aerobic culture [[id number]]  (Abnormal) Collected: 01/14/25 1546    Order Status: Completed Specimen: Skin from Arm, Right Updated: 01/16/25 1258     Aerobic Bacterial Culture STAPHYLOCOCCUS AUREUS  Few  Susceptibility pending      Culture, Anaerobe [[id number]] Collected: 01/14/25 1545    Order Status: Completed Specimen: Tissue from Arm, Right Updated: 01/16/25 1256     Anaerobic Culture Culture in progress    AFB Culture & Smear [[id number]] Collected: 01/14/25 1545    Order Status: Completed Specimen: Tissue from Arm, Right Updated: 01/15/25 2127     AFB Culture & Smear Culture in progress     AFB CULTURE STAIN No acid fast bacilli seen.    Fungus culture [[id number]] Collected: 01/14/25 1545    Order Status: Sent Specimen: Tissue from Arm, Right Updated: 01/14/25 0157

## 2025-01-17 NOTE — PROGRESS NOTES
Discharge teaching done with patient and patient's caregiver on BMT unit.  Approximately 45 minutes spent on discussion of post-transplant guidelines including:  Low microbial diet, safe food handling, dining out, home sanitation, outpatient plan of care, progression of recovery of cells and immune system, ways to prevent infection, fatigue, ways to avoid bleeding, pet and plant exposure and care, returning to work, smoking and alcohol consumption, sexual activity, sexual desire and response, immunizations, traveling, nutrition, personal hygiene, hand washing, oral care, eye care, signs and symptoms to report immediately, and emotional changes post transplant.  Also discussed who to contact during business hours, after hours, and holidays.  Written materials supplied.  Patient and family given the opportunity to ask questions.  Verbalizes understanding.  All questions answered to their satisfaction.  Patient and family instructed to call with any questions or concerns.  Patient and caregiver were given handouts which reiterate all the above teaching.     Anna Vera, FNP  Hematology/Oncology/Bone Marrow Transplant

## 2025-01-17 NOTE — PLAN OF CARE
Side rails up x2; call bell in place; bed in lowest, locked position; skid proof socks on; no evidence of skin breakdown; care plan explained to patient; pt remains free of injury. Pt is day +21 of an allo SCT. Pt with a poor appetite, voids per urinal, diarrhea BM x 2-3., ambulates in room. Fall risk/education given, pt agreed to bed alarm on. KCL 10meq KCL riders completed, Na Phos rider completed. Mg rider completed. Pt with c/o nausea scheduled Zofran given and compazine given PRN. Vancomycin given pt placed on contact precautions for staph infection. Frequent rounding in progress pt encouraged to call as needed VSS and afebrile.

## 2025-01-17 NOTE — ASSESSMENT & PLAN NOTE
- Patient of Dr. 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 Received 3 bags with CD34 dose of 6.04x10^6/kg.   - Today is Day +22  - Tacro levels MWF. Tacro level today 9.0 (goal is 7-9). Will continue current tacro dose of 0.5 mg BID Next level will be on Monday.   - Patient is O+. Donor is O+.  - Patient is CMV non-reactive. Donor is CMV reactive. Given donor CMV status, patient will start (patient-supplied) letermovir on Day +5.  - Day -4 and Day -3 Busulfan dose-adjusted to 282 mg  - Engrafted today with an ANC of 998.  - See treatment plan below:    Planned conditioning regimen:  Thiotepa on Day -7  Busulfan on Day -6, -5, -4, and -3  Fludarabine on Day -6, -5, -4, and -3  REST DAYS on Day -2 and -1     Planned  GVHD Prophylaxis:  Cyclophosphamide on Day +3 and +4  Tacrolimus starting on Day +5  Mycophenolate starting on Day +5     Antimicrobial Prophylaxis:  Acyclovir starting on Day -7  Levofloxacin starting on Day -1  Micafungin on Day -1 through Day +4  Letermovir starting on Day +5 (if CMV PCR drawn on day 0 results negative)  Posaconazole starting on Day +5  Atovaquone starting on Day +30     Skin Care with Thiotepa: Day -7 through D-5     Seizure Prophylaxis:  Levetiracetam on Day -7 through Day -2     SOS/VOD Prophylaxis:  Ursodiol on Day -7 through Day +30     Growth Factor Support:  Neupogen starting on Day +5        Caregiver: [place] (Spouse)  Post-transplant discharge plans: Home

## 2025-01-17 NOTE — PROGRESS NOTES
Daniel Rodríguez - Oncology ([facility])  Hematology  Bone Marrow Transplant  Progress Note    Patient Name: Rubén Hu  Admission Date: 12/18/2024  Hospital Length of Stay: 30 days  Code Status: Full Code    Subjective:     Interval History: Day +22 from a Bu/Flu/Thiotepa PTCy haploidentical (brother) SCT for PMF. Remains afebrile. VSS. Diarrhea and nausea persist. On scheduled imodium and limotil. Will add questran. Axilla cx + for staph. Speciation and susceptibilities pending. Leg wound biopsy consistent with infection (see path report for biopsy dated 1/14/25) rather than aGVHD. Engrafted today with ANC of 998, but has multiple barriers to discharge, including oral intake, diarrhea control, and ID plan (pending speciation and susceptibilities).    Objective:     Vital Signs (Most Recent):  Temp: 97.9 °F (36.6 °C) (01/17/25 0815)  Pulse: 77 (01/17/25 0815)  Resp: 16 (01/17/25 0815)  BP: 126/78 (01/17/25 0815)  SpO2: 95 % (01/17/25 0815) Vital Signs (24h Range):  Temp:  [97.8 °F (36.6 °C)-98.4 °F (36.9 °C)] 97.9 °F (36.6 °C)  Pulse:  [] 77  Resp:  [16-18] 16  SpO2:  [93 %-98 %] 95 %  BP: (122-145)/(72-82) 126/78     Weight: 87.6 kg (193 lb 3.4 oz)  Body mass index is 26.95 kg/m².  Body surface area is 2.09 meters squared.    ECOG SCORE           [unfilled]    Intake/Output - Last 3 Shifts         01/15 0700  01/16 0659 01/16 0700 01/17 0659 01/17 0700 01/18 0659    P.O. 1450 250     I.V. (mL/kg) 50 (0.6)      Blood  173     IV Piggyback 2775.6      Total Intake(mL/kg) 4275.6 (48) 423 (4.8)     Urine (mL/kg/hr) 2200 (1) 1400 (0.7)     Emesis/NG output 0 0     Stool 0 0     Total Output 2200 1400     Net +2075.6 -977            Urine Occurrence 3 x 5 x     Stool Occurrence 3 x 4 x     Emesis Occurrence 0 x 0 x              Physical Exam  Vitals reviewed.   Constitutional:       Appearance: Normal appearance.   HENT:      Head: Normocephalic and atraumatic.      Nose: Nose normal.      Mouth/Throat:      Mouth:  Mucous membranes are moist.      Pharynx: Oropharynx is clear.   Eyes:      Extraocular Movements: Extraocular movements intact.      Conjunctiva/sclera: Conjunctivae normal.      Pupils: Pupils are equal, round, and reactive to light.   Cardiovascular:      Rate and Rhythm: Normal rate and regular rhythm.      Pulses: Normal pulses.      Heart sounds: Normal heart sounds.   Pulmonary:      Effort: Pulmonary effort is normal.      Breath sounds: Normal breath sounds.   Abdominal:      General: Abdomen is flat. Bowel sounds are normal.      Palpations: Abdomen is soft.   Genitourinary:     Comments: L perianal erythema with TTP. No fluctuance appreciated.   B/l scrotal erythema   Musculoskeletal:         General: Normal range of motion.      Cervical back: Normal range of motion.   Skin:     General: Skin is warm and dry.      Capillary Refill: Capillary refill takes less than 2 seconds.      Findings: Bruising (abdominal), erythema (R underarm surrounding an ulceration), petechiae (B/l LE petechial rash) and rash (BLE) present. Rash is purpuric (on b/l LE).      Comments: R chest wall muhammad in place. Dressing c/d/i. No signs of infection to site.   Neurological:      General: No focal deficit present.      Mental Status: He is alert and oriented to person, place, and time.   Psychiatric:         Mood and Affect: Mood normal.         Behavior: Behavior normal.         Thought Content: Thought content normal.         Judgment: Judgment normal.            Significant Labs:   CBC:   Recent Labs   Lab 01/16/25  0354 01/17/25  0328   WBC 0.52* 1.16*   HGB 7.7* 7.8*   HCT 21.9* 22.3*   PLT 12* 7*    and CMP:   Recent Labs   Lab 01/16/25  0354 01/17/25  0328   * 137   K 2.9* 3.2*    104   CO2 23 23   GLU 94 91   BUN 8 8   CREATININE 0.6 0.6   CALCIUM 7.9* 8.0*   PROT 5.1* 5.3*   ALBUMIN 1.9* 2.1*   BILITOT 0.8 0.7   ALKPHOS 66 64   AST 19 14   ALT 27 19   ANIONGAP 10 10       Diagnostic  Results:  None  Assessment/Plan:     * History of allogeneic stem cell transplant  - Patient of Dr. 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 Received 3 bags with CD34 dose of 6.04x10^6/kg.   - Today is Day +22  - Tacro levels MWF. Tacro level today 9.0 (goal is 7-9). Will continue current tacro dose of 0.5 mg BID Next level will be on Monday.   - Patient is O+. Donor is O+.  - Patient is CMV non-reactive. Donor is CMV reactive. Given donor CMV status, patient will start (patient-supplied) letermovir on Day +5.  - Day -4 and Day -3 Busulfan dose-adjusted to 282 mg  - Engrafted today with an ANC of 998.  - See treatment plan below:    Planned conditioning regimen:  Thiotepa on Day -7  Busulfan on Day -6, -5, -4, and -3  Fludarabine on Day -6, -5, -4, and -3  REST DAYS on Day -2 and -1     Planned  GVHD Prophylaxis:  Cyclophosphamide on Day +3 and +4  Tacrolimus starting on Day +5  Mycophenolate starting on Day +5     Antimicrobial Prophylaxis:  Acyclovir starting on Day -7  Levofloxacin starting on Day -1  Micafungin on Day -1 through Day +4  Letermovir starting on Day +5 (if CMV PCR drawn on day 0 results negative)  Posaconazole starting on Day +5  Atovaquone starting on Day +30     Skin Care with Thiotepa: Day -7 through D-5     Seizure Prophylaxis:  Levetiracetam on Day -7 through Day -2     SOS/VOD Prophylaxis:  Ursodiol on Day -7 through Day +30     Growth Factor Support:  Neupogen starting on Day +5        Caregiver: Carmelita (Spouse)  Post-transplant discharge plans: Home      Primary myelofibrosis  - From Dr. 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 Transplant on 12/26/24 at 1330. Received 3 bags with CD34 dose of 6.04 x 10^6/kg.     Pancytopenia due to chemotherapy  - Anticipated following chemotherapy.  - Transfuse for hgb < 7 or plts < 10K  - Continue antimicrobial ppx   - Daily CBC while IP    Neutropenic fever  - Infection w/u neg thus far  - Afebrile, stopped cefepime and resumed levaquin 12/31/24. Pt afebrile but on 1/15 began vancomycin, cefepime, and flagyl per ID for R axillary rash, perirectal cellulitis, and BLE petechial rash.     Skin lesions  -  1/14 pt first complained of perianal erythema and pain, R axillary rash/pain, and BLE petechial rash. Concern for cellulitis vs fungal infection. Low suspicion for acute GVHD as patient has yet to engraft. Pt remains afebrile.  - CT A/P with colonic dilation, gaseous distension, and L sided perianal thickening/fat stranding concerning for cellulitis  - CT R axilla: no acute soft tissue infection  - CT BLE: mild edema; no signs of soft tissue infection. Punch biopsy (1/14) path results consistent with infection rather than with GVHD.  - S/p R axilla punch biopsy with dermatology. Cx + for staph. Speciation and susceptibilities pending. On vanc.  - ID following and recommending vancomycin, cefepime, and flagyl. Pending karius results  - Wound care consulted  - With zinc oxide barrier cream for scrotal and perianal irritation    Perianal pain  - Pt with history of hematochezia that was worked up outpatient prior to transplant. 7/23/24 colonoscopy with several diverticula and a non-bleeding rectal abscess  - Denies hematochezia/melena while inpatient. Now with left sided perianal irritation and pain. Erythematous area that is tender to palpation, but without fluctuance. Reports pain became worse after irritation from diarrhea. Pt afebrile.   - 1/14  erythema worsening and rash now warm to touch. Concern for cellulitis vs abscess. 1/14 Began vancomycin, cefepime, and flagyl. ID consulted.  - Wound care consulted and will monitor pt closely for fevers/overt signs of infection  - Low threshold to consult CRS  - Continue zinc oxide    Chemotherapy-induced nausea  - Anticipated 2/2 chemotherapy   - Meds switched from PO to IV as able 1/2.   - Zofran q8h  - Compazine q6h  - Ativan PRN    Hypercoagulable state, secondary  - See primary myelofibrosis    Cellulitis  - See skin lesions  - See perianal pain  - ID and derm consulted  - Wound care consulted  - 1/14 began vancomycin, cefepime, and flagyl    Hyperbilirubinemia  - Tbili elevated to 1.2 on 1/13. Abdomen distended and tender to palpation in RUQ  - 12/31 CT AP complete for diffuse abdominal pain: No acute pathology. Stable adrenal nodule  - 1/2 abdominal US: No concerns for VOD/SOS  - 1/14 liver US w/ doppler: Hepatomegaly; mild intrahepatic biliary dilation. No evidence of VOD/SOS  - , lipase normal  - Continue ursodiol for VOD/SOS ppx  - Trending Tbili daily. WNL today at 0.7.    Diarrhea  - C. Diff negative on 12/28/24. Diarrhea improving  - Imodium q8h  - Began lomotil q8h 1/15      Immunocompromised state associated with stem cell transplant  - See history of allogeneic stem cell transplant     Abdominal pain  - Suspect 2/2 chemotherapy conditioning regimen prior to allogeneic SCT. Abdomen notably distended and with TTP in epigastric region + RUQ.   - Continue protonix and famotidine daily, simethicone 80 TID ordered for flatulence  - , lipase normal  - morphine 1 mg PRN  - CT A/P 12/31: no acute pathology   - USG abdomen (RUQ) 01/02 unremarkable, not concerning for VOD  - Liver US w/ doppler 1/14: Hepatomegaly, mild intrahepatic biliary dilation. No signs of VOD/SOS  - Continuing on ursodiol for VOD/SOS ppx  - Trending Tbili. Elevated to 1.2 1/13. Today it is 0.7. Weight stable from  yesterday  - Improving    Mucositis  - Having poor PO intake/throat pain 2/2 chemotherapy   - Duke's solution QID  - Morphine 2 mg q3h PRN. Can increase dose or frequency if need be.  - Transitioned PO meds to IV. 1/15 began switching meds back to PO  - Diet changed from regular to soft/bite sized  - Improving    Allergic contact dermatitis due to adhesives  - Itching and redness to LLQ/RLQ where tele leads were placed.   - Topical benadryl prn for itching.  - Tele removed       Hypokalemia  - See electrolyte disorder    Electrolyte disorder  - Replete per PRN electrolyte order set  - Daily CMP, mag, and phos while inpatient    Transaminitis  - Transaminases first elevated 12/27. Suspect 2/2 chemotherapy prior to transplant.   - Will continue to monitor with daily CMP.  - RESOLVED    Hypertension  - Became hypertensive following SCT on 12/26. No signs of volume overload contributing to HTN.  - 12/27 started amlodipine.     Insomnia  - Difficulty sleeping at night while IP.  - Has PRN ativan and trazodone  - Melatonin not effective     Adrenal nodule  - Subcentimeter nodular thickening of the adrenal glands first noted on imaging from 1/2024.  - Seen by endocrine prior to transplant admission and Dexamethasone suppression test performed. Patient responded appropriately. No further w/u needed.  - Likely adenomas.    Metabolic dysfunction-associated steatotic liver disease (MASLD)  - Biopsy proven to be steatotic change. Most compatible with MASLD.   - Follow-up with hepatology OP.    Hyperlipidemia  - Holding home atorvastatin while IP to avoid interaction with other medications. Can resume at discharge if LFTs stable.        VTE Risk Mitigation (From admission, onward)           Ordered     heparin, porcine (PF) 100 unit/mL injection flush 300 Units  As needed (PRN)         12/18/24 2025                    Disposition: Inpatient for allogeneic SCT.    Anna Vera, NP  Bone Marrow Transplant  Daniel caryl - Oncology  ([facility])

## 2025-01-17 NOTE — PROGRESS NOTES
NAT met with patient and spouse at bedside. Patient explained he's feeling better after 2 very difficult weeks. Patient is hoping for a discharge in the next few days.    Patient feels he can ambulate and care for himself well, PT/OT had no other recommendations. Carmelita asked about bedside commode, NAT explained insurance rules for the commode as well as shower bench. Carmelita reports they will purchase themselves and may purchase today on [employer]).     Patient has no other concerns about returning home.

## 2025-01-17 NOTE — PT/OT/SLP PROGRESS
Occupational Therapy      Patient Name:  Rubén Hu   MRN:  [id number]    Patient not seen today secondary to on first attempt nurse staff reporting extensive diarrhea this am and in pm pt. Having discussion with NP   1/17/2025

## 2025-01-17 NOTE — ASSESSMENT & PLAN NOTE
54M with PMH of myelofibrosis s/p haplo SCT 12/26/24, now with lesion of R axilla, L calf petechial rash, and erythematous induration of L perianal area. No significant exposures. 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 Karius also in process. CTs of axilla, legs, and abd/pelvis all without any abscesses or adenopathy, did show cellulitis of perianal area. He feels like both axillary lesion and perianal area are improving.     Recommendations  Continue vancomycin, cefepime, and flagyl  Follow up staph aureus susceptibilities  Follow up karius test

## 2025-01-17 NOTE — ASSESSMENT & PLAN NOTE
- Infection w/u neg thus far  - Afebrile, stopped cefepime and resumed levaquin 12/31/24. Pt afebrile but on 1/15 began vancomycin, cefepime, and flagyl per ID for R axillary rash, perirectal cellulitis, and BLE petechial rash.

## 2025-01-17 NOTE — SUBJECTIVE & OBJECTIVE
Interval History: doing well this morning, does not have any new complaints, R axilla lesion improving    Review of Systems   Constitutional:  Negative for chills and fever.   Respiratory:  Negative for cough and shortness of breath.    Cardiovascular:  Negative for chest pain.   Gastrointestinal:  Negative for abdominal pain, constipation, diarrhea, nausea and vomiting.   Musculoskeletal:  Negative for arthralgias and myalgias.   Skin:  Positive for rash and wound.   Neurological:  Negative for headaches.     Objective:     Vital Signs (Most Recent):  Temp: 97.3 °F (36.3 °C) (01/17/25 1127)  Pulse: 86 (01/17/25 1127)  Resp: 18 (01/17/25 1127)  BP: 121/82 (01/17/25 1127)  SpO2: 96 % (01/17/25 1127) Vital Signs (24h Range):  Temp:  [97.3 °F (36.3 °C)-98.4 °F (36.9 °C)] 97.3 °F (36.3 °C)  Pulse:  [77-94] 86  Resp:  [16-18] 18  SpO2:  [93 %-98 %] 96 %  BP: (121-145)/(72-82) 121/82     Weight: 87.6 kg (193 lb 3.4 oz)  Body mass index is 26.95 kg/m².    Estimated Creatinine Clearance: 148.2 mL/min (based on SCr of 0.6 mg/dL).     Physical Exam  Vitals reviewed.   Constitutional:       General: He is not in acute distress.     Appearance: Normal appearance. He is not ill-appearing.   HENT:      Head: Normocephalic and atraumatic.   Eyes:      Extraocular Movements: Extraocular movements intact.      Conjunctiva/sclera: Conjunctivae normal.   Pulmonary:      Effort: Pulmonary effort is normal. No respiratory distress.   Abdominal:      General: Abdomen is flat.   Musculoskeletal:      Cervical back: Normal range of motion.   Skin:     General: Skin is warm and dry.      Comments: R axilla lesion improving   Neurological:      General: No focal deficit present.      Mental Status: He is alert and oriented to person, place, and time.   Psychiatric:         Mood and Affect: Mood normal.         Behavior: Behavior normal.         Thought Content: Thought content normal.          Significant Labs: All pertinent labs within the  past 24 hours have been reviewed.  Recent Lab Results         01/17/25  1139   01/17/25  0328        Albumin   2.1       ALP   64       ALT   19       Anion Gap   10       Aniso   Slight       AST   14       Bands   3.0       Basophil %   0.0       BILIRUBIN TOTAL   0.7  Comment: For infants and newborns, interpretation of results should be based  on gestational age, weight and in agreement with clinical  observations.    Premature Infant recommended reference ranges:  Up to 24 hours.............<8.0 mg/dL  Up to 48 hours............<12.0 mg/dL  3-5 days..................<15.0 mg/dL  6-29 days.................<15.0 mg/dL         BUN   8       Calcium   8.0       Chloride   104       CO2   23       Creatinine   0.6       Differential Method   Manual       Dohle Bodies   Present       eGFR   >60.0       Eos %   0.0       Fragmented Cells   Occasional       Glucose   91       Gran %   86.0  Comment: CORRECTED RESULT; previously reported as 81.0 on %DDDDDDDD% at %TTT%.  [C]       Hematocrit   22.3       Hemoglobin   7.8       Hypo   Occasional       Immature Grans (Abs)   CANCELED  Comment: Mild elevation in immature granulocytes is non specific and   can be seen in a variety of conditions including stress response,   acute inflammation, trauma and pregnancy. Correlation with other   laboratory and clinical findings is essential.    Result canceled by the ancillary.         Immature Granulocytes   CANCELED  Comment: Result canceled by the ancillary.       Lymph %   3.0  Comment: CORRECTED RESULT; previously reported as 0.9 on %DDDDDDDD% at %TTT%.  [C]       Magnesium    1.3       MCH   30.2       MCHC   35.0       MCV   86       Metamyelocytes   1.0       Mono %   7.0  Comment: CORRECTED RESULT; previously reported as 12.9 on %DDDDDDDD% at %TTT%.  [C]       MPV   9.8       nRBC   0       Ovalocytes   Occasional       Phosphorus Level   2.2       Platelet Estimate   Decreased       Platelet Count   7  Comment: plt   critical result(s) called and verbal readback obtained from   Jaiden Brownlee RN by CHANG 01/17/2025 05:54         Poikilocytosis   Slight       Poly   Occasional       Potassium   3.2       PROTEIN TOTAL   5.3       RBC   2.58       RDW   13.9       Schistocytes   Present       Sodium   137       Spherocytes   Occasional       Tacrolimus Lvl   9.0  Comment: Testing performed by a chemiluminescent microparticle   immunoassay on the [employer] i System.    CAUTION: No firm therapeutic range exists for tacrolimus in whole   blood. The   complexity of the clinical state, individual differences in   sensitivity to   immunosuppressive and nephrotoxic effects of tacrolimus,   co-administration   of other immunosuppressants, type of transplant, time post-transplant   and a   number of other factors contribute to different requirements for   optimal   blood levels of tacrolimus. Therefore, individual tacrolimus values   cannot   be used as the sole indicator for making changes in treatment regimen   and   each patient should be thoroughly evaluated clinically before changes   in   treatment regimens are made. Each user must establish his or her own   ranges   based on clinical experience.  Therapeutic ranges vary according to the commercial test used, and   therefore   should be established for each commercial test. Values obtained with   different assay methods cannot be used interchangeably due to   differences in   assay methods and cross-reactivity with metabolites, nor should   correction   factors be applied. Therefore, consistent use of one assay for   individual   patients is recommended.         Toxic Granulation   Present       Vacuolated Granulocytes   Present       Vancomycin-Trough 9.0         WBC   1.16  Comment: wbc critical result(s) called and verbal readback obtained from   JAIDEN LEONARD RN  by CAROLINE 01/17/2025 03:57                  [C] - Corrected Result               Significant Imaging: I have  reviewed all pertinent imaging results/findings within the past 24 hours.

## 2025-01-17 NOTE — ASSESSMENT & PLAN NOTE
- Tbili elevated to 1.2 on 1/13. Abdomen distended and tender to palpation in RUQ  - 12/31 CT AP complete for diffuse abdominal pain: No acute pathology. Stable adrenal nodule  - 1/2 abdominal US: No concerns for VOD/SOS  - 1/14 liver US w/ doppler: Hepatomegaly; mild intrahepatic biliary dilation. No evidence of VOD/SOS  - , lipase normal  - Continue ursodiol for VOD/SOS ppx  - Trending Tbili daily. WNL today at 0.7.

## 2025-01-17 NOTE — PLAN OF CARE
Patient AAOX4, VSS, afebrile, on room air. Patient had no complaints. Patient up to the bathroom independently, free from falls and injuries. I&O's monitored as ordered. Bed in lowest position, side rails up x2, non-skid socks on, call light in reach. Patient educated to use call light for any needs, patient verbalizes understanding. There are no concerns at this time. Plan of care on going.

## 2025-01-17 NOTE — PROGRESS NOTES
"[facility])  Infectious Disease  Progress Note    Patient Name: Rubén Hu  MRN: [id number]  Admission Date: 12/18/2024  Length of Stay: 30 days  Attending Physician: Clyde James MD  Primary Care Provider: Bebeto Arora MD    Isolation Status: Contact  Assessment/Plan:      Derm  Skin lesions  54M with PMH of myelofibrosis s/p haplo SCT 12/26/24, now with lesion of R axilla, L calf petechial rash, and erythematous induration of L perianal area. No significant exposures. 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 Karius also in process. CTs of axilla, legs, and abd/pelvis all without any abscesses or adenopathy, did show cellulitis of perianal area. He feels like both axillary lesion and perianal area are improving.     Recommendations  Continue vancomycin, cefepime, and flagyl  Follow up staph aureus susceptibilities  Follow up karius test        Anticipated Disposition: TBD    Thank you for your consult. I will follow-up with patient. Please contact us if you have any additional questions.    Ghislaine Ashby DO  Infectious Disease  [facility])    Subjective:     Principal Problem:History of allogeneic stem cell transplant    HPI: Mr. 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 Infectious disease consulted for "Concern for cellulitis in R underarm and perianal area. Warm to touch and tender to palpation. Remains afebrile. On anti-microbial ppx with levaquin, posaconazole, and acyclovir".     Photos of R underarm and L calf placed in media tab. Also has erythematous induration of L perianal area. Has been afebrile, on room air. Denies any significant exposures. Lives in the [facility] with his wife. They have 1 dog, no other animal exposures. Does go fishing, last trip was " right before his previous hospitalization. Denies any cuts/scrapes/injuries while fishing. Works as the  for [employer].           Interval History: doing well this morning, does not have any new complaints, R axilla lesion improving    Review of Systems   Constitutional:  Negative for chills and fever.   Respiratory:  Negative for cough and shortness of breath.    Cardiovascular:  Negative for chest pain.   Gastrointestinal:  Negative for abdominal pain, constipation, diarrhea, nausea and vomiting.   Musculoskeletal:  Negative for arthralgias and myalgias.   Skin:  Positive for rash and wound.   Neurological:  Negative for headaches.     Objective:     Vital Signs (Most Recent):  Temp: 97.3 °F (36.3 °C) (01/17/25 1127)  Pulse: 86 (01/17/25 1127)  Resp: 18 (01/17/25 1127)  BP: 121/82 (01/17/25 1127)  SpO2: 96 % (01/17/25 1127) Vital Signs (24h Range):  Temp:  [97.3 °F (36.3 °C)-98.4 °F (36.9 °C)] 97.3 °F (36.3 °C)  Pulse:  [77-94] 86  Resp:  [16-18] 18  SpO2:  [93 %-98 %] 96 %  BP: (121-145)/(72-82) 121/82     Weight: 87.6 kg (193 lb 3.4 oz)  Body mass index is 26.95 kg/m².    Estimated Creatinine Clearance: 148.2 mL/min (based on SCr of 0.6 mg/dL).     Physical Exam  Vitals reviewed.   Constitutional:       General: He is not in acute distress.     Appearance: Normal appearance. He is not ill-appearing.   HENT:      Head: Normocephalic and atraumatic.   Eyes:      Extraocular Movements: Extraocular movements intact.      Conjunctiva/sclera: Conjunctivae normal.   Pulmonary:      Effort: Pulmonary effort is normal. No respiratory distress.   Abdominal:      General: Abdomen is flat.   Musculoskeletal:      Cervical back: Normal range of motion.   Skin:     General: Skin is warm and dry.      Comments: R axilla lesion improving   Neurological:      General: No focal deficit present.      Mental Status: He is alert and oriented to person, place, and time.   Psychiatric:         Mood  and Affect: Mood normal.         Behavior: Behavior normal.         Thought Content: Thought content normal.          Significant Labs: All pertinent labs within the past 24 hours have been reviewed.  Recent Lab Results         01/17/25  1139   01/17/25  0328        Albumin   2.1       ALP   64       ALT   19       Anion Gap   10       Aniso   Slight       AST   14       Bands   3.0       Basophil %   0.0       BILIRUBIN TOTAL   0.7  Comment: For infants and newborns, interpretation of results should be based  on gestational age, weight and in agreement with clinical  observations.    Premature Infant recommended reference ranges:  Up to 24 hours.............<8.0 mg/dL  Up to 48 hours............<12.0 mg/dL  3-5 days..................<15.0 mg/dL  6-29 days.................<15.0 mg/dL         BUN   8       Calcium   8.0       Chloride   104       CO2   23       Creatinine   0.6       Differential Method   Manual       Dohle Bodies   Present       eGFR   >60.0       Eos %   0.0       Fragmented Cells   Occasional       Glucose   91       Gran %   86.0  Comment: CORRECTED RESULT; previously reported as 81.0 on %DDDDDDDD% at %TTT%.  [C]       Hematocrit   22.3       Hemoglobin   7.8       Hypo   Occasional       Immature Grans (Abs)   CANCELED  Comment: Mild elevation in immature granulocytes is non specific and   can be seen in a variety of conditions including stress response,   acute inflammation, trauma and pregnancy. Correlation with other   laboratory and clinical findings is essential.    Result canceled by the ancillary.         Immature Granulocytes   CANCELED  Comment: Result canceled by the ancillary.       Lymph %   3.0  Comment: CORRECTED RESULT; previously reported as 0.9 on %DDDDDDDD% at %TTT%.  [C]       Magnesium    1.3       MCH   30.2       MCHC   35.0       MCV   86       Metamyelocytes   1.0       Mono %   7.0  Comment: CORRECTED RESULT; previously reported as 12.9 on %DDDDDDDD% at %TTT%.  [C]        MPV   9.8       nRBC   0       Ovalocytes   Occasional       Phosphorus Level   2.2       Platelet Estimate   Decreased       Platelet Count   7  Comment: plt  critical result(s) called and verbal readback obtained from   Zaida Brownlee RN by [facility] 01/17/2025 05:54         Poikilocytosis   Slight       Poly   Occasional       Potassium   3.2       PROTEIN TOTAL   5.3       RBC   2.58       RDW   13.9       Schistocytes   Present       Sodium   137       Spherocytes   Occasional       Tacrolimus Lvl   9.0  Comment: Testing performed by a chemiluminescent microparticle   immunoassay on the [employer].    CAUTION: No firm therapeutic range exists for tacrolimus in whole   blood. The   complexity of the clinical state, individual differences in   sensitivity to   immunosuppressive and nephrotoxic effects of tacrolimus,   co-administration   of other immunosuppressants, type of transplant, time post-transplant   and a   number of other factors contribute to different requirements for   optimal   blood levels of tacrolimus. Therefore, individual tacrolimus values   cannot   be used as the sole indicator for making changes in treatment regimen   and   each patient should be thoroughly evaluated clinically before changes   in   treatment regimens are made. Each user must establish his or her own   ranges   based on clinical experience.  Therapeutic ranges vary according to the commercial test used, and   therefore   should be established for each commercial test. Values obtained with   different assay methods cannot be used interchangeably due to   differences in   assay methods and cross-reactivity with metabolites, nor should   correction   factors be applied. Therefore, consistent use of one assay for   individual   patients is recommended.         Toxic Granulation   Present       Vacuolated Granulocytes   Present       Vancomycin-Trough 9.0         WBC   1.16  Comment: wbc critical result(s) called and  verbal readback obtained from   JAIDEN LEONARD RN  by [facility] 01/17/2025 03:57                  [C] - Corrected Result               Significant Imaging: I have reviewed all pertinent imaging results/findings within the past 24 hours.

## 2025-01-17 NOTE — ASSESSMENT & PLAN NOTE
- Suspect 2/2 chemotherapy conditioning regimen prior to allogeneic SCT. Abdomen notably distended and with TTP in epigastric region + RUQ.   - Continue protonix and famotidine daily, simethicone 80 TID ordered for flatulence  - , lipase normal  - morphine 1 mg PRN  - CT A/P 12/31: no acute pathology   - USG abdomen (RUQ) 01/02 unremarkable, not concerning for VOD  - Liver US w/ doppler 1/14: Hepatomegaly, mild intrahepatic biliary dilation. No signs of VOD/SOS  - Continuing on ursodiol for VOD/SOS ppx  - Trending Tbili. Elevated to 1.2 1/13. Today it is 0.7. Weight stable from yesterday  - Improving

## 2025-01-17 NOTE — SUBJECTIVE & OBJECTIVE
Subjective:     Interval History: Day +22 from a Bu/Flu/Thiotepa PTCy haploidentical (brother) SCT for PMF. Remains afebrile. VSS. Diarrhea and nausea persist. Diarrhea overall improved, however, on scheduled imodium and limotil. Axilla cx + for staph. Speciation and susceptibilities pending. Leg wound biopsy consistent with infection (see path report for biopsy dated 1/14/25) rather than aGVHD. Engrafted today with ANC of 998, but has multiple barriers to discharge, including oral intake, diarrhea control, and ID plan (pending speciation and susceptibilities).     Objective:     Vital Signs (Most Recent):  Temp: 97.9 °F (36.6 °C) (01/17/25 0815)  Pulse: 77 (01/17/25 0815)  Resp: 16 (01/17/25 0815)  BP: 126/78 (01/17/25 0815)  SpO2: 95 % (01/17/25 0815) Vital Signs (24h Range):  Temp:  [97.8 °F (36.6 °C)-98.4 °F (36.9 °C)] 97.9 °F (36.6 °C)  Pulse:  [] 77  Resp:  [16-18] 16  SpO2:  [93 %-98 %] 95 %  BP: (122-145)/(72-82) 126/78     Weight: 87.6 kg (193 lb 3.4 oz)  Body mass index is 26.95 kg/m².  Body surface area is 2.09 meters squared.    ECOG SCORE           [unfilled]    Intake/Output - Last 3 Shifts         01/15 0700  01/16 0659 01/16 0700  01/17 0659 01/17 0700  01/18 0659    P.O. 1450 250     I.V. (mL/kg) 50 (0.6)      Blood  173     IV Piggyback 2775.6      Total Intake(mL/kg) 4275.6 (48) 423 (4.8)     Urine (mL/kg/hr) 2200 (1) 1400 (0.7)     Emesis/NG output 0 0     Stool 0 0     Total Output 2200 1400     Net +2075.6 -977            Urine Occurrence 3 x 5 x     Stool Occurrence 3 x 4 x     Emesis Occurrence 0 x 0 x              Physical Exam  Vitals reviewed.   Constitutional:       Appearance: Normal appearance.   HENT:      Head: Normocephalic and atraumatic.      Nose: Nose normal.      Mouth/Throat:      Mouth: Mucous membranes are moist.      Pharynx: Oropharynx is clear.   Eyes:      Extraocular Movements: Extraocular movements intact.      Conjunctiva/sclera: Conjunctivae normal.      Pupils:  Pupils are equal, round, and reactive to light.   Cardiovascular:      Rate and Rhythm: Normal rate and regular rhythm.      Pulses: Normal pulses.      Heart sounds: Normal heart sounds.   Pulmonary:      Effort: Pulmonary effort is normal.      Breath sounds: Normal breath sounds.   Abdominal:      General: Abdomen is flat. Bowel sounds are normal.      Palpations: Abdomen is soft.   Genitourinary:     Comments: L perianal erythema with TTP. No fluctuance appreciated.   B/l scrotal erythema   Musculoskeletal:         General: Normal range of motion.      Cervical back: Normal range of motion.   Skin:     General: Skin is warm and dry.      Capillary Refill: Capillary refill takes less than 2 seconds.      Findings: Bruising (abdominal), erythema (R underarm surrounding an ulceration), petechiae (B/l LE petechial rash) and rash (BLE) present. Rash is purpuric (on b/l LE).      Comments: R chest wall muhammad in place. Dressing c/d/i. No signs of infection to site.   Neurological:      General: No focal deficit present.      Mental Status: He is alert and oriented to person, place, and time.   Psychiatric:         Mood and Affect: Mood normal.         Behavior: Behavior normal.         Thought Content: Thought content normal.         Judgment: Judgment normal.            Significant Labs:   CBC:   Recent Labs   Lab 01/16/25  0354 01/17/25  0328   WBC 0.52* 1.16*   HGB 7.7* 7.8*   HCT 21.9* 22.3*   PLT 12* 7*    and CMP:   Recent Labs   Lab 01/16/25  0354 01/17/25  0328   * 137   K 2.9* 3.2*    104   CO2 23 23   GLU 94 91   BUN 8 8   CREATININE 0.6 0.6   CALCIUM 7.9* 8.0*   PROT 5.1* 5.3*   ALBUMIN 1.9* 2.1*   BILITOT 0.8 0.7   ALKPHOS 66 64   AST 19 14   ALT 27 19   ANIONGAP 10 10       Diagnostic Results:  None

## 2025-01-17 NOTE — PROGRESS NOTES
[facility] - Oncology ([facility])  Hematology  Bone Marrow Transplant  Progress Note    Patient Name: Rubén Hu  Admission Date: 12/18/2024  Hospital Length of Stay: 30 days  Code Status: Full Code  No new subjective & objective note has been filed under this hospital service since the last note was generated.    Assessment/Plan:     * History of allogeneic stem cell transplant  - Patient of Dr. 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 Received 3 bags with CD34 dose of 6.04x10^6/kg.   - Today is Day +22  - Tacro levels MWF. Tacro level today 9.0 (goal is 7-9). Will continue current tacro dose of 0.5 mg BID Next level will be on Monday.   - Patient is O+. Donor is O+.  - Patient is CMV non-reactive. Donor is CMV reactive. Given donor CMV status, patient will start (patient-supplied) letermovir on Day +5.  - Day -4 and Day -3 Busulfan dose-adjusted to 282 mg  - Engrafted today with an ANC of 998.  - See treatment plan below:    Planned conditioning regimen:  Thiotepa on Day -7  Busulfan on Day -6, -5, -4, and -3  Fludarabine on Day -6, -5, -4, and -3  REST DAYS on Day -2 and -1     Planned  GVHD Prophylaxis:  Cyclophosphamide on Day +3 and +4  Tacrolimus starting on Day +5  Mycophenolate starting on Day +5     Antimicrobial Prophylaxis:  Acyclovir starting on Day -7  Levofloxacin starting on Day -1  Micafungin on Day -1 through Day +4  Letermovir starting on Day +5 (if CMV PCR drawn on day 0 results negative)  Posaconazole starting on Day +5  Atovaquone starting on Day +30     Skin Care with Thiotepa: Day -7 through D-5     Seizure Prophylaxis:  Levetiracetam on Day -7 through Day -2     SOS/VOD Prophylaxis:  Ursodiol on Day -7 through Day +30     Growth Factor Support:  Neupogen starting on Day +5        Caregiver: Carmelita (Spouse)  Post-transplant discharge plans: Home      Primary myelofibrosis  - From Dr. 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 Transplant on 12/26/24 at 1330. Received 3 bags with CD34 dose of 6.04 x 10^6/kg.     Pancytopenia due to chemotherapy  - Anticipated following chemotherapy.  - Transfuse for hgb < 7 or plts < 10K  - Continue antimicrobial ppx   - Daily CBC while IP    Neutropenic fever  - Infection w/u neg thus far  - Afebrile, stopped cefepime and resumed levaquin 12/31/24. Pt afebrile but on 1/15 began vancomycin, cefepime, and flagyl per ID for R axillary rash, perirectal cellulitis, and BLE petechial rash.     Skin lesions  -  1/14 pt first complained of perianal erythema and pain, R axillary rash/pain, and BLE petechial rash. Concern for cellulitis vs fungal infection. Low suspicion for acute GVHD as patient has yet to engraft. Pt remains afebrile.  - CT A/P with colonic dilation, gaseous distension, and L sided perianal thickening/fat stranding concerning for cellulitis  - CT R axilla: no acute soft tissue infection  - CT BLE: mild edema; no signs of soft tissue infection. Punch biopsy (1/14) path results consistent with infection rather than with GVHD.  - S/p R axilla punch biopsy with dermatology. Cx + for staph. Speciation and susceptibilities pending. On vanc.  - ID following and recommending vancomycin, cefepime, and flagyl. Pending karius results  - Wound care consulted  - With zinc oxide barrier cream for scrotal and perianal irritation    Perianal pain  - Pt with history of hematochezia that was worked up outpatient prior to transplant. 7/23/24 colonoscopy with  several diverticula and a non-bleeding rectal abscess  - Denies hematochezia/melena while inpatient. Now with left sided perianal irritation and pain. Erythematous area that is tender to palpation, but without fluctuance. Reports pain became worse after irritation from diarrhea. Pt afebrile.   - 1/14 erythema worsening and rash now warm to touch. Concern for cellulitis vs abscess. 1/14 Began vancomycin, cefepime, and flagyl. ID consulted.  - Wound care consulted and will monitor pt closely for fevers/overt signs of infection  - Low threshold to consult CRS  - Continue zinc oxide    Chemotherapy-induced nausea  - Anticipated 2/2 chemotherapy   - Meds switched from PO to IV as able 1/2.   - Zofran q8h  - Compazine q6h  - Ativan PRN    Hypercoagulable state, secondary  - See primary myelofibrosis    Cellulitis  - See skin lesions  - See perianal pain  - ID and derm consulted  - Wound care consulted  - 1/14 began vancomycin, cefepime, and flagyl    Hyperbilirubinemia  - Tbili elevated to 1.2 on 1/13. Abdomen distended and tender to palpation in RUQ  - 12/31 CT AP complete for diffuse abdominal pain: No acute pathology. Stable adrenal nodule  - 1/2 abdominal US: No concerns for VOD/SOS  - 1/14 liver US w/ doppler: Hepatomegaly; mild intrahepatic biliary dilation. No evidence of VOD/SOS  - , lipase normal  - Continue ursodiol for VOD/SOS ppx  - Trending Tbili daily. WNL today at 0.7.    Diarrhea  - C. Diff negative on 12/28/24. Diarrhea improving  - Imodium q8h  - Began lomotil q8h 1/15      Immunocompromised state associated with stem cell transplant  - See history of allogeneic stem cell transplant     Abdominal pain  - Suspect 2/2 chemotherapy conditioning regimen prior to allogeneic SCT. Abdomen notably distended and with TTP in epigastric region + RUQ.   - Continue protonix and famotidine daily, simethicone 80 TID ordered for flatulence  - , lipase normal  - morphine 1 mg PRN  - CT A/P 12/31: no acute  pathology   - USG abdomen (RUQ) 01/02 unremarkable, not concerning for VOD  - Liver US w/ doppler 1/14: Hepatomegaly, mild intrahepatic biliary dilation. No signs of VOD/SOS  - Continuing on ursodiol for VOD/SOS ppx  - Trending Tbili. Elevated to 1.2 1/13. Today it is 0.7. Weight stable from yesterday  - Improving    Mucositis  - Having poor PO intake/throat pain 2/2 chemotherapy   - [facility]'s solution QID  - Morphine 2 mg q3h PRN. Can increase dose or frequency if need be.  - Transitioned PO meds to IV. 1/15 began switching meds back to PO  - Diet changed from regular to soft/bite sized  - Improving    Allergic contact dermatitis due to adhesives  - Itching and redness to LLQ/RLQ where tele leads were placed.   - Topical benadryl prn for itching.  - Tele removed       Hypokalemia  - See electrolyte disorder    Electrolyte disorder  - Replete per PRN electrolyte order set  - Daily CMP, mag, and phos while inpatient    Transaminitis  - Transaminases first elevated 12/27. Suspect 2/2 chemotherapy prior to transplant.   - Will continue to monitor with daily CMP.  - RESOLVED    Hypertension  - Became hypertensive following SCT on 12/26. No signs of volume overload contributing to HTN.  - 12/27 started amlodipine.     Insomnia  - Difficulty sleeping at night while IP.  - Has PRN ativan and trazodone  - Melatonin not effective     Adrenal nodule  - Subcentimeter nodular thickening of the adrenal glands first noted on imaging from 1/2024.  - Seen by endocrine prior to transplant admission and Dexamethasone suppression test performed. Patient responded appropriately. No further w/u needed.  - Likely adenomas.    Metabolic dysfunction-associated steatotic liver disease (MASLD)  - Biopsy proven to be steatotic change. Most compatible with MASLD.   - Follow-up with hepatology OP.    Hyperlipidemia  - Holding home atorvastatin while IP to avoid interaction with other medications. Can resume at discharge if LFTs stable.        VTE  Risk Mitigation (From admission, onward)           Ordered     heparin, porcine (PF) 100 unit/mL injection flush 300 Units  As needed (PRN)         12/18/24 2025                    Disposition: Inpatient for allogeneic SCT.    Anna Vera, NP  Bone Marrow Transplant  [facility] - Oncology ([facility])

## 2025-01-18 LAB
ABO + RH BLD: NORMAL
ALBUMIN SERPL BCP-MCNC: 2.2 G/DL (ref 3.5–5.2)
ALP SERPL-CCNC: 59 U/L (ref 40–150)
ALT SERPL W/O P-5'-P-CCNC: 16 U/L (ref 10–44)
ANION GAP SERPL CALC-SCNC: 8 MMOL/L (ref 8–16)
AST SERPL-CCNC: 14 U/L (ref 10–40)
BASOPHILS NFR BLD: 0 % (ref 0–1.9)
BILIRUB SERPL-MCNC: 0.5 MG/DL (ref 0.1–1)
BLD GP AB SCN CELLS X3 SERPL QL: NORMAL
BLD PROD TYP BPU: NORMAL
BLOOD UNIT EXPIRATION DATE: NORMAL
BLOOD UNIT TYPE CODE: 600
BLOOD UNIT TYPE: NORMAL
BUN SERPL-MCNC: 6 MG/DL (ref 6–20)
CALCIUM SERPL-MCNC: 8 MG/DL (ref 8.7–10.5)
CHLORIDE SERPL-SCNC: 102 MMOL/L (ref 95–110)
CO2 SERPL-SCNC: 25 MMOL/L (ref 23–29)
CODING SYSTEM: NORMAL
CREAT SERPL-MCNC: 0.6 MG/DL (ref 0.5–1.4)
CROSSMATCH INTERPRETATION: NORMAL
DIFFERENTIAL METHOD BLD: ABNORMAL
DISPENSE STATUS: NORMAL
DOHLE BOD BLD QL SMEAR: PRESENT
EOSINOPHIL NFR BLD: 0 % (ref 0–8)
ERYTHROCYTE [DISTWIDTH] IN BLOOD BY AUTOMATED COUNT: 13.7 % (ref 11.5–14.5)
EST. GFR  (NO RACE VARIABLE): >60 ML/MIN/1.73 M^2
GLUCOSE SERPL-MCNC: 107 MG/DL (ref 70–110)
HCT VFR BLD AUTO: 20.6 % (ref 40–54)
HGB BLD-MCNC: 7.2 G/DL (ref 14–18)
IMM GRANULOCYTES # BLD AUTO: ABNORMAL K/UL (ref 0–0.04)
IMM GRANULOCYTES NFR BLD AUTO: ABNORMAL % (ref 0–0.5)
LYMPHOCYTES NFR BLD: 4 % (ref 18–48)
MAGNESIUM SERPL-MCNC: 1.2 MG/DL (ref 1.6–2.6)
MCH RBC QN AUTO: 30.4 PG (ref 27–31)
MCHC RBC AUTO-ENTMCNC: 35 G/DL (ref 32–36)
MCV RBC AUTO: 87 FL (ref 82–98)
MONOCYTES NFR BLD: 13 % (ref 4–15)
NEUTROPHILS NFR BLD: 80 % (ref 38–73)
NEUTS BAND NFR BLD MANUAL: 3 %
NRBC BLD-RTO: 0 /100 WBC
PHOSPHATE SERPL-MCNC: 2.7 MG/DL (ref 2.7–4.5)
PLATELET # BLD AUTO: 7 K/UL (ref 150–450)
PLATELET BLD QL SMEAR: ABNORMAL
PMV BLD AUTO: 10 FL (ref 9.2–12.9)
POTASSIUM SERPL-SCNC: 2.7 MMOL/L (ref 3.5–5.1)
PROT SERPL-MCNC: 5.2 G/DL (ref 6–8.4)
RBC # BLD AUTO: 2.37 M/UL (ref 4.6–6.2)
SODIUM SERPL-SCNC: 135 MMOL/L (ref 136–145)
SPECIMEN OUTDATE: NORMAL
TOXIC GRANULES BLD QL SMEAR: PRESENT
UNIT NUMBER: NORMAL
WBC # BLD AUTO: 1.65 K/UL (ref 3.9–12.7)

## 2025-01-18 PROCEDURE — 20600001 HC STEP DOWN PRIVATE ROOM

## 2025-01-18 PROCEDURE — 99233 SBSQ HOSP IP/OBS HIGH 50: CPT | Mod: ,,, | Performed by: INTERNAL MEDICINE

## 2025-01-18 PROCEDURE — 85007 BL SMEAR W/DIFF WBC COUNT: CPT | Performed by: NURSE PRACTITIONER

## 2025-01-18 PROCEDURE — 85027 COMPLETE CBC AUTOMATED: CPT | Performed by: NURSE PRACTITIONER

## 2025-01-18 PROCEDURE — 63600175 PHARM REV CODE 636 W HCPCS: Performed by: STUDENT IN AN ORGANIZED HEALTH CARE EDUCATION/TRAINING PROGRAM

## 2025-01-18 PROCEDURE — 86901 BLOOD TYPING SEROLOGIC RH(D): CPT | Performed by: INTERNAL MEDICINE

## 2025-01-18 PROCEDURE — 25000003 PHARM REV CODE 250

## 2025-01-18 PROCEDURE — P9037 PLATE PHERES LEUKOREDU IRRAD: HCPCS | Performed by: STUDENT IN AN ORGANIZED HEALTH CARE EDUCATION/TRAINING PROGRAM

## 2025-01-18 PROCEDURE — 25000003 PHARM REV CODE 250: Performed by: STUDENT IN AN ORGANIZED HEALTH CARE EDUCATION/TRAINING PROGRAM

## 2025-01-18 PROCEDURE — 36430 TRANSFUSION BLD/BLD COMPNT: CPT

## 2025-01-18 PROCEDURE — 27000207 HC ISOLATION

## 2025-01-18 PROCEDURE — 63600175 PHARM REV CODE 636 W HCPCS: Performed by: NURSE PRACTITIONER

## 2025-01-18 PROCEDURE — 63600175 PHARM REV CODE 636 W HCPCS: Performed by: INTERNAL MEDICINE

## 2025-01-18 PROCEDURE — 63600175 PHARM REV CODE 636 W HCPCS

## 2025-01-18 PROCEDURE — 25000003 PHARM REV CODE 250: Performed by: NURSE PRACTITIONER

## 2025-01-18 PROCEDURE — 25000003 PHARM REV CODE 250: Performed by: INTERNAL MEDICINE

## 2025-01-18 PROCEDURE — 84100 ASSAY OF PHOSPHORUS: CPT | Performed by: NURSE PRACTITIONER

## 2025-01-18 PROCEDURE — 80053 COMPREHEN METABOLIC PANEL: CPT | Performed by: NURSE PRACTITIONER

## 2025-01-18 PROCEDURE — 83735 ASSAY OF MAGNESIUM: CPT | Performed by: NURSE PRACTITIONER

## 2025-01-18 RX ORDER — DOXYCYCLINE HYCLATE 100 MG
100 TABLET ORAL EVERY 12 HOURS
Status: COMPLETED | OUTPATIENT
Start: 2025-01-18 | End: 2025-01-24

## 2025-01-18 RX ORDER — HYDROCODONE BITARTRATE AND ACETAMINOPHEN 500; 5 MG/1; MG/1
TABLET ORAL
Status: DISCONTINUED | OUTPATIENT
Start: 2025-01-18 | End: 2025-01-22

## 2025-01-18 RX ADMIN — LOPERAMIDE HYDROCHLORIDE 2 MG: 2 CAPSULE ORAL at 02:01

## 2025-01-18 RX ADMIN — SIMETHICONE 80 MG: 80 TABLET, CHEWABLE ORAL at 09:01

## 2025-01-18 RX ADMIN — DIPHENOXYLATE HYDROCHLORIDE AND ATROPINE SULFATE 1 TABLET: .025; 2.5 TABLET ORAL at 05:01

## 2025-01-18 RX ADMIN — DIPHENHYDRAMINE HYDROCHLORIDE 25 MG: 25 CAPSULE ORAL at 10:01

## 2025-01-18 RX ADMIN — METRONIDAZOLE 500 MG: 500 TABLET ORAL at 02:01

## 2025-01-18 RX ADMIN — MAGNESIUM SULFATE HEPTAHYDRATE 2 G: 40 INJECTION, SOLUTION INTRAVENOUS at 06:01

## 2025-01-18 RX ADMIN — POTASSIUM CHLORIDE 10 MEQ: 7.45 INJECTION INTRAVENOUS at 10:01

## 2025-01-18 RX ADMIN — POTASSIUM CHLORIDE 10 MEQ: 7.45 INJECTION INTRAVENOUS at 11:01

## 2025-01-18 RX ADMIN — LOPERAMIDE HYDROCHLORIDE 2 MG: 2 CAPSULE ORAL at 05:01

## 2025-01-18 RX ADMIN — POTASSIUM CHLORIDE 10 MEQ: 7.45 INJECTION INTRAVENOUS at 02:01

## 2025-01-18 RX ADMIN — FILGRASTIM 480 MCG: 480 INJECTION, SOLUTION INTRAVENOUS; SUBCUTANEOUS at 09:01

## 2025-01-18 RX ADMIN — DOXYCYCLINE HYCLATE 100 MG: 100 TABLET, COATED ORAL at 11:01

## 2025-01-18 RX ADMIN — ZINC OXIDE: 200 OINTMENT TOPICAL at 02:01

## 2025-01-18 RX ADMIN — ONDANSETRON 8 MG: 2 INJECTION INTRAMUSCULAR; INTRAVENOUS at 02:01

## 2025-01-18 RX ADMIN — LOPERAMIDE HYDROCHLORIDE 2 MG: 2 CAPSULE ORAL at 09:01

## 2025-01-18 RX ADMIN — DOXYCYCLINE HYCLATE 100 MG: 100 TABLET, COATED ORAL at 09:01

## 2025-01-18 RX ADMIN — CEFEPIME 2 G: 2 INJECTION, POWDER, FOR SOLUTION INTRAVENOUS at 02:01

## 2025-01-18 RX ADMIN — TACROLIMUS 0.5 MG: 0.5 CAPSULE ORAL at 05:01

## 2025-01-18 RX ADMIN — Medication 1 DOSE: at 09:01

## 2025-01-18 RX ADMIN — PANTOPRAZOLE SODIUM 40 MG: 40 TABLET, DELAYED RELEASE ORAL at 09:01

## 2025-01-18 RX ADMIN — POSACONAZOLE 300 MG: 100 TABLET, DELAYED RELEASE ORAL at 09:01

## 2025-01-18 RX ADMIN — DIPHENOXYLATE HYDROCHLORIDE AND ATROPINE SULFATE 1 TABLET: .025; 2.5 TABLET ORAL at 09:01

## 2025-01-18 RX ADMIN — CHOLESTYRAMINE 4 G: 4 POWDER, FOR SUSPENSION ORAL at 09:01

## 2025-01-18 RX ADMIN — Medication 1 DOSE: at 05:01

## 2025-01-18 RX ADMIN — GUAIFENESIN AND DEXTROMETHORPHAN HYDROBROMIDE 1 TABLET: 600; 30 TABLET, EXTENDED RELEASE ORAL at 09:01

## 2025-01-18 RX ADMIN — TACROLIMUS 0.5 MG: 0.5 CAPSULE ORAL at 07:01

## 2025-01-18 RX ADMIN — POTASSIUM CHLORIDE 10 MEQ: 7.45 INJECTION INTRAVENOUS at 03:01

## 2025-01-18 RX ADMIN — AMLODIPINE BESYLATE 10 MG: 10 TABLET ORAL at 09:01

## 2025-01-18 RX ADMIN — URSODIOL 300 MG: 300 CAPSULE ORAL at 09:01

## 2025-01-18 RX ADMIN — ACETAMINOPHEN 650 MG: 325 TABLET ORAL at 10:01

## 2025-01-18 RX ADMIN — CEFEPIME 2 G: 2 INJECTION, POWDER, FOR SOLUTION INTRAVENOUS at 05:01

## 2025-01-18 RX ADMIN — POTASSIUM CHLORIDE 10 MEQ: 7.45 INJECTION INTRAVENOUS at 01:01

## 2025-01-18 RX ADMIN — MYCOPHENOLATE MOFETIL 1000 MG: 250 CAPSULE ORAL at 09:01

## 2025-01-18 RX ADMIN — SODIUM PHOSPHATE, MONOBASIC, MONOHYDRATE AND SODIUM PHOSPHATE, DIBASIC, ANHYDROUS 15 MMOL: 142; 276 INJECTION, SOLUTION INTRAVENOUS at 08:01

## 2025-01-18 RX ADMIN — DIPHENOXYLATE HYDROCHLORIDE AND ATROPINE SULFATE 1 TABLET: .025; 2.5 TABLET ORAL at 02:01

## 2025-01-18 RX ADMIN — VANCOMYCIN HYDROCHLORIDE 1750 MG: 10 INJECTION, POWDER, LYOPHILIZED, FOR SOLUTION INTRAVENOUS at 04:01

## 2025-01-18 RX ADMIN — POTASSIUM CHLORIDE 10 MEQ: 7.46 INJECTION, SOLUTION INTRAVENOUS at 07:01

## 2025-01-18 RX ADMIN — MAGNESIUM SULFATE HEPTAHYDRATE 2 G: 40 INJECTION, SOLUTION INTRAVENOUS at 10:01

## 2025-01-18 RX ADMIN — MICONAZOLE NITRATE: 20 OINTMENT TOPICAL at 09:01

## 2025-01-18 RX ADMIN — SIMETHICONE 80 MG: 80 TABLET, CHEWABLE ORAL at 03:01

## 2025-01-18 RX ADMIN — POTASSIUM CHLORIDE 80 MEQ: 7.46 INJECTION, SOLUTION INTRAVENOUS at 06:01

## 2025-01-18 RX ADMIN — ONDANSETRON 8 MG: 2 INJECTION INTRAMUSCULAR; INTRAVENOUS at 05:01

## 2025-01-18 RX ADMIN — Medication 1 DOSE: at 02:01

## 2025-01-18 RX ADMIN — ACYCLOVIR 800 MG: 800 TABLET ORAL at 09:01

## 2025-01-18 RX ADMIN — CEFEPIME 2 G: 2 INJECTION, POWDER, FOR SOLUTION INTRAVENOUS at 09:01

## 2025-01-18 RX ADMIN — METRONIDAZOLE 500 MG: 500 TABLET ORAL at 05:01

## 2025-01-18 RX ADMIN — MYCOPHENOLATE MOFETIL 1000 MG: 250 CAPSULE ORAL at 03:01

## 2025-01-18 RX ADMIN — ZINC OXIDE: 200 OINTMENT TOPICAL at 09:01

## 2025-01-18 RX ADMIN — POTASSIUM CHLORIDE 10 MEQ: 7.45 INJECTION INTRAVENOUS at 09:01

## 2025-01-18 RX ADMIN — ONDANSETRON 8 MG: 2 INJECTION INTRAMUSCULAR; INTRAVENOUS at 09:01

## 2025-01-18 RX ADMIN — METRONIDAZOLE 500 MG: 500 TABLET ORAL at 09:01

## 2025-01-18 RX ADMIN — LETERMOVIR 480 MG: 480 TABLET, FILM COATED ORAL at 10:01

## 2025-01-18 NOTE — SUBJECTIVE & OBJECTIVE
Interval History: feeling ok today, perianal lesion still bothersome    Review of Systems   Constitutional:  Negative for chills and fever.   Respiratory:  Negative for cough and shortness of breath.    Cardiovascular:  Negative for chest pain.   Gastrointestinal:  Negative for abdominal pain, constipation, diarrhea, nausea and vomiting.        Perianal lesion   Musculoskeletal:  Negative for arthralgias and myalgias.   Skin:  Negative for rash.   Neurological:  Negative for weakness and headaches.     Objective:     Vital Signs (Most Recent):  Temp: 98 °F (36.7 °C) (01/18/25 0801)  Pulse: 85 (01/18/25 0801)  Resp: 16 (01/18/25 0801)  BP: 130/76 (01/18/25 0801)  SpO2: 96 % (01/18/25 0801) Vital Signs (24h Range):  Temp:  [97.3 °F (36.3 °C)-98.6 °F (37 °C)] 98 °F (36.7 °C)  Pulse:  [] 85  Resp:  [16-18] 16  SpO2:  [96 %-98 %] 96 %  BP: (120-130)/(74-82) 130/76     Weight: 85.7 kg (188 lb 15 oz)  Body mass index is 26.35 kg/m².    Estimated Creatinine Clearance: 148.2 mL/min (based on SCr of 0.6 mg/dL).     Physical Exam  Vitals reviewed.   Constitutional:       General: He is not in acute distress.     Appearance: Normal appearance. He is not ill-appearing.   HENT:      Head: Normocephalic and atraumatic.   Eyes:      Extraocular Movements: Extraocular movements intact.      Conjunctiva/sclera: Conjunctivae normal.   Pulmonary:      Effort: Pulmonary effort is normal. No respiratory distress.   Abdominal:      General: Abdomen is flat.      Palpations: Abdomen is soft.   Musculoskeletal:      Cervical back: Normal range of motion.   Skin:     General: Skin is warm and dry.      Findings: Lesion present.      Comments: R axilla lesion improving  L perianal lesion with some ulceration, induration/firmness improved   Neurological:      General: No focal deficit present.      Mental Status: He is alert and oriented to person, place, and time.   Psychiatric:         Mood and Affect: Mood normal.         Behavior:  Behavior normal.         Thought Content: Thought content normal.          Significant Labs: All pertinent labs within the past 24 hours have been reviewed.  Recent Lab Results         01/18/25  0445   01/17/25  1139        Unit Blood Type Code 0600  [P]         Unit Expiration [id number]  [P]         Unit Blood Type A NEG  [P]         Albumin 2.2         ALP 59         ALT 16         Anion Gap 8         AST 14         Bands 3.0         Basophil % 0.0         BILIRUBIN TOTAL 0.5  Comment: For infants and newborns, interpretation of results should be based  on gestational age, weight and in agreement with clinical  observations.    Premature Infant recommended reference ranges:  Up to 24 hours.............<8.0 mg/dL  Up to 48 hours............<12.0 mg/dL  3-5 days..................<15.0 mg/dL  6-29 days.................<15.0 mg/dL           BUN 6         Calcium 8.0         Chloride 102         CO2 25         CODING SYSTEM [id number]  [P]         Creatinine 0.6         Crossmatch Interpretation Not Required  [P]         Differential Method Manual         DISPENSE STATUS ISSUED  [P]         Dohle Bodies Present         eGFR >60.0         Eos % 0.0         Glucose 107         Gran % 80.0  Comment: CORRECTED RESULT; previously reported as 71.0 on 01/18/2025 at 10:34.  [C]         Group & Rh O POS         Hematocrit 20.6         Hemoglobin 7.2         Immature Grans (Abs) Test Not Performed  Comment: Mild elevation in immature granulocytes is non specific and   can be seen in a variety of conditions including stress response,   acute inflammation, trauma and pregnancy. Correlation with other   laboratory and clinical findings is essential.  CORRECTED RESULT; previously reported as 0.24 on 01/18/2025 at 10:34.    [C]         Immature Granulocytes Test Not Performed  Comment: CORRECTED RESULT; previously reported as 14.5 on 01/18/2025 at 10:34.  [C]         INDIRECT DAVIDA NEG         Lymph % 4.0  Comment: CORRECTED RESULT;  previously reported as 0.6 on 01/18/2025 at 10:34.  [C]         Magnesium  1.2         MCH 30.4         MCHC 35.0         MCV 87         Mono % 13.0  Comment: CORRECTED RESULT; previously reported as 13.9 on 01/18/2025 at 10:34.  [C]         MPV 10.0         nRBC 0         Phosphorus Level 2.7         Platelet Estimate Decreased         Platelet Count 7         Potassium 2.7  Comment: *Critical value notification by _[facility]_ with confirmation of receipt to  __Nona Barone RN_ at  Date__1/18__Time_0616___           Product Code [id number]  [P]         PROTEIN TOTAL 5.2         RBC 2.37         RDW 13.7         Sodium 135         Specimen Outdate 01/21/2025 23:59         Toxic Granulation Present         UNIT NUMBER [id number]  [P]         Vancomycin-Trough   9.0       WBC 1.65  Comment: WBC critical result(s) called and verbal readback obtained from   Nona Davison RN by ADR 01/18/2025 06:20                    [P] - Preliminary Result [C] - Corrected Result              Significant Imaging: I have reviewed all pertinent imaging results/findings within the past 24 hours.

## 2025-01-18 NOTE — PLAN OF CARE
Day + 23 allo SCT. Plan of care discussed with patient at start of shift. Free from falls and injuries. Resting quietly with eyes closed. Respirations even, unlabored. Skin warm and dry. Denies pain. Nausea managed with scheduled Zofran 8 mg IV, and Compazine 5 gm IVP x's 1. Frequent checks for pain and safety maintained. Bed in lowest position, wheels locked, side rails up x's 2, call light in reach. Instructed to call for assistance as needed, verbalizes understanding.

## 2025-01-18 NOTE — ASSESSMENT & PLAN NOTE
54M with PMH of myelofibrosis s/p haplo SCT 12/26/24, now with lesion of R axilla, L calf petechial rash, and erythematous induration of L perianal area. CTs of axilla, legs, and abd/pelvis all without any abscesses or adenopathy, did show cellulitis of perianal area. 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 Ana positive for E coli and HHV6.     Will stop vancomycin and start doxycycline based on MRSA susceptibilities. Will continue cefepime to cover for E coli noted on karius, could be resistant since he was on levofloxacin prophylaxis prior to this. Will continue flagyl for anaerobic coverage for perianal lesion. Duration of these abx 1 more week from today.     Recommendations  Stop vancomycin  Start PO doxycycline 100mg BID  Continue cefepime and flagyl  End date 1/25/25  Please re-consult for OPAT for cefepime if he will be discharged prior to the end date

## 2025-01-18 NOTE — PLAN OF CARE
Side rails up x2; call bell in place; bed in lowest, locked position; skid proof socks on; no evidence of skin breakdown; care plan explained to patient; pt remains free of injury. Pt is day +22 of an allo SCT. Pt's appetite in improving but still with small po intake. Voids, liquid BM x 2 with scheduled lomotil, imodium and questran. KCL riders x 6 completed. Vancomycin given as scheduled and cefepime given. Contact isolation maintained. Frequent rounding in progress pt encouraged to call as needed, VSS and afebrile.

## 2025-01-18 NOTE — PROGRESS NOTES
[facility] - Oncology ([facility])  Hematology  Bone Marrow Transplant  Progress Note    Patient Name: Rubén Hu  Admission Date: 12/18/2024  Hospital Length of Stay: 31 days  Code Status: Full Code    Subjective:     Interval History: Day +23 from a Bu/Flu/Thiotepa PTCy haploidentical (brother) SCT for PMF. Remains afebrile. Day 2 of engraftment with and ANC of >1200. Diarrhea is ongoing, but other symptoms are much improved. Mucositis has resolved. Out of bed. In recliner.    Objective:     Vital Signs (Most Recent):  Temp: 97.8 °F (36.6 °C) (01/18/25 1458)  Pulse: 89 (01/18/25 1458)  Resp: 19 (01/18/25 1458)  BP: 112/71 (01/18/25 1458)  SpO2: 98 % (01/18/25 1458) Vital Signs (24h Range):  Temp:  [97.8 °F (36.6 °C)-98.6 °F (37 °C)] 97.8 °F (36.6 °C)  Pulse:  [] 89  Resp:  [16-19] 19  SpO2:  [95 %-98 %] 98 %  BP: (112-130)/(71-82) 112/71     Weight: 85.7 kg (188 lb 15 oz)  Body mass index is 26.35 kg/m².  Body surface area is 2.07 meters squared.    ECOG SCORE           [unfilled]    Intake/Output - Last 3 Shifts         01/16 0700  01/17 0659 01/17 0700  01/18 0659 01/18 0700  01/19 0659    P.O. 250 2880     I.V. (mL/kg)  98.5 (1.1)     Blood 173  180    IV Piggyback  5477.1     Total Intake(mL/kg) 423 (4.8) 8455.6 (98.7) 180 (2.1)    Urine (mL/kg/hr) 1400 (0.7) 2950 (1.4) 1700 (2.3)    Emesis/NG output 0      Stool 0 0 0    Total Output 1400 2950 1700    Net -977 +5505.6 -1520           Urine Occurrence 5 x 3 x     Stool Occurrence 4 x 4 x 1 x    Emesis Occurrence 0 x               Physical Exam  Vitals reviewed.   Constitutional:       Appearance: Normal appearance.   HENT:      Head: Normocephalic and atraumatic.      Nose: Nose normal.      Mouth/Throat:      Mouth: Mucous membranes are moist.      Pharynx: Oropharynx is clear.   Eyes:      Extraocular Movements: Extraocular movements intact.      Conjunctiva/sclera: Conjunctivae normal.      Pupils: Pupils are equal, round, and reactive to light.    Cardiovascular:      Rate and Rhythm: Normal rate and regular rhythm.      Pulses: Normal pulses.      Heart sounds: Normal heart sounds.   Pulmonary:      Effort: Pulmonary effort is normal.      Breath sounds: Normal breath sounds.   Abdominal:      General: Abdomen is flat. Bowel sounds are normal.      Palpations: Abdomen is soft.   Genitourinary:     Comments: L perianal erythema with TTP. No fluctuance appreciated.   B/l scrotal erythema   Musculoskeletal:         General: Normal range of motion.      Cervical back: Normal range of motion.   Skin:     General: Skin is warm and dry.      Capillary Refill: Capillary refill takes less than 2 seconds.      Findings: Bruising (abdominal), erythema (R underarm surrounding an ulceration), petechiae (B/l LE petechial rash) and rash (BLE) present. Rash is purpuric (on b/l LE).      Comments: R chest wall muhammad in place. Dressing c/d/i. No signs of infection to site.   Neurological:      General: No focal deficit present.      Mental Status: He is alert and oriented to person, place, and time.   Psychiatric:         Mood and Affect: Mood normal.         Behavior: Behavior normal.         Thought Content: Thought content normal.         Judgment: Judgment normal.            Significant Labs:   CBC:   Recent Labs   Lab 01/17/25  0328 01/18/25  0445   WBC 1.16* 1.65*   HGB 7.8* 7.2*   HCT 22.3* 20.6*   PLT 7* 7*    and CMP:   Recent Labs   Lab 01/17/25  0328 01/18/25  0445    135*   K 3.2* 2.7*    102   CO2 23 25   GLU 91 107   BUN 8 6   CREATININE 0.6 0.6   CALCIUM 8.0* 8.0*   PROT 5.3* 5.2*   ALBUMIN 2.1* 2.2*   BILITOT 0.7 0.5   ALKPHOS 64 59   AST 14 14   ALT 19 16   ANIONGAP 10 8       Diagnostic Results:  None  Assessment/Plan:     * History of allogeneic stem cell transplant  - Patient of Dr. 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 Received 3 bags with CD34 dose of  6.04x10^6/kg.   - Today is Day +23  - Tacro levels MWF. Tacro level today 9.0 (goal is 7-9). Will continue current tacro dose of 0.5 mg BID Next level will be on Monday.   - Patient is O+. Donor is O+.  - Patient is CMV non-reactive. Donor is CMV reactive. Given donor CMV status, patient will start (patient-supplied) letermovir on Day +5.  - Day -4 and Day -3 Busulfan dose-adjusted to 282 mg  - Day 2 of Engraftment with an ANC of > 1200.  - See treatment plan below:    Planned conditioning regimen:  Thiotepa on Day -7  Busulfan on Day -6, -5, -4, and -3  Fludarabine on Day -6, -5, -4, and -3  REST DAYS on Day -2 and -1     Planned  GVHD Prophylaxis:  Cyclophosphamide on Day +3 and +4  Tacrolimus starting on Day +5  Mycophenolate starting on Day +5     Antimicrobial Prophylaxis:  Acyclovir starting on Day -7  Levofloxacin starting on Day -1  Micafungin on Day -1 through Day +4  Letermovir starting on Day +5 (if CMV PCR drawn on day 0 results negative)  Posaconazole starting on Day +5  Atovaquone starting on Day +30     Skin Care with Thiotepa: Day -7 through D-5     Seizure Prophylaxis:  Levetiracetam on Day -7 through Day -2     SOS/VOD Prophylaxis:  Ursodiol on Day -7 through Day +30     Growth Factor Support:  Neupogen starting on Day +5        Caregiver: Carmelita (Spouse)  Post-transplant discharge plans: Home      Hypercoagulable state, secondary  - See primary myelofibrosis    Cellulitis  - See skin lesions  - See perianal pain  - ID and derm consulted  - Wound care consulted  - 1/14 began vancomycin, cefepime, and flagyl    Hyperbilirubinemia  - Tbili elevated to 1.2 on 1/13. Abdomen distended and tender to palpation in RUQ  - 12/31 CT AP complete for diffuse abdominal pain: No acute pathology. Stable adrenal nodule  - 1/2 abdominal US: No concerns for VOD/SOS  - 1/14 liver US w/ doppler: Hepatomegaly; mild intrahepatic biliary dilation. No evidence of VOD/SOS  - , lipase normal  - Continue ursodiol for  VOD/SOS ppx  - Trending Tbili daily. WNL today at 0.7.    Perianal pain  - Pt with history of hematochezia that was worked up outpatient prior to transplant. 7/23/24 colonoscopy with several diverticula and a non-bleeding rectal abscess  - Denies hematochezia/melena while inpatient. Now with left sided perianal irritation and pain. Erythematous area that is tender to palpation, but without fluctuance. Reports pain became worse after irritation from diarrhea. Pt afebrile.   - 1/14 erythema worsening and rash now warm to touch. Concern for cellulitis vs abscess. 1/14 Began vancomycin, cefepime, and flagyl. ID consulted.  - Wound care consulted and will monitor pt closely for fevers/overt signs of infection  - Low threshold to consult CRS  - Continue zinc oxide    Skin lesions  -  1/14 pt first complained of perianal erythema and pain, R axillary rash/pain, and BLE petechial rash. Concern for cellulitis vs fungal infection. Low suspicion for acute GVHD as patient has yet to engraft. Pt remains afebrile.  - CT A/P with colonic dilation, gaseous distension, and L sided perianal thickening/fat stranding concerning for cellulitis  - CT R axilla: no acute soft tissue infection  - CT BLE: mild edema; no signs of soft tissue infection. Punch biopsy (1/14) path results consistent with infection rather than with GVHD.  - S/p R axilla punch biopsy with dermatology. Cx + for staph. Speciation and susceptibilities pending. On vanc.  - ID following and recommending vancomycin, cefepime, and flagyl. Pending karius results  - Wound care consulted  - With zinc oxide barrier cream for scrotal and perianal irritation    Diarrhea  - C. Diff negative on 12/28/24. Diarrhea improving  - Imodium q8h  - Began lomotil q8h 1/15      Immunocompromised state associated with stem cell transplant  - See history of allogeneic stem cell transplant     Abdominal pain  - Suspect 2/2 chemotherapy conditioning regimen prior to allogeneic SCT. Abdomen  notably distended and with TTP in epigastric region + RUQ.   - Continue protonix and famotidine daily, simethicone 80 TID ordered for flatulence  - , lipase normal  - morphine 1 mg PRN  - CT A/P 12/31: no acute pathology   - USG abdomen (RUQ) 01/02 unremarkable, not concerning for VOD  - Liver US w/ doppler 1/14: Hepatomegaly, mild intrahepatic biliary dilation. No signs of VOD/SOS  - Continuing on ursodiol for VOD/SOS ppx  - Trending Tbili. Elevated to 1.2 1/13. Today it is 0.7. Weight stable from yesterday  - Improving    Mucositis  - Having poor PO intake/throat pain 2/2 chemotherapy   - Duke's solution QID  - Morphine 2 mg q3h PRN. Can increase dose or frequency if need be.  - Transitioned PO meds to IV. 1/15 began switching meds back to PO  - Diet changed from regular to soft/bite sized  - Improving    Allergic contact dermatitis due to adhesives  - Itching and redness to LLQ/RLQ where tele leads were placed.   - Topical benadryl prn for itching.  - Tele removed       Hypokalemia  - See electrolyte disorder    Electrolyte disorder  - Replete per PRN electrolyte order set  - Daily CMP, mag, and phos while inpatient    Neutropenic fever  - Infection w/u neg thus far  - Afebrile, stopped cefepime and resumed levaquin 12/31/24. Pt afebrile but on 1/15 began vancomycin, cefepime, and flagyl per ID for R axillary rash, perirectal cellulitis, and BLE petechial rash.     Chemotherapy-induced nausea  - Anticipated 2/2 chemotherapy   - Meds switched from PO to IV as able 1/2.   - Zofran q8h  - Compazine q6h  - Ativan PRN    Transaminitis  - Transaminases first elevated 12/27. Suspect 2/2 chemotherapy prior to transplant.   - Will continue to monitor with daily CMP.  - RESOLVED    Hypertension  - Became hypertensive following SCT on 12/26. No signs of volume overload contributing to HTN.  - 12/27 started amlodipine.     Insomnia  - Difficulty sleeping at night while IP.  - Has PRN ativan and trazodone  - Melatonin  not effective     Pancytopenia due to chemotherapy  - Anticipated following chemotherapy.  - Transfuse for hgb < 7 or plts < 10K  - Continue antimicrobial ppx   - Daily CBC while IP    Adrenal nodule  - Subcentimeter nodular thickening of the adrenal glands first noted on imaging from 1/2024.  - Seen by endocrine prior to transplant admission and Dexamethasone suppression test performed. Patient responded appropriately. No further w/u needed.  - Likely adenomas.    Metabolic dysfunction-associated steatotic liver disease (MASLD)  - Biopsy proven to be steatotic change. Most compatible with MASLD.   - Follow-up with hepatology OP.    Primary myelofibrosis  - From Dr. 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 Transplant on 12/26/24 at 1330. Received 3 bags with CD34 dose of 6.04 x 10^6/kg.     Hyperlipidemia  - Holding home atorvastatin while IP to avoid interaction with other medications. Can resume at discharge if LFTs stable.        VTE Risk Mitigation (From admission, onward)           Ordered     heparin, porcine (PF) 100 unit/mL injection flush 300 Units  As needed (PRN)         12/18/24 2025                    Disposition: Will remain inpatient through resolution of transplant related symptoms    Octavia Hennessy MD  Bone Marrow Transplant  [facility] - Oncology ([facility])

## 2025-01-18 NOTE — ASSESSMENT & PLAN NOTE
- Patient of Dr. 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 Received 3 bags with CD34 dose of 6.04x10^6/kg.   - Today is Day +23  - Tacro levels MWF. Tacro level today 9.0 (goal is 7-9). Will continue current tacro dose of 0.5 mg BID Next level will be on Monday.   - Patient is O+. Donor is O+.  - Patient is CMV non-reactive. Donor is CMV reactive. Given donor CMV status, patient will start (patient-supplied) letermovir on Day +5.  - Day -4 and Day -3 Busulfan dose-adjusted to 282 mg  - Day 2 of Engraftment with an ANC of > 1200.  - See treatment plan below:    Planned conditioning regimen:  Thiotepa on Day -7  Busulfan on Day -6, -5, -4, and -3  Fludarabine on Day -6, -5, -4, and -3  REST DAYS on Day -2 and -1     Planned  GVHD Prophylaxis:  Cyclophosphamide on Day +3 and +4  Tacrolimus starting on Day +5  Mycophenolate starting on Day +5     Antimicrobial Prophylaxis:  Acyclovir starting on Day -7  Levofloxacin starting on Day -1  Micafungin on Day -1 through Day +4  Letermovir starting on Day +5 (if CMV PCR drawn on day 0 results negative)  Posaconazole starting on Day +5  Atovaquone starting on Day +30     Skin Care with Thiotepa: Day -7 through D-5     Seizure Prophylaxis:  Levetiracetam on Day -7 through Day -2     SOS/VOD Prophylaxis:  Ursodiol on Day -7 through Day +30     Growth Factor Support:  Neupogen starting on Day +5        Caregiver: Carmelita (Spouse)  Post-transplant discharge plans: Home

## 2025-01-18 NOTE — PROGRESS NOTES
Pharmacokinetic Assessment Follow Up: IV Vancomycin    Vancomycin order has been discontinued. Pharmacy will sign off. Please reconsult as needed!     Thank you for the consult,   Anjel Krishnan

## 2025-01-18 NOTE — PROGRESS NOTES
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 CTs of axilla, legs, and abd/pelvis all without any abscesses or adenopathy, did show cellulitis of perianal area. 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 Karius positive for E coli and HHV6.     Will stop vancomycin and start doxycycline based on MRSA susceptibilities. Will continue cefepime to cover for E coli noted on karius, could be resistant since he was on levofloxacin prophylaxis prior to this. Will continue flagyl for anaerobic coverage for perianal lesion. Duration of these abx 1 more week from today.     Recommendations  Stop vancomycin  Start PO doxycycline 100mg BID  Continue cefepime and flagyl  End date 1/25/25  Please re-consult for OPAT for cefepime if he will be discharged prior to the end date        Anticipated Disposition: TBD    Thank you for your consult. I will sign off. Please contact us if you have any additional questions.    Ghislaine Ashby DO  Infectious Disease  [facility])    Subjective:     Principal Problem:History of allogeneic stem cell transplant    HPI: Mr. 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 Infectious disease consulted for "Concern for cellulitis in R underarm and perianal area. Warm to touch and tender to palpation. Remains afebrile. On anti-microbial ppx with levaquin, posaconazole, and " "acyclovir".     Photos of R underarm and L calf placed in media tab. Also has erythematous induration of L perianal area. Has been afebrile, on room air. Denies any significant exposures. Lives in the [facility] with his wife. They have 1 dog, no other animal exposures. Does go fishing, last trip was right before his previous hospitalization. Denies any cuts/scrapes/injuries while fishing. Works as the  for [employer].           Interval History: feeling ok today, perianal lesion still bothersome    Review of Systems   Constitutional:  Negative for chills and fever.   Respiratory:  Negative for cough and shortness of breath.    Cardiovascular:  Negative for chest pain.   Gastrointestinal:  Negative for abdominal pain, constipation, diarrhea, nausea and vomiting.        Perianal lesion   Musculoskeletal:  Negative for arthralgias and myalgias.   Skin:  Negative for rash.   Neurological:  Negative for weakness and headaches.     Objective:     Vital Signs (Most Recent):  Temp: 98 °F (36.7 °C) (01/18/25 0801)  Pulse: 85 (01/18/25 0801)  Resp: 16 (01/18/25 0801)  BP: 130/76 (01/18/25 0801)  SpO2: 96 % (01/18/25 0801) Vital Signs (24h Range):  Temp:  [97.3 °F (36.3 °C)-98.6 °F (37 °C)] 98 °F (36.7 °C)  Pulse:  [] 85  Resp:  [16-18] 16  SpO2:  [96 %-98 %] 96 %  BP: (120-130)/(74-82) 130/76     Weight: 85.7 kg (188 lb 15 oz)  Body mass index is 26.35 kg/m².    Estimated Creatinine Clearance: 148.2 mL/min (based on SCr of 0.6 mg/dL).     Physical Exam  Vitals reviewed.   Constitutional:       General: He is not in acute distress.     Appearance: Normal appearance. He is not ill-appearing.   HENT:      Head: Normocephalic and atraumatic.   Eyes:      Extraocular Movements: Extraocular movements intact.      Conjunctiva/sclera: Conjunctivae normal.   Pulmonary:      Effort: Pulmonary effort is normal. No respiratory distress.   Abdominal:      General: Abdomen is flat.      Palpations: " Abdomen is soft.   Musculoskeletal:      Cervical back: Normal range of motion.   Skin:     General: Skin is warm and dry.      Findings: Lesion present.      Comments: R axilla lesion improving  L perianal lesion with some ulceration, induration/firmness improved   Neurological:      General: No focal deficit present.      Mental Status: He is alert and oriented to person, place, and time.   Psychiatric:         Mood and Affect: Mood normal.         Behavior: Behavior normal.         Thought Content: Thought content normal.          Significant Labs: All pertinent labs within the past 24 hours have been reviewed.  Recent Lab Results         01/18/25  0445   01/17/25  1139        Unit Blood Type Code 0600  [P]         Unit Expiration [id number]  [P]         Unit Blood Type A NEG  [P]         Albumin 2.2         ALP 59         ALT 16         Anion Gap 8         AST 14         Bands 3.0         Basophil % 0.0         BILIRUBIN TOTAL 0.5  Comment: For infants and newborns, interpretation of results should be based  on gestational age, weight and in agreement with clinical  observations.    Premature Infant recommended reference ranges:  Up to 24 hours.............<8.0 mg/dL  Up to 48 hours............<12.0 mg/dL  3-5 days..................<15.0 mg/dL  6-29 days.................<15.0 mg/dL           BUN 6         Calcium 8.0         Chloride 102         CO2 25         CODING SYSTEM [id number]  [P]         Creatinine 0.6         Crossmatch Interpretation Not Required  [P]         Differential Method Manual         DISPENSE STATUS ISSUED  [P]         Dohle Bodies Present         eGFR >60.0         Eos % 0.0         Glucose 107         Gran % 80.0  Comment: CORRECTED RESULT; previously reported as 71.0 on 01/18/2025 at 10:34.  [C]         Group & Rh O POS         Hematocrit 20.6         Hemoglobin 7.2         Immature Grans (Abs) Test Not Performed  Comment: Mild elevation in immature granulocytes is non specific and   can  be seen in a variety of conditions including stress response,   acute inflammation, trauma and pregnancy. Correlation with other   laboratory and clinical findings is essential.  CORRECTED RESULT; previously reported as 0.24 on 01/18/2025 at 10:34.    [C]         Immature Granulocytes Test Not Performed  Comment: CORRECTED RESULT; previously reported as 14.5 on 01/18/2025 at 10:34.  [C]         INDIRECT DAVIDA NEG         Lymph % 4.0  Comment: CORRECTED RESULT; previously reported as 0.6 on 01/18/2025 at 10:34.  [C]         Magnesium  1.2         MCH 30.4         MCHC 35.0         MCV 87         Mono % 13.0  Comment: CORRECTED RESULT; previously reported as 13.9 on 01/18/2025 at 10:34.  [C]         MPV 10.0         nRBC 0         Phosphorus Level 2.7         Platelet Estimate Decreased         Platelet Count 7         Potassium 2.7  Comment: *Critical value notification by _[facility]_ with confirmation of receipt to  __Nona Barone RN_ at  Date__1/18__Time_0616___           Product Code [id number]  [P]         PROTEIN TOTAL 5.2         RBC 2.37         RDW 13.7         Sodium 135         Specimen Outdate 01/21/2025 23:59         Toxic Granulation Present         UNIT NUMBER [id number]  [P]         Vancomycin-Trough   9.0       WBC 1.65  Comment: WBC critical result(s) called and verbal readback obtained from   Nona Davison RN by ADR 01/18/2025 06:20                    [P] - Preliminary Result [C] - Corrected Result              Significant Imaging: I have reviewed all pertinent imaging results/findings within the past 24 hours.

## 2025-01-18 NOTE — PLAN OF CARE
Day +23 Allo SCT. Pt involved in plan of care and communicating needs throughout shift. 1 unit of platelets given. Electrolytes replaced. Vancomycin DC, switch to oral abx. Up in room and to bathroom independently; voiding without difficulty. PIV removed. Pt dislikes food here but appetite is good. Wife brings in food. No c/o pain. All VSS. Pt remaining free from falls or injury throughout shift. Bed locked and in lowest position, personal belongings and call light within reach, non skid socks on when OOB. Pt instructed to call for assistance as needed. Hourly rounding done on pt.

## 2025-01-18 NOTE — SUBJECTIVE & OBJECTIVE
Subjective:     Interval History: Day +23 from a Bu/Flu/Thiotepa PTCy haploidentical (brother) SCT for PMF. Remains afebrile. Day 2 of engraftment with and ANC of >1200. Diarrhea is ongoing, but other symptoms are much improved. Mucositis has resolved. Out of bed. In recliner.    Objective:     Vital Signs (Most Recent):  Temp: 97.8 °F (36.6 °C) (01/18/25 1458)  Pulse: 89 (01/18/25 1458)  Resp: 19 (01/18/25 1458)  BP: 112/71 (01/18/25 1458)  SpO2: 98 % (01/18/25 1458) Vital Signs (24h Range):  Temp:  [97.8 °F (36.6 °C)-98.6 °F (37 °C)] 97.8 °F (36.6 °C)  Pulse:  [] 89  Resp:  [16-19] 19  SpO2:  [95 %-98 %] 98 %  BP: (112-130)/(71-82) 112/71     Weight: 85.7 kg (188 lb 15 oz)  Body mass index is 26.35 kg/m².  Body surface area is 2.07 meters squared.    ECOG SCORE           [unfilled]    Intake/Output - Last 3 Shifts         01/16 0700  01/17 0659 01/17 0700  01/18 0659 01/18 0700  01/19 0659    P.O. 250 2880     I.V. (mL/kg)  98.5 (1.1)     Blood 173  180    IV Piggyback  5477.1     Total Intake(mL/kg) 423 (4.8) 8455.6 (98.7) 180 (2.1)    Urine (mL/kg/hr) 1400 (0.7) 2950 (1.4) 1700 (2.3)    Emesis/NG output 0      Stool 0 0 0    Total Output 1400 2950 1700    Net -977 +5505.6 -1520           Urine Occurrence 5 x 3 x     Stool Occurrence 4 x 4 x 1 x    Emesis Occurrence 0 x               Physical Exam  Vitals reviewed.   Constitutional:       Appearance: Normal appearance.   HENT:      Head: Normocephalic and atraumatic.      Nose: Nose normal.      Mouth/Throat:      Mouth: Mucous membranes are moist.      Pharynx: Oropharynx is clear.   Eyes:      Extraocular Movements: Extraocular movements intact.      Conjunctiva/sclera: Conjunctivae normal.      Pupils: Pupils are equal, round, and reactive to light.   Cardiovascular:      Rate and Rhythm: Normal rate and regular rhythm.      Pulses: Normal pulses.      Heart sounds: Normal heart sounds.   Pulmonary:      Effort: Pulmonary effort is normal.      Breath  sounds: Normal breath sounds.   Abdominal:      General: Abdomen is flat. Bowel sounds are normal.      Palpations: Abdomen is soft.   Genitourinary:     Comments: L perianal erythema with TTP. No fluctuance appreciated.   B/l scrotal erythema   Musculoskeletal:         General: Normal range of motion.      Cervical back: Normal range of motion.   Skin:     General: Skin is warm and dry.      Capillary Refill: Capillary refill takes less than 2 seconds.      Findings: Bruising (abdominal), erythema (R underarm surrounding an ulceration), petechiae (B/l LE petechial rash) and rash (BLE) present. Rash is purpuric (on b/l LE).      Comments: R chest wall muhammad in place. Dressing c/d/i. No signs of infection to site.   Neurological:      General: No focal deficit present.      Mental Status: He is alert and oriented to person, place, and time.   Psychiatric:         Mood and Affect: Mood normal.         Behavior: Behavior normal.         Thought Content: Thought content normal.         Judgment: Judgment normal.            Significant Labs:   CBC:   Recent Labs   Lab 01/17/25  0328 01/18/25  0445   WBC 1.16* 1.65*   HGB 7.8* 7.2*   HCT 22.3* 20.6*   PLT 7* 7*    and CMP:   Recent Labs   Lab 01/17/25  0328 01/18/25  0445    135*   K 3.2* 2.7*    102   CO2 23 25   GLU 91 107   BUN 8 6   CREATININE 0.6 0.6   CALCIUM 8.0* 8.0*   PROT 5.3* 5.2*   ALBUMIN 2.1* 2.2*   BILITOT 0.7 0.5   ALKPHOS 64 59   AST 14 14   ALT 19 16   ANIONGAP 10 8       Diagnostic Results:  None

## 2025-01-19 LAB
ALBUMIN SERPL BCP-MCNC: 2.3 G/DL (ref 3.5–5.2)
ALP SERPL-CCNC: 56 U/L (ref 40–150)
ALT SERPL W/O P-5'-P-CCNC: 14 U/L (ref 10–44)
ANION GAP SERPL CALC-SCNC: 9 MMOL/L (ref 8–16)
AST SERPL-CCNC: 16 U/L (ref 10–40)
BASOPHILS NFR BLD: 0 % (ref 0–1.9)
BILIRUB SERPL-MCNC: 0.4 MG/DL (ref 0.1–1)
BLD PROD TYP BPU: NORMAL
BLOOD UNIT EXPIRATION DATE: NORMAL
BLOOD UNIT TYPE CODE: 7300
BLOOD UNIT TYPE: NORMAL
BUN SERPL-MCNC: 6 MG/DL (ref 6–20)
CALCIUM SERPL-MCNC: 8.1 MG/DL (ref 8.7–10.5)
CHLORIDE SERPL-SCNC: 101 MMOL/L (ref 95–110)
CO2 SERPL-SCNC: 24 MMOL/L (ref 23–29)
CODING SYSTEM: NORMAL
CREAT SERPL-MCNC: 0.6 MG/DL (ref 0.5–1.4)
CROSSMATCH INTERPRETATION: NORMAL
DIFFERENTIAL METHOD BLD: ABNORMAL
DISPENSE STATUS: NORMAL
EOSINOPHIL NFR BLD: 0 % (ref 0–8)
ERYTHROCYTE [DISTWIDTH] IN BLOOD BY AUTOMATED COUNT: 13.7 % (ref 11.5–14.5)
EST. GFR  (NO RACE VARIABLE): >60 ML/MIN/1.73 M^2
GLUCOSE SERPL-MCNC: 94 MG/DL (ref 70–110)
HCT VFR BLD AUTO: 21.6 % (ref 40–54)
HGB BLD-MCNC: 7.3 G/DL (ref 14–18)
IMM GRANULOCYTES # BLD AUTO: ABNORMAL K/UL (ref 0–0.04)
IMM GRANULOCYTES NFR BLD AUTO: ABNORMAL % (ref 0–0.5)
LYMPHOCYTES NFR BLD: 0 % (ref 18–48)
MAGNESIUM SERPL-MCNC: 1.4 MG/DL (ref 1.6–2.6)
MCH RBC QN AUTO: 29.3 PG (ref 27–31)
MCHC RBC AUTO-ENTMCNC: 33.8 G/DL (ref 32–36)
MCV RBC AUTO: 87 FL (ref 82–98)
METAMYELOCYTES NFR BLD MANUAL: 1 %
MONOCYTES NFR BLD: 13 % (ref 4–15)
NEUTROPHILS NFR BLD: 86 % (ref 38–73)
NRBC BLD-RTO: 0 /100 WBC
PHOSPHATE SERPL-MCNC: 2.4 MG/DL (ref 2.7–4.5)
PLATELET # BLD AUTO: 6 K/UL (ref 150–450)
PLATELET BLD QL SMEAR: ABNORMAL
PMV BLD AUTO: 13.1 FL (ref 9.2–12.9)
POTASSIUM SERPL-SCNC: 3.2 MMOL/L (ref 3.5–5.1)
PROT SERPL-MCNC: 5.3 G/DL (ref 6–8.4)
RBC # BLD AUTO: 2.49 M/UL (ref 4.6–6.2)
SODIUM SERPL-SCNC: 134 MMOL/L (ref 136–145)
UNIT NUMBER: NORMAL
WBC # BLD AUTO: 2.66 K/UL (ref 3.9–12.7)

## 2025-01-19 PROCEDURE — 63600175 PHARM REV CODE 636 W HCPCS

## 2025-01-19 PROCEDURE — 25000003 PHARM REV CODE 250: Performed by: NURSE PRACTITIONER

## 2025-01-19 PROCEDURE — 36430 TRANSFUSION BLD/BLD COMPNT: CPT

## 2025-01-19 PROCEDURE — 25000003 PHARM REV CODE 250

## 2025-01-19 PROCEDURE — 99233 SBSQ HOSP IP/OBS HIGH 50: CPT | Mod: ,,, | Performed by: INTERNAL MEDICINE

## 2025-01-19 PROCEDURE — 80053 COMPREHEN METABOLIC PANEL: CPT | Performed by: NURSE PRACTITIONER

## 2025-01-19 PROCEDURE — 83735 ASSAY OF MAGNESIUM: CPT | Performed by: NURSE PRACTITIONER

## 2025-01-19 PROCEDURE — 63600175 PHARM REV CODE 636 W HCPCS: Performed by: STUDENT IN AN ORGANIZED HEALTH CARE EDUCATION/TRAINING PROGRAM

## 2025-01-19 PROCEDURE — 25000003 PHARM REV CODE 250: Performed by: STUDENT IN AN ORGANIZED HEALTH CARE EDUCATION/TRAINING PROGRAM

## 2025-01-19 PROCEDURE — P9073 PLATELETS PHERESIS PATH REDU: HCPCS | Mod: 91 | Performed by: STUDENT IN AN ORGANIZED HEALTH CARE EDUCATION/TRAINING PROGRAM

## 2025-01-19 PROCEDURE — 63600175 PHARM REV CODE 636 W HCPCS: Mod: JZ,TB | Performed by: INTERNAL MEDICINE

## 2025-01-19 PROCEDURE — 20600001 HC STEP DOWN PRIVATE ROOM

## 2025-01-19 PROCEDURE — 94761 N-INVAS EAR/PLS OXIMETRY MLT: CPT

## 2025-01-19 PROCEDURE — 85007 BL SMEAR W/DIFF WBC COUNT: CPT | Performed by: NURSE PRACTITIONER

## 2025-01-19 PROCEDURE — 27000207 HC ISOLATION

## 2025-01-19 PROCEDURE — P9073 PLATELETS PHERESIS PATH REDU: HCPCS | Performed by: STUDENT IN AN ORGANIZED HEALTH CARE EDUCATION/TRAINING PROGRAM

## 2025-01-19 PROCEDURE — 63600175 PHARM REV CODE 636 W HCPCS: Performed by: NURSE PRACTITIONER

## 2025-01-19 PROCEDURE — 25000003 PHARM REV CODE 250: Performed by: INTERNAL MEDICINE

## 2025-01-19 PROCEDURE — 85027 COMPLETE CBC AUTOMATED: CPT | Performed by: NURSE PRACTITIONER

## 2025-01-19 PROCEDURE — 84100 ASSAY OF PHOSPHORUS: CPT | Performed by: NURSE PRACTITIONER

## 2025-01-19 RX ORDER — DIPHENHYDRAMINE HCL 25 MG
25 CAPSULE ORAL ONCE
Status: COMPLETED | OUTPATIENT
Start: 2025-01-19 | End: 2025-01-19

## 2025-01-19 RX ORDER — ACETAMINOPHEN 325 MG/1
650 TABLET ORAL ONCE
Status: COMPLETED | OUTPATIENT
Start: 2025-01-19 | End: 2025-01-19

## 2025-01-19 RX ORDER — HYDROCODONE BITARTRATE AND ACETAMINOPHEN 500; 5 MG/1; MG/1
TABLET ORAL
Status: DISCONTINUED | OUTPATIENT
Start: 2025-01-19 | End: 2025-01-22

## 2025-01-19 RX ADMIN — ZINC OXIDE: 200 OINTMENT TOPICAL at 09:01

## 2025-01-19 RX ADMIN — METRONIDAZOLE 500 MG: 500 TABLET ORAL at 10:01

## 2025-01-19 RX ADMIN — ACETAMINOPHEN 650 MG: 325 TABLET ORAL at 09:01

## 2025-01-19 RX ADMIN — FILGRASTIM 480 MCG: 480 INJECTION, SOLUTION INTRAVENOUS; SUBCUTANEOUS at 09:01

## 2025-01-19 RX ADMIN — LOPERAMIDE HYDROCHLORIDE 2 MG: 2 CAPSULE ORAL at 09:01

## 2025-01-19 RX ADMIN — SODIUM PHOSPHATE, MONOBASIC, MONOHYDRATE AND SODIUM PHOSPHATE, DIBASIC, ANHYDROUS 15 MMOL: 142; 276 INJECTION, SOLUTION INTRAVENOUS at 09:01

## 2025-01-19 RX ADMIN — LOPERAMIDE HYDROCHLORIDE 2 MG: 2 CAPSULE ORAL at 05:01

## 2025-01-19 RX ADMIN — TACROLIMUS 0.5 MG: 0.5 CAPSULE ORAL at 05:01

## 2025-01-19 RX ADMIN — SIMETHICONE 80 MG: 80 TABLET, CHEWABLE ORAL at 09:01

## 2025-01-19 RX ADMIN — Medication 1 DOSE: at 10:01

## 2025-01-19 RX ADMIN — POTASSIUM CHLORIDE 10 MEQ: 7.45 INJECTION INTRAVENOUS at 01:01

## 2025-01-19 RX ADMIN — MYCOPHENOLATE MOFETIL 1000 MG: 250 CAPSULE ORAL at 02:01

## 2025-01-19 RX ADMIN — SIMETHICONE 80 MG: 80 TABLET, CHEWABLE ORAL at 02:01

## 2025-01-19 RX ADMIN — LETERMOVIR 480 MG: 480 TABLET, FILM COATED ORAL at 09:01

## 2025-01-19 RX ADMIN — DIPHENOXYLATE HYDROCHLORIDE AND ATROPINE SULFATE 1 TABLET: .025; 2.5 TABLET ORAL at 02:01

## 2025-01-19 RX ADMIN — CEFEPIME 2 G: 2 INJECTION, POWDER, FOR SOLUTION INTRAVENOUS at 06:01

## 2025-01-19 RX ADMIN — DIPHENOXYLATE HYDROCHLORIDE AND ATROPINE SULFATE 1 TABLET: .025; 2.5 TABLET ORAL at 09:01

## 2025-01-19 RX ADMIN — POTASSIUM CHLORIDE 10 MEQ: 7.45 INJECTION INTRAVENOUS at 10:01

## 2025-01-19 RX ADMIN — DOXYCYCLINE HYCLATE 100 MG: 100 TABLET, COATED ORAL at 10:01

## 2025-01-19 RX ADMIN — ONDANSETRON 8 MG: 2 INJECTION INTRAMUSCULAR; INTRAVENOUS at 10:01

## 2025-01-19 RX ADMIN — Medication 1 DOSE: at 09:01

## 2025-01-19 RX ADMIN — DIPHENOXYLATE HYDROCHLORIDE AND ATROPINE SULFATE 1 TABLET: .025; 2.5 TABLET ORAL at 05:01

## 2025-01-19 RX ADMIN — ACYCLOVIR 800 MG: 800 TABLET ORAL at 10:01

## 2025-01-19 RX ADMIN — URSODIOL 300 MG: 300 CAPSULE ORAL at 09:01

## 2025-01-19 RX ADMIN — DIPHENHYDRAMINE HYDROCHLORIDE 25 MG: 25 CAPSULE ORAL at 09:01

## 2025-01-19 RX ADMIN — CEFEPIME 2 G: 2 INJECTION, POWDER, FOR SOLUTION INTRAVENOUS at 02:01

## 2025-01-19 RX ADMIN — ONDANSETRON 8 MG: 2 INJECTION INTRAMUSCULAR; INTRAVENOUS at 02:01

## 2025-01-19 RX ADMIN — CEFEPIME 2 G: 2 INJECTION, POWDER, FOR SOLUTION INTRAVENOUS at 10:01

## 2025-01-19 RX ADMIN — URSODIOL 300 MG: 300 CAPSULE ORAL at 10:01

## 2025-01-19 RX ADMIN — MYCOPHENOLATE MOFETIL 1000 MG: 250 CAPSULE ORAL at 10:01

## 2025-01-19 RX ADMIN — METRONIDAZOLE 500 MG: 500 TABLET ORAL at 06:01

## 2025-01-19 RX ADMIN — MICONAZOLE NITRATE: 20 OINTMENT TOPICAL at 09:01

## 2025-01-19 RX ADMIN — METRONIDAZOLE 500 MG: 500 TABLET ORAL at 02:01

## 2025-01-19 RX ADMIN — TACROLIMUS 0.5 MG: 0.5 CAPSULE ORAL at 09:01

## 2025-01-19 RX ADMIN — DIPHENOXYLATE HYDROCHLORIDE AND ATROPINE SULFATE 1 TABLET: .025; 2.5 TABLET ORAL at 10:01

## 2025-01-19 RX ADMIN — POTASSIUM CHLORIDE 60 MEQ: 7.45 INJECTION INTRAVENOUS at 07:01

## 2025-01-19 RX ADMIN — Medication 1 DOSE: at 02:01

## 2025-01-19 RX ADMIN — ZINC OXIDE: 200 OINTMENT TOPICAL at 02:01

## 2025-01-19 RX ADMIN — LOPERAMIDE HYDROCHLORIDE 2 MG: 2 CAPSULE ORAL at 10:01

## 2025-01-19 RX ADMIN — POSACONAZOLE 300 MG: 100 TABLET, DELAYED RELEASE ORAL at 09:01

## 2025-01-19 RX ADMIN — GUAIFENESIN AND DEXTROMETHORPHAN HYDROBROMIDE 1 TABLET: 600; 30 TABLET, EXTENDED RELEASE ORAL at 09:01

## 2025-01-19 RX ADMIN — ACYCLOVIR 800 MG: 800 TABLET ORAL at 09:01

## 2025-01-19 RX ADMIN — AMLODIPINE BESYLATE 10 MG: 10 TABLET ORAL at 09:01

## 2025-01-19 RX ADMIN — Medication 1 DOSE: at 05:01

## 2025-01-19 RX ADMIN — PANTOPRAZOLE SODIUM 40 MG: 40 TABLET, DELAYED RELEASE ORAL at 09:01

## 2025-01-19 RX ADMIN — POTASSIUM CHLORIDE 10 MEQ: 7.45 INJECTION INTRAVENOUS at 09:01

## 2025-01-19 RX ADMIN — MYCOPHENOLATE MOFETIL 1000 MG: 250 CAPSULE ORAL at 09:01

## 2025-01-19 RX ADMIN — CHOLESTYRAMINE 4 G: 4 POWDER, FOR SUSPENSION ORAL at 09:01

## 2025-01-19 RX ADMIN — CHOLESTYRAMINE 4 G: 4 POWDER, FOR SUSPENSION ORAL at 10:01

## 2025-01-19 RX ADMIN — LOPERAMIDE HYDROCHLORIDE 2 MG: 2 CAPSULE ORAL at 02:01

## 2025-01-19 RX ADMIN — MAGNESIUM SULFATE HEPTAHYDRATE 2 G: 40 INJECTION, SOLUTION INTRAVENOUS at 06:01

## 2025-01-19 RX ADMIN — DOXYCYCLINE HYCLATE 100 MG: 100 TABLET, COATED ORAL at 09:01

## 2025-01-19 RX ADMIN — MAGNESIUM SULFATE HEPTAHYDRATE 2 G: 40 INJECTION, SOLUTION INTRAVENOUS at 09:01

## 2025-01-19 RX ADMIN — SIMETHICONE 80 MG: 80 TABLET, CHEWABLE ORAL at 10:01

## 2025-01-19 RX ADMIN — POTASSIUM CHLORIDE 10 MEQ: 7.45 INJECTION INTRAVENOUS at 12:01

## 2025-01-19 RX ADMIN — GUAIFENESIN AND DEXTROMETHORPHAN HYDROBROMIDE 1 TABLET: 600; 30 TABLET, EXTENDED RELEASE ORAL at 10:01

## 2025-01-19 RX ADMIN — ONDANSETRON 8 MG: 2 INJECTION INTRAMUSCULAR; INTRAVENOUS at 06:01

## 2025-01-19 RX ADMIN — POTASSIUM CHLORIDE 10 MEQ: 7.45 INJECTION INTRAVENOUS at 06:01

## 2025-01-19 NOTE — SUBJECTIVE & OBJECTIVE
Subjective:     Interval History: Day +24 from a Bu/Flu/Thiotepa PTCy haploidentical (brother) SCT for PMF. Remains afebrile. Day 3of engraftment. Today is the last day of neupogen. Tried an increased number of pills iinstead of iv mediation was poorly tolerated.    Objective:     Vital Signs (Most Recent):  Temp: 97.3 °F (36.3 °C) (01/19/25 1632)  Pulse: 84 (01/19/25 1224)  Resp: 20 (01/19/25 1632)  BP: 113/74 (01/19/25 1632)  SpO2: 98 % (01/19/25 1224) Vital Signs (24h Range):  Temp:  [97.3 °F (36.3 °C)-98.9 °F (37.2 °C)] 97.3 °F (36.3 °C)  Pulse:  [82-91] 84  Resp:  [16-20] 20  SpO2:  [96 %-99 %] 98 %  BP: (112-124)/(63-77) 113/74     Weight: 85.7 kg (188 lb 15 oz)  Body mass index is 26.35 kg/m².  Body surface area is 2.07 meters squared.    ECOG SCORE           [unfilled]    Intake/Output - Last 3 Shifts         01/17 0700  01/18 0659 01/18 0700  01/19 0659 01/19 0700  01/20 0659    P.O. 2880 800     I.V. (mL/kg) 98.5 (1.1) 98.2 (1.1)     Blood  180 932.4    IV Piggyback 5477.1 2828.1     Total Intake(mL/kg) 8455.6 (98.7) 3906.3 (45.6) 932.4 (10.9)    Urine (mL/kg/hr) 2950 (1.4) 2000 (1)     Emesis/NG output       Stool 0 0     Total Output 2950 2000     Net +5505.6 +1906.3 +932.4           Urine Occurrence 3 x      Stool Occurrence 4 x 1 x              Physical Exam  Vitals reviewed.   Constitutional:       Appearance: Normal appearance.   HENT:      Head: Normocephalic and atraumatic.      Nose: Nose normal.      Mouth/Throat:      Mouth: Mucous membranes are moist.      Pharynx: Oropharynx is clear.   Eyes:      Extraocular Movements: Extraocular movements intact.      Conjunctiva/sclera: Conjunctivae normal.      Pupils: Pupils are equal, round, and reactive to light.   Cardiovascular:      Rate and Rhythm: Normal rate and regular rhythm.      Pulses: Normal pulses.      Heart sounds: Normal heart sounds.   Pulmonary:      Effort: Pulmonary effort is normal.      Breath sounds: Normal breath sounds.    Abdominal:      General: Abdomen is flat. Bowel sounds are normal.      Palpations: Abdomen is soft.   Genitourinary:     Comments: L perianal erythema with TTP. No fluctuance appreciated.   B/l scrotal erythema   Musculoskeletal:         General: Normal range of motion.      Cervical back: Normal range of motion.   Skin:     General: Skin is warm and dry.      Capillary Refill: Capillary refill takes less than 2 seconds.      Findings: Bruising (abdominal), erythema (R underarm surrounding an ulceration), petechiae (B/l LE petechial rash) and rash (BLE) present. Rash is purpuric (on b/l LE).      Comments: R chest wall muhammad in place. Dressing c/d/i. No signs of infection to site.   Neurological:      General: No focal deficit present.      Mental Status: He is alert and oriented to person, place, and time.   Psychiatric:         Mood and Affect: Mood normal.         Behavior: Behavior normal.         Thought Content: Thought content normal.         Judgment: Judgment normal.            Significant Labs:   CBC:   Recent Labs   Lab 01/18/25  0445 01/19/25  0506   WBC 1.65* 2.66*   HGB 7.2* 7.3*   HCT 20.6* 21.6*   PLT 7* 6*    and CMP:   Recent Labs   Lab 01/18/25  0445 01/19/25  0506   * 134*   K 2.7* 3.2*    101   CO2 25 24    94   BUN 6 6   CREATININE 0.6 0.6   CALCIUM 8.0* 8.1*   PROT 5.2* 5.3*   ALBUMIN 2.2* 2.3*   BILITOT 0.5 0.4   ALKPHOS 59 56   AST 14 16   ALT 16 14   ANIONGAP 8 9       Diagnostic Results:  None

## 2025-01-19 NOTE — ASSESSMENT & PLAN NOTE
- Patient of Dr. 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 Received 3 bags with CD34 dose of 6.04x10^6/kg.   - Today is Day +24  - Tacro levels MWF. Tacro level today 9.0 (goal is 7-9). Will continue current tacro dose of 0.5 mg BID Next level will be on Monday.   - Patient is O+. Donor is O+.  - Patient is CMV non-reactive. Donor is CMV reactive. Given donor CMV status, patient will start (patient-supplied) letermovir on Day +5.  - Day -4 and Day -3 Busulfan dose-adjusted to 282 mg  - Day 2 of Engraftment with an ANC of > 1200.  - See treatment plan below:    Planned conditioning regimen:  Thiotepa on Day -7  Busulfan on Day -6, -5, -4, and -3  Fludarabine on Day -6, -5, -4, and -3  REST DAYS on Day -2 and -1     Planned  GVHD Prophylaxis:  Cyclophosphamide on Day +3 and +4  Tacrolimus starting on Day +5  Mycophenolate starting on Day +5     Antimicrobial Prophylaxis:  Acyclovir starting on Day -7  Levofloxacin starting on Day -1  Micafungin on Day -1 through Day +4  Letermovir starting on Day +5 (if CMV PCR drawn on day 0 results negative)  Posaconazole starting on Day +5  Atovaquone starting on Day +30     Skin Care with Thiotepa: Day -7 through D-5     Seizure Prophylaxis:  Levetiracetam on Day -7 through Day -2     SOS/VOD Prophylaxis:  Ursodiol on Day -7 through Day +30     Growth Factor Support:  Neupogen starting on Day +5        Caregiver: Carmelita (Spouse)  Post-transplant discharge plans: Home

## 2025-01-19 NOTE — PROGRESS NOTES
[facility]guez - Oncology ([facility])  Hematology  Bone Marrow Transplant  Progress Note    Patient Name: Rubén Hu  Admission Date: 12/18/2024  Hospital Length of Stay: 32 days  Code Status: Full Code    Subjective:     Interval History: Day +24 from a Bu/Flu/Thiotepa PTCy haploidentical (brother) SCT for PMF. Remains afebrile. Day 3of engraftment. Today is the last day of neupogen. Tried an increased number of pills iinstead of iv mediation was poorly tolerated.    Objective:     Vital Signs (Most Recent):  Temp: 97.3 °F (36.3 °C) (01/19/25 1632)  Pulse: 84 (01/19/25 1224)  Resp: 20 (01/19/25 1632)  BP: 113/74 (01/19/25 1632)  SpO2: 98 % (01/19/25 1224) Vital Signs (24h Range):  Temp:  [97.3 °F (36.3 °C)-98.9 °F (37.2 °C)] 97.3 °F (36.3 °C)  Pulse:  [82-91] 84  Resp:  [16-20] 20  SpO2:  [96 %-99 %] 98 %  BP: (112-124)/(63-77) 113/74     Weight: 85.7 kg (188 lb 15 oz)  Body mass index is 26.35 kg/m².  Body surface area is 2.07 meters squared.    ECOG SCORE           [unfilled]    Intake/Output - Last 3 Shifts         01/17 0700  01/18 0659 01/18 0700  01/19 0659 01/19 0700  01/20 0659    P.O. 2880 800     I.V. (mL/kg) 98.5 (1.1) 98.2 (1.1)     Blood  180 932.4    IV Piggyback 5477.1 2828.1     Total Intake(mL/kg) 8455.6 (98.7) 3906.3 (45.6) 932.4 (10.9)    Urine (mL/kg/hr) 2950 (1.4) 2000 (1)     Emesis/NG output       Stool 0 0     Total Output 2950 2000     Net +5505.6 +1906.3 +932.4           Urine Occurrence 3 x      Stool Occurrence 4 x 1 x              Physical Exam  Vitals reviewed.   Constitutional:       Appearance: Normal appearance.   HENT:      Head: Normocephalic and atraumatic.      Nose: Nose normal.      Mouth/Throat:      Mouth: Mucous membranes are moist.      Pharynx: Oropharynx is clear.   Eyes:      Extraocular Movements: Extraocular movements intact.      Conjunctiva/sclera: Conjunctivae normal.      Pupils: Pupils are equal, round, and reactive to light.   Cardiovascular:      Rate and Rhythm:  Normal rate and regular rhythm.      Pulses: Normal pulses.      Heart sounds: Normal heart sounds.   Pulmonary:      Effort: Pulmonary effort is normal.      Breath sounds: Normal breath sounds.   Abdominal:      General: Abdomen is flat. Bowel sounds are normal.      Palpations: Abdomen is soft.   Genitourinary:     Comments: L perianal erythema with TTP. No fluctuance appreciated.   B/l scrotal erythema   Musculoskeletal:         General: Normal range of motion.      Cervical back: Normal range of motion.   Skin:     General: Skin is warm and dry.      Capillary Refill: Capillary refill takes less than 2 seconds.      Findings: Bruising (abdominal), erythema (R underarm surrounding an ulceration), petechiae (B/l LE petechial rash) and rash (BLE) present. Rash is purpuric (on b/l LE).      Comments: R chest wall muhammad in place. Dressing c/d/i. No signs of infection to site.   Neurological:      General: No focal deficit present.      Mental Status: He is alert and oriented to person, place, and time.   Psychiatric:         Mood and Affect: Mood normal.         Behavior: Behavior normal.         Thought Content: Thought content normal.         Judgment: Judgment normal.            Significant Labs:   CBC:   Recent Labs   Lab 01/18/25  0445 01/19/25  0506   WBC 1.65* 2.66*   HGB 7.2* 7.3*   HCT 20.6* 21.6*   PLT 7* 6*    and CMP:   Recent Labs   Lab 01/18/25  0445 01/19/25  0506   * 134*   K 2.7* 3.2*    101   CO2 25 24    94   BUN 6 6   CREATININE 0.6 0.6   CALCIUM 8.0* 8.1*   PROT 5.2* 5.3*   ALBUMIN 2.2* 2.3*   BILITOT 0.5 0.4   ALKPHOS 59 56   AST 14 16   ALT 16 14   ANIONGAP 8 9       Diagnostic Results:  None  Assessment/Plan:     * History of allogeneic stem cell transplant  - Patient of Dr. 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 Received 3 bags with CD34 dose of 6.04x10^6/kg.   - Today is Day +24  -  Tacro levels MWF. Tacro level today 9.0 (goal is 7-9). Will continue current tacro dose of 0.5 mg BID Next level will be on Monday.   - Patient is O+. Donor is O+.  - Patient is CMV non-reactive. Donor is CMV reactive. Given donor CMV status, patient will start (patient-supplied) letermovir on Day +5.  - Day -4 and Day -3 Busulfan dose-adjusted to 282 mg  - Day 2 of Engraftment with an ANC of > 1200.  - See treatment plan below:    Planned conditioning regimen:  Thiotepa on Day -7  Busulfan on Day -6, -5, -4, and -3  Fludarabine on Day -6, -5, -4, and -3  REST DAYS on Day -2 and -1     Planned  GVHD Prophylaxis:  Cyclophosphamide on Day +3 and +4  Tacrolimus starting on Day +5  Mycophenolate starting on Day +5     Antimicrobial Prophylaxis:  Acyclovir starting on Day -7  Levofloxacin starting on Day -1  Micafungin on Day -1 through Day +4  Letermovir starting on Day +5 (if CMV PCR drawn on day 0 results negative)  Posaconazole starting on Day +5  Atovaquone starting on Day +30     Skin Care with Thiotepa: Day -7 through D-5     Seizure Prophylaxis:  Levetiracetam on Day -7 through Day -2     SOS/VOD Prophylaxis:  Ursodiol on Day -7 through Day +30     Growth Factor Support:  Neupogen starting on Day +5        Caregiver: Carmelita (Spouse)  Post-transplant discharge plans: Home      Hypercoagulable state, secondary  - See primary myelofibrosis    Cellulitis  - See skin lesions  - See perianal pain  - ID and derm consulted  - Wound care consulted  - 1/14 began vancomycin, cefepime, and flagyl    Hyperbilirubinemia  - Tbili elevated to 1.2 on 1/13. Abdomen distended and tender to palpation in RUQ  - 12/31 CT AP complete for diffuse abdominal pain: No acute pathology. Stable adrenal nodule  - 1/2 abdominal US: No concerns for VOD/SOS  - 1/14 liver US w/ doppler: Hepatomegaly; mild intrahepatic biliary dilation. No evidence of VOD/SOS  - , lipase normal  - Continue ursodiol for VOD/SOS ppx  - Trending Tbili daily.  WNL today at 0.7.    Perianal pain  - Pt with history of hematochezia that was worked up outpatient prior to transplant. 7/23/24 colonoscopy with several diverticula and a non-bleeding rectal abscess  - Denies hematochezia/melena while inpatient. Now with left sided perianal irritation and pain. Erythematous area that is tender to palpation, but without fluctuance. Reports pain became worse after irritation from diarrhea. Pt afebrile.   - 1/14 erythema worsening and rash now warm to touch. Concern for cellulitis vs abscess. 1/14 Began vancomycin, cefepime, and flagyl. ID consulted.  - Wound care consulted and will monitor pt closely for fevers/overt signs of infection  - Low threshold to consult CRS  - Continue zinc oxide    Skin lesions  -  1/14 pt first complained of perianal erythema and pain, R axillary rash/pain, and BLE petechial rash. Concern for cellulitis vs fungal infection. Low suspicion for acute GVHD as patient has yet to engraft. Pt remains afebrile.  - CT A/P with colonic dilation, gaseous distension, and L sided perianal thickening/fat stranding concerning for cellulitis  - CT R axilla: no acute soft tissue infection  - CT BLE: mild edema; no signs of soft tissue infection. Punch biopsy (1/14) path results consistent with infection rather than with GVHD.  - S/p R axilla punch biopsy with dermatology. Cx + for staph. Speciation and susceptibilities pending. On vanc.  - ID following and recommending vancomycin, cefepime, and flagyl. Pending karius results  - Wound care consulted  - With zinc oxide barrier cream for scrotal and perianal irritation    Diarrhea  - C. Diff negative on 12/28/24. Diarrhea improving  - Imodium q8h  - Began lomotil q8h 1/15      Immunocompromised state associated with stem cell transplant  - See history of allogeneic stem cell transplant     Abdominal pain  - Suspect 2/2 chemotherapy conditioning regimen prior to allogeneic SCT. Abdomen notably distended and with TTP in  epigastric region + RUQ.   - Continue protonix and famotidine daily, simethicone 80 TID ordered for flatulence  - , lipase normal  - morphine 1 mg PRN  - CT A/P 12/31: no acute pathology   - USG abdomen (RUQ) 01/02 unremarkable, not concerning for VOD  - Liver US w/ doppler 1/14: Hepatomegaly, mild intrahepatic biliary dilation. No signs of VOD/SOS  - Continuing on ursodiol for VOD/SOS ppx  - Trending Tbili. Elevated to 1.2 1/13. Today it is 0.7. Weight stable from yesterday  - Improving    Mucositis  - Having poor PO intake/throat pain 2/2 chemotherapy   - Duke's solution QID  - Morphine 2 mg q3h PRN. Can increase dose or frequency if need be.  - Transitioned PO meds to IV. 1/15 began switching meds back to PO  - Diet changed from regular to soft/bite sized  - Improving    Allergic contact dermatitis due to adhesives  - Itching and redness to LLQ/RLQ where tele leads were placed.   - Topical benadryl prn for itching.  - Tele removed       Hypokalemia  - See electrolyte disorder    Electrolyte disorder  - Replete per PRN electrolyte order set  - Daily CMP, mag, and phos while inpatient    Neutropenic fever  - Infection w/u neg thus far  - Afebrile, stopped cefepime and resumed levaquin 12/31/24. Pt afebrile but on 1/15 began vancomycin, cefepime, and flagyl per ID for R axillary rash, perirectal cellulitis, and BLE petechial rash.     Chemotherapy-induced nausea  - Anticipated 2/2 chemotherapy   - Meds switched from PO to IV as able 1/2.   - Zofran q8h  - Compazine q6h  - Ativan PRN    Transaminitis  - Transaminases first elevated 12/27. Suspect 2/2 chemotherapy prior to transplant.   - Will continue to monitor with daily CMP.  - RESOLVED    Hypertension  - Became hypertensive following SCT on 12/26. No signs of volume overload contributing to HTN.  - 12/27 started amlodipine.     Insomnia  - Difficulty sleeping at night while IP.  - Has PRN ativan and trazodone  - Melatonin not effective     Pancytopenia due  to chemotherapy  - Anticipated following chemotherapy.  - Transfuse for hgb < 7 or plts < 10K  - Continue antimicrobial ppx   - Daily CBC while IP    Adrenal nodule  - Subcentimeter nodular thickening of the adrenal glands first noted on imaging from 1/2024.  - Seen by endocrine prior to transplant admission and Dexamethasone suppression test performed. Patient responded appropriately. No further w/u needed.  - Likely adenomas.    Metabolic dysfunction-associated steatotic liver disease (MASLD)  - Biopsy proven to be steatotic change. Most compatible with MASLD.   - Follow-up with hepatology OP.    Primary myelofibrosis  - From Dr. 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 Transplant on 12/26/24 at 1330. Received 3 bags with CD34 dose of 6.04 x 10^6/kg.     Hyperlipidemia  - Holding home atorvastatin while IP to avoid interaction with other medications. Can resume at discharge if LFTs stable.        VTE Risk Mitigation (From admission, onward)           Ordered     heparin, porcine (PF) 100 unit/mL injection flush 300 Units  As needed (PRN)         12/18/24 2025                    Disposition: Will remain inpatient through further improvement of symptoms.     Octavia Hennessy MD  Bone Marrow Transplant  [facility] - Oncology ([facility])

## 2025-01-19 NOTE — PLAN OF CARE
Pt involved in plan of care and communicating needs throughout shift. Up in room and to bathroom independently; voiding without difficulty. Urinal also at bedside. 1 unit of platelets given. Electrolytes replaced.  Tolerating diet. No c/o pain. All VSS. Pt remaining free from falls or injury throughout shift. Bed locked and in lowest position, personal belongings and call light within reach, non skid socks on when OOB. Pt instructed to call for assistance as needed. Hourly rounding done on pt.

## 2025-01-19 NOTE — PLAN OF CARE
POC reviewed with patient; understanding verbalized. Pt is day+24 Allo SCT. No complaints this shift. Contact precautions remain in place. Pt. with nonskid footwear on, bed in lowest position, and locked with bed rails up x2.  Pt. instructed to call prior to getting OOB.  Pt. has call light and personal items within reach. Patient ambulates in room independently. VSS and afebrile this shift. All questions and concerns addressed at this time.     K-riders and Mg started. Phos scheduled. Critical plt count of 6 reported to MD

## 2025-01-20 LAB
ABO + RH BLD: NORMAL
ALBUMIN SERPL BCP-MCNC: 2.5 G/DL (ref 3.5–5.2)
ALP SERPL-CCNC: 62 U/L (ref 40–150)
ALT SERPL W/O P-5'-P-CCNC: 14 U/L (ref 10–44)
ANION GAP SERPL CALC-SCNC: 7 MMOL/L (ref 8–16)
ANISOCYTOSIS BLD QL SMEAR: SLIGHT
AST SERPL-CCNC: 16 U/L (ref 10–40)
BACTERIA SPEC ANAEROBE CULT: NORMAL
BASOPHILS # BLD AUTO: ABNORMAL K/UL (ref 0–0.2)
BASOPHILS NFR BLD: 0 % (ref 0–1.9)
BILIRUB SERPL-MCNC: 0.4 MG/DL (ref 0.1–1)
BLD GP AB SCN CELLS X3 SERPL QL: NORMAL
BUN SERPL-MCNC: 7 MG/DL (ref 6–20)
CALCIUM SERPL-MCNC: 8.3 MG/DL (ref 8.7–10.5)
CHLORIDE SERPL-SCNC: 103 MMOL/L (ref 95–110)
CO2 SERPL-SCNC: 24 MMOL/L (ref 23–29)
CREAT SERPL-MCNC: 0.6 MG/DL (ref 0.5–1.4)
DIFFERENTIAL METHOD BLD: ABNORMAL
DOHLE BOD BLD QL SMEAR: PRESENT
EOSINOPHIL # BLD AUTO: ABNORMAL K/UL (ref 0–0.5)
EOSINOPHIL NFR BLD: 0 % (ref 0–8)
ERYTHROCYTE [DISTWIDTH] IN BLOOD BY AUTOMATED COUNT: 13.4 % (ref 11.5–14.5)
EST. GFR  (NO RACE VARIABLE): >60 ML/MIN/1.73 M^2
GLUCOSE SERPL-MCNC: 95 MG/DL (ref 70–110)
HCT VFR BLD AUTO: 21 % (ref 40–54)
HGB BLD-MCNC: 7.1 G/DL (ref 14–18)
HYPOCHROMIA BLD QL SMEAR: ABNORMAL
IMM GRANULOCYTES # BLD AUTO: ABNORMAL K/UL (ref 0–0.04)
IMM GRANULOCYTES NFR BLD AUTO: ABNORMAL % (ref 0–0.5)
LYMPHOCYTES # BLD AUTO: ABNORMAL K/UL (ref 1–4.8)
LYMPHOCYTES NFR BLD: 1 % (ref 18–48)
MAGNESIUM SERPL-MCNC: 1.5 MG/DL (ref 1.6–2.6)
MCH RBC QN AUTO: 29.5 PG (ref 27–31)
MCHC RBC AUTO-ENTMCNC: 33.8 G/DL (ref 32–36)
MCV RBC AUTO: 87 FL (ref 82–98)
MISCELLANEOUS TEST NAME: NORMAL
MONOCYTES # BLD AUTO: ABNORMAL K/UL (ref 0.3–1)
MONOCYTES NFR BLD: 9 % (ref 4–15)
NEUTROPHILS NFR BLD: 89 % (ref 38–73)
NEUTS BAND NFR BLD MANUAL: 1 %
NRBC BLD-RTO: 0 /100 WBC
OVALOCYTES BLD QL SMEAR: ABNORMAL
PHOSPHATE SERPL-MCNC: 2.6 MG/DL (ref 2.7–4.5)
PLATELET # BLD AUTO: 18 K/UL (ref 150–450)
PMV BLD AUTO: 9.8 FL (ref 9.2–12.9)
POIKILOCYTOSIS BLD QL SMEAR: SLIGHT
POLYCHROMASIA BLD QL SMEAR: ABNORMAL
POTASSIUM SERPL-SCNC: 3.4 MMOL/L (ref 3.5–5.1)
PROT SERPL-MCNC: 5.5 G/DL (ref 6–8.4)
RBC # BLD AUTO: 2.41 M/UL (ref 4.6–6.2)
REFERENCE LAB: NORMAL
SODIUM SERPL-SCNC: 134 MMOL/L (ref 136–145)
SPECIMEN OUTDATE: NORMAL
SPECIMEN TYPE: NORMAL
SPHEROCYTES BLD QL SMEAR: ABNORMAL
TACROLIMUS BLD-MCNC: 4.6 NG/ML (ref 5–15)
TEST RESULT: NORMAL
TOXIC GRANULES BLD QL SMEAR: PRESENT
WBC # BLD AUTO: 3.58 K/UL (ref 3.9–12.7)

## 2025-01-20 PROCEDURE — 80053 COMPREHEN METABOLIC PANEL: CPT | Performed by: NURSE PRACTITIONER

## 2025-01-20 PROCEDURE — 85027 COMPLETE CBC AUTOMATED: CPT | Performed by: NURSE PRACTITIONER

## 2025-01-20 PROCEDURE — 86901 BLOOD TYPING SEROLOGIC RH(D): CPT | Performed by: INTERNAL MEDICINE

## 2025-01-20 PROCEDURE — 83735 ASSAY OF MAGNESIUM: CPT | Performed by: NURSE PRACTITIONER

## 2025-01-20 PROCEDURE — 84100 ASSAY OF PHOSPHORUS: CPT | Performed by: NURSE PRACTITIONER

## 2025-01-20 PROCEDURE — 25000003 PHARM REV CODE 250: Performed by: STUDENT IN AN ORGANIZED HEALTH CARE EDUCATION/TRAINING PROGRAM

## 2025-01-20 PROCEDURE — 63600175 PHARM REV CODE 636 W HCPCS: Performed by: STUDENT IN AN ORGANIZED HEALTH CARE EDUCATION/TRAINING PROGRAM

## 2025-01-20 PROCEDURE — 25000003 PHARM REV CODE 250

## 2025-01-20 PROCEDURE — 25000003 PHARM REV CODE 250: Performed by: INTERNAL MEDICINE

## 2025-01-20 PROCEDURE — 99233 SBSQ HOSP IP/OBS HIGH 50: CPT | Mod: ,,, | Performed by: INTERNAL MEDICINE

## 2025-01-20 PROCEDURE — 27000207 HC ISOLATION

## 2025-01-20 PROCEDURE — 85007 BL SMEAR W/DIFF WBC COUNT: CPT | Performed by: NURSE PRACTITIONER

## 2025-01-20 PROCEDURE — 25000003 PHARM REV CODE 250: Performed by: NURSE PRACTITIONER

## 2025-01-20 PROCEDURE — 20600001 HC STEP DOWN PRIVATE ROOM

## 2025-01-20 PROCEDURE — 63600175 PHARM REV CODE 636 W HCPCS: Performed by: NURSE PRACTITIONER

## 2025-01-20 PROCEDURE — 63600175 PHARM REV CODE 636 W HCPCS

## 2025-01-20 PROCEDURE — 80197 ASSAY OF TACROLIMUS: CPT | Performed by: INTERNAL MEDICINE

## 2025-01-20 RX ORDER — TACROLIMUS 1 MG/1
1 CAPSULE ORAL 2 TIMES DAILY
Status: DISCONTINUED | OUTPATIENT
Start: 2025-01-20 | End: 2025-01-22

## 2025-01-20 RX ORDER — TACROLIMUS 0.5 MG/1
0.5 CAPSULE ORAL ONCE
Status: COMPLETED | OUTPATIENT
Start: 2025-01-20 | End: 2025-01-20

## 2025-01-20 RX ADMIN — ONDANSETRON 8 MG: 2 INJECTION INTRAMUSCULAR; INTRAVENOUS at 02:01

## 2025-01-20 RX ADMIN — LOPERAMIDE HYDROCHLORIDE 2 MG: 2 CAPSULE ORAL at 09:01

## 2025-01-20 RX ADMIN — Medication 1 DOSE: at 01:01

## 2025-01-20 RX ADMIN — POTASSIUM CHLORIDE 60 MEQ: 7.45 INJECTION INTRAVENOUS at 12:01

## 2025-01-20 RX ADMIN — POTASSIUM CHLORIDE 10 MEQ: 7.45 INJECTION INTRAVENOUS at 01:01

## 2025-01-20 RX ADMIN — GUAIFENESIN AND DEXTROMETHORPHAN HYDROBROMIDE 1 TABLET: 600; 30 TABLET, EXTENDED RELEASE ORAL at 09:01

## 2025-01-20 RX ADMIN — Medication 1 DOSE: at 09:01

## 2025-01-20 RX ADMIN — MICONAZOLE NITRATE: 20 OINTMENT TOPICAL at 09:01

## 2025-01-20 RX ADMIN — TACROLIMUS 0.5 MG: 0.5 CAPSULE ORAL at 11:01

## 2025-01-20 RX ADMIN — METRONIDAZOLE 500 MG: 500 TABLET ORAL at 06:01

## 2025-01-20 RX ADMIN — DIPHENOXYLATE HYDROCHLORIDE AND ATROPINE SULFATE 1 TABLET: .025; 2.5 TABLET ORAL at 09:01

## 2025-01-20 RX ADMIN — MAGNESIUM SULFATE HEPTAHYDRATE 2 G: 40 INJECTION, SOLUTION INTRAVENOUS at 06:01

## 2025-01-20 RX ADMIN — URSODIOL 300 MG: 300 CAPSULE ORAL at 09:01

## 2025-01-20 RX ADMIN — POSACONAZOLE 300 MG: 100 TABLET, DELAYED RELEASE ORAL at 10:01

## 2025-01-20 RX ADMIN — ZINC OXIDE: 200 OINTMENT TOPICAL at 10:01

## 2025-01-20 RX ADMIN — SIMETHICONE 80 MG: 80 TABLET, CHEWABLE ORAL at 02:01

## 2025-01-20 RX ADMIN — MICONAZOLE NITRATE: 20 OINTMENT TOPICAL at 10:01

## 2025-01-20 RX ADMIN — TACROLIMUS 0.5 MG: 0.5 CAPSULE ORAL at 09:01

## 2025-01-20 RX ADMIN — CHOLESTYRAMINE 4 G: 4 POWDER, FOR SUSPENSION ORAL at 10:01

## 2025-01-20 RX ADMIN — LOPERAMIDE HYDROCHLORIDE 2 MG: 2 CAPSULE ORAL at 01:01

## 2025-01-20 RX ADMIN — ACYCLOVIR 800 MG: 800 TABLET ORAL at 09:01

## 2025-01-20 RX ADMIN — AMLODIPINE BESYLATE 10 MG: 10 TABLET ORAL at 09:01

## 2025-01-20 RX ADMIN — ONDANSETRON 8 MG: 2 INJECTION INTRAMUSCULAR; INTRAVENOUS at 09:01

## 2025-01-20 RX ADMIN — DIPHENOXYLATE HYDROCHLORIDE AND ATROPINE SULFATE 1 TABLET: .025; 2.5 TABLET ORAL at 01:01

## 2025-01-20 RX ADMIN — CHOLESTYRAMINE 4 G: 4 POWDER, FOR SUSPENSION ORAL at 09:01

## 2025-01-20 RX ADMIN — SIMETHICONE 80 MG: 80 TABLET, CHEWABLE ORAL at 09:01

## 2025-01-20 RX ADMIN — CEFEPIME 2 G: 2 INJECTION, POWDER, FOR SOLUTION INTRAVENOUS at 09:01

## 2025-01-20 RX ADMIN — LOPERAMIDE HYDROCHLORIDE 2 MG: 2 CAPSULE ORAL at 05:01

## 2025-01-20 RX ADMIN — POTASSIUM CHLORIDE 60 MEQ: 7.45 INJECTION INTRAVENOUS at 09:01

## 2025-01-20 RX ADMIN — POTASSIUM CHLORIDE 60 MEQ: 7.45 INJECTION INTRAVENOUS at 07:01

## 2025-01-20 RX ADMIN — MYCOPHENOLATE MOFETIL 1000 MG: 250 CAPSULE ORAL at 09:01

## 2025-01-20 RX ADMIN — METRONIDAZOLE 500 MG: 500 TABLET ORAL at 09:01

## 2025-01-20 RX ADMIN — DOXYCYCLINE HYCLATE 100 MG: 100 TABLET, COATED ORAL at 09:01

## 2025-01-20 RX ADMIN — CEFEPIME 2 G: 2 INJECTION, POWDER, FOR SOLUTION INTRAVENOUS at 02:01

## 2025-01-20 RX ADMIN — ALUMINUM HYDROXIDE, MAGNESIUM HYDROXIDE, AND DIMETHICONE 10 ML: 400; 400; 40 SUSPENSION ORAL at 11:01

## 2025-01-20 RX ADMIN — POTASSIUM CHLORIDE 10 MEQ: 7.45 INJECTION INTRAVENOUS at 06:01

## 2025-01-20 RX ADMIN — MYCOPHENOLATE MOFETIL 1000 MG: 250 CAPSULE ORAL at 10:01

## 2025-01-20 RX ADMIN — Medication 1 DOSE: at 06:01

## 2025-01-20 RX ADMIN — SODIUM PHOSPHATE, MONOBASIC, MONOHYDRATE AND SODIUM PHOSPHATE, DIBASIC, ANHYDROUS 15 MMOL: 142; 276 INJECTION, SOLUTION INTRAVENOUS at 06:01

## 2025-01-20 RX ADMIN — MYCOPHENOLATE MOFETIL 1000 MG: 250 CAPSULE ORAL at 02:01

## 2025-01-20 RX ADMIN — PANTOPRAZOLE SODIUM 40 MG: 40 TABLET, DELAYED RELEASE ORAL at 09:01

## 2025-01-20 RX ADMIN — CEFEPIME 2 G: 2 INJECTION, POWDER, FOR SOLUTION INTRAVENOUS at 06:01

## 2025-01-20 RX ADMIN — POTASSIUM CHLORIDE 10 MEQ: 7.45 INJECTION INTRAVENOUS at 10:01

## 2025-01-20 RX ADMIN — TACROLIMUS 1 MG: 1 CAPSULE ORAL at 06:01

## 2025-01-20 RX ADMIN — DIPHENOXYLATE HYDROCHLORIDE AND ATROPINE SULFATE 1 TABLET: .025; 2.5 TABLET ORAL at 05:01

## 2025-01-20 RX ADMIN — ONDANSETRON 8 MG: 2 INJECTION INTRAMUSCULAR; INTRAVENOUS at 06:01

## 2025-01-20 RX ADMIN — LETERMOVIR 480 MG: 480 TABLET, FILM COATED ORAL at 10:01

## 2025-01-20 RX ADMIN — ZINC OXIDE: 200 OINTMENT TOPICAL at 09:01

## 2025-01-20 RX ADMIN — METRONIDAZOLE 500 MG: 500 TABLET ORAL at 02:01

## 2025-01-20 NOTE — ASSESSMENT & PLAN NOTE
- Patient of Dr. 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 Received 3 bags with CD34 dose of 6.04x10^6/kg.   - Today is Day +25  - Tacro levels MWF. Tacro level today 9.0 (goal is 7-9). Will continue current tacro dose of 0.5 mg BID Next level will be on Monday.   - Patient is O+. Donor is O+.  - Patient is CMV non-reactive. Donor is CMV reactive. Given donor CMV status, patient will start (patient-supplied) letermovir on Day +5.  - Day -4 and Day -3 Busulfan dose-adjusted to 282 mg  - Day 2 of Engraftment with an ANC of > 1200.  - See treatment plan below:    Planned conditioning regimen:  Thiotepa on Day -7  Busulfan on Day -6, -5, -4, and -3  Fludarabine on Day -6, -5, -4, and -3  REST DAYS on Day -2 and -1     Planned  GVHD Prophylaxis:  Cyclophosphamide on Day +3 and +4  Tacrolimus starting on Day +5  Mycophenolate starting on Day +5     Antimicrobial Prophylaxis:  Acyclovir starting on Day -7  Levofloxacin starting on Day -1  Micafungin on Day -1 through Day +4  Letermovir starting on Day +5 (if CMV PCR drawn on day 0 results negative)  Posaconazole starting on Day +5  Atovaquone starting on Day +30     Skin Care with Thiotepa: Day -7 through D-5     Seizure Prophylaxis:  Levetiracetam on Day -7 through Day -2     SOS/VOD Prophylaxis:  Ursodiol on Day -7 through Day +30     Growth Factor Support:  Neupogen starting on Day +5        Caregiver: Carmelita (Spouse)  Post-transplant discharge plans: Home

## 2025-01-20 NOTE — ASSESSMENT & PLAN NOTE
"- Pt with history of hematochezia that was worked up outpatient prior to transplant. 7/23/24 colonoscopy with several diverticula and a non-bleeding rectal abscess  - Denies hematochezia/melena while inpatient. Now with left sided perianal irritation and pain. Erythematous area that is tender to palpation, but without fluctuance. Reports pain became worse after irritation from diarrhea. Pt afebrile.   - 1/14 erythema worsening and rash now warm to touch. Concern for cellulitis vs abscess. 1/14 Began vancomycin (DCed 01/18, started doxy), cefepime, and flagyl. ID consulted. See further plan under "skin  lesions"  - Wound care consulted and will monitor pt closely for fevers/overt signs of infection  - Low threshold to consult CRS  - Continue zinc oxide  "

## 2025-01-20 NOTE — PLAN OF CARE
POC reviewed with patient; understanding verbalized. Pt is day+25 Allo SCT. No complaints this shift. Contact precautions remain in place. Pt. with nonskid footwear on, bed in lowest position, and locked with bed rails up x2.  Pt. instructed to call prior to getting OOB.  Pt. has call light and personal items within reach. Patient ambulates in room independently. VSS and afebrile this shift. All questions and concerns addressed at this time.      K+ riders, Mg, and Phos started.

## 2025-01-20 NOTE — ASSESSMENT & PLAN NOTE
"- Afebrile, stopped cefepime and resumed levaquin 12/31/24. Pt afebrile but on 1/15 began vancomycin, cefepime, and flagyl per ID for R axillary rash, perirectal cellulitis, and BLE petechial rash.   - See plan under "skin lesions"  - Currently on doxy (MRSA on axillary skin lesion biopsy), cefepime (E coli on Karius, possibly resistant to levoflox) and flagyl (anaerobic coverage given perianal lesion) until 01/25.  "

## 2025-01-20 NOTE — ASSESSMENT & PLAN NOTE
- See skin lesions  - See perianal pain  - ID and derm consulted  - Wound care consulted   Patient has orthopedic surgery follow-up only.  No TCM call required per policy.    Corinne R Thayer, RN on 1/13/2023 at 8:38 AM

## 2025-01-20 NOTE — SUBJECTIVE & OBJECTIVE
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 Remains afebrile. Day 4 of engraftment. Neupogen was discontinued yesterday. Tolerating oral medications much better now. ID signed off - last day for cefepime/flagyl is 01/25.    Objective:     Vital Signs (Most Recent):  Temp: 97.9 °F (36.6 °C) (01/20/25 0945)  Pulse: 83 (01/20/25 0945)  Resp: 18 (01/20/25 0945)  BP: 121/69 (01/20/25 0945)  SpO2: 98 % (01/20/25 0945) Vital Signs (24h Range):  Temp:  [97.3 °F (36.3 °C)-98 °F (36.7 °C)] 97.9 °F (36.6 °C)  Pulse:  [83-87] 83  Resp:  [18-20] 18  SpO2:  [96 %-98 %] 98 %  BP: (109-121)/(68-74) 121/69     Weight: 83 kg (182 lb 15.7 oz)  Body mass index is 25.52 kg/m².  Body surface area is 2.04 meters squared.    ECOG SCORE           Intake/Output - Last 3 Shifts         01/18 0700  01/19 0659 01/19 0700  01/20 0659 01/20 0700  01/21 0659    P.O. 800 200 180    I.V. (mL/kg) 98.2 (1.1) 99.5 (1.2)     Blood 180 932.4     IV Piggyback 2828.1 952.3     Total Intake(mL/kg) 3906.3 (45.6) 2184.3 (26.3) 180 (2.2)    Urine (mL/kg/hr) 2000 (1)      Stool 0      Total Output 2000      Net +1906.3 +2184.3 +180           Urine Occurrence   3 x    Stool Occurrence 1 x               Physical Exam  Vitals reviewed.   Constitutional:       Appearance: Normal appearance.   HENT:      Head: Normocephalic and atraumatic.      Nose: Nose normal.      Mouth/Throat:      Mouth: Mucous membranes are moist.      Pharynx: Oropharynx is clear.   Eyes:      Extraocular Movements: Extraocular movements intact.      Conjunctiva/sclera: Conjunctivae normal.      Pupils: Pupils are equal, round, and reactive to light.   Cardiovascular:      Rate and Rhythm: Normal rate and regular rhythm.      Pulses: Normal pulses.      Heart sounds: Normal heart sounds.   Pulmonary:      Effort: Pulmonary effort is normal.      Breath sounds: Normal breath sounds.   Abdominal:      General: Abdomen is flat.  Bowel sounds are normal.      Palpations: Abdomen is soft.   Genitourinary:     Comments: L perianal erythema with TTP. No fluctuance appreciated.   B/l scrotal erythema   Musculoskeletal:         General: Normal range of motion.      Cervical back: Normal range of motion.   Skin:     General: Skin is warm and dry.      Capillary Refill: Capillary refill takes less than 2 seconds.      Findings: Bruising (abdominal), erythema (R underarm surrounding an ulceration), petechiae (B/l LE petechial rash) and rash (BLE) present. Rash is purpuric (on b/l LE).      Comments: R chest wall muhammad in place. Dressing c/d/i. No signs of infection to site.   Neurological:      General: No focal deficit present.      Mental Status: He is alert and oriented to person, place, and time.   Psychiatric:         Mood and Affect: Mood normal.         Behavior: Behavior normal.         Thought Content: Thought content normal.         Judgment: Judgment normal.            Significant Labs:   All pertinent labs from the last 24 hours have been reviewed.    Diagnostic Results:  I have reviewed and interpreted all pertinent imaging results/findings within the past 24 hours.

## 2025-01-20 NOTE — ASSESSMENT & PLAN NOTE
-  1/14 pt first complained of perianal erythema and pain, R axillary rash/pain, and BLE petechial rash. Concern for cellulitis vs fungal infection. Low suspicion for acute GVHD as patient has yet to engraft. Pt remains afebrile.  - CT A/P with colonic dilation, gaseous distension, and L sided perianal thickening/fat stranding concerning for cellulitis  - CT R axilla: no acute soft tissue infection  - CT BLE: mild edema; no signs of soft tissue infection. Punch biopsy (1/14) path results consistent with infection rather than with GVHD.  - S/p R axilla punch biopsy with dermatology. Cx + for MRSA. Vanc discontinued and started doxycycline on  01/18 (end 01/25 - total of 7 days).  - ID signed off 01/18 with the following recs  - Discontinued vanc, started doxycycline 100 BID for 7 days (end 01/24) as axillary lesion biopsy grew MRSA  - Continue cefepime (Ecoli on Karius), end date   - Continue flagyl for anaerobic coverage given perianal lesion  - With zinc oxide barrier cream for scrotal and perianal irritation  - Follow wound care recs

## 2025-01-20 NOTE — PLAN OF CARE
Plan of care reviewed with patient and family.  FAll precautions maintained, side rails up x2, call light in reach, bed in low position and locked, nonskid socks on.  Day +25 Allotransplant .  Voiding without difficulty.  Received 6 k-riders  for k of 3.4.  given tacrolimus 0.5mg extra dose.  No complaints voiced.

## 2025-01-20 NOTE — PROGRESS NOTES
[facility] - Oncology ([facility])  Hematology  Bone Marrow Transplant  Progress Note    Patient Name: Rubén Hu  Admission Date: 12/18/2024  Hospital Length of Stay: 33 days  Code Status: Full Code    Subjective:     Interval History:   Day +25 from a Bu/Flu/Thiotepa PTCy haploidentical (brother) SCT for PMF. Remains afebrile. Day 4 of engraftment. Neupogen was discontinued yesterday. Tolerating oral medications much better now. ID signed off - last day for doxy/cefepime (Ecoli on Karius)/flagyl is 01/25.    Objective:     Vital Signs (Most Recent):  Temp: 97.9 °F (36.6 °C) (01/20/25 0945)  Pulse: 83 (01/20/25 0945)  Resp: 18 (01/20/25 0945)  BP: 121/69 (01/20/25 0945)  SpO2: 98 % (01/20/25 0945) Vital Signs (24h Range):  Temp:  [97.3 °F (36.3 °C)-98 °F (36.7 °C)] 97.9 °F (36.6 °C)  Pulse:  [83-87] 83  Resp:  [18-20] 18  SpO2:  [96 %-98 %] 98 %  BP: (109-121)/(68-74) 121/69     Weight: 83 kg (182 lb 15.7 oz)  Body mass index is 25.52 kg/m².  Body surface area is 2.04 meters squared.    ECOG SCORE           Intake/Output - Last 3 Shifts         01/18 0700  01/19 0659 01/19 0700  01/20 0659 01/20 0700  01/21 0659    P.O. 800 200 180    I.V. (mL/kg) 98.2 (1.1) 99.5 (1.2)     Blood 180 932.4     IV Piggyback 2828.1 952.3     Total Intake(mL/kg) 3906.3 (45.6) 2184.3 (26.3) 180 (2.2)    Urine (mL/kg/hr) 2000 (1)      Stool 0      Total Output 2000      Net +1906.3 +2184.3 +180           Urine Occurrence   3 x    Stool Occurrence 1 x               Physical Exam  Vitals reviewed.   Constitutional:       Appearance: Normal appearance.   HENT:      Head: Normocephalic and atraumatic.      Nose: Nose normal.      Mouth/Throat:      Mouth: Mucous membranes are moist.      Pharynx: Oropharynx is clear.   Eyes:      Extraocular Movements: Extraocular movements intact.      Conjunctiva/sclera: Conjunctivae normal.      Pupils: Pupils are equal, round, and reactive to light.   Cardiovascular:      Rate and Rhythm: Normal rate  and regular rhythm.      Pulses: Normal pulses.      Heart sounds: Normal heart sounds.   Pulmonary:      Effort: Pulmonary effort is normal.      Breath sounds: Normal breath sounds.   Abdominal:      General: Abdomen is flat. Bowel sounds are normal.      Palpations: Abdomen is soft.   Genitourinary:     Comments: L perianal erythema with TTP. No fluctuance appreciated.   B/l scrotal erythema   Musculoskeletal:         General: Normal range of motion.      Cervical back: Normal range of motion.   Skin:     General: Skin is warm and dry.      Capillary Refill: Capillary refill takes less than 2 seconds.      Findings: Bruising (abdominal), erythema (R underarm surrounding an ulceration), petechiae (B/l LE petechial rash) and rash (BLE) present. Rash is purpuric (on b/l LE).      Comments: R chest wall muhammad in place. Dressing c/d/i. No signs of infection to site.   Neurological:      General: No focal deficit present.      Mental Status: He is alert and oriented to person, place, and time.   Psychiatric:         Mood and Affect: Mood normal.         Behavior: Behavior normal.         Thought Content: Thought content normal.         Judgment: Judgment normal.            Significant Labs:   All pertinent labs from the last 24 hours have been reviewed.    Diagnostic Results:  I have reviewed and interpreted all pertinent imaging results/findings within the past 24 hours.  Assessment/Plan:     * History of allogeneic stem cell transplant  - Patient of Dr. 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 Received 3 bags with CD34 dose of 6.04x10^6/kg.   - Today is Day +25  - Tacro levels MWF. Tacro level today 9.0 (goal is 7-9). Will continue current tacro dose of 0.5 mg BID Next level will be on Monday.   - Patient is O+. Donor is O+.  - Patient is CMV non-reactive. Donor is CMV reactive. Given donor CMV status, patient will start  (patient-supplied) letermovir on Day +5.  - Day -4 and Day -3 Busulfan dose-adjusted to 282 mg  - Day 2 of Engraftment with an ANC of > 1200.  - See treatment plan below:    Planned conditioning regimen:  Thiotepa on Day -7  Busulfan on Day -6, -5, -4, and -3  Fludarabine on Day -6, -5, -4, and -3  REST DAYS on Day -2 and -1     Planned  GVHD Prophylaxis:  Cyclophosphamide on Day +3 and +4  Tacrolimus starting on Day +5  Mycophenolate starting on Day +5     Antimicrobial Prophylaxis:  Acyclovir starting on Day -7  Levofloxacin starting on Day -1  Micafungin on Day -1 through Day +4  Letermovir starting on Day +5 (if CMV PCR drawn on day 0 results negative)  Posaconazole starting on Day +5  Atovaquone starting on Day +30     Skin Care with Thiotepa: Day -7 through D-5     Seizure Prophylaxis:  Levetiracetam on Day -7 through Day -2     SOS/VOD Prophylaxis:  Ursodiol on Day -7 through Day +30     Growth Factor Support:  Neupogen starting on Day +5        Caregiver: Carmelita (Spouse)  Post-transplant discharge plans: Home      Hypercoagulable state, secondary  - See primary myelofibrosis    Cellulitis  - See skin lesions  - See perianal pain  - ID and derm consulted  - Wound care consulted    Hyperbilirubinemia  - Tbili elevated to 1.2 on 1/13. Abdomen distended and tender to palpation in RUQ  - 12/31 CT AP complete for diffuse abdominal pain: No acute pathology. Stable adrenal nodule  - 1/2 abdominal US: No concerns for VOD/SOS  - 1/14 liver US w/ doppler: Hepatomegaly; mild intrahepatic biliary dilation. No evidence of VOD/SOS  - , lipase normal  - Continue ursodiol for VOD/SOS ppx  - Trending Tbili daily. WNL today at 0.7.    Perianal pain  - Pt with history of hematochezia that was worked up outpatient prior to transplant. 7/23/24 colonoscopy with several diverticula and a non-bleeding rectal abscess  - Denies hematochezia/melena while inpatient. Now with left sided perianal irritation and pain. Erythematous  "area that is tender to palpation, but without fluctuance. Reports pain became worse after irritation from diarrhea. Pt afebrile.   - 1/14 erythema worsening and rash now warm to touch. Concern for cellulitis vs abscess. 1/14 Began vancomycin (DCed 01/18, started doxy), cefepime, and flagyl. ID consulted. See further plan under "skin  lesions"  - Wound care consulted and will monitor pt closely for fevers/overt signs of infection  - Low threshold to consult CRS  - Continue zinc oxide    Skin lesions  -  1/14 pt first complained of perianal erythema and pain, R axillary rash/pain, and BLE petechial rash. Concern for cellulitis vs fungal infection. Low suspicion for acute GVHD as patient has yet to engraft. Pt remains afebrile.  - CT A/P with colonic dilation, gaseous distension, and L sided perianal thickening/fat stranding concerning for cellulitis  - CT R axilla: no acute soft tissue infection  - CT BLE: mild edema; no signs of soft tissue infection. Punch biopsy (1/14) path results consistent with infection rather than with GVHD.  - S/p R axilla punch biopsy with dermatology. Cx + for MRSA. Vanc discontinued and started doxycycline on  01/18 (end 01/25 - total of 7 days).  - ID signed off 01/18 with the following recs  - Discontinued vanc, started doxycycline 100 BID for 7 days (end 01/24) as axillary lesion biopsy grew MRSA  - Continue cefepime (Ecoli on Karius), end date   - Continue flagyl for anaerobic coverage given perianal lesion  - With zinc oxide barrier cream for scrotal and perianal irritation  - Follow wound care recs    Diarrhea  - C. Diff negative on 12/28/24. Diarrhea improving  - Imodium q8h  - Began lomotil q8h 1/15      Immunocompromised state associated with stem cell transplant  - See history of allogeneic stem cell transplant     Abdominal pain  - Suspect 2/2 chemotherapy conditioning regimen prior to allogeneic SCT. Abdomen notably distended and with TTP in epigastric region + RUQ.   - " "Continue protonix and famotidine daily, simethicone 80 TID ordered for flatulence  - , lipase normal  - morphine 1 mg PRN  - CT A/P 12/31: no acute pathology   - USG abdomen (RUQ) 01/02 unremarkable, not concerning for VOD  - Liver US w/ doppler 1/14: Hepatomegaly, mild intrahepatic biliary dilation. No signs of VOD/SOS  - Continuing on ursodiol for VOD/SOS ppx  - Trending Tbili. Elevated to 1.2 1/13. Today it is 0.7. Weight stable from yesterday  - Improving    Mucositis  - Having poor PO intake/throat pain 2/2 chemotherapy   - Duke's solution QID  - Morphine 2 mg q3h PRN. Can increase dose or frequency if need be.  - Transitioned PO meds to IV. 1/15 began switching meds back to PO  - Diet changed from regular to soft/bite sized  - Improving    Allergic contact dermatitis due to adhesives  - Itching and redness to LLQ/RLQ where tele leads were placed.   - Topical benadryl prn for itching.  - Tele removed     Hypokalemia  - See electrolyte disorder    Electrolyte disorder  - Replete per PRN electrolyte order set  - Daily CMP, mag, and phos while inpatient    Neutropenic fever  - Afebrile, stopped cefepime and resumed levaquin 12/31/24. Pt afebrile but on 1/15 began vancomycin, cefepime, and flagyl per ID for R axillary rash, perirectal cellulitis, and BLE petechial rash.   - See plan under "skin lesions"  - Currently on doxy (MRSA on axillary skin lesion biopsy), cefepime (E coli on Karius, possibly resistant to levoflox) and flagyl (anaerobic coverage given perianal lesion) until 01/25.    Chemotherapy-induced nausea  - Anticipated 2/2 chemotherapy   - Meds switched from PO to IV as able 1/2.   - Zofran q8h  - Compazine q6h  - Ativan PRN    Transaminitis  - Transaminases first elevated 12/27. Suspect 2/2 chemotherapy prior to transplant.   - Will continue to monitor with daily CMP.  - RESOLVED    Hypertension  - Became hypertensive following SCT on 12/26. No signs of volume overload contributing to HTN.  - " 12/27 started amlodipine.     Insomnia  - Difficulty sleeping at night while IP.  - Has PRN ativan and trazodone  - Melatonin not effective     Pancytopenia due to chemotherapy  - Anticipated following chemotherapy.  - Transfuse for hgb < 7 or plts < 10K  - Continue antimicrobial ppx   - Daily CBC while IP    Adrenal nodule  - Subcentimeter nodular thickening of the adrenal glands first noted on imaging from 1/2024.  - Seen by endocrine prior to transplant admission and Dexamethasone suppression test performed. Patient responded appropriately. No further w/u needed.  - Likely adenomas.    Metabolic dysfunction-associated steatotic liver disease (MASLD)  - Biopsy proven to be steatotic change. Most compatible with MASLD.   - Follow-up with hepatology OP.    Primary myelofibrosis  - From Dr. 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 Transplant on 12/26/24 at 1330. Received 3 bags with CD34 dose of 6.04 x 10^6/kg.     Hyperlipidemia  - Holding home atorvastatin while IP to avoid interaction with other medications. Can resume at discharge if LFTs stable.      VTE Risk Mitigation (From admission, onward)           Ordered     heparin, porcine (PF) 100 unit/mL injection flush 300 Units  As needed (PRN)         12/18/24 2025                    Disposition: Will remain inpatient through further improvement of symptoms.     Michael Saavedra MD  Bone Marrow Transplant  [facility] - Oncology ([facility])

## 2025-01-21 LAB
ABO + RH BLD: NORMAL
ALBUMIN SERPL BCP-MCNC: 2.7 G/DL (ref 3.5–5.2)
ALP SERPL-CCNC: 59 U/L (ref 40–150)
ALT SERPL W/O P-5'-P-CCNC: 14 U/L (ref 10–44)
ANION GAP SERPL CALC-SCNC: 7 MMOL/L (ref 8–16)
ANISOCYTOSIS BLD QL SMEAR: SLIGHT
AST SERPL-CCNC: 16 U/L (ref 10–40)
BASOPHILS # BLD AUTO: ABNORMAL K/UL (ref 0–0.2)
BASOPHILS NFR BLD: 0 % (ref 0–1.9)
BILIRUB SERPL-MCNC: 0.4 MG/DL (ref 0.1–1)
BLD GP AB SCN CELLS X3 SERPL QL: NORMAL
BUN SERPL-MCNC: 8 MG/DL (ref 6–20)
CALCIUM SERPL-MCNC: 8.6 MG/DL (ref 8.7–10.5)
CHLORIDE SERPL-SCNC: 106 MMOL/L (ref 95–110)
CO2 SERPL-SCNC: 24 MMOL/L (ref 23–29)
CREAT SERPL-MCNC: 0.6 MG/DL (ref 0.5–1.4)
DIFFERENTIAL METHOD BLD: ABNORMAL
EOSINOPHIL # BLD AUTO: ABNORMAL K/UL (ref 0–0.5)
EOSINOPHIL NFR BLD: 0 % (ref 0–8)
ERYTHROCYTE [DISTWIDTH] IN BLOOD BY AUTOMATED COUNT: 13.5 % (ref 11.5–14.5)
EST. GFR  (NO RACE VARIABLE): >60 ML/MIN/1.73 M^2
GLUCOSE SERPL-MCNC: 95 MG/DL (ref 70–110)
HCT VFR BLD AUTO: 20.8 % (ref 40–54)
HGB BLD-MCNC: 7 G/DL (ref 14–18)
HYPOCHROMIA BLD QL SMEAR: ABNORMAL
IMM GRANULOCYTES # BLD AUTO: ABNORMAL K/UL (ref 0–0.04)
IMM GRANULOCYTES NFR BLD AUTO: ABNORMAL % (ref 0–0.5)
LYMPHOCYTES # BLD AUTO: ABNORMAL K/UL (ref 1–4.8)
LYMPHOCYTES NFR BLD: 0 % (ref 18–48)
MAGNESIUM SERPL-MCNC: 1.5 MG/DL (ref 1.6–2.6)
MCH RBC QN AUTO: 29.4 PG (ref 27–31)
MCHC RBC AUTO-ENTMCNC: 33.7 G/DL (ref 32–36)
MCV RBC AUTO: 87 FL (ref 82–98)
METAMYELOCYTES NFR BLD MANUAL: 1 %
MONOCYTES # BLD AUTO: ABNORMAL K/UL (ref 0.3–1)
MONOCYTES NFR BLD: 6 % (ref 4–15)
NEUTROPHILS NFR BLD: 93 % (ref 38–73)
NRBC BLD-RTO: 0 /100 WBC
OVALOCYTES BLD QL SMEAR: ABNORMAL
PHOSPHATE SERPL-MCNC: 2.9 MG/DL (ref 2.7–4.5)
PLATELET # BLD AUTO: 11 K/UL (ref 150–450)
PMV BLD AUTO: 10.6 FL (ref 9.2–12.9)
POIKILOCYTOSIS BLD QL SMEAR: SLIGHT
POLYCHROMASIA BLD QL SMEAR: ABNORMAL
POTASSIUM SERPL-SCNC: 3.9 MMOL/L (ref 3.5–5.1)
PROT SERPL-MCNC: 5.7 G/DL (ref 6–8.4)
RBC # BLD AUTO: 2.38 M/UL (ref 4.6–6.2)
SODIUM SERPL-SCNC: 137 MMOL/L (ref 136–145)
SPECIMEN OUTDATE: NORMAL
SPHEROCYTES BLD QL SMEAR: ABNORMAL
WBC # BLD AUTO: 3.44 K/UL (ref 3.9–12.7)

## 2025-01-21 PROCEDURE — 63600175 PHARM REV CODE 636 W HCPCS: Performed by: STUDENT IN AN ORGANIZED HEALTH CARE EDUCATION/TRAINING PROGRAM

## 2025-01-21 PROCEDURE — 20600001 HC STEP DOWN PRIVATE ROOM

## 2025-01-21 PROCEDURE — 99233 SBSQ HOSP IP/OBS HIGH 50: CPT | Mod: ,,, | Performed by: INTERNAL MEDICINE

## 2025-01-21 PROCEDURE — 83735 ASSAY OF MAGNESIUM: CPT | Performed by: NURSE PRACTITIONER

## 2025-01-21 PROCEDURE — 27000207 HC ISOLATION

## 2025-01-21 PROCEDURE — 25000003 PHARM REV CODE 250: Performed by: STUDENT IN AN ORGANIZED HEALTH CARE EDUCATION/TRAINING PROGRAM

## 2025-01-21 PROCEDURE — 63600175 PHARM REV CODE 636 W HCPCS

## 2025-01-21 PROCEDURE — 80053 COMPREHEN METABOLIC PANEL: CPT | Performed by: NURSE PRACTITIONER

## 2025-01-21 PROCEDURE — 86850 RBC ANTIBODY SCREEN: CPT | Performed by: INTERNAL MEDICINE

## 2025-01-21 PROCEDURE — 85007 BL SMEAR W/DIFF WBC COUNT: CPT | Performed by: NURSE PRACTITIONER

## 2025-01-21 PROCEDURE — 85027 COMPLETE CBC AUTOMATED: CPT | Performed by: NURSE PRACTITIONER

## 2025-01-21 PROCEDURE — 84100 ASSAY OF PHOSPHORUS: CPT | Performed by: NURSE PRACTITIONER

## 2025-01-21 PROCEDURE — 25000003 PHARM REV CODE 250: Performed by: INTERNAL MEDICINE

## 2025-01-21 PROCEDURE — 25000003 PHARM REV CODE 250

## 2025-01-21 RX ADMIN — ZINC OXIDE: 200 OINTMENT TOPICAL at 09:01

## 2025-01-21 RX ADMIN — CEFEPIME 2 G: 2 INJECTION, POWDER, FOR SOLUTION INTRAVENOUS at 09:01

## 2025-01-21 RX ADMIN — LOPERAMIDE HYDROCHLORIDE 2 MG: 2 CAPSULE ORAL at 05:01

## 2025-01-21 RX ADMIN — SIMETHICONE 80 MG: 80 TABLET, CHEWABLE ORAL at 08:01

## 2025-01-21 RX ADMIN — Medication 1 DOSE: at 08:01

## 2025-01-21 RX ADMIN — MYCOPHENOLATE MOFETIL 1000 MG: 250 CAPSULE ORAL at 03:01

## 2025-01-21 RX ADMIN — DIPHENOXYLATE HYDROCHLORIDE AND ATROPINE SULFATE 1 TABLET: .025; 2.5 TABLET ORAL at 09:01

## 2025-01-21 RX ADMIN — METRONIDAZOLE 500 MG: 500 TABLET ORAL at 09:01

## 2025-01-21 RX ADMIN — ONDANSETRON 8 MG: 2 INJECTION INTRAMUSCULAR; INTRAVENOUS at 01:01

## 2025-01-21 RX ADMIN — MYCOPHENOLATE MOFETIL 1000 MG: 250 CAPSULE ORAL at 09:01

## 2025-01-21 RX ADMIN — MICONAZOLE NITRATE: 20 OINTMENT TOPICAL at 09:01

## 2025-01-21 RX ADMIN — ACYCLOVIR 800 MG: 800 TABLET ORAL at 09:01

## 2025-01-21 RX ADMIN — LOPERAMIDE HYDROCHLORIDE 2 MG: 2 CAPSULE ORAL at 09:01

## 2025-01-21 RX ADMIN — DIPHENOXYLATE HYDROCHLORIDE AND ATROPINE SULFATE 1 TABLET: .025; 2.5 TABLET ORAL at 08:01

## 2025-01-21 RX ADMIN — URSODIOL 300 MG: 300 CAPSULE ORAL at 08:01

## 2025-01-21 RX ADMIN — LETERMOVIR 480 MG: 480 TABLET, FILM COATED ORAL at 08:01

## 2025-01-21 RX ADMIN — Medication 1 DOSE: at 01:01

## 2025-01-21 RX ADMIN — DIPHENOXYLATE HYDROCHLORIDE AND ATROPINE SULFATE 1 TABLET: .025; 2.5 TABLET ORAL at 01:01

## 2025-01-21 RX ADMIN — Medication 1 DOSE: at 05:01

## 2025-01-21 RX ADMIN — LOPERAMIDE HYDROCHLORIDE 2 MG: 2 CAPSULE ORAL at 01:01

## 2025-01-21 RX ADMIN — MYCOPHENOLATE MOFETIL 1000 MG: 250 CAPSULE ORAL at 08:01

## 2025-01-21 RX ADMIN — ONDANSETRON 8 MG: 2 INJECTION INTRAMUSCULAR; INTRAVENOUS at 09:01

## 2025-01-21 RX ADMIN — SIMETHICONE 80 MG: 80 TABLET, CHEWABLE ORAL at 09:01

## 2025-01-21 RX ADMIN — URSODIOL 300 MG: 300 CAPSULE ORAL at 09:01

## 2025-01-21 RX ADMIN — PANTOPRAZOLE SODIUM 40 MG: 40 TABLET, DELAYED RELEASE ORAL at 08:01

## 2025-01-21 RX ADMIN — DOXYCYCLINE HYCLATE 100 MG: 100 TABLET, COATED ORAL at 09:01

## 2025-01-21 RX ADMIN — CEFEPIME 2 G: 2 INJECTION, POWDER, FOR SOLUTION INTRAVENOUS at 05:01

## 2025-01-21 RX ADMIN — MAGNESIUM SULFATE HEPTAHYDRATE 2 G: 40 INJECTION, SOLUTION INTRAVENOUS at 05:01

## 2025-01-21 RX ADMIN — METRONIDAZOLE 500 MG: 500 TABLET ORAL at 01:01

## 2025-01-21 RX ADMIN — LOPERAMIDE HYDROCHLORIDE 2 MG: 2 CAPSULE ORAL at 08:01

## 2025-01-21 RX ADMIN — AMLODIPINE BESYLATE 10 MG: 10 TABLET ORAL at 08:01

## 2025-01-21 RX ADMIN — SIMETHICONE 80 MG: 80 TABLET, CHEWABLE ORAL at 03:01

## 2025-01-21 RX ADMIN — DOXYCYCLINE HYCLATE 100 MG: 100 TABLET, COATED ORAL at 08:01

## 2025-01-21 RX ADMIN — GUAIFENESIN AND DEXTROMETHORPHAN HYDROBROMIDE 1 TABLET: 600; 30 TABLET, EXTENDED RELEASE ORAL at 09:01

## 2025-01-21 RX ADMIN — ACYCLOVIR 800 MG: 800 TABLET ORAL at 08:01

## 2025-01-21 RX ADMIN — METRONIDAZOLE 500 MG: 500 TABLET ORAL at 05:01

## 2025-01-21 RX ADMIN — TACROLIMUS 1 MG: 1 CAPSULE ORAL at 08:01

## 2025-01-21 RX ADMIN — CEFEPIME 2 G: 2 INJECTION, POWDER, FOR SOLUTION INTRAVENOUS at 01:01

## 2025-01-21 RX ADMIN — POSACONAZOLE 300 MG: 100 TABLET, DELAYED RELEASE ORAL at 08:01

## 2025-01-21 RX ADMIN — DIPHENOXYLATE HYDROCHLORIDE AND ATROPINE SULFATE 1 TABLET: .025; 2.5 TABLET ORAL at 05:01

## 2025-01-21 RX ADMIN — TACROLIMUS 1 MG: 1 CAPSULE ORAL at 05:01

## 2025-01-21 RX ADMIN — ONDANSETRON 8 MG: 2 INJECTION INTRAMUSCULAR; INTRAVENOUS at 05:01

## 2025-01-21 NOTE — PLAN OF CARE
D+26 allo SCT. Went over POC with pt at beginning of shift.  Questions were asked and answered.  AAO x 4.  Afebrile.  Up to toilet independently. Voiding without difficulty. No complaints of pain. Electrolytes replaced PRN as per protocol.  Pt remained free from injury with bed in low locked position, call light with in reach, bed alarm refused, non-skid socks, and frequent rounding.  Pt instructed to call for assistance as needed. Denies needs at this time.

## 2025-01-21 NOTE — ASSESSMENT & PLAN NOTE
- Suspect 2/2 chemotherapy conditioning regimen prior to allogeneic SCT. Abdomen notably distended and with TTP in epigastric region + RUQ.   - Continue protonix and famotidine daily, simethicone 80 TID ordered for flatulence  - , lipase normal  - morphine 1 mg PRN  - CT A/P 12/31: no acute pathology   - USG abdomen (RUQ) 01/02 unremarkable, not concerning for VOD  - Liver US w/ doppler 1/14: Hepatomegaly, mild intrahepatic biliary dilation. No signs of VOD/SOS  - Continuing on ursodiol for VOD/SOS ppx  - Trending Tbili. Elevated to 1.2 1/13. Now down-trended  - Improving
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
## 2025-01-21 NOTE — PLAN OF CARE
Day 26 of haplo SCT; no acute events this shift. Afebrile & VSS on room air. No PRNs needed. Electrolytes closely monitored.  Ambulates independently to bathroom; educated on importance of I/O recording. CHG wipes provided and encouraged use. Tolerating diet, voiding without difficulty. Pt reports BM's are becoming more formed. Pt remaining free from falls or injury throughout shift; bed locked and in lowest position; call light within reach. POC reviewed with patient. All needs addressed. Frequent rounding performed.

## 2025-01-21 NOTE — ASSESSMENT & PLAN NOTE
- Having poor PO intake/throat pain 2/2 chemotherapy   - Duke's solution QID  - Morphine 2 mg q3h PRN. Can increase dose or frequency if need be.  - Transitioned PO meds to IV. 1/15 began switching meds back to PO  - Diet changed from regular to soft/bite sized  RESOLVED

## 2025-01-21 NOTE — ASSESSMENT & PLAN NOTE
- Tbili elevated to 1.2 on 1/13. Abdomen distended and tender to palpation in RUQ  - 12/31 CT AP complete for diffuse abdominal pain: No acute pathology. Stable adrenal nodule  - 1/2 abdominal US: No concerns for VOD/SOS  - 1/14 liver US w/ doppler: Hepatomegaly; mild intrahepatic biliary dilation. No evidence of VOD/SOS  - , lipase normal  - Continue ursodiol for VOD/SOS ppx  - Trending Tbili daily. WNL today

## 2025-01-21 NOTE — ASSESSMENT & PLAN NOTE
- Patient of Dr. 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 Received 3 bags with CD34 dose of 6.04x10^6/kg. Engrafted on day +22 with ANC of 998 . Today is day 5 of engraftment  - Today is Day +26  - Tacro levels MWF. Tacro level1/20 4.6 (goal is 7-9). Tacro dose increased to 1.0 mg BID.   - Patient is O+. Donor is O+.  - Patient is CMV non-reactive. Donor is CMV reactive. Given donor CMV status, patient will start (patient-supplied) letermovir on Day +5.  - Day -4 and Day -3 Busulfan dose-adjusted to 282 mg  - See treatment plan below:    Planned conditioning regimen:  Thiotepa on Day -7  Busulfan on Day -6, -5, -4, and -3  Fludarabine on Day -6, -5, -4, and -3  REST DAYS on Day -2 and -1     Planned  GVHD Prophylaxis:  Cyclophosphamide on Day +3 and +4  Tacrolimus starting on Day +5  Mycophenolate starting on Day +5     Antimicrobial Prophylaxis:  Acyclovir starting on Day -7  Levofloxacin starting on Day -1  Micafungin on Day -1 through Day +4  Letermovir starting on Day +5 (if CMV PCR drawn on day 0 results negative)  Posaconazole starting on Day +5  Atovaquone starting on Day +30     Skin Care with Thiotepa: Day -7 through D-5     Seizure Prophylaxis:  Levetiracetam on Day -7 through Day -2     SOS/VOD Prophylaxis:  Ursodiol on Day -7 through Day +30     Growth Factor Support:  Neupogen starting on Day +5        Caregiver: Carmelita (Spouse)  Post-transplant discharge plans: Home

## 2025-01-21 NOTE — ASSESSMENT & PLAN NOTE
-  1/14 pt first complained of perianal erythema and pain, R axillary rash/pain, and BLE petechial rash. Concern for cellulitis vs fungal infection. Low suspicion for acute GVHD as patient had yet to engraft at the time of symptoms. Pt remains afebrile.  - CT A/P with colonic dilation, gaseous distension, and L sided perianal thickening/fat stranding concerning for cellulitis  - CT R axilla: no acute soft tissue infection  - CT BLE: mild edema; no signs of soft tissue infection. Punch biopsy (1/14) path results consistent with infection rather than with GVHD.  - S/p R axilla punch biopsy with dermatology. Cx + for MRSA. Vanc discontinued and started doxycycline on  01/18 (end 01/25 - total of 7 days).  - ID signed off 01/18 with the following recs  - Discontinued vanc, started doxycycline 100 BID for 7 days (end 01/24) as axillary lesion biopsy grew MRSA  - Continue cefepime (Ecoli on Karius), end date   - Continue flagyl for anaerobic coverage given perianal lesion  - With zinc oxide barrier cream for scrotal and perianal irritation  - Follow wound care recs

## 2025-01-22 PROBLEM — L23.1 ALLERGIC CONTACT DERMATITIS DUE TO ADHESIVES: Status: RESOLVED | Noted: 2024-12-31 | Resolved: 2025-01-22

## 2025-01-22 PROBLEM — K12.30 MUCOSITIS: Status: RESOLVED | Noted: 2025-01-02 | Resolved: 2025-01-22

## 2025-01-22 LAB
ALBUMIN SERPL BCP-MCNC: 2.8 G/DL (ref 3.5–5.2)
ALP SERPL-CCNC: 59 U/L (ref 40–150)
ALT SERPL W/O P-5'-P-CCNC: 14 U/L (ref 10–44)
ANION GAP SERPL CALC-SCNC: 7 MMOL/L (ref 8–16)
ANISOCYTOSIS BLD QL SMEAR: SLIGHT
AST SERPL-CCNC: 16 U/L (ref 10–40)
BASOPHILS # BLD AUTO: ABNORMAL K/UL (ref 0–0.2)
BASOPHILS NFR BLD: 0 % (ref 0–1.9)
BILIRUB SERPL-MCNC: 0.4 MG/DL (ref 0.1–1)
BLD PROD TYP BPU: NORMAL
BLOOD UNIT EXPIRATION DATE: NORMAL
BLOOD UNIT TYPE CODE: 5100
BLOOD UNIT TYPE: NORMAL
BUN SERPL-MCNC: 10 MG/DL (ref 6–20)
CALCIUM SERPL-MCNC: 8.8 MG/DL (ref 8.7–10.5)
CHLORIDE SERPL-SCNC: 106 MMOL/L (ref 95–110)
CO2 SERPL-SCNC: 24 MMOL/L (ref 23–29)
CODING SYSTEM: NORMAL
CREAT SERPL-MCNC: 0.6 MG/DL (ref 0.5–1.4)
CROSSMATCH INTERPRETATION: NORMAL
DIFFERENTIAL METHOD BLD: ABNORMAL
DISPENSE STATUS: NORMAL
EOSINOPHIL # BLD AUTO: ABNORMAL K/UL (ref 0–0.5)
EOSINOPHIL NFR BLD: 0 % (ref 0–8)
ERYTHROCYTE [DISTWIDTH] IN BLOOD BY AUTOMATED COUNT: 13.2 % (ref 11.5–14.5)
EST. GFR  (NO RACE VARIABLE): >60 ML/MIN/1.73 M^2
GLUCOSE SERPL-MCNC: 95 MG/DL (ref 70–110)
HCT VFR BLD AUTO: 20.9 % (ref 40–54)
HGB BLD-MCNC: 7.3 G/DL (ref 14–18)
HYPOCHROMIA BLD QL SMEAR: ABNORMAL
IMM GRANULOCYTES # BLD AUTO: ABNORMAL K/UL (ref 0–0.04)
IMM GRANULOCYTES NFR BLD AUTO: ABNORMAL % (ref 0–0.5)
LYMPHOCYTES # BLD AUTO: ABNORMAL K/UL (ref 1–4.8)
LYMPHOCYTES NFR BLD: 1 % (ref 18–48)
MAGNESIUM SERPL-MCNC: 1.4 MG/DL (ref 1.6–2.6)
MCH RBC QN AUTO: 30 PG (ref 27–31)
MCHC RBC AUTO-ENTMCNC: 34.9 G/DL (ref 32–36)
MCV RBC AUTO: 86 FL (ref 82–98)
MONOCYTES # BLD AUTO: ABNORMAL K/UL (ref 0.3–1)
MONOCYTES NFR BLD: 12 % (ref 4–15)
NEUTROPHILS NFR BLD: 87 % (ref 38–73)
NRBC BLD-RTO: 0 /100 WBC
OVALOCYTES BLD QL SMEAR: ABNORMAL
PHOSPHATE SERPL-MCNC: 3.2 MG/DL (ref 2.7–4.5)
PLATELET # BLD AUTO: 9 K/UL (ref 150–450)
PMV BLD AUTO: 10.6 FL (ref 9.2–12.9)
POIKILOCYTOSIS BLD QL SMEAR: SLIGHT
POLYCHROMASIA BLD QL SMEAR: ABNORMAL
POTASSIUM SERPL-SCNC: 3.7 MMOL/L (ref 3.5–5.1)
PROT SERPL-MCNC: 5.9 G/DL (ref 6–8.4)
RBC # BLD AUTO: 2.43 M/UL (ref 4.6–6.2)
SODIUM SERPL-SCNC: 137 MMOL/L (ref 136–145)
SPHEROCYTES BLD QL SMEAR: ABNORMAL
TACROLIMUS BLD-MCNC: 6.7 NG/ML (ref 5–15)
UNIT NUMBER: NORMAL
WBC # BLD AUTO: 2.64 K/UL (ref 3.9–12.7)

## 2025-01-22 PROCEDURE — 20600001 HC STEP DOWN PRIVATE ROOM

## 2025-01-22 PROCEDURE — 63600175 PHARM REV CODE 636 W HCPCS: Performed by: STUDENT IN AN ORGANIZED HEALTH CARE EDUCATION/TRAINING PROGRAM

## 2025-01-22 PROCEDURE — 25000003 PHARM REV CODE 250

## 2025-01-22 PROCEDURE — 80053 COMPREHEN METABOLIC PANEL: CPT | Performed by: NURSE PRACTITIONER

## 2025-01-22 PROCEDURE — P9037 PLATE PHERES LEUKOREDU IRRAD: HCPCS

## 2025-01-22 PROCEDURE — 25000003 PHARM REV CODE 250: Performed by: INTERNAL MEDICINE

## 2025-01-22 PROCEDURE — 84100 ASSAY OF PHOSPHORUS: CPT | Performed by: NURSE PRACTITIONER

## 2025-01-22 PROCEDURE — 83735 ASSAY OF MAGNESIUM: CPT | Performed by: NURSE PRACTITIONER

## 2025-01-22 PROCEDURE — 25000003 PHARM REV CODE 250: Performed by: STUDENT IN AN ORGANIZED HEALTH CARE EDUCATION/TRAINING PROGRAM

## 2025-01-22 PROCEDURE — 99233 SBSQ HOSP IP/OBS HIGH 50: CPT | Mod: ,,, | Performed by: INTERNAL MEDICINE

## 2025-01-22 PROCEDURE — 27000207 HC ISOLATION

## 2025-01-22 PROCEDURE — 85007 BL SMEAR W/DIFF WBC COUNT: CPT | Performed by: NURSE PRACTITIONER

## 2025-01-22 PROCEDURE — 80197 ASSAY OF TACROLIMUS: CPT | Performed by: INTERNAL MEDICINE

## 2025-01-22 PROCEDURE — 36430 TRANSFUSION BLD/BLD COMPNT: CPT

## 2025-01-22 PROCEDURE — 63600175 PHARM REV CODE 636 W HCPCS

## 2025-01-22 PROCEDURE — 85027 COMPLETE CBC AUTOMATED: CPT | Performed by: NURSE PRACTITIONER

## 2025-01-22 RX ORDER — LANOLIN ALCOHOL/MO/W.PET/CERES
400 CREAM (GRAM) TOPICAL EVERY 4 HOURS PRN
Status: DISCONTINUED | OUTPATIENT
Start: 2025-01-22 | End: 2025-01-25 | Stop reason: HOSPADM

## 2025-01-22 RX ORDER — TACROLIMUS 1 MG/1
1 CAPSULE ORAL EVERY MORNING
Status: DISCONTINUED | OUTPATIENT
Start: 2025-01-23 | End: 2025-01-25 | Stop reason: HOSPADM

## 2025-01-22 RX ORDER — HYDROCODONE BITARTRATE AND ACETAMINOPHEN 500; 5 MG/1; MG/1
TABLET ORAL
Status: DISCONTINUED | OUTPATIENT
Start: 2025-01-22 | End: 2025-01-25 | Stop reason: HOSPADM

## 2025-01-22 RX ORDER — POTASSIUM CHLORIDE 20 MEQ/1
20 TABLET, EXTENDED RELEASE ORAL
Status: DISCONTINUED | OUTPATIENT
Start: 2025-01-22 | End: 2025-01-25 | Stop reason: HOSPADM

## 2025-01-22 RX ORDER — LANOLIN ALCOHOL/MO/W.PET/CERES
400 CREAM (GRAM) TOPICAL 2 TIMES DAILY
Status: DISCONTINUED | OUTPATIENT
Start: 2025-01-23 | End: 2025-01-25 | Stop reason: HOSPADM

## 2025-01-22 RX ORDER — LANOLIN ALCOHOL/MO/W.PET/CERES
800 CREAM (GRAM) TOPICAL EVERY 4 HOURS PRN
Status: DISCONTINUED | OUTPATIENT
Start: 2025-01-22 | End: 2025-01-25 | Stop reason: HOSPADM

## 2025-01-22 RX ADMIN — MYCOPHENOLATE MOFETIL 1000 MG: 250 CAPSULE ORAL at 09:01

## 2025-01-22 RX ADMIN — LETERMOVIR 480 MG: 480 TABLET, FILM COATED ORAL at 08:01

## 2025-01-22 RX ADMIN — ONDANSETRON 8 MG: 2 INJECTION INTRAMUSCULAR; INTRAVENOUS at 02:01

## 2025-01-22 RX ADMIN — ACYCLOVIR 800 MG: 800 TABLET ORAL at 09:01

## 2025-01-22 RX ADMIN — POTASSIUM CHLORIDE 20 MEQ: 1500 TABLET, EXTENDED RELEASE ORAL at 08:01

## 2025-01-22 RX ADMIN — MAGNESIUM SULFATE HEPTAHYDRATE 2 G: 40 INJECTION, SOLUTION INTRAVENOUS at 06:01

## 2025-01-22 RX ADMIN — METRONIDAZOLE 500 MG: 500 TABLET ORAL at 09:01

## 2025-01-22 RX ADMIN — METRONIDAZOLE 500 MG: 500 TABLET ORAL at 06:01

## 2025-01-22 RX ADMIN — AMLODIPINE BESYLATE 10 MG: 10 TABLET ORAL at 08:01

## 2025-01-22 RX ADMIN — DOXYCYCLINE HYCLATE 100 MG: 100 TABLET, COATED ORAL at 09:01

## 2025-01-22 RX ADMIN — CEFEPIME 2 G: 2 INJECTION, POWDER, FOR SOLUTION INTRAVENOUS at 06:01

## 2025-01-22 RX ADMIN — ACETAMINOPHEN 650 MG: 325 TABLET ORAL at 12:01

## 2025-01-22 RX ADMIN — POSACONAZOLE 300 MG: 100 TABLET, DELAYED RELEASE ORAL at 08:01

## 2025-01-22 RX ADMIN — MYCOPHENOLATE MOFETIL 1000 MG: 250 CAPSULE ORAL at 08:01

## 2025-01-22 RX ADMIN — TACROLIMUS 1.5 MG: 1 CAPSULE ORAL at 05:01

## 2025-01-22 RX ADMIN — ONDANSETRON 8 MG: 2 INJECTION INTRAMUSCULAR; INTRAVENOUS at 09:01

## 2025-01-22 RX ADMIN — SIMETHICONE 80 MG: 80 TABLET, CHEWABLE ORAL at 02:01

## 2025-01-22 RX ADMIN — SIMETHICONE 80 MG: 80 TABLET, CHEWABLE ORAL at 09:01

## 2025-01-22 RX ADMIN — DIPHENOXYLATE HYDROCHLORIDE AND ATROPINE SULFATE 1 TABLET: .025; 2.5 TABLET ORAL at 08:01

## 2025-01-22 RX ADMIN — Medication 1 DOSE: at 12:01

## 2025-01-22 RX ADMIN — METRONIDAZOLE 500 MG: 500 TABLET ORAL at 02:01

## 2025-01-22 RX ADMIN — ONDANSETRON 8 MG: 2 INJECTION INTRAMUSCULAR; INTRAVENOUS at 06:01

## 2025-01-22 RX ADMIN — ACYCLOVIR 800 MG: 800 TABLET ORAL at 08:01

## 2025-01-22 RX ADMIN — URSODIOL 300 MG: 300 CAPSULE ORAL at 09:01

## 2025-01-22 RX ADMIN — CEFEPIME 2 G: 2 INJECTION, POWDER, FOR SOLUTION INTRAVENOUS at 02:01

## 2025-01-22 RX ADMIN — Medication 400 MG: at 08:01

## 2025-01-22 RX ADMIN — MYCOPHENOLATE MOFETIL 1000 MG: 250 CAPSULE ORAL at 02:01

## 2025-01-22 RX ADMIN — URSODIOL 300 MG: 300 CAPSULE ORAL at 08:01

## 2025-01-22 RX ADMIN — Medication 1 DOSE: at 08:01

## 2025-01-22 RX ADMIN — TACROLIMUS 1 MG: 1 CAPSULE ORAL at 08:01

## 2025-01-22 RX ADMIN — Medication 1 DOSE: at 05:01

## 2025-01-22 RX ADMIN — DOXYCYCLINE HYCLATE 100 MG: 100 TABLET, COATED ORAL at 08:01

## 2025-01-22 RX ADMIN — CEFEPIME 2 G: 2 INJECTION, POWDER, FOR SOLUTION INTRAVENOUS at 09:01

## 2025-01-22 RX ADMIN — POTASSIUM CHLORIDE 10 MEQ: 7.46 INJECTION, SOLUTION INTRAVENOUS at 06:01

## 2025-01-22 RX ADMIN — PANTOPRAZOLE SODIUM 40 MG: 40 TABLET, DELAYED RELEASE ORAL at 08:01

## 2025-01-22 RX ADMIN — SIMETHICONE 80 MG: 80 TABLET, CHEWABLE ORAL at 08:01

## 2025-01-22 NOTE — ASSESSMENT & PLAN NOTE
"- Pt with history of hematochezia that was worked up outpatient prior to transplant. 7/23/24 colonoscopy with several diverticula and a non-bleeding rectal abscess  - Denies hematochezia/melena while inpatient. Now with left sided perianal irritation and pain. Erythematous area that is tender to palpation, but without fluctuance. Reports pain became worse after irritation from diarrhea. Pt afebrile.   - 1/14 erythema worsening and rash now warm to touch. Concern for cellulitis vs abscess. 1/14 Began vancomycin (DCed 01/18, started doxy), cefepime, and flagyl. ID consulted. See further plan under "skin  lesions"  - Wound care consulted and will monitor pt closely for fevers/overt signs of infection  - Low threshold to consult CRS  - Continue zinc oxide. IMPROVING  "

## 2025-01-22 NOTE — PLAN OF CARE
Day 27 of haplo SCT; no acute events this shift. Afebrile & VSS on room air. No PRNs needed. Electrolytes closely monitored and replaced per orders. Pt received 1 U plts this shift, tolerated well. Pt educated on importance of utilizing the bed alarm due to plt score of 9. Pt verbalized understanding and bed alarm remained on throughout shift. Ambulates independently to bathroom; educated on importance of I/O recording. CHG wipes provided and encouraged use. Tolerating diet, voiding without difficulty. Pt reports BM today was formed and soft. Pt remaining free from falls or injury throughout shift; bed locked and in lowest position; call light within reach. POC reviewed with patient. All needs addressed. Frequent rounding performed.

## 2025-01-22 NOTE — PROGRESS NOTES
Daniel Rodríguez - Oncology ([facility])  Hematology  Bone Marrow Transplant  Progress Note    Patient Name: Rubén Hu  Admission Date: 12/18/2024  Hospital Length of Stay: 35 days  Code Status: Full Code    Subjective:     Interval History: Day +27 from a Bu/Flu/Thiotepa PTCy haploidentical (brother) SCT for PMF. VSS, afebrile. Day 6 of engraftment. Tacro 6.7 today. Will increase pm dose to 1.5 mg and leave am dose at 1 mg with goal of 7-9. Mag now scheduled with suspected wasting with tacro. Plans to discharge Friday.     Objective:     Vital Signs (Most Recent):  Temp: 97.9 °F (36.6 °C) (01/22/25 0818)  Pulse: 90 (01/22/25 0818)  Resp: (!) 1 (01/22/25 0818)  BP: 107/70 (01/22/25 0818)  SpO2: 97 % (01/22/25 0818) Vital Signs (24h Range):  Temp:  [97.9 °F (36.6 °C)-98.9 °F (37.2 °C)] 97.9 °F (36.6 °C)  Pulse:  [88-95] 90  Resp:  [1-20] 1  SpO2:  [96 %-99 %] 97 %  BP: (100-115)/(59-72) 107/70     Weight: 86.4 kg (190 lb 7.6 oz)  Body mass index is 26.57 kg/m².  Body surface area is 2.08 meters squared.    ECOG SCORE           [unfilled]    Intake/Output - Last 3 Shifts         01/20 0700  01/21 0659 01/21 0700  01/22 0659 01/22 0700  01/23 0659    P.O. 960 1050     I.V. (mL/kg)       Blood       IV Piggyback       Total Intake(mL/kg) 960 (11.7) 1050 (12.2)     Urine (mL/kg/hr) 900 (0.5) 1100 (0.5) 250 (0.8)    Stool 0 0     Total Output 900 1100 250    Net +60 -50 -250           Urine Occurrence 5 x 5 x     Stool Occurrence 2 x 1 x              Physical Exam  Vitals reviewed.   Constitutional:       Appearance: Normal appearance.   HENT:      Head: Normocephalic and atraumatic.      Nose: Nose normal.      Mouth/Throat:      Mouth: Mucous membranes are moist.      Pharynx: Oropharynx is clear.   Eyes:      Extraocular Movements: Extraocular movements intact.      Conjunctiva/sclera: Conjunctivae normal.      Pupils: Pupils are equal, round, and reactive to light.   Cardiovascular:      Rate and Rhythm: Normal rate and  regular rhythm.      Pulses: Normal pulses.      Heart sounds: Normal heart sounds.   Pulmonary:      Effort: Pulmonary effort is normal.      Breath sounds: Normal breath sounds.   Abdominal:      General: Abdomen is flat. Bowel sounds are normal.      Palpations: Abdomen is soft.   Genitourinary:     Comments: L perianal erythema with TTP. No fluctuance appreciated.   B/l scrotal erythema   Musculoskeletal:         General: Normal range of motion.      Cervical back: Normal range of motion.   Skin:     General: Skin is warm and dry.      Capillary Refill: Capillary refill takes less than 2 seconds.      Findings: Bruising (abdominal), erythema (R underarm surrounding an ulceration), petechiae (B/l LE petechial rash. now improved) and rash present. Rash is purpuric (on b/l LE).      Comments: R muhammad in place, c/d/I. No signs of infection    Neurological:      General: No focal deficit present.      Mental Status: He is alert and oriented to person, place, and time.   Psychiatric:         Mood and Affect: Mood normal.         Behavior: Behavior normal.         Thought Content: Thought content normal.         Judgment: Judgment normal.            Significant Labs:   CBC:   Recent Labs   Lab 01/21/25  0338 01/22/25  0338   WBC 3.44* 2.64*   HGB 7.0* 7.3*   HCT 20.8* 20.9*   PLT 11* 9*    and CMP:   Recent Labs   Lab 01/21/25  0338 01/22/25  0338    137   K 3.9 3.7    106   CO2 24 24   GLU 95 95   BUN 8 10   CREATININE 0.6 0.6   CALCIUM 8.6* 8.8   PROT 5.7* 5.9*   ALBUMIN 2.7* 2.8*   BILITOT 0.4 0.4   ALKPHOS 59 59   AST 16 16   ALT 14 14   ANIONGAP 7* 7*       Diagnostic Results:  None  Assessment/Plan:     * History of allogeneic stem cell transplant  - Patient of Dr. 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 Received 3 bags with CD34 dose of 6.04x10^6/kg. Engrafted on day +22 with ANC of 998 . Today is day 6 of  engraftment  - Today is Day +27  - Tacro levels MWF. Tacro level 1/22 6.7. (goal is 7-9). PM tacro dose increased to 1.5 mg. AM dose will remain at 1 mg.   - Patient is O+. Donor is O+.  - Patient is CMV non-reactive. Donor is CMV reactive. Given donor CMV status, patient will start (patient-supplied) letermovir on Day +5.  - Day -4 and Day -3 Busulfan dose-adjusted to 282 mg  - See treatment plan below:    Planned conditioning regimen:  Thiotepa on Day -7  Busulfan on Day -6, -5, -4, and -3  Fludarabine on Day -6, -5, -4, and -3  REST DAYS on Day -2 and -1     Planned  GVHD Prophylaxis:  Cyclophosphamide on Day +3 and +4  Tacrolimus starting on Day +5  Mycophenolate starting on Day +5     Antimicrobial Prophylaxis:  Acyclovir starting on Day -7  Levofloxacin starting on Day -1  Micafungin on Day -1 through Day +4  Letermovir starting on Day +5 (if CMV PCR drawn on day 0 results negative)  Posaconazole starting on Day +5  Atovaquone starting on Day +30     Skin Care with Thiotepa: Day -7 through D-5     Seizure Prophylaxis:  Levetiracetam on Day -7 through Day -2     SOS/VOD Prophylaxis:  Ursodiol on Day -7 through Day +30     Growth Factor Support:  Neupogen starting on Day +5        Caregiver: Carmelita (Spouse)  Post-transplant discharge plans: Home      Skin lesions  -  1/14 pt first complained of perianal erythema and pain, R axillary rash/pain, and BLE petechial rash. Concern for cellulitis vs fungal infection. Low suspicion for acute GVHD as patient had yet to engraft at the time of symptoms. Pt remains afebrile.  - CT A/P with colonic dilation, gaseous distension, and L sided perianal thickening/fat stranding concerning for cellulitis  - CT R axilla: no acute soft tissue infection  - CT BLE: mild edema; no signs of soft tissue infection. Punch biopsy (1/14) path results consistent with infection rather than with GVHD.  - S/p R axilla punch biopsy with dermatology. Cx + for MRSA. Vanc discontinued and started  "doxycycline on  01/18 (end 01/25 - total of 7 days).  - ID signed off 01/18 with the following recs  - Discontinued vanc, started doxycycline 100 BID for 7 days (end 01/24) as axillary lesion biopsy grew MRSA  - Continue cefepime (Ecoli on Karius), end date   - Continue flagyl for anaerobic coverage given perianal lesion  - With zinc oxide barrier cream for scrotal and perianal irritation  - Follow wound care recs    Primary myelofibrosis  - From Dr. 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 Transplant on 12/26/24 at 1330. Received 3 bags with CD34 dose of 6.04 x 10^6/kg.     Perianal pain  - Pt with history of hematochezia that was worked up outpatient prior to transplant. 7/23/24 colonoscopy with several diverticula and a non-bleeding rectal abscess  - Denies hematochezia/melena while inpatient. Now with left sided perianal irritation and pain. Erythematous area that is tender to palpation, but without fluctuance. Reports pain became worse after irritation from diarrhea. Pt afebrile.   - 1/14 erythema worsening and rash now warm to touch. Concern for cellulitis vs abscess. 1/14 Began vancomycin (DCed 01/18, started doxy), cefepime, and flagyl. ID consulted. See further plan under "skin  lesions"  - Wound care consulted and will monitor pt closely for fevers/overt signs of infection  - Low threshold to consult CRS  - Continue zinc oxide. IMPROVING    Hyperbilirubinemia  - Tbili elevated to 1.2 on 1/13. Abdomen distended and tender to palpation in RUQ  - 12/31 CT AP complete for diffuse " abdominal pain: No acute pathology. Stable adrenal nodule  - 1/2 abdominal US: No concerns for VOD/SOS  - 1/14 liver US w/ doppler: Hepatomegaly; mild intrahepatic biliary dilation. No evidence of VOD/SOS  - , lipase normal  - Continue ursodiol for VOD/SOS ppx  - Trending Tbili daily. RESOLVED    Abdominal pain  - Suspect 2/2 chemotherapy conditioning regimen prior to allogeneic SCT. Abdomen notably distended and with TTP in epigastric region + RUQ.   - Continue protonix and famotidine daily, simethicone 80 TID ordered for flatulence  - , lipase normal  - morphine 1 mg PRN  - CT A/P 12/31: no acute pathology   - USG abdomen (RUQ) 01/02 unremarkable, not concerning for VOD  - Liver US w/ doppler 1/14: Hepatomegaly, mild intrahepatic biliary dilation. No signs of VOD/SOS  - Continuing on ursodiol for VOD/SOS ppx  - Trending Tbili. Elevated to 1.2 1/13. Now down-trended  - Improving    Transaminitis  - Transaminases first elevated 12/27. Suspect 2/2 chemotherapy prior to transplant.   - Will continue to monitor with daily CMP.  - RESOLVED    Hypertension  - Became hypertensive following SCT on 12/26. No signs of volume overload contributing to HTN.  - 12/27 started amlodipine.     Hypercoagulable state, secondary  - See primary myelofibrosis    Cellulitis  - See skin lesions  - See perianal pain  - ID and derm consulted  - Wound care consulted    Diarrhea  - C. Diff negative on 12/28/24. Diarrhea improving  - Imodium q8h  - Began lomotil q8h 1/15      Immunocompromised state associated with stem cell transplant  - See history of allogeneic stem cell transplant     Hypokalemia  - See electrolyte disorder    Electrolyte disorder  - Replete per PRN electrolyte order set  - Daily CMP, mag, and phos while inpatient  - 1/22 Mag ox 400 mg daily with suspected mag wasting with tacro    Neutropenic fever  - Afebrile, stopped cefepime and resumed levaquin 12/31/24. Pt afebrile but on 1/15 began vancomycin,  "cefepime, and flagyl per ID for R axillary rash, perirectal cellulitis, and BLE petechial rash.   - See plan under "skin lesions"  - Currently on doxy (MRSA on axillary skin lesion biopsy), cefepime (E coli on Karius, possibly resistant to levoflox) and flagyl (anaerobic coverage given perianal lesion) until 01/25.    Chemotherapy-induced nausea  - Anticipated 2/2 chemotherapy   - Meds switched from PO to IV as able 1/2.   - Zofran q8h  - Compazine q6h  - Ativan PRN    Insomnia  - Difficulty sleeping at night while IP.  - Has PRN ativan and trazodone  - Melatonin not effective     Pancytopenia due to chemotherapy  - Anticipated following chemotherapy.  - Transfuse for hgb < 7 or plts < 10K  - Continue antimicrobial ppx   - Daily CBC while IP    Adrenal nodule  - Subcentimeter nodular thickening of the adrenal glands first noted on imaging from 1/2024.  - Seen by endocrine prior to transplant admission and Dexamethasone suppression test performed. Patient responded appropriately. No further w/u needed.  - Likely adenomas.    Metabolic dysfunction-associated steatotic liver disease (MASLD)  - Biopsy proven to be steatotic change. Most compatible with MASLD.   - Follow-up with hepatology OP.    Hyperlipidemia  - Holding home atorvastatin while IP to avoid interaction with other medications. Can resume at discharge if LFTs stable.        VTE Risk Mitigation (From admission, onward)           Ordered     heparin, porcine (PF) 100 unit/mL injection flush 300 Units  As needed (PRN)         12/18/24 2025                    Disposition: Remains inpatient     Chu Wolf PA-C  Bone Marrow Transplant  [facility] - [facility] (Hospital)        "

## 2025-01-22 NOTE — ASSESSMENT & PLAN NOTE
- Tbili elevated to 1.2 on 1/13. Abdomen distended and tender to palpation in RUQ  - 12/31 CT AP complete for diffuse abdominal pain: No acute pathology. Stable adrenal nodule  - 1/2 abdominal US: No concerns for VOD/SOS  - 1/14 liver US w/ doppler: Hepatomegaly; mild intrahepatic biliary dilation. No evidence of VOD/SOS  - , lipase normal  - Continue ursodiol for VOD/SOS ppx  - Trending Tbili daily. RESOLVED

## 2025-01-22 NOTE — PLAN OF CARE
D+27 haplo SCT. Went over POC with pt at beginning of shift.  Questions were asked and answered.  AAO x 4.  Afebrile.  Up to toilet independently. Voiding without difficulty. No complaints of pain. Electrolytes replaced PRN as per protocol. Pt refused Cholestyramine, sodium bicarb and magic mouth. Pt remained free from injury with bed in low locked position, call light with in reach, bed alarm refused, non-skid socks, and frequent rounding.  Pt instructed to call for assistance as needed. Denies needs at this time.

## 2025-01-22 NOTE — ASSESSMENT & PLAN NOTE
- Replete per PRN electrolyte order set  - Daily CMP, mag, and phos while inpatient  - 1/22 Mag ox 400 mg daily with suspected mag wasting with tacro

## 2025-01-22 NOTE — ASSESSMENT & PLAN NOTE
- Patient of Dr. 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 Received 3 bags with CD34 dose of 6.04x10^6/kg. Engrafted on day +22 with ANC of 998 . Today is day 6 of engraftment  - Today is Day +27  - Tacro levels MWF. Tacro level 1/22 6.7. (goal is 7-9). PM tacro dose increased to 1.5 mg. AM dose will remain at 1 mg.   - Patient is O+. Donor is O+.  - Patient is CMV non-reactive. Donor is CMV reactive. Given donor CMV status, patient will start (patient-supplied) letermovir on Day +5.  - Day -4 and Day -3 Busulfan dose-adjusted to 282 mg  - See treatment plan below:    Planned conditioning regimen:  Thiotepa on Day -7  Busulfan on Day -6, -5, -4, and -3  Fludarabine on Day -6, -5, -4, and -3  REST DAYS on Day -2 and -1     Planned  GVHD Prophylaxis:  Cyclophosphamide on Day +3 and +4  Tacrolimus starting on Day +5  Mycophenolate starting on Day +5     Antimicrobial Prophylaxis:  Acyclovir starting on Day -7  Levofloxacin starting on Day -1  Micafungin on Day -1 through Day +4  Letermovir starting on Day +5 (if CMV PCR drawn on day 0 results negative)  Posaconazole starting on Day +5  Atovaquone starting on Day +30     Skin Care with Thiotepa: Day -7 through D-5     Seizure Prophylaxis:  Levetiracetam on Day -7 through Day -2     SOS/VOD Prophylaxis:  Ursodiol on Day -7 through Day +30     Growth Factor Support:  Neupogen starting on Day +5        Caregiver: Carmelita (Spouse)  Post-transplant discharge plans: Home

## 2025-01-22 NOTE — ASSESSMENT & PLAN NOTE
- Difficulty sleeping at night while IP.  - Has PRN ativan and trazodone  - Melatonin not effective    Left message to call back Monday morning to discuss.

## 2025-01-22 NOTE — SUBJECTIVE & OBJECTIVE
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 VSS, afebrile. Day 6 of engraftment. Tacro 6.7 today. Will increase pm dose to 1.5 mg and leave am dose at 1 mg with goal of 7-9. Mag now scheduled with suspected wasting with tacro. Plans to discharge Friday.     Objective:     Vital Signs (Most Recent):  Temp: 97.9 °F (36.6 °C) (01/22/25 0818)  Pulse: 90 (01/22/25 0818)  Resp: (!) 1 (01/22/25 0818)  BP: 107/70 (01/22/25 0818)  SpO2: 97 % (01/22/25 0818) Vital Signs (24h Range):  Temp:  [97.9 °F (36.6 °C)-98.9 °F (37.2 °C)] 97.9 °F (36.6 °C)  Pulse:  [88-95] 90  Resp:  [1-20] 1  SpO2:  [96 %-99 %] 97 %  BP: (100-115)/(59-72) 107/70     Weight: 86.4 kg (190 lb 7.6 oz)  Body mass index is 26.57 kg/m².  Body surface area is 2.08 meters squared.    ECOG SCORE           [unfilled]    Intake/Output - Last 3 Shifts         01/20 0700  01/21 0659 01/21 0700  01/22 0659 01/22 0700  01/23 0659    P.O. 960 1050     I.V. (mL/kg)       Blood       IV Piggyback       Total Intake(mL/kg) 960 (11.7) 1050 (12.2)     Urine (mL/kg/hr) 900 (0.5) 1100 (0.5) 250 (0.8)    Stool 0 0     Total Output 900 1100 250    Net +60 -50 -250           Urine Occurrence 5 x 5 x     Stool Occurrence 2 x 1 x              Physical Exam  Vitals reviewed.   Constitutional:       Appearance: Normal appearance.   HENT:      Head: Normocephalic and atraumatic.      Nose: Nose normal.      Mouth/Throat:      Mouth: Mucous membranes are moist.      Pharynx: Oropharynx is clear.   Eyes:      Extraocular Movements: Extraocular movements intact.      Conjunctiva/sclera: Conjunctivae normal.      Pupils: Pupils are equal, round, and reactive to light.   Cardiovascular:      Rate and Rhythm: Normal rate and regular rhythm.      Pulses: Normal pulses.      Heart sounds: Normal heart sounds.   Pulmonary:      Effort: Pulmonary effort is normal.      Breath sounds: Normal breath sounds.   Abdominal:       General: Abdomen is flat. Bowel sounds are normal.      Palpations: Abdomen is soft.   Genitourinary:     Comments: L perianal erythema with TTP. No fluctuance appreciated.   B/l scrotal erythema   Musculoskeletal:         General: Normal range of motion.      Cervical back: Normal range of motion.   Skin:     General: Skin is warm and dry.      Capillary Refill: Capillary refill takes less than 2 seconds.      Findings: Bruising (abdominal), erythema (R underarm surrounding an ulceration), petechiae (B/l LE petechial rash. now improved) and rash present. Rash is purpuric (on b/l LE).      Comments: R muhammad in place, c/d/I. No signs of infection    Neurological:      General: No focal deficit present.      Mental Status: He is alert and oriented to person, place, and time.   Psychiatric:         Mood and Affect: Mood normal.         Behavior: Behavior normal.         Thought Content: Thought content normal.         Judgment: Judgment normal.            Significant Labs:   CBC:   Recent Labs   Lab 01/21/25  0338 01/22/25  0338   WBC 3.44* 2.64*   HGB 7.0* 7.3*   HCT 20.8* 20.9*   PLT 11* 9*    and CMP:   Recent Labs   Lab 01/21/25  0338 01/22/25  0338    137   K 3.9 3.7    106   CO2 24 24   GLU 95 95   BUN 8 10   CREATININE 0.6 0.6   CALCIUM 8.6* 8.8   PROT 5.7* 5.9*   ALBUMIN 2.7* 2.8*   BILITOT 0.4 0.4   ALKPHOS 59 59   AST 16 16   ALT 14 14   ANIONGAP 7* 7*       Diagnostic Results:  None

## 2025-01-23 PROBLEM — R74.01 TRANSAMINITIS: Status: RESOLVED | Noted: 2024-12-27 | Resolved: 2025-01-23

## 2025-01-23 PROBLEM — R10.9 ABDOMINAL PAIN: Status: RESOLVED | Noted: 2025-01-02 | Resolved: 2025-01-23

## 2025-01-23 LAB
ALBUMIN SERPL BCP-MCNC: 2.8 G/DL (ref 3.5–5.2)
ALP SERPL-CCNC: 58 U/L (ref 40–150)
ALT SERPL W/O P-5'-P-CCNC: 13 U/L (ref 10–44)
ANION GAP SERPL CALC-SCNC: 9 MMOL/L (ref 8–16)
ANISOCYTOSIS BLD QL SMEAR: SLIGHT
AST SERPL-CCNC: 15 U/L (ref 10–40)
BASOPHILS # BLD AUTO: ABNORMAL K/UL (ref 0–0.2)
BASOPHILS NFR BLD: 0 % (ref 0–1.9)
BILIRUB SERPL-MCNC: 0.3 MG/DL (ref 0.1–1)
BUN SERPL-MCNC: 12 MG/DL (ref 6–20)
CALCIUM SERPL-MCNC: 9 MG/DL (ref 8.7–10.5)
CHLORIDE SERPL-SCNC: 106 MMOL/L (ref 95–110)
CO2 SERPL-SCNC: 24 MMOL/L (ref 23–29)
CREAT SERPL-MCNC: 0.6 MG/DL (ref 0.5–1.4)
DIFFERENTIAL METHOD BLD: ABNORMAL
EOSINOPHIL # BLD AUTO: ABNORMAL K/UL (ref 0–0.5)
EOSINOPHIL NFR BLD: 0 % (ref 0–8)
ERYTHROCYTE [DISTWIDTH] IN BLOOD BY AUTOMATED COUNT: 13 % (ref 11.5–14.5)
EST. GFR  (NO RACE VARIABLE): >60 ML/MIN/1.73 M^2
GLUCOSE SERPL-MCNC: 94 MG/DL (ref 70–110)
HCT VFR BLD AUTO: 21 % (ref 40–54)
HGB BLD-MCNC: 7.2 G/DL (ref 14–18)
HYPOCHROMIA BLD QL SMEAR: ABNORMAL
IMM GRANULOCYTES # BLD AUTO: ABNORMAL K/UL (ref 0–0.04)
IMM GRANULOCYTES NFR BLD AUTO: ABNORMAL % (ref 0–0.5)
LYMPHOCYTES # BLD AUTO: ABNORMAL K/UL (ref 1–4.8)
LYMPHOCYTES NFR BLD: 1 % (ref 18–48)
MAGNESIUM SERPL-MCNC: 1.4 MG/DL (ref 1.6–2.6)
MCH RBC QN AUTO: 29.4 PG (ref 27–31)
MCHC RBC AUTO-ENTMCNC: 34.3 G/DL (ref 32–36)
MCV RBC AUTO: 86 FL (ref 82–98)
METAMYELOCYTES NFR BLD MANUAL: 1 %
MONOCYTES # BLD AUTO: ABNORMAL K/UL (ref 0.3–1)
MONOCYTES NFR BLD: 18 % (ref 4–15)
NEUTROPHILS NFR BLD: 79 % (ref 38–73)
NRBC BLD-RTO: 0 /100 WBC
OVALOCYTES BLD QL SMEAR: ABNORMAL
PHOSPHATE SERPL-MCNC: 3.3 MG/DL (ref 2.7–4.5)
PLATELET # BLD AUTO: 22 K/UL (ref 150–450)
PMV BLD AUTO: 11.5 FL (ref 9.2–12.9)
POIKILOCYTOSIS BLD QL SMEAR: SLIGHT
POLYCHROMASIA BLD QL SMEAR: ABNORMAL
POTASSIUM SERPL-SCNC: 3.9 MMOL/L (ref 3.5–5.1)
PROMYELOCYTES NFR BLD MANUAL: 1 %
PROT SERPL-MCNC: 5.9 G/DL (ref 6–8.4)
RBC # BLD AUTO: 2.45 M/UL (ref 4.6–6.2)
ROULEAUX BLD QL SMEAR: PRESENT
SODIUM SERPL-SCNC: 139 MMOL/L (ref 136–145)
SPHEROCYTES BLD QL SMEAR: ABNORMAL
TOXIC GRANULES BLD QL SMEAR: PRESENT
WBC # BLD AUTO: 2.03 K/UL (ref 3.9–12.7)

## 2025-01-23 PROCEDURE — 63600175 PHARM REV CODE 636 W HCPCS

## 2025-01-23 PROCEDURE — 25000003 PHARM REV CODE 250: Performed by: STUDENT IN AN ORGANIZED HEALTH CARE EDUCATION/TRAINING PROGRAM

## 2025-01-23 PROCEDURE — 25000003 PHARM REV CODE 250

## 2025-01-23 PROCEDURE — 20600001 HC STEP DOWN PRIVATE ROOM

## 2025-01-23 PROCEDURE — 80053 COMPREHEN METABOLIC PANEL: CPT | Performed by: NURSE PRACTITIONER

## 2025-01-23 PROCEDURE — 85027 COMPLETE CBC AUTOMATED: CPT | Performed by: NURSE PRACTITIONER

## 2025-01-23 PROCEDURE — 27000207 HC ISOLATION

## 2025-01-23 PROCEDURE — 99233 SBSQ HOSP IP/OBS HIGH 50: CPT | Mod: ,,, | Performed by: INTERNAL MEDICINE

## 2025-01-23 PROCEDURE — 85007 BL SMEAR W/DIFF WBC COUNT: CPT | Performed by: NURSE PRACTITIONER

## 2025-01-23 PROCEDURE — 63600175 PHARM REV CODE 636 W HCPCS: Performed by: STUDENT IN AN ORGANIZED HEALTH CARE EDUCATION/TRAINING PROGRAM

## 2025-01-23 PROCEDURE — 25000003 PHARM REV CODE 250: Performed by: INTERNAL MEDICINE

## 2025-01-23 PROCEDURE — 84100 ASSAY OF PHOSPHORUS: CPT | Performed by: NURSE PRACTITIONER

## 2025-01-23 PROCEDURE — 83735 ASSAY OF MAGNESIUM: CPT | Performed by: NURSE PRACTITIONER

## 2025-01-23 RX ORDER — ONDANSETRON 4 MG/1
8 TABLET, FILM COATED ORAL EVERY 8 HOURS PRN
Status: DISCONTINUED | OUTPATIENT
Start: 2025-01-23 | End: 2025-01-24

## 2025-01-23 RX ORDER — ONDANSETRON HYDROCHLORIDE 8 MG/1
8 TABLET, FILM COATED ORAL EVERY 8 HOURS PRN
Qty: 30 TABLET | Refills: 2 | Status: SHIPPED | OUTPATIENT
Start: 2025-01-23

## 2025-01-23 RX ADMIN — ACYCLOVIR 800 MG: 800 TABLET ORAL at 09:01

## 2025-01-23 RX ADMIN — CEFEPIME 2 G: 2 INJECTION, POWDER, FOR SOLUTION INTRAVENOUS at 09:01

## 2025-01-23 RX ADMIN — Medication 400 MG: at 05:01

## 2025-01-23 RX ADMIN — CEFEPIME 2 G: 2 INJECTION, POWDER, FOR SOLUTION INTRAVENOUS at 02:01

## 2025-01-23 RX ADMIN — DOXYCYCLINE HYCLATE 100 MG: 100 TABLET, COATED ORAL at 09:01

## 2025-01-23 RX ADMIN — PROCHLORPERAZINE EDISYLATE 5 MG: 5 INJECTION INTRAMUSCULAR; INTRAVENOUS at 11:01

## 2025-01-23 RX ADMIN — PANTOPRAZOLE SODIUM 40 MG: 40 TABLET, DELAYED RELEASE ORAL at 08:01

## 2025-01-23 RX ADMIN — SIMETHICONE 80 MG: 80 TABLET, CHEWABLE ORAL at 02:01

## 2025-01-23 RX ADMIN — POSACONAZOLE 300 MG: 100 TABLET, DELAYED RELEASE ORAL at 08:01

## 2025-01-23 RX ADMIN — TACROLIMUS 1 MG: 1 CAPSULE ORAL at 08:01

## 2025-01-23 RX ADMIN — Medication 400 MG: at 08:01

## 2025-01-23 RX ADMIN — METRONIDAZOLE 500 MG: 500 TABLET ORAL at 09:01

## 2025-01-23 RX ADMIN — SIMETHICONE 80 MG: 80 TABLET, CHEWABLE ORAL at 09:01

## 2025-01-23 RX ADMIN — SIMETHICONE 80 MG: 80 TABLET, CHEWABLE ORAL at 08:01

## 2025-01-23 RX ADMIN — MYCOPHENOLATE MOFETIL 1000 MG: 250 CAPSULE ORAL at 08:01

## 2025-01-23 RX ADMIN — ONDANSETRON 8 MG: 2 INJECTION INTRAMUSCULAR; INTRAVENOUS at 05:01

## 2025-01-23 RX ADMIN — URSODIOL 300 MG: 300 CAPSULE ORAL at 08:01

## 2025-01-23 RX ADMIN — CEFEPIME 2 G: 2 INJECTION, POWDER, FOR SOLUTION INTRAVENOUS at 05:01

## 2025-01-23 RX ADMIN — TACROLIMUS 1.5 MG: 1 CAPSULE ORAL at 06:01

## 2025-01-23 RX ADMIN — METRONIDAZOLE 500 MG: 500 TABLET ORAL at 02:01

## 2025-01-23 RX ADMIN — AMLODIPINE BESYLATE 10 MG: 10 TABLET ORAL at 08:01

## 2025-01-23 RX ADMIN — URSODIOL 300 MG: 300 CAPSULE ORAL at 09:01

## 2025-01-23 RX ADMIN — DOXYCYCLINE HYCLATE 100 MG: 100 TABLET, COATED ORAL at 08:01

## 2025-01-23 RX ADMIN — MYCOPHENOLATE MOFETIL 1000 MG: 250 CAPSULE ORAL at 02:01

## 2025-01-23 RX ADMIN — METRONIDAZOLE 500 MG: 500 TABLET ORAL at 05:01

## 2025-01-23 RX ADMIN — Medication 400 MG: at 02:01

## 2025-01-23 RX ADMIN — Medication 400 MG: at 09:01

## 2025-01-23 RX ADMIN — ACYCLOVIR 800 MG: 800 TABLET ORAL at 08:01

## 2025-01-23 RX ADMIN — MYCOPHENOLATE MOFETIL 1000 MG: 250 CAPSULE ORAL at 09:01

## 2025-01-23 RX ADMIN — LETERMOVIR 480 MG: 480 TABLET, FILM COATED ORAL at 08:01

## 2025-01-23 NOTE — PROGRESS NOTES
"Daniel Rodríguez - Oncology ([facility])  Adult Nutrition  Progress Note    SUMMARY       Recommendations     --Continue soft and bite sized diet as tolerated and clinically indicated    -- Nursing: please continue to document % meal eaten on flowsheet  --Encourage good intakes    --RD to monitor weight, po intake    Goals:   1.  75% nutritional needs met with diet    2. Maintain weight during admission    Nutrition Goal Status: progressing towards goal  Communication of RD Recs:  (POC)    Assessment and Plan    Nutrition Problem  Increased nutrient needs (calories, protein)      Related to (etiology):   Metabolic demand of disease and treatment     Signs and Symptoms (as evidenced by):   Myelofibrosis           Nutrition Diagnosis Status:   Continues             Reason for Assessment    Reason For Assessment: RD follow-up  Diagnosis: cancer diagnosis/related complications (Hisotry of allogeneic stem cell transplant)  General Information Comments: Patient assessed today for follow up.  Patient remains admitted for stem cell transplant.  Patient currently with soft and bite sized diet with variable intakes- overall fair 50-75% and tolerating well. +BM 1/22. Noted 24 lb weight loss x 1 month (12%).  No concerns for malnutrition per last RD interview with patient.  Nutrition Discharge Planning: Adequate nutritional intake    Nutrition/Diet History    Spiritual, Cultural Beliefs, Baptism Practices, Values that Affect Care: no  Food Allergies: NKFA  Factors Affecting Nutritional Intake: nausea/vomiting, decreased appetite    Anthropometrics    Height: 5' 11" (180.3 cm)  Height (inches): 71 in  Height Method: Stated  Weight: 79.9 kg (176 lb 2.4 oz)  Weight (lb): 176.15 lb  Weight Method: Standard Scale  Ideal Body Weight (IBW), Male: 172 lb  % Ideal Body Weight, Male (lb): 114.08 %  BMI (Calculated): 24.6  BMI Grade: 25 - 29.9 - overweight       Lab/Procedures/Meds    Pertinent Labs Reviewed: reviewed  Pertinent Labs Comments: " H/H 7.2/21.0 (L), Mag 1.4 (L)  Pertinent Medications Reviewed: reviewed  Pertinent Medications Comments: Magic Mouthwash, cholestyramine, mag ox, pantoprazole, sodium bicarb    Estimated/Assessed Needs    Weight Used For Calorie Calculations: 79.9 kg (176 lb 2.4 oz)  Energy Calorie Requirements (kcal): 5833-4705 kcal (25-30 kcal/kg)  Energy Need Method: Kcal/kg  Protein Requirements:  g (1.0-1.2 g/kg)  Weight Used For Protein Calculations: 79.9 kg (176 lb 2.4 oz)     Estimated Fluid Requirement Method: RDA Method  RDA Method (mL): 1998  CHO Requirement: 286-343 g/day      Nutrition Prescription Ordered    Current Diet Order: Soft & Bite Sized (IDDSI Level 6)  Oral Nutrition Supplement: -    Evaluation of Received Nutrient/Fluid Intake    Tolerance: tolerating  % Intake of Estimated Energy Needs: 50 - 75 %  % Meal Intake: 50 - 75 %    Nutrition Risk    Level of Risk/Frequency of Follow-up: moderate     Monitor and Evaluation    Food and Nutrient Intake: energy intake, food and beverage intake  Food and Nutrient Adminstration: diet order  Knowledge/Beliefs/Attitudes: food and nutrition knowledge/skill  Physical Activity and Function: nutrition-related ADLs and IADLs  Anthropometric Measurements: weight, weight change, body mass index  Biochemical Data, Medical Tests and Procedures: electrolyte and renal panel, gastrointestinal profile, glucose/endocrine profile, inflammatory profile, lipid profile  Nutrition-Focused Physical Findings: overall appearance     Nutrition Follow-Up    RD Follow-up?: Yes

## 2025-01-23 NOTE — ASSESSMENT & PLAN NOTE
- Suspect 2/2 chemotherapy conditioning regimen prior to allogeneic SCT. Abdomen notably distended and with TTP in epigastric region + RUQ.   - Continue protonix and famotidine daily, simethicone 80 TID ordered for flatulence  - , lipase normal  - morphine 1 mg PRN  - CT A/P 12/31: no acute pathology   - USG abdomen (RUQ) 01/02 unremarkable, not concerning for VOD  - Liver US w/ doppler 1/14: Hepatomegaly, mild intrahepatic biliary dilation. No signs of VOD/SOS  - Continuing on ursodiol for VOD/SOS ppx  - Trending Tbili. Elevated to 1.2 1/13. Now down-trended  RESOLVED

## 2025-01-23 NOTE — ASSESSMENT & PLAN NOTE
- Patient of Dr. 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 Received 3 bags with CD34 dose of 6.04x10^6/kg. Engrafted on day +22 with ANC of 998 . Today is day 7 of engraftment  - Today is Day +28  - Tacro levels MWF. Tacro level 1/22 6.7. (goal is 7-9). PM tacro dose increased to 1.5 mg. AM dose will remain at 1 mg.   - Patient is O+. Donor is O+.  - Patient is CMV non-reactive. Donor is CMV reactive. Given donor CMV status, patient will start (patient-supplied) letermovir on Day +5.  - Day -4 and Day -3 Busulfan dose-adjusted to 282 mg  - See treatment plan below:    Planned conditioning regimen:  Thiotepa on Day -7  Busulfan on Day -6, -5, -4, and -3  Fludarabine on Day -6, -5, -4, and -3  REST DAYS on Day -2 and -1     Planned  GVHD Prophylaxis:  Cyclophosphamide on Day +3 and +4  Tacrolimus starting on Day +5  Mycophenolate starting on Day +5     Antimicrobial Prophylaxis:  Acyclovir starting on Day -7  Levofloxacin starting on Day -1  Micafungin on Day -1 through Day +4  Letermovir starting on Day +5 (if CMV PCR drawn on day 0 results negative)  Posaconazole starting on Day +5  Atovaquone starting on Day +30     Skin Care with Thiotepa: Day -7 through D-5     Seizure Prophylaxis:  Levetiracetam on Day -7 through Day -2     SOS/VOD Prophylaxis:  Ursodiol on Day -7 through Day +30     Growth Factor Support:  Neupogen starting on Day +5        Caregiver: Carmelita (Spouse)  Post-transplant discharge plans: Home

## 2025-01-23 NOTE — ASSESSMENT & PLAN NOTE
-  1/14 pt first complained of perianal erythema and pain, R axillary rash/pain, and BLE petechial rash. Concern for cellulitis vs fungal infection. Low suspicion for acute GVHD as patient had yet to engraft at the time of symptoms. Pt remains afebrile.  - CT A/P with colonic dilation, gaseous distension, and L sided perianal thickening/fat stranding concerning for cellulitis  - CT R axilla: no acute soft tissue infection  - CT BLE: mild edema; no signs of soft tissue infection. Punch biopsy (1/14) path results consistent with infection rather than with GVHD.  - S/p R axilla punch biopsy with dermatology. Cx + for MRSA. Vanc discontinued and started doxycycline on  01/18 (end 01/25 - total of 7 days).  - Karius + for E. Coli and HHV6  - ID signed off 01/18 with the following recs: spoke with ID 1/23 and OK to stop all abx at discharge 1/24  - Discontinued vanc, started doxycycline 100 BID for 7 days (end 01/25) as axillary lesion biopsy grew MRSA  - Continue cefepime (Ecoli on Karius)  - Continue flagyl for anaerobic coverage given perianal lesion  - With zinc oxide barrier cream for scrotal and perianal irritation  - Follow wound care recs

## 2025-01-23 NOTE — PLAN OF CARE
D+28 haplo SCT. Went over POC with pt at beginning of shift.  Questions were asked and answered.  AAO x 4.  Afebrile.  Up to toilet independently. Voiding without difficulty. No complaints of pain. Electrolytes replaced PRN as per protocol. Pt remained free from injury with bed in low locked position, call light with in reach, bed alarm on, non-skid socks, and frequent rounding.  Pt instructed to call for assistance as needed. Denies needs at this time

## 2025-01-23 NOTE — SUBJECTIVE & OBJECTIVE
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 VSS, afebrile. Day 7 of engraftment. Doing well today and appropriate for discharge tomorrow. Will finish abx tomorrow prior to discharge and does not need to discharge home with abx per ID. Discharge teaching complete and follow up appts set up.     Objective:     Vital Signs (Most Recent):  Temp: 98 °F (36.7 °C) (01/23/25 0816)  Pulse: 98 (01/23/25 0816)  Resp: 16 (01/23/25 0816)  BP: 113/75 (01/23/25 0816)  SpO2: 99 % (01/23/25 0816) Vital Signs (24h Range):  Temp:  [97.6 °F (36.4 °C)-98.9 °F (37.2 °C)] 98 °F (36.7 °C)  Pulse:  [] 98  Resp:  [16-20] 16  SpO2:  [98 %-100 %] 99 %  BP: ()/(64-75) 113/75     Weight: 79.9 kg (176 lb 2.4 oz)  Body mass index is 24.57 kg/m².  Body surface area is 2 meters squared.    ECOG SCORE           [unfilled]    Intake/Output - Last 3 Shifts         01/21 0700  01/22 0659 01/22 0700  01/23 0659 01/23 0700  01/24 0659    P.O. 1050 1000 350    Blood  158     Total Intake(mL/kg) 1050 (12.2) 1158 (14.5) 350 (4.4)    Urine (mL/kg/hr) 1100 (0.5) 1050 (0.5) 250 (1)    Stool 0 0     Total Output 1100 1050 250    Net -50 +108 +100           Urine Occurrence 5 x 2 x     Stool Occurrence 1 x 1 x              Physical Exam  Vitals reviewed.   Constitutional:       Appearance: Normal appearance.   HENT:      Head: Normocephalic and atraumatic.      Nose: Nose normal.      Mouth/Throat:      Mouth: Mucous membranes are moist.      Pharynx: Oropharynx is clear.   Eyes:      Extraocular Movements: Extraocular movements intact.      Conjunctiva/sclera: Conjunctivae normal.      Pupils: Pupils are equal, round, and reactive to light.   Cardiovascular:      Rate and Rhythm: Normal rate and regular rhythm.      Pulses: Normal pulses.      Heart sounds: Normal heart sounds.   Pulmonary:      Effort: Pulmonary effort is normal.      Breath sounds: Normal breath sounds.   Abdominal:       General: Abdomen is flat. Bowel sounds are normal.      Palpations: Abdomen is soft.   Genitourinary:     Comments: L perianal erythema with TTP. No fluctuance appreciated.   B/l scrotal erythema   Musculoskeletal:         General: Normal range of motion.      Cervical back: Normal range of motion.   Skin:     General: Skin is warm and dry.      Capillary Refill: Capillary refill takes less than 2 seconds.      Findings: Bruising (abdominal), erythema (R underarm surrounding an ulceration) and rash present. No petechiae (Petechial rash resolved). Rash is not purpuric (on b/l LE).      Comments: R umhammad in place, c/d/I. No signs of infection    Neurological:      General: No focal deficit present.      Mental Status: He is alert and oriented to person, place, and time.   Psychiatric:         Mood and Affect: Mood normal.         Behavior: Behavior normal.         Thought Content: Thought content normal.         Judgment: Judgment normal.            Significant Labs:   CBC:   Recent Labs   Lab 01/22/25  0338 01/23/25  0336   WBC 2.64* 2.03*   HGB 7.3* 7.2*   HCT 20.9* 21.0*   PLT 9* 22*    and CMP:   Recent Labs   Lab 01/22/25  0338 01/23/25  0336    139   K 3.7 3.9    106   CO2 24 24   GLU 95 94   BUN 10 12   CREATININE 0.6 0.6   CALCIUM 8.8 9.0   PROT 5.9* 5.9*   ALBUMIN 2.8* 2.8*   BILITOT 0.4 0.3   ALKPHOS 59 58   AST 16 15   ALT 14 13   ANIONGAP 7* 9       Diagnostic Results:  None

## 2025-01-23 NOTE — PLAN OF CARE
Day +28 of haplo SCT; no acute events this shift. Afebrile & VSS on room air. No PRNs needed. Electrolytes closely monitored and replaced per orders. Ambulates independently to bathroom; educated on importance of I/O recording. CHG wipes provided and encouraged use. Tolerating diet, voiding without difficulty. Pt remaining free from falls or injury throughout shift; bed locked and in lowest position; call light within reach. POC reviewed with patient. All needs addressed. Frequent rounding performed. Pt awaiting discharge tomorrow.

## 2025-01-23 NOTE — ASSESSMENT & PLAN NOTE
"- Afebrile, stopped cefepime and resumed levaquin 12/31/24. Pt afebrile but on 1/15 began vancomycin, cefepime, and flagyl per ID for R axillary rash, perirectal cellulitis, and BLE petechial rash.   - See plan under "skin lesions"  - Currently on doxy (MRSA on axillary skin lesion biopsy), cefepime (E coli on Karius, possibly resistant to levoflox, and HHV6) and flagyl (anaerobic coverage given perianal lesion) until 01/25. Spoke with ID 1/23 and OK to d/c all abx tomorrow at discharge. Pt does not need to go home on abx  "

## 2025-01-23 NOTE — PROGRESS NOTES
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 VSS, afebrile. Day 7 of engraftment. Doing well today and appropriate for discharge tomorrow. Will finish abx tomorrow prior to discharge and does not need to discharge home with abx per ID. Discharge teaching complete and follow up appts set up.     Objective:     Vital Signs (Most Recent):  Temp: 98 °F (36.7 °C) (01/23/25 0816)  Pulse: 98 (01/23/25 0816)  Resp: 16 (01/23/25 0816)  BP: 113/75 (01/23/25 0816)  SpO2: 99 % (01/23/25 0816) Vital Signs (24h Range):  Temp:  [97.6 °F (36.4 °C)-98.9 °F (37.2 °C)] 98 °F (36.7 °C)  Pulse:  [] 98  Resp:  [16-20] 16  SpO2:  [98 %-100 %] 99 %  BP: ()/(64-75) 113/75     Weight: 79.9 kg (176 lb 2.4 oz)  Body mass index is 24.57 kg/m².  Body surface area is 2 meters squared.    ECOG SCORE           [unfilled]    Intake/Output - Last 3 Shifts         01/21 0700  01/22 0659 01/22 0700  01/23 0659 01/23 0700  01/24 0659    P.O. 1050 1000 350    Blood  158     Total Intake(mL/kg) 1050 (12.2) 1158 (14.5) 350 (4.4)    Urine (mL/kg/hr) 1100 (0.5) 1050 (0.5) 250 (1)    Stool 0 0     Total Output 1100 1050 250    Net -50 +108 +100           Urine Occurrence 5 x 2 x     Stool Occurrence 1 x 1 x              Physical Exam  Vitals reviewed.   Constitutional:       Appearance: Normal appearance.   HENT:      Head: Normocephalic and atraumatic.      Nose: Nose normal.      Mouth/Throat:      Mouth: Mucous membranes are moist.      Pharynx: Oropharynx is clear.   Eyes:      Extraocular Movements: Extraocular movements intact.      Conjunctiva/sclera: Conjunctivae normal.      Pupils: Pupils are equal, round, and reactive to light.   Cardiovascular:      Rate and Rhythm: Normal  rate and regular rhythm.      Pulses: Normal pulses.      Heart sounds: Normal heart sounds.   Pulmonary:      Effort: Pulmonary effort is normal.      Breath sounds: Normal breath sounds.   Abdominal:      General: Abdomen is flat. Bowel sounds are normal.      Palpations: Abdomen is soft.   Genitourinary:     Comments: L perianal erythema with TTP. No fluctuance appreciated.   B/l scrotal erythema   Musculoskeletal:         General: Normal range of motion.      Cervical back: Normal range of motion.   Skin:     General: Skin is warm and dry.      Capillary Refill: Capillary refill takes less than 2 seconds.      Findings: Bruising (abdominal), erythema (R underarm surrounding an ulceration) and rash present. No petechiae (Petechial rash resolved). Rash is not purpuric (on b/l LE).      Comments: R muhammad in place, c/d/I. No signs of infection    Neurological:      General: No focal deficit present.      Mental Status: He is alert and oriented to person, place, and time.   Psychiatric:         Mood and Affect: Mood normal.         Behavior: Behavior normal.         Thought Content: Thought content normal.         Judgment: Judgment normal.            Significant Labs:   CBC:   Recent Labs   Lab 01/22/25  0338 01/23/25  0336   WBC 2.64* 2.03*   HGB 7.3* 7.2*   HCT 20.9* 21.0*   PLT 9* 22*    and CMP:   Recent Labs   Lab 01/22/25  0338 01/23/25  0336    139   K 3.7 3.9    106   CO2 24 24   GLU 95 94   BUN 10 12   CREATININE 0.6 0.6   CALCIUM 8.8 9.0   PROT 5.9* 5.9*   ALBUMIN 2.8* 2.8*   BILITOT 0.4 0.3   ALKPHOS 59 58   AST 16 15   ALT 14 13   ANIONGAP 7* 9       Diagnostic Results:  None  Assessment/Plan:     * History of allogeneic stem cell transplant  - Patient of Dr. 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 Received 3 bags with CD34 dose of 6.04x10^6/kg. Engrafted on day +22 with ANC of 998 . Today is day 7 of  engraftment  - Today is Day +28  - Tacro levels MWF. Tacro level 1/22 6.7. (goal is 7-9). PM tacro dose increased to 1.5 mg. AM dose will remain at 1 mg.   - Patient is O+. Donor is O+.  - Patient is CMV non-reactive. Donor is CMV reactive. Given donor CMV status, patient will start (patient-supplied) letermovir on Day +5.  - Day -4 and Day -3 Busulfan dose-adjusted to 282 mg  - See treatment plan below:    Planned conditioning regimen:  Thiotepa on Day -7  Busulfan on Day -6, -5, -4, and -3  Fludarabine on Day -6, -5, -4, and -3  REST DAYS on Day -2 and -1     Planned  GVHD Prophylaxis:  Cyclophosphamide on Day +3 and +4  Tacrolimus starting on Day +5  Mycophenolate starting on Day +5     Antimicrobial Prophylaxis:  Acyclovir starting on Day -7  Levofloxacin starting on Day -1  Micafungin on Day -1 through Day +4  Letermovir starting on Day +5 (if CMV PCR drawn on day 0 results negative)  Posaconazole starting on Day +5  Atovaquone starting on Day +30     Skin Care with Thiotepa: Day -7 through D-5     Seizure Prophylaxis:  Levetiracetam on Day -7 through Day -2     SOS/VOD Prophylaxis:  Ursodiol on Day -7 through Day +30     Growth Factor Support:  Neupogen starting on Day +5        Caregiver: Carmelita (Spouse)  Post-transplant discharge plans: Home      Skin lesions  -  1/14 pt first complained of perianal erythema and pain, R axillary rash/pain, and BLE petechial rash. Concern for cellulitis vs fungal infection. Low suspicion for acute GVHD as patient had yet to engraft at the time of symptoms. Pt remains afebrile.  - CT A/P with colonic dilation, gaseous distension, and L sided perianal thickening/fat stranding concerning for cellulitis  - CT R axilla: no acute soft tissue infection  - CT BLE: mild edema; no signs of soft tissue infection. Punch biopsy (1/14) path results consistent with infection rather than with GVHD.  - S/p R axilla punch biopsy with dermatology. Cx + for MRSA. Vanc discontinued and started  "doxycycline on  01/18 (end 01/25 - total of 7 days).  - Karius + for E. Coli and HHV6  - ID signed off 01/18 with the following recs: spoke with ID 1/23 and OK to stop all abx at discharge 1/24  - Discontinued vanc, started doxycycline 100 BID for 7 days (end 01/25) as axillary lesion biopsy grew MRSA  - Continue cefepime (Ecoli on Karius)  - Continue flagyl for anaerobic coverage given perianal lesion  - With zinc oxide barrier cream for scrotal and perianal irritation  - Follow wound care recs    Primary myelofibrosis  - From Dr. 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 Transplant on 12/26/24 at 1330. Received 3 bags with CD34 dose of 6.04 x 10^6/kg.     Perianal pain  - Pt with history of hematochezia that was worked up outpatient prior to transplant. 7/23/24 colonoscopy with several diverticula and a non-bleeding rectal abscess  - Denies hematochezia/melena while inpatient. Now with left sided perianal irritation and pain. Erythematous area that is tender to palpation, but without fluctuance. Reports pain became worse after irritation from diarrhea. Pt afebrile.   - 1/14 erythema worsening and rash now warm to touch. Concern for cellulitis vs abscess. 1/14 Began vancomycin (DCed 01/18, started doxy), cefepime, and flagyl. ID consulted. See further plan under "skin  lesions"  - Wound care consulted and will monitor pt closely for fevers/overt signs of infection  - Continue zinc oxide. IMPROVING    Hyperbilirubinemia  - Tbili elevated to 1.2 on 1/13. Abdomen distended and tender to " "palpation in RUQ  - 12/31 CT AP complete for diffuse abdominal pain: No acute pathology. Stable adrenal nodule  - 1/2 abdominal US: No concerns for VOD/SOS  - 1/14 liver US w/ doppler: Hepatomegaly; mild intrahepatic biliary dilation. No evidence of VOD/SOS  - , lipase normal  - Continue ursodiol for VOD/SOS ppx  - Trending Tbili daily. RESOLVED    Hypertension  - Became hypertensive following SCT on 12/26. No signs of volume overload contributing to HTN.  - 12/27 started amlodipine.     Hypercoagulable state, secondary  - See primary myelofibrosis    Cellulitis  - See skin lesions  - See perianal pain  - ID and derm consulted  - Wound care consulted    Diarrhea  - C. Diff negative on 12/28/24. Diarrhea improving  - Imodium q8h  - Began lomotil q8h 1/15      Immunocompromised state associated with stem cell transplant  - See history of allogeneic stem cell transplant     Hypokalemia  - See electrolyte disorder    Electrolyte disorder  - Replete per PRN electrolyte order set  - Daily CMP, mag, and phos while inpatient  - 1/22 Mag ox 400 mg daily with suspected mag wasting with tacro    Neutropenic fever  - Afebrile, stopped cefepime and resumed levaquin 12/31/24. Pt afebrile but on 1/15 began vancomycin, cefepime, and flagyl per ID for R axillary rash, perirectal cellulitis, and BLE petechial rash.   - See plan under "skin lesions"  - Currently on doxy (MRSA on axillary skin lesion biopsy), cefepime (E coli on Karius, possibly resistant to levoflox, and HHV6) and flagyl (anaerobic coverage given perianal lesion) until 01/25. Spoke with ID 1/23 and OK to d/c all abx tomorrow at discharge. Pt does not need to go home on abx    Chemotherapy-induced nausea  - Anticipated 2/2 chemotherapy   - Meds switched from PO to IV as able 1/2.   - Zofran q8h PRN  - Compazine q6h PRN  - Ativan PRN    Insomnia  - Difficulty sleeping at night while IP.  - Has PRN ativan and trazodone  - Melatonin not effective     Pancytopenia " due to chemotherapy  - Anticipated following chemotherapy.  - Transfuse for hgb < 7 or plts < 10K  - Continue antimicrobial ppx   - Daily CBC while IP    Adrenal nodule  - Subcentimeter nodular thickening of the adrenal glands first noted on imaging from 1/2024.  - Seen by endocrine prior to transplant admission and Dexamethasone suppression test performed. Patient responded appropriately. No further w/u needed.  - Likely adenomas.    Metabolic dysfunction-associated steatotic liver disease (MASLD)  - Biopsy proven to be steatotic change. Most compatible with MASLD.   - Follow-up with hepatology OP.    Hyperlipidemia  - Holding home atorvastatin while IP to avoid interaction with other medications. Can resume at discharge if LFTs stable.        VTE Risk Mitigation (From admission, onward)           Ordered     heparin, porcine (PF) 100 unit/mL injection flush 300 Units  As needed (PRN)         12/18/24 2025                    Disposition: Discharge tomorrow    Chu Wolf PA-C  Bone Marrow Transplant  [facility] - Oncology ([facility])         11-Dec-2022 06:19

## 2025-01-23 NOTE — ASSESSMENT & PLAN NOTE
- Biopsy proven to be steatotic change. Most compatible with MASLD.   - Follow-up with hepatology OP.   Detail Level: Detailed Detail Level: Generalized

## 2025-01-23 NOTE — ASSESSMENT & PLAN NOTE
"- Pt with history of hematochezia that was worked up outpatient prior to transplant. 7/23/24 colonoscopy with several diverticula and a non-bleeding rectal abscess  - Denies hematochezia/melena while inpatient. Now with left sided perianal irritation and pain. Erythematous area that is tender to palpation, but without fluctuance. Reports pain became worse after irritation from diarrhea. Pt afebrile.   - 1/14 erythema worsening and rash now warm to touch. Concern for cellulitis vs abscess. 1/14 Began vancomycin (DCed 01/18, started doxy), cefepime, and flagyl. ID consulted. See further plan under "skin  lesions"  - Wound care consulted and will monitor pt closely for fevers/overt signs of infection  - Continue zinc oxide. IMPROVING  "

## 2025-01-23 NOTE — ASSESSMENT & PLAN NOTE
- Anticipated 2/2 chemotherapy   - Meds switched from PO to IV as able 1/2.   - Zofran q8h PRN  - Compazine q6h PRN  - Ativan PRN

## 2025-01-23 NOTE — PLAN OF CARE
Recommendations      --Continue soft and bite sized diet as tolerated and clinically indicated    -- Nursing: please continue to document % meal eaten on flowsheet  --Encourage good intakes    --RD to monitor weight, po intake     Goals:   1.  75% nutritional needs met with diet    2. Maintain weight during admission     Nutrition Goal Status: progressing towards goal  Communication of RD Recs:  (POC)

## 2025-01-24 LAB
ABO + RH BLD: NORMAL
ALBUMIN SERPL BCP-MCNC: 3 G/DL (ref 3.5–5.2)
ALP SERPL-CCNC: 59 U/L (ref 40–150)
ALT SERPL W/O P-5'-P-CCNC: 11 U/L (ref 10–44)
ANION GAP SERPL CALC-SCNC: 10 MMOL/L (ref 8–16)
ANISOCYTOSIS BLD QL SMEAR: SLIGHT
AST SERPL-CCNC: 12 U/L (ref 10–40)
BASOPHILS # BLD AUTO: ABNORMAL K/UL (ref 0–0.2)
BASOPHILS NFR BLD: 0 % (ref 0–1.9)
BILIRUB SERPL-MCNC: 0.3 MG/DL (ref 0.1–1)
BLD GP AB SCN CELLS X3 SERPL QL: NORMAL
BUN SERPL-MCNC: 15 MG/DL (ref 6–20)
CALCIUM SERPL-MCNC: 9.3 MG/DL (ref 8.7–10.5)
CHLORIDE SERPL-SCNC: 106 MMOL/L (ref 95–110)
CO2 SERPL-SCNC: 23 MMOL/L (ref 23–29)
CREAT SERPL-MCNC: 0.6 MG/DL (ref 0.5–1.4)
DIFFERENTIAL METHOD BLD: ABNORMAL
EOSINOPHIL # BLD AUTO: ABNORMAL K/UL (ref 0–0.5)
EOSINOPHIL NFR BLD: 0 % (ref 0–8)
ERYTHROCYTE [DISTWIDTH] IN BLOOD BY AUTOMATED COUNT: 12.9 % (ref 11.5–14.5)
EST. GFR  (NO RACE VARIABLE): >60 ML/MIN/1.73 M^2
GIANT PLATELETS BLD QL SMEAR: PRESENT
GLUCOSE SERPL-MCNC: 103 MG/DL (ref 70–110)
HCT VFR BLD AUTO: 21.4 % (ref 40–54)
HGB BLD-MCNC: 7.4 G/DL (ref 14–18)
IMM GRANULOCYTES # BLD AUTO: ABNORMAL K/UL (ref 0–0.04)
IMM GRANULOCYTES NFR BLD AUTO: ABNORMAL % (ref 0–0.5)
LYMPHOCYTES # BLD AUTO: ABNORMAL K/UL (ref 1–4.8)
LYMPHOCYTES NFR BLD: 2 % (ref 18–48)
MAGNESIUM SERPL-MCNC: 1.4 MG/DL (ref 1.6–2.6)
MCH RBC QN AUTO: 29.5 PG (ref 27–31)
MCHC RBC AUTO-ENTMCNC: 34.6 G/DL (ref 32–36)
MCV RBC AUTO: 85 FL (ref 82–98)
METAMYELOCYTES NFR BLD MANUAL: 1 %
MONOCYTES # BLD AUTO: ABNORMAL K/UL (ref 0.3–1)
MONOCYTES NFR BLD: 39 % (ref 4–15)
MYELOCYTES NFR BLD MANUAL: 2 %
NEUTROPHILS NFR BLD: 53 % (ref 38–73)
NEUTS BAND NFR BLD MANUAL: 3 %
NRBC BLD-RTO: 1 /100 WBC
OVALOCYTES BLD QL SMEAR: ABNORMAL
PHOSPHATE SERPL-MCNC: 3.4 MG/DL (ref 2.7–4.5)
PLATELET # BLD AUTO: 19 K/UL (ref 150–450)
PLATELET BLD QL SMEAR: ABNORMAL
PMV BLD AUTO: 11.1 FL (ref 9.2–12.9)
POIKILOCYTOSIS BLD QL SMEAR: SLIGHT
POLYCHROMASIA BLD QL SMEAR: ABNORMAL
POTASSIUM SERPL-SCNC: 3.8 MMOL/L (ref 3.5–5.1)
PROT SERPL-MCNC: 6.2 G/DL (ref 6–8.4)
RBC # BLD AUTO: 2.51 M/UL (ref 4.6–6.2)
SODIUM SERPL-SCNC: 139 MMOL/L (ref 136–145)
SPECIMEN OUTDATE: NORMAL
SPHEROCYTES BLD QL SMEAR: ABNORMAL
TACROLIMUS BLD-MCNC: 9.1 NG/ML (ref 5–15)
TOXIC GRANULES BLD QL SMEAR: PRESENT
WBC # BLD AUTO: 2.31 K/UL (ref 3.9–12.7)

## 2025-01-24 PROCEDURE — 25000003 PHARM REV CODE 250: Performed by: STUDENT IN AN ORGANIZED HEALTH CARE EDUCATION/TRAINING PROGRAM

## 2025-01-24 PROCEDURE — 63600175 PHARM REV CODE 636 W HCPCS: Performed by: STUDENT IN AN ORGANIZED HEALTH CARE EDUCATION/TRAINING PROGRAM

## 2025-01-24 PROCEDURE — 63600175 PHARM REV CODE 636 W HCPCS: Performed by: INTERNAL MEDICINE

## 2025-01-24 PROCEDURE — 25000003 PHARM REV CODE 250

## 2025-01-24 PROCEDURE — 85007 BL SMEAR W/DIFF WBC COUNT: CPT | Performed by: NURSE PRACTITIONER

## 2025-01-24 PROCEDURE — 83735 ASSAY OF MAGNESIUM: CPT | Performed by: NURSE PRACTITIONER

## 2025-01-24 PROCEDURE — 85027 COMPLETE CBC AUTOMATED: CPT | Performed by: NURSE PRACTITIONER

## 2025-01-24 PROCEDURE — 20600001 HC STEP DOWN PRIVATE ROOM

## 2025-01-24 PROCEDURE — 27000207 HC ISOLATION

## 2025-01-24 PROCEDURE — 25000003 PHARM REV CODE 250: Performed by: NURSE PRACTITIONER

## 2025-01-24 PROCEDURE — 80197 ASSAY OF TACROLIMUS: CPT | Performed by: INTERNAL MEDICINE

## 2025-01-24 PROCEDURE — 25000003 PHARM REV CODE 250: Performed by: INTERNAL MEDICINE

## 2025-01-24 PROCEDURE — 80053 COMPREHEN METABOLIC PANEL: CPT | Performed by: NURSE PRACTITIONER

## 2025-01-24 PROCEDURE — 86901 BLOOD TYPING SEROLOGIC RH(D): CPT | Performed by: INTERNAL MEDICINE

## 2025-01-24 PROCEDURE — 63600175 PHARM REV CODE 636 W HCPCS

## 2025-01-24 PROCEDURE — 84100 ASSAY OF PHOSPHORUS: CPT | Performed by: NURSE PRACTITIONER

## 2025-01-24 PROCEDURE — 63600175 PHARM REV CODE 636 W HCPCS: Performed by: NURSE PRACTITIONER

## 2025-01-24 PROCEDURE — 99239 HOSP IP/OBS DSCHRG MGMT >30: CPT | Mod: FS,,, | Performed by: INTERNAL MEDICINE

## 2025-01-24 RX ORDER — LORAZEPAM 0.5 MG/1
0.5 TABLET ORAL ONCE AS NEEDED
Qty: 1 TABLET | Refills: 0 | Status: SHIPPED | OUTPATIENT
Start: 2025-01-24 | End: 2025-01-27

## 2025-01-24 RX ORDER — ONDANSETRON HYDROCHLORIDE 2 MG/ML
8 INJECTION, SOLUTION INTRAVENOUS ONCE
Status: COMPLETED | OUTPATIENT
Start: 2025-01-24 | End: 2025-01-24

## 2025-01-24 RX ORDER — TRAMADOL HYDROCHLORIDE 50 MG/1
50 TABLET ORAL EVERY 6 HOURS PRN
Qty: 28 TABLET | Refills: 0 | Status: SHIPPED | OUTPATIENT
Start: 2025-01-24

## 2025-01-24 RX ORDER — TRAMADOL HYDROCHLORIDE 50 MG/1
50 TABLET ORAL EVERY 6 HOURS PRN
Status: DISCONTINUED | OUTPATIENT
Start: 2025-01-24 | End: 2025-01-25 | Stop reason: HOSPADM

## 2025-01-24 RX ORDER — LOPERAMIDE HYDROCHLORIDE 2 MG/1
2 CAPSULE ORAL 4 TIMES DAILY PRN
Status: DISCONTINUED | OUTPATIENT
Start: 2025-01-24 | End: 2025-01-25 | Stop reason: HOSPADM

## 2025-01-24 RX ORDER — DAPSONE 100 MG/1
100 TABLET ORAL DAILY
Qty: 30 TABLET | Refills: 5 | Status: SHIPPED | OUTPATIENT
Start: 2025-01-25

## 2025-01-24 RX ORDER — TACROLIMUS 1 MG/1
1 CAPSULE ORAL EVERY MORNING
Start: 2025-01-25 | End: 2025-01-24 | Stop reason: HOSPADM

## 2025-01-24 RX ORDER — TACROLIMUS 0.5 MG/1
CAPSULE ORAL
Qty: 150 CAPSULE | Refills: 11 | Status: CANCELLED | OUTPATIENT
Start: 2025-01-24 | End: 2026-01-24

## 2025-01-24 RX ORDER — PROCHLORPERAZINE MALEATE 5 MG
10 TABLET ORAL 4 TIMES DAILY PRN
Status: DISCONTINUED | OUTPATIENT
Start: 2025-01-24 | End: 2025-01-25 | Stop reason: HOSPADM

## 2025-01-24 RX ORDER — ONDANSETRON HYDROCHLORIDE 2 MG/ML
4 INJECTION, SOLUTION INTRAVENOUS ONCE
Status: COMPLETED | OUTPATIENT
Start: 2025-01-24 | End: 2025-01-24

## 2025-01-24 RX ORDER — TACROLIMUS 0.5 MG/1
1.5 CAPSULE ORAL NIGHTLY
Qty: 90 CAPSULE | Refills: 11 | Status: SHIPPED | OUTPATIENT
Start: 2025-01-24 | End: 2025-01-24

## 2025-01-24 RX ORDER — TACROLIMUS 0.5 MG/1
CAPSULE ORAL
Qty: 150 CAPSULE | Refills: 11 | Status: SHIPPED | OUTPATIENT
Start: 2025-01-24 | End: 2026-01-24

## 2025-01-24 RX ORDER — VORICONAZOLE 200 MG/1
200 TABLET, FILM COATED ORAL 2 TIMES DAILY
Qty: 60 TABLET | Refills: 5 | Status: SHIPPED | OUTPATIENT
Start: 2025-01-24

## 2025-01-24 RX ORDER — ONDANSETRON 4 MG/1
8 TABLET, FILM COATED ORAL EVERY 8 HOURS
Status: DISCONTINUED | OUTPATIENT
Start: 2025-01-24 | End: 2025-01-25 | Stop reason: HOSPADM

## 2025-01-24 RX ORDER — PROCHLORPERAZINE MALEATE 10 MG
10 TABLET ORAL 4 TIMES DAILY PRN
Qty: 120 TABLET | Refills: 3 | Status: SHIPPED | OUTPATIENT
Start: 2025-01-24

## 2025-01-24 RX ADMIN — CEFEPIME 2 G: 2 INJECTION, POWDER, FOR SOLUTION INTRAVENOUS at 05:01

## 2025-01-24 RX ADMIN — MYCOPHENOLATE MOFETIL 1000 MG: 250 CAPSULE ORAL at 09:01

## 2025-01-24 RX ADMIN — PROCHLORPERAZINE EDISYLATE 5 MG: 5 INJECTION INTRAMUSCULAR; INTRAVENOUS at 08:01

## 2025-01-24 RX ADMIN — METRONIDAZOLE 500 MG: 500 TABLET ORAL at 09:01

## 2025-01-24 RX ADMIN — LETERMOVIR 480 MG: 480 TABLET, FILM COATED ORAL at 08:01

## 2025-01-24 RX ADMIN — AMLODIPINE BESYLATE 10 MG: 10 TABLET ORAL at 08:01

## 2025-01-24 RX ADMIN — MYCOPHENOLATE MOFETIL 1000 MG: 250 CAPSULE ORAL at 08:01

## 2025-01-24 RX ADMIN — Medication 400 MG: at 09:01

## 2025-01-24 RX ADMIN — POSACONAZOLE 300 MG: 100 TABLET, DELAYED RELEASE ORAL at 08:01

## 2025-01-24 RX ADMIN — ONDANSETRON 4 MG: 2 INJECTION INTRAMUSCULAR; INTRAVENOUS at 09:01

## 2025-01-24 RX ADMIN — TACROLIMUS 1.5 MG: 1 CAPSULE ORAL at 05:01

## 2025-01-24 RX ADMIN — URSODIOL 300 MG: 300 CAPSULE ORAL at 09:01

## 2025-01-24 RX ADMIN — ONDANSETRON HYDROCHLORIDE 8 MG: 4 TABLET, FILM COATED ORAL at 11:01

## 2025-01-24 RX ADMIN — CEFEPIME 2 G: 2 INJECTION, POWDER, FOR SOLUTION INTRAVENOUS at 02:01

## 2025-01-24 RX ADMIN — DOXYCYCLINE HYCLATE 100 MG: 100 TABLET, COATED ORAL at 08:01

## 2025-01-24 RX ADMIN — CEFEPIME 2 G: 2 INJECTION, POWDER, FOR SOLUTION INTRAVENOUS at 09:01

## 2025-01-24 RX ADMIN — PANTOPRAZOLE SODIUM 40 MG: 40 TABLET, DELAYED RELEASE ORAL at 08:01

## 2025-01-24 RX ADMIN — ACYCLOVIR 800 MG: 800 TABLET ORAL at 09:01

## 2025-01-24 RX ADMIN — URSODIOL 300 MG: 300 CAPSULE ORAL at 08:01

## 2025-01-24 RX ADMIN — Medication 400 MG: at 08:01

## 2025-01-24 RX ADMIN — MICONAZOLE NITRATE: 20 OINTMENT TOPICAL at 08:01

## 2025-01-24 RX ADMIN — DOXYCYCLINE HYCLATE 100 MG: 100 TABLET, COATED ORAL at 09:01

## 2025-01-24 RX ADMIN — SIMETHICONE 80 MG: 80 TABLET, CHEWABLE ORAL at 02:01

## 2025-01-24 RX ADMIN — METRONIDAZOLE 500 MG: 500 TABLET ORAL at 05:01

## 2025-01-24 RX ADMIN — SIMETHICONE 80 MG: 80 TABLET, CHEWABLE ORAL at 09:01

## 2025-01-24 RX ADMIN — ACYCLOVIR 800 MG: 800 TABLET ORAL at 08:01

## 2025-01-24 RX ADMIN — ONDANSETRON HYDROCHLORIDE 8 MG: 4 TABLET, FILM COATED ORAL at 02:01

## 2025-01-24 RX ADMIN — MYCOPHENOLATE MOFETIL 1000 MG: 250 CAPSULE ORAL at 02:01

## 2025-01-24 RX ADMIN — TACROLIMUS 1 MG: 1 CAPSULE ORAL at 08:01

## 2025-01-24 RX ADMIN — ONDANSETRON 8 MG: 2 INJECTION INTRAMUSCULAR; INTRAVENOUS at 11:01

## 2025-01-24 RX ADMIN — SIMETHICONE 80 MG: 80 TABLET, CHEWABLE ORAL at 08:01

## 2025-01-24 RX ADMIN — METRONIDAZOLE 500 MG: 500 TABLET ORAL at 02:01

## 2025-01-24 NOTE — PROGRESS NOTES
Daniel Rodríguez - Oncology ([facility])  Hematology  Bone Marrow Transplant  Progress Note    Patient Name: Rubén Hu  Admission Date: 12/18/2024  Hospital Length of Stay: 37 days  Code Status: Full Code    Subjective:     Interval History: Day +29 from a Bu/Flu/Thiotepa PT Cy haploidentical (brother) SCT for PMF. Day 8 of engraftment. Remains afebrile. VSS. Discharge teaching completed last week. Will discharge home today following pharmacy teaching. Tacro level 9.1 today. Will continue current tacro dose of 1 mg Q am 1.5 mg Q pm.    Objective:     Vital Signs (Most Recent):  Temp: 98.5 °F (36.9 °C) (01/24/25 0347)  Pulse: 104 (01/24/25 0347)  Resp: 18 (01/24/25 0347)  BP: 120/75 (01/24/25 0347)  SpO2: 99 % (01/24/25 0347) Vital Signs (24h Range):  Temp:  [97.3 °F (36.3 °C)-99.2 °F (37.3 °C)] 98.5 °F (36.9 °C)  Pulse:  [] 104  Resp:  [18-20] 18  SpO2:  [98 %-99 %] 99 %  BP: (102-120)/(64-78) 120/75     Weight: 79.2 kg (174 lb 9.7 oz)  Body mass index is 24.35 kg/m².  Body surface area is 1.99 meters squared.    ECOG SCORE           [unfilled]    Intake/Output - Last 3 Shifts         01/22 0700  01/23 0659 01/23 0700  01/24 0659 01/24 0700  01/25 0659    P.O. 1000 1850     I.V. (mL/kg)  197.4 (2.5)     Blood 158      Total Intake(mL/kg) 1158 (14.5) 2047.4 (25.9)     Urine (mL/kg/hr) 1050 (0.5) 1200 (0.6) 1 (0)    Stool 0 0     Total Output 1050 1200 1    Net +108 +847.4 -1           Urine Occurrence 2 x 3 x     Stool Occurrence 1 x 1 x              Physical Exam  Vitals reviewed.   Constitutional:       Appearance: Normal appearance.   HENT:      Head: Normocephalic and atraumatic.      Nose: Nose normal.      Mouth/Throat:      Mouth: Mucous membranes are moist.      Pharynx: Oropharynx is clear.   Eyes:      Extraocular Movements: Extraocular movements intact.      Conjunctiva/sclera: Conjunctivae normal.      Pupils: Pupils are equal, round, and reactive to light.   Cardiovascular:      Rate and Rhythm: Normal  rate and regular rhythm.      Pulses: Normal pulses.      Heart sounds: Normal heart sounds.   Pulmonary:      Effort: Pulmonary effort is normal.      Breath sounds: Normal breath sounds.   Abdominal:      General: Abdomen is flat. Bowel sounds are normal.      Palpations: Abdomen is soft.   Genitourinary:     Comments: L perianal erythema with TTP. No fluctuance appreciated.   B/l scrotal erythema   Musculoskeletal:         General: Normal range of motion.      Cervical back: Normal range of motion.   Skin:     General: Skin is warm and dry.      Capillary Refill: Capillary refill takes less than 2 seconds.      Findings: Bruising (abdominal), erythema (R underarm surrounding an ulceration), petechiae (B/l LE petechial rash) and rash (BLE) present. Rash is purpuric (on b/l LE).      Comments: R chest wall muhammad in place. Dressing c/d/i. No signs of infection to site.   Neurological:      General: No focal deficit present.      Mental Status: He is alert and oriented to person, place, and time.   Psychiatric:         Mood and Affect: Mood normal.         Behavior: Behavior normal.         Thought Content: Thought content normal.         Judgment: Judgment normal.            Significant Labs:   CBC:   Recent Labs   Lab 01/23/25  0336 01/24/25  0354   WBC 2.03* 2.31*   HGB 7.2* 7.4*   HCT 21.0* 21.4*   PLT 22* 19*    and CMP:   Recent Labs   Lab 01/23/25  0336 01/24/25  0354    139   K 3.9 3.8    106   CO2 24 23   GLU 94 103   BUN 12 15   CREATININE 0.6 0.6   CALCIUM 9.0 9.3   PROT 5.9* 6.2   ALBUMIN 2.8* 3.0*   BILITOT 0.3 0.3   ALKPHOS 58 59   AST 15 12   ALT 13 11   ANIONGAP 9 10       Diagnostic Results:  None  Assessment/Plan:     * History of allogeneic stem cell transplant  - Patient of Dr. 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 - Today is Day +29  - Transplant day 0 on 12/26/24. Received 3 bags with CD34 dose of 6.04x10^6/kg. Engrafted on  day +22 with ANC of 998 . Today is day 8 of engraftment.  - Tacro levels MWF. Tacro level 9.1 today. Tacro dose increased to 1 mg Q am and 1.5 mg Q pm on 1/22 with tacro level of 6.7. Will continue this dose today.  - Patient is O+. Donor is O+.  - Patient is CMV non-reactive. Donor is CMV reactive. Given donor CMV status, patient will start (patient-supplied) letermovir on Day +5.  - Day -4 and Day -3 Busulfan dose-adjusted to 282 mg  - See treatment plan below:    Planned conditioning regimen:  Thiotepa on Day -7  Busulfan on Day -6, -5, -4, and -3  Fludarabine on Day -6, -5, -4, and -3  REST DAYS on Day -2 and -1     Planned  GVHD Prophylaxis:  Cyclophosphamide on Day +3 and +4  Tacrolimus starting on Day +5  Mycophenolate starting on Day +5     Antimicrobial Prophylaxis:  Acyclovir starting on Day -7  Levofloxacin starting on Day -1  Micafungin on Day -1 through Day +4  Letermovir starting on Day +5 (if CMV PCR drawn on day 0 results negative)  Posaconazole starting on Day +5  Atovaquone starting on Day +30     Skin Care with Thiotepa: Day -7 through D-5     Seizure Prophylaxis:  Levetiracetam on Day -7 through Day -2     SOS/VOD Prophylaxis:  Ursodiol on Day -7 through Day +30     Growth Factor Support:  Neupogen starting on Day +5        Caregiver: Carmelita (Spouse)  Post-transplant discharge plans: Home      Primary myelofibrosis  - From Dr. 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 Transplant on 12/26/24 at 1330. Received 3 bags with CD34 dose of 6.04 x 10^6/kg.     Pancytopenia  "due to chemotherapy  - Anticipated following chemotherapy.  - Transfuse for hgb < 7 or plts < 10K  - Continue antimicrobial ppx   - Daily CBC while IP    Neutropenic fever  - Afebrile, stopped cefepime and resumed levaquin 12/31/24. Pt afebrile but on 1/15 began vancomycin, cefepime, and flagyl per ID for R axillary rash, perirectal cellulitis, and BLE petechial rash.   - See plan under "skin lesions"  - Currently on doxy (MRSA on axillary skin lesion biopsy), cefepime (E coli on Karius, possibly resistant to levoflox, and HHV6) and flagyl (anaerobic coverage given perianal lesion) until 01/25. Spoke with ID 1/23 and OK to d/c all abx today at discharge. Pt does not need to go home on abx.    Skin lesions  -  1/14 pt first complained of perianal erythema and pain, R axillary rash/pain, and BLE petechial rash. Concern for cellulitis vs fungal infection. Low suspicion for acute GVHD as patient had yet to engraft at the time of symptoms. Pt remains afebrile.  - CT A/P with colonic dilation, gaseous distension, and L sided perianal thickening/fat stranding concerning for cellulitis  - CT R axilla: no acute soft tissue infection  - CT BLE: mild edema; no signs of soft tissue infection. Punch biopsy (1/14) path results consistent with infection rather than with GVHD.  - S/p R axilla punch biopsy with dermatology. Cx + for MRSA. Vanc discontinued and started doxycycline on  01/18 (end 01/25 - total of 7 days).  - Karius + for E. Coli and HHV6  - ID signed off 01/18 with the following recs: spoke with ID 1/23 and OK to stop all abx at discharge 1/24  - Discontinued vanc, started doxycycline 100 BID for 7 days (end 01/25) as axillary lesion biopsy grew MRSA  - Continue cefepime (Ecoli on Karius)  - Continue flagyl for anaerobic coverage given perianal lesion  - With zinc oxide barrier cream for scrotal and perianal irritation  - Follow wound care recs    Perianal pain  - Pt with history of hematochezia that was worked up " "outpatient prior to transplant. 7/23/24 colonoscopy with several diverticula and a non-bleeding rectal abscess  - Denies hematochezia/melena while inpatient. Now with left sided perianal irritation and pain. Erythematous area that is tender to palpation, but without fluctuance. Reports pain became worse after irritation from diarrhea. Pt afebrile.   - 1/14 erythema worsening and rash now warm to touch. Concern for cellulitis vs abscess. 1/14 Began vancomycin (DCed 01/18, started doxy), cefepime, and flagyl. ID consulted. See further plan under "skin  lesions"  - Wound care consulted and will monitor pt closely for fevers/overt signs of infection  - Continue zinc oxide. IMPROVING    Chemotherapy-induced nausea  - Anticipated 2/2 chemotherapy   - Meds switched from PO to IV as able 1/2.   - Zofran q8h PRN  - Compazine q6h PRN  - Ativan PRN    Hypercoagulable state, secondary  - See primary myelofibrosis    Cellulitis  - See skin lesions  - See perianal pain  - ID and derm consulted  - Wound care consulted    Hyperbilirubinemia  - Tbili elevated to 1.2 on 1/13. Abdomen distended and tender to palpation in RUQ  - 12/31 CT AP complete for diffuse abdominal pain: No acute pathology. Stable adrenal nodule  - 1/2 abdominal US: No concerns for VOD/SOS  - 1/14 liver US w/ doppler: Hepatomegaly; mild intrahepatic biliary dilation. No evidence of VOD/SOS  - , lipase normal  - Continue ursodiol for VOD/SOS ppx  - Trending Tbili daily. RESOLVED    Diarrhea  - C. Diff negative on 12/28/24. Diarrhea improving  - Imodium q8h  - Began lomotil q8h 1/15      Immunocompromised state associated with stem cell transplant  - See history of allogeneic stem cell transplant     Hypokalemia  - See electrolyte disorder    Electrolyte disorder  - Replete per PRN electrolyte order set  - Daily CMP, mag, and phos while inpatient  - 1/22 Mag ox 400 mg daily with suspected mag wasting with tacro    Hypertension  - Became hypertensive " following SCT on 12/26. No signs of volume overload contributing to HTN.  - 12/27 started amlodipine.     Insomnia  - Difficulty sleeping at night while IP.  - Has PRN ativan and trazodone  - Melatonin not effective     Adrenal nodule  - Subcentimeter nodular thickening of the adrenal glands first noted on imaging from 1/2024.  - Seen by endocrine prior to transplant admission and Dexamethasone suppression test performed. Patient responded appropriately. No further w/u needed.  - Likely adenomas.    Metabolic dysfunction-associated steatotic liver disease (MASLD)  - Biopsy proven to be steatotic change. Most compatible with MASLD.   - Follow-up with hepatology OP.    Hyperlipidemia  - Holding home atorvastatin while IP to avoid interaction with other medications. Can resume at discharge if LFTs stable.        VTE Risk Mitigation (From admission, onward)           Ordered     heparin, porcine (PF) 100 unit/mL injection flush 300 Units  As needed (PRN)         12/18/24 2025                    Disposition: Inpatient for allogeneic SCT.    Anna Vera NP  Bone Marrow Transplant  Daniel Rodríguez - Oncology ([facility])

## 2025-01-24 NOTE — PROGRESS NOTES
BMT Pharmacist Discharge Note     All discharge medications were reviewed with the patient and his caregiver. 10 medications were sent to the [facility] pharmacy for bedside delivery: acyclovir, amlodipine, mycophenolate, ursodiol, magnesium, Zofran, tacrolimus, Compazine, voriconazole, and dapsone.       Medication Indication Morning Afternoon Evening/Night   **Acyclovir 800mg  Viral infection prevention 1 tablet  1 tablet   **Letermovir (Prevymis®) 480mg CMV infection prevention 1 tablet     **Voriconazole 200mg Fungal infection prevention 1 tablet  1 tablet   **Dapsone 100mg Fungal pneumonia prevention  1 tablet        **Mycophenolate 250mg GVHD prevention (thru 1/30/25) 4 capsules 4 capsules 4 capsules   **Tacrolimus 0.5mg GVHD prevention - HOLD morning dose on lab draw days 2 capsules  3 capsules   **Ursodiol 300mg Liver protection (thru 1/25/25) 1 tablet  1 tablet   **Magnesium oxide 400mg Magnesium supplement 2 tablets  2 tablets      **Amlodipine 10mg Blood pressure 1 tablet                                 **new medication or change in medication     AS NEEDED MEDICATIONS:  **Ondansetron 8mg every 8 hours as needed for nausea  **Prochlorperazine 10mg every 6 hours as needed for nausea (can take 20 minutes after ondansetron if no relief from nausea)    NO LONGER TAKE:   Aspirin  Atorvastatin  Ergocalciferol  Jakafi      Notes:   Introduced post-transplant pharmacy services to the patient and his caregiver. I discussed that they will be seen weekly for pill box fills (first appointment 01/27/25) up until day +100, and then as needed from that point on. Patient is aware that his medications will be delivered from the pharmacy on day of discharge.      I discussed the importance of taking acyclovir up until day +365 to prevent viral infections. Also advised patient to take all electrolyte replacements as prescribed until the bottle is completed. I explained that the dose and frequency of tacrolimus might change at  each appointment, so to use the medication sheet provided for dosing instructions rather than what is printed on the bottle label. Patient understands not to take tacrolimus in the morning before lab draws and that he may take his medication after we have drawn a tacrolimus level. Side effects of tacrolimus were discussed and he understands to call clinic if he begins to experience headache or tremor. Patient understands the importance of taking Voriconazole and Dapsone as long as he is on immunosuppression. Patient is aware that letermovir should remain in the blister pack until administration is needed; it should not be placed in the pill box with other medications. Patient is also aware that he was started on amlodipine on discharge for blood pressure. Recommended he check his blood pressure at home daily. Recommended the patient avoid NSAIDs and APAP on discharge due to increased risk of bleeding and fever masking.         The patient had the opportunity to ask questions and express concerns. All questions were answered.      Annalee Mcdowell, PharmD  Clinical Pharmacist-BMT/Hematology  [facility]

## 2025-01-24 NOTE — PT/OT/SLP PROGRESS
Physical Therapy      Patient Name:  Rubén Hu   MRN:  [id number]    Patient not seen today secondary to  (AM pt not seen due to c/o nausea per RN.  PT was not able to make 2nd attempt for therapy.). Will follow-up at a later date.  1/24/2025  .

## 2025-01-24 NOTE — PLAN OF CARE
Day +29 ALLO SCT. Patient AAOx4. Afebrile and VSS on room air. Patient with complaints of nausea overnight. PRN compazine given x1 with full relief obtained. Patient is up independently to the bathroom. Free from falls and injury overnight. Monitoring I&Os, tolerating diet, and CHG wipes given for self care. Bed is locked and in lowest position, non-skid socks on, and call light within reach. Patient educated on using the call light when needing assistance and verbalizes understanding. Plan of care reviewed and is ongoing. Patient expresses no other needs at this time. Possible d/c today.

## 2025-01-24 NOTE — PT/OT/SLP PROGRESS
Occupational Therapy      Patient Name:  Rubén Hu   MRN:  [id number]    Patient not seen today secondary to feeling nauseated this am and pt. Pending possible  d/c on this date    1/24/2025

## 2025-01-24 NOTE — PROGRESS NOTES
SW notified of possible dc today. Pharmacy reports high cost for outpatient medication. SW discussed possible manufacturers assistance programs, SW request Pharmacy [facility] Wellness assist with MAP.     SW spoke to patient who reports never having a patient assistance program despite Lukas Lacy PharmD reporting a past copay card.    SW unable to identify co-pay assistance programs for Primary myelofibrosis.    HEM/BMT team identifying medication list and costs and possible substitutions.

## 2025-01-24 NOTE — PROGRESS NOTES
Nurse reports active vomiting after taking oral meds on empty stomach. Scheduling nausea meds. May need to hold discharge.    Anna Vera, LEONP  Hematology/Oncology/Bone Marrow Transplant

## 2025-01-24 NOTE — ASSESSMENT & PLAN NOTE
"- Afebrile, stopped cefepime and resumed levaquin 12/31/24. Pt afebrile but on 1/15 began vancomycin, cefepime, and flagyl per ID for R axillary rash, perirectal cellulitis, and BLE petechial rash.   - See plan under "skin lesions"  - Currently on doxy (MRSA on axillary skin lesion biopsy), cefepime (E coli on Karius, possibly resistant to levoflox, and HHV6) and flagyl (anaerobic coverage given perianal lesion) until 01/25. Spoke with ID 1/23 and OK to d/c all abx today at discharge. Pt does not need to go home on abx.  "

## 2025-01-24 NOTE — ASSESSMENT & PLAN NOTE
- Patient of Dr. 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 - Today is Day +29  - Transplant day 0 on 12/26/24. Received 3 bags with CD34 dose of 6.04x10^6/kg. Engrafted on day +22 with ANC of 998 . Today is day 8 of engraftment.  - Tacro levels MWF. Tacro level 9.1 today. Tacro dose increased to 1 mg Q am and 1.5 mg Q pm on 1/22 with tacro level of 6.7. Will continue this dose today.  - Patient is O+. Donor is O+.  - Patient is CMV non-reactive. Donor is CMV reactive. Given donor CMV status, patient will start (patient-supplied) letermovir on Day +5.  - Day -4 and Day -3 Busulfan dose-adjusted to 282 mg  - See treatment plan below:    Planned conditioning regimen:  Thiotepa on Day -7  Busulfan on Day -6, -5, -4, and -3  Fludarabine on Day -6, -5, -4, and -3  REST DAYS on Day -2 and -1     Planned  GVHD Prophylaxis:  Cyclophosphamide on Day +3 and +4  Tacrolimus starting on Day +5  Mycophenolate starting on Day +5     Antimicrobial Prophylaxis:  Acyclovir starting on Day -7  Levofloxacin starting on Day -1  Micafungin on Day -1 through Day +4  Letermovir starting on Day +5 (if CMV PCR drawn on day 0 results negative)  Posaconazole starting on Day +5  Atovaquone starting on Day +30     Skin Care with Thiotepa: Day -7 through D-5     Seizure Prophylaxis:  Levetiracetam on Day -7 through Day -2     SOS/VOD Prophylaxis:  Ursodiol on Day -7 through Day +30     Growth Factor Support:  Neupogen starting on Day +5        Caregiver: Carmelita (Spouse)  Post-transplant discharge plans: Home

## 2025-01-24 NOTE — SUBJECTIVE & OBJECTIVE
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 Day 8 of engraftment. Remains afebrile. VSS. Discharge teaching completed last week. Will discharge home today following pharmacy teaching. Tacro level 9.1 today. Will continue current tacro dose of 1 mg Q am 1.5 mg Q pm.    Objective:     Vital Signs (Most Recent):  Temp: 98.5 °F (36.9 °C) (01/24/25 0347)  Pulse: 104 (01/24/25 0347)  Resp: 18 (01/24/25 0347)  BP: 120/75 (01/24/25 0347)  SpO2: 99 % (01/24/25 0347) Vital Signs (24h Range):  Temp:  [97.3 °F (36.3 °C)-99.2 °F (37.3 °C)] 98.5 °F (36.9 °C)  Pulse:  [] 104  Resp:  [18-20] 18  SpO2:  [98 %-99 %] 99 %  BP: (102-120)/(64-78) 120/75     Weight: 79.2 kg (174 lb 9.7 oz)  Body mass index is 24.35 kg/m².  Body surface area is 1.99 meters squared.    ECOG SCORE           [unfilled]    Intake/Output - Last 3 Shifts         01/22 0700  01/23 0659 01/23 0700  01/24 0659 01/24 0700  01/25 0659    P.O. 1000 1850     I.V. (mL/kg)  197.4 (2.5)     Blood 158      Total Intake(mL/kg) 1158 (14.5) 2047.4 (25.9)     Urine (mL/kg/hr) 1050 (0.5) 1200 (0.6) 1 (0)    Stool 0 0     Total Output 1050 1200 1    Net +108 +847.4 -1           Urine Occurrence 2 x 3 x     Stool Occurrence 1 x 1 x              Physical Exam  Vitals reviewed.   Constitutional:       Appearance: Normal appearance.   HENT:      Head: Normocephalic and atraumatic.      Nose: Nose normal.      Mouth/Throat:      Mouth: Mucous membranes are moist.      Pharynx: Oropharynx is clear.   Eyes:      Extraocular Movements: Extraocular movements intact.      Conjunctiva/sclera: Conjunctivae normal.      Pupils: Pupils are equal, round, and reactive to light.   Cardiovascular:      Rate and Rhythm: Normal rate and regular rhythm.      Pulses: Normal pulses.      Heart sounds: Normal heart sounds.   Pulmonary:      Effort: Pulmonary effort is normal.      Breath sounds: Normal breath sounds.   Abdominal:       General: Abdomen is flat. Bowel sounds are normal.      Palpations: Abdomen is soft.   Genitourinary:     Comments: L perianal erythema with TTP. No fluctuance appreciated.   B/l scrotal erythema   Musculoskeletal:         General: Normal range of motion.      Cervical back: Normal range of motion.   Skin:     General: Skin is warm and dry.      Capillary Refill: Capillary refill takes less than 2 seconds.      Findings: Bruising (abdominal), erythema (R underarm surrounding an ulceration), petechiae (B/l LE petechial rash) and rash (BLE) present. Rash is purpuric (on b/l LE).      Comments: R chest wall muhammad in place. Dressing c/d/i. No signs of infection to site.   Neurological:      General: No focal deficit present.      Mental Status: He is alert and oriented to person, place, and time.   Psychiatric:         Mood and Affect: Mood normal.         Behavior: Behavior normal.         Thought Content: Thought content normal.         Judgment: Judgment normal.            Significant Labs:   CBC:   Recent Labs   Lab 01/23/25  0336 01/24/25  0354   WBC 2.03* 2.31*   HGB 7.2* 7.4*   HCT 21.0* 21.4*   PLT 22* 19*    and CMP:   Recent Labs   Lab 01/23/25  0336 01/24/25  0354    139   K 3.9 3.8    106   CO2 24 23   GLU 94 103   BUN 12 15   CREATININE 0.6 0.6   CALCIUM 9.0 9.3   PROT 5.9* 6.2   ALBUMIN 2.8* 3.0*   BILITOT 0.3 0.3   ALKPHOS 58 59   AST 15 12   ALT 13 11   ANIONGAP 9 10       Diagnostic Results:  None

## 2025-01-25 VITALS
WEIGHT: 173.31 LBS | HEIGHT: 71 IN | SYSTOLIC BLOOD PRESSURE: 109 MMHG | RESPIRATION RATE: 20 BRPM | HEART RATE: 97 BPM | TEMPERATURE: 98 F | BODY MASS INDEX: 24.26 KG/M2 | OXYGEN SATURATION: 97 % | DIASTOLIC BLOOD PRESSURE: 72 MMHG

## 2025-01-25 LAB
ALBUMIN SERPL BCP-MCNC: 3 G/DL (ref 3.5–5.2)
ALP SERPL-CCNC: 55 U/L (ref 40–150)
ALT SERPL W/O P-5'-P-CCNC: 7 U/L (ref 10–44)
ANION GAP SERPL CALC-SCNC: 7 MMOL/L (ref 8–16)
ANISOCYTOSIS BLD QL SMEAR: SLIGHT
AST SERPL-CCNC: 11 U/L (ref 10–40)
BASOPHILS NFR BLD: 0 % (ref 0–1.9)
BILIRUB SERPL-MCNC: 0.4 MG/DL (ref 0.1–1)
BUN SERPL-MCNC: 23 MG/DL (ref 6–20)
CALCIUM SERPL-MCNC: 9.4 MG/DL (ref 8.7–10.5)
CHLORIDE SERPL-SCNC: 108 MMOL/L (ref 95–110)
CO2 SERPL-SCNC: 24 MMOL/L (ref 23–29)
CREAT SERPL-MCNC: 0.8 MG/DL (ref 0.5–1.4)
DIFFERENTIAL METHOD BLD: ABNORMAL
EOSINOPHIL NFR BLD: 0 % (ref 0–8)
ERYTHROCYTE [DISTWIDTH] IN BLOOD BY AUTOMATED COUNT: 12.9 % (ref 11.5–14.5)
EST. GFR  (NO RACE VARIABLE): >60 ML/MIN/1.73 M^2
GLUCOSE SERPL-MCNC: 99 MG/DL (ref 70–110)
HCT VFR BLD AUTO: 21.1 % (ref 40–54)
HGB BLD-MCNC: 7.1 G/DL (ref 14–18)
IMM GRANULOCYTES # BLD AUTO: ABNORMAL K/UL (ref 0–0.04)
IMM GRANULOCYTES NFR BLD AUTO: ABNORMAL % (ref 0–0.5)
LYMPHOCYTES NFR BLD: 2 % (ref 18–48)
MAGNESIUM SERPL-MCNC: 1.4 MG/DL (ref 1.6–2.6)
MCH RBC QN AUTO: 29 PG (ref 27–31)
MCHC RBC AUTO-ENTMCNC: 33.6 G/DL (ref 32–36)
MCV RBC AUTO: 86 FL (ref 82–98)
METAMYELOCYTES NFR BLD MANUAL: 2 %
MONOCYTES NFR BLD: 31 % (ref 4–15)
NEUTROPHILS NFR BLD: 65 % (ref 38–73)
NRBC BLD-RTO: 1 /100 WBC
PHOSPHATE SERPL-MCNC: 3.8 MG/DL (ref 2.7–4.5)
PLATELET # BLD AUTO: 21 K/UL (ref 150–450)
PMV BLD AUTO: 11.8 FL (ref 9.2–12.9)
POIKILOCYTOSIS BLD QL SMEAR: SLIGHT
POLYCHROMASIA BLD QL SMEAR: ABNORMAL
POTASSIUM SERPL-SCNC: 3.9 MMOL/L (ref 3.5–5.1)
PROT SERPL-MCNC: 6.2 G/DL (ref 6–8.4)
RBC # BLD AUTO: 2.45 M/UL (ref 4.6–6.2)
SODIUM SERPL-SCNC: 139 MMOL/L (ref 136–145)
SPHEROCYTES BLD QL SMEAR: ABNORMAL
WBC # BLD AUTO: 3.2 K/UL (ref 3.9–12.7)

## 2025-01-25 PROCEDURE — 63600175 PHARM REV CODE 636 W HCPCS

## 2025-01-25 PROCEDURE — 85027 COMPLETE CBC AUTOMATED: CPT | Performed by: NURSE PRACTITIONER

## 2025-01-25 PROCEDURE — 63600175 PHARM REV CODE 636 W HCPCS: Performed by: STUDENT IN AN ORGANIZED HEALTH CARE EDUCATION/TRAINING PROGRAM

## 2025-01-25 PROCEDURE — 99239 HOSP IP/OBS DSCHRG MGMT >30: CPT | Mod: ,,, | Performed by: INTERNAL MEDICINE

## 2025-01-25 PROCEDURE — 80053 COMPREHEN METABOLIC PANEL: CPT | Performed by: NURSE PRACTITIONER

## 2025-01-25 PROCEDURE — 25000003 PHARM REV CODE 250: Performed by: NURSE PRACTITIONER

## 2025-01-25 PROCEDURE — 25000003 PHARM REV CODE 250

## 2025-01-25 PROCEDURE — 25000003 PHARM REV CODE 250: Performed by: INTERNAL MEDICINE

## 2025-01-25 PROCEDURE — 83735 ASSAY OF MAGNESIUM: CPT | Performed by: NURSE PRACTITIONER

## 2025-01-25 PROCEDURE — 25000003 PHARM REV CODE 250: Performed by: STUDENT IN AN ORGANIZED HEALTH CARE EDUCATION/TRAINING PROGRAM

## 2025-01-25 PROCEDURE — 84100 ASSAY OF PHOSPHORUS: CPT | Performed by: NURSE PRACTITIONER

## 2025-01-25 PROCEDURE — 85007 BL SMEAR W/DIFF WBC COUNT: CPT | Performed by: NURSE PRACTITIONER

## 2025-01-25 RX ADMIN — CEFEPIME 2 G: 2 INJECTION, POWDER, FOR SOLUTION INTRAVENOUS at 06:01

## 2025-01-25 RX ADMIN — LETERMOVIR 480 MG: 480 TABLET, FILM COATED ORAL at 08:01

## 2025-01-25 RX ADMIN — SIMETHICONE 80 MG: 80 TABLET, CHEWABLE ORAL at 08:01

## 2025-01-25 RX ADMIN — POSACONAZOLE 300 MG: 100 TABLET, DELAYED RELEASE ORAL at 08:01

## 2025-01-25 RX ADMIN — METRONIDAZOLE 500 MG: 500 TABLET ORAL at 01:01

## 2025-01-25 RX ADMIN — URSODIOL 300 MG: 300 CAPSULE ORAL at 08:01

## 2025-01-25 RX ADMIN — AMLODIPINE BESYLATE 10 MG: 10 TABLET ORAL at 08:01

## 2025-01-25 RX ADMIN — ONDANSETRON HYDROCHLORIDE 8 MG: 4 TABLET, FILM COATED ORAL at 06:01

## 2025-01-25 RX ADMIN — Medication 400 MG: at 08:01

## 2025-01-25 RX ADMIN — PANTOPRAZOLE SODIUM 40 MG: 40 TABLET, DELAYED RELEASE ORAL at 08:01

## 2025-01-25 RX ADMIN — ONDANSETRON HYDROCHLORIDE 8 MG: 4 TABLET, FILM COATED ORAL at 01:01

## 2025-01-25 RX ADMIN — METRONIDAZOLE 500 MG: 500 TABLET ORAL at 06:01

## 2025-01-25 RX ADMIN — CEFEPIME 2 G: 2 INJECTION, POWDER, FOR SOLUTION INTRAVENOUS at 02:01

## 2025-01-25 RX ADMIN — MYCOPHENOLATE MOFETIL 1000 MG: 250 CAPSULE ORAL at 02:01

## 2025-01-25 RX ADMIN — ACYCLOVIR 800 MG: 800 TABLET ORAL at 08:01

## 2025-01-25 RX ADMIN — MYCOPHENOLATE MOFETIL 1000 MG: 250 CAPSULE ORAL at 08:01

## 2025-01-25 RX ADMIN — SIMETHICONE 80 MG: 80 TABLET, CHEWABLE ORAL at 02:01

## 2025-01-25 RX ADMIN — Medication 400 MG: at 01:01

## 2025-01-25 RX ADMIN — TACROLIMUS 1 MG: 1 CAPSULE ORAL at 08:01

## 2025-01-25 RX ADMIN — ATOVAQUONE ORAL SUSPENSION 1500 MG: 750 SUSPENSION ORAL at 08:01

## 2025-01-25 NOTE — PLAN OF CARE
Side rails up x2; call bell in place; bed in lowest, locked position; skid proof socks on; no evidence of skin breakdown; care plan explained to patient; pt remains free of injury. Pt is day +30 of an allo SCT. Pt tolerated po, with no c/o n/v, voids, BM x 2, ambulates in room. Pt received all prescriptions medications from outpatient pharmacy. Pt with d/c home orders. Discharge instructions, appts, medications and prescriptions reviewed with pt and wife. Both verbalized understanding. [place]oria dressing d/c/I and current, all lines flushed with 10 cc NS and patent. Frequent rounding in progress pt encouraged to call as needed, VSS and afebrile.
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
## 2025-01-25 NOTE — PLAN OF CARE
Side rails up x2; call bell in place; bed in lowest, locked position; skid proof socks on; no evidence of skin breakdown; care plan explained to patient; pt remains free of injury. Pt is day + 29 of an allo SCT. Pt with c/o N/V throughout morning. Compazine IV, zofran po,and zofran iv given. Pt placed on scheduled antiemetics. Discharge home held until tomorrow. Frequent rounding in progress pt encouraged to call as needed, VSS and afebrile.

## 2025-01-25 NOTE — ASSESSMENT & PLAN NOTE
- Held home atorvastatin while IP to avoid interaction with other medications. Can resume at clinic follow-up if LFTs stable

## 2025-01-25 NOTE — PLAN OF CARE
Day +30 ALLO SCT. Patient AAOx4. Afebrile and VSS on room air. Patient with complaints of nausea overnight. PRN Zofran given x1 with full relief obtained. Patient is up independently to the bathroom. Free from falls and injury overnight. Monitoring I&Os, tolerating diet, and CHG wipes given for self care. Bed is locked and in lowest position, non-skid socks on, and call light within reach. Patient educated on using the call light when needing assistance and verbalizes understanding. Plan of care reviewed and is ongoing. Patient expresses no other needs at this time. Possible d/c today.

## 2025-01-25 NOTE — ASSESSMENT & PLAN NOTE
- Patient of Dr. 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 - Today is Day +30  - Transplant day 0 on 12/26/24. Received 3 bags with CD34 dose of 6.04x10^6/kg. Engrafted on day +22 with ANC of 998 . Today is day 9 of engraftment.  - Tacro levels MWF. Tacro level 9.1 today. Tacro dose increased to 1 mg Q am and 1.5 mg Q pm on 1/22 with tacro level of 6.7. Will continue this dose today.  - Patient is O+. Donor is O+.  - Patient is CMV non-reactive. Donor is CMV reactive. Given donor CMV status, patient will start (patient-supplied) letermovir on Day +5.  - Day -4 and Day -3 Busulfan dose-adjusted to 282 mg  - See treatment plan below:    Planned conditioning regimen:  Thiotepa on Day -7  Busulfan on Day -6, -5, -4, and -3  Fludarabine on Day -6, -5, -4, and -3  REST DAYS on Day -2 and -1     Planned  GVHD Prophylaxis:  Cyclophosphamide on Day +3 and +4  Tacrolimus starting on Day +5  Mycophenolate starting on Day +5     Antimicrobial Prophylaxis:  Acyclovir starting on Day -7  Levofloxacin starting on Day -1  Micafungin on Day -1 through Day +4  Letermovir starting on Day +5 (if CMV PCR drawn on day 0 results negative)  Posaconazole starting on Day +5  Atovaquone starting on Day +30     Skin Care with Thiotepa: Day -7 through D-5     Seizure Prophylaxis:  Levetiracetam on Day -7 through Day -2     SOS/VOD Prophylaxis:  Ursodiol on Day -7 through Day +30     Growth Factor Support:  Neupogen starting on Day +5        Caregiver: Carmelita (Spouse)  Post-transplant discharge plans: Home

## 2025-01-27 ENCOUNTER — INFUSION (OUTPATIENT)
Dept: INFUSION THERAPY | Facility: HOSPITAL | Age: 55
End: 2025-01-27
Payer: COMMERCIAL

## 2025-01-27 ENCOUNTER — LAB VISIT (OUTPATIENT)
Dept: LAB | Facility: HOSPITAL | Age: 55
End: 2025-01-27
Payer: COMMERCIAL

## 2025-01-27 ENCOUNTER — PROCEDURE VISIT (OUTPATIENT)
Dept: HEMATOLOGY/ONCOLOGY | Facility: CLINIC | Age: 55
End: 2025-01-27
Payer: COMMERCIAL

## 2025-01-27 ENCOUNTER — DOCUMENTATION ONLY (OUTPATIENT)
Dept: HEMATOLOGY/ONCOLOGY | Facility: CLINIC | Age: 55
End: 2025-01-27

## 2025-01-27 ENCOUNTER — CLINICAL SUPPORT (OUTPATIENT)
Dept: HEMATOLOGY/ONCOLOGY | Facility: CLINIC | Age: 55
End: 2025-01-27
Payer: COMMERCIAL

## 2025-01-27 ENCOUNTER — OFFICE VISIT (OUTPATIENT)
Dept: HEMATOLOGY/ONCOLOGY | Facility: CLINIC | Age: 55
End: 2025-01-27
Payer: COMMERCIAL

## 2025-01-27 VITALS
OXYGEN SATURATION: 98 % | SYSTOLIC BLOOD PRESSURE: 101 MMHG | DIASTOLIC BLOOD PRESSURE: 65 MMHG | TEMPERATURE: 98 F | HEART RATE: 111 BPM

## 2025-01-27 VITALS
WEIGHT: 176.56 LBS | HEART RATE: 112 BPM | HEIGHT: 71 IN | RESPIRATION RATE: 16 BRPM | BODY MASS INDEX: 24.72 KG/M2 | TEMPERATURE: 98 F | DIASTOLIC BLOOD PRESSURE: 64 MMHG | SYSTOLIC BLOOD PRESSURE: 95 MMHG

## 2025-01-27 DIAGNOSIS — Z94.84 HISTORY OF ALLOGENEIC STEM CELL TRANSPLANT: ICD-10-CM

## 2025-01-27 DIAGNOSIS — D61.810 PANCYTOPENIA DUE TO CHEMOTHERAPY: ICD-10-CM

## 2025-01-27 DIAGNOSIS — D47.1 PRIMARY MYELOFIBROSIS: ICD-10-CM

## 2025-01-27 DIAGNOSIS — Z76.82 STEM CELL TRANSPLANT CANDIDATE: ICD-10-CM

## 2025-01-27 DIAGNOSIS — E27.9 ADRENAL NODULE: Primary | ICD-10-CM

## 2025-01-27 DIAGNOSIS — Z94.84 HISTORY OF ALLOGENEIC STEM CELL TRANSPLANT: Primary | ICD-10-CM

## 2025-01-27 DIAGNOSIS — E83.42 HYPOMAGNESEMIA: ICD-10-CM

## 2025-01-27 LAB
ABO + RH BLD: NORMAL
ALBUMIN SERPL BCP-MCNC: 3.3 G/DL (ref 3.5–5.2)
ALP SERPL-CCNC: 57 U/L (ref 40–150)
ALT SERPL W/O P-5'-P-CCNC: 8 U/L (ref 10–44)
ANION GAP SERPL CALC-SCNC: 12 MMOL/L (ref 8–16)
ANISOCYTOSIS BLD QL SMEAR: SLIGHT
AST SERPL-CCNC: 10 U/L (ref 10–40)
BASOPHILS # BLD AUTO: ABNORMAL K/UL (ref 0–0.2)
BASOPHILS NFR BLD: 0 % (ref 0–1.9)
BILIRUB SERPL-MCNC: 0.3 MG/DL (ref 0.1–1)
BLD GP AB SCN CELLS X3 SERPL QL: NORMAL
BUN SERPL-MCNC: 21 MG/DL (ref 6–20)
CALCIUM SERPL-MCNC: 9.8 MG/DL (ref 8.7–10.5)
CHLORIDE SERPL-SCNC: 106 MMOL/L (ref 95–110)
CMV DNA SPEC QL NAA+PROBE: NORMAL
CO2 SERPL-SCNC: 24 MMOL/L (ref 23–29)
CREAT SERPL-MCNC: 0.8 MG/DL (ref 0.5–1.4)
CYTOMEGALOVIRUS PCR, QUANT: NOT DETECTED IU/ML
DIFFERENTIAL METHOD BLD: ABNORMAL
EOSINOPHIL # BLD AUTO: ABNORMAL K/UL (ref 0–0.5)
EOSINOPHIL NFR BLD: 0 % (ref 0–8)
EPSTEIN-BARR VIRUS DNA: NORMAL
EPSTEIN-BARR VIRUS PCR, QUANT: NOT DETECTED IU/ML
ERYTHROCYTE [DISTWIDTH] IN BLOOD BY AUTOMATED COUNT: 12.9 % (ref 11.5–14.5)
EST. GFR  (NO RACE VARIABLE): >60 ML/MIN/1.73 M^2
GLUCOSE SERPL-MCNC: 107 MG/DL (ref 70–110)
HCT VFR BLD AUTO: 22.9 % (ref 40–54)
HGB BLD-MCNC: 7.7 G/DL (ref 14–18)
HYPOCHROMIA BLD QL SMEAR: ABNORMAL
IMM GRANULOCYTES # BLD AUTO: ABNORMAL K/UL (ref 0–0.04)
IMM GRANULOCYTES NFR BLD AUTO: ABNORMAL % (ref 0–0.5)
LYMPHOCYTES # BLD AUTO: ABNORMAL K/UL (ref 1–4.8)
LYMPHOCYTES NFR BLD: 10 % (ref 18–48)
MAGNESIUM SERPL-MCNC: 1.5 MG/DL (ref 1.6–2.6)
MCH RBC QN AUTO: 29.2 PG (ref 27–31)
MCHC RBC AUTO-ENTMCNC: 33.6 G/DL (ref 32–36)
MCV RBC AUTO: 87 FL (ref 82–98)
MONOCYTES # BLD AUTO: ABNORMAL K/UL (ref 0.3–1)
MONOCYTES NFR BLD: 11 % (ref 4–15)
MYELOCYTES NFR BLD MANUAL: 1 %
NEUTROPHILS NFR BLD: 74 % (ref 38–73)
NEUTS BAND NFR BLD MANUAL: 4 %
NRBC BLD-RTO: 1 /100 WBC
OVALOCYTES BLD QL SMEAR: ABNORMAL
PHOSPHATE SERPL-MCNC: 3.1 MG/DL (ref 2.7–4.5)
PLATELET # BLD AUTO: 32 K/UL (ref 150–450)
PMV BLD AUTO: 11.8 FL (ref 9.2–12.9)
POIKILOCYTOSIS BLD QL SMEAR: SLIGHT
POLYCHROMASIA BLD QL SMEAR: ABNORMAL
POTASSIUM SERPL-SCNC: 3.7 MMOL/L (ref 3.5–5.1)
PROT SERPL-MCNC: 6.8 G/DL (ref 6–8.4)
RBC # BLD AUTO: 2.64 M/UL (ref 4.6–6.2)
SODIUM SERPL-SCNC: 142 MMOL/L (ref 136–145)
SPECIMEN OUTDATE: NORMAL
SPHEROCYTES BLD QL SMEAR: ABNORMAL
TACROLIMUS BLD-MCNC: 9.2 NG/ML (ref 5–15)
WBC # BLD AUTO: 3.19 K/UL (ref 3.9–12.7)

## 2025-01-27 PROCEDURE — 85007 BL SMEAR W/DIFF WBC COUNT: CPT | Performed by: INTERNAL MEDICINE

## 2025-01-27 PROCEDURE — 88341 IMHCHEM/IMCYTCHM EA ADD ANTB: CPT | Mod: 26,,, | Performed by: PATHOLOGY

## 2025-01-27 PROCEDURE — 81268 CHIMERISM ANAL W/CELL SELECT: CPT | Performed by: INTERNAL MEDICINE

## 2025-01-27 PROCEDURE — 88189 FLOWCYTOMETRY/READ 16 & >: CPT | Mod: ,,, | Performed by: PATHOLOGY

## 2025-01-27 PROCEDURE — 3078F DIAST BP <80 MM HG: CPT | Mod: CPTII,S$GLB,, | Performed by: INTERNAL MEDICINE

## 2025-01-27 PROCEDURE — 63600175 PHARM REV CODE 636 W HCPCS

## 2025-01-27 PROCEDURE — 3074F SYST BP LT 130 MM HG: CPT | Mod: CPTII,S$GLB,, | Performed by: INTERNAL MEDICINE

## 2025-01-27 PROCEDURE — 84100 ASSAY OF PHOSPHORUS: CPT | Performed by: INTERNAL MEDICINE

## 2025-01-27 PROCEDURE — 88342 IMHCHEM/IMCYTCHM 1ST ANTB: CPT | Mod: 26,59,, | Performed by: PATHOLOGY

## 2025-01-27 PROCEDURE — 88313 SPECIAL STAINS GROUP 2: CPT | Performed by: PATHOLOGY

## 2025-01-27 PROCEDURE — 25000003 PHARM REV CODE 250

## 2025-01-27 PROCEDURE — 81268 CHIMERISM ANAL W/CELL SELECT: CPT | Mod: 91 | Performed by: INTERNAL MEDICINE

## 2025-01-27 PROCEDURE — 1160F RVW MEDS BY RX/DR IN RCRD: CPT | Mod: CPTII,S$GLB,, | Performed by: INTERNAL MEDICINE

## 2025-01-27 PROCEDURE — 88305 TISSUE EXAM BY PATHOLOGIST: CPT | Mod: 26,,, | Performed by: PATHOLOGY

## 2025-01-27 PROCEDURE — 88305 TISSUE EXAM BY PATHOLOGIST: CPT | Performed by: PATHOLOGY

## 2025-01-27 PROCEDURE — 80197 ASSAY OF TACROLIMUS: CPT | Performed by: INTERNAL MEDICINE

## 2025-01-27 PROCEDURE — 88185 FLOWCYTOMETRY/TC ADD-ON: CPT | Performed by: PATHOLOGY

## 2025-01-27 PROCEDURE — 99999 PR PBB SHADOW E&M-EST. PATIENT-LVL II: CPT | Mod: PBBFAC,,,

## 2025-01-27 PROCEDURE — 1159F MED LIST DOCD IN RCRD: CPT | Mod: CPTII,S$GLB,, | Performed by: INTERNAL MEDICINE

## 2025-01-27 PROCEDURE — 88313 SPECIAL STAINS GROUP 2: CPT | Mod: 26,,, | Performed by: PATHOLOGY

## 2025-01-27 PROCEDURE — 38222 DX BONE MARROW BX & ASPIR: CPT | Mod: LT,S$GLB,,

## 2025-01-27 PROCEDURE — A4216 STERILE WATER/SALINE, 10 ML: HCPCS

## 2025-01-27 PROCEDURE — 3008F BODY MASS INDEX DOCD: CPT | Mod: CPTII,S$GLB,, | Performed by: INTERNAL MEDICINE

## 2025-01-27 PROCEDURE — 88342 IMHCHEM/IMCYTCHM 1ST ANTB: CPT | Performed by: PATHOLOGY

## 2025-01-27 PROCEDURE — 81450 HL NEO GSAP 5-50DNA/DNA&RNA: CPT | Performed by: INTERNAL MEDICINE

## 2025-01-27 PROCEDURE — 88264 CHROMOSOME ANALYSIS 20-25: CPT | Performed by: INTERNAL MEDICINE

## 2025-01-27 PROCEDURE — 88237 TISSUE CULTURE BONE MARROW: CPT | Performed by: INTERNAL MEDICINE

## 2025-01-27 PROCEDURE — 1111F DSCHRG MED/CURRENT MED MERGE: CPT | Mod: CPTII,S$GLB,, | Performed by: INTERNAL MEDICINE

## 2025-01-27 PROCEDURE — 99215 OFFICE O/P EST HI 40 MIN: CPT | Mod: 25,S$GLB,, | Performed by: INTERNAL MEDICINE

## 2025-01-27 PROCEDURE — 99999 PR PBB SHADOW E&M-EST. PATIENT-LVL IV: CPT | Mod: PBBFAC,,, | Performed by: INTERNAL MEDICINE

## 2025-01-27 PROCEDURE — 83735 ASSAY OF MAGNESIUM: CPT | Performed by: INTERNAL MEDICINE

## 2025-01-27 PROCEDURE — 85027 COMPLETE CBC AUTOMATED: CPT | Performed by: INTERNAL MEDICINE

## 2025-01-27 PROCEDURE — 88311 DECALCIFY TISSUE: CPT | Performed by: PATHOLOGY

## 2025-01-27 PROCEDURE — 96523 IRRIG DRUG DELIVERY DEVICE: CPT

## 2025-01-27 PROCEDURE — 87799 DETECT AGENT NOS DNA QUANT: CPT | Performed by: INTERNAL MEDICINE

## 2025-01-27 PROCEDURE — 86900 BLOOD TYPING SEROLOGIC ABO: CPT | Performed by: INTERNAL MEDICINE

## 2025-01-27 PROCEDURE — 85097 BONE MARROW INTERPRETATION: CPT | Mod: ,,, | Performed by: PATHOLOGY

## 2025-01-27 PROCEDURE — 88184 FLOWCYTOMETRY/ TC 1 MARKER: CPT | Performed by: PATHOLOGY

## 2025-01-27 PROCEDURE — 88299 UNLISTED CYTOGENETIC STUDY: CPT | Performed by: INTERNAL MEDICINE

## 2025-01-27 PROCEDURE — 88311 DECALCIFY TISSUE: CPT | Mod: 26,,, | Performed by: PATHOLOGY

## 2025-01-27 PROCEDURE — 36415 COLL VENOUS BLD VENIPUNCTURE: CPT | Performed by: INTERNAL MEDICINE

## 2025-01-27 PROCEDURE — 80053 COMPREHEN METABOLIC PANEL: CPT | Performed by: INTERNAL MEDICINE

## 2025-01-27 PROCEDURE — 88341 IMHCHEM/IMCYTCHM EA ADD ANTB: CPT | Mod: 59 | Performed by: PATHOLOGY

## 2025-01-27 RX ORDER — HEPARIN 100 UNIT/ML
500 SYRINGE INTRAVENOUS
Status: CANCELLED | OUTPATIENT
Start: 2025-01-27

## 2025-01-27 RX ORDER — SODIUM CHLORIDE 0.9 % (FLUSH) 0.9 %
10 SYRINGE (ML) INJECTION
Status: CANCELLED | OUTPATIENT
Start: 2025-01-27

## 2025-01-27 RX ORDER — HEPARIN 100 UNIT/ML
500 SYRINGE INTRAVENOUS
Status: DISCONTINUED | OUTPATIENT
Start: 2025-01-27 | End: 2025-01-27 | Stop reason: HOSPADM

## 2025-01-27 RX ORDER — SODIUM CHLORIDE 0.9 % (FLUSH) 0.9 %
10 SYRINGE (ML) INJECTION
Status: DISCONTINUED | OUTPATIENT
Start: 2025-01-27 | End: 2025-01-27 | Stop reason: HOSPADM

## 2025-01-27 RX ORDER — LORAZEPAM/0.9% SODIUM CHLORIDE 100MG/0.1L
2 PLASTIC BAG, INJECTION (ML) INTRAVENOUS
Status: CANCELLED | OUTPATIENT
Start: 2025-01-27

## 2025-01-27 RX ADMIN — Medication 10 ML: at 08:01

## 2025-01-27 RX ADMIN — HEPARIN SODIUM (PORCINE) LOCK FLUSH IV SOLN 100 UNIT/ML 500 UNITS: 100 SOLUTION at 08:01

## 2025-01-27 NOTE — PROGRESS NOTES
BMT Pharmacist Medication Review Note     All current medications were reviewed with the patient and his caregiver. The patient brought in all of his medications with him to this visit.      We discussed the changes made since last meeting:   - Ursodiol was completed on 01/26/25    The patient has ample supply of all medications except for Letermovir. He will call the pharmacy to set up delivery and is aware to reach out if he has issues obtaining.        Medication Indication Morning Afternoon Evening/Night   Acyclovir 800mg  Viral infection prevention 1 tablet  1 tablet   Letermovir (Prevymis®) 480mg CMV infection prevention 1 tablet     Voriconazole 200mg Fungal infection prevention 1 tablet  1 tablet   Dapsone 100mg Fungal pneumonia prevention  1 tablet        Mycophenolate 250mg GVHD prevention (thru 1/30/25) 4 capsules 4 capsules 4 capsules   Tacrolimus 0.5mg GVHD prevention - HOLD morning dose on lab draw days 2 capsules  3 capsules   Magnesium oxide 400mg Magnesium supplement 2 tablets  2 tablets      Amlodipine 10mg Blood pressure 1 tablet                                 **new medication or change in medication     AS NEEDED MEDICATIONS:  Ondansetron 8mg every 8 hours as needed for nausea  Prochlorperazine 10mg every 6 hours as needed for nausea (can take 20 minutes after ondansetron if no relief from nausea)  Tramadol 50 mg tablet every 6 hours as needed for pain.     NO LONGER TAKE:   Ursodiol        All questions were answered.      Annalee Mcdowell, PharmD  Clinical Pharmacist-BMT/Hematology  [facility]

## 2025-01-27 NOTE — PROGRESS NOTES
Section of Hematology and Stem Cell Transplantation    Post-Transplantation Follow Up Visit     1/27/25    Transplant History:   Primary oncologist: Clyde James MD  Primary oncologic diagnosis: primary myelofibrosis  Transplant date: 12/26/2024  Donor: haploidentical  Blood Type (Patient): O +  Blood Type (Donor): O +  CMV (Patient): Negative  CMV (Donor): Positive  Graft source: Peripheral blood  CD34+ cell dose: 6.04 X10^6 cells/kg  Conditioning Regimen:  Thiotepa, Busulfan, Fludarabine  GVHD prophylaxis: Post-transplant cyclophosphamide, MMF, Tacrolimus  Immediate post-transplant complications: mucositis, R axillary skin/soft tissue infection secondary to MRSA    History of Present Ilness:   Rubén Peters) is a pleasant 54 y.o.male with primary myelofibrosis who is status post haploidentical stem cell transplantation conditioned with  Bu/Flu/TT  who is currently day +32 who presents for post-transplant follow up.    He is still feeling very weak and becomes dyspneic with very little activity. He reports mucositis has resolved, and he is gradually increasing appetite. Diarrhea has resolved. No nausea/vomiting. No pruritis/itching.     PAST MEDICAL HISTORY:   Past Medical History:   Diagnosis Date    Abdominal pain 01/02/2025    Allergic contact dermatitis due to adhesives 12/31/2024    Cancer     Flatulence 01/03/2025    Headache 12/27/2024    Hyperlipidemia     Mucositis 01/02/2025    Other constipation 12/19/2024    Thrombocytosis 12/18/2024    Transaminitis 12/27/2024       PAST SURGICAL HISTORY:   Past Surgical History:   Procedure Laterality Date    BONE MARROW BIOPSY N/A 11/27/2024    Procedure: Biopsy-bone marrow;  Surgeon: Clyde James MD;  Location: [facility] ([address] [place] [address]);  Service: Oncology;  Laterality: N/A;  11/20-pre call complete-tb    COLONOSCOPY N/A 8/9/2023    Procedure: COLONOSCOPY;  Surgeon: Will Ardon MD;  Location: [facility] ([address] [place] [address]);  Service: Endoscopy;  Laterality: N/A;   She needs telemed appt for this thanks, CHRISTEN   Suprep Instructions sent via portal / Authorizing:     Bebeto Arora MD in [facility] FAMILY MED/ INTERNAL MED/ PEDS  Referral:          [id number]    EYE SURGERY      FLEXIBLE SIGMOIDOSCOPY N/A 7/23/2024    Procedure: SIGMOIDOSCOPY, FLEXIBLE;  Surgeon: Nirali Vicente MD;  Location: [facility];  Service: Endoscopy;  Laterality: N/A;    INSERTION OF LONGORIA CATHETER Right 12/18/2024    Procedure: INSERTION, CATHETER, CENTRAL VENOUS, LONGORIA TRIPLE LUMEN, Bard 12.5 fr Longoria Cath model [id number];  Surgeon: Willie Hadley MD;  Location: [facility] OR [address] [place] [address];  Service: General;  Laterality: Right;       PAST SOCIAL HISTORY:  Social History     Tobacco Use    Smoking status: Former     Types: Cigars, Cigarettes    Smokeless tobacco: Never   Substance Use Topics    Alcohol use: Not Currently     Comment: socially    Drug use: Never       FAMILY HISTORY:  Family History   Problem Relation Name Age of Onset    Arthritis Mother      COPD Father      Testicular cancer Maternal Cousin Peter 17        Chemotherapy    Breast cancer Maternal Cousin  51        Chemotherapy    Heart disease Maternal Grandfather      Stroke Maternal Grandmother         CURRENT MEDICATIONS:   Current Outpatient Medications   Medication Sig    acyclovir (ZOVIRAX) 800 MG Tab Take 1 tablet (800 mg total) by mouth 2 (two) times daily.    amLODIPine (NORVASC) 10 MG tablet Take 1 tablet (10 mg total) by mouth once daily.    dapsone 100 MG Tab Take 1 tablet (100 mg total) by mouth once daily.    letermovir (PREVYMIS) 480 mg Tab Take 1 tablet (480 mg total) by mouth once daily.    LORazepam (ATIVAN) 0.5 MG tablet Take 1 tablet (0.5 mg total) by mouth once as needed. Take 30-45 minutes prior to your bone marrow biopsy on 1/27/25. Do not take if you do not have a .    magnesium oxide (MAG-OX) 400 mg (241.3 mg magnesium) tablet Take 2 tablets (800 mg total) by mouth 2 (two) times daily.    mycophenolate (CELLCEPT) 250 mg Cap Take 4 capsules (1,000 mg total) by  mouth 3 (three) times daily. for 13 days    ondansetron (ZOFRAN) 8 MG tablet Take 1 tablet (8 mg total) by mouth every 8 (eight) hours as needed for Nausea.    prochlorperazine (COMPAZINE) 10 MG tablet Take 1 tablet (10 mg total) by mouth 4 (four) times daily as needed for Nausea.    tacrolimus (PROGRAF) 0.5 MG Cap Take 2 capsules (1 mg total) by mouth every morning AND 3 capsules (1.5 mg total) every evening.    traMADoL (ULTRAM) 50 mg tablet Take 1 tablet (50 mg total) by mouth every 6 (six) hours as needed for Pain.    voriconazole (VFEND) 200 MG Tab Take 1 tablet (200 mg total) by mouth 2 (two) times daily.     No current facility-administered medications for this visit.       ALLERGIES:   Review of patient's allergies indicates:   Allergen Reactions    Bactrim [sulfamethoxazole-trimethoprim] Hives       GVHD Review of Systems:     Pertinent positives and negatives included in the HPI. Otherwise a 14 point review of systems is negative. GVHD review of systems recorded in BMT flowsheet.     Physical Exam:     Vitals:    01/27/25 0821   BP: 95/64   Pulse: (!) 112   Resp: 16   Temp: 97.6 °F (36.4 °C)       General: Appears well, NAD  HEENT: MMM, no OP lesions  Pulmonary: CTAB, no increased work of breathing, no W/R/C  Cardiovascular: S1S2 normal, RRR, no M/R/G  Abdominal: Soft, NT, ND, BS+, no HSM  Extremities: No C/C/E  Neurological: AAOx4, grossly normal, no focal deficits  Dermatologic: No appreciable rashes or lesions    ECOG Performance Status: (foot note - ECOG PS provided by Eastern Cooperative Oncology Group) 1 - Symptomatic but completely ambulatory    Karnofsky Performance Score:  70%- Cares for Self: Unable to Carry on Normal Activity or Active Work    Labs:   Lab Results   Component Value Date    WBC 3.19 (L) 01/27/2025    RBC 2.64 (L) 01/27/2025    HGB 7.7 (L) 01/27/2025    HCT 22.9 (L) 01/27/2025    MCV 87 01/27/2025    MCH 29.2 01/27/2025    MCHC 33.6 01/27/2025    RDW 12.9 01/27/2025    PLT 32 (LL)  01/27/2025    MPV 11.8 01/27/2025    GRAN 74.0 (H) 01/27/2025    LYMPH Test Not Performed 01/27/2025    LYMPH 10.0 (L) 01/27/2025    MONO Test Not Performed 01/27/2025    MONO 11.0 01/27/2025    EOS Test Not Performed 01/27/2025    BASO Test Not Performed 01/27/2025    EOSINOPHIL 0.0 01/27/2025    BASOPHIL 0.0 01/27/2025       Sodium   Date Value Ref Range Status   01/27/2025 142 136 - 145 mmol/L Final     Potassium   Date Value Ref Range Status   01/27/2025 3.7 3.5 - 5.1 mmol/L Final     Chloride   Date Value Ref Range Status   01/27/2025 106 95 - 110 mmol/L Final     CO2   Date Value Ref Range Status   01/27/2025 24 23 - 29 mmol/L Final     Glucose   Date Value Ref Range Status   01/27/2025 107 70 - 110 mg/dL Final     BUN   Date Value Ref Range Status   01/27/2025 21 (H) 6 - 20 mg/dL Final     Creatinine   Date Value Ref Range Status   01/27/2025 0.8 0.5 - 1.4 mg/dL Final     Calcium   Date Value Ref Range Status   01/27/2025 9.8 8.7 - 10.5 mg/dL Final     Total Protein   Date Value Ref Range Status   01/27/2025 6.8 6.0 - 8.4 g/dL Final     Albumin   Date Value Ref Range Status   01/27/2025 3.3 (L) 3.5 - 5.2 g/dL Final     Total Bilirubin   Date Value Ref Range Status   01/27/2025 0.3 0.1 - 1.0 mg/dL Final     Comment:     For infants and newborns, interpretation of results should be based  on gestational age, weight and in agreement with clinical  observations.    Premature Infant recommended reference ranges:  Up to 24 hours.............<8.0 mg/dL  Up to 48 hours............<12.0 mg/dL  3-5 days..................<15.0 mg/dL  6-29 days.................<15.0 mg/dL       Alkaline Phosphatase   Date Value Ref Range Status   01/27/2025 57 40 - 150 U/L Final     AST   Date Value Ref Range Status   01/27/2025 10 10 - 40 U/L Final     ALT   Date Value Ref Range Status   01/27/2025 8 (L) 10 - 44 U/L Final     Anion Gap   Date Value Ref Range Status   01/27/2025 12 8 - 16 mmol/L Final     eGFR if    Date  Value Ref Range Status   01/22/2022 >60.0 >60 mL/min/1.73 m^2 Final     eGFR if non    Date Value Ref Range Status   01/22/2022 >60.0 >60 mL/min/1.73 m^2 Final     Comment:     Calculation used to obtain the estimated glomerular filtration  rate (eGFR) is the CKD-EPI equation.          Imaging:   All pertinent studies reviewed and interpreted by me    Acute GVHD Scoring:  GVHD Acute Assessment                        Assessment and Plan:   Rubén Peters) is a pleasant 54 y.o.male with primary myelofibrosis s/p haplo SCT who presents for post-transplant follow up.    Primary myelofibrosis: Status post treatment with ruxolitinib, followed by alloSCT. Primary oncologist is Clyde James MD who will be managing primary underlying disease. Assess disease on bone marrow biopsy.     Status post allogeneic stem cell transplantation: As noted above, status post haploidentical stem cell transplantation conditioned with  Bu/Flu/TT . Currently day +32. Engrafted on day +22.    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   Current tacro dose: 1 mg qAM and 1.5 mg qPM  Last tacro level: 9.2  Adjustments: none    Immunosuppression: Prevention with voriconazole,  acyclovir. CMV prophylaxis with letermovir. PJP prophyalxis dapsone. Continue weekly monitoring of CMV and EBV.  Last CMV: negative (1/27/2025), last EBV: negative (1/27/2025).  Active infections: none    Pancytopenia: Due to underlying disease and chemotherapy. Transfuse for Hgb <7 g/dL and platelets <10k.    Follow up: Twice weekly follow up with labs.    Orders Placed:      Orders Placed This Encounter    Bone marrow    Leukemia/Lymphoma Screen - Bone Marrow Right Posterior Iliac Crest    Chromosome Analysis, Bone Marrow Left Posterior Iliac Crest    Heme Disorders DNA/RNA Hold, Bone Marrow    Chimerism Transplant Sorted Cells (Performed at [facility]) Bone Marrow    OncoHeme (NGS)  Hematologic Neoplasms, BM Diagnosis or Indication for test: PMF s/p alloSCT    Specimen to Pathology, Bone Marrow Aspiration/Biopsy         Clyde James MD  Hematology, Oncology, and Stem Cell Transplantation  [facility]    Visit today included increased complexity associated with the care of the episodic problem PMF addressed and managing the longitudinal care of the patient due to the serious and/or complex managed problem(s) PMF.

## 2025-01-28 ENCOUNTER — INFUSION (OUTPATIENT)
Dept: INFUSION THERAPY | Facility: HOSPITAL | Age: 55
End: 2025-01-28
Attending: INTERNAL MEDICINE
Payer: COMMERCIAL

## 2025-01-28 ENCOUNTER — TELEPHONE (OUTPATIENT)
Dept: NUTRITION | Facility: CLINIC | Age: 55
End: 2025-01-28
Payer: COMMERCIAL

## 2025-01-28 VITALS
OXYGEN SATURATION: 98 % | DIASTOLIC BLOOD PRESSURE: 56 MMHG | HEART RATE: 118 BPM | SYSTOLIC BLOOD PRESSURE: 102 MMHG | RESPIRATION RATE: 16 BRPM | TEMPERATURE: 97 F

## 2025-01-28 DIAGNOSIS — E83.42 HYPOMAGNESEMIA: Primary | ICD-10-CM

## 2025-01-28 LAB
FUNGUS SPEC CULT: NORMAL
FUNGUS SPEC CULT: NORMAL

## 2025-01-28 PROCEDURE — 96366 THER/PROPH/DIAG IV INF ADDON: CPT

## 2025-01-28 PROCEDURE — 96365 THER/PROPH/DIAG IV INF INIT: CPT

## 2025-01-28 PROCEDURE — 63600175 PHARM REV CODE 636 W HCPCS: Performed by: INTERNAL MEDICINE

## 2025-01-28 PROCEDURE — 25000003 PHARM REV CODE 250: Performed by: INTERNAL MEDICINE

## 2025-01-28 RX ORDER — LORAZEPAM/0.9% SODIUM CHLORIDE 100MG/0.1L
2 PLASTIC BAG, INJECTION (ML) INTRAVENOUS
Status: DISCONTINUED | OUTPATIENT
Start: 2025-01-28 | End: 2025-01-28

## 2025-01-28 RX ORDER — HEPARIN 100 UNIT/ML
500 SYRINGE INTRAVENOUS
Status: DISCONTINUED | OUTPATIENT
Start: 2025-01-28 | End: 2025-01-28

## 2025-01-28 RX ADMIN — MAGNESIUM SULFATE HEPTAHYDRATE 2 G: 500 INJECTION, SOLUTION INTRAMUSCULAR; INTRAVENOUS at 02:01

## 2025-01-28 RX ADMIN — HEPARIN SODIUM (PORCINE) LOCK FLUSH IV SOLN 100 UNIT/ML 500 UNITS: 100 SOLUTION at 04:01

## 2025-01-28 RX ADMIN — SODIUM CHLORIDE 1000 ML: 9 INJECTION, SOLUTION INTRAVENOUS at 02:01

## 2025-01-28 NOTE — PLAN OF CARE
Pt arrived to clinic by foot without difficulty, AAOx4, accompanied by wife. Pt received 2g of Magnesium sulfate via distal lumen of IJ central line over 2 hours and 1L of NS via proximal lumen over 2 hours. Central line dsg c/d/i; free of s/s of infection. Pt tolerated medications well. No S&S of an adverse reaction, VSS. All 3 lumens flushed before with NS and after infusion with heparin; patent with blood return. Denies pain or discomfort. Care plan discussed with pt. Pt verbalized understanding. Pt will f/u with provider for next appointment. Pt ambulatory upon discharge in NAD.       Problem: Fatigue  Goal: Improved Activity Tolerance  Outcome: Met     Problem: Fall Injury Risk  Goal: Absence of Fall and Fall-Related Injury  Outcome: Met

## 2025-01-28 NOTE — TELEPHONE ENCOUNTER
Patient was discharged from the hospital Saturday and met with a dietitian there just before discharge. Answered his questions on crawfish and shrimp and told him about Octavia the [address]. .   Signature: Hanna Painter, MPH, RD, LDN

## 2025-01-29 LAB
DNA/RNA EXTRACT AND HOLD RESULT: NORMAL
DNA/RNA EXTRACTION: NORMAL
EXHR SPECIMEN TYPE: NORMAL

## 2025-01-30 ENCOUNTER — OFFICE VISIT (OUTPATIENT)
Dept: HEMATOLOGY/ONCOLOGY | Facility: CLINIC | Age: 55
End: 2025-01-30
Payer: COMMERCIAL

## 2025-01-30 ENCOUNTER — DOCUMENTATION ONLY (OUTPATIENT)
Dept: HEMATOLOGY/ONCOLOGY | Facility: CLINIC | Age: 55
End: 2025-01-30
Payer: COMMERCIAL

## 2025-01-30 ENCOUNTER — TELEPHONE (OUTPATIENT)
Dept: HEMATOLOGY/ONCOLOGY | Facility: CLINIC | Age: 55
End: 2025-01-30

## 2025-01-30 ENCOUNTER — LAB VISIT (OUTPATIENT)
Dept: LAB | Facility: HOSPITAL | Age: 55
End: 2025-01-30
Attending: INTERNAL MEDICINE
Payer: COMMERCIAL

## 2025-01-30 VITALS
TEMPERATURE: 98 F | WEIGHT: 178 LBS | BODY MASS INDEX: 24.92 KG/M2 | OXYGEN SATURATION: 98 % | SYSTOLIC BLOOD PRESSURE: 99 MMHG | HEIGHT: 71 IN | HEART RATE: 113 BPM | DIASTOLIC BLOOD PRESSURE: 59 MMHG

## 2025-01-30 DIAGNOSIS — D63.0 ANEMIA IN NEOPLASTIC DISEASE: ICD-10-CM

## 2025-01-30 DIAGNOSIS — Z94.84 HISTORY OF ALLOGENEIC STEM CELL TRANSPLANT: Primary | ICD-10-CM

## 2025-01-30 DIAGNOSIS — D47.1 PRIMARY MYELOFIBROSIS: ICD-10-CM

## 2025-01-30 DIAGNOSIS — Z94.84 IMMUNOCOMPROMISED STATE ASSOCIATED WITH STEM CELL TRANSPLANT: ICD-10-CM

## 2025-01-30 DIAGNOSIS — D84.822 IMMUNOCOMPROMISED STATE ASSOCIATED WITH STEM CELL TRANSPLANT: ICD-10-CM

## 2025-01-30 DIAGNOSIS — D61.810 PANCYTOPENIA DUE TO CHEMOTHERAPY: ICD-10-CM

## 2025-01-30 DIAGNOSIS — Z76.82 STEM CELL TRANSPLANT CANDIDATE: ICD-10-CM

## 2025-01-30 DIAGNOSIS — I10 HYPERTENSION, UNSPECIFIED TYPE: ICD-10-CM

## 2025-01-30 LAB
ABO + RH BLD: NORMAL
ALBUMIN SERPL BCP-MCNC: 3.1 G/DL (ref 3.5–5.2)
ALP SERPL-CCNC: 54 U/L (ref 40–150)
ALT SERPL W/O P-5'-P-CCNC: 9 U/L (ref 10–44)
ANION GAP SERPL CALC-SCNC: 10 MMOL/L (ref 8–16)
ANISOCYTOSIS BLD QL SMEAR: SLIGHT
AST SERPL-CCNC: 12 U/L (ref 10–40)
BASOPHILS # BLD AUTO: 0.02 K/UL (ref 0–0.2)
BASOPHILS NFR BLD: 0.5 % (ref 0–1.9)
BILIRUB SERPL-MCNC: 0.4 MG/DL (ref 0.1–1)
BLD GP AB SCN CELLS X3 SERPL QL: NORMAL
BUN SERPL-MCNC: 10 MG/DL (ref 6–20)
CALCIUM SERPL-MCNC: 9.4 MG/DL (ref 8.7–10.5)
CHLORIDE SERPL-SCNC: 104 MMOL/L (ref 95–110)
CO2 SERPL-SCNC: 24 MMOL/L (ref 23–29)
CREAT SERPL-MCNC: 0.7 MG/DL (ref 0.5–1.4)
DIFFERENTIAL METHOD BLD: ABNORMAL
EOSINOPHIL # BLD AUTO: 0 K/UL (ref 0–0.5)
EOSINOPHIL NFR BLD: 0 % (ref 0–8)
ERYTHROCYTE [DISTWIDTH] IN BLOOD BY AUTOMATED COUNT: 13.4 % (ref 11.5–14.5)
EST. GFR  (NO RACE VARIABLE): >60 ML/MIN/1.73 M^2
GLUCOSE SERPL-MCNC: 99 MG/DL (ref 70–110)
HCT VFR BLD AUTO: 20.2 % (ref 40–54)
HGB BLD-MCNC: 7 G/DL (ref 14–18)
IMM GRANULOCYTES # BLD AUTO: 0.19 K/UL (ref 0–0.04)
IMM GRANULOCYTES NFR BLD AUTO: 5 % (ref 0–0.5)
LYMPHOCYTES # BLD AUTO: 0.2 K/UL (ref 1–4.8)
LYMPHOCYTES NFR BLD: 4 % (ref 18–48)
MAGNESIUM SERPL-MCNC: 1.4 MG/DL (ref 1.6–2.6)
MCH RBC QN AUTO: 29.9 PG (ref 27–31)
MCHC RBC AUTO-ENTMCNC: 34.7 G/DL (ref 32–36)
MCV RBC AUTO: 86 FL (ref 82–98)
MEGAKARYOCYTIC FRAGMENTS: ABNORMAL
MONOCYTES # BLD AUTO: 0.9 K/UL (ref 0.3–1)
MONOCYTES NFR BLD: 23.8 % (ref 4–15)
NEUTROPHILS # BLD AUTO: 2.5 K/UL (ref 1.8–7.7)
NEUTROPHILS NFR BLD: 66.7 % (ref 38–73)
NRBC BLD-RTO: 2 /100 WBC
OVALOCYTES BLD QL SMEAR: ABNORMAL
PHOSPHATE SERPL-MCNC: 2.6 MG/DL (ref 2.7–4.5)
PLATELET # BLD AUTO: 37 K/UL (ref 150–450)
PLATELET BLD QL SMEAR: ABNORMAL
PMV BLD AUTO: 11.2 FL (ref 9.2–12.9)
POIKILOCYTOSIS BLD QL SMEAR: SLIGHT
POLYCHROMASIA BLD QL SMEAR: ABNORMAL
POTASSIUM SERPL-SCNC: 3.7 MMOL/L (ref 3.5–5.1)
PROT SERPL-MCNC: 6.8 G/DL (ref 6–8.4)
RBC # BLD AUTO: 2.34 M/UL (ref 4.6–6.2)
SCHISTOCYTES BLD QL SMEAR: ABNORMAL
SCHISTOCYTES BLD QL SMEAR: PRESENT
SODIUM SERPL-SCNC: 138 MMOL/L (ref 136–145)
SPECIMEN OUTDATE: NORMAL
SPHEROCYTES BLD QL SMEAR: ABNORMAL
TACROLIMUS BLD-MCNC: 8.3 NG/ML (ref 5–15)
TARGETS BLD QL SMEAR: ABNORMAL
WBC # BLD AUTO: 3.78 K/UL (ref 3.9–12.7)

## 2025-01-30 PROCEDURE — 84100 ASSAY OF PHOSPHORUS: CPT | Performed by: INTERNAL MEDICINE

## 2025-01-30 PROCEDURE — 3078F DIAST BP <80 MM HG: CPT | Mod: CPTII,S$GLB,, | Performed by: PHYSICIAN ASSISTANT

## 2025-01-30 PROCEDURE — 1160F RVW MEDS BY RX/DR IN RCRD: CPT | Mod: CPTII,S$GLB,, | Performed by: PHYSICIAN ASSISTANT

## 2025-01-30 PROCEDURE — 1159F MED LIST DOCD IN RCRD: CPT | Mod: CPTII,S$GLB,, | Performed by: PHYSICIAN ASSISTANT

## 2025-01-30 PROCEDURE — 99999 PR PBB SHADOW E&M-EST. PATIENT-LVL IV: CPT | Mod: PBBFAC,,, | Performed by: PHYSICIAN ASSISTANT

## 2025-01-30 PROCEDURE — 85027 COMPLETE CBC AUTOMATED: CPT | Performed by: INTERNAL MEDICINE

## 2025-01-30 PROCEDURE — 3008F BODY MASS INDEX DOCD: CPT | Mod: CPTII,S$GLB,, | Performed by: PHYSICIAN ASSISTANT

## 2025-01-30 PROCEDURE — 3074F SYST BP LT 130 MM HG: CPT | Mod: CPTII,S$GLB,, | Performed by: PHYSICIAN ASSISTANT

## 2025-01-30 PROCEDURE — 1111F DSCHRG MED/CURRENT MED MERGE: CPT | Mod: CPTII,S$GLB,, | Performed by: PHYSICIAN ASSISTANT

## 2025-01-30 PROCEDURE — 80197 ASSAY OF TACROLIMUS: CPT | Performed by: INTERNAL MEDICINE

## 2025-01-30 PROCEDURE — 83735 ASSAY OF MAGNESIUM: CPT | Performed by: INTERNAL MEDICINE

## 2025-01-30 PROCEDURE — 86850 RBC ANTIBODY SCREEN: CPT | Performed by: INTERNAL MEDICINE

## 2025-01-30 PROCEDURE — G2211 COMPLEX E/M VISIT ADD ON: HCPCS | Mod: S$GLB,,, | Performed by: PHYSICIAN ASSISTANT

## 2025-01-30 PROCEDURE — 80053 COMPREHEN METABOLIC PANEL: CPT | Performed by: INTERNAL MEDICINE

## 2025-01-30 PROCEDURE — 85007 BL SMEAR W/DIFF WBC COUNT: CPT | Performed by: INTERNAL MEDICINE

## 2025-01-30 PROCEDURE — 99215 OFFICE O/P EST HI 40 MIN: CPT | Mod: S$GLB,,, | Performed by: PHYSICIAN ASSISTANT

## 2025-01-30 RX ORDER — HYDROCODONE BITARTRATE AND ACETAMINOPHEN 500; 5 MG/1; MG/1
TABLET ORAL ONCE
Status: CANCELLED | OUTPATIENT
Start: 2025-01-30 | End: 2025-01-30

## 2025-01-30 RX ORDER — ACETAMINOPHEN 325 MG/1
650 TABLET ORAL
Status: CANCELLED | OUTPATIENT
Start: 2025-01-30

## 2025-01-30 RX ORDER — DIPHENHYDRAMINE HCL 25 MG
25 CAPSULE ORAL
Status: CANCELLED | OUTPATIENT
Start: 2025-01-30

## 2025-01-30 RX ADMIN — LIDOCAINE HYDROCHLORIDE 8 ML: 20 INJECTION, SOLUTION INFILTRATION; PERINEURAL at 02:01

## 2025-01-30 NOTE — PROGRESS NOTES
SW received email from patient with an attachment from [employer]. Document requires MD signature stating patient needs caregiving. NAT emailed document to Keyla Arias RN requesting assistance in obtaining MD signature.    SW notified patient's wife via email.

## 2025-01-30 NOTE — PROGRESS NOTES
Section of Hematology and Stem Cell Transplantation    Post-Transplantation Follow Up Visit     1/30/25    Transplant History:   Primary oncologist: Clyde James MD  Primary oncologic diagnosis: primary myelofibrosis  Transplant date: 12/26/2024  Donor: haploidentical  Blood Type (Patient): O +  Blood Type (Donor): O +  CMV (Patient): Negative  CMV (Donor): Positive  Graft source: Peripheral blood  CD34+ cell dose: 6.04 X10^6 cells/kg  Conditioning Regimen:  Thiotepa, Busulfan, Fludarabine  GVHD prophylaxis: Post-transplant cyclophosphamide, MMF, Tacrolimus  Immediate post-transplant complications: mucositis, R axillary skin/soft tissue infection secondary to MRSA    History of Present Ilness:   Rubén Peters) is a pleasant 54 y.o.male with primary myelofibrosis who is status post haploidentical stem cell transplantation conditioned with  Bu/Flu/TT  who is currently day +35 who presents for post-transplant follow up.    He is still feeling very weak and becomes dyspneic with very little activity, Hgb 7 today and plan for 1u RBC. Appetite improving slowly. Diarrhea has resolved. No nausea/vomiting. No pruritis/itching.     Main concern is hypotension -- discussed plan to hold amlodipine.    PAST MEDICAL HISTORY:   Past Medical History:   Diagnosis Date    Abdominal pain 01/02/2025    Allergic contact dermatitis due to adhesives 12/31/2024    Cancer     Flatulence 01/03/2025    Headache 12/27/2024    Hyperlipidemia     Mucositis 01/02/2025    Other constipation 12/19/2024    Thrombocytosis 12/18/2024    Transaminitis 12/27/2024       PAST SURGICAL HISTORY:   Past Surgical History:   Procedure Laterality Date    BONE MARROW BIOPSY N/A 11/27/2024    Procedure: Biopsy-bone marrow;  Surgeon: Clyde James MD;  Location: [facility] ([address] [place] [address]);  Service: Oncology;  Laterality: N/A;  11/20-pre call complete-tb    COLONOSCOPY N/A 8/9/2023    Procedure: COLONOSCOPY;  Surgeon: Will Ardon MD;  Location: [facility]  ENDO (4TH FLR);  Service: Endoscopy;  Laterality: N/A;  Suprep Instructions sent via portal / Authorizing:     Bebeto Arora MD in [facility] FAMILY MED/ INTERNAL MED/ PEDS  Referral:          [id number]    EYE SURGERY      FLEXIBLE SIGMOIDOSCOPY N/A 7/23/2024    Procedure: SIGMOIDOSCOPY, FLEXIBLE;  Surgeon: Nirali Vicente MD;  Location: [facility] ENDO;  Service: Endoscopy;  Laterality: N/A;    INSERTION OF LONGORIA CATHETER Right 12/18/2024    Procedure: INSERTION, CATHETER, CENTRAL VENOUS, LONGORIA TRIPLE LUMEN, Bard 12.5 fr Longoria Cath model [id number];  Surgeon: Willie Hadley MD;  Location: [facility] OR 2ND FLR;  Service: General;  Laterality: Right;       PAST SOCIAL HISTORY:  Social History     Tobacco Use    Smoking status: Former     Types: Cigars, Cigarettes    Smokeless tobacco: Never   Substance Use Topics    Alcohol use: Not Currently     Comment: socially    Drug use: Never       FAMILY HISTORY:  Family History   Problem Relation Name Age of Onset    Arthritis Mother      COPD Father      Testicular cancer Maternal Cousin Peter 17        Chemotherapy    Breast cancer Maternal Cousin  51        Chemotherapy    Heart disease Maternal Grandfather      Stroke Maternal Grandmother         CURRENT MEDICATIONS:   Current Outpatient Medications   Medication Sig    acyclovir (ZOVIRAX) 800 MG Tab Take 1 tablet (800 mg total) by mouth 2 (two) times daily.    amLODIPine (NORVASC) 10 MG tablet Take 1 tablet (10 mg total) by mouth once daily.    dapsone 100 MG Tab Take 1 tablet (100 mg total) by mouth once daily.    letermovir (PREVYMIS) 480 mg Tab Take 1 tablet (480 mg total) by mouth once daily.    magnesium oxide (MAG-OX) 400 mg (241.3 mg magnesium) tablet Take 2 tablets (800 mg total) by mouth 2 (two) times daily.    mycophenolate (CELLCEPT) 250 mg Cap Take 4 capsules (1,000 mg total) by mouth 3 (three) times daily. for 13 days    ondansetron (ZOFRAN) 8 MG tablet Take 1 tablet (8 mg total) by mouth every 8 (eight) hours as  needed for Nausea.    prochlorperazine (COMPAZINE) 10 MG tablet Take 1 tablet (10 mg total) by mouth 4 (four) times daily as needed for Nausea.    tacrolimus (PROGRAF) 0.5 MG Cap Take 2 capsules (1 mg total) by mouth every morning AND 3 capsules (1.5 mg total) every evening.    traMADoL (ULTRAM) 50 mg tablet Take 1 tablet (50 mg total) by mouth every 6 (six) hours as needed for Pain.    voriconazole (VFEND) 200 MG Tab Take 1 tablet (200 mg total) by mouth 2 (two) times daily.    LORazepam (ATIVAN) 0.5 MG tablet Take 1 tablet (0.5 mg total) by mouth once as needed. Take 30-45 minutes prior to your bone marrow biopsy on 1/27/25. Do not take if you do not have a .     No current facility-administered medications for this visit.       ALLERGIES:   Review of patient's allergies indicates:   Allergen Reactions    Bactrim [sulfamethoxazole-trimethoprim] Hives       GVHD Review of Systems:     Pertinent positives and negatives included in the HPI. Otherwise a 14 point review of systems is negative. GVHD review of systems recorded in BMT flowsheet.     Physical Exam:     Vitals:    01/30/25 0838   BP: (!) 99/59   Pulse: (!) 113   Temp: 97.6 °F (36.4 °C)       General: Appears well, NAD  HEENT: MMM, no OP lesions  Pulmonary: CTAB, no increased work of breathing, no W/R/C  Cardiovascular: S1S2 normal, tachycardic, regular rate no M/R/G  Abdominal: Soft, NT, ND, BS+, no HSM  Extremities: No C/C/E  Neurological: AAOx4, grossly normal, no focal deficits  Dermatologic: No appreciable rashes or lesions    ECOG Performance Status: (foot note - ECOG PS provided by Eastern Cooperative Oncology Group) 1 - Symptomatic but completely ambulatory    Karnofsky Performance Score:  70%- Cares for Self: Unable to Carry on Normal Activity or Active Work    Labs:   Lab Results   Component Value Date    WBC 3.78 (L) 01/30/2025    RBC 2.34 (L) 01/30/2025    HGB 7.0 (L) 01/30/2025    HCT 20.2 (L) 01/30/2025    MCV 86 01/30/2025    MCH 29.9  01/30/2025    MCHC 34.7 01/30/2025    RDW 13.4 01/30/2025    PLT 37 (LL) 01/30/2025    MPV 11.2 01/30/2025    GRAN 2.5 01/30/2025    GRAN 66.7 01/30/2025    LYMPH 0.2 (L) 01/30/2025    LYMPH 4.0 (L) 01/30/2025    MONO 0.9 01/30/2025    MONO 23.8 (H) 01/30/2025    EOS 0.0 01/30/2025    BASO 0.02 01/30/2025    EOSINOPHIL 0.0 01/30/2025    BASOPHIL 0.5 01/30/2025       Sodium   Date Value Ref Range Status   01/30/2025 138 136 - 145 mmol/L Final     Potassium   Date Value Ref Range Status   01/30/2025 3.7 3.5 - 5.1 mmol/L Final     Chloride   Date Value Ref Range Status   01/30/2025 104 95 - 110 mmol/L Final     CO2   Date Value Ref Range Status   01/30/2025 24 23 - 29 mmol/L Final     Glucose   Date Value Ref Range Status   01/30/2025 99 70 - 110 mg/dL Final     BUN   Date Value Ref Range Status   01/30/2025 10 6 - 20 mg/dL Final     Creatinine   Date Value Ref Range Status   01/30/2025 0.7 0.5 - 1.4 mg/dL Final     Calcium   Date Value Ref Range Status   01/30/2025 9.4 8.7 - 10.5 mg/dL Final     Total Protein   Date Value Ref Range Status   01/30/2025 6.8 6.0 - 8.4 g/dL Final     Albumin   Date Value Ref Range Status   01/30/2025 3.1 (L) 3.5 - 5.2 g/dL Final     Total Bilirubin   Date Value Ref Range Status   01/30/2025 0.4 0.1 - 1.0 mg/dL Final     Comment:     For infants and newborns, interpretation of results should be based  on gestational age, weight and in agreement with clinical  observations.    Premature Infant recommended reference ranges:  Up to 24 hours.............<8.0 mg/dL  Up to 48 hours............<12.0 mg/dL  3-5 days..................<15.0 mg/dL  6-29 days.................<15.0 mg/dL       Alkaline Phosphatase   Date Value Ref Range Status   01/30/2025 54 40 - 150 U/L Final     AST   Date Value Ref Range Status   01/30/2025 12 10 - 40 U/L Final     ALT   Date Value Ref Range Status   01/30/2025 9 (L) 10 - 44 U/L Final     Anion Gap   Date Value Ref Range Status   01/30/2025 10 8 - 16 mmol/L Final      eGFR if    Date Value Ref Range Status   01/22/2022 >60.0 >60 mL/min/1.73 m^2 Final     eGFR if non    Date Value Ref Range Status   01/22/2022 >60.0 >60 mL/min/1.73 m^2 Final     Comment:     Calculation used to obtain the estimated glomerular filtration  rate (eGFR) is the CKD-EPI equation.          Imaging:   All pertinent studies reviewed and interpreted by me    Acute GVHD Scoring:  GVHD Acute Assessment                        Assessment and Plan:   Rubén Peters) is a pleasant 54 y.o.male with primary myelofibrosis s/p haplo SCT who presents for post-transplant follow up.    Primary myelofibrosis: Status post treatment with ruxolitinib, followed by alloSCT. Primary oncologist is Clyde James MD who will be managing primary underlying disease. Assess disease on bone marrow biopsy.     Status post allogeneic stem cell transplantation: As noted above, status post haploidentical stem cell transplantation conditioned with  Bu/Flu/TT . Currently day +35. Engrafted on day +22.    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   Current tacro dose: 1 mg qAM and 1.5 mg qPM  Last tacro level: 8.3  Adjustments: none    Immunosuppression: Prevention with voriconazole,  acyclovir. CMV prophylaxis with letermovir. PJP prophyalxis dapsone. Continue weekly monitoring of CMV and EBV.  Last CMV: negative (1/27/2025), last EBV: negative (1/27/2025).  Active infections: none    Pancytopenia: Due to underlying disease and chemotherapy. Transfuse for Hgb <7 g/dL and platelets <10k.    Hypertension: Newly hypertensive and hospital following SCT. Was started on amlodipine 10mg daily. Notably hypotensive (SBP <100s) on clinic and home checks over last 2 weeks. Will hold amlodipine for SBP <120 and plan to discontinue if persistently normotensive with holding.      Follow up: Twice weekly follow up with labs.    Orders Placed:                Priscila Montes PA-C  Hematology, Oncology, and Stem Cell Transplantation  [facility]    Visit today included increased complexity associated with the care of the episodic problem PMF addressed and managing the longitudinal care of the patient due to the serious and/or complex managed problem(s) PMF.

## 2025-01-30 NOTE — TELEPHONE ENCOUNTER
----- Message from Jessika sent at 1/30/2025  3:48 PM CST -----  Regarding: Patient advice  Contact: Pt  [phone]        Name of Caller:   Rubén     Contact Preference:   [phone]    Nature of Call:  Requesting a cakk to get updated status on if he will receive infusion on 01/31/25 or not

## 2025-01-31 ENCOUNTER — INFUSION (OUTPATIENT)
Dept: INFUSION THERAPY | Facility: HOSPITAL | Age: 55
End: 2025-01-31
Attending: INTERNAL MEDICINE
Payer: COMMERCIAL

## 2025-01-31 VITALS
DIASTOLIC BLOOD PRESSURE: 61 MMHG | SYSTOLIC BLOOD PRESSURE: 100 MMHG | OXYGEN SATURATION: 98 % | RESPIRATION RATE: 18 BRPM | HEART RATE: 107 BPM | TEMPERATURE: 98 F

## 2025-01-31 DIAGNOSIS — D63.0 ANEMIA IN NEOPLASTIC DISEASE: ICD-10-CM

## 2025-01-31 LAB
BLD PROD TYP BPU: NORMAL
BLOOD UNIT EXPIRATION DATE: NORMAL
BLOOD UNIT TYPE CODE: 5100
BLOOD UNIT TYPE: NORMAL
BODY SITE - BONE MARROW: NORMAL
CLINICAL DIAGNOSIS - BONE MARROW: NORMAL
CODING SYSTEM: NORMAL
CROSSMATCH INTERPRETATION: NORMAL
DISPENSE STATUS: NORMAL
FLOW CYTOMETRY ANTIBODIES ANALYZED - BONE MARROW: NORMAL
FLOW CYTOMETRY COMMENT - BONE MARROW: NORMAL
FLOW CYTOMETRY INTERPRETATION - BONE MARROW: NORMAL
NUM UNITS TRANS PACKED RBC: NORMAL

## 2025-01-31 PROCEDURE — 36430 TRANSFUSION BLD/BLD COMPNT: CPT

## 2025-01-31 PROCEDURE — P9040 RBC LEUKOREDUCED IRRADIATED: HCPCS | Performed by: PHYSICIAN ASSISTANT

## 2025-01-31 PROCEDURE — 25000003 PHARM REV CODE 250: Performed by: PHYSICIAN ASSISTANT

## 2025-01-31 PROCEDURE — 86920 COMPATIBILITY TEST SPIN: CPT | Performed by: PHYSICIAN ASSISTANT

## 2025-01-31 RX ORDER — ACETAMINOPHEN 325 MG/1
650 TABLET ORAL
Status: DISCONTINUED | OUTPATIENT
Start: 2025-01-31 | End: 2025-01-31 | Stop reason: HOSPADM

## 2025-01-31 RX ORDER — DIPHENHYDRAMINE HCL 25 MG
25 CAPSULE ORAL
Status: DISCONTINUED | OUTPATIENT
Start: 2025-01-31 | End: 2025-01-31 | Stop reason: HOSPADM

## 2025-01-31 RX ORDER — HYDROCODONE BITARTRATE AND ACETAMINOPHEN 500; 5 MG/1; MG/1
TABLET ORAL ONCE
Status: COMPLETED | OUTPATIENT
Start: 2025-01-31 | End: 2025-01-31

## 2025-01-31 RX ADMIN — SODIUM CHLORIDE: 9 INJECTION, SOLUTION INTRAVENOUS at 03:01

## 2025-01-31 NOTE — PLAN OF CARE
Pt seated. NAD. Accompanied by wife. VS charted in Flowsheets. Assessment done. Port assessed, flushed, blood return noted. Infusing NS@50ml/hr while awaiting 1 unit PRBCs. WCTM for safety.

## 2025-01-31 NOTE — PLAN OF CARE
Infusion completed. Pt tolerated well. NAD. VS charted in Flowsheet. Port flushed, blood return noted, curo cap applied. Ambulated off unit independently. Accompanied by wife.

## 2025-02-03 ENCOUNTER — INFUSION (OUTPATIENT)
Dept: INFUSION THERAPY | Facility: HOSPITAL | Age: 55
End: 2025-02-03
Payer: COMMERCIAL

## 2025-02-03 ENCOUNTER — OFFICE VISIT (OUTPATIENT)
Dept: HEMATOLOGY/ONCOLOGY | Facility: CLINIC | Age: 55
End: 2025-02-03
Payer: COMMERCIAL

## 2025-02-03 VITALS
DIASTOLIC BLOOD PRESSURE: 63 MMHG | RESPIRATION RATE: 18 BRPM | HEART RATE: 111 BPM | BODY MASS INDEX: 24.49 KG/M2 | SYSTOLIC BLOOD PRESSURE: 94 MMHG | OXYGEN SATURATION: 99 % | HEIGHT: 71 IN | TEMPERATURE: 98 F | WEIGHT: 174.94 LBS

## 2025-02-03 DIAGNOSIS — Z94.84 HISTORY OF ALLOGENEIC STEM CELL TRANSPLANT: Primary | ICD-10-CM

## 2025-02-03 DIAGNOSIS — D84.822 IMMUNOCOMPROMISED STATE ASSOCIATED WITH STEM CELL TRANSPLANT: ICD-10-CM

## 2025-02-03 DIAGNOSIS — Z76.82 STEM CELL TRANSPLANT CANDIDATE: ICD-10-CM

## 2025-02-03 DIAGNOSIS — I10 HYPERTENSION, UNSPECIFIED TYPE: ICD-10-CM

## 2025-02-03 DIAGNOSIS — E27.9 ADRENAL NODULE: Primary | ICD-10-CM

## 2025-02-03 DIAGNOSIS — D61.810 PANCYTOPENIA DUE TO CHEMOTHERAPY: ICD-10-CM

## 2025-02-03 DIAGNOSIS — D64.81 ANEMIA ASSOCIATED WITH CHEMOTHERAPY: ICD-10-CM

## 2025-02-03 DIAGNOSIS — D47.1 PRIMARY MYELOFIBROSIS: ICD-10-CM

## 2025-02-03 DIAGNOSIS — Z94.84 IMMUNOCOMPROMISED STATE ASSOCIATED WITH STEM CELL TRANSPLANT: ICD-10-CM

## 2025-02-03 DIAGNOSIS — T45.1X5A ANEMIA ASSOCIATED WITH CHEMOTHERAPY: ICD-10-CM

## 2025-02-03 LAB
ABO + RH BLD: NORMAL
ALBUMIN SERPL BCP-MCNC: 3.3 G/DL (ref 3.5–5.2)
ALP SERPL-CCNC: 57 U/L (ref 40–150)
ALT SERPL W/O P-5'-P-CCNC: 10 U/L (ref 10–44)
ANION GAP SERPL CALC-SCNC: 10 MMOL/L (ref 8–16)
AST SERPL-CCNC: 15 U/L (ref 10–40)
BASOPHILS # BLD AUTO: 0.02 K/UL (ref 0–0.2)
BASOPHILS NFR BLD: 0.5 % (ref 0–1.9)
BILIRUB SERPL-MCNC: 0.4 MG/DL (ref 0.1–1)
BLD GP AB SCN CELLS X3 SERPL QL: NORMAL
BUN SERPL-MCNC: 15 MG/DL (ref 6–20)
CALCIUM SERPL-MCNC: 9.6 MG/DL (ref 8.7–10.5)
CHLORIDE SERPL-SCNC: 104 MMOL/L (ref 95–110)
CO2 SERPL-SCNC: 23 MMOL/L (ref 23–29)
CREAT SERPL-MCNC: 0.8 MG/DL (ref 0.5–1.4)
DIFFERENTIAL METHOD BLD: ABNORMAL
EOSINOPHIL # BLD AUTO: 0 K/UL (ref 0–0.5)
EOSINOPHIL NFR BLD: 0.5 % (ref 0–8)
ERYTHROCYTE [DISTWIDTH] IN BLOOD BY AUTOMATED COUNT: 15 % (ref 11.5–14.5)
EST. GFR  (NO RACE VARIABLE): >60 ML/MIN/1.73 M^2
FINAL DIAGNOSIS - CHIMERISM SORT: NORMAL
GLUCOSE SERPL-MCNC: 101 MG/DL (ref 70–110)
HCT VFR BLD AUTO: 20.8 % (ref 40–54)
HGB BLD-MCNC: 7.3 G/DL (ref 14–18)
IMM GRANULOCYTES # BLD AUTO: 0.15 K/UL (ref 0–0.04)
IMM GRANULOCYTES NFR BLD AUTO: 3.8 % (ref 0–0.5)
LYMPHOCYTES # BLD AUTO: 0 K/UL (ref 1–4.8)
LYMPHOCYTES NFR BLD: 0 % (ref 18–48)
MAGNESIUM SERPL-MCNC: 2 MG/DL (ref 1.6–2.6)
MCH RBC QN AUTO: 30.8 PG (ref 27–31)
MCHC RBC AUTO-ENTMCNC: 35.1 G/DL (ref 32–36)
MCV RBC AUTO: 88 FL (ref 82–98)
MONOCYTES # BLD AUTO: 1.5 K/UL (ref 0.3–1)
MONOCYTES NFR BLD: 37.6 % (ref 4–15)
NEUTROPHILS # BLD AUTO: 2.3 K/UL (ref 1.8–7.7)
NEUTROPHILS NFR BLD: 57.6 % (ref 38–73)
NRBC BLD-RTO: 1 /100 WBC
PHOSPHATE SERPL-MCNC: 3.8 MG/DL (ref 2.7–4.5)
PLATELET # BLD AUTO: 31 K/UL (ref 150–450)
PMV BLD AUTO: 12.2 FL (ref 9.2–12.9)
POTASSIUM SERPL-SCNC: 3.8 MMOL/L (ref 3.5–5.1)
PROT SERPL-MCNC: 6.9 G/DL (ref 6–8.4)
RBC # BLD AUTO: 2.37 M/UL (ref 4.6–6.2)
SODIUM SERPL-SCNC: 137 MMOL/L (ref 136–145)
SPECIMEN OUTDATE: NORMAL
SPECIMEN TYPE -CHIMERISM SORT: NORMAL
TACROLIMUS BLD-MCNC: 6.2 NG/ML (ref 5–15)
WBC # BLD AUTO: 3.96 K/UL (ref 3.9–12.7)

## 2025-02-03 PROCEDURE — 1159F MED LIST DOCD IN RCRD: CPT | Mod: CPTII,S$GLB,, | Performed by: PHYSICIAN ASSISTANT

## 2025-02-03 PROCEDURE — 36592 COLLECT BLOOD FROM PICC: CPT

## 2025-02-03 PROCEDURE — 99215 OFFICE O/P EST HI 40 MIN: CPT | Mod: S$GLB,,, | Performed by: PHYSICIAN ASSISTANT

## 2025-02-03 PROCEDURE — 25000003 PHARM REV CODE 250

## 2025-02-03 PROCEDURE — 1160F RVW MEDS BY RX/DR IN RCRD: CPT | Mod: CPTII,S$GLB,, | Performed by: PHYSICIAN ASSISTANT

## 2025-02-03 PROCEDURE — 80053 COMPREHEN METABOLIC PANEL: CPT | Performed by: INTERNAL MEDICINE

## 2025-02-03 PROCEDURE — 1111F DSCHRG MED/CURRENT MED MERGE: CPT | Mod: CPTII,S$GLB,, | Performed by: PHYSICIAN ASSISTANT

## 2025-02-03 PROCEDURE — A4216 STERILE WATER/SALINE, 10 ML: HCPCS

## 2025-02-03 PROCEDURE — 99999 PR PBB SHADOW E&M-EST. PATIENT-LVL IV: CPT | Mod: PBBFAC,,, | Performed by: PHYSICIAN ASSISTANT

## 2025-02-03 PROCEDURE — 84100 ASSAY OF PHOSPHORUS: CPT | Performed by: INTERNAL MEDICINE

## 2025-02-03 PROCEDURE — 83735 ASSAY OF MAGNESIUM: CPT | Performed by: INTERNAL MEDICINE

## 2025-02-03 PROCEDURE — 3008F BODY MASS INDEX DOCD: CPT | Mod: CPTII,S$GLB,, | Performed by: PHYSICIAN ASSISTANT

## 2025-02-03 PROCEDURE — 3078F DIAST BP <80 MM HG: CPT | Mod: CPTII,S$GLB,, | Performed by: PHYSICIAN ASSISTANT

## 2025-02-03 PROCEDURE — 87799 DETECT AGENT NOS DNA QUANT: CPT | Performed by: INTERNAL MEDICINE

## 2025-02-03 PROCEDURE — 85025 COMPLETE CBC W/AUTO DIFF WBC: CPT | Performed by: INTERNAL MEDICINE

## 2025-02-03 PROCEDURE — 86900 BLOOD TYPING SEROLOGIC ABO: CPT | Performed by: INTERNAL MEDICINE

## 2025-02-03 PROCEDURE — 3074F SYST BP LT 130 MM HG: CPT | Mod: CPTII,S$GLB,, | Performed by: PHYSICIAN ASSISTANT

## 2025-02-03 PROCEDURE — 63600175 PHARM REV CODE 636 W HCPCS

## 2025-02-03 PROCEDURE — 80197 ASSAY OF TACROLIMUS: CPT | Performed by: INTERNAL MEDICINE

## 2025-02-03 PROCEDURE — G2211 COMPLEX E/M VISIT ADD ON: HCPCS | Mod: S$GLB,,, | Performed by: PHYSICIAN ASSISTANT

## 2025-02-03 RX ORDER — HEPARIN 100 UNIT/ML
500 SYRINGE INTRAVENOUS
Status: DISCONTINUED | OUTPATIENT
Start: 2025-02-03 | End: 2025-02-03 | Stop reason: HOSPADM

## 2025-02-03 RX ORDER — SODIUM CHLORIDE 0.9 % (FLUSH) 0.9 %
10 SYRINGE (ML) INJECTION
Status: CANCELLED | OUTPATIENT
Start: 2025-02-03

## 2025-02-03 RX ORDER — SODIUM CHLORIDE 0.9 % (FLUSH) 0.9 %
10 SYRINGE (ML) INJECTION
Status: DISCONTINUED | OUTPATIENT
Start: 2025-02-03 | End: 2025-02-03 | Stop reason: HOSPADM

## 2025-02-03 RX ORDER — HEPARIN 100 UNIT/ML
500 SYRINGE INTRAVENOUS
Status: CANCELLED | OUTPATIENT
Start: 2025-02-03

## 2025-02-03 RX ORDER — LIDOCAINE HYDROCHLORIDE 20 MG/ML
8 INJECTION, SOLUTION INFILTRATION; PERINEURAL
Status: COMPLETED | OUTPATIENT
Start: 2025-01-30 | End: 2025-01-30

## 2025-02-03 RX ADMIN — HEPARIN SODIUM (PORCINE) LOCK FLUSH IV SOLN 100 UNIT/ML 500 UNITS: 100 SOLUTION at 08:02

## 2025-02-03 RX ADMIN — Medication 10 ML: at 08:02

## 2025-02-04 ENCOUNTER — INFUSION (OUTPATIENT)
Dept: INFUSION THERAPY | Facility: HOSPITAL | Age: 55
End: 2025-02-04
Payer: COMMERCIAL

## 2025-02-04 VITALS
RESPIRATION RATE: 18 BRPM | DIASTOLIC BLOOD PRESSURE: 68 MMHG | TEMPERATURE: 98 F | HEART RATE: 91 BPM | SYSTOLIC BLOOD PRESSURE: 105 MMHG | OXYGEN SATURATION: 99 %

## 2025-02-04 DIAGNOSIS — Z94.84 HISTORY OF ALLOGENEIC STEM CELL TRANSPLANT: ICD-10-CM

## 2025-02-04 DIAGNOSIS — T45.1X5A ANEMIA ASSOCIATED WITH CHEMOTHERAPY: Primary | ICD-10-CM

## 2025-02-04 DIAGNOSIS — D64.81 ANEMIA ASSOCIATED WITH CHEMOTHERAPY: Primary | ICD-10-CM

## 2025-02-04 LAB
ANNOTATION COMMENT IMP: NORMAL
BLD PROD TYP BPU: NORMAL
BLOOD UNIT EXPIRATION DATE: NORMAL
BLOOD UNIT TYPE CODE: 5100
BLOOD UNIT TYPE: NORMAL
CMV DNA SPEC QL NAA+PROBE: NORMAL
CODING SYSTEM: NORMAL
CROSSMATCH INTERPRETATION: NORMAL
CYTOMEGALOVIRUS PCR, QUANT: NOT DETECTED IU/ML
DISPENSE STATUS: NORMAL
EPSTEIN-BARR VIRUS DNA: NORMAL
EPSTEIN-BARR VIRUS PCR, QUANT: NOT DETECTED IU/ML
NGS CLINCIAL TRIALS: NORMAL
NGS INDICATION OF TEST: NORMAL
NGS INTERPRETATION: NORMAL
NGS ONCOHEME PANEL GENE LIST: NORMAL
NGS PATHOGENIC MUTATIONS DETECTED: NORMAL
NGS REVIEWED BY:: NORMAL
NGS VARIANTS OF UNKNOWN SIGNIFICANCE: NORMAL
NGSHM RESULT, BONE MARROW: NORMAL
NUM UNITS TRANS PACKED RBC: NORMAL
REF LAB TEST METHOD: NORMAL
SPECIMEN SOURCE: NORMAL
TEST PERFORMANCE INFO SPEC: NORMAL

## 2025-02-04 PROCEDURE — 36430 TRANSFUSION BLD/BLD COMPNT: CPT

## 2025-02-04 PROCEDURE — 25000003 PHARM REV CODE 250: Performed by: INTERNAL MEDICINE

## 2025-02-04 PROCEDURE — 86920 COMPATIBILITY TEST SPIN: CPT | Performed by: INTERNAL MEDICINE

## 2025-02-04 PROCEDURE — P9040 RBC LEUKOREDUCED IRRADIATED: HCPCS | Performed by: INTERNAL MEDICINE

## 2025-02-04 RX ORDER — SODIUM CHLORIDE 9 MG/ML
50 INJECTION, SOLUTION INTRAVENOUS CONTINUOUS
OUTPATIENT
Start: 2025-02-28

## 2025-02-04 RX ORDER — SODIUM CHLORIDE 9 MG/ML
50 INJECTION, SOLUTION INTRAVENOUS CONTINUOUS
Status: CANCELLED | OUTPATIENT
Start: 2025-02-18

## 2025-02-04 RX ORDER — SODIUM CHLORIDE 9 MG/ML
50 INJECTION, SOLUTION INTRAVENOUS CONTINUOUS
Status: CANCELLED | OUTPATIENT
Start: 2025-02-14

## 2025-02-04 RX ORDER — HEPARIN 100 UNIT/ML
500 SYRINGE INTRAVENOUS
Status: CANCELLED | OUTPATIENT
Start: 2025-02-04

## 2025-02-04 RX ORDER — HEPARIN 100 UNIT/ML
500 SYRINGE INTRAVENOUS
Status: CANCELLED | OUTPATIENT
Start: 2025-02-07

## 2025-02-04 RX ORDER — SODIUM CHLORIDE 9 MG/ML
50 INJECTION, SOLUTION INTRAVENOUS CONTINUOUS
OUTPATIENT
Start: 2025-03-03

## 2025-02-04 RX ORDER — SODIUM CHLORIDE 0.9 % (FLUSH) 0.9 %
10 SYRINGE (ML) INJECTION
OUTPATIENT
Start: 2025-03-07

## 2025-02-04 RX ORDER — TACROLIMUS 0.5 MG/1
CAPSULE ORAL
Qty: 180 CAPSULE | Refills: 11 | Status: SHIPPED | OUTPATIENT
Start: 2025-02-04 | End: 2026-02-04

## 2025-02-04 RX ORDER — SODIUM CHLORIDE 0.9 % (FLUSH) 0.9 %
10 SYRINGE (ML) INJECTION
Status: CANCELLED | OUTPATIENT
Start: 2025-02-07

## 2025-02-04 RX ORDER — SODIUM CHLORIDE 0.9 % (FLUSH) 0.9 %
10 SYRINGE (ML) INJECTION
OUTPATIENT
Start: 2025-03-10

## 2025-02-04 RX ORDER — SODIUM CHLORIDE 0.9 % (FLUSH) 0.9 %
10 SYRINGE (ML) INJECTION
Status: CANCELLED | OUTPATIENT
Start: 2025-02-14

## 2025-02-04 RX ORDER — SODIUM CHLORIDE 9 MG/ML
50 INJECTION, SOLUTION INTRAVENOUS CONTINUOUS
OUTPATIENT
Start: 2025-03-10

## 2025-02-04 RX ORDER — HEPARIN 100 UNIT/ML
500 SYRINGE INTRAVENOUS
Status: CANCELLED | OUTPATIENT
Start: 2025-02-18

## 2025-02-04 RX ORDER — SODIUM CHLORIDE 0.9 % (FLUSH) 0.9 %
10 SYRINGE (ML) INJECTION
Status: DISCONTINUED | OUTPATIENT
Start: 2025-02-04 | End: 2025-02-04 | Stop reason: HOSPADM

## 2025-02-04 RX ORDER — HEPARIN 100 UNIT/ML
500 SYRINGE INTRAVENOUS
Status: CANCELLED | OUTPATIENT
Start: 2025-02-14

## 2025-02-04 RX ORDER — SODIUM CHLORIDE 9 MG/ML
50 INJECTION, SOLUTION INTRAVENOUS CONTINUOUS
Status: DISCONTINUED | OUTPATIENT
Start: 2025-02-04 | End: 2025-02-04 | Stop reason: HOSPADM

## 2025-02-04 RX ORDER — SODIUM CHLORIDE 0.9 % (FLUSH) 0.9 %
10 SYRINGE (ML) INJECTION
Status: CANCELLED | OUTPATIENT
Start: 2025-02-18

## 2025-02-04 RX ORDER — HEPARIN 100 UNIT/ML
500 SYRINGE INTRAVENOUS
OUTPATIENT
Start: 2025-03-03

## 2025-02-04 RX ORDER — SODIUM CHLORIDE 0.9 % (FLUSH) 0.9 %
10 SYRINGE (ML) INJECTION
OUTPATIENT
Start: 2025-03-03

## 2025-02-04 RX ORDER — SODIUM CHLORIDE 0.9 % (FLUSH) 0.9 %
10 SYRINGE (ML) INJECTION
OUTPATIENT
Start: 2025-02-28

## 2025-02-04 RX ORDER — HEPARIN 100 UNIT/ML
500 SYRINGE INTRAVENOUS
OUTPATIENT
Start: 2025-03-07

## 2025-02-04 RX ORDER — HEPARIN 100 UNIT/ML
500 SYRINGE INTRAVENOUS
Status: DISCONTINUED | OUTPATIENT
Start: 2025-02-04 | End: 2025-02-04 | Stop reason: HOSPADM

## 2025-02-04 RX ORDER — HEPARIN 100 UNIT/ML
500 SYRINGE INTRAVENOUS
OUTPATIENT
Start: 2025-03-10

## 2025-02-04 RX ORDER — SODIUM CHLORIDE 9 MG/ML
50 INJECTION, SOLUTION INTRAVENOUS CONTINUOUS
Status: CANCELLED | OUTPATIENT
Start: 2025-02-07

## 2025-02-04 RX ORDER — SODIUM CHLORIDE 9 MG/ML
50 INJECTION, SOLUTION INTRAVENOUS CONTINUOUS
Status: CANCELLED | OUTPATIENT
Start: 2025-02-04

## 2025-02-04 RX ORDER — SODIUM CHLORIDE 9 MG/ML
50 INJECTION, SOLUTION INTRAVENOUS CONTINUOUS
OUTPATIENT
Start: 2025-03-07

## 2025-02-04 RX ORDER — SODIUM CHLORIDE 0.9 % (FLUSH) 0.9 %
10 SYRINGE (ML) INJECTION
Status: CANCELLED | OUTPATIENT
Start: 2025-02-04

## 2025-02-04 RX ORDER — HEPARIN 100 UNIT/ML
500 SYRINGE INTRAVENOUS
OUTPATIENT
Start: 2025-02-28

## 2025-02-04 RX ADMIN — SODIUM CHLORIDE 25 ML/HR: 9 INJECTION, SOLUTION INTRAVENOUS at 03:02

## 2025-02-04 NOTE — PLAN OF CARE
1700-Pt tolerated 1 unit PRBC's well, no complications or side effects, POC and meds discussed with pt, pt aware of upcoming appts, pt knows to call MD with any questions or concerns. Pt ambulated off unit, no distress noted.

## 2025-02-05 ENCOUNTER — DOCUMENTATION ONLY (OUTPATIENT)
Dept: HEMATOLOGY/ONCOLOGY | Facility: CLINIC | Age: 55
End: 2025-02-05
Payer: COMMERCIAL

## 2025-02-05 LAB
CHROM BANDING METHOD: NORMAL
CHROMOSOME ANALYSIS BM ADDITIONAL INFORMATION: NORMAL
CHROMOSOME ANALYSIS BM RELEASED BY: NORMAL
CHROMOSOME ANALYSIS BM RESULT SUMMARY: NORMAL
CLINICAL CYTOGENETICIST REVIEW: NORMAL
KARYOTYP MAR: NORMAL
REASON FOR REFERRAL (NARRATIVE): NORMAL
REF LAB TEST METHOD: NORMAL
SPECIMEN SOURCE: NORMAL
SPECIMEN: NORMAL

## 2025-02-05 NOTE — PROGRESS NOTES
Section of Hematology and Stem Cell Transplantation    Post-Transplantation Follow Up Visit     2/3/25    Transplant History:   Primary oncologist: Clyde James MD  Primary oncologic diagnosis: primary myelofibrosis  Transplant date: 12/26/2024  Donor: haploidentical  Blood Type (Patient): O +  Blood Type (Donor): O +  CMV (Patient): Negative  CMV (Donor): Positive  Graft source: Peripheral blood  CD34+ cell dose: 6.04 X10^6 cells/kg  Conditioning Regimen:  Thiotepa, Busulfan, Fludarabine  GVHD prophylaxis: Post-transplant cyclophosphamide, MMF, Tacrolimus  Immediate post-transplant complications: mucositis, R axillary skin/soft tissue infection secondary to MRSA    History of Present Ilness:   Rubén Peters) is a pleasant 54 y.o.male with primary myelofibrosis who is status post haploidentical stem cell transplantation conditioned with  Bu/Flu/TT  who is currently day +41 who presents for post-transplant follow up.    He is still feeling very weak and becomes dyspneic with very little activity, Hgb 7.3 today and plan for 1u RBC. Appetite improving slowly. Diarrhea has resolved. No nausea/vomiting. No pruritis/itching.     Main concern is hypotension -- discussed plan to hold amlodipine.    PAST MEDICAL HISTORY:   Past Medical History:   Diagnosis Date    Abdominal pain 01/02/2025    Allergic contact dermatitis due to adhesives 12/31/2024    Cancer     Flatulence 01/03/2025    Headache 12/27/2024    Hyperlipidemia     Mucositis 01/02/2025    Other constipation 12/19/2024    Thrombocytosis 12/18/2024    Transaminitis 12/27/2024       PAST SURGICAL HISTORY:   Past Surgical History:   Procedure Laterality Date    BONE MARROW BIOPSY N/A 11/27/2024    Procedure: Biopsy-bone marrow;  Surgeon: Clyde James MD;  Location: [facility] ([address] [place] [address]);  Service: Oncology;  Laterality: N/A;  11/20-pre call complete-tb    COLONOSCOPY N/A 8/9/2023    Procedure: COLONOSCOPY;  Surgeon: Will Ardon MD;  Location: [facility]  ENDO (4TH FLR);  Service: Endoscopy;  Laterality: N/A;  Suprep Instructions sent via portal / Authorizing:     Bebeto Arora MD in [facility] FAMILY MED/ INTERNAL MED/ PEDS  Referral:          [id number]    EYE SURGERY      FLEXIBLE SIGMOIDOSCOPY N/A 7/23/2024    Procedure: SIGMOIDOSCOPY, FLEXIBLE;  Surgeon: Nirali Vicente MD;  Location: [facility] ENDO;  Service: Endoscopy;  Laterality: N/A;    INSERTION OF LONGORIA CATHETER Right 12/18/2024    Procedure: INSERTION, CATHETER, CENTRAL VENOUS, LONGORIA TRIPLE LUMEN, Bard 12.5 fr [place] Cath model [id number];  Surgeon: Willie Hadley MD;  Location: [facility] OR 2ND FLR;  Service: General;  Laterality: Right;       PAST SOCIAL HISTORY:  Social History     Tobacco Use    Smoking status: Former     Types: Cigars, Cigarettes    Smokeless tobacco: Never   Substance Use Topics    Alcohol use: Not Currently     Comment: socially    Drug use: Never       FAMILY HISTORY:  Family History   Problem Relation Name Age of Onset    Arthritis Mother      COPD Father      Testicular cancer Maternal Cousin Peter 17        Chemotherapy    Breast cancer Maternal Cousin  51        Chemotherapy    Heart disease Maternal Grandfather      Stroke Maternal Grandmother         CURRENT MEDICATIONS:   Current Outpatient Medications   Medication Sig    acyclovir (ZOVIRAX) 800 MG Tab Take 1 tablet (800 mg total) by mouth 2 (two) times daily.    dapsone 100 MG Tab Take 1 tablet (100 mg total) by mouth once daily.    letermovir (PREVYMIS) 480 mg Tab Take 1 tablet (480 mg total) by mouth once daily.    magnesium oxide (MAG-OX) 400 mg (241.3 mg magnesium) tablet Take 2 tablets (800 mg total) by mouth 2 (two) times daily.    ondansetron (ZOFRAN) 8 MG tablet Take 1 tablet (8 mg total) by mouth every 8 (eight) hours as needed for Nausea.    prochlorperazine (COMPAZINE) 10 MG tablet Take 1 tablet (10 mg total) by mouth 4 (four) times daily as needed for Nausea.    traMADoL (ULTRAM) 50 mg tablet Take 1 tablet (50 mg  total) by mouth every 6 (six) hours as needed for Pain.    voriconazole (VFEND) 200 MG Tab Take 1 tablet (200 mg total) by mouth 2 (two) times daily.    LORazepam (ATIVAN) 0.5 MG tablet Take 1 tablet (0.5 mg total) by mouth once as needed. Take 30-45 minutes prior to your bone marrow biopsy on 1/27/25. Do not take if you do not have a .    tacrolimus (PROGRAF) 0.5 MG Cap Take 3 capsules (1.5 mg total) by mouth every morning AND 3 capsules (1.5 mg total) every evening.     No current facility-administered medications for this visit.       ALLERGIES:   Review of patient's allergies indicates:   Allergen Reactions    Bactrim [sulfamethoxazole-trimethoprim] Hives       GVHD Review of Systems:     Pertinent positives and negatives included in the HPI. Otherwise a 14 point review of systems is negative. GVHD review of systems recorded in BMT flowsheet.     Physical Exam:     Vitals:    02/03/25 0903   BP: 94/63   Pulse: (!) 111   Resp: 18   Temp: 98 °F (36.7 °C)       General: Appears well, NAD  HEENT: MMM, no OP lesions  Pulmonary: CTAB, no increased work of breathing, no W/R/C  Cardiovascular: S1S2 normal, tachycardic, regular rate no M/R/G  Abdominal: Soft, NT, ND, BS+, no HSM  Extremities: No C/C/E  Neurological: AAOx4, grossly normal, no focal deficits  Dermatologic: No appreciable rashes or lesions    ECOG Performance Status: (foot note - ECOG PS provided by Eastern Cooperative Oncology Group) 1 - Symptomatic but completely ambulatory    Karnofsky Performance Score:  70%- Cares for Self: Unable to Carry on Normal Activity or Active Work    Labs:   Lab Results   Component Value Date    WBC 3.96 02/03/2025    RBC 2.37 (L) 02/03/2025    HGB 7.3 (L) 02/03/2025    HCT 20.8 (L) 02/03/2025    MCV 88 02/03/2025    MCH 30.8 02/03/2025    MCHC 35.1 02/03/2025    RDW 15.0 (H) 02/03/2025    PLT 31 (LL) 02/03/2025    MPV 12.2 02/03/2025    GRAN 2.3 02/03/2025    GRAN 57.6 02/03/2025    LYMPH 0.0 (L) 02/03/2025    LYMPH 0.0  (L) 02/03/2025    MONO 1.5 (H) 02/03/2025    MONO 37.6 (H) 02/03/2025    EOS 0.0 02/03/2025    BASO 0.02 02/03/2025    EOSINOPHIL 0.5 02/03/2025    BASOPHIL 0.5 02/03/2025       Sodium   Date Value Ref Range Status   02/03/2025 137 136 - 145 mmol/L Final     Potassium   Date Value Ref Range Status   02/03/2025 3.8 3.5 - 5.1 mmol/L Final     Chloride   Date Value Ref Range Status   02/03/2025 104 95 - 110 mmol/L Final     CO2   Date Value Ref Range Status   02/03/2025 23 23 - 29 mmol/L Final     Glucose   Date Value Ref Range Status   02/03/2025 101 70 - 110 mg/dL Final     BUN   Date Value Ref Range Status   02/03/2025 15 6 - 20 mg/dL Final     Creatinine   Date Value Ref Range Status   02/03/2025 0.8 0.5 - 1.4 mg/dL Final     Calcium   Date Value Ref Range Status   02/03/2025 9.6 8.7 - 10.5 mg/dL Final     Total Protein   Date Value Ref Range Status   02/03/2025 6.9 6.0 - 8.4 g/dL Final     Albumin   Date Value Ref Range Status   02/03/2025 3.3 (L) 3.5 - 5.2 g/dL Final     Total Bilirubin   Date Value Ref Range Status   02/03/2025 0.4 0.1 - 1.0 mg/dL Final     Comment:     For infants and newborns, interpretation of results should be based  on gestational age, weight and in agreement with clinical  observations.    Premature Infant recommended reference ranges:  Up to 24 hours.............<8.0 mg/dL  Up to 48 hours............<12.0 mg/dL  3-5 days..................<15.0 mg/dL  6-29 days.................<15.0 mg/dL       Alkaline Phosphatase   Date Value Ref Range Status   02/03/2025 57 40 - 150 U/L Final     AST   Date Value Ref Range Status   02/03/2025 15 10 - 40 U/L Final     ALT   Date Value Ref Range Status   02/03/2025 10 10 - 44 U/L Final     Anion Gap   Date Value Ref Range Status   02/03/2025 10 8 - 16 mmol/L Final     eGFR if    Date Value Ref Range Status   01/22/2022 >60.0 >60 mL/min/1.73 m^2 Final     eGFR if non    Date Value Ref Range Status   01/22/2022 >60.0 >60  mL/min/1.73 m^2 Final     Comment:     Calculation used to obtain the estimated glomerular filtration  rate (eGFR) is the CKD-EPI equation.          Imaging:   All pertinent studies reviewed and interpreted by me    Acute GVHD Scoring:  GVHD Acute Assessment                        Assessment and Plan:   Rubén Peters) is a pleasant 54 y.o.male with primary myelofibrosis s/p haplo SCT who presents for post-transplant follow up.    Primary myelofibrosis: Status post treatment with ruxolitinib, followed by alloSCT. Primary oncologist is Clyde James MD who will be managing primary underlying disease. Day +30 BMBx results pending     Status post allogeneic stem cell transplantation: As noted above, status post haploidentical stem cell transplantation conditioned with  Bu/Flu/TT . Currently day +39. Engrafted on day +22.  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 MMF has now been discontinued.  Current tacro dose: 1 mg qAM and 1.5 mg qPM  Last tacro level: 6.2  Adjustments: Increase to 1.5mg BID    Immunosuppression: Prevention with voriconazole,  acyclovir. CMV prophylaxis with letermovir. PJP prophyalxis dapsone. Continue weekly monitoring of CMV and EBV.  Last CMV: negative (1/27/2025), last EBV: negative (1/27/2025).  Active infections: none    Pancytopenia: Due to underlying disease and chemotherapy. Transfuse for Hgb <7 g/dL and platelets <10k.    Hypertension: Newly hypertensive in hospital following SCT at which time he was started on amlodipine. He has been persistently hypotensive since discharge. We are holding amlodipine.     Follow up: Twice weekly follow up with labs.    Orders Placed:      Orders Placed This Encounter    Prepare RBC    Prepare RBC    Prepare RBC    Prepare RBC    Prepare RBC    Prepare RBC    Prepare RBC    Prepare RBC           Priscila Montes PA-C  Hematology, Oncology, and  Stem Cell Transplantation  [facility]    Visit today included increased complexity associated with the care of the episodic problem PMF addressed and managing the longitudinal care of the patient due to the serious and/or complex managed problem(s) PMF.

## 2025-02-05 NOTE — PROGRESS NOTES
NAT faxed signed MD form to [employer] system. NAT notified patient's wife via email and encouraged her to confirm they received the fax.

## 2025-02-06 ENCOUNTER — DOCUMENTATION ONLY (OUTPATIENT)
Dept: HEMATOLOGY/ONCOLOGY | Facility: CLINIC | Age: 55
End: 2025-02-06
Payer: COMMERCIAL

## 2025-02-06 ENCOUNTER — INFUSION (OUTPATIENT)
Dept: INFUSION THERAPY | Facility: HOSPITAL | Age: 55
End: 2025-02-06
Payer: COMMERCIAL

## 2025-02-06 ENCOUNTER — OFFICE VISIT (OUTPATIENT)
Dept: HEMATOLOGY/ONCOLOGY | Facility: CLINIC | Age: 55
End: 2025-02-06
Payer: COMMERCIAL

## 2025-02-06 VITALS
SYSTOLIC BLOOD PRESSURE: 101 MMHG | HEIGHT: 71 IN | WEIGHT: 175.81 LBS | DIASTOLIC BLOOD PRESSURE: 72 MMHG | TEMPERATURE: 98 F | OXYGEN SATURATION: 97 % | BODY MASS INDEX: 24.61 KG/M2 | HEART RATE: 111 BPM

## 2025-02-06 DIAGNOSIS — D47.1 PRIMARY MYELOFIBROSIS: ICD-10-CM

## 2025-02-06 DIAGNOSIS — R11.0 CHEMOTHERAPY-INDUCED NAUSEA: ICD-10-CM

## 2025-02-06 DIAGNOSIS — Z94.84 IMMUNOCOMPROMISED STATE ASSOCIATED WITH STEM CELL TRANSPLANT: ICD-10-CM

## 2025-02-06 DIAGNOSIS — D84.822 IMMUNOCOMPROMISED STATE ASSOCIATED WITH STEM CELL TRANSPLANT: ICD-10-CM

## 2025-02-06 DIAGNOSIS — T45.1X5A CHEMOTHERAPY-INDUCED NAUSEA: ICD-10-CM

## 2025-02-06 DIAGNOSIS — T45.1X5A ANEMIA ASSOCIATED WITH CHEMOTHERAPY: ICD-10-CM

## 2025-02-06 DIAGNOSIS — E27.9 ADRENAL NODULE: Primary | ICD-10-CM

## 2025-02-06 DIAGNOSIS — D64.81 ANEMIA ASSOCIATED WITH CHEMOTHERAPY: ICD-10-CM

## 2025-02-06 DIAGNOSIS — D61.810 PANCYTOPENIA DUE TO CHEMOTHERAPY: ICD-10-CM

## 2025-02-06 DIAGNOSIS — Z76.82 STEM CELL TRANSPLANT CANDIDATE: ICD-10-CM

## 2025-02-06 DIAGNOSIS — Z94.84 HISTORY OF ALLOGENEIC STEM CELL TRANSPLANT: Primary | ICD-10-CM

## 2025-02-06 LAB
ABO + RH BLD: NORMAL
ALBUMIN SERPL BCP-MCNC: 3.4 G/DL (ref 3.5–5.2)
ALP SERPL-CCNC: 66 U/L (ref 40–150)
ALT SERPL W/O P-5'-P-CCNC: 11 U/L (ref 10–44)
ANION GAP SERPL CALC-SCNC: 10 MMOL/L (ref 8–16)
ANISOCYTOSIS BLD QL SMEAR: SLIGHT
AST SERPL-CCNC: 17 U/L (ref 10–40)
BASO STIPL BLD QL SMEAR: ABNORMAL
BASOPHILS # BLD AUTO: ABNORMAL K/UL (ref 0–0.2)
BASOPHILS NFR BLD: 0 % (ref 0–1.9)
BILIRUB SERPL-MCNC: 0.4 MG/DL (ref 0.1–1)
BLD GP AB SCN CELLS X3 SERPL QL: NORMAL
BUN SERPL-MCNC: 11 MG/DL (ref 6–20)
CALCIUM SERPL-MCNC: 9.5 MG/DL (ref 8.7–10.5)
CHLORIDE SERPL-SCNC: 103 MMOL/L (ref 95–110)
CO2 SERPL-SCNC: 24 MMOL/L (ref 23–29)
COMMENT: NORMAL
CREAT SERPL-MCNC: 0.8 MG/DL (ref 0.5–1.4)
DIFFERENTIAL METHOD BLD: ABNORMAL
EOSINOPHIL # BLD AUTO: ABNORMAL K/UL (ref 0–0.5)
EOSINOPHIL NFR BLD: 0 % (ref 0–8)
ERYTHROCYTE [DISTWIDTH] IN BLOOD BY AUTOMATED COUNT: 17.2 % (ref 11.5–14.5)
EST. GFR  (NO RACE VARIABLE): >60 ML/MIN/1.73 M^2
FINAL PATHOLOGIC DIAGNOSIS: NORMAL
GLUCOSE SERPL-MCNC: 98 MG/DL (ref 70–110)
GROSS: NORMAL
HCT VFR BLD AUTO: 24.2 % (ref 40–54)
HGB BLD-MCNC: 8.1 G/DL (ref 14–18)
IMM GRANULOCYTES # BLD AUTO: ABNORMAL K/UL (ref 0–0.04)
IMM GRANULOCYTES NFR BLD AUTO: ABNORMAL % (ref 0–0.5)
LYMPHOCYTES # BLD AUTO: ABNORMAL K/UL (ref 1–4.8)
LYMPHOCYTES NFR BLD: 6 % (ref 18–48)
Lab: NORMAL
MAGNESIUM SERPL-MCNC: 1.8 MG/DL (ref 1.6–2.6)
MCH RBC QN AUTO: 29.8 PG (ref 27–31)
MCHC RBC AUTO-ENTMCNC: 33.5 G/DL (ref 32–36)
MCV RBC AUTO: 89 FL (ref 82–98)
METAMYELOCYTES NFR BLD MANUAL: 2 %
MICROSCOPIC EXAM: NORMAL
MONOCYTES # BLD AUTO: ABNORMAL K/UL (ref 0.3–1)
MONOCYTES NFR BLD: 16 % (ref 4–15)
MYELOCYTES NFR BLD MANUAL: 3 %
NEUTROPHILS NFR BLD: 69 % (ref 38–73)
NEUTS BAND NFR BLD MANUAL: 4 %
NRBC BLD-RTO: 1 /100 WBC
PHOSPHATE SERPL-MCNC: 4 MG/DL (ref 2.7–4.5)
PLATELET # BLD AUTO: 49 K/UL (ref 150–450)
PMV BLD AUTO: 11.3 FL (ref 9.2–12.9)
POLYCHROMASIA BLD QL SMEAR: ABNORMAL
POTASSIUM SERPL-SCNC: 3.9 MMOL/L (ref 3.5–5.1)
PROT SERPL-MCNC: 6.9 G/DL (ref 6–8.4)
RBC # BLD AUTO: 2.72 M/UL (ref 4.6–6.2)
SODIUM SERPL-SCNC: 137 MMOL/L (ref 136–145)
SPECIMEN OUTDATE: NORMAL
SUPPLEMENTAL DIAGNOSIS: NORMAL
TACROLIMUS BLD-MCNC: 7.8 NG/ML (ref 5–15)
WBC # BLD AUTO: 5.57 K/UL (ref 3.9–12.7)

## 2025-02-06 PROCEDURE — 83735 ASSAY OF MAGNESIUM: CPT | Performed by: INTERNAL MEDICINE

## 2025-02-06 PROCEDURE — 80197 ASSAY OF TACROLIMUS: CPT | Performed by: INTERNAL MEDICINE

## 2025-02-06 PROCEDURE — A4216 STERILE WATER/SALINE, 10 ML: HCPCS

## 2025-02-06 PROCEDURE — 86900 BLOOD TYPING SEROLOGIC ABO: CPT | Performed by: INTERNAL MEDICINE

## 2025-02-06 PROCEDURE — 85007 BL SMEAR W/DIFF WBC COUNT: CPT | Performed by: INTERNAL MEDICINE

## 2025-02-06 PROCEDURE — 36592 COLLECT BLOOD FROM PICC: CPT

## 2025-02-06 PROCEDURE — 25000003 PHARM REV CODE 250

## 2025-02-06 PROCEDURE — 99999 PR PBB SHADOW E&M-EST. PATIENT-LVL III: CPT | Mod: PBBFAC,,, | Performed by: PHYSICIAN ASSISTANT

## 2025-02-06 PROCEDURE — 85027 COMPLETE CBC AUTOMATED: CPT | Performed by: INTERNAL MEDICINE

## 2025-02-06 PROCEDURE — 84100 ASSAY OF PHOSPHORUS: CPT | Performed by: INTERNAL MEDICINE

## 2025-02-06 PROCEDURE — 63600175 PHARM REV CODE 636 W HCPCS

## 2025-02-06 PROCEDURE — 80053 COMPREHEN METABOLIC PANEL: CPT | Performed by: INTERNAL MEDICINE

## 2025-02-06 RX ORDER — SODIUM CHLORIDE 0.9 % (FLUSH) 0.9 %
10 SYRINGE (ML) INJECTION
Status: DISCONTINUED | OUTPATIENT
Start: 2025-02-06 | End: 2025-02-06 | Stop reason: HOSPADM

## 2025-02-06 RX ORDER — SODIUM CHLORIDE 0.9 % (FLUSH) 0.9 %
10 SYRINGE (ML) INJECTION
Status: CANCELLED | OUTPATIENT
Start: 2025-02-06

## 2025-02-06 RX ORDER — HEPARIN 100 UNIT/ML
500 SYRINGE INTRAVENOUS
Status: DISCONTINUED | OUTPATIENT
Start: 2025-02-06 | End: 2025-02-06 | Stop reason: HOSPADM

## 2025-02-06 RX ORDER — HEPARIN 100 UNIT/ML
500 SYRINGE INTRAVENOUS
Status: CANCELLED | OUTPATIENT
Start: 2025-02-06

## 2025-02-06 RX ORDER — ONDANSETRON HYDROCHLORIDE 8 MG/1
8 TABLET, FILM COATED ORAL EVERY 8 HOURS PRN
Qty: 30 TABLET | Refills: 2 | Status: SHIPPED | OUTPATIENT
Start: 2025-02-06

## 2025-02-06 RX ADMIN — HEPARIN SODIUM (PORCINE) LOCK FLUSH IV SOLN 100 UNIT/ML 500 UNITS: 100 SOLUTION at 07:02

## 2025-02-06 RX ADMIN — SODIUM CHLORIDE, PRESERVATIVE FREE 10 ML: 5 INJECTION INTRAVENOUS at 07:02

## 2025-02-06 NOTE — PROGRESS NOTES
Section of Hematology and Stem Cell Transplantation    Post-Transplantation Follow Up Visit     2/6/25    Transplant History:   Primary oncologist: Clyde James MD  Primary oncologic diagnosis: primary myelofibrosis  Transplant date: 12/26/2024  Donor: haploidentical  Blood Type (Patient): O +  Blood Type (Donor): O +  CMV (Patient): Negative  CMV (Donor): Positive  Graft source: Peripheral blood  CD34+ cell dose: 6.04 X10^6 cells/kg  Conditioning Regimen:  Thiotepa, Busulfan, Fludarabine  GVHD prophylaxis: Post-transplant cyclophosphamide, MMF, Tacrolimus  Immediate post-transplant complications: mucositis, R axillary skin/soft tissue infection secondary to MRSA    History of Present Ilness:   Rbuén Peters) is a pleasant 54 y.o.male with primary myelofibrosis who is status post haploidentical stem cell transplantation conditioned with  Bu/Flu/TT  who is currently day +42 who presents for post-transplant follow up.    Feeling improved after 1u RBC earlier this week. Appetite improving slowly. Diarrhea has  mostly resolved, occasional loose stools (no more than once per day). No nausea/vomiting. No pruritis/itching.     Has been monitoring BP at home - normo/hypotensive, amlodipine held.     PAST MEDICAL HISTORY:   Past Medical History:   Diagnosis Date    Abdominal pain 01/02/2025    Allergic contact dermatitis due to adhesives 12/31/2024    Cancer     Flatulence 01/03/2025    Headache 12/27/2024    Hyperlipidemia     Mucositis 01/02/2025    Other constipation 12/19/2024    Thrombocytosis 12/18/2024    Transaminitis 12/27/2024       PAST SURGICAL HISTORY:   Past Surgical History:   Procedure Laterality Date    BONE MARROW BIOPSY N/A 11/27/2024    Procedure: Biopsy-bone marrow;  Surgeon: Clyde James MD;  Location: [facility] ([address] [place] [address]);  Service: Oncology;  Laterality: N/A;  11/20-pre call complete-tb    COLONOSCOPY N/A 8/9/2023    Procedure: COLONOSCOPY;  Surgeon: Will Ardon MD;  Location: [facility]  ENDO (4TH FLR);  Service: Endoscopy;  Laterality: N/A;  Suprep Instructions sent via portal / Authorizing:     Bebeto Arora MD in [facility] FAMILY MED/ INTERNAL MED/ PEDS  Referral:          [id number]    EYE SURGERY      FLEXIBLE SIGMOIDOSCOPY N/A 7/23/2024    Procedure: SIGMOIDOSCOPY, FLEXIBLE;  Surgeon: Nirali Vicente MD;  Location: [facility] ENDO;  Service: Endoscopy;  Laterality: N/A;    INSERTION OF LONGORIA CATHETER Right 12/18/2024    Procedure: INSERTION, CATHETER, CENTRAL VENOUS, LONGORIA TRIPLE LUMEN, Bard 12.5 fr [place] Cath model [id number];  Surgeon: Willie Hadley MD;  Location: [facility] OR 2ND FLR;  Service: General;  Laterality: Right;       PAST SOCIAL HISTORY:  Social History     Tobacco Use    Smoking status: Former     Types: Cigars, Cigarettes    Smokeless tobacco: Never   Substance Use Topics    Alcohol use: Not Currently     Comment: socially    Drug use: Never       FAMILY HISTORY:  Family History   Problem Relation Name Age of Onset    Arthritis Mother      COPD Father      Testicular cancer Maternal Cousin Peter 17        Chemotherapy    Breast cancer Maternal Cousin  51        Chemotherapy    Heart disease Maternal Grandfather      Stroke Maternal Grandmother         CURRENT MEDICATIONS:   Current Outpatient Medications   Medication Sig    acyclovir (ZOVIRAX) 800 MG Tab Take 1 tablet (800 mg total) by mouth 2 (two) times daily.    dapsone 100 MG Tab Take 1 tablet (100 mg total) by mouth once daily.    letermovir (PREVYMIS) 480 mg Tab Take 1 tablet (480 mg total) by mouth once daily.    magnesium oxide (MAG-OX) 400 mg (241.3 mg magnesium) tablet Take 2 tablets (800 mg total) by mouth 2 (two) times daily.    ondansetron (ZOFRAN) 8 MG tablet Take 1 tablet (8 mg total) by mouth every 8 (eight) hours as needed for Nausea.    prochlorperazine (COMPAZINE) 10 MG tablet Take 1 tablet (10 mg total) by mouth 4 (four) times daily as needed for Nausea.    tacrolimus (PROGRAF) 0.5 MG Cap Take 3 capsules (1.5 mg  total) by mouth every morning AND 3 capsules (1.5 mg total) every evening.    traMADoL (ULTRAM) 50 mg tablet Take 1 tablet (50 mg total) by mouth every 6 (six) hours as needed for Pain.    voriconazole (VFEND) 200 MG Tab Take 1 tablet (200 mg total) by mouth 2 (two) times daily.    LORazepam (ATIVAN) 0.5 MG tablet Take 1 tablet (0.5 mg total) by mouth once as needed. Take 30-45 minutes prior to your bone marrow biopsy on 1/27/25. Do not take if you do not have a .     No current facility-administered medications for this visit.       ALLERGIES:   Review of patient's allergies indicates:   Allergen Reactions    Bactrim [sulfamethoxazole-trimethoprim] Hives       GVHD Review of Systems:     Pertinent positives and negatives included in the HPI. Otherwise a 14 point review of systems is negative. GVHD review of systems recorded in BMT flowsheet.     Physical Exam:     Vitals:    02/06/25 0822   BP: 101/72   Pulse: (!) 111   Temp: 98.1 °F (36.7 °C)       General: Appears well, NAD  HEENT: MMM, no OP lesions  Pulmonary: CTAB, no increased work of breathing, no W/R/C  Cardiovascular: S1S2 normal, tachycardic, regular rate no M/R/G  Abdominal: Soft, NT, ND, BS+, no HSM  Extremities: No C/C/E  Neurological: AAOx4, grossly normal, no focal deficits  Dermatologic: No appreciable rashes or lesions    ECOG Performance Status: (foot note - ECOG PS provided by Eastern Cooperative Oncology Group) 1 - Symptomatic but completely ambulatory    Karnofsky Performance Score:  70%- Cares for Self: Unable to Carry on Normal Activity or Active Work    Labs:   Lab Results   Component Value Date    WBC 5.57 02/06/2025    RBC 2.72 (L) 02/06/2025    HGB 8.1 (L) 02/06/2025    HCT 24.2 (L) 02/06/2025    MCV 89 02/06/2025    MCH 29.8 02/06/2025    MCHC 33.5 02/06/2025    RDW 17.2 (H) 02/06/2025    PLT 49 (L) 02/06/2025    MPV 11.3 02/06/2025    GRAN 2.3 02/03/2025    GRAN 57.6 02/03/2025    LYMPH 0.0 (L) 02/03/2025    LYMPH 0.0 (L)  02/03/2025    MONO 1.5 (H) 02/03/2025    MONO 37.6 (H) 02/03/2025    EOS 0.0 02/03/2025    BASO 0.02 02/03/2025    EOSINOPHIL 0.5 02/03/2025    BASOPHIL 0.5 02/03/2025       Sodium   Date Value Ref Range Status   02/06/2025 137 136 - 145 mmol/L Final     Potassium   Date Value Ref Range Status   02/06/2025 3.9 3.5 - 5.1 mmol/L Final     Chloride   Date Value Ref Range Status   02/06/2025 103 95 - 110 mmol/L Final     CO2   Date Value Ref Range Status   02/06/2025 24 23 - 29 mmol/L Final     Glucose   Date Value Ref Range Status   02/06/2025 98 70 - 110 mg/dL Final     BUN   Date Value Ref Range Status   02/06/2025 11 6 - 20 mg/dL Final     Creatinine   Date Value Ref Range Status   02/06/2025 0.8 0.5 - 1.4 mg/dL Final     Calcium   Date Value Ref Range Status   02/06/2025 9.5 8.7 - 10.5 mg/dL Final     Total Protein   Date Value Ref Range Status   02/06/2025 6.9 6.0 - 8.4 g/dL Final     Albumin   Date Value Ref Range Status   02/06/2025 3.4 (L) 3.5 - 5.2 g/dL Final     Total Bilirubin   Date Value Ref Range Status   02/06/2025 0.4 0.1 - 1.0 mg/dL Final     Comment:     For infants and newborns, interpretation of results should be based  on gestational age, weight and in agreement with clinical  observations.    Premature Infant recommended reference ranges:  Up to 24 hours.............<8.0 mg/dL  Up to 48 hours............<12.0 mg/dL  3-5 days..................<15.0 mg/dL  6-29 days.................<15.0 mg/dL       Alkaline Phosphatase   Date Value Ref Range Status   02/06/2025 66 40 - 150 U/L Final     AST   Date Value Ref Range Status   02/06/2025 17 10 - 40 U/L Final     ALT   Date Value Ref Range Status   02/06/2025 11 10 - 44 U/L Final     Anion Gap   Date Value Ref Range Status   02/06/2025 10 8 - 16 mmol/L Final     eGFR if    Date Value Ref Range Status   01/22/2022 >60.0 >60 mL/min/1.73 m^2 Final     eGFR if non    Date Value Ref Range Status   01/22/2022 >60.0 >60  mL/min/1.73 m^2 Final     Comment:     Calculation used to obtain the estimated glomerular filtration  rate (eGFR) is the CKD-EPI equation.          Imaging:   All pertinent studies reviewed and interpreted by me    Acute GVHD Scoring:  GVHD Acute Assessment                        Assessment and Plan:   Rubén Peters) is a pleasant 54 y.o.male with primary myelofibrosis s/p haplo SCT who presents for post-transplant follow up.    Primary myelofibrosis: Status post treatment with ruxolitinib, followed by alloSCT. Primary oncologist is Clyde James MD who will be managing primary underlying disease. Day +30 BMBx results pending     Status post allogeneic stem cell transplantation: As noted above, status post haploidentical stem cell transplantation conditioned with  Bu/Flu/TT . Currently day +39. Engrafted on day +22.  Day +30 BMBx on 1/2: No definitive evidence of residual JAK2 [id number] primary myelofibrosis. NGS without evidence of pathogenic mutations. Chimerism studies with 100 donor CD33, CD3 insufficient for analysis.     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 MMF has now been discontinued.  Current tacro dose: 1.5mg BID  Last tacro level: 7.8  Adjustments: No Changes    Immunosuppression: Prevention with voriconazole,  acyclovir. CMV prophylaxis with letermovir. PJP prophyalxis dapsone. Continue weekly monitoring of CMV and EBV.  Last CMV: negative (1/27/2025), last EBV: negative (1/27/2025).  Active infections: none    Pancytopenia: Due to underlying disease and chemotherapy. Transfuse for Hgb <7 g/dL and platelets <10k.    Hypertension: Newly hypertensive in hospital following SCT at which time he was started on amlodipine. He has been persistently hypotensive since discharge. We are holding amlodipine.     Follow up: Twice weekly follow up with labs.    Orders Placed:               Priscila Montes PA-C  Hematology, Oncology, and  Stem Cell Transplantation  [facility]    Visit today included increased complexity associated with the care of the episodic problem PMF addressed and managing the longitudinal care of the patient due to the serious and/or complex managed problem(s) PMF.

## 2025-02-07 ENCOUNTER — PATIENT MESSAGE (OUTPATIENT)
Dept: HEMATOLOGY/ONCOLOGY | Facility: CLINIC | Age: 55
End: 2025-02-07
Payer: COMMERCIAL

## 2025-02-10 ENCOUNTER — CLINICAL SUPPORT (OUTPATIENT)
Dept: HEMATOLOGY/ONCOLOGY | Facility: CLINIC | Age: 55
End: 2025-02-10
Payer: COMMERCIAL

## 2025-02-10 ENCOUNTER — OFFICE VISIT (OUTPATIENT)
Dept: HEMATOLOGY/ONCOLOGY | Facility: CLINIC | Age: 55
End: 2025-02-10
Payer: COMMERCIAL

## 2025-02-10 ENCOUNTER — INFUSION (OUTPATIENT)
Dept: INFUSION THERAPY | Facility: HOSPITAL | Age: 55
End: 2025-02-10
Payer: COMMERCIAL

## 2025-02-10 VITALS
TEMPERATURE: 98 F | WEIGHT: 175.69 LBS | OXYGEN SATURATION: 98 % | DIASTOLIC BLOOD PRESSURE: 73 MMHG | BODY MASS INDEX: 24.6 KG/M2 | HEART RATE: 110 BPM | SYSTOLIC BLOOD PRESSURE: 101 MMHG | HEIGHT: 71 IN | RESPIRATION RATE: 18 BRPM

## 2025-02-10 DIAGNOSIS — D84.822 IMMUNOCOMPROMISED STATE ASSOCIATED WITH STEM CELL TRANSPLANT: ICD-10-CM

## 2025-02-10 DIAGNOSIS — T45.1X5A ANEMIA ASSOCIATED WITH CHEMOTHERAPY: ICD-10-CM

## 2025-02-10 DIAGNOSIS — Z76.82 STEM CELL TRANSPLANT CANDIDATE: ICD-10-CM

## 2025-02-10 DIAGNOSIS — Z94.84 IMMUNOCOMPROMISED STATE ASSOCIATED WITH STEM CELL TRANSPLANT: ICD-10-CM

## 2025-02-10 DIAGNOSIS — E27.9 ADRENAL NODULE: Primary | ICD-10-CM

## 2025-02-10 DIAGNOSIS — Z94.84 HISTORY OF ALLOGENEIC STEM CELL TRANSPLANT: Primary | ICD-10-CM

## 2025-02-10 DIAGNOSIS — D64.81 ANEMIA ASSOCIATED WITH CHEMOTHERAPY: ICD-10-CM

## 2025-02-10 DIAGNOSIS — D47.1 PRIMARY MYELOFIBROSIS: ICD-10-CM

## 2025-02-10 LAB
ABO + RH BLD: NORMAL
ALBUMIN SERPL BCP-MCNC: 3.5 G/DL (ref 3.5–5.2)
ALP SERPL-CCNC: 71 U/L (ref 40–150)
ALT SERPL W/O P-5'-P-CCNC: 18 U/L (ref 10–44)
ANION GAP SERPL CALC-SCNC: 9 MMOL/L (ref 8–16)
AST SERPL-CCNC: 19 U/L (ref 10–40)
BASOPHILS NFR BLD: 0 % (ref 0–1.9)
BILIRUB SERPL-MCNC: 0.4 MG/DL (ref 0.1–1)
BLD GP AB SCN CELLS X3 SERPL QL: NORMAL
BUN SERPL-MCNC: 15 MG/DL (ref 6–20)
CALCIUM SERPL-MCNC: 9.4 MG/DL (ref 8.7–10.5)
CHLORIDE SERPL-SCNC: 105 MMOL/L (ref 95–110)
CO2 SERPL-SCNC: 22 MMOL/L (ref 23–29)
CREAT SERPL-MCNC: 0.9 MG/DL (ref 0.5–1.4)
DIFFERENTIAL METHOD BLD: ABNORMAL
EOSINOPHIL NFR BLD: 0 % (ref 0–8)
ERYTHROCYTE [DISTWIDTH] IN BLOOD BY AUTOMATED COUNT: 19.8 % (ref 11.5–14.5)
EST. GFR  (NO RACE VARIABLE): >60 ML/MIN/1.73 M^2
GLUCOSE SERPL-MCNC: 100 MG/DL (ref 70–110)
HCT VFR BLD AUTO: 24.1 % (ref 40–54)
HGB BLD-MCNC: 8 G/DL (ref 14–18)
IMM GRANULOCYTES # BLD AUTO: ABNORMAL K/UL (ref 0–0.04)
IMM GRANULOCYTES NFR BLD AUTO: ABNORMAL % (ref 0–0.5)
LYMPHOCYTES NFR BLD: 14 % (ref 18–48)
MAGNESIUM SERPL-MCNC: 1.9 MG/DL (ref 1.6–2.6)
MCH RBC QN AUTO: 30.5 PG (ref 27–31)
MCHC RBC AUTO-ENTMCNC: 33.2 G/DL (ref 32–36)
MCV RBC AUTO: 92 FL (ref 82–98)
MONOCYTES NFR BLD: 9 % (ref 4–15)
MYELOCYTES NFR BLD MANUAL: 4 %
NEUTROPHILS NFR BLD: 72 % (ref 38–73)
NEUTS BAND NFR BLD MANUAL: 1 %
NRBC BLD-RTO: 0 /100 WBC
PHOSPHATE SERPL-MCNC: 3.9 MG/DL (ref 2.7–4.5)
PLATELET # BLD AUTO: 81 K/UL (ref 150–450)
PLATELET BLD QL SMEAR: ABNORMAL
PMV BLD AUTO: 11.4 FL (ref 9.2–12.9)
POTASSIUM SERPL-SCNC: 4.6 MMOL/L (ref 3.5–5.1)
PROT SERPL-MCNC: 7.1 G/DL (ref 6–8.4)
RBC # BLD AUTO: 2.62 M/UL (ref 4.6–6.2)
SODIUM SERPL-SCNC: 136 MMOL/L (ref 136–145)
SPECIMEN OUTDATE: NORMAL
TACROLIMUS BLD-MCNC: 8.5 NG/ML (ref 5–15)
WBC # BLD AUTO: 7.04 K/UL (ref 3.9–12.7)

## 2025-02-10 PROCEDURE — 85027 COMPLETE CBC AUTOMATED: CPT | Performed by: INTERNAL MEDICINE

## 2025-02-10 PROCEDURE — 80197 ASSAY OF TACROLIMUS: CPT | Performed by: INTERNAL MEDICINE

## 2025-02-10 PROCEDURE — 99999 PR PBB SHADOW E&M-EST. PATIENT-LVL I: CPT | Mod: PBBFAC,,,

## 2025-02-10 PROCEDURE — 36591 DRAW BLOOD OFF VENOUS DEVICE: CPT

## 2025-02-10 PROCEDURE — 86901 BLOOD TYPING SEROLOGIC RH(D): CPT | Performed by: INTERNAL MEDICINE

## 2025-02-10 PROCEDURE — 84100 ASSAY OF PHOSPHORUS: CPT | Performed by: INTERNAL MEDICINE

## 2025-02-10 PROCEDURE — 25000003 PHARM REV CODE 250

## 2025-02-10 PROCEDURE — 99999 PR PBB SHADOW E&M-EST. PATIENT-LVL IV: CPT | Mod: PBBFAC,,, | Performed by: PHYSICIAN ASSISTANT

## 2025-02-10 PROCEDURE — 63600175 PHARM REV CODE 636 W HCPCS

## 2025-02-10 PROCEDURE — 80053 COMPREHEN METABOLIC PANEL: CPT | Performed by: INTERNAL MEDICINE

## 2025-02-10 PROCEDURE — 85007 BL SMEAR W/DIFF WBC COUNT: CPT | Performed by: INTERNAL MEDICINE

## 2025-02-10 PROCEDURE — 83735 ASSAY OF MAGNESIUM: CPT | Performed by: INTERNAL MEDICINE

## 2025-02-10 PROCEDURE — A4216 STERILE WATER/SALINE, 10 ML: HCPCS

## 2025-02-10 RX ORDER — HEPARIN 100 UNIT/ML
500 SYRINGE INTRAVENOUS
Status: DISCONTINUED | OUTPATIENT
Start: 2025-02-10 | End: 2025-02-10 | Stop reason: HOSPADM

## 2025-02-10 RX ORDER — HEPARIN 100 UNIT/ML
500 SYRINGE INTRAVENOUS
Status: CANCELLED | OUTPATIENT
Start: 2025-02-10

## 2025-02-10 RX ORDER — SODIUM CHLORIDE 0.9 % (FLUSH) 0.9 %
10 SYRINGE (ML) INJECTION
Status: DISCONTINUED | OUTPATIENT
Start: 2025-02-10 | End: 2025-02-10 | Stop reason: HOSPADM

## 2025-02-10 RX ORDER — SODIUM CHLORIDE 0.9 % (FLUSH) 0.9 %
10 SYRINGE (ML) INJECTION
Status: CANCELLED | OUTPATIENT
Start: 2025-02-10

## 2025-02-10 RX ADMIN — HEPARIN SODIUM (PORCINE) LOCK FLUSH IV SOLN 100 UNIT/ML 500 UNITS: 100 SOLUTION at 09:02

## 2025-02-10 RX ADMIN — Medication 10 ML: at 09:02

## 2025-02-10 NOTE — PROGRESS NOTES
Section of Hematology and Stem Cell Transplantation    Post-Transplantation Follow Up Visit     2/10/25    Transplant History:   Primary oncologist: Clyde James MD  Primary oncologic diagnosis: primary myelofibrosis  Transplant date: 12/26/2024  Donor: haploidentical  Blood Type (Patient): O +  Blood Type (Donor): O +  CMV (Patient): Negative  CMV (Donor): Positive  Graft source: Peripheral blood  CD34+ cell dose: 6.04 X10^6 cells/kg  Conditioning Regimen:  Thiotepa, Busulfan, Fludarabine  GVHD prophylaxis: Post-transplant cyclophosphamide, MMF, Tacrolimus  Immediate post-transplant complications: mucositis, R axillary skin/soft tissue infection secondary to MRSA    History of Present Ilness:   Rubén Peters) is a pleasant 54 y.o.male with primary myelofibrosis who is status post haploidentical stem cell transplantation conditioned with  Bu/Flu/TT  who is currently day +46 who presents for post-transplant follow up.    Feeling okay today, continues to have SOB with exertion but slowly improving. Eating 3 meals a day. Diarrhea has mostly resolved, occasional loose stools (no more than once per day). No nausea/vomiting. No pruritis/itching. No issues at muhammad, dressing change this AM. Remains normotensive after stopping amlodipine.     PharmD visit today-  does not require any refills, no medication concerns.     PAST MEDICAL HISTORY:   Past Medical History:   Diagnosis Date    Abdominal pain 01/02/2025    Allergic contact dermatitis due to adhesives 12/31/2024    Cancer     Flatulence 01/03/2025    Headache 12/27/2024    Hyperlipidemia     Mucositis 01/02/2025    Other constipation 12/19/2024    Thrombocytosis 12/18/2024    Transaminitis 12/27/2024       PAST SURGICAL HISTORY:   Past Surgical History:   Procedure Laterality Date    BONE MARROW BIOPSY N/A 11/27/2024    Procedure: Biopsy-bone marrow;  Surgeon: Clyde James MD;  Location: [address]);  Service: Oncology;  Laterality: N/A;   11/20-pre call complete-tb    COLONOSCOPY N/A 8/9/2023    Procedure: COLONOSCOPY;  Surgeon: Will Ardon MD;  Location: [facility] (4TH FLR);  Service: Endoscopy;  Laterality: N/A;  Suprep Instructions sent via portal / Authorizing:     Bebeto Arora MD in [facility] FAMILY MED/ INTERNAL MED/ PEDS  Referral:          [id number]    EYE SURGERY      FLEXIBLE SIGMOIDOSCOPY N/A 7/23/2024    Procedure: SIGMOIDOSCOPY, FLEXIBLE;  Surgeon: Nirali Vicente MD;  Location: [facility] ENDO;  Service: Endoscopy;  Laterality: N/A;    INSERTION OF LONGORIA CATHETER Right 12/18/2024    Procedure: INSERTION, CATHETER, CENTRAL VENOUS, LONGORIA TRIPLE LUMEN, Bard 12.5 fr Longoria Cath model [id number];  Surgeon: Willie Hadley MD;  Location: [facility] OR 2ND FLR;  Service: General;  Laterality: Right;       PAST SOCIAL HISTORY:  Social History     Tobacco Use    Smoking status: Former     Types: Cigars, Cigarettes    Smokeless tobacco: Never   Substance Use Topics    Alcohol use: Not Currently     Comment: socially    Drug use: Never       FAMILY HISTORY:  Family History   Problem Relation Name Age of Onset    Arthritis Mother      COPD Father      Testicular cancer Maternal Cousin Peter 17        Chemotherapy    Breast cancer Maternal Cousin  51        Chemotherapy    Heart disease Maternal Grandfather      Stroke Maternal Grandmother         CURRENT MEDICATIONS:   Current Outpatient Medications   Medication Sig    acyclovir (ZOVIRAX) 800 MG Tab Take 1 tablet (800 mg total) by mouth 2 (two) times daily.    dapsone 100 MG Tab Take 1 tablet (100 mg total) by mouth once daily.    letermovir (PREVYMIS) 480 mg Tab Take 1 tablet (480 mg total) by mouth once daily.    magnesium oxide (MAG-OX) 400 mg (241.3 mg magnesium) tablet Take 2 tablets (800 mg total) by mouth 2 (two) times daily.    ondansetron (ZOFRAN) 8 MG tablet Take 1 tablet (8 mg total) by mouth every 8 (eight) hours as needed for Nausea.    prochlorperazine (COMPAZINE) 10 MG tablet Take 1 tablet  (10 mg total) by mouth 4 (four) times daily as needed for Nausea.    tacrolimus (PROGRAF) 0.5 MG Cap Take 3 capsules (1.5 mg total) by mouth every morning AND 3 capsules (1.5 mg total) every evening.    traMADoL (ULTRAM) 50 mg tablet Take 1 tablet (50 mg total) by mouth every 6 (six) hours as needed for Pain.    voriconazole (VFEND) 200 MG Tab Take 1 tablet (200 mg total) by mouth 2 (two) times daily.    LORazepam (ATIVAN) 0.5 MG tablet Take 1 tablet (0.5 mg total) by mouth once as needed. Take 30-45 minutes prior to your bone marrow biopsy on 1/27/25. Do not take if you do not have a .     No current facility-administered medications for this visit.       ALLERGIES:   Review of patient's allergies indicates:   Allergen Reactions    Bactrim [sulfamethoxazole-trimethoprim] Hives       GVHD Review of Systems:     Pertinent positives and negatives included in the HPI. Otherwise a 14 point review of systems is negative. GVHD review of systems recorded in BMT flowsheet.     Physical Exam:     Vitals:    02/10/25 0912   BP: 101/73   Pulse: 110   Resp: 18   Temp: 98.4 °F (36.9 °C)       General: Appears well, NAD  HEENT: MMM, no OP lesions  Pulmonary: CTAB, no increased work of breathing, no W/R/C  Cardiovascular: S1S2 normal, tachycardic, regular rate no M/R/G  Abdominal: Soft, NT, ND, BS+, no HSM  Extremities: No C/C/E  Neurological: AAOx4, grossly normal, no focal deficits  Dermatologic: No appreciable rashes or lesions    ECOG Performance Status: (foot note - ECOG PS provided by Eastern Cooperative Oncology Group) 1 - Symptomatic but completely ambulatory    Karnofsky Performance Score:  70%- Cares for Self: Unable to Carry on Normal Activity or Active Work    Labs:   Lab Results   Component Value Date    WBC 7.04 02/10/2025    RBC 2.62 (L) 02/10/2025    HGB 8.0 (L) 02/10/2025    HCT 24.1 (L) 02/10/2025    MCV 92 02/10/2025    MCH 30.5 02/10/2025    MCHC 33.2 02/10/2025    RDW 19.8 (H) 02/10/2025    PLT 81 (L)  02/10/2025    MPV 11.4 02/10/2025    GRAN 69.0 02/06/2025    LYMPH CANCELED 02/06/2025    LYMPH 6.0 (L) 02/06/2025    MONO CANCELED 02/06/2025    MONO 16.0 (H) 02/06/2025    EOS CANCELED 02/06/2025    BASO CANCELED 02/06/2025    EOSINOPHIL 0.0 02/06/2025    BASOPHIL 0.0 02/06/2025       Sodium   Date Value Ref Range Status   02/06/2025 137 136 - 145 mmol/L Final     Potassium   Date Value Ref Range Status   02/06/2025 3.9 3.5 - 5.1 mmol/L Final     Chloride   Date Value Ref Range Status   02/06/2025 103 95 - 110 mmol/L Final     CO2   Date Value Ref Range Status   02/06/2025 24 23 - 29 mmol/L Final     Glucose   Date Value Ref Range Status   02/06/2025 98 70 - 110 mg/dL Final     BUN   Date Value Ref Range Status   02/06/2025 11 6 - 20 mg/dL Final     Creatinine   Date Value Ref Range Status   02/06/2025 0.8 0.5 - 1.4 mg/dL Final     Calcium   Date Value Ref Range Status   02/06/2025 9.5 8.7 - 10.5 mg/dL Final     Total Protein   Date Value Ref Range Status   02/06/2025 6.9 6.0 - 8.4 g/dL Final     Albumin   Date Value Ref Range Status   02/06/2025 3.4 (L) 3.5 - 5.2 g/dL Final     Total Bilirubin   Date Value Ref Range Status   02/06/2025 0.4 0.1 - 1.0 mg/dL Final     Comment:     For infants and newborns, interpretation of results should be based  on gestational age, weight and in agreement with clinical  observations.    Premature Infant recommended reference ranges:  Up to 24 hours.............<8.0 mg/dL  Up to 48 hours............<12.0 mg/dL  3-5 days..................<15.0 mg/dL  6-29 days.................<15.0 mg/dL       Alkaline Phosphatase   Date Value Ref Range Status   02/06/2025 66 40 - 150 U/L Final     AST   Date Value Ref Range Status   02/06/2025 17 10 - 40 U/L Final     ALT   Date Value Ref Range Status   02/06/2025 11 10 - 44 U/L Final     Anion Gap   Date Value Ref Range Status   02/06/2025 10 8 - 16 mmol/L Final     eGFR if    Date Value Ref Range Status   01/22/2022 >60.0 >60  mL/min/1.73 m^2 Final     eGFR if non    Date Value Ref Range Status   01/22/2022 >60.0 >60 mL/min/1.73 m^2 Final     Comment:     Calculation used to obtain the estimated glomerular filtration  rate (eGFR) is the CKD-EPI equation.          Imaging:   All pertinent studies reviewed and interpreted by me    Acute GVHD Scoring:  GVHD Acute Assessment  02/10/2025: No evidence of aGVHD today. See flowsheet documentation.      Assessment and Plan:   Rubén Peters) is a pleasant 54 y.o.male with primary myelofibrosis s/p haplo SCT who presents for post-transplant follow up.    Primary myelofibrosis: Status post treatment with ruxolitinib, followed by alloSCT. Primary oncologist is Clyde James MD who will be managing primary underlying disease. Day +30 BMBx results pending     Status post allogeneic stem cell transplantation: As noted above, status post haploidentical stem cell transplantation conditioned with  Bu/Flu/TT . Currently day 46 Engrafted on day +22.  Day +30 BMBx on 1/2: No definitive evidence of residual JAK2 [id number] primary myelofibrosis. NGS without evidence of pathogenic mutations. Chimerism studies with 100 donor CD33, CD3 insufficient for analysis.     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 MMF has now been discontinued.  Current tacro dose: 1.5mg BID  Last tacro level: 8.5  Adjustments: No Changes    Immunosuppression: Prevention with voriconazole,  acyclovir. CMV prophylaxis with letermovir. PJP prophyalxis dapsone. Continue weekly monitoring of CMV and EBV.  Last CMV: negative (2/3/2025), last EBV: negative (2/3/2025).  Active infections: none    Pancytopenia: Due to underlying disease and chemotherapy. Transfuse for Hgb <7 g/dL and platelets <10k.    Hypertension: (improved) Was newly hypertensive in hospital following SCT at which time he was started on amlodipine. He has been persistently hypotensive  since discharge. Discontinued amlodipine, normotensive.      Follow up: Twice weekly follow up with labs.    Orders Placed:               Priscila Montes PA-C  Hematology, Oncology, and Stem Cell Transplantation  [facility]    Visit today included increased complexity associated with the care of the episodic problem PMF addressed and managing the longitudinal care of the patient due to the serious and/or complex managed problem(s) PMF.

## 2025-02-10 NOTE — PROGRESS NOTES
BMT Pharmacist Medication Review Note     All current medications were reviewed with the patient and wife. The patient brought in all of his medications with him to this visit.      We discussed the changes made since last meeting:   - He is off amlodipine and has completed mycophenolate    The patient expressed that he has not experienced any side effects thus far.     The patient has an ample supply of all medications.      Medication Indication Morning Afternoon Evening/Night   Acyclovir 800mg  Viral infection prevention 1 tablet  1 tablet   Letermovir (Prevymis®) 480mg CMV infection prevention 1 tablet     Voriconazole 200mg Fungal infection prevention 1 tablet  1 tablet   Dapsone 100mg Fungal pneumonia prevention  1 tablet        Tacrolimus 0.5mg GVHD prevention - HOLD morning dose on lab draw days 3 capsules  3 capsules   Magnesium oxide 400mg Magnesium supplement 2 tablets 1 tablet 2 tablets      **new medication or change in medication     AS NEEDED MEDICATIONS:  Ondansetron 8mg every 8 hours as needed for nausea  Prochlorperazine 10mg every 6 hours as needed for nausea (can take 20 minutes after ondansetron if no relief from nausea)  Tramadol 50 mg tablet every 6 hours as needed for pain.         All questions were answered.      Sree Hernandez, PharmD  Clinical Pharmacy Specialist, Bone Marrow Transplant/Hematology  Spectra link: [phone]

## 2025-02-10 NOTE — NURSING
Labs drawn from central line without difficulty. Flushed with saline and heparin per protocol. Dressing changed. Tolerated well. D/C out of the clinic.

## 2025-02-12 NOTE — PROGRESS NOTES
Section of Hematology and Stem Cell Transplantation    Post-Transplantation Follow Up Visit     2/13/25    Transplant History:   Primary oncologist: Clyde James MD  Primary oncologic diagnosis: primary myelofibrosis  Transplant date: 12/26/2024  Donor: haploidentical  Blood Type (Patient): O +  Blood Type (Donor): O +  CMV (Patient): Negative  CMV (Donor): Positive  Graft source: Peripheral blood  CD34+ cell dose: 6.04 X10^6 cells/kg  Conditioning Regimen:  Thiotepa, Busulfan, Fludarabine  GVHD prophylaxis: Post-transplant cyclophosphamide, MMF, Tacrolimus  Immediate post-transplant complications: mucositis, R axillary skin/soft tissue infection secondary to MRSA    History of Present Ilness:   Rubén Peters) is a pleasant 54 y.o.male with primary myelofibrosis who is status post haploidentical stem cell transplantation conditioned with  Bu/Flu/TT  who is currently day +49 who presents for post-transplant follow up.    Feeling okay today, continues to have SOB with exertion and some at rest with hemoglobin 7.4. Eating 3 meals a day but feels his appetite is still slightly suppressed compared to normal. He feels his bowel movements are starting to normalize now. He has noticed a new pustule that has developed under his right axilla near where he had a biopsy done in the hospital for a pustule that came back as MRSA. No nausea/vomiting. No pruritis/itching. No issues at muhammad, dressing changed .      PAST MEDICAL HISTORY:   Past Medical History:   Diagnosis Date    Abdominal pain 01/02/2025    Allergic contact dermatitis due to adhesives 12/31/2024    Cancer     Flatulence 01/03/2025    Headache 12/27/2024    Hyperlipidemia     Mucositis 01/02/2025    Other constipation 12/19/2024    Thrombocytosis 12/18/2024    Transaminitis 12/27/2024       PAST SURGICAL HISTORY:   Past Surgical History:   Procedure Laterality Date    BONE MARROW BIOPSY N/A 11/27/2024    Procedure: Biopsy-bone marrow;  Surgeon:  Clyde James MD;  Location: [facility] (4TH FLR);  Service: Oncology;  Laterality: N/A;  11/20-pre call complete-tb    COLONOSCOPY N/A 8/9/2023    Procedure: COLONOSCOPY;  Surgeon: Will Ardon MD;  Location: [facility] ENDO (4TH FLR);  Service: Endoscopy;  Laterality: N/A;  Suprep Instructions sent via portal / Authorizing:     Bebeto Arora MD in [facility] FAMILY MED/ INTERNAL MED/ PEDS  Referral:          [id number]    EYE SURGERY      FLEXIBLE SIGMOIDOSCOPY N/A 7/23/2024    Procedure: SIGMOIDOSCOPY, FLEXIBLE;  Surgeon: Nirali Vicente MD;  Location: [facility] ENDO;  Service: Endoscopy;  Laterality: N/A;    INSERTION OF LONGORIA CATHETER Right 12/18/2024    Procedure: INSERTION, CATHETER, CENTRAL VENOUS, LONGORIA TRIPLE LUMEN, Bard 12.5 fr Longoria Cath model [id number];  Surgeon: Willie Hadley MD;  Location: [facility] OR 2ND FLR;  Service: General;  Laterality: Right;       PAST SOCIAL HISTORY:  Social History     Tobacco Use    Smoking status: Former     Types: Cigars, Cigarettes    Smokeless tobacco: Never   Substance Use Topics    Alcohol use: Not Currently     Comment: socially    Drug use: Never       FAMILY HISTORY:  Family History   Problem Relation Name Age of Onset    Arthritis Mother      COPD Father      Testicular cancer Maternal Cousin Peter 17        Chemotherapy    Breast cancer Maternal Cousin  51        Chemotherapy    Heart disease Maternal Grandfather      Stroke Maternal Grandmother         CURRENT MEDICATIONS:   Current Outpatient Medications   Medication Sig    acyclovir (ZOVIRAX) 800 MG Tab Take 1 tablet (800 mg total) by mouth 2 (two) times daily.    dapsone 100 MG Tab Take 1 tablet (100 mg total) by mouth once daily.    letermovir (PREVYMIS) 480 mg Tab Take 1 tablet (480 mg total) by mouth once daily.    magnesium oxide (MAG-OX) 400 mg (241.3 mg magnesium) tablet Take 2 tablets (800 mg total) by mouth 2 (two) times daily.    ondansetron (ZOFRAN) 8 MG tablet Take 1 tablet (8 mg total) by mouth every 8  (eight) hours as needed for Nausea.    prochlorperazine (COMPAZINE) 10 MG tablet Take 1 tablet (10 mg total) by mouth 4 (four) times daily as needed for Nausea.    tacrolimus (PROGRAF) 0.5 MG Cap Take 3 capsules (1.5 mg total) by mouth every morning AND 3 capsules (1.5 mg total) every evening.    traMADoL (ULTRAM) 50 mg tablet Take 1 tablet (50 mg total) by mouth every 6 (six) hours as needed for Pain.    voriconazole (VFEND) 200 MG Tab Take 1 tablet (200 mg total) by mouth 2 (two) times daily.    doxycycline (VIBRAMYCIN) 100 MG Cap Take 1 capsule (100 mg total) by mouth 2 (two) times daily.    LORazepam (ATIVAN) 0.5 MG tablet Take 1 tablet (0.5 mg total) by mouth once as needed. Take 30-45 minutes prior to your bone marrow biopsy on 1/27/25. Do not take if you do not have a .     No current facility-administered medications for this visit.     Facility-Administered Medications Ordered in Other Visits   Medication    0.9% NaCl infusion    alteplase injection 2 mg    heparin, porcine (PF) 100 unit/mL injection flush 500 Units    heparin, porcine (PF) 100 unit/mL injection flush 500 Units    sodium chloride 0.9% flush 10 mL    sodium chloride 0.9% flush 10 mL       ALLERGIES:   Review of patient's allergies indicates:   Allergen Reactions    Bactrim [sulfamethoxazole-trimethoprim] Hives       GVHD Review of Systems:     Pertinent positives and negatives included in the HPI. Otherwise a 14 point review of systems is negative. GVHD review of systems recorded in BMT flowsheet.     Physical Exam:     Vitals:    02/13/25 0754   BP: 101/71   Pulse: (!) 112   Resp: 18   Temp: 97.5 °F (36.4 °C)         General: Appears well, NAD  HEENT: MMM, no OP lesions  Pulmonary: CTAB, no increased work of breathing, no W/R/C  Cardiovascular: S1S2 normal, tachycardic, regular rate no M/R/G  Abdominal: Soft, NT, ND, BS+, no HSM  Extremities: No C/C/E  Neurological: AAOx4, grossly normal, no focal deficits  Dermatologic: Pustule under  right axilla. No new rashes.    ECOG Performance Status: (foot note - ECOG PS provided by Eastern Cooperative Oncology Group) 1 - Symptomatic but completely ambulatory    Karnofsky Performance Score:  70%- Cares for Self: Unable to Carry on Normal Activity or Active Work    Labs:   Lab Results   Component Value Date    WBC 7.37 02/13/2025    RBC 2.41 (L) 02/13/2025    HGB 7.4 (L) 02/13/2025    HCT 22.7 (L) 02/13/2025    MCV 94 02/13/2025    MCH 30.7 02/13/2025    MCHC 32.6 02/13/2025    RDW 19.6 (H) 02/13/2025     (L) 02/13/2025    MPV 10.5 02/13/2025    GRAN 4.8 02/13/2025    GRAN 65.2 02/13/2025    LYMPH 1.0 02/13/2025    LYMPH 13.3 (L) 02/13/2025    MONO 1.4 (H) 02/13/2025    MONO 18.7 (H) 02/13/2025    EOS 0.0 02/13/2025    BASO 0.03 02/13/2025    EOSINOPHIL 0.4 02/13/2025    BASOPHIL 0.4 02/13/2025       Sodium   Date Value Ref Range Status   02/13/2025 138 136 - 145 mmol/L Final     Potassium   Date Value Ref Range Status   02/13/2025 4.9 3.5 - 5.1 mmol/L Final     Chloride   Date Value Ref Range Status   02/13/2025 106 95 - 110 mmol/L Final     CO2   Date Value Ref Range Status   02/13/2025 20 (L) 23 - 29 mmol/L Final     Glucose   Date Value Ref Range Status   02/13/2025 99 70 - 110 mg/dL Final     BUN   Date Value Ref Range Status   02/13/2025 21 (H) 6 - 20 mg/dL Final     Creatinine   Date Value Ref Range Status   02/13/2025 1.0 0.5 - 1.4 mg/dL Final     Calcium   Date Value Ref Range Status   02/13/2025 9.8 8.7 - 10.5 mg/dL Final     Total Protein   Date Value Ref Range Status   02/13/2025 7.0 6.0 - 8.4 g/dL Final     Albumin   Date Value Ref Range Status   02/13/2025 3.6 3.5 - 5.2 g/dL Final     Total Bilirubin   Date Value Ref Range Status   02/13/2025 0.3 0.1 - 1.0 mg/dL Final     Comment:     For infants and newborns, interpretation of results should be based  on gestational age, weight and in agreement with clinical  observations.    Premature Infant recommended reference ranges:  Up to 24  hours.............<8.0 mg/dL  Up to 48 hours............<12.0 mg/dL  3-5 days..................<15.0 mg/dL  6-29 days.................<15.0 mg/dL       Alkaline Phosphatase   Date Value Ref Range Status   02/13/2025 80 40 - 150 U/L Final     AST   Date Value Ref Range Status   02/13/2025 25 10 - 40 U/L Final     ALT   Date Value Ref Range Status   02/13/2025 19 10 - 44 U/L Final     Anion Gap   Date Value Ref Range Status   02/13/2025 12 8 - 16 mmol/L Final     eGFR if    Date Value Ref Range Status   01/22/2022 >60.0 >60 mL/min/1.73 m^2 Final     eGFR if non    Date Value Ref Range Status   01/22/2022 >60.0 >60 mL/min/1.73 m^2 Final     Comment:     Calculation used to obtain the estimated glomerular filtration  rate (eGFR) is the CKD-EPI equation.          Imaging:   All pertinent studies reviewed and interpreted by me    Acute GVHD Scoring:  GVHD Acute Assessment  02/13/2025: No evidence of aGVHD today. See flowsheet documentation.      Assessment and Plan:   Rubén Peters) is a pleasant 54 y.o.male with primary myelofibrosis s/p haplo SCT who presents for post-transplant follow up.    Primary myelofibrosis: Status post treatment with ruxolitinib, followed by alloSCT. Primary oncologist is Clyde James MD who will be managing primary underlying disease. Day +30 BMBx results pending     Status post allogeneic stem cell transplantation: As noted above, status post haploidentical stem cell transplantation conditioned with  Bu/Flu/TT . Currently day 49 Engrafted on day +22.  Day +30 BMBx on 1/2: No definitive evidence of residual JAK2 [id number] primary myelofibrosis. NGS without evidence of pathogenic mutations. Chimerism studies with 100 donor CD33, CD3 insufficient for analysis.     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 MMF has now been discontinued.  Current tacro dose: 1.5mg BID   Last tacro level:  10.8  Adjustments: No Changes    Immunosuppression: Prevention with voriconazole,  acyclovir. CMV prophylaxis with letermovir. PJP prophyalxis dapsone. Continue weekly monitoring of CMV and EBV.  Last CMV: negative (2/3/2025), last EBV: negative (2/3/2025).  Active infections: none    Pancytopenia: Due to underlying disease and chemotherapy. Transfuse for Hgb <7 g/dL and platelets <10k.  Patient short of breath today with Hgb 7.4. Plan for 1 unit RBCs today.    Hypertension: (improved) Was newly hypertensive in hospital following SCT at which time he was started on amlodipine. He has been persistently hypotensive since discharge. Discontinued amlodipine, normotensive.    Patient normotensive today. Will continue to monitor.    Staphylococcus Skin Infection: Patient had MRSA while int  hospital and was treated with doxycycline. Today he has a similar pustule under the right axilla near where the original MRSA infection was. Picture uploaded to media and ID consulted who recommended Doxycycline 100mg PO BID for a month to ensure it does not come back.     Follow up: Twice weekly follow up with labs.    Orders Placed:      Orders Placed This Encounter    doxycycline (VIBRAMYCIN) 100 MG Ángel Alonzo PA-C  Malignant Hematology, Stem Cell Transplant, and Cellular Therapy  [facility]  [facility]     Visit today included increased complexity associated with the care of the episodic problem staph skin infection addressed and managing the longitudinal care of the patient due to the serious and/or complex managed problem(s) PMF s/p allogeneic transplant.

## 2025-02-13 ENCOUNTER — DOCUMENTATION ONLY (OUTPATIENT)
Dept: HEMATOLOGY/ONCOLOGY | Facility: CLINIC | Age: 55
End: 2025-02-13

## 2025-02-13 ENCOUNTER — INFUSION (OUTPATIENT)
Dept: INFUSION THERAPY | Facility: HOSPITAL | Age: 55
End: 2025-02-13
Payer: COMMERCIAL

## 2025-02-13 ENCOUNTER — OFFICE VISIT (OUTPATIENT)
Dept: HEMATOLOGY/ONCOLOGY | Facility: CLINIC | Age: 55
End: 2025-02-13
Payer: COMMERCIAL

## 2025-02-13 ENCOUNTER — LAB VISIT (OUTPATIENT)
Dept: LAB | Facility: HOSPITAL | Age: 55
End: 2025-02-13
Attending: INTERNAL MEDICINE
Payer: COMMERCIAL

## 2025-02-13 VITALS
OXYGEN SATURATION: 100 % | RESPIRATION RATE: 18 BRPM | TEMPERATURE: 98 F | WEIGHT: 174.81 LBS | DIASTOLIC BLOOD PRESSURE: 71 MMHG | SYSTOLIC BLOOD PRESSURE: 101 MMHG | HEART RATE: 112 BPM | HEIGHT: 71 IN | BODY MASS INDEX: 24.47 KG/M2

## 2025-02-13 VITALS
SYSTOLIC BLOOD PRESSURE: 114 MMHG | RESPIRATION RATE: 18 BRPM | TEMPERATURE: 98 F | HEART RATE: 89 BPM | DIASTOLIC BLOOD PRESSURE: 70 MMHG | OXYGEN SATURATION: 100 %

## 2025-02-13 DIAGNOSIS — L08.9 STAPH SKIN INFECTION: ICD-10-CM

## 2025-02-13 DIAGNOSIS — Z94.84 IMMUNOCOMPROMISED STATE ASSOCIATED WITH STEM CELL TRANSPLANT: ICD-10-CM

## 2025-02-13 DIAGNOSIS — B95.8 STAPH SKIN INFECTION: ICD-10-CM

## 2025-02-13 DIAGNOSIS — Z94.84 HISTORY OF ALLOGENEIC STEM CELL TRANSPLANT: ICD-10-CM

## 2025-02-13 DIAGNOSIS — D61.810 PANCYTOPENIA DUE TO CHEMOTHERAPY: ICD-10-CM

## 2025-02-13 DIAGNOSIS — D47.1 PRIMARY MYELOFIBROSIS: ICD-10-CM

## 2025-02-13 DIAGNOSIS — Z94.84 HISTORY OF ALLOGENEIC STEM CELL TRANSPLANT: Primary | ICD-10-CM

## 2025-02-13 DIAGNOSIS — D64.81 ANEMIA ASSOCIATED WITH CHEMOTHERAPY: Primary | ICD-10-CM

## 2025-02-13 DIAGNOSIS — E27.9 ADRENAL NODULE: ICD-10-CM

## 2025-02-13 DIAGNOSIS — D84.822 IMMUNOCOMPROMISED STATE ASSOCIATED WITH STEM CELL TRANSPLANT: ICD-10-CM

## 2025-02-13 DIAGNOSIS — T45.1X5A ANEMIA ASSOCIATED WITH CHEMOTHERAPY: Primary | ICD-10-CM

## 2025-02-13 LAB
ABO + RH BLD: NORMAL
ALBUMIN SERPL BCP-MCNC: 3.6 G/DL (ref 3.5–5.2)
ALP SERPL-CCNC: 80 U/L (ref 40–150)
ALT SERPL W/O P-5'-P-CCNC: 19 U/L (ref 10–44)
ANION GAP SERPL CALC-SCNC: 12 MMOL/L (ref 8–16)
AST SERPL-CCNC: 25 U/L (ref 10–40)
BASOPHILS # BLD AUTO: 0.03 K/UL (ref 0–0.2)
BASOPHILS NFR BLD: 0.4 % (ref 0–1.9)
BILIRUB SERPL-MCNC: 0.3 MG/DL (ref 0.1–1)
BLD GP AB SCN CELLS X3 SERPL QL: NORMAL
BLD PROD TYP BPU: NORMAL
BLD PROD TYP BPU: NORMAL
BLOOD UNIT EXPIRATION DATE: NORMAL
BLOOD UNIT EXPIRATION DATE: NORMAL
BLOOD UNIT TYPE CODE: 5100
BLOOD UNIT TYPE CODE: 5100
BLOOD UNIT TYPE: NORMAL
BLOOD UNIT TYPE: NORMAL
BUN SERPL-MCNC: 21 MG/DL (ref 6–20)
CALCIUM SERPL-MCNC: 9.8 MG/DL (ref 8.7–10.5)
CHLORIDE SERPL-SCNC: 106 MMOL/L (ref 95–110)
CO2 SERPL-SCNC: 20 MMOL/L (ref 23–29)
CODING SYSTEM: NORMAL
CODING SYSTEM: NORMAL
CREAT SERPL-MCNC: 1 MG/DL (ref 0.5–1.4)
CROSSMATCH INTERPRETATION: NORMAL
CROSSMATCH INTERPRETATION: NORMAL
DIFFERENTIAL METHOD BLD: ABNORMAL
DISPENSE STATUS: NORMAL
DISPENSE STATUS: NORMAL
EOSINOPHIL # BLD AUTO: 0 K/UL (ref 0–0.5)
EOSINOPHIL NFR BLD: 0.4 % (ref 0–8)
ERYTHROCYTE [DISTWIDTH] IN BLOOD BY AUTOMATED COUNT: 19.6 % (ref 11.5–14.5)
EST. GFR  (NO RACE VARIABLE): >60 ML/MIN/1.73 M^2
GLUCOSE SERPL-MCNC: 99 MG/DL (ref 70–110)
HCT VFR BLD AUTO: 22.7 % (ref 40–54)
HGB BLD-MCNC: 7.4 G/DL (ref 14–18)
IMM GRANULOCYTES # BLD AUTO: 0.15 K/UL (ref 0–0.04)
IMM GRANULOCYTES NFR BLD AUTO: 2 % (ref 0–0.5)
LYMPHOCYTES # BLD AUTO: 1 K/UL (ref 1–4.8)
LYMPHOCYTES NFR BLD: 13.3 % (ref 18–48)
MAGNESIUM SERPL-MCNC: 2.1 MG/DL (ref 1.6–2.6)
MCH RBC QN AUTO: 30.7 PG (ref 27–31)
MCHC RBC AUTO-ENTMCNC: 32.6 G/DL (ref 32–36)
MCV RBC AUTO: 94 FL (ref 82–98)
MONOCYTES # BLD AUTO: 1.4 K/UL (ref 0.3–1)
MONOCYTES NFR BLD: 18.7 % (ref 4–15)
NEUTROPHILS # BLD AUTO: 4.8 K/UL (ref 1.8–7.7)
NEUTROPHILS NFR BLD: 65.2 % (ref 38–73)
NRBC BLD-RTO: 0 /100 WBC
NUM UNITS TRANS PACKED RBC: NORMAL
NUM UNITS TRANS PACKED RBC: NORMAL
PHOSPHATE SERPL-MCNC: 4.9 MG/DL (ref 2.7–4.5)
PLATELET # BLD AUTO: 103 K/UL (ref 150–450)
PMV BLD AUTO: 10.5 FL (ref 9.2–12.9)
POTASSIUM SERPL-SCNC: 4.9 MMOL/L (ref 3.5–5.1)
PROT SERPL-MCNC: 7 G/DL (ref 6–8.4)
RBC # BLD AUTO: 2.41 M/UL (ref 4.6–6.2)
SODIUM SERPL-SCNC: 138 MMOL/L (ref 136–145)
SPECIMEN OUTDATE: NORMAL
TACROLIMUS BLD-MCNC: 10.8 NG/ML (ref 5–15)
WBC # BLD AUTO: 7.37 K/UL (ref 3.9–12.7)

## 2025-02-13 PROCEDURE — 25000003 PHARM REV CODE 250

## 2025-02-13 PROCEDURE — 36430 TRANSFUSION BLD/BLD COMPNT: CPT

## 2025-02-13 PROCEDURE — 63600175 PHARM REV CODE 636 W HCPCS

## 2025-02-13 PROCEDURE — 36415 COLL VENOUS BLD VENIPUNCTURE: CPT | Performed by: INTERNAL MEDICINE

## 2025-02-13 PROCEDURE — 85025 COMPLETE CBC W/AUTO DIFF WBC: CPT | Performed by: INTERNAL MEDICINE

## 2025-02-13 PROCEDURE — 84100 ASSAY OF PHOSPHORUS: CPT | Performed by: INTERNAL MEDICINE

## 2025-02-13 PROCEDURE — P9040 RBC LEUKOREDUCED IRRADIATED: HCPCS | Performed by: INTERNAL MEDICINE

## 2025-02-13 PROCEDURE — 99999 PR PBB SHADOW E&M-EST. PATIENT-LVL IV: CPT | Mod: PBBFAC,,,

## 2025-02-13 PROCEDURE — 80053 COMPREHEN METABOLIC PANEL: CPT | Performed by: INTERNAL MEDICINE

## 2025-02-13 PROCEDURE — A4216 STERILE WATER/SALINE, 10 ML: HCPCS

## 2025-02-13 PROCEDURE — 25000003 PHARM REV CODE 250: Performed by: INTERNAL MEDICINE

## 2025-02-13 PROCEDURE — 83735 ASSAY OF MAGNESIUM: CPT | Performed by: INTERNAL MEDICINE

## 2025-02-13 PROCEDURE — 86920 COMPATIBILITY TEST SPIN: CPT | Performed by: INTERNAL MEDICINE

## 2025-02-13 PROCEDURE — 86900 BLOOD TYPING SEROLOGIC ABO: CPT | Performed by: INTERNAL MEDICINE

## 2025-02-13 PROCEDURE — 80197 ASSAY OF TACROLIMUS: CPT | Performed by: INTERNAL MEDICINE

## 2025-02-13 RX ORDER — DOXYCYCLINE 100 MG/1
100 CAPSULE ORAL 2 TIMES DAILY
Qty: 60 CAPSULE | Refills: 0 | Status: SHIPPED | OUTPATIENT
Start: 2025-02-13 | End: 2025-03-15

## 2025-02-13 RX ORDER — SODIUM CHLORIDE 0.9 % (FLUSH) 0.9 %
10 SYRINGE (ML) INJECTION
Status: DISCONTINUED | OUTPATIENT
Start: 2025-02-13 | End: 2025-02-13 | Stop reason: HOSPADM

## 2025-02-13 RX ORDER — SODIUM CHLORIDE 9 MG/ML
50 INJECTION, SOLUTION INTRAVENOUS CONTINUOUS
Status: DISCONTINUED | OUTPATIENT
Start: 2025-02-13 | End: 2025-02-13 | Stop reason: HOSPADM

## 2025-02-13 RX ORDER — HEPARIN 100 UNIT/ML
500 SYRINGE INTRAVENOUS
OUTPATIENT
Start: 2025-02-13

## 2025-02-13 RX ORDER — HEPARIN 100 UNIT/ML
500 SYRINGE INTRAVENOUS
Status: DISCONTINUED | OUTPATIENT
Start: 2025-02-13 | End: 2025-02-13 | Stop reason: HOSPADM

## 2025-02-13 RX ORDER — DOXYCYCLINE 100 MG/1
100 CAPSULE ORAL 2 TIMES DAILY
Qty: 60 CAPSULE | Refills: 0 | Status: SHIPPED | OUTPATIENT
Start: 2025-02-13 | End: 2025-02-13

## 2025-02-13 RX ORDER — SODIUM CHLORIDE 0.9 % (FLUSH) 0.9 %
10 SYRINGE (ML) INJECTION
OUTPATIENT
Start: 2025-02-13

## 2025-02-13 RX ADMIN — SODIUM CHLORIDE, PRESERVATIVE FREE 10 ML: 5 INJECTION INTRAVENOUS at 11:02

## 2025-02-13 RX ADMIN — HEPARIN SODIUM (PORCINE) LOCK FLUSH IV SOLN 100 UNIT/ML 500 UNITS: 100 SOLUTION at 11:02

## 2025-02-13 RX ADMIN — SODIUM CHLORIDE 25 ML/HR: 9 INJECTION, SOLUTION INTRAVENOUS at 09:02

## 2025-02-13 NOTE — PLAN OF CARE
0900-Labs , hx, and medications reviewed, patient was seen in clinic prior to arrival- will get 1 unit PRBCs today. Assessment completed. Discussed plan of care with patient. Patient in agreement. Chair reclined and warm blanket and snack offered.

## 2025-02-13 NOTE — PLAN OF CARE
1142-Patient tolerated 1 unit of blood well. Discharged without complaints or S/S of adverse event.  Instructed to call provider for any questions or concerns.

## 2025-02-13 NOTE — PROGRESS NOTES
BMT Pharmacist Immunosuppression Note:    Reviewed patient's tacrolimus level with YONNY Joseph,  and it is within goal range. Instructed patient to continue your current regimen of 3 capsules (1.5mg) in the morning and 3 capsules (1.5mg) in the evening.       Sree Hernandez, PharmD  Clinical Pharmacy Specialist, Bone Marrow Transplant/Hematology  Spectra link: [phone]

## 2025-02-17 ENCOUNTER — INFUSION (OUTPATIENT)
Dept: INFUSION THERAPY | Facility: HOSPITAL | Age: 55
End: 2025-02-17
Attending: INTERNAL MEDICINE
Payer: COMMERCIAL

## 2025-02-17 ENCOUNTER — OFFICE VISIT (OUTPATIENT)
Dept: HEMATOLOGY/ONCOLOGY | Facility: CLINIC | Age: 55
End: 2025-02-17
Payer: COMMERCIAL

## 2025-02-17 ENCOUNTER — INFUSION (OUTPATIENT)
Dept: INFUSION THERAPY | Facility: HOSPITAL | Age: 55
End: 2025-02-17
Payer: COMMERCIAL

## 2025-02-17 ENCOUNTER — HOSPITAL ENCOUNTER (OUTPATIENT)
Dept: CARDIOLOGY | Facility: CLINIC | Age: 55
Discharge: HOME | End: 2025-02-17
Payer: COMMERCIAL

## 2025-02-17 VITALS
SYSTOLIC BLOOD PRESSURE: 116 MMHG | DIASTOLIC BLOOD PRESSURE: 69 MMHG | HEART RATE: 100 BPM | TEMPERATURE: 98 F | RESPIRATION RATE: 18 BRPM

## 2025-02-17 VITALS
SYSTOLIC BLOOD PRESSURE: 98 MMHG | HEIGHT: 71 IN | DIASTOLIC BLOOD PRESSURE: 64 MMHG | BODY MASS INDEX: 24.57 KG/M2 | TEMPERATURE: 98 F | WEIGHT: 175.5 LBS | HEART RATE: 118 BPM | OXYGEN SATURATION: 97 %

## 2025-02-17 DIAGNOSIS — T45.1X5A ANEMIA ASSOCIATED WITH CHEMOTHERAPY: Primary | ICD-10-CM

## 2025-02-17 DIAGNOSIS — Z94.84 IMMUNOCOMPROMISED STATE ASSOCIATED WITH STEM CELL TRANSPLANT: ICD-10-CM

## 2025-02-17 DIAGNOSIS — L08.9 STAPH SKIN INFECTION: ICD-10-CM

## 2025-02-17 DIAGNOSIS — D47.1 PRIMARY MYELOFIBROSIS: ICD-10-CM

## 2025-02-17 DIAGNOSIS — Z76.82 STEM CELL TRANSPLANT CANDIDATE: ICD-10-CM

## 2025-02-17 DIAGNOSIS — D84.822 IMMUNOCOMPROMISED STATE ASSOCIATED WITH STEM CELL TRANSPLANT: ICD-10-CM

## 2025-02-17 DIAGNOSIS — E27.9 ADRENAL NODULE: ICD-10-CM

## 2025-02-17 DIAGNOSIS — R00.0 TACHYCARDIA: ICD-10-CM

## 2025-02-17 DIAGNOSIS — L98.8 SKIN GVHD: ICD-10-CM

## 2025-02-17 DIAGNOSIS — E27.9 ADRENAL NODULE: Primary | ICD-10-CM

## 2025-02-17 DIAGNOSIS — B95.8 STAPH SKIN INFECTION: ICD-10-CM

## 2025-02-17 DIAGNOSIS — D89.813 SKIN GVHD: ICD-10-CM

## 2025-02-17 DIAGNOSIS — Z76.82 STEM CELL TRANSPLANT CANDIDATE: Primary | ICD-10-CM

## 2025-02-17 DIAGNOSIS — Z94.84 HISTORY OF ALLOGENEIC STEM CELL TRANSPLANT: Primary | ICD-10-CM

## 2025-02-17 DIAGNOSIS — D64.81 ANEMIA ASSOCIATED WITH CHEMOTHERAPY: Primary | ICD-10-CM

## 2025-02-17 LAB
ABO + RH BLD: NORMAL
ALBUMIN SERPL BCP-MCNC: 3.7 G/DL (ref 3.5–5.2)
ALP SERPL-CCNC: 90 U/L (ref 40–150)
ALT SERPL W/O P-5'-P-CCNC: 21 U/L (ref 10–44)
ANION GAP SERPL CALC-SCNC: 11 MMOL/L (ref 8–16)
ANISOCYTOSIS BLD QL SMEAR: SLIGHT
AST SERPL-CCNC: 28 U/L (ref 10–40)
BASOPHILS NFR BLD: 0 % (ref 0–1.9)
BILIRUB SERPL-MCNC: 0.4 MG/DL (ref 0.1–1)
BLD GP AB SCN CELLS X3 SERPL QL: NORMAL
BLD PROD TYP BPU: NORMAL
BLOOD UNIT EXPIRATION DATE: NORMAL
BLOOD UNIT TYPE CODE: 5100
BLOOD UNIT TYPE: NORMAL
BUN SERPL-MCNC: 17 MG/DL (ref 6–20)
CALCIUM SERPL-MCNC: 9.7 MG/DL (ref 8.7–10.5)
CHLORIDE SERPL-SCNC: 106 MMOL/L (ref 95–110)
CO2 SERPL-SCNC: 20 MMOL/L (ref 23–29)
CODING SYSTEM: NORMAL
CREAT SERPL-MCNC: 1 MG/DL (ref 0.5–1.4)
CROSSMATCH INTERPRETATION: NORMAL
DACRYOCYTES BLD QL SMEAR: ABNORMAL
DIFFERENTIAL METHOD BLD: ABNORMAL
DISPENSE STATUS: NORMAL
EOSINOPHIL NFR BLD: 1 % (ref 0–8)
ERYTHROCYTE [DISTWIDTH] IN BLOOD BY AUTOMATED COUNT: 17.5 % (ref 11.5–14.5)
EST. GFR  (NO RACE VARIABLE): >60 ML/MIN/1.73 M^2
GLUCOSE SERPL-MCNC: 103 MG/DL (ref 70–110)
HCT VFR BLD AUTO: 22.6 % (ref 40–54)
HGB BLD-MCNC: 7.7 G/DL (ref 14–18)
IMM GRANULOCYTES # BLD AUTO: ABNORMAL K/UL (ref 0–0.04)
IMM GRANULOCYTES NFR BLD AUTO: ABNORMAL % (ref 0–0.5)
LYMPHOCYTES NFR BLD: 5 % (ref 18–48)
MAGNESIUM SERPL-MCNC: 2 MG/DL (ref 1.6–2.6)
MCH RBC QN AUTO: 30.9 PG (ref 27–31)
MCHC RBC AUTO-ENTMCNC: 34.1 G/DL (ref 32–36)
MCV RBC AUTO: 91 FL (ref 82–98)
METAMYELOCYTES NFR BLD MANUAL: 1 %
MONOCYTES NFR BLD: 4 % (ref 4–15)
MYELOCYTES NFR BLD MANUAL: 1 %
NEUTROPHILS NFR BLD: 86 % (ref 38–73)
NEUTS BAND NFR BLD MANUAL: 2 %
NRBC BLD-RTO: 0 /100 WBC
NUM UNITS TRANS PACKED RBC: NORMAL
OHS QRS DURATION: 82 MS
OHS QTC CALCULATION: 445 MS
OVALOCYTES BLD QL SMEAR: ABNORMAL
PHOSPHATE SERPL-MCNC: 3.6 MG/DL (ref 2.7–4.5)
PLATELET # BLD AUTO: 137 K/UL (ref 150–450)
PLATELET BLD QL SMEAR: ABNORMAL
PMV BLD AUTO: 10.6 FL (ref 9.2–12.9)
POIKILOCYTOSIS BLD QL SMEAR: SLIGHT
POLYCHROMASIA BLD QL SMEAR: ABNORMAL
POTASSIUM SERPL-SCNC: 4.5 MMOL/L (ref 3.5–5.1)
PROT SERPL-MCNC: 7.1 G/DL (ref 6–8.4)
RBC # BLD AUTO: 2.49 M/UL (ref 4.6–6.2)
SODIUM SERPL-SCNC: 137 MMOL/L (ref 136–145)
SPECIMEN OUTDATE: NORMAL
SPHEROCYTES BLD QL SMEAR: ABNORMAL
WBC # BLD AUTO: 13.49 K/UL (ref 3.9–12.7)

## 2025-02-17 PROCEDURE — 85027 COMPLETE CBC AUTOMATED: CPT | Performed by: INTERNAL MEDICINE

## 2025-02-17 PROCEDURE — 93010 ELECTROCARDIOGRAM REPORT: CPT | Mod: S$GLB,,, | Performed by: INTERNAL MEDICINE

## 2025-02-17 PROCEDURE — 36430 TRANSFUSION BLD/BLD COMPNT: CPT

## 2025-02-17 PROCEDURE — 25000003 PHARM REV CODE 250

## 2025-02-17 PROCEDURE — 86920 COMPATIBILITY TEST SPIN: CPT | Performed by: INTERNAL MEDICINE

## 2025-02-17 PROCEDURE — 80053 COMPREHEN METABOLIC PANEL: CPT | Performed by: INTERNAL MEDICINE

## 2025-02-17 PROCEDURE — 86850 RBC ANTIBODY SCREEN: CPT | Performed by: PHYSICIAN ASSISTANT

## 2025-02-17 PROCEDURE — 85007 BL SMEAR W/DIFF WBC COUNT: CPT | Mod: NCS | Performed by: INTERNAL MEDICINE

## 2025-02-17 PROCEDURE — 25000003 PHARM REV CODE 250: Performed by: INTERNAL MEDICINE

## 2025-02-17 PROCEDURE — P9040 RBC LEUKOREDUCED IRRADIATED: HCPCS | Performed by: INTERNAL MEDICINE

## 2025-02-17 PROCEDURE — 63600175 PHARM REV CODE 636 W HCPCS

## 2025-02-17 PROCEDURE — 84100 ASSAY OF PHOSPHORUS: CPT | Performed by: INTERNAL MEDICINE

## 2025-02-17 PROCEDURE — A4216 STERILE WATER/SALINE, 10 ML: HCPCS

## 2025-02-17 PROCEDURE — 36592 COLLECT BLOOD FROM PICC: CPT

## 2025-02-17 PROCEDURE — 83735 ASSAY OF MAGNESIUM: CPT | Performed by: INTERNAL MEDICINE

## 2025-02-17 PROCEDURE — 87799 DETECT AGENT NOS DNA QUANT: CPT | Performed by: INTERNAL MEDICINE

## 2025-02-17 RX ORDER — SODIUM CHLORIDE 9 MG/ML
50 INJECTION, SOLUTION INTRAVENOUS CONTINUOUS
Status: DISCONTINUED | OUTPATIENT
Start: 2025-02-17 | End: 2025-02-17 | Stop reason: HOSPADM

## 2025-02-17 RX ORDER — TRIAMCINOLONE ACETONIDE 1 MG/G
CREAM TOPICAL 2 TIMES DAILY
Qty: 15 G | Refills: 0 | Status: SHIPPED | OUTPATIENT
Start: 2025-02-17

## 2025-02-17 RX ORDER — HEPARIN 100 UNIT/ML
500 SYRINGE INTRAVENOUS
Status: DISCONTINUED | OUTPATIENT
Start: 2025-02-17 | End: 2025-02-17 | Stop reason: HOSPADM

## 2025-02-17 RX ORDER — SODIUM CHLORIDE 0.9 % (FLUSH) 0.9 %
10 SYRINGE (ML) INJECTION
Status: DISCONTINUED | OUTPATIENT
Start: 2025-02-17 | End: 2025-02-17 | Stop reason: HOSPADM

## 2025-02-17 RX ORDER — HEPARIN 100 UNIT/ML
500 SYRINGE INTRAVENOUS
Status: CANCELLED | OUTPATIENT
Start: 2025-02-17

## 2025-02-17 RX ORDER — SODIUM CHLORIDE 0.9 % (FLUSH) 0.9 %
10 SYRINGE (ML) INJECTION
Status: CANCELLED | OUTPATIENT
Start: 2025-02-17

## 2025-02-17 RX ADMIN — HEPARIN SODIUM (PORCINE) LOCK FLUSH IV SOLN 100 UNIT/ML 500 UNITS: 100 SOLUTION at 02:02

## 2025-02-17 RX ADMIN — SODIUM CHLORIDE 50 ML/HR: 9 INJECTION, SOLUTION INTRAVENOUS at 12:02

## 2025-02-17 RX ADMIN — SODIUM CHLORIDE, PRESERVATIVE FREE 10 ML: 5 INJECTION INTRAVENOUS at 02:02

## 2025-02-17 NOTE — PROGRESS NOTES
Section of Hematology and Stem Cell Transplantation    Post-Transplantation Follow Up Visit     2/17/25    Transplant History:   Primary oncologist: Clyde James MD  Primary oncologic diagnosis: primary myelofibrosis  Transplant date: 12/26/2024  Donor: haploidentical  Blood Type (Patient): O +  Blood Type (Donor): O +  CMV (Patient): Negative  CMV (Donor): Positive  Graft source: Peripheral blood  CD34+ cell dose: 6.04 X10^6 cells/kg  Conditioning Regimen:  Thiotepa, Busulfan, Fludarabine  GVHD prophylaxis: Post-transplant cyclophosphamide, MMF, Tacrolimus  Immediate post-transplant complications: mucositis, R axillary skin/soft tissue infection secondary to MRSA    History of Present Ilness:   Rubén Hu (Rubén) is a pleasant 54 y.o.male with primary myelofibrosis who is status post haploidentical stem cell transplantation conditioned with  Bu/Flu/TT  who is currently day +53 who presents for post-transplant follow up.    Feeling more SOB on exertion today with racing heart - improves with rest/sitting, no chest pain, no history of VTE, no extremity swelling, no issues at line.  in clinic. No fevers/chills. Underarm pustule resolving with initiation of doxycycline. New diffuse hyperpigmentation across neck/upper chest, wife has noted over past several days. No other concerns.     PAST MEDICAL HISTORY:   Past Medical History:   Diagnosis Date    Abdominal pain 01/02/2025    Allergic contact dermatitis due to adhesives 12/31/2024    Cancer     Flatulence 01/03/2025    Headache 12/27/2024    Hyperlipidemia     Mucositis 01/02/2025    Other constipation 12/19/2024    Thrombocytosis 12/18/2024    Transaminitis 12/27/2024       PAST SURGICAL HISTORY:   Past Surgical History:   Procedure Laterality Date    BONE MARROW BIOPSY N/A 11/27/2024    Procedure: Biopsy-bone marrow;  Surgeon: Clyde James MD;  Location: [facility] ([address] [place] [address]);  Service: Oncology;  Laterality: N/A;  11/20-pre call complete-tb     COLONOSCOPY N/A 8/9/2023    Procedure: COLONOSCOPY;  Surgeon: Will Ardon MD;  Location: [facility] (4TH FLR);  Service: Endoscopy;  Laterality: N/A;  Suprep Instructions sent via portal / Authorizing:     Bebeto Arora MD in [facility] FAMILY MED/ INTERNAL MED/ PEDS  Referral:          [id number]    EYE SURGERY      FLEXIBLE SIGMOIDOSCOPY N/A 7/23/2024    Procedure: SIGMOIDOSCOPY, FLEXIBLE;  Surgeon: Nirali Vicente MD;  Location: [facility] ENDO;  Service: Endoscopy;  Laterality: N/A;    INSERTION OF LONGORIA CATHETER Right 12/18/2024    Procedure: INSERTION, CATHETER, CENTRAL VENOUS, LONGORIA TRIPLE LUMEN, Bard 12.5 fr Longoria Cath model [id number];  Surgeon: Willie Hadley MD;  Location: [facility] OR 2ND FLR;  Service: General;  Laterality: Right;       PAST SOCIAL HISTORY:  Social History     Tobacco Use    Smoking status: Former     Types: Cigars, Cigarettes    Smokeless tobacco: Never   Substance Use Topics    Alcohol use: Not Currently     Comment: socially    Drug use: Never       FAMILY HISTORY:  Family History   Problem Relation Name Age of Onset    Arthritis Mother      COPD Father      Testicular cancer Maternal Cousin Peter 17        Chemotherapy    Breast cancer Maternal Cousin  51        Chemotherapy    Heart disease Maternal Grandfather      Stroke Maternal Grandmother         CURRENT MEDICATIONS:   Current Outpatient Medications   Medication Sig    acyclovir (ZOVIRAX) 800 MG Tab Take 1 tablet (800 mg total) by mouth 2 (two) times daily.    dapsone 100 MG Tab Take 1 tablet (100 mg total) by mouth once daily.    doxycycline (VIBRAMYCIN) 100 MG Cap Take 1 capsule (100 mg total) by mouth 2 (two) times daily.    letermovir (PREVYMIS) 480 mg Tab Take 1 tablet (480 mg total) by mouth once daily.    LORazepam (ATIVAN) 0.5 MG tablet Take 1 tablet (0.5 mg total) by mouth once as needed. Take 30-45 minutes prior to your bone marrow biopsy on 1/27/25. Do not take if you do not have a .    magnesium oxide (MAG-OX) 400 mg  (241.3 mg magnesium) tablet Take 2 tablets (800 mg total) by mouth 2 (two) times daily.    ondansetron (ZOFRAN) 8 MG tablet Take 1 tablet (8 mg total) by mouth every 8 (eight) hours as needed for Nausea.    prochlorperazine (COMPAZINE) 10 MG tablet Take 1 tablet (10 mg total) by mouth 4 (four) times daily as needed for Nausea.    tacrolimus (PROGRAF) 0.5 MG Cap Take 3 capsules (1.5 mg total) by mouth every morning AND 3 capsules (1.5 mg total) every evening.    traMADoL (ULTRAM) 50 mg tablet Take 1 tablet (50 mg total) by mouth every 6 (six) hours as needed for Pain.    voriconazole (VFEND) 200 MG Tab Take 1 tablet (200 mg total) by mouth 2 (two) times daily.     No current facility-administered medications for this visit.     Facility-Administered Medications Ordered in Other Visits   Medication    heparin, porcine (PF) 100 unit/mL injection flush 500 Units    sodium chloride 0.9% flush 10 mL       ALLERGIES:   Review of patient's allergies indicates:   Allergen Reactions    Bactrim [sulfamethoxazole-trimethoprim] Hives       GVHD Review of Systems:     Pertinent positives and negatives included in the HPI. Otherwise a 14 point review of systems is negative. GVHD review of systems recorded in BMT flowsheet.     Physical Exam:     Vitals:    02/17/25 0855   BP: 98/64   Pulse: (!) 118   Temp: 98 °F (36.7 °C)       General: Appears well, NAD  HEENT: MMM, no OP lesions  Pulmonary: CTAB, no increased work of breathing, no W/R/C  Cardiovascular: S1S2 normal, tachycardic, regular rate no M/R/G  Abdominal: Soft, NT, ND, BS+, no HSM  Extremities: No C/C/E  Neurological: AAOx4, grossly normal, no focal deficits  Dermatologic: Pustule under right axilla. Hyperpigmentation around neck, upper chest.     ECOG Performance Status: (foot note - ECOG PS provided by Eastern Cooperative [employer]cology Group) 1 - Symptomatic but completely ambulatory    Karnofsky Performance Score:  70%- Cares for Self: Unable to Carry on Normal Activity or  Active Work    Labs:   Lab Results   Component Value Date    WBC 7.37 02/13/2025    RBC 2.41 (L) 02/13/2025    HGB 7.4 (L) 02/13/2025    HCT 22.7 (L) 02/13/2025    MCV 94 02/13/2025    MCH 30.7 02/13/2025    MCHC 32.6 02/13/2025    RDW 19.6 (H) 02/13/2025     (L) 02/13/2025    MPV 10.5 02/13/2025    GRAN 4.8 02/13/2025    GRAN 65.2 02/13/2025    LYMPH 1.0 02/13/2025    LYMPH 13.3 (L) 02/13/2025    MONO 1.4 (H) 02/13/2025    MONO 18.7 (H) 02/13/2025    EOS 0.0 02/13/2025    BASO 0.03 02/13/2025    EOSINOPHIL 0.4 02/13/2025    BASOPHIL 0.4 02/13/2025       Sodium   Date Value Ref Range Status   02/13/2025 138 136 - 145 mmol/L Final     Potassium   Date Value Ref Range Status   02/13/2025 4.9 3.5 - 5.1 mmol/L Final     Chloride   Date Value Ref Range Status   02/13/2025 106 95 - 110 mmol/L Final     CO2   Date Value Ref Range Status   02/13/2025 20 (L) 23 - 29 mmol/L Final     Glucose   Date Value Ref Range Status   02/13/2025 99 70 - 110 mg/dL Final     BUN   Date Value Ref Range Status   02/13/2025 21 (H) 6 - 20 mg/dL Final     Creatinine   Date Value Ref Range Status   02/13/2025 1.0 0.5 - 1.4 mg/dL Final     Calcium   Date Value Ref Range Status   02/13/2025 9.8 8.7 - 10.5 mg/dL Final     Total Protein   Date Value Ref Range Status   02/13/2025 7.0 6.0 - 8.4 g/dL Final     Albumin   Date Value Ref Range Status   02/13/2025 3.6 3.5 - 5.2 g/dL Final     Total Bilirubin   Date Value Ref Range Status   02/13/2025 0.3 0.1 - 1.0 mg/dL Final     Comment:     For infants and newborns, interpretation of results should be based  on gestational age, weight and in agreement with clinical  observations.    Premature Infant recommended reference ranges:  Up to 24 hours.............<8.0 mg/dL  Up to 48 hours............<12.0 mg/dL  3-5 days..................<15.0 mg/dL  6-29 days.................<15.0 mg/dL       Alkaline Phosphatase   Date Value Ref Range Status   02/13/2025 80 40 - 150 U/L Final     AST   Date Value Ref  Range Status   02/13/2025 25 10 - 40 U/L Final     ALT   Date Value Ref Range Status   02/13/2025 19 10 - 44 U/L Final     Anion Gap   Date Value Ref Range Status   02/13/2025 12 8 - 16 mmol/L Final     eGFR if    Date Value Ref Range Status   01/22/2022 >60.0 >60 mL/min/1.73 m^2 Final     eGFR if non    Date Value Ref Range Status   01/22/2022 >60.0 >60 mL/min/1.73 m^2 Final     Comment:     Calculation used to obtain the estimated glomerular filtration  rate (eGFR) is the CKD-EPI equation.          Imaging:   All pertinent studies reviewed and interpreted by me    Acute GVHD Scoring:  GVHD Acute Assessment  02/17/2025: aGHVD of skin evident today, ~5% BSA. Topical steroids started.      Assessment and Plan:   Rubén Peters) is a pleasant 54 y.o.male with primary myelofibrosis s/p haplo SCT who presents for post-transplant follow up.    Primary myelofibrosis: Status post treatment with ruxolitinib, followed by alloSCT. Primary oncologist is Clyde James MD who will be managing primary underlying disease. Day +30 BMBx results pending     Status post allogeneic stem cell transplantation: As noted above, status post haploidentical stem cell transplantation conditioned with  Bu/Flu/TT . Currently day 53 Engrafted on day +22.  Day +30 BMBx on 1/2: No definitive evidence of residual JAK2 [id number] primary myelofibrosis. NGS without evidence of pathogenic mutations. Chimerism studies with 100 donor CD33, CD3 insufficient for analysis.     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 MMF has now been discontinued.  Current tacro dose: 1.5mg BID   Last tacro level: not collected today, will continue and assess end of  week  Adjustments: No Changes  Acute GVHD of skin: Hyperpigmentation acutely to neck/upper chest over last few days (~5% BSA). Plan for triamcinalone 0.1% BID to affected area and reassess end of  week.    Immunosuppression: Prevention with voriconazole,  acyclovir. CMV prophylaxis with letermovir. PJP prophyalxis dapsone. Continue weekly monitoring of CMV and EBV.  Last CMV: negative (2/3/2025), last EBV: negative (2/3/2025).  Active infections: none    Pancytopenia: Due to underlying disease and chemotherapy. Transfuse for Hgb <7 g/dL and platelets <10k.  Patient short of breath today with Hgb 7.7. Plan for 1 unit RBCs today.    Hypertension: (improved) Was newly hypertensive in hospital following SCT at which time he was started on amlodipine. He has been persistently hypotensive since discharge. Discontinued amlodipine, normotensive.    Patient normotensive today. Will continue to monitor.    Tachycardic: Persistent tachycardia since discharge, initially attributed to volume depletion but now increasing, plan for EKG, echocardiogram. No chest pain and only mildly SOB on exertion. Considered PE in differential - No extremity swelling/pain, no CP/SOB, no history of VTE.     Staphylococcus Skin Infection: Patient had MRSA while in the hospital and was treated with doxycycline, concern for recurrence on recent exam. Picture uploaded to media and ID consulted who recommended Doxycycline 100mg PO BID for a month to ensure it does not come back.     Follow up: Twice weekly follow up with labs.    Orders Placed:         Priscila Montes PA-C  Malignant Hematology & Bone Marrow Transplant      Visit today included increased complexity associated with the care of the episodic problem staph skin infection addressed and managing the longitudinal care of the patient due to the serious and/or complex managed problem(s) PMF s/p allogeneic transplant.

## 2025-02-17 NOTE — PLAN OF CARE
1500-Patient tolerated 1 unit PRBCs well. Discharged without complaints or S/S of adverse event.    Instructed to call provider for any questions or concerns. Verbalized understanding.

## 2025-02-17 NOTE — PLAN OF CARE
1236-Labs , hx, and medications reviewed. Patient is here for 1 unit of blood, he was seen in the clinic prior to arrival . Assessment completed. Discussed plan of care with patient. Patient in agreement. Chair reclined and warm blanket and snack offered.

## 2025-02-17 NOTE — PROGRESS NOTES
Oncology Nutrition Assessment   Medical Nutrition Therapy Post Stem Cell Transplant    Rubén Hu  1970    The patient location is: [place]  The chief complaint leading to consultation is: nutrition referral    Visit type: audiovisual    Face to Face time with patient: 9 minutes  15 minutes of total time spent on the encounter, which includes face to face time and non-face to face time preparing to see the patient (eg, review of tests), Obtaining and/or reviewing separately obtained history, Documenting clinical information in the electronic or other health record, Independently interpreting results (not separately reported) and communicating results to the patient/family/caregiver, or Care coordination (not separately reported).     Each patient to whom he or she provides medical services by telemedicine is:  (1) informed of the relationship between the physician and patient and the respective role of any other health care provider with respect to management of the patient; and (2) notified that he or she may decline to receive medical services by telemedicine and may withdraw from such care at any time.    Transplant Info:   Primary oncologist: Jacob  Primary oncologic diagnosis: primary myelofibrosis   Transplant type and date: Allogenic SCT on 12/26/24  Conditioning Regimen: Thiotepa, Busulfan, Fludarabine   Immediate post-transplant complications: mucositis, R axillary skin/soft tissue infection secondary to MRSA   Caregiver: spouse  Post-transplant discharge plans: home    Past Medical History:   Diagnosis Date    Abdominal pain 01/02/2025    Allergic contact dermatitis due to adhesives 12/31/2024    Cancer     Flatulence 01/03/2025    Headache 12/27/2024    Hyperlipidemia     Mucositis 01/02/2025    Other constipation 12/19/2024    Thrombocytosis 12/18/2024    Transaminitis 12/27/2024       Nutrition Assessment    This is a 54 y.o.male being seen for nutrition follow up s/p Allogenic SCT. Patient  "presents on virtual visit alone. He reports his appetite has been fair but is having to make himself eat at times. He is eating 3 meals/day, smaller portions. He isn't snacking much. He reports some taste changes, particularly with meat. He is doing well with hydration, mostly water and Powerade.    Weight: 79.6 kg (175 lb 7.8 oz) - 2/17 clinic weight  Height: 5' 11" (1.803 m)  BMI: 24.5    Usual BW: 195-200lb  Weight Change: 20-25lb loss over a month - since maintaining    Allergies: Bactrim [sulfamethoxazole-trimethoprim]    Current Medications:  Current Medications[1]    Food/medication interactions noted: avoid grapefruit    Labs: Reviewed    Nutrition Impact Symptoms    [] No nutritional concerns at current  [x] Poor Appetite   [] Weight loss   [] Nausea   [] Vomiting   [] Diarrhea   [] Constipation   [] Early Satiety [] Change in smell   [x] Change in taste   [] Dry Mouth   [] Thick saliva   [] Dysphagia   [] Difficulty chewing  [] Mucositis  [] Indigestion  [] Gas/Bloating  [] Reflux      [] Other -      Nutrition Diagnosis    Problem: nutrition-related knowledge deficit  Etiology: lack of prior need for nutrition education  Signs/Symptoms: referral for SCT evaluation    Nutrition Intervention    Nutrition Prescription   2260-4138 kcals (25-30kcal/kg)  96g protein (1.2g/kg)   1990-2388mL fluid (25-30mL/kg)    Recommendations:   Continue to eat at least 3 meals/day  Make meals/snacks high in calories and protein - examples reviewed  Drink at least 64oz fluid/day  Addressed food safety questions    Materials Provided/Reviewed: Cell Therapy Nutrition Guide booklet (at previous visit)    Nutrition Monitoring and Evaluation    Monitor: diet education needs, energy intake, and weight status    Follow up: Patient provided with contact information and advised to call/message with questions or to make future appointment if further intervention is needed.    Communication to referring provider/care team: note available " in chart    Counseling time: 9 minutes      Octavia Begum, MS, RD, LDN  Senior Clinical Dietitian  [facility] [place]          [1]   Current Outpatient Medications:     acyclovir (ZOVIRAX) 800 MG Tab, Take 1 tablet (800 mg total) by mouth 2 (two) times daily., Disp: 60 tablet, Rfl: 11    dapsone 100 MG Tab, Take 1 tablet (100 mg total) by mouth once daily., Disp: 30 tablet, Rfl: 5    doxycycline (VIBRAMYCIN) 100 MG Cap, Take 1 capsule (100 mg total) by mouth 2 (two) times daily., Disp: 60 capsule, Rfl: 0    letermovir (PREVYMIS) 480 mg Tab, Take 1 tablet (480 mg total) by mouth once daily., Disp: 28 tablet, Rfl: 11    LORazepam (ATIVAN) 0.5 MG tablet, Take 1 tablet (0.5 mg total) by mouth once as needed. Take 30-45 minutes prior to your bone marrow biopsy on 1/27/25. Do not take if you do not have a ., Disp: 1 tablet, Rfl: 0    magnesium oxide (MAG-OX) 400 mg (241.3 mg magnesium) tablet, Take 2 tablets (800 mg total) by mouth 2 (two) times daily., Disp: 120 tablet, Rfl: 5    ondansetron (ZOFRAN) 8 MG tablet, Take 1 tablet (8 mg total) by mouth every 8 (eight) hours as needed for Nausea., Disp: 30 tablet, Rfl: 2    prochlorperazine (COMPAZINE) 10 MG tablet, Take 1 tablet (10 mg total) by mouth 4 (four) times daily as needed for Nausea., Disp: 120 tablet, Rfl: 3    tacrolimus (PROGRAF) 0.5 MG Cap, Take 3 capsules (1.5 mg total) by mouth every morning AND 3 capsules (1.5 mg total) every evening., Disp: 180 capsule, Rfl: 11    traMADoL (ULTRAM) 50 mg tablet, Take 1 tablet (50 mg total) by mouth every 6 (six) hours as needed for Pain., Disp: 28 tablet, Rfl: 0    triamcinolone acetonide 0.1% (KENALOG) 0.1 % cream, Apply topically 2 (two) times daily., Disp: 15 g, Rfl: 0    voriconazole (VFEND) 200 MG Tab, Take 1 tablet (200 mg total) by mouth 2 (two) times daily., Disp: 60 tablet, Rfl: 5  No current facility-administered medications for this visit.    Facility-Administered Medications Ordered in  Other Visits:     0.9% NaCl infusion, 50 mL/hr, Intravenous, Continuous, Clyde James MD, Last Rate: 50 mL/hr at 02/17/25 1228, 50 mL/hr at 02/17/25 1228    alteplase injection 2 mg, 2 mg, Intra-Catheter, PRN, Clyde James MD    heparin, porcine (PF) 100 unit/mL injection flush 500 Units, 500 Units, Intravenous, PRN, Clyde James MD    heparin, porcine (PF) 100 unit/mL injection flush 500 Units, 500 Units, Intravenous, PRN, Tatum Pruitt, NP    sodium chloride 0.9% flush 10 mL, 10 mL, Intravenous, PRN, Clyde James MD    sodium chloride 0.9% flush 10 mL, 10 mL, Intravenous, PRN, Tatum Pruitt, NP

## 2025-02-18 ENCOUNTER — CLINICAL SUPPORT (OUTPATIENT)
Dept: HEMATOLOGY/ONCOLOGY | Facility: CLINIC | Age: 55
End: 2025-02-18
Payer: COMMERCIAL

## 2025-02-18 DIAGNOSIS — Z94.84 HISTORY OF ALLOGENEIC STEM CELL TRANSPLANT: ICD-10-CM

## 2025-02-18 DIAGNOSIS — Z71.3 NUTRITIONAL COUNSELING: Primary | ICD-10-CM

## 2025-02-18 DIAGNOSIS — D47.1 PRIMARY MYELOFIBROSIS: ICD-10-CM

## 2025-02-18 LAB
CMV DNA SPEC QL NAA+PROBE: NORMAL
CYTOMEGALOVIRUS PCR, QUANT: NOT DETECTED IU/ML
EPSTEIN-BARR VIRUS DNA: NORMAL
EPSTEIN-BARR VIRUS PCR, QUANT: NOT DETECTED IU/ML

## 2025-02-19 ENCOUNTER — PATIENT MESSAGE (OUTPATIENT)
Dept: HEMATOLOGY/ONCOLOGY | Facility: CLINIC | Age: 55
End: 2025-02-19
Payer: COMMERCIAL

## 2025-02-20 ENCOUNTER — OFFICE VISIT (OUTPATIENT)
Dept: HEMATOLOGY/ONCOLOGY | Facility: CLINIC | Age: 55
End: 2025-02-20
Payer: COMMERCIAL

## 2025-02-20 ENCOUNTER — INFUSION (OUTPATIENT)
Dept: INFUSION THERAPY | Facility: HOSPITAL | Age: 55
End: 2025-02-20
Payer: COMMERCIAL

## 2025-02-20 VITALS
HEART RATE: 111 BPM | BODY MASS INDEX: 24.75 KG/M2 | OXYGEN SATURATION: 98 % | TEMPERATURE: 98 F | WEIGHT: 176.81 LBS | SYSTOLIC BLOOD PRESSURE: 130 MMHG | HEIGHT: 71 IN | DIASTOLIC BLOOD PRESSURE: 75 MMHG

## 2025-02-20 DIAGNOSIS — R00.0 TACHYCARDIA: ICD-10-CM

## 2025-02-20 DIAGNOSIS — Z76.82 STEM CELL TRANSPLANT CANDIDATE: ICD-10-CM

## 2025-02-20 DIAGNOSIS — D61.810 PANCYTOPENIA DUE TO CHEMOTHERAPY: ICD-10-CM

## 2025-02-20 DIAGNOSIS — Z94.84 HISTORY OF ALLOGENEIC STEM CELL TRANSPLANT: Primary | ICD-10-CM

## 2025-02-20 DIAGNOSIS — D47.1 PRIMARY MYELOFIBROSIS: ICD-10-CM

## 2025-02-20 DIAGNOSIS — E27.9 ADRENAL NODULE: Primary | ICD-10-CM

## 2025-02-20 DIAGNOSIS — Z94.84 HISTORY OF ALLOGENEIC STEM CELL TRANSPLANT: ICD-10-CM

## 2025-02-20 DIAGNOSIS — D84.81 IMMUNODEFICIENCY DUE TO CONDITIONS CLASSIFIED ELSEWHERE: ICD-10-CM

## 2025-02-20 LAB
ABO + RH BLD: NORMAL
ALBUMIN SERPL BCP-MCNC: 3.6 G/DL (ref 3.5–5.2)
ALP SERPL-CCNC: 81 U/L (ref 40–150)
ALT SERPL W/O P-5'-P-CCNC: 19 U/L (ref 10–44)
ANION GAP SERPL CALC-SCNC: 10 MMOL/L (ref 8–16)
AST SERPL-CCNC: 29 U/L (ref 10–40)
BASOPHILS # BLD AUTO: 0.07 K/UL (ref 0–0.2)
BASOPHILS NFR BLD: 0.5 % (ref 0–1.9)
BILIRUB SERPL-MCNC: 0.4 MG/DL (ref 0.1–1)
BLD GP AB SCN CELLS X3 SERPL QL: NORMAL
BUN SERPL-MCNC: 16 MG/DL (ref 6–20)
CALCIUM SERPL-MCNC: 9.7 MG/DL (ref 8.7–10.5)
CHLORIDE SERPL-SCNC: 107 MMOL/L (ref 95–110)
CO2 SERPL-SCNC: 22 MMOL/L (ref 23–29)
CREAT SERPL-MCNC: 1 MG/DL (ref 0.5–1.4)
DIFFERENTIAL METHOD BLD: ABNORMAL
EOSINOPHIL # BLD AUTO: 0.1 K/UL (ref 0–0.5)
EOSINOPHIL NFR BLD: 0.5 % (ref 0–8)
ERYTHROCYTE [DISTWIDTH] IN BLOOD BY AUTOMATED COUNT: 17.2 % (ref 11.5–14.5)
EST. GFR  (NO RACE VARIABLE): >60 ML/MIN/1.73 M^2
GLUCOSE SERPL-MCNC: 94 MG/DL (ref 70–110)
HCT VFR BLD AUTO: 26.3 % (ref 40–54)
HGB BLD-MCNC: 8.7 G/DL (ref 14–18)
IMM GRANULOCYTES # BLD AUTO: 0.64 K/UL (ref 0–0.04)
IMM GRANULOCYTES NFR BLD AUTO: 4.9 % (ref 0–0.5)
LYMPHOCYTES # BLD AUTO: 1 K/UL (ref 1–4.8)
LYMPHOCYTES NFR BLD: 7.3 % (ref 18–48)
MAGNESIUM SERPL-MCNC: 1.8 MG/DL (ref 1.6–2.6)
MCH RBC QN AUTO: 31.3 PG (ref 27–31)
MCHC RBC AUTO-ENTMCNC: 33.1 G/DL (ref 32–36)
MCV RBC AUTO: 95 FL (ref 82–98)
MONOCYTES # BLD AUTO: 1.5 K/UL (ref 0.3–1)
MONOCYTES NFR BLD: 11.4 % (ref 4–15)
NEUTROPHILS # BLD AUTO: 9.8 K/UL (ref 1.8–7.7)
NEUTROPHILS NFR BLD: 75.4 % (ref 38–73)
NRBC BLD-RTO: 1 /100 WBC
PHOSPHATE SERPL-MCNC: 4.2 MG/DL (ref 2.7–4.5)
PLATELET # BLD AUTO: 117 K/UL (ref 150–450)
PMV BLD AUTO: 10.6 FL (ref 9.2–12.9)
POTASSIUM SERPL-SCNC: 4.2 MMOL/L (ref 3.5–5.1)
PROT SERPL-MCNC: 6.9 G/DL (ref 6–8.4)
RBC # BLD AUTO: 2.78 M/UL (ref 4.6–6.2)
SODIUM SERPL-SCNC: 139 MMOL/L (ref 136–145)
SPECIMEN OUTDATE: NORMAL
WBC # BLD AUTO: 12.96 K/UL (ref 3.9–12.7)

## 2025-02-20 PROCEDURE — 87799 DETECT AGENT NOS DNA QUANT: CPT | Performed by: INTERNAL MEDICINE

## 2025-02-20 PROCEDURE — 36592 COLLECT BLOOD FROM PICC: CPT

## 2025-02-20 PROCEDURE — 86850 RBC ANTIBODY SCREEN: CPT | Performed by: INTERNAL MEDICINE

## 2025-02-20 PROCEDURE — 83735 ASSAY OF MAGNESIUM: CPT | Performed by: INTERNAL MEDICINE

## 2025-02-20 PROCEDURE — 80053 COMPREHEN METABOLIC PANEL: CPT | Performed by: INTERNAL MEDICINE

## 2025-02-20 PROCEDURE — 85025 COMPLETE CBC W/AUTO DIFF WBC: CPT | Performed by: INTERNAL MEDICINE

## 2025-02-20 PROCEDURE — 25000003 PHARM REV CODE 250

## 2025-02-20 PROCEDURE — 84100 ASSAY OF PHOSPHORUS: CPT | Performed by: INTERNAL MEDICINE

## 2025-02-20 PROCEDURE — A4216 STERILE WATER/SALINE, 10 ML: HCPCS

## 2025-02-20 PROCEDURE — 63600175 PHARM REV CODE 636 W HCPCS

## 2025-02-20 RX ORDER — SODIUM CHLORIDE 0.9 % (FLUSH) 0.9 %
10 SYRINGE (ML) INJECTION
OUTPATIENT
Start: 2025-02-20

## 2025-02-20 RX ORDER — SODIUM CHLORIDE 0.9 % (FLUSH) 0.9 %
10 SYRINGE (ML) INJECTION
Status: DISCONTINUED | OUTPATIENT
Start: 2025-02-20 | End: 2025-02-20 | Stop reason: HOSPADM

## 2025-02-20 RX ORDER — HEPARIN 100 UNIT/ML
500 SYRINGE INTRAVENOUS
OUTPATIENT
Start: 2025-02-20

## 2025-02-20 RX ORDER — HEPARIN 100 UNIT/ML
500 SYRINGE INTRAVENOUS
Status: DISCONTINUED | OUTPATIENT
Start: 2025-02-20 | End: 2025-02-20 | Stop reason: HOSPADM

## 2025-02-20 RX ADMIN — HEPARIN SODIUM (PORCINE) LOCK FLUSH IV SOLN 100 UNIT/ML 500 UNITS: 100 SOLUTION at 09:02

## 2025-02-20 RX ADMIN — Medication 10 ML: at 09:02

## 2025-02-20 NOTE — PROGRESS NOTES
Section of Hematology and Stem Cell Transplantation    Post-Transplantation Follow Up Visit     2/20/25    Transplant History:   Primary oncologist: Clyde James MD  Primary oncologic diagnosis: primary myelofibrosis  Transplant date: 12/26/2024  Donor: haploidentical  Blood Type (Patient): O +  Blood Type (Donor): O +  CMV (Patient): Negative  CMV (Donor): Positive  Graft source: Peripheral blood  CD34+ cell dose: 6.04 X10^6 cells/kg  Conditioning Regimen:  Thiotepa, Busulfan, Fludarabine  GVHD prophylaxis: Post-transplant cyclophosphamide, MMF, Tacrolimus  Immediate post-transplant complications: mucositis, R axillary skin/soft tissue infection secondary to MRSA    History of Present Ilness:   Rubén Hu (Rubén) is a pleasant 54 y.o.male with primary myelofibrosis who is status post haploidentical stem cell transplantation conditioned with  Bu/Flu/TT  who is currently day +55 who presents for post-transplant follow up.    Feeling more SOB on exertion today with racing heart - improves with rest/sitting, no chest pain, no history of VTE, no extremity swelling, no issues at line.  in clinic. No fevers/chills. Underarm pustule resolving with initiation of doxycycline. New diffuse hyperpigmentation across neck/upper chest, wife has noted over past several days. No other concerns.     PAST MEDICAL HISTORY:   Past Medical History:   Diagnosis Date    Abdominal pain 01/02/2025    Allergic contact dermatitis due to adhesives 12/31/2024    Cancer     Flatulence 01/03/2025    Headache 12/27/2024    Hyperlipidemia     Mucositis 01/02/2025    Other constipation 12/19/2024    Thrombocytosis 12/18/2024    Transaminitis 12/27/2024       PAST SURGICAL HISTORY:   Past Surgical History:   Procedure Laterality Date    BONE MARROW BIOPSY N/A 11/27/2024    Procedure: Biopsy-bone marrow;  Surgeon: Clyde James MD;  Location: [facility] ([address] [place] [address]);  Service: Oncology;  Laterality: N/A;  11/20-pre call complete-tb     COLONOSCOPY N/A 8/9/2023    Procedure: COLONOSCOPY;  Surgeon: Will Ardon MD;  Location: [facility] (4TH FLR);  Service: Endoscopy;  Laterality: N/A;  Suprep Instructions sent via portal / Authorizing:     Bebeto Arora MD in [facility] FAMILY MED/ INTERNAL MED/ PEDS  Referral:          [id number]    EYE SURGERY      FLEXIBLE SIGMOIDOSCOPY N/A 7/23/2024    Procedure: SIGMOIDOSCOPY, FLEXIBLE;  Surgeon: Nirali Vicente MD;  Location: [facility] ENDO;  Service: Endoscopy;  Laterality: N/A;    INSERTION OF LONGORIA CATHETER Right 12/18/2024    Procedure: INSERTION, CATHETER, CENTRAL VENOUS, LONGORIA TRIPLE LUMEN, Bard 12.5 fr Longoria Cath model [id number];  Surgeon: Willie Hadley MD;  Location: [facility] OR 2ND FLR;  Service: General;  Laterality: Right;       PAST SOCIAL HISTORY:  Social History     Tobacco Use    Smoking status: Former     Types: Cigars, Cigarettes    Smokeless tobacco: Never   Substance Use Topics    Alcohol use: Not Currently     Comment: socially    Drug use: Never       FAMILY HISTORY:  Family History   Problem Relation Name Age of Onset    Arthritis Mother      COPD Father      Testicular cancer Maternal Cousin Peter 17        Chemotherapy    Breast cancer Maternal Cousin  51        Chemotherapy    Heart disease Maternal Grandfather      Stroke Maternal Grandmother         CURRENT MEDICATIONS:   Current Outpatient Medications   Medication Sig    acyclovir (ZOVIRAX) 800 MG Tab Take 1 tablet (800 mg total) by mouth 2 (two) times daily.    dapsone 100 MG Tab Take 1 tablet (100 mg total) by mouth once daily.    doxycycline (VIBRAMYCIN) 100 MG Cap Take 1 capsule (100 mg total) by mouth 2 (two) times daily.    letermovir (PREVYMIS) 480 mg Tab Take 1 tablet (480 mg total) by mouth once daily.    LORazepam (ATIVAN) 0.5 MG tablet Take 1 tablet (0.5 mg total) by mouth once as needed. Take 30-45 minutes prior to your bone marrow biopsy on 1/27/25. Do not take if you do not have a .    magnesium oxide (MAG-OX) 400 mg  (241.3 mg magnesium) tablet Take 2 tablets (800 mg total) by mouth 2 (two) times daily.    ondansetron (ZOFRAN) 8 MG tablet Take 1 tablet (8 mg total) by mouth every 8 (eight) hours as needed for Nausea.    prochlorperazine (COMPAZINE) 10 MG tablet Take 1 tablet (10 mg total) by mouth 4 (four) times daily as needed for Nausea.    tacrolimus (PROGRAF) 0.5 MG Cap Take 3 capsules (1.5 mg total) by mouth every morning AND 3 capsules (1.5 mg total) every evening.    traMADoL (ULTRAM) 50 mg tablet Take 1 tablet (50 mg total) by mouth every 6 (six) hours as needed for Pain.    triamcinolone acetonide 0.1% (KENALOG) 0.1 % cream Apply topically 2 (two) times daily.    voriconazole (VFEND) 200 MG Tab Take 1 tablet (200 mg total) by mouth 2 (two) times daily.     No current facility-administered medications for this visit.       ALLERGIES:   Review of patient's allergies indicates:   Allergen Reactions    Bactrim [sulfamethoxazole-trimethoprim] Hives       GVHD Review of Systems:     Pertinent positives and negatives included in the HPI. Otherwise a 14 point review of systems is negative. GVHD review of systems recorded in BMT flowsheet.     Physical Exam:     There were no vitals filed for this visit.      General: Appears well, NAD  HEENT: MMM, no OP lesions  Pulmonary: CTAB, no increased work of breathing, no W/R/C  Cardiovascular: S1S2 normal, tachycardic, regular rate no M/R/G  Abdominal: Soft, NT, ND, BS+, no HSM  Extremities: No C/C/E  Neurological: AAOx4, grossly normal, no focal deficits  Dermatologic: Pustule under right axilla. Hyperpigmentation around neck, upper chest.     ECOG Performance Status: (foot note - ECOG PS provided by Eastern Cooperative Oncology Group) 1 - Symptomatic but completely ambulatory    Karnofsky Performance Score:  70%- Cares for Self: Unable to Carry on Normal Activity or Active Work    Labs:   Lab Results   Component Value Date    WBC 13.49 (H) 02/17/2025    RBC 2.49 (L) 02/17/2025    HGB  7.7 (L) 02/17/2025    HCT 22.6 (L) 02/17/2025    MCV 91 02/17/2025    MCH 30.9 02/17/2025    MCHC 34.1 02/17/2025    RDW 17.5 (H) 02/17/2025     (L) 02/17/2025    MPV 10.6 02/17/2025    GRAN 86.0 (H) 02/17/2025    LYMPH 5.0 (L) 02/17/2025    MONO 4.0 02/17/2025    EOS 0.0 02/13/2025    BASO 0.03 02/13/2025    EOSINOPHIL 1.0 02/17/2025    BASOPHIL 0.0 02/17/2025       Sodium   Date Value Ref Range Status   02/17/2025 137 136 - 145 mmol/L Final     Potassium   Date Value Ref Range Status   02/17/2025 4.5 3.5 - 5.1 mmol/L Final     Chloride   Date Value Ref Range Status   02/17/2025 106 95 - 110 mmol/L Final     CO2   Date Value Ref Range Status   02/17/2025 20 (L) 23 - 29 mmol/L Final     Glucose   Date Value Ref Range Status   02/17/2025 103 70 - 110 mg/dL Final     BUN   Date Value Ref Range Status   02/17/2025 17 6 - 20 mg/dL Final     Creatinine   Date Value Ref Range Status   02/17/2025 1.0 0.5 - 1.4 mg/dL Final     Calcium   Date Value Ref Range Status   02/17/2025 9.7 8.7 - 10.5 mg/dL Final     Total Protein   Date Value Ref Range Status   02/17/2025 7.1 6.0 - 8.4 g/dL Final     Albumin   Date Value Ref Range Status   02/17/2025 3.7 3.5 - 5.2 g/dL Final     Total Bilirubin   Date Value Ref Range Status   02/17/2025 0.4 0.1 - 1.0 mg/dL Final     Comment:     For infants and newborns, interpretation of results should be based  on gestational age, weight and in agreement with clinical  observations.    Premature Infant recommended reference ranges:  Up to 24 hours.............<8.0 mg/dL  Up to 48 hours............<12.0 mg/dL  3-5 days..................<15.0 mg/dL  6-29 days.................<15.0 mg/dL       Alkaline Phosphatase   Date Value Ref Range Status   02/17/2025 90 40 - 150 U/L Final     AST   Date Value Ref Range Status   02/17/2025 28 10 - 40 U/L Final     ALT   Date Value Ref Range Status   02/17/2025 21 10 - 44 U/L Final     Anion Gap   Date Value Ref Range Status   02/17/2025 11 8 - 16 mmol/L  Final     eGFR if    Date Value Ref Range Status   01/22/2022 >60.0 >60 mL/min/1.73 m^2 Final     eGFR if non    Date Value Ref Range Status   01/22/2022 >60.0 >60 mL/min/1.73 m^2 Final     Comment:     Calculation used to obtain the estimated glomerular filtration  rate (eGFR) is the CKD-EPI equation.          Imaging:   All pertinent studies reviewed and interpreted by me    Acute GVHD Scoring:  GVHD Acute Assessment  02/19/2025: aGHVD of skin evident today, ~5% BSA. Topical steroids started.      Assessment and Plan:   Rubén Hu (Rubén) is a pleasant 54 y.o.male with primary myelofibrosis s/p haplo SCT who presents for post-transplant follow up.    Primary myelofibrosis: Status post treatment with ruxolitinib, followed by alloSCT. Primary oncologist is Clyde James MD who will be managing primary underlying disease. Day +30 BMBx results pending     Status post allogeneic stem cell transplantation: As noted above, status post haploidentical stem cell transplantation conditioned with  Bu/Flu/TT . Currently day 55 Engrafted on day +22.  Day +30 BMBx on 1/2: No definitive evidence of residual JAK2 [id number] primary myelofibrosis. NGS without evidence of pathogenic mutations. Chimerism studies with 100 donor CD33, CD3 insufficient for analysis.     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 MMF has now been discontinued.  Current tacro dose: 1.5mg BID   Last tacro level: not collected today, will continue and assess end of  week  Adjustments: No Changes  Acute GVHD of skin: Hyperpigmentation acutely to neck/upper chest over last few days (~5% BSA). Plan for triamcinalone 0.1% BID to affected area and reassess end of week.    Immunosuppression: Prevention with voriconazole,  acyclovir. CMV prophylaxis with letermovir. PJP prophyalxis dapsone. Continue weekly monitoring of CMV and EBV.  Last CMV: negative (2/3/2025),  last EBV: negative (2/3/2025).  Active infections: none    Pancytopenia: Due to underlying disease and chemotherapy. Transfuse for Hgb <7 g/dL and platelets <10k.  Patient short of breath today with Hgb 7.7. Plan for 1 unit RBCs today.    Hypertension: (improved) Was newly hypertensive in hospital following SCT at which time he was started on amlodipine. He has been persistently hypotensive since discharge. Discontinued amlodipine, normotensive.    Patient normotensive today. Will continue to monitor.    Tachycardic: Persistent tachycardia since discharge, initially attributed to volume depletion but now increasing, plan for EKG, echocardiogram. No chest pain and only mildly SOB on exertion. Considered PE in differential - No extremity swelling/pain, no CP/SOB, no history of VTE.     Staphylococcus Skin Infection: Patient had MRSA while in the hospital and was treated with doxycycline, concern for recurrence on recent exam. Picture uploaded to media and ID consulted who recommended Doxycycline 100mg PO BID for a month to ensure it does not come back.     Follow up: Twice weekly follow up with labs.    Orders Placed:         Opal Alonzo PA-C  Malignant Hematology, Stem Cell Transplant, and Cellular Therapy  [facility]  [facility]       Visit today included increased complexity associated with the care of the episodic problem staph skin infection addressed and managing the longitudinal care of the patient due to the serious and/or complex managed problem(s) PMF s/p allogeneic transplant.

## 2025-02-20 NOTE — PROGRESS NOTES
Section of Hematology and Stem Cell Transplantation    Post-Transplantation Follow Up Visit     2/20/25    Transplant History:   Primary oncologist: Clyde James MD  Primary oncologic diagnosis: primary myelofibrosis  Transplant date: 12/26/2024  Donor: haploidentical  Blood Type (Patient): O +  Blood Type (Donor): O +  CMV (Patient): Negative  CMV (Donor): Positive  Graft source: Peripheral blood  CD34+ cell dose: 6.04 X10^6 cells/kg  Conditioning Regimen:  Thiotepa, Busulfan, Fludarabine  GVHD prophylaxis: Post-transplant cyclophosphamide, MMF, Tacrolimus  Immediate post-transplant complications: mucositis, R axillary skin/soft tissue infection secondary to MRSA    History of Present Ilness:   Rubén Peters) is a pleasant 54 y.o.male with primary myelofibrosis who is status post haploidentical stem cell transplantation conditioned with  Bu/Flu/TT  who is currently day +56 who presents for post-transplant follow up.    SOB, fatigue and palpitations have improved. Home HR log shows HR mostly in  range. No fevers/chills. Underarm pustule improved with doxycycline. Hyperpigmentation across neck/upper chest improved. No other concerns. Compliant with home meds.    PAST MEDICAL HISTORY:   Past Medical History:   Diagnosis Date    Abdominal pain 01/02/2025    Allergic contact dermatitis due to adhesives 12/31/2024    Cancer     Flatulence 01/03/2025    Headache 12/27/2024    Hyperlipidemia     Mucositis 01/02/2025    Other constipation 12/19/2024    Thrombocytosis 12/18/2024    Transaminitis 12/27/2024       PAST SURGICAL HISTORY:   Past Surgical History:   Procedure Laterality Date    BONE MARROW BIOPSY N/A 11/27/2024    Procedure: Biopsy-bone marrow;  Surgeon: Clyde James MD;  [facility] [place] [address]);  Service: Oncology;  Laterality: N/A;  11/20-pre call complete-tb    COLONOSCOPY N/A 8/9/2023    Procedure: COLONOSCOPY;  Surgeon: Will Ardon MD;  [facility]);  Service:  Endoscopy;  Laterality: N/A;  Suprep Instructions sent via portal / Authorizing:     Bebeto Arora MD in [facility] FAMILY MED/ INTERNAL MED/ PEDS  Referral:          [id number]    EYE SURGERY      FLEXIBLE SIGMOIDOSCOPY N/A 7/23/2024    Procedure: SIGMOIDOSCOPY, FLEXIBLE;  Surgeon: Nirali Vicente MD;  Location: [facility];  Service: Endoscopy;  Laterality: N/A;    INSERTION OF LONGORIA CATHETER Right 12/18/2024    Procedure: INSERTION, CATHETER, CENTRAL VENOUS, LONGORIA TRIPLE LUMEN, Bard 12.5 fr Longoria Cath model [id number];  Surgeon: Willie Hadley MD;  Location: [facility] OR [address] [place] [address];  Service: General;  Laterality: Right;       PAST SOCIAL HISTORY:  Social History     Tobacco Use    Smoking status: Former     Types: Cigars, Cigarettes    Smokeless tobacco: Never   Substance Use Topics    Alcohol use: Not Currently     Comment: socially    Drug use: Never       FAMILY HISTORY:  Family History   Problem Relation Name Age of Onset    Arthritis Mother      COPD Father      Testicular cancer Maternal Cousin Peter 17        Chemotherapy    Breast cancer Maternal Cousin  51        Chemotherapy    Heart disease Maternal Grandfather      Stroke Maternal Grandmother         CURRENT MEDICATIONS:   Current Outpatient Medications   Medication Sig    acyclovir (ZOVIRAX) 800 MG Tab Take 1 tablet (800 mg total) by mouth 2 (two) times daily.    dapsone 100 MG Tab Take 1 tablet (100 mg total) by mouth once daily.    doxycycline (VIBRAMYCIN) 100 MG Cap Take 1 capsule (100 mg total) by mouth 2 (two) times daily.    letermovir (PREVYMIS) 480 mg Tab Take 1 tablet (480 mg total) by mouth once daily.    magnesium oxide (MAG-OX) 400 mg (241.3 mg magnesium) tablet Take 2 tablets (800 mg total) by mouth 2 (two) times daily.    ondansetron (ZOFRAN) 8 MG tablet Take 1 tablet (8 mg total) by mouth every 8 (eight) hours as needed for Nausea.    prochlorperazine (COMPAZINE) 10 MG tablet Take 1 tablet (10 mg total) by mouth 4 (four) times daily as  needed for Nausea.    tacrolimus (PROGRAF) 0.5 MG Cap Take 3 capsules (1.5 mg total) by mouth every morning AND 3 capsules (1.5 mg total) every evening.    traMADoL (ULTRAM) 50 mg tablet Take 1 tablet (50 mg total) by mouth every 6 (six) hours as needed for Pain.    triamcinolone acetonide 0.1% (KENALOG) 0.1 % cream Apply topically 2 (two) times daily.    voriconazole (VFEND) 200 MG Tab Take 1 tablet (200 mg total) by mouth 2 (two) times daily.    LORazepam (ATIVAN) 0.5 MG tablet Take 1 tablet (0.5 mg total) by mouth once as needed. Take 30-45 minutes prior to your bone marrow biopsy on 1/27/25. Do not take if you do not have a .     No current facility-administered medications for this visit.       ALLERGIES:   Review of patient's allergies indicates:   Allergen Reactions    Bactrim [sulfamethoxazole-trimethoprim] Hives       GVHD Review of Systems:     Pertinent positives and negatives included in the HPI. Otherwise a 14 point review of systems is negative. GVHD review of systems recorded in BMT flowsheet.     Physical Exam:     Vitals:    02/20/25 0914   BP: 130/75   Pulse: (!) 111   Temp: 97.6 °F (36.4 °C)         General: Appears well, NAD  HEENT: MMM, no OP lesions  Pulmonary: CTAB, no increased work of breathing, no W/R/C  Cardiovascular: S1S2 normal, tachycardic, regular rate no M/R/G  Abdominal: Soft, NT, ND, BS+, no HSM  Extremities: No C/C/E  Neurological: AAOx4, grossly normal, no focal deficits  Dermatologic: Pustule under right axilla - resolving. Hyperpigmentation around neck, upper chest - improved    ECOG Performance Status: (foot note - ECOG PS provided by Eastern Cooperative Oncology Group) 1 - Symptomatic but completely ambulatory    Karnofsky Performance Score:  70%- Cares for Self: Unable to Carry on Normal Activity or Active Work    Labs:   Lab Results   Component Value Date    WBC 12.96 (H) 02/20/2025    RBC 2.78 (L) 02/20/2025    HGB 8.7 (L) 02/20/2025    HCT 26.3 (L) 02/20/2025    MCV  95 02/20/2025    MCH 31.3 (H) 02/20/2025    MCHC 33.1 02/20/2025    RDW 17.2 (H) 02/20/2025     (L) 02/20/2025    MPV 10.6 02/20/2025    GRAN 9.8 (H) 02/20/2025    GRAN 75.4 (H) 02/20/2025    LYMPH 1.0 02/20/2025    LYMPH 7.3 (L) 02/20/2025    MONO 1.5 (H) 02/20/2025    MONO 11.4 02/20/2025    EOS 0.1 02/20/2025    BASO 0.07 02/20/2025    EOSINOPHIL 0.5 02/20/2025    BASOPHIL 0.5 02/20/2025       Sodium   Date Value Ref Range Status   02/20/2025 139 136 - 145 mmol/L Final     Potassium   Date Value Ref Range Status   02/20/2025 4.2 3.5 - 5.1 mmol/L Final     Chloride   Date Value Ref Range Status   02/20/2025 107 95 - 110 mmol/L Final     CO2   Date Value Ref Range Status   02/20/2025 22 (L) 23 - 29 mmol/L Final     Glucose   Date Value Ref Range Status   02/20/2025 94 70 - 110 mg/dL Final     BUN   Date Value Ref Range Status   02/20/2025 16 6 - 20 mg/dL Final     Creatinine   Date Value Ref Range Status   02/20/2025 1.0 0.5 - 1.4 mg/dL Final     Calcium   Date Value Ref Range Status   02/20/2025 9.7 8.7 - 10.5 mg/dL Final     Total Protein   Date Value Ref Range Status   02/20/2025 6.9 6.0 - 8.4 g/dL Final     Albumin   Date Value Ref Range Status   02/20/2025 3.6 3.5 - 5.2 g/dL Final     Total Bilirubin   Date Value Ref Range Status   02/20/2025 0.4 0.1 - 1.0 mg/dL Final     Comment:     For infants and newborns, interpretation of results should be based  on gestational age, weight and in agreement with clinical  observations.    Premature Infant recommended reference ranges:  Up to 24 hours.............<8.0 mg/dL  Up to 48 hours............<12.0 mg/dL  3-5 days..................<15.0 mg/dL  6-29 days.................<15.0 mg/dL       Alkaline Phosphatase   Date Value Ref Range Status   02/20/2025 81 40 - 150 U/L Final     AST   Date Value Ref Range Status   02/20/2025 29 10 - 40 U/L Final     ALT   Date Value Ref Range Status   02/20/2025 19 10 - 44 U/L Final     Anion Gap   Date Value Ref Range Status    02/20/2025 10 8 - 16 mmol/L Final     eGFR if    Date Value Ref Range Status   01/22/2022 >60.0 >60 mL/min/1.73 m^2 Final     eGFR if non    Date Value Ref Range Status   01/22/2022 >60.0 >60 mL/min/1.73 m^2 Final     Comment:     Calculation used to obtain the estimated glomerular filtration  rate (eGFR) is the CKD-EPI equation.          Imaging:   All pertinent studies reviewed and interpreted by me    Acute GVHD Scoring:  GVHD Acute Assessment  02/20/2025: aGHVD of skin evident today, ~5% BSA. Topical steroids started.   02/20/2025: aGVHD of skin improved     Assessment and Plan:   Rubén Peters) is a pleasant 54 y.o.male with primary myelofibrosis s/p haplo SCT who presents for post-transplant follow up.    Primary myelofibrosis: Status post treatment with ruxolitinib, followed by alloSCT. Primary oncologist is Clyde James MD who will be managing primary underlying disease. Day +30 BMBx results below     Status post allogeneic stem cell transplantation: As noted above, status post haploidentical stem cell transplantation conditioned with  Bu/Flu/TT . Currently day 56 Engrafted on day +22.  Day +30 BMBx on 1/2: No definitive evidence of residual JAK2 [id number] primary myelofibrosis. NGS without evidence of pathogenic mutations. Chimerism studies with 100 donor CD33, CD3 insufficient for analysis.     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 MMF has now been discontinued.  Current tacro dose: 1.5mg BID   Last tacro level: pending today  Adjustments: No Changes  Acute GVHD of skin: noted 2/20/25. Hyperpigmentation acutely to neck/upper chest over last few days (~5% BSA). triamcinalone 0.1% BID to affected area started; s/p improved. Continue topical steroids PRN    Immunosuppression: Prevention with voriconazole,  acyclovir. CMV prophylaxis with letermovir. PJP prophyalxis dapsone. Continue weekly  monitoring of CMV and EBV.  Last CMV: negative (2/3/2025), last EBV: negative (2/3/2025).  Active infections: none    Pancytopenia: Due to underlying disease and chemotherapy. Transfuse for Hgb <7 g/dL and platelets <10k.    Hypertension: (improved) Was newly hypertensive in hospital following SCT at which time he was started on amlodipine. He has been persistently hypotensive since discharge. Discontinued amlodipine, normotensive.    Patient normotensive today. Will continue to monitor.    Tachycardia: Persistent tachycardia since discharge, initially attributed to volume depletion. EKG showed sinus tachycardia. No chest pain and only mildly SOB on exertion. Home HR log shows HR mostly . ?Anxiety related. Gradually improving, monitor for now. Echo pending. PE unlikely - No extremity swelling/pain, no CP/SOB, no history of VTE.     Staphylococcus Skin Infection: Patient had MRSA while in the hospital and was treated with doxycycline, concern for recurrence on recent exam. Picture uploaded to media and ID consulted who recommended Doxycycline 100mg PO BID for a month (to end mid-March 2025) to prevent recurrence.     Follow up: Twice weekly follow up with labs.    Orders Placed:      No orders of the defined types were placed in this encounter.         40 minutes of total time spent on the encounter, which includes face to face time and non-face to face time preparing to see the patient (eg, review of tests), Obtaining and/or reviewing separately obtained history, Documenting clinical information in the electronic or other health record, Independently interpreting results (not separately reported) and communicating results to the patient/family/caregiver, or Care coordination with other physicians involved in his care (not separately reported).     Rome Eric  Hematology/Oncology Fellow  #[id number]

## 2025-02-24 ENCOUNTER — RESULTS FOLLOW-UP (OUTPATIENT)
Dept: HEMATOLOGY/ONCOLOGY | Facility: CLINIC | Age: 55
End: 2025-02-24

## 2025-02-24 ENCOUNTER — CLINICAL SUPPORT (OUTPATIENT)
Dept: HEMATOLOGY/ONCOLOGY | Facility: CLINIC | Age: 55
End: 2025-02-24
Payer: COMMERCIAL

## 2025-02-24 ENCOUNTER — OFFICE VISIT (OUTPATIENT)
Dept: HEMATOLOGY/ONCOLOGY | Facility: CLINIC | Age: 55
End: 2025-02-24
Payer: COMMERCIAL

## 2025-02-24 ENCOUNTER — INFUSION (OUTPATIENT)
Dept: INFUSION THERAPY | Facility: HOSPITAL | Age: 55
End: 2025-02-24
Attending: INTERNAL MEDICINE
Payer: COMMERCIAL

## 2025-02-24 VITALS
BODY MASS INDEX: 24.53 KG/M2 | OXYGEN SATURATION: 97 % | HEART RATE: 109 BPM | HEIGHT: 71 IN | WEIGHT: 175.25 LBS | DIASTOLIC BLOOD PRESSURE: 76 MMHG | TEMPERATURE: 98 F | RESPIRATION RATE: 18 BRPM | SYSTOLIC BLOOD PRESSURE: 103 MMHG

## 2025-02-24 DIAGNOSIS — Z76.82 STEM CELL TRANSPLANT CANDIDATE: ICD-10-CM

## 2025-02-24 DIAGNOSIS — T45.1X5A ANEMIA ASSOCIATED WITH CHEMOTHERAPY: ICD-10-CM

## 2025-02-24 DIAGNOSIS — Z94.84 HISTORY OF ALLOGENEIC STEM CELL TRANSPLANT: Primary | ICD-10-CM

## 2025-02-24 DIAGNOSIS — D89.810 ACUTE GVHD: ICD-10-CM

## 2025-02-24 DIAGNOSIS — D64.81 ANEMIA ASSOCIATED WITH CHEMOTHERAPY: ICD-10-CM

## 2025-02-24 DIAGNOSIS — D69.6 THROMBOCYTOPENIA: ICD-10-CM

## 2025-02-24 DIAGNOSIS — D47.1 PRIMARY MYELOFIBROSIS: ICD-10-CM

## 2025-02-24 DIAGNOSIS — E27.9 ADRENAL NODULE: Primary | ICD-10-CM

## 2025-02-24 PROBLEM — L03.90 CELLULITIS: Status: RESOLVED | Noted: 2025-01-14 | Resolved: 2025-02-24

## 2025-02-24 LAB
ABO + RH BLD: NORMAL
ALBUMIN SERPL BCP-MCNC: 3.7 G/DL (ref 3.5–5.2)
ALP SERPL-CCNC: 68 U/L (ref 40–150)
ALT SERPL W/O P-5'-P-CCNC: 20 U/L (ref 10–44)
ANION GAP SERPL CALC-SCNC: 10 MMOL/L (ref 8–16)
AST SERPL-CCNC: 32 U/L (ref 10–40)
BASOPHILS # BLD AUTO: 0.02 K/UL (ref 0–0.2)
BASOPHILS NFR BLD: 0.4 % (ref 0–1.9)
BILIRUB SERPL-MCNC: 0.5 MG/DL (ref 0.1–1)
BLD GP AB SCN CELLS X3 SERPL QL: NORMAL
BUN SERPL-MCNC: 16 MG/DL (ref 6–20)
CALCIUM SERPL-MCNC: 9.3 MG/DL (ref 8.7–10.5)
CHLORIDE SERPL-SCNC: 108 MMOL/L (ref 95–110)
CO2 SERPL-SCNC: 20 MMOL/L (ref 23–29)
CREAT SERPL-MCNC: 1 MG/DL (ref 0.5–1.4)
DIFFERENTIAL METHOD BLD: ABNORMAL
EOSINOPHIL # BLD AUTO: 0.1 K/UL (ref 0–0.5)
EOSINOPHIL NFR BLD: 1.3 % (ref 0–8)
ERYTHROCYTE [DISTWIDTH] IN BLOOD BY AUTOMATED COUNT: 18.5 % (ref 11.5–14.5)
EST. GFR  (NO RACE VARIABLE): >60 ML/MIN/1.73 M^2
GLUCOSE SERPL-MCNC: 99 MG/DL (ref 70–110)
HCT VFR BLD AUTO: 25 % (ref 40–54)
HGB BLD-MCNC: 8.2 G/DL (ref 14–18)
IMM GRANULOCYTES # BLD AUTO: 0.03 K/UL (ref 0–0.04)
IMM GRANULOCYTES NFR BLD AUTO: 0.5 % (ref 0–0.5)
LYMPHOCYTES # BLD AUTO: 0.8 K/UL (ref 1–4.8)
LYMPHOCYTES NFR BLD: 13.9 % (ref 18–48)
MAGNESIUM SERPL-MCNC: 1.8 MG/DL (ref 1.6–2.6)
MCH RBC QN AUTO: 31.1 PG (ref 27–31)
MCHC RBC AUTO-ENTMCNC: 32.8 G/DL (ref 32–36)
MCV RBC AUTO: 95 FL (ref 82–98)
MONOCYTES # BLD AUTO: 0.7 K/UL (ref 0.3–1)
MONOCYTES NFR BLD: 12.6 % (ref 4–15)
NEUTROPHILS # BLD AUTO: 4 K/UL (ref 1.8–7.7)
NEUTROPHILS NFR BLD: 71.3 % (ref 38–73)
NRBC BLD-RTO: 0 /100 WBC
PHOSPHATE SERPL-MCNC: 3.9 MG/DL (ref 2.7–4.5)
PLATELET # BLD AUTO: 100 K/UL (ref 150–450)
PMV BLD AUTO: 10.6 FL (ref 9.2–12.9)
POTASSIUM SERPL-SCNC: 4.3 MMOL/L (ref 3.5–5.1)
PROT SERPL-MCNC: 6.9 G/DL (ref 6–8.4)
RBC # BLD AUTO: 2.64 M/UL (ref 4.6–6.2)
SODIUM SERPL-SCNC: 138 MMOL/L (ref 136–145)
SPECIMEN OUTDATE: NORMAL
TACROLIMUS BLD-MCNC: 10.5 NG/ML (ref 5–15)
WBC # BLD AUTO: 5.55 K/UL (ref 3.9–12.7)

## 2025-02-24 PROCEDURE — 84100 ASSAY OF PHOSPHORUS: CPT | Performed by: INTERNAL MEDICINE

## 2025-02-24 PROCEDURE — 99215 OFFICE O/P EST HI 40 MIN: CPT | Mod: S$GLB,,, | Performed by: INTERNAL MEDICINE

## 2025-02-24 PROCEDURE — 1111F DSCHRG MED/CURRENT MED MERGE: CPT | Mod: CPTII,S$GLB,, | Performed by: INTERNAL MEDICINE

## 2025-02-24 PROCEDURE — 85025 COMPLETE CBC W/AUTO DIFF WBC: CPT | Performed by: INTERNAL MEDICINE

## 2025-02-24 PROCEDURE — 25000003 PHARM REV CODE 250

## 2025-02-24 PROCEDURE — 99999 PR PBB SHADOW E&M-EST. PATIENT-LVL IV: CPT | Mod: PBBFAC,,, | Performed by: INTERNAL MEDICINE

## 2025-02-24 PROCEDURE — 80053 COMPREHEN METABOLIC PANEL: CPT | Performed by: INTERNAL MEDICINE

## 2025-02-24 PROCEDURE — 3078F DIAST BP <80 MM HG: CPT | Mod: CPTII,S$GLB,, | Performed by: INTERNAL MEDICINE

## 2025-02-24 PROCEDURE — 83735 ASSAY OF MAGNESIUM: CPT | Performed by: INTERNAL MEDICINE

## 2025-02-24 PROCEDURE — 99999 PR PBB SHADOW E&M-EST. PATIENT-LVL I: CPT | Mod: PBBFAC,,,

## 2025-02-24 PROCEDURE — 1159F MED LIST DOCD IN RCRD: CPT | Mod: CPTII,S$GLB,, | Performed by: INTERNAL MEDICINE

## 2025-02-24 PROCEDURE — G2211 COMPLEX E/M VISIT ADD ON: HCPCS | Mod: S$GLB,,, | Performed by: INTERNAL MEDICINE

## 2025-02-24 PROCEDURE — 36592 COLLECT BLOOD FROM PICC: CPT

## 2025-02-24 PROCEDURE — 3074F SYST BP LT 130 MM HG: CPT | Mod: CPTII,S$GLB,, | Performed by: INTERNAL MEDICINE

## 2025-02-24 PROCEDURE — 63600175 PHARM REV CODE 636 W HCPCS

## 2025-02-24 PROCEDURE — 87799 DETECT AGENT NOS DNA QUANT: CPT | Performed by: INTERNAL MEDICINE

## 2025-02-24 PROCEDURE — 3008F BODY MASS INDEX DOCD: CPT | Mod: CPTII,S$GLB,, | Performed by: INTERNAL MEDICINE

## 2025-02-24 PROCEDURE — 1160F RVW MEDS BY RX/DR IN RCRD: CPT | Mod: CPTII,S$GLB,, | Performed by: INTERNAL MEDICINE

## 2025-02-24 PROCEDURE — 86900 BLOOD TYPING SEROLOGIC ABO: CPT | Performed by: INTERNAL MEDICINE

## 2025-02-24 PROCEDURE — 80197 ASSAY OF TACROLIMUS: CPT | Performed by: INTERNAL MEDICINE

## 2025-02-24 PROCEDURE — A4216 STERILE WATER/SALINE, 10 ML: HCPCS

## 2025-02-24 RX ORDER — SODIUM CHLORIDE 0.9 % (FLUSH) 0.9 %
10 SYRINGE (ML) INJECTION
Status: CANCELLED | OUTPATIENT
Start: 2025-02-24

## 2025-02-24 RX ORDER — HEPARIN 100 UNIT/ML
500 SYRINGE INTRAVENOUS
Status: CANCELLED | OUTPATIENT
Start: 2025-02-24

## 2025-02-24 RX ORDER — BUDESONIDE 3 MG/1
3 CAPSULE, COATED PELLETS ORAL 3 TIMES DAILY
Qty: 90 CAPSULE | Refills: 0 | Status: SHIPPED | OUTPATIENT
Start: 2025-02-24

## 2025-02-24 RX ORDER — SODIUM CHLORIDE 0.9 % (FLUSH) 0.9 %
10 SYRINGE (ML) INJECTION
Status: DISCONTINUED | OUTPATIENT
Start: 2025-02-24 | End: 2025-02-24 | Stop reason: HOSPADM

## 2025-02-24 RX ORDER — HEPARIN 100 UNIT/ML
500 SYRINGE INTRAVENOUS
Status: DISCONTINUED | OUTPATIENT
Start: 2025-02-24 | End: 2025-02-24 | Stop reason: HOSPADM

## 2025-02-24 RX ADMIN — SODIUM CHLORIDE, PRESERVATIVE FREE 10 ML: 5 INJECTION INTRAVENOUS at 09:02

## 2025-02-24 RX ADMIN — HEPARIN SODIUM (PORCINE) LOCK FLUSH IV SOLN 100 UNIT/ML 500 UNITS: 100 SOLUTION at 09:02

## 2025-02-24 NOTE — PROGRESS NOTES
BMT Pharmacist Medication Review Note     All current medications were reviewed with the patient and wife. The patient brought in all of his medications with him to this visit.      We discussed the changes made since last meeting:   -  He is starting budenoside 3 mg three time a day  -  He is due to stop doxycycline per Dr. aJmes instructions.     No symptoms so far.       The patient has ample supply of all medications.       Medication Indication Morning Afternoon Evening/Night   Acyclovir 800mg  Viral infection prevention 1 tablet  1 tablet   Letermovir (Prevymis®) 480mg CMV infection prevention 1 tablet     Voriconazole 200mg Fungal infection prevention 1 tablet  1 tablet   Dapsone 100mg Fungal pneumonia prevention  1 tablet        Tacrolimus 0.5mg GVHD prevention - HOLD morning dose on lab draw days 3 capsules  3 capsules   Magnesium oxide 400mg Magnesium supplement 2 tablets 1 tablet 2 tablets   Triamcinolone cream 0.1% Acute GVHD of the skin Apply to the affected area (neck/upper chest) twice daily   Budesonide (Entocort) 3 mg  1 capsule 1 capsule 1 capsule      **new medication or change in medication     AS NEEDED MEDICATIONS:  Ondansetron 8mg every 8 hours as needed for nausea  Prochlorperazine 10mg every 6 hours as needed for nausea (can take 20 minutes after ondansetron if no relief from nausea)  Tramadol 50 mg tablet every 6 hours as needed for pain.         All questions were answered.      Sree Hernandez, PharmD  Clinical Pharmacy Specialist, Bone Marrow Transplant/Hematology  Spectra link: [phone]

## 2025-02-26 ENCOUNTER — DOCUMENTATION ONLY (OUTPATIENT)
Dept: HEMATOLOGY/ONCOLOGY | Facility: CLINIC | Age: 55
End: 2025-02-26
Payer: COMMERCIAL

## 2025-02-26 ENCOUNTER — HOSPITAL ENCOUNTER (OUTPATIENT)
Dept: CARDIOLOGY | Facility: HOSPITAL | Age: 55
Discharge: HOME | End: 2025-02-26
Attending: PHYSICIAN ASSISTANT
Payer: COMMERCIAL

## 2025-02-26 VITALS
WEIGHT: 174.19 LBS | HEART RATE: 88 BPM | HEIGHT: 71 IN | SYSTOLIC BLOOD PRESSURE: 103 MMHG | BODY MASS INDEX: 24.39 KG/M2 | DIASTOLIC BLOOD PRESSURE: 76 MMHG

## 2025-02-26 DIAGNOSIS — R00.0 TACHYCARDIA: ICD-10-CM

## 2025-02-26 LAB
ASCENDING AORTA: 3.16 CM
AV AREA BY CONTINUOUS VTI: 3.5 CM2
AV INDEX (PROSTH): 0.73
AV LVOT MEAN GRADIENT: 1 MMHG
AV LVOT PEAK GRADIENT: 2 MMHG
AV MEAN GRADIENT: 3 MMHG
AV PEAK GRADIENT: 4 MMHG
AV VALVE AREA BY VELOCITY RATIO: 3.9 CM²
AV VALVE AREA: 3.6 CM2
AV VELOCITY RATIO: 0.8
BSA FOR ECHO PROCEDURE: 1.99 M2
CV ECHO LV RWT: 0.43 CM
DOP CALC AO PEAK VEL: 1 M/S
DOP CALC AO VTI: 18.1 CM
DOP CALC LVOT AREA: 4.9 CM2
DOP CALC LVOT DIAMETER: 2.5 CM
DOP CALC LVOT PEAK VEL: 0.8 M/S
DOP CALC LVOT STROKE VOLUME: 64.8 CM3
DOP CALCLVOT PEAK VEL VTI: 13.2 CM
E WAVE DECELERATION TIME: 140 MS
E/A RATIO: 0.83
E/E' RATIO: 9 M/S
ECHO EF ESTIMATED: 68 %
ECHO LV POSTERIOR WALL: 1 CM (ref 0.6–1.1)
FRACTIONAL SHORTENING: 38.3 % (ref 28–44)
GLOBAL LONGITUIDAL STRAIN: 15.3 %
INTERVENTRICULAR SEPTUM: 1 CM (ref 0.6–1.1)
IVC DIAMETER: 1.56 CM
IVRT: 128 MS
LA MAJOR: 4.1 CM
LA MINOR: 4.5 CM
LA WIDTH: 3.7 CM
LEFT ATRIUM SIZE: 3.6 CM
LEFT ATRIUM VOLUME INDEX MOD: 21 ML/M2
LEFT ATRIUM VOLUME INDEX: 24 ML/M2
LEFT ATRIUM VOLUME MOD: 41 ML
LEFT ATRIUM VOLUME: 49 CM3
LEFT INTERNAL DIMENSION IN SYSTOLE: 2.9 CM (ref 2.1–4)
LEFT VENTRICLE DIASTOLIC VOLUME INDEX: 52.26 ML/M2
LEFT VENTRICLE DIASTOLIC VOLUME: 104 ML
LEFT VENTRICLE MASS INDEX: 82.6 G/M2
LEFT VENTRICLE SYSTOLIC VOLUME INDEX: 16.6 ML/M2
LEFT VENTRICLE SYSTOLIC VOLUME: 33 ML
LEFT VENTRICULAR INTERNAL DIMENSION IN DIASTOLE: 4.7 CM (ref 3.5–6)
LEFT VENTRICULAR MASS: 164.5 G
LV LATERAL E/E' RATIO: 8.7
LV SEPTAL E/E' RATIO: 8.7
MV A" WAVE DURATION": 81.83 MS
MV PEAK A VEL: 0.63 M/S
MV PEAK E VEL: 0.52 M/S
OHS CV RV/LV RATIO: 0.81 CM
OHS LV EJECTION FRACTION SIMPSONS BIPLANE MOD: 47 %
PISA TR MAX VEL: 2.1 M/S
PULM VEIN A" WAVE DURATION": 81.83 MS
PULM VEIN S/D RATIO: 1.33
PULMONIC VEIN PEAK A VELOCITY: 0.4 M/S
PV PEAK D VEL: 0.45 M/S
PV PEAK S VEL: 0.6 M/S
RA MAJOR: 3.31 CM
RA PRESSURE ESTIMATED: 3 MMHG
RA WIDTH: 3.32 CM
RIGHT ATRIAL AREA: 11.9 CM2
RIGHT VENTRICLE DIASTOLIC BASEL DIMENSION: 3.8 CM
RV TB RVSP: 5 MMHG
RV TISSUE DOPPLER FREE WALL SYSTOLIC VELOCITY 1 (APICAL 4 CHAMBER VIEW): 11.49 CM/S
SINUS: 3.2 CM
STJ: 3.04 CM
TDI LATERAL: 0.06 M/S
TDI SEPTAL: 0.06 M/S
TDI: 0.06 M/S
TRICUSPID ANNULAR PLANE SYSTOLIC EXCURSION: 1.5 CM
TV PEAK GRADIENT: 18 MMHG
TV REST PULMONARY ARTERY PRESSURE: 21 MMHG
Z-SCORE OF LEFT VENTRICULAR DIMENSION IN END DIASTOLE: -2.05
Z-SCORE OF LEFT VENTRICULAR DIMENSION IN END SYSTOLE: -1.59

## 2025-02-26 PROCEDURE — 93306 TTE W/DOPPLER COMPLETE: CPT | Mod: 26,,, | Performed by: STUDENT IN AN ORGANIZED HEALTH CARE EDUCATION/TRAINING PROGRAM

## 2025-02-26 PROCEDURE — 93306 TTE W/DOPPLER COMPLETE: CPT

## 2025-02-26 NOTE — PROGRESS NOTES
NAT received signed Physician Statement for spouses work. NAT faxed to [employer] at [phone], fax went through. NAT notified spouse via email and recommended she follow up.

## 2025-02-27 ENCOUNTER — OFFICE VISIT (OUTPATIENT)
Dept: HEMATOLOGY/ONCOLOGY | Facility: CLINIC | Age: 55
End: 2025-02-27
Payer: COMMERCIAL

## 2025-02-27 ENCOUNTER — INFUSION (OUTPATIENT)
Dept: INFUSION THERAPY | Facility: HOSPITAL | Age: 55
End: 2025-02-27
Payer: COMMERCIAL

## 2025-02-27 VITALS
HEART RATE: 86 BPM | OXYGEN SATURATION: 98 % | RESPIRATION RATE: 16 BRPM | WEIGHT: 175.5 LBS | BODY MASS INDEX: 24.57 KG/M2 | TEMPERATURE: 98 F | DIASTOLIC BLOOD PRESSURE: 70 MMHG | HEIGHT: 71 IN | SYSTOLIC BLOOD PRESSURE: 109 MMHG

## 2025-02-27 VITALS
TEMPERATURE: 98 F | OXYGEN SATURATION: 97 % | SYSTOLIC BLOOD PRESSURE: 134 MMHG | RESPIRATION RATE: 16 BRPM | HEART RATE: 78 BPM | DIASTOLIC BLOOD PRESSURE: 73 MMHG

## 2025-02-27 DIAGNOSIS — D75.81 MF (MYELOFIBROSIS): ICD-10-CM

## 2025-02-27 DIAGNOSIS — D84.81 IMMUNODEFICIENCY DUE TO CONDITIONS CLASSIFIED ELSEWHERE: ICD-10-CM

## 2025-02-27 DIAGNOSIS — T45.1X5A ANEMIA ASSOCIATED WITH CHEMOTHERAPY: Primary | ICD-10-CM

## 2025-02-27 DIAGNOSIS — Z94.84 HISTORY OF ALLOGENEIC STEM CELL TRANSPLANT: Primary | ICD-10-CM

## 2025-02-27 DIAGNOSIS — D47.1 PRIMARY MYELOFIBROSIS: ICD-10-CM

## 2025-02-27 DIAGNOSIS — Z76.82 STEM CELL TRANSPLANT CANDIDATE: ICD-10-CM

## 2025-02-27 DIAGNOSIS — E27.9 ADRENAL NODULE: Primary | ICD-10-CM

## 2025-02-27 DIAGNOSIS — D64.81 ANEMIA ASSOCIATED WITH CHEMOTHERAPY: Primary | ICD-10-CM

## 2025-02-27 DIAGNOSIS — D61.818 PANCYTOPENIA: ICD-10-CM

## 2025-02-27 LAB
ABO + RH BLD: NORMAL
ALBUMIN SERPL BCP-MCNC: 3.6 G/DL (ref 3.5–5.2)
ALP SERPL-CCNC: 60 U/L (ref 40–150)
ALT SERPL W/O P-5'-P-CCNC: 17 U/L (ref 10–44)
ANION GAP SERPL CALC-SCNC: 7 MMOL/L (ref 8–16)
AST SERPL-CCNC: 22 U/L (ref 10–40)
BASOPHILS # BLD AUTO: 0.01 K/UL (ref 0–0.2)
BASOPHILS NFR BLD: 0.2 % (ref 0–1.9)
BILIRUB SERPL-MCNC: 0.3 MG/DL (ref 0.1–1)
BLD GP AB SCN CELLS X3 SERPL QL: NORMAL
BLD PROD TYP BPU: NORMAL
BLOOD UNIT EXPIRATION DATE: NORMAL
BLOOD UNIT TYPE CODE: 5100
BLOOD UNIT TYPE: NORMAL
BUN SERPL-MCNC: 20 MG/DL (ref 6–20)
CALCIUM SERPL-MCNC: 9 MG/DL (ref 8.7–10.5)
CHLORIDE SERPL-SCNC: 110 MMOL/L (ref 95–110)
CO2 SERPL-SCNC: 20 MMOL/L (ref 23–29)
CODING SYSTEM: NORMAL
CREAT SERPL-MCNC: 0.8 MG/DL (ref 0.5–1.4)
CROSSMATCH INTERPRETATION: NORMAL
DIFFERENTIAL METHOD BLD: ABNORMAL
DISPENSE STATUS: NORMAL
EOSINOPHIL # BLD AUTO: 0 K/UL (ref 0–0.5)
EOSINOPHIL NFR BLD: 0.2 % (ref 0–8)
ERYTHROCYTE [DISTWIDTH] IN BLOOD BY AUTOMATED COUNT: 17.9 % (ref 11.5–14.5)
EST. GFR  (NO RACE VARIABLE): >60 ML/MIN/1.73 M^2
GLUCOSE SERPL-MCNC: 96 MG/DL (ref 70–110)
HCT VFR BLD AUTO: 20.7 % (ref 40–54)
HGB BLD-MCNC: 6.9 G/DL (ref 14–18)
IMM GRANULOCYTES # BLD AUTO: 0.03 K/UL (ref 0–0.04)
IMM GRANULOCYTES NFR BLD AUTO: 0.5 % (ref 0–0.5)
LYMPHOCYTES # BLD AUTO: 0.9 K/UL (ref 1–4.8)
LYMPHOCYTES NFR BLD: 13.9 % (ref 18–48)
MAGNESIUM SERPL-MCNC: 1.9 MG/DL (ref 1.6–2.6)
MCH RBC QN AUTO: 32.4 PG (ref 27–31)
MCHC RBC AUTO-ENTMCNC: 33.3 G/DL (ref 32–36)
MCV RBC AUTO: 97 FL (ref 82–98)
MONOCYTES # BLD AUTO: 0.8 K/UL (ref 0.3–1)
MONOCYTES NFR BLD: 13.6 % (ref 4–15)
NEUTROPHILS # BLD AUTO: 4.4 K/UL (ref 1.8–7.7)
NEUTROPHILS NFR BLD: 71.6 % (ref 38–73)
NRBC BLD-RTO: 0 /100 WBC
NUM UNITS TRANS PACKED RBC: NORMAL
PHOSPHATE SERPL-MCNC: 3.2 MG/DL (ref 2.7–4.5)
PLATELET # BLD AUTO: 105 K/UL (ref 150–450)
PMV BLD AUTO: 11.2 FL (ref 9.2–12.9)
POTASSIUM SERPL-SCNC: 4.2 MMOL/L (ref 3.5–5.1)
PROT SERPL-MCNC: 6.5 G/DL (ref 6–8.4)
RBC # BLD AUTO: 2.13 M/UL (ref 4.6–6.2)
SODIUM SERPL-SCNC: 137 MMOL/L (ref 136–145)
SPECIMEN OUTDATE: NORMAL
TACROLIMUS BLD-MCNC: 5.7 NG/ML (ref 5–15)
WBC # BLD AUTO: 6.11 K/UL (ref 3.9–12.7)

## 2025-02-27 PROCEDURE — 36592 COLLECT BLOOD FROM PICC: CPT

## 2025-02-27 PROCEDURE — 99999 PR PBB SHADOW E&M-EST. PATIENT-LVL IV: CPT | Mod: PBBFAC,,, | Performed by: INTERNAL MEDICINE

## 2025-02-27 PROCEDURE — A4216 STERILE WATER/SALINE, 10 ML: HCPCS | Performed by: INTERNAL MEDICINE

## 2025-02-27 PROCEDURE — P9040 RBC LEUKOREDUCED IRRADIATED: HCPCS | Performed by: INTERNAL MEDICINE

## 2025-02-27 PROCEDURE — 25000003 PHARM REV CODE 250

## 2025-02-27 PROCEDURE — 63600175 PHARM REV CODE 636 W HCPCS

## 2025-02-27 PROCEDURE — 25000003 PHARM REV CODE 250: Performed by: INTERNAL MEDICINE

## 2025-02-27 PROCEDURE — 63600175 PHARM REV CODE 636 W HCPCS: Performed by: INTERNAL MEDICINE

## 2025-02-27 PROCEDURE — 1159F MED LIST DOCD IN RCRD: CPT | Mod: CPTII,S$GLB,, | Performed by: INTERNAL MEDICINE

## 2025-02-27 PROCEDURE — 86920 COMPATIBILITY TEST SPIN: CPT | Performed by: INTERNAL MEDICINE

## 2025-02-27 PROCEDURE — 36430 TRANSFUSION BLD/BLD COMPNT: CPT

## 2025-02-27 PROCEDURE — 1160F RVW MEDS BY RX/DR IN RCRD: CPT | Mod: CPTII,S$GLB,, | Performed by: INTERNAL MEDICINE

## 2025-02-27 PROCEDURE — 3008F BODY MASS INDEX DOCD: CPT | Mod: CPTII,S$GLB,, | Performed by: INTERNAL MEDICINE

## 2025-02-27 PROCEDURE — 99215 OFFICE O/P EST HI 40 MIN: CPT | Mod: S$GLB,,, | Performed by: INTERNAL MEDICINE

## 2025-02-27 PROCEDURE — 3074F SYST BP LT 130 MM HG: CPT | Mod: CPTII,S$GLB,, | Performed by: INTERNAL MEDICINE

## 2025-02-27 PROCEDURE — 80053 COMPREHEN METABOLIC PANEL: CPT | Performed by: INTERNAL MEDICINE

## 2025-02-27 PROCEDURE — 85025 COMPLETE CBC W/AUTO DIFF WBC: CPT | Performed by: INTERNAL MEDICINE

## 2025-02-27 PROCEDURE — 3078F DIAST BP <80 MM HG: CPT | Mod: CPTII,S$GLB,, | Performed by: INTERNAL MEDICINE

## 2025-02-27 PROCEDURE — A4216 STERILE WATER/SALINE, 10 ML: HCPCS

## 2025-02-27 PROCEDURE — 83735 ASSAY OF MAGNESIUM: CPT | Performed by: INTERNAL MEDICINE

## 2025-02-27 PROCEDURE — 84100 ASSAY OF PHOSPHORUS: CPT | Performed by: INTERNAL MEDICINE

## 2025-02-27 PROCEDURE — 80197 ASSAY OF TACROLIMUS: CPT | Performed by: INTERNAL MEDICINE

## 2025-02-27 PROCEDURE — 86901 BLOOD TYPING SEROLOGIC RH(D): CPT | Performed by: INTERNAL MEDICINE

## 2025-02-27 RX ORDER — HEPARIN 100 UNIT/ML
500 SYRINGE INTRAVENOUS
Status: DISCONTINUED | OUTPATIENT
Start: 2025-02-27 | End: 2025-02-27 | Stop reason: HOSPADM

## 2025-02-27 RX ORDER — SODIUM CHLORIDE 9 MG/ML
50 INJECTION, SOLUTION INTRAVENOUS CONTINUOUS
Status: DISCONTINUED | OUTPATIENT
Start: 2025-02-27 | End: 2025-02-27 | Stop reason: HOSPADM

## 2025-02-27 RX ORDER — SODIUM CHLORIDE 0.9 % (FLUSH) 0.9 %
10 SYRINGE (ML) INJECTION
Status: DISCONTINUED | OUTPATIENT
Start: 2025-02-27 | End: 2025-02-27 | Stop reason: HOSPADM

## 2025-02-27 RX ORDER — SODIUM CHLORIDE 0.9 % (FLUSH) 0.9 %
10 SYRINGE (ML) INJECTION
OUTPATIENT
Start: 2025-02-27

## 2025-02-27 RX ORDER — HEPARIN 100 UNIT/ML
500 SYRINGE INTRAVENOUS
OUTPATIENT
Start: 2025-02-27

## 2025-02-27 RX ADMIN — Medication 10 ML: at 08:02

## 2025-02-27 RX ADMIN — HEPARIN SODIUM (PORCINE) LOCK FLUSH IV SOLN 100 UNIT/ML 500 UNITS: 100 SOLUTION at 08:02

## 2025-02-27 RX ADMIN — HEPARIN SODIUM (PORCINE) LOCK FLUSH IV SOLN 100 UNIT/ML 500 UNITS: 100 SOLUTION at 04:02

## 2025-02-27 RX ADMIN — Medication 10 ML: at 04:02

## 2025-02-27 NOTE — PLAN OF CARE
Patient tolerated 1 unit RBCs without incident. Vitals stable before, during and after infusion. Central line flushed with brisk blood return present, & heparin locked at d/c. Stable & ambulatory off of unit at d/c accompanied by his wife.

## 2025-02-27 NOTE — PROGRESS NOTES
Section of Hematology and Stem Cell Transplantation    Post-Transplantation Follow Up Visit     2/27/25    Transplant History:   Primary oncologist: Clyde James MD  Primary oncologic diagnosis: primary myelofibrosis  Transplant date: 12/26/2024  Donor: haploidentical  Blood Type (Patient): O +  Blood Type (Donor): O +  CMV (Patient): Negative  CMV (Donor): Positive  Graft source: Peripheral blood  CD34+ cell dose: 6.04 X10^6 cells/kg  Conditioning Regimen:  Thiotepa, Busulfan, Fludarabine  GVHD prophylaxis: Post-transplant cyclophosphamide, MMF, Tacrolimus  Immediate post-transplant complications: mucositis, R axillary skin/soft tissue infection secondary to MRSA    History of Present Ilness:   Rubén Peters) is a pleasant 54 y.o.male with primary myelofibrosis who is status post haploidentical stem cell transplantation conditioned with  Bu/Flu/TT  who is currently day +63 who presents for post-transplant follow up.    Diarrhea has resolved. Pt reports significant improvement in SOB, fatigue and palpitations as well. No fevers/chills. Hyperpigmentation across neck/upper chest resolved. No other concerns. Compliant with home meds.    PAST MEDICAL HISTORY:   Past Medical History:   Diagnosis Date    Abdominal pain 01/02/2025    Allergic contact dermatitis due to adhesives 12/31/2024    Cancer     Flatulence 01/03/2025    Headache 12/27/2024    Hyperlipidemia     Mucositis 01/02/2025    Other constipation 12/19/2024    Thrombocytosis 12/18/2024    Transaminitis 12/27/2024       PAST SURGICAL HISTORY:   Past Surgical History:   Procedure Laterality Date    BONE MARROW BIOPSY N/A 11/27/2024    Procedure: Biopsy-bone marrow;  Surgeon: Clyde James MD;  Location: [facility] ([address] [place] [address]);  Service: Oncology;  Laterality: N/A;  11/20-pre call complete-tb    COLONOSCOPY N/A 8/9/2023    Procedure: COLONOSCOPY;  Surgeon: Will Ardon MD;  Location: [facility] ([address] [place] [address]);  Service: Endoscopy;  Laterality: N/A;   Suprep Instructions sent via portal / Authorizing:     Bebeto Arora MD in [facility] FAMILY MED/ INTERNAL MED/ PEDS  Referral:          [id number]    EYE SURGERY      FLEXIBLE SIGMOIDOSCOPY N/A 7/23/2024    Procedure: SIGMOIDOSCOPY, FLEXIBLE;  Surgeon: Nirali Vicente MD;  Location: [facility];  Service: Endoscopy;  Laterality: N/A;    INSERTION OF LONGORIA CATHETER Right 12/18/2024    Procedure: INSERTION, CATHETER, CENTRAL VENOUS, LONGORIA TRIPLE LUMEN, Bard 12.5 fr Longoria Cath model [id number];  Surgeon: Willie Hadley MD;  Location: [facility] OR [address] [place] [address];  Service: General;  Laterality: Right;       PAST SOCIAL HISTORY:  Social History     Tobacco Use    Smoking status: Former     Types: Cigars, Cigarettes    Smokeless tobacco: Never   Substance Use Topics    Alcohol use: Not Currently     Comment: socially    Drug use: Never       FAMILY HISTORY:  Family History   Problem Relation Name Age of Onset    Arthritis Mother      COPD Father      Testicular cancer Maternal Cousin Peter 17        Chemotherapy    Breast cancer Maternal Cousin  51        Chemotherapy    Heart disease Maternal Grandfather      Stroke Maternal Grandmother         CURRENT MEDICATIONS:   Current Outpatient Medications   Medication Sig    acyclovir (ZOVIRAX) 800 MG Tab Take 1 tablet (800 mg total) by mouth 2 (two) times daily.    budesonide (ENTOCORT EC) 3 mg capsule Take 1 capsule (3 mg total) by mouth 3 (three) times daily.    dapsone 100 MG Tab Take 1 tablet (100 mg total) by mouth once daily.    letermovir (PREVYMIS) 480 mg Tab Take 1 tablet (480 mg total) by mouth once daily.    magnesium oxide (MAG-OX) 400 mg (241.3 mg magnesium) tablet Take 2 tablets (800 mg total) by mouth 2 (two) times daily.    ondansetron (ZOFRAN) 8 MG tablet Take 1 tablet (8 mg total) by mouth every 8 (eight) hours as needed for Nausea.    prochlorperazine (COMPAZINE) 10 MG tablet Take 1 tablet (10 mg total) by mouth 4 (four) times daily as needed for Nausea.    tacrolimus  (PROGRAF) 0.5 MG Cap Take 3 capsules (1.5 mg total) by mouth every morning AND 3 capsules (1.5 mg total) every evening.    traMADoL (ULTRAM) 50 mg tablet Take 1 tablet (50 mg total) by mouth every 6 (six) hours as needed for Pain.    triamcinolone acetonide 0.1% (KENALOG) 0.1 % cream Apply topically 2 (two) times daily.    voriconazole (VFEND) 200 MG Tab Take 1 tablet (200 mg total) by mouth 2 (two) times daily.    LORazepam (ATIVAN) 0.5 MG tablet Take 1 tablet (0.5 mg total) by mouth once as needed. Take 30-45 minutes prior to your bone marrow biopsy on 1/27/25. Do not take if you do not have a .     No current facility-administered medications for this visit.       ALLERGIES:   Review of patient's allergies indicates:   Allergen Reactions    Bactrim [sulfamethoxazole-trimethoprim] Hives       GVHD Review of Systems:     Pertinent positives and negatives included in the HPI. Otherwise a 14 point review of systems is negative. GVHD review of systems recorded in BMT flowsheet.     Physical Exam:     Vitals:    02/27/25 0952   BP: 109/70   Pulse: 86   Resp: 16   Temp: 97.7 °F (36.5 °C)           General: Appears well, NAD  HEENT: MMM, no OP lesions  Pulmonary: CTAB, no increased work of breathing, no W/R/C  Cardiovascular: S1S2 normal, tachycardic, regular rate no M/R/G  Abdominal: Soft, NT, ND, BS+, no HSM  Extremities: No C/C/E  Neurological: AAOx4, grossly normal, no focal deficits  Dermatologic: Pustule under right axilla - resolving. Hyperpigmentation around neck, upper chest - improved    ECOG Performance Status: (foot note - ECOG PS provided by Eastern Cooperative Oncology Group) 1 - Symptomatic but completely ambulatory    Karnofsky Performance Score:  70%- Cares for Self: Unable to Carry on Normal Activity or Active Work    Labs:   Lab Results   Component Value Date    WBC 6.11 02/27/2025    RBC 2.13 (L) 02/27/2025    HGB 6.9 (L) 02/27/2025    HCT 20.7 (L) 02/27/2025    MCV 97 02/27/2025    MCH 32.4 (H)  02/27/2025    MCHC 33.3 02/27/2025    RDW 17.9 (H) 02/27/2025     (L) 02/27/2025    MPV 11.2 02/27/2025    GRAN 4.4 02/27/2025    GRAN 71.6 02/27/2025    LYMPH 0.9 (L) 02/27/2025    LYMPH 13.9 (L) 02/27/2025    MONO 0.8 02/27/2025    MONO 13.6 02/27/2025    EOS 0.0 02/27/2025    BASO 0.01 02/27/2025    EOSINOPHIL 0.2 02/27/2025    BASOPHIL 0.2 02/27/2025       Sodium   Date Value Ref Range Status   02/27/2025 137 136 - 145 mmol/L Final     Potassium   Date Value Ref Range Status   02/27/2025 4.2 3.5 - 5.1 mmol/L Final     Chloride   Date Value Ref Range Status   02/27/2025 110 95 - 110 mmol/L Final     CO2   Date Value Ref Range Status   02/27/2025 20 (L) 23 - 29 mmol/L Final     Glucose   Date Value Ref Range Status   02/27/2025 96 70 - 110 mg/dL Final     BUN   Date Value Ref Range Status   02/27/2025 20 6 - 20 mg/dL Final     Creatinine   Date Value Ref Range Status   02/27/2025 0.8 0.5 - 1.4 mg/dL Final     Calcium   Date Value Ref Range Status   02/27/2025 9.0 8.7 - 10.5 mg/dL Final     Total Protein   Date Value Ref Range Status   02/27/2025 6.5 6.0 - 8.4 g/dL Final     Albumin   Date Value Ref Range Status   02/27/2025 3.6 3.5 - 5.2 g/dL Final     Total Bilirubin   Date Value Ref Range Status   02/27/2025 0.3 0.1 - 1.0 mg/dL Final     Comment:     For infants and newborns, interpretation of results should be based  on gestational age, weight and in agreement with clinical  observations.    Premature Infant recommended reference ranges:  Up to 24 hours.............<8.0 mg/dL  Up to 48 hours............<12.0 mg/dL  3-5 days..................<15.0 mg/dL  6-29 days.................<15.0 mg/dL       Alkaline Phosphatase   Date Value Ref Range Status   02/27/2025 60 40 - 150 U/L Final     AST   Date Value Ref Range Status   02/27/2025 22 10 - 40 U/L Final     ALT   Date Value Ref Range Status   02/27/2025 17 10 - 44 U/L Final     Anion Gap   Date Value Ref Range Status   02/27/2025 7 (L) 8 - 16 mmol/L Final      eGFR if    Date Value Ref Range Status   01/22/2022 >60.0 >60 mL/min/1.73 m^2 Final     eGFR if non    Date Value Ref Range Status   01/22/2022 >60.0 >60 mL/min/1.73 m^2 Final     Comment:     Calculation used to obtain the estimated glomerular filtration  rate (eGFR) is the CKD-EPI equation.          Imaging:   All pertinent studies reviewed and interpreted by me    Acute GVHD Scoring:  GVHD Acute Assessment  02/17/25: aGHVD of skin, ~5% BSA. Resolved with Topical steroids.  02/24/25: aGVHD of GI tract (diarrhea); started budesonide  02/27/25: aGVHD of GI tract resolved; budesonide taper started     Assessment and Plan:   Rubén Peters) is a pleasant 54 y.o.male with primary myelofibrosis s/p haplo SCT who presents for post-transplant follow up.    Primary myelofibrosis: Status post treatment with ruxolitinib, followed by alloSCT. Primary oncologist is Clyde James MD who will be managing primary underlying disease. Day +30 BMBx results below     Status post allogeneic stem cell transplantation: As noted above, status post haploidentical stem cell transplantation conditioned with  Bu/Flu/TT . Currently day 63 Engrafted on day +22.  Day +30 BMBx on 1/2/25: No definitive evidence of residual JAK2 [id number] primary myelofibrosis. NGS without evidence of pathogenic mutations. Chimerism studies with 100 donor CD33, CD3 insufficient for analysis.     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 MMF has now been discontinued.  Current tacro dose: 1.5mg BID   Last tacro level: 5.7 on 2/27/25  Adjustments: No Changes. Will monitor at next visit  Acute GVHD of skin: noted 2/17/25. Resolved with topical steroids PRN  Acute GVHD of GI tract noted 2/24/25: Charted in flowsheet. Started budesonide 2/24/25; s/p resolution. Will taper off budesonide over next 6 days. Discussed that if symptoms progress, he may need  hospitalization for EGD + flex sig.     Immunosuppression: Prevention with voriconazole,  acyclovir. CMV prophylaxis with letermovir. PJP prophyalxis dapsone. Continue weekly monitoring of CMV and EBV.  Last CMV: negative (2/20/2025), last EBV: negative (2/20/2025).  Active infections: none    Pancytopenia: Due to underlying disease and chemotherapy. Transfuse for Hgb <7 g/dL and platelets <10k.  Hb 6.9 today; will transfuse 1u pRBC    Hypertension: (improved) Was newly hypertensive in hospital following SCT at which time he was started on amlodipine. He has been persistently hypotensive since discharge. Discontinued amlodipine, normotensive.   Will continue to monitor.    Tachycardia: Persistent tachycardia since discharge (improved now), initially attributed to volume depletion. EKG showed sinus tachycardia. No chest pain and only mildly SOB on exertion. Home HR log shows HR mostly . ?Anxiety related. Gradually improving, monitor for now. Echo pending. PE unlikely - No extremity swelling/pain, no CP/SOB, no history of VTE.     Staphylococcus Skin Infection: Patient had MRSA while in the hospital and was treated with doxycycline, concern for recurrence on recent exam. Picture uploaded to media and ID consulted who recommended Doxycycline 100mg PO BID for a month (to end mid-March 2025) to prevent recurrence.     Follow up: Twice weekly follow up with labs.    Orders Placed:      No orders of the defined types were placed in this encounter.         40 minutes of total time spent on the encounter, which includes face to face time and non-face to face time preparing to see the patient (eg, review of tests), Obtaining and/or reviewing separately obtained history, Documenting clinical information in the electronic or other health record, Independently interpreting results (not separately reported) and communicating results to the patient/family/caregiver, or Care coordination with other physicians involved in his  care (not separately reported).     SONIDO Nuram  Hematology/Oncology Fellow  #[phone]

## 2025-02-27 NOTE — PROGRESS NOTES
BMT Pharmacist Immunosuppression Note:    Reviewed patient's tacrolimus level with Dr. Grover and it is below goal range however has been stable the past few weeks. Instructed patient to continue your current regimen of 3 capsules (1.5mg) in the morning and 3 capsules (1.5mg) in the evening. Will follow up with next week's level before making any adjustments.       Carolina Carson, Pharm.D., BCOP  Clinical Pharmacy Specialist, Bone Marrow Transplant/Cellular Therapy  [facility]  SpectraLink: [phone]

## 2025-03-03 ENCOUNTER — INFUSION (OUTPATIENT)
Dept: INFUSION THERAPY | Facility: HOSPITAL | Age: 55
End: 2025-03-03
Payer: COMMERCIAL

## 2025-03-03 ENCOUNTER — OFFICE VISIT (OUTPATIENT)
Dept: HEMATOLOGY/ONCOLOGY | Facility: CLINIC | Age: 55
End: 2025-03-03
Payer: COMMERCIAL

## 2025-03-03 VITALS
HEIGHT: 71 IN | SYSTOLIC BLOOD PRESSURE: 117 MMHG | RESPIRATION RATE: 16 BRPM | WEIGHT: 176.69 LBS | OXYGEN SATURATION: 97 % | BODY MASS INDEX: 24.74 KG/M2 | HEART RATE: 99 BPM | DIASTOLIC BLOOD PRESSURE: 70 MMHG | TEMPERATURE: 99 F

## 2025-03-03 DIAGNOSIS — Z94.84 HISTORY OF ALLOGENEIC STEM CELL TRANSPLANT: Primary | ICD-10-CM

## 2025-03-03 DIAGNOSIS — D75.81 MF (MYELOFIBROSIS): ICD-10-CM

## 2025-03-03 DIAGNOSIS — D84.81 IMMUNODEFICIENCY DUE TO CONDITIONS CLASSIFIED ELSEWHERE: ICD-10-CM

## 2025-03-03 DIAGNOSIS — D61.818 PANCYTOPENIA: ICD-10-CM

## 2025-03-03 DIAGNOSIS — D47.1 PRIMARY MYELOFIBROSIS: ICD-10-CM

## 2025-03-03 DIAGNOSIS — E27.9 ADRENAL NODULE: Primary | ICD-10-CM

## 2025-03-03 DIAGNOSIS — Z76.82 STEM CELL TRANSPLANT CANDIDATE: ICD-10-CM

## 2025-03-03 LAB
ABO + RH BLD: NORMAL
ALBUMIN SERPL BCP-MCNC: 3.6 G/DL (ref 3.5–5.2)
ALP SERPL-CCNC: 70 U/L (ref 40–150)
ALT SERPL W/O P-5'-P-CCNC: 32 U/L (ref 10–44)
ANION GAP SERPL CALC-SCNC: 7 MMOL/L (ref 8–16)
AST SERPL-CCNC: 30 U/L (ref 10–40)
BASOPHILS NFR BLD: 0 % (ref 0–1.9)
BILIRUB SERPL-MCNC: 0.3 MG/DL (ref 0.1–1)
BLD GP AB SCN CELLS X3 SERPL QL: NORMAL
BUN SERPL-MCNC: 16 MG/DL (ref 6–20)
CALCIUM SERPL-MCNC: 9.1 MG/DL (ref 8.7–10.5)
CHLORIDE SERPL-SCNC: 106 MMOL/L (ref 95–110)
CMV DNA SPEC QL NAA+PROBE: NORMAL
CO2 SERPL-SCNC: 22 MMOL/L (ref 23–29)
CREAT SERPL-MCNC: 0.8 MG/DL (ref 0.5–1.4)
CYTOMEGALOVIRUS PCR, QUANT: NOT DETECTED IU/ML
DIFFERENTIAL METHOD BLD: ABNORMAL
EOSINOPHIL NFR BLD: 0 % (ref 0–8)
EPSTEIN-BARR VIRUS DNA: NORMAL
EPSTEIN-BARR VIRUS PCR, QUANT: NOT DETECTED IU/ML
ERYTHROCYTE [DISTWIDTH] IN BLOOD BY AUTOMATED COUNT: 17.2 % (ref 11.5–14.5)
EST. GFR  (NO RACE VARIABLE): >60 ML/MIN/1.73 M^2
GLUCOSE SERPL-MCNC: 93 MG/DL (ref 70–110)
HCT VFR BLD AUTO: 24.6 % (ref 40–54)
HGB BLD-MCNC: 8.1 G/DL (ref 14–18)
IMM GRANULOCYTES # BLD AUTO: ABNORMAL K/UL (ref 0–0.04)
IMM GRANULOCYTES NFR BLD AUTO: ABNORMAL % (ref 0–0.5)
LYMPHOCYTES NFR BLD: 9 % (ref 18–48)
MAGNESIUM SERPL-MCNC: 1.8 MG/DL (ref 1.6–2.6)
MCH RBC QN AUTO: 30.8 PG (ref 27–31)
MCHC RBC AUTO-ENTMCNC: 32.9 G/DL (ref 32–36)
MCV RBC AUTO: 94 FL (ref 82–98)
MONOCYTES NFR BLD: 13 % (ref 4–15)
MYELOCYTES NFR BLD MANUAL: 1 %
NEUTROPHILS NFR BLD: 75 % (ref 38–73)
NEUTS BAND NFR BLD MANUAL: 2 %
NRBC BLD-RTO: 1 /100 WBC
PHOSPHATE SERPL-MCNC: 3.3 MG/DL (ref 2.7–4.5)
PLATELET # BLD AUTO: 100 K/UL (ref 150–450)
PLATELET BLD QL SMEAR: ABNORMAL
PMV BLD AUTO: 10.2 FL (ref 9.2–12.9)
POTASSIUM SERPL-SCNC: 4 MMOL/L (ref 3.5–5.1)
PROT SERPL-MCNC: 6.5 G/DL (ref 6–8.4)
RBC # BLD AUTO: 2.63 M/UL (ref 4.6–6.2)
SODIUM SERPL-SCNC: 135 MMOL/L (ref 136–145)
SPECIMEN OUTDATE: NORMAL
TACROLIMUS BLD-MCNC: 7.5 NG/ML (ref 5–15)
WBC # BLD AUTO: 7.38 K/UL (ref 3.9–12.7)

## 2025-03-03 PROCEDURE — 84100 ASSAY OF PHOSPHORUS: CPT | Performed by: INTERNAL MEDICINE

## 2025-03-03 PROCEDURE — 99999 PR PBB SHADOW E&M-EST. PATIENT-LVL IV: CPT | Mod: PBBFAC,,, | Performed by: INTERNAL MEDICINE

## 2025-03-03 PROCEDURE — 1159F MED LIST DOCD IN RCRD: CPT | Mod: CPTII,S$GLB,, | Performed by: INTERNAL MEDICINE

## 2025-03-03 PROCEDURE — 85007 BL SMEAR W/DIFF WBC COUNT: CPT | Performed by: INTERNAL MEDICINE

## 2025-03-03 PROCEDURE — 3008F BODY MASS INDEX DOCD: CPT | Mod: CPTII,S$GLB,, | Performed by: INTERNAL MEDICINE

## 2025-03-03 PROCEDURE — 99215 OFFICE O/P EST HI 40 MIN: CPT | Mod: S$GLB,,, | Performed by: INTERNAL MEDICINE

## 2025-03-03 PROCEDURE — 36592 COLLECT BLOOD FROM PICC: CPT

## 2025-03-03 PROCEDURE — 80053 COMPREHEN METABOLIC PANEL: CPT | Performed by: INTERNAL MEDICINE

## 2025-03-03 PROCEDURE — 85027 COMPLETE CBC AUTOMATED: CPT | Performed by: INTERNAL MEDICINE

## 2025-03-03 PROCEDURE — 87799 DETECT AGENT NOS DNA QUANT: CPT | Performed by: INTERNAL MEDICINE

## 2025-03-03 PROCEDURE — 80197 ASSAY OF TACROLIMUS: CPT | Performed by: INTERNAL MEDICINE

## 2025-03-03 PROCEDURE — 1160F RVW MEDS BY RX/DR IN RCRD: CPT | Mod: CPTII,S$GLB,, | Performed by: INTERNAL MEDICINE

## 2025-03-03 PROCEDURE — 63600175 PHARM REV CODE 636 W HCPCS

## 2025-03-03 PROCEDURE — 3074F SYST BP LT 130 MM HG: CPT | Mod: CPTII,S$GLB,, | Performed by: INTERNAL MEDICINE

## 2025-03-03 PROCEDURE — A4216 STERILE WATER/SALINE, 10 ML: HCPCS

## 2025-03-03 PROCEDURE — 83735 ASSAY OF MAGNESIUM: CPT | Performed by: INTERNAL MEDICINE

## 2025-03-03 PROCEDURE — 25000003 PHARM REV CODE 250

## 2025-03-03 PROCEDURE — 86901 BLOOD TYPING SEROLOGIC RH(D): CPT | Performed by: INTERNAL MEDICINE

## 2025-03-03 PROCEDURE — 3078F DIAST BP <80 MM HG: CPT | Mod: CPTII,S$GLB,, | Performed by: INTERNAL MEDICINE

## 2025-03-03 RX ORDER — HEPARIN 100 UNIT/ML
500 SYRINGE INTRAVENOUS
Status: DISCONTINUED | OUTPATIENT
Start: 2025-03-03 | End: 2025-03-03 | Stop reason: HOSPADM

## 2025-03-03 RX ORDER — HEPARIN 100 UNIT/ML
500 SYRINGE INTRAVENOUS
Status: CANCELLED | OUTPATIENT
Start: 2025-03-03

## 2025-03-03 RX ORDER — SODIUM CHLORIDE 0.9 % (FLUSH) 0.9 %
10 SYRINGE (ML) INJECTION
Status: DISCONTINUED | OUTPATIENT
Start: 2025-03-03 | End: 2025-03-03 | Stop reason: HOSPADM

## 2025-03-03 RX ORDER — SODIUM CHLORIDE 0.9 % (FLUSH) 0.9 %
10 SYRINGE (ML) INJECTION
Status: CANCELLED | OUTPATIENT
Start: 2025-03-03

## 2025-03-03 RX ADMIN — HEPARIN SODIUM (PORCINE) LOCK FLUSH IV SOLN 100 UNIT/ML 500 UNITS: 100 SOLUTION at 08:03

## 2025-03-03 RX ADMIN — Medication 10 ML: at 08:03

## 2025-03-03 NOTE — PROGRESS NOTES
Section of Hematology and Stem Cell Transplantation    Post-Transplantation Follow Up Visit     3/3/25    Transplant History:   Primary oncologist: Clyde James MD  Primary oncologic diagnosis: primary myelofibrosis  Transplant date: 12/26/2024  Donor: haploidentical  Blood Type (Patient): O +  Blood Type (Donor): O +  CMV (Patient): Negative  CMV (Donor): Positive  Graft source: Peripheral blood  CD34+ cell dose: 6.04 X10^6 cells/kg  Conditioning Regimen:  Thiotepa, Busulfan, Fludarabine  GVHD prophylaxis: Post-transplant cyclophosphamide, MMF, Tacrolimus  Immediate post-transplant complications: mucositis, R axillary skin/soft tissue infection secondary to MRSA    History of Present Ilness:   Rubén Peters) is a pleasant 54 y.o.male with primary myelofibrosis who is status post haploidentical stem cell transplantation conditioned with  Bu/Flu/TT  who is currently day +67 who presents for post-transplant follow up.    He has had normal stools since last week. Denies nausea, anorexia, rash. Overall he feels great.     PAST MEDICAL HISTORY:   Past Medical History:   Diagnosis Date    Abdominal pain 01/02/2025    Allergic contact dermatitis due to adhesives 12/31/2024    Cancer     Flatulence 01/03/2025    Headache 12/27/2024    Hyperlipidemia     Mucositis 01/02/2025    Other constipation 12/19/2024    Thrombocytosis 12/18/2024    Transaminitis 12/27/2024       PAST SURGICAL HISTORY:   Past Surgical History:   Procedure Laterality Date    BONE MARROW BIOPSY N/A 11/27/2024    Procedure: Biopsy-bone marrow;  Surgeon: Clyde James MD;  Location: [facility] ([address] [place] [address]);  Service: Oncology;  Laterality: N/A;  11/20-pre call complete-tb    COLONOSCOPY N/A 8/9/2023    Procedure: COLONOSCOPY;  Surgeon: Will Ardon MD;  Location: [facility] (4TH FLR);  Service: Endoscopy;  Laterality: N/A;  Suprep Instructions sent via portal / Authorizing:     Bebeto Arora MD in [facility] FAMILY MED/ INTERNAL MED/ PEDS  Referral:           [id number]    EYE SURGERY      FLEXIBLE SIGMOIDOSCOPY N/A 7/23/2024    Procedure: SIGMOIDOSCOPY, FLEXIBLE;  Surgeon: Nirali Vicente MD;  Location: [facility];  Service: Endoscopy;  Laterality: N/A;    INSERTION OF NORMAN CATHETER Right 12/18/2024    Procedure: INSERTION, CATHETER, CENTRAL VENOUS, NORMAN TRIPLE LUMEN, Bard 12.5 fr Norman Cath model [id number];  Surgeon: Willie Hadley MD;  Location: [facility] OR [address] [place] [address];  Service: General;  Laterality: Right;       PAST SOCIAL HISTORY:  Social History     Tobacco Use    Smoking status: Former     Types: Cigars, Cigarettes    Smokeless tobacco: Never   Substance Use Topics    Alcohol use: Not Currently     Comment: socially    Drug use: Never       FAMILY HISTORY:  Family History   Problem Relation Name Age of Onset    Arthritis Mother      COPD Father      Testicular cancer Maternal Cousin Peter 17        Chemotherapy    Breast cancer Maternal Cousin  51        Chemotherapy    Heart disease Maternal Grandfather      Stroke Maternal Grandmother         CURRENT MEDICATIONS:   Current Outpatient Medications   Medication Sig    acyclovir (ZOVIRAX) 800 MG Tab Take 1 tablet (800 mg total) by mouth 2 (two) times daily.    budesonide (ENTOCORT EC) 3 mg capsule Take 1 capsule (3 mg total) by mouth 3 (three) times daily.    dapsone 100 MG Tab Take 1 tablet (100 mg total) by mouth once daily.    letermovir (PREVYMIS) 480 mg Tab Take 1 tablet (480 mg total) by mouth once daily.    magnesium oxide (MAG-OX) 400 mg (241.3 mg magnesium) tablet Take 2 tablets (800 mg total) by mouth 2 (two) times daily.    ondansetron (ZOFRAN) 8 MG tablet Take 1 tablet (8 mg total) by mouth every 8 (eight) hours as needed for Nausea.    prochlorperazine (COMPAZINE) 10 MG tablet Take 1 tablet (10 mg total) by mouth 4 (four) times daily as needed for Nausea.    tacrolimus (PROGRAF) 0.5 MG Cap Take 3 capsules (1.5 mg total) by mouth every morning AND 3 capsules (1.5 mg total) every evening.     traMADoL (ULTRAM) 50 mg tablet Take 1 tablet (50 mg total) by mouth every 6 (six) hours as needed for Pain.    triamcinolone acetonide 0.1% (KENALOG) 0.1 % cream Apply topically 2 (two) times daily.    voriconazole (VFEND) 200 MG Tab Take 1 tablet (200 mg total) by mouth 2 (two) times daily.    LORazepam (ATIVAN) 0.5 MG tablet Take 1 tablet (0.5 mg total) by mouth once as needed. Take 30-45 minutes prior to your bone marrow biopsy on 1/27/25. Do not take if you do not have a . (Patient not taking: Reported on 3/3/2025)     No current facility-administered medications for this visit.       ALLERGIES:   Review of patient's allergies indicates:   Allergen Reactions    Bactrim [sulfamethoxazole-trimethoprim] Hives       GVHD Review of Systems:     Pertinent positives and negatives included in the HPI. Otherwise a 14 point review of systems is negative. GVHD review of systems recorded in BMT flowsheet.     Physical Exam:     Vitals:    03/03/25 0910   BP: 117/70   Pulse: 99   Resp: 16   Temp: 98.8 °F (37.1 °C)       General: Appears well, NAD  HEENT: MMM, no OP lesions  Pulmonary: CTAB, no increased work of breathing, no W/R/C  Cardiovascular: S1S2 normal, tachycardic, regular rate no M/R/G  Abdominal: Soft, NT, ND, BS+, no HSM  Extremities: No C/C/E  Neurological: AAOx4, grossly normal, no focal deficits  Dermatologic: Pustule under right axilla - resolving. Hyperpigmentation around neck, upper chest - improved    ECOG Performance Status: (foot note - ECOG PS provided by Eastern Cooperative Oncology Group) 1 - Symptomatic but completely ambulatory    Karnofsky Performance Score:  90%- Able to Carry on Normal Activity: Minor Symptoms of Disease    Labs:   Lab Results   Component Value Date    WBC 7.38 03/03/2025    RBC 2.63 (L) 03/03/2025    HGB 8.1 (L) 03/03/2025    HCT 24.6 (L) 03/03/2025    MCV 94 03/03/2025    MCH 30.8 03/03/2025    MCHC 32.9 03/03/2025    RDW 17.2 (H) 03/03/2025     (L) 03/03/2025     MPV 10.2 03/03/2025    GRAN 75.0 (H) 03/03/2025    LYMPH 9.0 (L) 03/03/2025    MONO 13.0 03/03/2025    EOS 0.0 02/27/2025    BASO 0.01 02/27/2025    EOSINOPHIL 0.0 03/03/2025    BASOPHIL 0.0 03/03/2025       Sodium   Date Value Ref Range Status   03/03/2025 135 (L) 136 - 145 mmol/L Final     Potassium   Date Value Ref Range Status   03/03/2025 4.0 3.5 - 5.1 mmol/L Final     Chloride   Date Value Ref Range Status   03/03/2025 106 95 - 110 mmol/L Final     CO2   Date Value Ref Range Status   03/03/2025 22 (L) 23 - 29 mmol/L Final     Glucose   Date Value Ref Range Status   03/03/2025 93 70 - 110 mg/dL Final     BUN   Date Value Ref Range Status   03/03/2025 16 6 - 20 mg/dL Final     Creatinine   Date Value Ref Range Status   03/03/2025 0.8 0.5 - 1.4 mg/dL Final     Calcium   Date Value Ref Range Status   03/03/2025 9.1 8.7 - 10.5 mg/dL Final     Total Protein   Date Value Ref Range Status   03/03/2025 6.5 6.0 - 8.4 g/dL Final     Albumin   Date Value Ref Range Status   03/03/2025 3.6 3.5 - 5.2 g/dL Final     Total Bilirubin   Date Value Ref Range Status   03/03/2025 0.3 0.1 - 1.0 mg/dL Final     Comment:     For infants and newborns, interpretation of results should be based  on gestational age, weight and in agreement with clinical  observations.    Premature Infant recommended reference ranges:  Up to 24 hours.............<8.0 mg/dL  Up to 48 hours............<12.0 mg/dL  3-5 days..................<15.0 mg/dL  6-29 days.................<15.0 mg/dL       Alkaline Phosphatase   Date Value Ref Range Status   03/03/2025 70 40 - 150 U/L Final     AST   Date Value Ref Range Status   03/03/2025 30 10 - 40 U/L Final     ALT   Date Value Ref Range Status   03/03/2025 32 10 - 44 U/L Final     Anion Gap   Date Value Ref Range Status   03/03/2025 7 (L) 8 - 16 mmol/L Final     eGFR if    Date Value Ref Range Status   01/22/2022 >60.0 >60 mL/min/1.73 m^2 Final     eGFR if non    Date Value Ref  Range Status   01/22/2022 >60.0 >60 mL/min/1.73 m^2 Final     Comment:     Calculation used to obtain the estimated glomerular filtration  rate (eGFR) is the CKD-EPI equation.          Imaging:   All pertinent studies reviewed and interpreted by me    Acute GVHD Scoring:  GVHD Acute Assessment  02/17/25: aGHVD of skin, ~5% BSA. Resolved with Topical steroids.  02/24/25: aGVHD of GI tract (diarrhea); started budesonide  02/27/25: aGVHD of GI tract resolved; budesonide taper started     Assessment and Plan:   Rubén Peters) is a pleasant 54 y.o.male with primary myelofibrosis s/p haplo SCT who presents for post-transplant follow up.    Primary myelofibrosis: Status post treatment with ruxolitinib, followed by alloSCT. Day +30 BMBx results below.    Status post allogeneic stem cell transplantation: As noted above, status post haploidentical stem cell transplantation conditioned with  Bu/Flu/TT . Currently day 67 Engrafted on day +22.  Day +30 BMBx on 1/2/25: No definitive evidence of residual JAK2 [id number] primary myelofibrosis. NGS without evidence of pathogenic mutations. Chimerism studies with 100% donor CD33, CD3 insufficient for analysis.     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 MMF has now been discontinued. He does not have any evidence of aGVHD at this time. He will taper off budesonide tomorrow.   Current tacro dose: 1.5mg BID   Last tacro level: 7.5  Adjustments: No Changes.     Immunosuppression: Prevention with voriconazole,  acyclovir. CMV prophylaxis with letermovir. PJP prophyalxis dapsone. Continue weekly monitoring of CMV and EBV.  Last CMV: negative (2/20/2025), last EBV: negative (2/20/2025).  Active infections: none    Pancytopenia: Due to underlying disease and chemotherapy. Transfuse for Hgb <7 g/dL and platelets <10k.    Hypertension: (improved) Was newly hypertensive in hospital following SCT at which time he was  started on amlodipine. He has been persistently hypotensive since discharge. Discontinued amlodipine, normotensive.   Will continue to monitor.    Cardiomyopathy: Echo 2/26/25 obtained due to tachycardia revealed reduced LVEF 45-50%. No signs/symptoms of HFrEF. Repeat echo in 3 months (5/2025).    Staphylococcus Skin Infection: Patient had MRSA while in the hospital and was treated with doxycycline, concern for recurrence on recent exam. Picture uploaded to media and ID consulted who recommended Doxycycline 100mg PO BID for a month (to end mid-March 2025) to prevent recurrence.     Follow up: Twice weekly follow up with labs.    Orders Placed:      No orders of the defined types were placed in this encounter.         40 minutes of total time spent on the encounter, which includes face to face time and non-face to face time preparing to see the patient (eg, review of tests), Obtaining and/or reviewing separately obtained history, Documenting clinical information in the electronic or other health record, Independently interpreting results (not separately reported) and communicating results to the patient/family/caregiver, or Care coordination with other physicians involved in his care (not separately reported).     Rocael Grover MD  Malignant Hematology, Stem Cell Transplant, and Cellular Therapy  [facility]  [facility] [place]

## 2025-03-05 ENCOUNTER — DOCUMENTATION ONLY (OUTPATIENT)
Dept: HEMATOLOGY/ONCOLOGY | Facility: CLINIC | Age: 55
End: 2025-03-05
Payer: COMMERCIAL

## 2025-03-05 NOTE — PROGRESS NOTES
NAT faxed notes from 2.27 and 3.3 to [facility], [phone]. NAT notified spouse via email and recommended she follow up and notify SW of any additional paperwork needed.

## 2025-03-06 ENCOUNTER — INFUSION (OUTPATIENT)
Dept: INFUSION THERAPY | Facility: HOSPITAL | Age: 55
End: 2025-03-06
Payer: COMMERCIAL

## 2025-03-06 ENCOUNTER — OFFICE VISIT (OUTPATIENT)
Dept: HEMATOLOGY/ONCOLOGY | Facility: CLINIC | Age: 55
End: 2025-03-06
Payer: COMMERCIAL

## 2025-03-06 VITALS
BODY MASS INDEX: 24.41 KG/M2 | OXYGEN SATURATION: 97 % | HEIGHT: 71 IN | WEIGHT: 174.38 LBS | SYSTOLIC BLOOD PRESSURE: 107 MMHG | HEART RATE: 96 BPM | DIASTOLIC BLOOD PRESSURE: 68 MMHG | TEMPERATURE: 98 F

## 2025-03-06 DIAGNOSIS — E27.9 ADRENAL NODULE: Primary | ICD-10-CM

## 2025-03-06 DIAGNOSIS — D84.81 IMMUNODEFICIENCY DUE TO CONDITIONS CLASSIFIED ELSEWHERE: ICD-10-CM

## 2025-03-06 DIAGNOSIS — D47.1 PRIMARY MYELOFIBROSIS: ICD-10-CM

## 2025-03-06 DIAGNOSIS — I42.9 CARDIOMYOPATHY, UNSPECIFIED TYPE: ICD-10-CM

## 2025-03-06 DIAGNOSIS — Z76.82 STEM CELL TRANSPLANT CANDIDATE: ICD-10-CM

## 2025-03-06 DIAGNOSIS — Z94.84 HISTORY OF ALLOGENEIC STEM CELL TRANSPLANT: Primary | ICD-10-CM

## 2025-03-06 DIAGNOSIS — D69.6 THROMBOCYTOPENIA: ICD-10-CM

## 2025-03-06 DIAGNOSIS — D75.81 MF (MYELOFIBROSIS): ICD-10-CM

## 2025-03-06 DIAGNOSIS — D64.81 ANEMIA ASSOCIATED WITH CHEMOTHERAPY: ICD-10-CM

## 2025-03-06 DIAGNOSIS — T45.1X5A ANEMIA ASSOCIATED WITH CHEMOTHERAPY: ICD-10-CM

## 2025-03-06 DIAGNOSIS — I10 HYPERTENSION, UNSPECIFIED TYPE: ICD-10-CM

## 2025-03-06 LAB
ABO + RH BLD: NORMAL
ALBUMIN SERPL BCP-MCNC: 3.6 G/DL (ref 3.5–5.2)
ALP SERPL-CCNC: 62 U/L (ref 40–150)
ALT SERPL W/O P-5'-P-CCNC: 23 U/L (ref 10–44)
ANION GAP SERPL CALC-SCNC: 8 MMOL/L (ref 8–16)
AST SERPL-CCNC: 26 U/L (ref 10–40)
BASOPHILS # BLD AUTO: 0.02 K/UL (ref 0–0.2)
BASOPHILS NFR BLD: 0.4 % (ref 0–1.9)
BILIRUB SERPL-MCNC: 0.5 MG/DL (ref 0.1–1)
BLD GP AB SCN CELLS X3 SERPL QL: NORMAL
BUN SERPL-MCNC: 17 MG/DL (ref 6–20)
CALCIUM SERPL-MCNC: 8.9 MG/DL (ref 8.7–10.5)
CHLORIDE SERPL-SCNC: 106 MMOL/L (ref 95–110)
CO2 SERPL-SCNC: 20 MMOL/L (ref 23–29)
CREAT SERPL-MCNC: 0.8 MG/DL (ref 0.5–1.4)
DIFFERENTIAL METHOD BLD: ABNORMAL
EOSINOPHIL # BLD AUTO: 0 K/UL (ref 0–0.5)
EOSINOPHIL NFR BLD: 0.6 % (ref 0–8)
ERYTHROCYTE [DISTWIDTH] IN BLOOD BY AUTOMATED COUNT: 18.5 % (ref 11.5–14.5)
EST. GFR  (NO RACE VARIABLE): >60 ML/MIN/1.73 M^2
GLUCOSE SERPL-MCNC: 99 MG/DL (ref 70–110)
HCT VFR BLD AUTO: 22.8 % (ref 40–54)
HGB BLD-MCNC: 7.4 G/DL (ref 14–18)
IMM GRANULOCYTES # BLD AUTO: 0.07 K/UL (ref 0–0.04)
IMM GRANULOCYTES NFR BLD AUTO: 1.4 % (ref 0–0.5)
LYMPHOCYTES # BLD AUTO: 0.9 K/UL (ref 1–4.8)
LYMPHOCYTES NFR BLD: 18 % (ref 18–48)
MAGNESIUM SERPL-MCNC: 1.8 MG/DL (ref 1.6–2.6)
MCH RBC QN AUTO: 30.8 PG (ref 27–31)
MCHC RBC AUTO-ENTMCNC: 32.5 G/DL (ref 32–36)
MCV RBC AUTO: 95 FL (ref 82–98)
MONOCYTES # BLD AUTO: 0.8 K/UL (ref 0.3–1)
MONOCYTES NFR BLD: 15.7 % (ref 4–15)
NEUTROPHILS # BLD AUTO: 3.3 K/UL (ref 1.8–7.7)
NEUTROPHILS NFR BLD: 63.9 % (ref 38–73)
NRBC BLD-RTO: 0 /100 WBC
PHOSPHATE SERPL-MCNC: 4 MG/DL (ref 2.7–4.5)
PLATELET # BLD AUTO: 68 K/UL (ref 150–450)
PMV BLD AUTO: 11 FL (ref 9.2–12.9)
POTASSIUM SERPL-SCNC: 4 MMOL/L (ref 3.5–5.1)
PROT SERPL-MCNC: 6.5 G/DL (ref 6–8.4)
RBC # BLD AUTO: 2.4 M/UL (ref 4.6–6.2)
SODIUM SERPL-SCNC: 134 MMOL/L (ref 136–145)
SPECIMEN OUTDATE: NORMAL
TACROLIMUS BLD-MCNC: 8.3 NG/ML (ref 5–15)
WBC # BLD AUTO: 5.17 K/UL (ref 3.9–12.7)

## 2025-03-06 PROCEDURE — 80053 COMPREHEN METABOLIC PANEL: CPT | Performed by: INTERNAL MEDICINE

## 2025-03-06 PROCEDURE — 25000003 PHARM REV CODE 250

## 2025-03-06 PROCEDURE — 83735 ASSAY OF MAGNESIUM: CPT | Performed by: INTERNAL MEDICINE

## 2025-03-06 PROCEDURE — 86901 BLOOD TYPING SEROLOGIC RH(D): CPT | Performed by: INTERNAL MEDICINE

## 2025-03-06 PROCEDURE — 99999 PR PBB SHADOW E&M-EST. PATIENT-LVL IV: CPT | Mod: PBBFAC,,, | Performed by: PHYSICIAN ASSISTANT

## 2025-03-06 PROCEDURE — A4216 STERILE WATER/SALINE, 10 ML: HCPCS

## 2025-03-06 PROCEDURE — 36592 COLLECT BLOOD FROM PICC: CPT

## 2025-03-06 PROCEDURE — 85025 COMPLETE CBC W/AUTO DIFF WBC: CPT | Performed by: INTERNAL MEDICINE

## 2025-03-06 PROCEDURE — 80197 ASSAY OF TACROLIMUS: CPT | Performed by: INTERNAL MEDICINE

## 2025-03-06 PROCEDURE — 84100 ASSAY OF PHOSPHORUS: CPT | Performed by: INTERNAL MEDICINE

## 2025-03-06 PROCEDURE — 63600175 PHARM REV CODE 636 W HCPCS

## 2025-03-06 RX ORDER — HEPARIN 100 UNIT/ML
500 SYRINGE INTRAVENOUS
Status: DISCONTINUED | OUTPATIENT
Start: 2025-03-06 | End: 2025-03-06 | Stop reason: HOSPADM

## 2025-03-06 RX ORDER — SODIUM CHLORIDE 0.9 % (FLUSH) 0.9 %
10 SYRINGE (ML) INJECTION
OUTPATIENT
Start: 2025-03-06

## 2025-03-06 RX ORDER — HEPARIN 100 UNIT/ML
500 SYRINGE INTRAVENOUS
OUTPATIENT
Start: 2025-03-06

## 2025-03-06 RX ORDER — SODIUM CHLORIDE 0.9 % (FLUSH) 0.9 %
10 SYRINGE (ML) INJECTION
Status: DISCONTINUED | OUTPATIENT
Start: 2025-03-06 | End: 2025-03-06 | Stop reason: HOSPADM

## 2025-03-06 RX ADMIN — Medication 10 ML: at 08:03

## 2025-03-06 RX ADMIN — HEPARIN SODIUM (PORCINE) LOCK FLUSH IV SOLN 100 UNIT/ML 500 UNITS: 100 SOLUTION at 08:03

## 2025-03-06 NOTE — PROGRESS NOTES
Section of Hematology and Stem Cell Transplantation    Post-Transplantation Follow Up Visit     3/6/25    Transplant History:   Primary oncologist: Clyde James MD  Primary oncologic diagnosis: primary myelofibrosis  Transplant date: 12/26/2024  Donor: haploidentical  Blood Type (Patient): O +  Blood Type (Donor): O +  CMV (Patient): Negative  CMV (Donor): Positive  Graft source: Peripheral blood  CD34+ cell dose: 6.04 X10^6 cells/kg  Conditioning Regimen:  Thiotepa, Busulfan, Fludarabine  GVHD prophylaxis: Post-transplant cyclophosphamide, MMF, Tacrolimus  Immediate post-transplant complications: mucositis, R axillary skin/soft tissue infection secondary to MRSA    History of Present Ilness:   Rubén Peters) is a pleasant 54 y.o.male with primary myelofibrosis who is status post haploidentical stem cell transplantation conditioned with  Bu/Flu/TT  who is currently day +70 who presents for post-transplant follow up.    Generally feeling okay, though more fatigued today with MERCER. Denies nausea, anorexia, rash. No fevers/chills. No acute concerns. Plan for [facility] tomorrow.     PAST MEDICAL HISTORY:   Past Medical History:   Diagnosis Date    Abdominal pain 01/02/2025    Allergic contact dermatitis due to adhesives 12/31/2024    Cancer     Flatulence 01/03/2025    Headache 12/27/2024    Hyperlipidemia     Mucositis 01/02/2025    Other constipation 12/19/2024    Thrombocytosis 12/18/2024    Transaminitis 12/27/2024       PAST SURGICAL HISTORY:   Past Surgical History:   Procedure Laterality Date    BONE MARROW BIOPSY N/A 11/27/2024    Procedure: Biopsy-bone marrow;  Surgeon: Clyde James MD;  Location: [facility] ([address] [place] [address]);  Service: Oncology;  Laterality: N/A;  11/20-pre call complete-tb    COLONOSCOPY N/A 8/9/2023    Procedure: COLONOSCOPY;  Surgeon: Will Ardon MD;  Location: [facility] ([address] [place] [address]);  Service: Endoscopy;  Laterality: N/A;  Suprep Instructions sent via portal / Authorizing:     Bebeto GARG  MD Maite in [facility] FAMILY MED/ INTERNAL MED/ PEDS  Referral:          [id number]    EYE SURGERY      FLEXIBLE SIGMOIDOSCOPY N/A 7/23/2024    Procedure: SIGMOIDOSCOPY, FLEXIBLE;  Surgeon: Nirali Vicente MD;  Location: [facility];  Service: Endoscopy;  Laterality: N/A;    INSERTION OF NORMAN CATHETER Right 12/18/2024    Procedure: INSERTION, CATHETER, CENTRAL VENOUS, NORMAN TRIPLE LUMEN, Bard 12.5 fr Norman Cath model [id number];  Surgeon: Willie Hadley MD;  Location: [facility] OR [address] [place] [address];  Service: General;  Laterality: Right;       PAST SOCIAL HISTORY:  Social History     Tobacco Use    Smoking status: Former     Types: Cigars, Cigarettes    Smokeless tobacco: Never   Substance Use Topics    Alcohol use: Not Currently     Comment: socially    Drug use: Never       FAMILY HISTORY:  Family History   Problem Relation Name Age of Onset    Arthritis Mother      COPD Father      Testicular cancer Maternal Cousin Peter 17        Chemotherapy    Breast cancer Maternal Cousin  51        Chemotherapy    Heart disease Maternal Grandfather      Stroke Maternal Grandmother         CURRENT MEDICATIONS:   Current Outpatient Medications   Medication Sig    acyclovir (ZOVIRAX) 800 MG Tab Take 1 tablet (800 mg total) by mouth 2 (two) times daily.    budesonide (ENTOCORT EC) 3 mg capsule Take 1 capsule (3 mg total) by mouth 3 (three) times daily.    dapsone 100 MG Tab Take 1 tablet (100 mg total) by mouth once daily.    letermovir (PREVYMIS) 480 mg Tab Take 1 tablet (480 mg total) by mouth once daily.    magnesium oxide (MAG-OX) 400 mg (241.3 mg magnesium) tablet Take 2 tablets (800 mg total) by mouth 2 (two) times daily.    ondansetron (ZOFRAN) 8 MG tablet Take 1 tablet (8 mg total) by mouth every 8 (eight) hours as needed for Nausea.    prochlorperazine (COMPAZINE) 10 MG tablet Take 1 tablet (10 mg total) by mouth 4 (four) times daily as needed for Nausea.    tacrolimus (PROGRAF) 0.5 MG Cap Take 3 capsules (1.5 mg total) by mouth  every morning AND 3 capsules (1.5 mg total) every evening.    traMADoL (ULTRAM) 50 mg tablet Take 1 tablet (50 mg total) by mouth every 6 (six) hours as needed for Pain.    triamcinolone acetonide 0.1% (KENALOG) 0.1 % cream Apply topically 2 (two) times daily.    voriconazole (VFEND) 200 MG Tab Take 1 tablet (200 mg total) by mouth 2 (two) times daily.    LORazepam (ATIVAN) 0.5 MG tablet Take 1 tablet (0.5 mg total) by mouth once as needed. Take 30-45 minutes prior to your bone marrow biopsy on 1/27/25. Do not take if you do not have a . (Patient not taking: Reported on 3/3/2025)     No current facility-administered medications for this visit.     Facility-Administered Medications Ordered in Other Visits   Medication    heparin, porcine (PF) 100 unit/mL injection flush 500 Units    sodium chloride 0.9% flush 10 mL       ALLERGIES:   Review of patient's allergies indicates:   Allergen Reactions    Bactrim [sulfamethoxazole-trimethoprim] Hives       GVHD Review of Systems:     Pertinent positives and negatives included in the HPI. Otherwise a 14 point review of systems is negative. GVHD review of systems recorded in BMT flowsheet.     Physical Exam:     Vitals:    03/06/25 0823   BP: 107/68   Pulse: 96   Temp: 97.8 °F (36.6 °C)       General: Appears well, NAD  HEENT: MMM, no OP lesions  Pulmonary: CTAB, no increased work of breathing, no W/R/C  Cardiovascular: S1S2 normal, RRR no M/R/G  Abdominal: Soft, NT, ND, BS+, no HSM  Extremities: No C/C/E  Neurological: AAOx4, grossly normal, no focal deficits  Dermatologic: Pustule under right axilla - resolving. Hyperpigmentation around neck, upper chest - improved    ECOG Performance Status: (foot note - ECOG PS provided by Eastern Cooperative Oncology Group) 1 - Symptomatic but completely ambulatory    Karnofsky Performance Score:  90%- Able to Carry on Normal Activity: Minor Symptoms of Disease    Labs:   Lab Results   Component Value Date    WBC 5.17 03/06/2025     RBC 2.40 (L) 03/06/2025    HGB 7.4 (L) 03/06/2025    HCT 22.8 (L) 03/06/2025    MCV 95 03/06/2025    MCH 30.8 03/06/2025    MCHC 32.5 03/06/2025    RDW 18.5 (H) 03/06/2025    PLT 68 (L) 03/06/2025    MPV 11.0 03/06/2025    GRAN 3.3 03/06/2025    GRAN 63.9 03/06/2025    LYMPH 0.9 (L) 03/06/2025    LYMPH 18.0 03/06/2025    MONO 0.8 03/06/2025    MONO 15.7 (H) 03/06/2025    EOS 0.0 03/06/2025    BASO 0.02 03/06/2025    EOSINOPHIL 0.6 03/06/2025    BASOPHIL 0.4 03/06/2025       Sodium   Date Value Ref Range Status   03/03/2025 135 (L) 136 - 145 mmol/L Final     Potassium   Date Value Ref Range Status   03/03/2025 4.0 3.5 - 5.1 mmol/L Final     Chloride   Date Value Ref Range Status   03/03/2025 106 95 - 110 mmol/L Final     CO2   Date Value Ref Range Status   03/03/2025 22 (L) 23 - 29 mmol/L Final     Glucose   Date Value Ref Range Status   03/03/2025 93 70 - 110 mg/dL Final     BUN   Date Value Ref Range Status   03/03/2025 16 6 - 20 mg/dL Final     Creatinine   Date Value Ref Range Status   03/03/2025 0.8 0.5 - 1.4 mg/dL Final     Calcium   Date Value Ref Range Status   03/03/2025 9.1 8.7 - 10.5 mg/dL Final     Total Protein   Date Value Ref Range Status   03/03/2025 6.5 6.0 - 8.4 g/dL Final     Albumin   Date Value Ref Range Status   03/03/2025 3.6 3.5 - 5.2 g/dL Final     Total Bilirubin   Date Value Ref Range Status   03/03/2025 0.3 0.1 - 1.0 mg/dL Final     Comment:     For infants and newborns, interpretation of results should be based  on gestational age, weight and in agreement with clinical  observations.    Premature Infant recommended reference ranges:  Up to 24 hours.............<8.0 mg/dL  Up to 48 hours............<12.0 mg/dL  3-5 days..................<15.0 mg/dL  6-29 days.................<15.0 mg/dL       Alkaline Phosphatase   Date Value Ref Range Status   03/03/2025 70 40 - 150 U/L Final     AST   Date Value Ref Range Status   03/03/2025 30 10 - 40 U/L Final     ALT   Date Value Ref Range Status    03/03/2025 32 10 - 44 U/L Final     Anion Gap   Date Value Ref Range Status   03/03/2025 7 (L) 8 - 16 mmol/L Final     eGFR if    Date Value Ref Range Status   01/22/2022 >60.0 >60 mL/min/1.73 m^2 Final     eGFR if non    Date Value Ref Range Status   01/22/2022 >60.0 >60 mL/min/1.73 m^2 Final     Comment:     Calculation used to obtain the estimated glomerular filtration  rate (eGFR) is the CKD-EPI equation.          Imaging:   All pertinent studies reviewed and interpreted by me    Acute GVHD Scoring:  GVHD Acute Assessment  02/17/25: aGHVD of skin, ~5% BSA. Resolved with Topical steroids.  02/24/25: aGVHD of GI tract (diarrhea); started budesonide  02/27/25: aGVHD of GI tract resolved; budesonide taper started     Assessment and Plan:   Rubén Peters) is a pleasant 54 y.o.male with primary myelofibrosis s/p haplo SCT who presents for post-transplant follow up.    Primary myelofibrosis: Status post treatment with ruxolitinib, followed by alloSCT. Day +30 BMBx results below.    Status post allogeneic stem cell transplantation: As noted above, status post haploidentical stem cell transplantation conditioned with  Bu/Flu/TT . Currently day 70 Engrafted on day +22.  Day +30 BMBx on 1/2/25: No definitive evidence of residual JAK2 [id number] primary myelofibrosis. NGS without evidence of pathogenic mutations. Chimerism studies with 100% donor CD33, CD3 insufficient for analysis.     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 MMF has now been discontinued. He does not have any evidence of aGVHD at this time. He will taper off budesonide tomorrow.   Current tacro dose: 1.5mg BID   Last tacro level: 8.3  Adjustments: No Changes.     Immunosuppression: Prevention with voriconazole,  acyclovir. CMV prophylaxis with letermovir. PJP prophyalxis dapsone. Continue weekly monitoring of CMV and EBV.  Last CMV: negative  (2/20/2025), last EBV: negative (2/20/2025).  Active infections: none    Pancytopenia: Due to underlying disease and chemotherapy. Transfuse for Hgb <7 g/dL and platelets <10k.  Plan for 1u RBC tomorrow for symptomatic anemia    Hypertension: (improved) Was newly hypertensive in hospital following SCT at which time he was started on amlodipine. He has been persistently hypotensive since discharge. Discontinued amlodipine, normotensive.   Will continue to monitor.    Cardiomyopathy: Echo 2/26/25 obtained due to tachycardia revealed reduced LVEF 45-50%. No signs/symptoms of HFrEF. Repeat echo in 3 months (5/2025).    Staphylococcus Skin Infection: Patient had MRSA while in the hospital and was treated with doxycycline, concern for recurrence on recent exam and has once again resolved following Doxycyline 100mg PO BID.     Follow up: Twice weekly follow up with labs.    Orders Placed:      No orders of the defined types were placed in this encounter.         40 minutes of total time spent on the encounter, which includes face to face time and non-face to face time preparing to see the patient (eg, review of tests), Obtaining and/or reviewing separately obtained history, Documenting clinical information in the electronic or other health record, Independently interpreting results (not separately reported) and communicating results to the patient/family/caregiver, or Care coordination with other physicians involved in his care (not separately reported).     Priscila Montes PA-C  Malignant Hematology, Stem Cell Transplant, and Cellular Therapy  [facility]  [facility]

## 2025-03-06 NOTE — PROGRESS NOTES
BMT Pharmacist Immunosuppression Note:    Reviewed patient's tacrolimus level with Dr. James and it is within goal range. Instructed patient to continue your current regimen of 3 capsules (1.5mg) in the morning and 3 capsules (1.5mg) in the evening.       Carolina Carson Pharm.D., [facility]  Clinical Pharmacy Specialist, Bone Marrow Transplant/Cellular Therapy  [facility]  SpectraLink: [phone]

## 2025-03-07 ENCOUNTER — INFUSION (OUTPATIENT)
Dept: INFUSION THERAPY | Facility: HOSPITAL | Age: 55
End: 2025-03-07
Payer: COMMERCIAL

## 2025-03-07 VITALS
RESPIRATION RATE: 18 BRPM | TEMPERATURE: 98 F | SYSTOLIC BLOOD PRESSURE: 115 MMHG | DIASTOLIC BLOOD PRESSURE: 75 MMHG | OXYGEN SATURATION: 98 % | HEART RATE: 80 BPM

## 2025-03-07 DIAGNOSIS — D64.81 ANEMIA ASSOCIATED WITH CHEMOTHERAPY: Primary | ICD-10-CM

## 2025-03-07 DIAGNOSIS — T45.1X5A ANEMIA ASSOCIATED WITH CHEMOTHERAPY: Primary | ICD-10-CM

## 2025-03-07 LAB
BLD PROD TYP BPU: NORMAL
BLOOD UNIT EXPIRATION DATE: NORMAL
BLOOD UNIT TYPE CODE: 5100
BLOOD UNIT TYPE: NORMAL
CODING SYSTEM: NORMAL
CROSSMATCH INTERPRETATION: NORMAL
DISPENSE STATUS: NORMAL
NUM UNITS TRANS PACKED RBC: NORMAL

## 2025-03-07 PROCEDURE — 25000003 PHARM REV CODE 250: Performed by: INTERNAL MEDICINE

## 2025-03-07 PROCEDURE — 36430 TRANSFUSION BLD/BLD COMPNT: CPT

## 2025-03-07 PROCEDURE — 86920 COMPATIBILITY TEST SPIN: CPT | Performed by: PHYSICIAN ASSISTANT

## 2025-03-07 PROCEDURE — P9040 RBC LEUKOREDUCED IRRADIATED: HCPCS | Performed by: PHYSICIAN ASSISTANT

## 2025-03-07 PROCEDURE — 63600175 PHARM REV CODE 636 W HCPCS: Performed by: INTERNAL MEDICINE

## 2025-03-07 PROCEDURE — A4216 STERILE WATER/SALINE, 10 ML: HCPCS | Performed by: INTERNAL MEDICINE

## 2025-03-07 RX ORDER — SODIUM CHLORIDE 0.9 % (FLUSH) 0.9 %
10 SYRINGE (ML) INJECTION
Status: DISCONTINUED | OUTPATIENT
Start: 2025-03-07 | End: 2025-03-07 | Stop reason: HOSPADM

## 2025-03-07 RX ORDER — SODIUM CHLORIDE 9 MG/ML
50 INJECTION, SOLUTION INTRAVENOUS CONTINUOUS
Status: DISCONTINUED | OUTPATIENT
Start: 2025-03-07 | End: 2025-03-07 | Stop reason: HOSPADM

## 2025-03-07 RX ORDER — HEPARIN 100 UNIT/ML
500 SYRINGE INTRAVENOUS
Status: DISCONTINUED | OUTPATIENT
Start: 2025-03-07 | End: 2025-03-07 | Stop reason: HOSPADM

## 2025-03-07 RX ADMIN — HEPARIN SODIUM (PORCINE) LOCK FLUSH IV SOLN 100 UNIT/ML 500 UNITS: 100 SOLUTION at 03:03

## 2025-03-07 RX ADMIN — Medication 10 ML: at 03:03

## 2025-03-07 RX ADMIN — SODIUM CHLORIDE 50 ML/HR: 0.9 INJECTION, SOLUTION INTRAVENOUS at 02:03

## 2025-03-07 NOTE — PLAN OF CARE
Patient completed 1 unit PRBC, tolerated well.  PICC medial line flushed, heparin locked & capped.  Pt ambulated off floor accompanied by wife.

## 2025-03-07 NOTE — PLAN OF CARE
Patient seated in chair accompanied by wife. VSS, assessment done. PICC medial line flushed, blood return noted, started NS @ 25 cc/hr KVO while waiting for Blood from Blood Bank.  WCTM for safety

## 2025-03-10 ENCOUNTER — RESULTS FOLLOW-UP (OUTPATIENT)
Dept: HEMATOLOGY/ONCOLOGY | Facility: CLINIC | Age: 55
End: 2025-03-10

## 2025-03-10 ENCOUNTER — INFUSION (OUTPATIENT)
Dept: INFUSION THERAPY | Facility: HOSPITAL | Age: 55
End: 2025-03-10
Payer: COMMERCIAL

## 2025-03-10 ENCOUNTER — OFFICE VISIT (OUTPATIENT)
Dept: HEMATOLOGY/ONCOLOGY | Facility: CLINIC | Age: 55
End: 2025-03-10
Payer: COMMERCIAL

## 2025-03-10 VITALS
WEIGHT: 172.38 LBS | OXYGEN SATURATION: 97 % | RESPIRATION RATE: 16 BRPM | TEMPERATURE: 98 F | HEART RATE: 100 BPM | DIASTOLIC BLOOD PRESSURE: 77 MMHG | SYSTOLIC BLOOD PRESSURE: 131 MMHG | HEIGHT: 71 IN | BODY MASS INDEX: 24.13 KG/M2

## 2025-03-10 DIAGNOSIS — Z76.82 STEM CELL TRANSPLANT CANDIDATE: ICD-10-CM

## 2025-03-10 DIAGNOSIS — R19.7 DIARRHEA, UNSPECIFIED TYPE: ICD-10-CM

## 2025-03-10 DIAGNOSIS — D89.810 ACUTE GVHD: ICD-10-CM

## 2025-03-10 DIAGNOSIS — D69.3 ACUTE ITP: ICD-10-CM

## 2025-03-10 DIAGNOSIS — R11.0 CHEMOTHERAPY-INDUCED NAUSEA: ICD-10-CM

## 2025-03-10 DIAGNOSIS — E27.9 ADRENAL NODULE: Primary | ICD-10-CM

## 2025-03-10 DIAGNOSIS — T45.1X5A CHEMOTHERAPY-INDUCED NAUSEA: ICD-10-CM

## 2025-03-10 DIAGNOSIS — R10.9 ABDOMINAL CRAMPING: ICD-10-CM

## 2025-03-10 DIAGNOSIS — Z94.84 HISTORY OF ALLOGENEIC STEM CELL TRANSPLANT: Primary | ICD-10-CM

## 2025-03-10 DIAGNOSIS — D47.1 PRIMARY MYELOFIBROSIS: ICD-10-CM

## 2025-03-10 LAB
ABO + RH BLD: NORMAL
ALBUMIN SERPL BCP-MCNC: 3.8 G/DL (ref 3.5–5.2)
ALP SERPL-CCNC: 69 U/L (ref 40–150)
ALT SERPL W/O P-5'-P-CCNC: 29 U/L (ref 10–44)
ANION GAP SERPL CALC-SCNC: 7 MMOL/L (ref 8–16)
AST SERPL-CCNC: 36 U/L (ref 10–40)
BASOPHILS # BLD AUTO: 0.01 K/UL (ref 0–0.2)
BASOPHILS NFR BLD: 0.2 % (ref 0–1.9)
BILIRUB SERPL-MCNC: 0.5 MG/DL (ref 0.1–1)
BLD GP AB SCN CELLS X3 SERPL QL: NORMAL
BUN SERPL-MCNC: 20 MG/DL (ref 6–20)
C DIFF GDH STL QL: NEGATIVE
C DIFF TOX A+B STL QL IA: NEGATIVE
CALCIUM SERPL-MCNC: 9.1 MG/DL (ref 8.7–10.5)
CHLORIDE SERPL-SCNC: 107 MMOL/L (ref 95–110)
CMV DNA SPEC QL NAA+PROBE: NORMAL
CO2 SERPL-SCNC: 22 MMOL/L (ref 23–29)
CREAT SERPL-MCNC: 1 MG/DL (ref 0.5–1.4)
CYTOMEGALOVIRUS PCR, QUANT: NOT DETECTED IU/ML
DIFFERENTIAL METHOD BLD: ABNORMAL
EOSINOPHIL # BLD AUTO: 0.1 K/UL (ref 0–0.5)
EOSINOPHIL NFR BLD: 1.7 % (ref 0–8)
EPSTEIN-BARR VIRUS DNA: NORMAL
EPSTEIN-BARR VIRUS PCR, QUANT: NOT DETECTED IU/ML
ERYTHROCYTE [DISTWIDTH] IN BLOOD BY AUTOMATED COUNT: 16.3 % (ref 11.5–14.5)
EST. GFR  (NO RACE VARIABLE): >60 ML/MIN/1.73 M^2
GLUCOSE SERPL-MCNC: 100 MG/DL (ref 70–110)
HCT VFR BLD AUTO: 25.3 % (ref 40–54)
HGB BLD-MCNC: 8.4 G/DL (ref 14–18)
IMM GRANULOCYTES # BLD AUTO: 0.03 K/UL (ref 0–0.04)
IMM GRANULOCYTES NFR BLD AUTO: 0.6 % (ref 0–0.5)
LYMPHOCYTES # BLD AUTO: 0.9 K/UL (ref 1–4.8)
LYMPHOCYTES NFR BLD: 16.5 % (ref 18–48)
MAGNESIUM SERPL-MCNC: 1.9 MG/DL (ref 1.6–2.6)
MCH RBC QN AUTO: 30.9 PG (ref 27–31)
MCHC RBC AUTO-ENTMCNC: 33.2 G/DL (ref 32–36)
MCV RBC AUTO: 93 FL (ref 82–98)
MONOCYTES # BLD AUTO: 0.4 K/UL (ref 0.3–1)
MONOCYTES NFR BLD: 8 % (ref 4–15)
NEUTROPHILS # BLD AUTO: 3.9 K/UL (ref 1.8–7.7)
NEUTROPHILS NFR BLD: 73 % (ref 38–73)
NRBC BLD-RTO: 0 /100 WBC
PHOSPHATE SERPL-MCNC: 3.2 MG/DL (ref 2.7–4.5)
PLATELET # BLD AUTO: 60 K/UL (ref 150–450)
PMV BLD AUTO: 10.7 FL (ref 9.2–12.9)
POTASSIUM SERPL-SCNC: 4.2 MMOL/L (ref 3.5–5.1)
PROT SERPL-MCNC: 6.6 G/DL (ref 6–8.4)
RBC # BLD AUTO: 2.72 M/UL (ref 4.6–6.2)
SODIUM SERPL-SCNC: 136 MMOL/L (ref 136–145)
SPECIMEN OUTDATE: NORMAL
TACROLIMUS BLD-MCNC: 10.3 NG/ML (ref 5–15)
WBC # BLD AUTO: 5.38 K/UL (ref 3.9–12.7)

## 2025-03-10 PROCEDURE — 25000003 PHARM REV CODE 250

## 2025-03-10 PROCEDURE — 86850 RBC ANTIBODY SCREEN: CPT | Performed by: INTERNAL MEDICINE

## 2025-03-10 PROCEDURE — 87324 CLOSTRIDIUM AG IA: CPT | Performed by: PHYSICIAN ASSISTANT

## 2025-03-10 PROCEDURE — A4216 STERILE WATER/SALINE, 10 ML: HCPCS

## 2025-03-10 PROCEDURE — 85025 COMPLETE CBC W/AUTO DIFF WBC: CPT | Performed by: INTERNAL MEDICINE

## 2025-03-10 PROCEDURE — 87427 SHIGA-LIKE TOXIN AG IA: CPT | Performed by: PHYSICIAN ASSISTANT

## 2025-03-10 PROCEDURE — 87046 STOOL CULTR AEROBIC BACT EA: CPT | Performed by: PHYSICIAN ASSISTANT

## 2025-03-10 PROCEDURE — 87045 FECES CULTURE AEROBIC BACT: CPT | Performed by: PHYSICIAN ASSISTANT

## 2025-03-10 PROCEDURE — 84100 ASSAY OF PHOSPHORUS: CPT | Performed by: INTERNAL MEDICINE

## 2025-03-10 PROCEDURE — 80197 ASSAY OF TACROLIMUS: CPT

## 2025-03-10 PROCEDURE — 89055 LEUKOCYTE ASSESSMENT FECAL: CPT | Performed by: PHYSICIAN ASSISTANT

## 2025-03-10 PROCEDURE — 63600175 PHARM REV CODE 636 W HCPCS

## 2025-03-10 PROCEDURE — 99999 PR PBB SHADOW E&M-EST. PATIENT-LVL IV: CPT | Mod: PBBFAC,,, | Performed by: PHYSICIAN ASSISTANT

## 2025-03-10 PROCEDURE — 36592 COLLECT BLOOD FROM PICC: CPT

## 2025-03-10 PROCEDURE — 83735 ASSAY OF MAGNESIUM: CPT | Performed by: INTERNAL MEDICINE

## 2025-03-10 PROCEDURE — 87449 NOS EACH ORGANISM AG IA: CPT | Mod: 91 | Performed by: PHYSICIAN ASSISTANT

## 2025-03-10 PROCEDURE — 87799 DETECT AGENT NOS DNA QUANT: CPT | Performed by: INTERNAL MEDICINE

## 2025-03-10 PROCEDURE — 80053 COMPREHEN METABOLIC PANEL: CPT | Performed by: INTERNAL MEDICINE

## 2025-03-10 RX ORDER — ONDANSETRON HYDROCHLORIDE 8 MG/1
8 TABLET, FILM COATED ORAL EVERY 8 HOURS PRN
Qty: 60 TABLET | Refills: 2 | Status: SHIPPED | OUTPATIENT
Start: 2025-03-10

## 2025-03-10 RX ORDER — HEPARIN 100 UNIT/ML
500 SYRINGE INTRAVENOUS
Status: DISCONTINUED | OUTPATIENT
Start: 2025-03-10 | End: 2025-03-10 | Stop reason: HOSPADM

## 2025-03-10 RX ORDER — DICYCLOMINE HYDROCHLORIDE 10 MG/1
10 CAPSULE ORAL
Qty: 30 CAPSULE | Refills: 0 | Status: SHIPPED | OUTPATIENT
Start: 2025-03-10 | End: 2025-04-09

## 2025-03-10 RX ORDER — SODIUM CHLORIDE 0.9 % (FLUSH) 0.9 %
10 SYRINGE (ML) INJECTION
Status: DISCONTINUED | OUTPATIENT
Start: 2025-03-10 | End: 2025-03-10 | Stop reason: HOSPADM

## 2025-03-10 RX ORDER — SODIUM CHLORIDE 0.9 % (FLUSH) 0.9 %
10 SYRINGE (ML) INJECTION
Status: CANCELLED | OUTPATIENT
Start: 2025-03-10

## 2025-03-10 RX ORDER — BUDESONIDE 3 MG/1
3 CAPSULE, COATED PELLETS ORAL 3 TIMES DAILY
Qty: 90 CAPSULE | Refills: 0 | Status: SHIPPED | OUTPATIENT
Start: 2025-03-10

## 2025-03-10 RX ORDER — HEPARIN 100 UNIT/ML
500 SYRINGE INTRAVENOUS
Status: CANCELLED | OUTPATIENT
Start: 2025-03-10

## 2025-03-10 RX ADMIN — Medication 10 ML: at 08:03

## 2025-03-10 RX ADMIN — HEPARIN SODIUM (PORCINE) LOCK FLUSH IV SOLN 100 UNIT/ML 500 UNITS: 100 SOLUTION at 08:03

## 2025-03-10 NOTE — PROGRESS NOTES
BMT Pharmacist Immunosuppression Note:    Reviewed patient's tacrolimus level with Dr. James and it is within goal range. Instructed patient to continue your current regimen of 3 capsules (1.5mg) in the morning and 3 capsules (1.5mg) in the evening.       Carolina Carson Pharm.D., [facility]  Clinical Pharmacy Specialist, Bone Marrow Transplant/Cellular Therapy  [facility]  SpectraLink: [phone]

## 2025-03-10 NOTE — PLAN OF CARE
Assumed care of pt. 06:45-19:15  Pt involved in plan of care and communicating needs throughout shift.     - D +2 haplo Allo SCT for PMF  - AAOx4;   - C/o HA; PRN sumatriptan given with moderate relief   - Ambulates independently;   - Reg. diet  - T-max 101.9; tylenol given as ordered; Temp. Down to 99.2 after 1 hr.   - Continent of bowel and bladder   - Voids via RR; BM x5 this shift  - RA; NSR-low ST (100's) on telemonitor  - Skin intact       - q1h patient rounds. All VSS; no acute events so far this shift. Non skid socks on; Pt. Instructed to call if any assistance is needed. Pt remaining free from falls or injury; Bed locked in lowest position w/side rails up x2 & all belongings including call light within reach; Plan of care ongoing; All needs met at this time.            35 y/o female with a PMHx of GERD, Fibromyalgia, DM (on Insulin), Graves, spinal stenosis, L1 fx since July and primary hyperthyroidism presents to the ED c/o chest pain, SOB and back pain since last Friday. Had discussion that this is chronic pain management, nothing has changed, give pain meds and follow up.

## 2025-03-10 NOTE — PROGRESS NOTES
Section of Hematology and Stem Cell Transplantation    Post-Transplantation Follow Up Visit     3/10/25    Transplant History:   Primary oncologist: Clyde James MD  Primary oncologic diagnosis: primary myelofibrosis  Transplant date: 12/26/2024  Donor: haploidentical  Blood Type (Patient): O +  Blood Type (Donor): O +  CMV (Patient): Negative  CMV (Donor): Positive  Graft source: Peripheral blood  CD34+ cell dose: 6.04 X10^6 cells/kg  Conditioning Regimen:  Thiotepa, Busulfan, Fludarabine  GVHD prophylaxis: Post-transplant cyclophosphamide, MMF, Tacrolimus  Immediate post-transplant complications: mucositis, R axillary skin/soft tissue infection secondary to MRSA    History of Present Ilness:   Rubén Peters) is a pleasant 54 y.o.male with primary myelofibrosis who is status post haploidentical stem cell transplantation conditioned with  Bu/Flu/TT  who is currently day +74 who presents for post-transplant follow up.    Increased diarrhea over the weekend with mild abdominal cramping (No N/V). 3 episodes Friday, 2 Saturday, 2 Sunday, 2 this A (4 in last 24 hours). Notably tapered off budesonide beginning of last week. Obtaining C diff studies and resuming budesonide, hold imodium until infectious studies return.     Otherwise, no new complaints/concerns. No rash, afebrile.    PAST MEDICAL HISTORY:   Past Medical History:   Diagnosis Date    Abdominal pain 01/02/2025    Allergic contact dermatitis due to adhesives 12/31/2024    Cancer     Flatulence 01/03/2025    Headache 12/27/2024    Hyperlipidemia     Mucositis 01/02/2025    Other constipation 12/19/2024    Thrombocytosis 12/18/2024    Transaminitis 12/27/2024       PAST SURGICAL HISTORY:   Past Surgical History:   Procedure Laterality Date    BONE MARROW BIOPSY N/A 11/27/2024    Procedure: Biopsy-bone marrow;  Surgeon: Clyde James MD;  Location: [facility] ([address] [place] [address]);  Service: Oncology;  Laterality: N/A;  11/20-pre call complete-tb     COLONOSCOPY N/A 8/9/2023    Procedure: COLONOSCOPY;  Surgeon: Will Ardon MD;  Location: [facility] (4TH FLR);  Service: Endoscopy;  Laterality: N/A;  Suprep Instructions sent via portal / Authorizing:     Bebeto Arora MD in [facility] FAMILY MED/ INTERNAL MED/ PEDS  Referral:          [id number]    EYE SURGERY      FLEXIBLE SIGMOIDOSCOPY N/A 7/23/2024    Procedure: SIGMOIDOSCOPY, FLEXIBLE;  Surgeon: Nirali Vicente MD;  Location: [facility] ENDO;  Service: Endoscopy;  Laterality: N/A;    INSERTION OF LONGORIA CATHETER Right 12/18/2024    Procedure: INSERTION, CATHETER, CENTRAL VENOUS, LONGORIA TRIPLE LUMEN, Bard 12.5 fr Longoria Cath model [id number];  Surgeon: Willie Hadley MD;  Location: [facility] OR 2ND FLR;  Service: General;  Laterality: Right;       PAST SOCIAL HISTORY:  Social History     Tobacco Use    Smoking status: Former     Types: Cigars, Cigarettes    Smokeless tobacco: Never   Substance Use Topics    Alcohol use: Not Currently     Comment: socially    Drug use: Never       FAMILY HISTORY:  Family History   Problem Relation Name Age of Onset    Arthritis Mother      COPD Father      Testicular cancer Maternal Cousin Peter 17        Chemotherapy    Breast cancer Maternal Cousin  51        Chemotherapy    Heart disease Maternal Grandfather      Stroke Maternal Grandmother         CURRENT MEDICATIONS:   Current Outpatient Medications   Medication Sig    acyclovir (ZOVIRAX) 800 MG Tab Take 1 tablet (800 mg total) by mouth 2 (two) times daily.    dapsone 100 MG Tab Take 1 tablet (100 mg total) by mouth once daily.    letermovir (PREVYMIS) 480 mg Tab Take 1 tablet (480 mg total) by mouth once daily.    magnesium oxide (MAG-OX) 400 mg (241.3 mg magnesium) tablet Take 2 tablets (800 mg total) by mouth 2 (two) times daily.    ondansetron (ZOFRAN) 8 MG tablet Take 1 tablet (8 mg total) by mouth every 8 (eight) hours as needed for Nausea.    prochlorperazine (COMPAZINE) 10 MG tablet Take 1 tablet (10 mg total) by mouth 4 (four)  times daily as needed for Nausea.    tacrolimus (PROGRAF) 0.5 MG Cap Take 3 capsules (1.5 mg total) by mouth every morning AND 3 capsules (1.5 mg total) every evening.    traMADoL (ULTRAM) 50 mg tablet Take 1 tablet (50 mg total) by mouth every 6 (six) hours as needed for Pain.    triamcinolone acetonide 0.1% (KENALOG) 0.1 % cream Apply topically 2 (two) times daily.    voriconazole (VFEND) 200 MG Tab Take 1 tablet (200 mg total) by mouth 2 (two) times daily.     No current facility-administered medications for this visit.     Facility-Administered Medications Ordered in Other Visits   Medication    heparin, porcine (PF) 100 unit/mL injection flush 500 Units    sodium chloride 0.9% flush 10 mL       ALLERGIES:   Review of patient's allergies indicates:   Allergen Reactions    Bactrim [sulfamethoxazole-trimethoprim] Hives       GVHD Review of Systems:     Pertinent positives and negatives included in the HPI. Otherwise a 14 point review of systems is negative. GVHD review of systems recorded in BMT flowsheet.     Physical Exam:     Vitals:    03/10/25 0907   BP: 131/77   Pulse: 100   Resp: 16   Temp: 98.1 °F (36.7 °C)       General: Appears well, NAD  HEENT: MMM, no OP lesions  Pulmonary: CTAB, no increased work of breathing, no W/R/C  Cardiovascular: S1S2 normal, RRR no M/R/G  Abdominal: Soft, NT, ND, BS+, no HSM  Extremities: No C/C/E  Neurological: AAOx4, grossly normal, no focal deficits  Dermatologic: Hyperpigmentation around neck, upper chest - improved    ECOG Performance Status: (foot note - ECOG PS provided by Eastern Cooperative Oncology Group) 1 - Symptomatic but completely ambulatory    Karnofsky Performance Score:  90%- Able to Carry on Normal Activity: Minor Symptoms of Disease    Labs:   Lab Results   Component Value Date    WBC 5.38 03/10/2025    RBC 2.72 (L) 03/10/2025    HGB 8.4 (L) 03/10/2025    HCT 25.3 (L) 03/10/2025    MCV 93 03/10/2025    MCH 30.9 03/10/2025    MCHC 33.2 03/10/2025    RDW 16.3  (H) 03/10/2025    PLT 60 (L) 03/10/2025    MPV 10.7 03/10/2025    GRAN 3.9 03/10/2025    GRAN 73.0 03/10/2025    LYMPH 0.9 (L) 03/10/2025    LYMPH 16.5 (L) 03/10/2025    MONO 0.4 03/10/2025    MONO 8.0 03/10/2025    EOS 0.1 03/10/2025    BASO 0.01 03/10/2025    EOSINOPHIL 1.7 03/10/2025    BASOPHIL 0.2 03/10/2025       Sodium   Date Value Ref Range Status   03/06/2025 134 (L) 136 - 145 mmol/L Final     Potassium   Date Value Ref Range Status   03/06/2025 4.0 3.5 - 5.1 mmol/L Final     Chloride   Date Value Ref Range Status   03/06/2025 106 95 - 110 mmol/L Final     CO2   Date Value Ref Range Status   03/06/2025 20 (L) 23 - 29 mmol/L Final     Glucose   Date Value Ref Range Status   03/06/2025 99 70 - 110 mg/dL Final     BUN   Date Value Ref Range Status   03/06/2025 17 6 - 20 mg/dL Final     Creatinine   Date Value Ref Range Status   03/06/2025 0.8 0.5 - 1.4 mg/dL Final     Calcium   Date Value Ref Range Status   03/06/2025 8.9 8.7 - 10.5 mg/dL Final     Total Protein   Date Value Ref Range Status   03/06/2025 6.5 6.0 - 8.4 g/dL Final     Albumin   Date Value Ref Range Status   03/06/2025 3.6 3.5 - 5.2 g/dL Final     Total Bilirubin   Date Value Ref Range Status   03/06/2025 0.5 0.1 - 1.0 mg/dL Final     Comment:     For infants and newborns, interpretation of results should be based  on gestational age, weight and in agreement with clinical  observations.    Premature Infant recommended reference ranges:  Up to 24 hours.............<8.0 mg/dL  Up to 48 hours............<12.0 mg/dL  3-5 days..................<15.0 mg/dL  6-29 days.................<15.0 mg/dL       Alkaline Phosphatase   Date Value Ref Range Status   03/06/2025 62 40 - 150 U/L Final     AST   Date Value Ref Range Status   03/06/2025 26 10 - 40 U/L Final     ALT   Date Value Ref Range Status   03/06/2025 23 10 - 44 U/L Final     Anion Gap   Date Value Ref Range Status   03/06/2025 8 8 - 16 mmol/L Final     eGFR if    Date Value Ref  Range Status   01/22/2022 >60.0 >60 mL/min/1.73 m^2 Final     eGFR if non    Date Value Ref Range Status   01/22/2022 >60.0 >60 mL/min/1.73 m^2 Final     Comment:     Calculation used to obtain the estimated glomerular filtration  rate (eGFR) is the CKD-EPI equation.          Imaging:   All pertinent studies reviewed and interpreted by me    Acute GVHD Scoring:  GVHD Acute Assessment  02/17/25: aGHVD of skin, ~5% BSA. Resolved with Topical steroids.  02/24/25: aGVHD of GI tract (diarrhea); started budesonide  02/27/25: aGVHD of GI tract resolved; budesonide taper started     Assessment and Plan:   Rubén Peters) is a pleasant 54 y.o.male with primary myelofibrosis s/p haplo SCT who presents for post-transplant follow up.    Primary myelofibrosis: Status post treatment with ruxolitinib, followed by alloSCT. Day +30 BMBx results below.    Status post allogeneic stem cell transplantation: As noted above, status post haploidentical stem cell transplantation conditioned with  Bu/Flu/TT . Currently day 74 Engrafted on day +22.  Day +30 BMBx on 1/2/25: No definitive evidence of residual JAK2 [id number] primary myelofibrosis. NGS without evidence of pathogenic mutations. Chimerism studies with 100% donor CD33, CD3 insufficient for analysis.     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 MMF has now been discontinued. Suspect aGVHD of lower GI tract persists after budesonide taper, resuming 3mg TID.  Current tacro dose: 1.5mg BID   Last tacro level: pending today, will follow  Adjustments: pending results    Immunosuppression: Prevention with voriconazole,  acyclovir. CMV prophylaxis with letermovir. PJP prophyalxis dapsone. Continue weekly monitoring of CMV and EBV.  Last CMV: negative (2/20/2025), last EBV: negative (2/20/2025).  Active infections: none    Pancytopenia: Due to underlying disease and chemotherapy. Transfuse for Hgb <7  g/dL and platelets <10k.  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 Plan for Eltrombopag 50mg daily, rx sent to [facility]    Diarrhea: Suspect aGVHD. To be cautious, checking stool culture/C Diff studies. Hold off on anti-motility agents until negative infectious work up results. Resume budesonide 3mg TID. Start dicyclomine PRN.     Hypertension: (improved) Was newly hypertensive in hospital following SCT at which time he was started on amlodipine. He has been persistently hypotensive since discharge. Discontinued amlodipine, normotensive.   Will continue to monitor.    Cardiomyopathy: Echo 2/26/25 obtained due to tachycardia revealed reduced LVEF 45-50%. No signs/symptoms of HFrEF. Repeat echo in 3 months (5/2025).    Staphylococcus Skin Infection: Patient had MRSA while in the hospital and was treated with doxycycline, concern for recurrence on recent exam and has once again resolved following Doxycyline 100mg PO BID.     Follow up: Twice weekly follow up with labs.    Orders Placed:      No orders of the defined types were placed in this encounter.         40 minutes of total time spent on the encounter, which includes face to face time and non-face to face time preparing to see the patient (eg, review of tests), Obtaining and/or reviewing separately obtained history, Documenting clinical information in the electronic or other health record, Independently interpreting results (not separately reported) and communicating results to the patient/family/caregiver, or Care coordination with other physicians involved in his care (not separately reported).     Priscila Montes PA-C  Malignant Hematology, Stem Cell Transplant, and Cellular Therapy  [facility]  [facility]

## 2025-03-11 ENCOUNTER — RESULTS FOLLOW-UP (OUTPATIENT)
Dept: HEMATOLOGY/ONCOLOGY | Facility: CLINIC | Age: 55
End: 2025-03-11

## 2025-03-11 LAB
E COLI SXT1 STL QL IA: NEGATIVE
E COLI SXT2 STL QL IA: NEGATIVE
WBC #/AREA STL HPF: NORMAL /[HPF]

## 2025-03-12 LAB — BACTERIA STL CULT: NORMAL

## 2025-03-13 ENCOUNTER — INFUSION (OUTPATIENT)
Dept: INFUSION THERAPY | Facility: HOSPITAL | Age: 55
End: 2025-03-13
Payer: COMMERCIAL

## 2025-03-13 ENCOUNTER — CLINICAL SUPPORT (OUTPATIENT)
Dept: HEMATOLOGY/ONCOLOGY | Facility: CLINIC | Age: 55
End: 2025-03-13
Payer: COMMERCIAL

## 2025-03-13 ENCOUNTER — RESULTS FOLLOW-UP (OUTPATIENT)
Dept: HEMATOLOGY/ONCOLOGY | Facility: CLINIC | Age: 55
End: 2025-03-13

## 2025-03-13 ENCOUNTER — OFFICE VISIT (OUTPATIENT)
Dept: HEMATOLOGY/ONCOLOGY | Facility: CLINIC | Age: 55
End: 2025-03-13
Payer: COMMERCIAL

## 2025-03-13 VITALS
HEART RATE: 96 BPM | SYSTOLIC BLOOD PRESSURE: 126 MMHG | WEIGHT: 171.63 LBS | BODY MASS INDEX: 24.57 KG/M2 | OXYGEN SATURATION: 98 % | TEMPERATURE: 99 F | DIASTOLIC BLOOD PRESSURE: 81 MMHG | HEIGHT: 70 IN

## 2025-03-13 DIAGNOSIS — D61.818 PANCYTOPENIA: ICD-10-CM

## 2025-03-13 DIAGNOSIS — D75.81 MF (MYELOFIBROSIS): Primary | ICD-10-CM

## 2025-03-13 DIAGNOSIS — R19.7 DIARRHEA, UNSPECIFIED TYPE: ICD-10-CM

## 2025-03-13 DIAGNOSIS — Z94.84 HISTORY OF ALLOGENEIC STEM CELL TRANSPLANT: Primary | ICD-10-CM

## 2025-03-13 DIAGNOSIS — D64.9 ANEMIA: Primary | ICD-10-CM

## 2025-03-13 DIAGNOSIS — D89.810 ACUTE GVHD: ICD-10-CM

## 2025-03-13 DIAGNOSIS — D47.1 PRIMARY MYELOFIBROSIS: ICD-10-CM

## 2025-03-13 DIAGNOSIS — I42.9 CARDIOMYOPATHY, UNSPECIFIED TYPE: ICD-10-CM

## 2025-03-13 DIAGNOSIS — L98.8 SKIN GVHD: ICD-10-CM

## 2025-03-13 DIAGNOSIS — Z94.84 HISTORY OF ALLOGENEIC STEM CELL TRANSPLANT: ICD-10-CM

## 2025-03-13 DIAGNOSIS — D84.81 IMMUNODEFICIENCY DUE TO CONDITIONS CLASSIFIED ELSEWHERE: ICD-10-CM

## 2025-03-13 DIAGNOSIS — E27.9 ADRENAL NODULE: Primary | ICD-10-CM

## 2025-03-13 DIAGNOSIS — D89.813 SKIN GVHD: ICD-10-CM

## 2025-03-13 DIAGNOSIS — Z76.82 STEM CELL TRANSPLANT CANDIDATE: ICD-10-CM

## 2025-03-13 DIAGNOSIS — I10 HYPERTENSION, UNSPECIFIED TYPE: ICD-10-CM

## 2025-03-13 LAB
ABO + RH BLD: NORMAL
ALBUMIN SERPL BCP-MCNC: 3.8 G/DL (ref 3.5–5.2)
ALP SERPL-CCNC: 90 U/L (ref 40–150)
ALT SERPL W/O P-5'-P-CCNC: 49 U/L (ref 10–44)
ANION GAP SERPL CALC-SCNC: 7 MMOL/L (ref 8–16)
AST SERPL-CCNC: 40 U/L (ref 10–40)
BASOPHILS # BLD AUTO: 0.01 K/UL (ref 0–0.2)
BASOPHILS NFR BLD: 0.2 % (ref 0–1.9)
BILIRUB SERPL-MCNC: 0.4 MG/DL (ref 0.1–1)
BLD GP AB SCN CELLS X3 SERPL QL: NORMAL
BUN SERPL-MCNC: 17 MG/DL (ref 6–20)
CALCIUM SERPL-MCNC: 9.5 MG/DL (ref 8.7–10.5)
CHLORIDE SERPL-SCNC: 109 MMOL/L (ref 95–110)
CO2 SERPL-SCNC: 21 MMOL/L (ref 23–29)
CREAT SERPL-MCNC: 0.8 MG/DL (ref 0.5–1.4)
DIFFERENTIAL METHOD BLD: ABNORMAL
EOSINOPHIL # BLD AUTO: 0 K/UL (ref 0–0.5)
EOSINOPHIL NFR BLD: 0.2 % (ref 0–8)
ERYTHROCYTE [DISTWIDTH] IN BLOOD BY AUTOMATED COUNT: 16.7 % (ref 11.5–14.5)
EST. GFR  (NO RACE VARIABLE): >60 ML/MIN/1.73 M^2
GLUCOSE SERPL-MCNC: 109 MG/DL (ref 70–110)
HCT VFR BLD AUTO: 23.7 % (ref 40–54)
HGB BLD-MCNC: 7.7 G/DL (ref 14–18)
IMM GRANULOCYTES # BLD AUTO: 0.12 K/UL (ref 0–0.04)
IMM GRANULOCYTES NFR BLD AUTO: 2 % (ref 0–0.5)
LYMPHOCYTES # BLD AUTO: 1.1 K/UL (ref 1–4.8)
LYMPHOCYTES NFR BLD: 17.3 % (ref 18–48)
MAGNESIUM SERPL-MCNC: 1.9 MG/DL (ref 1.6–2.6)
MCH RBC QN AUTO: 31 PG (ref 27–31)
MCHC RBC AUTO-ENTMCNC: 32.5 G/DL (ref 32–36)
MCV RBC AUTO: 96 FL (ref 82–98)
MONOCYTES # BLD AUTO: 0.6 K/UL (ref 0.3–1)
MONOCYTES NFR BLD: 9.6 % (ref 4–15)
NEUTROPHILS # BLD AUTO: 4.3 K/UL (ref 1.8–7.7)
NEUTROPHILS NFR BLD: 70.7 % (ref 38–73)
NRBC BLD-RTO: 1 /100 WBC
PHOSPHATE SERPL-MCNC: 3.5 MG/DL (ref 2.7–4.5)
PLATELET # BLD AUTO: 69 K/UL (ref 150–450)
PMV BLD AUTO: 12 FL (ref 9.2–12.9)
POTASSIUM SERPL-SCNC: 4.6 MMOL/L (ref 3.5–5.1)
PROT SERPL-MCNC: 6.9 G/DL (ref 6–8.4)
RBC # BLD AUTO: 2.48 M/UL (ref 4.6–6.2)
SODIUM SERPL-SCNC: 137 MMOL/L (ref 136–145)
SPECIMEN OUTDATE: NORMAL
TACROLIMUS BLD-MCNC: 5.7 NG/ML (ref 5–15)
WBC # BLD AUTO: 6.12 K/UL (ref 3.9–12.7)

## 2025-03-13 PROCEDURE — 83735 ASSAY OF MAGNESIUM: CPT | Performed by: INTERNAL MEDICINE

## 2025-03-13 PROCEDURE — 86850 RBC ANTIBODY SCREEN: CPT | Performed by: INTERNAL MEDICINE

## 2025-03-13 PROCEDURE — 99999 PR PBB SHADOW E&M-EST. PATIENT-LVL IV: CPT | Mod: PBBFAC,,,

## 2025-03-13 PROCEDURE — 25000003 PHARM REV CODE 250

## 2025-03-13 PROCEDURE — 80053 COMPREHEN METABOLIC PANEL: CPT | Performed by: INTERNAL MEDICINE

## 2025-03-13 PROCEDURE — 63600175 PHARM REV CODE 636 W HCPCS

## 2025-03-13 PROCEDURE — A4216 STERILE WATER/SALINE, 10 ML: HCPCS

## 2025-03-13 PROCEDURE — 87799 DETECT AGENT NOS DNA QUANT: CPT | Performed by: INTERNAL MEDICINE

## 2025-03-13 PROCEDURE — 80197 ASSAY OF TACROLIMUS: CPT | Performed by: INTERNAL MEDICINE

## 2025-03-13 PROCEDURE — 36592 COLLECT BLOOD FROM PICC: CPT

## 2025-03-13 PROCEDURE — 85025 COMPLETE CBC W/AUTO DIFF WBC: CPT | Performed by: INTERNAL MEDICINE

## 2025-03-13 PROCEDURE — 99999 PR PBB SHADOW E&M-EST. PATIENT-LVL II: CPT | Mod: PBBFAC,,,

## 2025-03-13 PROCEDURE — 84100 ASSAY OF PHOSPHORUS: CPT | Performed by: INTERNAL MEDICINE

## 2025-03-13 RX ORDER — LANOLIN ALCOHOL/MO/W.PET/CERES
CREAM (GRAM) TOPICAL
Qty: 180 TABLET | Refills: 5 | Status: SHIPPED | OUTPATIENT
Start: 2025-03-13

## 2025-03-13 RX ORDER — SODIUM CHLORIDE 0.9 % (FLUSH) 0.9 %
10 SYRINGE (ML) INJECTION
Status: DISCONTINUED | OUTPATIENT
Start: 2025-03-13 | End: 2025-03-13 | Stop reason: HOSPADM

## 2025-03-13 RX ORDER — SODIUM CHLORIDE 0.9 % (FLUSH) 0.9 %
10 SYRINGE (ML) INJECTION
OUTPATIENT
Start: 2025-03-13

## 2025-03-13 RX ORDER — HEPARIN 100 UNIT/ML
500 SYRINGE INTRAVENOUS
Status: DISCONTINUED | OUTPATIENT
Start: 2025-03-13 | End: 2025-03-13 | Stop reason: HOSPADM

## 2025-03-13 RX ORDER — TRIAMCINOLONE ACETONIDE 1 MG/G
CREAM TOPICAL 2 TIMES DAILY PRN
Start: 2025-03-13

## 2025-03-13 RX ORDER — HEPARIN 100 UNIT/ML
500 SYRINGE INTRAVENOUS
OUTPATIENT
Start: 2025-03-13

## 2025-03-13 RX ORDER — TACROLIMUS 0.5 MG/1
CAPSULE ORAL
Qty: 210 CAPSULE | Refills: 11 | Status: SHIPPED | OUTPATIENT
Start: 2025-03-13 | End: 2026-03-13

## 2025-03-13 RX ADMIN — SODIUM CHLORIDE, PRESERVATIVE FREE 10 ML: 5 INJECTION INTRAVENOUS at 09:03

## 2025-03-13 RX ADMIN — HEPARIN SODIUM (PORCINE) LOCK FLUSH IV SOLN 100 UNIT/ML 500 UNITS: 100 SOLUTION at 09:03

## 2025-03-13 NOTE — PROGRESS NOTES
BMT Pharmacist Medication Review Note     Did not meet with patient as appointments were running behind. Will reach out to Specialty Pharmacy to follow up on Promacta as it does not look to be filled/dispensed yet. Will provide new medication list to patient on Monday as medications have been adjusted in EPIC to reflect was is currently accurate.    Medication Indication Morning Afternoon Evening/Night   Acyclovir 800mg  Viral infection prevention 1 tablet  1 tablet   Letermovir (Prevymis®) 480mg CMV infection prevention 1 tablet     Voriconazole 200mg Fungal infection prevention 1 tablet  1 tablet   Dapsone 100mg Fungal pneumonia prevention  1 tablet        Tacrolimus 0.5mg GVHD prevention - HOLD morning dose on lab draw days 3 capsules  3 capsules   Magnesium oxide 400mg Magnesium supplement 2 tablets 1 tablet 2 tablets   **Eltrombopag (Promacta) 50mg Platelets 1 tablet     Budesonide (Entocort) 3 mg GVHD  1 capsule 1 capsule 1 capsule      **new medication or change in medication     AS NEEDED MEDICATIONS:  Dicyclomine 10mg up to four times a day as needed for abdominal cramping  Ondansetron 8mg every 8 hours as needed for nausea  Prochlorperazine 10mg every 6 hours as needed for nausea (can take 20 minutes after ondansetron if no relief from nausea)  Tramadol 50 mg tablet every 6 hours as needed for pain.   Triamcinolone cream 0.1% as needed for GVHD flare of skin        Heather ReevesD., [facility]  Clinical Pharmacy Specialist, Bone Marrow Transplant/Cellular Therapy  [facility]  SpectraLink: [phone]

## 2025-03-13 NOTE — PROGRESS NOTES
BMT Pharmacist Immunosuppression Note:    Reviewed patient's tacrolimus level with Dr. Grover and it is below goal range. Instructed patient to increase your current regimen of 3 capsules (1.5mg) in the morning and 3 capsules (1.5mg) in the evening to 3 capsules (1.5mg) in the morning and 4 capsules (2mg) in the evening.       Carolina Carson Pharm.D., BCOP  Clinical Pharmacy Specialist, Bone Marrow Transplant/Cellular Therapy  [facility]  SpectraLink: [phone]

## 2025-03-13 NOTE — PROGRESS NOTES
Section of Hematology and Stem Cell Transplantation    Post-Transplantation Follow Up Visit     3/13/25    Transplant History:   Primary oncologist: Cylde James MD  Primary oncologic diagnosis: primary myelofibrosis  Transplant date: 12/26/2024  Donor: haploidentical  Blood Type (Patient): O +  Blood Type (Donor): O +  CMV (Patient): Negative  CMV (Donor): Positive  Graft source: Peripheral blood  CD34+ cell dose: 6.04 X10^6 cells/kg  Conditioning Regimen:  Thiotepa, Busulfan, Fludarabine  GVHD prophylaxis: Post-transplant cyclophosphamide, MMF, Tacrolimus  Immediate post-transplant complications: mucositis, R axillary skin/soft tissue infection secondary to MRSA    History of Present Ilness:   Rubén Peters) is a pleasant 54 y.o.male with primary myelofibrosis who is status post haploidentical stem cell transplantation conditioned with  Bu/Flu/TT  who is currently day +77 who presents for post-transplant follow up.    He is feeling better overall. His stool studies came back negative so took imodium Tuesday night and has not had diarrhea since. He had a solid bowel movement this morning. His energy has been okay, he does mention the episodes of diarrhea caused some more fatigue. His appetite has not been the best but he is eating three times a day. On Tuesday he had an episode of heart burn which he has never had before but he has not had it since then.  No nausea or vomiting. No rashes.       PAST MEDICAL HISTORY:   Past Medical History:   Diagnosis Date    Abdominal pain 01/02/2025    Allergic contact dermatitis due to adhesives 12/31/2024    Cancer     Flatulence 01/03/2025    Headache 12/27/2024    Hyperlipidemia     Mucositis 01/02/2025    Other constipation 12/19/2024    Thrombocytosis 12/18/2024    Transaminitis 12/27/2024       PAST SURGICAL HISTORY:   Past Surgical History:   Procedure Laterality Date    BONE MARROW BIOPSY N/A 11/27/2024    Procedure: Biopsy-bone marrow;  Surgeon: Jacob  Clyde GARG MD;  Location: [facility] (4TH FLR);  Service: Oncology;  Laterality: N/A;  11/20-pre call complete-tb    COLONOSCOPY N/A 8/9/2023    Procedure: COLONOSCOPY;  Surgeon: Will Ardon MD;  Location: [facility] ENDO (4TH FLR);  Service: Endoscopy;  Laterality: N/A;  Suprep Instructions sent via portal / Authorizing:     Bebeto Arora MD in [facility] FAMILY MED/ INTERNAL MED/ PEDS  Referral:          [id number]    EYE SURGERY      FLEXIBLE SIGMOIDOSCOPY N/A 7/23/2024    Procedure: SIGMOIDOSCOPY, FLEXIBLE;  Surgeon: Nirali Vicente MD;  Location: [facility] ENDO;  Service: Endoscopy;  Laterality: N/A;    INSERTION OF LONGORIA CATHETER Right 12/18/2024    Procedure: INSERTION, CATHETER, CENTRAL VENOUS, LONGORIA TRIPLE LUMEN, Bard 12.5 fr Longoria Cath model [id number];  Surgeon: Willie Hadley MD;  Location: [facility] OR 2ND FLR;  Service: General;  Laterality: Right;       PAST SOCIAL HISTORY:  Social History     Tobacco Use    Smoking status: Former     Types: Cigars, Cigarettes    Smokeless tobacco: Never   Substance Use Topics    Alcohol use: Not Currently     Comment: socially    Drug use: Never       FAMILY HISTORY:  Family History   Problem Relation Name Age of Onset    Arthritis Mother      COPD Father      Testicular cancer Maternal Cousin Peter 17        Chemotherapy    Breast cancer Maternal Cousin  51        Chemotherapy    Heart disease Maternal Grandfather      Stroke Maternal Grandmother         CURRENT MEDICATIONS:   Current Outpatient Medications   Medication Sig    acyclovir (ZOVIRAX) 800 MG Tab Take 1 tablet (800 mg total) by mouth 2 (two) times daily.    budesonide (ENTOCORT EC) 3 mg capsule Take 1 capsule (3 mg total) by mouth 3 (three) times daily.    dapsone 100 MG Tab Take 1 tablet (100 mg total) by mouth once daily.    dicyclomine (BENTYL) 10 MG capsule Take 1 capsule (10 mg total) by mouth before meals as needed.    eltrombopag olamine (PROMACTA) 50 MG Tab Take 1 tablet (50 mg total) by mouth once daily.     letermovir (PREVYMIS) 480 mg Tab Take 1 tablet (480 mg total) by mouth once daily.    magnesium oxide (MAG-OX) 400 mg (241.3 mg magnesium) tablet Take 2 tablets (800 mg total) by mouth 2 (two) times daily.    ondansetron (ZOFRAN) 8 MG tablet Take 1 tablet (8 mg total) by mouth every 8 (eight) hours as needed for Nausea.    prochlorperazine (COMPAZINE) 10 MG tablet Take 1 tablet (10 mg total) by mouth 4 (four) times daily as needed for Nausea.    tacrolimus (PROGRAF) 0.5 MG Cap Take 3 capsules (1.5 mg total) by mouth every morning AND 3 capsules (1.5 mg total) every evening.    traMADoL (ULTRAM) 50 mg tablet Take 1 tablet (50 mg total) by mouth every 6 (six) hours as needed for Pain.    triamcinolone acetonide 0.1% (KENALOG) 0.1 % cream Apply topically 2 (two) times daily.    voriconazole (VFEND) 200 MG Tab Take 1 tablet (200 mg total) by mouth 2 (two) times daily.     No current facility-administered medications for this visit.     Facility-Administered Medications Ordered in Other Visits   Medication    heparin, porcine (PF) 100 unit/mL injection flush 500 Units    sodium chloride 0.9% flush 10 mL       ALLERGIES:   Review of patient's allergies indicates:   Allergen Reactions    Sulfamethoxazole-trimethoprim Hives       GVHD Review of Systems:     Pertinent positives and negatives included in the HPI. Otherwise a 14 point review of systems is negative. GVHD review of systems recorded in BMT flowsheet.     Physical Exam:     Vitals:    03/13/25 0929   BP: 126/81   Pulse: 96   Temp: 98.8 °F (37.1 °C)         General: Appears well, NAD  HEENT: MMM, no OP lesions  Pulmonary: CTAB, no increased work of breathing, no W/R/C  Cardiovascular: S1S2 normal, RRR no M/R/G  Abdominal: Soft, NT, ND, BS+, no HSM  Extremities: No C/C/E  Neurological: AAOx4, grossly normal, no focal deficits  Dermatologic: Hyperpigmentation around neck, upper chest - improved    ECOG Performance Status: (foot note - ECOG PS provided by [employer]cology [employer]) 1 - Symptomatic but completely ambulatory    Karnofsky Performance Score:  80%- Normal Activity with Effort: Some Symptoms of Disease    Labs:   Lab Results   Component Value Date    WBC 5.38 03/10/2025    RBC 2.72 (L) 03/10/2025    HGB 8.4 (L) 03/10/2025    HCT 25.3 (L) 03/10/2025    MCV 93 03/10/2025    MCH 30.9 03/10/2025    MCHC 33.2 03/10/2025    RDW 16.3 (H) 03/10/2025    PLT 60 (L) 03/10/2025    MPV 10.7 03/10/2025    GRAN 3.9 03/10/2025    GRAN 73.0 03/10/2025    LYMPH 0.9 (L) 03/10/2025    LYMPH 16.5 (L) 03/10/2025    MONO 0.4 03/10/2025    MONO 8.0 03/10/2025    EOS 0.1 03/10/2025    BASO 0.01 03/10/2025    EOSINOPHIL 1.7 03/10/2025    BASOPHIL 0.2 03/10/2025       Sodium   Date Value Ref Range Status   03/10/2025 136 136 - 145 mmol/L Final     Potassium   Date Value Ref Range Status   03/10/2025 4.2 3.5 - 5.1 mmol/L Final     Chloride   Date Value Ref Range Status   03/10/2025 107 95 - 110 mmol/L Final     CO2   Date Value Ref Range Status   03/10/2025 22 (L) 23 - 29 mmol/L Final     Glucose   Date Value Ref Range Status   03/10/2025 100 70 - 110 mg/dL Final     BUN   Date Value Ref Range Status   03/10/2025 20 6 - 20 mg/dL Final     Creatinine   Date Value Ref Range Status   03/10/2025 1.0 0.5 - 1.4 mg/dL Final     Calcium   Date Value Ref Range Status   03/10/2025 9.1 8.7 - 10.5 mg/dL Final     Total Protein   Date Value Ref Range Status   03/10/2025 6.6 6.0 - 8.4 g/dL Final     Albumin   Date Value Ref Range Status   03/10/2025 3.8 3.5 - 5.2 g/dL Final     Total Bilirubin   Date Value Ref Range Status   03/10/2025 0.5 0.1 - 1.0 mg/dL Final     Comment:     For infants and newborns, interpretation of results should be based  on gestational age, weight and in agreement with clinical  observations.    Premature Infant recommended reference ranges:  Up to 24 hours.............<8.0 mg/dL  Up to 48 hours............<12.0 mg/dL  3-5 days..................<15.0 mg/dL  6-29  days.................<15.0 mg/dL       Alkaline Phosphatase   Date Value Ref Range Status   03/10/2025 69 40 - 150 U/L Final     AST   Date Value Ref Range Status   03/10/2025 36 10 - 40 U/L Final     ALT   Date Value Ref Range Status   03/10/2025 29 10 - 44 U/L Final     Anion Gap   Date Value Ref Range Status   03/10/2025 7 (L) 8 - 16 mmol/L Final     eGFR if    Date Value Ref Range Status   01/22/2022 >60.0 >60 mL/min/1.73 m^2 Final     eGFR if non    Date Value Ref Range Status   01/22/2022 >60.0 >60 mL/min/1.73 m^2 Final     Comment:     Calculation used to obtain the estimated glomerular filtration  rate (eGFR) is the CKD-EPI equation.          Imaging:   All pertinent studies reviewed and interpreted by me    Acute GVHD Scoring:  GVHD Acute Assessment  02/17/25: aGHVD of skin, ~5% BSA. Resolved with Topical steroids.  02/24/25: aGVHD of GI tract (diarrhea); started budesonide  02/27/25: aGVHD of GI tract resolved; budesonide taper started  3/10/25: aGVHD GI tract present; budesonide 3mg TID resumed     Assessment and Plan:   Rubén Peters) is a pleasant 54 y.o.male with primary myelofibrosis s/p haplo SCT who presents for post-transplant follow up.    Primary myelofibrosis: Status post treatment with ruxolitinib, followed by alloSCT. Day +30 BMBx results below.    Status post allogeneic stem cell transplantation: As noted above, status post haploidentical stem cell transplantation conditioned with  Bu/Flu/TT . Currently day 77 Engrafted on day +22.  Day +30 BMBx on 1/2/25: No definitive evidence of residual JAK2 [id number] primary myelofibrosis. NGS without evidence of pathogenic mutations. Chimerism studies with 100% donor CD33, CD3 insufficient for analysis.     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 MMF has now been discontinued. Suspect aGVHD of lower GI tract persists after budesonide taper, resumed  3mg TID on 3/10/25.  Current tacro dose: 1.5mg BID   Last tacro level: Results pending, will follow up  Adjustments: Pending results, will follow up.     Immunosuppression: Prevention with voriconazole,  acyclovir. CMV prophylaxis with letermovir. PJP prophyalxis dapsone. Continue weekly monitoring of CMV and EBV.  Last CMV: negative (2/20/2025), last EBV: negative (2/20/2025).  Active infections: none    Pancytopenia: Due to underlying disease and chemotherapy. Transfuse for Hgb <7 g/dL and platelets <10k.  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 Plan for Eltrombopag 50mg daily, rx sent to [facility]- they are still waiting for this medication and for [facility] to help financially.   Hgb 7.7 with symptomatic anemiaso will arrange for 1 unit PRBC    Diarrhea: Suspect aGVHD. To be cautious, stool culture/C Diff studies were ordered and resulted negative. He resumed budesonide 3mg TID on 3/10/25 and start dicyclomine PRN. He can now take imodium with negative infectious work up. Will continue budesonide over the weekend then can consider tapering again next week.     Hypertension: (improved) Was newly hypertensive in hospital following SCT at which time he was started on amlodipine. He has been persistently hypotensive since discharge. Discontinued amlodipine, normotensive.  Will continue to monitor.    Cardiomyopathy: Echo 2/26/25 obtained due to tachycardia revealed reduced LVEF 45-50%. No signs/symptoms of HFrEF. Repeat echo in 3 months (5/2025).    Staphylococcus Skin Infection: Patient had MRSA while in the hospital and was treated with doxycycline, concern for recurrence on recent exam and has once again resolved following Doxycyline 100mg PO BID. He has now stopped doxycycline and the spot is gone.     Follow up: Twice weekly follow up with labs.    Orders Placed:      No orders of the defined types were placed in this  encounter.         40 minutes of total time spent on the encounter, which includes face to face time and non-face to face time preparing to see the patient (eg, review of tests), Obtaining and/or reviewing separately obtained history, Documenting clinical information in the electronic or other health record, Independently interpreting results (not separately reported) and communicating results to the patient/family/caregiver, or Care coordination with other physicians involved in his care (not separately reported).     Opal Alonzo PA-C  Malignant Hematology, Stem Cell Transplant, and Cellular Therapy  [facility]

## 2025-03-14 ENCOUNTER — INFUSION (OUTPATIENT)
Dept: INFUSION THERAPY | Facility: HOSPITAL | Age: 55
End: 2025-03-14
Payer: COMMERCIAL

## 2025-03-14 VITALS
SYSTOLIC BLOOD PRESSURE: 135 MMHG | HEART RATE: 91 BPM | OXYGEN SATURATION: 96 % | DIASTOLIC BLOOD PRESSURE: 87 MMHG | RESPIRATION RATE: 16 BRPM | TEMPERATURE: 98 F

## 2025-03-14 DIAGNOSIS — D64.9 ANEMIA: ICD-10-CM

## 2025-03-14 LAB
BLD PROD TYP BPU: NORMAL
BLOOD UNIT EXPIRATION DATE: NORMAL
BLOOD UNIT TYPE CODE: 5100
BLOOD UNIT TYPE: NORMAL
CMV DNA SPEC QL NAA+PROBE: NORMAL
CODING SYSTEM: NORMAL
CROSSMATCH INTERPRETATION: NORMAL
CYTOMEGALOVIRUS PCR, QUANT: NOT DETECTED IU/ML
DISPENSE STATUS: NORMAL
EPSTEIN-BARR VIRUS DNA: NORMAL
EPSTEIN-BARR VIRUS PCR, QUANT: NOT DETECTED IU/ML
NUM UNITS TRANS PACKED RBC: NORMAL

## 2025-03-14 PROCEDURE — P9040 RBC LEUKOREDUCED IRRADIATED: HCPCS | Performed by: INTERNAL MEDICINE

## 2025-03-14 PROCEDURE — 86920 COMPATIBILITY TEST SPIN: CPT | Performed by: INTERNAL MEDICINE

## 2025-03-14 RX ORDER — ACETAMINOPHEN 325 MG/1
650 TABLET ORAL
Status: CANCELLED | OUTPATIENT
Start: 2025-03-14

## 2025-03-14 RX ORDER — SODIUM CHLORIDE 0.9 % (FLUSH) 0.9 %
10 SYRINGE (ML) INJECTION
Status: DISCONTINUED | OUTPATIENT
Start: 2025-03-14 | End: 2025-03-14 | Stop reason: HOSPADM

## 2025-03-14 RX ORDER — DIPHENHYDRAMINE HCL 25 MG
25 CAPSULE ORAL
Status: DISCONTINUED | OUTPATIENT
Start: 2025-03-14 | End: 2025-03-14 | Stop reason: HOSPADM

## 2025-03-14 RX ORDER — HYDROCODONE BITARTRATE AND ACETAMINOPHEN 500; 5 MG/1; MG/1
TABLET ORAL ONCE
Status: DISCONTINUED | OUTPATIENT
Start: 2025-03-14 | End: 2025-03-14 | Stop reason: HOSPADM

## 2025-03-14 RX ORDER — SODIUM CHLORIDE 0.9 % (FLUSH) 0.9 %
10 SYRINGE (ML) INJECTION
Status: CANCELLED | OUTPATIENT
Start: 2025-03-14

## 2025-03-14 RX ORDER — DIPHENHYDRAMINE HCL 25 MG
25 CAPSULE ORAL
Status: CANCELLED | OUTPATIENT
Start: 2025-03-14

## 2025-03-14 RX ORDER — ACETAMINOPHEN 325 MG/1
650 TABLET ORAL
Status: DISCONTINUED | OUTPATIENT
Start: 2025-03-14 | End: 2025-03-14 | Stop reason: HOSPADM

## 2025-03-14 RX ORDER — HYDROCODONE BITARTRATE AND ACETAMINOPHEN 500; 5 MG/1; MG/1
TABLET ORAL ONCE
Status: CANCELLED | OUTPATIENT
Start: 2025-03-14 | End: 2025-03-14

## 2025-03-17 ENCOUNTER — OFFICE VISIT (OUTPATIENT)
Dept: HEMATOLOGY/ONCOLOGY | Facility: CLINIC | Age: 55
End: 2025-03-17
Payer: COMMERCIAL

## 2025-03-17 ENCOUNTER — INFUSION (OUTPATIENT)
Dept: INFUSION THERAPY | Facility: HOSPITAL | Age: 55
End: 2025-03-17
Payer: COMMERCIAL

## 2025-03-17 VITALS
DIASTOLIC BLOOD PRESSURE: 82 MMHG | RESPIRATION RATE: 16 BRPM | OXYGEN SATURATION: 98 % | WEIGHT: 170.94 LBS | BODY MASS INDEX: 24.47 KG/M2 | SYSTOLIC BLOOD PRESSURE: 127 MMHG | HEIGHT: 70 IN | TEMPERATURE: 98 F | HEART RATE: 78 BPM

## 2025-03-17 DIAGNOSIS — Z94.84 HISTORY OF ALLOGENEIC STEM CELL TRANSPLANT: Primary | ICD-10-CM

## 2025-03-17 DIAGNOSIS — E27.9 ADRENAL NODULE: Primary | ICD-10-CM

## 2025-03-17 DIAGNOSIS — D89.810 ACUTE GVHD: ICD-10-CM

## 2025-03-17 DIAGNOSIS — Z94.84 HISTORY OF ALLOGENEIC STEM CELL TRANSPLANT: ICD-10-CM

## 2025-03-17 DIAGNOSIS — Z76.82 STEM CELL TRANSPLANT CANDIDATE: ICD-10-CM

## 2025-03-17 DIAGNOSIS — D61.810 PANCYTOPENIA DUE TO CHEMOTHERAPY: ICD-10-CM

## 2025-03-17 DIAGNOSIS — D47.1 PRIMARY MYELOFIBROSIS: ICD-10-CM

## 2025-03-17 DIAGNOSIS — D75.81 MF (MYELOFIBROSIS): ICD-10-CM

## 2025-03-17 LAB
ABO + RH BLD: NORMAL
ALBUMIN SERPL BCP-MCNC: 3.8 G/DL (ref 3.5–5.2)
ALP SERPL-CCNC: 99 U/L (ref 40–150)
ALT SERPL W/O P-5'-P-CCNC: 64 U/L (ref 10–44)
ANION GAP SERPL CALC-SCNC: 9 MMOL/L (ref 8–16)
AST SERPL-CCNC: 44 U/L (ref 10–40)
BASOPHILS # BLD AUTO: 0 K/UL (ref 0–0.2)
BASOPHILS NFR BLD: 0 % (ref 0–1.9)
BILIRUB SERPL-MCNC: 0.5 MG/DL (ref 0.1–1)
BLD GP AB SCN CELLS X3 SERPL QL: NORMAL
BUN SERPL-MCNC: 21 MG/DL (ref 6–20)
CALCIUM SERPL-MCNC: 9 MG/DL (ref 8.7–10.5)
CHLORIDE SERPL-SCNC: 109 MMOL/L (ref 95–110)
CO2 SERPL-SCNC: 20 MMOL/L (ref 23–29)
CREAT SERPL-MCNC: 0.7 MG/DL (ref 0.5–1.4)
DIFFERENTIAL METHOD BLD: ABNORMAL
EOSINOPHIL # BLD AUTO: 0 K/UL (ref 0–0.5)
EOSINOPHIL NFR BLD: 0.2 % (ref 0–8)
ERYTHROCYTE [DISTWIDTH] IN BLOOD BY AUTOMATED COUNT: 16.4 % (ref 11.5–14.5)
EST. GFR  (NO RACE VARIABLE): >60 ML/MIN/1.73 M^2
GLUCOSE SERPL-MCNC: 98 MG/DL (ref 70–110)
HCT VFR BLD AUTO: 27.4 % (ref 40–54)
HGB BLD-MCNC: 8.9 G/DL (ref 14–18)
IMM GRANULOCYTES # BLD AUTO: 0.02 K/UL (ref 0–0.04)
IMM GRANULOCYTES NFR BLD AUTO: 0.5 % (ref 0–0.5)
LYMPHOCYTES # BLD AUTO: 0.7 K/UL (ref 1–4.8)
LYMPHOCYTES NFR BLD: 15.8 % (ref 18–48)
MAGNESIUM SERPL-MCNC: 1.6 MG/DL (ref 1.6–2.6)
MCH RBC QN AUTO: 31.3 PG (ref 27–31)
MCHC RBC AUTO-ENTMCNC: 32.5 G/DL (ref 32–36)
MCV RBC AUTO: 97 FL (ref 82–98)
MONOCYTES # BLD AUTO: 0.4 K/UL (ref 0.3–1)
MONOCYTES NFR BLD: 9 % (ref 4–15)
NEUTROPHILS # BLD AUTO: 3.3 K/UL (ref 1.8–7.7)
NEUTROPHILS NFR BLD: 74.5 % (ref 38–73)
NRBC BLD-RTO: 0 /100 WBC
PHOSPHATE SERPL-MCNC: 2.9 MG/DL (ref 2.7–4.5)
PLATELET # BLD AUTO: 52 K/UL (ref 150–450)
PMV BLD AUTO: 10.7 FL (ref 9.2–12.9)
POTASSIUM SERPL-SCNC: 4.2 MMOL/L (ref 3.5–5.1)
PROT SERPL-MCNC: 6.7 G/DL (ref 6–8.4)
RBC # BLD AUTO: 2.84 M/UL (ref 4.6–6.2)
SODIUM SERPL-SCNC: 138 MMOL/L (ref 136–145)
SPECIMEN OUTDATE: NORMAL
TACROLIMUS BLD-MCNC: 8.2 NG/ML (ref 5–15)
WBC # BLD AUTO: 4.43 K/UL (ref 3.9–12.7)

## 2025-03-17 PROCEDURE — 85025 COMPLETE CBC W/AUTO DIFF WBC: CPT

## 2025-03-17 PROCEDURE — 83735 ASSAY OF MAGNESIUM: CPT

## 2025-03-17 PROCEDURE — 99999 PR PBB SHADOW E&M-EST. PATIENT-LVL IV: CPT | Mod: PBBFAC,,, | Performed by: INTERNAL MEDICINE

## 2025-03-17 PROCEDURE — 3008F BODY MASS INDEX DOCD: CPT | Mod: CPTII,S$GLB,, | Performed by: INTERNAL MEDICINE

## 2025-03-17 PROCEDURE — 1159F MED LIST DOCD IN RCRD: CPT | Mod: CPTII,S$GLB,, | Performed by: INTERNAL MEDICINE

## 2025-03-17 PROCEDURE — 99215 OFFICE O/P EST HI 40 MIN: CPT | Mod: S$GLB,,, | Performed by: INTERNAL MEDICINE

## 2025-03-17 PROCEDURE — 86850 RBC ANTIBODY SCREEN: CPT

## 2025-03-17 PROCEDURE — G2211 COMPLEX E/M VISIT ADD ON: HCPCS | Mod: S$GLB,,, | Performed by: INTERNAL MEDICINE

## 2025-03-17 PROCEDURE — 87799 DETECT AGENT NOS DNA QUANT: CPT | Performed by: INTERNAL MEDICINE

## 2025-03-17 PROCEDURE — 96523 IRRIG DRUG DELIVERY DEVICE: CPT

## 2025-03-17 PROCEDURE — 3074F SYST BP LT 130 MM HG: CPT | Mod: CPTII,S$GLB,, | Performed by: INTERNAL MEDICINE

## 2025-03-17 PROCEDURE — 1160F RVW MEDS BY RX/DR IN RCRD: CPT | Mod: CPTII,S$GLB,, | Performed by: INTERNAL MEDICINE

## 2025-03-17 PROCEDURE — 36592 COLLECT BLOOD FROM PICC: CPT

## 2025-03-17 PROCEDURE — 80197 ASSAY OF TACROLIMUS: CPT | Performed by: INTERNAL MEDICINE

## 2025-03-17 PROCEDURE — 84100 ASSAY OF PHOSPHORUS: CPT

## 2025-03-17 PROCEDURE — 80053 COMPREHEN METABOLIC PANEL: CPT

## 2025-03-17 PROCEDURE — 3079F DIAST BP 80-89 MM HG: CPT | Mod: CPTII,S$GLB,, | Performed by: INTERNAL MEDICINE

## 2025-03-17 RX ORDER — TACROLIMUS 0.5 MG/1
CAPSULE ORAL
Qty: 210 CAPSULE | Refills: 11 | Status: SHIPPED | OUTPATIENT
Start: 2025-03-17 | End: 2026-03-17

## 2025-03-17 RX ORDER — BUDESONIDE 3 MG/1
3 CAPSULE, COATED PELLETS ORAL 2 TIMES DAILY
Start: 2025-03-17

## 2025-03-17 RX ORDER — HEPARIN 100 UNIT/ML
500 SYRINGE INTRAVENOUS
Status: CANCELLED | OUTPATIENT
Start: 2025-03-17

## 2025-03-17 RX ORDER — HEPARIN 100 UNIT/ML
500 SYRINGE INTRAVENOUS
Status: DISCONTINUED | OUTPATIENT
Start: 2025-03-17 | End: 2025-03-17 | Stop reason: HOSPADM

## 2025-03-17 RX ORDER — SODIUM CHLORIDE 0.9 % (FLUSH) 0.9 %
10 SYRINGE (ML) INJECTION
Status: DISCONTINUED | OUTPATIENT
Start: 2025-03-17 | End: 2025-03-17 | Stop reason: HOSPADM

## 2025-03-17 RX ORDER — SODIUM CHLORIDE 0.9 % (FLUSH) 0.9 %
10 SYRINGE (ML) INJECTION
Status: CANCELLED | OUTPATIENT
Start: 2025-03-17

## 2025-03-17 NOTE — NURSING
Labs drawn from CL, site clean and free of infection.  Sterile dressing changed.  Pt updated on plan of care.

## 2025-03-18 NOTE — PROGRESS NOTES
Section of Hematology and Stem Cell Transplantation    Post-Transplantation Follow Up Visit     3/17/25    Transplant History:   Primary oncologist: Clyde James MD  Primary oncologic diagnosis: primary myelofibrosis  Transplant date: 12/26/2024  Donor: haploidentical  Blood Type (Patient): O +  Blood Type (Donor): O +  CMV (Patient): Negative  CMV (Donor): Positive  Graft source: Peripheral blood  CD34+ cell dose: 6.04 X10^6 cells/kg  Conditioning Regimen:  Thiotepa, Busulfan, Fludarabine  GVHD prophylaxis: Post-transplant cyclophosphamide, MMF, Tacrolimus  Immediate post-transplant complications: mucositis, R axillary skin/soft tissue infection secondary to MRSA    History of Present Ilness:   Rubén Petres) is a pleasant 54 y.o.male with primary myelofibrosis who is status post haploidentical stem cell transplantation conditioned with  Bu/Flu/TT  who is currently day +81 who presents for post-transplant follow up.    He is feeling better overall. He reports no diarrhea since increase in budesonide to three times daily dosing. He continues to experience mild reflux symptoms, controlled with Tums. He is not using dicyclomine.       PAST MEDICAL HISTORY:   Past Medical History:   Diagnosis Date    Abdominal pain 01/02/2025    Allergic contact dermatitis due to adhesives 12/31/2024    Cancer     Flatulence 01/03/2025    Headache 12/27/2024    Hyperlipidemia     Mucositis 01/02/2025    Other constipation 12/19/2024    Thrombocytosis 12/18/2024    Transaminitis 12/27/2024       PAST SURGICAL HISTORY:   Past Surgical History:   Procedure Laterality Date    BONE MARROW BIOPSY N/A 11/27/2024    Procedure: Biopsy-bone marrow;  Surgeon: Clyde James MD;  Location: [facility] ([address] [place] [address]);  Service: Oncology;  Laterality: N/A;  11/20-pre call complete-tb    COLONOSCOPY N/A 8/9/2023    Procedure: COLONOSCOPY;  Surgeon: Will Ardon MD;  Location: [facility] ([address] [place] [address]);  Service: Endoscopy;  Laterality: N/A;   Suprep Instructions sent via portal / Authorizing:     Bebeto Arora MD in [facility] FAMILY MED/ INTERNAL MED/ PEDS  Referral:          [id number]    EYE SURGERY      FLEXIBLE SIGMOIDOSCOPY N/A 7/23/2024    Procedure: SIGMOIDOSCOPY, FLEXIBLE;  Surgeon: Nirali Vicente MD;  Location: [facility];  Service: Endoscopy;  Laterality: N/A;    INSERTION OF LONGORIA CATHETER Right 12/18/2024    Procedure: INSERTION, CATHETER, CENTRAL VENOUS, LONGORIA TRIPLE LUMEN, Bard 12.5 fr Longoria Cath model [id number];  Surgeon: Willie Hadley MD;  Location: [facility] OR [address] [place] [address];  Service: General;  Laterality: Right;       PAST SOCIAL HISTORY:  Social History     Tobacco Use    Smoking status: Former     Types: Cigars, Cigarettes    Smokeless tobacco: Never   Substance Use Topics    Alcohol use: Not Currently     Comment: socially    Drug use: Never       FAMILY HISTORY:  Family History   Problem Relation Name Age of Onset    Arthritis Mother      COPD Father      Testicular cancer Maternal Cousin Peter 17        Chemotherapy    Breast cancer Maternal Cousin  51        Chemotherapy    Heart disease Maternal Grandfather      Stroke Maternal Grandmother         CURRENT MEDICATIONS:   Current Outpatient Medications   Medication Sig    acyclovir (ZOVIRAX) 800 MG Tab Take 1 tablet (800 mg total) by mouth 2 (two) times daily.    dapsone 100 MG Tab Take 1 tablet (100 mg total) by mouth once daily.    eltrombopag olamine (PROMACTA) 50 MG Tab Take 1 tablet (50 mg total) by mouth once daily.    letermovir (PREVYMIS) 480 mg Tab Take 1 tablet (480 mg total) by mouth once daily.    magnesium oxide (MAG-OX) 400 mg (241.3 mg magnesium) tablet Take 2 tablets in the morning, 1 tablet in the afternoon, and 2 tablets in the evening.    ondansetron (ZOFRAN) 8 MG tablet Take 1 tablet (8 mg total) by mouth every 8 (eight) hours as needed for Nausea.    prochlorperazine (COMPAZINE) 10 MG tablet Take 1 tablet (10 mg total) by mouth 4 (four) times daily as needed for  Nausea.    traMADoL (ULTRAM) 50 mg tablet Take 1 tablet (50 mg total) by mouth every 6 (six) hours as needed for Pain.    triamcinolone acetonide 0.1% (KENALOG) 0.1 % cream Apply topically 2 (two) times daily as needed (rash on arms, chest, back, trunk, and legs (do not apply to face)).    voriconazole (VFEND) 200 MG Tab Take 1 tablet (200 mg total) by mouth 2 (two) times daily.    budesonide (ENTOCORT EC) 3 mg capsule Take 1 capsule (3 mg total) by mouth 2 (two) times a day.    tacrolimus (PROGRAF) 0.5 MG Cap Take 3 capsules (1.5 mg total) by mouth every morning AND 4 capsules (2 mg total) every evening.     No current facility-administered medications for this visit.       ALLERGIES:   Review of patient's allergies indicates:   Allergen Reactions    Sulfamethoxazole-trimethoprim Hives       GVHD Review of Systems:     Pertinent positives and negatives included in the HPI. Otherwise a 14 point review of systems is negative. GVHD review of systems recorded in BMT flowsheet.     Physical Exam:     Vitals:    03/17/25 1009   BP: 127/82   Pulse: 78   Resp: 16   Temp: 97.6 °F (36.4 °C)         General: Appears well, NAD  HEENT: MMM, no OP lesions  Pulmonary: CTAB, no increased work of breathing, no W/R/C  Cardiovascular: S1S2 normal, RRR no M/R/G  Abdominal: Soft, NT, ND, BS+, no HSM  Extremities: No C/C/E  Neurological: AAOx4, grossly normal, no focal deficits  Dermatologic: Hyperpigmentation around neck, upper chest - improved    ECOG Performance Status: (foot note - ECOG PS provided by Eastern Cooperative Oncology Group) 1 - Symptomatic but completely ambulatory    Karnofsky Performance Score:  80%- Normal Activity with Effort: Some Symptoms of Disease    Labs:   Lab Results   Component Value Date    WBC 4.43 03/17/2025    RBC 2.84 (L) 03/17/2025    HGB 8.9 (L) 03/17/2025    HCT 27.4 (L) 03/17/2025    MCV 97 03/17/2025    MCH 31.3 (H) 03/17/2025    MCHC 32.5 03/17/2025    RDW 16.4 (H) 03/17/2025    PLT 52 (L)  03/17/2025    MPV 10.7 03/17/2025    GRAN 3.3 03/17/2025    GRAN 74.5 (H) 03/17/2025    LYMPH 0.7 (L) 03/17/2025    LYMPH 15.8 (L) 03/17/2025    MONO 0.4 03/17/2025    MONO 9.0 03/17/2025    EOS 0.0 03/17/2025    BASO 0.00 03/17/2025    EOSINOPHIL 0.2 03/17/2025    BASOPHIL 0.0 03/17/2025       Sodium   Date Value Ref Range Status   03/17/2025 138 136 - 145 mmol/L Final     Potassium   Date Value Ref Range Status   03/17/2025 4.2 3.5 - 5.1 mmol/L Final     Chloride   Date Value Ref Range Status   03/17/2025 109 95 - 110 mmol/L Final     CO2   Date Value Ref Range Status   03/17/2025 20 (L) 23 - 29 mmol/L Final     Glucose   Date Value Ref Range Status   03/17/2025 98 70 - 110 mg/dL Final     BUN   Date Value Ref Range Status   03/17/2025 21 (H) 6 - 20 mg/dL Final     Creatinine   Date Value Ref Range Status   03/17/2025 0.7 0.5 - 1.4 mg/dL Final     Calcium   Date Value Ref Range Status   03/17/2025 9.0 8.7 - 10.5 mg/dL Final     Total Protein   Date Value Ref Range Status   03/17/2025 6.7 6.0 - 8.4 g/dL Final     Albumin   Date Value Ref Range Status   03/17/2025 3.8 3.5 - 5.2 g/dL Final     Total Bilirubin   Date Value Ref Range Status   03/17/2025 0.5 0.1 - 1.0 mg/dL Final     Comment:     For infants and newborns, interpretation of results should be based  on gestational age, weight and in agreement with clinical  observations.    Premature Infant recommended reference ranges:  Up to 24 hours.............<8.0 mg/dL  Up to 48 hours............<12.0 mg/dL  3-5 days..................<15.0 mg/dL  6-29 days.................<15.0 mg/dL       Alkaline Phosphatase   Date Value Ref Range Status   03/17/2025 99 40 - 150 U/L Final     AST   Date Value Ref Range Status   03/17/2025 44 (H) 10 - 40 U/L Final     ALT   Date Value Ref Range Status   03/17/2025 64 (H) 10 - 44 U/L Final     Anion Gap   Date Value Ref Range Status   03/17/2025 9 8 - 16 mmol/L Final     eGFR if    Date Value Ref Range Status    01/22/2022 >60.0 >60 mL/min/1.73 m^2 Final     eGFR if non    Date Value Ref Range Status   01/22/2022 >60.0 >60 mL/min/1.73 m^2 Final     Comment:     Calculation used to obtain the estimated glomerular filtration  rate (eGFR) is the CKD-EPI equation.          Imaging:   All pertinent studies reviewed and interpreted by me    Acute GVHD Scoring:  GVHD Acute Assessment  02/17/25: aGHVD of skin, ~5% BSA. Resolved with Topical steroids.  02/24/25: aGVHD of GI tract (diarrhea); started budesonide  02/27/25: aGVHD of GI tract resolved; budesonide taper started  3/10/25: aGVHD GI tract present; budesonide 3mg TID resumed     Assessment and Plan:   Rubén Peters) is a pleasant 54 y.o.male with primary myelofibrosis s/p haplo SCT who presents for post-transplant follow up.    Primary myelofibrosis: Status post treatment with ruxolitinib, followed by alloSCT. Day +30 BMBx results below.    Status post allogeneic stem cell transplantation: As noted above, status post haploidentical stem cell transplantation conditioned with  Bu/Flu/TT . Currently day +81. Engrafted on day +22.  Day +30 BMBx on 1/2/25: No definitive evidence of residual JAK2 [id number] primary myelofibrosis. NGS without evidence of pathogenic mutations. Chimerism studies with 100% donor CD33, CD3 insufficient for analysis.     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 MMF has now been discontinued. He is on budesonide for aGVHD. No evidence of aGVHD at this visit. Decrease budesonide to BID.   Current tacro dose: 1.5mg qaM and 2 mg qPM   Last tacro level: 8.2  Adjustments: None    Immunosuppression: Prevention with voriconazole,  acyclovir. CMV prophylaxis with letermovir. PJP prophyalxis dapsone. Continue weekly monitoring of CMV and EBV.  Last CMV: negative (3/13/2025), last EBV: negative (3/13/2025).  Active infections: none    Pancytopenia: Due to underlying disease  and chemotherapy. Transfuse for Hgb <7 g/dL and platelets <10k.  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 Plan for Eltrombopag 50mg daily. Rx sent; pending approval and financial assistance.    Diarrhea: Due to aGVHD and improved/resolved at this visit.     Hypertension: (improved) Was newly hypertensive in hospital following SCT at which time he was started on amlodipine. He has been persistently hypotensive since discharge. Discontinued amlodipine, normotensive.  Will continue to monitor.    Cardiomyopathy: Echo 2/26/25 obtained due to tachycardia revealed reduced LVEF 45-50%. No signs/symptoms of HFrEF. Repeat echo in 3 months (5/2025).    Staphylococcus Skin Infection: Patient had MRSA while in the hospital and was treated with doxycycline, concern for recurrence on recent exam and has once again resolved following Doxycyline 100mg PO BID. He has now stopped doxycycline and the spot is gone.     Follow up: Twice weekly follow up with labs.    Orders Placed:      Orders Placed This Encounter   Procedures    Case Request Endoscopy: Biopsy-bone marrow     Medical Necessity::   Medically Non-Urgent [100]     Case Referring Provider:   PEDRO PABLO JAMES. [[id number]]     Is an on-site pathologist required for this procedure?:   N/A          40 minutes of total time spent on the encounter, which includes face to face time and non-face to face time preparing to see the patient (eg, review of tests), Obtaining and/or reviewing separately obtained history, Documenting clinical information in the electronic or other health record, Independently interpreting results (not separately reported) and communicating results to the patient/family/caregiver, or Care coordination with other physicians involved in his care (not separately reported).     Pedro Pablo James MD  Malignant Hematology, Stem Cell Transplant, and Cellular Therapy  [facility]  [facility]     Route Chart for  Scheduling    BMT Chart Routing  Urgent    Follow up with physician . Please schedule bone marrow biopsy in endoscopy on 4/2 or 4/9   Follow up with REBEKAH    Provider visit type    Infusion scheduling note    Injection scheduling note    Labs    Imaging    Pharmacy appointment    Other referrals                    Treatment Plan Information   OP Adult Blood - Twice Weekly Clyde James MD   Associated Diagnosis: Anemia associated with chemotherapy   noted on 2/4/2025   Line of treatment: Supportive Care  Treatment Goal: Supportive     Upcoming Treatment Dates - OP Adult Blood - Twice Weekly    3/15/2025       Prepare RBC       Transfuse RBC 1 Unit  3/19/2025       Prepare RBC       Transfuse RBC 1 Unit  3/22/2025       Prepare RBC       Transfuse RBC 1 Unit  3/26/2025       Prepare RBC       Transfuse RBC 1 Unit    Therapy Plan Information  PORT FLUSH for Adrenal nodule, noted on 12/9/2024  Flushes  heparin, porcine (PF) 100 unit/mL injection flush 500 Units  500 Units, Intravenous, Every visit  sodium chloride 0.9% flush 10 mL  10 mL, Intravenous, Every visit      No therapy plan of the specified type found.    No therapy plan of the specified type found.

## 2025-03-20 ENCOUNTER — INFUSION (OUTPATIENT)
Dept: INFUSION THERAPY | Facility: HOSPITAL | Age: 55
End: 2025-03-20
Payer: COMMERCIAL

## 2025-03-20 ENCOUNTER — OFFICE VISIT (OUTPATIENT)
Dept: HEMATOLOGY/ONCOLOGY | Facility: CLINIC | Age: 55
End: 2025-03-20
Payer: COMMERCIAL

## 2025-03-20 ENCOUNTER — TELEPHONE (OUTPATIENT)
Dept: SURGERY | Facility: CLINIC | Age: 55
End: 2025-03-20
Payer: COMMERCIAL

## 2025-03-20 VITALS
TEMPERATURE: 98 F | SYSTOLIC BLOOD PRESSURE: 142 MMHG | HEIGHT: 70 IN | DIASTOLIC BLOOD PRESSURE: 80 MMHG | WEIGHT: 175.25 LBS | HEART RATE: 92 BPM | OXYGEN SATURATION: 97 % | BODY MASS INDEX: 25.09 KG/M2 | RESPIRATION RATE: 18 BRPM

## 2025-03-20 DIAGNOSIS — E27.9 ADRENAL NODULE: Primary | ICD-10-CM

## 2025-03-20 DIAGNOSIS — D47.1 PRIMARY MYELOFIBROSIS: ICD-10-CM

## 2025-03-20 DIAGNOSIS — I10 HYPERTENSION, UNSPECIFIED TYPE: ICD-10-CM

## 2025-03-20 DIAGNOSIS — I42.9 CARDIOMYOPATHY, UNSPECIFIED TYPE: ICD-10-CM

## 2025-03-20 DIAGNOSIS — D84.81 IMMUNODEFICIENCY DUE TO CONDITIONS CLASSIFIED ELSEWHERE: ICD-10-CM

## 2025-03-20 DIAGNOSIS — D61.810 PANCYTOPENIA DUE TO CHEMOTHERAPY: ICD-10-CM

## 2025-03-20 DIAGNOSIS — Z76.82 STEM CELL TRANSPLANT CANDIDATE: ICD-10-CM

## 2025-03-20 DIAGNOSIS — Z94.84 HISTORY OF ALLOGENEIC STEM CELL TRANSPLANT: ICD-10-CM

## 2025-03-20 DIAGNOSIS — Z94.84 HISTORY OF ALLOGENEIC STEM CELL TRANSPLANT: Primary | ICD-10-CM

## 2025-03-20 DIAGNOSIS — D75.81 MF (MYELOFIBROSIS): ICD-10-CM

## 2025-03-20 DIAGNOSIS — D69.3 ACUTE ITP: ICD-10-CM

## 2025-03-20 DIAGNOSIS — D89.810 ACUTE GVHD: ICD-10-CM

## 2025-03-20 DIAGNOSIS — R19.7 DIARRHEA, UNSPECIFIED TYPE: ICD-10-CM

## 2025-03-20 LAB
ABO + RH BLD: NORMAL
ALBUMIN SERPL BCP-MCNC: 3.7 G/DL (ref 3.5–5.2)
ALP SERPL-CCNC: 122 U/L (ref 40–150)
ALT SERPL W/O P-5'-P-CCNC: 85 U/L (ref 10–44)
ANION GAP SERPL CALC-SCNC: 9 MMOL/L (ref 8–16)
AST SERPL-CCNC: 59 U/L (ref 10–40)
BASOPHILS # BLD AUTO: 0.02 K/UL (ref 0–0.2)
BASOPHILS NFR BLD: 0.4 % (ref 0–1.9)
BILIRUB SERPL-MCNC: 0.4 MG/DL (ref 0.1–1)
BLD GP AB SCN CELLS X3 SERPL QL: NORMAL
BUN SERPL-MCNC: 21 MG/DL (ref 6–20)
CALCIUM SERPL-MCNC: 9.2 MG/DL (ref 8.7–10.5)
CHLORIDE SERPL-SCNC: 108 MMOL/L (ref 95–110)
CO2 SERPL-SCNC: 21 MMOL/L (ref 23–29)
CREAT SERPL-MCNC: 0.8 MG/DL (ref 0.5–1.4)
DIFFERENTIAL METHOD BLD: ABNORMAL
EOSINOPHIL # BLD AUTO: 0 K/UL (ref 0–0.5)
EOSINOPHIL NFR BLD: 0.6 % (ref 0–8)
ERYTHROCYTE [DISTWIDTH] IN BLOOD BY AUTOMATED COUNT: 16.5 % (ref 11.5–14.5)
EST. GFR  (NO RACE VARIABLE): >60 ML/MIN/1.73 M^2
GLUCOSE SERPL-MCNC: 101 MG/DL (ref 70–110)
HCT VFR BLD AUTO: 25.1 % (ref 40–54)
HGB BLD-MCNC: 8.3 G/DL (ref 14–18)
IMM GRANULOCYTES # BLD AUTO: 0.21 K/UL (ref 0–0.04)
IMM GRANULOCYTES NFR BLD AUTO: 4.2 % (ref 0–0.5)
LYMPHOCYTES # BLD AUTO: 0.6 K/UL (ref 1–4.8)
LYMPHOCYTES NFR BLD: 12.1 % (ref 18–48)
MAGNESIUM SERPL-MCNC: 1.6 MG/DL (ref 1.6–2.6)
MCH RBC QN AUTO: 31.9 PG (ref 27–31)
MCHC RBC AUTO-ENTMCNC: 33.1 G/DL (ref 32–36)
MCV RBC AUTO: 97 FL (ref 82–98)
MONOCYTES # BLD AUTO: 0.5 K/UL (ref 0.3–1)
MONOCYTES NFR BLD: 10.3 % (ref 4–15)
NEUTROPHILS # BLD AUTO: 3.7 K/UL (ref 1.8–7.7)
NEUTROPHILS NFR BLD: 72.4 % (ref 38–73)
NRBC BLD-RTO: 0 /100 WBC
PHOSPHATE SERPL-MCNC: 3.3 MG/DL (ref 2.7–4.5)
PLATELET # BLD AUTO: 56 K/UL (ref 150–450)
PMV BLD AUTO: 10.8 FL (ref 9.2–12.9)
POTASSIUM SERPL-SCNC: 4.2 MMOL/L (ref 3.5–5.1)
PROT SERPL-MCNC: 6.6 G/DL (ref 6–8.4)
RBC # BLD AUTO: 2.6 M/UL (ref 4.6–6.2)
SODIUM SERPL-SCNC: 138 MMOL/L (ref 136–145)
SPECIMEN OUTDATE: NORMAL
TACROLIMUS BLD-MCNC: 10.2 NG/ML (ref 5–15)
WBC # BLD AUTO: 5.06 K/UL (ref 3.9–12.7)

## 2025-03-20 PROCEDURE — 63600175 PHARM REV CODE 636 W HCPCS

## 2025-03-20 PROCEDURE — 86901 BLOOD TYPING SEROLOGIC RH(D): CPT | Performed by: INTERNAL MEDICINE

## 2025-03-20 PROCEDURE — 85025 COMPLETE CBC W/AUTO DIFF WBC: CPT | Performed by: INTERNAL MEDICINE

## 2025-03-20 PROCEDURE — 84100 ASSAY OF PHOSPHORUS: CPT | Performed by: INTERNAL MEDICINE

## 2025-03-20 PROCEDURE — 25000003 PHARM REV CODE 250

## 2025-03-20 PROCEDURE — 80053 COMPREHEN METABOLIC PANEL: CPT | Performed by: INTERNAL MEDICINE

## 2025-03-20 PROCEDURE — 80197 ASSAY OF TACROLIMUS: CPT | Performed by: INTERNAL MEDICINE

## 2025-03-20 PROCEDURE — 36592 COLLECT BLOOD FROM PICC: CPT

## 2025-03-20 PROCEDURE — A4216 STERILE WATER/SALINE, 10 ML: HCPCS

## 2025-03-20 PROCEDURE — 99999 PR PBB SHADOW E&M-EST. PATIENT-LVL IV: CPT | Mod: PBBFAC,,, | Performed by: PHYSICIAN ASSISTANT

## 2025-03-20 PROCEDURE — 83735 ASSAY OF MAGNESIUM: CPT | Performed by: INTERNAL MEDICINE

## 2025-03-20 RX ORDER — SODIUM CHLORIDE 0.9 % (FLUSH) 0.9 %
10 SYRINGE (ML) INJECTION
OUTPATIENT
Start: 2025-03-20

## 2025-03-20 RX ORDER — SODIUM CHLORIDE 0.9 % (FLUSH) 0.9 %
10 SYRINGE (ML) INJECTION
Status: DISCONTINUED | OUTPATIENT
Start: 2025-03-20 | End: 2025-03-20 | Stop reason: HOSPADM

## 2025-03-20 RX ORDER — HEPARIN 100 UNIT/ML
500 SYRINGE INTRAVENOUS
Status: DISCONTINUED | OUTPATIENT
Start: 2025-03-20 | End: 2025-03-20 | Stop reason: HOSPADM

## 2025-03-20 RX ORDER — HEPARIN 100 UNIT/ML
500 SYRINGE INTRAVENOUS
OUTPATIENT
Start: 2025-03-20

## 2025-03-20 RX ADMIN — HEPARIN SODIUM (PORCINE) LOCK FLUSH IV SOLN 100 UNIT/ML 500 UNITS: 100 SOLUTION at 08:03

## 2025-03-20 RX ADMIN — SODIUM CHLORIDE, PRESERVATIVE FREE 10 ML: 5 INJECTION INTRAVENOUS at 08:03

## 2025-03-20 NOTE — PROGRESS NOTES
BMT Pharmacist Immunosuppression Note:    Reviewed patient's tacrolimus level with Dr. James and it is within goal range. Instructed patient to continue your current regimen of 3 capsules (1.5mg) in the morning and 4 capsules (2mg) in the evening.       Carolina Carson Pharm.D., [facility]  Clinical Pharmacy Specialist, Bone Marrow Transplant/Cellular Therapy  [facility]  SpectraLink: [phone]

## 2025-03-20 NOTE — TELEPHONE ENCOUNTER
Spoke to the pt. Advised that Dr. Hadley's next available removal date was not until 5/8 but I will work on getting him in sooner and call back to give information. Pt v/u.

## 2025-03-20 NOTE — PROGRESS NOTES
Section of Hematology and Stem Cell Transplantation    Post-Transplantation Follow Up Visit     3/20/25    Transplant History:   Primary oncologist: Clyde James MD  Primary oncologic diagnosis: primary myelofibrosis  Transplant date: 12/26/2024  Donor: haploidentical  Blood Type (Patient): O +  Blood Type (Donor): O +  CMV (Patient): Negative  CMV (Donor): Positive  Graft source: Peripheral blood  CD34+ cell dose: 6.04 X10^6 cells/kg  Conditioning Regimen:  Thiotepa, Busulfan, Fludarabine  GVHD prophylaxis: Post-transplant cyclophosphamide, MMF, Tacrolimus  Immediate post-transplant complications: mucositis, R axillary skin/soft tissue infection secondary to MRSA    History of Present Ilness:   Rubén Peters) is a pleasant 54 y.o.male with primary myelofibrosis who is status post haploidentical stem cell transplantation conditioned with  Bu/Flu/TT  who is currently day +81 who presents for post-transplant follow up.    He is feeling better overall. Budesonide tapered to 3mg BID and diarrhea remains resolved. He has gained back the 5lbs he lost while having frequent diarrhea. Otherwise generally no concerns.     PAST MEDICAL HISTORY:   Past Medical History:   Diagnosis Date    Abdominal pain 01/02/2025    Allergic contact dermatitis due to adhesives 12/31/2024    Cancer     Flatulence 01/03/2025    Headache 12/27/2024    Hyperlipidemia     Mucositis 01/02/2025    Other constipation 12/19/2024    Thrombocytosis 12/18/2024    Transaminitis 12/27/2024       PAST SURGICAL HISTORY:   Past Surgical History:   Procedure Laterality Date    BONE MARROW BIOPSY N/A 11/27/2024    Procedure: Biopsy-bone marrow;  Surgeon: Clyde James MD;  Location: [facility] ([address] [place] [address]);  Service: Oncology;  Laterality: N/A;  11/20-pre call complete-tb    COLONOSCOPY N/A 8/9/2023    Procedure: COLONOSCOPY;  Surgeon: Will Ardon MD;  Location: [facility] ([address] [place] [address]);  Service: Endoscopy;  Laterality: N/A;  Suprep Instructions sent  via portal / Authorizing:     Bebeto Arora MD in [facility] FAMILY MED/ INTERNAL MED/ PEDS  Referral:          [id number]    EYE SURGERY      FLEXIBLE SIGMOIDOSCOPY N/A 7/23/2024    Procedure: SIGMOIDOSCOPY, FLEXIBLE;  Surgeon: Nirali Vicente MD;  Location: [facility];  Service: Endoscopy;  Laterality: N/A;    INSERTION OF LONGORIA CATHETER Right 12/18/2024    Procedure: INSERTION, CATHETER, CENTRAL VENOUS, LONGORIA TRIPLE LUMEN, Bard 12.5 fr Longoria Cath model [id number];  Surgeon: Willie Hadley MD;  Location: [facility] OR [address] [place] [address];  Service: General;  Laterality: Right;       PAST SOCIAL HISTORY:  Social History     Tobacco Use    Smoking status: Former     Types: Cigars, Cigarettes    Smokeless tobacco: Never   Substance Use Topics    Alcohol use: Not Currently     Comment: socially    Drug use: Never       FAMILY HISTORY:  Family History   Problem Relation Name Age of Onset    Arthritis Mother      COPD Father      Testicular cancer Maternal Cousin Peter 17        Chemotherapy    Breast cancer Maternal Cousin  51        Chemotherapy    Heart disease Maternal Grandfather      Stroke Maternal Grandmother         CURRENT MEDICATIONS:   Current Outpatient Medications   Medication Sig    acyclovir (ZOVIRAX) 800 MG Tab Take 1 tablet (800 mg total) by mouth 2 (two) times daily.    budesonide (ENTOCORT EC) 3 mg capsule Take 1 capsule (3 mg total) by mouth 2 (two) times a day.    dapsone 100 MG Tab Take 1 tablet (100 mg total) by mouth once daily.    eltrombopag olamine (PROMACTA) 50 MG Tab Take 1 tablet (50 mg total) by mouth once daily.    letermovir (PREVYMIS) 480 mg Tab Take 1 tablet (480 mg total) by mouth once daily.    magnesium oxide (MAG-OX) 400 mg (241.3 mg magnesium) tablet Take 2 tablets in the morning, 1 tablet in the afternoon, and 2 tablets in the evening.    ondansetron (ZOFRAN) 8 MG tablet Take 1 tablet (8 mg total) by mouth every 8 (eight) hours as needed for Nausea.    prochlorperazine (COMPAZINE) 10 MG tablet  Take 1 tablet (10 mg total) by mouth 4 (four) times daily as needed for Nausea.    tacrolimus (PROGRAF) 0.5 MG Cap Take 3 capsules (1.5 mg total) by mouth every morning AND 4 capsules (2 mg total) every evening.    traMADoL (ULTRAM) 50 mg tablet Take 1 tablet (50 mg total) by mouth every 6 (six) hours as needed for Pain.    triamcinolone acetonide 0.1% (KENALOG) 0.1 % cream Apply topically 2 (two) times daily as needed (rash on arms, chest, back, trunk, and legs (do not apply to face)).    voriconazole (VFEND) 200 MG Tab Take 1 tablet (200 mg total) by mouth 2 (two) times daily.     No current facility-administered medications for this visit.     Facility-Administered Medications Ordered in Other Visits   Medication    heparin, porcine (PF) 100 unit/mL injection flush 500 Units    sodium chloride 0.9% flush 10 mL       ALLERGIES:   Review of patient's allergies indicates:   Allergen Reactions    Sulfamethoxazole-trimethoprim Hives       GVHD Review of Systems:     Pertinent positives and negatives included in the HPI. Otherwise a 14 point review of systems is negative. GVHD review of systems recorded in BMT flowsheet.     Physical Exam:     Vitals:    03/20/25 0916   BP: (!) 142/80   Pulse: 92   Resp: 18   Temp: 98.1 °F (36.7 °C)         General: Appears well, NAD  HEENT: MMM, no OP lesions  Pulmonary: CTAB, no increased work of breathing, no W/R/C  Cardiovascular: S1S2 normal, RRR no M/R/G  Abdominal: Soft, NT, ND, BS+, no HSM  Extremities: No C/C/E  Neurological: AAOx4, grossly normal, no focal deficits  Dermatologic: Hyperpigmentation around neck, upper chest - improved    ECOG Performance Status: (foot note - ECOG PS provided by Eastern Cooperative Oncology Group) 1 - Symptomatic but completely ambulatory    Karnofsky Performance Score:  80%- Normal Activity with Effort: Some Symptoms of Disease    Labs:   Lab Results   Component Value Date    WBC 5.06 03/20/2025    RBC 2.60 (L) 03/20/2025    HGB 8.3 (L)  03/20/2025    HCT 25.1 (L) 03/20/2025    MCV 97 03/20/2025    MCH 31.9 (H) 03/20/2025    MCHC 33.1 03/20/2025    RDW 16.5 (H) 03/20/2025    PLT 56 (L) 03/20/2025    MPV 10.8 03/20/2025    GRAN 3.7 03/20/2025    GRAN 72.4 03/20/2025    LYMPH 0.6 (L) 03/20/2025    LYMPH 12.1 (L) 03/20/2025    MONO 0.5 03/20/2025    MONO 10.3 03/20/2025    EOS 0.0 03/20/2025    BASO 0.02 03/20/2025    EOSINOPHIL 0.6 03/20/2025    BASOPHIL 0.4 03/20/2025       Sodium   Date Value Ref Range Status   03/20/2025 138 136 - 145 mmol/L Final     Potassium   Date Value Ref Range Status   03/20/2025 4.2 3.5 - 5.1 mmol/L Final     Chloride   Date Value Ref Range Status   03/20/2025 108 95 - 110 mmol/L Final     CO2   Date Value Ref Range Status   03/20/2025 21 (L) 23 - 29 mmol/L Final     Glucose   Date Value Ref Range Status   03/20/2025 101 70 - 110 mg/dL Final     BUN   Date Value Ref Range Status   03/20/2025 21 (H) 6 - 20 mg/dL Final     Creatinine   Date Value Ref Range Status   03/20/2025 0.8 0.5 - 1.4 mg/dL Final     Calcium   Date Value Ref Range Status   03/20/2025 9.2 8.7 - 10.5 mg/dL Final     Total Protein   Date Value Ref Range Status   03/20/2025 6.6 6.0 - 8.4 g/dL Final     Albumin   Date Value Ref Range Status   03/20/2025 3.7 3.5 - 5.2 g/dL Final     Total Bilirubin   Date Value Ref Range Status   03/20/2025 0.4 0.1 - 1.0 mg/dL Final     Comment:     For infants and newborns, interpretation of results should be based  on gestational age, weight and in agreement with clinical  observations.    Premature Infant recommended reference ranges:  Up to 24 hours.............<8.0 mg/dL  Up to 48 hours............<12.0 mg/dL  3-5 days..................<15.0 mg/dL  6-29 days.................<15.0 mg/dL       Alkaline Phosphatase   Date Value Ref Range Status   03/20/2025 122 40 - 150 U/L Final     AST   Date Value Ref Range Status   03/20/2025 59 (H) 10 - 40 U/L Final     ALT   Date Value Ref Range Status   03/20/2025 85 (H) 10 - 44 U/L  Final     Anion Gap   Date Value Ref Range Status   03/20/2025 9 8 - 16 mmol/L Final     eGFR if    Date Value Ref Range Status   01/22/2022 >60.0 >60 mL/min/1.73 m^2 Final     eGFR if non    Date Value Ref Range Status   01/22/2022 >60.0 >60 mL/min/1.73 m^2 Final     Comment:     Calculation used to obtain the estimated glomerular filtration  rate (eGFR) is the CKD-EPI equation.          Imaging:   All pertinent studies reviewed and interpreted by me    Acute GVHD Scoring:  GVHD Acute Assessment  02/17/25: aGHVD of skin, ~5% BSA. Resolved with Topical steroids.  02/24/25: aGVHD of GI tract (diarrhea); started budesonide  02/27/25: aGVHD of GI tract resolved; budesonide taper started  3/10/25: aGVHD GI tract present; budesonide 3mg TID resumed     Assessment and Plan:   Rubén Peters) is a pleasant 54 y.o.male with primary myelofibrosis s/p haplo SCT who presents for post-transplant follow up.    Primary myelofibrosis: Status post treatment with ruxolitinib, followed by alloSCT. Day +30 BMBx results below.    Status post allogeneic stem cell transplantation: As noted above, status post haploidentical stem cell transplantation conditioned with  Bu/Flu/TT . Currently day 84.  Engrafted on day +22.  Day +30 BMBx on 1/2/25: No definitive evidence of residual JAK2 [id number] primary myelofibrosis. NGS without evidence of pathogenic mutations. Chimerism studies with 100% donor CD33, CD3 insufficient for analysis.   Plan for central line removal - messaged Dr. 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 MMF has now been discontinued. He is on budesonide for aGVHD. No evidence of aGVHD at this visit. Continue budesonide BID, anticipate further taper next week.   Current tacro dose: 1.5mg qaM and 2 mg qPM   Last tacro level: 10.2  Adjustments: None    Immunosuppression: Prevention with  voriconazole,  acyclovir. CMV prophylaxis with letermovir. PJP prophyalxis dapsone. Continue weekly monitoring of CMV and EBV.  Last CMV: negative (3/13/2025), last EBV: negative (3/13/2025).  Active infections: none    Pancytopenia: Due to underlying disease and chemotherapy. Transfuse for Hgb <7 g/dL and platelets <10k.  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 Plan for Eltrombopag 50mg daily. Rx sent; pending approval and financial assistance.    Diarrhea: Due to aGVHD and improved/resolved at this visit. Continues budesonide taper as above.     Transaminitis: Mild. Asymptomatic. Normal T Bili. Monitoring closely.     Hypertension: (improved) Was newly hypertensive in hospital following SCT at which time he was started on amlodipine. He has been persistently hypotensive since discharge. Discontinued amlodipine, normotensive.  Will continue to monitor.    Cardiomyopathy: Echo 2/26/25 obtained due to tachycardia revealed reduced LVEF 45-50%. No signs/symptoms of HFrEF. Repeat echo in 3 months (5/2025).    Staphylococcus Skin Infection: Patient had MRSA while in the hospital and was treated with doxycycline, concern for recurrence on recent exam and has once again resolved following Doxycyline 100mg PO BID. He has now stopped doxycycline and the spot is gone.     Follow up: Twice weekly follow up with labs.    Orders Placed:      No orders of the defined types were placed in this encounter.     40 minutes of total time spent on the encounter, which includes face to face time and non-face to face time preparing to see the patient (eg, review of tests), Obtaining and/or reviewing separately obtained history, Documenting clinical information in the electronic or other health record, Independently interpreting results (not separately reported) and communicating results to the patient/family/caregiver, or Care coordination with other physicians involved in his care (not separately reported).     Priscila SALINAS  MARK Montes  Malignant Hematology, Stem Cell Transplant, and Cellular Therapy  [facility]  [facility]     Route Chart for Scheduling    BMT Chart Routing      Follow up with physician . Needs weekly f/u in April w/ labs (Reilly James Camryn)   Follow up with REBEKAH    Provider visit type    Infusion scheduling note    Injection scheduling note    Labs CBC, CMP, CMV, LDH, uric acid, phosphorus, magnesium, tacrolimus level and EBV   Scheduling:  Preferred lab:  Lab interval:  labs scheduled in April through 5th floor BMT lab   Imaging    Pharmacy appointment    Other referrals                    Treatment Plan Information   OP Adult Blood - Twice Weekly Clyde James MD   Associated Diagnosis: Anemia associated with chemotherapy   noted on 2/4/2025   Line of treatment: Supportive Care  Treatment Goal: Supportive     Upcoming Treatment Dates - OP Adult Blood - Twice Weekly    3/15/2025       Prepare RBC       Transfuse RBC 1 Unit  3/19/2025       Prepare RBC       Transfuse RBC 1 Unit  3/22/2025       Prepare RBC       Transfuse RBC 1 Unit  3/26/2025       Prepare RBC       Transfuse RBC 1 Unit    Therapy Plan Information  PORT FLUSH for Adrenal nodule, noted on 12/9/2024  Flushes  heparin, porcine (PF) 100 unit/mL injection flush 500 Units  500 Units, Intravenous, Every visit  sodium chloride 0.9% flush 10 mL  10 mL, Intravenous, Every visit      No therapy plan of the specified type found.    No therapy plan of the specified type found.

## 2025-03-24 ENCOUNTER — INFUSION (OUTPATIENT)
Dept: INFUSION THERAPY | Facility: HOSPITAL | Age: 55
End: 2025-03-24
Payer: COMMERCIAL

## 2025-03-24 ENCOUNTER — OFFICE VISIT (OUTPATIENT)
Dept: HEMATOLOGY/ONCOLOGY | Facility: CLINIC | Age: 55
End: 2025-03-24
Payer: COMMERCIAL

## 2025-03-24 VITALS
SYSTOLIC BLOOD PRESSURE: 142 MMHG | RESPIRATION RATE: 18 BRPM | DIASTOLIC BLOOD PRESSURE: 77 MMHG | HEART RATE: 92 BPM | HEIGHT: 73 IN | BODY MASS INDEX: 22.99 KG/M2 | TEMPERATURE: 98 F | WEIGHT: 173.5 LBS

## 2025-03-24 VITALS
BODY MASS INDEX: 22.98 KG/M2 | SYSTOLIC BLOOD PRESSURE: 149 MMHG | DIASTOLIC BLOOD PRESSURE: 90 MMHG | WEIGHT: 173.38 LBS | TEMPERATURE: 98 F | HEART RATE: 83 BPM | OXYGEN SATURATION: 99 % | RESPIRATION RATE: 18 BRPM | HEIGHT: 73 IN

## 2025-03-24 DIAGNOSIS — E27.9 ADRENAL NODULE: Primary | ICD-10-CM

## 2025-03-24 DIAGNOSIS — T45.1X5A ANEMIA ASSOCIATED WITH CHEMOTHERAPY: Primary | ICD-10-CM

## 2025-03-24 DIAGNOSIS — D64.9 SYMPTOMATIC ANEMIA: ICD-10-CM

## 2025-03-24 DIAGNOSIS — Z94.84 HISTORY OF ALLOGENEIC STEM CELL TRANSPLANT: Primary | ICD-10-CM

## 2025-03-24 DIAGNOSIS — D64.81 ANEMIA ASSOCIATED WITH CHEMOTHERAPY: Primary | ICD-10-CM

## 2025-03-24 DIAGNOSIS — D89.810 ACUTE GVHD: ICD-10-CM

## 2025-03-24 DIAGNOSIS — D47.1 PRIMARY MYELOFIBROSIS: ICD-10-CM

## 2025-03-24 DIAGNOSIS — D69.6 THROMBOCYTOPENIA: ICD-10-CM

## 2025-03-24 DIAGNOSIS — Z94.84 HISTORY OF ALLOGENEIC STEM CELL TRANSPLANT: ICD-10-CM

## 2025-03-24 DIAGNOSIS — Z76.82 STEM CELL TRANSPLANT CANDIDATE: ICD-10-CM

## 2025-03-24 LAB
ABO + RH BLD: NORMAL
ABSOLUTE EOSINOPHIL (OHS): 0.01 K/UL
ABSOLUTE MONOCYTE (OHS): 0.39 K/UL (ref 0.3–1)
ABSOLUTE NEUTROPHIL COUNT (OHS): 2.12 K/UL (ref 1.8–7.7)
ALBUMIN SERPL BCP-MCNC: 3.6 G/DL (ref 3.5–5.2)
ALP SERPL-CCNC: 106 UNIT/L (ref 40–150)
ALT SERPL W/O P-5'-P-CCNC: 71 UNIT/L (ref 10–44)
ANION GAP (OHS): 7 MMOL/L (ref 8–16)
AST SERPL-CCNC: 36 UNIT/L (ref 11–45)
BASOPHILS # BLD AUTO: 0 K/UL
BASOPHILS NFR BLD AUTO: 0 %
BILIRUB SERPL-MCNC: 0.4 MG/DL (ref 0.1–1)
BLD PROD TYP BPU: NORMAL
BLOOD UNIT EXPIRATION DATE: NORMAL
BLOOD UNIT TYPE CODE: 5100
BUN SERPL-MCNC: 22 MG/DL (ref 6–20)
CALCIUM SERPL-MCNC: 8.8 MG/DL (ref 8.7–10.5)
CHLORIDE SERPL-SCNC: 110 MMOL/L (ref 95–110)
CO2 SERPL-SCNC: 21 MMOL/L (ref 23–29)
CREAT SERPL-MCNC: 0.8 MG/DL (ref 0.5–1.4)
CROSSMATCH INTERPRETATION: NORMAL
DISPENSE STATUS: NORMAL
ERYTHROCYTE [DISTWIDTH] IN BLOOD BY AUTOMATED COUNT: 16.4 % (ref 11.5–14.5)
GFR SERPLBLD CREATININE-BSD FMLA CKD-EPI: >60 ML/MIN/1.73/M2
GLUCOSE SERPL-MCNC: 94 MG/DL (ref 70–110)
HCT VFR BLD AUTO: 21.7 % (ref 40–54)
HGB BLD-MCNC: 7 GM/DL (ref 14–18)
IMM GRANULOCYTES # BLD AUTO: 0.06 K/UL (ref 0–0.04)
IMM GRANULOCYTES NFR BLD AUTO: 1.8 % (ref 0–0.5)
INDIRECT COOMBS: NORMAL
LYMPHOCYTES # BLD AUTO: 0.8 K/UL (ref 1–4.8)
MAGNESIUM SERPL-MCNC: 1.7 MG/DL (ref 1.6–2.6)
MCH RBC QN AUTO: 31.3 PG (ref 27–50)
MCHC RBC AUTO-ENTMCNC: 32.3 G/DL (ref 32–36)
MCV RBC AUTO: 97 FL (ref 82–98)
NUCLEATED RBC (/100WBC) (OHS): 0 /100 WBC
PHOSPHATE SERPL-MCNC: 3.4 MG/DL (ref 2.7–4.5)
PLATELET # BLD AUTO: 44 K/UL (ref 150–450)
PMV BLD AUTO: 10.4 FL (ref 9.2–12.9)
POTASSIUM SERPL-SCNC: 4.2 MMOL/L (ref 3.5–5.1)
PROT SERPL-MCNC: 6.3 GM/DL (ref 6–8.4)
RBC # BLD AUTO: 2.24 M/UL (ref 4.6–6.2)
RELATIVE EOSINOPHIL (OHS): 0.3 %
RELATIVE LYMPHOCYTE (OHS): 23.7 % (ref 18–48)
RELATIVE MONOCYTE (OHS): 11.5 % (ref 4–15)
RELATIVE NEUTROPHIL (OHS): 62.7 % (ref 38–73)
RH BLD: NORMAL
SODIUM SERPL-SCNC: 138 MMOL/L (ref 136–145)
SPECIMEN OUTDATE: NORMAL
TACROLIMUS BLD-MCNC: 7.2 NG/ML (ref 5–15)
UNIT NUMBER: NORMAL
WBC # BLD AUTO: 3.38 K/UL (ref 3.9–12.7)

## 2025-03-24 PROCEDURE — 1159F MED LIST DOCD IN RCRD: CPT | Mod: CPTII,S$GLB,, | Performed by: INTERNAL MEDICINE

## 2025-03-24 PROCEDURE — 87799 DETECT AGENT NOS DNA QUANT: CPT

## 2025-03-24 PROCEDURE — 83735 ASSAY OF MAGNESIUM: CPT

## 2025-03-24 PROCEDURE — 86920 COMPATIBILITY TEST SPIN: CPT | Performed by: INTERNAL MEDICINE

## 2025-03-24 PROCEDURE — 3080F DIAST BP >= 90 MM HG: CPT | Mod: CPTII,S$GLB,, | Performed by: INTERNAL MEDICINE

## 2025-03-24 PROCEDURE — P9040 RBC LEUKOREDUCED IRRADIATED: HCPCS | Performed by: INTERNAL MEDICINE

## 2025-03-24 PROCEDURE — 25000003 PHARM REV CODE 250

## 2025-03-24 PROCEDURE — 3008F BODY MASS INDEX DOCD: CPT | Mod: CPTII,S$GLB,, | Performed by: INTERNAL MEDICINE

## 2025-03-24 PROCEDURE — 84100 ASSAY OF PHOSPHORUS: CPT

## 2025-03-24 PROCEDURE — 99215 OFFICE O/P EST HI 40 MIN: CPT | Mod: S$GLB,,, | Performed by: INTERNAL MEDICINE

## 2025-03-24 PROCEDURE — G2211 COMPLEX E/M VISIT ADD ON: HCPCS | Mod: S$GLB,,, | Performed by: INTERNAL MEDICINE

## 2025-03-24 PROCEDURE — 1160F RVW MEDS BY RX/DR IN RCRD: CPT | Mod: CPTII,S$GLB,, | Performed by: INTERNAL MEDICINE

## 2025-03-24 PROCEDURE — 3077F SYST BP >= 140 MM HG: CPT | Mod: CPTII,S$GLB,, | Performed by: INTERNAL MEDICINE

## 2025-03-24 PROCEDURE — 99999 PR PBB SHADOW E&M-EST. PATIENT-LVL IV: CPT | Mod: PBBFAC,,, | Performed by: INTERNAL MEDICINE

## 2025-03-24 PROCEDURE — 36415 COLL VENOUS BLD VENIPUNCTURE: CPT

## 2025-03-24 PROCEDURE — 86901 BLOOD TYPING SEROLOGIC RH(D): CPT | Performed by: INTERNAL MEDICINE

## 2025-03-24 PROCEDURE — 36430 TRANSFUSION BLD/BLD COMPNT: CPT

## 2025-03-24 PROCEDURE — 80197 ASSAY OF TACROLIMUS: CPT

## 2025-03-24 PROCEDURE — 82040 ASSAY OF SERUM ALBUMIN: CPT

## 2025-03-24 PROCEDURE — A4216 STERILE WATER/SALINE, 10 ML: HCPCS

## 2025-03-24 PROCEDURE — 36592 COLLECT BLOOD FROM PICC: CPT

## 2025-03-24 PROCEDURE — 85025 COMPLETE CBC W/AUTO DIFF WBC: CPT

## 2025-03-24 PROCEDURE — 63600175 PHARM REV CODE 636 W HCPCS

## 2025-03-24 RX ORDER — SODIUM CHLORIDE 0.9 % (FLUSH) 0.9 %
10 SYRINGE (ML) INJECTION
Status: CANCELLED | OUTPATIENT
Start: 2025-03-24

## 2025-03-24 RX ORDER — SODIUM CHLORIDE 9 MG/ML
50 INJECTION, SOLUTION INTRAVENOUS CONTINUOUS
Status: DISCONTINUED | OUTPATIENT
Start: 2025-03-24 | End: 2025-03-24 | Stop reason: HOSPADM

## 2025-03-24 RX ORDER — HEPARIN 100 UNIT/ML
500 SYRINGE INTRAVENOUS
Status: DISCONTINUED | OUTPATIENT
Start: 2025-03-24 | End: 2025-03-24 | Stop reason: HOSPADM

## 2025-03-24 RX ORDER — SODIUM CHLORIDE 9 MG/ML
50 INJECTION, SOLUTION INTRAVENOUS CONTINUOUS
Status: CANCELLED | OUTPATIENT
Start: 2025-03-28

## 2025-03-24 RX ORDER — HEPARIN 100 UNIT/ML
500 SYRINGE INTRAVENOUS
Status: CANCELLED | OUTPATIENT
Start: 2025-03-28

## 2025-03-24 RX ORDER — BUDESONIDE 3 MG/1
3 CAPSULE, COATED PELLETS ORAL DAILY
Start: 2025-03-24

## 2025-03-24 RX ORDER — SODIUM CHLORIDE 0.9 % (FLUSH) 0.9 %
10 SYRINGE (ML) INJECTION
Status: CANCELLED | OUTPATIENT
Start: 2025-03-28

## 2025-03-24 RX ORDER — HEPARIN 100 UNIT/ML
500 SYRINGE INTRAVENOUS
Status: CANCELLED | OUTPATIENT
Start: 2025-03-24

## 2025-03-24 RX ORDER — SODIUM CHLORIDE 0.9 % (FLUSH) 0.9 %
10 SYRINGE (ML) INJECTION
Status: DISCONTINUED | OUTPATIENT
Start: 2025-03-24 | End: 2025-03-24 | Stop reason: HOSPADM

## 2025-03-24 RX ADMIN — HEPARIN SODIUM (PORCINE) LOCK FLUSH IV SOLN 100 UNIT/ML 500 UNITS: 100 SOLUTION at 08:03

## 2025-03-24 RX ADMIN — Medication 10 ML: at 08:03

## 2025-03-24 NOTE — PROGRESS NOTES
BMT Pharmacist Immunosuppression Note:    Reviewed patient's tacrolimus level with Dr. James and it is within goal range. Instructed patient to continue your current regimen of 3 capsules (1.5mg) in the morning and 4 capsules (2mg) in the evening.       Carolina Carson Pharm.D., [facility]  Clinical Pharmacy Specialist, Bone Marrow Transplant/Cellular Therapy  [facility]  SpectraLink: [phone]

## 2025-03-24 NOTE — PROGRESS NOTES
Section of Hematology and Stem Cell Transplantation    Post-Transplantation Follow Up Visit     3/24/25    Transplant History:   Primary oncologist: Clyde James MD  Primary oncologic diagnosis: primary myelofibrosis  Transplant date: 12/26/2024  Donor: haploidentical  Blood Type (Patient): O +  Blood Type (Donor): O +  CMV (Patient): Negative  CMV (Donor): Positive  Graft source: Peripheral blood  CD34+ cell dose: 6.04 X10^6 cells/kg  Conditioning Regimen:  Thiotepa, Busulfan, Fludarabine  GVHD prophylaxis: Post-transplant cyclophosphamide, MMF, Tacrolimus  Immediate post-transplant complications: mucositis, R axillary skin/soft tissue infection secondary to MRSA    History of Present Ilness:   Rubén Peters) is a pleasant 54 y.o.male with primary myelofibrosis who is status post haploidentical stem cell transplantation conditioned with  Bu/Flu/TT  who is currently day +88 who presents for post-transplant follow up.    He is feeling well overall. No additional diarrhea on BID dosing of budesonide. No bruising/bleeding. He reports some dyspnea over the weekend.     PAST MEDICAL HISTORY:   Past Medical History:   Diagnosis Date    Abdominal pain 01/02/2025    Allergic contact dermatitis due to adhesives 12/31/2024    Cancer     Flatulence 01/03/2025    Headache 12/27/2024    Hyperlipidemia     Mucositis 01/02/2025    Other constipation 12/19/2024    Thrombocytosis 12/18/2024    Transaminitis 12/27/2024       PAST SURGICAL HISTORY:   Past Surgical History:   Procedure Laterality Date    BONE MARROW BIOPSY N/A 11/27/2024    Procedure: Biopsy-bone marrow;  Surgeon: Clyde James MD;  Location: [facility] ([address] [place] [address]);  Service: Oncology;  Laterality: N/A;  11/20-pre call complete-tb    COLONOSCOPY N/A 8/9/2023    Procedure: COLONOSCOPY;  Surgeon: Will Ardon MD;  Location: [facility] ([address] [place] [address]);  Service: Endoscopy;  Laterality: N/A;  Suprep Instructions sent via portal / Authorizing:     Bebeto Arora  MD in [facility] FAMILY MED/ INTERNAL MED/ PEDS  Referral:          [id number]    EYE SURGERY      FLEXIBLE SIGMOIDOSCOPY N/A 7/23/2024    Procedure: SIGMOIDOSCOPY, FLEXIBLE;  Surgeon: Nirali Vicente MD;  Location: [facility];  Service: Endoscopy;  Laterality: N/A;    INSERTION OF NORMAN CATHETER Right 12/18/2024    Procedure: INSERTION, CATHETER, CENTRAL VENOUS, NORMAN TRIPLE LUMEN, Bard 12.5 fr Norman Cath model [id number];  Surgeon: Willie Hadley MD;  Location: [facility] OR [address] [place] [address];  Service: General;  Laterality: Right;       PAST SOCIAL HISTORY:  Social History     Tobacco Use    Smoking status: Former     Types: Cigars, Cigarettes    Smokeless tobacco: Never   Substance Use Topics    Alcohol use: Not Currently     Comment: socially    Drug use: Never       FAMILY HISTORY:  Family History   Problem Relation Name Age of Onset    Arthritis Mother      COPD Father      Testicular cancer Maternal Cousin Peter 17        Chemotherapy    Breast cancer Maternal Cousin  51        Chemotherapy    Heart disease Maternal Grandfather      Stroke Maternal Grandmother         CURRENT MEDICATIONS:   Current Outpatient Medications   Medication Sig    acyclovir (ZOVIRAX) 800 MG Tab Take 1 tablet (800 mg total) by mouth 2 (two) times daily.    dapsone 100 MG Tab Take 1 tablet (100 mg total) by mouth once daily.    eltrombopag olamine (PROMACTA) 50 MG Tab Take 1 tablet (50 mg total) by mouth once daily.    letermovir (PREVYMIS) 480 mg Tab Take 1 tablet (480 mg total) by mouth once daily.    magnesium oxide (MAG-OX) 400 mg (241.3 mg magnesium) tablet Take 2 tablets in the morning, 1 tablet in the afternoon, and 2 tablets in the evening.    ondansetron (ZOFRAN) 8 MG tablet Take 1 tablet (8 mg total) by mouth every 8 (eight) hours as needed for Nausea.    prochlorperazine (COMPAZINE) 10 MG tablet Take 1 tablet (10 mg total) by mouth 4 (four) times daily as needed for Nausea.    tacrolimus (PROGRAF) 0.5 MG Cap Take 3 capsules (1.5 mg total)  by mouth every morning AND 4 capsules (2 mg total) every evening.    traMADoL (ULTRAM) 50 mg tablet Take 1 tablet (50 mg total) by mouth every 6 (six) hours as needed for Pain.    triamcinolone acetonide 0.1% (KENALOG) 0.1 % cream Apply topically 2 (two) times daily as needed (rash on arms, chest, back, trunk, and legs (do not apply to face)).    voriconazole (VFEND) 200 MG Tab Take 1 tablet (200 mg total) by mouth 2 (two) times daily.    budesonide (ENTOCORT EC) 3 mg capsule Take 1 capsule (3 mg total) by mouth once daily.     No current facility-administered medications for this visit.     Facility-Administered Medications Ordered in Other Visits   Medication    0.9% NaCl infusion    alteplase injection 2 mg    heparin, porcine (PF) 100 unit/mL injection flush 500 Units    sodium chloride 0.9% flush 10 mL       ALLERGIES:   Review of patient's allergies indicates:   Allergen Reactions    Sulfamethoxazole-trimethoprim Hives       GVHD Review of Systems:     Pertinent positives and negatives included in the HPI. Otherwise a 14 point review of systems is negative. GVHD review of systems recorded in BMT flowsheet.     Physical Exam:     Vitals:    03/24/25 0912   BP: (!) 149/90   Pulse: 83   Resp: 18   Temp: 97.6 °F (36.4 °C)         General: Appears well, NAD  HEENT: MMM, no OP lesions  Pulmonary: CTAB, no increased work of breathing, no W/R/C  Cardiovascular: S1S2 normal, RRR no M/R/G  Abdominal: Soft, NT, ND, BS+, no HSM  Extremities: No C/C/E  Neurological: AAOx4, grossly normal, no focal deficits  Dermatologic: Hyperpigmentation around neck, upper chest - improved    ECOG Performance Status: (foot note - ECOG PS provided by [employer]) 1 - Symptomatic but completely ambulatory    Karnofsky Performance Score:  70%- Cares for Self: Unable to Carry on Normal Activity or Active Work    Labs:   Lab Results   Component Value Date    WBC 3.38 (L) 03/24/2025    RBC 2.24 (L) 03/24/2025    HGB 7.0  (L) 03/24/2025    HCT 21.7 (L) 03/24/2025    MCV 97 03/24/2025    MCH 31.3 03/24/2025    MCHC 32.3 03/24/2025    RDW 16.4 (H) 03/24/2025    PLT 44 (L) 03/24/2025    MPV 10.4 03/24/2025    GRAN 3.7 03/20/2025    GRAN 72.4 03/20/2025    LYMPH 23.7 03/24/2025    LYMPH 0.80 (L) 03/24/2025    MONO 11.5 03/24/2025    MONO 0.39 03/24/2025    EOS 0.3 03/24/2025    EOS 0.01 03/24/2025    BASO 0.02 03/20/2025    EOSINOPHIL 0.6 03/20/2025    BASOPHIL 0.0 03/24/2025    BASOPHIL 0.00 03/24/2025       Sodium   Date Value Ref Range Status   03/24/2025 138 136 - 145 mmol/L Final   03/20/2025 138 136 - 145 mmol/L Final     Potassium   Date Value Ref Range Status   03/24/2025 4.2 3.5 - 5.1 mmol/L Final   03/20/2025 4.2 3.5 - 5.1 mmol/L Final     Chloride   Date Value Ref Range Status   03/24/2025 110 95 - 110 mmol/L Final   03/20/2025 108 95 - 110 mmol/L Final     CO2   Date Value Ref Range Status   03/24/2025 21 (L) 23 - 29 mmol/L Final   03/20/2025 21 (L) 23 - 29 mmol/L Final     Glucose   Date Value Ref Range Status   03/20/2025 101 70 - 110 mg/dL Final     BUN   Date Value Ref Range Status   03/24/2025 22 (H) 6 - 20 mg/dL Final     Creatinine   Date Value Ref Range Status   03/24/2025 0.8 0.5 - 1.4 mg/dL Final     Calcium   Date Value Ref Range Status   03/24/2025 8.8 8.7 - 10.5 mg/dL Final   03/20/2025 9.2 8.7 - 10.5 mg/dL Final     Total Protein   Date Value Ref Range Status   03/20/2025 6.6 6.0 - 8.4 g/dL Final     Albumin   Date Value Ref Range Status   03/24/2025 3.6 3.5 - 5.2 g/dL Final   03/20/2025 3.7 3.5 - 5.2 g/dL Final     Total Bilirubin   Date Value Ref Range Status   03/20/2025 0.4 0.1 - 1.0 mg/dL Final     Comment:     For infants and newborns, interpretation of results should be based  on gestational age, weight and in agreement with clinical  observations.    Premature Infant recommended reference ranges:  Up to 24 hours.............<8.0 mg/dL  Up to 48 hours............<12.0 mg/dL  3-5  days..................<15.0 mg/dL  6-29 days.................<15.0 mg/dL       Bilirubin Total   Date Value Ref Range Status   03/24/2025 0.4 0.1 - 1.0 mg/dL Final     Comment:     For infants and newborns, interpretation of results should be based   on gestational age, weight and in agreement with clinical   observations.    Premature Infant recommended reference ranges:   0-24 hours:  <8.0 mg/dL   24-48 hours: <12.0 mg/dL   3-5 days:    <15.0 mg/dL   6-29 days:   <15.0 mg/dL     Alkaline Phosphatase   Date Value Ref Range Status   03/20/2025 122 40 - 150 U/L Final     ALP   Date Value Ref Range Status   03/24/2025 106 40 - 150 unit/L Final     AST   Date Value Ref Range Status   03/24/2025 36 11 - 45 unit/L Final   03/20/2025 59 (H) 10 - 40 U/L Final     ALT   Date Value Ref Range Status   03/24/2025 71 (H) 10 - 44 unit/L Final   03/20/2025 85 (H) 10 - 44 U/L Final     Anion Gap   Date Value Ref Range Status   03/24/2025 7 (L) 8 - 16 mmol/L Final     eGFR if    Date Value Ref Range Status   01/22/2022 >60.0 >60 mL/min/1.73 m^2 Final     eGFR if non    Date Value Ref Range Status   01/22/2022 >60.0 >60 mL/min/1.73 m^2 Final     Comment:     Calculation used to obtain the estimated glomerular filtration  rate (eGFR) is the CKD-EPI equation.          Imaging:   All pertinent studies reviewed and interpreted by me       Assessment and Plan:   Rubén Peters) is a pleasant 54 y.o.male with primary myelofibrosis s/p haplo SCT who presents for post-transplant follow up.    Primary myelofibrosis: Status post treatment with ruxolitinib, followed by alloSCT. Day +30 BMBx results below.    Status post allogeneic stem cell transplantation: As noted above, status post haploidentical stem cell transplantation conditioned with  Bu/Flu/TT . Currently day +88.  Engrafted on day +22.  Day +30 BMBx on 1/2/25: No definitive evidence of residual JAK2 [id number] primary myelofibrosis. NGS  without evidence of pathogenic mutations. Chimerism studies with 100% donor CD33, CD3 insufficient for analysis.   Plan for central line removal - messaged Dr. 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 MMF has now been discontinued. He is on budesonide for aGVHD. No evidence of aGVHD at this visit. Reduce budesonide to daily today. Stop next week if no recurrence of aGVHD.   Current tacro dose: 1.5mg qaM and 2 mg qPM   Last tacro level: 7.2  Adjustments: None    Immunosuppression: Prevention with voriconazole,  acyclovir. CMV prophylaxis with letermovir. PJP prophyalxis dapsone. Continue weekly monitoring of CMV and EBV.  Last CMV: negative (3/17/2025), last EBV: negative (3/17/2025).  Active infections: none    Pancytopenia: Due to underlying disease and chemotherapy. Transfuse for Hgb <7 g/dL and platelets <10k.  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 Plan for Eltrombopag 50mg daily. Rx sent; pending approval and financial assistance.  Transfuse 1 unit pRBCs today    Diarrhea: Due to aGVHD and improved/resolved at this visit. Continues budesonide taper as above.     Transaminitis: Mild. Asymptomatic. Normal T Bili. Monitoring closely.     Hypertension:  Was newly hypertensive in hospital following SCT at which time he was started on amlodipine. Discontinued amlodipine after hospitalization due to hypotension. Monitor for now but restart antihypertensives if this worsens.     Cardiomyopathy: Echo 2/26/25 obtained due to tachycardia revealed reduced LVEF 45-50%. No signs/symptoms of HFrEF. Repeat echo in 3 months (5/2025).    Staphylococcus Skin Infection: Patient had MRSA while in the hospital and was treated with doxycycline, concern for recurrence on recent exam and has once again resolved following Doxycyline 100mg PO BID. He has now stopped doxycycline and the spot is gone.     Follow up: Twice weekly  follow up with labs.    Orders Placed:      Orders Placed This Encounter   Procedures    Type & Screen     Standing Status:   Future     Expected Date:   3/24/2025     Expiration Date:   5/23/2026    Type & Screen     Standing Status:   Future     Number of Occurrences:   1     Expected Date:   3/24/2025     Expiration Date:   5/23/2026      40 minutes of total time spent on the encounter, which includes face to face time and non-face to face time preparing to see the patient (eg, review of tests), Obtaining and/or reviewing separately obtained history, Documenting clinical information in the electronic or other health record, Independently interpreting results (not separately reported) and communicating results to the patient/family/caregiver, or Care coordination with other physicians involved in his care (not separately reported).     Clyde James MD  Malignant Hematology, Stem Cell Transplant, and Cellular Therapy  [facility]  [facility]

## 2025-03-25 LAB
CYTOMEGALOVIRUS DNA, QUAL (OHS): NOT DETECTED
EPSTEIN-BARR VIRUS DNA, QUAL (OHS): NORMAL

## 2025-03-26 NOTE — PROGRESS NOTES
Section of Hematology and Stem Cell Transplantation    Post-Transplantation Follow Up Visit     3/27/25    Transplant History:   Primary oncologist: Clyde James MD  Primary oncologic diagnosis: primary myelofibrosis  Transplant date: 12/26/2024  Donor: haploidentical  Blood Type (Patient): O +  Blood Type (Donor): O +  CMV (Patient): Negative  CMV (Donor): Positive  Graft source: Peripheral blood  CD34+ cell dose: 6.04 X10^6 cells/kg  Conditioning Regimen:  Thiotepa, Busulfan, Fludarabine  GVHD prophylaxis: Post-transplant cyclophosphamide, MMF, Tacrolimus  Immediate post-transplant complications: mucositis, R axillary skin/soft tissue infection secondary to MRSA    History of Present Ilness:   Rubén Peters) is a pleasant 54 y.o.male with primary myelofibrosis who is status post haploidentical stem cell transplantation conditioned with  Bu/Flu/TT  who is currently day +90 who presents for post-transplant follow up.    He is feeling well overall. No additional diarrhea on BID dosing of budesonide. No bruising/bleeding. He reports some dyspnea over the weekend.     PAST MEDICAL HISTORY:   Past Medical History:   Diagnosis Date    Abdominal pain 01/02/2025    Allergic contact dermatitis due to adhesives 12/31/2024    Cancer     Flatulence 01/03/2025    Headache 12/27/2024    Hyperlipidemia     Mucositis 01/02/2025    Other constipation 12/19/2024    Thrombocytosis 12/18/2024    Transaminitis 12/27/2024       PAST SURGICAL HISTORY:   Past Surgical History:   Procedure Laterality Date    BONE MARROW BIOPSY N/A 11/27/2024    Procedure: Biopsy-bone marrow;  Surgeon: Clyde James MD;  Location: [facility] ([address] [place] [address]);  Service: Oncology;  Laterality: N/A;  11/20-pre call complete-tb    COLONOSCOPY N/A 8/9/2023    Procedure: COLONOSCOPY;  Surgeon: Will Ardon MD;  Location: [facility] ([address] [place] [address]);  Service: Endoscopy;  Laterality: N/A;  Suprep Instructions sent via portal / Authorizing:     Bebeto Arora  MD in [facility] FAMILY MED/ INTERNAL MED/ PEDS  Referral:          [id number]    EYE SURGERY      FLEXIBLE SIGMOIDOSCOPY N/A 7/23/2024    Procedure: SIGMOIDOSCOPY, FLEXIBLE;  Surgeon: Nirali Vicente MD;  Location: [facility];  Service: Endoscopy;  Laterality: N/A;    INSERTION OF NORMAN CATHETER Right 12/18/2024    Procedure: INSERTION, CATHETER, CENTRAL VENOUS, NORMAN TRIPLE LUMEN, Bard 12.5 fr Norman Cath model [id number];  Surgeon: Willie Hadley MD;  Location: [facility] OR [address] [place] [address];  Service: General;  Laterality: Right;       PAST SOCIAL HISTORY:  Social History     Tobacco Use    Smoking status: Former     Types: Cigars, Cigarettes    Smokeless tobacco: Never   Substance Use Topics    Alcohol use: Not Currently     Comment: socially    Drug use: Never       FAMILY HISTORY:  Family History   Problem Relation Name Age of Onset    Arthritis Mother      COPD Father      Testicular cancer Maternal Cousin Peter 17        Chemotherapy    Breast cancer Maternal Cousin  51        Chemotherapy    Heart disease Maternal Grandfather      Stroke Maternal Grandmother         CURRENT MEDICATIONS:   Current Outpatient Medications   Medication Sig    acyclovir (ZOVIRAX) 800 MG Tab Take 1 tablet (800 mg total) by mouth 2 (two) times daily.    budesonide (ENTOCORT EC) 3 mg capsule Take 1 capsule (3 mg total) by mouth once daily.    dapsone 100 MG Tab Take 1 tablet (100 mg total) by mouth once daily.    eltrombopag olamine (PROMACTA) 50 MG Tab Take 1 tablet (50 mg total) by mouth once daily.    letermovir (PREVYMIS) 480 mg Tab Take 1 tablet (480 mg total) by mouth once daily.    magnesium oxide (MAG-OX) 400 mg (241.3 mg magnesium) tablet Take 2 tablets in the morning, 1 tablet in the afternoon, and 2 tablets in the evening.    ondansetron (ZOFRAN) 8 MG tablet Take 1 tablet (8 mg total) by mouth every 8 (eight) hours as needed for Nausea.    prochlorperazine (COMPAZINE) 10 MG tablet Take 1 tablet (10 mg total) by mouth 4 (four) times  daily as needed for Nausea.    tacrolimus (PROGRAF) 0.5 MG Cap Take 3 capsules (1.5 mg total) by mouth every morning AND 4 capsules (2 mg total) every evening.    traMADoL (ULTRAM) 50 mg tablet Take 1 tablet (50 mg total) by mouth every 6 (six) hours as needed for Pain.    triamcinolone acetonide 0.1% (KENALOG) 0.1 % cream Apply topically 2 (two) times daily as needed (rash on arms, chest, back, trunk, and legs (do not apply to face)).    voriconazole (VFEND) 200 MG Tab Take 1 tablet (200 mg total) by mouth 2 (two) times daily.     No current facility-administered medications for this visit.       ALLERGIES:   Review of patient's allergies indicates:   Allergen Reactions    Sulfamethoxazole-trimethoprim Hives       GVHD Review of Systems:     Pertinent positives and negatives included in the HPI. Otherwise a 14 point review of systems is negative. GVHD review of systems recorded in BMT flowsheet.     Physical Exam:     There were no vitals filed for this visit.        General: Appears well, NAD  HEENT: MMM, no OP lesions  Pulmonary: CTAB, no increased work of breathing, no W/R/C  Cardiovascular: S1S2 normal, RRR no M/R/G  Abdominal: Soft, NT, ND, BS+, no HSM  Extremities: No C/C/E  Neurological: AAOx4, grossly normal, no focal deficits  Dermatologic: Hyperpigmentation around neck, upper chest - improved    ECOG Performance Status: (foot note - ECOG PS provided by [employer]) 1 - Symptomatic but completely ambulatory    Karnofsky Performance Score:  70%- Cares for Self: Unable to Carry on Normal Activity or Active Work    Labs:   Lab Results   Component Value Date    WBC 3.38 (L) 03/24/2025    RBC 2.24 (L) 03/24/2025    HGB 7.0 (L) 03/24/2025    HCT 21.7 (L) 03/24/2025    MCV 97 03/24/2025    MCH 31.3 03/24/2025    MCHC 32.3 03/24/2025    RDW 16.4 (H) 03/24/2025    PLT 44 (L) 03/24/2025    MPV 10.4 03/24/2025    GRAN 3.7 03/20/2025    GRAN 72.4 03/20/2025    LYMPH 23.7 03/24/2025    LYMPH 0.80  (L) 03/24/2025    MONO 11.5 03/24/2025    MONO 0.39 03/24/2025    EOS 0.3 03/24/2025    EOS 0.01 03/24/2025    BASO 0.02 03/20/2025    EOSINOPHIL 0.6 03/20/2025    BASOPHIL 0.0 03/24/2025    BASOPHIL 0.00 03/24/2025       Sodium   Date Value Ref Range Status   03/24/2025 138 136 - 145 mmol/L Final   03/20/2025 138 136 - 145 mmol/L Final     Potassium   Date Value Ref Range Status   03/24/2025 4.2 3.5 - 5.1 mmol/L Final   03/20/2025 4.2 3.5 - 5.1 mmol/L Final     Chloride   Date Value Ref Range Status   03/24/2025 110 95 - 110 mmol/L Final   03/20/2025 108 95 - 110 mmol/L Final     CO2   Date Value Ref Range Status   03/24/2025 21 (L) 23 - 29 mmol/L Final   03/20/2025 21 (L) 23 - 29 mmol/L Final     Glucose   Date Value Ref Range Status   03/20/2025 101 70 - 110 mg/dL Final     BUN   Date Value Ref Range Status   03/24/2025 22 (H) 6 - 20 mg/dL Final     Creatinine   Date Value Ref Range Status   03/24/2025 0.8 0.5 - 1.4 mg/dL Final     Calcium   Date Value Ref Range Status   03/24/2025 8.8 8.7 - 10.5 mg/dL Final   03/20/2025 9.2 8.7 - 10.5 mg/dL Final     Total Protein   Date Value Ref Range Status   03/20/2025 6.6 6.0 - 8.4 g/dL Final     Albumin   Date Value Ref Range Status   03/24/2025 3.6 3.5 - 5.2 g/dL Final   03/20/2025 3.7 3.5 - 5.2 g/dL Final     Total Bilirubin   Date Value Ref Range Status   03/20/2025 0.4 0.1 - 1.0 mg/dL Final     Comment:     For infants and newborns, interpretation of results should be based  on gestational age, weight and in agreement with clinical  observations.    Premature Infant recommended reference ranges:  Up to 24 hours.............<8.0 mg/dL  Up to 48 hours............<12.0 mg/dL  3-5 days..................<15.0 mg/dL  6-29 days.................<15.0 mg/dL       Bilirubin Total   Date Value Ref Range Status   03/24/2025 0.4 0.1 - 1.0 mg/dL Final     Comment:     For infants and newborns, interpretation of results should be based   on gestational age, weight and in agreement  with clinical   observations.    Premature Infant recommended reference ranges:   0-24 hours:  <8.0 mg/dL   24-48 hours: <12.0 mg/dL   3-5 days:    <15.0 mg/dL   6-29 days:   <15.0 mg/dL     Alkaline Phosphatase   Date Value Ref Range Status   03/20/2025 122 40 - 150 U/L Final     ALP   Date Value Ref Range Status   03/24/2025 106 40 - 150 unit/L Final     AST   Date Value Ref Range Status   03/24/2025 36 11 - 45 unit/L Final   03/20/2025 59 (H) 10 - 40 U/L Final     ALT   Date Value Ref Range Status   03/24/2025 71 (H) 10 - 44 unit/L Final   03/20/2025 85 (H) 10 - 44 U/L Final     Anion Gap   Date Value Ref Range Status   03/24/2025 7 (L) 8 - 16 mmol/L Final     eGFR if    Date Value Ref Range Status   01/22/2022 >60.0 >60 mL/min/1.73 m^2 Final     eGFR if non    Date Value Ref Range Status   01/22/2022 >60.0 >60 mL/min/1.73 m^2 Final     Comment:     Calculation used to obtain the estimated glomerular filtration  rate (eGFR) is the CKD-EPI equation.          Imaging:   All pertinent studies reviewed and interpreted by me       Assessment and Plan:   Rubén Peters) is a pleasant 54 y.o.male with primary myelofibrosis s/p haplo SCT who presents for post-transplant follow up.    Primary myelofibrosis: Status post treatment with ruxolitinib, followed by alloSCT. Day +30 BMBx results below.    Status post allogeneic stem cell transplantation: As noted above, status post haploidentical stem cell transplantation conditioned with  Bu/Flu/TT . Currently day +90.  Engrafted on day +22.  Day +30 BMBx on 1/2/25: No definitive evidence of residual JAK2 [id number] primary myelofibrosis. NGS without evidence of pathogenic mutations. Chimerism studies with 100% donor CD33, CD3 insufficient for analysis.   Plan for central line removal - messaged Dr. 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 MMF has now been discontinued. He is on budesonide for aGVHD. No evidence of aGVHD at this visit. Reduce budesonide to daily today. Stop next week if no recurrence of aGVHD.   Current tacro dose: 1.5mg qaM and 2 mg qPM   Last tacro level: 7.2  Adjustments: None    Immunosuppression: Prevention with voriconazole,  acyclovir. CMV prophylaxis with letermovir. PJP prophyalxis dapsone. Continue weekly monitoring of CMV and EBV.  Last CMV: negative (3/17/2025), last EBV: negative (3/17/2025).  Active infections: none    Pancytopenia: Due to underlying disease and chemotherapy. Transfuse for Hgb <7 g/dL and platelets <10k.  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 Plan for Eltrombopag 50mg daily. Rx sent; pending approval and financial assistance.  Transfuse 1 unit pRBCs today    Diarrhea: Due to aGVHD and improved/resolved at this visit. Continues budesonide taper as above.     Transaminitis: Mild. Asymptomatic. Normal T Bili. Monitoring closely.     Hypertension:  Was newly hypertensive in hospital following SCT at which time he was started on amlodipine. Discontinued amlodipine after hospitalization due to hypotension. Monitor for now but restart antihypertensives if this worsens.     Cardiomyopathy: Echo 2/26/25 obtained due to tachycardia revealed reduced LVEF 45-50%. No signs/symptoms of HFrEF. Repeat echo in 3 months (5/2025).    Staphylococcus Skin Infection: Patient had MRSA while in the hospital and was treated with doxycycline, concern for recurrence on recent exam and has once again resolved following Doxycyline 100mg PO BID. He has now stopped doxycycline and the spot is gone.     Follow up: Twice weekly follow up with labs.    Orders Placed:      No orders of the defined types were placed in this encounter.     40 minutes of total time spent on the encounter, which includes face to face time and non-face to face time preparing to see the patient (eg, review of  tests), Obtaining and/or reviewing separately obtained history, Documenting clinical information in the electronic or other health record, Independently interpreting results (not separately reported) and communicating results to the patient/family/caregiver, or Care coordination with other physicians involved in his care (not separately reported).     Opal Alonzo PA-C  Malignant Hematology, Stem Cell Transplant, and Cellular Therapy  [facility]

## 2025-03-27 ENCOUNTER — OFFICE VISIT (OUTPATIENT)
Dept: HEMATOLOGY/ONCOLOGY | Facility: CLINIC | Age: 55
End: 2025-03-27
Payer: COMMERCIAL

## 2025-03-27 ENCOUNTER — INFUSION (OUTPATIENT)
Dept: INFUSION THERAPY | Facility: HOSPITAL | Age: 55
End: 2025-03-27
Payer: COMMERCIAL

## 2025-03-27 VITALS
WEIGHT: 174.19 LBS | DIASTOLIC BLOOD PRESSURE: 83 MMHG | OXYGEN SATURATION: 96 % | HEIGHT: 73 IN | TEMPERATURE: 98 F | HEART RATE: 109 BPM | BODY MASS INDEX: 23.09 KG/M2 | RESPIRATION RATE: 16 BRPM | SYSTOLIC BLOOD PRESSURE: 141 MMHG

## 2025-03-27 DIAGNOSIS — D64.9 SYMPTOMATIC ANEMIA: ICD-10-CM

## 2025-03-27 DIAGNOSIS — I10 HYPERTENSION, UNSPECIFIED TYPE: ICD-10-CM

## 2025-03-27 DIAGNOSIS — I42.9 CARDIOMYOPATHY, UNSPECIFIED TYPE: ICD-10-CM

## 2025-03-27 DIAGNOSIS — Z76.82 STEM CELL TRANSPLANT CANDIDATE: ICD-10-CM

## 2025-03-27 DIAGNOSIS — D84.81 IMMUNODEFICIENCY DUE TO CONDITIONS CLASSIFIED ELSEWHERE: ICD-10-CM

## 2025-03-27 DIAGNOSIS — D89.810 ACUTE GVHD: ICD-10-CM

## 2025-03-27 DIAGNOSIS — Z94.84 HISTORY OF ALLOGENEIC STEM CELL TRANSPLANT: ICD-10-CM

## 2025-03-27 DIAGNOSIS — D47.1 PRIMARY MYELOFIBROSIS: ICD-10-CM

## 2025-03-27 DIAGNOSIS — R74.01 TRANSAMINITIS: ICD-10-CM

## 2025-03-27 DIAGNOSIS — D61.818 PANCYTOPENIA: ICD-10-CM

## 2025-03-27 DIAGNOSIS — E27.9 ADRENAL NODULE: Primary | ICD-10-CM

## 2025-03-27 DIAGNOSIS — D47.1 PRIMARY MYELOFIBROSIS: Primary | ICD-10-CM

## 2025-03-27 LAB
ABSOLUTE NEUTROPHIL MANUAL (OHS): 3.6 K/UL
ALBUMIN SERPL BCP-MCNC: 3.7 G/DL (ref 3.5–5.2)
ALP SERPL-CCNC: 122 UNIT/L (ref 40–150)
ALT SERPL W/O P-5'-P-CCNC: 114 UNIT/L (ref 10–44)
ANION GAP (OHS): 6 MMOL/L (ref 8–16)
AST SERPL-CCNC: 60 UNIT/L (ref 11–45)
BASOPHILS NFR BLD MANUAL: 1 %
BILIRUB SERPL-MCNC: 0.5 MG/DL (ref 0.1–1)
BUN SERPL-MCNC: 23 MG/DL (ref 6–20)
CALCIUM SERPL-MCNC: 9 MG/DL (ref 8.7–10.5)
CHLORIDE SERPL-SCNC: 110 MMOL/L (ref 95–110)
CO2 SERPL-SCNC: 22 MMOL/L (ref 23–29)
CREAT SERPL-MCNC: 0.8 MG/DL (ref 0.5–1.4)
ERYTHROCYTE [DISTWIDTH] IN BLOOD BY AUTOMATED COUNT: 17.4 % (ref 11.5–14.5)
GFR SERPLBLD CREATININE-BSD FMLA CKD-EPI: >60 ML/MIN/1.73/M2
GLUCOSE SERPL-MCNC: 88 MG/DL (ref 70–110)
HCT VFR BLD AUTO: 24.8 % (ref 40–54)
HGB BLD-MCNC: 8 GM/DL (ref 14–18)
INDIRECT COOMBS: NORMAL
LYMPHOCYTES NFR BLD MANUAL: 10 % (ref 18–48)
MAGNESIUM SERPL-MCNC: 1.7 MG/DL (ref 1.6–2.6)
MCH RBC QN AUTO: 30 PG (ref 27–50)
MCHC RBC AUTO-ENTMCNC: 32.3 G/DL (ref 32–36)
MCV RBC AUTO: 93 FL (ref 82–98)
MONOCYTES NFR BLD MANUAL: 7 % (ref 4–15)
MYELOCYTES NFR BLD MANUAL: 1 %
NEUTROPHILS NFR BLD MANUAL: 80 % (ref 38–73)
NEUTS BAND NFR BLD MANUAL: 1 %
NUCLEATED RBC (/100WBC) (OHS): 0 /100 WBC
PHOSPHATE SERPL-MCNC: 2.8 MG/DL (ref 2.7–4.5)
PLATELET # BLD AUTO: 50 K/UL (ref 150–450)
PLATELET BLD QL SMEAR: ABNORMAL
PMV BLD AUTO: 11.3 FL (ref 9.2–12.9)
POLYCHROMASIA BLD QL SMEAR: ABNORMAL
POTASSIUM SERPL-SCNC: 4 MMOL/L (ref 3.5–5.1)
PROT SERPL-MCNC: 6.5 GM/DL (ref 6–8.4)
RBC # BLD AUTO: 2.67 M/UL (ref 4.6–6.2)
RH BLD: NORMAL
SODIUM SERPL-SCNC: 138 MMOL/L (ref 136–145)
SPECIMEN OUTDATE: NORMAL
TACROLIMUS BLD-MCNC: 8.4 NG/ML (ref 5–15)
WBC # BLD AUTO: 4.48 K/UL (ref 3.9–12.7)

## 2025-03-27 PROCEDURE — 80197 ASSAY OF TACROLIMUS: CPT

## 2025-03-27 PROCEDURE — 86900 BLOOD TYPING SEROLOGIC ABO: CPT | Performed by: INTERNAL MEDICINE

## 2025-03-27 PROCEDURE — 25000003 PHARM REV CODE 250

## 2025-03-27 PROCEDURE — 85027 COMPLETE CBC AUTOMATED: CPT

## 2025-03-27 PROCEDURE — 36592 COLLECT BLOOD FROM PICC: CPT

## 2025-03-27 PROCEDURE — 36415 COLL VENOUS BLD VENIPUNCTURE: CPT

## 2025-03-27 PROCEDURE — 83735 ASSAY OF MAGNESIUM: CPT

## 2025-03-27 PROCEDURE — A4216 STERILE WATER/SALINE, 10 ML: HCPCS

## 2025-03-27 PROCEDURE — 82040 ASSAY OF SERUM ALBUMIN: CPT

## 2025-03-27 PROCEDURE — 99999 PR PBB SHADOW E&M-EST. PATIENT-LVL IV: CPT | Mod: PBBFAC,,, | Performed by: INTERNAL MEDICINE

## 2025-03-27 PROCEDURE — 84100 ASSAY OF PHOSPHORUS: CPT

## 2025-03-27 RX ORDER — SODIUM CHLORIDE 0.9 % (FLUSH) 0.9 %
10 SYRINGE (ML) INJECTION
OUTPATIENT
Start: 2025-03-27

## 2025-03-27 RX ORDER — HEPARIN 100 UNIT/ML
500 SYRINGE INTRAVENOUS
Status: DISCONTINUED | OUTPATIENT
Start: 2025-03-27 | End: 2025-03-27 | Stop reason: HOSPADM

## 2025-03-27 RX ORDER — HEPARIN 100 UNIT/ML
500 SYRINGE INTRAVENOUS
OUTPATIENT
Start: 2025-03-27

## 2025-03-27 RX ORDER — SODIUM CHLORIDE 0.9 % (FLUSH) 0.9 %
10 SYRINGE (ML) INJECTION
Status: DISCONTINUED | OUTPATIENT
Start: 2025-03-27 | End: 2025-03-27 | Stop reason: HOSPADM

## 2025-03-27 RX ADMIN — Medication 10 ML: at 09:03

## 2025-03-27 NOTE — PROGRESS NOTES
BMT Pharmacist Immunosuppression Note:    Reviewed patient's tacrolimus level with Dr. Grover and it is within goal range. Instructed patient to continue your current regimen of 3 capsules (1.5mg) in the morning and 4 capsules (2mg) in the evening.       Carolina Carson PharmLaithD., [facility]  Clinical Pharmacy Specialist, Bone Marrow Transplant/Cellular Therapy  [facility]  SpectraLink: [id number]

## 2025-03-27 NOTE — PROGRESS NOTES
Section of Hematology and Stem Cell Transplantation    Post-Transplantation Follow Up Visit     3/27/25    Transplant History:   Primary oncologist: Clyde James MD  Primary oncologic diagnosis: primary myelofibrosis  Transplant date: 12/26/2024  Donor: haploidentical  Blood Type (Patient): O +  Blood Type (Donor): O +  CMV (Patient): Negative  CMV (Donor): Positive  Graft source: Peripheral blood  CD34+ cell dose: 6.04 X10^6 cells/kg  Conditioning Regimen:  Thiotepa, Busulfan, Fludarabine  GVHD prophylaxis: Post-transplant cyclophosphamide, MMF, Tacrolimus  Immediate post-transplant complications: mucositis, R axillary skin/soft tissue infection secondary to MRSA    History of Present Ilness:   Rubén Peters) is a pleasant 54 y.o.male with primary myelofibrosis who is status post haploidentical stem cell transplantation conditioned with  Bu/Flu/TT  who is currently day +91 who presents for post-transplant follow up.    He is feeling well overall. No additional diarrhea on daily budesonide. No bruising/bleeding. No n/v or skin changes. He has been having on and off headaches and reflux issues.    PAST MEDICAL HISTORY:   Past Medical History:   Diagnosis Date    Abdominal pain 01/02/2025    Allergic contact dermatitis due to adhesives 12/31/2024    Cancer     Flatulence 01/03/2025    Headache 12/27/2024    Hyperlipidemia     Mucositis 01/02/2025    Other constipation 12/19/2024    Thrombocytosis 12/18/2024    Transaminitis 12/27/2024       PAST SURGICAL HISTORY:   Past Surgical History:   Procedure Laterality Date    BONE MARROW BIOPSY N/A 11/27/2024    Procedure: Biopsy-bone marrow;  Surgeon: Clyde James MD;  Location: [facility] ([address] [place] [place] [address]);  Service: Oncology;  Laterality: N/A;  11/20-pre call complete-tb    COLONOSCOPY N/A 8/9/2023    Procedure: COLONOSCOPY;  Surgeon: Will Ardon MD;  Location: [facility] ([address] [place] [place] [address]);  Service: Endoscopy;  Laterality: N/A;  Suprep Instructions sent via portal /  Authorizing:     Bebeto Arora MD in [facility] FAMILY MED/ INTERNAL MED/ PEDS  Referral:          [id number]    EYE SURGERY      FLEXIBLE SIGMOIDOSCOPY N/A 7/23/2024    Procedure: SIGMOIDOSCOPY, FLEXIBLE;  Surgeon: Nirali Vicente MD;  Location: [facility];  Service: Endoscopy;  Laterality: N/A;    INSERTION OF LONGORIA CATHETER Right 12/18/2024    Procedure: INSERTION, CATHETER, CENTRAL VENOUS, LONGORIA TRIPLE LUMEN, Bard 12.5 fr Longoria Cath model [id number];  Surgeon: Willie Hadley MD;  Location: [facility] OR [address] [place] [address];  Service: General;  Laterality: Right;       PAST SOCIAL HISTORY:  Social History     Tobacco Use    Smoking status: Former     Types: Cigars, Cigarettes    Smokeless tobacco: Never   Substance Use Topics    Alcohol use: Not Currently     Comment: socially    Drug use: Never       FAMILY HISTORY:  Family History   Problem Relation Name Age of Onset    Arthritis Mother      COPD Father      Testicular cancer Maternal Cousin Peter 17        Chemotherapy    Breast cancer Maternal Cousin  51        Chemotherapy    Heart disease Maternal Grandfather      Stroke Maternal Grandmother         CURRENT MEDICATIONS:   Current Outpatient Medications   Medication Sig    acyclovir (ZOVIRAX) 800 MG Tab Take 1 tablet (800 mg total) by mouth 2 (two) times daily.    budesonide (ENTOCORT EC) 3 mg capsule Take 1 capsule (3 mg total) by mouth once daily.    dapsone 100 MG Tab Take 1 tablet (100 mg total) by mouth once daily.    eltrombopag olamine (PROMACTA) 50 MG Tab Take 1 tablet (50 mg total) by mouth once daily.    letermovir (PREVYMIS) 480 mg Tab Take 1 tablet (480 mg total) by mouth once daily.    magnesium oxide (MAG-OX) 400 mg (241.3 mg magnesium) tablet Take 2 tablets in the morning, 1 tablet in the afternoon, and 2 tablets in the evening.    ondansetron (ZOFRAN) 8 MG tablet Take 1 tablet (8 mg total) by mouth every 8 (eight) hours as needed for Nausea.    prochlorperazine (COMPAZINE) 10 MG tablet Take 1 tablet (10 mg  total) by mouth 4 (four) times daily as needed for Nausea.    tacrolimus (PROGRAF) 0.5 MG Cap Take 3 capsules (1.5 mg total) by mouth every morning AND 4 capsules (2 mg total) every evening.    traMADoL (ULTRAM) 50 mg tablet Take 1 tablet (50 mg total) by mouth every 6 (six) hours as needed for Pain.    triamcinolone acetonide 0.1% (KENALOG) 0.1 % cream Apply topically 2 (two) times daily as needed (rash on arms, chest, back, trunk, and legs (do not apply to face)).    voriconazole (VFEND) 200 MG Tab Take 1 tablet (200 mg total) by mouth 2 (two) times daily.     No current facility-administered medications for this visit.     Facility-Administered Medications Ordered in Other Visits   Medication    heparin, porcine (PF) 100 unit/mL injection flush 500 Units    sodium chloride 0.9% flush 10 mL       ALLERGIES:   Review of patient's allergies indicates:   Allergen Reactions    Sulfamethoxazole-trimethoprim Hives       GVHD Review of Systems:     Pertinent positives and negatives included in the HPI. Otherwise a 14 point review of systems is negative. GVHD review of systems recorded in BMT flowsheet.     Physical Exam:     Vitals:    03/27/25 1047   BP: (!) 141/83   Pulse: 109   Resp: 16   Temp: 97.8 °F (36.6 °C)         General: Appears well, NAD  HEENT: MMM, no OP lesions  Neck: Supple, no LAD  Pulmonary: CTAB, no increased work of breathing, no W/R/C  Cardiovascular: S1S2 normal, RRR, no M/R/G  Abdominal: Soft, NT, ND, BS+, no HSM  Extremities: No C/C/E  Neurological: AAOx4, grossly normal, no focal deficits  Dermatologic: No appreciable rashes or lesions     ECOG Performance Status: (foot note - ECOG PS provided by [employer]cology [employer]) 1 - Symptomatic but completely ambulatory    Karnofsky Performance Score:  70%- Cares for Self: Unable to Carry on Normal Activity or Active Work    Labs:   Lab Results   Component Value Date    WBC 4.48 03/27/2025    RBC 2.67 (L) 03/27/2025    HGB 8.0 (L) 03/27/2025     HCT 24.8 (L) 03/27/2025    MCV 93 03/27/2025    MCH 30.0 03/27/2025    MCHC 32.3 03/27/2025    RDW 17.4 (H) 03/27/2025    PLT 50 (L) 03/27/2025    MPV 11.3 03/27/2025    GRAN 3.6 03/27/2025    LYMPH 10.0 (L) 03/27/2025    MONO 7.0 03/27/2025    EOS 0.3 03/24/2025    EOS 0.01 03/24/2025    BASO 0.02 03/20/2025    EOSINOPHIL 0.6 03/20/2025    BASOPHIL 1.0 03/27/2025       Sodium   Date Value Ref Range Status   03/27/2025 138 136 - 145 mmol/L Final   03/20/2025 138 136 - 145 mmol/L Final     Potassium   Date Value Ref Range Status   03/27/2025 4.0 3.5 - 5.1 mmol/L Final   03/20/2025 4.2 3.5 - 5.1 mmol/L Final     Chloride   Date Value Ref Range Status   03/27/2025 110 95 - 110 mmol/L Final   03/20/2025 108 95 - 110 mmol/L Final     CO2   Date Value Ref Range Status   03/27/2025 22 (L) 23 - 29 mmol/L Final   03/20/2025 21 (L) 23 - 29 mmol/L Final     Glucose   Date Value Ref Range Status   03/20/2025 101 70 - 110 mg/dL Final     BUN   Date Value Ref Range Status   03/27/2025 23 (H) 6 - 20 mg/dL Final     Creatinine   Date Value Ref Range Status   03/27/2025 0.8 0.5 - 1.4 mg/dL Final     Calcium   Date Value Ref Range Status   03/27/2025 9.0 8.7 - 10.5 mg/dL Final   03/20/2025 9.2 8.7 - 10.5 mg/dL Final     Total Protein   Date Value Ref Range Status   03/20/2025 6.6 6.0 - 8.4 g/dL Final     Albumin   Date Value Ref Range Status   03/27/2025 3.7 3.5 - 5.2 g/dL Final   03/20/2025 3.7 3.5 - 5.2 g/dL Final     Total Bilirubin   Date Value Ref Range Status   03/20/2025 0.4 0.1 - 1.0 mg/dL Final     Comment:     For infants and newborns, interpretation of results should be based  on gestational age, weight and in agreement with clinical  observations.    Premature Infant recommended reference ranges:  Up to 24 hours.............<8.0 mg/dL  Up to 48 hours............<12.0 mg/dL  3-5 days..................<15.0 mg/dL  6-29 days.................<15.0 mg/dL       Bilirubin Total   Date Value Ref Range Status   03/27/2025 0.5  0.1 - 1.0 mg/dL Final     Comment:     For infants and newborns, interpretation of results should be based   on gestational age, weight and in agreement with clinical   observations.    Premature Infant recommended reference ranges:   0-24 hours:  <8.0 mg/dL   24-48 hours: <12.0 mg/dL   3-5 days:    <15.0 mg/dL   6-29 days:   <15.0 mg/dL     Alkaline Phosphatase   Date Value Ref Range Status   03/20/2025 122 40 - 150 U/L Final     ALP   Date Value Ref Range Status   03/27/2025 122 40 - 150 unit/L Final     AST   Date Value Ref Range Status   03/27/2025 60 (H) 11 - 45 unit/L Final   03/20/2025 59 (H) 10 - 40 U/L Final     ALT   Date Value Ref Range Status   03/27/2025 114 (H) 10 - 44 unit/L Final   03/20/2025 85 (H) 10 - 44 U/L Final     Anion Gap   Date Value Ref Range Status   03/27/2025 6 (L) 8 - 16 mmol/L Final     eGFR if    Date Value Ref Range Status   01/22/2022 >60.0 >60 mL/min/1.73 m^2 Final     eGFR if non    Date Value Ref Range Status   01/22/2022 >60.0 >60 mL/min/1.73 m^2 Final     Comment:     Calculation used to obtain the estimated glomerular filtration  rate (eGFR) is the CKD-EPI equation.          Imaging:   All pertinent studies reviewed and interpreted by me       Assessment and Plan:   Rubén Peters) is a pleasant 54 y.o.male with primary myelofibrosis s/p haplo SCT who presents for post-transplant follow up.    Primary myelofibrosis: Status post treatment with ruxolitinib, followed by alloSCT. Day +30 BMBx results below.    Status post allogeneic stem cell transplantation: As noted above, status post haploidentical stem cell transplantation conditioned with  Bu/Flu/TT . Currently day +91.  Engrafted on day +22.  Day +30 BMBx on 1/2/25: No definitive evidence of residual JAK2 [id number] primary myelofibrosis. NGS without evidence of pathogenic mutations. Chimerism studies with 100% donor CD33, CD3 insufficient for analysis.   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 MMF has now been discontinued. He is on budesonide for aGVHD. No evidence of aGVHD at this visit. Reduce budesonide to daily today. Stop next week if no recurrence of aGVHD.   Current tacro dose: 1.5mg qaM and 2 mg qPM   Last tacro level: 8.4  Adjustments: None    Immunosuppression: Prevention with voriconazole,  acyclovir. CMV prophylaxis with letermovir. PJP prophyalxis dapsone. Continue weekly monitoring of CMV and EBV.  Last CMV: negative (3/17/2025), last EBV: negative (3/17/2025).  Active infections: none    Pancytopenia: Due to underlying disease and chemotherapy. Transfuse for Hgb <7 g/dL and platelets <10k.  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 Plan for Eltrombopag 50mg daily starting today.     Diarrhea: Due to aGVHD and improved/resolved at this visit. Continues budesonide taper as above.     Transaminitis: Mild. Asymptomatic. Normal T Bili. Monitoring closely. If liver enzymes continue to increase plan for US.    Hypertension:  Was newly hypertensive in hospital following SCT at which time he was started on amlodipine. Discontinued amlodipine after hospitalization due to hypotension. Monitor for now but restart antihypertensives if this worsens.     Cardiomyopathy: Echo 2/26/25 obtained due to tachycardia revealed reduced LVEF 45-50%. No signs/symptoms of HFrEF. Repeat echo in 3 months (5/2025).    Staphylococcus Skin Infection: Patient had MRSA while in the hospital and was treated with doxycycline, concern for recurrence on recent exam and has once again resolved following Doxycyline 100mg PO BID. He has now stopped doxycycline and the spot is gone.     Follow up: Twice weekly follow up with labs.    Orders Placed:      No orders of the defined types were placed in this encounter.     40 minutes of total time spent on the  encounter, which includes face to face time and non-face to face time preparing to see the patient (eg, review of tests), Obtaining and/or reviewing separately obtained history, Documenting clinical information in the electronic or other health record, Independently interpreting results (not separately reported) and communicating results to the patient/family/caregiver, or Care coordination with other physicians involved in his care (not separately reported).     Opal Alonzo PA-C  Malignant Hematology, Stem Cell Transplant, and Cellular Therapy  [facility] [place]

## 2025-03-31 ENCOUNTER — INFUSION (OUTPATIENT)
Dept: INFUSION THERAPY | Facility: HOSPITAL | Age: 55
End: 2025-03-31
Payer: COMMERCIAL

## 2025-03-31 ENCOUNTER — OFFICE VISIT (OUTPATIENT)
Dept: HEMATOLOGY/ONCOLOGY | Facility: CLINIC | Age: 55
End: 2025-03-31
Payer: COMMERCIAL

## 2025-03-31 VITALS
OXYGEN SATURATION: 95 % | DIASTOLIC BLOOD PRESSURE: 77 MMHG | WEIGHT: 172.38 LBS | SYSTOLIC BLOOD PRESSURE: 132 MMHG | HEIGHT: 73 IN | HEART RATE: 98 BPM | TEMPERATURE: 98 F | BODY MASS INDEX: 22.85 KG/M2

## 2025-03-31 VITALS
HEIGHT: 73 IN | DIASTOLIC BLOOD PRESSURE: 81 MMHG | OXYGEN SATURATION: 98 % | RESPIRATION RATE: 16 BRPM | WEIGHT: 172.38 LBS | SYSTOLIC BLOOD PRESSURE: 131 MMHG | BODY MASS INDEX: 22.85 KG/M2 | HEART RATE: 86 BPM | TEMPERATURE: 98 F

## 2025-03-31 DIAGNOSIS — Z94.84 HISTORY OF ALLOGENEIC STEM CELL TRANSPLANT: Primary | ICD-10-CM

## 2025-03-31 DIAGNOSIS — T45.1X5A ANEMIA ASSOCIATED WITH CHEMOTHERAPY: ICD-10-CM

## 2025-03-31 DIAGNOSIS — Z94.84 HISTORY OF ALLOGENEIC STEM CELL TRANSPLANT: ICD-10-CM

## 2025-03-31 DIAGNOSIS — Z76.82 STEM CELL TRANSPLANT CANDIDATE: ICD-10-CM

## 2025-03-31 DIAGNOSIS — E27.9 ADRENAL NODULE: Primary | ICD-10-CM

## 2025-03-31 DIAGNOSIS — D47.1 PRIMARY MYELOFIBROSIS: ICD-10-CM

## 2025-03-31 DIAGNOSIS — Z79.899 IMMUNODEFICIENCY DUE TO DRUG THERAPY: ICD-10-CM

## 2025-03-31 DIAGNOSIS — D84.821 IMMUNODEFICIENCY DUE TO DRUG THERAPY: ICD-10-CM

## 2025-03-31 DIAGNOSIS — D64.81 ANEMIA ASSOCIATED WITH CHEMOTHERAPY: ICD-10-CM

## 2025-03-31 DIAGNOSIS — T45.1X5A ANEMIA ASSOCIATED WITH CHEMOTHERAPY: Primary | ICD-10-CM

## 2025-03-31 DIAGNOSIS — D64.81 ANEMIA ASSOCIATED WITH CHEMOTHERAPY: Primary | ICD-10-CM

## 2025-03-31 LAB
ABO + RH BLD: NORMAL
ABSOLUTE EOSINOPHIL (OHS): 0.02 K/UL
ABSOLUTE MONOCYTE (OHS): 0.33 K/UL (ref 0.3–1)
ABSOLUTE NEUTROPHIL COUNT (OHS): 1.49 K/UL (ref 1.8–7.7)
ALBUMIN SERPL BCP-MCNC: 3.7 G/DL (ref 3.5–5.2)
ALP SERPL-CCNC: 135 UNIT/L (ref 40–150)
ALT SERPL W/O P-5'-P-CCNC: 85 UNIT/L (ref 10–44)
ANION GAP (OHS): 8 MMOL/L (ref 8–16)
AST SERPL-CCNC: 43 UNIT/L (ref 11–45)
BASOPHILS # BLD AUTO: 0.01 K/UL
BASOPHILS NFR BLD AUTO: 0.4 %
BILIRUB SERPL-MCNC: 0.5 MG/DL (ref 0.1–1)
BLD PROD TYP BPU: NORMAL
BLOOD UNIT EXPIRATION DATE: NORMAL
BLOOD UNIT TYPE CODE: 5100
BUN SERPL-MCNC: 26 MG/DL (ref 6–20)
CALCIUM SERPL-MCNC: 9.4 MG/DL (ref 8.7–10.5)
CHLORIDE SERPL-SCNC: 108 MMOL/L (ref 95–110)
CO2 SERPL-SCNC: 23 MMOL/L (ref 23–29)
CREAT SERPL-MCNC: 0.8 MG/DL (ref 0.5–1.4)
CROSSMATCH INTERPRETATION: NORMAL
CYTOMEGALOVIRUS DNA, QUAL (OHS): NOT DETECTED
DISPENSE STATUS: NORMAL
EPSTEIN-BARR VIRUS DNA, QUAL (OHS): NORMAL
ERYTHROCYTE [DISTWIDTH] IN BLOOD BY AUTOMATED COUNT: 17.2 % (ref 11.5–14.5)
GFR SERPLBLD CREATININE-BSD FMLA CKD-EPI: >60 ML/MIN/1.73/M2
GLUCOSE SERPL-MCNC: 85 MG/DL (ref 70–110)
HCT VFR BLD AUTO: 22.7 % (ref 40–54)
HGB BLD-MCNC: 7 GM/DL (ref 14–18)
IMM GRANULOCYTES # BLD AUTO: 0.05 K/UL (ref 0–0.04)
IMM GRANULOCYTES NFR BLD AUTO: 2 % (ref 0–0.5)
INDIRECT COOMBS: NORMAL
LYMPHOCYTES # BLD AUTO: 0.61 K/UL (ref 1–4.8)
MAGNESIUM SERPL-MCNC: 1.8 MG/DL (ref 1.6–2.6)
MCH RBC QN AUTO: 29.4 PG (ref 27–50)
MCHC RBC AUTO-ENTMCNC: 30.8 G/DL (ref 32–36)
MCV RBC AUTO: 95 FL (ref 82–98)
NUCLEATED RBC (/100WBC) (OHS): 0 /100 WBC
PHOSPHATE SERPL-MCNC: 3.8 MG/DL (ref 2.7–4.5)
PLATELET # BLD AUTO: 40 K/UL (ref 150–450)
PMV BLD AUTO: 12.1 FL (ref 9.2–12.9)
POTASSIUM SERPL-SCNC: 4.1 MMOL/L (ref 3.5–5.1)
PROT SERPL-MCNC: 6.8 GM/DL (ref 6–8.4)
RBC # BLD AUTO: 2.38 M/UL (ref 4.6–6.2)
RELATIVE EOSINOPHIL (OHS): 0.8 %
RELATIVE LYMPHOCYTE (OHS): 24.3 % (ref 18–48)
RELATIVE MONOCYTE (OHS): 13.1 % (ref 4–15)
RELATIVE NEUTROPHIL (OHS): 59.4 % (ref 38–73)
RH BLD: NORMAL
SODIUM SERPL-SCNC: 139 MMOL/L (ref 136–145)
SPECIMEN OUTDATE: NORMAL
TACROLIMUS BLD-MCNC: 9.1 NG/ML (ref 5–15)
UNIT NUMBER: NORMAL
WBC # BLD AUTO: 2.51 K/UL (ref 3.9–12.7)

## 2025-03-31 PROCEDURE — 86900 BLOOD TYPING SEROLOGIC ABO: CPT | Performed by: INTERNAL MEDICINE

## 2025-03-31 PROCEDURE — 63600175 PHARM REV CODE 636 W HCPCS

## 2025-03-31 PROCEDURE — 1160F RVW MEDS BY RX/DR IN RCRD: CPT | Mod: CPTII,S$GLB,, | Performed by: INTERNAL MEDICINE

## 2025-03-31 PROCEDURE — P9040 RBC LEUKOREDUCED IRRADIATED: HCPCS | Performed by: INTERNAL MEDICINE

## 2025-03-31 PROCEDURE — 87799 DETECT AGENT NOS DNA QUANT: CPT

## 2025-03-31 PROCEDURE — 25000003 PHARM REV CODE 250: Performed by: INTERNAL MEDICINE

## 2025-03-31 PROCEDURE — 84295 ASSAY OF SERUM SODIUM: CPT

## 2025-03-31 PROCEDURE — 63600175 PHARM REV CODE 636 W HCPCS: Performed by: INTERNAL MEDICINE

## 2025-03-31 PROCEDURE — 99215 OFFICE O/P EST HI 40 MIN: CPT | Mod: S$GLB,,, | Performed by: INTERNAL MEDICINE

## 2025-03-31 PROCEDURE — 1159F MED LIST DOCD IN RCRD: CPT | Mod: CPTII,S$GLB,, | Performed by: INTERNAL MEDICINE

## 2025-03-31 PROCEDURE — 99999 PR PBB SHADOW E&M-EST. PATIENT-LVL IV: CPT | Mod: PBBFAC,,, | Performed by: INTERNAL MEDICINE

## 2025-03-31 PROCEDURE — 3075F SYST BP GE 130 - 139MM HG: CPT | Mod: CPTII,S$GLB,, | Performed by: INTERNAL MEDICINE

## 2025-03-31 PROCEDURE — 25000003 PHARM REV CODE 250

## 2025-03-31 PROCEDURE — A4216 STERILE WATER/SALINE, 10 ML: HCPCS

## 2025-03-31 PROCEDURE — 84100 ASSAY OF PHOSPHORUS: CPT

## 2025-03-31 PROCEDURE — 3008F BODY MASS INDEX DOCD: CPT | Mod: CPTII,S$GLB,, | Performed by: INTERNAL MEDICINE

## 2025-03-31 PROCEDURE — 36591 DRAW BLOOD OFF VENOUS DEVICE: CPT

## 2025-03-31 PROCEDURE — 3078F DIAST BP <80 MM HG: CPT | Mod: CPTII,S$GLB,, | Performed by: INTERNAL MEDICINE

## 2025-03-31 PROCEDURE — 36592 COLLECT BLOOD FROM PICC: CPT

## 2025-03-31 PROCEDURE — 36430 TRANSFUSION BLD/BLD COMPNT: CPT

## 2025-03-31 PROCEDURE — A4216 STERILE WATER/SALINE, 10 ML: HCPCS | Performed by: INTERNAL MEDICINE

## 2025-03-31 PROCEDURE — 85025 COMPLETE CBC W/AUTO DIFF WBC: CPT

## 2025-03-31 PROCEDURE — 80197 ASSAY OF TACROLIMUS: CPT

## 2025-03-31 PROCEDURE — 86920 COMPATIBILITY TEST SPIN: CPT | Performed by: INTERNAL MEDICINE

## 2025-03-31 PROCEDURE — 83735 ASSAY OF MAGNESIUM: CPT

## 2025-03-31 PROCEDURE — 36415 COLL VENOUS BLD VENIPUNCTURE: CPT

## 2025-03-31 RX ORDER — HEPARIN 100 UNIT/ML
500 SYRINGE INTRAVENOUS
OUTPATIENT
Start: 2025-04-15

## 2025-03-31 RX ORDER — HEPARIN 100 UNIT/ML
500 SYRINGE INTRAVENOUS
OUTPATIENT
Start: 2025-04-18

## 2025-03-31 RX ORDER — SODIUM CHLORIDE 0.9 % (FLUSH) 0.9 %
10 SYRINGE (ML) INJECTION
Status: DISCONTINUED | OUTPATIENT
Start: 2025-03-31 | End: 2025-03-31 | Stop reason: HOSPADM

## 2025-03-31 RX ORDER — SODIUM CHLORIDE 0.9 % (FLUSH) 0.9 %
10 SYRINGE (ML) INJECTION
OUTPATIENT
Start: 2025-04-08

## 2025-03-31 RX ORDER — HEPARIN 100 UNIT/ML
500 SYRINGE INTRAVENOUS
OUTPATIENT
Start: 2025-04-11

## 2025-03-31 RX ORDER — SODIUM CHLORIDE 9 MG/ML
50 INJECTION, SOLUTION INTRAVENOUS CONTINUOUS
OUTPATIENT
Start: 2025-04-18

## 2025-03-31 RX ORDER — SODIUM CHLORIDE 0.9 % (FLUSH) 0.9 %
10 SYRINGE (ML) INJECTION
Status: CANCELLED | OUTPATIENT
Start: 2025-04-04

## 2025-03-31 RX ORDER — SODIUM CHLORIDE 0.9 % (FLUSH) 0.9 %
10 SYRINGE (ML) INJECTION
Status: CANCELLED | OUTPATIENT
Start: 2025-03-31

## 2025-03-31 RX ORDER — HEPARIN 100 UNIT/ML
500 SYRINGE INTRAVENOUS
Status: DISCONTINUED | OUTPATIENT
Start: 2025-03-31 | End: 2025-03-31 | Stop reason: HOSPADM

## 2025-03-31 RX ORDER — SODIUM CHLORIDE 0.9 % (FLUSH) 0.9 %
10 SYRINGE (ML) INJECTION
OUTPATIENT
Start: 2025-04-15

## 2025-03-31 RX ORDER — SODIUM CHLORIDE 9 MG/ML
50 INJECTION, SOLUTION INTRAVENOUS CONTINUOUS
OUTPATIENT
Start: 2025-04-11

## 2025-03-31 RX ORDER — SODIUM CHLORIDE 9 MG/ML
50 INJECTION, SOLUTION INTRAVENOUS CONTINUOUS
OUTPATIENT
Start: 2025-04-08

## 2025-03-31 RX ORDER — SODIUM CHLORIDE 0.9 % (FLUSH) 0.9 %
10 SYRINGE (ML) INJECTION
OUTPATIENT
Start: 2025-04-18

## 2025-03-31 RX ORDER — HEPARIN 100 UNIT/ML
500 SYRINGE INTRAVENOUS
Status: CANCELLED | OUTPATIENT
Start: 2025-04-04

## 2025-03-31 RX ORDER — HEPARIN 100 UNIT/ML
500 SYRINGE INTRAVENOUS
Status: CANCELLED | OUTPATIENT
Start: 2025-03-31

## 2025-03-31 RX ORDER — HEPARIN 100 UNIT/ML
500 SYRINGE INTRAVENOUS
OUTPATIENT
Start: 2025-04-08

## 2025-03-31 RX ORDER — SODIUM CHLORIDE 9 MG/ML
50 INJECTION, SOLUTION INTRAVENOUS CONTINUOUS
Status: CANCELLED | OUTPATIENT
Start: 2025-04-04

## 2025-03-31 RX ORDER — ATOVAQUONE 750 MG/5ML
1500 SUSPENSION ORAL 2 TIMES DAILY
Qty: 210 ML | Refills: 2 | Status: SHIPPED | OUTPATIENT
Start: 2025-03-31

## 2025-03-31 RX ORDER — SODIUM CHLORIDE 0.9 % (FLUSH) 0.9 %
10 SYRINGE (ML) INJECTION
OUTPATIENT
Start: 2025-04-11

## 2025-03-31 RX ORDER — SODIUM CHLORIDE 9 MG/ML
50 INJECTION, SOLUTION INTRAVENOUS CONTINUOUS
OUTPATIENT
Start: 2025-04-15

## 2025-03-31 RX ADMIN — HEPARIN SODIUM (PORCINE) LOCK FLUSH IV SOLN 100 UNIT/ML 500 UNITS: 100 SOLUTION at 08:03

## 2025-03-31 RX ADMIN — Medication 10 ML: at 01:03

## 2025-03-31 RX ADMIN — HEPARIN 500 UNITS: 100 SYRINGE at 01:03

## 2025-03-31 RX ADMIN — Medication 10 ML: at 08:03

## 2025-03-31 NOTE — PROGRESS NOTES
Section of Hematology and Stem Cell Transplantation    Post-Transplantation Follow Up Visit     3/31/25    Transplant History:   Primary oncologist: Clyde James MD  Primary oncologic diagnosis: primary myelofibrosis  Transplant date: 12/26/2024  Donor: haploidentical  Blood Type (Patient): O +  Blood Type (Donor): O +  CMV (Patient): Negative  CMV (Donor): Positive  Graft source: Peripheral blood  CD34+ cell dose: 6.04 X10^6 cells/kg  Conditioning Regimen:  Thiotepa, Busulfan, Fludarabine  GVHD prophylaxis: Post-transplant cyclophosphamide, MMF, Tacrolimus  Immediate post-transplant complications: mucositis, R axillary skin/soft tissue infection secondary to MRSA    History of Present Ilness:   Rubén Peters) is a pleasant 54 y.o.male with primary myelofibrosis who is status post haploidentical stem cell transplantation conditioned with  Bu/Flu/TT  who is currently day +95 who presents for post-transplant follow up.    He is feeling tired. Has been requiring almost weekly PRBC transfusions. No additional diarrhea on daily budesonide. No bruising/bleeding. No n/v or skin changes. Appetite improving and putting on a couple of pounds.     PAST MEDICAL HISTORY:   Past Medical History:   Diagnosis Date    Abdominal pain 01/02/2025    Allergic contact dermatitis due to adhesives 12/31/2024    Cancer     Flatulence 01/03/2025    Headache 12/27/2024    Hyperlipidemia     Mucositis 01/02/2025    Other constipation 12/19/2024    Thrombocytosis 12/18/2024    Transaminitis 12/27/2024       PAST SURGICAL HISTORY:   Past Surgical History:   Procedure Laterality Date    BONE MARROW BIOPSY N/A 11/27/2024    Procedure: Biopsy-bone marrow;  Surgeon: Clyde James MD;  Location: [facility] ([address] [place] [address]);  Service: Oncology;  Laterality: N/A;  11/20-pre call complete-tb    COLONOSCOPY N/A 8/9/2023    Procedure: COLONOSCOPY;  Surgeon: Will Ardon MD;  Location: [facility] ([address] [place] [address]);  Service: Endoscopy;  Laterality: N/A;   Suprep Instructions sent via portal / Authorizing:     Bebeto Arora MD in [facility] FAMILY MED/ INTERNAL MED/ PEDS  Referral:          [id number]    EYE SURGERY      FLEXIBLE SIGMOIDOSCOPY N/A 7/23/2024    Procedure: SIGMOIDOSCOPY, FLEXIBLE;  Surgeon: Nirali Vicente MD;  Location: [facility];  Service: Endoscopy;  Laterality: N/A;    INSERTION OF LONGORIA CATHETER Right 12/18/2024    Procedure: INSERTION, CATHETER, CENTRAL VENOUS, LONGORIA TRIPLE LUMEN, Bard 12.5 fr [place] Cath model [id number];  Surgeon: Willie Hadley MD;  Location: [facility] OR [address] [place] [address];  Service: General;  Laterality: Right;       PAST SOCIAL HISTORY:  Social History     Tobacco Use    Smoking status: Former     Types: Cigars, Cigarettes    Smokeless tobacco: Never   Substance Use Topics    Alcohol use: Not Currently     Comment: socially    Drug use: Never       FAMILY HISTORY:  Family History   Problem Relation Name Age of Onset    Arthritis Mother      COPD Father      Testicular cancer Maternal Cousin Peter 17        Chemotherapy    Breast cancer Maternal Cousin  51        Chemotherapy    Heart disease Maternal Grandfather      Stroke Maternal Grandmother         CURRENT MEDICATIONS:   Current Outpatient Medications   Medication Sig    acyclovir (ZOVIRAX) 800 MG Tab Take 1 tablet (800 mg total) by mouth 2 (two) times daily.    eltrombopag olamine (PROMACTA) 50 MG Tab Take 1 tablet (50 mg total) by mouth once daily.    letermovir (PREVYMIS) 480 mg Tab Take 1 tablet (480 mg total) by mouth once daily.    magnesium oxide (MAG-OX) 400 mg (241.3 mg magnesium) tablet Take 2 tablets in the morning, 1 tablet in the afternoon, and 2 tablets in the evening.    ondansetron (ZOFRAN) 8 MG tablet Take 1 tablet (8 mg total) by mouth every 8 (eight) hours as needed for Nausea.    prochlorperazine (COMPAZINE) 10 MG tablet Take 1 tablet (10 mg total) by mouth 4 (four) times daily as needed for Nausea.    tacrolimus (PROGRAF) 0.5 MG Cap Take 3 capsules (1.5 mg total)  by mouth every morning AND 4 capsules (2 mg total) every evening.    traMADoL (ULTRAM) 50 mg tablet Take 1 tablet (50 mg total) by mouth every 6 (six) hours as needed for Pain.    triamcinolone acetonide 0.1% (KENALOG) 0.1 % cream Apply topically 2 (two) times daily as needed (rash on arms, chest, back, trunk, and legs (do not apply to face)).    voriconazole (VFEND) 200 MG Tab Take 1 tablet (200 mg total) by mouth 2 (two) times daily.    atovaquone (MEPRON) 750 mg/5 mL Susp oral liquid Take 10 mLs (1,500 mg total) by mouth 2 (two) times daily.     No current facility-administered medications for this visit.     Facility-Administered Medications Ordered in Other Visits   Medication    alteplase injection 2 mg    heparin, porcine (PF) 100 unit/mL injection flush 500 Units    sodium chloride 0.9% flush 10 mL       ALLERGIES:   Review of patient's allergies indicates:   Allergen Reactions    Sulfamethoxazole-trimethoprim Hives       GVHD Review of Systems:     Pertinent positives and negatives included in the HPI. Otherwise a 14 point review of systems is negative. GVHD review of systems recorded in BMT flowsheet.     Physical Exam:     Vitals:    03/31/25 0908   BP: 132/77   Pulse: 98   Temp: 98.2 °F (36.8 °C)         General: Appears well, NAD  HEENT: MMM, no OP lesions  Neck: Supple, no LAD  Pulmonary: CTAB, no increased work of breathing, no W/R/C  Cardiovascular: S1S2 normal, RRR, no M/R/G  Abdominal: Soft, NT, ND, BS+, no HSM  Extremities: No C/C/E  Neurological: AAOx4, grossly normal, no focal deficits  Dermatologic: No appreciable rashes or lesions     ECOG Performance Status: (foot note - ECOG PS provided by [employer]cology [employer]) 1 - Symptomatic but completely ambulatory    Karnofsky Performance Score:  70%- Cares for Self: Unable to Carry on Normal Activity or Active Work    Labs:   Lab Results   Component Value Date    WBC 2.51 (L) 03/31/2025    RBC 2.38 (L) 03/31/2025    HGB 7.0 (L) 03/31/2025     HCT 22.7 (L) 03/31/2025    MCV 95 03/31/2025    MCH 29.4 03/31/2025    MCHC 30.8 (L) 03/31/2025    RDW 17.2 (H) 03/31/2025    PLT 40 (L) 03/31/2025    MPV 12.1 03/31/2025    GRAN 3.6 03/27/2025    LYMPH 24.3 03/31/2025    LYMPH 0.61 (L) 03/31/2025    MONO 13.1 03/31/2025    MONO 0.33 03/31/2025    EOS 0.8 03/31/2025    EOS 0.02 03/31/2025    BASO 0.02 03/20/2025    EOSINOPHIL 0.6 03/20/2025    BASOPHIL 0.4 03/31/2025    BASOPHIL 0.01 03/31/2025       Sodium   Date Value Ref Range Status   03/31/2025 139 136 - 145 mmol/L Final   03/20/2025 138 136 - 145 mmol/L Final     Potassium   Date Value Ref Range Status   03/31/2025 4.1 3.5 - 5.1 mmol/L Final   03/20/2025 4.2 3.5 - 5.1 mmol/L Final     Chloride   Date Value Ref Range Status   03/31/2025 108 95 - 110 mmol/L Final   03/20/2025 108 95 - 110 mmol/L Final     CO2   Date Value Ref Range Status   03/31/2025 23 23 - 29 mmol/L Final   03/20/2025 21 (L) 23 - 29 mmol/L Final     Glucose   Date Value Ref Range Status   03/20/2025 101 70 - 110 mg/dL Final     BUN   Date Value Ref Range Status   03/31/2025 26 (H) 6 - 20 mg/dL Final     Creatinine   Date Value Ref Range Status   03/31/2025 0.8 0.5 - 1.4 mg/dL Final     Calcium   Date Value Ref Range Status   03/31/2025 9.4 8.7 - 10.5 mg/dL Final   03/20/2025 9.2 8.7 - 10.5 mg/dL Final     Total Protein   Date Value Ref Range Status   03/20/2025 6.6 6.0 - 8.4 g/dL Final     Albumin   Date Value Ref Range Status   03/31/2025 3.7 3.5 - 5.2 g/dL Final   03/20/2025 3.7 3.5 - 5.2 g/dL Final     Total Bilirubin   Date Value Ref Range Status   03/20/2025 0.4 0.1 - 1.0 mg/dL Final     Comment:     For infants and newborns, interpretation of results should be based  on gestational age, weight and in agreement with clinical  observations.    Premature Infant recommended reference ranges:  Up to 24 hours.............<8.0 mg/dL  Up to 48 hours............<12.0 mg/dL  3-5 days..................<15.0 mg/dL  6-29  days.................<15.0 mg/dL       Bilirubin Total   Date Value Ref Range Status   03/31/2025 0.5 0.1 - 1.0 mg/dL Final     Comment:     For infants and newborns, interpretation of results should be based   on gestational age, weight and in agreement with clinical   observations.    Premature Infant recommended reference ranges:   0-24 hours:  <8.0 mg/dL   24-48 hours: <12.0 mg/dL   3-5 days:    <15.0 mg/dL   6-29 days:   <15.0 mg/dL     Alkaline Phosphatase   Date Value Ref Range Status   03/20/2025 122 40 - 150 U/L Final     ALP   Date Value Ref Range Status   03/31/2025 135 40 - 150 unit/L Final     AST   Date Value Ref Range Status   03/31/2025 43 11 - 45 unit/L Final   03/20/2025 59 (H) 10 - 40 U/L Final     ALT   Date Value Ref Range Status   03/31/2025 85 (H) 10 - 44 unit/L Final   03/20/2025 85 (H) 10 - 44 U/L Final     Anion Gap   Date Value Ref Range Status   03/31/2025 8 8 - 16 mmol/L Final     eGFR if    Date Value Ref Range Status   01/22/2022 >60.0 >60 mL/min/1.73 m^2 Final     eGFR if non    Date Value Ref Range Status   01/22/2022 >60.0 >60 mL/min/1.73 m^2 Final     Comment:     Calculation used to obtain the estimated glomerular filtration  rate (eGFR) is the CKD-EPI equation.          Imaging:   All pertinent studies reviewed and interpreted by me       Assessment and Plan:   Rubén Peters) is a pleasant 54 y.o.male with primary myelofibrosis s/p haplo SCT who presents for post-transplant follow up.    Primary myelofibrosis: Status post treatment with ruxolitinib, followed by alloSCT. Day +30 BMBx results below.    Status post allogeneic stem cell transplantation: As noted above, status post haploidentical stem cell transplantation conditioned with  Bu/Flu/TT . Currently day +95.  Engrafted on day +22.  Day +30 BMBx on 1/2/25: No definitive evidence of residual JAK2 [id number] primary myelofibrosis. NGS without evidence of pathogenic mutations.  Chimerism studies with 100% donor CD33, CD3 insufficient for analysis.   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 MMF has now been discontinued. He is on budesonide for aGVHD. No evidence of aGVHD at this visit. Can stop budesonide today.   Current tacro dose: 1.5mg qaM and 2 mg qPM   Last tacro level: 9.1  Adjustments: None    Immunosuppression: Prevention with voriconazole,  acyclovir. CMV prophylaxis with letermovir. PJP prophyalxis dapsone. Continue weekly monitoring of CMV and EBV.  Last CMV: negative (3/17/2025), last EBV: negative (3/17/2025).  Active infections: none    Pancytopenia: Due to underlying disease and chemotherapy. Transfuse for Hgb <7 g/dL and platelets <10k.  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 Plan for Eltrombopag 50mg daily.   Anemia - thought to be from conditioning chemotherapy. Will switch dapsone for atovaquone.    Diarrhea: Due to aGVHD and improved/resolved at this visit. Continues budesonide taper as above.     Transaminitis: Mild. Asymptomatic. Normal T Bili. Monitoring closely. If liver enzymes continue to increase plan for US.    Hypertension:  Was newly hypertensive in hospital following SCT at which time he was started on amlodipine. Discontinued amlodipine after hospitalization due to hypotension. Monitor for now but restart antihypertensives if this worsens.     Cardiomyopathy: Echo 2/26/25 obtained due to tachycardia revealed reduced LVEF 45-50%. No signs/symptoms of HFrEF. Repeat echo in 3 months (5/2025).    Staphylococcus Skin Infection: Patient had MRSA while in the hospital and was treated with doxycycline, concern for recurrence on recent exam and has once again resolved following Doxycyline 100mg PO BID. He has now stopped doxycycline and the spot is gone.     Follow up: Twice weekly follow up with  labs.    Orders Placed:      Orders Placed This Encounter   Procedures    Prepare RBC     Standing Status:   Future     Number of Occurrences:   1     Expected Date:   4/4/2025     Expiration Date:   5/4/2026     Number of Units:   1 Unit     Transfusion Indications:   Symptomatic Anemia     Special Requirements:   CMV Negative     Special Requirements:   Leukoreduced     Special Requirements:   Irradiated    Prepare RBC     Standing Status:   Future     Expected Date:   4/8/2025     Expiration Date:   5/8/2026     Number of Units:   1 Unit     Transfusion Indications:   Symptomatic Anemia     Special Requirements:   CMV Negative     Special Requirements:   Leukoreduced     Special Requirements:   Irradiated    Prepare RBC     Standing Status:   Future     Expected Date:   4/11/2025     Expiration Date:   5/11/2026     Number of Units:   1 Unit     Transfusion Indications:   Symptomatic Anemia     Special Requirements:   CMV Negative     Special Requirements:   Leukoreduced     Special Requirements:   Irradiated    Prepare RBC     Standing Status:   Future     Expected Date:   4/15/2025     Expiration Date:   5/15/2026     Number of Units:   1 Unit     Transfusion Indications:   Symptomatic Anemia     Special Requirements:   CMV Negative     Special Requirements:   Leukoreduced     Special Requirements:   Irradiated    Prepare RBC     Standing Status:   Future     Expected Date:   4/18/2025     Expiration Date:   5/18/2026     Number of Units:   1 Unit     Transfusion Indications:   Symptomatic Anemia     Special Requirements:   CMV Negative     Special Requirements:   Leukoreduced     Special Requirements:   Irradiated      40 minutes of total time spent on the encounter, which includes face to face time and non-face to face time preparing to see the patient (eg, review of tests), Obtaining and/or reviewing separately obtained history, Documenting clinical information in the electronic or other health record,  Independently interpreting results (not separately reported) and communicating results to the patient/family/caregiver, or Care coordination with other physicians involved in his care (not separately reported).     Paddy Fuller MD  Malignant Hematology, Stem Cell Transplant, and Cellular Therapy  [facility]

## 2025-03-31 NOTE — PROGRESS NOTES
BMT Pharmacist Immunosuppression Note:    Reviewed patient's tacrolimus level with Dr. James and it is within goal range.      Lab Results   Component Value Date    TACROLIMUS 9.1 03/31/2025    TACROLIMUS 8.4 03/27/2025    TACROLIMUS 7.2 03/24/2025       Lab Results   Component Value Date    CREATININE 0.8 03/31/2025    CREATININE 0.8 03/27/2025    CREATININE 0.8 03/24/2025    BILITOT 0.5 03/31/2025    BILITOT 0.5 03/27/2025    BILITOT 0.4 03/24/2025    AST 43 03/31/2025    AST 60 (H) 03/27/2025    AST 36 03/24/2025    ALT 85 (H) 03/31/2025     (H) 03/27/2025    ALT 71 (H) 03/24/2025         Current regimen: 1.5 mg in the morning and 2 mg in the evening    Plan: Continue current regimen.        Sree Hernandez, PharmD  Clinical Pharmacy Specialist, Bone Marrow Transplant/Hematology  Spectra link: [phone]

## 2025-03-31 NOTE — PLAN OF CARE
"BP (!) 140/79   Pulse 90   Temp 98.1 °F (36.7 °C) (Oral)   Resp 16   Ht 6' 1" (1.854 m)   Wt 78.2 kg (172 lb 6.4 oz)   SpO2 96%   BMI 22.75 kg/m² Pleasant, alert and oriented patient to Chemo Infusion per self with wife for 1 unit PRBC's - VSS and RCW TLC distal port flushed with NS, blood return observed,dressing D/I, and NS infusing - 1 unit PRBC's infused, and patient tolerated transfusion with no AVE's, distal port flushed with NS/Heparin, blood return observed, green cap applied and patient discharged to home with no concerns - RTC on 4/3/25    "

## 2025-04-01 ENCOUNTER — OFFICE VISIT (OUTPATIENT)
Dept: FAMILY MEDICINE | Facility: CLINIC | Age: 55
End: 2025-04-01
Payer: COMMERCIAL

## 2025-04-01 VITALS
OXYGEN SATURATION: 99 % | DIASTOLIC BLOOD PRESSURE: 74 MMHG | HEIGHT: 73 IN | TEMPERATURE: 98 F | BODY MASS INDEX: 22.9 KG/M2 | SYSTOLIC BLOOD PRESSURE: 122 MMHG | WEIGHT: 172.81 LBS | HEART RATE: 98 BPM

## 2025-04-01 DIAGNOSIS — Z00.00 ROUTINE PHYSICAL EXAMINATION: Primary | ICD-10-CM

## 2025-04-01 DIAGNOSIS — E78.5 HYPERLIPIDEMIA, UNSPECIFIED HYPERLIPIDEMIA TYPE: Chronic | ICD-10-CM

## 2025-04-01 PROBLEM — R19.7 DIARRHEA: Status: RESOLVED | Noted: 2025-01-07 | Resolved: 2025-04-01

## 2025-04-01 PROBLEM — K62.89 PERIANAL PAIN: Status: RESOLVED | Noted: 2025-01-13 | Resolved: 2025-04-01

## 2025-04-01 PROBLEM — Z87.891 HISTORY OF TOBACCO ABUSE: Status: ACTIVE | Noted: 2019-08-09

## 2025-04-01 PROBLEM — K52.9 COLITIS: Status: RESOLVED | Noted: 2024-07-22 | Resolved: 2025-04-01

## 2025-04-01 PROCEDURE — 3074F SYST BP LT 130 MM HG: CPT | Mod: CPTII,S$GLB,, | Performed by: FAMILY MEDICINE

## 2025-04-01 PROCEDURE — 99396 PREV VISIT EST AGE 40-64: CPT | Mod: S$GLB,,, | Performed by: FAMILY MEDICINE

## 2025-04-01 PROCEDURE — 99999 PR PBB SHADOW E&M-EST. PATIENT-LVL IV: CPT | Mod: PBBFAC,,, | Performed by: FAMILY MEDICINE

## 2025-04-01 PROCEDURE — 3078F DIAST BP <80 MM HG: CPT | Mod: CPTII,S$GLB,, | Performed by: FAMILY MEDICINE

## 2025-04-01 PROCEDURE — 1159F MED LIST DOCD IN RCRD: CPT | Mod: CPTII,S$GLB,, | Performed by: FAMILY MEDICINE

## 2025-04-01 PROCEDURE — 3008F BODY MASS INDEX DOCD: CPT | Mod: CPTII,S$GLB,, | Performed by: FAMILY MEDICINE

## 2025-04-01 NOTE — PROGRESS NOTES
[facility]  Progress Note    SUBJECTIVE:     Chief Complaint   Patient presents with    Annual Exam       HPI   Rubén Hu  is a 54 y.o. male here for physical exam. Patient has no other new complaints/problems at this time.      Review of patient's allergies indicates:   Allergen Reactions    Sulfamethoxazole-trimethoprim Hives       Past Medical History:   Diagnosis Date    Abdominal pain 01/02/2025    Allergic contact dermatitis due to adhesives 12/31/2024    Cancer     Flatulence 01/03/2025    Headache 12/27/2024    Hyperlipidemia     Mucositis 01/02/2025    Other constipation 12/19/2024    Thrombocytosis 12/18/2024    Transaminitis 12/27/2024     Past Surgical History:   Procedure Laterality Date    BONE MARROW BIOPSY N/A 11/27/2024    Procedure: Biopsy-bone marrow;  Surgeon: Clyde James MD;  [facility]  Service: Oncology;  Laterality: N/A;  11/20-pre call complete-tb    COLONOSCOPY N/A 8/9/2023    Procedure: COLONOSCOPY;  Surgeon: Will Ardon MD;  [facility]  Service: Endoscopy;  Laterality: N/A;  Suprep Instructions sent via portal / Authorizing:     Bebeto Arora MD in [facility] FAMILY MED/ INTERNAL MED/ PEDS  Referral:          [id number]    EYE SURGERY      FLEXIBLE SIGMOIDOSCOPY N/A 7/23/2024    Procedure: SIGMOIDOSCOPY, FLEXIBLE;  Surgeon: Nirali Vicente MD;  Location: [facility];  Service: Endoscopy;  Laterality: N/A;    INSERTION OF LONGORIA CATHETER Right 12/18/2024    Procedure: INSERTION, CATHETER, CENTRAL VENOUS, LONGORIA TRIPLE LUMEN, Bard 12.5 fr [place] Cath model [id number];  Surgeon: Willie Hadley MD;  Location: [address];  Service: General;  Laterality: Right;     Family History   Problem Relation Name Age of Onset    Arthritis Mother      COPD Father      Testicular cancer Maternal Cousin Peter 17        Chemotherapy    Breast cancer Maternal Cousin  51        Chemotherapy    Heart disease Maternal  Grandfather      Stroke Maternal Grandmother       Social History[1]     Review of Systems   Constitutional:  Negative for chills, diaphoresis and fever.   HENT:  Negative for congestion, ear pain and sore throat.    Eyes:  Negative for photophobia and discharge.   Respiratory:  Negative for cough, shortness of breath and wheezing.    Cardiovascular:  Negative for chest pain and palpitations.   Gastrointestinal:  Negative for abdominal pain, constipation, diarrhea, nausea and vomiting.   Genitourinary:  Negative for dysuria and hematuria.   Musculoskeletal:  Negative for back pain and myalgias.   Skin:  Negative for itching and rash.   Neurological:  Negative for dizziness, sensory change, focal weakness, weakness and headaches.   All other systems reviewed and are negative.    OBJECTIVE:     Vitals:    04/01/25 1324   BP: 122/74   Pulse: 98   Temp: 98.3 °F (36.8 °C)     Body mass index is 22.8 kg/m².    Physical Exam  Constitutional:       General: He is not in acute distress.     Appearance: He is not diaphoretic.   HENT:      Head: Normocephalic and atraumatic.      Right Ear: Tympanic membrane and ear canal normal. No hemotympanum. Tympanic membrane is not perforated, erythematous or bulging.      Left Ear: Tympanic membrane and ear canal normal. No hemotympanum. Tympanic membrane is not perforated, erythematous or bulging.   Eyes:      Conjunctiva/sclera: Conjunctivae normal.      Pupils: Pupils are equal, round, and reactive to light.   Neck:      Thyroid: No thyromegaly.   Cardiovascular:      Rate and Rhythm: Normal rate and regular rhythm.      Heart sounds: Normal heart sounds. No murmur heard.     No friction rub. No gallop.   Pulmonary:      Effort: Pulmonary effort is normal. No respiratory distress.      Breath sounds: Normal breath sounds. No wheezing or rales.   Abdominal:      General: Bowel sounds are normal. There is no distension.      Palpations: Abdomen is soft.      Tenderness: There is no  abdominal tenderness. There is no guarding or rebound.   Musculoskeletal:         General: No tenderness. Normal range of motion.      Comments: + temp port in R chest area. No evidence of infection.   Skin:     General: Skin is warm.      Findings: No erythema or rash.   Neurological:      Mental Status: He is alert and oriented to person, place, and time.       Old records were reviewed. Labs and/or images were independently reviewed.    ASSESSMENT     1. Routine physical examination    2. Hyperlipidemia, unspecified hyperlipidemia type        PLAN:     Routine physical examination  -     Hemoglobin A1C; Future; Expected date: 04/01/2025  -     Lipid Panel; Future; Expected date: 04/01/2025  -     TSH; Future; Expected date: 04/01/2025  -     PSA, Screening; Future; Expected date: 04/01/2025  -     T4, Free; Future; Expected date: 04/01/2025  -     Vitamin D; Future; Expected date: 04/01/2025  -     We briefly discussed diet, exercise, and routine preventive exams. All questions and comments addressed.  -     still seeing hem/onc for his myelofibrosis. He is in good spirits.     Hyperlipidemia, unspecified hyperlipidemia type   -     Counseled patient about healthy diet, exercise habits, and to increase physical activity.        RTC PRJOSE Arora MD  04/01/2025 1:38 PM             [1]  Social History  Tobacco Use    Smoking status: Former     Types: Cigars, Cigarettes    Smokeless tobacco: Never   Substance Use Topics    Alcohol use: Not Currently     Comment: socially    Drug use: Never

## 2025-04-02 ENCOUNTER — PATIENT MESSAGE (OUTPATIENT)
Dept: HEMATOLOGY/ONCOLOGY | Facility: CLINIC | Age: 55
End: 2025-04-02
Payer: COMMERCIAL

## 2025-04-02 ENCOUNTER — LAB VISIT (OUTPATIENT)
Dept: LAB | Facility: HOSPITAL | Age: 55
End: 2025-04-02
Attending: FAMILY MEDICINE
Payer: COMMERCIAL

## 2025-04-02 ENCOUNTER — DOCUMENTATION ONLY (OUTPATIENT)
Dept: HEMATOLOGY/ONCOLOGY | Facility: CLINIC | Age: 55
End: 2025-04-02
Payer: COMMERCIAL

## 2025-04-02 DIAGNOSIS — Z00.00 ROUTINE PHYSICAL EXAMINATION: ICD-10-CM

## 2025-04-02 LAB
25(OH)D3+25(OH)D2 SERPL-MCNC: 14 NG/ML (ref 30–96)
CHOLEST SERPL-MCNC: 288 MG/DL (ref 120–199)
CHOLEST/HDLC SERPL: 6.9 {RATIO} (ref 2–5)
EAG (OHS): 105 MG/DL (ref 68–131)
HBA1C MFR BLD: 5.3 % (ref 4–5.6)
HDLC SERPL-MCNC: 42 MG/DL (ref 40–75)
HDLC SERPL: 14.6 % (ref 20–50)
LDLC SERPL CALC-MCNC: ABNORMAL MG/DL
NONHDLC SERPL-MCNC: 246 MG/DL
PSA SERPL-MCNC: 2.65 NG/ML
T4 FREE SERPL-MCNC: 0.91 NG/DL (ref 0.71–1.51)
TRIGL SERPL-MCNC: 1018 MG/DL (ref 30–150)
TSH SERPL-ACNC: 2.62 UIU/ML (ref 0.4–4)

## 2025-04-02 PROCEDURE — 84153 ASSAY OF PSA TOTAL: CPT

## 2025-04-02 PROCEDURE — 83036 HEMOGLOBIN GLYCOSYLATED A1C: CPT

## 2025-04-02 PROCEDURE — 36415 COLL VENOUS BLD VENIPUNCTURE: CPT | Mod: PO

## 2025-04-02 PROCEDURE — 84443 ASSAY THYROID STIM HORMONE: CPT

## 2025-04-02 PROCEDURE — 82465 ASSAY BLD/SERUM CHOLESTEROL: CPT

## 2025-04-02 PROCEDURE — 82306 VITAMIN D 25 HYDROXY: CPT

## 2025-04-02 PROCEDURE — 84439 ASSAY OF FREE THYROXINE: CPT

## 2025-04-02 NOTE — PROGRESS NOTES
SW received email from patient's spouse, Carmelita, requesting FMLA paperwork be signed by physician. SW provided MD staff with paperwork and notified Carmelita via email.

## 2025-04-03 ENCOUNTER — INFUSION (OUTPATIENT)
Dept: INFUSION THERAPY | Facility: HOSPITAL | Age: 55
End: 2025-04-03
Payer: COMMERCIAL

## 2025-04-03 ENCOUNTER — OFFICE VISIT (OUTPATIENT)
Dept: HEMATOLOGY/ONCOLOGY | Facility: CLINIC | Age: 55
End: 2025-04-03
Payer: COMMERCIAL

## 2025-04-03 ENCOUNTER — PROCEDURE VISIT (OUTPATIENT)
Dept: SURGERY | Facility: CLINIC | Age: 55
End: 2025-04-03
Payer: COMMERCIAL

## 2025-04-03 VITALS
HEIGHT: 73 IN | RESPIRATION RATE: 18 BRPM | SYSTOLIC BLOOD PRESSURE: 128 MMHG | HEART RATE: 90 BPM | TEMPERATURE: 98 F | BODY MASS INDEX: 22.82 KG/M2 | DIASTOLIC BLOOD PRESSURE: 80 MMHG | WEIGHT: 172.19 LBS

## 2025-04-03 VITALS
OXYGEN SATURATION: 100 % | SYSTOLIC BLOOD PRESSURE: 131 MMHG | HEIGHT: 73 IN | BODY MASS INDEX: 23.13 KG/M2 | WEIGHT: 174.5 LBS | HEART RATE: 113 BPM | DIASTOLIC BLOOD PRESSURE: 84 MMHG

## 2025-04-03 VITALS
OXYGEN SATURATION: 100 % | HEART RATE: 102 BPM | HEIGHT: 73 IN | TEMPERATURE: 98 F | DIASTOLIC BLOOD PRESSURE: 82 MMHG | RESPIRATION RATE: 18 BRPM | WEIGHT: 172.19 LBS | SYSTOLIC BLOOD PRESSURE: 132 MMHG | BODY MASS INDEX: 22.82 KG/M2

## 2025-04-03 DIAGNOSIS — D61.810 PANCYTOPENIA DUE TO CHEMOTHERAPY: ICD-10-CM

## 2025-04-03 DIAGNOSIS — D47.1 PRIMARY MYELOFIBROSIS: ICD-10-CM

## 2025-04-03 DIAGNOSIS — Z76.82 STEM CELL TRANSPLANT CANDIDATE: Primary | ICD-10-CM

## 2025-04-03 DIAGNOSIS — Z94.84 HISTORY OF ALLOGENEIC STEM CELL TRANSPLANT: Primary | ICD-10-CM

## 2025-04-03 DIAGNOSIS — Z76.82 STEM CELL TRANSPLANT CANDIDATE: ICD-10-CM

## 2025-04-03 DIAGNOSIS — E27.9 ADRENAL NODULE: Primary | ICD-10-CM

## 2025-04-03 DIAGNOSIS — D69.6 THROMBOCYTOPENIA: ICD-10-CM

## 2025-04-03 LAB
ABO + RH BLD: NORMAL
ABSOLUTE EOSINOPHIL (OHS): 0.02 K/UL
ABSOLUTE MONOCYTE (OHS): 0.28 K/UL (ref 0.3–1)
ABSOLUTE NEUTROPHIL COUNT (OHS): 1.71 K/UL (ref 1.8–7.7)
ALBUMIN SERPL BCP-MCNC: 3.7 G/DL (ref 3.5–5.2)
ALP SERPL-CCNC: 155 UNIT/L (ref 40–150)
ALT SERPL W/O P-5'-P-CCNC: 69 UNIT/L (ref 10–44)
ANION GAP (OHS): 9 MMOL/L (ref 8–16)
AST SERPL-CCNC: 37 UNIT/L (ref 11–45)
BASOPHILS # BLD AUTO: 0.01 K/UL
BASOPHILS NFR BLD AUTO: 0.4 %
BILIRUB SERPL-MCNC: 0.4 MG/DL (ref 0.1–1)
BLD PROD TYP BPU: NORMAL
BLOOD UNIT EXPIRATION DATE: NORMAL
BLOOD UNIT TYPE CODE: 5100
BUN SERPL-MCNC: 25 MG/DL (ref 6–20)
CALCIUM SERPL-MCNC: 9.6 MG/DL (ref 8.7–10.5)
CHLORIDE SERPL-SCNC: 107 MMOL/L (ref 95–110)
CO2 SERPL-SCNC: 22 MMOL/L (ref 23–29)
CREAT SERPL-MCNC: 0.9 MG/DL (ref 0.5–1.4)
CROSSMATCH INTERPRETATION: NORMAL
DISPENSE STATUS: NORMAL
ERYTHROCYTE [DISTWIDTH] IN BLOOD BY AUTOMATED COUNT: 15.9 % (ref 11.5–14.5)
GFR SERPLBLD CREATININE-BSD FMLA CKD-EPI: >60 ML/MIN/1.73/M2
GLUCOSE SERPL-MCNC: 93 MG/DL (ref 70–110)
HCT VFR BLD AUTO: 24.8 % (ref 40–54)
HGB BLD-MCNC: 8.2 GM/DL (ref 14–18)
IMM GRANULOCYTES # BLD AUTO: 0.03 K/UL (ref 0–0.04)
IMM GRANULOCYTES NFR BLD AUTO: 1.2 % (ref 0–0.5)
INDIRECT COOMBS: NORMAL
LYMPHOCYTES # BLD AUTO: 0.47 K/UL (ref 1–4.8)
MAGNESIUM SERPL-MCNC: 1.8 MG/DL (ref 1.6–2.6)
MCH RBC QN AUTO: 30.5 PG (ref 27–31)
MCHC RBC AUTO-ENTMCNC: 33.1 G/DL (ref 32–36)
MCV RBC AUTO: 92 FL (ref 82–98)
NUCLEATED RBC (/100WBC) (OHS): 0 /100 WBC
PHOSPHATE SERPL-MCNC: 4.4 MG/DL (ref 2.7–4.5)
PLATELET # BLD AUTO: 34 K/UL (ref 150–450)
PLATELET BLD QL SMEAR: ABNORMAL
PMV BLD AUTO: 10.1 FL (ref 9.2–12.9)
POTASSIUM SERPL-SCNC: 4.6 MMOL/L (ref 3.5–5.1)
PROT SERPL-MCNC: 6.8 GM/DL (ref 6–8.4)
RBC # BLD AUTO: 2.69 M/UL (ref 4.6–6.2)
RELATIVE EOSINOPHIL (OHS): 0.8 %
RELATIVE LYMPHOCYTE (OHS): 18.7 % (ref 18–48)
RELATIVE MONOCYTE (OHS): 11.1 % (ref 4–15)
RELATIVE NEUTROPHIL (OHS): 67.8 % (ref 38–73)
RH BLD: NORMAL
SODIUM SERPL-SCNC: 138 MMOL/L (ref 136–145)
SPECIMEN OUTDATE: NORMAL
TACROLIMUS BLD-MCNC: 12.4 NG/ML (ref 5–15)
UNIT NUMBER: NORMAL
WBC # BLD AUTO: 2.52 K/UL (ref 3.9–12.7)

## 2025-04-03 PROCEDURE — 63600175 PHARM REV CODE 636 W HCPCS

## 2025-04-03 PROCEDURE — P9037 PLATE PHERES LEUKOREDU IRRAD: HCPCS | Performed by: INTERNAL MEDICINE

## 2025-04-03 PROCEDURE — 86850 RBC ANTIBODY SCREEN: CPT | Performed by: INTERNAL MEDICINE

## 2025-04-03 PROCEDURE — 80197 ASSAY OF TACROLIMUS: CPT

## 2025-04-03 PROCEDURE — 83735 ASSAY OF MAGNESIUM: CPT

## 2025-04-03 PROCEDURE — 36415 COLL VENOUS BLD VENIPUNCTURE: CPT

## 2025-04-03 PROCEDURE — 25000003 PHARM REV CODE 250

## 2025-04-03 PROCEDURE — 36590 REMOVAL TUNNELED CV CATH: CPT | Mod: S$GLB,,, | Performed by: SURGERY

## 2025-04-03 PROCEDURE — 36592 COLLECT BLOOD FROM PICC: CPT

## 2025-04-03 PROCEDURE — 99999 PR PBB SHADOW E&M-EST. PATIENT-LVL IV: CPT | Mod: PBBFAC,,, | Performed by: INTERNAL MEDICINE

## 2025-04-03 PROCEDURE — 82040 ASSAY OF SERUM ALBUMIN: CPT

## 2025-04-03 PROCEDURE — A4216 STERILE WATER/SALINE, 10 ML: HCPCS

## 2025-04-03 PROCEDURE — 99499 UNLISTED E&M SERVICE: CPT | Mod: 25,S$GLB,, | Performed by: SURGERY

## 2025-04-03 PROCEDURE — 84100 ASSAY OF PHOSPHORUS: CPT

## 2025-04-03 PROCEDURE — 36430 TRANSFUSION BLD/BLD COMPNT: CPT

## 2025-04-03 PROCEDURE — 85025 COMPLETE CBC W/AUTO DIFF WBC: CPT

## 2025-04-03 RX ORDER — ACETAMINOPHEN 325 MG/1
650 TABLET ORAL
Status: CANCELLED | OUTPATIENT
Start: 2025-04-03

## 2025-04-03 RX ORDER — DIPHENHYDRAMINE HCL 25 MG
25 CAPSULE ORAL
Status: DISCONTINUED | OUTPATIENT
Start: 2025-04-03 | End: 2025-04-03 | Stop reason: HOSPADM

## 2025-04-03 RX ORDER — ACETAMINOPHEN 325 MG/1
650 TABLET ORAL
Status: DISCONTINUED | OUTPATIENT
Start: 2025-04-03 | End: 2025-04-03 | Stop reason: HOSPADM

## 2025-04-03 RX ORDER — HYDROCODONE BITARTRATE AND ACETAMINOPHEN 500; 5 MG/1; MG/1
TABLET ORAL ONCE
Status: DISCONTINUED | OUTPATIENT
Start: 2025-04-03 | End: 2025-04-03 | Stop reason: HOSPADM

## 2025-04-03 RX ORDER — SODIUM CHLORIDE 0.9 % (FLUSH) 0.9 %
10 SYRINGE (ML) INJECTION
Status: DISCONTINUED | OUTPATIENT
Start: 2025-04-03 | End: 2025-04-03 | Stop reason: HOSPADM

## 2025-04-03 RX ORDER — HEPARIN 100 UNIT/ML
500 SYRINGE INTRAVENOUS
Status: DISCONTINUED | OUTPATIENT
Start: 2025-04-03 | End: 2025-04-03 | Stop reason: HOSPADM

## 2025-04-03 RX ORDER — HYDROCODONE BITARTRATE AND ACETAMINOPHEN 500; 5 MG/1; MG/1
TABLET ORAL ONCE
Status: CANCELLED | OUTPATIENT
Start: 2025-04-03 | End: 2025-04-03

## 2025-04-03 RX ORDER — DIPHENHYDRAMINE HCL 25 MG
25 CAPSULE ORAL
Status: CANCELLED | OUTPATIENT
Start: 2025-04-03

## 2025-04-03 RX ORDER — HEPARIN 100 UNIT/ML
500 SYRINGE INTRAVENOUS
OUTPATIENT
Start: 2025-04-03

## 2025-04-03 RX ORDER — SODIUM CHLORIDE 0.9 % (FLUSH) 0.9 %
10 SYRINGE (ML) INJECTION
OUTPATIENT
Start: 2025-04-03

## 2025-04-03 RX ADMIN — HEPARIN 500 UNITS: 100 SYRINGE at 08:04

## 2025-04-03 RX ADMIN — Medication 10 ML: at 08:04

## 2025-04-03 NOTE — PROGRESS NOTES
Section of Hematology and Stem Cell Transplantation    Post-Transplantation Follow Up Visit     4/3/25    Transplant History:   Primary oncologist: Clyde James MD  Primary oncologic diagnosis: primary myelofibrosis  Transplant date: 12/26/2024  Donor: haploidentical  Blood Type (Patient): O +  Blood Type (Donor): O +  CMV (Patient): Negative  CMV (Donor): Positive  Graft source: Peripheral blood  CD34+ cell dose: 6.04 X10^6 cells/kg  Conditioning Regimen:  Thiotepa, Busulfan, Fludarabine  GVHD prophylaxis: Post-transplant cyclophosphamide, MMF, Tacrolimus  Immediate post-transplant complications: mucositis, R axillary skin/soft tissue infection secondary to MRSA    History of Present Ilness:   Rubén Peters) is a pleasant 54 y.o.male with primary myelofibrosis who is status post haploidentical stem cell transplantation conditioned with  Bu/Flu/TT  who is currently day +98 who presents for post-transplant follow up.    Fatigue is improved at this visit. One liquid bowel movement at 1AM today, but he otherwise has not had diarrhea. No nausea/vomiting. No rash. No jaundice/pruritis.     PAST MEDICAL HISTORY:   Past Medical History:   Diagnosis Date    Abdominal pain 01/02/2025    Allergic contact dermatitis due to adhesives 12/31/2024    Cancer     Flatulence 01/03/2025    Headache 12/27/2024    Hyperlipidemia     Mucositis 01/02/2025    Other constipation 12/19/2024    Thrombocytosis 12/18/2024    Transaminitis 12/27/2024       PAST SURGICAL HISTORY:   Past Surgical History:   Procedure Laterality Date    BONE MARROW BIOPSY N/A 11/27/2024    Procedure: Biopsy-bone marrow;  Surgeon: Clyde James MD;  Location: [facility] ([address] [place] [address]);  Service: Oncology;  Laterality: N/A;  11/20-pre call complete-tb    COLONOSCOPY N/A 8/9/2023    Procedure: COLONOSCOPY;  Surgeon: Will Ardon MD;  Location: [facility] ([address] [place] [address]);  Service: Endoscopy;  Laterality: N/A;  Suprep Instructions sent via portal / Authorizing:      Bebeto Arora MD in [facility] FAMILY MED/ INTERNAL MED/ PEDS  Referral:          [id number]    EYE SURGERY      FLEXIBLE SIGMOIDOSCOPY N/A 7/23/2024    Procedure: SIGMOIDOSCOPY, FLEXIBLE;  Surgeon: Nirali Vicente MD;  Location[facility];  Service: Endoscopy;  Laterality: N/A;    INSERTION OF LONGORIA CATHETER Right 12/18/2024    Procedure: INSERTION, CATHETER, CENTRAL VENOUS, LONGORIA TRIPLE LUMEN, Bard 12.5 fr Longoria Cath model [id number];  Surgeon: Willie Hadley MD;  Location: [facility] OR [address] [place] [address];  Service: General;  Laterality: Right;       PAST SOCIAL HISTORY:  Social History     Tobacco Use    Smoking status: Former     Types: Cigars, Cigarettes    Smokeless tobacco: Never   Substance Use Topics    Alcohol use: Not Currently     Comment: socially    Drug use: Never       FAMILY HISTORY:  Family History   Problem Relation Name Age of Onset    Arthritis Mother      COPD Father      Testicular cancer Maternal Cousin Peter 17        Chemotherapy    Breast cancer Maternal Cousin  51        Chemotherapy    Heart disease Maternal Grandfather      Stroke Maternal Grandmother         CURRENT MEDICATIONS:   Current Outpatient Medications   Medication Sig    acyclovir (ZOVIRAX) 800 MG Tab Take 1 tablet (800 mg total) by mouth 2 (two) times daily.    atovaquone (MEPRON) 750 mg/5 mL Susp oral liquid Take 10 mLs (1,500 mg total) by mouth 2 (two) times daily.    eltrombopag olamine (PROMACTA) 50 MG Tab Take 1 tablet (50 mg total) by mouth once daily.    letermovir (PREVYMIS) 480 mg Tab Take 1 tablet (480 mg total) by mouth once daily.    magnesium oxide (MAG-OX) 400 mg (241.3 mg magnesium) tablet Take 2 tablets in the morning, 1 tablet in the afternoon, and 2 tablets in the evening.    ondansetron (ZOFRAN) 8 MG tablet Take 1 tablet (8 mg total) by mouth every 8 (eight) hours as needed for Nausea.    prochlorperazine (COMPAZINE) 10 MG tablet Take 1 tablet (10 mg total) by mouth 4 (four) times daily as needed for Nausea.     tacrolimus (PROGRAF) 0.5 MG Cap Take 3 capsules (1.5 mg total) by mouth every morning AND 4 capsules (2 mg total) every evening.    traMADoL (ULTRAM) 50 mg tablet Take 1 tablet (50 mg total) by mouth every 6 (six) hours as needed for Pain.    triamcinolone acetonide 0.1% (KENALOG) 0.1 % cream Apply topically 2 (two) times daily as needed (rash on arms, chest, back, trunk, and legs (do not apply to face)).    voriconazole (VFEND) 200 MG Tab Take 1 tablet (200 mg total) by mouth 2 (two) times daily.     No current facility-administered medications for this visit.       ALLERGIES:   Review of patient's allergies indicates:   Allergen Reactions    Sulfamethoxazole-trimethoprim Hives       GVHD Review of Systems:     Pertinent positives and negatives included in the HPI. Otherwise a 14 point review of systems is negative. GVHD review of systems recorded in BMT flowsheet.     Physical Exam:     Vitals:    04/03/25 0832   BP: 132/82   Pulse: 102   Resp: 18   Temp: 98 °F (36.7 °C)         General: Appears well, NAD  HEENT: MMM, no OP lesions  Neck: Supple, no LAD  Pulmonary: CTAB, no increased work of breathing, no W/R/C  Cardiovascular: S1S2 normal, RRR, no M/R/G  Abdominal: Soft, NT, ND, BS+, no HSM  Extremities: No C/C/E  Neurological: AAOx4, grossly normal, no focal deficits  Dermatologic: No appreciable rashes or lesions     ECOG Performance Status: (foot note - ECOG PS provided by [employer]cology [employer]) 1 - Symptomatic but completely ambulatory    Karnofsky Performance Score:  70%- Cares for Self: Unable to Carry on Normal Activity or Active Work    Labs:   Lab Results   Component Value Date    WBC 2.52 (L) 04/03/2025    RBC 2.69 (L) 04/03/2025    HGB 8.2 (L) 04/03/2025    HCT 24.8 (L) 04/03/2025    MCV 92 04/03/2025    MCH 30.5 04/03/2025    MCHC 33.1 04/03/2025    RDW 15.9 (H) 04/03/2025    PLT 34 (LL) 04/03/2025    MPV 10.1 04/03/2025    GRAN 3.6 03/27/2025    LYMPH 18.7 04/03/2025    LYMPH 0.47 (L)  04/03/2025    MONO 11.1 04/03/2025    MONO 0.28 (L) 04/03/2025    EOS 0.8 04/03/2025    EOS 0.02 04/03/2025    BASO 0.02 03/20/2025    EOSINOPHIL 0.6 03/20/2025    BASOPHIL 0.4 04/03/2025    BASOPHIL 0.01 04/03/2025       Sodium   Date Value Ref Range Status   04/03/2025 138 136 - 145 mmol/L Final   03/20/2025 138 136 - 145 mmol/L Final     Potassium   Date Value Ref Range Status   04/03/2025 4.6 3.5 - 5.1 mmol/L Final   03/20/2025 4.2 3.5 - 5.1 mmol/L Final     Chloride   Date Value Ref Range Status   04/03/2025 107 95 - 110 mmol/L Final   03/20/2025 108 95 - 110 mmol/L Final     CO2   Date Value Ref Range Status   04/03/2025 22 (L) 23 - 29 mmol/L Final   03/20/2025 21 (L) 23 - 29 mmol/L Final     Glucose   Date Value Ref Range Status   03/20/2025 101 70 - 110 mg/dL Final     BUN   Date Value Ref Range Status   04/03/2025 25 (H) 6 - 20 mg/dL Final     Creatinine   Date Value Ref Range Status   04/03/2025 0.9 0.5 - 1.4 mg/dL Final     Calcium   Date Value Ref Range Status   04/03/2025 9.6 8.7 - 10.5 mg/dL Final   03/20/2025 9.2 8.7 - 10.5 mg/dL Final     Total Protein   Date Value Ref Range Status   03/20/2025 6.6 6.0 - 8.4 g/dL Final     Albumin   Date Value Ref Range Status   04/03/2025 3.7 3.5 - 5.2 g/dL Final   03/20/2025 3.7 3.5 - 5.2 g/dL Final     Total Bilirubin   Date Value Ref Range Status   03/20/2025 0.4 0.1 - 1.0 mg/dL Final     Comment:     For infants and newborns, interpretation of results should be based  on gestational age, weight and in agreement with clinical  observations.    Premature Infant recommended reference ranges:  Up to 24 hours.............<8.0 mg/dL  Up to 48 hours............<12.0 mg/dL  3-5 days..................<15.0 mg/dL  6-29 days.................<15.0 mg/dL       Bilirubin Total   Date Value Ref Range Status   04/03/2025 0.4 0.1 - 1.0 mg/dL Final     Comment:     For infants and newborns, interpretation of results should be based   on gestational age, weight and in agreement  with clinical   observations.    Premature Infant recommended reference ranges:   0-24 hours:  <8.0 mg/dL   24-48 hours: <12.0 mg/dL   3-5 days:    <15.0 mg/dL   6-29 days:   <15.0 mg/dL     Alkaline Phosphatase   Date Value Ref Range Status   03/20/2025 122 40 - 150 U/L Final     ALP   Date Value Ref Range Status   04/03/2025 155 (H) 40 - 150 unit/L Final     AST   Date Value Ref Range Status   04/03/2025 37 11 - 45 unit/L Final   03/20/2025 59 (H) 10 - 40 U/L Final     ALT   Date Value Ref Range Status   04/03/2025 69 (H) 10 - 44 unit/L Final   03/20/2025 85 (H) 10 - 44 U/L Final     Anion Gap   Date Value Ref Range Status   04/03/2025 9 8 - 16 mmol/L Final     eGFR if    Date Value Ref Range Status   01/22/2022 >60.0 >60 mL/min/1.73 m^2 Final     eGFR if non    Date Value Ref Range Status   01/22/2022 >60.0 >60 mL/min/1.73 m^2 Final     Comment:     Calculation used to obtain the estimated glomerular filtration  rate (eGFR) is the CKD-EPI equation.          Imaging:   All pertinent studies reviewed and interpreted by me       Assessment and Plan:   Rubén Peters) is a pleasant 54 y.o.male with primary myelofibrosis s/p haplo SCT who presents for post-transplant follow up.    Primary myelofibrosis: Status post treatment with ruxolitinib, followed by alloSCT. Day +30 BMBx results below.    Status post allogeneic stem cell transplantation: As noted above, status post haploidentical stem cell transplantation conditioned with  Bu/Flu/TT . Currently day +98.  Engrafted on day +22.  Day +30 BMBx on 1/2/25: No definitive evidence of residual JAK2 [id number] primary myelofibrosis. NGS without evidence of pathogenic mutations. Chimerism studies with 100% donor CD33, CD3 insufficient for analysis.   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 MMF has now been discontinued. He is on budesonide for aGVHD. No evidence of aGVHD at this visit.   Current tacro dose: 1.5mg qaM and 2 mg qPM   Last tacro level: 12.4  Adjustments: Decrease tacrolimus to 1.5 mg qAM and 1 mg qPM    Immunosuppression: Prevention with voriconazole,  acyclovir. CMV prophylaxis with letermovir. PJP prophyalxis dapsone. Continue weekly monitoring of CMV and EBV.  Last CMV: negative (3/31/2025), last EBV: negative (3/31/2025).  Active infections: none    Pancytopenia: Due to underlying disease and chemotherapy. Transfuse for Hgb <7 g/dL and platelets <10k.  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 Eltrombopag 50mg daily. Transfuse platelets today for line removal.   Anemia - thought to be from conditioning chemotherapy. Will switch dapsone for atovaquone.    Diarrhea: Due to aGVHD and resolved at this visit. Monitor for recurrence.     Transaminitis: Mild. Asymptomatic. Normal T Bili. Monitoring closely. Improving.     Hypertension:  Was newly hypertensive in hospital following SCT at which time he was started on amlodipine. Discontinued amlodipine after hospitalization due to hypotension. Monitor for now but restart antihypertensives if this worsens.     Cardiomyopathy: Echo 2/26/25 obtained due to tachycardia revealed reduced LVEF 45-50%. No signs/symptoms of HFrEF. Repeat echo in 3 months (5/2025).    Staphylococcus Skin Infection: Patient had MRSA while in the hospital and was treated with doxycycline, concern for recurrence on recent exam and has once again resolved following Doxycyline 100mg PO BID. He has now stopped doxycycline and the spot is gone.     Follow up: Twice weekly follow up with labs.    Orders Placed:      Orders Placed This Encounter   Procedures    Prepare Platelets 1 Dose     Standing Status:   Future     Number of Occurrences:   1     Expiration Date:   5/4/2026     Number of Units:   1 Dose     Purpose of Preparation::   Pending Transfusion      Special Requirements::   CMV Negative     Special Requirements::   Leukoreduced     Special Requirements::   Irradiated      40 minutes of total time spent on the encounter, which includes face to face time and non-face to face time preparing to see the patient (eg, review of tests), Obtaining and/or reviewing separately obtained history, Documenting clinical information in the electronic or other health record, Independently interpreting results (not separately reported) and communicating results to the patient/family/caregiver, or Care coordination with other physicians involved in his care (not separately reported).     Clyde James MD  Malignant Hematology, Stem Cell Transplant, and Cellular Therapy  [facility] [place]  [facility] [place]

## 2025-04-03 NOTE — H&P (VIEW-ONLY)
Section of Hematology and Stem Cell Transplantation    Post-Transplantation Follow Up Visit     4/3/25    Transplant History:   Primary oncologist: Clyde James MD  Primary oncologic diagnosis: primary myelofibrosis  Transplant date: 12/26/2024  Donor: haploidentical  Blood Type (Patient): O +  Blood Type (Donor): O +  CMV (Patient): Negative  CMV (Donor): Positive  Graft source: Peripheral blood  CD34+ cell dose: 6.04 X10^6 cells/kg  Conditioning Regimen:  Thiotepa, Busulfan, Fludarabine  GVHD prophylaxis: Post-transplant cyclophosphamide, MMF, Tacrolimus  Immediate post-transplant complications: mucositis, R axillary skin/soft tissue infection secondary to MRSA    History of Present Ilness:   Rubén Peters) is a pleasant 54 y.o.male with primary myelofibrosis who is status post haploidentical stem cell transplantation conditioned with  Bu/Flu/TT  who is currently day +98 who presents for post-transplant follow up.    Fatigue is improved at this visit. One liquid bowel movement at 1AM today, but he otherwise has not had diarrhea. No nausea/vomiting. No rash. No jaundice/pruritis.     PAST MEDICAL HISTORY:   Past Medical History:   Diagnosis Date    Abdominal pain 01/02/2025    Allergic contact dermatitis due to adhesives 12/31/2024    Cancer     Flatulence 01/03/2025    Headache 12/27/2024    Hyperlipidemia     Mucositis 01/02/2025    Other constipation 12/19/2024    Thrombocytosis 12/18/2024    Transaminitis 12/27/2024       PAST SURGICAL HISTORY:   Past Surgical History:   Procedure Laterality Date    BONE MARROW BIOPSY N/A 11/27/2024    Procedure: Biopsy-bone marrow;  Surgeon: Clyde James MD;  Location: [facility] ([address] [place] [address]);  Service: Oncology;  Laterality: N/A;  11/20-pre call complete-tb    COLONOSCOPY N/A 8/9/2023    Procedure: COLONOSCOPY;  Surgeon: Will Ardon MD;  Location: [facility] ([address] [place] [address]);  Service: Endoscopy;  Laterality: N/A;  Suprep Instructions sent via portal / Authorizing:      Bebeto Arora MD in [facility] FAMILY MED/ INTERNAL MED/ PEDS  Referral:          [id number]    EYE SURGERY      FLEXIBLE SIGMOIDOSCOPY N/A 7/23/2024    Procedure: SIGMOIDOSCOPY, FLEXIBLE;  Surgeon: Nirali Vicente MD;  Location: [facility];  Service: Endoscopy;  Laterality: N/A;    INSERTION OF LONGORIA CATHETER Right 12/18/2024    Procedure: INSERTION, CATHETER, CENTRAL VENOUS, LONGORIA TRIPLE LUMEN, Bard 12.5 fr Longoria Cath model [id number];  Surgeon: Willie Hadley MD;  Location: [facility] OR [address] [place] [address];  Service: General;  Laterality: Right;       PAST SOCIAL HISTORY:  Social History     Tobacco Use    Smoking status: Former     Types: Cigars, Cigarettes    Smokeless tobacco: Never   Substance Use Topics    Alcohol use: Not Currently     Comment: socially    Drug use: Never       FAMILY HISTORY:  Family History   Problem Relation Name Age of Onset    Arthritis Mother      COPD Father      Testicular cancer Maternal Cousin Peter 17        Chemotherapy    Breast cancer Maternal Cousin  51        Chemotherapy    Heart disease Maternal Grandfather      Stroke Maternal Grandmother         CURRENT MEDICATIONS:   Current Outpatient Medications   Medication Sig    acyclovir (ZOVIRAX) 800 MG Tab Take 1 tablet (800 mg total) by mouth 2 (two) times daily.    atovaquone (MEPRON) 750 mg/5 mL Susp oral liquid Take 10 mLs (1,500 mg total) by mouth 2 (two) times daily.    eltrombopag olamine (PROMACTA) 50 MG Tab Take 1 tablet (50 mg total) by mouth once daily.    letermovir (PREVYMIS) 480 mg Tab Take 1 tablet (480 mg total) by mouth once daily.    magnesium oxide (MAG-OX) 400 mg (241.3 mg magnesium) tablet Take 2 tablets in the morning, 1 tablet in the afternoon, and 2 tablets in the evening.    ondansetron (ZOFRAN) 8 MG tablet Take 1 tablet (8 mg total) by mouth every 8 (eight) hours as needed for Nausea.    prochlorperazine (COMPAZINE) 10 MG tablet Take 1 tablet (10 mg total) by mouth 4 (four) times daily as needed for Nausea.     tacrolimus (PROGRAF) 0.5 MG Cap Take 3 capsules (1.5 mg total) by mouth every morning AND 4 capsules (2 mg total) every evening.    traMADoL (ULTRAM) 50 mg tablet Take 1 tablet (50 mg total) by mouth every 6 (six) hours as needed for Pain.    triamcinolone acetonide 0.1% (KENALOG) 0.1 % cream Apply topically 2 (two) times daily as needed (rash on arms, chest, back, trunk, and legs (do not apply to face)).    voriconazole (VFEND) 200 MG Tab Take 1 tablet (200 mg total) by mouth 2 (two) times daily.     No current facility-administered medications for this visit.       ALLERGIES:   Review of patient's allergies indicates:   Allergen Reactions    Sulfamethoxazole-trimethoprim Hives       GVHD Review of Systems:     Pertinent positives and negatives included in the HPI. Otherwise a 14 point review of systems is negative. GVHD review of systems recorded in BMT flowsheet.     Physical Exam:     Vitals:    04/03/25 0832   BP: 132/82   Pulse: 102   Resp: 18   Temp: 98 °F (36.7 °C)         General: Appears well, NAD  HEENT: MMM, no OP lesions  Neck: Supple, no LAD  Pulmonary: CTAB, no increased work of breathing, no W/R/C  Cardiovascular: S1S2 normal, RRR, no M/R/G  Abdominal: Soft, NT, ND, BS+, no HSM  Extremities: No C/C/E  Neurological: AAOx4, grossly normal, no focal deficits  Dermatologic: No appreciable rashes or lesions     ECOG Performance Status: (foot note - ECOG PS provided by [employer] Co[employer]cology [employer]) 1 - Symptomatic but completely ambulatory    Karnofsky Performance Score:  70%- Cares for Self: Unable to Carry on Normal Activity or Active Work    Labs:   Lab Results   Component Value Date    WBC 2.52 (L) 04/03/2025    RBC 2.69 (L) 04/03/2025    HGB 8.2 (L) 04/03/2025    HCT 24.8 (L) 04/03/2025    MCV 92 04/03/2025    MCH 30.5 04/03/2025    MCHC 33.1 04/03/2025    RDW 15.9 (H) 04/03/2025    PLT 34 (LL) 04/03/2025    MPV 10.1 04/03/2025    GRAN 3.6 03/27/2025    LYMPH 18.7 04/03/2025    LYMPH 0.47 (L)  04/03/2025    MONO 11.1 04/03/2025    MONO 0.28 (L) 04/03/2025    EOS 0.8 04/03/2025    EOS 0.02 04/03/2025    BASO 0.02 03/20/2025    EOSINOPHIL 0.6 03/20/2025    BASOPHIL 0.4 04/03/2025    BASOPHIL 0.01 04/03/2025       Sodium   Date Value Ref Range Status   04/03/2025 138 136 - 145 mmol/L Final   03/20/2025 138 136 - 145 mmol/L Final     Potassium   Date Value Ref Range Status   04/03/2025 4.6 3.5 - 5.1 mmol/L Final   03/20/2025 4.2 3.5 - 5.1 mmol/L Final     Chloride   Date Value Ref Range Status   04/03/2025 107 95 - 110 mmol/L Final   03/20/2025 108 95 - 110 mmol/L Final     CO2   Date Value Ref Range Status   04/03/2025 22 (L) 23 - 29 mmol/L Final   03/20/2025 21 (L) 23 - 29 mmol/L Final     Glucose   Date Value Ref Range Status   03/20/2025 101 70 - 110 mg/dL Final     BUN   Date Value Ref Range Status   04/03/2025 25 (H) 6 - 20 mg/dL Final     Creatinine   Date Value Ref Range Status   04/03/2025 0.9 0.5 - 1.4 mg/dL Final     Calcium   Date Value Ref Range Status   04/03/2025 9.6 8.7 - 10.5 mg/dL Final   03/20/2025 9.2 8.7 - 10.5 mg/dL Final     Total Protein   Date Value Ref Range Status   03/20/2025 6.6 6.0 - 8.4 g/dL Final     Albumin   Date Value Ref Range Status   04/03/2025 3.7 3.5 - 5.2 g/dL Final   03/20/2025 3.7 3.5 - 5.2 g/dL Final     Total Bilirubin   Date Value Ref Range Status   03/20/2025 0.4 0.1 - 1.0 mg/dL Final     Comment:     For infants and newborns, interpretation of results should be based  on gestational age, weight and in agreement with clinical  observations.    Premature Infant recommended reference ranges:  Up to 24 hours.............<8.0 mg/dL  Up to 48 hours............<12.0 mg/dL  3-5 days..................<15.0 mg/dL  6-29 days.................<15.0 mg/dL       Bilirubin Total   Date Value Ref Range Status   04/03/2025 0.4 0.1 - 1.0 mg/dL Final     Comment:     For infants and newborns, interpretation of results should be based   on gestational age, weight and in agreement  with clinical   observations.    Premature Infant recommended reference ranges:   0-24 hours:  <8.0 mg/dL   24-48 hours: <12.0 mg/dL   3-5 days:    <15.0 mg/dL   6-29 days:   <15.0 mg/dL     Alkaline Phosphatase   Date Value Ref Range Status   03/20/2025 122 40 - 150 U/L Final     ALP   Date Value Ref Range Status   04/03/2025 155 (H) 40 - 150 unit/L Final     AST   Date Value Ref Range Status   04/03/2025 37 11 - 45 unit/L Final   03/20/2025 59 (H) 10 - 40 U/L Final     ALT   Date Value Ref Range Status   04/03/2025 69 (H) 10 - 44 unit/L Final   03/20/2025 85 (H) 10 - 44 U/L Final     Anion Gap   Date Value Ref Range Status   04/03/2025 9 8 - 16 mmol/L Final     eGFR if    Date Value Ref Range Status   01/22/2022 >60.0 >60 mL/min/1.73 m^2 Final     eGFR if non    Date Value Ref Range Status   01/22/2022 >60.0 >60 mL/min/1.73 m^2 Final     Comment:     Calculation used to obtain the estimated glomerular filtration  rate (eGFR) is the CKD-EPI equation.          Imaging:   All pertinent studies reviewed and interpreted by me       Assessment and Plan:   Rubén Peters) is a pleasant 54 y.o.male with primary myelofibrosis s/p haplo SCT who presents for post-transplant follow up.    Primary myelofibrosis: Status post treatment with ruxolitinib, followed by alloSCT. Day +30 BMBx results below.    Status post allogeneic stem cell transplantation: As noted above, status post haploidentical stem cell transplantation conditioned with  Bu/Flu/TT . Currently day +98.  Engrafted on day +22.  Day +30 BMBx on 1/2/25: No definitive evidence of residual JAK2 [id number] primary myelofibrosis. NGS without evidence of pathogenic mutations. Chimerism studies with 100% donor CD33, CD3 insufficient for analysis.   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 MMF has now been discontinued. He is on budesonide for aGVHD. No evidence of aGVHD at this visit.   Current tacro dose: 1.5mg qaM and 2 mg qPM   Last tacro level: 12.4  Adjustments: Decrease tacrolimus to 1.5 mg qAM and 1 mg qPM    Immunosuppression: Prevention with voriconazole,  acyclovir. CMV prophylaxis with letermovir. PJP prophyalxis dapsone. Continue weekly monitoring of CMV and EBV.  Last CMV: negative (3/31/2025), last EBV: negative (3/31/2025).  Active infections: none    Pancytopenia: Due to underlying disease and chemotherapy. Transfuse for Hgb <7 g/dL and platelets <10k.  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 Eltrombopag 50mg daily. Transfuse platelets today for line removal.   Anemia - thought to be from conditioning chemotherapy. Will switch dapsone for atovaquone.    Diarrhea: Due to aGVHD and resolved at this visit. Monitor for recurrence.     Transaminitis: Mild. Asymptomatic. Normal T Bili. Monitoring closely. Improving.     Hypertension:  Was newly hypertensive in hospital following SCT at which time he was started on amlodipine. Discontinued amlodipine after hospitalization due to hypotension. Monitor for now but restart antihypertensives if this worsens.     Cardiomyopathy: Echo 2/26/25 obtained due to tachycardia revealed reduced LVEF 45-50%. No signs/symptoms of HFrEF. Repeat echo in 3 months (5/2025).    Staphylococcus Skin Infection: Patient had MRSA while in the hospital and was treated with doxycycline, concern for recurrence on recent exam and has once again resolved following Doxycyline 100mg PO BID. He has now stopped doxycycline and the spot is gone.     Follow up: Twice weekly follow up with labs.    Orders Placed:      Orders Placed This Encounter   Procedures    Prepare Platelets 1 Dose     Standing Status:   Future     Number of Occurrences:   1     Expiration Date:   5/4/2026     Number of Units:   1 Dose     Purpose of Preparation::   Pending Transfusion      Special Requirements::   CMV Negative     Special Requirements::   Leukoreduced     Special Requirements::   Irradiated      40 minutes of total time spent on the encounter, which includes face to face time and non-face to face time preparing to see the patient (eg, review of tests), Obtaining and/or reviewing separately obtained history, Documenting clinical information in the electronic or other health record, Independently interpreting results (not separately reported) and communicating results to the patient/family/caregiver, or Care coordination with other physicians involved in his care (not separately reported).     Clyde James MD  Malignant Hematology, Stem Cell Transplant, and Cellular Therapy  [facility] [place]  [facility] [place]

## 2025-04-03 NOTE — PROCEDURES
Removal, Portacath    Date/Time: 4/3/2025 1:45 PM    Performed by: Amada Jimenez MD  Authorized by: Amada Jimenez MD    Consent Done?:  Yes (Written)  Timeout: prior to procedure the correct patient, procedure, and site was verified    Prep: patient was prepped and draped in usual sterile fashion    Local anesthesia used?: Yes    Local anesthetic:  Lidocaine 1% with epinephrine  Anesthetic total (ml):  6   I was present for the entire procedure.  Indications:  Port-a-cath (Longoria catheter removal)  Body area:  Chest  Laterality:  Right  Position:  Supine   Patient was prepped and draped in the normal sterile fashion.  Local anesthetic:  Lidocaine 1% with epinephrine  Excision type:  Skin  Malignancy:  Benign  Specimens?: No     Hemostasis was obtained.   Needle, instrument, and sponge counts were correct.   Patient tolerated the procedure well with no immediate complications.   Post-operative instructions were provided for the patient.

## 2025-04-04 ENCOUNTER — RESULTS FOLLOW-UP (OUTPATIENT)
Dept: FAMILY MEDICINE | Facility: CLINIC | Age: 55
End: 2025-04-04
Payer: COMMERCIAL

## 2025-04-04 ENCOUNTER — DOCUMENTATION ONLY (OUTPATIENT)
Dept: HEMATOLOGY/ONCOLOGY | Facility: CLINIC | Age: 55
End: 2025-04-04
Payer: COMMERCIAL

## 2025-04-04 ENCOUNTER — PATIENT MESSAGE (OUTPATIENT)
Dept: FAMILY MEDICINE | Facility: CLINIC | Age: 55
End: 2025-04-04
Payer: COMMERCIAL

## 2025-04-04 DIAGNOSIS — R79.89 LOW VITAMIN D LEVEL: Primary | ICD-10-CM

## 2025-04-04 DIAGNOSIS — R79.89 LOW VITAMIN D LEVEL: ICD-10-CM

## 2025-04-04 DIAGNOSIS — E78.1 HYPERTRIGLYCERIDEMIA: Primary | ICD-10-CM

## 2025-04-04 RX ORDER — ERGOCALCIFEROL 1.25 MG/1
50000 CAPSULE ORAL
Qty: 12 CAPSULE | Refills: 3 | Status: SHIPPED | OUTPATIENT
Start: 2025-04-04

## 2025-04-04 NOTE — PROGRESS NOTES
NAT faxed signed MD confirmation to  [employer] at [phone] as well as emailing to patient's spouse to hold on to for her own records.

## 2025-04-07 ENCOUNTER — OFFICE VISIT (OUTPATIENT)
Dept: HEMATOLOGY/ONCOLOGY | Facility: CLINIC | Age: 55
End: 2025-04-07
Payer: COMMERCIAL

## 2025-04-07 ENCOUNTER — LAB VISIT (OUTPATIENT)
Dept: LAB | Facility: HOSPITAL | Age: 55
End: 2025-04-07
Attending: INTERNAL MEDICINE
Payer: COMMERCIAL

## 2025-04-07 VITALS
HEART RATE: 100 BPM | TEMPERATURE: 98 F | HEIGHT: 73 IN | SYSTOLIC BLOOD PRESSURE: 113 MMHG | DIASTOLIC BLOOD PRESSURE: 76 MMHG | RESPIRATION RATE: 16 BRPM | OXYGEN SATURATION: 98 % | BODY MASS INDEX: 22.9 KG/M2 | WEIGHT: 172.75 LBS

## 2025-04-07 DIAGNOSIS — Z76.82 STEM CELL TRANSPLANT CANDIDATE: ICD-10-CM

## 2025-04-07 DIAGNOSIS — D64.9 ANEMIA: ICD-10-CM

## 2025-04-07 DIAGNOSIS — R74.01 TRANSAMINITIS: ICD-10-CM

## 2025-04-07 DIAGNOSIS — D84.821 IMMUNODEFICIENCY DUE TO DRUG THERAPY: ICD-10-CM

## 2025-04-07 DIAGNOSIS — Z94.84 HISTORY OF ALLOGENEIC STEM CELL TRANSPLANT: ICD-10-CM

## 2025-04-07 DIAGNOSIS — D47.1 PRIMARY MYELOFIBROSIS: ICD-10-CM

## 2025-04-07 DIAGNOSIS — E55.9 VITAMIN D DEFICIENCY: ICD-10-CM

## 2025-04-07 DIAGNOSIS — D61.810 PANCYTOPENIA DUE TO CHEMOTHERAPY: ICD-10-CM

## 2025-04-07 DIAGNOSIS — Z79.899 IMMUNODEFICIENCY DUE TO DRUG THERAPY: ICD-10-CM

## 2025-04-07 DIAGNOSIS — I42.9 CARDIOMYOPATHY, UNSPECIFIED TYPE: ICD-10-CM

## 2025-04-07 DIAGNOSIS — Z94.84 HISTORY OF ALLOGENEIC STEM CELL TRANSPLANT: Primary | ICD-10-CM

## 2025-04-07 LAB
ABSOLUTE EOSINOPHIL (OHS): 0.05 K/UL
ABSOLUTE MONOCYTE (OHS): 0.33 K/UL (ref 0.3–1)
ABSOLUTE NEUTROPHIL COUNT (OHS): 1.73 K/UL (ref 1.8–7.7)
ALBUMIN SERPL BCP-MCNC: 3.7 G/DL (ref 3.5–5.2)
ALP SERPL-CCNC: 173 UNIT/L (ref 40–150)
ALT SERPL W/O P-5'-P-CCNC: 49 UNIT/L (ref 10–44)
ANION GAP (OHS): 9 MMOL/L (ref 8–16)
AST SERPL-CCNC: 36 UNIT/L (ref 11–45)
BASOPHILS # BLD AUTO: 0.01 K/UL
BASOPHILS NFR BLD AUTO: 0.4 %
BILIRUB SERPL-MCNC: 0.4 MG/DL (ref 0.1–1)
BUN SERPL-MCNC: 28 MG/DL (ref 6–20)
CALCIUM SERPL-MCNC: 9.6 MG/DL (ref 8.7–10.5)
CHLORIDE SERPL-SCNC: 107 MMOL/L (ref 95–110)
CO2 SERPL-SCNC: 22 MMOL/L (ref 23–29)
CREAT SERPL-MCNC: 1.3 MG/DL (ref 0.5–1.4)
CYTOMEGALOVIRUS DNA, QUAL (OHS): NOT DETECTED
EPSTEIN-BARR VIRUS DNA, QUAL (OHS): NORMAL
ERYTHROCYTE [DISTWIDTH] IN BLOOD BY AUTOMATED COUNT: 15.9 % (ref 11.5–14.5)
GFR SERPLBLD CREATININE-BSD FMLA CKD-EPI: >60 ML/MIN/1.73/M2
GLUCOSE SERPL-MCNC: 99 MG/DL (ref 70–110)
HCT VFR BLD AUTO: 25.6 % (ref 40–54)
HGB BLD-MCNC: 8.2 GM/DL (ref 14–18)
IMM GRANULOCYTES # BLD AUTO: 0.05 K/UL (ref 0–0.04)
IMM GRANULOCYTES NFR BLD AUTO: 1.8 % (ref 0–0.5)
INDIRECT COOMBS: NORMAL
LDH SERPL-CCNC: 321 U/L (ref 110–260)
LYMPHOCYTES # BLD AUTO: 0.55 K/UL (ref 1–4.8)
MAGNESIUM SERPL-MCNC: 2.1 MG/DL (ref 1.6–2.6)
MCH RBC QN AUTO: 29.5 PG (ref 27–31)
MCHC RBC AUTO-ENTMCNC: 32 G/DL (ref 32–36)
MCV RBC AUTO: 92 FL (ref 82–98)
NUCLEATED RBC (/100WBC) (OHS): 0 /100 WBC
PHOSPHATE SERPL-MCNC: 4.3 MG/DL (ref 2.7–4.5)
PLATELET # BLD AUTO: 45 K/UL (ref 150–450)
PMV BLD AUTO: 10.2 FL (ref 9.2–12.9)
POTASSIUM SERPL-SCNC: 4.8 MMOL/L (ref 3.5–5.1)
PROT SERPL-MCNC: 7.2 GM/DL (ref 6–8.4)
RBC # BLD AUTO: 2.78 M/UL (ref 4.6–6.2)
RELATIVE EOSINOPHIL (OHS): 1.8 %
RELATIVE LYMPHOCYTE (OHS): 20.2 % (ref 18–48)
RELATIVE MONOCYTE (OHS): 12.1 % (ref 4–15)
RELATIVE NEUTROPHIL (OHS): 63.7 % (ref 38–73)
RH BLD: NORMAL
SODIUM SERPL-SCNC: 138 MMOL/L (ref 136–145)
SPECIMEN OUTDATE: NORMAL
TACROLIMUS BLD-MCNC: 6.3 NG/ML (ref 5–15)
URATE SERPL-MCNC: 6.2 MG/DL (ref 3.4–7)
WBC # BLD AUTO: 2.72 K/UL (ref 3.9–12.7)

## 2025-04-07 PROCEDURE — 80197 ASSAY OF TACROLIMUS: CPT

## 2025-04-07 PROCEDURE — 86900 BLOOD TYPING SEROLOGIC ABO: CPT | Performed by: INTERNAL MEDICINE

## 2025-04-07 PROCEDURE — 83615 LACTATE (LD) (LDH) ENZYME: CPT

## 2025-04-07 PROCEDURE — 83735 ASSAY OF MAGNESIUM: CPT

## 2025-04-07 PROCEDURE — 3044F HG A1C LEVEL LT 7.0%: CPT | Mod: CPTII,S$GLB,,

## 2025-04-07 PROCEDURE — 99215 OFFICE O/P EST HI 40 MIN: CPT | Mod: S$GLB,,,

## 2025-04-07 PROCEDURE — 84550 ASSAY OF BLOOD/URIC ACID: CPT

## 2025-04-07 PROCEDURE — 3074F SYST BP LT 130 MM HG: CPT | Mod: CPTII,S$GLB,,

## 2025-04-07 PROCEDURE — 1159F MED LIST DOCD IN RCRD: CPT | Mod: CPTII,S$GLB,,

## 2025-04-07 PROCEDURE — 84100 ASSAY OF PHOSPHORUS: CPT

## 2025-04-07 PROCEDURE — 85025 COMPLETE CBC W/AUTO DIFF WBC: CPT

## 2025-04-07 PROCEDURE — 87799 DETECT AGENT NOS DNA QUANT: CPT

## 2025-04-07 PROCEDURE — 99999 PR PBB SHADOW E&M-EST. PATIENT-LVL IV: CPT | Mod: PBBFAC,,,

## 2025-04-07 PROCEDURE — 36415 COLL VENOUS BLD VENIPUNCTURE: CPT

## 2025-04-07 PROCEDURE — 3078F DIAST BP <80 MM HG: CPT | Mod: CPTII,S$GLB,,

## 2025-04-07 PROCEDURE — 3008F BODY MASS INDEX DOCD: CPT | Mod: CPTII,S$GLB,,

## 2025-04-07 PROCEDURE — 84460 ALANINE AMINO (ALT) (SGPT): CPT

## 2025-04-07 NOTE — PROGRESS NOTES
Section of Hematology and Stem Cell Transplantation    Post-Transplantation Follow Up Visit     4/7/25    Transplant History:   Primary oncologist: Clyde James MD  Primary oncologic diagnosis: primary myelofibrosis  Transplant date: 12/26/2024  Donor: haploidentical  Blood Type (Patient): O +  Blood Type (Donor): O +  CMV (Patient): Negative  CMV (Donor): Positive  Graft source: Peripheral blood  CD34+ cell dose: 6.04 X10^6 cells/kg  Conditioning Regimen:  Thiotepa, Busulfan, Fludarabine  GVHD prophylaxis: Post-transplant cyclophosphamide, MMF, Tacrolimus  Immediate post-transplant complications: mucositis, R axillary skin/soft tissue infection secondary to MRSA    History of Present Ilness:   Rubén Peters) is a pleasant 54 y.o.male with primary myelofibrosis who is status post haploidentical stem cell transplantation conditioned with  Bu/Flu/TT  who is currently day +102 who presents for post-transplant follow up.    He is feeling better overall. He got his port removed on 4/3. He is still breathing slightly heavy with exertion. His energy level was very low over the weekend. No nausea/vomiting. No rash. No jaundice/pruritis. His appetite is doing great.     PAST MEDICAL HISTORY:   Past Medical History:   Diagnosis Date    Abdominal pain 01/02/2025    Allergic contact dermatitis due to adhesives 12/31/2024    Cancer     Flatulence 01/03/2025    Headache 12/27/2024    Hyperlipidemia     Mucositis 01/02/2025    Other constipation 12/19/2024    Thrombocytosis 12/18/2024    Transaminitis 12/27/2024       PAST SURGICAL HISTORY:   Past Surgical History:   Procedure Laterality Date    BONE MARROW BIOPSY N/A 11/27/2024    Procedure: Biopsy-bone marrow;  Surgeon: Clyde James MD;  Location: [facility] ([address] [place] [address]);  Service: Oncology;  Laterality: N/A;  11/20-pre call complete-tb    COLONOSCOPY N/A 8/9/2023    Procedure: COLONOSCOPY;  Surgeon: Will Ardon MD;  Location: [facility] ([address] [place] [address]);  Service:  Endoscopy;  Laterality: N/A;  Suprep Instructions sent via portal / Authorizing:     Bebeto Arora MD in [facility] FAMILY MED/ INTERNAL MED/ PEDS  Referral:          [id number]    EYE SURGERY      FLEXIBLE SIGMOIDOSCOPY N/A 7/23/2024    Procedure: SIGMOIDOSCOPY, FLEXIBLE;  Surgeon: Nirail Vicente MD;  Location: [facility];  Service: Endoscopy;  Laterality: N/A;    INSERTION OF LONGORIA CATHETER Right 12/18/2024    Procedure: INSERTION, CATHETER, CENTRAL VENOUS, LONGORIA TRIPLE LUMEN, Bard 12.5 fr Longoria Cath model [id number];  Surgeon: Willie Hadley MD;  Location: [facility] OR [address] [place] [address];  Service: General;  Laterality: Right;       PAST SOCIAL HISTORY:  Social History     Tobacco Use    Smoking status: Former     Types: Cigars, Cigarettes    Smokeless tobacco: Never   Substance Use Topics    Alcohol use: Not Currently     Comment: socially    Drug use: Never       FAMILY HISTORY:  Family History   Problem Relation Name Age of Onset    Arthritis Mother      COPD Father      Testicular cancer Maternal Cousin Peter 17        Chemotherapy    Breast cancer Maternal Cousin  51        Chemotherapy    Heart disease Maternal Grandfather      Stroke Maternal Grandmother         CURRENT MEDICATIONS:   Current Outpatient Medications   Medication Sig    acyclovir (ZOVIRAX) 800 MG Tab Take 1 tablet (800 mg total) by mouth 2 (two) times daily.    atovaquone (MEPRON) 750 mg/5 mL Susp oral liquid Take 10 mLs (1,500 mg total) by mouth 2 (two) times daily.    eltrombopag olamine (PROMACTA) 50 MG Tab Take 1 tablet (50 mg total) by mouth once daily.    ergocalciferol (ERGOCALCIFEROL) 50,000 unit Cap Take 1 capsule (50,000 Units total) by mouth every 7 days.    letermovir (PREVYMIS) 480 mg Tab Take 1 tablet (480 mg total) by mouth once daily.    magnesium oxide (MAG-OX) 400 mg (241.3 mg magnesium) tablet Take 2 tablets in the morning, 1 tablet in the afternoon, and 2 tablets in the evening.    ondansetron (ZOFRAN) 8 MG tablet Take 1 tablet (8 mg  total) by mouth every 8 (eight) hours as needed for Nausea.    prochlorperazine (COMPAZINE) 10 MG tablet Take 1 tablet (10 mg total) by mouth 4 (four) times daily as needed for Nausea.    tacrolimus (PROGRAF) 0.5 MG Cap Take 3 capsules (1.5 mg total) by mouth every morning AND 4 capsules (2 mg total) every evening.    traMADoL (ULTRAM) 50 mg tablet Take 1 tablet (50 mg total) by mouth every 6 (six) hours as needed for Pain.    triamcinolone acetonide 0.1% (KENALOG) 0.1 % cream Apply topically 2 (two) times daily as needed (rash on arms, chest, back, trunk, and legs (do not apply to face)).    voriconazole (VFEND) 200 MG Tab Take 1 tablet (200 mg total) by mouth 2 (two) times daily.     No current facility-administered medications for this visit.       ALLERGIES:   Review of patient's allergies indicates:   Allergen Reactions    Sulfamethoxazole-trimethoprim Hives       GVHD Review of Systems:     Pertinent positives and negatives included in the HPI. Otherwise a 14 point review of systems is negative. GVHD review of systems recorded in BMT flowsheet.     Physical Exam:     Vitals:    04/07/25 0924   BP: 113/76   Pulse: 100   Resp: 16   Temp: 97.9 °F (36.6 °C)           General: Appears well, NAD  HEENT: MMM, no OP lesions  Neck: Supple, no LAD  Pulmonary: CTAB, no increased work of breathing, no W/R/C  Cardiovascular: S1S2 normal, RRR, no M/R/G  Abdominal: Soft, NT, ND, BS+, no HSM  Extremities: No C/C/E  Neurological: AAOx4, grossly normal, no focal deficits  Dermatologic: No appreciable rashes or lesions     ECOG Performance Status: (foot note - ECOG PS provided by Eastern Cooperative Oncology Group) 1 - Symptomatic but completely ambulatory    Karnofsky Performance Score:  70%- Cares for Self: Unable to Carry on Normal Activity or Active Work    Labs:   Lab Results   Component Value Date    WBC 2.72 (L) 04/07/2025    RBC 2.78 (L) 04/07/2025    HGB 8.2 (L) 04/07/2025    HCT 25.6 (L) 04/07/2025    MCV 92 04/07/2025     MCH 29.5 04/07/2025    MCHC 32.0 04/07/2025    RDW 15.9 (H) 04/07/2025    PLT 45 (L) 04/07/2025    MPV 10.2 04/07/2025    GRAN 3.6 03/27/2025    LYMPH 20.2 04/07/2025    LYMPH 0.55 (L) 04/07/2025    MONO 12.1 04/07/2025    MONO 0.33 04/07/2025    EOS 1.8 04/07/2025    EOS 0.05 04/07/2025    BASO 0.02 03/20/2025    EOSINOPHIL 0.6 03/20/2025    BASOPHIL 0.4 04/07/2025    BASOPHIL 0.01 04/07/2025       Sodium   Date Value Ref Range Status   04/07/2025 138 136 - 145 mmol/L Final   03/20/2025 138 136 - 145 mmol/L Final     Potassium   Date Value Ref Range Status   04/07/2025 4.8 3.5 - 5.1 mmol/L Final   03/20/2025 4.2 3.5 - 5.1 mmol/L Final     Chloride   Date Value Ref Range Status   04/07/2025 107 95 - 110 mmol/L Final   03/20/2025 108 95 - 110 mmol/L Final     CO2   Date Value Ref Range Status   04/07/2025 22 (L) 23 - 29 mmol/L Final   03/20/2025 21 (L) 23 - 29 mmol/L Final     Glucose   Date Value Ref Range Status   03/20/2025 101 70 - 110 mg/dL Final     BUN   Date Value Ref Range Status   04/07/2025 28 (H) 6 - 20 mg/dL Final     Creatinine   Date Value Ref Range Status   04/07/2025 1.3 0.5 - 1.4 mg/dL Final     Calcium   Date Value Ref Range Status   04/07/2025 9.6 8.7 - 10.5 mg/dL Final   03/20/2025 9.2 8.7 - 10.5 mg/dL Final     Total Protein   Date Value Ref Range Status   03/20/2025 6.6 6.0 - 8.4 g/dL Final     Albumin   Date Value Ref Range Status   04/07/2025 3.7 3.5 - 5.2 g/dL Final   03/20/2025 3.7 3.5 - 5.2 g/dL Final     Total Bilirubin   Date Value Ref Range Status   03/20/2025 0.4 0.1 - 1.0 mg/dL Final     Comment:     For infants and newborns, interpretation of results should be based  on gestational age, weight and in agreement with clinical  observations.    Premature Infant recommended reference ranges:  Up to 24 hours.............<8.0 mg/dL  Up to 48 hours............<12.0 mg/dL  3-5 days..................<15.0 mg/dL  6-29 days.................<15.0 mg/dL       Bilirubin Total   Date Value Ref  Range Status   04/07/2025 0.4 0.1 - 1.0 mg/dL Final     Comment:     For infants and newborns, interpretation of results should be based   on gestational age, weight and in agreement with clinical   observations.    Premature Infant recommended reference ranges:   0-24 hours:  <8.0 mg/dL   24-48 hours: <12.0 mg/dL   3-5 days:    <15.0 mg/dL   6-29 days:   <15.0 mg/dL     Alkaline Phosphatase   Date Value Ref Range Status   03/20/2025 122 40 - 150 U/L Final     ALP   Date Value Ref Range Status   04/07/2025 173 (H) 40 - 150 unit/L Final     AST   Date Value Ref Range Status   04/07/2025 36 11 - 45 unit/L Final   03/20/2025 59 (H) 10 - 40 U/L Final     ALT   Date Value Ref Range Status   04/07/2025 49 (H) 10 - 44 unit/L Final   03/20/2025 85 (H) 10 - 44 U/L Final     Anion Gap   Date Value Ref Range Status   04/07/2025 9 8 - 16 mmol/L Final     eGFR if    Date Value Ref Range Status   01/22/2022 >60.0 >60 mL/min/1.73 m^2 Final     eGFR if non    Date Value Ref Range Status   01/22/2022 >60.0 >60 mL/min/1.73 m^2 Final     Comment:     Calculation used to obtain the estimated glomerular filtration  rate (eGFR) is the CKD-EPI equation.          Imaging:   All pertinent studies reviewed and interpreted by me       Assessment and Plan:   Rubén Peters) is a pleasant 54 y.o.male with primary myelofibrosis s/p haplo SCT who presents for post-transplant follow up.    Primary myelofibrosis: Status post treatment with ruxolitinib, followed by alloSCT. Day +30 BMBx results below.    Status post allogeneic stem cell transplantation: As noted above, status post haploidentical stem cell transplantation conditioned with  Bu/Flu/TT . Currently day +102.  Engrafted on day +22.  Day +30 BMBx on 1/2/25: No definitive evidence of residual JAK2 [id number] primary myelofibrosis. NGS without evidence of pathogenic mutations. Chimerism studies with 100% donor CD33, CD3 insufficient for analysis.    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 MMF has now been discontinued. He is on budesonide for aGVHD. No evidence of aGVHD at this visit.   Current tacro dose: 1.5mg qaM and 1 mg qPM   Last tacro level: 6.3  Adjustments: none    Immunosuppression: Prevention with voriconazole,  acyclovir. CMV prophylaxis with letermovir. PJP prophyalxis dapsone. Continue weekly monitoring of CMV and EBV.  Last CMV: negative (3/31/2025), last EBV: negative (3/31/2025).  Active infections: none    Pancytopenia: Due to underlying disease and chemotherapy. Transfuse for Hgb <7 g/dL and platelets <10k.  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 Eltrombopag 50mg daily.   Anemia - thought to be from conditioning chemotherapy. Swapped dapsone for atovaquone on 4/3/25..     Transaminitis: Mild. Asymptomatic. Normal T Bili. Monitoring closely. Improving.     Hypertension:  Was newly hypertensive in hospital following SCT at which time he was started on amlodipine. Discontinued amlodipine after hospitalization due to hypotension. Monitor for now but restart antihypertensives if this worsens.     Cardiomyopathy: Echo 2/26/25 obtained due to tachycardia revealed reduced LVEF 45-50%. No signs/symptoms of HFrEF. Repeat echo in 3 months (5/2025).    Staphylococcus Skin Infection: Patient had MRSA while in the hospital and was treated with doxycycline, concern for recurrence on recent exam and has once again resolved following Doxycyline 100mg PO BID. He has now stopped doxycycline and the spot is gone.     Vitamin D deficiency: His primary care is giving him 50,000 units once a week.     Follow up: Twice weekly follow up with labs.    Orders Placed:      No orders of the defined types were placed in this encounter.     40 minutes of total time spent on the encounter, which includes face to  face time and non-face to face time preparing to see the patient (eg, review of tests), Obtaining and/or reviewing separately obtained history, Documenting clinical information in the electronic or other health record, Independently interpreting results (not separately reported) and communicating results to the patient/family/caregiver, or Care coordination with other physicians involved in his care (not separately reported).     Opal Alonzo PA-C  Malignant Hematology, Stem Cell Transplant, and Cellular Therapy  [facility] [place]

## 2025-04-07 NOTE — PROGRESS NOTES
BMT Pharmacist Immunosuppression Note:    Reviewed patient's tacrolimus level with Dr. Grover and it is below goal range.      Lab Results   Component Value Date    TACROLIMUS 6.3 04/07/2025    TACROLIMUS 12.4 04/03/2025    TACROLIMUS 9.1 03/31/2025       Lab Results   Component Value Date    CREATININE 1.3 04/07/2025    CREATININE 0.9 04/03/2025    CREATININE 0.8 03/31/2025    BILITOT 0.4 04/07/2025    BILITOT 0.4 04/03/2025    BILITOT 0.5 03/31/2025    AST 36 04/07/2025    AST 37 04/03/2025    AST 43 03/31/2025    ALT 49 (H) 04/07/2025    ALT 69 (H) 04/03/2025    ALT 85 (H) 03/31/2025         Current regimen: 1.5 mg qAM + 1 mg qPM    Plan: Given Scr increased from 0.9 to 1.3, will continue with current regimen.        Tari Lu, PharmD  PGY2 Oncology Pharmacy Resident

## 2025-04-08 RX ORDER — ERGOCALCIFEROL 1.25 MG/1
50000 CAPSULE ORAL
Qty: 12 CAPSULE | Refills: 3 | Status: SHIPPED | OUTPATIENT
Start: 2025-04-08

## 2025-04-09 ENCOUNTER — HOSPITAL ENCOUNTER (OUTPATIENT)
Facility: HOSPITAL | Age: 55
Discharge: HOME | End: 2025-04-09
Attending: INTERNAL MEDICINE | Admitting: INTERNAL MEDICINE
Payer: COMMERCIAL

## 2025-04-09 ENCOUNTER — ANESTHESIA EVENT (OUTPATIENT)
Dept: ENDOSCOPY | Facility: HOSPITAL | Age: 55
End: 2025-04-09
Payer: COMMERCIAL

## 2025-04-09 ENCOUNTER — ANESTHESIA (OUTPATIENT)
Dept: ENDOSCOPY | Facility: HOSPITAL | Age: 55
End: 2025-04-09
Payer: COMMERCIAL

## 2025-04-09 VITALS
HEART RATE: 82 BPM | HEIGHT: 70 IN | RESPIRATION RATE: 16 BRPM | TEMPERATURE: 98 F | BODY MASS INDEX: 24.94 KG/M2 | OXYGEN SATURATION: 99 % | SYSTOLIC BLOOD PRESSURE: 126 MMHG | DIASTOLIC BLOOD PRESSURE: 71 MMHG | WEIGHT: 174.19 LBS

## 2025-04-09 DIAGNOSIS — Z94.81 S/P ALLOGENEIC BONE MARROW TRANSPLANT: ICD-10-CM

## 2025-04-09 PROCEDURE — 38222 DX BONE MARROW BX & ASPIR: CPT | Performed by: INTERNAL MEDICINE

## 2025-04-09 PROCEDURE — 63600175 PHARM REV CODE 636 W HCPCS

## 2025-04-09 PROCEDURE — 88275 CYTOGENETICS 100-300: CPT

## 2025-04-09 PROCEDURE — 25000003 PHARM REV CODE 250

## 2025-04-09 PROCEDURE — 88313 SPECIAL STAINS GROUP 2: CPT | Mod: TC,59

## 2025-04-09 PROCEDURE — 37000008 HC ANESTHESIA 1ST 15 MINUTES: Performed by: INTERNAL MEDICINE

## 2025-04-09 PROCEDURE — 88299 UNLISTED CYTOGENETIC STUDY: CPT

## 2025-04-09 PROCEDURE — 37000009 HC ANESTHESIA EA ADD 15 MINS: Performed by: INTERNAL MEDICINE

## 2025-04-09 PROCEDURE — A4216 STERILE WATER/SALINE, 10 ML: HCPCS

## 2025-04-09 PROCEDURE — 88237 TISSUE CULTURE BONE MARROW: CPT

## 2025-04-09 PROCEDURE — 88184 FLOWCYTOMETRY/ TC 1 MARKER: CPT

## 2025-04-09 RX ORDER — OXYCODONE HYDROCHLORIDE 5 MG/1
5 TABLET ORAL ONCE
Refills: 0 | Status: DISCONTINUED | OUTPATIENT
Start: 2025-04-09 | End: 2025-04-09 | Stop reason: HOSPADM

## 2025-04-09 RX ORDER — SODIUM CHLORIDE 9 MG/ML
INJECTION, SOLUTION INTRAVENOUS CONTINUOUS
Status: DISCONTINUED | OUTPATIENT
Start: 2025-04-09 | End: 2025-04-09 | Stop reason: HOSPADM

## 2025-04-09 RX ORDER — LIDOCAINE HYDROCHLORIDE 20 MG/ML
INJECTION INTRAVENOUS
Status: DISCONTINUED | OUTPATIENT
Start: 2025-04-09 | End: 2025-04-09

## 2025-04-09 RX ORDER — SODIUM CHLORIDE 0.9 % (FLUSH) 0.9 %
SYRINGE (ML) INJECTION
Status: DISCONTINUED | OUTPATIENT
Start: 2025-04-09 | End: 2025-04-09

## 2025-04-09 RX ORDER — OXYCODONE HYDROCHLORIDE 5 MG/1
5 TABLET ORAL EVERY 6 HOURS PRN
Qty: 10 TABLET | Refills: 0 | Status: SHIPPED | OUTPATIENT
Start: 2025-04-09

## 2025-04-09 RX ORDER — PROPOFOL 10 MG/ML
VIAL (ML) INTRAVENOUS
Status: DISCONTINUED | OUTPATIENT
Start: 2025-04-09 | End: 2025-04-09

## 2025-04-09 RX ORDER — LIDOCAINE HYDROCHLORIDE 20 MG/ML
INJECTION, SOLUTION INFILTRATION; PERINEURAL
Status: DISCONTINUED | OUTPATIENT
Start: 2025-04-09 | End: 2025-04-09 | Stop reason: HOSPADM

## 2025-04-09 RX ADMIN — SODIUM CHLORIDE 10 ML: 9 INJECTION, SOLUTION INTRAMUSCULAR; INTRAVENOUS; SUBCUTANEOUS at 08:04

## 2025-04-09 RX ADMIN — SODIUM CHLORIDE 20 ML: 9 INJECTION, SOLUTION INTRAMUSCULAR; INTRAVENOUS; SUBCUTANEOUS at 08:04

## 2025-04-09 RX ADMIN — PROPOFOL 30 MG: 10 INJECTION, EMULSION INTRAVENOUS at 08:04

## 2025-04-09 RX ADMIN — PROPOFOL 175 MCG/KG/MIN: 10 INJECTION, EMULSION INTRAVENOUS at 08:04

## 2025-04-09 RX ADMIN — PROPOFOL 70 MG: 10 INJECTION, EMULSION INTRAVENOUS at 08:04

## 2025-04-09 RX ADMIN — LIDOCAINE HYDROCHLORIDE 60 MG: 20 INJECTION INTRAVENOUS at 08:04

## 2025-04-09 NOTE — DISCHARGE INSTRUCTIONS
Discharge instructions for having a Bone Marrow Aspiration / Biopsy:    Keep Bandage in place for 24 hours.  - Do not shower or take a tube bath during this time (you may sponge bathe).  - Call the nurse or physician for excessive bleeding or pain at biopsy site.  - You may take Tylenol as needed for pain.    During procedure today you have received medication to sedate you. These medications may affect your judgment, balance, and/or coordination. Therefore, for the next 24 hours, you have the follow restrictions:  - Do not drive a car or operate heavy machinery for the rest of the day.  - Do not make legal/financial decisions, sign important papers, or drink alcohol.  - You may resume other activities as tolerated.    You can call [phone] for any problems during the hours of 8:00 AM-5:00PM.    For an emergency after 5:00 PM you can call [phone] and have the  page the Hematologist / Oncologist on call.

## 2025-04-09 NOTE — ANESTHESIA POSTPROCEDURE EVALUATION
Anesthesia Post Evaluation    Patient: Rubén Hu    Procedure(s) Performed: Procedure(s) (LRB):  Biopsy-bone marrow (N/A)    Final Anesthesia Type: general      Patient location during evaluation: GI PACU  Patient participation: Yes- Able to Participate  Level of consciousness: awake and alert and oriented  Post-procedure vital signs: reviewed and stable  Pain management: adequate  Airway patency: patent    PONV status at discharge: No PONV  Anesthetic complications: no      Cardiovascular status: blood pressure returned to baseline  Respiratory status: unassisted and spontaneous ventilation  Hydration status: euvolemic  Follow-up not needed.              Vitals Value Taken Time   /71 04/09/25 09:40   Temp 36.6 °C (97.8 °F) 04/09/25 09:40   Pulse 82 04/09/25 09:40   Resp 16 04/09/25 09:40   SpO2 99 % 04/09/25 09:40         Event Time   Out of Recovery 10:00:16         Pain/Mohsen Score: Mohsen Score: 10 (4/9/2025  9:08 AM)

## 2025-04-09 NOTE — PLAN OF CARE
Chu. PA is at bedside evaluating pt. States that she is calling in Tramadol to [facility] pharmacy for pt to  on way home. She did not want him to take anything with Tylenol. Wound site remains clean and dry.  Pt continues to deny numbness/tingling, states pain remains 6/10.

## 2025-04-09 NOTE — ANESTHESIA PREPROCEDURE EVALUATION
04/09/2025  Rubén Hu is a 54 y.o., male.    Past Medical History:   Diagnosis Date    Abdominal pain 01/02/2025    Allergic contact dermatitis due to adhesives 12/31/2024    Cancer     Flatulence 01/03/2025    Headache 12/27/2024    Hyperlipidemia     Mucositis 01/02/2025    Other constipation 12/19/2024    Thrombocytosis 12/18/2024    Transaminitis 12/27/2024     Past Surgical History:   Procedure Laterality Date    BONE MARROW BIOPSY N/A 11/27/2024    Procedure: Biopsy-bone marrow;  Surgeon: Clyde James MD;  Location: [facility] (4TH FLR);  Service: Oncology;  Laterality: N/A;  11/20-pre call complete-tb    COLONOSCOPY N/A 8/9/2023    Procedure: COLONOSCOPY;  Surgeon: Will Ardon MD;  Location: [facility] (4TH FLR);  Service: Endoscopy;  Laterality: N/A;  Suprep Instructions sent via portal / Authorizing:     Bebeto Arora MD in [facility] FAMILY MED/ INTERNAL MED/ PEDS  Referral:          [id number]    EYE SURGERY      FLEXIBLE SIGMOIDOSCOPY N/A 7/23/2024    Procedure: SIGMOIDOSCOPY, FLEXIBLE;  Surgeon: Nirali Vicente MD;  Location: [facility] ENDO;  Service: Endoscopy;  Laterality: N/A;    INSERTION OF LONGORIA CATHETER Right 12/18/2024    Procedure: INSERTION, CATHETER, CENTRAL VENOUS, LONGORIA TRIPLE LUMEN, Bard 12.5 fr [place] Cath model [id number];  Surgeon: Willie Hadley MD;  [address]  Service: General;  Laterality: Right;      Problem List[1]     Pre-op Assessment    I have reviewed the Patient Summary Reports.    I have reviewed the NPO Status.   I have reviewed the Medications.     Review of Systems  Anesthesia Hx:  No problems with previous Anesthesia             Denies Family Hx of Anesthesia complications.    Denies Personal Hx of Anesthesia complications.                    Social:  Former Smoker, No Alcohol Use       Hematology/Oncology:  Hematology Normal                      Current/Recent Cancer.         chemotherapy   Oncology Comments: HX of allogeneic stem cell transplant     EENT/Dental:  EENT/Dental Normal           Cardiovascular:  Exercise tolerance: good   Hypertension, well controlled           hyperlipidemia   ECG has been reviewed.                            Pulmonary:  Pulmonary Normal                       Renal/:  Renal/ Normal                 Hepatic/GI:      Liver Disease,               Musculoskeletal:  Musculoskeletal Normal                Neurological:      Headaches                                 Endocrine:  Endocrine Normal            Dermatological:  Skin Normal    Psych:  Psychiatric Normal                    Physical Exam  General: Cooperative, Alert, Oriented and Well nourished    Airway:  Mallampati: II / I  Mouth Opening: Normal  TM Distance: Normal  Tongue: Normal  Neck ROM: Normal ROM    Dental:  Intact    Chest/Lungs:  Clear to auscultation, Normal Respiratory Rate    Heart:  Rate: Normal  Rhythm: Regular Rhythm  Sounds: Normal    Abdomen:  Normal, Soft, Nontender        Anesthesia Plan  Type of Anesthesia, risks & benefits discussed:    Anesthesia Type: Gen Natural Airway  Intra-op Monitoring Plan: Standard ASA Monitors  Induction:  IV  Airway Plan: Direct  Informed Consent: Informed consent signed with the Patient and all parties understand the risks and agree with anesthesia plan.  All questions answered. Patient consented to blood products? No  ASA Score: 3  Day of Surgery Review of History & Physical: H&P Update referred to the surgeon/provider.    Ready For Surgery From Anesthesia Perspective.     .           [1]   Patient Active Problem List  Diagnosis    Hyperlipidemia    History of tobacco abuse    Hemorrhoids    Primary myelofibrosis    Metabolic dysfunction-associated steatotic liver disease (MASLD)    History of allogeneic stem cell transplant    Overweight (BMI 25.0-29.9)    Adrenal nodule    Pancytopenia due to chemotherapy     Insomnia    Hypertension    Chemotherapy-induced nausea    Neutropenic fever    Electrolyte disorder    Hypokalemia    Immunocompromised state associated with stem cell transplant    Skin lesions    Hyperbilirubinemia    Hypercoagulable state, secondary    Hypomagnesemia    Anemia associated with chemotherapy

## 2025-04-09 NOTE — PROCEDURES
"Rubén Hu is a 54 y.o. male patient.    Temp: 98.1 °F (36.7 °C) (04/09/25 0808)  Pulse: 92 (04/09/25 0808)  Resp: 16 (04/09/25 0808)  BP: 135/77 (04/09/25 0808)  SpO2: 99 % (04/09/25 0808)  Weight: 79 kg (174 lb 3.2 oz) (04/09/25 0808)  Height: 5' 10" (177.8 cm) (04/09/25 0808)       Bone marrow    Date/Time: 4/9/2025 8:49 AM    Performed by: Chu Wolf PA-C  Authorized by: Chu Wolf PA-C      Position: left lateral  Aspiration?: Yes   Biopsy?: Yes    PROCEDURE NOTE:  Date of Procedure: 4/9/25  Bone Marrow Biopsy and Aspiration  Indication: S/p allo day +100 bmbx  Consent: Informed consent was obtained from patient.  Timeout: Done and documented. Allergies reviewed.  Position: LLD  Site: Right  posterior illiac crest.  Prep: Betadine.  Needle used: 11 gauge Jamshidi needle.  Anesthetic: 2% lidocaine 5 cc.  Biopsy: The biopsy needle was introduced into the marrow cavity and an aspirate was obtained without complications and sent for flow cytometry, MDS fish, JAK2, NGS, chimersim, DNA/RNA Hold, and cytogenetics. Core biopsy obtained without difficulty and sent for routine histologic examination.  Complications: None.  Disposition: The patient was placed supine following procedure. RN to assess bandaid for bleeding prior to discharge home. Patient aware to keep bandaid dry and intact for 24hrs and to call clinic for any bleeding, fevers, pain, or signs of infection. Will discharge home per anesthesia protocol   Blood loss: Minimal.     Chu Wolf PA-C  Hematology/Oncology/BMT     4/9/2025    "

## 2025-04-09 NOTE — TRANSFER OF CARE
"Anesthesia Transfer of Care Note    Patient: Rubén Hu    Procedure(s) Performed: Procedure(s) (LRB):  Biopsy-bone marrow (N/A)    Patient location: GI    Anesthesia Type: general    Transport from OR: Transported from OR on 6-10 L/min O2 by face mask with adequate spontaneous ventilation    Post pain: adequate analgesia    Post assessment: no apparent anesthetic complications    Post vital signs: stable    Level of consciousness: sedated    Nausea/Vomiting: no nausea/vomiting    Complications: none    Transfer of care protocol was followed      Last vitals: Visit Vitals  BP (!) 97/57 (BP Location: Right arm, Patient Position: Lying)   Pulse 80   Temp 36.9 °C (98.4 °F) (Skin)   Resp 16   Ht 5' 10" (1.778 m)   Wt 79 kg (174 lb 3.2 oz)   SpO2 99%   BMI 25.00 kg/m²     "

## 2025-04-09 NOTE — PLAN OF CARE
Bone marrow aspiration and biopsy performed by YONNY Vazquez  via right iliac crest.  Linda Mendoza  present during procedure.   same name as above

## 2025-04-09 NOTE — PLAN OF CARE
MD at bedside. Patient able to dress self without assistance.  Pt d/c'd by wheelchair, to wife in lobby, discussed meds at pharmacy.  Pt instructed to call if no relief with pain meds.  Pt and wife v/u.

## 2025-04-09 NOTE — DISCHARGE SUMMARY
"[facility] 4th Floor  Bone Marrow Transplant  Discharge Summary      Patient Name: Rubén Hu  MRN: [id number]  Admission Date: 4/9/2025  Hospital Length of Stay: 0 days  Discharge Date and Time: 04/09/2025 8:53 AM  Attending Physician: Clyde James MD   Discharging Provider: Chu Wolf PA-C  Primary Care Provider: Bebeto Arora MD    HPI:     Pt is a 54 yoM, patient of Dr. James, presenting to endoscopy prior to a bone marrow biopsy and aspiration. Bone marrow to be completed as patient is day +104 s/p allo SCT for MDS.       Hospital Course: Patient admitted to endoscopy suite today for a bone marrow aspiration and biopsy. Pt was consented for a bone marrow biopsy. Patient was sedated per anesthesia and a bone marrow biopsy and aspiration was performed in the endoscopy suite procedure room (see procedure note). Patient was then transferred to post op recovery area and discharged home when appropriate per anesthesia.          Significant Diagnostic Studies: Labs: CMP No results for input(s): "NA", "K", "CL", "CO2", "GLU", "BUN", "CREATININE", "CALCIUM", "PROT", "ALBUMIN", "BILITOT", "ALKPHOS", "AST", "ALT", "ANIONGAP", "ESTGFRAFRICA", "EGFRNONAA" in the last 48 hours. and CBC No results for input(s): "WBC", "HGB", "HCT", "PLT" in the last 48 hours.    Pending Diagnostic Studies:       Procedure Component Value Units Date/Time    Chimerism Transplant Sorted Cells (Performed at [facility]) Bone Marrow [[id number]] Collected: 04/09/25 0846    Order Status: Sent Lab Status: No result     Specimen: Blood     Chromosome Analysis, Bone Marrow Right Posterior Iliac Crest [[id number]] Collected: 04/09/25 0846    Order Status: Sent Lab Status: No result     Specimen: Bone Marrow     Heme Disorders DNA/RNA Hold -Bone Marrow [[id number]] Collected: 04/09/25 0846    Order Status: Sent Lab Status: No result     Specimen: Bone Marrow Aspirate, Right Iliac Crest     JAK2 V617F Mutation Detection, " Bone Marrow [[id number]] Collected: 04/09/25 0846    Order Status: Sent Lab Status: No result     Specimen: Bone Marrow     Leukemia/Lymphoma Screen - Bone Marrow Right Posterior Iliac Crest [[id number]] Collected: 04/09/25 0846    Order Status: Sent Lab Status: No result     Specimen: Bone Marrow     Myelodysplastic Syndrome (MDS), FISH -Bone Marrow [[id number]] Collected: 04/09/25 0846    Order Status: Sent Lab Status: No result     Specimen: Bone Marrow Aspirate, Right Iliac Crest     OncoHeme (NGS) Hematologic Neoplasms -Bone Marrow [[id number]] Collected: 04/09/25 0846    Order Status: Sent Lab Status: No result     Specimen: Bone Marrow Aspirate, Right Iliac Crest     Specimen to Pathology, Bone Marrow Aspiration/Biopsy [[id number]] Collected: 04/09/25 0846    Order Status: Sent Lab Status: No result     Specimen: Bone Marrow Aspirate, Right Iliac Crest; Bone Marrow Clot, Right Iliac Crest; Bone Marrow Core Bx, Right Iliac Crest           There are no hospital problems to display for this patient.     Discharged Condition: stable    Disposition: Home or Self Care    Follow Up:    Future Appointments   Date Time Provider Department Center   4/10/2025  7:10 AM LAB, HEMONC CANCER BLDG [facility] [place] [facility]   4/10/2025  8:00 AM Clarence Grover MD Replaced by [facility] B[facility]   4/14/2025  9:10 AM [facility]   4/14/2025 10:00 AM Clarence Grover MD Replaced by [facility] [facility]   4/21/2025  7:50 AM [facility] LAB [facility] [facility]   4/21/2025  9:00 AM Clarence Grover MD Replaced by [facility]   4/28/2025  7:50 AM [facility] LAB B[facility] [place] [facility]   4/28/2025  8:30 AM Clarence Grover MD Replaced by [facility] B[facility] [facility]   5/1/2025  8:30 AM Yeimi Cruz DO [facility] ID [address]      Patient Instructions:      Diet Adult Regular     Notify your health care provider if you experience any of the following:  temperature >100.4     Notify your health care provider if you  experience any of the following:  persistent nausea and vomiting or diarrhea     Notify your health care provider if you experience any of the following:  severe uncontrolled pain     Notify your health care provider if you experience any of the following:  redness, tenderness, or signs of infection (pain, swelling, redness, odor or green/yellow discharge around incision site)     Notify your health care provider if you experience any of the following:  difficulty breathing or increased cough     Notify your health care provider if you experience any of the following:  severe persistent headache     Notify your health care provider if you experience any of the following:  worsening rash     Notify your health care provider if you experience any of the following:  persistent dizziness, light-headedness, or visual disturbances     Notify your health care provider if you experience any of the following:  increased confusion or weakness     Remove dressing in 24 hours     Activity as tolerated     Medications:  Reconciled Home Medications:      Medication List        ASK your doctor about these medications      acyclovir 800 MG Tab  Commonly known as: ZOVIRAX  Take 1 tablet (800 mg total) by mouth 2 (two) times daily.     atovaquone 750 mg/5 mL Susp oral liquid  Commonly known as: MEPRON  Take 10 mLs (1,500 mg total) by mouth 2 (two) times daily.     eltrombopag olamine 50 MG Tab  Commonly known as: PROMACTA  Take 1 tablet (50 mg total) by mouth once daily.     ergocalciferol 50,000 unit Cap  Commonly known as: ERGOCALCIFEROL  Take 1 capsule (50,000 Units total) by mouth every 7 days.     letermovir 480 mg Tab  Commonly known as: PREVYMIS  Take 1 tablet (480 mg total) by mouth once daily.     magnesium oxide 400 mg (241.3 mg magnesium) tablet  Commonly known as: MAG-OX  Take 2 tablets in the morning, 1 tablet in the afternoon, and 2 tablets in the evening.     ondansetron 8 MG tablet  Commonly known as: ZOFRAN  Take 1  tablet (8 mg total) by mouth every 8 (eight) hours as needed for Nausea.     prochlorperazine 10 MG tablet  Commonly known as: COMPAZINE  Take 1 tablet (10 mg total) by mouth 4 (four) times daily as needed for Nausea.     tacrolimus 0.5 MG Cap  Commonly known as: PROGRAF  Take 3 capsules (1.5 mg total) by mouth every morning AND 4 capsules (2 mg total) every evening.     traMADoL 50 mg tablet  Commonly known as: ULTRAM  Take 1 tablet (50 mg total) by mouth every 6 (six) hours as needed for Pain.     triamcinolone acetonide 0.1% 0.1 % cream  Commonly known as: KENALOG  Apply topically 2 (two) times daily as needed (rash on arms, chest, back, trunk, and legs (do not apply to face)).     voriconazole 200 MG Tab  Commonly known as: VFEND  Take 1 tablet (200 mg total) by mouth 2 (two) times daily.              Chu Wolf PA-C  Bone Marrow Transplant  [facility] 4th Floor

## 2025-04-09 NOTE — PROGRESS NOTES
Section of Hematology and Stem Cell Transplantation    Post-Transplantation Follow Up Visit     4/10/25    Transplant History:   Primary oncologist: Clyde James MD  Primary oncologic diagnosis: primary myelofibrosis  Transplant date: 12/26/2024  Donor: haploidentical  Blood Type (Patient): O +  Blood Type (Donor): O +  CMV (Patient): Negative  CMV (Donor): Positive  Graft source: Peripheral blood  CD34+ cell dose: 6.04 X10^6 cells/kg  Conditioning Regimen:  Thiotepa, Busulfan, Fludarabine  GVHD prophylaxis: Post-transplant cyclophosphamide, MMF, Tacrolimus  Immediate post-transplant complications: mucositis, R axillary skin/soft tissue infection secondary to MRSA    History of Present Ilness:   Rubén Peters) is a pleasant 54 y.o.male with primary myelofibrosis who is status post haploidentical stem cell transplantation conditioned with  Bu/Flu/TT  who is currently day +105 who presents for post-transplant follow up.    He has been feeling good. He is sore from his bone marrow biopsy yesterday. His shortness of breath with exertion is improving. Slight improvement in energy this week.He got nauseated two days ago and took zofran and then his nausea improved. No persistent nausea/vomiting. No diarrhea. No rash. No jaundice/pruritis. His appetite is doing great.     PAST MEDICAL HISTORY:   Past Medical History:   Diagnosis Date    Abdominal pain 01/02/2025    Allergic contact dermatitis due to adhesives 12/31/2024    Cancer     Flatulence 01/03/2025    Headache 12/27/2024    Hyperlipidemia     Mucositis 01/02/2025    Other constipation 12/19/2024    Thrombocytosis 12/18/2024    Transaminitis 12/27/2024       PAST SURGICAL HISTORY:   Past Surgical History:   Procedure Laterality Date    BONE MARROW BIOPSY N/A 11/27/2024    Procedure: Biopsy-bone marrow;  Surgeon: Clyde James MD;  Location: [address]);  Service: Oncology;  Laterality: N/A;  11/20-pre call complete-tb    BONE MARROW BIOPSY  N/A 4/9/2025    Procedure: Biopsy-bone marrow;  Surgeon: Clyde James MD;  Location: [facility] ENDO (4TH FLR);  Service: Oncology;  Laterality: N/A;  4/1 precall complete. EW    COLONOSCOPY N/A 8/9/2023    Procedure: COLONOSCOPY;  Surgeon: Will Ardon MD;  Location: [facility] ENDO (4TH FLR);  Service: Endoscopy;  Laterality: N/A;  Suprep Instructions sent via portal / Authorizing:     Bebeto Arora MD in [facility] FAMILY MED/ INTERNAL MED/ PEDS  Referral:          [id number]    EYE SURGERY      FLEXIBLE SIGMOIDOSCOPY N/A 7/23/2024    Procedure: SIGMOIDOSCOPY, FLEXIBLE;  Surgeon: Nirali Vicente MD;  Location: [facility] ENDO;  Service: Endoscopy;  Laterality: N/A;    INSERTION OF LONGORIA CATHETER Right 12/18/2024    Procedure: INSERTION, CATHETER, CENTRAL VENOUS, LONGORIA TRIPLE LUMEN, Bard 12.5 fr Longoria Cath model [id number];  Surgeon: Willie Hadley MD;  Location: [facility] OR 2ND FLR;  Service: General;  Laterality: Right;       PAST SOCIAL HISTORY:  Social History     Tobacco Use    Smoking status: Former     Types: Cigars, Cigarettes    Smokeless tobacco: Never   Substance Use Topics    Alcohol use: Not Currently     Comment: socially    Drug use: Never       FAMILY HISTORY:  Family History   Problem Relation Name Age of Onset    Arthritis Mother      COPD Father      Testicular cancer Maternal Cousin Peter 17        Chemotherapy    Breast cancer Maternal Cousin  51        Chemotherapy    Heart disease Maternal Grandfather      Stroke Maternal Grandmother         CURRENT MEDICATIONS:   Current Outpatient Medications   Medication Sig    acyclovir (ZOVIRAX) 800 MG Tab Take 1 tablet (800 mg total) by mouth 2 (two) times daily.    atovaquone (MEPRON) 750 mg/5 mL Susp oral liquid Take 10 mLs (1,500 mg total) by mouth 2 (two) times daily.    eltrombopag olamine (PROMACTA) 50 MG Tab Take 1 tablet (50 mg total) by mouth once daily.    ergocalciferol (ERGOCALCIFEROL) 50,000 unit Cap Take 1 capsule (50,000 Units total) by mouth every 7  days.    letermovir (PREVYMIS) 480 mg Tab Take 1 tablet (480 mg total) by mouth once daily.    magnesium oxide (MAG-OX) 400 mg (241.3 mg magnesium) tablet Take 2 tablets in the morning, 1 tablet in the afternoon, and 2 tablets in the evening.    ondansetron (ZOFRAN) 8 MG tablet Take 1 tablet (8 mg total) by mouth every 8 (eight) hours as needed for Nausea.    oxyCODONE (ROXICODONE) 5 MG immediate release tablet Take 1 tablet (5 mg total) by mouth every 6 (six) hours as needed for Pain.    prochlorperazine (COMPAZINE) 10 MG tablet Take 1 tablet (10 mg total) by mouth 4 (four) times daily as needed for Nausea.    tacrolimus (PROGRAF) 0.5 MG Cap Take 3 capsules (1.5 mg total) by mouth every morning AND 4 capsules (2 mg total) every evening.    traMADoL (ULTRAM) 50 mg tablet Take 1 tablet (50 mg total) by mouth every 6 (six) hours as needed for Pain.    triamcinolone acetonide 0.1% (KENALOG) 0.1 % cream Apply topically 2 (two) times daily as needed (rash on arms, chest, back, trunk, and legs (do not apply to face)).    voriconazole (VFEND) 200 MG Tab Take 1 tablet (200 mg total) by mouth 2 (two) times daily.     No current facility-administered medications for this visit.       ALLERGIES:   Review of patient's allergies indicates:   Allergen Reactions    Sulfamethoxazole-trimethoprim Hives       GVHD Review of Systems:     Pertinent positives and negatives included in the HPI. Otherwise a 14 point review of systems is negative. GVHD review of systems recorded in BMT flowsheet.     Physical Exam:     Vitals:    04/10/25 0754   BP: 109/73   Pulse: 101   Resp: 18   Temp: 97.6 °F (36.4 °C)             General: Appears well, NAD  HEENT: MMM, no OP lesions  Neck: Supple, no LAD  Pulmonary: CTAB, no increased work of breathing, no W/R/C  Cardiovascular: S1S2 normal, RRR, no M/R/G  Abdominal: Soft, NT, ND, BS+, no HSM  Extremities: No C/C/E  Neurological: AAOx4, grossly normal, no focal deficits  Dermatologic: No appreciable  rashes or lesions     ECOG Performance Status: (foot note - ECOG PS provided by Eastern Cooperative Oncology Group) 1 - Symptomatic but completely ambulatory    Karnofsky Performance Score:  80%- Normal Activity with Effort: Some Symptoms of Disease    Labs:   Lab Results   Component Value Date    WBC 2.72 (L) 04/07/2025    RBC 2.78 (L) 04/07/2025    HGB 8.2 (L) 04/07/2025    HCT 25.6 (L) 04/07/2025    MCV 92 04/07/2025    MCH 29.5 04/07/2025    MCHC 32.0 04/07/2025    RDW 15.9 (H) 04/07/2025    PLT 45 (L) 04/07/2025    MPV 10.2 04/07/2025    GRAN 3.6 03/27/2025    LYMPH 20.2 04/07/2025    LYMPH 0.55 (L) 04/07/2025    MONO 12.1 04/07/2025    MONO 0.33 04/07/2025    EOS 1.8 04/07/2025    EOS 0.05 04/07/2025    BASO 0.02 03/20/2025    EOSINOPHIL 0.6 03/20/2025    BASOPHIL 0.4 04/07/2025    BASOPHIL 0.01 04/07/2025       Sodium   Date Value Ref Range Status   04/07/2025 138 136 - 145 mmol/L Final   03/20/2025 138 136 - 145 mmol/L Final     Potassium   Date Value Ref Range Status   04/07/2025 4.8 3.5 - 5.1 mmol/L Final   03/20/2025 4.2 3.5 - 5.1 mmol/L Final     Chloride   Date Value Ref Range Status   04/07/2025 107 95 - 110 mmol/L Final   03/20/2025 108 95 - 110 mmol/L Final     CO2   Date Value Ref Range Status   04/07/2025 22 (L) 23 - 29 mmol/L Final   03/20/2025 21 (L) 23 - 29 mmol/L Final     Glucose   Date Value Ref Range Status   03/20/2025 101 70 - 110 mg/dL Final     BUN   Date Value Ref Range Status   04/07/2025 28 (H) 6 - 20 mg/dL Final     Creatinine   Date Value Ref Range Status   04/07/2025 1.3 0.5 - 1.4 mg/dL Final     Calcium   Date Value Ref Range Status   04/07/2025 9.6 8.7 - 10.5 mg/dL Final   03/20/2025 9.2 8.7 - 10.5 mg/dL Final     Total Protein   Date Value Ref Range Status   03/20/2025 6.6 6.0 - 8.4 g/dL Final     Albumin   Date Value Ref Range Status   04/07/2025 3.7 3.5 - 5.2 g/dL Final   03/20/2025 3.7 3.5 - 5.2 g/dL Final     Total Bilirubin   Date Value Ref Range Status   03/20/2025 0.4 0.1  - 1.0 mg/dL Final     Comment:     For infants and newborns, interpretation of results should be based  on gestational age, weight and in agreement with clinical  observations.    Premature Infant recommended reference ranges:  Up to 24 hours.............<8.0 mg/dL  Up to 48 hours............<12.0 mg/dL  3-5 days..................<15.0 mg/dL  6-29 days.................<15.0 mg/dL       Bilirubin Total   Date Value Ref Range Status   04/07/2025 0.4 0.1 - 1.0 mg/dL Final     Comment:     For infants and newborns, interpretation of results should be based   on gestational age, weight and in agreement with clinical   observations.    Premature Infant recommended reference ranges:   0-24 hours:  <8.0 mg/dL   24-48 hours: <12.0 mg/dL   3-5 days:    <15.0 mg/dL   6-29 days:   <15.0 mg/dL     Alkaline Phosphatase   Date Value Ref Range Status   03/20/2025 122 40 - 150 U/L Final     ALP   Date Value Ref Range Status   04/07/2025 173 (H) 40 - 150 unit/L Final     AST   Date Value Ref Range Status   04/07/2025 36 11 - 45 unit/L Final   03/20/2025 59 (H) 10 - 40 U/L Final     ALT   Date Value Ref Range Status   04/07/2025 49 (H) 10 - 44 unit/L Final   03/20/2025 85 (H) 10 - 44 U/L Final     Anion Gap   Date Value Ref Range Status   04/07/2025 9 8 - 16 mmol/L Final     eGFR if    Date Value Ref Range Status   01/22/2022 >60.0 >60 mL/min/1.73 m^2 Final     eGFR if non    Date Value Ref Range Status   01/22/2022 >60.0 >60 mL/min/1.73 m^2 Final     Comment:     Calculation used to obtain the estimated glomerular filtration  rate (eGFR) is the CKD-EPI equation.          Imaging:   All pertinent studies reviewed and interpreted by me       Assessment and Plan:   Rubén Beachdawoodia Fran) is a pleasant 54 y.o.male with primary myelofibrosis s/p haplo SCT who presents for post-transplant follow up.    Primary myelofibrosis: Status post treatment with ruxolitinib, followed by alloSCT. Day +30 BMBx results  below.    Status post allogeneic stem cell transplantation: As noted above, status post haploidentical stem cell transplantation conditioned with  Bu/Flu/TT . Currently day +105.  Engrafted on day +22.  Day +30 BMBx on 1/2/25: No definitive evidence of residual JAK2 [id number] primary myelofibrosis. NGS without evidence of pathogenic mutations. Chimerism studies with 100% donor CD33, CD3 insufficient for analysis.   Plan for central line removal -  4/3/25  Day 100 bone marrow biopsy completed but results pending.    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 MMF has now been discontinued. He is on budesonide for aGVHD. No evidence of aGVHD at this visit.   Current tacro dose: 1.5mg qaM and 1 mg qPM   Last tacro level: 9.3  Adjustments: none    Immunosuppression: Prevention with voriconazole,  acyclovir. CMV prophylaxis with letermovir. PJP prophyalxis dapsone. Continue weekly monitoring of CMV and EBV.  Last CMV: negative (3/31/2025), last EBV: negative (3/31/2025).  Active infections: none    Pancytopenia: Due to underlying disease and chemotherapy. Transfuse for Hgb <7 g/dL and platelets <10k.  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 Eltrombopag 50mg daily.   Anemia - thought to be from conditioning chemotherapy. Swapped dapsone for atovaquone on 4/3/25. Will think about starting weekly retacrit shots. Nutritional and viral workup will be done.     Transaminitis: Levels normalized now. Will continue to monitor.    Hypertension:  Was newly hypertensive in hospital following SCT at which time he was started on amlodipine. Discontinued amlodipine after hospitalization due to hypotension. Monitor for now but restart antihypertensives if this worsens.     Cardiomyopathy: Echo 2/26/25 obtained due to tachycardia revealed reduced LVEF 45-50%. No signs/symptoms of HFrEF. Repeat echo in 3 months (5/2025).    Vitamin D deficiency: His primary care is  giving him 50,000 units once a week.     Hypercholesterolemia: He is following with primary care. Could be elevated due to tacrolimus.     Follow up: Twice weekly follow up with labs.    Orders Placed:      No orders of the defined types were placed in this encounter.     40 minutes of total time spent on the encounter, which includes face to face time and non-face to face time preparing to see the patient (eg, review of tests), Obtaining and/or reviewing separately obtained history, Documenting clinical information in the electronic or other health record, Independently interpreting results (not separately reported) and communicating results to the patient/family/caregiver, or Care coordination with other physicians involved in his care (not separately reported).     Opal Alonzo PA-C  Malignant Hematology, Stem Cell Transplant, and Cellular Therapy  [facility]  [facility] [place]

## 2025-04-09 NOTE — PLAN OF CARE
After dressing, pt reports pain to right back area 6/10.  Pt requests pain meds.  YONNY Sage informed and states that she will order pain med for pt.  Pt denies numbness/tingling to back or legs.  Wound site remains clean and dry.

## 2025-04-10 ENCOUNTER — LAB VISIT (OUTPATIENT)
Dept: LAB | Facility: HOSPITAL | Age: 55
End: 2025-04-10
Attending: INTERNAL MEDICINE
Payer: COMMERCIAL

## 2025-04-10 ENCOUNTER — INFUSION (OUTPATIENT)
Dept: INFUSION THERAPY | Facility: HOSPITAL | Age: 55
End: 2025-04-10
Payer: COMMERCIAL

## 2025-04-10 ENCOUNTER — OFFICE VISIT (OUTPATIENT)
Dept: HEMATOLOGY/ONCOLOGY | Facility: CLINIC | Age: 55
End: 2025-04-10
Payer: COMMERCIAL

## 2025-04-10 VITALS
RESPIRATION RATE: 16 BRPM | HEART RATE: 76 BPM | TEMPERATURE: 98 F | OXYGEN SATURATION: 97 % | SYSTOLIC BLOOD PRESSURE: 123 MMHG | DIASTOLIC BLOOD PRESSURE: 79 MMHG

## 2025-04-10 VITALS
BODY MASS INDEX: 24.41 KG/M2 | WEIGHT: 174.38 LBS | RESPIRATION RATE: 18 BRPM | SYSTOLIC BLOOD PRESSURE: 109 MMHG | DIASTOLIC BLOOD PRESSURE: 73 MMHG | OXYGEN SATURATION: 99 % | HEIGHT: 71 IN | HEART RATE: 101 BPM | TEMPERATURE: 98 F

## 2025-04-10 DIAGNOSIS — Z76.82 STEM CELL TRANSPLANT CANDIDATE: ICD-10-CM

## 2025-04-10 DIAGNOSIS — E78.00 HYPERCHOLESTEROLEMIA: ICD-10-CM

## 2025-04-10 DIAGNOSIS — Z94.84 HISTORY OF ALLOGENEIC STEM CELL TRANSPLANT: ICD-10-CM

## 2025-04-10 DIAGNOSIS — D84.81 IMMUNODEFICIENCY DUE TO CONDITIONS CLASSIFIED ELSEWHERE: ICD-10-CM

## 2025-04-10 DIAGNOSIS — D61.810 PANCYTOPENIA DUE TO CHEMOTHERAPY: ICD-10-CM

## 2025-04-10 DIAGNOSIS — T45.1X5A ANEMIA ASSOCIATED WITH CHEMOTHERAPY: Primary | ICD-10-CM

## 2025-04-10 DIAGNOSIS — D64.81 ANEMIA ASSOCIATED WITH CHEMOTHERAPY: Primary | ICD-10-CM

## 2025-04-10 DIAGNOSIS — D61.818 PANCYTOPENIA: Primary | ICD-10-CM

## 2025-04-10 DIAGNOSIS — D64.9 ANEMIA: ICD-10-CM

## 2025-04-10 DIAGNOSIS — D47.1 PRIMARY MYELOFIBROSIS: Primary | ICD-10-CM

## 2025-04-10 DIAGNOSIS — E55.9 VITAMIN D DEFICIENCY: ICD-10-CM

## 2025-04-10 DIAGNOSIS — I42.9 CARDIOMYOPATHY, UNSPECIFIED TYPE: ICD-10-CM

## 2025-04-10 DIAGNOSIS — D47.1 PRIMARY MYELOFIBROSIS: ICD-10-CM

## 2025-04-10 LAB
ABO + RH BLD: NORMAL
ABSOLUTE EOSINOPHIL (OHS): 0.07 K/UL
ABSOLUTE MONOCYTE (OHS): 0.33 K/UL (ref 0.3–1)
ABSOLUTE NEUTROPHIL COUNT (OHS): 2.13 K/UL (ref 1.8–7.7)
ALBUMIN SERPL BCP-MCNC: 3.5 G/DL (ref 3.5–5.2)
ALP SERPL-CCNC: 149 UNIT/L (ref 40–150)
ALT SERPL W/O P-5'-P-CCNC: 33 UNIT/L (ref 10–44)
ANION GAP (OHS): 7 MMOL/L (ref 8–16)
AST SERPL-CCNC: 26 UNIT/L (ref 11–45)
BASOPHILS # BLD AUTO: 0 K/UL
BASOPHILS NFR BLD AUTO: 0 %
BILIRUB SERPL-MCNC: 0.3 MG/DL (ref 0.1–1)
BLD PROD TYP BPU: NORMAL
BLOOD UNIT EXPIRATION DATE: NORMAL
BLOOD UNIT TYPE CODE: 5100
BUN SERPL-MCNC: 24 MG/DL (ref 6–20)
CALCIUM SERPL-MCNC: 9 MG/DL (ref 8.7–10.5)
CHLORIDE SERPL-SCNC: 108 MMOL/L (ref 95–110)
CO2 SERPL-SCNC: 24 MMOL/L (ref 23–29)
CREAT SERPL-MCNC: 1.2 MG/DL (ref 0.5–1.4)
CROSSMATCH INTERPRETATION: NORMAL
CYTOMEGALOVIRUS DNA, QUAL (OHS): NOT DETECTED
DISPENSE STATUS: NORMAL
EPSTEIN-BARR VIRUS DNA, QUAL (OHS): NORMAL
ERYTHROCYTE [DISTWIDTH] IN BLOOD BY AUTOMATED COUNT: 15.9 % (ref 11.5–14.5)
GFR SERPLBLD CREATININE-BSD FMLA CKD-EPI: >60 ML/MIN/1.73/M2
GLUCOSE SERPL-MCNC: 101 MG/DL (ref 70–110)
HCT VFR BLD AUTO: 20.5 % (ref 40–54)
HGB BLD-MCNC: 6.7 GM/DL (ref 14–18)
IMM GRANULOCYTES # BLD AUTO: 0.02 K/UL (ref 0–0.04)
IMM GRANULOCYTES NFR BLD AUTO: 0.6 % (ref 0–0.5)
LDH SERPL-CCNC: 266 U/L (ref 110–260)
LYMPHOCYTES # BLD AUTO: 0.59 K/UL (ref 1–4.8)
MAGNESIUM SERPL-MCNC: 2.2 MG/DL (ref 1.6–2.6)
MCH RBC QN AUTO: 30.3 PG (ref 27–31)
MCHC RBC AUTO-ENTMCNC: 32.7 G/DL (ref 32–36)
MCV RBC AUTO: 93 FL (ref 82–98)
NUCLEATED RBC (/100WBC) (OHS): 0 /100 WBC
PHOSPHATE SERPL-MCNC: 3.4 MG/DL (ref 2.7–4.5)
PLATELET # BLD AUTO: 40 K/UL (ref 150–450)
PLATELET BLD QL SMEAR: ABNORMAL
PMV BLD AUTO: 10.7 FL (ref 9.2–12.9)
POTASSIUM SERPL-SCNC: 4.8 MMOL/L (ref 3.5–5.1)
PROT SERPL-MCNC: 6.8 GM/DL (ref 6–8.4)
RBC # BLD AUTO: 2.21 M/UL (ref 4.6–6.2)
RELATIVE EOSINOPHIL (OHS): 2.2 %
RELATIVE LYMPHOCYTE (OHS): 18.8 % (ref 18–48)
RELATIVE MONOCYTE (OHS): 10.5 % (ref 4–15)
RELATIVE NEUTROPHIL (OHS): 67.9 % (ref 38–73)
SODIUM SERPL-SCNC: 139 MMOL/L (ref 136–145)
TACROLIMUS BLD-MCNC: 9.2 NG/ML (ref 5–15)
UNIT NUMBER: NORMAL
URATE SERPL-MCNC: 5.2 MG/DL (ref 3.4–7)
WBC # BLD AUTO: 3.14 K/UL (ref 3.9–12.7)

## 2025-04-10 PROCEDURE — P9040 RBC LEUKOREDUCED IRRADIATED: HCPCS | Performed by: INTERNAL MEDICINE

## 2025-04-10 PROCEDURE — 82040 ASSAY OF SERUM ALBUMIN: CPT

## 2025-04-10 PROCEDURE — 99999 PR PBB SHADOW E&M-EST. PATIENT-LVL IV: CPT | Mod: PBBFAC,,,

## 2025-04-10 PROCEDURE — 83615 LACTATE (LD) (LDH) ENZYME: CPT

## 2025-04-10 PROCEDURE — 25000003 PHARM REV CODE 250: Performed by: INTERNAL MEDICINE

## 2025-04-10 PROCEDURE — 36430 TRANSFUSION BLD/BLD COMPNT: CPT

## 2025-04-10 PROCEDURE — 83735 ASSAY OF MAGNESIUM: CPT

## 2025-04-10 PROCEDURE — 84100 ASSAY OF PHOSPHORUS: CPT

## 2025-04-10 PROCEDURE — 84550 ASSAY OF BLOOD/URIC ACID: CPT

## 2025-04-10 PROCEDURE — 87799 DETECT AGENT NOS DNA QUANT: CPT

## 2025-04-10 PROCEDURE — 80197 ASSAY OF TACROLIMUS: CPT

## 2025-04-10 PROCEDURE — 85025 COMPLETE CBC W/AUTO DIFF WBC: CPT

## 2025-04-10 PROCEDURE — 86920 COMPATIBILITY TEST SPIN: CPT | Performed by: INTERNAL MEDICINE

## 2025-04-10 RX ORDER — SODIUM CHLORIDE 0.9 % (FLUSH) 0.9 %
10 SYRINGE (ML) INJECTION
Status: DISCONTINUED | OUTPATIENT
Start: 2025-04-10 | End: 2025-04-10 | Stop reason: HOSPADM

## 2025-04-10 RX ORDER — SODIUM CHLORIDE 9 MG/ML
50 INJECTION, SOLUTION INTRAVENOUS CONTINUOUS
Status: DISCONTINUED | OUTPATIENT
Start: 2025-04-10 | End: 2025-04-10 | Stop reason: HOSPADM

## 2025-04-10 RX ORDER — LEVOFLOXACIN 500 MG/1
500 TABLET, FILM COATED ORAL DAILY
Qty: 30 TABLET | Refills: 11 | Status: CANCELLED | OUTPATIENT
Start: 2025-04-10

## 2025-04-10 RX ORDER — HEPARIN 100 UNIT/ML
500 SYRINGE INTRAVENOUS
Status: DISCONTINUED | OUTPATIENT
Start: 2025-04-10 | End: 2025-04-10 | Stop reason: HOSPADM

## 2025-04-10 RX ADMIN — SODIUM CHLORIDE 25 ML/HR: 9 INJECTION, SOLUTION INTRAVENOUS at 09:04

## 2025-04-10 NOTE — PLAN OF CARE
Pt ambulatory to clinic with wife for blood transfusion. Denies any sig complaints. PIV started without difficulty. CVA removed last Thursday. Blood consent UTD in chart. Tolerated transfusion well. PIV removed with catheter intact. Ambulatory from clinic in [facility].

## 2025-04-10 NOTE — PROGRESS NOTES
BMT Pharmacist Immunosuppression Note:    Reviewed patient's tacrolimus level with Dr. Grover and it is within goal range.      Current regimen: 1.5mg in AM and 1mg in PM    Plan: Continue current regimen.        Lab Results   Component Value Date    TACROLIMUS 9.2 04/10/2025    TACROLIMUS 6.3 04/07/2025    TACROLIMUS 12.4 04/03/2025       Creatinine   Date Value Ref Range Status   04/10/2025 1.2 0.5 - 1.4 mg/dL Final   04/07/2025 1.3 0.5 - 1.4 mg/dL Final   04/03/2025 0.9 0.5 - 1.4 mg/dL Final     Total Bilirubin   Date Value Ref Range Status   03/20/2025 0.4 0.1 - 1.0 mg/dL Final     Comment:     For infants and newborns, interpretation of results should be based  on gestational age, weight and in agreement with clinical  observations.    Premature Infant recommended reference ranges:  Up to 24 hours.............<8.0 mg/dL  Up to 48 hours............<12.0 mg/dL  3-5 days..................<15.0 mg/dL  6-29 days.................<15.0 mg/dL     03/17/2025 0.5 0.1 - 1.0 mg/dL Final     Comment:     For infants and newborns, interpretation of results should be based  on gestational age, weight and in agreement with clinical  observations.    Premature Infant recommended reference ranges:  Up to 24 hours.............<8.0 mg/dL  Up to 48 hours............<12.0 mg/dL  3-5 days..................<15.0 mg/dL  6-29 days.................<15.0 mg/dL     03/13/2025 0.4 0.1 - 1.0 mg/dL Final     Comment:     For infants and newborns, interpretation of results should be based  on gestational age, weight and in agreement with clinical  observations.    Premature Infant recommended reference ranges:  Up to 24 hours.............<8.0 mg/dL  Up to 48 hours............<12.0 mg/dL  3-5 days..................<15.0 mg/dL  6-29 days.................<15.0 mg/dL       Bilirubin Total   Date Value Ref Range Status   04/10/2025 0.3 0.1 - 1.0 mg/dL Final     Comment:     For infants and newborns, interpretation of results should be based   on  gestational age, weight and in agreement with clinical   observations.    Premature Infant recommended reference ranges:   0-24 hours:  <8.0 mg/dL   24-48 hours: <12.0 mg/dL   3-5 days:    <15.0 mg/dL   6-29 days:   <15.0 mg/dL   04/07/2025 0.4 0.1 - 1.0 mg/dL Final     Comment:     For infants and newborns, interpretation of results should be based   on gestational age, weight and in agreement with clinical   observations.    Premature Infant recommended reference ranges:   0-24 hours:  <8.0 mg/dL   24-48 hours: <12.0 mg/dL   3-5 days:    <15.0 mg/dL   6-29 days:   <15.0 mg/dL   04/03/2025 0.4 0.1 - 1.0 mg/dL Final     Comment:     For infants and newborns, interpretation of results should be based   on gestational age, weight and in agreement with clinical   observations.    Premature Infant recommended reference ranges:   0-24 hours:  <8.0 mg/dL   24-48 hours: <12.0 mg/dL   3-5 days:    <15.0 mg/dL   6-29 days:   <15.0 mg/dL     AST   Date Value Ref Range Status   04/10/2025 26 11 - 45 unit/L Final   04/07/2025 36 11 - 45 unit/L Final   04/03/2025 37 11 - 45 unit/L Final   03/20/2025 59 (H) 10 - 40 U/L Final   03/17/2025 44 (H) 10 - 40 U/L Final   03/13/2025 40 10 - 40 U/L Final     ALT   Date Value Ref Range Status   04/10/2025 33 10 - 44 unit/L Final   04/07/2025 49 (H) 10 - 44 unit/L Final   04/03/2025 69 (H) 10 - 44 unit/L Final   03/20/2025 85 (H) 10 - 44 U/L Final   03/17/2025 64 (H) 10 - 44 U/L Final   03/13/2025 49 (H) 10 - 44 U/L Final             Carolina Carson, Pharm.D., BCOP  Clinical Pharmacy Specialist, Bone Marrow Transplant/Cellular Therapy  [facility]  [employer]Link: [id number]

## 2025-04-11 LAB
DHEA SERPL-MCNC: NORMAL
ESTROGEN SERPL-MCNC: NORMAL PG/ML
INSULIN SERPL-ACNC: NORMAL U[IU]/ML
LAB AP CLINICAL INFORMATION: NORMAL
LAB AP GROSS DESCRIPTION: NORMAL
LAB AP PERFORMING LOCATION(S): NORMAL
LAB FLOW CYTOMETRY ANTIBODIES ANALYZED (OHS): NORMAL
LAB FLOW CYTOMETRY COMMENT (OHS): NORMAL
LAB FLOW CYTOMETRY INTERPRETATION (OHS): NORMAL
LABORATORY COMMENT REPORT: NORMAL
T3RU NFR SERPL: NORMAL %

## 2025-04-11 NOTE — PROGRESS NOTES
Section of Hematology and Stem Cell Transplantation    Post-Transplantation Follow Up Visit     4/14/25    Transplant History:   Primary oncologist: Clyde James MD  Primary oncologic diagnosis: primary myelofibrosis  Transplant date: 12/26/2024  Donor: haploidentical  Blood Type (Patient): O +  Blood Type (Donor): O +  CMV (Patient): Negative  CMV (Donor): Positive  Graft source: Peripheral blood  CD34+ cell dose: 6.04 X10^6 cells/kg  Conditioning Regimen:  Thiotepa, Busulfan, Fludarabine  GVHD prophylaxis: Post-transplant cyclophosphamide, MMF, Tacrolimus  Immediate post-transplant complications: mucositis, R axillary skin/soft tissue infection secondary to MRSA    History of Present Ilness:   Rubén Petesr) is a pleasant 54 y.o.male with primary myelofibrosis who is status post haploidentical stem cell transplantation conditioned with  Bu/Flu/TT  who is currently day +109 who presents for post-transplant follow up.    He is not doing great today. Last night he was having bad headaches with nausea and took his temperature which was 100.2 multiple times. He has had some rhinorrhea also. Thursday afternoon he began having bad nausea causing him to take zofran and now compazine. He has been having two episodes of diarrhea a day after eating. No skin changes.      PAST MEDICAL HISTORY:   Past Medical History:   Diagnosis Date    Abdominal pain 01/02/2025    Allergic contact dermatitis due to adhesives 12/31/2024    Cancer     Flatulence 01/03/2025    Headache 12/27/2024    Hyperlipidemia     Mucositis 01/02/2025    Other constipation 12/19/2024    Thrombocytosis 12/18/2024    Transaminitis 12/27/2024       PAST SURGICAL HISTORY:   Past Surgical History:   Procedure Laterality Date    BONE MARROW BIOPSY N/A 11/27/2024    Procedure: Biopsy-bone marrow;  Surgeon: Clyde James MD;  Location: [facility] ([address] [place] [address]);  Service: Oncology;  Laterality: N/A;  11/20-pre call complete-tb    BONE MARROW BIOPSY  N/A 4/9/2025    Procedure: Biopsy-bone marrow;  Surgeon: Clyde James MD;  Location: [facility] ENDO (4TH FLR);  Service: Oncology;  Laterality: N/A;  4/1 precall complete. EW    COLONOSCOPY N/A 8/9/2023    Procedure: COLONOSCOPY;  Surgeon: Will Ardon MD;  Location: [facility] ENDO (4TH FLR);  Service: Endoscopy;  Laterality: N/A;  Suprep Instructions sent via portal / Authorizing:     Bebeto Arora MD in [facility] FAMILY MED/ INTERNAL MED/ PEDS  Referral:          [id number]    EYE SURGERY      FLEXIBLE SIGMOIDOSCOPY N/A 7/23/2024    Procedure: SIGMOIDOSCOPY, FLEXIBLE;  Surgeon: Nirali Vicente MD;  Location: [facility] ENDO;  Service: Endoscopy;  Laterality: N/A;    INSERTION OF LONGORIA CATHETER Right 12/18/2024    Procedure: INSERTION, CATHETER, CENTRAL VENOUS, LONGORIA TRIPLE LUMEN, Bard 12.5 fr Longoria Cath model [id number];  Surgeon: Willie Hadley MD;  Location: [facility] OR 2ND FLR;  Service: General;  Laterality: Right;       PAST SOCIAL HISTORY:  Social History     Tobacco Use    Smoking status: Former     Types: Cigars, Cigarettes    Smokeless tobacco: Never   Substance Use Topics    Alcohol use: Not Currently     Comment: socially    Drug use: Never       FAMILY HISTORY:  Family History   Problem Relation Name Age of Onset    Arthritis Mother      COPD Father      Testicular cancer Maternal Cousin Peter 17        Chemotherapy    Breast cancer Maternal Cousin  51        Chemotherapy    Heart disease Maternal Grandfather      Stroke Maternal Grandmother         CURRENT MEDICATIONS:   Current Outpatient Medications   Medication Sig    acyclovir (ZOVIRAX) 800 MG Tab Take 1 tablet (800 mg total) by mouth 2 (two) times daily.    atovaquone (MEPRON) 750 mg/5 mL Susp oral liquid Take 10 mLs (1,500 mg total) by mouth 2 (two) times daily.    eltrombopag olamine (PROMACTA) 50 MG Tab Take 1 tablet (50 mg total) by mouth once daily.    ergocalciferol (ERGOCALCIFEROL) 50,000 unit Cap Take 1 capsule (50,000 Units total) by mouth every 7  days.    letermovir (PREVYMIS) 480 mg Tab Take 1 tablet (480 mg total) by mouth once daily.    magnesium oxide (MAG-OX) 400 mg (241.3 mg magnesium) tablet Take 2 tablets in the morning, 1 tablet in the afternoon, and 2 tablets in the evening.    ondansetron (ZOFRAN) 8 MG tablet Take 1 tablet (8 mg total) by mouth every 8 (eight) hours as needed for Nausea.    oxyCODONE (ROXICODONE) 5 MG immediate release tablet Take 1 tablet (5 mg total) by mouth every 6 (six) hours as needed for Pain.    prochlorperazine (COMPAZINE) 10 MG tablet Take 1 tablet (10 mg total) by mouth 4 (four) times daily as needed for Nausea.    tacrolimus (PROGRAF) 0.5 MG Cap Take 3 capsules (1.5 mg total) by mouth every morning AND 4 capsules (2 mg total) every evening.    traMADoL (ULTRAM) 50 mg tablet Take 1 tablet (50 mg total) by mouth every 6 (six) hours as needed for Pain.    triamcinolone acetonide 0.1% (KENALOG) 0.1 % cream Apply topically 2 (two) times daily as needed (rash on arms, chest, back, trunk, and legs (do not apply to face)).    voriconazole (VFEND) 200 MG Tab Take 1 tablet (200 mg total) by mouth 2 (two) times daily.     No current facility-administered medications for this visit.       ALLERGIES:   Review of patient's allergies indicates:   Allergen Reactions    Sulfamethoxazole-trimethoprim Hives       GVHD Review of Systems:     Pertinent positives and negatives included in the HPI. Otherwise a 14 point review of systems is negative. GVHD review of systems recorded in BMT flowsheet.     Physical Exam:     Vitals:    04/14/25 0952   BP: 134/80   Pulse: 94   Temp: 97.9 °F (36.6 °C)       General: Appears well, NAD  HEENT: MMM, no OP lesions  Neck: Supple, no LAD  Pulmonary: CTAB, no increased work of breathing, no W/R/C  Cardiovascular: S1S2 normal, RRR, no M/R/G  Abdominal: Soft, NT, ND, BS+, no HSM  Extremities: No C/C/E  Neurological: AAOx4, grossly normal, no focal deficits  Dermatologic: No appreciable rashes or lesions      ECOG Performance Status: (foot note - ECOG PS provided by [employer] Cooperative [employer]cology Group) 1 - Symptomatic but completely ambulatory    Karnofsky Performance Score:  70%- Cares for Self: Unable to Carry on Normal Activity or Active Work    Labs:   Lab Results   Component Value Date    WBC 3.14 (L) 04/10/2025    RBC 2.21 (L) 04/10/2025    HGB 6.7 (L) 04/10/2025    HCT 20.5 (L) 04/10/2025    MCV 93 04/10/2025    MCH 30.3 04/10/2025    MCHC 32.7 04/10/2025    RDW 15.9 (H) 04/10/2025    PLT 40 (L) 04/10/2025    MPV 10.7 04/10/2025    GRAN 3.6 03/27/2025    LYMPH 18.8 04/10/2025    LYMPH 0.59 (L) 04/10/2025    MONO 10.5 04/10/2025    MONO 0.33 04/10/2025    EOS 2.2 04/10/2025    EOS 0.07 04/10/2025    BASO 0.02 03/20/2025    EOSINOPHIL 0.6 03/20/2025    BASOPHIL 0.0 04/10/2025    BASOPHIL 0.00 04/10/2025       Sodium   Date Value Ref Range Status   04/14/2025 137 136 - 145 mmol/L Final   03/20/2025 138 136 - 145 mmol/L Final     Potassium   Date Value Ref Range Status   04/14/2025 4.8 3.5 - 5.1 mmol/L Final   03/20/2025 4.2 3.5 - 5.1 mmol/L Final     Chloride   Date Value Ref Range Status   04/14/2025 106 95 - 110 mmol/L Final   03/20/2025 108 95 - 110 mmol/L Final     CO2   Date Value Ref Range Status   04/14/2025 21 (L) 23 - 29 mmol/L Final   03/20/2025 21 (L) 23 - 29 mmol/L Final     Glucose   Date Value Ref Range Status   03/20/2025 101 70 - 110 mg/dL Final     BUN   Date Value Ref Range Status   04/14/2025 21 (H) 6 - 20 mg/dL Final     Creatinine   Date Value Ref Range Status   04/14/2025 1.1 0.5 - 1.4 mg/dL Final     Calcium   Date Value Ref Range Status   04/14/2025 9.0 8.7 - 10.5 mg/dL Final   03/20/2025 9.2 8.7 - 10.5 mg/dL Final     Total Protein   Date Value Ref Range Status   03/20/2025 6.6 6.0 - 8.4 g/dL Final     Albumin   Date Value Ref Range Status   04/14/2025 3.5 3.5 - 5.2 g/dL Final   03/20/2025 3.7 3.5 - 5.2 g/dL Final     Total Bilirubin   Date Value Ref Range Status   03/20/2025 0.4 0.1 - 1.0  mg/dL Final     Comment:     For infants and newborns, interpretation of results should be based  on gestational age, weight and in agreement with clinical  observations.    Premature Infant recommended reference ranges:  Up to 24 hours.............<8.0 mg/dL  Up to 48 hours............<12.0 mg/dL  3-5 days..................<15.0 mg/dL  6-29 days.................<15.0 mg/dL       Bilirubin Total   Date Value Ref Range Status   04/14/2025 0.4 0.1 - 1.0 mg/dL Final     Comment:     For infants and newborns, interpretation of results should be based   on gestational age, weight and in agreement with clinical   observations.    Premature Infant recommended reference ranges:   0-24 hours:  <8.0 mg/dL   24-48 hours: <12.0 mg/dL   3-5 days:    <15.0 mg/dL   6-29 days:   <15.0 mg/dL     Alkaline Phosphatase   Date Value Ref Range Status   03/20/2025 122 40 - 150 U/L Final     ALP   Date Value Ref Range Status   04/14/2025 169 (H) 40 - 150 unit/L Final     AST   Date Value Ref Range Status   04/14/2025 40 11 - 45 unit/L Final   03/20/2025 59 (H) 10 - 40 U/L Final     ALT   Date Value Ref Range Status   04/14/2025 39 10 - 44 unit/L Final   03/20/2025 85 (H) 10 - 44 U/L Final     Anion Gap   Date Value Ref Range Status   04/14/2025 10 8 - 16 mmol/L Final     eGFR if    Date Value Ref Range Status   01/22/2022 >60.0 >60 mL/min/1.73 m^2 Final     eGFR if non    Date Value Ref Range Status   01/22/2022 >60.0 >60 mL/min/1.73 m^2 Final     Comment:     Calculation used to obtain the estimated glomerular filtration  rate (eGFR) is the CKD-EPI equation.          Imaging:   All pertinent studies reviewed and interpreted by me       Assessment and Plan:   Rubén Peters) is a pleasant 54 y.o.male with primary myelofibrosis s/p haplo SCT who presents for post-transplant follow up.    Primary myelofibrosis: Status post treatment with ruxolitinib, followed by alloSCT. Day +30 BMBx results  below.    Status post allogeneic stem cell transplantation: As noted above, status post haploidentical stem cell transplantation conditioned with  Bu/Flu/TT . Currently day +109.  Engrafted on day +22.  Day +30 BMBx on 1/2/25: No definitive evidence of residual JAK2 [id number] primary myelofibrosis. NGS without evidence of pathogenic mutations. Chimerism studies with 100% donor CD33, CD3 insufficient for analysis.   Plan for central line removal -  4/3/25  Day 100 bone marrow biopsy 4/9/25:Markedly hypocellular marrow (<5%-15%) with trilineage hematopoiesis. All other labs still pending.     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 MMF has now been discontinued. 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 Restarted on budesonide 3mg TID (4/14/25)  Current tacro dose: 1.5mg qaM and 1 mg qPM   Last tacro level: 8.3  Adjustments: none    Immunosuppression: Prevention with voriconazole,  acyclovir. CMV prophylaxis with letermovir. PJP prophyalxis dapsone. Continue weekly monitoring of CMV and EBV.  Last CMV: negative (3/31/2025), last EBV: negative (3/31/2025).  Active infections: none    Pancytopenia: Due to underlying disease and chemotherapy. Transfuse for Hgb <7 g/dL and platelets <10k.  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 Eltrombopag 50mg daily.   Anemia - thought to be from conditioning chemotherapy. Swapped dapsone for atovaquone on 4/3/25. Will think about starting weekly retacrit shots. Nutritional and viral workup completed. Some results still pending    Transaminitis: Levels normalized now. Will continue to monitor.    Hypertension:  Was newly hypertensive in hospital following SCT at which time he was started on amlodipine. Discontinued amlodipine after hospitalization due to hypotension. Monitor for now but restart antihypertensives if this worsens.     Cardiomyopathy: Echo 2/26/25 obtained due to tachycardia revealed  reduced LVEF 45-50%. No signs/symptoms of HFrEF. Repeat echo in 3 months (5/2025).    Vitamin D deficiency: His primary care is giving him 50,000 units once a week.     Hypercholesterolemia: He is following with primary care. Could be elevated due to tacrolimus.     Rhinorrhea: Patient with temp 100.2 last night, runny nose, and headaches. Respiratory infection paanel completed and was negative. Will continue to monitor    Tension Headache: Patient with more frequent headaches across his forehead. Unsure of cause but could be stress or dehydration. Encourage more frequent hydration. Tramadol refilled.     Follow up: Twice weekly follow up with labs.    Orders Placed:      No orders of the defined types were placed in this encounter.     35 minutes of total time spent on the encounter, which includes face to face time and non-face to face time preparing to see the patient (eg, review of tests), Obtaining and/or reviewing separately obtained history, Documenting clinical information in the electronic or other health record, Independently interpreting results (not separately reported) and communicating results to the patient/family/caregiver, or Care coordination with other physicians involved in his care (not separately reported).     Opal Alonzo PA-C  Malignant Hematology, Stem Cell Transplant, and Cellular Therapy  [facility]  [facility] [place]

## 2025-04-12 LAB
M DNA/RNA EXTRACT AND HOLD RESULT: NORMAL
M DNA/RNA EXTRACTION: NORMAL
SPECIMEN SOURCE: NORMAL

## 2025-04-14 ENCOUNTER — RESULTS FOLLOW-UP (OUTPATIENT)
Dept: FAMILY MEDICINE | Facility: CLINIC | Age: 55
End: 2025-04-14

## 2025-04-14 ENCOUNTER — OFFICE VISIT (OUTPATIENT)
Dept: HEMATOLOGY/ONCOLOGY | Facility: CLINIC | Age: 55
End: 2025-04-14
Payer: COMMERCIAL

## 2025-04-14 ENCOUNTER — LAB VISIT (OUTPATIENT)
Dept: LAB | Facility: HOSPITAL | Age: 55
End: 2025-04-14
Attending: INTERNAL MEDICINE
Payer: COMMERCIAL

## 2025-04-14 ENCOUNTER — NURSE TRIAGE (OUTPATIENT)
Dept: ADMINISTRATIVE | Facility: CLINIC | Age: 55
End: 2025-04-14
Payer: COMMERCIAL

## 2025-04-14 VITALS
TEMPERATURE: 98 F | SYSTOLIC BLOOD PRESSURE: 134 MMHG | WEIGHT: 173.31 LBS | BODY MASS INDEX: 24.26 KG/M2 | HEART RATE: 94 BPM | DIASTOLIC BLOOD PRESSURE: 80 MMHG | OXYGEN SATURATION: 100 % | HEIGHT: 71 IN

## 2025-04-14 DIAGNOSIS — I42.9 CARDIOMYOPATHY, UNSPECIFIED TYPE: ICD-10-CM

## 2025-04-14 DIAGNOSIS — Z76.82 STEM CELL TRANSPLANT CANDIDATE: Primary | ICD-10-CM

## 2025-04-14 DIAGNOSIS — D89.810 ACUTE GVHD: ICD-10-CM

## 2025-04-14 DIAGNOSIS — D61.818 PANCYTOPENIA: ICD-10-CM

## 2025-04-14 DIAGNOSIS — D61.810 PANCYTOPENIA DUE TO CHEMOTHERAPY: ICD-10-CM

## 2025-04-14 DIAGNOSIS — I10 HYPERTENSION, UNSPECIFIED TYPE: ICD-10-CM

## 2025-04-14 DIAGNOSIS — G44.209 TENSION HEADACHE: ICD-10-CM

## 2025-04-14 DIAGNOSIS — G89.29 CHRONIC PAIN OF RIGHT KNEE: ICD-10-CM

## 2025-04-14 DIAGNOSIS — E55.9 VITAMIN D DEFICIENCY: ICD-10-CM

## 2025-04-14 DIAGNOSIS — D47.1 PRIMARY MYELOFIBROSIS: ICD-10-CM

## 2025-04-14 DIAGNOSIS — E78.00 HYPERCHOLESTEROLEMIA: ICD-10-CM

## 2025-04-14 DIAGNOSIS — Z76.82 STEM CELL TRANSPLANT CANDIDATE: ICD-10-CM

## 2025-04-14 DIAGNOSIS — Z94.84 HISTORY OF ALLOGENEIC STEM CELL TRANSPLANT: ICD-10-CM

## 2025-04-14 DIAGNOSIS — M25.561 CHRONIC PAIN OF RIGHT KNEE: ICD-10-CM

## 2025-04-14 DIAGNOSIS — D64.9 ANEMIA: ICD-10-CM

## 2025-04-14 DIAGNOSIS — J34.89 RHINORRHEA: ICD-10-CM

## 2025-04-14 DIAGNOSIS — E78.1 HYPERTRIGLYCERIDEMIA: ICD-10-CM

## 2025-04-14 DIAGNOSIS — D84.81 IMMUNODEFICIENCY DUE TO CONDITIONS CLASSIFIED ELSEWHERE: ICD-10-CM

## 2025-04-14 LAB
ABSOLUTE EOSINOPHIL (OHS): 0.01 K/UL
ABSOLUTE MONOCYTE (OHS): 0.37 K/UL (ref 0.3–1)
ABSOLUTE NEUTROPHIL COUNT (OHS): 2.35 K/UL (ref 1.8–7.7)
ADENOVIRUS: NOT DETECTED
ALBUMIN SERPL BCP-MCNC: 3.5 G/DL (ref 3.5–5.2)
ALP SERPL-CCNC: 169 UNIT/L (ref 40–150)
ALT SERPL W/O P-5'-P-CCNC: 39 UNIT/L (ref 10–44)
ANION GAP (OHS): 10 MMOL/L (ref 8–16)
AST SERPL-CCNC: 40 UNIT/L (ref 11–45)
BASOPHILS # BLD AUTO: 0.01 K/UL
BASOPHILS NFR BLD AUTO: 0.3 %
BILIRUB SERPL-MCNC: 0.4 MG/DL (ref 0.1–1)
BORDETELLA PARAPERTUSSIS (IS1001): NOT DETECTED
BORDETELLA PERTUSSIS (PTXP): NOT DETECTED
BUN SERPL-MCNC: 21 MG/DL (ref 6–20)
CALCIUM SERPL-MCNC: 9 MG/DL (ref 8.7–10.5)
CHLAMYDIA PNEUMONIAE: NOT DETECTED
CHLORIDE SERPL-SCNC: 106 MMOL/L (ref 95–110)
CHOLEST SERPL-MCNC: 204 MG/DL (ref 120–199)
CHOLEST/HDLC SERPL: 5.2 {RATIO} (ref 2–5)
CO2 SERPL-SCNC: 21 MMOL/L (ref 23–29)
CORONAVIRUS 229E, COMMON COLD VIRUS: NOT DETECTED
CORONAVIRUS HKU1, COMMON COLD VIRUS: NOT DETECTED
CORONAVIRUS NL63, COMMON COLD VIRUS: NOT DETECTED
CORONAVIRUS OC43, COMMON COLD VIRUS: NOT DETECTED
CREAT SERPL-MCNC: 1.1 MG/DL (ref 0.5–1.4)
CYTOMEGALOVIRUS DNA, QUAL (OHS): NOT DETECTED
EPSTEIN-BARR VIRUS DNA, QUAL (OHS): NORMAL
ERYTHROCYTE [DISTWIDTH] IN BLOOD BY AUTOMATED COUNT: 15.3 % (ref 11.5–14.5)
FERRITIN SERPL-MCNC: 2300 NG/ML (ref 20–300)
FLUBV RNA NPH QL NAA+NON-PROBE: NOT DETECTED
FOLATE SERPL-MCNC: 6.6 NG/ML (ref 4–24)
GFR SERPLBLD CREATININE-BSD FMLA CKD-EPI: >60 ML/MIN/1.73/M2
GLUCOSE SERPL-MCNC: 122 MG/DL (ref 70–110)
HAPTOGLOB SERPL-MCNC: 76 MG/DL (ref 30–250)
HCT VFR BLD AUTO: 24.4 % (ref 40–54)
HDLC SERPL-MCNC: 39 MG/DL (ref 40–75)
HDLC SERPL: 19.1 % (ref 20–50)
HGB BLD-MCNC: 8.1 GM/DL (ref 14–18)
HPIV1 RNA NPH QL NAA+NON-PROBE: NOT DETECTED
HPIV2 RNA NPH QL NAA+NON-PROBE: NOT DETECTED
HPIV3 RNA NPH QL NAA+NON-PROBE: NOT DETECTED
HPIV4 RNA NPH QL NAA+NON-PROBE: NOT DETECTED
HUMAN METAPNEUMOVIRUS: NOT DETECTED
IMM GRANULOCYTES # BLD AUTO: 0.07 K/UL (ref 0–0.04)
IMM GRANULOCYTES NFR BLD AUTO: 2.3 % (ref 0–0.5)
INDIRECT COOMBS: NORMAL
INFLUENZA A: NOT DETECTED
IRON SATN MFR SERPL: 72 % (ref 20–50)
IRON SERPL-MCNC: 215 UG/DL (ref 45–160)
LDH SERPL-CCNC: 293 U/L (ref 110–260)
LDLC SERPL CALC-MCNC: 102.6 MG/DL (ref 63–159)
LYMPHOCYTES # BLD AUTO: 0.24 K/UL (ref 1–4.8)
MAGNESIUM SERPL-MCNC: 1.9 MG/DL (ref 1.6–2.6)
MCH RBC QN AUTO: 30.3 PG (ref 27–31)
MCHC RBC AUTO-ENTMCNC: 33.2 G/DL (ref 32–36)
MCV RBC AUTO: 91 FL (ref 82–98)
MYCOPLASMA PNEUMONIAE: NOT DETECTED
NONHDLC SERPL-MCNC: 165 MG/DL
NUCLEATED RBC (/100WBC) (OHS): 0 /100 WBC
PHOSPHATE SERPL-MCNC: 3.1 MG/DL (ref 2.7–4.5)
PLATELET # BLD AUTO: 41 K/UL (ref 150–450)
PLATELET BLD QL SMEAR: ABNORMAL
PMV BLD AUTO: 11.3 FL (ref 9.2–12.9)
POTASSIUM SERPL-SCNC: 4.8 MMOL/L (ref 3.5–5.1)
PROT SERPL-MCNC: 7 GM/DL (ref 6–8.4)
RBC # BLD AUTO: 2.67 M/UL (ref 4.6–6.2)
RELATIVE EOSINOPHIL (OHS): 0.3 %
RELATIVE LYMPHOCYTE (OHS): 7.9 % (ref 18–48)
RELATIVE MONOCYTE (OHS): 12.1 % (ref 4–15)
RELATIVE NEUTROPHIL (OHS): 77.1 % (ref 38–73)
RESPIRATORY INFECTION PANEL SOURCE: NORMAL
RETICS/RBC NFR AUTO: 0.3 % (ref 0.4–2)
RH BLD: NORMAL
RSV RNA NPH QL NAA+NON-PROBE: NOT DETECTED
RV+EV RNA NPH QL NAA+NON-PROBE: NOT DETECTED
SARS-COV-2 RNA RESP QL NAA+PROBE: NOT DETECTED
SODIUM SERPL-SCNC: 137 MMOL/L (ref 136–145)
SPECIMEN OUTDATE: NORMAL
TACROLIMUS BLD-MCNC: 8.3 NG/ML (ref 5–15)
TIBC SERPL-MCNC: 299 UG/DL (ref 250–450)
TRANSFERRIN SERPL-MCNC: 202 MG/DL (ref 200–375)
TRIGL SERPL-MCNC: 312 MG/DL (ref 30–150)
URATE SERPL-MCNC: 5.3 MG/DL (ref 3.4–7)
VIT B12 SERPL-MCNC: 321 PG/ML (ref 210–950)
WBC # BLD AUTO: 3.05 K/UL (ref 3.9–12.7)

## 2025-04-14 PROCEDURE — 99999 PR PBB SHADOW E&M-EST. PATIENT-LVL IV: CPT | Mod: PBBFAC,,,

## 2025-04-14 PROCEDURE — 3075F SYST BP GE 130 - 139MM HG: CPT | Mod: CPTII,S$GLB,,

## 2025-04-14 PROCEDURE — 85045 AUTOMATED RETICULOCYTE COUNT: CPT

## 2025-04-14 PROCEDURE — 3079F DIAST BP 80-89 MM HG: CPT | Mod: CPTII,S$GLB,,

## 2025-04-14 PROCEDURE — 84450 TRANSFERASE (AST) (SGOT): CPT

## 2025-04-14 PROCEDURE — 99215 OFFICE O/P EST HI 40 MIN: CPT | Mod: S$GLB,,,

## 2025-04-14 PROCEDURE — 83615 LACTATE (LD) (LDH) ENZYME: CPT

## 2025-04-14 PROCEDURE — 1159F MED LIST DOCD IN RCRD: CPT | Mod: CPTII,S$GLB,,

## 2025-04-14 PROCEDURE — 86850 RBC ANTIBODY SCREEN: CPT | Performed by: INTERNAL MEDICINE

## 2025-04-14 PROCEDURE — 84550 ASSAY OF BLOOD/URIC ACID: CPT

## 2025-04-14 PROCEDURE — 36415 COLL VENOUS BLD VENIPUNCTURE: CPT

## 2025-04-14 PROCEDURE — 3008F BODY MASS INDEX DOCD: CPT | Mod: CPTII,S$GLB,,

## 2025-04-14 PROCEDURE — 85025 COMPLETE CBC W/AUTO DIFF WBC: CPT

## 2025-04-14 PROCEDURE — 87532 HHV-6 DNA AMP PROBE: CPT

## 2025-04-14 PROCEDURE — 83010 ASSAY OF HAPTOGLOBIN QUANT: CPT

## 2025-04-14 PROCEDURE — 87798 DETECT AGENT NOS DNA AMP: CPT

## 2025-04-14 PROCEDURE — 83540 ASSAY OF IRON: CPT

## 2025-04-14 PROCEDURE — 82728 ASSAY OF FERRITIN: CPT

## 2025-04-14 PROCEDURE — 82746 ASSAY OF FOLIC ACID SERUM: CPT

## 2025-04-14 PROCEDURE — 83735 ASSAY OF MAGNESIUM: CPT

## 2025-04-14 PROCEDURE — 87799 DETECT AGENT NOS DNA QUANT: CPT

## 2025-04-14 PROCEDURE — 82607 VITAMIN B-12: CPT

## 2025-04-14 PROCEDURE — 0202U NFCT DS 22 TRGT SARS-COV-2: CPT

## 2025-04-14 PROCEDURE — 87799 DETECT AGENT NOS DNA QUANT: CPT | Mod: 59

## 2025-04-14 PROCEDURE — 80197 ASSAY OF TACROLIMUS: CPT

## 2025-04-14 PROCEDURE — 80061 LIPID PANEL: CPT

## 2025-04-14 PROCEDURE — 3044F HG A1C LEVEL LT 7.0%: CPT | Mod: CPTII,S$GLB,,

## 2025-04-14 PROCEDURE — 82525 ASSAY OF COPPER: CPT

## 2025-04-14 PROCEDURE — 84100 ASSAY OF PHOSPHORUS: CPT

## 2025-04-14 RX ORDER — TRAMADOL HYDROCHLORIDE 50 MG/1
50 TABLET ORAL EVERY 6 HOURS PRN
Qty: 28 TABLET | Refills: 0 | Status: SHIPPED | OUTPATIENT
Start: 2025-04-14

## 2025-04-14 RX ORDER — BUDESONIDE 3 MG/1
3 CAPSULE, COATED PELLETS ORAL 3 TIMES DAILY
Qty: 90 CAPSULE | Refills: 2 | Status: SHIPPED | OUTPATIENT
Start: 2025-04-14

## 2025-04-14 NOTE — TELEPHONE ENCOUNTER
Wife calling, states monitoring pt temp x1 hour, c/o headache, nausea. Current temp 100.2, repeat 100., oral. Pt received blood on Thursday. Currently takes Prograf. States chemo last in December. Per protocol, see hcp within 4 hours. Wife verbalizes understanding and advised to call back for any further questions or concerns.   Reason for Disposition   [1] Fever > 100 F (37.8 C) AND [2] diabetes mellitus or weak immune system (e.g., HIV positive, cancer chemo, splenectomy, organ transplant, chronic steroids)    Additional Information   Fever in a cancer patient who is currently (or recently) receiving chemotherapy or radiation therapy, or cancer patient who has metastatic or end-stage cancer and is receiving palliative care   Negative: Shock suspected (e.g., cold/pale/clammy skin, too weak to stand, low BP, rapid pulse)   Negative: Difficult to awaken or acting confused (e.g., disoriented, slurred speech)   Negative: Bluish (or gray) lips or face now   Negative: New-onset rash with many purple (or blood-colored) spots or dots   Negative: Sounds like a life-threatening emergency to the triager   Negative: Fever > 103 F (39.4 C)   Negative: [1] Neutropenia known or suspected (e.g., recent cancer chemotherapy, stem cell transplant) AND [2] fever > 100.4 F (38.0 C)   Negative: [1] Neutropenia known or suspected AND [2] signs or symptoms of suspected infection are present   Negative: [1] Headache AND [2] stiff neck (can't touch chin to chest)   Negative: Difficulty breathing   Negative: [1] Drinking very little AND [2] dehydration suspected (e.g., no urine > 12 hours, very dry mouth, very lightheaded)   Negative: Patient sounds very sick or weak to the triager  (Exception: Mild weakness AND hasn't taken fever medicine.)   Negative: Severe chills (i.e., feeling extremely cold WITH shaking chills)   Negative: [1] Fever > 101 F (38.3 C) AND [2] age > 60 years   Negative: [1] Fever > 101 F (38.3 C) AND [2] co-morbidity risk  factor for serious infection (e.g., COPD, heart failure, liver failure, renal failure with dialysis)   Negative: [1] Fever > 100 F (37.8 C) AND [2] bedridden (e.g., CVA, chronic illness, recovering from surgery)    Protocols used: Fever-A-AH, Cancer - Fever-A-AH

## 2025-04-14 NOTE — PROGRESS NOTES
BMT Pharmacist Immunosuppression Note:    Reviewed patient's tacrolimus level with Dr. Grover and it is within goal range.      Current regimen: 1.5 mg qAM + 1 mg qPM    Plan: Continue current regimen.        Lab Results   Component Value Date    TACROLIMUS 8.3 04/14/2025    TACROLIMUS 9.2 04/10/2025    TACROLIMUS 6.3 04/07/2025       Creatinine   Date Value Ref Range Status   04/14/2025 1.1 0.5 - 1.4 mg/dL Final   04/10/2025 1.2 0.5 - 1.4 mg/dL Final   04/07/2025 1.3 0.5 - 1.4 mg/dL Final     Total Bilirubin   Date Value Ref Range Status   03/20/2025 0.4 0.1 - 1.0 mg/dL Final     Comment:     For infants and newborns, interpretation of results should be based  on gestational age, weight and in agreement with clinical  observations.    Premature Infant recommended reference ranges:  Up to 24 hours.............<8.0 mg/dL  Up to 48 hours............<12.0 mg/dL  3-5 days..................<15.0 mg/dL  6-29 days.................<15.0 mg/dL     03/17/2025 0.5 0.1 - 1.0 mg/dL Final     Comment:     For infants and newborns, interpretation of results should be based  on gestational age, weight and in agreement with clinical  observations.    Premature Infant recommended reference ranges:  Up to 24 hours.............<8.0 mg/dL  Up to 48 hours............<12.0 mg/dL  3-5 days..................<15.0 mg/dL  6-29 days.................<15.0 mg/dL     03/13/2025 0.4 0.1 - 1.0 mg/dL Final     Comment:     For infants and newborns, interpretation of results should be based  on gestational age, weight and in agreement with clinical  observations.    Premature Infant recommended reference ranges:  Up to 24 hours.............<8.0 mg/dL  Up to 48 hours............<12.0 mg/dL  3-5 days..................<15.0 mg/dL  6-29 days.................<15.0 mg/dL       Bilirubin Total   Date Value Ref Range Status   04/14/2025 0.4 0.1 - 1.0 mg/dL Final     Comment:     For infants and newborns, interpretation of results should be based   on  gestational age, weight and in agreement with clinical   observations.    Premature Infant recommended reference ranges:   0-24 hours:  <8.0 mg/dL   24-48 hours: <12.0 mg/dL   3-5 days:    <15.0 mg/dL   6-29 days:   <15.0 mg/dL   04/10/2025 0.3 0.1 - 1.0 mg/dL Final     Comment:     For infants and newborns, interpretation of results should be based   on gestational age, weight and in agreement with clinical   observations.    Premature Infant recommended reference ranges:   0-24 hours:  <8.0 mg/dL   24-48 hours: <12.0 mg/dL   3-5 days:    <15.0 mg/dL   6-29 days:   <15.0 mg/dL   04/07/2025 0.4 0.1 - 1.0 mg/dL Final     Comment:     For infants and newborns, interpretation of results should be based   on gestational age, weight and in agreement with clinical   observations.    Premature Infant recommended reference ranges:   0-24 hours:  <8.0 mg/dL   24-48 hours: <12.0 mg/dL   3-5 days:    <15.0 mg/dL   6-29 days:   <15.0 mg/dL     AST   Date Value Ref Range Status   04/14/2025 40 11 - 45 unit/L Final   04/10/2025 26 11 - 45 unit/L Final   04/07/2025 36 11 - 45 unit/L Final   03/20/2025 59 (H) 10 - 40 U/L Final   03/17/2025 44 (H) 10 - 40 U/L Final   03/13/2025 40 10 - 40 U/L Final     ALT   Date Value Ref Range Status   04/14/2025 39 10 - 44 unit/L Final   04/10/2025 33 10 - 44 unit/L Final   04/07/2025 49 (H) 10 - 44 unit/L Final   03/20/2025 85 (H) 10 - 44 U/L Final   03/17/2025 64 (H) 10 - 44 U/L Final   03/13/2025 49 (H) 10 - 44 U/L Final             Tari Lu, PharmD  PGY2 Oncology Pharmacy Resident

## 2025-04-15 DIAGNOSIS — Z94.84 HISTORY OF ALLOGENEIC STEM CELL TRANSPLANT: ICD-10-CM

## 2025-04-15 LAB — Lab: NORMAL IU/ML

## 2025-04-15 RX ORDER — TACROLIMUS 0.5 MG/1
CAPSULE ORAL
Qty: 150 CAPSULE | Refills: 11 | Status: SHIPPED | OUTPATIENT
Start: 2025-04-15 | End: 2026-04-15

## 2025-04-16 ENCOUNTER — DOCUMENTATION ONLY (OUTPATIENT)
Dept: HEMATOLOGY/ONCOLOGY | Facility: CLINIC | Age: 55
End: 2025-04-16
Payer: COMMERCIAL

## 2025-04-16 DIAGNOSIS — D84.821 IMMUNODEFICIENCY DUE TO DRUG THERAPY: ICD-10-CM

## 2025-04-16 DIAGNOSIS — Z79.899 IMMUNODEFICIENCY DUE TO DRUG THERAPY: ICD-10-CM

## 2025-04-16 LAB
B19V DNA # SPEC NAA+PROBE: ABNORMAL COPIES/ML
PARVOVIRUS B19 DNA, QUANT: >8 LOG CPS/ML
SPECIMEN SOURCE: NO GROWTH

## 2025-04-16 RX ORDER — ATOVAQUONE 750 MG/5ML
1500 SUSPENSION ORAL DAILY
Qty: 300 ML | Refills: 3 | Status: SHIPPED | OUTPATIENT
Start: 2025-04-16

## 2025-04-16 NOTE — PROGRESS NOTES
SW received email from patient requesting assistance with a physician form that his  long term care insurance company is requesting. Form was sent to the Disability desk by [employer].    SW returned email to explain progress and ask if there was a specific date in which paperwork needed to be returned.     Paperwork is due 4/25/25.

## 2025-04-17 LAB
HHV6 DNA # SERPL NAA+PROBE: NORMAL COPIES/ML
HHV6 DNA # SERPL NAA+PROBE: NORMAL COPIES/ML
W COPPER: 1459 UG/L

## 2025-04-21 ENCOUNTER — RESULTS FOLLOW-UP (OUTPATIENT)
Dept: HEMATOLOGY/ONCOLOGY | Facility: CLINIC | Age: 55
End: 2025-04-21

## 2025-04-21 ENCOUNTER — OFFICE VISIT (OUTPATIENT)
Dept: HEMATOLOGY/ONCOLOGY | Facility: CLINIC | Age: 55
End: 2025-04-21
Payer: COMMERCIAL

## 2025-04-21 ENCOUNTER — LAB VISIT (OUTPATIENT)
Dept: LAB | Facility: HOSPITAL | Age: 55
End: 2025-04-21
Attending: INTERNAL MEDICINE
Payer: COMMERCIAL

## 2025-04-21 ENCOUNTER — INFUSION (OUTPATIENT)
Dept: INFUSION THERAPY | Facility: HOSPITAL | Age: 55
End: 2025-04-21
Payer: COMMERCIAL

## 2025-04-21 VITALS
TEMPERATURE: 98 F | BODY MASS INDEX: 23.89 KG/M2 | SYSTOLIC BLOOD PRESSURE: 147 MMHG | DIASTOLIC BLOOD PRESSURE: 80 MMHG | OXYGEN SATURATION: 99 % | HEART RATE: 88 BPM | WEIGHT: 170.63 LBS | RESPIRATION RATE: 16 BRPM | HEIGHT: 71 IN

## 2025-04-21 VITALS
DIASTOLIC BLOOD PRESSURE: 65 MMHG | SYSTOLIC BLOOD PRESSURE: 133 MMHG | RESPIRATION RATE: 18 BRPM | OXYGEN SATURATION: 100 % | HEART RATE: 90 BPM | BODY MASS INDEX: 23.89 KG/M2 | HEIGHT: 71 IN | WEIGHT: 170.63 LBS | TEMPERATURE: 98 F

## 2025-04-21 DIAGNOSIS — T45.1X5A ANEMIA ASSOCIATED WITH CHEMOTHERAPY: Primary | ICD-10-CM

## 2025-04-21 DIAGNOSIS — D47.1 PRIMARY MYELOFIBROSIS: ICD-10-CM

## 2025-04-21 DIAGNOSIS — Z94.84 HISTORY OF ALLOGENEIC STEM CELL TRANSPLANT: ICD-10-CM

## 2025-04-21 DIAGNOSIS — Z76.82 STEM CELL TRANSPLANT CANDIDATE: ICD-10-CM

## 2025-04-21 DIAGNOSIS — D64.81 ANEMIA ASSOCIATED WITH CHEMOTHERAPY: Primary | ICD-10-CM

## 2025-04-21 DIAGNOSIS — D64.9 ANEMIA: ICD-10-CM

## 2025-04-21 DIAGNOSIS — B34.3 PARVOVIRUS B19 INFECTION: ICD-10-CM

## 2025-04-21 DIAGNOSIS — D61.818 PANCYTOPENIA: ICD-10-CM

## 2025-04-21 DIAGNOSIS — Z94.84 HISTORY OF ALLOGENEIC STEM CELL TRANSPLANT: Primary | ICD-10-CM

## 2025-04-21 DIAGNOSIS — D84.81 IMMUNODEFICIENCY DUE TO CONDITIONS CLASSIFIED ELSEWHERE: ICD-10-CM

## 2025-04-21 LAB
ABO + RH BLD: NORMAL
ABSOLUTE EOSINOPHIL (OHS): 0.03 K/UL
ABSOLUTE MONOCYTE (OHS): 0.54 K/UL (ref 0.3–1)
ABSOLUTE NEUTROPHIL COUNT (OHS): 3.3 K/UL (ref 1.8–7.7)
ALBUMIN SERPL BCP-MCNC: 3.8 G/DL (ref 3.5–5.2)
ALP SERPL-CCNC: 132 UNIT/L (ref 40–150)
ALT SERPL W/O P-5'-P-CCNC: 45 UNIT/L (ref 10–44)
ANION GAP (OHS): 6 MMOL/L (ref 8–16)
AST SERPL-CCNC: 26 UNIT/L (ref 11–45)
BASOPHILS # BLD AUTO: 0.01 K/UL
BASOPHILS NFR BLD AUTO: 0.2 %
BILIRUB SERPL-MCNC: 0.4 MG/DL (ref 0.1–1)
BLD PROD TYP BPU: NORMAL
BLOOD UNIT EXPIRATION DATE: NORMAL
BLOOD UNIT TYPE CODE: 5100
BUN SERPL-MCNC: 27 MG/DL (ref 6–20)
CALCIUM SERPL-MCNC: 9.4 MG/DL (ref 8.7–10.5)
CHLORIDE SERPL-SCNC: 109 MMOL/L (ref 95–110)
CO2 SERPL-SCNC: 24 MMOL/L (ref 23–29)
CREAT SERPL-MCNC: 0.9 MG/DL (ref 0.5–1.4)
CROSSMATCH INTERPRETATION: NORMAL
CYTOMEGALOVIRUS DNA, QUAL (OHS): NOT DETECTED
DISPENSE STATUS: NORMAL
EPSTEIN-BARR VIRUS DNA, QUAL (OHS): NORMAL
ERYTHROCYTE [DISTWIDTH] IN BLOOD BY AUTOMATED COUNT: 14.9 % (ref 11.5–14.5)
GFR SERPLBLD CREATININE-BSD FMLA CKD-EPI: >60 ML/MIN/1.73/M2
GLUCOSE SERPL-MCNC: 102 MG/DL (ref 70–110)
HCT VFR BLD AUTO: 21.3 % (ref 40–54)
HGB BLD-MCNC: 6.8 GM/DL (ref 14–18)
IMM GRANULOCYTES # BLD AUTO: 0.17 K/UL (ref 0–0.04)
IMM GRANULOCYTES NFR BLD AUTO: 3.6 % (ref 0–0.5)
INDIRECT COOMBS: NORMAL
LDH SERPL-CCNC: 241 U/L (ref 110–260)
LYMPHOCYTES # BLD AUTO: 0.69 K/UL (ref 1–4.8)
MAGNESIUM SERPL-MCNC: 2.1 MG/DL (ref 1.6–2.6)
MCH RBC QN AUTO: 29.2 PG (ref 27–31)
MCHC RBC AUTO-ENTMCNC: 31.9 G/DL (ref 32–36)
MCV RBC AUTO: 91 FL (ref 82–98)
NUCLEATED RBC (/100WBC) (OHS): 0 /100 WBC
PHOSPHATE SERPL-MCNC: 3.1 MG/DL (ref 2.7–4.5)
PLATELET # BLD AUTO: 90 K/UL (ref 150–450)
PLATELET BLD QL SMEAR: ABNORMAL
PMV BLD AUTO: 10.7 FL (ref 9.2–12.9)
POTASSIUM SERPL-SCNC: 4.6 MMOL/L (ref 3.5–5.1)
PROT SERPL-MCNC: 6.9 GM/DL (ref 6–8.4)
RBC # BLD AUTO: 2.33 M/UL (ref 4.6–6.2)
RELATIVE EOSINOPHIL (OHS): 0.6 %
RELATIVE LYMPHOCYTE (OHS): 14.6 % (ref 18–48)
RELATIVE MONOCYTE (OHS): 11.4 % (ref 4–15)
RELATIVE NEUTROPHIL (OHS): 69.6 % (ref 38–73)
RH BLD: NORMAL
SODIUM SERPL-SCNC: 139 MMOL/L (ref 136–145)
SPECIMEN OUTDATE: NORMAL
TACROLIMUS BLD-MCNC: 4.5 NG/ML (ref 5–15)
UNIT NUMBER: NORMAL
URATE SERPL-MCNC: 4.7 MG/DL (ref 3.4–7)
WBC # BLD AUTO: 4.74 K/UL (ref 3.9–12.7)

## 2025-04-21 PROCEDURE — 86920 COMPATIBILITY TEST SPIN: CPT | Performed by: INTERNAL MEDICINE

## 2025-04-21 PROCEDURE — 86901 BLOOD TYPING SEROLOGIC RH(D): CPT | Performed by: INTERNAL MEDICINE

## 2025-04-21 PROCEDURE — 36430 TRANSFUSION BLD/BLD COMPNT: CPT

## 2025-04-21 PROCEDURE — 87799 DETECT AGENT NOS DNA QUANT: CPT

## 2025-04-21 PROCEDURE — P9040 RBC LEUKOREDUCED IRRADIATED: HCPCS | Performed by: INTERNAL MEDICINE

## 2025-04-21 PROCEDURE — 83735 ASSAY OF MAGNESIUM: CPT

## 2025-04-21 PROCEDURE — 83615 LACTATE (LD) (LDH) ENZYME: CPT

## 2025-04-21 PROCEDURE — 84550 ASSAY OF BLOOD/URIC ACID: CPT

## 2025-04-21 PROCEDURE — 85025 COMPLETE CBC W/AUTO DIFF WBC: CPT

## 2025-04-21 PROCEDURE — 84100 ASSAY OF PHOSPHORUS: CPT

## 2025-04-21 PROCEDURE — 80053 COMPREHEN METABOLIC PANEL: CPT

## 2025-04-21 PROCEDURE — 36415 COLL VENOUS BLD VENIPUNCTURE: CPT

## 2025-04-21 PROCEDURE — 25000003 PHARM REV CODE 250: Performed by: INTERNAL MEDICINE

## 2025-04-21 PROCEDURE — 80197 ASSAY OF TACROLIMUS: CPT

## 2025-04-21 PROCEDURE — 99999 PR PBB SHADOW E&M-EST. PATIENT-LVL IV: CPT | Mod: PBBFAC,,, | Performed by: INTERNAL MEDICINE

## 2025-04-21 RX ORDER — SODIUM CHLORIDE 0.9 % (FLUSH) 0.9 %
10 SYRINGE (ML) INJECTION
Status: DISCONTINUED | OUTPATIENT
Start: 2025-04-21 | End: 2025-04-21 | Stop reason: HOSPADM

## 2025-04-21 RX ORDER — ACETAMINOPHEN 325 MG/1
650 TABLET ORAL
OUTPATIENT
Start: 2025-04-29

## 2025-04-21 RX ORDER — HEPARIN 100 UNIT/ML
500 SYRINGE INTRAVENOUS
Status: DISCONTINUED | OUTPATIENT
Start: 2025-04-21 | End: 2025-04-21 | Stop reason: HOSPADM

## 2025-04-21 RX ORDER — HEPARIN 100 UNIT/ML
500 SYRINGE INTRAVENOUS
OUTPATIENT
Start: 2025-04-29

## 2025-04-21 RX ORDER — ACETAMINOPHEN 325 MG/1
650 TABLET ORAL
OUTPATIENT
Start: 2025-04-28

## 2025-04-21 RX ORDER — FAMOTIDINE 10 MG/ML
20 INJECTION, SOLUTION INTRAVENOUS
OUTPATIENT
Start: 2025-04-29

## 2025-04-21 RX ORDER — SODIUM CHLORIDE 0.9 % (FLUSH) 0.9 %
10 SYRINGE (ML) INJECTION
OUTPATIENT
Start: 2025-04-29

## 2025-04-21 RX ORDER — FAMOTIDINE 10 MG/ML
20 INJECTION, SOLUTION INTRAVENOUS
OUTPATIENT
Start: 2025-04-28

## 2025-04-21 RX ORDER — SODIUM CHLORIDE 0.9 % (FLUSH) 0.9 %
10 SYRINGE (ML) INJECTION
OUTPATIENT
Start: 2025-04-28

## 2025-04-21 RX ORDER — HEPARIN 100 UNIT/ML
500 SYRINGE INTRAVENOUS
OUTPATIENT
Start: 2025-04-28

## 2025-04-21 RX ORDER — SODIUM CHLORIDE 9 MG/ML
50 INJECTION, SOLUTION INTRAVENOUS CONTINUOUS
Status: DISCONTINUED | OUTPATIENT
Start: 2025-04-21 | End: 2025-04-21 | Stop reason: HOSPADM

## 2025-04-21 RX ADMIN — SODIUM CHLORIDE 25 ML/HR: 9 INJECTION, SOLUTION INTRAVENOUS at 02:04

## 2025-04-21 NOTE — PROGRESS NOTES
Section of Hematology and Stem Cell Transplantation    Post-Transplantation Follow Up Visit     4/21/25    Transplant History:   Primary oncologist: Clyde James MD  Primary oncologic diagnosis: primary myelofibrosis  Transplant date: 12/26/2024  Donor: haploidentical  Blood Type (Patient): O +  Blood Type (Donor): O +  CMV (Patient): Negative  CMV (Donor): Positive  Graft source: Peripheral blood  CD34+ cell dose: 6.04 X10^6 cells/kg  Conditioning Regimen:  Thiotepa, Busulfan, Fludarabine  GVHD prophylaxis: Post-transplant cyclophosphamide, MMF, Tacrolimus  Immediate post-transplant complications: mucositis, R axillary skin/soft tissue infection secondary to MRSA    History of Present Ilness:   Rubén Peters) is a pleasant 54 y.o.male with primary myelofibrosis who is status post haploidentical stem cell transplantation conditioned with  Bu/Flu/TT  who is currently day +116 who presents for post-transplant follow up.    He feels much better today. These past 4 days have been great for him. No additional fevers. He was cooking this weekend. He has some fatigue today, and he reports feeling more fatigue this morning likely related to anemia. No recent nausea, vomiting, diarrhea, rash.      PAST MEDICAL HISTORY:   Past Medical History:   Diagnosis Date    Abdominal pain 01/02/2025    Allergic contact dermatitis due to adhesives 12/31/2024    Cancer     Flatulence 01/03/2025    Headache 12/27/2024    Hyperlipidemia     Mucositis 01/02/2025    Other constipation 12/19/2024    Thrombocytosis 12/18/2024    Transaminitis 12/27/2024       PAST SURGICAL HISTORY:   Past Surgical History:   Procedure Laterality Date    BONE MARROW BIOPSY N/A 11/27/2024    Procedure: Biopsy-bone marrow;  Surgeon: Clyde James MD;  Location: [address];  Service: Oncology;  Laterality: N/A;  11/20-pre call complete-tb    BONE MARROW BIOPSY N/A 4/9/2025    Procedure: Biopsy-bone marrow;  Surgeon: Clyde James MD;   Location: [facility] ENDO (4TH FLR);  Service: Oncology;  Laterality: N/A;  4/1 precall complete. EW    COLONOSCOPY N/A 8/9/2023    Procedure: COLONOSCOPY;  Surgeon: Will Ardon MD;  Location: [facility] ENDO (4TH FLR);  Service: Endoscopy;  Laterality: N/A;  Suprep Instructions sent via portal / Authorizing:     Bebeto Arora MD in [facility] FAMILY MED/ INTERNAL MED/ PEDS  Referral:          [id number]    EYE SURGERY      FLEXIBLE SIGMOIDOSCOPY N/A 7/23/2024    Procedure: SIGMOIDOSCOPY, FLEXIBLE;  Surgeon: Nirali Vicente MD;  Location: [facility] ENDO;  Service: Endoscopy;  Laterality: N/A;    INSERTION OF LONGORIA CATHETER Right 12/18/2024    Procedure: INSERTION, CATHETER, CENTRAL VENOUS, LONGORIA TRIPLE LUMEN, Bard 12.5 fr Longoria Cath model [id number];  Surgeon: Willie Hadley MD;  Location: [facility] OR 2ND FLR;  Service: General;  Laterality: Right;       PAST SOCIAL HISTORY:  Social History     Tobacco Use    Smoking status: Former     Types: Cigars, Cigarettes    Smokeless tobacco: Never   Substance Use Topics    Alcohol use: Not Currently     Comment: socially    Drug use: Never       FAMILY HISTORY:  Family History   Problem Relation Name Age of Onset    Arthritis Mother      COPD Father      Testicular cancer Maternal Cousin Peter 17        Chemotherapy    Breast cancer Maternal Cousin  51        Chemotherapy    Heart disease Maternal Grandfather      Stroke Maternal Grandmother         CURRENT MEDICATIONS:   Current Outpatient Medications   Medication Sig    acyclovir (ZOVIRAX) 800 MG Tab Take 1 tablet (800 mg total) by mouth 2 (two) times daily.    atovaquone (MEPRON) 750 mg/5 mL Susp oral liquid Take 10 mLs (1,500 mg total) by mouth once daily.    budesonide (ENTOCORT EC) 3 mg capsule Take 1 capsule (3 mg total) by mouth 3 (three) times daily.    eltrombopag olamine (PROMACTA) 50 MG Tab Take 1 tablet (50 mg total) by mouth once daily.    ergocalciferol (ERGOCALCIFEROL) 50,000 unit Cap Take 1 capsule (50,000 Units total) by  mouth every 7 days.    letermovir (PREVYMIS) 480 mg Tab Take 1 tablet (480 mg total) by mouth once daily.    magnesium oxide (MAG-OX) 400 mg (241.3 mg magnesium) tablet Take 2 tablets in the morning, 1 tablet in the afternoon, and 2 tablets in the evening.    ondansetron (ZOFRAN) 8 MG tablet Take 1 tablet (8 mg total) by mouth every 8 (eight) hours as needed for Nausea.    oxyCODONE (ROXICODONE) 5 MG immediate release tablet Take 1 tablet (5 mg total) by mouth every 6 (six) hours as needed for Pain.    prochlorperazine (COMPAZINE) 10 MG tablet Take 1 tablet (10 mg total) by mouth 4 (four) times daily as needed for Nausea.    tacrolimus (PROGRAF) 0.5 MG Cap Take 3 capsules (1.5 mg total) by mouth every morning AND 2 capsules (1 mg total) every evening.    traMADoL (ULTRAM) 50 mg tablet Take 1 tablet (50 mg total) by mouth every 6 (six) hours as needed for Pain.    triamcinolone acetonide 0.1% (KENALOG) 0.1 % cream Apply topically 2 (two) times daily as needed (rash on arms, chest, back, trunk, and legs (do not apply to face)).    voriconazole (VFEND) 200 MG Tab Take 1 tablet (200 mg total) by mouth 2 (two) times daily.     No current facility-administered medications for this visit.       ALLERGIES:   Review of patient's allergies indicates:   Allergen Reactions    Sulfamethoxazole-trimethoprim Hives       GVHD Review of Systems:     Pertinent positives and negatives included in the HPI. Otherwise a 14 point review of systems is negative. GVHD review of systems recorded in BMT flowsheet.     Physical Exam:     Vitals:    04/21/25 0842   BP: (!) 147/80   Pulse: 88   Resp: 16   Temp: 98 °F (36.7 °C)       General: Appears well, NAD  HEENT: MMM, no OP lesions  Neck: Supple, no LAD  Pulmonary: CTAB, no increased work of breathing, no W/R/C  Cardiovascular: S1S2 normal, RRR, no M/R/G  Abdominal: Soft, NT, ND, BS+, no HSM  Extremities: No C/C/E  Neurological: AAOx4, grossly normal, no focal deficits  Dermatologic: No  appreciable rashes or lesions     ECOG Performance Status: (foot note - ECOG PS provided by [employer] Co[employer]cology Group) 1 - Symptomatic but completely ambulatory    Karnofsky Performance Score:  90%- Able to Carry on Normal Activity: Minor Symptoms of Disease    Labs:   Lab Results   Component Value Date    WBC 4.74 04/21/2025    RBC 2.33 (L) 04/21/2025    HGB 6.8 (L) 04/21/2025    HCT 21.3 (L) 04/21/2025    MCV 91 04/21/2025    MCH 29.2 04/21/2025    MCHC 31.9 (L) 04/21/2025    RDW 14.9 (H) 04/21/2025    PLT 90 (L) 04/21/2025    MPV 10.7 04/21/2025    GRAN 3.6 03/27/2025    LYMPH 14.6 (L) 04/21/2025    LYMPH 0.69 (L) 04/21/2025    MONO 11.4 04/21/2025    MONO 0.54 04/21/2025    EOS 0.6 04/21/2025    EOS 0.03 04/21/2025    BASO 0.02 03/20/2025    EOSINOPHIL 0.6 03/20/2025    BASOPHIL 0.2 04/21/2025    BASOPHIL 0.01 04/21/2025       Sodium   Date Value Ref Range Status   04/21/2025 139 136 - 145 mmol/L Final   03/20/2025 138 136 - 145 mmol/L Final     Potassium   Date Value Ref Range Status   04/21/2025 4.6 3.5 - 5.1 mmol/L Final   03/20/2025 4.2 3.5 - 5.1 mmol/L Final     Chloride   Date Value Ref Range Status   04/21/2025 109 95 - 110 mmol/L Final   03/20/2025 108 95 - 110 mmol/L Final     CO2   Date Value Ref Range Status   04/21/2025 24 23 - 29 mmol/L Final   03/20/2025 21 (L) 23 - 29 mmol/L Final     Glucose   Date Value Ref Range Status   03/20/2025 101 70 - 110 mg/dL Final     BUN   Date Value Ref Range Status   04/21/2025 27 (H) 6 - 20 mg/dL Final     Creatinine   Date Value Ref Range Status   04/21/2025 0.9 0.5 - 1.4 mg/dL Final     Calcium   Date Value Ref Range Status   04/21/2025 9.4 8.7 - 10.5 mg/dL Final   03/20/2025 9.2 8.7 - 10.5 mg/dL Final     Total Protein   Date Value Ref Range Status   03/20/2025 6.6 6.0 - 8.4 g/dL Final     Albumin   Date Value Ref Range Status   04/21/2025 3.8 3.5 - 5.2 g/dL Final   03/20/2025 3.7 3.5 - 5.2 g/dL Final     Total Bilirubin   Date Value Ref Range Status    03/20/2025 0.4 0.1 - 1.0 mg/dL Final     Comment:     For infants and newborns, interpretation of results should be based  on gestational age, weight and in agreement with clinical  observations.    Premature Infant recommended reference ranges:  Up to 24 hours.............<8.0 mg/dL  Up to 48 hours............<12.0 mg/dL  3-5 days..................<15.0 mg/dL  6-29 days.................<15.0 mg/dL       Bilirubin Total   Date Value Ref Range Status   04/21/2025 0.4 0.1 - 1.0 mg/dL Final     Comment:     For infants and newborns, interpretation of results should be based   on gestational age, weight and in agreement with clinical   observations.    Premature Infant recommended reference ranges:   0-24 hours:  <8.0 mg/dL   24-48 hours: <12.0 mg/dL   3-5 days:    <15.0 mg/dL   6-29 days:   <15.0 mg/dL     Alkaline Phosphatase   Date Value Ref Range Status   03/20/2025 122 40 - 150 U/L Final     ALP   Date Value Ref Range Status   04/21/2025 132 40 - 150 unit/L Final     AST   Date Value Ref Range Status   04/21/2025 26 11 - 45 unit/L Final   03/20/2025 59 (H) 10 - 40 U/L Final     ALT   Date Value Ref Range Status   04/21/2025 45 (H) 10 - 44 unit/L Final   03/20/2025 85 (H) 10 - 44 U/L Final     Anion Gap   Date Value Ref Range Status   04/21/2025 6 (L) 8 - 16 mmol/L Final     eGFR if    Date Value Ref Range Status   01/22/2022 >60.0 >60 mL/min/1.73 m^2 Final     eGFR if non    Date Value Ref Range Status   01/22/2022 >60.0 >60 mL/min/1.73 m^2 Final     Comment:     Calculation used to obtain the estimated glomerular filtration  rate (eGFR) is the CKD-EPI equation.          Imaging:   All pertinent studies reviewed and interpreted by me       Assessment and Plan:   Rubén Peters) is a pleasant 54 y.o.male with primary myelofibrosis s/p haplo SCT who presents for post-transplant follow up.    Primary myelofibrosis: Status post treatment with ruxolitinib, followed by alloSCT.  Day +30 BMBx results below.    Status post allogeneic stem cell transplantation: As noted above, status post haploidentical stem cell transplantation conditioned with  Bu/Flu/TT . Currently day +116.  Engrafted on day +22.  Day +30 BMBx on 1/2/25: No definitive evidence of residual JAK2 [id number] primary myelofibrosis. NGS without evidence of pathogenic mutations. Chimerism studies with 100% donor CD33, CD3 insufficient for analysis.   Plan for central line removal -  4/3/25  Day 100 bone marrow biopsy 4/9/25:Markedly hypocellular marrow (<5%-15%) with trilineage hematopoiesis. NGS with DNMT3A 2%. CG normal and JAK2 negative. Chimerisms 90% CD3 and 100% CD33 donor.    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 MMF has now been discontinued. 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 Restarted on budesonide 3mg TID (4/14/25), and he reports resolution of nausea. Taper budesonide at next visit. With parvovirus noted, will allow for low-normal tacro levels (goal 4-10).  Current tacro dose: 1.5mg qaM and 1 mg qPM   Last tacro level: 4.5  Adjustments: none (re-evaluate next week)    Immunosuppression: Prevention with voriconazole,  acyclovir. CMV prophylaxis with letermovir. PJP prophyalxis dapsone. Continue weekly monitoring of CMV and EBV.  Last CMV: negative (3/31/2025), last EBV: negative (3/31/2025).  Active infections: none    Pancytopenia: Due to underlying disease and chemotherapy. Transfuse for Hgb <7 g/dL and platelets <10k. Likely related to parvovirus (see below). Continue Promacta 50mg daily. Consider adding Retacrit.   Transfuse 1u PRBCs today.    Parvovirus: Obtained to evaluate for pancytopenia. Will start IVIG ASAP. Monitor labs weekly and continue transfusion support.     Hypertension:  Was newly hypertensive in hospital following SCT at which time he was started on amlodipine. Discontinued amlodipine after hospitalization due  to hypotension. Monitor for now but restart antihypertensives if this worsens.     Cardiomyopathy: Echo 2/26/25 obtained due to tachycardia revealed reduced LVEF 45-50%. No signs/symptoms of HFrEF. Repeat echo in 3 months (5/2025).    Vitamin D deficiency: His primary care is giving him 50,000 units once a week.     Hypercholesterolemia: He is following with primary care. Could be elevated due to tacrolimus.     Tension Headache: Patient with more frequent headaches across his forehead. Unsure of cause but could be stress or dehydration. Encourage more frequent hydration. Tramadol refilled.     Follow up: Weekly follow up with labs.    Orders Placed:      No orders of the defined types were placed in this encounter.     Route Chart for Scheduling    BMT Chart Routing      Follow up with physician 1 week. weekly follow up (Rocael or Opal mccurdy) x6 weeks   Follow up with REBEKAH    Provider visit type    Infusion scheduling note New or changed treatment   IVIG x2 days ASAP   Injection scheduling note    Labs CBC, CMP, tacrolimus level, phosphorus, magnesium, CMV, EBV and type and screen   Scheduling:  Preferred lab:  Lab interval: once a week     Imaging    Pharmacy appointment    Other referrals                    Supportive Plan Information  OP IVIG Clarence Grover MD   Associated Diagnosis: Parvovirus B19 infection   noted on 4/21/2025   Line of treatment: Supportive Care   Treatment goal: Supportive     Upcoming Treatment Dates - OP IVIG    4/28/2025       Pre-Medications       acetaminophen tablet 650 mg       diphenhydrAMINE (BENADRYL) 50 mg in NS 50 mL IVPB       famotidine (PF) injection 20 mg       Medications       Immune Globulin G (IGG)-PRO-IGA 10 % injection (Privigen) 10 % injection 75 g  4/29/2025       Pre-Medications       acetaminophen tablet 650 mg       diphenhydrAMINE (BENADRYL) 50 mg in NS 50 mL IVPB       famotidine (PF) injection 20 mg       Medications       Immune Globulin G  (IGG)-PRO-IGA 10 % injection (Privigen) 10 % injection 75 g    Treatment Plan Information   OP Adult Blood - Twice Weekly Clyde James MD   Associated Diagnosis: Anemia associated with chemotherapy   noted on 2/4/2025   Line of treatment: Supportive Care  Treatment Goal: Supportive     Upcoming Treatment Dates - OP Adult Blood - Twice Weekly    4/11/2025       Prepare RBC       Transfuse RBC 1 Unit  4/15/2025       Prepare RBC       Transfuse RBC 1 Unit  4/18/2025       Prepare RBC       Transfuse RBC 1 Unit    Therapy Plan Information  PORT FLUSH for Adrenal nodule, noted on 12/9/2024  Flushes  heparin, porcine (PF) 100 unit/mL injection flush 500 Units  500 Units, Intravenous, Every visit  sodium chloride 0.9% flush 10 mL  10 mL, Intravenous, Every visit      No therapy plan of the specified type found.    No therapy plan of the specified type found.    Total time of this visit was 40 minutes, including time spent face to face with patient and/or via video/audio, and also in preparing for today's visit for MDM and documentation. (Medical Decision Making, including consideration of possible diagnoses, management options, complex medical record review, review of diagnostic tests and information, consideration and discussion of significant complications based on comorbidities, and discussion with providers involved with the care of the patient). Greater than 50% was spent face to face with the patient counseling and coordinating care.  Visit today included increased complexity associated with the care of the episodic problem parvovirus addressed and managing the longitudinal care of the patient due to the serious and/or complex managed problem(s) history of allogeneic stem cell transplant and immunodeficiency.      Rocael Grover MD  Malignant Hematology, Stem Cell Transplant, and Cellular Therapy  [facility]

## 2025-04-22 ENCOUNTER — PATIENT MESSAGE (OUTPATIENT)
Dept: HEMATOLOGY/ONCOLOGY | Facility: CLINIC | Age: 55
End: 2025-04-22
Payer: COMMERCIAL

## 2025-04-25 ENCOUNTER — DOCUMENTATION ONLY (OUTPATIENT)
Dept: HEMATOLOGY/ONCOLOGY | Facility: CLINIC | Age: 55
End: 2025-04-25
Payer: COMMERCIAL

## 2025-04-25 NOTE — PROGRESS NOTES
NAT received email from patient's wife requesting Physicians Statement for time off. NAT sent request to Dr. James staff to request signature. NAT notified patient's spouse of progress via email.

## 2025-04-25 NOTE — PROGRESS NOTES
Section of Hematology and Stem Cell Transplantation    Post-Transplantation Follow Up Visit     4/28/25    Transplant History:   Primary oncologist: Clyde James MD  Primary oncologic diagnosis: primary myelofibrosis  Transplant date: 12/26/2024  Donor: haploidentical  Blood Type (Patient): O +  Blood Type (Donor): O +  CMV (Patient): Negative  CMV (Donor): Positive  Graft source: Peripheral blood  CD34+ cell dose: 6.04 X10^6 cells/kg  Conditioning Regimen:  Thiotepa, Busulfan, Fludarabine  GVHD prophylaxis: Post-transplant cyclophosphamide, MMF, Tacrolimus  Immediate post-transplant complications: mucositis, R axillary skin/soft tissue infection secondary to MRSA    History of Present Ilness:   Rubén Peters) is a pleasant 54 y.o.male with primary myelofibrosis who is status post haploidentical stem cell transplantation conditioned with  Bu/Flu/TT  who is currently day +123 who presents for post-transplant follow up.    He reports having a great Friday and then feeling more sluggish over the weekend. He notes having a headache near occipital lobe on Saturday night that caused shooting pain down his neck to back. He took tramadol and it helped. No other issues over the weekend. No recent nausea, vomiting, diarrhea, rash.      PAST MEDICAL HISTORY:   Past Medical History:   Diagnosis Date    Abdominal pain 01/02/2025    Allergic contact dermatitis due to adhesives 12/31/2024    Cancer     Flatulence 01/03/2025    Headache 12/27/2024    Hyperlipidemia     Mucositis 01/02/2025    Other constipation 12/19/2024    Thrombocytosis 12/18/2024    Transaminitis 12/27/2024       PAST SURGICAL HISTORY:   Past Surgical History:   Procedure Laterality Date    BONE MARROW BIOPSY N/A 11/27/2024    Procedure: Biopsy-bone marrow;  Surgeon: Clyde James MD;  Location: [address]);  Service: Oncology;  Laterality: N/A;  11/20-pre call complete-tb    BONE MARROW BIOPSY N/A 4/9/2025    Procedure: Biopsy-bone  marrow;  Surgeon: Clyde James MD;  Location: [facility] ENDO (4TH FLR);  Service: Oncology;  Laterality: N/A;  4/1 precall complete. EW    COLONOSCOPY N/A 8/9/2023    Procedure: COLONOSCOPY;  Surgeon: Will Ardon MD;  Location: [facility] ENDO (4TH FLR);  Service: Endoscopy;  Laterality: N/A;  Suprep Instructions sent via portal / Authorizing:     Bebeto Arora MD in [facility] FAMILY MED/ INTERNAL MED/ PEDS  Referral:          [id number]    EYE SURGERY      FLEXIBLE SIGMOIDOSCOPY N/A 7/23/2024    Procedure: SIGMOIDOSCOPY, FLEXIBLE;  Surgeon: Nirali Vicente MD;  Location: [facility] ENDO;  Service: Endoscopy;  Laterality: N/A;    INSERTION OF LONGORIA CATHETER Right 12/18/2024    Procedure: INSERTION, CATHETER, CENTRAL VENOUS, LONGORIA TRIPLE LUMEN, Bard 12.5 fr Longoria Cath model [id number];  Surgeon: Willie Hadley MD;  Location: [facility] OR 2ND FLR;  Service: General;  Laterality: Right;       PAST SOCIAL HISTORY:  Social History     Tobacco Use    Smoking status: Former     Types: Cigars, Cigarettes    Smokeless tobacco: Never   Substance Use Topics    Alcohol use: Not Currently     Comment: socially    Drug use: Never       FAMILY HISTORY:  Family History   Problem Relation Name Age of Onset    Arthritis Mother      COPD Father      Testicular cancer Maternal Cousin Peter 17        Chemotherapy    Breast cancer Maternal Cousin  51        Chemotherapy    Heart disease Maternal Grandfather      Stroke Maternal Grandmother         CURRENT MEDICATIONS:   Current Outpatient Medications   Medication Sig    acyclovir (ZOVIRAX) 800 MG Tab Take 1 tablet (800 mg total) by mouth 2 (two) times daily.    atovaquone (MEPRON) 750 mg/5 mL Susp oral liquid Take 10 mLs (1,500 mg total) by mouth once daily.    budesonide (ENTOCORT EC) 3 mg capsule Take 1 capsule (3 mg total) by mouth 3 (three) times daily.    eltrombopag olamine (PROMACTA) 50 MG Tab Take 1 tablet (50 mg total) by mouth once daily.    ergocalciferol (ERGOCALCIFEROL) 50,000 unit Cap  Take 1 capsule (50,000 Units total) by mouth every 7 days.    letermovir (PREVYMIS) 480 mg Tab Take 1 tablet (480 mg total) by mouth once daily.    magnesium oxide (MAG-OX) 400 mg (241.3 mg magnesium) tablet Take 2 tablets in the morning, 1 tablet in the afternoon, and 2 tablets in the evening.    ondansetron (ZOFRAN) 8 MG tablet Take 1 tablet (8 mg total) by mouth every 8 (eight) hours as needed for Nausea.    prochlorperazine (COMPAZINE) 10 MG tablet Take 1 tablet (10 mg total) by mouth 4 (four) times daily as needed for Nausea.    tacrolimus (PROGRAF) 0.5 MG Cap Take 3 capsules (1.5 mg total) by mouth every morning AND 2 capsules (1 mg total) every evening.    traMADoL (ULTRAM) 50 mg tablet Take 1 tablet (50 mg total) by mouth every 6 (six) hours as needed for Pain.    triamcinolone acetonide 0.1% (KENALOG) 0.1 % cream Apply topically 2 (two) times daily as needed (rash on arms, chest, back, trunk, and legs (do not apply to face)).    voriconazole (VFEND) 200 MG Tab Take 1 tablet (200 mg total) by mouth 2 (two) times daily.    oxyCODONE (ROXICODONE) 5 MG immediate release tablet Take 1 tablet (5 mg total) by mouth every 6 (six) hours as needed for Pain.     No current facility-administered medications for this visit.       ALLERGIES:   Review of patient's allergies indicates:   Allergen Reactions    Sulfamethoxazole-trimethoprim Hives       GVHD Review of Systems:     Pertinent positives and negatives included in the HPI. Otherwise a 14 point review of systems is negative. GVHD review of systems recorded in BMT flowsheet.     Physical Exam:     Vitals:    04/28/25 0807   BP: 131/80   Pulse: 98   Resp: 18   Temp: 98 °F (36.7 °C)         General: Appears well, NAD  HEENT: MMM, no OP lesions  Neck: Supple, no LAD  Pulmonary: CTAB, no increased work of breathing, no W/R/C  Cardiovascular: S1S2 normal, RRR, no M/R/G  Abdominal: Soft, NT, ND, BS+, no HSM  Extremities: No C/C/E  Neurological: AAOx4, grossly normal, no  focal deficits  Dermatologic: No appreciable rashes or lesions     ECOG Performance Status: (foot note - ECOG PS provided by Eastern Cooperative Oncology Group) 1 - Symptomatic but completely ambulatory    Karnofsky Performance Score:  90%- Able to Carry on Normal Activity: Minor Symptoms of Disease    Labs:   Lab Results   Component Value Date    WBC 4.74 04/21/2025    RBC 2.33 (L) 04/21/2025    HGB 6.8 (L) 04/21/2025    HCT 21.3 (L) 04/21/2025    MCV 91 04/21/2025    MCH 29.2 04/21/2025    MCHC 31.9 (L) 04/21/2025    RDW 14.9 (H) 04/21/2025    PLT 90 (L) 04/21/2025    MPV 10.7 04/21/2025    GRAN 3.6 03/27/2025    LYMPH 14.6 (L) 04/21/2025    LYMPH 0.69 (L) 04/21/2025    MONO 11.4 04/21/2025    MONO 0.54 04/21/2025    EOS 0.6 04/21/2025    EOS 0.03 04/21/2025    BASO 0.02 03/20/2025    EOSINOPHIL 0.6 03/20/2025    BASOPHIL 0.2 04/21/2025    BASOPHIL 0.01 04/21/2025       Sodium   Date Value Ref Range Status   04/21/2025 139 136 - 145 mmol/L Final   03/20/2025 138 136 - 145 mmol/L Final     Potassium   Date Value Ref Range Status   04/21/2025 4.6 3.5 - 5.1 mmol/L Final   03/20/2025 4.2 3.5 - 5.1 mmol/L Final     Chloride   Date Value Ref Range Status   04/21/2025 109 95 - 110 mmol/L Final   03/20/2025 108 95 - 110 mmol/L Final     CO2   Date Value Ref Range Status   04/21/2025 24 23 - 29 mmol/L Final   03/20/2025 21 (L) 23 - 29 mmol/L Final     Glucose   Date Value Ref Range Status   03/20/2025 101 70 - 110 mg/dL Final     BUN   Date Value Ref Range Status   04/21/2025 27 (H) 6 - 20 mg/dL Final     Creatinine   Date Value Ref Range Status   04/21/2025 0.9 0.5 - 1.4 mg/dL Final     Calcium   Date Value Ref Range Status   04/21/2025 9.4 8.7 - 10.5 mg/dL Final   03/20/2025 9.2 8.7 - 10.5 mg/dL Final     Total Protein   Date Value Ref Range Status   03/20/2025 6.6 6.0 - 8.4 g/dL Final     Albumin   Date Value Ref Range Status   04/21/2025 3.8 3.5 - 5.2 g/dL Final   03/20/2025 3.7 3.5 - 5.2 g/dL Final     Total Bilirubin    Date Value Ref Range Status   03/20/2025 0.4 0.1 - 1.0 mg/dL Final     Comment:     For infants and newborns, interpretation of results should be based  on gestational age, weight and in agreement with clinical  observations.    Premature Infant recommended reference ranges:  Up to 24 hours.............<8.0 mg/dL  Up to 48 hours............<12.0 mg/dL  3-5 days..................<15.0 mg/dL  6-29 days.................<15.0 mg/dL       Bilirubin Total   Date Value Ref Range Status   04/21/2025 0.4 0.1 - 1.0 mg/dL Final     Comment:     For infants and newborns, interpretation of results should be based   on gestational age, weight and in agreement with clinical   observations.    Premature Infant recommended reference ranges:   0-24 hours:  <8.0 mg/dL   24-48 hours: <12.0 mg/dL   3-5 days:    <15.0 mg/dL   6-29 days:   <15.0 mg/dL     Alkaline Phosphatase   Date Value Ref Range Status   03/20/2025 122 40 - 150 U/L Final     ALP   Date Value Ref Range Status   04/21/2025 132 40 - 150 unit/L Final     AST   Date Value Ref Range Status   04/21/2025 26 11 - 45 unit/L Final   03/20/2025 59 (H) 10 - 40 U/L Final     ALT   Date Value Ref Range Status   04/21/2025 45 (H) 10 - 44 unit/L Final   03/20/2025 85 (H) 10 - 44 U/L Final     Anion Gap   Date Value Ref Range Status   04/21/2025 6 (L) 8 - 16 mmol/L Final     eGFR if    Date Value Ref Range Status   01/22/2022 >60.0 >60 mL/min/1.73 m^2 Final     eGFR if non    Date Value Ref Range Status   01/22/2022 >60.0 >60 mL/min/1.73 m^2 Final     Comment:     Calculation used to obtain the estimated glomerular filtration  rate (eGFR) is the CKD-EPI equation.          Imaging:   All pertinent studies reviewed and interpreted by me       Assessment and Plan:   Rubén Hu (Rubén) is a pleasant 54 y.o.male with primary myelofibrosis s/p haplo SCT who presents for post-transplant follow up.    Primary myelofibrosis: Status post treatment with  ruxolitinib, followed by alloSCT. Day +30 BMBx results below.    Status post allogeneic stem cell transplantation: As noted above, status post haploidentical stem cell transplantation conditioned with  Bu/Flu/TT . Currently day +123.  Engrafted on day +22.  Day +30 BMBx on 1/2/25: No definitive evidence of residual JAK2 [id number] primary myelofibrosis. NGS without evidence of pathogenic mutations. Chimerism studies with 100% donor CD33, CD3 insufficient for analysis.   Plan for central line removal -  4/3/25  Day 100 bone marrow biopsy 4/9/25:Markedly hypocellular marrow (<5%-15%) with trilineage hematopoiesis. NGS with DNMT3A 2%. CG normal and JAK2 negative. Chimerisms 90% CD3 and 100% CD33 donor.    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 MMF has now been discontinued. 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 Restarted on budesonide 3mg TID (4/14/25), and he reports resolution of nausea. Taper budesonide ending 5/12. With parvovirus noted, will allow for low-normal tacro levels (goal 4-10).  Current tacro dose: 1.5mg qaM and 1 mg qPM   Last tacro level: pending results  Adjustments: pending results  Budesonide taper:  4/28-5/4- Twice a day   5/5-5/11- Daily  5/12- Stop    Immunosuppression: Prevention with voriconazole,  acyclovir. CMV prophylaxis with letermovir. PJP prophyalxis dapsone. Continue weekly monitoring of CMV and EBV.  Last CMV: negative (4/21/2025), last EBV: negative (4/21/2025).  Active infections: none    Pancytopenia: Due to underlying disease and chemotherapy. Transfuse for Hgb <7 g/dL and platelets <10k. Likely related to parvovirus (see below). Continue Promacta 50mg daily. Consider adding Retacrit.     Parvovirus: Obtained to evaluate for pancytopenia. Will start IVIG as soon as insurance approves. Monitor labs weekly and continue transfusion support.     Hypertension:  Was newly hypertensive in hospital  following SCT at which time he was started on amlodipine. Discontinued amlodipine after hospitalization due to hypotension. Monitor for now but restart antihypertensives if this worsens.     Cardiomyopathy: Echo 2/26/25 obtained due to tachycardia revealed reduced LVEF 45-50%. No signs/symptoms of HFrEF. Repeat echo in 3 months (5/2025).    Vitamin D deficiency: His primary care is giving him 50,000 units once a week.     Hypercholesterolemia: He is following with primary care. Could be elevated due to tacrolimus.     Tension Headache: Patient with more frequent headaches across his forehead. Unsure of cause but could be stress or dehydration. Encourage more frequent hydration. Tramadol prn for headaches.     Follow up: Weekly follow up with labs.    Orders Placed:      No orders of the defined types were placed in this encounter.     Route Chart for Scheduling  BMT Route Chart for Scheduling      Supportive Plan Information  OP IVIG Clarence Grover MD   Associated Diagnosis: Parvovirus B19 infection   noted on 4/21/2025   Line of treatment: Supportive Care   Treatment goal: Supportive     Upcoming Treatment Dates - OP IVIG    4/28/2025       Pre-Medications       acetaminophen tablet 650 mg       diphenhydrAMINE (BENADRYL) 50 mg in NS 50 mL IVPB       famotidine (PF) injection 20 mg       Medications       immune globulin (human) (IGG) injection 10% (GAMUNEX)  4/29/2025       Pre-Medications       acetaminophen tablet 650 mg       diphenhydrAMINE (BENADRYL) 50 mg in NS 50 mL IVPB       famotidine (PF) injection 20 mg       Medications       immune globulin (human) (IGG) injection 10% (GAMUNEX)    Treatment Plan Information   OP Adult Blood - Twice Weekly Clyde James MD   Associated Diagnosis: Anemia associated with chemotherapy   noted on 2/4/2025   Line of treatment: Supportive Care  Treatment Goal: Supportive     Upcoming Treatment Dates - OP Adult Blood - Twice Weekly    4/22/2025       Prepare RBC        Transfuse RBC 1 Unit  4/25/2025       Prepare RBC       Transfuse RBC 1 Unit    Therapy Plan Information  PORT FLUSH for Adrenal nodule, noted on 12/9/2024  Flushes  heparin, porcine (PF) 100 unit/mL injection flush 500 Units  500 Units, Intravenous, Every visit  sodium chloride 0.9% flush 10 mL  10 mL, Intravenous, Every visit      No therapy plan of the specified type found.    No therapy plan of the specified type found.    Total time of this visit was 40 minutes, including time spent face to face with patient and/or via video/audio, and also in preparing for today's visit for MDM and documentation. (Medical Decision Making, including consideration of possible diagnoses, management options, complex medical record review, review of diagnostic tests and information, consideration and discussion of significant complications based on comorbidities, and discussion with providers involved with the care of the patient). Greater than 50% was spent face to face with the patient counseling and coordinating care.    Visit today included increased complexity associated with the care of the episodic problem parvovirus addressed and managing the longitudinal care of the patient due to the serious and/or complex managed problem(s) history of allogeneic stem cell transplant and immunodeficiency.    Opal Alonzo PA-C  Malignant Hematology, Stem Cell Transplant, and Cellular Therapy  [facility]  [facility] [place]

## 2025-04-28 ENCOUNTER — OFFICE VISIT (OUTPATIENT)
Dept: HEMATOLOGY/ONCOLOGY | Facility: CLINIC | Age: 55
End: 2025-04-28
Payer: COMMERCIAL

## 2025-04-28 ENCOUNTER — PATIENT MESSAGE (OUTPATIENT)
Dept: HEMATOLOGY/ONCOLOGY | Facility: CLINIC | Age: 55
End: 2025-04-28

## 2025-04-28 ENCOUNTER — NURSE TRIAGE (OUTPATIENT)
Dept: ADMINISTRATIVE | Facility: CLINIC | Age: 55
End: 2025-04-28
Payer: COMMERCIAL

## 2025-04-28 ENCOUNTER — DOCUMENTATION ONLY (OUTPATIENT)
Dept: HEMATOLOGY/ONCOLOGY | Facility: CLINIC | Age: 55
End: 2025-04-28

## 2025-04-28 ENCOUNTER — LAB VISIT (OUTPATIENT)
Dept: LAB | Facility: HOSPITAL | Age: 55
End: 2025-04-28
Attending: INTERNAL MEDICINE
Payer: COMMERCIAL

## 2025-04-28 ENCOUNTER — RESULTS FOLLOW-UP (OUTPATIENT)
Dept: HEMATOLOGY/ONCOLOGY | Facility: CLINIC | Age: 55
End: 2025-04-28

## 2025-04-28 VITALS
SYSTOLIC BLOOD PRESSURE: 131 MMHG | HEART RATE: 98 BPM | BODY MASS INDEX: 24.1 KG/M2 | RESPIRATION RATE: 18 BRPM | TEMPERATURE: 98 F | DIASTOLIC BLOOD PRESSURE: 80 MMHG | WEIGHT: 172.19 LBS | HEIGHT: 71 IN | OXYGEN SATURATION: 100 %

## 2025-04-28 DIAGNOSIS — D84.81 IMMUNODEFICIENCY DUE TO CONDITIONS CLASSIFIED ELSEWHERE: ICD-10-CM

## 2025-04-28 DIAGNOSIS — G44.209 TENSION HEADACHE: ICD-10-CM

## 2025-04-28 DIAGNOSIS — B34.3 PARVOVIRUS B19 INFECTION: ICD-10-CM

## 2025-04-28 DIAGNOSIS — D64.9 ANEMIA: ICD-10-CM

## 2025-04-28 DIAGNOSIS — E55.9 VITAMIN D DEFICIENCY: ICD-10-CM

## 2025-04-28 DIAGNOSIS — E78.00 HYPERCHOLESTEROLEMIA: ICD-10-CM

## 2025-04-28 DIAGNOSIS — D47.1 PRIMARY MYELOFIBROSIS: ICD-10-CM

## 2025-04-28 DIAGNOSIS — D47.1 PRIMARY MYELOFIBROSIS: Primary | ICD-10-CM

## 2025-04-28 DIAGNOSIS — I10 HYPERTENSION, UNSPECIFIED TYPE: ICD-10-CM

## 2025-04-28 DIAGNOSIS — I42.9 CARDIOMYOPATHY, UNSPECIFIED TYPE: ICD-10-CM

## 2025-04-28 DIAGNOSIS — Z76.82 STEM CELL TRANSPLANT CANDIDATE: ICD-10-CM

## 2025-04-28 DIAGNOSIS — Z94.84 HISTORY OF ALLOGENEIC STEM CELL TRANSPLANT: ICD-10-CM

## 2025-04-28 DIAGNOSIS — D61.818 PANCYTOPENIA: ICD-10-CM

## 2025-04-28 LAB
ABSOLUTE NEUTROPHIL MANUAL (OHS): 7.8 K/UL
ALBUMIN SERPL BCP-MCNC: 3.8 G/DL (ref 3.5–5.2)
ALP SERPL-CCNC: 123 UNIT/L (ref 40–150)
ALT SERPL W/O P-5'-P-CCNC: 54 UNIT/L (ref 10–44)
ANION GAP (OHS): 8 MMOL/L (ref 8–16)
ANISOCYTOSIS BLD QL SMEAR: SLIGHT
AST SERPL-CCNC: 30 UNIT/L (ref 11–45)
BILIRUB SERPL-MCNC: 0.4 MG/DL (ref 0.1–1)
BUN SERPL-MCNC: 32 MG/DL (ref 6–20)
CALCIUM SERPL-MCNC: 9.5 MG/DL (ref 8.7–10.5)
CHLORIDE SERPL-SCNC: 106 MMOL/L (ref 95–110)
CO2 SERPL-SCNC: 22 MMOL/L (ref 23–29)
CREAT SERPL-MCNC: 0.9 MG/DL (ref 0.5–1.4)
CYTOMEGALOVIRUS DNA, QUAL (OHS): NOT DETECTED
EPSTEIN-BARR VIRUS DNA, QUAL (OHS): ABNORMAL
EPSTEIN-BARR VIRUS LOG (IU/ML)(OHS): ABNORMAL
EPSTEIN-BARR VIRUS PCR, QUANT (OHS): <50
ERYTHROCYTE [DISTWIDTH] IN BLOOD BY AUTOMATED COUNT: 14.7 % (ref 11.5–14.5)
GFR SERPLBLD CREATININE-BSD FMLA CKD-EPI: >60 ML/MIN/1.73/M2
GLUCOSE SERPL-MCNC: 90 MG/DL (ref 70–110)
HCT VFR BLD AUTO: 25.2 % (ref 40–54)
HGB BLD-MCNC: 8.3 GM/DL (ref 14–18)
LDH SERPL-CCNC: 274 U/L (ref 110–260)
LYMPHOCYTES NFR BLD MANUAL: 3 % (ref 18–48)
MAGNESIUM SERPL-MCNC: 1.9 MG/DL (ref 1.6–2.6)
MCH RBC QN AUTO: 29.5 PG (ref 27–31)
MCHC RBC AUTO-ENTMCNC: 32.9 G/DL (ref 32–36)
MCV RBC AUTO: 90 FL (ref 82–98)
MONOCYTES NFR BLD MANUAL: 5 % (ref 4–15)
NEUTROPHILS NFR BLD MANUAL: 92 % (ref 38–73)
NUCLEATED RBC (/100WBC) (OHS): 0 /100 WBC
OVALOCYTES BLD QL SMEAR: ABNORMAL
PHOSPHATE SERPL-MCNC: 3.6 MG/DL (ref 2.7–4.5)
PLATELET # BLD AUTO: 103 K/UL (ref 150–450)
PLATELET BLD QL SMEAR: ABNORMAL
PMV BLD AUTO: 10.9 FL (ref 9.2–12.9)
POIKILOCYTOSIS BLD QL SMEAR: SLIGHT
POTASSIUM SERPL-SCNC: 4.6 MMOL/L (ref 3.5–5.1)
PROT SERPL-MCNC: 7.1 GM/DL (ref 6–8.4)
RBC # BLD AUTO: 2.81 M/UL (ref 4.6–6.2)
SODIUM SERPL-SCNC: 136 MMOL/L (ref 136–145)
TACROLIMUS BLD-MCNC: 6.7 NG/ML (ref 5–15)
URATE SERPL-MCNC: 4.7 MG/DL (ref 3.4–7)
WBC # BLD AUTO: 8.44 K/UL (ref 3.9–12.7)

## 2025-04-28 PROCEDURE — 83735 ASSAY OF MAGNESIUM: CPT

## 2025-04-28 PROCEDURE — 80197 ASSAY OF TACROLIMUS: CPT

## 2025-04-28 PROCEDURE — 36415 COLL VENOUS BLD VENIPUNCTURE: CPT

## 2025-04-28 PROCEDURE — 83615 LACTATE (LD) (LDH) ENZYME: CPT

## 2025-04-28 PROCEDURE — 84550 ASSAY OF BLOOD/URIC ACID: CPT

## 2025-04-28 PROCEDURE — 87799 DETECT AGENT NOS DNA QUANT: CPT

## 2025-04-28 PROCEDURE — 99999 PR PBB SHADOW E&M-EST. PATIENT-LVL IV: CPT | Mod: PBBFAC,,,

## 2025-04-28 PROCEDURE — 84100 ASSAY OF PHOSPHORUS: CPT

## 2025-04-28 PROCEDURE — 82247 BILIRUBIN TOTAL: CPT

## 2025-04-28 PROCEDURE — 85025 COMPLETE CBC W/AUTO DIFF WBC: CPT

## 2025-04-28 NOTE — PROGRESS NOTES
NAT sent patient's spouse's MD statement for patient other than employee to [employer], [phone].     NAT also emailed copy to patients spouse at [email or web address].

## 2025-04-28 NOTE — PROGRESS NOTES
BMT Pharmacist Immunosuppression Note:    Continuing tacrolimus regimen as is.   Goal level dropped due to parvo virus.      Current regimen: 1.5mg in AM and 1mg in PM  Capsule size(s): 0.5mg    Plan: Continue current regimen.        Lab Results   Component Value Date    TACROLIMUS 6.7 04/28/2025    TACROLIMUS 4.5 (L) 04/21/2025    TACROLIMUS 8.3 04/14/2025       Creatinine   Date Value Ref Range Status   04/28/2025 0.9 0.5 - 1.4 mg/dL Final   04/21/2025 0.9 0.5 - 1.4 mg/dL Final   04/14/2025 1.1 0.5 - 1.4 mg/dL Final     Total Bilirubin   Date Value Ref Range Status   03/20/2025 0.4 0.1 - 1.0 mg/dL Final     Comment:     For infants and newborns, interpretation of results should be based  on gestational age, weight and in agreement with clinical  observations.    Premature Infant recommended reference ranges:  Up to 24 hours.............<8.0 mg/dL  Up to 48 hours............<12.0 mg/dL  3-5 days..................<15.0 mg/dL  6-29 days.................<15.0 mg/dL     03/17/2025 0.5 0.1 - 1.0 mg/dL Final     Comment:     For infants and newborns, interpretation of results should be based  on gestational age, weight and in agreement with clinical  observations.    Premature Infant recommended reference ranges:  Up to 24 hours.............<8.0 mg/dL  Up to 48 hours............<12.0 mg/dL  3-5 days..................<15.0 mg/dL  6-29 days.................<15.0 mg/dL     03/13/2025 0.4 0.1 - 1.0 mg/dL Final     Comment:     For infants and newborns, interpretation of results should be based  on gestational age, weight and in agreement with clinical  observations.    Premature Infant recommended reference ranges:  Up to 24 hours.............<8.0 mg/dL  Up to 48 hours............<12.0 mg/dL  3-5 days..................<15.0 mg/dL  6-29 days.................<15.0 mg/dL       Bilirubin Total   Date Value Ref Range Status   04/28/2025 0.4 0.1 - 1.0 mg/dL Final     Comment:     For infants and newborns, interpretation of results  should be based   on gestational age, weight and in agreement with clinical   observations.    Premature Infant recommended reference ranges:   0-24 hours:  <8.0 mg/dL   24-48 hours: <12.0 mg/dL   3-5 days:    <15.0 mg/dL   6-29 days:   <15.0 mg/dL   04/21/2025 0.4 0.1 - 1.0 mg/dL Final     Comment:     For infants and newborns, interpretation of results should be based   on gestational age, weight and in agreement with clinical   observations.    Premature Infant recommended reference ranges:   0-24 hours:  <8.0 mg/dL   24-48 hours: <12.0 mg/dL   3-5 days:    <15.0 mg/dL   6-29 days:   <15.0 mg/dL   04/14/2025 0.4 0.1 - 1.0 mg/dL Final     Comment:     For infants and newborns, interpretation of results should be based   on gestational age, weight and in agreement with clinical   observations.    Premature Infant recommended reference ranges:   0-24 hours:  <8.0 mg/dL   24-48 hours: <12.0 mg/dL   3-5 days:    <15.0 mg/dL   6-29 days:   <15.0 mg/dL     AST   Date Value Ref Range Status   04/28/2025 30 11 - 45 unit/L Final   04/21/2025 26 11 - 45 unit/L Final   04/14/2025 40 11 - 45 unit/L Final   03/20/2025 59 (H) 10 - 40 U/L Final   03/17/2025 44 (H) 10 - 40 U/L Final   03/13/2025 40 10 - 40 U/L Final     ALT   Date Value Ref Range Status   04/28/2025 54 (H) 10 - 44 unit/L Final   04/21/2025 45 (H) 10 - 44 unit/L Final   04/14/2025 39 10 - 44 unit/L Final   03/20/2025 85 (H) 10 - 44 U/L Final   03/17/2025 64 (H) 10 - 44 U/L Final   03/13/2025 49 (H) 10 - 44 U/L Final             Philipp Reeves.[email or web address]., BCOP  Clinical Pharmacy Specialist, Bone Marrow Transplant/Cellular Therapy  [facility]  SpectraLink: [id number]

## 2025-04-29 ENCOUNTER — PATIENT MESSAGE (OUTPATIENT)
Dept: HEMATOLOGY/ONCOLOGY | Facility: CLINIC | Age: 55
End: 2025-04-29
Payer: COMMERCIAL

## 2025-04-29 NOTE — TELEPHONE ENCOUNTER
"Wife calling        Bone marrow transplant in December  Was seen in clinic today      HA 8/10  HA started after 12pm  Nausea      Took 100mg tramadol, did improve HA a small bit  Reports it is not worst HA of his life but is longest.     Dispo is ED Now (or PCP Triage)    Reached out to on-call MD for hematology. Was told to reach out to attending by Dr. Goodman. Reached out to attending and was told to reach out to fellow. Reached out to fellow and was told to reach back out to attending.     Attempted to reach attending again x2; LVM x2.     Advised pt's wife that I can only advise ER as I cannot reach on-call MD. While giving dispo, MD called back. Advised ER and for pt to go to . Advised wife per MD advisement. Wife VU.         Reason for Disposition   Patient sounds very sick or weak to the triager    Additional Information   Negative: Difficult to awaken or acting confused (e.g., disoriented, slurred speech)   Negative: [1] Weakness of the face, arm or leg on one side of the body AND [2] new-onset   Negative: [1] Numbness of the face, arm or leg on one side of the body AND [2] new-onset   Negative: [1] Loss of speech or garbled speech AND [2] new-onset   Negative: Passed out (e.g., fainted, lost consciousness, blacked out and was not responding)   Negative: Sounds like a life-threatening emergency to the triager   Negative: Unable to walk, or can only walk with assistance (e.g., requires support)   Negative: Stiff neck (can't touch chin to chest)   Negative: Severe pain in one eye   Negative: [1] Other family members (or people in same household) with headaches AND [2] possibility of carbon monoxide exposure   Negative: [1] SEVERE headache (e.g., excruciating) AND [2] "worst headache" of life   Negative: [1] SEVERE headache AND [2] sudden-onset (i.e., reaching maximum intensity within seconds to 1 hour)   Negative: [1] SEVERE headache AND [2] fever   Negative: Loss of vision or double vision  (Exception: Same " as previously diagnosed migraines.)   Negative: [1] Fever > 100 F (37.8 C) AND [2] diabetes mellitus or weak immune system (e.g., HIV positive, cancer chemo, splenectomy, organ transplant, chronic steroids)    Protocols used: Headache-A-AH

## 2025-05-01 ENCOUNTER — OFFICE VISIT (OUTPATIENT)
Dept: INFECTIOUS DISEASES | Facility: CLINIC | Age: 55
End: 2025-05-01
Payer: COMMERCIAL

## 2025-05-01 VITALS
TEMPERATURE: 98 F | SYSTOLIC BLOOD PRESSURE: 126 MMHG | BODY MASS INDEX: 23.46 KG/M2 | DIASTOLIC BLOOD PRESSURE: 83 MMHG | HEART RATE: 89 BPM | HEIGHT: 71 IN | WEIGHT: 167.56 LBS

## 2025-05-01 DIAGNOSIS — Z94.84 HISTORY OF ALLOGENEIC STEM CELL TRANSPLANT: Primary | ICD-10-CM

## 2025-05-01 PROCEDURE — 99999 PR PBB SHADOW E&M-EST. PATIENT-LVL III: CPT | Mod: PBBFAC,,, | Performed by: INTERNAL MEDICINE

## 2025-05-01 NOTE — PROGRESS NOTES
POST-HSCT VACCINATION ENCOUNTER    Subjective:     Patient ID: Rubén Hu is a 54 y.o. male    CC: BMT vaccine update    [facility] oncologist: Reilly  [facility] oncologist: none    Transplant: Allogeneic stem cell transplant  Date of Transplant: 12/26/24  cGVHD: yes - some skin, GI   Current therapy: tacrolimus, budesonide, MMF off, cyclophosphamide   Prophylaxis:  atovaquone, acyclovir, letermovir, voriconazole      HPI: Rubén Hu is a 54 y.o. male presenting for post-stem cell transplant vaccination update. Patient denies any acute complaints today.  Recently diagnosed w/ parvovirus, receiving blood transfusions, awaiting IVIG      Review of Systems   All other systems reviewed and are negative.       Past Medical History:   Diagnosis Date    Abdominal pain 01/02/2025    Allergic contact dermatitis due to adhesives 12/31/2024    Cancer     Flatulence 01/03/2025    Headache 12/27/2024    Hyperlipidemia     Mucositis 01/02/2025    Other constipation 12/19/2024    Thrombocytosis 12/18/2024    Transaminitis 12/27/2024       Past Surgical History:   Procedure Laterality Date    BONE MARROW BIOPSY N/A 11/27/2024    Procedure: Biopsy-bone marrow;  Surgeon: Clyde James MD;  Location: [address]);  Service: Oncology;  Laterality: N/A;  11/20-pre call complete-tb    BONE MARROW BIOPSY N/A 4/9/2025    Procedure: Biopsy-bone marrow;  Surgeon: Clyde James MD;  Location: [address]);  Service: Oncology;  Laterality: N/A;  4/1 precall complete. EW    COLONOSCOPY N/A 8/9/2023    Procedure: COLONOSCOPY;  Surgeon: Will Ardon MD;  Location: [address]);  Service: Endoscopy;  Laterality: N/A;  Suprep Instructions sent via portal / Authorizing:     Bebeto Arora MD in [facility] FAMILY MED/ INTERNAL MED/ PEDS  Referral:          [id number]    EYE SURGERY      FLEXIBLE SIGMOIDOSCOPY N/A 7/23/2024    Procedure: SIGMOIDOSCOPY, FLEXIBLE;  Surgeon: Nirali Vicente MD;  Location: [facility];  Service:  Endoscopy;  Laterality: N/A;    INSERTION OF NORMAN CATHETER Right 12/18/2024    Procedure: INSERTION, CATHETER, CENTRAL VENOUS, NORMAN TRIPLE LUMEN, Bard 12.5 fr Norman Cath model [id number];  Surgeon: Willie Hadley MD;  Location: [facility] OR [address] [place] [address];  Service: General;  Laterality: Right;       Family History   Problem Relation Name Age of Onset    Arthritis Mother      COPD Father      Testicular cancer Maternal Cousin Peter 17        Chemotherapy    Breast cancer Maternal Cousin  51        Chemotherapy    Heart disease Maternal Grandfather      Stroke Maternal Grandmother         Social History[1]    Immunization History   Administered Date(s) Administered    COVID-19 Vaccine 04/01/2021    COVID-19, vector-nr, rS-Ad26, PF (Isaac) 03/16/2021    Influenza (FLUBLOK) - Quadrivalent - Recombinant - PF *Preferred* (egg allergy) 10/20/2020, 10/25/2021, 10/14/2022, 09/29/2023    Influenza - Quadrivalent - MDCK - PF 10/08/2019    Influenza - Trivalent - Afluria, Fluzone MDV 10/01/2023    Pneumococcal Conjugate - 20 Valent 02/20/2024    Pneumococcal Polysaccharide - 23 Valent 08/09/2019    Tdap 01/21/2022    Zoster Recombinant 08/11/2020, 12/03/2020        Objective:     Physical Exam  Constitutional:       General: He is not in acute distress.     Appearance: Normal appearance. He is well-developed. He is not ill-appearing or diaphoretic.   HENT:      Head: Normocephalic and atraumatic.      Right Ear: External ear normal.      Left Ear: External ear normal.      Nose: Nose normal.   Eyes:      General: No scleral icterus.        Right eye: No discharge.         Left eye: No discharge.      Extraocular Movements: Extraocular movements intact.      Conjunctiva/sclera: Conjunctivae normal.   Pulmonary:      Effort: Pulmonary effort is normal. No respiratory distress.      Breath sounds: No stridor.   Skin:     General: Skin is dry.      Coloration: Skin is not jaundiced or pale.      Findings: No erythema.    Neurological:      General: No focal deficit present.      Mental Status: He is alert and oriented to person, place, and time. Mental status is at baseline.   Psychiatric:         Mood and Affect: Mood normal.         Behavior: Behavior normal.         Thought Content: Thought content normal.         Judgment: Judgment normal.       Data:    All data, including recent labs, radiology, and pathology, has been independently reviewed.      Labs:    Recent Labs   Lab Result Units 04/14/25  0911 04/21/25  0803 04/28/25  0800   WBC K/uL 3.05* 4.74 8.44   HGB gm/dL 8.1* 6.8* 8.3*   HCT % 24.4* 21.3* 25.2*   Sodium mmol/L 137 139 136   Potassium mmol/L 4.8 4.6 4.6   Chloride mmol/L 106 109 106   BUN mg/dL 21* 27* 32*   Creatinine mg/dL 1.1 0.9 0.9   AST unit/L 40 26 30   ALT unit/L 39 45* 54*   ALP unit/L 169* 132 123   Bilirubin Total mg/dL 0.4 0.4 0.4          Radiology:    No results found in the last 24 hours.       Assessment:    1. History of allogeneic stem cell transplant  Vaccines to start once patient completes prograf  Will discuss IVIG scheduling w/ BMT for parvovirus             COVID-19 mRNA Vaccine Booster - pt defers    COVID-19  Dose 1 Dose 2   Moderna >=3m >=5m   Pfizer-BioNTech >=3m >=5m     RSV - to send to pharmacy, pt agrees to out of pocket cost  Coverage may be limited to age>60 years    RSV Dose 1   Arexvy >=3m     Inactivated High-Dose Influenza Vaccine     Month Time After Transplant   September - March >=6m Annually     Adult Inactivated Vaccine Schedule    Inactivated Vaccine >=6m >=8m >=12m >=14m >=18m   Pneumococcal-conjugate (Prevnar 20)* PCV20 PCV20 PCV20  PCV20   Haemophilus influenzae type B  HiB HiB HiB     Inactivated Polio IPV IPV IPV     Diphtheria-Tetanus-acellular Pertussis DTaP DTaP DTaP     Twinrix (Hepatitis A/B) HAV/HBV HAV/HBV HAV/HBV         Adult Live Vaccine Schedule After 24 Months - All Stem Cell Transplants     2-1-5 Rule - Not until:  2 years post-HSCT  >1 year off all  immunosuppressive therapy  >5 months since last dose of IVIg, VZIg, plasma transfusion    Live Vaccine >=24m >=25m   Measles/Mumps/Rubella (MMR) MMR MMR       Adult Non-Live Adjuvant Vaccine Schedule - Shingrix    Shingles Non-Live Adjuvant Vaccine (Shingrix)  (Insurance may not cover if age <50 years)   Allogeneic  Not until:  2 years post-SCT  >=8mo off immunosuppressive therapy  No flares of GvHD       Shingrix #1 >=24m   Shingrix #2 (2-6 months after #1) >=26m-30m       Follow up once prograf has been tapered off    The total time for evaluation and management services performed on 5/1/25 was greater than 30 minutes.       Juli Cocco DO  Transplant Infectious Disease         [1]   Social History  Tobacco Use    Smoking status: Former     Types: Cigars, Cigarettes    Smokeless tobacco: Never   Substance Use Topics    Alcohol use: Not Currently     Comment: socially    Drug use: Never

## 2025-05-02 ENCOUNTER — DOCUMENTATION ONLY (OUTPATIENT)
Dept: HEMATOLOGY/ONCOLOGY | Facility: CLINIC | Age: 55
End: 2025-05-02
Payer: COMMERCIAL

## 2025-05-02 NOTE — PROGRESS NOTES
Section of Hematology and Stem Cell Transplantation    Post-Transplantation Follow Up Visit     5/5/25    Transplant History:   Primary oncologist: Clyde James MD  Primary oncologic diagnosis: primary myelofibrosis  Transplant date: 12/26/2024  Donor: haploidentical  Blood Type (Patient): O +  Blood Type (Donor): O +  CMV (Patient): Negative  CMV (Donor): Positive  Graft source: Peripheral blood  CD34+ cell dose: 6.04 X10^6 cells/kg  Conditioning Regimen: Thiotepa, Busulfan, Fludarabine  GVHD prophylaxis: Post-transplant cyclophosphamide, MMF, Tacrolimus  Immediate post-transplant complications: mucositis, R axillary skin/soft tissue infection secondary to MRSA    History of Present Ilness:   Rubén Peters) is a pleasant 54 y.o.male with primary myelofibrosis who is status post haploidentical stem cell transplantation conditioned with Bu/Flu/TT who is currently day +130 who presents for post-transplant follow up.    He reports feeling very fatigued and short of breath this weekend with exertion (of note HGB 6.4 today). His appetite is doing great. He is currently tapering off of budesonide. No recent nausea, vomiting, diarrhea, rash. He is still waiting to get his IVIG pending insurance approval.       PAST MEDICAL HISTORY:   Past Medical History:   Diagnosis Date    Abdominal pain 01/02/2025    Allergic contact dermatitis due to adhesives 12/31/2024    Cancer     Flatulence 01/03/2025    Headache 12/27/2024    Hyperlipidemia     Mucositis 01/02/2025    Other constipation 12/19/2024    Thrombocytosis 12/18/2024    Transaminitis 12/27/2024       PAST SURGICAL HISTORY:   Past Surgical History:   Procedure Laterality Date    BONE MARROW BIOPSY N/A 11/27/2024    Procedure: Biopsy-bone marrow;  Surgeon: Clyde James MD;  Location: [address]);  Service: Oncology;  Laterality: N/A;  11/20-pre call complete-tb    BONE MARROW BIOPSY N/A 4/9/2025    Procedure: Biopsy-bone marrow;  Surgeon: Jacob  Clyde GARG MD;  Location: [facility] ENDO (4TH FLR);  Service: Oncology;  Laterality: N/A;  4/1 precall complete. EW    COLONOSCOPY N/A 8/9/2023    Procedure: COLONOSCOPY;  Surgeon: Will Ardon MD;  Location: [facility] ENDO (4TH FLR);  Service: Endoscopy;  Laterality: N/A;  Suprep Instructions sent via portal / Authorizing:     Bebeto Arora MD in [facility] FAMILY MED/ INTERNAL MED/ PEDS  Referral:          [id number]    EYE SURGERY      FLEXIBLE SIGMOIDOSCOPY N/A 7/23/2024    Procedure: SIGMOIDOSCOPY, FLEXIBLE;  Surgeon: Nirali Vicente MD;  Location: [facility] ENDO;  Service: Endoscopy;  Laterality: N/A;    INSERTION OF LONGORIA CATHETER Right 12/18/2024    Procedure: INSERTION, CATHETER, CENTRAL VENOUS, OLNGORIA TRIPLE LUMEN, Bard 12.5 fr Longoria Cath model [id number];  Surgeon: Willie Hadley MD;  Location: [facility] OR 2ND FLR;  Service: General;  Laterality: Right;       PAST SOCIAL HISTORY:  Social History     Tobacco Use    Smoking status: Former     Types: Cigars, Cigarettes    Smokeless tobacco: Never   Substance Use Topics    Alcohol use: Not Currently     Comment: socially    Drug use: Never       FAMILY HISTORY:  Family History   Problem Relation Name Age of Onset    Arthritis Mother      COPD Father      Testicular cancer Maternal Cousin Peter 17        Chemotherapy    Breast cancer Maternal Cousin  51        Chemotherapy    Heart disease Maternal Grandfather      Stroke Maternal Grandmother         CURRENT MEDICATIONS:   Current Outpatient Medications   Medication Sig    acyclovir (ZOVIRAX) 800 MG Tab Take 1 tablet (800 mg total) by mouth 2 (two) times daily.    atovaquone (MEPRON) 750 mg/5 mL Susp oral liquid Take 10 mLs (1,500 mg total) by mouth once daily.    budesonide (ENTOCORT EC) 3 mg capsule Take 1 capsule (3 mg total) by mouth 3 (three) times daily. (Patient taking differently: Take 3 mg by mouth once daily.)    eltrombopag olamine (PROMACTA) 50 MG Tab Take 1 tablet (50 mg total) by mouth once daily.     ergocalciferol (ERGOCALCIFEROL) 50,000 unit Cap Take 1 capsule (50,000 Units total) by mouth every 7 days.    letermovir (PREVYMIS) 480 mg Tab Take 1 tablet (480 mg total) by mouth once daily.    magnesium oxide (MAG-OX) 400 mg (241.3 mg magnesium) tablet Take 2 tablets in the morning, 1 tablet in the afternoon, and 2 tablets in the evening.    ondansetron (ZOFRAN) 8 MG tablet Take 1 tablet (8 mg total) by mouth every 8 (eight) hours as needed for Nausea.    prochlorperazine (COMPAZINE) 10 MG tablet Take 1 tablet (10 mg total) by mouth 4 (four) times daily as needed for Nausea.    tacrolimus (PROGRAF) 0.5 MG Cap Take 3 capsules (1.5 mg total) by mouth every morning AND 2 capsules (1 mg total) every evening.    traMADoL (ULTRAM) 50 mg tablet Take 1 tablet (50 mg total) by mouth every 6 (six) hours as needed for Pain.    triamcinolone acetonide 0.1% (KENALOG) 0.1 % cream Apply topically 2 (two) times daily as needed (rash on arms, chest, back, trunk, and legs (do not apply to face)).    voriconazole (VFEND) 200 MG Tab Take 1 tablet (200 mg total) by mouth 2 (two) times daily.     No current facility-administered medications for this visit.       ALLERGIES:   Review of patient's allergies indicates:   Allergen Reactions    Sulfamethoxazole-trimethoprim Hives       GVHD Review of Systems:     Pertinent positives and negatives included in the HPI. Otherwise a 14 point review of systems is negative. GVHD review of systems recorded in BMT flowsheet.     Physical Exam:     Vitals:    05/05/25 0843   BP: (!) 142/75   Pulse: 76   Resp: 18   Temp: 97.6 °F (36.4 °C)           General: Appears well, NAD  HEENT: MMM, no OP lesions  Neck: Supple, no LAD  Pulmonary: CTAB, no increased work of breathing, no W/R/C  Cardiovascular: S1S2 normal, RRR, no M/R/G  Abdominal: Soft, NT, ND, BS+, no HSM  Extremities: No C/C/E  Neurological: AAOx4, grossly normal, no focal deficits  Dermatologic: No appreciable rashes or lesions     ECOG  Performance Status: (foot note - ECOG PS provided by [employer]cology [employer]) 1 - Symptomatic but completely ambulatory    Karnofsky Performance Score:  70%- Cares for Self: Unable to Carry on Normal Activity or Active Work    Labs:   Lab Results   Component Value Date    WBC 8.44 04/28/2025    RBC 2.81 (L) 04/28/2025    HGB 8.3 (L) 04/28/2025    HCT 25.2 (L) 04/28/2025    MCV 90 04/28/2025    MCH 29.5 04/28/2025    MCHC 32.9 04/28/2025    RDW 14.7 (H) 04/28/2025     (L) 04/28/2025    MPV 10.9 04/28/2025    GRAN 7.8 04/28/2025    LYMPH 3.0 (L) 04/28/2025    MONO 5.0 04/28/2025    EOS 0.6 04/21/2025    EOS 0.03 04/21/2025    BASO 0.02 03/20/2025    EOSINOPHIL 0.6 03/20/2025    BASOPHIL 0.2 04/21/2025    BASOPHIL 0.01 04/21/2025       Sodium   Date Value Ref Range Status   05/05/2025 139 136 - 145 mmol/L Final   03/20/2025 138 136 - 145 mmol/L Final     Potassium   Date Value Ref Range Status   05/05/2025 4.7 3.5 - 5.1 mmol/L Final   03/20/2025 4.2 3.5 - 5.1 mmol/L Final     Chloride   Date Value Ref Range Status   05/05/2025 107 95 - 110 mmol/L Final   03/20/2025 108 95 - 110 mmol/L Final     CO2   Date Value Ref Range Status   05/05/2025 23 23 - 29 mmol/L Final   03/20/2025 21 (L) 23 - 29 mmol/L Final     Glucose   Date Value Ref Range Status   05/05/2025 97 70 - 110 mg/dL Final   03/20/2025 101 70 - 110 mg/dL Final     BUN   Date Value Ref Range Status   05/05/2025 30 (H) 6 - 20 mg/dL Final     Creatinine   Date Value Ref Range Status   05/05/2025 0.9 0.5 - 1.4 mg/dL Final     Calcium   Date Value Ref Range Status   05/05/2025 9.4 8.7 - 10.5 mg/dL Final   03/20/2025 9.2 8.7 - 10.5 mg/dL Final     Protein Total   Date Value Ref Range Status   05/05/2025 6.5 6.0 - 8.4 gm/dL Final     Total Protein   Date Value Ref Range Status   03/20/2025 6.6 6.0 - 8.4 g/dL Final     Albumin   Date Value Ref Range Status   05/05/2025 3.7 3.5 - 5.2 g/dL Final   03/20/2025 3.7 3.5 - 5.2 g/dL Final     Total Bilirubin    Date Value Ref Range Status   03/20/2025 0.4 0.1 - 1.0 mg/dL Final     Comment:     For infants and newborns, interpretation of results should be based  on gestational age, weight and in agreement with clinical  observations.    Premature Infant recommended reference ranges:  Up to 24 hours.............<8.0 mg/dL  Up to 48 hours............<12.0 mg/dL  3-5 days..................<15.0 mg/dL  6-29 days.................<15.0 mg/dL       Bilirubin Total   Date Value Ref Range Status   05/05/2025 0.4 0.1 - 1.0 mg/dL Final     Comment:     For infants and newborns, interpretation of results should be based   on gestational age, weight and in agreement with clinical   observations.    Premature Infant recommended reference ranges:   0-24 hours:  <8.0 mg/dL   24-48 hours: <12.0 mg/dL   3-5 days:    <15.0 mg/dL   6-29 days:   <15.0 mg/dL     Alkaline Phosphatase   Date Value Ref Range Status   03/20/2025 122 40 - 150 U/L Final     ALP   Date Value Ref Range Status   05/05/2025 111 40 - 150 unit/L Final     AST   Date Value Ref Range Status   05/05/2025 31 11 - 45 unit/L Final   03/20/2025 59 (H) 10 - 40 U/L Final     ALT   Date Value Ref Range Status   05/05/2025 61 (H) 10 - 44 unit/L Final   03/20/2025 85 (H) 10 - 44 U/L Final     Anion Gap   Date Value Ref Range Status   05/05/2025 9 8 - 16 mmol/L Final     eGFR if    Date Value Ref Range Status   01/22/2022 >60.0 >60 mL/min/1.73 m^2 Final     eGFR if non    Date Value Ref Range Status   01/22/2022 >60.0 >60 mL/min/1.73 m^2 Final     Comment:     Calculation used to obtain the estimated glomerular filtration  rate (eGFR) is the CKD-EPI equation.          Imaging:   All pertinent studies reviewed and interpreted by me       Assessment and Plan:   Rubén Hu (Rubén) is a pleasant 54 y.o.male with primary myelofibrosis s/p haplo SCT who presents for post-transplant follow up.    Primary myelofibrosis: Status post treatment with  ruxolitinib, followed by alloSCT. Day +30 BMBx results below.    Status post allogeneic stem cell transplantation: As noted above, status post haploidentical stem cell transplantation conditioned with Bu/Flu/TT. Currently day +130.  Engrafted on day +22.  Day +30 BMBx on 1/2/25: No definitive evidence of residual JAK2 [id number] primary myelofibrosis. NGS without evidence of pathogenic mutations. Chimerism studies with 100% donor CD33, CD3 insufficient for analysis.   Plan for central line removal -  4/3/25  Day 100 bone marrow biopsy 4/9/25:Markedly hypocellular marrow (<5%-15%) with trilineage hematopoiesis. NGS with DNMT3A 2%. CG normal and JAK2 negative. Chimerisms 90% CD3 and 100% CD33 donor.    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 MMF has now been discontinued. 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 Restarted on budesonide 3mg TID (4/14/25), and he reports resolution of nausea. Taper budesonide ending 5/12. With parvovirus noted, will allow for low-normal tacro levels (goal 4-10).  Current tacro dose: 1.5mg qaM and 1 mg qPM   Last tacro level: 6.1  Adjustments: none  Budesonide taper:  4/28-5/4- Twice a day   5/5-5/11- Daily  5/12- Stop    Immunosuppression: Prevention with voriconazole,  acyclovir. CMV prophylaxis with letermovir. PJP prophyalxis dapsone. Continue weekly monitoring of CMV and EBV.  Last CMV: negative (4/21/2025), last EBV: negative (4/21/2025).  Active infections: none    Pancytopenia: Due to underlying disease and chemotherapy. Transfuse for Hgb <7 g/dL and platelets <10k. Likely related to parvovirus (see below). Continue Promacta 50mg daily. Consider adding Retacrit.   Hgb 6.4 today. 1 unit PRBCs today .     Parvovirus: Obtained to evaluate for pancytopenia. Will start IVIG as soon as insurance approves. Monitor labs weekly and continue transfusion support.     Hypertension:  Was newly hypertensive in  hospital following SCT at which time he was started on amlodipine. Discontinued amlodipine after hospitalization due to hypotension. Monitor for now but restart antihypertensives if this worsens.     Cardiomyopathy: Echo 2/26/25 obtained due to tachycardia revealed reduced LVEF 45-50%. No signs/symptoms of HFrEF. Repeat echo in 3 months (5/2025).    Vitamin D deficiency: His primary care is giving him 50,000 units once a week.     Hypercholesterolemia: He is following with primary care. Could be elevated due to tacrolimus.     Tension Headache: Patient with more frequent headaches across his forehead. Unsure of cause but could be stress or dehydration. Encourage more frequent hydration. Tramadol prn for headaches.   Improving overall.     Follow up: Weekly follow up with labs.    Orders Placed:      No orders of the defined types were placed in this encounter.     Route Chart for Scheduling  BMT Route Chart for Scheduling      Supportive Plan Information  OP IVIG Clarence Grover MD   Associated Diagnosis: Parvovirus B19 infection   noted on 4/21/2025   Line of treatment: Supportive Care   Treatment goal: Supportive     Upcoming Treatment Dates - OP IVIG    4/28/2025       Pre-Medications       acetaminophen tablet 650 mg       diphenhydrAMINE (BENADRYL) 50 mg in NS 50 mL IVPB       famotidine (PF) injection 20 mg       Medications       immun glob G (IgG)-gly-IgA 50+ (GAMUNEX-C/GAMMAKED) (GAMUNEX-C) 20 gram/200 mL (10 %) injection 75 g  4/29/2025       Pre-Medications       acetaminophen tablet 650 mg       diphenhydrAMINE (BENADRYL) 50 mg in NS 50 mL IVPB       famotidine (PF) injection 20 mg       Medications       immun glob G (IgG)-gly-IgA 50+ (GAMUNEX-C/GAMMAKED) (GAMUNEX-C) 20 gram/200 mL (10 %) injection 75 g    Treatment Plan Information   OP Adult Blood - Twice Weekly Clyde James MD   Associated Diagnosis: Anemia associated with chemotherapy   noted on 2/4/2025   Line of treatment: Supportive  Care  Treatment Goal: Supportive     Upcoming Treatment Dates - OP Adult Blood - Twice Weekly    4/22/2025       Prepare RBC       Transfuse RBC 1 Unit  4/25/2025       Prepare RBC       Transfuse RBC 1 Unit    Therapy Plan Information  PORT FLUSH for Adrenal nodule, noted on 12/9/2024  Flushes  heparin, porcine (PF) 100 unit/mL injection flush 500 Units  500 Units, Intravenous, Every visit  sodium chloride 0.9% flush 10 mL  10 mL, Intravenous, Every visit      No therapy plan of the specified type found.    No therapy plan of the specified type found.    Total time of this visit was 40 minutes, including time spent face to face with patient and/or via video/audio, and also in preparing for today's visit for MDM and documentation. (Medical Decision Making, including consideration of possible diagnoses, management options, complex medical record review, review of diagnostic tests and information, consideration and discussion of significant complications based on comorbidities, and discussion with providers involved with the care of the patient). Greater than 50% was spent face to face with the patient counseling and coordinating care.    Visit today included increased complexity associated with the care of the episodic problem parvovirus induced anemia addressed and managing the longitudinal care of the patient due to the serious and/or complex managed problem(s) s/p allogeneic stem cell transplant .     Opal Alonzo PA-C  Malignant Hematology, Stem Cell Transplant, and Cellular Therapy  [facility]  [facility] [place]

## 2025-05-02 NOTE — PROGRESS NOTES
NAT received email from patient and [facility] requesting documentation for patient's medical leave.    NAT emailed 5.1 and 4.28 progress notes, 4.28 pharm note and 2.26 Echo as requested to [email or web address] also Cc'd patient.    Auto reply states processing may take 3 business days, SW forwarded response to patient.

## 2025-05-05 ENCOUNTER — INFUSION (OUTPATIENT)
Dept: INFUSION THERAPY | Facility: HOSPITAL | Age: 55
End: 2025-05-05
Payer: COMMERCIAL

## 2025-05-05 ENCOUNTER — TELEPHONE (OUTPATIENT)
Dept: HEMATOLOGY/ONCOLOGY | Facility: CLINIC | Age: 55
End: 2025-05-05

## 2025-05-05 ENCOUNTER — OFFICE VISIT (OUTPATIENT)
Dept: HEMATOLOGY/ONCOLOGY | Facility: CLINIC | Age: 55
End: 2025-05-05
Payer: COMMERCIAL

## 2025-05-05 ENCOUNTER — RESULTS FOLLOW-UP (OUTPATIENT)
Dept: HEMATOLOGY/ONCOLOGY | Facility: CLINIC | Age: 55
End: 2025-05-05

## 2025-05-05 ENCOUNTER — LAB VISIT (OUTPATIENT)
Dept: LAB | Facility: HOSPITAL | Age: 55
End: 2025-05-05
Attending: INTERNAL MEDICINE
Payer: COMMERCIAL

## 2025-05-05 VITALS
RESPIRATION RATE: 18 BRPM | DIASTOLIC BLOOD PRESSURE: 68 MMHG | OXYGEN SATURATION: 98 % | TEMPERATURE: 98 F | HEART RATE: 77 BPM | SYSTOLIC BLOOD PRESSURE: 121 MMHG

## 2025-05-05 VITALS
TEMPERATURE: 98 F | BODY MASS INDEX: 23.89 KG/M2 | DIASTOLIC BLOOD PRESSURE: 75 MMHG | SYSTOLIC BLOOD PRESSURE: 142 MMHG | WEIGHT: 170.63 LBS | HEART RATE: 76 BPM | OXYGEN SATURATION: 100 % | RESPIRATION RATE: 18 BRPM | HEIGHT: 71 IN

## 2025-05-05 DIAGNOSIS — Z94.84 HISTORY OF ALLOGENEIC STEM CELL TRANSPLANT: Primary | ICD-10-CM

## 2025-05-05 DIAGNOSIS — Z76.82 STEM CELL TRANSPLANT CANDIDATE: ICD-10-CM

## 2025-05-05 DIAGNOSIS — I42.9 CARDIOMYOPATHY, UNSPECIFIED TYPE: ICD-10-CM

## 2025-05-05 DIAGNOSIS — E78.00 HYPERCHOLESTEROLEMIA: ICD-10-CM

## 2025-05-05 DIAGNOSIS — E55.9 VITAMIN D DEFICIENCY: ICD-10-CM

## 2025-05-05 DIAGNOSIS — I10 HYPERTENSION, UNSPECIFIED TYPE: ICD-10-CM

## 2025-05-05 DIAGNOSIS — D47.1 PRIMARY MYELOFIBROSIS: ICD-10-CM

## 2025-05-05 DIAGNOSIS — B34.3 PARVOVIRUS B19 INFECTION: ICD-10-CM

## 2025-05-05 DIAGNOSIS — D84.81 IMMUNODEFICIENCY DUE TO CONDITIONS CLASSIFIED ELSEWHERE: ICD-10-CM

## 2025-05-05 DIAGNOSIS — G44.209 TENSION HEADACHE: ICD-10-CM

## 2025-05-05 DIAGNOSIS — D61.818 PANCYTOPENIA: ICD-10-CM

## 2025-05-05 DIAGNOSIS — D64.81 ANEMIA ASSOCIATED WITH CHEMOTHERAPY: Primary | ICD-10-CM

## 2025-05-05 DIAGNOSIS — T45.1X5A ANEMIA ASSOCIATED WITH CHEMOTHERAPY: Primary | ICD-10-CM

## 2025-05-05 DIAGNOSIS — Z94.84 HISTORY OF ALLOGENEIC STEM CELL TRANSPLANT: ICD-10-CM

## 2025-05-05 LAB
ABO + RH BLD: NORMAL
ABSOLUTE NEUTROPHIL MANUAL (OHS): 6.3 K/UL
ALBUMIN SERPL BCP-MCNC: 3.7 G/DL (ref 3.5–5.2)
ALP SERPL-CCNC: 111 UNIT/L (ref 40–150)
ALT SERPL W/O P-5'-P-CCNC: 61 UNIT/L (ref 10–44)
ANION GAP (OHS): 9 MMOL/L (ref 8–16)
ANISOCYTOSIS BLD QL SMEAR: SLIGHT
AST SERPL-CCNC: 31 UNIT/L (ref 11–45)
BILIRUB SERPL-MCNC: 0.4 MG/DL (ref 0.1–1)
BLD PROD TYP BPU: NORMAL
BLOOD UNIT EXPIRATION DATE: NORMAL
BLOOD UNIT TYPE CODE: 5100
BUN SERPL-MCNC: 30 MG/DL (ref 6–20)
CALCIUM SERPL-MCNC: 9.4 MG/DL (ref 8.7–10.5)
CHLORIDE SERPL-SCNC: 107 MMOL/L (ref 95–110)
CO2 SERPL-SCNC: 23 MMOL/L (ref 23–29)
CREAT SERPL-MCNC: 0.9 MG/DL (ref 0.5–1.4)
CROSSMATCH INTERPRETATION: NORMAL
CYTOMEGALOVIRUS DNA, QUAL (OHS): NOT DETECTED
DISPENSE STATUS: NORMAL
EPSTEIN-BARR VIRUS DNA, QUAL (OHS): ABNORMAL
EPSTEIN-BARR VIRUS LOG (IU/ML)(OHS): ABNORMAL
EPSTEIN-BARR VIRUS PCR, QUANT (OHS): <50
ERYTHROCYTE [DISTWIDTH] IN BLOOD BY AUTOMATED COUNT: 14.6 % (ref 11.5–14.5)
GFR SERPLBLD CREATININE-BSD FMLA CKD-EPI: >60 ML/MIN/1.73/M2
GLUCOSE SERPL-MCNC: 97 MG/DL (ref 70–110)
HCT VFR BLD AUTO: 19.9 % (ref 40–54)
HGB BLD-MCNC: 6.4 GM/DL (ref 14–18)
IGA SERPL-MCNC: 85 MG/DL (ref 40–350)
IGG SERPL-MCNC: 761 MG/DL (ref 650–1600)
IGM SERPL-MCNC: 135 MG/DL (ref 50–300)
INDIRECT COOMBS: NORMAL
LYMPHOCYTES NFR BLD MANUAL: 5 % (ref 18–48)
MAGNESIUM SERPL-MCNC: 1.8 MG/DL (ref 1.6–2.6)
MCH RBC QN AUTO: 28.7 PG (ref 27–31)
MCHC RBC AUTO-ENTMCNC: 32.2 G/DL (ref 32–36)
MCV RBC AUTO: 89 FL (ref 82–98)
METAMYELOCYTES NFR BLD MANUAL: 1 %
NEUTROPHILS NFR BLD MANUAL: 91 % (ref 38–73)
NEUTS BAND NFR BLD MANUAL: 3 %
NUCLEATED RBC (/100WBC) (OHS): 0 /100 WBC
OVALOCYTES BLD QL SMEAR: ABNORMAL
PHOSPHATE SERPL-MCNC: 3.9 MG/DL (ref 2.7–4.5)
PLATELET # BLD AUTO: 105 K/UL (ref 150–450)
PMV BLD AUTO: 10.5 FL (ref 9.2–12.9)
POIKILOCYTOSIS BLD QL SMEAR: SLIGHT
POLYCHROMASIA BLD QL SMEAR: ABNORMAL
POTASSIUM SERPL-SCNC: 4.7 MMOL/L (ref 3.5–5.1)
PROT SERPL-MCNC: 6.5 GM/DL (ref 6–8.4)
RBC # BLD AUTO: 2.23 M/UL (ref 4.6–6.2)
RH BLD: NORMAL
SODIUM SERPL-SCNC: 139 MMOL/L (ref 136–145)
SPECIMEN OUTDATE: NORMAL
TACROLIMUS BLD-MCNC: 6.1 NG/ML (ref 5–15)
UNIT NUMBER: NORMAL
WBC # BLD AUTO: 6.65 K/UL (ref 3.9–12.7)

## 2025-05-05 PROCEDURE — 85025 COMPLETE CBC W/AUTO DIFF WBC: CPT

## 2025-05-05 PROCEDURE — P9040 RBC LEUKOREDUCED IRRADIATED: HCPCS | Performed by: INTERNAL MEDICINE

## 2025-05-05 PROCEDURE — 86920 COMPATIBILITY TEST SPIN: CPT | Performed by: INTERNAL MEDICINE

## 2025-05-05 PROCEDURE — 82784 ASSAY IGA/IGD/IGG/IGM EACH: CPT | Mod: 59

## 2025-05-05 PROCEDURE — 82040 ASSAY OF SERUM ALBUMIN: CPT

## 2025-05-05 PROCEDURE — 83735 ASSAY OF MAGNESIUM: CPT

## 2025-05-05 PROCEDURE — 99999 PR PBB SHADOW E&M-EST. PATIENT-LVL IV: CPT | Mod: PBBFAC,,,

## 2025-05-05 PROCEDURE — 84100 ASSAY OF PHOSPHORUS: CPT

## 2025-05-05 PROCEDURE — 86850 RBC ANTIBODY SCREEN: CPT | Performed by: INTERNAL MEDICINE

## 2025-05-05 PROCEDURE — 36430 TRANSFUSION BLD/BLD COMPNT: CPT

## 2025-05-05 PROCEDURE — 36415 COLL VENOUS BLD VENIPUNCTURE: CPT

## 2025-05-05 PROCEDURE — 80197 ASSAY OF TACROLIMUS: CPT

## 2025-05-05 PROCEDURE — 87799 DETECT AGENT NOS DNA QUANT: CPT

## 2025-05-05 RX ORDER — SODIUM CHLORIDE 0.9 % (FLUSH) 0.9 %
10 SYRINGE (ML) INJECTION
Status: DISCONTINUED | OUTPATIENT
Start: 2025-05-05 | End: 2025-05-05 | Stop reason: HOSPADM

## 2025-05-05 RX ORDER — HEPARIN 100 UNIT/ML
500 SYRINGE INTRAVENOUS
Status: DISCONTINUED | OUTPATIENT
Start: 2025-05-05 | End: 2025-05-05 | Stop reason: HOSPADM

## 2025-05-05 RX ORDER — SODIUM CHLORIDE 9 MG/ML
50 INJECTION, SOLUTION INTRAVENOUS CONTINUOUS
Status: DISCONTINUED | OUTPATIENT
Start: 2025-05-05 | End: 2025-05-05 | Stop reason: HOSPADM

## 2025-05-05 NOTE — TELEPHONE ENCOUNTER
----- Message from sportif225 sent at 5/5/2025 11:25 AM CDT -----  Regarding: Consult/Advisory  Contact: :[email or web address]   Consult/Advisory Name Of Caller:[email or web address]  Contact Preference:[phone]  Nature of call:Jesika is calling to get a PA for injection, gammma guard 500 mg.  [place] [facility] [place] [phone] ref# [id number].

## 2025-05-05 NOTE — PLAN OF CARE
1730 Pt tolerated 1 unit PBRC's well, reviewed s/s of post transfusion reaction, knows when to contact MD, when to report to ER; AVS declined, pt verbalized understanding, aware of upcoming appts. Pt ambulatory from clinic with steady gait, no distress noted. VSS

## 2025-05-05 NOTE — TELEPHONE ENCOUNTER
Called [facility]. Jeiska was unavailable and a message was left for her to return call to clinic.

## 2025-05-05 NOTE — TELEPHONE ENCOUNTER
"----- Message from Serge sent at 5/5/2025  4:41 PM CDT -----  Consult/AdvisoryName Of Caller: Jesika [BAKARI]Contact Preference?:  [phone] Name: Giovanni patient feel the need to be seen today? NoWhat is the nature of the call?: Returning call to [facility] Notes:"Thank you for all that you do for our patients"  "

## 2025-05-05 NOTE — PROGRESS NOTES
BMT Pharmacist Immunosuppression Note:    Reviewed patient's tacrolimus level with Dr. Grover and it is within goal range.      Current regimen: 1.5 mg in the morning and 1 mg in the evening  Capsule size(s): 0.5 mg    Plan: Continue current regimen.        Lab Results   Component Value Date    TACROLIMUS 6.1 05/05/2025    TACROLIMUS 6.7 04/28/2025    TACROLIMUS 4.5 (L) 04/21/2025       Creatinine   Date Value Ref Range Status   05/05/2025 0.9 0.5 - 1.4 mg/dL Final   04/28/2025 0.9 0.5 - 1.4 mg/dL Final   04/21/2025 0.9 0.5 - 1.4 mg/dL Final     Total Bilirubin   Date Value Ref Range Status   03/20/2025 0.4 0.1 - 1.0 mg/dL Final     Comment:     For infants and newborns, interpretation of results should be based  on gestational age, weight and in agreement with clinical  observations.    Premature Infant recommended reference ranges:  Up to 24 hours.............<8.0 mg/dL  Up to 48 hours............<12.0 mg/dL  3-5 days..................<15.0 mg/dL  6-29 days.................<15.0 mg/dL     03/17/2025 0.5 0.1 - 1.0 mg/dL Final     Comment:     For infants and newborns, interpretation of results should be based  on gestational age, weight and in agreement with clinical  observations.    Premature Infant recommended reference ranges:  Up to 24 hours.............<8.0 mg/dL  Up to 48 hours............<12.0 mg/dL  3-5 days..................<15.0 mg/dL  6-29 days.................<15.0 mg/dL     03/13/2025 0.4 0.1 - 1.0 mg/dL Final     Comment:     For infants and newborns, interpretation of results should be based  on gestational age, weight and in agreement with clinical  observations.    Premature Infant recommended reference ranges:  Up to 24 hours.............<8.0 mg/dL  Up to 48 hours............<12.0 mg/dL  3-5 days..................<15.0 mg/dL  6-29 days.................<15.0 mg/dL       Bilirubin Total   Date Value Ref Range Status   05/05/2025 0.4 0.1 - 1.0 mg/dL Final     Comment:     For infants and newborns,  interpretation of results should be based   on gestational age, weight and in agreement with clinical   observations.    Premature Infant recommended reference ranges:   0-24 hours:  <8.0 mg/dL   24-48 hours: <12.0 mg/dL   3-5 days:    <15.0 mg/dL   6-29 days:   <15.0 mg/dL   04/28/2025 0.4 0.1 - 1.0 mg/dL Final     Comment:     For infants and newborns, interpretation of results should be based   on gestational age, weight and in agreement with clinical   observations.    Premature Infant recommended reference ranges:   0-24 hours:  <8.0 mg/dL   24-48 hours: <12.0 mg/dL   3-5 days:    <15.0 mg/dL   6-29 days:   <15.0 mg/dL   04/21/2025 0.4 0.1 - 1.0 mg/dL Final     Comment:     For infants and newborns, interpretation of results should be based   on gestational age, weight and in agreement with clinical   observations.    Premature Infant recommended reference ranges:   0-24 hours:  <8.0 mg/dL   24-48 hours: <12.0 mg/dL   3-5 days:    <15.0 mg/dL   6-29 days:   <15.0 mg/dL     AST   Date Value Ref Range Status   05/05/2025 31 11 - 45 unit/L Final   04/28/2025 30 11 - 45 unit/L Final   04/21/2025 26 11 - 45 unit/L Final   03/20/2025 59 (H) 10 - 40 U/L Final   03/17/2025 44 (H) 10 - 40 U/L Final   03/13/2025 40 10 - 40 U/L Final     ALT   Date Value Ref Range Status   05/05/2025 61 (H) 10 - 44 unit/L Final   04/28/2025 54 (H) 10 - 44 unit/L Final   04/21/2025 45 (H) 10 - 44 unit/L Final   03/20/2025 85 (H) 10 - 44 U/L Final   03/17/2025 64 (H) 10 - 44 U/L Final   03/13/2025 49 (H) 10 - 44 U/L Final             Annalee Mcdowell PharmD  Clinical Pharmacist-BMT/Hematology  [facility]

## 2025-05-05 NOTE — TELEPHONE ENCOUNTER
Returned call to Jesika at [facility]. Jesika serves as pharmacist at [facility] and was unable to provide update as approval would need to come from [employer]

## 2025-05-09 ENCOUNTER — INFUSION (OUTPATIENT)
Dept: INFUSION THERAPY | Facility: HOSPITAL | Age: 55
End: 2025-05-09
Payer: COMMERCIAL

## 2025-05-09 VITALS
HEART RATE: 75 BPM | RESPIRATION RATE: 18 BRPM | TEMPERATURE: 98 F | DIASTOLIC BLOOD PRESSURE: 77 MMHG | OXYGEN SATURATION: 100 % | SYSTOLIC BLOOD PRESSURE: 134 MMHG | BODY MASS INDEX: 24.01 KG/M2 | WEIGHT: 171.5 LBS | HEIGHT: 71 IN

## 2025-05-09 DIAGNOSIS — B34.3 PARVOVIRUS B19 INFECTION: Primary | ICD-10-CM

## 2025-05-09 PROCEDURE — 96375 TX/PRO/DX INJ NEW DRUG ADDON: CPT

## 2025-05-09 PROCEDURE — 96366 THER/PROPH/DIAG IV INF ADDON: CPT

## 2025-05-09 PROCEDURE — 96367 TX/PROPH/DG ADDL SEQ IV INF: CPT

## 2025-05-09 PROCEDURE — 63600175 PHARM REV CODE 636 W HCPCS: Performed by: INTERNAL MEDICINE

## 2025-05-09 PROCEDURE — 96365 THER/PROPH/DIAG IV INF INIT: CPT

## 2025-05-09 PROCEDURE — 25000003 PHARM REV CODE 250: Performed by: INTERNAL MEDICINE

## 2025-05-09 RX ORDER — HEPARIN 100 UNIT/ML
500 SYRINGE INTRAVENOUS
Status: DISCONTINUED | OUTPATIENT
Start: 2025-05-09 | End: 2025-05-09 | Stop reason: HOSPADM

## 2025-05-09 RX ORDER — FAMOTIDINE 10 MG/ML
20 INJECTION, SOLUTION INTRAVENOUS
Status: COMPLETED | OUTPATIENT
Start: 2025-05-09 | End: 2025-05-09

## 2025-05-09 RX ORDER — SODIUM CHLORIDE 0.9 % (FLUSH) 0.9 %
10 SYRINGE (ML) INJECTION
Status: DISCONTINUED | OUTPATIENT
Start: 2025-05-09 | End: 2025-05-09 | Stop reason: HOSPADM

## 2025-05-09 RX ORDER — ACETAMINOPHEN 325 MG/1
650 TABLET ORAL
Status: COMPLETED | OUTPATIENT
Start: 2025-05-09 | End: 2025-05-09

## 2025-05-09 RX ADMIN — SODIUM CHLORIDE: 9 INJECTION, SOLUTION INTRAVENOUS at 09:05

## 2025-05-09 RX ADMIN — ACETAMINOPHEN 650 MG: 325 TABLET ORAL at 10:05

## 2025-05-09 RX ADMIN — FAMOTIDINE 20 MG: 10 INJECTION INTRAVENOUS at 10:05

## 2025-05-09 RX ADMIN — DIPHENHYDRAMINE HYDROCHLORIDE 50 MG: 50 INJECTION INTRAMUSCULAR; INTRAVENOUS at 10:05

## 2025-05-09 RX ADMIN — IMMUNE GLOBULIN (HUMAN) 75 G: 10 INJECTION INTRAVENOUS; SUBCUTANEOUS at 10:05

## 2025-05-09 NOTE — PLAN OF CARE
Patient tolerated C1D1 IVIG without incident. Vitals stable before, during and after treatment. PIV inserted & flushed with blood return present, saline locked & catheter removed at d/c. Premedicated per orders. Patient uses [facility] to track appointments. Stable and ambulatory off of unit at d/c.

## 2025-05-12 ENCOUNTER — PATIENT MESSAGE (OUTPATIENT)
Dept: INFECTIOUS DISEASES | Facility: CLINIC | Age: 55
End: 2025-05-12
Payer: COMMERCIAL

## 2025-05-12 NOTE — ASSESSMENT & PLAN NOTE
- 01/07 - current   - Has been receiving increasing doses of PRN loperamide  - If worsening and meets criteria, will order Cdif testing   Attempted to reach Tara at [facility] to inquire whether they accept Traches and not confirm referral of ALEXUS Denney on 5/9.  Left message (vm and text) requesting call return.  Will f/u.    Talked to patient's daughter, Ashley Montanez, @ [phone].  Notified her of attempts to confirm with [facility] re: traches.  Obtained second choice for additional referral for placement of [facility].  Made referral and faxed documents.  She stated that her brother is in agreement with referral.  Will continue to follow for dc/placement needs.      1210  Talked to Tara at [facility].  [place] that they do not accept patients with a trach.  Patient has been referred to [facility].  Will continue to follow for dc/placement needs.

## 2025-05-14 ENCOUNTER — OFFICE VISIT (OUTPATIENT)
Dept: HEMATOLOGY/ONCOLOGY | Facility: CLINIC | Age: 55
End: 2025-05-14
Payer: COMMERCIAL

## 2025-05-14 ENCOUNTER — LAB VISIT (OUTPATIENT)
Dept: LAB | Facility: HOSPITAL | Age: 55
End: 2025-05-14
Attending: INTERNAL MEDICINE
Payer: COMMERCIAL

## 2025-05-14 VITALS
DIASTOLIC BLOOD PRESSURE: 80 MMHG | RESPIRATION RATE: 16 BRPM | OXYGEN SATURATION: 98 % | HEIGHT: 71 IN | TEMPERATURE: 98 F | SYSTOLIC BLOOD PRESSURE: 135 MMHG | BODY MASS INDEX: 23.32 KG/M2 | WEIGHT: 166.56 LBS | HEART RATE: 109 BPM

## 2025-05-14 DIAGNOSIS — Z94.84 HISTORY OF ALLOGENEIC STEM CELL TRANSPLANT: ICD-10-CM

## 2025-05-14 DIAGNOSIS — D47.1 PRIMARY MYELOFIBROSIS: ICD-10-CM

## 2025-05-14 DIAGNOSIS — D64.9 ANEMIA, UNSPECIFIED TYPE: ICD-10-CM

## 2025-05-14 DIAGNOSIS — Z94.84 HISTORY OF ALLOGENEIC STEM CELL TRANSPLANT: Primary | ICD-10-CM

## 2025-05-14 DIAGNOSIS — D84.81 IMMUNODEFICIENCY DUE TO CONDITIONS CLASSIFIED ELSEWHERE: ICD-10-CM

## 2025-05-14 DIAGNOSIS — E78.00 HYPERCHOLESTEROLEMIA: ICD-10-CM

## 2025-05-14 DIAGNOSIS — I42.9 CARDIOMYOPATHY, UNSPECIFIED TYPE: ICD-10-CM

## 2025-05-14 DIAGNOSIS — Z76.82 STEM CELL TRANSPLANT CANDIDATE: ICD-10-CM

## 2025-05-14 DIAGNOSIS — D89.810 ACUTE GVHD: ICD-10-CM

## 2025-05-14 DIAGNOSIS — I10 HYPERTENSION, UNSPECIFIED TYPE: ICD-10-CM

## 2025-05-14 DIAGNOSIS — D61.818 PANCYTOPENIA: ICD-10-CM

## 2025-05-14 DIAGNOSIS — G44.209 TENSION HEADACHE: ICD-10-CM

## 2025-05-14 DIAGNOSIS — E55.9 VITAMIN D DEFICIENCY: ICD-10-CM

## 2025-05-14 DIAGNOSIS — B34.3 PARVOVIRUS B19 INFECTION: ICD-10-CM

## 2025-05-14 LAB
ABSOLUTE EOSINOPHIL (OHS): 0.12 K/UL
ABSOLUTE MONOCYTE (OHS): 0.41 K/UL (ref 0.3–1)
ABSOLUTE NEUTROPHIL COUNT (OHS): 3.68 K/UL (ref 1.8–7.7)
ALBUMIN SERPL BCP-MCNC: 3.5 G/DL (ref 3.5–5.2)
ALP SERPL-CCNC: 154 UNIT/L (ref 40–150)
ALT SERPL W/O P-5'-P-CCNC: 68 UNIT/L (ref 10–44)
ANION GAP (OHS): 8 MMOL/L (ref 8–16)
AST SERPL-CCNC: 43 UNIT/L (ref 11–45)
BASOPHILS # BLD AUTO: 0.02 K/UL
BASOPHILS NFR BLD AUTO: 0.4 %
BILIRUB SERPL-MCNC: 0.4 MG/DL (ref 0.1–1)
BUN SERPL-MCNC: 17 MG/DL (ref 6–20)
CALCIUM SERPL-MCNC: 9.6 MG/DL (ref 8.7–10.5)
CHLORIDE SERPL-SCNC: 104 MMOL/L (ref 95–110)
CO2 SERPL-SCNC: 23 MMOL/L (ref 23–29)
CREAT SERPL-MCNC: 1.1 MG/DL (ref 0.5–1.4)
ERYTHROCYTE [DISTWIDTH] IN BLOOD BY AUTOMATED COUNT: 14.7 % (ref 11.5–14.5)
GFR SERPLBLD CREATININE-BSD FMLA CKD-EPI: >60 ML/MIN/1.73/M2
GLUCOSE SERPL-MCNC: 104 MG/DL (ref 70–110)
HCT VFR BLD AUTO: 23.6 % (ref 40–54)
HGB BLD-MCNC: 7.8 GM/DL (ref 14–18)
IMM GRANULOCYTES # BLD AUTO: 0.18 K/UL (ref 0–0.04)
IMM GRANULOCYTES NFR BLD AUTO: 3.3 % (ref 0–0.5)
INDIRECT COOMBS: NORMAL
LYMPHOCYTES # BLD AUTO: 0.98 K/UL (ref 1–4.8)
MAGNESIUM SERPL-MCNC: 2.3 MG/DL (ref 1.6–2.6)
MCH RBC QN AUTO: 29 PG (ref 27–31)
MCHC RBC AUTO-ENTMCNC: 33.1 G/DL (ref 32–36)
MCV RBC AUTO: 88 FL (ref 82–98)
NUCLEATED RBC (/100WBC) (OHS): 0 /100 WBC
PHOSPHATE SERPL-MCNC: 3.4 MG/DL (ref 2.7–4.5)
PLATELET # BLD AUTO: 107 K/UL (ref 150–450)
PMV BLD AUTO: 10.6 FL (ref 9.2–12.9)
POTASSIUM SERPL-SCNC: 4.6 MMOL/L (ref 3.5–5.1)
PROT SERPL-MCNC: 8.1 GM/DL (ref 6–8.4)
RBC # BLD AUTO: 2.69 M/UL (ref 4.6–6.2)
RELATIVE EOSINOPHIL (OHS): 2.2 %
RELATIVE LYMPHOCYTE (OHS): 18.2 % (ref 18–48)
RELATIVE MONOCYTE (OHS): 7.6 % (ref 4–15)
RELATIVE NEUTROPHIL (OHS): 68.3 % (ref 38–73)
RH BLD: NORMAL
SODIUM SERPL-SCNC: 135 MMOL/L (ref 136–145)
SPECIMEN OUTDATE: NORMAL
TACROLIMUS BLD-MCNC: 9.6 NG/ML (ref 5–15)
WBC # BLD AUTO: 5.39 K/UL (ref 3.9–12.7)

## 2025-05-14 PROCEDURE — 80053 COMPREHEN METABOLIC PANEL: CPT

## 2025-05-14 PROCEDURE — 84100 ASSAY OF PHOSPHORUS: CPT

## 2025-05-14 PROCEDURE — 80197 ASSAY OF TACROLIMUS: CPT

## 2025-05-14 PROCEDURE — 85025 COMPLETE CBC W/AUTO DIFF WBC: CPT

## 2025-05-14 PROCEDURE — 83735 ASSAY OF MAGNESIUM: CPT

## 2025-05-14 PROCEDURE — 87799 DETECT AGENT NOS DNA QUANT: CPT

## 2025-05-14 PROCEDURE — 86901 BLOOD TYPING SEROLOGIC RH(D): CPT | Performed by: INTERNAL MEDICINE

## 2025-05-14 PROCEDURE — 99999 PR PBB SHADOW E&M-EST. PATIENT-LVL IV: CPT | Mod: PBBFAC,,,

## 2025-05-14 PROCEDURE — 36415 COLL VENOUS BLD VENIPUNCTURE: CPT

## 2025-05-14 RX ORDER — PREDNISONE 20 MG/1
40 TABLET ORAL DAILY
Qty: 60 TABLET | Refills: 0 | Status: SHIPPED | OUTPATIENT
Start: 2025-05-14

## 2025-05-14 RX ORDER — TACROLIMUS 0.5 MG/1
CAPSULE ORAL
Qty: 120 CAPSULE | Refills: 11 | Status: SHIPPED | OUTPATIENT
Start: 2025-05-14 | End: 2026-05-14

## 2025-05-14 RX ORDER — PANTOPRAZOLE SODIUM 40 MG/1
40 TABLET, DELAYED RELEASE ORAL DAILY
Qty: 30 TABLET | Refills: 11 | Status: SHIPPED | OUTPATIENT
Start: 2025-05-14 | End: 2026-05-14

## 2025-05-14 RX ORDER — TRAMADOL HYDROCHLORIDE 50 MG/1
50 TABLET, FILM COATED ORAL EVERY 6 HOURS PRN
Qty: 28 TABLET | Refills: 0 | Status: SHIPPED | OUTPATIENT
Start: 2025-05-14

## 2025-05-14 NOTE — PROGRESS NOTES
Section of Hematology and Stem Cell Transplantation    Post-Transplantation Follow Up Visit     5/14/25    Transplant History:   Primary oncologist: Clyde James MD  Primary oncologic diagnosis: primary myelofibrosis  Transplant date: 12/26/2024  Donor: haploidentical  Blood Type (Patient): O +  Blood Type (Donor): O +  CMV (Patient): Negative  CMV (Donor): Positive  Graft source: Peripheral blood  CD34+ cell dose: 6.04 X10^6 cells/kg  Conditioning Regimen: Thiotepa, Busulfan, Fludarabine  GVHD prophylaxis: Post-transplant cyclophosphamide, MMF, Tacrolimus  Immediate post-transplant complications: mucositis, R axillary skin/soft tissue infection secondary to MRSA    History of Present Ilness:   Rubén Peters) is a pleasant 54 y.o.male with primary myelofibrosis who is status post haploidentical stem cell transplantation conditioned with Bu/Flu/TT who is currently day +140 who presents for post-transplant follow up.    He is not feeling well at all right now. He received IVIG on 5/8/25. He has been experiencing severe headaches which tramadol did not help so he took tylenol. Since tapering off of the budesonide he has been nauseous again constantly despite taking zofran and compazine. He also is experiencing lack of appetite and early satiety. He is extremely fatigued and can not even shower or brush his teeth without having to rest.     PAST MEDICAL HISTORY:   Past Medical History:   Diagnosis Date    Abdominal pain 01/02/2025    Allergic contact dermatitis due to adhesives 12/31/2024    Cancer     Flatulence 01/03/2025    Headache 12/27/2024    Hyperlipidemia     Mucositis 01/02/2025    Other constipation 12/19/2024    Thrombocytosis 12/18/2024    Transaminitis 12/27/2024       PAST SURGICAL HISTORY:   Past Surgical History:   Procedure Laterality Date    BONE MARROW BIOPSY N/A 11/27/2024    Procedure: Biopsy-bone marrow;  Surgeon: Clyde James MD;  Location: [address];  Service:  Oncology;  Laterality: N/A;  11/20-pre call complete-tb    BONE MARROW BIOPSY N/A 4/9/2025    Procedure: Biopsy-bone marrow;  Surgeon: Clyde James MD;  Location: [facility] ENDO (4TH FLR);  Service: Oncology;  Laterality: N/A;  4/1 precall complete. EW    COLONOSCOPY N/A 8/9/2023    Procedure: COLONOSCOPY;  Surgeon: Will Ardon MD;  Location: [facility] ENDO (4TH FLR);  Service: Endoscopy;  Laterality: N/A;  Suprep Instructions sent via portal / Authorizing:     Bebeto Arora MD in [facility] FAMILY MED/ INTERNAL MED/ PEDS  Referral:          [id number]    EYE SURGERY      FLEXIBLE SIGMOIDOSCOPY N/A 7/23/2024    Procedure: SIGMOIDOSCOPY, FLEXIBLE;  Surgeon: Nirali Vicente MD;  Location: [facility] ENDO;  Service: Endoscopy;  Laterality: N/A;    INSERTION OF LONGORIA CATHETER Right 12/18/2024    Procedure: INSERTION, CATHETER, CENTRAL VENOUS, LONGORIA TRIPLE LUMEN, Bard 12.5 fr Longoria Cath model [id number];  Surgeon: Willie Hadley MD;  [facility];  Service: General;  Laterality: Right;       PAST SOCIAL HISTORY:  Social History     Tobacco Use    Smoking status: Former     Types: Cigars, Cigarettes    Smokeless tobacco: Never   Substance Use Topics    Alcohol use: Not Currently     Comment: socially    Drug use: Never       FAMILY HISTORY:  Family History   Problem Relation Name Age of Onset    Arthritis Mother      COPD Father      Testicular cancer Maternal Cousin Peter 17        Chemotherapy    Breast cancer Maternal Cousin  51        Chemotherapy    Heart disease Maternal Grandfather      Stroke Maternal Grandmother         CURRENT MEDICATIONS:   Current Outpatient Medications   Medication Sig    acyclovir (ZOVIRAX) 800 MG Tab Take 1 tablet (800 mg total) by mouth 2 (two) times daily.    atovaquone (MEPRON) 750 mg/5 mL Susp oral liquid Take 10 mLs (1,500 mg total) by mouth once daily.    eltrombopag olamine (PROMACTA) 50 MG Tab Take 1 tablet (50 mg total) by mouth once daily.    ergocalciferol (ERGOCALCIFEROL)  50,000 unit Cap Take 1 capsule (50,000 Units total) by mouth every 7 days.    letermovir (PREVYMIS) 480 mg Tab Take 1 tablet (480 mg total) by mouth once daily.    magnesium oxide (MAG-OX) 400 mg (241.3 mg magnesium) tablet Take 2 tablets in the morning, 1 tablet in the afternoon, and 2 tablets in the evening.    ondansetron (ZOFRAN) 8 MG tablet Take 1 tablet (8 mg total) by mouth every 8 (eight) hours as needed for Nausea.    prochlorperazine (COMPAZINE) 10 MG tablet Take 1 tablet (10 mg total) by mouth 4 (four) times daily as needed for Nausea.    traMADoL (ULTRAM) 50 mg tablet Take 1 tablet (50 mg total) by mouth every 6 (six) hours as needed for Pain.    triamcinolone acetonide 0.1% (KENALOG) 0.1 % cream Apply topically 2 (two) times daily as needed (rash on arms, chest, back, trunk, and legs (do not apply to face)).    voriconazole (VFEND) 200 MG Tab Take 1 tablet (200 mg total) by mouth 2 (two) times daily.    pantoprazole (PROTONIX) 40 MG tablet Take 1 tablet (40 mg total) by mouth once daily.    predniSONE (DELTASONE) 20 MG tablet Take 2 tablets (40 mg total) by mouth once daily.    tacrolimus (PROGRAF) 0.5 MG Cap Take 2 capsules (1 mg total) by mouth every morning AND 2 capsules (1 mg total) every evening.     No current facility-administered medications for this visit.       ALLERGIES:   Review of patient's allergies indicates:   Allergen Reactions    Sulfamethoxazole-trimethoprim Hives       GVHD Review of Systems:     Pertinent positives and negatives included in the HPI. Otherwise a 14 point review of systems is negative. GVHD review of systems recorded in BMT flowsheet.     Physical Exam:     Vitals:    05/14/25 1534   BP: 135/80   Pulse: 109   Resp: 16   Temp: 98 °F (36.7 °C)             General: Appears well, NAD  HEENT: MMM, no OP lesions  Neck: Supple, no LAD  Pulmonary: CTAB, no increased work of breathing, no W/R/C  Cardiovascular: S1S2 normal, RRR, no M/R/G  Abdominal: Soft, NT, ND, BS+, no  HSM  Extremities: No C/C/E  Neurological: AAOx4, grossly normal, no focal deficits  Dermatologic: No appreciable rashes or lesions     ECOG Performance Status: (foot note - ECOG PS provided by [employer]cology [employer]) 1 - Symptomatic but completely ambulatory    Karnofsky Performance Score:  70%- Cares for Self: Unable to Carry on Normal Activity or Active Work    Labs:   Lab Results   Component Value Date    WBC 5.39 05/14/2025    RBC 2.69 (L) 05/14/2025    HGB 7.8 (L) 05/14/2025    HCT 23.6 (L) 05/14/2025    MCV 88 05/14/2025    MCH 29.0 05/14/2025    MCHC 33.1 05/14/2025    RDW 14.7 (H) 05/14/2025     (L) 05/14/2025    MPV 10.6 05/14/2025    GRAN 6.3 05/05/2025    LYMPH 18.2 05/14/2025    LYMPH 0.98 (L) 05/14/2025    MONO 7.6 05/14/2025    MONO 0.41 05/14/2025    EOS 2.2 05/14/2025    EOS 0.12 05/14/2025    BASO 0.02 03/20/2025    EOSINOPHIL 0.6 03/20/2025    BASOPHIL 0.4 05/14/2025    BASOPHIL 0.02 05/14/2025       Sodium   Date Value Ref Range Status   05/14/2025 135 (L) 136 - 145 mmol/L Final   03/20/2025 138 136 - 145 mmol/L Final     Potassium   Date Value Ref Range Status   05/14/2025 4.6 3.5 - 5.1 mmol/L Final   03/20/2025 4.2 3.5 - 5.1 mmol/L Final     Chloride   Date Value Ref Range Status   05/14/2025 104 95 - 110 mmol/L Final   03/20/2025 108 95 - 110 mmol/L Final     CO2   Date Value Ref Range Status   05/14/2025 23 23 - 29 mmol/L Final   03/20/2025 21 (L) 23 - 29 mmol/L Final     Glucose   Date Value Ref Range Status   05/14/2025 104 70 - 110 mg/dL Final   03/20/2025 101 70 - 110 mg/dL Final     BUN   Date Value Ref Range Status   05/14/2025 17 6 - 20 mg/dL Final     Creatinine   Date Value Ref Range Status   05/14/2025 1.1 0.5 - 1.4 mg/dL Final     Calcium   Date Value Ref Range Status   05/14/2025 9.6 8.7 - 10.5 mg/dL Final   03/20/2025 9.2 8.7 - 10.5 mg/dL Final     Protein Total   Date Value Ref Range Status   05/14/2025 8.1 6.0 - 8.4 gm/dL Final     Total Protein   Date Value  Ref Range Status   03/20/2025 6.6 6.0 - 8.4 g/dL Final     Albumin   Date Value Ref Range Status   05/14/2025 3.5 3.5 - 5.2 g/dL Final   03/20/2025 3.7 3.5 - 5.2 g/dL Final     Total Bilirubin   Date Value Ref Range Status   03/20/2025 0.4 0.1 - 1.0 mg/dL Final     Comment:     For infants and newborns, interpretation of results should be based  on gestational age, weight and in agreement with clinical  observations.    Premature Infant recommended reference ranges:  Up to 24 hours.............<8.0 mg/dL  Up to 48 hours............<12.0 mg/dL  3-5 days..................<15.0 mg/dL  6-29 days.................<15.0 mg/dL       Bilirubin Total   Date Value Ref Range Status   05/14/2025 0.4 0.1 - 1.0 mg/dL Final     Comment:     For infants and newborns, interpretation of results should be based   on gestational age, weight and in agreement with clinical   observations.    Premature Infant recommended reference ranges:   0-24 hours:  <8.0 mg/dL   24-48 hours: <12.0 mg/dL   3-5 days:    <15.0 mg/dL   6-29 days:   <15.0 mg/dL     Alkaline Phosphatase   Date Value Ref Range Status   03/20/2025 122 40 - 150 U/L Final     ALP   Date Value Ref Range Status   05/14/2025 154 (H) 40 - 150 unit/L Final     AST   Date Value Ref Range Status   05/14/2025 43 11 - 45 unit/L Final   03/20/2025 59 (H) 10 - 40 U/L Final     ALT   Date Value Ref Range Status   05/14/2025 68 (H) 10 - 44 unit/L Final   03/20/2025 85 (H) 10 - 44 U/L Final     Anion Gap   Date Value Ref Range Status   05/14/2025 8 8 - 16 mmol/L Final     eGFR if    Date Value Ref Range Status   01/22/2022 >60.0 >60 mL/min/1.73 m^2 Final     eGFR if non    Date Value Ref Range Status   01/22/2022 >60.0 >60 mL/min/1.73 m^2 Final     Comment:     Calculation used to obtain the estimated glomerular filtration  rate (eGFR) is the CKD-EPI equation.          Imaging:   All pertinent studies reviewed and interpreted by me       Assessment and Plan:    Rubén Peters) is a pleasant 54 y.o.male with primary myelofibrosis s/p haplo SCT who presents for post-transplant follow up.    Primary myelofibrosis: Status post treatment with ruxolitinib, followed by alloSCT. Day +30 BMBx results below.    Status post allogeneic stem cell transplantation: As noted above, status post haploidentical stem cell transplantation conditioned with Bu/Flu/TT. Currently day +140.  Engrafted on day +22.  Day +30 BMBx on 1/2/25: No definitive evidence of residual JAK2 [id number] primary myelofibrosis. NGS without evidence of pathogenic mutations. Chimerism studies with 100% donor CD33, CD3 insufficient for analysis.   Plan for central line removal -  4/3/25  Day 100 bone marrow biopsy 4/9/25:Markedly hypocellular marrow (<5%-15%) with trilineage hematopoiesis. NGS with DNMT3A 2%. CG normal and JAK2 negative. Chimerisms 90% CD3 and 100% CD33 donor.    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 MMF has now been discontinued. 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 Restarted on budesonide 3mg TID (4/14/25), and he reports resolution of nausea. Tapered budesonide ending 5/12. He has began to have worsening nausea despite zofran and compazine, lack of appetite, and early satiety since being off of budesonide. Will start prednisone 40mg daily. With parvovirus noted, will allow for low-normal tacro levels (goal 4-10).  Current tacro dose: 1.5mg qaM and 1 mg qPM   Last tacro level: 9.6  Adjustments: 1mg BID due to parvovirus       Immunosuppression: Prevention with voriconazole,  acyclovir. CMV prophylaxis with letermovir. PJP prophyalxis dapsone. Continue weekly monitoring of CMV and EBV.  Last CMV: negative (4/21/2025), last EBV: negative (4/21/2025).  Active infections: none    Pancytopenia: Due to underlying disease and chemotherapy. Transfuse for Hgb <7 g/dL and platelets <10k. Likely related to  parvovirus (see below). Continue Promacta 50mg daily. Consider adding Retacrit.       Parvovirus: Obtained to evaluate for pancytopenia. Monitor labs weekly and continue transfusion support.   IVIG started 5/9/25 and he tolerated it well. He will receive IVIG one more time.    Hypertension:  Was newly hypertensive in hospital following SCT at which time he was started on amlodipine. Discontinued amlodipine after hospitalization due to hypotension. Monitor for now but restart antihypertensives if this worsens.     Cardiomyopathy: Echo 2/26/25 obtained due to tachycardia revealed reduced LVEF 45-50%. No signs/symptoms of HFrEF. Repeat echo in 3 months (5/2025).    Vitamin D deficiency: His primary care is giving him 50,000 units once a week.     Hypercholesterolemia: He is following with primary care. Could be elevated due to tacrolimus.     Tension Headache: Patient with more frequent headaches across his forehead. Unsure of cause but could be stress or dehydration. Encourage more frequent hydration. Tramadol prn for headaches.   Worsening since receiving IVIG. Using tramadol an tylenol since he is no longer neutropenic.      Follow up: Weekly follow up with labs.    Orders Placed:      No orders of the defined types were placed in this encounter.     Route Chart for Scheduling  BMT Route Chart for Scheduling      Supportive Plan Information  OP IVIG Clarence Grover MD   Associated Diagnosis: Parvovirus B19 infection   noted on 4/21/2025   Line of treatment: Supportive Care   Treatment goal: Supportive     Upcoming Treatment Dates - OP IVIG    5/10/2025       Pre-Medications       acetaminophen tablet 650 mg       diphenhydrAMINE (BENADRYL) 50 mg in NS 50 mL IVPB       famotidine (PF) injection 20 mg       Medications       immun glob G (IgG)-gly-IgA 50+ (GAMUNEX-C/GAMMAKED) (GAMUNEX-C) 20 gram/200 mL (10 %) injection 75 g    Treatment Plan Information   OP Adult Blood - Twice Weekly Clyde James MD    Associated Diagnosis: Anemia associated with chemotherapy   noted on 2/4/2025   Line of treatment: Supportive Care  Treatment Goal: Supportive     Upcoming Treatment Dates - OP Adult Blood - Twice Weekly    5/6/2025       Prepare RBC       Transfuse RBC 1 Unit    Therapy Plan Information  PORT FLUSH for Adrenal nodule, noted on 12/9/2024  Flushes  heparin, porcine (PF) 100 unit/mL injection flush 500 Units  500 Units, Intravenous, Every visit  sodium chloride 0.9% flush 10 mL  10 mL, Intravenous, Every visit    EPOETIN CRISELDA (RETACRIT) WEEKLY for Anemia, noted on 5/14/2025  Medications  epoetin criselda-epbx injection 40,000 Units  40,000 Units, Subcutaneous, 1 time a week      No therapy plan of the specified type found.    Total time of this visit was 40 minutes, including time spent face to face with patient and/or via video/audio, and also in preparing for today's visit for MDM and documentation. (Medical Decision Making, including consideration of possible diagnoses, management options, complex medical record review, review of diagnostic tests and information, consideration and discussion of significant complications based on comorbidities, and discussion with providers involved with the care of the patient). Greater than 50% was spent face to face with the patient counseling and coordinating care.    Visit today included increased complexity associated with the care of the episodic problem parvovirus induced anemia addressed and managing the longitudinal care of the patient due to the serious and/or complex managed problem(s) s/p allogeneic stem cell transplant .     Opal Aolnzo PA-C  Malignant Hematology, Stem Cell Transplant, and Cellular Therapy  [facility]

## 2025-05-14 NOTE — PROGRESS NOTES
BMT Pharmacist Immunosuppression Note:    Current regimen: 1.5 mg AM and 1 mg PM  Capsule size(s): 0.5 mg    Plan: Decrease regimen to 2 capsules (1 mg) in the AM and 2 capsules (1 mg) in the evening. (Lower tacrolimus goal due to parovirus).         Lab Results   Component Value Date    TACROLIMUS 9.6 05/14/2025    TACROLIMUS 6.1 05/05/2025    TACROLIMUS 6.7 04/28/2025       Creatinine   Date Value Ref Range Status   05/14/2025 1.1 0.5 - 1.4 mg/dL Final   05/05/2025 0.9 0.5 - 1.4 mg/dL Final   04/28/2025 0.9 0.5 - 1.4 mg/dL Final     Total Bilirubin   Date Value Ref Range Status   03/20/2025 0.4 0.1 - 1.0 mg/dL Final     Comment:     For infants and newborns, interpretation of results should be based  on gestational age, weight and in agreement with clinical  observations.    Premature Infant recommended reference ranges:  Up to 24 hours.............<8.0 mg/dL  Up to 48 hours............<12.0 mg/dL  3-5 days..................<15.0 mg/dL  6-29 days.................<15.0 mg/dL     03/17/2025 0.5 0.1 - 1.0 mg/dL Final     Comment:     For infants and newborns, interpretation of results should be based  on gestational age, weight and in agreement with clinical  observations.    Premature Infant recommended reference ranges:  Up to 24 hours.............<8.0 mg/dL  Up to 48 hours............<12.0 mg/dL  3-5 days..................<15.0 mg/dL  6-29 days.................<15.0 mg/dL     03/13/2025 0.4 0.1 - 1.0 mg/dL Final     Comment:     For infants and newborns, interpretation of results should be based  on gestational age, weight and in agreement with clinical  observations.    Premature Infant recommended reference ranges:  Up to 24 hours.............<8.0 mg/dL  Up to 48 hours............<12.0 mg/dL  3-5 days..................<15.0 mg/dL  6-29 days.................<15.0 mg/dL       Bilirubin Total   Date Value Ref Range Status   05/14/2025 0.4 0.1 - 1.0 mg/dL Final     Comment:     For infants and newborns,  interpretation of results should be based   on gestational age, weight and in agreement with clinical   observations.    Premature Infant recommended reference ranges:   0-24 hours:  <8.0 mg/dL   24-48 hours: <12.0 mg/dL   3-5 days:    <15.0 mg/dL   6-29 days:   <15.0 mg/dL   05/05/2025 0.4 0.1 - 1.0 mg/dL Final     Comment:     For infants and newborns, interpretation of results should be based   on gestational age, weight and in agreement with clinical   observations.    Premature Infant recommended reference ranges:   0-24 hours:  <8.0 mg/dL   24-48 hours: <12.0 mg/dL   3-5 days:    <15.0 mg/dL   6-29 days:   <15.0 mg/dL   04/28/2025 0.4 0.1 - 1.0 mg/dL Final     Comment:     For infants and newborns, interpretation of results should be based   on gestational age, weight and in agreement with clinical   observations.    Premature Infant recommended reference ranges:   0-24 hours:  <8.0 mg/dL   24-48 hours: <12.0 mg/dL   3-5 days:    <15.0 mg/dL   6-29 days:   <15.0 mg/dL     AST   Date Value Ref Range Status   05/14/2025 43 11 - 45 unit/L Final   05/05/2025 31 11 - 45 unit/L Final   04/28/2025 30 11 - 45 unit/L Final   03/20/2025 59 (H) 10 - 40 U/L Final   03/17/2025 44 (H) 10 - 40 U/L Final   03/13/2025 40 10 - 40 U/L Final     ALT   Date Value Ref Range Status   05/14/2025 68 (H) 10 - 44 unit/L Final   05/05/2025 61 (H) 10 - 44 unit/L Final   04/28/2025 54 (H) 10 - 44 unit/L Final   03/20/2025 85 (H) 10 - 44 U/L Final   03/17/2025 64 (H) 10 - 44 U/L Final   03/13/2025 49 (H) 10 - 44 U/L Final             Annalee Mcdowell PharmD  Clinical Pharmacist-BMT/Hematology  [facility]

## 2025-05-15 LAB
CYTOMEGALOVIRUS DNA, QUAL (OHS): NOT DETECTED
EPSTEIN-BARR VIRUS DNA, QUAL (OHS): NORMAL

## 2025-05-16 ENCOUNTER — INFUSION (OUTPATIENT)
Dept: INFUSION THERAPY | Facility: HOSPITAL | Age: 55
End: 2025-05-16
Payer: COMMERCIAL

## 2025-05-16 VITALS
BODY MASS INDEX: 23.32 KG/M2 | HEIGHT: 71 IN | TEMPERATURE: 98 F | WEIGHT: 166.56 LBS | RESPIRATION RATE: 18 BRPM | DIASTOLIC BLOOD PRESSURE: 76 MMHG | OXYGEN SATURATION: 99 % | SYSTOLIC BLOOD PRESSURE: 131 MMHG | HEART RATE: 91 BPM

## 2025-05-16 DIAGNOSIS — T45.1X5A ANEMIA ASSOCIATED WITH CHEMOTHERAPY: ICD-10-CM

## 2025-05-16 DIAGNOSIS — D64.81 ANEMIA ASSOCIATED WITH CHEMOTHERAPY: ICD-10-CM

## 2025-05-16 DIAGNOSIS — T45.1X5A ANEMIA ASSOCIATED WITH CHEMOTHERAPY: Primary | ICD-10-CM

## 2025-05-16 DIAGNOSIS — D64.81 ANEMIA ASSOCIATED WITH CHEMOTHERAPY: Primary | ICD-10-CM

## 2025-05-16 LAB
ABO + RH BLD: NORMAL
BLD PROD TYP BPU: NORMAL
BLOOD UNIT EXPIRATION DATE: NORMAL
BLOOD UNIT TYPE CODE: 5100
CROSSMATCH INTERPRETATION: NORMAL
DISPENSE STATUS: NORMAL
UNIT NUMBER: NORMAL

## 2025-05-16 PROCEDURE — 86920 COMPATIBILITY TEST SPIN: CPT | Performed by: INTERNAL MEDICINE

## 2025-05-16 PROCEDURE — P9040 RBC LEUKOREDUCED IRRADIATED: HCPCS | Performed by: INTERNAL MEDICINE

## 2025-05-16 PROCEDURE — 25000003 PHARM REV CODE 250: Performed by: INTERNAL MEDICINE

## 2025-05-16 PROCEDURE — 36430 TRANSFUSION BLD/BLD COMPNT: CPT

## 2025-05-16 RX ORDER — ACETAMINOPHEN 325 MG/1
650 TABLET ORAL
Status: CANCELLED | OUTPATIENT
Start: 2025-05-16

## 2025-05-16 RX ORDER — SODIUM CHLORIDE 0.9 % (FLUSH) 0.9 %
10 SYRINGE (ML) INJECTION
Status: CANCELLED | OUTPATIENT
Start: 2025-05-16

## 2025-05-16 RX ORDER — HYDROCODONE BITARTRATE AND ACETAMINOPHEN 500; 5 MG/1; MG/1
TABLET ORAL ONCE
Status: COMPLETED | OUTPATIENT
Start: 2025-05-16 | End: 2025-05-16

## 2025-05-16 RX ORDER — DIPHENHYDRAMINE HCL 25 MG
25 CAPSULE ORAL
Status: CANCELLED | OUTPATIENT
Start: 2025-05-16

## 2025-05-16 RX ORDER — ACETAMINOPHEN 325 MG/1
650 TABLET ORAL
Status: DISCONTINUED | OUTPATIENT
Start: 2025-05-16 | End: 2025-05-16 | Stop reason: HOSPADM

## 2025-05-16 RX ORDER — DIPHENHYDRAMINE HCL 25 MG
25 CAPSULE ORAL
Status: DISCONTINUED | OUTPATIENT
Start: 2025-05-16 | End: 2025-05-16 | Stop reason: HOSPADM

## 2025-05-16 RX ORDER — HYDROCODONE BITARTRATE AND ACETAMINOPHEN 500; 5 MG/1; MG/1
TABLET ORAL ONCE
Status: CANCELLED | OUTPATIENT
Start: 2025-05-16 | End: 2025-05-16

## 2025-05-16 RX ORDER — SODIUM CHLORIDE 0.9 % (FLUSH) 0.9 %
10 SYRINGE (ML) INJECTION
Status: DISCONTINUED | OUTPATIENT
Start: 2025-05-16 | End: 2025-05-16 | Stop reason: HOSPADM

## 2025-05-16 RX ADMIN — SODIUM CHLORIDE: 0.9 INJECTION, SOLUTION INTRAVENOUS at 03:05

## 2025-05-19 ENCOUNTER — RESULTS FOLLOW-UP (OUTPATIENT)
Dept: HEMATOLOGY/ONCOLOGY | Facility: CLINIC | Age: 55
End: 2025-05-19

## 2025-05-19 ENCOUNTER — PATIENT MESSAGE (OUTPATIENT)
Dept: HEMATOLOGY/ONCOLOGY | Facility: CLINIC | Age: 55
End: 2025-05-19

## 2025-05-19 ENCOUNTER — LAB VISIT (OUTPATIENT)
Dept: LAB | Facility: HOSPITAL | Age: 55
End: 2025-05-19
Attending: INTERNAL MEDICINE
Payer: COMMERCIAL

## 2025-05-19 ENCOUNTER — OFFICE VISIT (OUTPATIENT)
Dept: HEMATOLOGY/ONCOLOGY | Facility: CLINIC | Age: 55
End: 2025-05-19
Payer: COMMERCIAL

## 2025-05-19 VITALS
WEIGHT: 165.88 LBS | HEART RATE: 90 BPM | SYSTOLIC BLOOD PRESSURE: 133 MMHG | HEIGHT: 71 IN | BODY MASS INDEX: 23.22 KG/M2 | RESPIRATION RATE: 16 BRPM | DIASTOLIC BLOOD PRESSURE: 87 MMHG | OXYGEN SATURATION: 99 %

## 2025-05-19 DIAGNOSIS — D84.81 IMMUNODEFICIENCY DUE TO CONDITIONS CLASSIFIED ELSEWHERE: ICD-10-CM

## 2025-05-19 DIAGNOSIS — Z94.84 HISTORY OF ALLOGENEIC STEM CELL TRANSPLANT: ICD-10-CM

## 2025-05-19 DIAGNOSIS — L08.9 STAPH SKIN INFECTION: Primary | ICD-10-CM

## 2025-05-19 DIAGNOSIS — Z76.82 STEM CELL TRANSPLANT CANDIDATE: ICD-10-CM

## 2025-05-19 DIAGNOSIS — D61.818 PANCYTOPENIA: ICD-10-CM

## 2025-05-19 DIAGNOSIS — D47.1 PRIMARY MYELOFIBROSIS: ICD-10-CM

## 2025-05-19 DIAGNOSIS — E55.9 VITAMIN D DEFICIENCY: ICD-10-CM

## 2025-05-19 DIAGNOSIS — B95.8 STAPH SKIN INFECTION: Primary | ICD-10-CM

## 2025-05-19 DIAGNOSIS — I10 HYPERTENSION, UNSPECIFIED TYPE: ICD-10-CM

## 2025-05-19 DIAGNOSIS — D89.810 ACUTE GVHD: ICD-10-CM

## 2025-05-19 DIAGNOSIS — I42.9 CARDIOMYOPATHY, UNSPECIFIED TYPE: ICD-10-CM

## 2025-05-19 DIAGNOSIS — G44.209 TENSION HEADACHE: ICD-10-CM

## 2025-05-19 DIAGNOSIS — B34.3 PARVOVIRUS B19 INFECTION: ICD-10-CM

## 2025-05-19 DIAGNOSIS — E78.00 HYPERCHOLESTEROLEMIA: ICD-10-CM

## 2025-05-19 LAB
ABSOLUTE EOSINOPHIL (OHS): 0 K/UL
ABSOLUTE MONOCYTE (OHS): 0.66 K/UL (ref 0.3–1)
ABSOLUTE NEUTROPHIL COUNT (OHS): 5.6 K/UL (ref 1.8–7.7)
ALBUMIN SERPL BCP-MCNC: 3.9 G/DL (ref 3.5–5.2)
ALP SERPL-CCNC: 121 UNIT/L (ref 40–150)
ALT SERPL W/O P-5'-P-CCNC: 37 UNIT/L (ref 10–44)
ANION GAP (OHS): 11 MMOL/L (ref 8–16)
AST SERPL-CCNC: 25 UNIT/L (ref 11–45)
BASOPHILS # BLD AUTO: 0.02 K/UL
BASOPHILS NFR BLD AUTO: 0.3 %
BILIRUB SERPL-MCNC: 0.5 MG/DL (ref 0.1–1)
BUN SERPL-MCNC: 26 MG/DL (ref 6–20)
CALCIUM SERPL-MCNC: 9.9 MG/DL (ref 8.7–10.5)
CHLORIDE SERPL-SCNC: 104 MMOL/L (ref 95–110)
CO2 SERPL-SCNC: 23 MMOL/L (ref 23–29)
CREAT SERPL-MCNC: 1.2 MG/DL (ref 0.5–1.4)
CYTOMEGALOVIRUS DNA, QUAL (OHS): NOT DETECTED
EPSTEIN-BARR VIRUS DNA, QUAL (OHS): NORMAL
ERYTHROCYTE [DISTWIDTH] IN BLOOD BY AUTOMATED COUNT: 17.2 % (ref 11.5–14.5)
GFR SERPLBLD CREATININE-BSD FMLA CKD-EPI: >60 ML/MIN/1.73/M2
GLUCOSE SERPL-MCNC: 102 MG/DL (ref 70–110)
HCT VFR BLD AUTO: 29.8 % (ref 40–54)
HGB BLD-MCNC: 9.7 GM/DL (ref 14–18)
IMM GRANULOCYTES # BLD AUTO: 0.16 K/UL (ref 0–0.04)
IMM GRANULOCYTES NFR BLD AUTO: 2.2 % (ref 0–0.5)
INDIRECT COOMBS: NORMAL
LYMPHOCYTES # BLD AUTO: 0.98 K/UL (ref 1–4.8)
MAGNESIUM SERPL-MCNC: 2.2 MG/DL (ref 1.6–2.6)
MCH RBC QN AUTO: 29.8 PG (ref 27–31)
MCHC RBC AUTO-ENTMCNC: 32.6 G/DL (ref 32–36)
MCV RBC AUTO: 92 FL (ref 82–98)
NUCLEATED RBC (/100WBC) (OHS): 1 /100 WBC
PHOSPHATE SERPL-MCNC: 4.1 MG/DL (ref 2.7–4.5)
PLATELET # BLD AUTO: 107 K/UL (ref 150–450)
PMV BLD AUTO: 11.7 FL (ref 9.2–12.9)
POTASSIUM SERPL-SCNC: 5 MMOL/L (ref 3.5–5.1)
PROT SERPL-MCNC: 8.1 GM/DL (ref 6–8.4)
RBC # BLD AUTO: 3.25 M/UL (ref 4.6–6.2)
RELATIVE EOSINOPHIL (OHS): 0 %
RELATIVE LYMPHOCYTE (OHS): 13.2 % (ref 18–48)
RELATIVE MONOCYTE (OHS): 8.9 % (ref 4–15)
RELATIVE NEUTROPHIL (OHS): 75.4 % (ref 38–73)
RH BLD: NORMAL
SODIUM SERPL-SCNC: 138 MMOL/L (ref 136–145)
SPECIMEN OUTDATE: NORMAL
TACROLIMUS BLD-MCNC: 7.4 NG/ML (ref 5–15)
WBC # BLD AUTO: 7.42 K/UL (ref 3.9–12.7)

## 2025-05-19 PROCEDURE — 87799 DETECT AGENT NOS DNA QUANT: CPT

## 2025-05-19 PROCEDURE — 83735 ASSAY OF MAGNESIUM: CPT

## 2025-05-19 PROCEDURE — 80053 COMPREHEN METABOLIC PANEL: CPT

## 2025-05-19 PROCEDURE — 99215 OFFICE O/P EST HI 40 MIN: CPT | Mod: S$GLB,,,

## 2025-05-19 PROCEDURE — 86900 BLOOD TYPING SEROLOGIC ABO: CPT | Performed by: INTERNAL MEDICINE

## 2025-05-19 PROCEDURE — 3075F SYST BP GE 130 - 139MM HG: CPT | Mod: CPTII,S$GLB,,

## 2025-05-19 PROCEDURE — 85025 COMPLETE CBC W/AUTO DIFF WBC: CPT

## 2025-05-19 PROCEDURE — 99999 PR PBB SHADOW E&M-EST. PATIENT-LVL IV: CPT | Mod: PBBFAC,,,

## 2025-05-19 PROCEDURE — 84100 ASSAY OF PHOSPHORUS: CPT

## 2025-05-19 PROCEDURE — 36415 COLL VENOUS BLD VENIPUNCTURE: CPT

## 2025-05-19 PROCEDURE — 1159F MED LIST DOCD IN RCRD: CPT | Mod: CPTII,S$GLB,,

## 2025-05-19 PROCEDURE — 3079F DIAST BP 80-89 MM HG: CPT | Mod: CPTII,S$GLB,,

## 2025-05-19 PROCEDURE — 3044F HG A1C LEVEL LT 7.0%: CPT | Mod: CPTII,S$GLB,,

## 2025-05-19 PROCEDURE — G2211 COMPLEX E/M VISIT ADD ON: HCPCS | Mod: S$GLB,,,

## 2025-05-19 PROCEDURE — 80197 ASSAY OF TACROLIMUS: CPT

## 2025-05-19 PROCEDURE — 3008F BODY MASS INDEX DOCD: CPT | Mod: CPTII,S$GLB,,

## 2025-05-19 RX ORDER — DOXYCYCLINE 100 MG/1
100 CAPSULE ORAL 2 TIMES DAILY
Qty: 14 CAPSULE | Refills: 0 | Status: SHIPPED | OUTPATIENT
Start: 2025-05-19 | End: 2025-05-26

## 2025-05-19 NOTE — PROGRESS NOTES
"Section of Hematology and Stem Cell Transplantation    Post-Transplantation Follow Up Visit     5/19/25    Transplant History:   Primary oncologist: Clyde James MD  Primary oncologic diagnosis: primary myelofibrosis  Transplant date: 12/26/2024  Donor: haploidentical  Blood Type (Patient): O +  Blood Type (Donor): O +  CMV (Patient): Negative  CMV (Donor): Positive  Graft source: Peripheral blood  CD34+ cell dose: 6.04 X10^6 cells/kg  Conditioning Regimen: Thiotepa, Busulfan, Fludarabine  GVHD prophylaxis: Post-transplant cyclophosphamide, MMF, Tacrolimus  Immediate post-transplant complications: mucositis, R axillary skin/soft tissue infection secondary to MRSA    History of Present Ilness:   Rubén Peters) is a pleasant 54 y.o.male with primary myelofibrosis who is status post haploidentical stem cell transplantation conditioned with Bu/Flu/TT who is currently day +144 who presents for post-transplant follow up.    He reports experiencing extreme fatigue and weakness. Last Thursday, he mentioned being in the shower almost "blacking out" but he got out of the shower and sat down. Since getting blood he has been better. He reports nausea has improved with the start of prednisone 40mg daily. He expressed reluctance to trying prescribed pantoprazole, he reports only having "gas" and belching. Last headache was last Tuesday.     PAST MEDICAL HISTORY:   Past Medical History:   Diagnosis Date    Abdominal pain 01/02/2025    Allergic contact dermatitis due to adhesives 12/31/2024    Cancer     Flatulence 01/03/2025    Headache 12/27/2024    Hyperlipidemia     Mucositis 01/02/2025    Other constipation 12/19/2024    Thrombocytosis 12/18/2024    Transaminitis 12/27/2024       PAST SURGICAL HISTORY:   Past Surgical History:   Procedure Laterality Date    BONE MARROW BIOPSY N/A 11/27/2024    Procedure: Biopsy-bone marrow;  Surgeon: Clyde James MD;  Location: [address]);  Service: Oncology;  " Laterality: N/A;  11/20-pre call complete-tb    BONE MARROW BIOPSY N/A 4/9/2025    Procedure: Biopsy-bone marrow;  Surgeon: Clyde James MD;  Location: [facility] ENDO (4TH FLR);  Service: Oncology;  Laterality: N/A;  4/1 precall complete. EW    COLONOSCOPY N/A 8/9/2023    Procedure: COLONOSCOPY;  Surgeon: Will Ardon MD;  Location: [facility] ENDO (4TH FLR);  Service: Endoscopy;  Laterality: N/A;  Suprep Instructions sent via portal / Authorizing:     Bebeto Arora MD in [facility] FAMILY MED/ INTERNAL MED/ PEDS  Referral:          [id number]    EYE SURGERY      FLEXIBLE SIGMOIDOSCOPY N/A 7/23/2024    Procedure: SIGMOIDOSCOPY, FLEXIBLE;  Surgeon: Nirali Vicente MD;  Location: [facility] ENDO;  Service: Endoscopy;  Laterality: N/A;    INSERTION OF LONGORIA CATHETER Right 12/18/2024    Procedure: INSERTION, CATHETER, CENTRAL VENOUS, LONGORIA TRIPLE LUMEN, Bard 12.5 fr Longoria Cath model [id number];  Surgeon: Willie Hadley MD;  [facility];  Service: General;  Laterality: Right;       PAST SOCIAL HISTORY:  Social History     Tobacco Use    Smoking status: Former     Types: Cigars, Cigarettes    Smokeless tobacco: Never   Substance Use Topics    Alcohol use: Not Currently     Comment: socially    Drug use: Never       FAMILY HISTORY:  Family History   Problem Relation Name Age of Onset    Arthritis Mother      COPD Father      Testicular cancer Maternal Cousin Peter 17        Chemotherapy    Breast cancer Maternal Cousin  51        Chemotherapy    Heart disease Maternal Grandfather      Stroke Maternal Grandmother         CURRENT MEDICATIONS:   Current Outpatient Medications   Medication Sig    acyclovir (ZOVIRAX) 800 MG Tab Take 1 tablet (800 mg total) by mouth 2 (two) times daily.    atovaquone (MEPRON) 750 mg/5 mL Susp oral liquid Take 10 mLs (1,500 mg total) by mouth once daily.    eltrombopag olamine (PROMACTA) 50 MG Tab Take 1 tablet (50 mg total) by mouth once daily.    ergocalciferol (ERGOCALCIFEROL) 50,000 unit  Cap Take 1 capsule (50,000 Units total) by mouth every 7 days.    letermovir (PREVYMIS) 480 mg Tab Take 1 tablet (480 mg total) by mouth once daily.    magnesium oxide (MAG-OX) 400 mg (241.3 mg magnesium) tablet Take 2 tablets in the morning, 1 tablet in the afternoon, and 2 tablets in the evening.    ondansetron (ZOFRAN) 8 MG tablet Take 1 tablet (8 mg total) by mouth every 8 (eight) hours as needed for Nausea.    pantoprazole (PROTONIX) 40 MG tablet Take 1 tablet (40 mg total) by mouth once daily.    predniSONE (DELTASONE) 20 MG tablet Take 2 tablets (40 mg total) by mouth once daily.    prochlorperazine (COMPAZINE) 10 MG tablet Take 1 tablet (10 mg total) by mouth 4 (four) times daily as needed for Nausea.    tacrolimus (PROGRAF) 0.5 MG Cap Take 2 capsules (1 mg total) by mouth every morning AND 2 capsules (1 mg total) every evening.    traMADoL (ULTRAM) 50 mg tablet Take 1 tablet (50 mg total) by mouth every 6 (six) hours as needed for Pain.    triamcinolone acetonide 0.1% (KENALOG) 0.1 % cream Apply topically 2 (two) times daily as needed (rash on arms, chest, back, trunk, and legs (do not apply to face)).    voriconazole (VFEND) 200 MG Tab Take 1 tablet (200 mg total) by mouth 2 (two) times daily.    doxycycline (VIBRAMYCIN) 100 MG Cap Take 1 capsule (100 mg total) by mouth 2 (two) times daily. for 7 days     No current facility-administered medications for this visit.       ALLERGIES:   Review of patient's allergies indicates:   Allergen Reactions    Sulfamethoxazole-trimethoprim Hives       GVHD Review of Systems:     Pertinent positives and negatives included in the HPI. Otherwise a 14 point review of systems is negative. GVHD review of systems recorded in BMT flowsheet.     Physical Exam:     Vitals:    05/19/25 0832   BP: 133/87   Pulse: 90   Resp: 16   Temp: (P) 97.8 °F (36.6 °C)               General: Appears well, NAD  HEENT: MMM, no OP lesions  Neck: Supple, no LAD  Pulmonary: CTAB, no increased work  of breathing, no W/R/C  Cardiovascular: S1S2 normal, RRR, no M/R/G  Abdominal: Soft, NT, ND, BS+, no HSM  Extremities: No C/C/E  Neurological: AAOx4, grossly normal, no focal deficits  Dermatologic: No appreciable rashes or lesions     ECOG Performance Status: (foot note - ECOG PS provided by [employer]) 1 - Symptomatic but completely ambulatory    Karnofsky Performance Score:  70%- Cares for Self: Unable to Carry on Normal Activity or Active Work    Labs:   Lab Results   Component Value Date    WBC 7.42 05/19/2025    RBC 3.25 (L) 05/19/2025    HGB 9.7 (L) 05/19/2025    HCT 29.8 (L) 05/19/2025    MCV 92 05/19/2025    MCH 29.8 05/19/2025    MCHC 32.6 05/19/2025    RDW 17.2 (H) 05/19/2025     (L) 05/19/2025    MPV 11.7 05/19/2025    GRAN 6.3 05/05/2025    LYMPH 13.2 (L) 05/19/2025    LYMPH 0.98 (L) 05/19/2025    MONO 8.9 05/19/2025    MONO 0.66 05/19/2025    EOS 0.0 05/19/2025    EOS 0.00 05/19/2025    BASO 0.02 03/20/2025    EOSINOPHIL 0.6 03/20/2025    BASOPHIL 0.3 05/19/2025    BASOPHIL 0.02 05/19/2025       Sodium   Date Value Ref Range Status   05/19/2025 138 136 - 145 mmol/L Final   03/20/2025 138 136 - 145 mmol/L Final     Potassium   Date Value Ref Range Status   05/19/2025 5.0 3.5 - 5.1 mmol/L Final   03/20/2025 4.2 3.5 - 5.1 mmol/L Final     Chloride   Date Value Ref Range Status   05/19/2025 104 95 - 110 mmol/L Final   03/20/2025 108 95 - 110 mmol/L Final     CO2   Date Value Ref Range Status   05/19/2025 23 23 - 29 mmol/L Final   03/20/2025 21 (L) 23 - 29 mmol/L Final     Glucose   Date Value Ref Range Status   05/19/2025 102 70 - 110 mg/dL Final   03/20/2025 101 70 - 110 mg/dL Final     BUN   Date Value Ref Range Status   05/19/2025 26 (H) 6 - 20 mg/dL Final     Creatinine   Date Value Ref Range Status   05/19/2025 1.2 0.5 - 1.4 mg/dL Final     Calcium   Date Value Ref Range Status   05/19/2025 9.9 8.7 - 10.5 mg/dL Final   03/20/2025 9.2 8.7 - 10.5 mg/dL Final     Protein  Total   Date Value Ref Range Status   05/19/2025 8.1 6.0 - 8.4 gm/dL Final     Total Protein   Date Value Ref Range Status   03/20/2025 6.6 6.0 - 8.4 g/dL Final     Albumin   Date Value Ref Range Status   05/19/2025 3.9 3.5 - 5.2 g/dL Final   03/20/2025 3.7 3.5 - 5.2 g/dL Final     Total Bilirubin   Date Value Ref Range Status   03/20/2025 0.4 0.1 - 1.0 mg/dL Final     Comment:     For infants and newborns, interpretation of results should be based  on gestational age, weight and in agreement with clinical  observations.    Premature Infant recommended reference ranges:  Up to 24 hours.............<8.0 mg/dL  Up to 48 hours............<12.0 mg/dL  3-5 days..................<15.0 mg/dL  6-29 days.................<15.0 mg/dL       Bilirubin Total   Date Value Ref Range Status   05/19/2025 0.5 0.1 - 1.0 mg/dL Final     Comment:     For infants and newborns, interpretation of results should be based   on gestational age, weight and in agreement with clinical   observations.    Premature Infant recommended reference ranges:   0-24 hours:  <8.0 mg/dL   24-48 hours: <12.0 mg/dL   3-5 days:    <15.0 mg/dL   6-29 days:   <15.0 mg/dL     Alkaline Phosphatase   Date Value Ref Range Status   03/20/2025 122 40 - 150 U/L Final     ALP   Date Value Ref Range Status   05/19/2025 121 40 - 150 unit/L Final     AST   Date Value Ref Range Status   05/19/2025 25 11 - 45 unit/L Final   03/20/2025 59 (H) 10 - 40 U/L Final     ALT   Date Value Ref Range Status   05/19/2025 37 10 - 44 unit/L Final   03/20/2025 85 (H) 10 - 44 U/L Final     Anion Gap   Date Value Ref Range Status   05/19/2025 11 8 - 16 mmol/L Final     eGFR if    Date Value Ref Range Status   01/22/2022 >60.0 >60 mL/min/1.73 m^2 Final     eGFR if non    Date Value Ref Range Status   01/22/2022 >60.0 >60 mL/min/1.73 m^2 Final     Comment:     Calculation used to obtain the estimated glomerular filtration  rate (eGFR) is the CKD-EPI equation.           Imaging:   All pertinent studies reviewed and interpreted by me       Assessment and Plan:   Rubén Hu (Rubén) is a pleasant 54 y.o.male with primary myelofibrosis s/p haplo SCT who presents for post-transplant follow up.    Primary myelofibrosis: Status post treatment with ruxolitinib, followed by alloSCT. Day +30 BMBx results below.    Status post allogeneic stem cell transplantation: As noted above, status post haploidentical stem cell transplantation conditioned with Bu/Flu/TT. Currently day +144.  Engrafted on day +22.  Day +30 BMBx on 1/2/25: No definitive evidence of residual JAK2 [id number] primary myelofibrosis. NGS without evidence of pathogenic mutations. Chimerism studies with 100% donor CD33, CD3 insufficient for analysis.   Plan for central line removal -  4/3/25  Day 100 bone marrow biopsy 4/9/25:Markedly hypocellular marrow (<5%-15%) with trilineage hematopoiesis. NGS with DNMT3A 2%. CG normal and JAK2 negative. Chimerisms 90% CD3 and 100% CD33 donor.    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 MMF has now been discontinued. 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 Restarted on budesonide 3mg TID (4/14/25), and he reports resolution of nausea. Tapered budesonide ending 5/12. He has began to have worsening nausea despite zofran and compazine, lack of appetite, and early satiety since being off of budesonide. Will start prednisone 40mg daily (started 5/16). With parvovirus noted, will allow for low-normal tacro levels (goal 4-10).  Current tacro dose: 1 mg BID   Last tacro level: 7.4  Adjustments: noneI w    Immunosuppression: Prevention with voriconazole,  acyclovir. CMV prophylaxis with letermovir. PJP prophyalxis dapsone. Continue weekly monitoring of CMV and EBV.  Last CMV: negative (4/21/2025), last EBV: negative (4/21/2025).  Active infections: none    Pancytopenia: Due to underlying disease and  chemotherapy. Transfuse for Hgb <7 g/dL and platelets <10k. Likely related to parvovirus (see below). Continue Promacta 50mg daily. Adding Retacrit.     Parvovirus: Obtained to evaluate for pancytopenia. Monitor labs weekly and continue transfusion support.   IVIG started 5/9/25 and he tolerated it well. He will receive IVIG one more time.    Hypertension:  Was newly hypertensive in hospital following SCT at which time he was started on amlodipine. Discontinued amlodipine after hospitalization due to hypotension. Monitor for now but restart antihypertensives if this worsens.     Cardiomyopathy: Echo 2/26/25 obtained due to tachycardia revealed reduced LVEF 45-50%. No signs/symptoms of HFrEF. Repeat echo in 3 months (5/2025).    Vitamin D deficiency: His primary care is giving him 50,000 units once a week.     Hypercholesterolemia: He is following with primary care. Could be elevated due to tacrolimus.     Tension Headache: Patient with more frequent headaches across his forehead. Unsure of cause but could be stress or dehydration. Encourage more frequent hydration. Tramadol prn for headaches.   Doing better. Has not had a headache since 5/13/25    Staph Infection: Patient with two episodes of staph under his right arm in the armpit region. He has been treated both times with doxycycline 100mg BID. Today he reports another pustule appearing in the same region as previous infections. Consult to ID placed and re-sent in doxycycline 100mg BID x7 days.         Orders Placed:      No orders of the defined types were placed in this encounter.     Route Chart for Scheduling  BMT Route Chart for Scheduling      Supportive Plan Information  OP IVIG Clarence Grover MD   Associated Diagnosis: Parvovirus B19 infection   noted on 4/21/2025   Line of treatment: Supportive Care   Treatment goal: Supportive     Upcoming Treatment Dates - OP IVIG    5/10/2025       Pre-Medications       acetaminophen tablet 650 mg        diphenhydrAMINE (BENADRYL) 50 mg in NS 50 mL IVPB       famotidine (PF) injection 20 mg       Medications       immun glob G (IgG)-gly-IgA 50+ (GAMUNEX-C/GAMMAKED) (GAMUNEX-C) 20 gram/200 mL (10 %) injection 75 g    Treatment Plan Information   OP Adult Blood - Twice Weekly Clyde James MD   Associated Diagnosis: Anemia associated with chemotherapy   noted on 2/4/2025   Line of treatment: Supportive Care  Treatment Goal: Supportive     Upcoming Treatment Dates - OP Adult Blood - Twice Weekly    5/6/2025       Prepare RBC       Transfuse RBC 1 Unit    Therapy Plan Information  PORT FLUSH for Adrenal nodule, noted on 12/9/2024  Flushes  heparin, porcine (PF) 100 unit/mL injection flush 500 Units  500 Units, Intravenous, Every visit  sodium chloride 0.9% flush 10 mL  10 mL, Intravenous, Every visit    EPOETIN CRISELDA (RETACRIT) WEEKLY for Anemia, noted on 5/14/2025  Medications  epoetin criselda-epbx injection 40,000 Units  40,000 Units, Subcutaneous, 1 time a week      No therapy plan of the specified type found.    Total time of this visit was 40 minutes, including time spent face to face with patient and/or via video/audio, and also in preparing for today's visit for MDM and documentation. (Medical Decision Making, including consideration of possible diagnoses, management options, complex medical record review, review of diagnostic tests and information, consideration and discussion of significant complications based on comorbidities, and discussion with providers involved with the care of the patient). Greater than 50% was spent face to face with the patient counseling and coordinating care.    Visit today included increased complexity associated with the care of the episodic problem parvovirus induced anemia and acute gut GVHD addressed and managing the longitudinal care of the patient due to the serious and/or complex managed problem(s) s/p allogeneic stem cell transplant .     Opal Alonoz PA-C  Malignant  Hematology, Stem Cell Transplant, and Cellular Therapy  [facility]  [facility] [place]

## 2025-05-23 ENCOUNTER — DOCUMENTATION ONLY (OUTPATIENT)
Dept: HEMATOLOGY/ONCOLOGY | Facility: CLINIC | Age: 55
End: 2025-05-23
Payer: COMMERCIAL

## 2025-05-23 NOTE — PROGRESS NOTES
SW received email from patient requesting medical documentation to be sent to his disability company, [employer], fax number [phone]. May documentation will be sent at the end of May and June documentation at the end of June.

## 2025-05-26 ENCOUNTER — OFFICE VISIT (OUTPATIENT)
Dept: HEMATOLOGY/ONCOLOGY | Facility: CLINIC | Age: 55
End: 2025-05-26
Payer: COMMERCIAL

## 2025-05-26 ENCOUNTER — RESULTS FOLLOW-UP (OUTPATIENT)
Dept: HEMATOLOGY/ONCOLOGY | Facility: CLINIC | Age: 55
End: 2025-05-26

## 2025-05-26 ENCOUNTER — LAB VISIT (OUTPATIENT)
Dept: LAB | Facility: HOSPITAL | Age: 55
End: 2025-05-26
Attending: INTERNAL MEDICINE
Payer: COMMERCIAL

## 2025-05-26 VITALS
HEIGHT: 71 IN | TEMPERATURE: 98 F | HEART RATE: 86 BPM | WEIGHT: 165.13 LBS | DIASTOLIC BLOOD PRESSURE: 87 MMHG | BODY MASS INDEX: 23.12 KG/M2 | OXYGEN SATURATION: 99 % | SYSTOLIC BLOOD PRESSURE: 135 MMHG

## 2025-05-26 DIAGNOSIS — D61.818 PANCYTOPENIA: ICD-10-CM

## 2025-05-26 DIAGNOSIS — G44.209 TENSION HEADACHE: ICD-10-CM

## 2025-05-26 DIAGNOSIS — E78.00 HYPERCHOLESTEROLEMIA: ICD-10-CM

## 2025-05-26 DIAGNOSIS — B34.3 PARVOVIRUS B19 INFECTION: ICD-10-CM

## 2025-05-26 DIAGNOSIS — D47.1 PRIMARY MYELOFIBROSIS: Primary | ICD-10-CM

## 2025-05-26 DIAGNOSIS — Z76.82 STEM CELL TRANSPLANT CANDIDATE: ICD-10-CM

## 2025-05-26 DIAGNOSIS — B95.8 STAPH SKIN INFECTION: ICD-10-CM

## 2025-05-26 DIAGNOSIS — Z94.84 HISTORY OF ALLOGENEIC STEM CELL TRANSPLANT: ICD-10-CM

## 2025-05-26 DIAGNOSIS — I42.9 CARDIOMYOPATHY, UNSPECIFIED TYPE: ICD-10-CM

## 2025-05-26 DIAGNOSIS — I10 HYPERTENSION, UNSPECIFIED TYPE: ICD-10-CM

## 2025-05-26 DIAGNOSIS — L08.9 STAPH SKIN INFECTION: ICD-10-CM

## 2025-05-26 DIAGNOSIS — D47.1 PRIMARY MYELOFIBROSIS: ICD-10-CM

## 2025-05-26 DIAGNOSIS — E55.9 VITAMIN D DEFICIENCY: ICD-10-CM

## 2025-05-26 DIAGNOSIS — D89.810 ACUTE GVHD: ICD-10-CM

## 2025-05-26 DIAGNOSIS — D84.81 IMMUNODEFICIENCY DUE TO CONDITIONS CLASSIFIED ELSEWHERE: ICD-10-CM

## 2025-05-26 LAB
ABSOLUTE NEUTROPHIL MANUAL (OHS): 7.1 K/UL
ALBUMIN SERPL BCP-MCNC: 3.8 G/DL (ref 3.5–5.2)
ALP SERPL-CCNC: 96 UNIT/L (ref 40–150)
ALT SERPL W/O P-5'-P-CCNC: 41 UNIT/L (ref 10–44)
ANION GAP (OHS): 9 MMOL/L (ref 8–16)
ANISOCYTOSIS BLD QL SMEAR: SLIGHT
AST SERPL-CCNC: 21 UNIT/L (ref 11–45)
BILIRUB SERPL-MCNC: 0.4 MG/DL (ref 0.1–1)
BUN SERPL-MCNC: 34 MG/DL (ref 6–20)
CALCIUM SERPL-MCNC: 9.7 MG/DL (ref 8.7–10.5)
CHLORIDE SERPL-SCNC: 102 MMOL/L (ref 95–110)
CO2 SERPL-SCNC: 22 MMOL/L (ref 23–29)
CREAT SERPL-MCNC: 1.1 MG/DL (ref 0.5–1.4)
CYTOMEGALOVIRUS DNA, QUAL (OHS): NOT DETECTED
EPSTEIN-BARR VIRUS DNA, QUAL (OHS): ABNORMAL
EPSTEIN-BARR VIRUS LOG (IU/ML)(OHS): ABNORMAL
EPSTEIN-BARR VIRUS PCR, QUANT (OHS): <50
ERYTHROCYTE [DISTWIDTH] IN BLOOD BY AUTOMATED COUNT: 21.6 % (ref 11.5–14.5)
GFR SERPLBLD CREATININE-BSD FMLA CKD-EPI: >60 ML/MIN/1.73/M2
GLUCOSE SERPL-MCNC: 123 MG/DL (ref 70–110)
HCT VFR BLD AUTO: 30 % (ref 40–54)
HGB BLD-MCNC: 9.9 GM/DL (ref 14–18)
INDIRECT COOMBS: NORMAL
LYMPHOCYTES NFR BLD MANUAL: 12 % (ref 18–48)
MAGNESIUM SERPL-MCNC: 2 MG/DL (ref 1.6–2.6)
MCH RBC QN AUTO: 31.4 PG (ref 27–31)
MCHC RBC AUTO-ENTMCNC: 33 G/DL (ref 32–36)
MCV RBC AUTO: 95 FL (ref 82–98)
MONOCYTES NFR BLD MANUAL: 5 % (ref 4–15)
NEUTROPHILS NFR BLD MANUAL: 78 % (ref 38–73)
NEUTS BAND NFR BLD MANUAL: 5 %
NUCLEATED RBC (/100WBC) (OHS): 2 /100 WBC
PLATELET # BLD AUTO: 118 K/UL (ref 150–450)
PLATELET BLD QL SMEAR: ABNORMAL
PMV BLD AUTO: 10.8 FL (ref 9.2–12.9)
POTASSIUM SERPL-SCNC: 4.9 MMOL/L (ref 3.5–5.1)
PROT SERPL-MCNC: 7.8 GM/DL (ref 6–8.4)
RBC # BLD AUTO: 3.15 M/UL (ref 4.6–6.2)
RH BLD: NORMAL
SODIUM SERPL-SCNC: 133 MMOL/L (ref 136–145)
SPECIMEN OUTDATE: NORMAL
TACROLIMUS BLD-MCNC: 5.9 NG/ML (ref 5–15)
WBC # BLD AUTO: 8.6 K/UL (ref 3.9–12.7)

## 2025-05-26 PROCEDURE — 80197 ASSAY OF TACROLIMUS: CPT

## 2025-05-26 PROCEDURE — 87799 DETECT AGENT NOS DNA QUANT: CPT

## 2025-05-26 PROCEDURE — 85007 BL SMEAR W/DIFF WBC COUNT: CPT

## 2025-05-26 PROCEDURE — 80053 COMPREHEN METABOLIC PANEL: CPT

## 2025-05-26 PROCEDURE — 36415 COLL VENOUS BLD VENIPUNCTURE: CPT

## 2025-05-26 PROCEDURE — 83735 ASSAY OF MAGNESIUM: CPT

## 2025-05-26 PROCEDURE — 86900 BLOOD TYPING SEROLOGIC ABO: CPT | Performed by: INTERNAL MEDICINE

## 2025-05-26 PROCEDURE — 99999 PR PBB SHADOW E&M-EST. PATIENT-LVL IV: CPT | Mod: PBBFAC,,,

## 2025-05-26 RX ORDER — PREDNISONE 10 MG/1
30 TABLET ORAL DAILY
Qty: 30 TABLET | Refills: 1 | Status: SHIPPED | OUTPATIENT
Start: 2025-05-26

## 2025-05-26 NOTE — PROGRESS NOTES
BMT Pharmacist Immunosuppression Note:    Reviewed patient's tacrolimus level with Dr. Grover and it is within goal range.      Current regimen: 1 mg BID   Capsule size(s): 0.5 mg    Plan: Continue current regimen.        Lab Results   Component Value Date    TACROLIMUS 5.9 05/26/2025    TACROLIMUS 7.4 05/19/2025    TACROLIMUS 9.6 05/14/2025       Creatinine   Date Value Ref Range Status   05/26/2025 1.1 0.5 - 1.4 mg/dL Final   05/19/2025 1.2 0.5 - 1.4 mg/dL Final   05/14/2025 1.1 0.5 - 1.4 mg/dL Final     Total Bilirubin   Date Value Ref Range Status   03/20/2025 0.4 0.1 - 1.0 mg/dL Final     Comment:     For infants and newborns, interpretation of results should be based  on gestational age, weight and in agreement with clinical  observations.    Premature Infant recommended reference ranges:  Up to 24 hours.............<8.0 mg/dL  Up to 48 hours............<12.0 mg/dL  3-5 days..................<15.0 mg/dL  6-29 days.................<15.0 mg/dL     03/17/2025 0.5 0.1 - 1.0 mg/dL Final     Comment:     For infants and newborns, interpretation of results should be based  on gestational age, weight and in agreement with clinical  observations.    Premature Infant recommended reference ranges:  Up to 24 hours.............<8.0 mg/dL  Up to 48 hours............<12.0 mg/dL  3-5 days..................<15.0 mg/dL  6-29 days.................<15.0 mg/dL     03/13/2025 0.4 0.1 - 1.0 mg/dL Final     Comment:     For infants and newborns, interpretation of results should be based  on gestational age, weight and in agreement with clinical  observations.    Premature Infant recommended reference ranges:  Up to 24 hours.............<8.0 mg/dL  Up to 48 hours............<12.0 mg/dL  3-5 days..................<15.0 mg/dL  6-29 days.................<15.0 mg/dL       Bilirubin Total   Date Value Ref Range Status   05/26/2025 0.4 0.1 - 1.0 mg/dL Final     Comment:     For infants and newborns, interpretation of results should be  based   on gestational age, weight and in agreement with clinical   observations.    Premature Infant recommended reference ranges:   0-24 hours:  <8.0 mg/dL   24-48 hours: <12.0 mg/dL   3-5 days:    <15.0 mg/dL   6-29 days:   <15.0 mg/dL   05/19/2025 0.5 0.1 - 1.0 mg/dL Final     Comment:     For infants and newborns, interpretation of results should be based   on gestational age, weight and in agreement with clinical   observations.    Premature Infant recommended reference ranges:   0-24 hours:  <8.0 mg/dL   24-48 hours: <12.0 mg/dL   3-5 days:    <15.0 mg/dL   6-29 days:   <15.0 mg/dL   05/14/2025 0.4 0.1 - 1.0 mg/dL Final     Comment:     For infants and newborns, interpretation of results should be based   on gestational age, weight and in agreement with clinical   observations.    Premature Infant recommended reference ranges:   0-24 hours:  <8.0 mg/dL   24-48 hours: <12.0 mg/dL   3-5 days:    <15.0 mg/dL   6-29 days:   <15.0 mg/dL     AST   Date Value Ref Range Status   05/26/2025 21 11 - 45 unit/L Final   05/19/2025 25 11 - 45 unit/L Final   05/14/2025 43 11 - 45 unit/L Final   03/20/2025 59 (H) 10 - 40 U/L Final   03/17/2025 44 (H) 10 - 40 U/L Final   03/13/2025 40 10 - 40 U/L Final     ALT   Date Value Ref Range Status   05/26/2025 41 10 - 44 unit/L Final   05/19/2025 37 10 - 44 unit/L Final   05/14/2025 68 (H) 10 - 44 unit/L Final   03/20/2025 85 (H) 10 - 44 U/L Final   03/17/2025 64 (H) 10 - 44 U/L Final   03/13/2025 49 (H) 10 - 44 U/L Final             Jonny James, PharmD  [id number]

## 2025-05-26 NOTE — PROGRESS NOTES
Section of Hematology and Stem Cell Transplantation    Post-Transplantation Follow Up Visit     5/26/25    Transplant History:   Primary oncologist: Clyde James MD  Primary oncologic diagnosis: primary myelofibrosis  Transplant date: 12/26/2024  Donor: haploidentical  Blood Type (Patient): O +  Blood Type (Donor): O +  CMV (Patient): Negative  CMV (Donor): Positive  Graft source: Peripheral blood  CD34+ cell dose: 6.04 X10^6 cells/kg  Conditioning Regimen: Thiotepa, Busulfan, Fludarabine  GVHD prophylaxis: Post-transplant cyclophosphamide, MMF, Tacrolimus  Immediate post-transplant complications: mucositis, R axillary skin/soft tissue infection secondary to MRSA    History of Present Ilness:   Rubén Peters) is a pleasant 54 y.o.male with primary myelofibrosis who is status post haploidentical stem cell transplantation conditioned with Bu/Flu/TT who is currently day +151 who presents for post-transplant follow up.    He reports doing better overall. His only complaints are shortness of breath and fatigue. His appetite is stable. His nausea and gas issues have improved. He has not had a headaches in awhile now. He reports some shakes in his hands and voice likely. The pustule under his arm has almost completely gone away since taking doxycyline again. No diarrhea or skin changes.    PAST MEDICAL HISTORY:   Past Medical History:   Diagnosis Date    Abdominal pain 01/02/2025    Allergic contact dermatitis due to adhesives 12/31/2024    Cancer     Flatulence 01/03/2025    Headache 12/27/2024    Hyperlipidemia     Mucositis 01/02/2025    Other constipation 12/19/2024    Thrombocytosis 12/18/2024    Transaminitis 12/27/2024       PAST SURGICAL HISTORY:   Past Surgical History:   Procedure Laterality Date    BONE MARROW BIOPSY N/A 11/27/2024    Procedure: Biopsy-bone marrow;  Surgeon: Clyde James MD;  Location: [facility] ([address] [place] [address]);  Service: Oncology;  Laterality: N/A;  11/20-pre call complete-tb    BONE  MARROW BIOPSY N/A 4/9/2025    Procedure: Biopsy-bone marrow;  Surgeon: Clyde James MD;  Location: [facility] ENDO (4TH FLR);  Service: Oncology;  Laterality: N/A;  4/1 precall complete. EW    COLONOSCOPY N/A 8/9/2023    Procedure: COLONOSCOPY;  Surgeon: Will Ardon MD;  Location: [facility] ENDO (4TH FLR);  Service: Endoscopy;  Laterality: N/A;  Suprep Instructions sent via portal / Authorizing:     Bebeto Arora MD in [facility] FAMILY MED/ INTERNAL MED/ PEDS  Referral:          [id number]    EYE SURGERY      FLEXIBLE SIGMOIDOSCOPY N/A 7/23/2024    Procedure: SIGMOIDOSCOPY, FLEXIBLE;  Surgeon: Nirali Vicente MD;  Location: [facility] ENDO;  Service: Endoscopy;  Laterality: N/A;    INSERTION OF LONGORIA CATHETER Right 12/18/2024    Procedure: INSERTION, CATHETER, CENTRAL VENOUS, LONGORIA TRIPLE LUMEN, Bard 12.5 fr Longoria Cath model [id number];  Surgeon: Willie Hadley MD;  Location: [facility] OR 2ND FLR;  Service: General;  Laterality: Right;       PAST SOCIAL HISTORY:  Social History     Tobacco Use    Smoking status: Former     Types: Cigars, Cigarettes    Smokeless tobacco: Never   Substance Use Topics    Alcohol use: Not Currently     Comment: socially    Drug use: Never       FAMILY HISTORY:  Family History   Problem Relation Name Age of Onset    Arthritis Mother      COPD Father      Testicular cancer Maternal Cousin Peter 17        Chemotherapy    Breast cancer Maternal Cousin  51        Chemotherapy    Heart disease Maternal Grandfather      Stroke Maternal Grandmother         CURRENT MEDICATIONS:   Current Outpatient Medications   Medication Sig    acyclovir (ZOVIRAX) 800 MG Tab Take 1 tablet (800 mg total) by mouth 2 (two) times daily.    atovaquone (MEPRON) 750 mg/5 mL Susp oral liquid Take 10 mLs (1,500 mg total) by mouth once daily.    doxycycline (VIBRAMYCIN) 100 MG Cap Take 1 capsule (100 mg total) by mouth 2 (two) times daily. for 7 days    eltrombopag olamine (PROMACTA) 50 MG Tab Take 1 tablet (50 mg total) by mouth  once daily.    ergocalciferol (ERGOCALCIFEROL) 50,000 unit Cap Take 1 capsule (50,000 Units total) by mouth every 7 days.    letermovir (PREVYMIS) 480 mg Tab Take 1 tablet (480 mg total) by mouth once daily.    magnesium oxide (MAG-OX) 400 mg (241.3 mg magnesium) tablet Take 2 tablets in the morning, 1 tablet in the afternoon, and 2 tablets in the evening.    ondansetron (ZOFRAN) 8 MG tablet Take 1 tablet (8 mg total) by mouth every 8 (eight) hours as needed for Nausea.    pantoprazole (PROTONIX) 40 MG tablet Take 1 tablet (40 mg total) by mouth once daily.    prochlorperazine (COMPAZINE) 10 MG tablet Take 1 tablet (10 mg total) by mouth 4 (four) times daily as needed for Nausea.    tacrolimus (PROGRAF) 0.5 MG Cap Take 2 capsules (1 mg total) by mouth every morning AND 2 capsules (1 mg total) every evening.    traMADoL (ULTRAM) 50 mg tablet Take 1 tablet (50 mg total) by mouth every 6 (six) hours as needed for Pain.    triamcinolone acetonide 0.1% (KENALOG) 0.1 % cream Apply topically 2 (two) times daily as needed (rash on arms, chest, back, trunk, and legs (do not apply to face)).    voriconazole (VFEND) 200 MG Tab Take 1 tablet (200 mg total) by mouth 2 (two) times daily.    predniSONE (DELTASONE) 10 MG tablet Take 3 tablets (30 mg total) by mouth once daily.     No current facility-administered medications for this visit.       ALLERGIES:   Review of patient's allergies indicates:   Allergen Reactions    Sulfamethoxazole-trimethoprim Hives       GVHD Review of Systems:     Pertinent positives and negatives included in the HPI. Otherwise a 14 point review of systems is negative. GVHD review of systems recorded in BMT flowsheet.     Physical Exam:     Vitals:    05/26/25 0802   BP: 135/87   Pulse: 86   Temp: 98.2 °F (36.8 °C)               General: Appears well, NAD  HEENT: MMM, no OP lesions  Neck: Supple, no LAD  Pulmonary: CTAB, no increased work of breathing, no W/R/C  Cardiovascular: S1S2 normal, RRR, no  M/R/G  Abdominal: Soft, NT, ND, BS+, no HSM  Extremities: No C/C/E  Neurological: AAOx4, grossly normal, no focal deficits  Dermatologic: No appreciable rashes or lesions     ECOG Performance Status: (foot note - ECOG PS provided by [employer]cology [employer]) 1 - Symptomatic but completely ambulatory    Karnofsky Performance Score:  70%- Cares for Self: Unable to Carry on Normal Activity or Active Work    Labs:   Lab Results   Component Value Date    WBC 8.60 05/26/2025    RBC 3.15 (L) 05/26/2025    HGB 9.9 (L) 05/26/2025    HCT 30.0 (L) 05/26/2025    MCV 95 05/26/2025    MCH 31.4 (H) 05/26/2025    MCHC 33.0 05/26/2025    RDW 21.6 (H) 05/26/2025     (L) 05/26/2025    MPV 10.8 05/26/2025    GRAN 6.3 05/05/2025    LYMPH 13.2 (L) 05/19/2025    LYMPH 0.98 (L) 05/19/2025    MONO 8.9 05/19/2025    MONO 0.66 05/19/2025    EOS 0.0 05/19/2025    EOS 0.00 05/19/2025    BASO 0.02 03/20/2025    EOSINOPHIL 0.6 03/20/2025    BASOPHIL 0.3 05/19/2025    BASOPHIL 0.02 05/19/2025       Sodium   Date Value Ref Range Status   05/26/2025 133 (L) 136 - 145 mmol/L Final   03/20/2025 138 136 - 145 mmol/L Final     Potassium   Date Value Ref Range Status   05/26/2025 4.9 3.5 - 5.1 mmol/L Final   03/20/2025 4.2 3.5 - 5.1 mmol/L Final     Chloride   Date Value Ref Range Status   05/26/2025 102 95 - 110 mmol/L Final   03/20/2025 108 95 - 110 mmol/L Final     CO2   Date Value Ref Range Status   05/26/2025 22 (L) 23 - 29 mmol/L Final   03/20/2025 21 (L) 23 - 29 mmol/L Final     Glucose   Date Value Ref Range Status   05/26/2025 123 (H) 70 - 110 mg/dL Final   03/20/2025 101 70 - 110 mg/dL Final     BUN   Date Value Ref Range Status   05/26/2025 34 (H) 6 - 20 mg/dL Final     Creatinine   Date Value Ref Range Status   05/26/2025 1.1 0.5 - 1.4 mg/dL Final     Calcium   Date Value Ref Range Status   05/26/2025 9.7 8.7 - 10.5 mg/dL Final   03/20/2025 9.2 8.7 - 10.5 mg/dL Final     Protein Total   Date Value Ref Range Status    05/26/2025 7.8 6.0 - 8.4 gm/dL Final     Total Protein   Date Value Ref Range Status   03/20/2025 6.6 6.0 - 8.4 g/dL Final     Albumin   Date Value Ref Range Status   05/26/2025 3.8 3.5 - 5.2 g/dL Final   03/20/2025 3.7 3.5 - 5.2 g/dL Final     Total Bilirubin   Date Value Ref Range Status   03/20/2025 0.4 0.1 - 1.0 mg/dL Final     Comment:     For infants and newborns, interpretation of results should be based  on gestational age, weight and in agreement with clinical  observations.    Premature Infant recommended reference ranges:  Up to 24 hours.............<8.0 mg/dL  Up to 48 hours............<12.0 mg/dL  3-5 days..................<15.0 mg/dL  6-29 days.................<15.0 mg/dL       Bilirubin Total   Date Value Ref Range Status   05/26/2025 0.4 0.1 - 1.0 mg/dL Final     Comment:     For infants and newborns, interpretation of results should be based   on gestational age, weight and in agreement with clinical   observations.    Premature Infant recommended reference ranges:   0-24 hours:  <8.0 mg/dL   24-48 hours: <12.0 mg/dL   3-5 days:    <15.0 mg/dL   6-29 days:   <15.0 mg/dL     Alkaline Phosphatase   Date Value Ref Range Status   03/20/2025 122 40 - 150 U/L Final     ALP   Date Value Ref Range Status   05/26/2025 96 40 - 150 unit/L Final     AST   Date Value Ref Range Status   05/26/2025 21 11 - 45 unit/L Final   03/20/2025 59 (H) 10 - 40 U/L Final     ALT   Date Value Ref Range Status   05/26/2025 41 10 - 44 unit/L Final   03/20/2025 85 (H) 10 - 44 U/L Final     Anion Gap   Date Value Ref Range Status   05/26/2025 9 8 - 16 mmol/L Final     eGFR if    Date Value Ref Range Status   01/22/2022 >60.0 >60 mL/min/1.73 m^2 Final     eGFR if non    Date Value Ref Range Status   01/22/2022 >60.0 >60 mL/min/1.73 m^2 Final     Comment:     Calculation used to obtain the estimated glomerular filtration  rate (eGFR) is the CKD-EPI equation.          Imaging:   All pertinent studies  reviewed and interpreted by me       Assessment and Plan:   Rubén Hu (Rubén) is a pleasant 54 y.o.male with primary myelofibrosis s/p haplo SCT who presents for post-transplant follow up.    Primary myelofibrosis: Status post treatment with ruxolitinib, followed by alloSCT. Day +30 BMBx results below.    Status post allogeneic stem cell transplantation: As noted above, status post haploidentical stem cell transplantation conditioned with Bu/Flu/TT. Currently day +151.  Engrafted on day +22.  Day +30 BMBx on 1/2/25: No definitive evidence of residual JAK2 [id number] primary myelofibrosis. NGS without evidence of pathogenic mutations. Chimerism studies with 100% donor CD33, CD3 insufficient for analysis.   Plan for central line removal -  4/3/25  Day 100 bone marrow biopsy 4/9/25:Markedly hypocellular marrow (<5%-15%) with trilineage hematopoiesis. NGS with DNMT3A 2%. CG normal and JAK2 negative. Chimerisms 90% CD3 and 100% CD33 donor.    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 MMF has now been discontinued. 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 Restarted on budesonide 3mg TID (4/14/25), and he reports resolution of nausea. Tapered budesonide ending 5/12. He has began to have worsening nausea despite zofran and compazine, lack of appetite, and early satiety since being off of budesonide. Will start prednisone 40mg daily (started 5/16). Nausea has improved today (9/26/25) so will go down to 30mg daily and make him a steroid taper. With parvovirus noted, will allow for low-normal tacro levels (goal 4-10).  Current tacro dose: 1 mg BID   Last tacro level: 5.9  Adjustments: none    Immunosuppression: Prevention with voriconazole,  acyclovir. CMV prophylaxis with letermovir. PJP prophyalxis dapsone. Continue weekly monitoring of CMV and EBV.  Last CMV: negative (4/21/2025), last EBV: negative (4/21/2025).  Active infections:  none    Pancytopenia: Due to underlying disease and chemotherapy. Transfuse for Hgb <7 g/dL and platelets <10k. Likely related to parvovirus (see below). Continue Promacta 50mg daily.   Considered adding retacrit but while waiting on authorization his Hgb is now holding. This could be due to the IVIG knocking back the parvovirus and oral prednisone knocking back his GVHD. Will continue to monitor weekly.      Parvovirus: Obtained to evaluate for pancytopenia. Monitor labs weekly and continue transfusion support.   IVIG started 5/9/25 and he tolerated it well. He will receive IVIG one more time on 6/11.    Hypertension:  Was newly hypertensive in hospital following SCT at which time he was started on amlodipine. Discontinued amlodipine after hospitalization due to hypotension. Monitor for now but restart antihypertensives if this worsens.     Cardiomyopathy: Echo 2/26/25 obtained due to tachycardia revealed reduced LVEF 45-50%. No signs/symptoms of HFrEF. Repeat echo in 3 months (5/2025).    Vitamin D deficiency: His primary care is giving him 50,000 units once a week.     Hypercholesterolemia: He is following with primary care. Could be elevated due to tacrolimus.     Tension Headache: Patient with more frequent headaches across his forehead. Unsure of cause but could be stress or dehydration. Encourage more frequent hydration. Tramadol prn for headaches.   Doing better. Has not had a headache since 5/13/25    Staph Infection: Patient with two episodes of staph under his right arm in the armpit region. He has been treated both times with doxycycline 100mg BID. Today he reports another pustule appearing in the same region as previous infections. Consult to ID placed last visit and he had restarted doxycycline 100mg BID x7 days on 5/19/25. Pustule has now resolved under his arm. Last dose of doxycycline is this evening.         Orders Placed:      No orders of the defined types were placed in this encounter.     Route  Chart for Scheduling    BMT Chart Routing      Follow up with physician . Weekly with Opal or jina for the next month   Follow up with REBEKAH    Provider visit type    Infusion scheduling note    Injection scheduling note    Labs CBC, CMP, magnesium, phosphorus, type and screen and tacrolimus level   Scheduling:  Preferred lab:  Lab interval: once a week     Imaging    Pharmacy appointment    Other referrals                      Supportive Plan Information  OP IVIG Clarence Grover MD   Associated Diagnosis: Parvovirus B19 infection   noted on 4/21/2025   Line of treatment: Supportive Care   Treatment goal: Supportive     Upcoming Treatment Dates - OP IVIG    5/10/2025       Pre-Medications       acetaminophen tablet 650 mg       diphenhydrAMINE (BENADRYL) 50 mg in NS 50 mL IVPB       famotidine (PF) injection 20 mg       Medications       immun glob G (IgG)-gly-IgA 50+ (GAMUNEX-C/GAMMAKED) (GAMUNEX-C) 20 gram/200 mL (10 %) injection 75 g    Treatment Plan Information   OP Adult Blood - Twice Weekly Clyde James MD   Associated Diagnosis: Anemia associated with chemotherapy   noted on 2/4/2025   Line of treatment: Supportive Care  Treatment Goal: Supportive     Upcoming Treatment Dates - OP Adult Blood - Twice Weekly    5/6/2025       Prepare RBC       Transfuse RBC 1 Unit    Therapy Plan Information  PORT FLUSH for Adrenal nodule, noted on 12/9/2024  Flushes  heparin, porcine (PF) 100 unit/mL injection flush 500 Units  500 Units, Intravenous, Every visit  sodium chloride 0.9% flush 10 mL  10 mL, Intravenous, Every visit    EPOETIN CRISELDA (RETACRIT) WEEKLY for Anemia, noted on 5/14/2025  Medications  epoetin criselda-epbx injection 40,000 Units  40,000 Units, Subcutaneous, 1 time a week      No therapy plan of the specified type found.    Total time of this visit was 40 minutes, including time spent face to face with patient and/or via video/audio, and also in preparing for today's visit for MDM and documentation.  (Medical Decision Making, including consideration of possible diagnoses, management options, complex medical record review, review of diagnostic tests and information, consideration and discussion of significant complications based on comorbidities, and discussion with providers involved with the care of the patient). Greater than 50% was spent face to face with the patient counseling and coordinating care.    Visit today included increased complexity associated with the care of the episodic problem parvovirus induced anemia and acute gut GVHD addressed and managing the longitudinal care of the patient due to the serious and/or complex managed problem(s) s/p allogeneic stem cell transplant .     Opal Alonzo PA-C  Malignant Hematology, Stem Cell Transplant, and Cellular Therapy  [facility]  [facility]

## 2025-05-28 ENCOUNTER — DOCUMENTATION ONLY (OUTPATIENT)
Dept: HEMATOLOGY/ONCOLOGY | Facility: CLINIC | Age: 55
End: 2025-05-28
Payer: COMMERCIAL

## 2025-05-28 NOTE — PROGRESS NOTES
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 NAT reached out to Dr. James staff to request letter.

## 2025-05-29 ENCOUNTER — DOCUMENTATION ONLY (OUTPATIENT)
Dept: HEMATOLOGY/ONCOLOGY | Facility: CLINIC | Age: 55
End: 2025-05-29
Payer: COMMERCIAL

## 2025-05-29 NOTE — PROGRESS NOTES
NAT faxed progress notes from 5/2-5/26 as requested by patient to [facility] at [phone]. SW also emailed patient with notes that were faxed to [facility].

## 2025-05-30 ENCOUNTER — DOCUMENTATION ONLY (OUTPATIENT)
Dept: HEMATOLOGY/ONCOLOGY | Facility: CLINIC | Age: 55
End: 2025-05-30
Payer: COMMERCIAL

## 2025-05-30 NOTE — PROGRESS NOTES
MD completed Return to Work letter for patient's spouse, Carmelita. NAT emailed Carmelita with signed paperwork attached.

## 2025-06-02 ENCOUNTER — PATIENT MESSAGE (OUTPATIENT)
Dept: HEMATOLOGY/ONCOLOGY | Facility: CLINIC | Age: 55
End: 2025-06-02

## 2025-06-02 ENCOUNTER — LAB VISIT (OUTPATIENT)
Dept: LAB | Facility: HOSPITAL | Age: 55
End: 2025-06-02
Attending: INTERNAL MEDICINE
Payer: COMMERCIAL

## 2025-06-02 ENCOUNTER — OFFICE VISIT (OUTPATIENT)
Dept: HEMATOLOGY/ONCOLOGY | Facility: CLINIC | Age: 55
End: 2025-06-02
Payer: COMMERCIAL

## 2025-06-02 VITALS
TEMPERATURE: 98 F | DIASTOLIC BLOOD PRESSURE: 96 MMHG | HEIGHT: 71 IN | RESPIRATION RATE: 18 BRPM | HEART RATE: 77 BPM | BODY MASS INDEX: 23.72 KG/M2 | SYSTOLIC BLOOD PRESSURE: 173 MMHG | WEIGHT: 169.44 LBS | OXYGEN SATURATION: 100 %

## 2025-06-02 DIAGNOSIS — D89.810 ACUTE GVHD: ICD-10-CM

## 2025-06-02 DIAGNOSIS — Z94.84 HISTORY OF ALLOGENEIC STEM CELL TRANSPLANT: Primary | ICD-10-CM

## 2025-06-02 DIAGNOSIS — D61.818 PANCYTOPENIA: ICD-10-CM

## 2025-06-02 DIAGNOSIS — B34.3 PARVOVIRUS B19 INFECTION: ICD-10-CM

## 2025-06-02 DIAGNOSIS — D84.81 IMMUNODEFICIENCY DUE TO CONDITIONS CLASSIFIED ELSEWHERE: ICD-10-CM

## 2025-06-02 DIAGNOSIS — D47.1 PRIMARY MYELOFIBROSIS: ICD-10-CM

## 2025-06-02 DIAGNOSIS — Z76.82 STEM CELL TRANSPLANT CANDIDATE: ICD-10-CM

## 2025-06-02 DIAGNOSIS — Z94.84 HISTORY OF ALLOGENEIC STEM CELL TRANSPLANT: ICD-10-CM

## 2025-06-02 LAB
ABSOLUTE EOSINOPHIL (OHS): 0.01 K/UL
ABSOLUTE MONOCYTE (OHS): 0.78 K/UL (ref 0.3–1)
ABSOLUTE NEUTROPHIL COUNT (OHS): 8.46 K/UL (ref 1.8–7.7)
ALBUMIN SERPL BCP-MCNC: 4 G/DL (ref 3.5–5.2)
ALP SERPL-CCNC: 78 UNIT/L (ref 40–150)
ALT SERPL W/O P-5'-P-CCNC: 53 UNIT/L (ref 10–44)
ANION GAP (OHS): 5 MMOL/L (ref 8–16)
ANISOCYTOSIS BLD QL SMEAR: SLIGHT
AST SERPL-CCNC: 27 UNIT/L (ref 11–45)
BASOPHILS # BLD AUTO: 0.03 K/UL
BASOPHILS NFR BLD AUTO: 0.3 %
BILIRUB SERPL-MCNC: 0.4 MG/DL (ref 0.1–1)
BUN SERPL-MCNC: 26 MG/DL (ref 6–20)
CALCIUM SERPL-MCNC: 9.4 MG/DL (ref 8.7–10.5)
CHLORIDE SERPL-SCNC: 103 MMOL/L (ref 95–110)
CO2 SERPL-SCNC: 26 MMOL/L (ref 23–29)
CREAT SERPL-MCNC: 1 MG/DL (ref 0.5–1.4)
CYTOMEGALOVIRUS DNA, QUAL (OHS): NOT DETECTED
EPSTEIN-BARR VIRUS DNA, QUAL (OHS): NORMAL
ERYTHROCYTE [DISTWIDTH] IN BLOOD BY AUTOMATED COUNT: 25.3 % (ref 11.5–14.5)
GFR SERPLBLD CREATININE-BSD FMLA CKD-EPI: >60 ML/MIN/1.73/M2
GLUCOSE SERPL-MCNC: 92 MG/DL (ref 70–110)
HCT VFR BLD AUTO: 30.9 % (ref 40–54)
HGB BLD-MCNC: 9.6 GM/DL (ref 14–18)
IMM GRANULOCYTES # BLD AUTO: 0.46 K/UL (ref 0–0.04)
IMM GRANULOCYTES NFR BLD AUTO: 4.2 % (ref 0–0.5)
INDIRECT COOMBS: NORMAL
LYMPHOCYTES # BLD AUTO: 1.1 K/UL (ref 1–4.8)
MAGNESIUM SERPL-MCNC: 2.1 MG/DL (ref 1.6–2.6)
MCH RBC QN AUTO: 31.8 PG (ref 27–31)
MCHC RBC AUTO-ENTMCNC: 31.1 G/DL (ref 32–36)
MCV RBC AUTO: 102 FL (ref 82–98)
NUCLEATED RBC (/100WBC) (OHS): 1 /100 WBC
PHOSPHATE SERPL-MCNC: 3.1 MG/DL (ref 2.7–4.5)
PLATELET # BLD AUTO: 140 K/UL (ref 150–450)
PMV BLD AUTO: 11.5 FL (ref 9.2–12.9)
POIKILOCYTOSIS BLD QL SMEAR: SLIGHT
POTASSIUM SERPL-SCNC: 5.1 MMOL/L (ref 3.5–5.1)
PROT SERPL-MCNC: 7.2 GM/DL (ref 6–8.4)
RBC # BLD AUTO: 3.02 M/UL (ref 4.6–6.2)
RELATIVE EOSINOPHIL (OHS): 0.1 %
RELATIVE LYMPHOCYTE (OHS): 10.1 % (ref 18–48)
RELATIVE MONOCYTE (OHS): 7.2 % (ref 4–15)
RELATIVE NEUTROPHIL (OHS): 78.1 % (ref 38–73)
RH BLD: NORMAL
SODIUM SERPL-SCNC: 134 MMOL/L (ref 136–145)
SPECIMEN OUTDATE: NORMAL
SPHEROCYTES BLD QL SMEAR: NORMAL
TACROLIMUS BLD-MCNC: 6.4 NG/ML (ref 5–15)
WBC # BLD AUTO: 10.84 K/UL (ref 3.9–12.7)

## 2025-06-02 PROCEDURE — 86900 BLOOD TYPING SEROLOGIC ABO: CPT | Performed by: INTERNAL MEDICINE

## 2025-06-02 PROCEDURE — 84460 ALANINE AMINO (ALT) (SGPT): CPT

## 2025-06-02 PROCEDURE — 36415 COLL VENOUS BLD VENIPUNCTURE: CPT

## 2025-06-02 PROCEDURE — 84100 ASSAY OF PHOSPHORUS: CPT

## 2025-06-02 PROCEDURE — 99999 PR PBB SHADOW E&M-EST. PATIENT-LVL IV: CPT | Mod: PBBFAC,,, | Performed by: INTERNAL MEDICINE

## 2025-06-02 PROCEDURE — 80197 ASSAY OF TACROLIMUS: CPT

## 2025-06-02 PROCEDURE — 83735 ASSAY OF MAGNESIUM: CPT

## 2025-06-02 PROCEDURE — 85025 COMPLETE CBC W/AUTO DIFF WBC: CPT

## 2025-06-02 PROCEDURE — 87799 DETECT AGENT NOS DNA QUANT: CPT

## 2025-06-02 RX ORDER — PREDNISONE 10 MG/1
TABLET ORAL
Qty: 26 TABLET | Refills: 0 | Status: SHIPPED | OUTPATIENT
Start: 2025-06-02

## 2025-06-04 ENCOUNTER — HOSPITAL ENCOUNTER (OUTPATIENT)
Dept: CARDIOLOGY | Facility: HOSPITAL | Age: 55
Discharge: HOME | End: 2025-06-04
Attending: INTERNAL MEDICINE
Payer: COMMERCIAL

## 2025-06-04 VITALS
DIASTOLIC BLOOD PRESSURE: 84 MMHG | SYSTOLIC BLOOD PRESSURE: 133 MMHG | HEIGHT: 71 IN | BODY MASS INDEX: 23.66 KG/M2 | WEIGHT: 169 LBS | HEART RATE: 101 BPM

## 2025-06-04 DIAGNOSIS — Z94.84 HISTORY OF ALLOGENEIC STEM CELL TRANSPLANT: ICD-10-CM

## 2025-06-04 LAB
AORTIC SIZE INDEX (SOV): 1.6 CM/M2
AORTIC SIZE INDEX: 1.8 CM/M2
ASCENDING AORTA: 3.5 CM
AV AREA BY CONTINUOUS VTI: 5.4 CM2
AV INDEX (PROSTH): 0.9
AV LVOT MEAN GRADIENT: 1 MMHG
AV LVOT PEAK GRADIENT: 3 MMHG
AV MEAN GRADIENT: 2 MMHG
AV PEAK GRADIENT: 3 MMHG
AV VALVE AREA BY VELOCITY RATIO: 5.5 CM²
AV VALVE AREA: 5.5 CM2
AV VELOCITY RATIO: 0.89
BSA FOR ECHO PROCEDURE: 1.96 M2
CV ECHO LV RWT: 0.25 CM
DOP CALC AO PEAK VEL: 0.9 M/S
DOP CALC AO VTI: 16.2 CM
DOP CALC LVOT AREA: 6.2 CM2
DOP CALC LVOT DIAMETER: 2.8 CM
DOP CALC LVOT PEAK VEL: 0.8 M/S
DOP CALC LVOT STROKE VOLUME: 89.9 CM3
DOP CALCLVOT PEAK VEL VTI: 14.6 CM
E WAVE DECELERATION TIME: 178 MS
E/A RATIO: 0.79
E/E' RATIO: 9 M/S
ECHO EF ESTIMATED: 56 %
ECHO LV POSTERIOR WALL: 0.6 CM (ref 0.6–1.1)
FRACTIONAL SHORTENING: 29.2 % (ref 28–44)
GLOBAL LONGITUIDAL STRAIN: 15.4 %
INTERVENTRICULAR SEPTUM: 1.2 CM (ref 0.6–1.1)
IVC DIAMETER: 1.47 CM
LA MAJOR: 4 CM
LA MINOR: 4.1 CM
LA WIDTH: 3.2 CM
LEFT ATRIUM SIZE: 3.2 CM
LEFT ATRIUM VOLUME INDEX MOD: 17 ML/M2
LEFT ATRIUM VOLUME INDEX: 18 ML/M2
LEFT ATRIUM VOLUME MOD: 33 ML
LEFT ATRIUM VOLUME: 35 CM3
LEFT INTERNAL DIMENSION IN SYSTOLE: 3.4 CM (ref 2.1–4)
LEFT VENTRICLE DIASTOLIC VOLUME INDEX: 54.59 ML/M2
LEFT VENTRICLE DIASTOLIC VOLUME: 107 ML
LEFT VENTRICLE MASS INDEX: 75.4 G/M2
LEFT VENTRICLE SYSTOLIC VOLUME INDEX: 24.5 ML/M2
LEFT VENTRICLE SYSTOLIC VOLUME: 48 ML
LEFT VENTRICULAR INTERNAL DIMENSION IN DIASTOLE: 4.8 CM (ref 3.5–6)
LEFT VENTRICULAR MASS: 147.8 G
LV LATERAL E/E' RATIO: 8
LV SEPTAL E/E' RATIO: 11.2
MV PEAK A VEL: 0.71 M/S
MV PEAK E VEL: 0.56 M/S
OHS CV RV/LV RATIO: 0.75 CM
RA MAJOR: 4.65 CM
RA PRESSURE ESTIMATED: 3 MMHG
RA WIDTH: 2.86 CM
RIGHT ATRIAL AREA: 12 CM2
RIGHT VENTRICLE DIASTOLIC BASEL DIMENSION: 3.6 CM
RV TISSUE DOPPLER FREE WALL SYSTOLIC VELOCITY 1 (APICAL 4 CHAMBER VIEW): 21.76 CM/S
SINUS: 3.18 CM
STJ: 2.8 CM
TDI LATERAL: 0.07 M/S
TDI SEPTAL: 0.05 M/S
TDI: 0.06 M/S
TRICUSPID ANNULAR PLANE SYSTOLIC EXCURSION: 2.2 CM
Z-SCORE OF LEFT VENTRICULAR DIMENSION IN END DIASTOLE: -1.56
Z-SCORE OF LEFT VENTRICULAR DIMENSION IN END SYSTOLE: -0.11

## 2025-06-04 PROCEDURE — 93306 TTE W/DOPPLER COMPLETE: CPT | Mod: 26,,, | Performed by: INTERNAL MEDICINE

## 2025-06-04 PROCEDURE — 93356 MYOCRD STRAIN IMG SPCKL TRCK: CPT | Mod: ,,, | Performed by: INTERNAL MEDICINE

## 2025-06-04 PROCEDURE — 93306 TTE W/DOPPLER COMPLETE: CPT

## 2025-06-09 ENCOUNTER — LAB VISIT (OUTPATIENT)
Dept: LAB | Facility: HOSPITAL | Age: 55
End: 2025-06-09
Attending: INTERNAL MEDICINE
Payer: COMMERCIAL

## 2025-06-09 ENCOUNTER — OFFICE VISIT (OUTPATIENT)
Dept: HEMATOLOGY/ONCOLOGY | Facility: CLINIC | Age: 55
End: 2025-06-09
Payer: COMMERCIAL

## 2025-06-09 VITALS
WEIGHT: 168.75 LBS | OXYGEN SATURATION: 99 % | RESPIRATION RATE: 16 BRPM | TEMPERATURE: 98 F | BODY MASS INDEX: 23.62 KG/M2 | HEIGHT: 71 IN | HEART RATE: 74 BPM

## 2025-06-09 DIAGNOSIS — I42.9 CARDIOMYOPATHY, UNSPECIFIED TYPE: ICD-10-CM

## 2025-06-09 DIAGNOSIS — Z94.84 HISTORY OF ALLOGENEIC STEM CELL TRANSPLANT: Primary | ICD-10-CM

## 2025-06-09 DIAGNOSIS — E78.00 HYPERCHOLESTEROLEMIA: ICD-10-CM

## 2025-06-09 DIAGNOSIS — D84.81 IMMUNODEFICIENCY DUE TO CONDITIONS CLASSIFIED ELSEWHERE: ICD-10-CM

## 2025-06-09 DIAGNOSIS — I10 HYPERTENSION, UNSPECIFIED TYPE: ICD-10-CM

## 2025-06-09 DIAGNOSIS — D61.818 PANCYTOPENIA: ICD-10-CM

## 2025-06-09 DIAGNOSIS — Z94.84 HISTORY OF ALLOGENEIC STEM CELL TRANSPLANT: ICD-10-CM

## 2025-06-09 DIAGNOSIS — D89.810 ACUTE GVHD: ICD-10-CM

## 2025-06-09 DIAGNOSIS — E55.9 VITAMIN D DEFICIENCY: ICD-10-CM

## 2025-06-09 DIAGNOSIS — B34.3 PARVOVIRUS B19 INFECTION: ICD-10-CM

## 2025-06-09 DIAGNOSIS — D47.1 PRIMARY MYELOFIBROSIS: ICD-10-CM

## 2025-06-09 LAB
ABSOLUTE EOSINOPHIL (OHS): 0.03 K/UL
ABSOLUTE MONOCYTE (OHS): 0.71 K/UL (ref 0.3–1)
ABSOLUTE NEUTROPHIL COUNT (OHS): 6.38 K/UL (ref 1.8–7.7)
ALBUMIN SERPL BCP-MCNC: 4.2 G/DL (ref 3.5–5.2)
ALP SERPL-CCNC: 74 UNIT/L (ref 40–150)
ALT SERPL W/O P-5'-P-CCNC: 39 UNIT/L (ref 10–44)
ANION GAP (OHS): 6 MMOL/L (ref 8–16)
ANISOCYTOSIS BLD QL SMEAR: SLIGHT
AST SERPL-CCNC: 19 UNIT/L (ref 11–45)
BASOPHILS # BLD AUTO: 0.02 K/UL
BASOPHILS NFR BLD AUTO: 0.2 %
BILIRUB SERPL-MCNC: 0.4 MG/DL (ref 0.1–1)
BUN SERPL-MCNC: 30 MG/DL (ref 6–20)
CALCIUM SERPL-MCNC: 9.5 MG/DL (ref 8.7–10.5)
CHLORIDE SERPL-SCNC: 107 MMOL/L (ref 95–110)
CO2 SERPL-SCNC: 24 MMOL/L (ref 23–29)
CREAT SERPL-MCNC: 0.9 MG/DL (ref 0.5–1.4)
ERYTHROCYTE [DISTWIDTH] IN BLOOD BY AUTOMATED COUNT: ABNORMAL %
GFR SERPLBLD CREATININE-BSD FMLA CKD-EPI: >60 ML/MIN/1.73/M2
GLUCOSE SERPL-MCNC: 93 MG/DL (ref 70–110)
HCT VFR BLD AUTO: 31.1 % (ref 40–54)
HGB BLD-MCNC: 9.6 GM/DL (ref 14–18)
IMM GRANULOCYTES # BLD AUTO: 0.26 K/UL (ref 0–0.04)
IMM GRANULOCYTES NFR BLD AUTO: 3 % (ref 0–0.5)
INDIRECT COOMBS: NORMAL
LYMPHOCYTES # BLD AUTO: 1.2 K/UL (ref 1–4.8)
MAGNESIUM SERPL-MCNC: 1.8 MG/DL (ref 1.6–2.6)
MCH RBC QN AUTO: 32.8 PG (ref 27–31)
MCHC RBC AUTO-ENTMCNC: 30.9 G/DL (ref 32–36)
MCV RBC AUTO: 106 FL (ref 82–98)
NUCLEATED RBC (/100WBC) (OHS): 1 /100 WBC
PHOSPHATE SERPL-MCNC: 3 MG/DL (ref 2.7–4.5)
PLATELET # BLD AUTO: 110 K/UL (ref 150–450)
PMV BLD AUTO: 11.6 FL (ref 9.2–12.9)
POIKILOCYTOSIS BLD QL SMEAR: SLIGHT
POLYCHROMASIA BLD QL SMEAR: NORMAL
POTASSIUM SERPL-SCNC: 4.9 MMOL/L (ref 3.5–5.1)
PROT SERPL-MCNC: 7.6 GM/DL (ref 6–8.4)
RBC # BLD AUTO: 2.93 M/UL (ref 4.6–6.2)
RELATIVE EOSINOPHIL (OHS): 0.3 %
RELATIVE LYMPHOCYTE (OHS): 14 % (ref 18–48)
RELATIVE MONOCYTE (OHS): 8.3 % (ref 4–15)
RELATIVE NEUTROPHIL (OHS): 74.2 % (ref 38–73)
RH BLD: NORMAL
SODIUM SERPL-SCNC: 137 MMOL/L (ref 136–145)
SPECIMEN OUTDATE: NORMAL
STOMATOCYTES BLD QL SMEAR: PRESENT
WBC # BLD AUTO: 8.6 K/UL (ref 3.9–12.7)

## 2025-06-09 PROCEDURE — 82040 ASSAY OF SERUM ALBUMIN: CPT

## 2025-06-09 PROCEDURE — 36415 COLL VENOUS BLD VENIPUNCTURE: CPT

## 2025-06-09 PROCEDURE — 99999 PR PBB SHADOW E&M-EST. PATIENT-LVL IV: CPT | Mod: PBBFAC,,,

## 2025-06-09 PROCEDURE — 86900 BLOOD TYPING SEROLOGIC ABO: CPT | Performed by: INTERNAL MEDICINE

## 2025-06-09 PROCEDURE — 80197 ASSAY OF TACROLIMUS: CPT

## 2025-06-09 PROCEDURE — 85025 COMPLETE CBC W/AUTO DIFF WBC: CPT

## 2025-06-09 PROCEDURE — 84100 ASSAY OF PHOSPHORUS: CPT

## 2025-06-09 PROCEDURE — 83735 ASSAY OF MAGNESIUM: CPT

## 2025-06-09 NOTE — PROGRESS NOTES
Section of Hematology and Stem Cell Transplantation    Post-Transplantation Follow Up Visit     6/9/25    Transplant History:   Primary oncologist: Clyde James MD  Primary oncologic diagnosis: primary myelofibrosis  Transplant date: 12/26/2024  Donor: haploidentical  Blood Type (Patient): O +  Blood Type (Donor): O +  CMV (Patient): Negative  CMV (Donor): Positive  Graft source: Peripheral blood  CD34+ cell dose: 6.04 X10^6 cells/kg  Conditioning Regimen: Thiotepa, Busulfan, Fludarabine  GVHD prophylaxis: Post-transplant cyclophosphamide, MMF, Tacrolimus  Immediate post-transplant complications: mucositis, R axillary skin/soft tissue infection secondary to MRSA    History of Present Ilness:   Rubén Peters) is a pleasant 54 y.o.male with primary myelofibrosis who is status post haploidentical stem cell transplantation conditioned with Bu/Flu/TT who is currently day +165 who presents for post-transplant follow up.    He reports feeling good. He states he feels like he has good energy right now. He walked a mile each day last week. He endorses weakness and fatigue with walking, but does report his strength is increasing. Denies nausea, vomiting, diarrhea, rash.       PAST MEDICAL HISTORY:   Past Medical History:   Diagnosis Date    Abdominal pain 01/02/2025    Allergic contact dermatitis due to adhesives 12/31/2024    Cancer     Flatulence 01/03/2025    Headache 12/27/2024    Hyperlipidemia     Mucositis 01/02/2025    Other constipation 12/19/2024    Thrombocytosis 12/18/2024    Transaminitis 12/27/2024       PAST SURGICAL HISTORY:   Past Surgical History:   Procedure Laterality Date    BONE MARROW BIOPSY N/A 11/27/2024    Procedure: Biopsy-bone marrow;  Surgeon: Clyde James MD;  Location: [facility] ([address] [place] [address]);  Service: Oncology;  Laterality: N/A;  11/20-pre call complete-tb    BONE MARROW BIOPSY N/A 4/9/2025    Procedure: Biopsy-bone marrow;  Surgeon: Clyde James MD;  Location: [facility] ([facility]  FLR);  Service: Oncology;  Laterality: N/A;  4/1 precall complete. EW    COLONOSCOPY N/A 8/9/2023    Procedure: COLONOSCOPY;  Surgeon: Will Ardon MD;  Location: [facility] (4TH FLR);  Service: Endoscopy;  Laterality: N/A;  Suprep Instructions sent via portal / Authorizing:     Bebeto Arora MD in [facility] FAMILY MED/ INTERNAL MED/ PEDS  Referral:          [id number]    EYE SURGERY      FLEXIBLE SIGMOIDOSCOPY N/A 7/23/2024    Procedure: SIGMOIDOSCOPY, FLEXIBLE;  Surgeon: Nirali Vicente MD;  Location: [facility] ENDO;  Service: Endoscopy;  Laterality: N/A;    INSERTION OF LONGORIA CATHETER Right 12/18/2024    Procedure: INSERTION, CATHETER, CENTRAL VENOUS, LONGORIA TRIPLE LUMEN, Bard 12.5 fr Longoria Cath model [id number];  Surgeon: Willie Hadley MD;  Location: [facility] OR 2ND FLR;  Service: General;  Laterality: Right;       PAST SOCIAL HISTORY:  Social History     Tobacco Use    Smoking status: Former     Types: Cigars, Cigarettes    Smokeless tobacco: Never   Substance Use Topics    Alcohol use: Not Currently     Comment: socially    Drug use: Never       FAMILY HISTORY:  Family History   Problem Relation Name Age of Onset    Arthritis Mother      COPD Father      Testicular cancer Maternal Cousin Peter 17        Chemotherapy    Breast cancer Maternal Cousin  51        Chemotherapy    Heart disease Maternal Grandfather      Stroke Maternal Grandmother         CURRENT MEDICATIONS:   Current Outpatient Medications   Medication Sig    acyclovir (ZOVIRAX) 800 MG Tab Take 1 tablet (800 mg total) by mouth 2 (two) times daily.    atovaquone (MEPRON) 750 mg/5 mL Susp oral liquid Take 10 mLs (1,500 mg total) by mouth once daily.    eltrombopag olamine (PROMACTA) 50 MG Tab Take 1 tablet (50 mg total) by mouth once daily.    ergocalciferol (ERGOCALCIFEROL) 50,000 unit Cap Take 1 capsule (50,000 Units total) by mouth every 7 days.    letermovir (PREVYMIS) 480 mg Tab Take 1 tablet (480 mg total) by mouth once daily.    magnesium oxide  (MAG-OX) 400 mg (241.3 mg magnesium) tablet Take 2 tablets in the morning, 1 tablet in the afternoon, and 2 tablets in the evening.    ondansetron (ZOFRAN) 8 MG tablet Take 1 tablet (8 mg total) by mouth every 8 (eight) hours as needed for Nausea.    pantoprazole (PROTONIX) 40 MG tablet Take 1 tablet (40 mg total) by mouth once daily.    predniSONE (DELTASONE) 10 MG tablet 6/2 - 6/8: Take 2 tablets (20mg) once a day; 6/9 - 6/15: Take 1 tablet (10mg) once a day; 6/16 - 6/22: Take 0.5 tablet (5mg) once a day; 6/23 - 6/29: Take 0.5 tablet (5mg) every other day; 6/30: STOP    prochlorperazine (COMPAZINE) 10 MG tablet Take 1 tablet (10 mg total) by mouth 4 (four) times daily as needed for Nausea.    tacrolimus (PROGRAF) 0.5 MG Cap Take 2 capsules (1 mg total) by mouth every morning AND 2 capsules (1 mg total) every evening.    traMADoL (ULTRAM) 50 mg tablet Take 1 tablet (50 mg total) by mouth every 6 (six) hours as needed for Pain.    triamcinolone acetonide 0.1% (KENALOG) 0.1 % cream Apply topically 2 (two) times daily as needed (rash on arms, chest, back, trunk, and legs (do not apply to face)).    voriconazole (VFEND) 200 MG Tab Take 1 tablet (200 mg total) by mouth 2 (two) times daily.     No current facility-administered medications for this visit.       ALLERGIES:   Review of patient's allergies indicates:   Allergen Reactions    Sulfamethoxazole-trimethoprim Hives       GVHD Review of Systems:     Pertinent positives and negatives included in the HPI. Otherwise a 14 point review of systems is negative. GVHD review of systems recorded in BMT flowsheet.     Physical Exam:     Vitals:    06/09/25 0953   BP: (!) (P) 150/89   Pulse: 74   Resp: 16   Temp: 97.8 °F (36.6 °C)         General: Appears well, NAD  HEENT: MMM, no OP lesions  Neck: Supple, no LAD  Pulmonary: CTAB, no increased work of breathing, no W/R/C  Cardiovascular: S1S2 normal, RRR, no M/R/G  Abdominal: Soft, NT, ND, BS+, no HSM  Extremities: No  C/C/E  Neurological: AAOx4, grossly normal, no focal deficits  Dermatologic: No appreciable rashes or lesions     ECOG Performance Status: (foot note - ECOG PS provided by [employer]cology [employer]) 1 - Symptomatic but completely ambulatory    Karnofsky Performance Score:  80%- Normal Activity with Effort: Some Symptoms of Disease    Labs:   Lab Results   Component Value Date    WBC 8.60 06/09/2025    RBC 2.93 (L) 06/09/2025    HGB 9.6 (L) 06/09/2025    HCT 31.1 (L) 06/09/2025     (H) 06/09/2025    MCH 32.8 (H) 06/09/2025    MCHC 30.9 (L) 06/09/2025    RDW  06/09/2025      Comment:      Result Not Available     (L) 06/09/2025    MPV 11.6 06/09/2025    GRAN 7.1 05/26/2025    LYMPH 14.0 (L) 06/09/2025    LYMPH 1.20 06/09/2025    MONO 8.3 06/09/2025    MONO 0.71 06/09/2025    EOS 0.3 06/09/2025    EOS 0.03 06/09/2025    BASO 0.02 03/20/2025    EOSINOPHIL 0.6 03/20/2025    BASOPHIL 0.2 06/09/2025    BASOPHIL 0.02 06/09/2025       Sodium   Date Value Ref Range Status   06/09/2025 137 136 - 145 mmol/L Final   03/20/2025 138 136 - 145 mmol/L Final     Potassium   Date Value Ref Range Status   06/09/2025 4.9 3.5 - 5.1 mmol/L Final   03/20/2025 4.2 3.5 - 5.1 mmol/L Final     Chloride   Date Value Ref Range Status   06/09/2025 107 95 - 110 mmol/L Final   03/20/2025 108 95 - 110 mmol/L Final     CO2   Date Value Ref Range Status   06/09/2025 24 23 - 29 mmol/L Final   03/20/2025 21 (L) 23 - 29 mmol/L Final     Glucose   Date Value Ref Range Status   06/09/2025 93 70 - 110 mg/dL Final   03/20/2025 101 70 - 110 mg/dL Final     BUN   Date Value Ref Range Status   06/09/2025 30 (H) 6 - 20 mg/dL Final     Creatinine   Date Value Ref Range Status   06/09/2025 0.9 0.5 - 1.4 mg/dL Final     Calcium   Date Value Ref Range Status   06/09/2025 9.5 8.7 - 10.5 mg/dL Final   03/20/2025 9.2 8.7 - 10.5 mg/dL Final     Protein Total   Date Value Ref Range Status   06/09/2025 7.6 6.0 - 8.4 gm/dL Final     Total Protein    Date Value Ref Range Status   03/20/2025 6.6 6.0 - 8.4 g/dL Final     Albumin   Date Value Ref Range Status   06/09/2025 4.2 3.5 - 5.2 g/dL Final   03/20/2025 3.7 3.5 - 5.2 g/dL Final     Total Bilirubin   Date Value Ref Range Status   03/20/2025 0.4 0.1 - 1.0 mg/dL Final     Comment:     For infants and newborns, interpretation of results should be based  on gestational age, weight and in agreement with clinical  observations.    Premature Infant recommended reference ranges:  Up to 24 hours.............<8.0 mg/dL  Up to 48 hours............<12.0 mg/dL  3-5 days..................<15.0 mg/dL  6-29 days.................<15.0 mg/dL       Bilirubin Total   Date Value Ref Range Status   06/09/2025 0.4 0.1 - 1.0 mg/dL Final     Comment:     For infants and newborns, interpretation of results should be based   on gestational age, weight and in agreement with clinical   observations.    Premature Infant recommended reference ranges:   0-24 hours:  <8.0 mg/dL   24-48 hours: <12.0 mg/dL   3-5 days:    <15.0 mg/dL   6-29 days:   <15.0 mg/dL     Alkaline Phosphatase   Date Value Ref Range Status   03/20/2025 122 40 - 150 U/L Final     ALP   Date Value Ref Range Status   06/09/2025 74 40 - 150 unit/L Final     AST   Date Value Ref Range Status   06/09/2025 19 11 - 45 unit/L Final   03/20/2025 59 (H) 10 - 40 U/L Final     ALT   Date Value Ref Range Status   06/09/2025 39 10 - 44 unit/L Final   03/20/2025 85 (H) 10 - 44 U/L Final     Anion Gap   Date Value Ref Range Status   06/09/2025 6 (L) 8 - 16 mmol/L Final     eGFR if    Date Value Ref Range Status   01/22/2022 >60.0 >60 mL/min/1.73 m^2 Final     eGFR if non    Date Value Ref Range Status   01/22/2022 >60.0 >60 mL/min/1.73 m^2 Final     Comment:     Calculation used to obtain the estimated glomerular filtration  rate (eGFR) is the CKD-EPI equation.          Imaging:   All pertinent studies reviewed and interpreted by me       Assessment and  Plan:   Rubén Peters) is a pleasant 54 y.o.male with primary myelofibrosis s/p haplo SCT who presents for post-transplant follow up.    Primary myelofibrosis: Status post treatment with ruxolitinib, followed by alloSCT. Day +30 BMBx results below.    Status post allogeneic stem cell transplantation: As noted above, status post haploidentical stem cell transplantation conditioned with Bu/Flu/TT. Currently day +165.  Engrafted on day +22.  Day +30 BMBx on 1/2/25: No definitive evidence of residual JAK2 [id number] primary myelofibrosis. NGS without evidence of pathogenic mutations. Chimerism studies with 100% donor CD33, CD3 insufficient for analysis.   Plan for central line removal -  4/3/25  Day 100 bone marrow biopsy 4/9/25:Markedly hypocellular marrow (<5%-15%) with trilineage hematopoiesis. NGS with DNMT3A 2%. CG normal and JAK2 negative. Chimerisms 90% CD3 and 100% CD33 donor.    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 MMF has now been discontinued. 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 He restarted budesonide 3mg TID (4/14/25), and budesonide was tapered in May. 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 He restarted prednisone 40mg daily on  5/16. His nausea improved within a few days. Will continue tapering prednisone(Taper below).   Current tacro dose: 1 mg BID   Last tacro level: 6.1 (goal 4-10)  Adjustments: none    6/2 - 6/8 Take 2 tablets (20mg) once a day   6/9 - 6/15 Take 1 tablet (10mg) once a day   6/16 - 6/22 Take ½ tablet (5mg) once a day   6/23 - 6/29 Take ½ tablet (5mg) every other day   6/30  STOP        Immunosuppression: Prevention with voriconazole and acyclovir. CMV prophylaxis with letermovir. PJP prophyalxis dapsone. Continue weekly monitoring of CMV and EBV - these labs remain negative.     Pancytopenia: Due to  parvovirus and aGVHD. Transfuse for Hgb <7 g/dL and platelets <10k.   Counts much improved with treatment of parvovirus (IVIG) and acute GVHD.     Stop promacta today.     Parvovirus: Obtained to evaluate for pancytopenia. Monitor labs weekly and continue transfusion support.   IVIG started 5/9/25 and he tolerated it well. He will receive last IVIG on 6/11.    Hypertension:  Was newly hypertensive in hospital following SCT at which time he was started on amlodipine. Discontinued amlodipine after hospitalization due to hypotension. Monitor for now but restart antihypertensives if this worsens.   Hypertensive likely related to steroids. If still hypertensive off prednisone, will restart low dose amlodipine.     Cardiomyopathy: Echo 2/26/25 obtained due to tachycardia revealed reduced LVEF 45-50%. No signs/symptoms of HFrEF. Repeat echo in 3 months (5/2025).  Repeat echo showed improvement of EF and some left ventricular thickening. Global longitudinal strain is mildly reduced measuring -15.4%     Vitamin D deficiency: His primary care is giving him 50,000 units once a week.     Hypercholesterolemia: He is following with primary care. Could be elevated due to tacrolimus.     Orders Placed:      No orders of the defined types were placed in this encounter.     Route Chart for Scheduling  BMT Route Chart for Scheduling      Supportive Plan Information  OP IVIG Clarence Grover MD   Associated Diagnosis: Parvovirus B19 infection   noted on 4/21/2025   Line of treatment: Supportive Care   Treatment goal: Supportive     Upcoming Treatment Dates - OP IVIG    6/11/2025       Pre-Medications       acetaminophen tablet 650 mg       diphenhydrAMINE (BENADRYL) 50 mg in NS 50 mL IVPB       famotidine (PF) injection 20 mg       Medications       immun glob G (IgG)-gly-IgA 50+ (GAMUNEX-C/GAMMAKED) (GAMUNEX-C) 20 gram/200 mL (10 %) injection 75 g    Treatment Plan Information   OP Adult Blood - Twice Weekly Clyde James MD    Associated Diagnosis: Anemia associated with chemotherapy   noted on 2/4/2025   Line of treatment: Supportive Care  Treatment Goal: Supportive     Upcoming Treatment Dates - OP Adult Blood - Twice Weekly    5/6/2025       Prepare RBC       Transfuse RBC 1 Unit    Therapy Plan Information  PORT FLUSH for Adrenal nodule, noted on 12/9/2024  Flushes  heparin, porcine (PF) 100 unit/mL injection flush 500 Units  500 Units, Intravenous, Every visit  sodium chloride 0.9% flush 10 mL  10 mL, Intravenous, Every visit    EPOETIN CRISELDA (RETACRIT) WEEKLY for Anemia, noted on 5/14/2025  Medications  epoetin criselda-epbx injection 40,000 Units  40,000 Units, Subcutaneous, 1 time a week      No therapy plan of the specified type found.    Total time of this visit was 40 minutes, including time spent face to face with patient and/or via video/audio, and also in preparing for today's visit for MDM and documentation. (Medical Decision Making, including consideration of possible diagnoses, management options, complex medical record review, review of diagnostic tests and information, consideration and discussion of significant complications based on comorbidities, and discussion with providers involved with the care of the patient). Greater than 50% was spent face to face with the patient counseling and coordinating care.    Visit today included increased complexity associated with the care of the episodic problem treatment of parvovirus addressed and managing the longitudinal care of the patient due to the serious and/or complex managed problem(s) s/p allogeneic stem cell transplant .     Opal Alozno PA-C  Malignant Hematology, Stem Cell Transplant, and Cellular Therapy  [facility]

## 2025-06-10 ENCOUNTER — RESULTS FOLLOW-UP (OUTPATIENT)
Dept: HEMATOLOGY/ONCOLOGY | Facility: CLINIC | Age: 55
End: 2025-06-10

## 2025-06-10 LAB — TACROLIMUS BLD-MCNC: 6.1 NG/ML (ref 5–15)

## 2025-06-10 NOTE — PROGRESS NOTES
BMT Pharmacist Immunosuppression Note:    Reviewed patient's tacrolimus level with Dr. Grover and it is within goal range.      Current regimen: 1 mg BID  Capsule size(s): 0.5    Plan: Continue current regimen.        Lab Results   Component Value Date    TACROLIMUS 6.1 06/09/2025    TACROLIMUS 6.4 06/02/2025    TACROLIMUS 5.9 05/26/2025       Creatinine   Date Value Ref Range Status   06/09/2025 0.9 0.5 - 1.4 mg/dL Final   06/02/2025 1.0 0.5 - 1.4 mg/dL Final   05/26/2025 1.1 0.5 - 1.4 mg/dL Final     Total Bilirubin   Date Value Ref Range Status   03/20/2025 0.4 0.1 - 1.0 mg/dL Final     Comment:     For infants and newborns, interpretation of results should be based  on gestational age, weight and in agreement with clinical  observations.    Premature Infant recommended reference ranges:  Up to 24 hours.............<8.0 mg/dL  Up to 48 hours............<12.0 mg/dL  3-5 days..................<15.0 mg/dL  6-29 days.................<15.0 mg/dL     03/17/2025 0.5 0.1 - 1.0 mg/dL Final     Comment:     For infants and newborns, interpretation of results should be based  on gestational age, weight and in agreement with clinical  observations.    Premature Infant recommended reference ranges:  Up to 24 hours.............<8.0 mg/dL  Up to 48 hours............<12.0 mg/dL  3-5 days..................<15.0 mg/dL  6-29 days.................<15.0 mg/dL     03/13/2025 0.4 0.1 - 1.0 mg/dL Final     Comment:     For infants and newborns, interpretation of results should be based  on gestational age, weight and in agreement with clinical  observations.    Premature Infant recommended reference ranges:  Up to 24 hours.............<8.0 mg/dL  Up to 48 hours............<12.0 mg/dL  3-5 days..................<15.0 mg/dL  6-29 days.................<15.0 mg/dL       Bilirubin Total   Date Value Ref Range Status   06/09/2025 0.4 0.1 - 1.0 mg/dL Final     Comment:     For infants and newborns, interpretation of results should be based    on gestational age, weight and in agreement with clinical   observations.    Premature Infant recommended reference ranges:   0-24 hours:  <8.0 mg/dL   24-48 hours: <12.0 mg/dL   3-5 days:    <15.0 mg/dL   6-29 days:   <15.0 mg/dL   06/02/2025 0.4 0.1 - 1.0 mg/dL Final     Comment:     For infants and newborns, interpretation of results should be based   on gestational age, weight and in agreement with clinical   observations.    Premature Infant recommended reference ranges:   0-24 hours:  <8.0 mg/dL   24-48 hours: <12.0 mg/dL   3-5 days:    <15.0 mg/dL   6-29 days:   <15.0 mg/dL   05/26/2025 0.4 0.1 - 1.0 mg/dL Final     Comment:     For infants and newborns, interpretation of results should be based   on gestational age, weight and in agreement with clinical   observations.    Premature Infant recommended reference ranges:   0-24 hours:  <8.0 mg/dL   24-48 hours: <12.0 mg/dL   3-5 days:    <15.0 mg/dL   6-29 days:   <15.0 mg/dL     AST   Date Value Ref Range Status   06/09/2025 19 11 - 45 unit/L Final   06/02/2025 27 11 - 45 unit/L Final   05/26/2025 21 11 - 45 unit/L Final   03/20/2025 59 (H) 10 - 40 U/L Final   03/17/2025 44 (H) 10 - 40 U/L Final   03/13/2025 40 10 - 40 U/L Final     ALT   Date Value Ref Range Status   06/09/2025 39 10 - 44 unit/L Final   06/02/2025 53 (H) 10 - 44 unit/L Final   05/26/2025 41 10 - 44 unit/L Final   03/20/2025 85 (H) 10 - 44 U/L Final   03/17/2025 64 (H) 10 - 44 U/L Final   03/13/2025 49 (H) 10 - 44 U/L Final             Sree Hernandez, PharmD  Clinical Pharmacy Specialist, Bone Marrow Transplant/Hematology  Spectra link: [phone]

## 2025-06-11 ENCOUNTER — TELEPHONE (OUTPATIENT)
Dept: INFECTIOUS DISEASES | Facility: CLINIC | Age: 55
End: 2025-06-11
Payer: COMMERCIAL

## 2025-06-11 ENCOUNTER — INFUSION (OUTPATIENT)
Dept: INFUSION THERAPY | Facility: HOSPITAL | Age: 55
End: 2025-06-11
Payer: COMMERCIAL

## 2025-06-11 VITALS
TEMPERATURE: 98 F | HEIGHT: 71 IN | RESPIRATION RATE: 16 BRPM | WEIGHT: 168.75 LBS | HEART RATE: 76 BPM | BODY MASS INDEX: 23.62 KG/M2 | SYSTOLIC BLOOD PRESSURE: 139 MMHG | DIASTOLIC BLOOD PRESSURE: 77 MMHG

## 2025-06-11 DIAGNOSIS — B34.3 PARVOVIRUS B19 INFECTION: Primary | ICD-10-CM

## 2025-06-11 PROCEDURE — 25000003 PHARM REV CODE 250: Performed by: INTERNAL MEDICINE

## 2025-06-11 PROCEDURE — 96375 TX/PRO/DX INJ NEW DRUG ADDON: CPT

## 2025-06-11 PROCEDURE — 96366 THER/PROPH/DIAG IV INF ADDON: CPT

## 2025-06-11 PROCEDURE — 96367 TX/PROPH/DG ADDL SEQ IV INF: CPT

## 2025-06-11 PROCEDURE — 63600175 PHARM REV CODE 636 W HCPCS: Mod: JZ,JA,TB | Performed by: INTERNAL MEDICINE

## 2025-06-11 PROCEDURE — 96365 THER/PROPH/DIAG IV INF INIT: CPT

## 2025-06-11 RX ORDER — SODIUM CHLORIDE 0.9 % (FLUSH) 0.9 %
10 SYRINGE (ML) INJECTION
Status: DISCONTINUED | OUTPATIENT
Start: 2025-06-11 | End: 2025-06-11 | Stop reason: HOSPADM

## 2025-06-11 RX ORDER — FAMOTIDINE 10 MG/ML
20 INJECTION, SOLUTION INTRAVENOUS
Status: COMPLETED | OUTPATIENT
Start: 2025-06-11 | End: 2025-06-11

## 2025-06-11 RX ORDER — ACETAMINOPHEN 325 MG/1
650 TABLET ORAL
Status: COMPLETED | OUTPATIENT
Start: 2025-06-11 | End: 2025-06-11

## 2025-06-11 RX ORDER — HEPARIN 100 UNIT/ML
500 SYRINGE INTRAVENOUS
Status: DISCONTINUED | OUTPATIENT
Start: 2025-06-11 | End: 2025-06-11 | Stop reason: HOSPADM

## 2025-06-11 RX ADMIN — DIPHENHYDRAMINE HYDROCHLORIDE 50 MG: 50 INJECTION INTRAMUSCULAR; INTRAVENOUS at 08:06

## 2025-06-11 RX ADMIN — SODIUM CHLORIDE: 9 INJECTION, SOLUTION INTRAVENOUS at 08:06

## 2025-06-11 RX ADMIN — FAMOTIDINE 20 MG: 10 INJECTION INTRAVENOUS at 08:06

## 2025-06-11 RX ADMIN — ACETAMINOPHEN 650 MG: 325 TABLET ORAL at 08:06

## 2025-06-11 RX ADMIN — IMMUNE GLOBULIN (HUMAN) 75 G: 10 INJECTION INTRAVENOUS; SUBCUTANEOUS at 08:06

## 2025-06-13 ENCOUNTER — TELEPHONE (OUTPATIENT)
Dept: HEMATOLOGY/ONCOLOGY | Facility: CLINIC | Age: 55
End: 2025-06-13
Payer: COMMERCIAL

## 2025-06-13 NOTE — TELEPHONE ENCOUNTER
Copied from CRM #[id number]. Topic: Medications - Medication Question  >> Jun 13, 2025 10:57 AM Yancy wrote:  Name Of Caller:  Cheri lloyd/ [facility]    Contact Preference:  [phone]      Returned call to pharmacy to advise that patient has been advised to stop Promacta at this time

## 2025-06-18 ENCOUNTER — LAB VISIT (OUTPATIENT)
Dept: LAB | Facility: HOSPITAL | Age: 55
End: 2025-06-18
Attending: INTERNAL MEDICINE
Payer: COMMERCIAL

## 2025-06-18 ENCOUNTER — OFFICE VISIT (OUTPATIENT)
Dept: HEMATOLOGY/ONCOLOGY | Facility: CLINIC | Age: 55
End: 2025-06-18
Payer: COMMERCIAL

## 2025-06-18 VITALS
WEIGHT: 174.81 LBS | HEART RATE: 88 BPM | SYSTOLIC BLOOD PRESSURE: 118 MMHG | OXYGEN SATURATION: 99 % | TEMPERATURE: 98 F | BODY MASS INDEX: 24.47 KG/M2 | HEIGHT: 71 IN | DIASTOLIC BLOOD PRESSURE: 77 MMHG

## 2025-06-18 DIAGNOSIS — D47.1 PRIMARY MYELOFIBROSIS: ICD-10-CM

## 2025-06-18 DIAGNOSIS — D84.81 IMMUNODEFICIENCY DUE TO CONDITIONS CLASSIFIED ELSEWHERE: ICD-10-CM

## 2025-06-18 DIAGNOSIS — D89.810 ACUTE GVHD: ICD-10-CM

## 2025-06-18 DIAGNOSIS — E55.9 VITAMIN D DEFICIENCY: ICD-10-CM

## 2025-06-18 DIAGNOSIS — I42.9 CARDIOMYOPATHY, UNSPECIFIED TYPE: ICD-10-CM

## 2025-06-18 DIAGNOSIS — Z94.84 HISTORY OF ALLOGENEIC STEM CELL TRANSPLANT: Primary | ICD-10-CM

## 2025-06-18 DIAGNOSIS — D61.818 PANCYTOPENIA: ICD-10-CM

## 2025-06-18 DIAGNOSIS — B34.3 PARVOVIRUS B19 INFECTION: ICD-10-CM

## 2025-06-18 DIAGNOSIS — Z94.84 HISTORY OF ALLOGENEIC STEM CELL TRANSPLANT: ICD-10-CM

## 2025-06-18 DIAGNOSIS — I10 HYPERTENSION, UNSPECIFIED TYPE: ICD-10-CM

## 2025-06-18 LAB
ABSOLUTE EOSINOPHIL (OHS): 0.03 K/UL
ABSOLUTE MONOCYTE (OHS): 0.45 K/UL (ref 0.3–1)
ABSOLUTE NEUTROPHIL COUNT (OHS): 5.17 K/UL (ref 1.8–7.7)
ALBUMIN SERPL BCP-MCNC: 3.9 G/DL (ref 3.5–5.2)
ALP SERPL-CCNC: 85 UNIT/L (ref 40–150)
ALT SERPL W/O P-5'-P-CCNC: 44 UNIT/L (ref 10–44)
ANION GAP (OHS): 8 MMOL/L (ref 8–16)
ANISOCYTOSIS BLD QL SMEAR: SLIGHT
AST SERPL-CCNC: 36 UNIT/L (ref 11–45)
BASOPHILS # BLD AUTO: 0.04 K/UL
BASOPHILS NFR BLD AUTO: 0.6 %
BILIRUB SERPL-MCNC: 0.3 MG/DL (ref 0.1–1)
BUN SERPL-MCNC: 26 MG/DL (ref 6–20)
CALCIUM SERPL-MCNC: 9.4 MG/DL (ref 8.7–10.5)
CHLORIDE SERPL-SCNC: 105 MMOL/L (ref 95–110)
CO2 SERPL-SCNC: 24 MMOL/L (ref 23–29)
CREAT SERPL-MCNC: 1.3 MG/DL (ref 0.5–1.4)
ERYTHROCYTE [DISTWIDTH] IN BLOOD BY AUTOMATED COUNT: ABNORMAL %
GFR SERPLBLD CREATININE-BSD FMLA CKD-EPI: >60 ML/MIN/1.73/M2
GLUCOSE SERPL-MCNC: 112 MG/DL (ref 70–110)
HCT VFR BLD AUTO: 28.8 % (ref 40–54)
HGB BLD-MCNC: 9 GM/DL (ref 14–18)
IMM GRANULOCYTES # BLD AUTO: 0.1 K/UL (ref 0–0.04)
IMM GRANULOCYTES NFR BLD AUTO: 1.6 % (ref 0–0.5)
INDIRECT COOMBS: NORMAL
LYMPHOCYTES # BLD AUTO: 0.65 K/UL (ref 1–4.8)
MAGNESIUM SERPL-MCNC: 2 MG/DL (ref 1.6–2.6)
MCH RBC QN AUTO: 34.1 PG (ref 27–31)
MCHC RBC AUTO-ENTMCNC: 31.3 G/DL (ref 32–36)
MCV RBC AUTO: 109 FL (ref 82–98)
NUCLEATED RBC (/100WBC) (OHS): 0 /100 WBC
PHOSPHATE SERPL-MCNC: 3.7 MG/DL (ref 2.7–4.5)
PLATELET # BLD AUTO: 88 K/UL (ref 150–450)
PMV BLD AUTO: 9.8 FL (ref 9.2–12.9)
POIKILOCYTOSIS BLD QL SMEAR: SLIGHT
POTASSIUM SERPL-SCNC: 4.7 MMOL/L (ref 3.5–5.1)
PROT SERPL-MCNC: 7.8 GM/DL (ref 6–8.4)
RBC # BLD AUTO: 2.64 M/UL (ref 4.6–6.2)
RELATIVE EOSINOPHIL (OHS): 0.5 %
RELATIVE LYMPHOCYTE (OHS): 10.1 % (ref 18–48)
RELATIVE MONOCYTE (OHS): 7 % (ref 4–15)
RELATIVE NEUTROPHIL (OHS): 80.2 % (ref 38–73)
RH BLD: NORMAL
SODIUM SERPL-SCNC: 137 MMOL/L (ref 136–145)
SPECIMEN OUTDATE: NORMAL
STOMATOCYTES BLD QL SMEAR: PRESENT
WBC # BLD AUTO: 6.44 K/UL (ref 3.9–12.7)

## 2025-06-18 PROCEDURE — 80197 ASSAY OF TACROLIMUS: CPT

## 2025-06-18 PROCEDURE — 99999 PR PBB SHADOW E&M-EST. PATIENT-LVL IV: CPT | Mod: PBBFAC,,,

## 2025-06-18 PROCEDURE — 83735 ASSAY OF MAGNESIUM: CPT

## 2025-06-18 PROCEDURE — 84100 ASSAY OF PHOSPHORUS: CPT

## 2025-06-18 PROCEDURE — 86850 RBC ANTIBODY SCREEN: CPT | Performed by: INTERNAL MEDICINE

## 2025-06-18 PROCEDURE — 36415 COLL VENOUS BLD VENIPUNCTURE: CPT

## 2025-06-18 PROCEDURE — 82247 BILIRUBIN TOTAL: CPT

## 2025-06-18 PROCEDURE — 85025 COMPLETE CBC W/AUTO DIFF WBC: CPT

## 2025-06-18 NOTE — PROGRESS NOTES
Section of Hematology and Stem Cell Transplantation    Post-Transplantation Follow Up Visit     6/18/25    Transplant History:   Primary oncologist: Clyde James MD  Primary oncologic diagnosis: primary myelofibrosis  Transplant date: 12/26/2024  Donor: haploidentical  Blood Type (Patient): O +  Blood Type (Donor): O +  CMV (Patient): Negative  CMV (Donor): Positive  Graft source: Peripheral blood  CD34+ cell dose: 6.04 X10^6 cells/kg  Conditioning Regimen: Thiotepa, Busulfan, Fludarabine  GVHD prophylaxis: Post-transplant cyclophosphamide, MMF, Tacrolimus  Immediate post-transplant complications: mucositis, R axillary skin/soft tissue infection secondary to MRSA    History of Present Ilness:   Rubén Peters) is a pleasant 54 y.o.male with primary myelofibrosis who is status post haploidentical stem cell transplantation conditioned with Bu/Flu/TT who is currently day +174 who presents for post-transplant follow up.    He reports feeling good. He is still walking a mile and a half everyday. He has been trying to be more active. Energy levels stable. Appetite is doing great. Denies nausea, vomiting, diarrhea. He has rash on both arms that began 4-5 days ago. It is not pruritic.       PAST MEDICAL HISTORY:   Past Medical History:   Diagnosis Date    Abdominal pain 01/02/2025    Allergic contact dermatitis due to adhesives 12/31/2024    Cancer     Flatulence 01/03/2025    Headache 12/27/2024    Hyperlipidemia     Mucositis 01/02/2025    Other constipation 12/19/2024    Thrombocytosis 12/18/2024    Transaminitis 12/27/2024       PAST SURGICAL HISTORY:   Past Surgical History:   Procedure Laterality Date    BONE MARROW BIOPSY N/A 11/27/2024    Procedure: Biopsy-bone marrow;  Surgeon: Clyde James MD;  Location: [address]);  Service: Oncology;  Laterality: N/A;  11/20-pre call complete-tb    BONE MARROW BIOPSY N/A 4/9/2025    Procedure: Biopsy-bone marrow;  Surgeon: Clyde James MD;   Location: [facility] ENDO (4TH FLR);  Service: Oncology;  Laterality: N/A;  4/1 precall complete. EW    COLONOSCOPY N/A 8/9/2023    Procedure: COLONOSCOPY;  Surgeon: Will Ardon MD;  Location: [facility] ENDO (4TH FLR);  Service: Endoscopy;  Laterality: N/A;  Suprep Instructions sent via portal / Authorizing:     Bebeto Arora MD in [facility] FAMILY MED/ INTERNAL MED/ PEDS  Referral:          [id number]    EYE SURGERY      FLEXIBLE SIGMOIDOSCOPY N/A 7/23/2024    Procedure: SIGMOIDOSCOPY, FLEXIBLE;  Surgeon: Nirali Vicente MD;  Location: [facility] ENDO;  Service: Endoscopy;  Laterality: N/A;    INSERTION OF LONGORIA CATHETER Right 12/18/2024    Procedure: INSERTION, CATHETER, CENTRAL VENOUS, LONGORIA TRIPLE LUMEN, Bard 12.5 fr Longoria Cath model [id number];  Surgeon: Willie Hadley MD;  Location: [facility] OR 2ND FLR;  Service: General;  Laterality: Right;       PAST SOCIAL HISTORY:  Social History     Tobacco Use    Smoking status: Former     Types: Cigars, Cigarettes    Smokeless tobacco: Never   Substance Use Topics    Alcohol use: Not Currently     Comment: socially    Drug use: Never       FAMILY HISTORY:  Family History   Problem Relation Name Age of Onset    Arthritis Mother      COPD Father      Testicular cancer Maternal Cousin Peter 17        Chemotherapy    Breast cancer Maternal Cousin  51        Chemotherapy    Heart disease Maternal Grandfather      Stroke Maternal Grandmother         CURRENT MEDICATIONS:   Current Outpatient Medications   Medication Sig    acyclovir (ZOVIRAX) 800 MG Tab Take 1 tablet (800 mg total) by mouth 2 (two) times daily.    atovaquone (MEPRON) 750 mg/5 mL Susp oral liquid Take 10 mLs (1,500 mg total) by mouth once daily.    eltrombopag olamine (PROMACTA) 50 MG Tab Take 1 tablet (50 mg total) by mouth once daily.    ergocalciferol (ERGOCALCIFEROL) 50,000 unit Cap Take 1 capsule (50,000 Units total) by mouth every 7 days.    letermovir (PREVYMIS) 480 mg Tab Take 1 tablet (480 mg total) by mouth once  daily.    magnesium oxide (MAG-OX) 400 mg (241.3 mg magnesium) tablet Take 2 tablets in the morning, 1 tablet in the afternoon, and 2 tablets in the evening.    ondansetron (ZOFRAN) 8 MG tablet Take 1 tablet (8 mg total) by mouth every 8 (eight) hours as needed for Nausea.    pantoprazole (PROTONIX) 40 MG tablet Take 1 tablet (40 mg total) by mouth once daily.    predniSONE (DELTASONE) 10 MG tablet 6/2 - 6/8: Take 2 tablets (20mg) once a day; 6/9 - 6/15: Take 1 tablet (10mg) once a day; 6/16 - 6/22: Take 0.5 tablet (5mg) once a day; 6/23 - 6/29: Take 0.5 tablet (5mg) every other day; 6/30: STOP    prochlorperazine (COMPAZINE) 10 MG tablet Take 1 tablet (10 mg total) by mouth 4 (four) times daily as needed for Nausea.    tacrolimus (PROGRAF) 0.5 MG Cap Take 2 capsules (1 mg total) by mouth every morning AND 2 capsules (1 mg total) every evening.    traMADoL (ULTRAM) 50 mg tablet Take 1 tablet (50 mg total) by mouth every 6 (six) hours as needed for Pain.    triamcinolone acetonide 0.1% (KENALOG) 0.1 % cream Apply topically 2 (two) times daily as needed (rash on arms, chest, back, trunk, and legs (do not apply to face)).    voriconazole (VFEND) 200 MG Tab Take 1 tablet (200 mg total) by mouth 2 (two) times daily.     No current facility-administered medications for this visit.       ALLERGIES:   Review of patient's allergies indicates:   Allergen Reactions    Sulfamethoxazole-trimethoprim Hives       GVHD Review of Systems:     Pertinent positives and negatives included in the HPI. Otherwise a 14 point review of systems is negative. GVHD review of systems recorded in BMT flowsheet.     Physical Exam:     Vitals:    06/18/25 1525   BP: 118/77   Pulse: 88   Resp: (P) 16   Temp: 97.7 °F (36.5 °C)           General: Appears well, NAD  HEENT: MMM, no OP lesions  Neck: Supple, no LAD  Pulmonary: CTAB, no increased work of breathing, no W/R/C  Cardiovascular: S1S2 normal, RRR, no M/R/G  Abdominal: Soft, NT, ND, BS+, no  HSM  Extremities: No C/C/E  Neurological: AAOx4, grossly normal, no focal deficits  Dermatologic: Rash noted to bilateral arms, worse on left arm.      ECOG Performance Status: (foot note - ECOG PS provided by Eastern Cooperative Oncology Group) 1 - Symptomatic but completely ambulatory    Karnofsky Performance Score:  80%- Normal Activity with Effort: Some Symptoms of Disease    Labs:   Lab Results   Component Value Date    WBC 6.44 06/18/2025    RBC 2.64 (L) 06/18/2025    HGB 9.0 (L) 06/18/2025    HCT 28.8 (L) 06/18/2025     (H) 06/18/2025    MCH 34.1 (H) 06/18/2025    MCHC 31.3 (L) 06/18/2025    RDW  06/18/2025      Comment:      Result Not Available    PLT 88 (L) 06/18/2025    MPV 9.8 06/18/2025    GRAN 7.1 05/26/2025    LYMPH 10.1 (L) 06/18/2025    LYMPH 0.65 (L) 06/18/2025    MONO 7.0 06/18/2025    MONO 0.45 06/18/2025    EOS 0.5 06/18/2025    EOS 0.03 06/18/2025    BASO 0.02 03/20/2025    EOSINOPHIL 0.6 03/20/2025    BASOPHIL 0.6 06/18/2025    BASOPHIL 0.04 06/18/2025       Sodium   Date Value Ref Range Status   06/18/2025 137 136 - 145 mmol/L Final   03/20/2025 138 136 - 145 mmol/L Final     Potassium   Date Value Ref Range Status   06/18/2025 4.7 3.5 - 5.1 mmol/L Final   03/20/2025 4.2 3.5 - 5.1 mmol/L Final     Chloride   Date Value Ref Range Status   06/18/2025 105 95 - 110 mmol/L Final   03/20/2025 108 95 - 110 mmol/L Final     CO2   Date Value Ref Range Status   06/18/2025 24 23 - 29 mmol/L Final   03/20/2025 21 (L) 23 - 29 mmol/L Final     Glucose   Date Value Ref Range Status   06/18/2025 112 (H) 70 - 110 mg/dL Final   03/20/2025 101 70 - 110 mg/dL Final     BUN   Date Value Ref Range Status   06/18/2025 26 (H) 6 - 20 mg/dL Final     Creatinine   Date Value Ref Range Status   06/18/2025 1.3 0.5 - 1.4 mg/dL Final     Calcium   Date Value Ref Range Status   06/18/2025 9.4 8.7 - 10.5 mg/dL Final   03/20/2025 9.2 8.7 - 10.5 mg/dL Final     Protein Total   Date Value Ref Range Status   06/18/2025  7.8 6.0 - 8.4 gm/dL Final     Total Protein   Date Value Ref Range Status   03/20/2025 6.6 6.0 - 8.4 g/dL Final     Albumin   Date Value Ref Range Status   06/18/2025 3.9 3.5 - 5.2 g/dL Final   03/20/2025 3.7 3.5 - 5.2 g/dL Final     Total Bilirubin   Date Value Ref Range Status   03/20/2025 0.4 0.1 - 1.0 mg/dL Final     Comment:     For infants and newborns, interpretation of results should be based  on gestational age, weight and in agreement with clinical  observations.    Premature Infant recommended reference ranges:  Up to 24 hours.............<8.0 mg/dL  Up to 48 hours............<12.0 mg/dL  3-5 days..................<15.0 mg/dL  6-29 days.................<15.0 mg/dL       Bilirubin Total   Date Value Ref Range Status   06/18/2025 0.3 0.1 - 1.0 mg/dL Final     Comment:     For infants and newborns, interpretation of results should be based   on gestational age, weight and in agreement with clinical   observations.    Premature Infant recommended reference ranges:   0-24 hours:  <8.0 mg/dL   24-48 hours: <12.0 mg/dL   3-5 days:    <15.0 mg/dL   6-29 days:   <15.0 mg/dL     Alkaline Phosphatase   Date Value Ref Range Status   03/20/2025 122 40 - 150 U/L Final     ALP   Date Value Ref Range Status   06/18/2025 85 40 - 150 unit/L Final     AST   Date Value Ref Range Status   06/18/2025 36 11 - 45 unit/L Final   03/20/2025 59 (H) 10 - 40 U/L Final     ALT   Date Value Ref Range Status   06/18/2025 44 10 - 44 unit/L Final   03/20/2025 85 (H) 10 - 44 U/L Final     Anion Gap   Date Value Ref Range Status   06/18/2025 8 8 - 16 mmol/L Final     eGFR if    Date Value Ref Range Status   01/22/2022 >60.0 >60 mL/min/1.73 m^2 Final     eGFR if non    Date Value Ref Range Status   01/22/2022 >60.0 >60 mL/min/1.73 m^2 Final     Comment:     Calculation used to obtain the estimated glomerular filtration  rate (eGFR) is the CKD-EPI equation.          Imaging:   All pertinent studies reviewed and  interpreted by me       Assessment and Plan:   Rubén Hu (Rubén) is a pleasant 54 y.o.male with primary myelofibrosis s/p haplo SCT who presents for post-transplant follow up.    Primary myelofibrosis: Status post treatment with ruxolitinib, followed by alloSCT. Day +30 BMBx results below.    Status post allogeneic stem cell transplantation: As noted above, status post haploidentical stem cell transplantation conditioned with Bu/Flu/TT. Currently day +174.  Engrafted on day +22.  Day +30 BMBx on 1/2/25: No definitive evidence of residual JAK2 [id number] primary myelofibrosis. NGS without evidence of pathogenic mutations. Chimerism studies with 100% donor CD33, CD3 insufficient for analysis.   Plan for central line removal -  4/3/25  Day 100 bone marrow biopsy 4/9/25:Markedly hypocellular marrow (<5%-15%) with trilineage hematopoiesis. NGS with DNMT3A 2%. CG normal and JAK2 negative. Chimerisms 90% CD3 and 100% CD33 donor.  Patient not able to return work anytime soon due to immunodeficiency and low blood counts.    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 MMF has now been discontinued. 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 He restarted budesonide 3mg TID (4/14/25), and budesonide was tapered in May. 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 He restarted prednisone 40mg daily on 5/16. His nausea improved within a few days. Small rash noted to both arms (<25% BSA) that is not pruritic. Per Dr. James patient will use triamcinolone cream and moisturizer.  Will continue tapering prednisone(Taper below).   Current tacro dose: 1 mg BID   Last tacro level: 6.1 (goal 4-10)  Adjustments: none    6/2 - 6/8 Take 2 tablets (20mg) once a day   6/9 - 6/15 Take 1 tablet (10mg) once a day   6/16 - 6/22 Take ½ tablet (5mg) once a day   6/23 - 6/29 Take ½  tablet (5mg) every other day   6/30  STOP        Immunosuppression: Prevention with voriconazole and acyclovir. CMV prophylaxis with letermovir. PJP prophyalxis dapsone. Continue weekly monitoring of CMV and EBV - these labs remain negative.     Pancytopenia: Due to parvovirus and aGVHD. Transfuse for Hgb <7 g/dL and platelets <10k.   Counts much improved with treatment of parvovirus (IVIG) and acute GVHD.     Restart promacta today    Parvovirus: Obtained to evaluate for pancytopenia. Monitor labs weekly and continue transfusion support.   IVIG started 5/9/25 and he tolerated it well.Second dose was 6/11.    Hypertension:  Was newly hypertensive in hospital following SCT at which time he was started on amlodipine. Discontinued amlodipine after hospitalization due to hypotension. Monitor for now but restart antihypertensives if this worsens.   Hypertensive likely related to steroids. If still hypertensive off prednisone, will restart low dose amlodipine.     Cardiomyopathy: Echo 2/26/25 obtained due to tachycardia revealed reduced LVEF 45-50%. No signs/symptoms of HFrEF. Repeat echo in 3 months (5/2025).  Repeat echo 5/2025 showed improvement of EF and some left ventricular thickening. Global longitudinal strain is mildly reduced measuring -15.4%     Vitamin D deficiency: His primary care is giving him 50,000 units once a week.     Hypercholesterolemia: He is following with primary care. Could be elevated due to tacrolimus.     Orders Placed:      No orders of the defined types were placed in this encounter.     Route Chart for Scheduling  BMT Route Chart for Scheduling      Supportive Plan Information  OP IVIG Clarence Grover MD   Associated Diagnosis: Parvovirus B19 infection   noted on 4/21/2025   Line of treatment: Supportive Care   Treatment goal: Supportive     Upcoming Treatment Dates - OP IVIG    No upcoming days in selected categories.    Treatment Plan Information   OP Adult Blood - Twice Weekly Clyde  BRITANY James MD   Associated Diagnosis: Anemia associated with chemotherapy   noted on 2/4/2025   Line of treatment: Supportive Care  Treatment Goal: Supportive     Upcoming Treatment Dates - OP Adult Blood - Twice Weekly    5/6/2025       Prepare RBC       Transfuse RBC 1 Unit    Therapy Plan Information  PORT FLUSH for Adrenal nodule, noted on 12/9/2024  Flushes  heparin, porcine (PF) 100 unit/mL injection flush 500 Units  500 Units, Intravenous, Every visit  sodium chloride 0.9% flush 10 mL  10 mL, Intravenous, Every visit    EPOETIN CRISELDA (RETACRIT) WEEKLY for Anemia, noted on 5/14/2025  Medications  epoetin criselda-epbx injection 40,000 Units  40,000 Units, Subcutaneous, 1 time a week      No therapy plan of the specified type found.    Total time of this visit was 40 minutes, including time spent face to face with patient and/or via video/audio, and also in preparing for today's visit for MDM and documentation. (Medical Decision Making, including consideration of possible diagnoses, management options, complex medical record review, review of diagnostic tests and information, consideration and discussion of significant complications based on comorbidities, and discussion with providers involved with the care of the patient). Greater than 50% was spent face to face with the patient counseling and coordinating care.    Visit today included increased complexity associated with the care of the episodic problem treatment of GVHD rash addressed and managing the longitudinal care of the patient due to the serious and/or complex managed problem(s) s/p allogeneic stem cell transplant .     Opal Alonzo PA-C  Malignant Hematology, Stem Cell Transplant, and Cellular Therapy  [facility]  [facility]

## 2025-06-19 ENCOUNTER — RESULTS FOLLOW-UP (OUTPATIENT)
Dept: HEMATOLOGY/ONCOLOGY | Facility: CLINIC | Age: 55
End: 2025-06-19

## 2025-06-19 LAB — TACROLIMUS BLD-MCNC: 10.1 NG/ML (ref 5–15)

## 2025-06-19 NOTE — PROGRESS NOTES
BMT Pharmacist Immunosuppression Note:    Reviewed patient's tacrolimus level with Dr. Grover and it is within goal range.      Current regimen: 1mg BID  Capsule size(s): 0.5mg    Plan: Continue current regimen.        Lab Results   Component Value Date    TACROLIMUS 10.1 06/18/2025    TACROLIMUS 6.1 06/09/2025    TACROLIMUS 6.4 06/02/2025       Creatinine   Date Value Ref Range Status   06/18/2025 1.3 0.5 - 1.4 mg/dL Final   06/09/2025 0.9 0.5 - 1.4 mg/dL Final   06/02/2025 1.0 0.5 - 1.4 mg/dL Final     Total Bilirubin   Date Value Ref Range Status   03/20/2025 0.4 0.1 - 1.0 mg/dL Final     Comment:     For infants and newborns, interpretation of results should be based  on gestational age, weight and in agreement with clinical  observations.    Premature Infant recommended reference ranges:  Up to 24 hours.............<8.0 mg/dL  Up to 48 hours............<12.0 mg/dL  3-5 days..................<15.0 mg/dL  6-29 days.................<15.0 mg/dL     03/17/2025 0.5 0.1 - 1.0 mg/dL Final     Comment:     For infants and newborns, interpretation of results should be based  on gestational age, weight and in agreement with clinical  observations.    Premature Infant recommended reference ranges:  Up to 24 hours.............<8.0 mg/dL  Up to 48 hours............<12.0 mg/dL  3-5 days..................<15.0 mg/dL  6-29 days.................<15.0 mg/dL     03/13/2025 0.4 0.1 - 1.0 mg/dL Final     Comment:     For infants and newborns, interpretation of results should be based  on gestational age, weight and in agreement with clinical  observations.    Premature Infant recommended reference ranges:  Up to 24 hours.............<8.0 mg/dL  Up to 48 hours............<12.0 mg/dL  3-5 days..................<15.0 mg/dL  6-29 days.................<15.0 mg/dL       Bilirubin Total   Date Value Ref Range Status   06/18/2025 0.3 0.1 - 1.0 mg/dL Final     Comment:     For infants and newborns, interpretation of results should be based    on gestational age, weight and in agreement with clinical   observations.    Premature Infant recommended reference ranges:   0-24 hours:  <8.0 mg/dL   24-48 hours: <12.0 mg/dL   3-5 days:    <15.0 mg/dL   6-29 days:   <15.0 mg/dL   06/09/2025 0.4 0.1 - 1.0 mg/dL Final     Comment:     For infants and newborns, interpretation of results should be based   on gestational age, weight and in agreement with clinical   observations.    Premature Infant recommended reference ranges:   0-24 hours:  <8.0 mg/dL   24-48 hours: <12.0 mg/dL   3-5 days:    <15.0 mg/dL   6-29 days:   <15.0 mg/dL   06/02/2025 0.4 0.1 - 1.0 mg/dL Final     Comment:     For infants and newborns, interpretation of results should be based   on gestational age, weight and in agreement with clinical   observations.    Premature Infant recommended reference ranges:   0-24 hours:  <8.0 mg/dL   24-48 hours: <12.0 mg/dL   3-5 days:    <15.0 mg/dL   6-29 days:   <15.0 mg/dL     AST   Date Value Ref Range Status   06/18/2025 36 11 - 45 unit/L Final   06/09/2025 19 11 - 45 unit/L Final   06/02/2025 27 11 - 45 unit/L Final   03/20/2025 59 (H) 10 - 40 U/L Final   03/17/2025 44 (H) 10 - 40 U/L Final   03/13/2025 40 10 - 40 U/L Final     ALT   Date Value Ref Range Status   06/18/2025 44 10 - 44 unit/L Final   06/09/2025 39 10 - 44 unit/L Final   06/02/2025 53 (H) 10 - 44 unit/L Final   03/20/2025 85 (H) 10 - 44 U/L Final   03/17/2025 64 (H) 10 - 44 U/L Final   03/13/2025 49 (H) 10 - 44 U/L Final             Carolina Carson, Pharm.D., BCOP  Clinical Pharmacy Specialist, Bone Marrow Transplant/Cellular Therapy  [facility]  SpectraLink: [id number]

## 2025-06-20 ENCOUNTER — DOCUMENTATION ONLY (OUTPATIENT)
Dept: HEMATOLOGY/ONCOLOGY | Facility: CLINIC | Age: 55
End: 2025-06-20
Payer: COMMERCIAL

## 2025-06-20 NOTE — PROGRESS NOTES
NAT received email from patient requesting notes from 5.28.25 until now be sent to [email or web address] sent progress notes from 6.2, 6.9 and 6.18.    NAT received return email stating the email was received. NAT forwarded this confirmation to patient, [email or web address].

## 2025-06-23 ENCOUNTER — RESULTS FOLLOW-UP (OUTPATIENT)
Dept: HEMATOLOGY/ONCOLOGY | Facility: CLINIC | Age: 55
End: 2025-06-23

## 2025-06-23 ENCOUNTER — OFFICE VISIT (OUTPATIENT)
Dept: HEMATOLOGY/ONCOLOGY | Facility: CLINIC | Age: 55
End: 2025-06-23
Payer: COMMERCIAL

## 2025-06-23 ENCOUNTER — LAB VISIT (OUTPATIENT)
Dept: LAB | Facility: HOSPITAL | Age: 55
End: 2025-06-23
Attending: INTERNAL MEDICINE
Payer: COMMERCIAL

## 2025-06-23 VITALS
BODY MASS INDEX: 24.23 KG/M2 | DIASTOLIC BLOOD PRESSURE: 83 MMHG | TEMPERATURE: 98 F | SYSTOLIC BLOOD PRESSURE: 131 MMHG | OXYGEN SATURATION: 100 % | WEIGHT: 173.75 LBS | RESPIRATION RATE: 16 BRPM | HEART RATE: 92 BPM

## 2025-06-23 DIAGNOSIS — E78.00 HYPERCHOLESTEROLEMIA: ICD-10-CM

## 2025-06-23 DIAGNOSIS — D47.1 PRIMARY MYELOFIBROSIS: ICD-10-CM

## 2025-06-23 DIAGNOSIS — D61.818 PANCYTOPENIA: ICD-10-CM

## 2025-06-23 DIAGNOSIS — I42.9 CARDIOMYOPATHY, UNSPECIFIED TYPE: ICD-10-CM

## 2025-06-23 DIAGNOSIS — D84.81 IMMUNODEFICIENCY DUE TO CONDITIONS CLASSIFIED ELSEWHERE: ICD-10-CM

## 2025-06-23 DIAGNOSIS — Z76.82 STEM CELL TRANSPLANT CANDIDATE: ICD-10-CM

## 2025-06-23 DIAGNOSIS — E55.9 VITAMIN D DEFICIENCY: ICD-10-CM

## 2025-06-23 DIAGNOSIS — D89.810 ACUTE GVHD: ICD-10-CM

## 2025-06-23 DIAGNOSIS — Z94.84 HISTORY OF ALLOGENEIC STEM CELL TRANSPLANT: ICD-10-CM

## 2025-06-23 DIAGNOSIS — Z94.84 HISTORY OF ALLOGENEIC STEM CELL TRANSPLANT: Primary | ICD-10-CM

## 2025-06-23 LAB
ABSOLUTE EOSINOPHIL (OHS): 0.04 K/UL
ABSOLUTE MONOCYTE (OHS): 0.44 K/UL (ref 0.3–1)
ABSOLUTE NEUTROPHIL COUNT (OHS): 2.87 K/UL (ref 1.8–7.7)
ALBUMIN SERPL BCP-MCNC: 3.9 G/DL (ref 3.5–5.2)
ALP SERPL-CCNC: 94 UNIT/L (ref 40–150)
ALT SERPL W/O P-5'-P-CCNC: 46 UNIT/L (ref 10–44)
ANION GAP (OHS): 9 MMOL/L (ref 8–16)
ANISOCYTOSIS BLD QL SMEAR: SLIGHT
AST SERPL-CCNC: 54 UNIT/L (ref 11–45)
BASOPHILS # BLD AUTO: 0.01 K/UL
BASOPHILS NFR BLD AUTO: 0.3 %
BILIRUB SERPL-MCNC: 0.3 MG/DL (ref 0.1–1)
BUN SERPL-MCNC: 24 MG/DL (ref 6–20)
CALCIUM SERPL-MCNC: 9.6 MG/DL (ref 8.7–10.5)
CHLORIDE SERPL-SCNC: 106 MMOL/L (ref 95–110)
CO2 SERPL-SCNC: 24 MMOL/L (ref 23–29)
CREAT SERPL-MCNC: 1.2 MG/DL (ref 0.5–1.4)
ERYTHROCYTE [DISTWIDTH] IN BLOOD BY AUTOMATED COUNT: ABNORMAL %
GFR SERPLBLD CREATININE-BSD FMLA CKD-EPI: >60 ML/MIN/1.73/M2
GLUCOSE SERPL-MCNC: 90 MG/DL (ref 70–110)
HCT VFR BLD AUTO: 28.9 % (ref 40–54)
HGB BLD-MCNC: 9.1 GM/DL (ref 14–18)
IMM GRANULOCYTES # BLD AUTO: 0.07 K/UL (ref 0–0.04)
IMM GRANULOCYTES NFR BLD AUTO: 1.8 % (ref 0–0.5)
INDIRECT COOMBS: NORMAL
LYMPHOCYTES # BLD AUTO: 0.57 K/UL (ref 1–4.8)
MAGNESIUM SERPL-MCNC: 2 MG/DL (ref 1.6–2.6)
MCH RBC QN AUTO: 34.3 PG (ref 27–31)
MCHC RBC AUTO-ENTMCNC: 31.5 G/DL (ref 32–36)
MCV RBC AUTO: 109 FL (ref 82–98)
NUCLEATED RBC (/100WBC) (OHS): 0 /100 WBC
PHOSPHATE SERPL-MCNC: 3.8 MG/DL (ref 2.7–4.5)
PLATELET # BLD AUTO: 99 K/UL (ref 150–450)
PMV BLD AUTO: 9.6 FL (ref 9.2–12.9)
POIKILOCYTOSIS BLD QL SMEAR: SLIGHT
POLYCHROMASIA BLD QL SMEAR: NORMAL
POTASSIUM SERPL-SCNC: 4.5 MMOL/L (ref 3.5–5.1)
PROT SERPL-MCNC: 7.8 GM/DL (ref 6–8.4)
RBC # BLD AUTO: 2.65 M/UL (ref 4.6–6.2)
RELATIVE EOSINOPHIL (OHS): 1 %
RELATIVE LYMPHOCYTE (OHS): 14.3 % (ref 18–48)
RELATIVE MONOCYTE (OHS): 11 % (ref 4–15)
RELATIVE NEUTROPHIL (OHS): 71.6 % (ref 38–73)
RH BLD: NORMAL
SCHISTOCYTES BLD QL SMEAR: NORMAL
SODIUM SERPL-SCNC: 139 MMOL/L (ref 136–145)
SPECIMEN OUTDATE: NORMAL
TACROLIMUS BLD-MCNC: 10.6 NG/ML (ref 5–15)
WBC # BLD AUTO: 4 K/UL (ref 3.9–12.7)

## 2025-06-23 PROCEDURE — 84100 ASSAY OF PHOSPHORUS: CPT

## 2025-06-23 PROCEDURE — 99215 OFFICE O/P EST HI 40 MIN: CPT | Mod: S$GLB,,,

## 2025-06-23 PROCEDURE — 80053 COMPREHEN METABOLIC PANEL: CPT

## 2025-06-23 PROCEDURE — 86850 RBC ANTIBODY SCREEN: CPT | Performed by: INTERNAL MEDICINE

## 2025-06-23 PROCEDURE — 80197 ASSAY OF TACROLIMUS: CPT

## 2025-06-23 PROCEDURE — 3075F SYST BP GE 130 - 139MM HG: CPT | Mod: CPTII,S$GLB,,

## 2025-06-23 PROCEDURE — 85025 COMPLETE CBC W/AUTO DIFF WBC: CPT

## 2025-06-23 PROCEDURE — G2211 COMPLEX E/M VISIT ADD ON: HCPCS | Mod: S$GLB,,,

## 2025-06-23 PROCEDURE — 36415 COLL VENOUS BLD VENIPUNCTURE: CPT

## 2025-06-23 PROCEDURE — 3079F DIAST BP 80-89 MM HG: CPT | Mod: CPTII,S$GLB,,

## 2025-06-23 PROCEDURE — 3008F BODY MASS INDEX DOCD: CPT | Mod: CPTII,S$GLB,,

## 2025-06-23 PROCEDURE — 83735 ASSAY OF MAGNESIUM: CPT

## 2025-06-23 PROCEDURE — 99999 PR PBB SHADOW E&M-EST. PATIENT-LVL III: CPT | Mod: PBBFAC,,,

## 2025-06-23 PROCEDURE — 3044F HG A1C LEVEL LT 7.0%: CPT | Mod: CPTII,S$GLB,,

## 2025-06-23 NOTE — PROGRESS NOTES
Section of Hematology and Stem Cell Transplantation    Post-Transplantation Follow Up Visit     6/23/25    Transplant History:   Primary oncologist: Clyde James MD  Primary oncologic diagnosis: primary myelofibrosis  Transplant date: 12/26/2024  Donor: haploidentical  Blood Type (Patient): O +  Blood Type (Donor): O +  CMV (Patient): Negative  CMV (Donor): Positive  Graft source: Peripheral blood  CD34+ cell dose: 6.04 X10^6 cells/kg  Conditioning Regimen: Thiotepa, Busulfan, Fludarabine  GVHD prophylaxis: Post-transplant cyclophosphamide, MMF, Tacrolimus  Immediate post-transplant complications: mucositis, R axillary skin/soft tissue infection secondary to MRSA    History of Present Ilness:   Rubén Peters) is a pleasant 54 y.o.male with primary myelofibrosis who is status post haploidentical stem cell transplantation conditioned with Bu/Flu/TT who is currently day +179 who presents for post-transplant follow up.    He reports feeling good. He is still walking a mile and a half everyday. He has been trying to be more active. Energy levels stable. Appetite is doing great. Denies nausea, vomiting, diarrhea. The rash on his arms are improving. He has been using triamcinolone once a day.    PAST MEDICAL HISTORY:   Past Medical History:   Diagnosis Date    Abdominal pain 01/02/2025    Allergic contact dermatitis due to adhesives 12/31/2024    Cancer     Flatulence 01/03/2025    Headache 12/27/2024    Hyperlipidemia     Mucositis 01/02/2025    Other constipation 12/19/2024    Thrombocytosis 12/18/2024    Transaminitis 12/27/2024       PAST SURGICAL HISTORY:   Past Surgical History:   Procedure Laterality Date    BONE MARROW BIOPSY N/A 11/27/2024    Procedure: Biopsy-bone marrow;  Surgeon: Clyde James MD;  Location: [address]);  Service: Oncology;  Laterality: N/A;  11/20-pre call complete-tb    BONE MARROW BIOPSY N/A 4/9/2025    Procedure: Biopsy-bone marrow;  Surgeon: Clyde James MD;   Location: [facility] ENDO (4TH FLR);  Service: Oncology;  Laterality: N/A;  4/1 precall complete. EW    COLONOSCOPY N/A 8/9/2023    Procedure: COLONOSCOPY;  Surgeon: Will Ardon MD;  Location: [facility] ENDO (4TH FLR);  Service: Endoscopy;  Laterality: N/A;  Suprep Instructions sent via portal / Authorizing:     Bebeto Arora MD in [facility] FAMILY MED/ INTERNAL MED/ PEDS  Referral:          [id number]    EYE SURGERY      FLEXIBLE SIGMOIDOSCOPY N/A 7/23/2024    Procedure: SIGMOIDOSCOPY, FLEXIBLE;  Surgeon: Nirali Vicente MD;  Location: [facility] ENDO;  Service: Endoscopy;  Laterality: N/A;    INSERTION OF LONGORIA CATHETER Right 12/18/2024    Procedure: INSERTION, CATHETER, CENTRAL VENOUS, LONGORIA TRIPLE LUMEN, Bard 12.5 fr Longoria Cath model [id number];  Surgeon: Willie Hadley MD;  Location: [facility] OR 2ND FLR;  Service: General;  Laterality: Right;       PAST SOCIAL HISTORY:  Social History     Tobacco Use    Smoking status: Former     Types: Cigars, Cigarettes    Smokeless tobacco: Never   Substance Use Topics    Alcohol use: Not Currently     Comment: socially    Drug use: Never       FAMILY HISTORY:  Family History   Problem Relation Name Age of Onset    Arthritis Mother      COPD Father      Testicular cancer Maternal Cousin Peter 17        Chemotherapy    Breast cancer Maternal Cousin  51        Chemotherapy    Heart disease Maternal Grandfather      Stroke Maternal Grandmother         CURRENT MEDICATIONS:   Current Outpatient Medications   Medication Sig    acyclovir (ZOVIRAX) 800 MG Tab Take 1 tablet (800 mg total) by mouth 2 (two) times daily.    atovaquone (MEPRON) 750 mg/5 mL Susp oral liquid Take 10 mLs (1,500 mg total) by mouth once daily.    eltrombopag olamine (PROMACTA) 50 MG Tab Take 1 tablet (50 mg total) by mouth once daily.    ergocalciferol (ERGOCALCIFEROL) 50,000 unit Cap Take 1 capsule (50,000 Units total) by mouth every 7 days.    letermovir (PREVYMIS) 480 mg Tab Take 1 tablet (480 mg total) by mouth once  daily.    magnesium oxide (MAG-OX) 400 mg (241.3 mg magnesium) tablet Take 2 tablets in the morning, 1 tablet in the afternoon, and 2 tablets in the evening.    ondansetron (ZOFRAN) 8 MG tablet Take 1 tablet (8 mg total) by mouth every 8 (eight) hours as needed for Nausea.    pantoprazole (PROTONIX) 40 MG tablet Take 1 tablet (40 mg total) by mouth once daily.    predniSONE (DELTASONE) 10 MG tablet 6/2 - 6/8: Take 2 tablets (20mg) once a day; 6/9 - 6/15: Take 1 tablet (10mg) once a day; 6/16 - 6/22: Take 0.5 tablet (5mg) once a day; 6/23 - 6/29: Take 0.5 tablet (5mg) every other day; 6/30: STOP    prochlorperazine (COMPAZINE) 10 MG tablet Take 1 tablet (10 mg total) by mouth 4 (four) times daily as needed for Nausea.    tacrolimus (PROGRAF) 0.5 MG Cap Take 2 capsules (1 mg total) by mouth every morning AND 2 capsules (1 mg total) every evening.    traMADoL (ULTRAM) 50 mg tablet Take 1 tablet (50 mg total) by mouth every 6 (six) hours as needed for Pain.    triamcinolone acetonide 0.1% (KENALOG) 0.1 % cream Apply topically 2 (two) times daily as needed (rash on arms, chest, back, trunk, and legs (do not apply to face)).    voriconazole (VFEND) 200 MG Tab Take 1 tablet (200 mg total) by mouth 2 (two) times daily.     No current facility-administered medications for this visit.       ALLERGIES:   Review of patient's allergies indicates:   Allergen Reactions    Sulfamethoxazole-trimethoprim Hives       GVHD Review of Systems:     Pertinent positives and negatives included in the HPI. Otherwise a 14 point review of systems is negative. GVHD review of systems recorded in BMT flowsheet.     Physical Exam:     Vitals:    06/23/25 0750   BP: 131/83   Pulse: 92   Resp: 16   Temp: 97.7 °F (36.5 °C)           General: Appears well, NAD  HEENT: MMM, no OP lesions  Neck: Supple, no LAD  Pulmonary: CTAB, no increased work of breathing, no W/R/C  Cardiovascular: S1S2 normal, RRR, no M/R/G  Abdominal: Soft, NT, ND, BS+, no  HSM  Extremities: No C/C/E  Neurological: AAOx4, grossly normal, no focal deficits  Dermatologic: Rash noted to bilateral arms, worse on left arm. Improved today- no longer raised a red; spots now appear pink and flattened.     ECOG Performance Status: (foot note - ECOG PS provided by [employer]cology [employer]) 1 - Symptomatic but completely ambulatory    Karnofsky Performance Score:  80%- Normal Activity with Effort: Some Symptoms of Disease    Labs:   Lab Results   Component Value Date    WBC 6.44 06/18/2025    RBC 2.64 (L) 06/18/2025    HGB 9.0 (L) 06/18/2025    HCT 28.8 (L) 06/18/2025     (H) 06/18/2025    MCH 34.1 (H) 06/18/2025    MCHC 31.3 (L) 06/18/2025    RDW  06/18/2025      Comment:      Result Not Available    PLT 88 (L) 06/18/2025    MPV 9.8 06/18/2025    GRAN 7.1 05/26/2025    LYMPH 10.1 (L) 06/18/2025    LYMPH 0.65 (L) 06/18/2025    MONO 7.0 06/18/2025    MONO 0.45 06/18/2025    EOS 0.5 06/18/2025    EOS 0.03 06/18/2025    BASO 0.02 03/20/2025    EOSINOPHIL 0.6 03/20/2025    BASOPHIL 0.6 06/18/2025    BASOPHIL 0.04 06/18/2025       Sodium   Date Value Ref Range Status   06/18/2025 137 136 - 145 mmol/L Final   03/20/2025 138 136 - 145 mmol/L Final     Potassium   Date Value Ref Range Status   06/18/2025 4.7 3.5 - 5.1 mmol/L Final   03/20/2025 4.2 3.5 - 5.1 mmol/L Final     Chloride   Date Value Ref Range Status   06/18/2025 105 95 - 110 mmol/L Final   03/20/2025 108 95 - 110 mmol/L Final     CO2   Date Value Ref Range Status   06/18/2025 24 23 - 29 mmol/L Final   03/20/2025 21 (L) 23 - 29 mmol/L Final     Glucose   Date Value Ref Range Status   06/18/2025 112 (H) 70 - 110 mg/dL Final   03/20/2025 101 70 - 110 mg/dL Final     BUN   Date Value Ref Range Status   06/18/2025 26 (H) 6 - 20 mg/dL Final     Creatinine   Date Value Ref Range Status   06/18/2025 1.3 0.5 - 1.4 mg/dL Final     Calcium   Date Value Ref Range Status   06/18/2025 9.4 8.7 - 10.5 mg/dL Final   03/20/2025 9.2 8.7 - 10.5  mg/dL Final     Protein Total   Date Value Ref Range Status   06/18/2025 7.8 6.0 - 8.4 gm/dL Final     Total Protein   Date Value Ref Range Status   03/20/2025 6.6 6.0 - 8.4 g/dL Final     Albumin   Date Value Ref Range Status   06/18/2025 3.9 3.5 - 5.2 g/dL Final   03/20/2025 3.7 3.5 - 5.2 g/dL Final     Total Bilirubin   Date Value Ref Range Status   03/20/2025 0.4 0.1 - 1.0 mg/dL Final     Comment:     For infants and newborns, interpretation of results should be based  on gestational age, weight and in agreement with clinical  observations.    Premature Infant recommended reference ranges:  Up to 24 hours.............<8.0 mg/dL  Up to 48 hours............<12.0 mg/dL  3-5 days..................<15.0 mg/dL  6-29 days.................<15.0 mg/dL       Bilirubin Total   Date Value Ref Range Status   06/18/2025 0.3 0.1 - 1.0 mg/dL Final     Comment:     For infants and newborns, interpretation of results should be based   on gestational age, weight and in agreement with clinical   observations.    Premature Infant recommended reference ranges:   0-24 hours:  <8.0 mg/dL   24-48 hours: <12.0 mg/dL   3-5 days:    <15.0 mg/dL   6-29 days:   <15.0 mg/dL     Alkaline Phosphatase   Date Value Ref Range Status   03/20/2025 122 40 - 150 U/L Final     ALP   Date Value Ref Range Status   06/18/2025 85 40 - 150 unit/L Final     AST   Date Value Ref Range Status   06/18/2025 36 11 - 45 unit/L Final   03/20/2025 59 (H) 10 - 40 U/L Final     ALT   Date Value Ref Range Status   06/18/2025 44 10 - 44 unit/L Final   03/20/2025 85 (H) 10 - 44 U/L Final     Anion Gap   Date Value Ref Range Status   06/18/2025 8 8 - 16 mmol/L Final     eGFR if    Date Value Ref Range Status   01/22/2022 >60.0 >60 mL/min/1.73 m^2 Final     eGFR if non    Date Value Ref Range Status   01/22/2022 >60.0 >60 mL/min/1.73 m^2 Final     Comment:     Calculation used to obtain the estimated glomerular filtration  rate (eGFR) is the  CKD-EPI equation.          Imaging:   All pertinent studies reviewed and interpreted by me       Assessment and Plan:   Rubén Hu (Rubén) is a pleasant 54 y.o.male with primary myelofibrosis s/p haplo SCT who presents for post-transplant follow up.    Primary myelofibrosis: Status post treatment with ruxolitinib, followed by alloSCT. Day +30 BMBx results below.    Status post allogeneic stem cell transplantation: As noted above, status post haploidentical stem cell transplantation conditioned with Bu/Flu/TT. Currently day +179.  Engrafted on day +22.  Day +30 BMBx on 1/2/25: No definitive evidence of residual JAK2 [id number] primary myelofibrosis. NGS without evidence of pathogenic mutations. Chimerism studies with 100% donor CD33, CD3 insufficient for analysis.   Plan for central line removal -  4/3/25  Day 100 bone marrow biopsy 4/9/25:Markedly hypocellular marrow (<5%-15%) with trilineage hematopoiesis. NGS with DNMT3A 2%. CG normal and JAK2 negative. Chimerisms 90% CD3 and 100% CD33 donor.  Patient not able to return work anytime soon due to immunodeficiency and low blood counts. Can re-evaluate in 4 months if he can return to work.     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 MMF has now been discontinued. 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 He restarted budesonide 3mg TID (4/14/25), and budesonide was tapered in May. 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 He restarted prednisone 40mg daily on 5/16. His nausea improved within a few days. Small rash noted to both arms (<25% BSA) that is not pruritic (6/18/25). Per Dr. James patient will use triamcinolone cream twice a day and moisturizer and rash is improving.  Will continue tapering prednisone(Taper below).   Current tacro dose: 1 mg BID   Last tacro level: 10.6 (goal  4-10)  Adjustments: none    6/2 - 6/8 Take 2 tablets (20mg) once a day   6/9 - 6/15 Take 1 tablet (10mg) once a day   6/16 - 6/22 Take ½ tablet (5mg) once a day   6/23 - 6/29 Take ½ tablet (5mg) every other day   6/30  STOP        Immunosuppression: Prevention with voriconazole and acyclovir. CMV prophylaxis with letermovir. PJP prophyalxis Mepron. Continue weekly monitoring of CMV and EBV - these labs remain negative.     Pancytopenia: Due to parvovirus and aGVHD. Transfuse for Hgb <7 g/dL and platelets <10k.   Counts much improved with treatment of parvovirus (IVIG) and acute GVHD.     Continue promacta     Parvovirus: Obtained to evaluate for pancytopenia. Monitor labs weekly and continue transfusion support.   IVIG started 5/9/25 and he tolerated it well.Second dose was 6/11.    Hypertension:  Was newly hypertensive in hospital following SCT at which time he was started on amlodipine. Discontinued amlodipine after hospitalization due to hypotension. Monitor for now but restart antihypertensives if this worsens.   Hypertensive likely related to steroids. If still hypertensive off prednisone, will restart low dose amlodipine.     Cardiomyopathy: Echo 2/26/25 obtained due to tachycardia revealed reduced LVEF 45-50%. No signs/symptoms of HFrEF. Repeat echo in 3 months (5/2025).  Repeat echo 5/2025 showed improvement of EF and some left ventricular thickening. Global longitudinal strain is mildly reduced measuring -15.4%     Vitamin D deficiency: His primary care is giving him 50,000 units once a week.     Hypercholesterolemia: He is following with primary care. Could be elevated due to tacrolimus.     Orders Placed:      No orders of the defined types were placed in this encounter.     Route Chart for Scheduling  BMT Route Chart for Scheduling      Supportive Plan Information  OP IVIG Clarence Grover MD   Associated Diagnosis: Parvovirus B19 infection   noted on 4/21/2025   Line of treatment: Supportive Care    Treatment goal: Supportive     Upcoming Treatment Dates - OP IVIG    No upcoming days in selected categories.    Treatment Plan Information   OP Adult Blood - Twice Weekly Clyde James MD   Associated Diagnosis: Anemia associated with chemotherapy   noted on 2/4/2025   Line of treatment: Supportive Care  Treatment Goal: Supportive     Upcoming Treatment Dates - OP Adult Blood - Twice Weekly    5/6/2025       Prepare RBC       Transfuse RBC 1 Unit    Therapy Plan Information  PORT FLUSH for Adrenal nodule, noted on 12/9/2024  Flushes  heparin, porcine (PF) 100 unit/mL injection flush 500 Units  500 Units, Intravenous, Every visit  sodium chloride 0.9% flush 10 mL  10 mL, Intravenous, Every visit    EPOETIN CRISELDA (RETACRIT) WEEKLY for Anemia, noted on 5/14/2025  Medications  epoetin criselda-epbx injection 40,000 Units  40,000 Units, Subcutaneous, 1 time a week      No therapy plan of the specified type found.    Total time of this visit was 40 minutes, including time spent face to face with patient and/or via video/audio, and also in preparing for today's visit for MDM and documentation. (Medical Decision Making, including consideration of possible diagnoses, management options, complex medical record review, review of diagnostic tests and information, consideration and discussion of significant complications based on comorbidities, and discussion with providers involved with the care of the patient). Greater than 50% was spent face to face with the patient counseling and coordinating care.    Visit today included increased complexity associated with the care of the episodic problem treatment of GVHD rash addressed and managing the longitudinal care of the patient due to the serious and/or complex managed problem(s) s/p allogeneic stem cell transplant .     Opal Alonzo PA-C  Malignant Hematology, Stem Cell Transplant, and Cellular Therapy  [facility]  [facility]

## 2025-06-27 ENCOUNTER — DOCUMENTATION ONLY (OUTPATIENT)
Dept: HEMATOLOGY/ONCOLOGY | Facility: CLINIC | Age: 55
End: 2025-06-27
Payer: COMMERCIAL

## 2025-06-27 NOTE — PROGRESS NOTES
Poncho Preciado LCSW, sent 6/23 note to [employer] via email on 6/24. SW followed up with patient via email asking if any further notes are needed.

## 2025-06-30 ENCOUNTER — LAB VISIT (OUTPATIENT)
Dept: LAB | Facility: HOSPITAL | Age: 55
End: 2025-06-30
Attending: INTERNAL MEDICINE
Payer: COMMERCIAL

## 2025-06-30 ENCOUNTER — PATIENT MESSAGE (OUTPATIENT)
Dept: HEMATOLOGY/ONCOLOGY | Facility: CLINIC | Age: 55
End: 2025-06-30

## 2025-06-30 ENCOUNTER — OFFICE VISIT (OUTPATIENT)
Dept: HEMATOLOGY/ONCOLOGY | Facility: CLINIC | Age: 55
End: 2025-06-30
Payer: COMMERCIAL

## 2025-06-30 ENCOUNTER — RESULTS FOLLOW-UP (OUTPATIENT)
Dept: HEMATOLOGY/ONCOLOGY | Facility: CLINIC | Age: 55
End: 2025-06-30

## 2025-06-30 VITALS
TEMPERATURE: 98 F | BODY MASS INDEX: 24.78 KG/M2 | DIASTOLIC BLOOD PRESSURE: 87 MMHG | SYSTOLIC BLOOD PRESSURE: 141 MMHG | HEART RATE: 91 BPM | WEIGHT: 177 LBS | RESPIRATION RATE: 20 BRPM | OXYGEN SATURATION: 100 % | HEIGHT: 71 IN

## 2025-06-30 DIAGNOSIS — D84.81 IMMUNODEFICIENCY DUE TO CONDITIONS CLASSIFIED ELSEWHERE: ICD-10-CM

## 2025-06-30 DIAGNOSIS — I42.9 CARDIOMYOPATHY, UNSPECIFIED TYPE: ICD-10-CM

## 2025-06-30 DIAGNOSIS — R21 SKIN RASH: ICD-10-CM

## 2025-06-30 DIAGNOSIS — Z94.84 HISTORY OF ALLOGENEIC STEM CELL TRANSPLANT: Primary | ICD-10-CM

## 2025-06-30 DIAGNOSIS — D47.1 PRIMARY MYELOFIBROSIS: ICD-10-CM

## 2025-06-30 DIAGNOSIS — Z76.82 STEM CELL TRANSPLANT CANDIDATE: ICD-10-CM

## 2025-06-30 DIAGNOSIS — Z94.84 HISTORY OF ALLOGENEIC STEM CELL TRANSPLANT: ICD-10-CM

## 2025-06-30 DIAGNOSIS — D61.818 PANCYTOPENIA: ICD-10-CM

## 2025-06-30 DIAGNOSIS — I10 HYPERTENSION, UNSPECIFIED TYPE: ICD-10-CM

## 2025-06-30 DIAGNOSIS — E55.9 VITAMIN D DEFICIENCY: ICD-10-CM

## 2025-06-30 DIAGNOSIS — E78.00 HYPERCHOLESTEROLEMIA: ICD-10-CM

## 2025-06-30 LAB
ABSOLUTE EOSINOPHIL (OHS): 0.07 K/UL
ABSOLUTE MONOCYTE (OHS): 0.6 K/UL (ref 0.3–1)
ABSOLUTE NEUTROPHIL COUNT (OHS): 3.13 K/UL (ref 1.8–7.7)
ALBUMIN SERPL BCP-MCNC: 3.8 G/DL (ref 3.5–5.2)
ALP SERPL-CCNC: 96 UNIT/L (ref 40–150)
ALT SERPL W/O P-5'-P-CCNC: 35 UNIT/L (ref 10–44)
ANION GAP (OHS): 8 MMOL/L (ref 8–16)
ANISOCYTOSIS BLD QL SMEAR: SLIGHT
AST SERPL-CCNC: 30 UNIT/L (ref 11–45)
BASOPHILS # BLD AUTO: 0.02 K/UL
BASOPHILS NFR BLD AUTO: 0.4 %
BILIRUB SERPL-MCNC: 0.3 MG/DL (ref 0.1–1)
BUN SERPL-MCNC: 20 MG/DL (ref 6–20)
CALCIUM SERPL-MCNC: 9.7 MG/DL (ref 8.7–10.5)
CHLORIDE SERPL-SCNC: 110 MMOL/L (ref 95–110)
CO2 SERPL-SCNC: 24 MMOL/L (ref 23–29)
CREAT SERPL-MCNC: 1.3 MG/DL (ref 0.5–1.4)
DOHLE BOD BLD QL SMEAR: PRESENT
ERYTHROCYTE [DISTWIDTH] IN BLOOD BY AUTOMATED COUNT: ABNORMAL %
GFR SERPLBLD CREATININE-BSD FMLA CKD-EPI: >60 ML/MIN/1.73/M2
GLUCOSE SERPL-MCNC: 90 MG/DL (ref 70–110)
HCT VFR BLD AUTO: 30 % (ref 40–54)
HGB BLD-MCNC: 9.4 GM/DL (ref 14–18)
HYPOCHROMIA BLD QL SMEAR: ABNORMAL
IMM GRANULOCYTES # BLD AUTO: 0.09 K/UL (ref 0–0.04)
IMM GRANULOCYTES NFR BLD AUTO: 1.9 % (ref 0–0.5)
INDIRECT COOMBS: NORMAL
LYMPHOCYTES # BLD AUTO: 0.76 K/UL (ref 1–4.8)
MAGNESIUM SERPL-MCNC: 1.8 MG/DL (ref 1.6–2.6)
MCH RBC QN AUTO: 35.1 PG (ref 27–31)
MCHC RBC AUTO-ENTMCNC: 31.3 G/DL (ref 32–36)
MCV RBC AUTO: 112 FL (ref 82–98)
NUCLEATED RBC (/100WBC) (OHS): 0 /100 WBC
PHOSPHATE SERPL-MCNC: 3.6 MG/DL (ref 2.7–4.5)
PLATELET # BLD AUTO: 134 K/UL (ref 150–450)
PLATELET BLD QL SMEAR: ABNORMAL
PMV BLD AUTO: 11.3 FL (ref 9.2–12.9)
POTASSIUM SERPL-SCNC: 4.5 MMOL/L (ref 3.5–5.1)
PROT SERPL-MCNC: 7.2 GM/DL (ref 6–8.4)
RBC # BLD AUTO: 2.68 M/UL (ref 4.6–6.2)
RELATIVE EOSINOPHIL (OHS): 1.5 %
RELATIVE LYMPHOCYTE (OHS): 16.3 % (ref 18–48)
RELATIVE MONOCYTE (OHS): 12.8 % (ref 4–15)
RELATIVE NEUTROPHIL (OHS): 67.1 % (ref 38–73)
RH BLD: NORMAL
SODIUM SERPL-SCNC: 142 MMOL/L (ref 136–145)
SPECIMEN OUTDATE: NORMAL
TACROLIMUS BLD-MCNC: 7.7 NG/ML (ref 5–15)
TOXIC GRANULES BLD QL SMEAR: PRESENT
WBC # BLD AUTO: 4.67 K/UL (ref 3.9–12.7)

## 2025-06-30 PROCEDURE — 80053 COMPREHEN METABOLIC PANEL: CPT

## 2025-06-30 PROCEDURE — 3044F HG A1C LEVEL LT 7.0%: CPT | Mod: CPTII,S$GLB,,

## 2025-06-30 PROCEDURE — 99215 OFFICE O/P EST HI 40 MIN: CPT | Mod: S$GLB,,,

## 2025-06-30 PROCEDURE — 99999 PR PBB SHADOW E&M-EST. PATIENT-LVL IV: CPT | Mod: PBBFAC,,,

## 2025-06-30 PROCEDURE — 84100 ASSAY OF PHOSPHORUS: CPT

## 2025-06-30 PROCEDURE — 36415 COLL VENOUS BLD VENIPUNCTURE: CPT

## 2025-06-30 PROCEDURE — 80197 ASSAY OF TACROLIMUS: CPT

## 2025-06-30 PROCEDURE — 86901 BLOOD TYPING SEROLOGIC RH(D): CPT | Performed by: INTERNAL MEDICINE

## 2025-06-30 PROCEDURE — 85025 COMPLETE CBC W/AUTO DIFF WBC: CPT

## 2025-06-30 PROCEDURE — 1159F MED LIST DOCD IN RCRD: CPT | Mod: CPTII,S$GLB,,

## 2025-06-30 PROCEDURE — 3008F BODY MASS INDEX DOCD: CPT | Mod: CPTII,S$GLB,,

## 2025-06-30 PROCEDURE — 83735 ASSAY OF MAGNESIUM: CPT

## 2025-06-30 PROCEDURE — 3077F SYST BP >= 140 MM HG: CPT | Mod: CPTII,S$GLB,,

## 2025-06-30 PROCEDURE — 3079F DIAST BP 80-89 MM HG: CPT | Mod: CPTII,S$GLB,,

## 2025-06-30 RX ORDER — TRIAMCINOLONE ACETONIDE 1 MG/G
CREAM TOPICAL 2 TIMES DAILY PRN
Start: 2025-06-30 | End: 2025-06-30 | Stop reason: SDUPTHER

## 2025-06-30 RX ORDER — TRIAMCINOLONE ACETONIDE 1 MG/G
CREAM TOPICAL 2 TIMES DAILY PRN
Qty: 453.6 G | Refills: 1
Start: 2025-06-30 | End: 2025-06-30

## 2025-06-30 NOTE — PROGRESS NOTES
Section of Hematology and Stem Cell Transplantation    Post-Transplantation Follow Up Visit     6/30/25    Transplant History:   Primary oncologist: Clyde James MD  Primary oncologic diagnosis: primary myelofibrosis  Transplant date: 12/26/2024  Donor: haploidentical  Blood Type (Patient): O +  Blood Type (Donor): O +  CMV (Patient): Negative  CMV (Donor): Positive  Graft source: Peripheral blood  CD34+ cell dose: 6.04 X10^6 cells/kg  Conditioning Regimen: Thiotepa, Busulfan, Fludarabine  GVHD prophylaxis: Post-transplant cyclophosphamide, MMF, Tacrolimus  Immediate post-transplant complications: mucositis, R axillary skin/soft tissue infection secondary to MRSA    History of Present Ilness:   Rubén Hu (Rubén) is a pleasant 54 y.o.male with primary myelofibrosis who is status post haploidentical stem cell transplantation conditioned with Bu/Flu/TT who is currently day +186 who presents for post-transplant follow up.    He reports feeling good overall. He reports a rash on his back which started Monday night and is pruritic. No new medications, soaps, or lotion. He also notes one episodes of liquid diarrhea this past week but it stopped on its own. No nausea. He is eating well and gaining weight. He is trying to build up muscle and stamina with walking and light weights. His energy is a little lower due to tapering off the steroids.     PAST MEDICAL HISTORY:   Past Medical History:   Diagnosis Date    Abdominal pain 01/02/2025    Allergic contact dermatitis due to adhesives 12/31/2024    Cancer     Flatulence 01/03/2025    Headache 12/27/2024    Hyperlipidemia     Mucositis 01/02/2025    Other constipation 12/19/2024    Thrombocytosis 12/18/2024    Transaminitis 12/27/2024       PAST SURGICAL HISTORY:   Past Surgical History:   Procedure Laterality Date    BONE MARROW BIOPSY N/A 11/27/2024    Procedure: Biopsy-bone marrow;  Surgeon: Clyde James MD;  Location: [address]);  Service: Oncology;   Laterality: N/A;  11/20-pre call complete-tb    BONE MARROW BIOPSY N/A 4/9/2025    Procedure: Biopsy-bone marrow;  Surgeon: Clyde James MD;  Location: [facility] ENDO (4TH FLR);  Service: Oncology;  Laterality: N/A;  4/1 precall complete. EW    COLONOSCOPY N/A 8/9/2023    Procedure: COLONOSCOPY;  Surgeon: Will Ardon MD;  Location: [facility] ENDO (4TH FLR);  Service: Endoscopy;  Laterality: N/A;  Suprep Instructions sent via portal / Authorizing:     Bebeto Arora MD in [facility] FAMILY MED/ INTERNAL MED/ PEDS  Referral:          [id number]    EYE SURGERY      FLEXIBLE SIGMOIDOSCOPY N/A 7/23/2024    Procedure: SIGMOIDOSCOPY, FLEXIBLE;  Surgeon: Nirali Vicente MD;  Location: [facility] ENDO;  Service: Endoscopy;  Laterality: N/A;    INSERTION OF LONGORIA CATHETER Right 12/18/2024    Procedure: INSERTION, CATHETER, CENTRAL VENOUS, LONGORIA TRIPLE LUMEN, Bard 12.5 fr Longoria Cath model [id number];  Surgeon: Willie Hadley MD;  [facility];  Service: General;  Laterality: Right;       PAST SOCIAL HISTORY:  Social History     Tobacco Use    Smoking status: Former     Types: Cigars, Cigarettes    Smokeless tobacco: Never   Substance Use Topics    Alcohol use: Not Currently     Comment: socially    Drug use: Never       FAMILY HISTORY:  Family History   Problem Relation Name Age of Onset    Arthritis Mother      COPD Father      Testicular cancer Maternal Cousin Peter 17        Chemotherapy    Breast cancer Maternal Cousin  51        Chemotherapy    Heart disease Maternal Grandfather      Stroke Maternal Grandmother         CURRENT MEDICATIONS:   Current Outpatient Medications   Medication Sig    acyclovir (ZOVIRAX) 800 MG Tab Take 1 tablet (800 mg total) by mouth 2 (two) times daily.    atovaquone (MEPRON) 750 mg/5 mL Susp oral liquid Take 10 mLs (1,500 mg total) by mouth once daily.    eltrombopag olamine (PROMACTA) 50 MG Tab Take 1 tablet (50 mg total) by mouth once daily.    ergocalciferol (ERGOCALCIFEROL) 50,000 unit  Cap Take 1 capsule (50,000 Units total) by mouth every 7 days.    letermovir (PREVYMIS) 480 mg Tab Take 1 tablet (480 mg total) by mouth once daily.    magnesium oxide (MAG-OX) 400 mg (241.3 mg magnesium) tablet Take 2 tablets in the morning, 1 tablet in the afternoon, and 2 tablets in the evening.    ondansetron (ZOFRAN) 8 MG tablet Take 1 tablet (8 mg total) by mouth every 8 (eight) hours as needed for Nausea.    pantoprazole (PROTONIX) 40 MG tablet Take 1 tablet (40 mg total) by mouth once daily.    predniSONE (DELTASONE) 10 MG tablet 6/2 - 6/8: Take 2 tablets (20mg) once a day; 6/9 - 6/15: Take 1 tablet (10mg) once a day; 6/16 - 6/22: Take 0.5 tablet (5mg) once a day; 6/23 - 6/29: Take 0.5 tablet (5mg) every other day; 6/30: STOP (Patient not taking: Reported on 6/30/2025)    prochlorperazine (COMPAZINE) 10 MG tablet Take 1 tablet (10 mg total) by mouth 4 (four) times daily as needed for Nausea.    tacrolimus (PROGRAF) 0.5 MG Cap Take 2 capsules (1 mg total) by mouth every morning AND 2 capsules (1 mg total) every evening.    traMADoL (ULTRAM) 50 mg tablet Take 1 tablet (50 mg total) by mouth every 6 (six) hours as needed for Pain.    triamcinolone acetonide 0.1% (KENALOG) 0.1 % cream Apply topically 2 (two) times daily as needed (rash on arms, chest, back, trunk, and legs (do not apply to face)).    voriconazole (VFEND) 200 MG Tab Take 1 tablet (200 mg total) by mouth 2 (two) times daily.     No current facility-administered medications for this visit.       ALLERGIES:   Review of patient's allergies indicates:   Allergen Reactions    Sulfamethoxazole-trimethoprim Hives       GVHD Review of Systems:     Pertinent positives and negatives included in the HPI. Otherwise a 14 point review of systems is negative. GVHD review of systems recorded in BMT flowsheet.     Physical Exam:     Vitals:    06/30/25 0753   BP: (!) 141/87   Pulse: 91   Resp: 20   Temp: 97.6 °F (36.4 °C)           General: Appears well,  NAD  HEENT: MMM, no OP lesions  Neck: Supple, no LAD  Pulmonary: CTAB, no increased work of breathing, no W/R/C  Cardiovascular: S1S2 normal, RRR, no M/R/G  Abdominal: Soft, NT, ND, BS+, no HSM  Extremities: No C/C/E  Neurological: AAOx4, grossly normal, no focal deficits  Dermatologic: Rash on arms is improved. He has a splotchy non raised rash on his back and stomach. Dry/ peeling skin in those areas is present.     ECOG Performance Status: (foot note - ECOG PS provided by Eastern Cooperative Oncology Group) 1 - Symptomatic but completely ambulatory    Karnofsky Performance Score:  80%- Normal Activity with Effort: Some Symptoms of Disease    Labs:   Lab Results   Component Value Date    WBC 4.00 06/23/2025    RBC 2.65 (L) 06/23/2025    HGB 9.1 (L) 06/23/2025    HCT 28.9 (L) 06/23/2025     (H) 06/23/2025    MCH 34.3 (H) 06/23/2025    MCHC 31.5 (L) 06/23/2025    RDW  06/23/2025      Comment:      Result Not Available    PLT 99 (L) 06/23/2025    MPV 9.6 06/23/2025    GRAN 7.1 05/26/2025    LYMPH 14.3 (L) 06/23/2025    LYMPH 0.57 (L) 06/23/2025    MONO 11.0 06/23/2025    MONO 0.44 06/23/2025    EOS 1.0 06/23/2025    EOS 0.04 06/23/2025    BASO 0.02 03/20/2025    EOSINOPHIL 0.6 03/20/2025    BASOPHIL 0.3 06/23/2025    BASOPHIL 0.01 06/23/2025       Sodium   Date Value Ref Range Status   06/23/2025 139 136 - 145 mmol/L Final   03/20/2025 138 136 - 145 mmol/L Final     Potassium   Date Value Ref Range Status   06/23/2025 4.5 3.5 - 5.1 mmol/L Final   03/20/2025 4.2 3.5 - 5.1 mmol/L Final     Chloride   Date Value Ref Range Status   06/23/2025 106 95 - 110 mmol/L Final   03/20/2025 108 95 - 110 mmol/L Final     CO2   Date Value Ref Range Status   06/23/2025 24 23 - 29 mmol/L Final   03/20/2025 21 (L) 23 - 29 mmol/L Final     Glucose   Date Value Ref Range Status   06/23/2025 90 70 - 110 mg/dL Final   03/20/2025 101 70 - 110 mg/dL Final     BUN   Date Value Ref Range Status   06/23/2025 24 (H) 6 - 20 mg/dL Final      Creatinine   Date Value Ref Range Status   06/23/2025 1.2 0.5 - 1.4 mg/dL Final     Calcium   Date Value Ref Range Status   06/23/2025 9.6 8.7 - 10.5 mg/dL Final   03/20/2025 9.2 8.7 - 10.5 mg/dL Final     Protein Total   Date Value Ref Range Status   06/23/2025 7.8 6.0 - 8.4 gm/dL Final     Total Protein   Date Value Ref Range Status   03/20/2025 6.6 6.0 - 8.4 g/dL Final     Albumin   Date Value Ref Range Status   06/23/2025 3.9 3.5 - 5.2 g/dL Final   03/20/2025 3.7 3.5 - 5.2 g/dL Final     Total Bilirubin   Date Value Ref Range Status   03/20/2025 0.4 0.1 - 1.0 mg/dL Final     Comment:     For infants and newborns, interpretation of results should be based  on gestational age, weight and in agreement with clinical  observations.    Premature Infant recommended reference ranges:  Up to 24 hours.............<8.0 mg/dL  Up to 48 hours............<12.0 mg/dL  3-5 days..................<15.0 mg/dL  6-29 days.................<15.0 mg/dL       Bilirubin Total   Date Value Ref Range Status   06/23/2025 0.3 0.1 - 1.0 mg/dL Final     Comment:     For infants and newborns, interpretation of results should be based   on gestational age, weight and in agreement with clinical   observations.    Premature Infant recommended reference ranges:   0-24 hours:  <8.0 mg/dL   24-48 hours: <12.0 mg/dL   3-5 days:    <15.0 mg/dL   6-29 days:   <15.0 mg/dL     Alkaline Phosphatase   Date Value Ref Range Status   03/20/2025 122 40 - 150 U/L Final     ALP   Date Value Ref Range Status   06/23/2025 94 40 - 150 unit/L Final     AST   Date Value Ref Range Status   06/23/2025 54 (H) 11 - 45 unit/L Final   03/20/2025 59 (H) 10 - 40 U/L Final     ALT   Date Value Ref Range Status   06/23/2025 46 (H) 10 - 44 unit/L Final   03/20/2025 85 (H) 10 - 44 U/L Final     Anion Gap   Date Value Ref Range Status   06/23/2025 9 8 - 16 mmol/L Final     eGFR if    Date Value Ref Range Status   01/22/2022 >60.0 >60 mL/min/1.73 m^2 Final      eGFR if non    Date Value Ref Range Status   01/22/2022 >60.0 >60 mL/min/1.73 m^2 Final     Comment:     Calculation used to obtain the estimated glomerular filtration  rate (eGFR) is the CKD-EPI equation.          Imaging:   All pertinent studies reviewed and interpreted by me       Assessment and Plan:   Rubén Peters) is a pleasant 54 y.o.male with primary myelofibrosis s/p haplo SCT who presents for post-transplant follow up.    Primary myelofibrosis: Status post treatment with ruxolitinib, followed by alloSCT. Day +30 BMBx results below.    Status post allogeneic stem cell transplantation: As noted above, status post haploidentical stem cell transplantation conditioned with Bu/Flu/TT. Currently day +186.  Engrafted on day +22.  Day +30 BMBx on 1/2/25: No definitive evidence of residual JAK2 [id number] primary myelofibrosis. NGS without evidence of pathogenic mutations. Chimerism studies with 100% donor CD33, CD3 insufficient for analysis.   Plan for central line removal -  4/3/25  Day 100 bone marrow biopsy 4/9/25:Markedly hypocellular marrow (<5%-15%) with trilineage hematopoiesis. NGS with DNMT3A 2%. CG normal and JAK2 negative. Chimerisms 90% CD3 and 100% CD33 donor.  Patient not able to return work anytime soon due to immunodeficiency and low blood counts. Can re-evaluate in 4 months if he can return to work.     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 MMF has now been discontinued. 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 He restarted budesonide 3mg TID (4/14/25), and budesonide was tapered in May. 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 He restarted prednisone 40mg daily on 5/16. His nausea improved within a few days. Small rash noted to both arms (<25% BSA) that is not pruritic (6/18/25). Per Dr. James  patient will use triamcinolone cream twice a day and moisturizer and rash is improving.  Prednisone tapered 6/2-6/30/25. Rash started on his back 6/23/25 and is pruritic. On exam rash appears more splotchy and no papules present. Rash resembles heat rash but patient will use triamcinolone cream just incase.   Current tacro dose: 1 mg BID   Last tacro level: 7.7  Adjustments: none      Immunosuppression: Prevention with voriconazole and acyclovir. CMV prophylaxis with letermovir. PJP prophyalxis Mepron. Continue weekly monitoring of CMV and EBV - these labs remain negative.     Pancytopenia: Due to parvovirus and aGVHD. Transfuse for Hgb <7 g/dL and platelets <10k.   Counts much improved with treatment of parvovirus (IVIG) and acute GVHD.     Continue promacta     Parvovirus: Obtained to evaluate for pancytopenia. Monitor labs weekly and continue transfusion support.   IVIG started 5/9/25 and he tolerated it well. Second dose was 6/11.    Hypertension:  Was newly hypertensive in hospital following SCT at which time he was started on amlodipine. Discontinued amlodipine after hospitalization due to hypotension. Monitor for now but restart antihypertensives if this worsens.   Hypertensive likely related to steroids. If still hypertensive off prednisone, will restart low dose amlodipine.     Cardiomyopathy: Echo 2/26/25 obtained due to tachycardia revealed reduced LVEF 45-50%. No signs/symptoms of HFrEF. Repeat echo in 3 months (5/2025).  Repeat echo 5/2025 showed improvement of EF and some left ventricular thickening. Global longitudinal strain is mildly reduced measuring -15.4%     Vitamin D deficiency: His primary care is giving him 50,000 units once a week.     Hypercholesterolemia: He is following with primary care. Could be elevated due to tacrolimus.     Heat rash: Rash started on his back 6/23/25 and is pruritic. On exam rash appears more splotchy and no papules present. Rash resembles heat rash but patient will  use triamcinolone cream just incase. Can continue benadryl cream for pruritus. He is aware to reach out if rash does not improve.     Orders Placed:      No orders of the defined types were placed in this encounter.     Route Chart for Scheduling    BMT Chart Routing      Follow up with physician    Follow up with REBEKAH 2 weeks. Opal   Provider visit type    Infusion scheduling note    Injection scheduling note    Labs CBC, CMP, magnesium, phosphorus, type and screen, CMV, EBV and tacrolimus level   Scheduling:  Preferred lab:  Lab interval: once a week     Imaging    Pharmacy appointment    Other referrals                      Supportive Plan Information  OP IVIG Clarence Grover MD   Associated Diagnosis: Parvovirus B19 infection   noted on 4/21/2025   Line of treatment: Supportive Care   Treatment goal: Supportive     Upcoming Treatment Dates - OP IVIG    No upcoming days in selected categories.    Treatment Plan Information   OP Adult Blood - Twice Weekly Clyde James MD   Associated Diagnosis: Anemia associated with chemotherapy   noted on 2/4/2025   Line of treatment: Supportive Care  Treatment Goal: Supportive     Upcoming Treatment Dates - OP Adult Blood - Twice Weekly    5/6/2025       Prepare RBC       Transfuse RBC 1 Unit    Therapy Plan Information  PORT FLUSH for Adrenal nodule, noted on 12/9/2024  Flushes  heparin, porcine (PF) 100 unit/mL injection flush 500 Units  500 Units, Intravenous, Every visit  sodium chloride 0.9% flush 10 mL  10 mL, Intravenous, Every visit    EPOETIN CRISELDA (RETACRIT) WEEKLY for Anemia, noted on 5/14/2025  Medications  epoetin criselda-epbx injection 40,000 Units  40,000 Units, Subcutaneous, 1 time a week      No therapy plan of the specified type found.    Total time of this visit was 40 minutes, including time spent face to face with patient and/or via video/audio, and also in preparing for today's visit for MDM and documentation. (Medical Decision Making, including  consideration of possible diagnoses, management options, complex medical record review, review of diagnostic tests and information, consideration and discussion of significant complications based on comorbidities, and discussion with providers involved with the care of the patient). Greater than 50% was spent face to face with the patient counseling and coordinating care.    Visit today included increased complexity associated with the care of the episodic problem treatment of GVHD rash addressed and managing the longitudinal care of the patient due to the serious and/or complex managed problem(s) s/p allogeneic stem cell transplant .     Opal Alonzo PA-C  Malignant Hematology, Stem Cell Transplant, and Cellular Therapy  [facility]  [facility] [place]

## 2025-06-30 NOTE — PROGRESS NOTES
BMT Pharmacist Immunosuppression Note:    Reviewed patient's tacrolimus level with Dr. Grover and it is within goal range.      Current regimen: 1mg BID  Capsule size(s): 0.5mg    Plan: Continue current regimen.        Lab Results   Component Value Date    TACROLIMUS 7.7 06/30/2025    TACROLIMUS 10.6 06/23/2025    TACROLIMUS 10.1 06/18/2025       Creatinine   Date Value Ref Range Status   06/30/2025 1.3 0.5 - 1.4 mg/dL Final   06/23/2025 1.2 0.5 - 1.4 mg/dL Final   06/18/2025 1.3 0.5 - 1.4 mg/dL Final     Total Bilirubin   Date Value Ref Range Status   03/20/2025 0.4 0.1 - 1.0 mg/dL Final     Comment:     For infants and newborns, interpretation of results should be based  on gestational age, weight and in agreement with clinical  observations.    Premature Infant recommended reference ranges:  Up to 24 hours.............<8.0 mg/dL  Up to 48 hours............<12.0 mg/dL  3-5 days..................<15.0 mg/dL  6-29 days.................<15.0 mg/dL     03/17/2025 0.5 0.1 - 1.0 mg/dL Final     Comment:     For infants and newborns, interpretation of results should be based  on gestational age, weight and in agreement with clinical  observations.    Premature Infant recommended reference ranges:  Up to 24 hours.............<8.0 mg/dL  Up to 48 hours............<12.0 mg/dL  3-5 days..................<15.0 mg/dL  6-29 days.................<15.0 mg/dL     03/13/2025 0.4 0.1 - 1.0 mg/dL Final     Comment:     For infants and newborns, interpretation of results should be based  on gestational age, weight and in agreement with clinical  observations.    Premature Infant recommended reference ranges:  Up to 24 hours.............<8.0 mg/dL  Up to 48 hours............<12.0 mg/dL  3-5 days..................<15.0 mg/dL  6-29 days.................<15.0 mg/dL       Bilirubin Total   Date Value Ref Range Status   06/30/2025 0.3 0.1 - 1.0 mg/dL Final     Comment:     For infants and newborns, interpretation of results should be based    on gestational age, weight and in agreement with clinical   observations.    Premature Infant recommended reference ranges:   0-24 hours:  <8.0 mg/dL   24-48 hours: <12.0 mg/dL   3-5 days:    <15.0 mg/dL   6-29 days:   <15.0 mg/dL   06/23/2025 0.3 0.1 - 1.0 mg/dL Final     Comment:     For infants and newborns, interpretation of results should be based   on gestational age, weight and in agreement with clinical   observations.    Premature Infant recommended reference ranges:   0-24 hours:  <8.0 mg/dL   24-48 hours: <12.0 mg/dL   3-5 days:    <15.0 mg/dL   6-29 days:   <15.0 mg/dL   06/18/2025 0.3 0.1 - 1.0 mg/dL Final     Comment:     For infants and newborns, interpretation of results should be based   on gestational age, weight and in agreement with clinical   observations.    Premature Infant recommended reference ranges:   0-24 hours:  <8.0 mg/dL   24-48 hours: <12.0 mg/dL   3-5 days:    <15.0 mg/dL   6-29 days:   <15.0 mg/dL     AST   Date Value Ref Range Status   06/30/2025 30 11 - 45 unit/L Final   06/23/2025 54 (H) 11 - 45 unit/L Final   06/18/2025 36 11 - 45 unit/L Final   03/20/2025 59 (H) 10 - 40 U/L Final   03/17/2025 44 (H) 10 - 40 U/L Final   03/13/2025 40 10 - 40 U/L Final     ALT   Date Value Ref Range Status   06/30/2025 35 10 - 44 unit/L Final   06/23/2025 46 (H) 10 - 44 unit/L Final   06/18/2025 44 10 - 44 unit/L Final   03/20/2025 85 (H) 10 - 44 U/L Final   03/17/2025 64 (H) 10 - 44 U/L Final   03/13/2025 49 (H) 10 - 44 U/L Final             Carolina Carson, Pharm.D., BCOP  Clinical Pharmacy Specialist, Bone Marrow Transplant/Cellular Therapy  [facility]  [employer]: [id number]

## 2025-07-01 RX ORDER — TRIAMCINOLONE ACETONIDE 1 MG/G
CREAM TOPICAL 2 TIMES DAILY PRN
Qty: 453.6 G | Refills: 1 | Status: SHIPPED | OUTPATIENT
Start: 2025-07-01

## 2025-07-08 ENCOUNTER — LAB VISIT (OUTPATIENT)
Dept: LAB | Facility: HOSPITAL | Age: 55
End: 2025-07-08
Attending: INTERNAL MEDICINE
Payer: COMMERCIAL

## 2025-07-08 DIAGNOSIS — Z76.82 STEM CELL TRANSPLANT CANDIDATE: ICD-10-CM

## 2025-07-08 DIAGNOSIS — D47.1 PRIMARY MYELOFIBROSIS: ICD-10-CM

## 2025-07-08 DIAGNOSIS — Z94.84 HISTORY OF ALLOGENEIC STEM CELL TRANSPLANT: ICD-10-CM

## 2025-07-08 LAB
ABSOLUTE EOSINOPHIL (OHS): 0.09 K/UL
ABSOLUTE MONOCYTE (OHS): 0.67 K/UL (ref 0.3–1)
ABSOLUTE NEUTROPHIL COUNT (OHS): 4.82 K/UL (ref 1.8–7.7)
ALBUMIN SERPL BCP-MCNC: 3.8 G/DL (ref 3.5–5.2)
ALP SERPL-CCNC: 104 UNIT/L (ref 40–150)
ALT SERPL W/O P-5'-P-CCNC: 25 UNIT/L (ref 10–44)
ANION GAP (OHS): 12 MMOL/L (ref 8–16)
AST SERPL-CCNC: 18 UNIT/L (ref 11–45)
BASOPHILS # BLD AUTO: 0.04 K/UL
BASOPHILS NFR BLD AUTO: 0.6 %
BILIRUB SERPL-MCNC: 0.3 MG/DL (ref 0.1–1)
BUN SERPL-MCNC: 22 MG/DL (ref 6–20)
CALCIUM SERPL-MCNC: 9.5 MG/DL (ref 8.7–10.5)
CHLORIDE SERPL-SCNC: 107 MMOL/L (ref 95–110)
CO2 SERPL-SCNC: 23 MMOL/L (ref 23–29)
CREAT SERPL-MCNC: 1.3 MG/DL (ref 0.5–1.4)
ERYTHROCYTE [DISTWIDTH] IN BLOOD BY AUTOMATED COUNT: 22.7 % (ref 11.5–14.5)
GFR SERPLBLD CREATININE-BSD FMLA CKD-EPI: >60 ML/MIN/1.73/M2
GLUCOSE SERPL-MCNC: 100 MG/DL (ref 70–110)
HCT VFR BLD AUTO: 28.5 % (ref 40–54)
HGB BLD-MCNC: 9.1 GM/DL (ref 14–18)
IMM GRANULOCYTES # BLD AUTO: 0.1 K/UL (ref 0–0.04)
IMM GRANULOCYTES NFR BLD AUTO: 1.6 % (ref 0–0.5)
INDIRECT COOMBS: NORMAL
LYMPHOCYTES # BLD AUTO: 0.68 K/UL (ref 1–4.8)
MAGNESIUM SERPL-MCNC: 1.8 MG/DL (ref 1.6–2.6)
MCH RBC QN AUTO: 36.4 PG (ref 27–31)
MCHC RBC AUTO-ENTMCNC: 31.9 G/DL (ref 32–36)
MCV RBC AUTO: 114 FL (ref 82–98)
NUCLEATED RBC (/100WBC) (OHS): 0 /100 WBC
PHOSPHATE SERPL-MCNC: 3.1 MG/DL (ref 2.7–4.5)
PLATELET # BLD AUTO: 177 K/UL (ref 150–450)
PMV BLD AUTO: 10.4 FL (ref 9.2–12.9)
POTASSIUM SERPL-SCNC: 4.5 MMOL/L (ref 3.5–5.1)
PROT SERPL-MCNC: 7.4 GM/DL (ref 6–8.4)
RBC # BLD AUTO: 2.5 M/UL (ref 4.6–6.2)
RELATIVE EOSINOPHIL (OHS): 1.4 %
RELATIVE LYMPHOCYTE (OHS): 10.6 % (ref 18–48)
RELATIVE MONOCYTE (OHS): 10.5 % (ref 4–15)
RELATIVE NEUTROPHIL (OHS): 75.3 % (ref 38–73)
RH BLD: NORMAL
SODIUM SERPL-SCNC: 142 MMOL/L (ref 136–145)
SPECIMEN OUTDATE: NORMAL
WBC # BLD AUTO: 6.4 K/UL (ref 3.9–12.7)

## 2025-07-08 PROCEDURE — 84100 ASSAY OF PHOSPHORUS: CPT

## 2025-07-08 PROCEDURE — 87799 DETECT AGENT NOS DNA QUANT: CPT

## 2025-07-08 PROCEDURE — 86850 RBC ANTIBODY SCREEN: CPT | Performed by: INTERNAL MEDICINE

## 2025-07-08 PROCEDURE — 80197 ASSAY OF TACROLIMUS: CPT

## 2025-07-08 PROCEDURE — 82040 ASSAY OF SERUM ALBUMIN: CPT

## 2025-07-08 PROCEDURE — 85025 COMPLETE CBC W/AUTO DIFF WBC: CPT

## 2025-07-08 PROCEDURE — 36415 COLL VENOUS BLD VENIPUNCTURE: CPT | Mod: PO

## 2025-07-08 PROCEDURE — 83735 ASSAY OF MAGNESIUM: CPT

## 2025-07-09 LAB
CYTOMEGALOVIRUS DNA, QUAL (OHS): NOT DETECTED
EPSTEIN-BARR VIRUS DNA, QUAL (OHS): NORMAL
TACROLIMUS BLD-MCNC: 7.5 NG/ML (ref 5–15)

## 2025-07-11 DIAGNOSIS — D84.822 IMMUNOCOMPROMISED STATE ASSOCIATED WITH STEM CELL TRANSPLANT: ICD-10-CM

## 2025-07-11 DIAGNOSIS — Z94.84 IMMUNOCOMPROMISED STATE ASSOCIATED WITH STEM CELL TRANSPLANT: ICD-10-CM

## 2025-07-14 RX ORDER — VORICONAZOLE 200 MG/1
200 TABLET, FILM COATED ORAL 2 TIMES DAILY
Qty: 60 TABLET | Refills: 5 | Status: SHIPPED | OUTPATIENT
Start: 2025-07-14

## 2025-07-15 ENCOUNTER — LAB VISIT (OUTPATIENT)
Dept: LAB | Facility: HOSPITAL | Age: 55
End: 2025-07-15
Attending: INTERNAL MEDICINE
Payer: COMMERCIAL

## 2025-07-15 ENCOUNTER — OFFICE VISIT (OUTPATIENT)
Dept: HEMATOLOGY/ONCOLOGY | Facility: CLINIC | Age: 55
End: 2025-07-15
Payer: COMMERCIAL

## 2025-07-15 VITALS
DIASTOLIC BLOOD PRESSURE: 76 MMHG | TEMPERATURE: 98 F | SYSTOLIC BLOOD PRESSURE: 133 MMHG | WEIGHT: 179.38 LBS | HEART RATE: 82 BPM | BODY MASS INDEX: 25.11 KG/M2 | OXYGEN SATURATION: 100 % | HEIGHT: 71 IN

## 2025-07-15 DIAGNOSIS — Z94.84 HISTORY OF ALLOGENEIC STEM CELL TRANSPLANT: ICD-10-CM

## 2025-07-15 DIAGNOSIS — D47.1 PRIMARY MYELOFIBROSIS: ICD-10-CM

## 2025-07-15 DIAGNOSIS — I42.9 CARDIOMYOPATHY, UNSPECIFIED TYPE: ICD-10-CM

## 2025-07-15 DIAGNOSIS — E78.00 HYPERCHOLESTEROLEMIA: ICD-10-CM

## 2025-07-15 DIAGNOSIS — E55.9 VITAMIN D DEFICIENCY: ICD-10-CM

## 2025-07-15 DIAGNOSIS — D89.810 ACUTE GVHD: ICD-10-CM

## 2025-07-15 DIAGNOSIS — D64.9 ANEMIA, UNSPECIFIED TYPE: ICD-10-CM

## 2025-07-15 DIAGNOSIS — D84.81 IMMUNODEFICIENCY DUE TO CONDITIONS CLASSIFIED ELSEWHERE: ICD-10-CM

## 2025-07-15 DIAGNOSIS — Z76.82 STEM CELL TRANSPLANT CANDIDATE: ICD-10-CM

## 2025-07-15 DIAGNOSIS — Z94.84 HISTORY OF ALLOGENEIC STEM CELL TRANSPLANT: Primary | ICD-10-CM

## 2025-07-15 LAB
ABSOLUTE EOSINOPHIL (OHS): 0.18 K/UL
ABSOLUTE MONOCYTE (OHS): 0.63 K/UL (ref 0.3–1)
ABSOLUTE NEUTROPHIL COUNT (OHS): 4.12 K/UL (ref 1.8–7.7)
ALBUMIN SERPL BCP-MCNC: 4 G/DL (ref 3.5–5.2)
ALP SERPL-CCNC: 103 UNIT/L (ref 40–150)
ALT SERPL W/O P-5'-P-CCNC: 18 UNIT/L (ref 10–44)
ANION GAP (OHS): 9 MMOL/L (ref 8–16)
AST SERPL-CCNC: 25 UNIT/L (ref 11–45)
BASOPHILS # BLD AUTO: 0.04 K/UL
BASOPHILS NFR BLD AUTO: 0.7 %
BILIRUB SERPL-MCNC: 0.3 MG/DL (ref 0.1–1)
BUN SERPL-MCNC: 25 MG/DL (ref 6–20)
CALCIUM SERPL-MCNC: 9.4 MG/DL (ref 8.7–10.5)
CHLORIDE SERPL-SCNC: 108 MMOL/L (ref 95–110)
CO2 SERPL-SCNC: 24 MMOL/L (ref 23–29)
CREAT SERPL-MCNC: 1.4 MG/DL (ref 0.5–1.4)
ERYTHROCYTE [DISTWIDTH] IN BLOOD BY AUTOMATED COUNT: 19.7 % (ref 11.5–14.5)
GFR SERPLBLD CREATININE-BSD FMLA CKD-EPI: 59 ML/MIN/1.73/M2
GLUCOSE SERPL-MCNC: 97 MG/DL (ref 70–110)
HCT VFR BLD AUTO: 29.4 % (ref 40–54)
HGB BLD-MCNC: 9.7 GM/DL (ref 14–18)
IMM GRANULOCYTES # BLD AUTO: 0.09 K/UL (ref 0–0.04)
IMM GRANULOCYTES NFR BLD AUTO: 1.5 % (ref 0–0.5)
INDIRECT COOMBS: NORMAL
LYMPHOCYTES # BLD AUTO: 0.95 K/UL (ref 1–4.8)
MAGNESIUM SERPL-MCNC: 1.8 MG/DL (ref 1.6–2.6)
MCH RBC QN AUTO: 36.9 PG (ref 27–31)
MCHC RBC AUTO-ENTMCNC: 33 G/DL (ref 32–36)
MCV RBC AUTO: 112 FL (ref 82–98)
NUCLEATED RBC (/100WBC) (OHS): 0 /100 WBC
PHOSPHATE SERPL-MCNC: 3.6 MG/DL (ref 2.7–4.5)
PLATELET # BLD AUTO: 205 K/UL (ref 150–450)
PMV BLD AUTO: 10.2 FL (ref 9.2–12.9)
POTASSIUM SERPL-SCNC: 4.8 MMOL/L (ref 3.5–5.1)
PROT SERPL-MCNC: 7.2 GM/DL (ref 6–8.4)
RBC # BLD AUTO: 2.63 M/UL (ref 4.6–6.2)
RELATIVE EOSINOPHIL (OHS): 3 %
RELATIVE LYMPHOCYTE (OHS): 15.8 % (ref 18–48)
RELATIVE MONOCYTE (OHS): 10.5 % (ref 4–15)
RELATIVE NEUTROPHIL (OHS): 68.5 % (ref 38–73)
RH BLD: NORMAL
SODIUM SERPL-SCNC: 141 MMOL/L (ref 136–145)
SPECIMEN OUTDATE: NORMAL
TACROLIMUS BLD-MCNC: 6.5 NG/ML (ref 5–15)
WBC # BLD AUTO: 6.01 K/UL (ref 3.9–12.7)

## 2025-07-15 PROCEDURE — 36415 COLL VENOUS BLD VENIPUNCTURE: CPT

## 2025-07-15 PROCEDURE — 84075 ASSAY ALKALINE PHOSPHATASE: CPT

## 2025-07-15 PROCEDURE — 84100 ASSAY OF PHOSPHORUS: CPT

## 2025-07-15 PROCEDURE — 83735 ASSAY OF MAGNESIUM: CPT

## 2025-07-15 PROCEDURE — 99999 PR PBB SHADOW E&M-EST. PATIENT-LVL IV: CPT | Mod: PBBFAC,,,

## 2025-07-15 PROCEDURE — 87799 DETECT AGENT NOS DNA QUANT: CPT

## 2025-07-15 PROCEDURE — 80197 ASSAY OF TACROLIMUS: CPT

## 2025-07-15 PROCEDURE — 86900 BLOOD TYPING SEROLOGIC ABO: CPT | Performed by: INTERNAL MEDICINE

## 2025-07-15 PROCEDURE — 85025 COMPLETE CBC W/AUTO DIFF WBC: CPT

## 2025-07-15 RX ORDER — TACROLIMUS 0.5 MG/1
CAPSULE ORAL
Qty: 150 CAPSULE | Refills: 11 | Status: SHIPPED | OUTPATIENT
Start: 2025-07-15

## 2025-07-15 RX ORDER — CLOBETASOL PROPIONATE 0.5 MG/G
CREAM TOPICAL 2 TIMES DAILY
Qty: 120 G | Refills: 1 | Status: SHIPPED | OUTPATIENT
Start: 2025-07-15

## 2025-07-15 NOTE — PROGRESS NOTES
Section of Hematology and Stem Cell Transplantation    Post-Transplantation Follow Up Visit     7/15/25    Transplant History:   Primary oncologist: Clyde James MD  Primary oncologic diagnosis: primary myelofibrosis  Transplant date: 12/26/2024  Donor: haploidentical  Blood Type (Patient): O +  Blood Type (Donor): O +  CMV (Patient): Negative  CMV (Donor): Positive  Graft source: Peripheral blood  CD34+ cell dose: 6.04 X10^6 cells/kg  Conditioning Regimen: Thiotepa, Busulfan, Fludarabine  GVHD prophylaxis: Post-transplant cyclophosphamide, MMF, Tacrolimus  Immediate post-transplant complications: mucositis, R axillary skin/soft tissue infection secondary to MRSA    History of Present Ilness:   Rubén Peters) is a pleasant 54 y.o.male with primary myelofibrosis who is status post haploidentical stem cell transplantation conditioned with Bu/Flu/TT who is currently day +201 who presents for post-transplant follow up.    He reports feeling good overall. He is walking five days a week and has noticed some muscle building back up. His appetite is getting better. He does still note some fatigue. He also still has an ongoing rash on his back, stomach, and bilateral arms. The rash is pruritic on his back but nowhere else.     PAST MEDICAL HISTORY:   Past Medical History:   Diagnosis Date    Abdominal pain 01/02/2025    Allergic contact dermatitis due to adhesives 12/31/2024    Cancer     Flatulence 01/03/2025    Headache 12/27/2024    Hyperlipidemia     Mucositis 01/02/2025    Other constipation 12/19/2024    Thrombocytosis 12/18/2024    Transaminitis 12/27/2024       PAST SURGICAL HISTORY:   Past Surgical History:   Procedure Laterality Date    BONE MARROW BIOPSY N/A 11/27/2024    Procedure: Biopsy-bone marrow;  Surgeon: Clyde James MD;  Location: [address]);  Service: Oncology;  Laterality: N/A;  11/20-pre call complete-tb    BONE MARROW BIOPSY N/A 4/9/2025    Procedure: Biopsy-bone marrow;   Surgeon: Clyde James MD;  Location: [facility] ENDO (4TH FLR);  Service: Oncology;  Laterality: N/A;  4/1 precall complete. EW    COLONOSCOPY N/A 8/9/2023    Procedure: COLONOSCOPY;  Surgeon: Will Ardon MD;  Location: [facility] ENDO (4TH FLR);  Service: Endoscopy;  Laterality: N/A;  Suprep Instructions sent via portal / Authorizing:     Bebeto Arora MD in [facility] FAMILY MED/ INTERNAL MED/ PEDS  Referral:          [id number]    EYE SURGERY      FLEXIBLE SIGMOIDOSCOPY N/A 7/23/2024    Procedure: SIGMOIDOSCOPY, FLEXIBLE;  Surgeon: Nirali Vicente MD;  Location: [facility] ENDO;  Service: Endoscopy;  Laterality: N/A;    INSERTION OF LONGORIA CATHETER Right 12/18/2024    Procedure: INSERTION, CATHETER, CENTRAL VENOUS, LONGORIA TRIPLE LUMEN, Bard 12.5 fr Longoria Cath model [id number];  Surgeon: Willie Hadley MD;  Location: [facility] OR 2ND FLR;  Service: General;  Laterality: Right;       PAST SOCIAL HISTORY:  Social History     Tobacco Use    Smoking status: Former     Types: Cigars, Cigarettes    Smokeless tobacco: Never   Substance Use Topics    Alcohol use: Not Currently     Comment: socially    Drug use: Never       FAMILY HISTORY:  Family History   Problem Relation Name Age of Onset    Arthritis Mother      COPD Father      Testicular cancer Maternal Cousin Peter 17        Chemotherapy    Breast cancer Maternal Cousin  51        Chemotherapy    Heart disease Maternal Grandfather      Stroke Maternal Grandmother         CURRENT MEDICATIONS:   Current Outpatient Medications   Medication Sig    acyclovir (ZOVIRAX) 800 MG Tab Take 1 tablet (800 mg total) by mouth 2 (two) times daily.    atovaquone (MEPRON) 750 mg/5 mL Susp oral liquid Take 10 mLs (1,500 mg total) by mouth once daily.    eltrombopag olamine (PROMACTA) 50 MG Tab Take 1 tablet (50 mg total) by mouth once daily.    ergocalciferol (ERGOCALCIFEROL) 50,000 unit Cap Take 1 capsule (50,000 Units total) by mouth every 7 days.    letermovir (PREVYMIS) 480 mg Tab Take 1 tablet  (480 mg total) by mouth once daily.    magnesium oxide (MAG-OX) 400 mg (241.3 mg magnesium) tablet Take 2 tablets in the morning, 1 tablet in the afternoon, and 2 tablets in the evening.    ondansetron (ZOFRAN) 8 MG tablet Take 1 tablet (8 mg total) by mouth every 8 (eight) hours as needed for Nausea.    pantoprazole (PROTONIX) 40 MG tablet Take 1 tablet (40 mg total) by mouth once daily.    predniSONE (DELTASONE) 10 MG tablet 6/2 - 6/8: Take 2 tablets (20mg) once a day; 6/9 - 6/15: Take 1 tablet (10mg) once a day; 6/16 - 6/22: Take 0.5 tablet (5mg) once a day; 6/23 - 6/29: Take 0.5 tablet (5mg) every other day; 6/30: STOP    prochlorperazine (COMPAZINE) 10 MG tablet Take 1 tablet (10 mg total) by mouth 4 (four) times daily as needed for Nausea.    tacrolimus (PROGRAF) 0.5 MG Cap Take 2 capsules (1 mg total) by mouth every morning AND 2 capsules (1 mg total) every evening.    traMADoL (ULTRAM) 50 mg tablet Take 1 tablet (50 mg total) by mouth every 6 (six) hours as needed for Pain.    triamcinolone acetonide 0.1% (KENALOG) 0.1 % cream Apply topically 2 (two) times daily as needed (rash on arms, chest, back, trunk, and legs (do not apply to face)).    voriconazole (VFEND) 200 MG Tab Take 1 tablet (200 mg total) by mouth 2 (two) times daily.     No current facility-administered medications for this visit.       ALLERGIES:   Review of patient's allergies indicates:   Allergen Reactions    Sulfamethoxazole-trimethoprim Hives       GVHD Review of Systems:     Pertinent positives and negatives included in the HPI. Otherwise a 14 point review of systems is negative. GVHD review of systems recorded in BMT flowsheet.     Physical Exam:     Vitals:    07/15/25 1003   BP: 133/76   Pulse: 82   Temp: 97.9 °F (36.6 °C)           General: Appears well, NAD  HEENT: MMM, no OP lesions  Neck: Supple, no LAD  Pulmonary: CTAB, no increased work of breathing, no W/R/C  Cardiovascular: S1S2 normal, RRR, no M/R/G  Abdominal: Soft, NT, ND,  BS+, no HSM  Extremities: No C/C/E  Neurological: AAOx4, grossly normal, no focal deficits  Dermatologic: Rash on his arms, back, stomach, and bilateral arms.     ECOG Performance Status: (foot note - ECOG PS provided by Eastern Cooperative Oncology Group) 1 - Symptomatic but completely ambulatory    Karnofsky Performance Score:  80%- Normal Activity with Effort: Some Symptoms of Disease    Labs:   Lab Results   Component Value Date    WBC 6.01 07/15/2025    RBC 2.63 (L) 07/15/2025    HGB 9.7 (L) 07/15/2025    HCT 29.4 (L) 07/15/2025     (H) 07/15/2025    MCH 36.9 (H) 07/15/2025    MCHC 33.0 07/15/2025    RDW 19.7 (H) 07/15/2025     07/15/2025    MPV 10.2 07/15/2025    GRAN 7.1 05/26/2025    LYMPH 15.8 (L) 07/15/2025    LYMPH 0.95 (L) 07/15/2025    MONO 10.5 07/15/2025    MONO 0.63 07/15/2025    EOS 3.0 07/15/2025    EOS 0.18 07/15/2025    BASO 0.02 03/20/2025    EOSINOPHIL 0.6 03/20/2025    BASOPHIL 0.7 07/15/2025    BASOPHIL 0.04 07/15/2025       Sodium   Date Value Ref Range Status   07/08/2025 142 136 - 145 mmol/L Final   03/20/2025 138 136 - 145 mmol/L Final     Potassium   Date Value Ref Range Status   07/08/2025 4.5 3.5 - 5.1 mmol/L Final   03/20/2025 4.2 3.5 - 5.1 mmol/L Final     Chloride   Date Value Ref Range Status   07/08/2025 107 95 - 110 mmol/L Final   03/20/2025 108 95 - 110 mmol/L Final     CO2   Date Value Ref Range Status   07/08/2025 23 23 - 29 mmol/L Final   03/20/2025 21 (L) 23 - 29 mmol/L Final     Glucose   Date Value Ref Range Status   07/08/2025 100 70 - 110 mg/dL Final   03/20/2025 101 70 - 110 mg/dL Final     BUN   Date Value Ref Range Status   07/08/2025 22 (H) 6 - 20 mg/dL Final     Creatinine   Date Value Ref Range Status   07/08/2025 1.3 0.5 - 1.4 mg/dL Final     Calcium   Date Value Ref Range Status   07/08/2025 9.5 8.7 - 10.5 mg/dL Final   03/20/2025 9.2 8.7 - 10.5 mg/dL Final     Protein Total   Date Value Ref Range Status   07/08/2025 7.4 6.0 - 8.4 gm/dL Final      Total Protein   Date Value Ref Range Status   03/20/2025 6.6 6.0 - 8.4 g/dL Final     Albumin   Date Value Ref Range Status   07/08/2025 3.8 3.5 - 5.2 g/dL Final   03/20/2025 3.7 3.5 - 5.2 g/dL Final     Total Bilirubin   Date Value Ref Range Status   03/20/2025 0.4 0.1 - 1.0 mg/dL Final     Comment:     For infants and newborns, interpretation of results should be based  on gestational age, weight and in agreement with clinical  observations.    Premature Infant recommended reference ranges:  Up to 24 hours.............<8.0 mg/dL  Up to 48 hours............<12.0 mg/dL  3-5 days..................<15.0 mg/dL  6-29 days.................<15.0 mg/dL       Bilirubin Total   Date Value Ref Range Status   07/08/2025 0.3 0.1 - 1.0 mg/dL Final     Comment:     For infants and newborns, interpretation of results should be based   on gestational age, weight and in agreement with clinical   observations.    Premature Infant recommended reference ranges:   0-24 hours:  <8.0 mg/dL   24-48 hours: <12.0 mg/dL   3-5 days:    <15.0 mg/dL   6-29 days:   <15.0 mg/dL     Alkaline Phosphatase   Date Value Ref Range Status   03/20/2025 122 40 - 150 U/L Final     ALP   Date Value Ref Range Status   07/08/2025 104 40 - 150 unit/L Final     AST   Date Value Ref Range Status   07/08/2025 18 11 - 45 unit/L Final   03/20/2025 59 (H) 10 - 40 U/L Final     ALT   Date Value Ref Range Status   07/08/2025 25 10 - 44 unit/L Final   03/20/2025 85 (H) 10 - 44 U/L Final     Anion Gap   Date Value Ref Range Status   07/08/2025 12 8 - 16 mmol/L Final     eGFR if    Date Value Ref Range Status   01/22/2022 >60.0 >60 mL/min/1.73 m^2 Final     eGFR if non    Date Value Ref Range Status   01/22/2022 >60.0 >60 mL/min/1.73 m^2 Final     Comment:     Calculation used to obtain the estimated glomerular filtration  rate (eGFR) is the CKD-EPI equation.          Imaging:   All pertinent studies reviewed and interpreted by me        Assessment and Plan:   Rubén Peters) is a pleasant 54 y.o.male with primary myelofibrosis s/p haplo SCT who presents for post-transplant follow up.    Primary myelofibrosis: Status post treatment with ruxolitinib, followed by alloSCT. Day +30 BMBx results below.    Status post allogeneic stem cell transplantation: As noted above, status post haploidentical stem cell transplantation conditioned with Bu/Flu/TT. Currently day +201.  Engrafted on day +22.  Day +30 BMBx on 1/2/25: No definitive evidence of residual JAK2 [id number] primary myelofibrosis. NGS without evidence of pathogenic mutations. Chimerism studies with 100% donor CD33, CD3 insufficient for analysis.   Plan for central line removal -  4/3/25  Day 100 bone marrow biopsy 4/9/25:Markedly hypocellular marrow (<5%-15%) with trilineage hematopoiesis. NGS with DNMT3A 2%. CG normal and JAK2 negative. Chimerisms 90% CD3 and 100% CD33 donor.  Patient not able to return work anytime soon due to immunodeficiency and low blood counts. Can re-evaluate in 4 months if he can return to work.     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 MMF has now been discontinued. 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 He restarted budesonide 3mg TID (4/14/25), and budesonide was tapered in May. 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 He restarted prednisone 40mg daily on 5/16. His nausea improved within a few days. Small rash noted to both arms (<25% BSA) that is not pruritic (6/18/25). Per Dr. James patient will use triamcinolone cream twice a day and moisturizer and rash is improving.  Prednisone tapered 6/2-6/30/25. Rash started on his back 6/23/25 and is pruritic. He has been using triamcinolone cream, on his back, bilateral arms, and stomach for about three weeks now  about 30% BSA. Rash on back  and stomach have started to improve. Clobetasol cream sent in today (7/15/25). Can think about adding in Jakafi if rash does not improve.   Current tacro dose: 1 mg BID   Last tacro level: 6.5  Adjustments: 1mg qAM and 1.5mg qPM      Immunosuppression: Prevention with voriconazole and acyclovir. CMV prophylaxis with letermovir. PJP prophyalxis Mepron. Continue weekly monitoring of CMV and EBV - these labs remain negative.     Pancytopenia: Due to parvovirus and aGVHD. Transfuse for Hgb <7 g/dL and platelets <10k.   Counts much improved with treatment of parvovirus (IVIG) and acute GVHD.     Discontinue promacta today (7/15/25) due to platelets over 200  Continue to monitor anemia    Parvovirus: Obtained to evaluate for pancytopenia. Monitor labs weekly and continue transfusion support.   IVIG started 5/9/25 and he tolerated it well. Second dose was 6/11.    Hypertension:  Was newly hypertensive in hospital following SCT at which time he was started on amlodipine. Discontinued amlodipine after hospitalization due to hypotension. Monitor for now but restart antihypertensives if this worsens.   Hypertension resolved following discontinuation of steroids.      Cardiomyopathy: Echo 2/26/25 obtained due to tachycardia revealed reduced LVEF 45-50%. No signs/symptoms of HFrEF. Repeat echo in 3 months (5/2025).  Repeat echo 5/2025 showed improvement of EF and some left ventricular thickening. Global longitudinal strain is mildly reduced measuring -15.4%     Vitamin D deficiency: His primary care is giving him 50,000 units once a week.     Hypercholesterolemia: He is following with primary care. Could be elevated due to tacrolimus.     Orders Placed:      No orders of the defined types were placed in this encounter.     Route Chart for Scheduling    BMT Chart Routing      Follow up with physician    Follow up with REBEKAH 2 weeks. Opal (plan the next month)   Provider visit type    Infusion scheduling note    Injection  scheduling note    Labs CBC, CMP, magnesium, phosphorus and tacrolimus level   Scheduling:  Preferred lab:  Lab interval:  weekly. At [facility] when not here for visit.   Imaging    Pharmacy appointment    Other referrals                      Supportive Plan Information  OP IVIG Clarence Grover MD   Associated Diagnosis: Parvovirus B19 infection   noted on 4/21/2025   Line of treatment: Supportive Care   Treatment goal: Supportive     Upcoming Treatment Dates - OP IVIG    No upcoming days in selected categories.    Treatment Plan Information   OP Adult Blood - Twice Weekly Clyde James MD   Associated Diagnosis: Anemia associated with chemotherapy   noted on 2/4/2025   Line of treatment: Supportive Care  Treatment Goal: Supportive     Upcoming Treatment Dates - OP Adult Blood - Twice Weekly    5/6/2025       Prepare RBC       Transfuse RBC 1 Unit    Therapy Plan Information  PORT FLUSH for Adrenal nodule, noted on 12/9/2024  Flushes  heparin, porcine (PF) 100 unit/mL injection flush 500 Units  500 Units, Intravenous, Every visit  sodium chloride 0.9% flush 10 mL  10 mL, Intravenous, Every visit    EPOETIN CRISELDA (RETACRIT) WEEKLY for Anemia, noted on 5/14/2025  Medications  epoetin criselda-epbx injection 40,000 Units  40,000 Units, Subcutaneous, 1 time a week      No therapy plan of the specified type found.    Total time of this visit was 40 minutes, including time spent face to face with patient and/or via video/audio, and also in preparing for today's visit for MDM and documentation. (Medical Decision Making, including consideration of possible diagnoses, management options, complex medical record review, review of diagnostic tests and information, consideration and discussion of significant complications based on comorbidities, and discussion with providers involved with the care of the patient). Greater than 50% was spent face to face with the patient counseling and coordinating care.    Visit today included  increased complexity associated with the care of the episodic problem treatment of GVHD rash addressed and managing the longitudinal care of the patient due to the serious and/or complex managed problem(s) s/p allogeneic stem cell transplant .     Opal Alonzo PA-C  Malignant Hematology, Stem Cell Transplant, and Cellular Therapy  [facility]  [facility] [place]

## 2025-07-15 NOTE — PROGRESS NOTES
BMT Pharmacist Immunosuppression Note:    Reviewed patient's tacrolimus level with Dr. Grover and it is below goal range.      Current regimen: 1mg BID  Capsule size(s): 0.5mg    Plan: Increase regimen to 2 capsules (1 mg) in the AM and 3 capsules (1.5 mg) in the evening.        Lab Results   Component Value Date    TACROLIMUS 6.5 07/15/2025    TACROLIMUS 7.5 07/08/2025    TACROLIMUS 7.7 06/30/2025       Creatinine   Date Value Ref Range Status   07/15/2025 1.4 0.5 - 1.4 mg/dL Final   07/08/2025 1.3 0.5 - 1.4 mg/dL Final   06/30/2025 1.3 0.5 - 1.4 mg/dL Final     Total Bilirubin   Date Value Ref Range Status   03/20/2025 0.4 0.1 - 1.0 mg/dL Final     Comment:     For infants and newborns, interpretation of results should be based  on gestational age, weight and in agreement with clinical  observations.    Premature Infant recommended reference ranges:  Up to 24 hours.............<8.0 mg/dL  Up to 48 hours............<12.0 mg/dL  3-5 days..................<15.0 mg/dL  6-29 days.................<15.0 mg/dL     03/17/2025 0.5 0.1 - 1.0 mg/dL Final     Comment:     For infants and newborns, interpretation of results should be based  on gestational age, weight and in agreement with clinical  observations.    Premature Infant recommended reference ranges:  Up to 24 hours.............<8.0 mg/dL  Up to 48 hours............<12.0 mg/dL  3-5 days..................<15.0 mg/dL  6-29 days.................<15.0 mg/dL     03/13/2025 0.4 0.1 - 1.0 mg/dL Final     Comment:     For infants and newborns, interpretation of results should be based  on gestational age, weight and in agreement with clinical  observations.    Premature Infant recommended reference ranges:  Up to 24 hours.............<8.0 mg/dL  Up to 48 hours............<12.0 mg/dL  3-5 days..................<15.0 mg/dL  6-29 days.................<15.0 mg/dL       Bilirubin Total   Date Value Ref Range Status   07/15/2025 0.3 0.1 - 1.0 mg/dL Final     Comment:     For  infants and newborns, interpretation of results should be based   on gestational age, weight and in agreement with clinical   observations.    Premature Infant recommended reference ranges:   0-24 hours:  <8.0 mg/dL   24-48 hours: <12.0 mg/dL   3-5 days:    <15.0 mg/dL   6-29 days:   <15.0 mg/dL   07/08/2025 0.3 0.1 - 1.0 mg/dL Final     Comment:     For infants and newborns, interpretation of results should be based   on gestational age, weight and in agreement with clinical   observations.    Premature Infant recommended reference ranges:   0-24 hours:  <8.0 mg/dL   24-48 hours: <12.0 mg/dL   3-5 days:    <15.0 mg/dL   6-29 days:   <15.0 mg/dL   06/30/2025 0.3 0.1 - 1.0 mg/dL Final     Comment:     For infants and newborns, interpretation of results should be based   on gestational age, weight and in agreement with clinical   observations.    Premature Infant recommended reference ranges:   0-24 hours:  <8.0 mg/dL   24-48 hours: <12.0 mg/dL   3-5 days:    <15.0 mg/dL   6-29 days:   <15.0 mg/dL     AST   Date Value Ref Range Status   07/15/2025 25 11 - 45 unit/L Final   07/08/2025 18 11 - 45 unit/L Final   06/30/2025 30 11 - 45 unit/L Final   03/20/2025 59 (H) 10 - 40 U/L Final   03/17/2025 44 (H) 10 - 40 U/L Final   03/13/2025 40 10 - 40 U/L Final     ALT   Date Value Ref Range Status   07/15/2025 18 10 - 44 unit/L Final   07/08/2025 25 10 - 44 unit/L Final   06/30/2025 35 10 - 44 unit/L Final   03/20/2025 85 (H) 10 - 44 U/L Final   03/17/2025 64 (H) 10 - 44 U/L Final   03/13/2025 49 (H) 10 - 44 U/L Final             Carolina Carson Pharm.D., BCOP  Clinical Pharmacy Specialist, Bone Marrow Transplant/Cellular Therapy  [facility] and [facility]: [id number]

## 2025-07-16 ENCOUNTER — DOCUMENTATION ONLY (OUTPATIENT)
Dept: HEMATOLOGY/ONCOLOGY | Facility: CLINIC | Age: 55
End: 2025-07-16
Payer: COMMERCIAL

## 2025-07-16 LAB
CYTOMEGALOVIRUS DNA, QUAL (OHS): NOT DETECTED
EPSTEIN-BARR VIRUS DNA, QUAL (OHS): NORMAL

## 2025-07-16 NOTE — PROGRESS NOTES
"SW received completed "Attending Physician Statement" and emailed to both patient and [email or web address]. Confirmation receipt was also received and forwarded to patient.    "

## 2025-07-22 ENCOUNTER — LAB VISIT (OUTPATIENT)
Dept: LAB | Facility: HOSPITAL | Age: 55
End: 2025-07-22
Attending: INTERNAL MEDICINE
Payer: COMMERCIAL

## 2025-07-22 ENCOUNTER — PATIENT OUTREACH (OUTPATIENT)
Dept: ADMINISTRATIVE | Facility: HOSPITAL | Age: 55
End: 2025-07-22
Payer: COMMERCIAL

## 2025-07-22 ENCOUNTER — TELEPHONE (OUTPATIENT)
Dept: HEMATOLOGY/ONCOLOGY | Facility: CLINIC | Age: 55
End: 2025-07-22
Payer: COMMERCIAL

## 2025-07-22 DIAGNOSIS — Z94.84 HISTORY OF ALLOGENEIC STEM CELL TRANSPLANT: ICD-10-CM

## 2025-07-22 LAB
ABSOLUTE EOSINOPHIL (OHS): 0.18 K/UL
ABSOLUTE MONOCYTE (OHS): 0.8 K/UL (ref 0.3–1)
ABSOLUTE NEUTROPHIL COUNT (OHS): 4.91 K/UL (ref 1.8–7.7)
ALBUMIN SERPL BCP-MCNC: 4 G/DL (ref 3.5–5.2)
ALP SERPL-CCNC: 111 UNIT/L (ref 40–150)
ALT SERPL W/O P-5'-P-CCNC: 17 UNIT/L (ref 10–44)
ANION GAP (OHS): 10 MMOL/L (ref 8–16)
AST SERPL-CCNC: 23 UNIT/L (ref 11–45)
BASOPHILS # BLD AUTO: 0.06 K/UL
BASOPHILS NFR BLD AUTO: 0.9 %
BILIRUB SERPL-MCNC: 0.2 MG/DL (ref 0.1–1)
BUN SERPL-MCNC: 27 MG/DL (ref 6–20)
CALCIUM SERPL-MCNC: 9.6 MG/DL (ref 8.7–10.5)
CHLORIDE SERPL-SCNC: 108 MMOL/L (ref 95–110)
CO2 SERPL-SCNC: 23 MMOL/L (ref 23–29)
CREAT SERPL-MCNC: 1.3 MG/DL (ref 0.5–1.4)
ERYTHROCYTE [DISTWIDTH] IN BLOOD BY AUTOMATED COUNT: 17.5 % (ref 11.5–14.5)
GFR SERPLBLD CREATININE-BSD FMLA CKD-EPI: >60 ML/MIN/1.73/M2
GLUCOSE SERPL-MCNC: 85 MG/DL (ref 70–110)
HCT VFR BLD AUTO: 30.2 % (ref 40–54)
HGB BLD-MCNC: 9.4 GM/DL (ref 14–18)
IMM GRANULOCYTES # BLD AUTO: 0.08 K/UL (ref 0–0.04)
IMM GRANULOCYTES NFR BLD AUTO: 1.2 % (ref 0–0.5)
LYMPHOCYTES # BLD AUTO: 0.88 K/UL (ref 1–4.8)
MAGNESIUM SERPL-MCNC: 1.8 MG/DL (ref 1.6–2.6)
MCH RBC QN AUTO: 35.9 PG (ref 27–31)
MCHC RBC AUTO-ENTMCNC: 31.1 G/DL (ref 32–36)
MCV RBC AUTO: 115 FL (ref 82–98)
NUCLEATED RBC (/100WBC) (OHS): 0 /100 WBC
PHOSPHATE SERPL-MCNC: 3.1 MG/DL (ref 2.7–4.5)
PLATELET # BLD AUTO: 241 K/UL (ref 150–450)
PMV BLD AUTO: 10 FL (ref 9.2–12.9)
POTASSIUM SERPL-SCNC: 4.4 MMOL/L (ref 3.5–5.1)
PROT SERPL-MCNC: 7.5 GM/DL (ref 6–8.4)
RBC # BLD AUTO: 2.62 M/UL (ref 4.6–6.2)
RELATIVE EOSINOPHIL (OHS): 2.6 %
RELATIVE LYMPHOCYTE (OHS): 12.7 % (ref 18–48)
RELATIVE MONOCYTE (OHS): 11.6 % (ref 4–15)
RELATIVE NEUTROPHIL (OHS): 71 % (ref 38–73)
SODIUM SERPL-SCNC: 141 MMOL/L (ref 136–145)
WBC # BLD AUTO: 6.91 K/UL (ref 3.9–12.7)

## 2025-07-22 PROCEDURE — 84460 ALANINE AMINO (ALT) (SGPT): CPT

## 2025-07-22 PROCEDURE — 85025 COMPLETE CBC W/AUTO DIFF WBC: CPT

## 2025-07-22 PROCEDURE — 83735 ASSAY OF MAGNESIUM: CPT

## 2025-07-22 PROCEDURE — 84100 ASSAY OF PHOSPHORUS: CPT

## 2025-07-22 PROCEDURE — 36415 COLL VENOUS BLD VENIPUNCTURE: CPT | Mod: PO

## 2025-07-22 PROCEDURE — 80197 ASSAY OF TACROLIMUS: CPT

## 2025-07-22 NOTE — TELEPHONE ENCOUNTER
Advised [facility] will fax last office note and labs to [phone]. [facility] has no further questions.

## 2025-07-22 NOTE — TELEPHONE ENCOUNTER
Copied from CRM #[id number]. Topic: Medications - Medication Authorization  >> Jul 22, 2025 12:45 PM Genesis wrote:     Consult/Advisory     Name Of Caller:[email or web address], [facility] ([place] - [place], [place] - [address] [place] [address], [address] [address]  Phone: [phone] Fax: [phone]               Contact Preference:     Nature of call:Nirali is calling to get office notes and labs for PA for PROMACTA)

## 2025-07-23 LAB — TACROLIMUS BLD-MCNC: 5.8 NG/ML (ref 5–15)

## 2025-07-24 ENCOUNTER — TELEPHONE (OUTPATIENT)
Dept: HEMATOLOGY/ONCOLOGY | Facility: CLINIC | Age: 55
End: 2025-07-24
Payer: COMMERCIAL

## 2025-07-24 NOTE — PROGRESS NOTES
BMT Pharmacist Immunosuppression Note:    Reviewed patient's tacrolimus level with Dr. Grover and it is below goal range, however will re-evaluate GVHD next week to assess if dose change is needed.      Current regimen: 1mg in AM and 1.5mg in PM  Capsule size(s): 0.5mg    Plan: Continue current regimen.        Lab Results   Component Value Date    TACROLIMUS 5.8 07/22/2025    TACROLIMUS 6.5 07/15/2025    TACROLIMUS 7.5 07/08/2025       Creatinine   Date Value Ref Range Status   07/22/2025 1.3 0.5 - 1.4 mg/dL Final   07/15/2025 1.4 0.5 - 1.4 mg/dL Final   07/08/2025 1.3 0.5 - 1.4 mg/dL Final     Total Bilirubin   Date Value Ref Range Status   03/20/2025 0.4 0.1 - 1.0 mg/dL Final     Comment:     For infants and newborns, interpretation of results should be based  on gestational age, weight and in agreement with clinical  observations.    Premature Infant recommended reference ranges:  Up to 24 hours.............<8.0 mg/dL  Up to 48 hours............<12.0 mg/dL  3-5 days..................<15.0 mg/dL  6-29 days.................<15.0 mg/dL     03/17/2025 0.5 0.1 - 1.0 mg/dL Final     Comment:     For infants and newborns, interpretation of results should be based  on gestational age, weight and in agreement with clinical  observations.    Premature Infant recommended reference ranges:  Up to 24 hours.............<8.0 mg/dL  Up to 48 hours............<12.0 mg/dL  3-5 days..................<15.0 mg/dL  6-29 days.................<15.0 mg/dL     03/13/2025 0.4 0.1 - 1.0 mg/dL Final     Comment:     For infants and newborns, interpretation of results should be based  on gestational age, weight and in agreement with clinical  observations.    Premature Infant recommended reference ranges:  Up to 24 hours.............<8.0 mg/dL  Up to 48 hours............<12.0 mg/dL  3-5 days..................<15.0 mg/dL  6-29 days.................<15.0 mg/dL       Bilirubin Total   Date Value Ref Range Status   07/22/2025 0.2 0.1 - 1.0 mg/dL  Final     Comment:     For infants and newborns, interpretation of results should be based   on gestational age, weight and in agreement with clinical   observations.    Premature Infant recommended reference ranges:   0-24 hours:  <8.0 mg/dL   24-48 hours: <12.0 mg/dL   3-5 days:    <15.0 mg/dL   6-29 days:   <15.0 mg/dL   07/15/2025 0.3 0.1 - 1.0 mg/dL Final     Comment:     For infants and newborns, interpretation of results should be based   on gestational age, weight and in agreement with clinical   observations.    Premature Infant recommended reference ranges:   0-24 hours:  <8.0 mg/dL   24-48 hours: <12.0 mg/dL   3-5 days:    <15.0 mg/dL   6-29 days:   <15.0 mg/dL   07/08/2025 0.3 0.1 - 1.0 mg/dL Final     Comment:     For infants and newborns, interpretation of results should be based   on gestational age, weight and in agreement with clinical   observations.    Premature Infant recommended reference ranges:   0-24 hours:  <8.0 mg/dL   24-48 hours: <12.0 mg/dL   3-5 days:    <15.0 mg/dL   6-29 days:   <15.0 mg/dL     AST   Date Value Ref Range Status   07/22/2025 23 11 - 45 unit/L Final   07/15/2025 25 11 - 45 unit/L Final   07/08/2025 18 11 - 45 unit/L Final   03/20/2025 59 (H) 10 - 40 U/L Final   03/17/2025 44 (H) 10 - 40 U/L Final   03/13/2025 40 10 - 40 U/L Final     ALT   Date Value Ref Range Status   07/22/2025 17 10 - 44 unit/L Final   07/15/2025 18 10 - 44 unit/L Final   07/08/2025 25 10 - 44 unit/L Final   03/20/2025 85 (H) 10 - 44 U/L Final   03/17/2025 64 (H) 10 - 44 U/L Final   03/13/2025 49 (H) 10 - 44 U/L Final             Carolina Carson, Pharm.D., BCOP  Clinical Pharmacy Specialist, Bone Marrow Transplant/Cellular Therapy  [facility]  SpectraLink: [id number]

## 2025-07-24 NOTE — TELEPHONE ENCOUNTER
Copied from CRM #[id number]. Topic: Medications - Medication Authorization  >> Jul 24, 2025 10:40 AM Genesis wrote:     Consult/Advisory     Name Of Caller:[email or web address]        [email or web address], [facility] ([place]) - [place], [place] - [address] [place] [address], [address] [address]  Phone: [phone] Fax: [phone]          Contact Preference:     Nature of call:Mamta is calling to give Keyla the cover my meds key# [id number] for PROMACTA.

## 2025-07-29 ENCOUNTER — LAB VISIT (OUTPATIENT)
Dept: LAB | Facility: HOSPITAL | Age: 55
End: 2025-07-29
Attending: INTERNAL MEDICINE
Payer: COMMERCIAL

## 2025-07-29 DIAGNOSIS — D84.821 IMMUNODEFICIENCY DUE TO DRUG THERAPY: ICD-10-CM

## 2025-07-29 DIAGNOSIS — Z79.899 IMMUNODEFICIENCY DUE TO DRUG THERAPY: ICD-10-CM

## 2025-07-29 DIAGNOSIS — Z94.84 HISTORY OF ALLOGENEIC STEM CELL TRANSPLANT: ICD-10-CM

## 2025-07-29 LAB
ABSOLUTE EOSINOPHIL (OHS): 0.25 K/UL
ABSOLUTE MONOCYTE (OHS): 0.75 K/UL (ref 0.3–1)
ABSOLUTE NEUTROPHIL COUNT (OHS): 4.72 K/UL (ref 1.8–7.7)
ALBUMIN SERPL BCP-MCNC: 4.1 G/DL (ref 3.5–5.2)
ALP SERPL-CCNC: 138 UNIT/L (ref 40–150)
ALT SERPL W/O P-5'-P-CCNC: 27 UNIT/L (ref 10–44)
ANION GAP (OHS): 7 MMOL/L (ref 8–16)
AST SERPL-CCNC: 35 UNIT/L (ref 11–45)
BASOPHILS # BLD AUTO: 0.05 K/UL
BASOPHILS NFR BLD AUTO: 0.7 %
BILIRUB SERPL-MCNC: 0.2 MG/DL (ref 0.1–1)
BUN SERPL-MCNC: 24 MG/DL (ref 6–20)
CALCIUM SERPL-MCNC: 9.7 MG/DL (ref 8.7–10.5)
CHLORIDE SERPL-SCNC: 108 MMOL/L (ref 95–110)
CO2 SERPL-SCNC: 23 MMOL/L (ref 23–29)
CREAT SERPL-MCNC: 1.3 MG/DL (ref 0.5–1.4)
ERYTHROCYTE [DISTWIDTH] IN BLOOD BY AUTOMATED COUNT: 15.5 % (ref 11.5–14.5)
GFR SERPLBLD CREATININE-BSD FMLA CKD-EPI: >60 ML/MIN/1.73/M2
GLUCOSE SERPL-MCNC: 91 MG/DL (ref 70–110)
HCT VFR BLD AUTO: 30.7 % (ref 40–54)
HGB BLD-MCNC: 9.8 GM/DL (ref 14–18)
IMM GRANULOCYTES # BLD AUTO: 0.04 K/UL (ref 0–0.04)
IMM GRANULOCYTES NFR BLD AUTO: 0.6 % (ref 0–0.5)
LYMPHOCYTES # BLD AUTO: 0.96 K/UL (ref 1–4.8)
MAGNESIUM SERPL-MCNC: 2.1 MG/DL (ref 1.6–2.6)
MCH RBC QN AUTO: 36.3 PG (ref 27–31)
MCHC RBC AUTO-ENTMCNC: 31.9 G/DL (ref 32–36)
MCV RBC AUTO: 114 FL (ref 82–98)
NUCLEATED RBC (/100WBC) (OHS): 0 /100 WBC
PHOSPHATE SERPL-MCNC: 3.5 MG/DL (ref 2.7–4.5)
PLATELET # BLD AUTO: 248 K/UL (ref 150–450)
PMV BLD AUTO: 10.2 FL (ref 9.2–12.9)
POTASSIUM SERPL-SCNC: 4.7 MMOL/L (ref 3.5–5.1)
PROT SERPL-MCNC: 7.1 GM/DL (ref 6–8.4)
RBC # BLD AUTO: 2.7 M/UL (ref 4.6–6.2)
RELATIVE EOSINOPHIL (OHS): 3.7 %
RELATIVE LYMPHOCYTE (OHS): 14.2 % (ref 18–48)
RELATIVE MONOCYTE (OHS): 11.1 % (ref 4–15)
RELATIVE NEUTROPHIL (OHS): 69.7 % (ref 38–73)
SODIUM SERPL-SCNC: 138 MMOL/L (ref 136–145)
WBC # BLD AUTO: 6.77 K/UL (ref 3.9–12.7)

## 2025-07-29 PROCEDURE — 80053 COMPREHEN METABOLIC PANEL: CPT

## 2025-07-29 PROCEDURE — 84100 ASSAY OF PHOSPHORUS: CPT

## 2025-07-29 PROCEDURE — 83735 ASSAY OF MAGNESIUM: CPT

## 2025-07-29 PROCEDURE — 36415 COLL VENOUS BLD VENIPUNCTURE: CPT | Mod: PO

## 2025-07-29 PROCEDURE — 80197 ASSAY OF TACROLIMUS: CPT

## 2025-07-29 PROCEDURE — 85025 COMPLETE CBC W/AUTO DIFF WBC: CPT

## 2025-07-29 RX ORDER — ATOVAQUONE 750 MG/5ML
1500 SUSPENSION ORAL DAILY
Qty: 300 ML | Refills: 3 | Status: SHIPPED | OUTPATIENT
Start: 2025-07-29

## 2025-07-30 LAB — TACROLIMUS BLD-MCNC: 6 NG/ML (ref 5–15)

## 2025-07-31 ENCOUNTER — PATIENT MESSAGE (OUTPATIENT)
Dept: HEMATOLOGY/ONCOLOGY | Facility: CLINIC | Age: 55
End: 2025-07-31

## 2025-07-31 ENCOUNTER — OFFICE VISIT (OUTPATIENT)
Dept: HEMATOLOGY/ONCOLOGY | Facility: CLINIC | Age: 55
End: 2025-07-31
Payer: COMMERCIAL

## 2025-07-31 VITALS
BODY MASS INDEX: 25.2 KG/M2 | HEIGHT: 71 IN | WEIGHT: 180 LBS | OXYGEN SATURATION: 99 % | DIASTOLIC BLOOD PRESSURE: 73 MMHG | SYSTOLIC BLOOD PRESSURE: 124 MMHG | RESPIRATION RATE: 18 BRPM | HEART RATE: 98 BPM

## 2025-07-31 DIAGNOSIS — D84.821 IMMUNODEFICIENCY DUE TO DRUG THERAPY: ICD-10-CM

## 2025-07-31 DIAGNOSIS — Z94.84 HISTORY OF ALLOGENEIC STEM CELL TRANSPLANT: ICD-10-CM

## 2025-07-31 DIAGNOSIS — D47.1 PRIMARY MYELOFIBROSIS: ICD-10-CM

## 2025-07-31 DIAGNOSIS — Z79.899 IMMUNODEFICIENCY DUE TO DRUG THERAPY: ICD-10-CM

## 2025-07-31 DIAGNOSIS — D69.6 THROMBOCYTOPENIA: ICD-10-CM

## 2025-07-31 DIAGNOSIS — D64.9 ANEMIA, UNSPECIFIED TYPE: ICD-10-CM

## 2025-07-31 DIAGNOSIS — Z94.84 HISTORY OF ALLOGENEIC STEM CELL TRANSPLANT: Primary | ICD-10-CM

## 2025-07-31 DIAGNOSIS — I42.9 CARDIOMYOPATHY, UNSPECIFIED TYPE: ICD-10-CM

## 2025-07-31 DIAGNOSIS — E55.9 VITAMIN D DEFICIENCY: ICD-10-CM

## 2025-07-31 DIAGNOSIS — L72.9 SKIN CYST: ICD-10-CM

## 2025-07-31 PROCEDURE — 99999 PR PBB SHADOW E&M-EST. PATIENT-LVL IV: CPT | Mod: PBBFAC,,,

## 2025-07-31 RX ORDER — TACROLIMUS 0.5 MG/1
CAPSULE ORAL
Qty: 180 CAPSULE | Refills: 11 | Status: SHIPPED | OUTPATIENT
Start: 2025-07-31

## 2025-07-31 NOTE — PROGRESS NOTES
Section of Hematology and Stem Cell Transplantation    Post-Transplantation Follow Up Visit     7/31/25    Transplant History:   Primary oncologist: Clyde James MD  Primary oncologic diagnosis: primary myelofibrosis  Transplant date: 12/26/2024  Donor: haploidentical  Blood Type (Patient): O +  Blood Type (Donor): O +  CMV (Patient): Negative  CMV (Donor): Positive  Graft source: Peripheral blood  CD34+ cell dose: 6.04 X10^6 cells/kg  Conditioning Regimen: Thiotepa, Busulfan, Fludarabine  GVHD prophylaxis: Post-transplant cyclophosphamide, MMF, Tacrolimus  Immediate post-transplant complications: mucositis, R axillary skin/soft tissue infection secondary to MRSA    History of Present Ilness:   Rubén Peters) is a pleasant 54 y.o.male with primary myelofibrosis who is status post haploidentical stem cell transplantation conditioned with Bu/Flu/TT who is currently day +217 who presents for post-transplant follow up.    He reports feeling good overall. He is walking five days a week and has noticed some muscle building back up. His appetite is getting better. He does still note some fatigue but feels it continues to slowly improve. His rash has cleared He has a cyst on his right scapula that he is going to see dermatology about.  Denies n/v/d.     PAST MEDICAL HISTORY:   Past Medical History:   Diagnosis Date    Abdominal pain 01/02/2025    Allergic contact dermatitis due to adhesives 12/31/2024    Cancer     Flatulence 01/03/2025    Headache 12/27/2024    Hyperlipidemia     Mucositis 01/02/2025    Other constipation 12/19/2024    Thrombocytosis 12/18/2024    Transaminitis 12/27/2024       PAST SURGICAL HISTORY:   Past Surgical History:   Procedure Laterality Date    BONE MARROW BIOPSY N/A 11/27/2024    Procedure: Biopsy-bone marrow;  Surgeon: Clyde James MD;  [facility] ([address] [place] [address]);  Service: Oncology;  Laterality: N/A;  11/20-pre call complete-tb    BONE MARROW BIOPSY N/A 4/9/2025     Procedure: Biopsy-bone marrow;  Surgeon: Clyde James MD;  Location: [facility] ENDO (4TH FLR);  Service: Oncology;  Laterality: N/A;  4/1 precall complete. EW    COLONOSCOPY N/A 8/9/2023    Procedure: COLONOSCOPY;  Surgeon: Will Ardon MD;  Location: [facility] ENDO (4TH FLR);  Service: Endoscopy;  Laterality: N/A;  Suprep Instructions sent via portal / Authorizing:     Bebeto Arora MD in [facility] FAMILY MED/ INTERNAL MED/ PEDS  Referral:          [id number]    EYE SURGERY      FLEXIBLE SIGMOIDOSCOPY N/A 7/23/2024    Procedure: SIGMOIDOSCOPY, FLEXIBLE;  Surgeon: Nirali Vicente MD;  Location: [facility] ENDO;  Service: Endoscopy;  Laterality: N/A;    INSERTION OF LONGORIA CATHETER Right 12/18/2024    Procedure: INSERTION, CATHETER, CENTRAL VENOUS, LONGORIA TRIPLE LUMEN, Bard 12.5 fr Longoria Cath model [id number];  Surgeon: Willie Hadley MD;  Location: [facility] OR 2ND FLR;  Service: General;  Laterality: Right;       PAST SOCIAL HISTORY:  Social History     Tobacco Use    Smoking status: Former     Types: Cigars, Cigarettes    Smokeless tobacco: Never   Substance Use Topics    Alcohol use: Not Currently     Comment: socially    Drug use: Never       FAMILY HISTORY:  Family History   Problem Relation Name Age of Onset    Arthritis Mother      COPD Father      Testicular cancer Maternal Cousin Peter 17        Chemotherapy    Breast cancer Maternal Cousin  51        Chemotherapy    Heart disease Maternal Grandfather      Stroke Maternal Grandmother         CURRENT MEDICATIONS:   Current Outpatient Medications   Medication Sig    acyclovir (ZOVIRAX) 800 MG Tab Take 1 tablet (800 mg total) by mouth 2 (two) times daily.    atovaquone (MEPRON) 750 mg/5 mL Susp oral liquid Take 10 mLs (1,500 mg total) by mouth once daily.    clobetasoL (TEMOVATE) 0.05 % cream Apply topically 2 (two) times daily.    eltrombopag olamine (PROMACTA) 50 MG Tab Take 1 tablet (50 mg total) by mouth once daily.    ergocalciferol (ERGOCALCIFEROL) 50,000 unit Cap Take  1 capsule (50,000 Units total) by mouth every 7 days.    letermovir (PREVYMIS) 480 mg Tab Take 1 tablet (480 mg total) by mouth once daily.    magnesium oxide (MAG-OX) 400 mg (241.3 mg magnesium) tablet Take 2 tablets in the morning, 1 tablet in the afternoon, and 2 tablets in the evening.    ondansetron (ZOFRAN) 8 MG tablet Take 1 tablet (8 mg total) by mouth every 8 (eight) hours as needed for Nausea.    pantoprazole (PROTONIX) 40 MG tablet Take 1 tablet (40 mg total) by mouth once daily.    predniSONE (DELTASONE) 10 MG tablet 6/2 - 6/8: Take 2 tablets (20mg) once a day; 6/9 - 6/15: Take 1 tablet (10mg) once a day; 6/16 - 6/22: Take 0.5 tablet (5mg) once a day; 6/23 - 6/29: Take 0.5 tablet (5mg) every other day; 6/30: STOP    prochlorperazine (COMPAZINE) 10 MG tablet Take 1 tablet (10 mg total) by mouth 4 (four) times daily as needed for Nausea.    tacrolimus (PROGRAF) 0.5 MG Cap Take 2 capsules (1 mg total) by mouth every morning AND 3 capsules (1.5 mg total) every evening.    traMADoL (ULTRAM) 50 mg tablet Take 1 tablet (50 mg total) by mouth every 6 (six) hours as needed for Pain.    triamcinolone acetonide 0.1% (KENALOG) 0.1 % cream Apply topically 2 (two) times daily as needed (rash on arms, chest, back, trunk, and legs (do not apply to face)).    voriconazole (VFEND) 200 MG Tab Take 1 tablet (200 mg total) by mouth 2 (two) times daily.     No current facility-administered medications for this visit.       ALLERGIES:   Review of patient's allergies indicates:   Allergen Reactions    Sulfamethoxazole-trimethoprim Hives       GVHD Review of Systems:     Pertinent positives and negatives included in the HPI. Otherwise a 14 point review of systems is negative. GVHD review of systems recorded in BMT flowsheet.     Physical Exam:     Vitals:    07/31/25 1534   BP: 124/73   Pulse: 98   Resp: 18             General: Appears well, NAD  HEENT: MMM, no OP lesions  Neck: Supple, no LAD  Pulmonary: CTAB, no increased work  of breathing, no W/R/C  Cardiovascular: S1S2 normal, RRR, no M/R/G  Abdominal: Soft, NT, ND, BS+, no HSM  Extremities: No C/C/E  Neurological: AAOx4, grossly normal, no focal deficits  Dermatologic: Rash on his arms, back, stomach, and bilateral arms.     ECOG Performance Status: (foot note - ECOG PS provided by Eastern Cooperative Oncology Group) 1 - Symptomatic but completely ambulatory    Karnofsky Performance Score:  80%- Normal Activity with Effort: Some Symptoms of Disease    Labs:   Lab Results   Component Value Date    WBC 6.77 07/29/2025    RBC 2.70 (L) 07/29/2025    HGB 9.8 (L) 07/29/2025    HCT 30.7 (L) 07/29/2025     (H) 07/29/2025    MCH 36.3 (H) 07/29/2025    MCHC 31.9 (L) 07/29/2025    RDW 15.5 (H) 07/29/2025     07/29/2025    MPV 10.2 07/29/2025    GRAN 7.1 05/26/2025    LYMPH 14.2 (L) 07/29/2025    LYMPH 0.96 (L) 07/29/2025    MONO 11.1 07/29/2025    MONO 0.75 07/29/2025    EOS 3.7 07/29/2025    EOS 0.25 07/29/2025    BASO 0.02 03/20/2025    EOSINOPHIL 0.6 03/20/2025    BASOPHIL 0.7 07/29/2025    BASOPHIL 0.05 07/29/2025       Sodium   Date Value Ref Range Status   07/29/2025 138 136 - 145 mmol/L Final   03/20/2025 138 136 - 145 mmol/L Final     Potassium   Date Value Ref Range Status   07/29/2025 4.7 3.5 - 5.1 mmol/L Final   03/20/2025 4.2 3.5 - 5.1 mmol/L Final     Chloride   Date Value Ref Range Status   07/29/2025 108 95 - 110 mmol/L Final   03/20/2025 108 95 - 110 mmol/L Final     CO2   Date Value Ref Range Status   07/29/2025 23 23 - 29 mmol/L Final   03/20/2025 21 (L) 23 - 29 mmol/L Final     Glucose   Date Value Ref Range Status   07/29/2025 91 70 - 110 mg/dL Final   03/20/2025 101 70 - 110 mg/dL Final     BUN   Date Value Ref Range Status   07/29/2025 24 (H) 6 - 20 mg/dL Final     Creatinine   Date Value Ref Range Status   07/29/2025 1.3 0.5 - 1.4 mg/dL Final     Calcium   Date Value Ref Range Status   07/29/2025 9.7 8.7 - 10.5 mg/dL Final   03/20/2025 9.2 8.7 - 10.5 mg/dL  Final     Protein Total   Date Value Ref Range Status   07/29/2025 7.1 6.0 - 8.4 gm/dL Final     Total Protein   Date Value Ref Range Status   03/20/2025 6.6 6.0 - 8.4 g/dL Final     Albumin   Date Value Ref Range Status   07/29/2025 4.1 3.5 - 5.2 g/dL Final   03/20/2025 3.7 3.5 - 5.2 g/dL Final     Total Bilirubin   Date Value Ref Range Status   03/20/2025 0.4 0.1 - 1.0 mg/dL Final     Comment:     For infants and newborns, interpretation of results should be based  on gestational age, weight and in agreement with clinical  observations.    Premature Infant recommended reference ranges:  Up to 24 hours.............<8.0 mg/dL  Up to 48 hours............<12.0 mg/dL  3-5 days..................<15.0 mg/dL  6-29 days.................<15.0 mg/dL       Bilirubin Total   Date Value Ref Range Status   07/29/2025 0.2 0.1 - 1.0 mg/dL Final     Comment:     For infants and newborns, interpretation of results should be based   on gestational age, weight and in agreement with clinical   observations.    Premature Infant recommended reference ranges:   0-24 hours:  <8.0 mg/dL   24-48 hours: <12.0 mg/dL   3-5 days:    <15.0 mg/dL   6-29 days:   <15.0 mg/dL     Alkaline Phosphatase   Date Value Ref Range Status   03/20/2025 122 40 - 150 U/L Final     ALP   Date Value Ref Range Status   07/29/2025 138 40 - 150 unit/L Final     AST   Date Value Ref Range Status   07/29/2025 35 11 - 45 unit/L Final   03/20/2025 59 (H) 10 - 40 U/L Final     ALT   Date Value Ref Range Status   07/29/2025 27 10 - 44 unit/L Final   03/20/2025 85 (H) 10 - 44 U/L Final     Anion Gap   Date Value Ref Range Status   07/29/2025 7 (L) 8 - 16 mmol/L Final     eGFR if    Date Value Ref Range Status   01/22/2022 >60.0 >60 mL/min/1.73 m^2 Final     eGFR if non    Date Value Ref Range Status   01/22/2022 >60.0 >60 mL/min/1.73 m^2 Final     Comment:     Calculation used to obtain the estimated glomerular filtration  rate (eGFR) is the  CKD-EPI equation.          Imaging:   All pertinent studies reviewed and interpreted by me       Assessment and Plan:   Rubén Hu (Rubén) is a pleasant 54 y.o.male with primary myelofibrosis s/p haplo SCT who presents for post-transplant follow up.    Primary myelofibrosis: Status post treatment with ruxolitinib, followed by alloSCT. Day +30 BMBx results below.    Status post allogeneic stem cell transplantation: As noted above, status post haploidentical stem cell transplantation conditioned with Bu/Flu/TT. Currently day +217.  Engrafted on day +22.  Day +30 BMBx on 1/2/25: No definitive evidence of residual JAK2 [id number] primary myelofibrosis. NGS without evidence of pathogenic mutations. Chimerism studies with 100% donor CD33, CD3 insufficient for analysis.   Plan for central line removal -  4/3/25  Day 100 bone marrow biopsy 4/9/25:Markedly hypocellular marrow (<5%-15%) with trilineage hematopoiesis. NGS with DNMT3A 2%. CG normal and JAK2 negative. Chimerisms 90% CD3 and 100% CD33 donor.  Patient not able to return work anytime soon due to immunodeficiency and low blood counts. Can re-evaluate in 4 months if he can return to work.     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 MMF has now been discontinued. 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 He restarted budesonide 3mg TID (4/14/25), and budesonide was tapered in May. 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 He restarted prednisone 40mg daily on 5/16. His nausea improved within a few days. Small rash noted to both arms (<25% BSA) that is not pruritic (6/18/25). Per Dr. James patient will use triamcinolone cream twice a day and moisturizer and rash is improving.  Prednisone tapered 6/2-6/30/25. Rash started on his back 6/23/25 and is pruritic. He has been using triamcinolone cream, on  his back, bilateral arms, and stomach for about three weeks now  about 30% BSA. Rash on back and stomach have started to improve. Clobetasol cream sent in today (7/15/25). Rash has clear, now only has hyperpigmentation remaining. Will d/c creams today. He is aware to re-start if any rash re-appears over the weekend.  Current tacro dose: 1mg qAM and 1.5mg qPM  Last tacro level: 6.0  Adjustments: 1.5mg bid      Immunosuppression: Prevention with voriconazole and acyclovir. CMV prophylaxis with letermovir. PJP prophyalxis Mepron. Continue weekly monitoring of CMV and EBV - these labs remain negative.     Anemia/thrombocytopenia: Due to parvovirus and aGVHD. Transfuse for Hgb <7 g/dL and platelets <10k.   Counts much improved with treatment of parvovirus (IVIG) and acute GVHD.     Discontinue promacta today (7/15/25) due to platelets over 200  Continue to monitor anemia. Stable oevrall    Parvovirus: Obtained to evaluate for pancytopenia. Monitor labs weekly and continue transfusion support.   IVIG started 5/9/25 and he tolerated it well. Second dose was 6/11.    Hypertension:  Was newly hypertensive in hospital following SCT at which time he was started on amlodipine. Discontinued amlodipine after hospitalization due to hypotension. Monitor for now but restart antihypertensives if this worsens.   Hypertension resolved following discontinuation of steroids.      Cardiomyopathy: Echo 2/26/25 obtained due to tachycardia revealed reduced LVEF 45-50%. No signs/symptoms of HFrEF. Repeat echo in 3 months (5/2025).  Repeat echo 5/2025 showed improvement of EF and some left ventricular thickening. Global longitudinal strain is mildly reduced measuring -15.4%     Vitamin D deficiency: His primary care is giving him 50,000 units once a week.     Hypercholesterolemia: He is following with primary care. Could be elevated due to tacrolimus.     Skin cyst on right shoulder: Patient with a cyst on his shoulder that he states will have  random drainage. He has had this issue before and been treated by dermatology. He will go see dermatology.     Orders Placed:      No orders of the defined types were placed in this encounter.     Route Chart for Scheduling    BMT Chart Routing      Follow up with physician    Follow up with REBEKAH 2 weeks. Cam   Provider visit type    Infusion scheduling note    Injection scheduling note    Labs CBC, CMP, magnesium, phosphorus, tacrolimus level, type and screen, CMV and EBV   Scheduling:  Preferred lab:  Lab interval: once a week     Imaging    Pharmacy appointment    Other referrals                      Supportive Plan Information  OP IVIG Clarence Grover MD   Associated Diagnosis: Parvovirus B19 infection   noted on 4/21/2025   Line of treatment: Supportive Care   Treatment goal: Supportive     Upcoming Treatment Dates - OP IVIG    No upcoming days in selected categories.    Treatment Plan Information   OP Adult Blood - Twice Weekly Clyde James MD   Associated Diagnosis: Anemia associated with chemotherapy   noted on 2/4/2025   Line of treatment: Supportive Care  Treatment Goal: Supportive     Upcoming Treatment Dates - OP Adult Blood - Twice Weekly    5/6/2025       Prepare RBC       Transfuse RBC 1 Unit    Therapy Plan Information  PORT FLUSH for Adrenal nodule, noted on 12/9/2024  Flushes  heparin, porcine (PF) 100 unit/mL injection flush 500 Units  500 Units, Intravenous, Every visit  sodium chloride 0.9% flush 10 mL  10 mL, Intravenous, Every visit    EPOETIN CRISELDA (RETACRIT) WEEKLY for Anemia, noted on 5/14/2025  Medications  epoetin criselda-epbx injection 40,000 Units  40,000 Units, Subcutaneous, 1 time a week      No therapy plan of the specified type found.    Total time of this visit was 40 minutes, including time spent face to face with patient and/or via video/audio, and also in preparing for today's visit for MDM and documentation. (Medical Decision Making, including consideration of possible  diagnoses, management options, complex medical record review, review of diagnostic tests and information, consideration and discussion of significant complications based on comorbidities, and discussion with providers involved with the care of the patient). Greater than 50% was spent face to face with the patient counseling and coordinating care.    Visit today included increased complexity associated with the care of the episodic problem treatment of GVHD rash addressed and managing the longitudinal care of the patient due to the serious and/or complex managed problem(s) s/p allogeneic stem cell transplant .     Opal Alonzo PA-C  Malignant Hematology, Stem Cell Transplant, and Cellular Therapy  [facility]  [facility] [place]

## 2025-07-31 NOTE — PROGRESS NOTES
BMT Pharmacist Immunosuppression Note:    Reviewed patient's tacrolimus level with Dr. Grover and it is below goal range.      Current regimen: 1 mg AM + 1.5 mg PM  Capsule size(s): 0.5 mg    Plan: Increase regimen to 3 capsules (1.5 mg) in the AM and 3 capsules (1.5 mg) in the evening.        Lab Results   Component Value Date    TACROLIMUS 6.0 07/29/2025    TACROLIMUS 5.8 07/22/2025    TACROLIMUS 6.5 07/15/2025       Creatinine   Date Value Ref Range Status   07/29/2025 1.3 0.5 - 1.4 mg/dL Final   07/22/2025 1.3 0.5 - 1.4 mg/dL Final   07/15/2025 1.4 0.5 - 1.4 mg/dL Final     Total Bilirubin   Date Value Ref Range Status   03/20/2025 0.4 0.1 - 1.0 mg/dL Final     Comment:     For infants and newborns, interpretation of results should be based  on gestational age, weight and in agreement with clinical  observations.    Premature Infant recommended reference ranges:  Up to 24 hours.............<8.0 mg/dL  Up to 48 hours............<12.0 mg/dL  3-5 days..................<15.0 mg/dL  6-29 days.................<15.0 mg/dL     03/17/2025 0.5 0.1 - 1.0 mg/dL Final     Comment:     For infants and newborns, interpretation of results should be based  on gestational age, weight and in agreement with clinical  observations.    Premature Infant recommended reference ranges:  Up to 24 hours.............<8.0 mg/dL  Up to 48 hours............<12.0 mg/dL  3-5 days..................<15.0 mg/dL  6-29 days.................<15.0 mg/dL     03/13/2025 0.4 0.1 - 1.0 mg/dL Final     Comment:     For infants and newborns, interpretation of results should be based  on gestational age, weight and in agreement with clinical  observations.    Premature Infant recommended reference ranges:  Up to 24 hours.............<8.0 mg/dL  Up to 48 hours............<12.0 mg/dL  3-5 days..................<15.0 mg/dL  6-29 days.................<15.0 mg/dL       Bilirubin Total   Date Value Ref Range Status   07/29/2025 0.2 0.1 - 1.0 mg/dL Final      Comment:     For infants and newborns, interpretation of results should be based   on gestational age, weight and in agreement with clinical   observations.    Premature Infant recommended reference ranges:   0-24 hours:  <8.0 mg/dL   24-48 hours: <12.0 mg/dL   3-5 days:    <15.0 mg/dL   6-29 days:   <15.0 mg/dL   07/22/2025 0.2 0.1 - 1.0 mg/dL Final     Comment:     For infants and newborns, interpretation of results should be based   on gestational age, weight and in agreement with clinical   observations.    Premature Infant recommended reference ranges:   0-24 hours:  <8.0 mg/dL   24-48 hours: <12.0 mg/dL   3-5 days:    <15.0 mg/dL   6-29 days:   <15.0 mg/dL   07/15/2025 0.3 0.1 - 1.0 mg/dL Final     Comment:     For infants and newborns, interpretation of results should be based   on gestational age, weight and in agreement with clinical   observations.    Premature Infant recommended reference ranges:   0-24 hours:  <8.0 mg/dL   24-48 hours: <12.0 mg/dL   3-5 days:    <15.0 mg/dL   6-29 days:   <15.0 mg/dL     AST   Date Value Ref Range Status   07/29/2025 35 11 - 45 unit/L Final   07/22/2025 23 11 - 45 unit/L Final   07/15/2025 25 11 - 45 unit/L Final   03/20/2025 59 (H) 10 - 40 U/L Final   03/17/2025 44 (H) 10 - 40 U/L Final   03/13/2025 40 10 - 40 U/L Final     ALT   Date Value Ref Range Status   07/29/2025 27 10 - 44 unit/L Final   07/22/2025 17 10 - 44 unit/L Final   07/15/2025 18 10 - 44 unit/L Final   03/20/2025 85 (H) 10 - 44 U/L Final   03/17/2025 64 (H) 10 - 44 U/L Final   03/13/2025 49 (H) 10 - 44 U/L Final             Sree Hernandez, PharmD  Clinical Pharmacy Specialist, Bone Marrow Transplant/Hematology  Spectra link: [phone]

## 2025-08-05 ENCOUNTER — LAB VISIT (OUTPATIENT)
Dept: LAB | Facility: HOSPITAL | Age: 55
End: 2025-08-05
Attending: INTERNAL MEDICINE
Payer: COMMERCIAL

## 2025-08-05 DIAGNOSIS — D47.1 PRIMARY MYELOFIBROSIS: ICD-10-CM

## 2025-08-05 DIAGNOSIS — Z76.82 STEM CELL TRANSPLANT CANDIDATE: ICD-10-CM

## 2025-08-05 DIAGNOSIS — Z94.84 HISTORY OF ALLOGENEIC STEM CELL TRANSPLANT: ICD-10-CM

## 2025-08-05 LAB
ABSOLUTE EOSINOPHIL (OHS): 0.27 K/UL
ABSOLUTE MONOCYTE (OHS): 0.73 K/UL (ref 0.3–1)
ABSOLUTE NEUTROPHIL COUNT (OHS): 4.96 K/UL (ref 1.8–7.7)
ALBUMIN SERPL BCP-MCNC: 4.1 G/DL (ref 3.5–5.2)
ALP SERPL-CCNC: 131 UNIT/L (ref 40–150)
ALT SERPL W/O P-5'-P-CCNC: 21 UNIT/L (ref 10–44)
ANION GAP (OHS): 7 MMOL/L (ref 8–16)
AST SERPL-CCNC: 31 UNIT/L (ref 11–45)
BASOPHILS # BLD AUTO: 0.06 K/UL
BASOPHILS NFR BLD AUTO: 0.9 %
BILIRUB SERPL-MCNC: 0.3 MG/DL (ref 0.1–1)
BUN SERPL-MCNC: 23 MG/DL (ref 6–20)
CALCIUM SERPL-MCNC: 9.7 MG/DL (ref 8.7–10.5)
CHLORIDE SERPL-SCNC: 105 MMOL/L (ref 95–110)
CO2 SERPL-SCNC: 25 MMOL/L (ref 23–29)
CREAT SERPL-MCNC: 1.5 MG/DL (ref 0.5–1.4)
ERYTHROCYTE [DISTWIDTH] IN BLOOD BY AUTOMATED COUNT: 14.5 % (ref 11.5–14.5)
GFR SERPLBLD CREATININE-BSD FMLA CKD-EPI: 55 ML/MIN/1.73/M2
GLUCOSE SERPL-MCNC: 92 MG/DL (ref 70–110)
HCT VFR BLD AUTO: 31.8 % (ref 40–54)
HGB BLD-MCNC: 10.4 GM/DL (ref 14–18)
IMM GRANULOCYTES # BLD AUTO: 0.03 K/UL (ref 0–0.04)
IMM GRANULOCYTES NFR BLD AUTO: 0.4 % (ref 0–0.5)
LYMPHOCYTES # BLD AUTO: 0.86 K/UL (ref 1–4.8)
MAGNESIUM SERPL-MCNC: 1.8 MG/DL (ref 1.6–2.6)
MCH RBC QN AUTO: 36 PG (ref 27–31)
MCHC RBC AUTO-ENTMCNC: 32.7 G/DL (ref 32–36)
MCV RBC AUTO: 110 FL (ref 82–98)
NUCLEATED RBC (/100WBC) (OHS): 0 /100 WBC
PHOSPHATE SERPL-MCNC: 3.5 MG/DL (ref 2.7–4.5)
PLATELET # BLD AUTO: 238 K/UL (ref 150–450)
PMV BLD AUTO: 9.8 FL (ref 9.2–12.9)
POTASSIUM SERPL-SCNC: 4.2 MMOL/L (ref 3.5–5.1)
PROT SERPL-MCNC: 7.2 GM/DL (ref 6–8.4)
RBC # BLD AUTO: 2.89 M/UL (ref 4.6–6.2)
RELATIVE EOSINOPHIL (OHS): 3.9 %
RELATIVE LYMPHOCYTE (OHS): 12.4 % (ref 18–48)
RELATIVE MONOCYTE (OHS): 10.6 % (ref 4–15)
RELATIVE NEUTROPHIL (OHS): 71.8 % (ref 38–73)
SODIUM SERPL-SCNC: 137 MMOL/L (ref 136–145)
WBC # BLD AUTO: 6.91 K/UL (ref 3.9–12.7)

## 2025-08-05 PROCEDURE — 83735 ASSAY OF MAGNESIUM: CPT

## 2025-08-05 PROCEDURE — 84295 ASSAY OF SERUM SODIUM: CPT

## 2025-08-05 PROCEDURE — 36415 COLL VENOUS BLD VENIPUNCTURE: CPT | Mod: PO

## 2025-08-05 PROCEDURE — 84100 ASSAY OF PHOSPHORUS: CPT

## 2025-08-05 PROCEDURE — 87799 DETECT AGENT NOS DNA QUANT: CPT

## 2025-08-05 PROCEDURE — 80197 ASSAY OF TACROLIMUS: CPT

## 2025-08-05 PROCEDURE — 85025 COMPLETE CBC W/AUTO DIFF WBC: CPT

## 2025-08-06 ENCOUNTER — TELEPHONE (OUTPATIENT)
Dept: HEMATOLOGY/ONCOLOGY | Facility: CLINIC | Age: 55
End: 2025-08-06
Payer: COMMERCIAL

## 2025-08-06 DIAGNOSIS — Z94.84 HISTORY OF ALLOGENEIC STEM CELL TRANSPLANT: ICD-10-CM

## 2025-08-06 LAB
CYTOMEGALOVIRUS DNA, QUAL (OHS): NOT DETECTED
EPSTEIN-BARR VIRUS DNA, QUAL (OHS): NORMAL
TACROLIMUS BLD-MCNC: 16.4 NG/ML (ref 5–15)

## 2025-08-06 RX ORDER — TACROLIMUS 0.5 MG/1
CAPSULE ORAL
Qty: 90 CAPSULE | Refills: 11 | Status: SHIPPED | OUTPATIENT
Start: 2025-08-06

## 2025-08-06 NOTE — TELEPHONE ENCOUNTER
Called patient to follow up on if he took yesterday's tacro dose before his lab draw. Patient stated that he did not and had been taking 2 caps in the morning and 3 caps in the evening.    Patient was advised to hold this evening's dose and begin new regimen of 1 capsule in am and then 2 capsules in evening with plans to recheck level on next Tuesday 8/12 as scheduled.    Patient aware and confirmed.

## 2025-08-06 NOTE — PROGRESS NOTES
BMT Pharmacist Immunosuppression Note:    Confirmed patient did not take dose prior to lab draw. Small bump in Scr.       Current regimen: 1mg in AM and 1.5mg in PM  Capsule size(s): 0.5mg    Plan: Decrease regimen to 1 capsules (0.5 mg) in the AM and 2 capsules (1 mg) in the evening.        Lab Results   Component Value Date    TACROLIMUS 16.4 (H) 08/05/2025    TACROLIMUS 6.0 07/29/2025    TACROLIMUS 5.8 07/22/2025       Creatinine   Date Value Ref Range Status   08/05/2025 1.5 (H) 0.5 - 1.4 mg/dL Final   07/29/2025 1.3 0.5 - 1.4 mg/dL Final   07/22/2025 1.3 0.5 - 1.4 mg/dL Final     Total Bilirubin   Date Value Ref Range Status   03/20/2025 0.4 0.1 - 1.0 mg/dL Final     Comment:     For infants and newborns, interpretation of results should be based  on gestational age, weight and in agreement with clinical  observations.    Premature Infant recommended reference ranges:  Up to 24 hours.............<8.0 mg/dL  Up to 48 hours............<12.0 mg/dL  3-5 days..................<15.0 mg/dL  6-29 days.................<15.0 mg/dL     03/17/2025 0.5 0.1 - 1.0 mg/dL Final     Comment:     For infants and newborns, interpretation of results should be based  on gestational age, weight and in agreement with clinical  observations.    Premature Infant recommended reference ranges:  Up to 24 hours.............<8.0 mg/dL  Up to 48 hours............<12.0 mg/dL  3-5 days..................<15.0 mg/dL  6-29 days.................<15.0 mg/dL     03/13/2025 0.4 0.1 - 1.0 mg/dL Final     Comment:     For infants and newborns, interpretation of results should be based  on gestational age, weight and in agreement with clinical  observations.    Premature Infant recommended reference ranges:  Up to 24 hours.............<8.0 mg/dL  Up to 48 hours............<12.0 mg/dL  3-5 days..................<15.0 mg/dL  6-29 days.................<15.0 mg/dL       Bilirubin Total   Date Value Ref Range Status   08/05/2025 0.3 0.1 - 1.0 mg/dL Final      Comment:     For infants and newborns, interpretation of results should be based   on gestational age, weight and in agreement with clinical   observations.    Premature Infant recommended reference ranges:   0-24 hours:  <8.0 mg/dL   24-48 hours: <12.0 mg/dL   3-5 days:    <15.0 mg/dL   6-29 days:   <15.0 mg/dL   07/29/2025 0.2 0.1 - 1.0 mg/dL Final     Comment:     For infants and newborns, interpretation of results should be based   on gestational age, weight and in agreement with clinical   observations.    Premature Infant recommended reference ranges:   0-24 hours:  <8.0 mg/dL   24-48 hours: <12.0 mg/dL   3-5 days:    <15.0 mg/dL   6-29 days:   <15.0 mg/dL   07/22/2025 0.2 0.1 - 1.0 mg/dL Final     Comment:     For infants and newborns, interpretation of results should be based   on gestational age, weight and in agreement with clinical   observations.    Premature Infant recommended reference ranges:   0-24 hours:  <8.0 mg/dL   24-48 hours: <12.0 mg/dL   3-5 days:    <15.0 mg/dL   6-29 days:   <15.0 mg/dL     AST   Date Value Ref Range Status   08/05/2025 31 11 - 45 unit/L Final   07/29/2025 35 11 - 45 unit/L Final   07/22/2025 23 11 - 45 unit/L Final   03/20/2025 59 (H) 10 - 40 U/L Final   03/17/2025 44 (H) 10 - 40 U/L Final   03/13/2025 40 10 - 40 U/L Final     ALT   Date Value Ref Range Status   08/05/2025 21 10 - 44 unit/L Final   07/29/2025 27 10 - 44 unit/L Final   07/22/2025 17 10 - 44 unit/L Final   03/20/2025 85 (H) 10 - 44 U/L Final   03/17/2025 64 (H) 10 - 44 U/L Final   03/13/2025 49 (H) 10 - 44 U/L Final             Carolina Carson, Pharm.D., BCOP  Clinical Pharmacy Specialist, Bone Marrow Transplant/Cellular Therapy  [facility]  SpectraLink: [phone]

## 2025-08-07 ENCOUNTER — TELEPHONE (OUTPATIENT)
Dept: HEMATOLOGY/ONCOLOGY | Facility: CLINIC | Age: 55
End: 2025-08-07
Payer: COMMERCIAL

## 2025-08-07 ENCOUNTER — PATIENT MESSAGE (OUTPATIENT)
Dept: HEMATOLOGY/ONCOLOGY | Facility: CLINIC | Age: 55
End: 2025-08-07
Payer: COMMERCIAL

## 2025-08-07 NOTE — TELEPHONE ENCOUNTER
Called patient to advise that I spoke with Sam Art from number provided and was told that his policy was inactive.    I contacted [employer] pharmacy at [phone] and spoke with representative who stated that medication had a paid claim and that they would be reaching out shortly to arrange delivery.    This was communicated to patient who stated that he would contact pharmacy to follow up

## 2025-08-08 ENCOUNTER — TELEPHONE (OUTPATIENT)
Dept: HEMATOLOGY/ONCOLOGY | Facility: CLINIC | Age: 55
End: 2025-08-08
Payer: COMMERCIAL

## 2025-08-08 NOTE — TELEPHONE ENCOUNTER
Julio Cesar with Clinical Pharmacy Care with [facility] states that prior Auth     Was transferred to peer to peer review     Phone : [phone]  Follow prompts to 2,5, 2,3     Peer to peer review was initiated with timeframe to call back within 24 business hours

## 2025-08-08 NOTE — TELEPHONE ENCOUNTER
Copied from CRM #[id number]. Topic: Medications - Medication Authorization  >> Aug 8, 2025  8:58 AM Jessika wrote:  Name of Pharmacy:  [facility]      Name of Medication:    letermovir (PREVYMIS) 480 mg Tab     Comments/advisory:   Ref#  [id number]    Auth Team  [phone]  Quantity limit being exceeds at 224 tables a year  additional info needed for more meds

## 2025-08-08 NOTE — TELEPHONE ENCOUNTER
Returned call to [facility] pharmacy. No further records needed at this time. Review still under way

## 2025-08-11 ENCOUNTER — TELEPHONE (OUTPATIENT)
Dept: HEMATOLOGY/ONCOLOGY | Facility: CLINIC | Age: 55
End: 2025-08-11
Payer: COMMERCIAL

## 2025-08-12 ENCOUNTER — LAB VISIT (OUTPATIENT)
Dept: LAB | Facility: HOSPITAL | Age: 55
End: 2025-08-12
Attending: INTERNAL MEDICINE
Payer: COMMERCIAL

## 2025-08-12 ENCOUNTER — TELEPHONE (OUTPATIENT)
Dept: HEMATOLOGY/ONCOLOGY | Facility: CLINIC | Age: 55
End: 2025-08-12
Payer: COMMERCIAL

## 2025-08-12 DIAGNOSIS — Z94.84 HISTORY OF ALLOGENEIC STEM CELL TRANSPLANT: ICD-10-CM

## 2025-08-12 DIAGNOSIS — D47.1 PRIMARY MYELOFIBROSIS: ICD-10-CM

## 2025-08-12 DIAGNOSIS — Z76.82 STEM CELL TRANSPLANT CANDIDATE: ICD-10-CM

## 2025-08-12 LAB
ABSOLUTE EOSINOPHIL (OHS): 0.3 K/UL
ABSOLUTE MONOCYTE (OHS): 0.54 K/UL (ref 0.3–1)
ABSOLUTE NEUTROPHIL COUNT (OHS): 3.94 K/UL (ref 1.8–7.7)
ALBUMIN SERPL BCP-MCNC: 4 G/DL (ref 3.5–5.2)
ALP SERPL-CCNC: 140 UNIT/L (ref 40–150)
ALT SERPL W/O P-5'-P-CCNC: 25 UNIT/L (ref 10–44)
ANION GAP (OHS): 10 MMOL/L (ref 8–16)
AST SERPL-CCNC: 31 UNIT/L (ref 11–45)
BASOPHILS # BLD AUTO: 0.03 K/UL
BASOPHILS NFR BLD AUTO: 0.5 %
BILIRUB SERPL-MCNC: 0.2 MG/DL (ref 0.1–1)
BUN SERPL-MCNC: 28 MG/DL (ref 6–20)
CALCIUM SERPL-MCNC: 9.5 MG/DL (ref 8.7–10.5)
CHLORIDE SERPL-SCNC: 106 MMOL/L (ref 95–110)
CO2 SERPL-SCNC: 24 MMOL/L (ref 23–29)
CREAT SERPL-MCNC: 1.3 MG/DL (ref 0.5–1.4)
ERYTHROCYTE [DISTWIDTH] IN BLOOD BY AUTOMATED COUNT: 13.3 % (ref 11.5–14.5)
GFR SERPLBLD CREATININE-BSD FMLA CKD-EPI: >60 ML/MIN/1.73/M2
GLUCOSE SERPL-MCNC: 94 MG/DL (ref 70–110)
HCT VFR BLD AUTO: 32.3 % (ref 40–54)
HGB BLD-MCNC: 10.4 GM/DL (ref 14–18)
IMM GRANULOCYTES # BLD AUTO: 0.04 K/UL (ref 0–0.04)
IMM GRANULOCYTES NFR BLD AUTO: 0.7 % (ref 0–0.5)
INDIRECT COOMBS: NORMAL
LYMPHOCYTES # BLD AUTO: 0.96 K/UL (ref 1–4.8)
MAGNESIUM SERPL-MCNC: 1.9 MG/DL (ref 1.6–2.6)
MCH RBC QN AUTO: 36.6 PG (ref 27–31)
MCHC RBC AUTO-ENTMCNC: 32.2 G/DL (ref 32–36)
MCV RBC AUTO: 114 FL (ref 82–98)
NUCLEATED RBC (/100WBC) (OHS): 0 /100 WBC
PHOSPHATE SERPL-MCNC: 3.4 MG/DL (ref 2.7–4.5)
PLATELET # BLD AUTO: 229 K/UL (ref 150–450)
PMV BLD AUTO: 9.8 FL (ref 9.2–12.9)
POTASSIUM SERPL-SCNC: 5.1 MMOL/L (ref 3.5–5.1)
PROT SERPL-MCNC: 7.1 GM/DL (ref 6–8.4)
RBC # BLD AUTO: 2.84 M/UL (ref 4.6–6.2)
RELATIVE EOSINOPHIL (OHS): 5.2 %
RELATIVE LYMPHOCYTE (OHS): 16.5 % (ref 18–48)
RELATIVE MONOCYTE (OHS): 9.3 % (ref 4–15)
RELATIVE NEUTROPHIL (OHS): 67.8 % (ref 38–73)
RH BLD: NORMAL
SODIUM SERPL-SCNC: 140 MMOL/L (ref 136–145)
SPECIMEN OUTDATE: NORMAL
WBC # BLD AUTO: 5.81 K/UL (ref 3.9–12.7)

## 2025-08-12 PROCEDURE — 84100 ASSAY OF PHOSPHORUS: CPT

## 2025-08-12 PROCEDURE — 36415 COLL VENOUS BLD VENIPUNCTURE: CPT | Mod: PO

## 2025-08-12 PROCEDURE — 83735 ASSAY OF MAGNESIUM: CPT

## 2025-08-12 PROCEDURE — 80197 ASSAY OF TACROLIMUS: CPT

## 2025-08-12 PROCEDURE — 82040 ASSAY OF SERUM ALBUMIN: CPT

## 2025-08-12 PROCEDURE — 86901 BLOOD TYPING SEROLOGIC RH(D): CPT | Performed by: INTERNAL MEDICINE

## 2025-08-12 PROCEDURE — 87799 DETECT AGENT NOS DNA QUANT: CPT

## 2025-08-12 PROCEDURE — 85025 COMPLETE CBC W/AUTO DIFF WBC: CPT

## 2025-08-13 DIAGNOSIS — Z94.84 HISTORY OF ALLOGENEIC STEM CELL TRANSPLANT: ICD-10-CM

## 2025-08-13 LAB
CYTOMEGALOVIRUS DNA, QUAL (OHS): NOT DETECTED
EPSTEIN-BARR VIRUS DNA, QUAL (OHS): NORMAL
TACROLIMUS BLD-MCNC: 4.4 NG/ML (ref 5–15)

## 2025-08-13 RX ORDER — TACROLIMUS 0.5 MG/1
CAPSULE ORAL
Qty: 120 CAPSULE | Refills: 11 | Status: SHIPPED | OUTPATIENT
Start: 2025-08-13

## 2025-08-19 ENCOUNTER — LAB VISIT (OUTPATIENT)
Dept: LAB | Facility: HOSPITAL | Age: 55
End: 2025-08-19
Attending: INTERNAL MEDICINE
Payer: COMMERCIAL

## 2025-08-19 DIAGNOSIS — D47.1 PRIMARY MYELOFIBROSIS: ICD-10-CM

## 2025-08-19 DIAGNOSIS — Z94.84 HISTORY OF ALLOGENEIC STEM CELL TRANSPLANT: ICD-10-CM

## 2025-08-19 DIAGNOSIS — Z76.82 STEM CELL TRANSPLANT CANDIDATE: ICD-10-CM

## 2025-08-19 LAB
ABSOLUTE EOSINOPHIL (OHS): 0.26 K/UL
ABSOLUTE MONOCYTE (OHS): 0.57 K/UL (ref 0.3–1)
ABSOLUTE NEUTROPHIL COUNT (OHS): 3.89 K/UL (ref 1.8–7.7)
ALBUMIN SERPL BCP-MCNC: 4 G/DL (ref 3.5–5.2)
ALP SERPL-CCNC: 132 UNIT/L (ref 40–150)
ALT SERPL W/O P-5'-P-CCNC: 21 UNIT/L (ref 10–44)
ANION GAP (OHS): 10 MMOL/L (ref 8–16)
AST SERPL-CCNC: 29 UNIT/L (ref 11–45)
BASOPHILS # BLD AUTO: 0.03 K/UL
BASOPHILS NFR BLD AUTO: 0.5 %
BILIRUB SERPL-MCNC: 0.3 MG/DL (ref 0.1–1)
BUN SERPL-MCNC: 24 MG/DL (ref 6–20)
CALCIUM SERPL-MCNC: 9.7 MG/DL (ref 8.7–10.5)
CHLORIDE SERPL-SCNC: 105 MMOL/L (ref 95–110)
CO2 SERPL-SCNC: 25 MMOL/L (ref 23–29)
CREAT SERPL-MCNC: 1.3 MG/DL (ref 0.5–1.4)
ERYTHROCYTE [DISTWIDTH] IN BLOOD BY AUTOMATED COUNT: 13.1 % (ref 11.5–14.5)
GFR SERPLBLD CREATININE-BSD FMLA CKD-EPI: >60 ML/MIN/1.73/M2
GLUCOSE SERPL-MCNC: 98 MG/DL (ref 70–110)
HCT VFR BLD AUTO: 33 % (ref 40–54)
HGB BLD-MCNC: 10.6 GM/DL (ref 14–18)
IMM GRANULOCYTES # BLD AUTO: 0.03 K/UL (ref 0–0.04)
IMM GRANULOCYTES NFR BLD AUTO: 0.5 % (ref 0–0.5)
INDIRECT COOMBS: NORMAL
LYMPHOCYTES # BLD AUTO: 1.01 K/UL (ref 1–4.8)
MAGNESIUM SERPL-MCNC: 2 MG/DL (ref 1.6–2.6)
MCH RBC QN AUTO: 35.8 PG (ref 27–31)
MCHC RBC AUTO-ENTMCNC: 32.1 G/DL (ref 32–36)
MCV RBC AUTO: 112 FL (ref 82–98)
NUCLEATED RBC (/100WBC) (OHS): 0 /100 WBC
PHOSPHATE SERPL-MCNC: 3.3 MG/DL (ref 2.7–4.5)
PLATELET # BLD AUTO: 225 K/UL (ref 150–450)
PMV BLD AUTO: 9.4 FL (ref 9.2–12.9)
POTASSIUM SERPL-SCNC: 4.7 MMOL/L (ref 3.5–5.1)
PROT SERPL-MCNC: 7.1 GM/DL (ref 6–8.4)
RBC # BLD AUTO: 2.96 M/UL (ref 4.6–6.2)
RELATIVE EOSINOPHIL (OHS): 4.5 %
RELATIVE LYMPHOCYTE (OHS): 17.4 % (ref 18–48)
RELATIVE MONOCYTE (OHS): 9.8 % (ref 4–15)
RELATIVE NEUTROPHIL (OHS): 67.3 % (ref 38–73)
RH BLD: NORMAL
SODIUM SERPL-SCNC: 140 MMOL/L (ref 136–145)
SPECIMEN OUTDATE: NORMAL
WBC # BLD AUTO: 5.79 K/UL (ref 3.9–12.7)

## 2025-08-19 PROCEDURE — 82565 ASSAY OF CREATININE: CPT

## 2025-08-19 PROCEDURE — 86901 BLOOD TYPING SEROLOGIC RH(D): CPT | Performed by: INTERNAL MEDICINE

## 2025-08-19 PROCEDURE — 85025 COMPLETE CBC W/AUTO DIFF WBC: CPT

## 2025-08-19 PROCEDURE — 83735 ASSAY OF MAGNESIUM: CPT

## 2025-08-19 PROCEDURE — 80197 ASSAY OF TACROLIMUS: CPT

## 2025-08-19 PROCEDURE — 36415 COLL VENOUS BLD VENIPUNCTURE: CPT | Mod: PO

## 2025-08-19 PROCEDURE — 87799 DETECT AGENT NOS DNA QUANT: CPT

## 2025-08-19 PROCEDURE — 84100 ASSAY OF PHOSPHORUS: CPT

## 2025-08-20 LAB
CYTOMEGALOVIRUS DNA, QUAL (OHS): NOT DETECTED
EPSTEIN-BARR VIRUS DNA, QUAL (OHS): NORMAL
TACROLIMUS BLD-MCNC: 6 NG/ML (ref 5–15)

## 2025-08-22 ENCOUNTER — OFFICE VISIT (OUTPATIENT)
Dept: HEMATOLOGY/ONCOLOGY | Facility: CLINIC | Age: 55
End: 2025-08-22
Payer: COMMERCIAL

## 2025-08-22 ENCOUNTER — PATIENT MESSAGE (OUTPATIENT)
Dept: HEMATOLOGY/ONCOLOGY | Facility: CLINIC | Age: 55
End: 2025-08-22

## 2025-08-22 VITALS
DIASTOLIC BLOOD PRESSURE: 84 MMHG | TEMPERATURE: 98 F | SYSTOLIC BLOOD PRESSURE: 130 MMHG | WEIGHT: 179 LBS | HEIGHT: 71 IN | BODY MASS INDEX: 25.06 KG/M2 | HEART RATE: 80 BPM | OXYGEN SATURATION: 99 %

## 2025-08-22 DIAGNOSIS — D84.821 IMMUNODEFICIENCY DUE TO DRUG THERAPY: ICD-10-CM

## 2025-08-22 DIAGNOSIS — L72.9 SKIN CYST: ICD-10-CM

## 2025-08-22 DIAGNOSIS — D64.9 ANEMIA, UNSPECIFIED TYPE: ICD-10-CM

## 2025-08-22 DIAGNOSIS — Z79.899 IMMUNODEFICIENCY DUE TO DRUG THERAPY: ICD-10-CM

## 2025-08-22 DIAGNOSIS — Z94.84 HISTORY OF ALLOGENEIC STEM CELL TRANSPLANT: Primary | ICD-10-CM

## 2025-08-22 DIAGNOSIS — I42.9 CARDIOMYOPATHY, UNSPECIFIED TYPE: ICD-10-CM

## 2025-08-22 DIAGNOSIS — D47.1 PRIMARY MYELOFIBROSIS: ICD-10-CM

## 2025-08-22 DIAGNOSIS — E55.9 VITAMIN D DEFICIENCY: ICD-10-CM

## 2025-08-22 PROCEDURE — 99999 PR PBB SHADOW E&M-EST. PATIENT-LVL IV: CPT | Mod: PBBFAC,,,

## 2025-08-22 RX ORDER — TACROLIMUS 0.5 MG/1
CAPSULE ORAL
Qty: 120 CAPSULE | Refills: 11 | Status: SHIPPED | OUTPATIENT
Start: 2025-08-22

## 2025-08-26 ENCOUNTER — LAB VISIT (OUTPATIENT)
Dept: LAB | Facility: HOSPITAL | Age: 55
End: 2025-08-26
Attending: INTERNAL MEDICINE
Payer: COMMERCIAL

## 2025-09-03 ENCOUNTER — TELEPHONE (OUTPATIENT)
Dept: HEMATOLOGY/ONCOLOGY | Facility: CLINIC | Age: 55
End: 2025-09-03
Payer: COMMERCIAL

## (undated) DEVICE — DRAPE T THYROID STERILE

## (undated) DEVICE — APPLICATOR CHLORAPREP ORN 26ML

## (undated) DEVICE — SOL NACL 0.9% INJ PF/50151

## (undated) DEVICE — DECANTER FLUID TRNSF WHITE 9IN

## (undated) DEVICE — DRESSING ANTIMICROBIAL 1 INCH

## (undated) DEVICE — SYR DISP LL 5CC

## (undated) DEVICE — SUT MCRYL PLUS 4-0 PS2 27IN

## (undated) DEVICE — DRAPE C-ARM ELAS CLIP 42X120IN

## (undated) DEVICE — Device

## (undated) DEVICE — TRAY MINOR GEN SURG OMC

## (undated) DEVICE — TIP YANKAUERS BULB NO VENT

## (undated) DEVICE — SYR ONLY LUER LOCK 20CC

## (undated) DEVICE — NDL HYPO REG 25G X 1 1/2

## (undated) DEVICE — ADHESIVE DERMABOND ADVANCED

## (undated) DEVICE — BLADE SURG CARBON STEEL SZ11

## (undated) DEVICE — SUT PROLENE 2-0 30 SH

## (undated) DEVICE — ELECTRODE REM PLYHSV RETURN 9

## (undated) DEVICE — CONTAINER SPECIMEN OR STER 4OZ